# Patient Record
Sex: FEMALE | Race: WHITE | NOT HISPANIC OR LATINO | Employment: OTHER | URBAN - METROPOLITAN AREA
[De-identification: names, ages, dates, MRNs, and addresses within clinical notes are randomized per-mention and may not be internally consistent; named-entity substitution may affect disease eponyms.]

---

## 2017-01-09 ENCOUNTER — ALLSCRIPTS OFFICE VISIT (OUTPATIENT)
Dept: OTHER | Facility: OTHER | Age: 62
End: 2017-01-09

## 2017-03-17 ENCOUNTER — APPOINTMENT (OUTPATIENT)
Dept: LAB | Facility: HOSPITAL | Age: 62
End: 2017-03-17
Payer: COMMERCIAL

## 2017-03-17 ENCOUNTER — ALLSCRIPTS OFFICE VISIT (OUTPATIENT)
Dept: OTHER | Facility: OTHER | Age: 62
End: 2017-03-17

## 2017-03-17 DIAGNOSIS — N39.3 STRESS INCONTINENCE: ICD-10-CM

## 2017-03-17 DIAGNOSIS — N39.0 URINARY TRACT INFECTION: ICD-10-CM

## 2017-03-17 LAB
BILIRUB UR QL STRIP: NORMAL
CLARITY UR: NORMAL
COLOR UR: YELLOW
GLUCOSE (HISTORICAL): NORMAL
HGB UR QL STRIP.AUTO: NORMAL
KETONES UR STRIP-MCNC: NORMAL MG/DL
LEUKOCYTE ESTERASE UR QL STRIP: NORMAL
NITRITE UR QL STRIP: NORMAL
PH UR STRIP.AUTO: 7 [PH]
PROT UR STRIP-MCNC: NORMAL MG/DL
SP GR UR STRIP.AUTO: 1.01
UROBILINOGEN UR QL STRIP.AUTO: 0.2

## 2017-03-17 PROCEDURE — 87186 SC STD MICRODIL/AGAR DIL: CPT

## 2017-03-17 PROCEDURE — 87086 URINE CULTURE/COLONY COUNT: CPT

## 2017-03-17 PROCEDURE — 87077 CULTURE AEROBIC IDENTIFY: CPT

## 2017-03-19 LAB — BACTERIA UR CULT: NORMAL

## 2017-03-20 ENCOUNTER — GENERIC CONVERSION - ENCOUNTER (OUTPATIENT)
Dept: OTHER | Facility: OTHER | Age: 62
End: 2017-03-20

## 2017-05-22 ENCOUNTER — GENERIC CONVERSION - ENCOUNTER (OUTPATIENT)
Dept: OTHER | Facility: OTHER | Age: 62
End: 2017-05-22

## 2017-05-22 LAB
25(OH)D3 SERPL-MCNC: 26.7 NG/ML (ref 30–100)
A/G RATIO (HISTORICAL): 1.8 (ref 1.2–2.2)
ALBUMIN SERPL BCP-MCNC: 4.4 G/DL (ref 3.6–4.8)
ALP SERPL-CCNC: 73 IU/L (ref 39–117)
ALT SERPL W P-5'-P-CCNC: 15 IU/L (ref 0–32)
AMBIG ABBREV CMP14 DEFAULT (HISTORICAL): NORMAL
AST SERPL W P-5'-P-CCNC: 15 IU/L (ref 0–40)
BILIRUB SERPL-MCNC: 0.3 MG/DL (ref 0–1.2)
BUN SERPL-MCNC: 18 MG/DL (ref 8–27)
BUN/CREA RATIO (HISTORICAL): 17 (ref 12–28)
CALCIUM SERPL-MCNC: 9.6 MG/DL (ref 8.7–10.3)
CHLORIDE SERPL-SCNC: 107 MMOL/L (ref 96–106)
CHOLEST SERPL-MCNC: 199 MG/DL (ref 100–199)
CO2 SERPL-SCNC: 23 MMOL/L (ref 18–29)
CREAT SERPL-MCNC: 1.05 MG/DL (ref 0.57–1)
EGFR AFRICAN AMERICAN (HISTORICAL): 66 ML/MIN/1.73
EGFR-AMERICAN CALC (HISTORICAL): 57 ML/MIN/1.73
GLUCOSE SERPL-MCNC: 111 MG/DL (ref 65–99)
HDLC SERPL-MCNC: 56 MG/DL
LDLC SERPL CALC-MCNC: 111 MG/DL (ref 0–99)
POTASSIUM SERPL-SCNC: 4.6 MMOL/L (ref 3.5–5.2)
SODIUM SERPL-SCNC: 147 MMOL/L (ref 134–144)
TOT. GLOBULIN, SERUM (HISTORICAL): 2.5 G/DL (ref 1.5–4.5)
TOTAL PROTEIN (HISTORICAL): 6.9 G/DL (ref 6–8.5)
TRIGL SERPL-MCNC: 160 MG/DL (ref 0–149)

## 2017-05-23 ENCOUNTER — GENERIC CONVERSION - ENCOUNTER (OUTPATIENT)
Dept: OTHER | Facility: OTHER | Age: 62
End: 2017-05-23

## 2017-05-23 LAB — TSH SERPL DL<=0.05 MIU/L-ACNC: 2.4 UIU/ML (ref 0.45–4.5)

## 2017-06-05 ENCOUNTER — ALLSCRIPTS OFFICE VISIT (OUTPATIENT)
Dept: OTHER | Facility: OTHER | Age: 62
End: 2017-06-05

## 2017-06-05 DIAGNOSIS — F31.63 BIPOLAR DISORDER, CURRENT EPISODE MIXED, SEVERE, WITHOUT PSYCHOTIC FEATURES (HCC): ICD-10-CM

## 2017-06-21 ENCOUNTER — GENERIC CONVERSION - ENCOUNTER (OUTPATIENT)
Dept: OTHER | Facility: OTHER | Age: 62
End: 2017-06-21

## 2017-09-12 ENCOUNTER — GENERIC CONVERSION - ENCOUNTER (OUTPATIENT)
Dept: OTHER | Facility: OTHER | Age: 62
End: 2017-09-12

## 2017-09-13 ENCOUNTER — GENERIC CONVERSION - ENCOUNTER (OUTPATIENT)
Dept: OTHER | Facility: OTHER | Age: 62
End: 2017-09-13

## 2017-09-13 LAB — LITHIUM LEVEL (HISTORICAL): 1 MMOL/L (ref 0.6–1.2)

## 2017-09-25 ENCOUNTER — ALLSCRIPTS OFFICE VISIT (OUTPATIENT)
Dept: OTHER | Facility: OTHER | Age: 62
End: 2017-09-25

## 2017-11-16 ENCOUNTER — ALLSCRIPTS OFFICE VISIT (OUTPATIENT)
Dept: OTHER | Facility: OTHER | Age: 62
End: 2017-11-16

## 2017-11-17 LAB
A/G RATIO (HISTORICAL): 1.8 (ref 1.2–2.2)
ALBUMIN SERPL BCP-MCNC: 4.5 G/DL (ref 3.6–4.8)
ALP SERPL-CCNC: 78 IU/L (ref 39–117)
ALT SERPL W P-5'-P-CCNC: 17 IU/L (ref 0–32)
AST SERPL W P-5'-P-CCNC: 18 IU/L (ref 0–40)
BILIRUB SERPL-MCNC: 0.4 MG/DL (ref 0–1.2)
BUN SERPL-MCNC: 16 MG/DL (ref 8–27)
BUN/CREA RATIO (HISTORICAL): 13 (ref 12–28)
CALCIUM SERPL-MCNC: 9.5 MG/DL (ref 8.7–10.3)
CHLORIDE SERPL-SCNC: 108 MMOL/L (ref 96–106)
CHOLEST SERPL-MCNC: 208 MG/DL (ref 100–199)
CREAT SERPL-MCNC: 1.24 MG/DL (ref 0.57–1)
EGFR AFRICAN AMERICAN (HISTORICAL): 53 ML/MIN/1.73
EGFR-AMERICAN CALC (HISTORICAL): 46 ML/MIN/1.73
GLUCOSE SERPL-MCNC: 124 MG/DL (ref 65–99)
HDLC SERPL-MCNC: 56 MG/DL
LDL/HDL RATIO (HISTORICAL): 2.2 RATIO UNITS (ref 0–3.2)
LDLC SERPL CALC-MCNC: 123 MG/DL (ref 0–99)
POTASSIUM SERPL-SCNC: 4.4 MMOL/L (ref 3.5–5.2)
SODIUM SERPL-SCNC: 147 MMOL/L (ref 134–144)
TOT. GLOBULIN, SERUM (HISTORICAL): 2.5 G/DL (ref 1.5–4.5)
TOTAL PROTEIN (HISTORICAL): 7 G/DL (ref 6–8.5)
TRIGL SERPL-MCNC: 146 MG/DL (ref 0–149)
VLDLC SERPL CALC-MCNC: 29 MG/DL (ref 5–40)

## 2017-11-18 LAB
T4 FREE SERPL-MCNC: 1.28 NG/DL (ref 0.82–1.77)
TSH SERPL DL<=0.05 MIU/L-ACNC: 3.68 UIU/ML (ref 0.45–4.5)

## 2017-11-20 ENCOUNTER — GENERIC CONVERSION - ENCOUNTER (OUTPATIENT)
Dept: OTHER | Facility: OTHER | Age: 62
End: 2017-11-20

## 2017-11-20 LAB — 25(OH)D3 SERPL-MCNC: 28 NG/ML

## 2017-11-21 ENCOUNTER — GENERIC CONVERSION - ENCOUNTER (OUTPATIENT)
Dept: OTHER | Facility: OTHER | Age: 62
End: 2017-11-21

## 2017-11-21 LAB
EST. AVERAGE GLUCOSE BLD GHB EST-MCNC: 108 MG/DL
HBA1C MFR BLD HPLC: 5.4 %HB

## 2017-12-05 ENCOUNTER — ALLSCRIPTS OFFICE VISIT (OUTPATIENT)
Dept: OTHER | Facility: OTHER | Age: 62
End: 2017-12-05

## 2017-12-06 NOTE — PROGRESS NOTES
Assessment    1  GERD without esophagitis (530 81) (K21 9)   2  Hypothyroidism (244 9) (E03 9)   3  Impaired fasting glucose (790 21) (R73 01)   4  Bipolar 1 disorder, mixed, severe (296 63) (F31 63)   5  Vitamin D deficiency (268 9) (E55 9)    Plan  Hypothyroidism    · From  Levothyroxine Sodium 75 MCG Oral Tablet Take 1 tablet daily ToLevothyroxine Sodium 88 MCG Oral Tablet TAKE 1 TABLET DAILY   · (LC) TSH+Free T4; Status:Active; Requested BXW:53MQU8481;    · Follow-up visit in 6 months Evaluation and Treatment  Follow-up  Status: Hold For -Scheduling  Requested for: 02GDM8665   · Begin a limited exercise program ; Status:Complete;   Done: 53NZR2035 10:31AM   · You may slowly resume your normal level of activity once you feel better  ;Status:Complete;   Done: 72XWP1192 10:31AM  Hypothyroidism, Impaired fasting glucose, Vitamin D deficiency    · (1) COMPREHENSIVE METABOLIC PANEL; Status:Active; Requested XXC:67WYJ0746;    · (1) HEMOGLOBIN A1C; Status:Active; Requested DAVE:06LZV3919;    · (1) LIPID PANEL FASTING W DIRECT LDL REFLEX; Status:Active; Requestedfor:37Ivx9281;    · (1) TSH WITH FT4 REFLEX; Status:Active; Requested PCL:41VAE7120; Discussion/Summary    Has appt in March with psych with Dr Sanaz Gallegos  Chief Complaint  Patient here for 6 month follow up on Gastroesophageal reflux, Hypothyroidism      History of Present Illness  here for follow up   The patient is being seen for follow-up of gastroesophageal reflux disease  The patient reports doing well  There are no comorbid illnesses  She has had no significant interval events  The patient is currently asymptomatic  Medications:  the patient is adherent to her medication regimen  The patient is being seen for follow-up of hypothyroidism of undetermined etiology  The patient reports doing well  She has had no significant interval events  The patient is currently asymptomatic  Medications:  the patient is adherent to her medication regimen     The patient is being seen for a routine clinic follow-up of pre-diabetes  Recent measurements: hemoglobin A1c 5 3 %  The patient is currently asymptomatic  The patient is being seen for follow-up of vitamin D deficiency  Recent laboratory results: 25-hydroxyvitamin D 28 ng/mL  Current treatment includes dietary vitamin D  The patient is currently asymptomatic  Review of Systems   Constitutional: No fever, no chills, feels well, no tiredness, no recent weight gain or weight loss  Eyes: No complaints of eye pain, no red eyes, no eyesight problems, no discharge, no dry eyes, no itching of eyes  ENT: no complaints of earache, no loss of hearing, no nose bleeds, no nasal discharge, no sore throat, no hoarseness  Cardiovascular: No complaints of slow heart rate, no fast heart rate, no chest pain, no palpitations, no leg claudication, no lower extremity edema  Respiratory: No complaints of shortness of breath, no wheezing, no cough, no SOB on exertion, no orthopnea, no PND  Gastrointestinal: constipation, but-- as noted in HPI  Genitourinary: No complaints of dysuria, no incontinence, no pelvic pain, no dysmenorrhea, no vaginal discharge or bleeding  Musculoskeletal: No complaints of arthralgias, no myalgias, no joint swelling or stiffness, no limb pain or swelling  Integumentary: No complaints of skin rash or lesions, no itching, no skin wounds, no breast pain or lump  Neurological: No complaints of headache, no confusion, no convulsions, no numbness, no dizziness or fainting, no tingling, no limb weakness, no difficulty walking  Psychiatric: Not suicidal, no sleep disturbance, no anxiety or depression, no change in personality, no emotional problems  Endocrine: No complaints of proptosis, no hot flashes, no muscle weakness, no deepening of the voice, no feelings of weakness  Hematologic/Lymphatic: No complaints of swollen glands, no swollen glands in the neck, does not bleed easily, does not bruise easily  Active Problems  1  Anxiety (300 00) (F41 9)   2  Bipolar 1 disorder, mixed, severe (296 63) (F31 63)   3  Complaints of memory disturbance (780 93) (R41 3)   4  Depression with anxiety (300 4) (F41 8)   5  GERD without esophagitis (530 81) (K21 9)   6  Hypothyroidism (244 9) (E03 9)   7  Impaired fasting glucose (790 21) (R73 01)   8  Insomnia (780 52) (G47 00)   9  Need for prophylactic vaccination and inoculation against influenza (V04 81) (Z23)   10  Obstructive sleep apnea (327 23) (G47 33)   11  Onychomycosis of toenail (110 1) (B35 1)   12  Patellofemoral arthritis of right knee (716 96) (M17 11)   13  Patellofemoral dysfunction (719 86) (M25 869)   14  Right knee pain (719 46) (M25 561)   15  Screening for colon cancer (V76 51) (Z12 11)   16  Screening for lipoid disorders (V77 91) (Z13 220)   17  Stress incontinence of urine (N39 3)   18  Vitamin D deficiency (268 9) (E55 9)   19  Well adult on routine health check (V70 0) (Z00 00)    Past Medical History  1  History of Arthritis (V13 4)   2  History of Dysuria (788 1) (R30 0)   3  History of Foot Pain (Soft Tissue) (729 5)   4  History of Hand pain, unspecified laterality   5  History of dermatitis (V13 3) (Z87 2)   6  History of diarrhea (V12 79) (Z87 898)   7  History of fatigue (V13 89) (Z87 898)   8  History of gastroenteritis (V12 79) (Z87 19)   9  History of Well adult on routine health check (V70 0) (Z00 00)    The active problems and past medical history were reviewed and updated today  Surgical History  1  Denied: History Of Prior Surgery    The surgical history was reviewed and updated today  Family History  Mother    1  Family history of Diabetes Mellitus (V18 0)   2  Family history of Heart Valve Replacement   3  Family history of Mother  At Age 78  Father    3  Family history of Arthritis (V17 7)  Maternal Grandmother    5  Family history of Maternal Grandmother Is   Paternal Grandmother    10   Family history of Paternal Grandmother Is   Maternal Grandfather    7  Family history of Maternal Grandfather Is   Paternal Grandfather    6  Family history of Paternal Grandfather Is     The family history was reviewed and updated today  Social History     · Alcohol Use (History)   · Daily Coffee Consumption (3  Cups/Day)   · Denied: History of Drug Use   · Former smoker (V15 82) (Q69 691)   · Marital History - Currently   The social history was reviewed and updated today  Current Meds   1  Cranberry CAPS; Therapy: (HGNIPYUS:86GJM8343) to Recorded   2  Folic Acid 351 MCG Oral Tablet; TAKE 1 TABLET DAILY AS DIRECTED; Therapy: (UXMPOSSM:68WKF0705) to Recorded   3  Glucosamine Complex Oral Tablet; Therapy: (RBLEMYFM:35PKD3922) to Recorded   4  Hair/Skin/Nails/Biotin Oral Tablet; TAKE 1 TABLET DAILY; Therapy: 44DLH3341 to Recorded   5  LamoTRIgine 200 MG Oral Tablet; TAKE 1 TABLET AT BEDTIME; Therapy: 17DFM3777 to (Last JQ:51KON5123)  Requested for: 94KBH1124 Ordered   6  Levothyroxine Sodium 75 MCG Oral Tablet; Take 1 tablet daily; Therapy: 71Dhj9618 to (Last Rx:96Vgn2416)  Requested for: 67SFG7561 Ordered   7  Lithium Carbonate  MG Oral Tablet Extended Release; take 1 tablet twice a day; Therapy: 66CVL3385 to (Last 781 1704)  Requested for: [phone] Ordered   8  Melatonin 10 MG Oral Capsule; TAKE 1 CAPSULE AT BEDTIME; Therapy: 34PAY4688 to Recorded   9  Meloxicam 15 MG Oral Tablet; TAKE 1 TABLET DAILY AS NEEDED; Therapy: 2016 to (Evaluate:29Kkt7627)  Requested for: 72GQY6086; Last Rx:2016 Ordered   10  Multi-Vitamin TABS; Therapy: (BSGHZVXE:37WAT2757) to Recorded   11  Nystatin 179767 UNIT/GM External Ointment; APPLY 2-3 TIMES DAILY TO AFFECTED  AREA(S); Therapy: 79LKQ1465 to (LIIQEDRQ:25JEL7161)  Requested for: 12WVC2904; Last  Rx:95Pot8954 Ordered   12  Nystatin 237355 UNIT/GM External Powder; APPLY 2-3 TIMES DAILY TO AFFECTED  AREA(S);   Therapy: 43ZHG1108 to (Last Rx: 00UPM9244)  Requested for: 49WRH8993 Ordered   13  Omeprazole 40 MG Oral Capsule Delayed Release; take 1 capsule daily; Therapy: 94AXQ7804 to (Last Rx:06Apr2017)  Requested for: 06Apr2017 Ordered   14  Tolterodine Tartrate ER 2 MG Oral Capsule Extended Release 24 Hour; take 1 capsule  daily; Therapy: 38OUI9609 to (Evaluate:99Nqr4728)  Requested for: 51XFT2470; Last  Rx:64Gyh9439 Ordered   15  Triamcinolone Acetonide 0 1 % External Cream; APPLY SPARINGLY AND RUB IN WELL  TO AFFECTED AREA(S) TWICE DAILY  NOT FOR FACE, BREAST OR GROIN;  Therapy: 44MNG6013 to (Last Rx:64Uxj8589)  Requested for: 90Tcz6009 Ordered   16  Vitamin B12 TABS; Therapy: (UEPFWXVH:22UHH0721) to Recorded   17  Vitamin D 2000 UNIT Oral Tablet; TAKE 1 CAPSULE BY MOUTH ONCE DAILY - VIT D  DEFICIENCY; Therapy: (DEONLROR:72SRM3793) to Recorded    The medication list was reviewed and updated today  Allergies  1  Cherry Flavor LIQD   2  Peach Flavor LIQD  3  Apples   4  Other    Vitals  Vital Signs    Recorded: 30HKA6660 10:16AM   Heart Rate 80    Respiration 18    Systolic 338    Diastolic 70    Patient Refused Height Yes Yes   Patient Refused Weight Yes Yes       Physical Exam   Constitutional  General appearance: No acute distress, well appearing and well nourished  Pulmonary  Respiratory effort: No increased work of breathing or signs of respiratory distress  Auscultation of lungs: Clear to auscultation  Cardiovascular  Auscultation of heart: Normal rate and rhythm, normal S1 and S2, without murmurs  Examination of extremities for edema and/or varicosities: Normal    Abdomen  Abdomen: Non-tender, no masses  Liver and spleen: No hepatomegaly or splenomegaly  Lymphatic  Palpation of lymph nodes in neck: No lymphadenopathy  Musculoskeletal  Gait and station: Normal    Skin  Skin and subcutaneous tissue: Normal without rashes or lesions           Future Appointments    Date/Time Provider Specialty Site   12/05/2017 10:30 AM Idalia Kiran DO Family Medicine [facility]       Signatures   Electronically signed by : Kimberley Velazquez DO; Dec  5 2017 10:32AM EST                       (Author)

## 2018-01-10 NOTE — PROGRESS NOTES
Chief Complaint  Patient here for weight check      Active Problems    1  Anxiety (300 00) (F41 9)   2  Bipolar 1 disorder, mixed, severe (296 63) (F31 63)   3  Complaints of memory disturbance (780 93) (R41 3)   4  Depression with anxiety (300 4) (F41 8)   5  Flu vaccine need (V04 81) (Z23)   6  GERD without esophagitis (530 81) (K21 9)   7  Hypothyroidism (244 9) (E03 9)   8  Impaired fasting glucose (790 21) (R73 01)   9  Insomnia (780 52) (G47 00)   10  Need for chickenpox vaccination (V05 4) (Z23)   11  Need for DTaP vaccination (V06 1) (Z23)   12  Obstructive sleep apnea (327 23) (G47 33)   13  Patellofemoral arthritis of right knee (716 96) (M19 90)   14  Patellofemoral dysfunction (719 86) (M25 869)   15  Screening for lipoid disorders (V77 91) (Z13 220)   16  Stress incontinence (N39 3)   17  Well adult on routine health check (V70 0) (Z00 00)    Current Meds   1  Cranberry CAPS; Therapy: (NPFJGU83ZII9835) to Recorded   2  Folic Acid 851 MCG Oral Tablet; TAKE 1 TABLET DAILY AS DIRECTED; Therapy: (WCWTPJVV:10NBL5668) to Recorded   3  Glucosamine Complex Oral Tablet; Therapy: (QOPEXDFX:28BMX0014) to Recorded   4  Hair/Skin/Nails/Biotin Oral Tablet; TAKE 1 TABLET DAILY; Therapy: 53WXS6486 to Recorded   5  LamoTRIgine 200 MG Oral Tablet; TAKE 1 TABLET AT BEDTIME; Therapy: 79NFO6378 to (Evaluate:75Llg7229)  Requested for: 49Sbe1567; Last   Rx:55Bfm7235 Ordered   6  Levothyroxine Sodium 50 MCG Oral Tablet; take 1 tablet every day; Therapy: 59Xoe1275 to (Evaluate:82Iux7248)  Requested for: 85VUK4780; Last   Rx:2015 Ordered   7  Lithium Carbonate  MG Oral Tablet Extended Release; take 1 tablet twice a day; Therapy: 67HON6928 to (Last Rx:96Ovm8300)  Requested for: 60Wqx8300 Ordered   8  Melatonin 10 MG Oral Capsule; TAKE 1 CAPSULE AT BEDTIME; Therapy: 44VPI0897 to Recorded   9  Meloxicam 15 MG Oral Tablet; TAKE 1 TABLET DAILY AS NEEDED;    Therapy: 2016 to (Evaluate:10Bxq3968) Requested for: 21Jan2016; Last   DP:98XKC5230 Ordered   10  Multi-Vitamin TABS; Therapy: (AEFQDYBN:09EPQ0615) to Recorded   11  Omeprazole 40 MG Oral Capsule Delayed Release; take 1 capsule daily; Therapy: 44RSG9944 to (Last Rx:13Jan2016)  Requested for: 56UDD1602 Ordered   12  Triamcinolone Acetonide 0 1 % External Cream; APPLY SPARINGLY AND RUB IN    WELL TO AFFECTED AREA(S) TWICE DAILY  NOT FOR FACE, BREAST OR GROIN;    Therapy: 73ORY5123 to (Last Rx:20Ztl4214)  Requested for: 69Zbl5535 Ordered   13  Vitamin B12 TABS; Therapy: (HDMUHVQJ:39WWN3636) to Recorded   14  Vitamin D 2000 UNIT Oral Tablet; TAKE 1 CAPSULE BY MOUTH ONCE DAILY - VIT D    DEFICIENCY; Therapy: (ZKWMNRVX:10HUZ5633) to Recorded   15  Zoster (Zostavax); INJECT 0 65  ML Subcutaneous; Therapy: 20TQR6111 to (Last Rx:16Apr2014) Ordered    Allergies    1  Cherry Flavor LIQD   2  Peach Flavor LIQD    3  Apples   4  Other    Vitals  Signs [Data Includes: Current Encounter]    Height: 5 ft 1 69 in  Weight: 235 lb   BMI Calculated: 43 42  BSA Calculated: 2 04    Plan   WEIGHT DECREASED     Future Appointments    Date/Time Provider Specialty Site   03/09/2016 09:00 AM CARMEN Gomes   [facility]iatry [facility]   02/03/2016 10:40 AM Humble Rizo DO Orthopedic Surgery [address]   06/14/2016 09:15 AM Teto Alves DO Family Medicine [address] [place] [address]     Signatures   Electronically signed by : Una Baldwin DO; Feb 1 2016  9:12AM EST                       (Author)

## 2018-01-10 NOTE — RESULT NOTES
Verified Results  (1) COMPREHENSIVE METABOLIC PANEL 38YGR9233 78:44TX [facility]     Test Name Result Flag Reference   Glucose, Serum 114 mg/dL H 65-99   BUN 27 mg/dL  8-27   Creatinine, Serum 1 09 mg/dL H 0 57-1 00   eGFR If NonAfricn Am 55 mL/min/1 73 L >59   eGFR If Africn Am 63 mL/min/1 73  >59   BUN/Creatinine Ratio 25  11-26   Sodium, Serum 144 mmol/L  134-144   Potassium, Serum 4 6 mmol/L  3 5-5 2   Chloride, Serum 105 mmol/L     Carbon Dioxide, Total 24 mmol/L  18-29   Calcium, Serum 9 5 mg/dL  8 7-10 3   Protein, Total, Serum 6 9 g/dL  6 0-8 5   Albumin, Serum 4 4 g/dL  3 6-4 8   Globulin, Total 2 5 g/dL  1 5-4 5   A/G Ratio 1 8  1 1-2 5   Bilirubin, Total 0 3 mg/dL  0 0-1 2   Alkaline Phosphatase, S 80 IU/L     AST (SGOT) 16 IU/L  0-40   ALT (SGPT) 18 IU/L  0-32     (1) LIPID PANEL FASTING W DIRECT LDL REFLEX 51Nep6102 08:27AM [facility]     Test Name Result Flag Reference   Cholesterol, Total 210 mg/dL H 100-199   Triglycerides 117 mg/dL  0-149   HDL Cholesterol 58 mg/dL  >39   According to ATP-III Guidelines, HDL-C >59 mg/dL is considered a  negative risk factor for CHD  LDL Cholesterol Calc 129 mg/dL H 0-99     (1) HEMOGLOBIN A1C 72Ues7105 08:27AM [facility]     Test Name Result Flag Reference   Hemoglobin A1c 5 5 %  4 8-5 6   Pre-diabetes: 5 7 - 6 4           Diabetes: >6 4           Glycemic control for adults with diabetes: <7 0     (1) VITAMIN D 25-HYDROXY 63Zcw0541 08:27AM [facility]     Test Name Result Flag Reference   Vitamin D, 25-Hydroxy 22 1 ng/mL L 30 0-100 0   Vitamin D deficiency has been defined by the [address] practice guideline as a  level of serum 25-OH vitamin D less than 20 ng/mL (1,2)  The Endocrine Society went on to further define vitamin D  insufficiency as a level between 21 and 29 ng/mL (2)  1  IOM ([place] [employer])  2010  Dietary reference     intakes for calcium and D  [facility]:  [email or web address]  2  Dori MF, Kaycee COURTNEY, Tuan SCHMID, et al      Evaluation, treatment, and prevention of vitamin D     deficiency: an Endocrine Society clinical practice     guideline  JCEM  2011 Jul; 96(7):1911-30  (LC) TSH Rfx on Abnormal to Free T4 31Cph5043 08:27AM [employer]     Test Name Result Flag Reference   TSH 1 850 uIU/mL  0 450-4 500     [facility]) [facility] Default 52XIA4277 08:27AM [employer]     Test Name Result Flag Reference   [place] [address] Default Comment     A hand-written panel/profile was received from your office  In  accordance with the [facility] Ambiguous Test Code Policy dated July 4568, we have completed your order by using the closest currently  or formerly recognized AMA panel  We have assigned Comprehensive  Metabolic Panel (14), Test Code #278440 to this request   If this  is not the testing you wished to receive on this specimen, please  contact the [address] [place] [address] Client Inquiry/Technical Services Department  to clarify the test order  We appreciate your business  Discussion/Summary   OVERALL OK  WILL DISCUSS AT NEXT VISIT     Marjorie Ventura

## 2018-01-11 NOTE — RESULT NOTES
Discussion/Summary   NORMAL LITHIUM LEVEL      [facility]     Verified Results  (1) LITHIUM 42Qgg8023 11:02AM [facility] [place]     Test Name Result Flag Reference   Lithium (Eskalith(R)), Serum 1 0 mmol/L  0 6-1 2   Detection Limit = 0 1                                           <0 1 indicates None Detected

## 2018-01-12 VITALS
SYSTOLIC BLOOD PRESSURE: 148 MMHG | HEART RATE: 80 BPM | TEMPERATURE: 99.1 F | DIASTOLIC BLOOD PRESSURE: 86 MMHG | RESPIRATION RATE: 20 BRPM

## 2018-01-12 NOTE — RESULT NOTES
Verified Results  * XR KNEE 4+ VIEW RIGHT 25Jan2016 09:31AM [facility] Order Number: [id number]     Test Name Result Flag Reference   XR KNEE 4+ VW RIGHT (Report)     RIGHT KNEE     INDICATION:  right knee pain, swelling     COMPARISON: 6/19/2014     VIEWS: 4; 4 images     FINDINGS:     There is no acute fracture or dislocation  There is no joint effusion  There is worsening tricompartmental osteoarthritis of the right knee  There is now moderate to moderately severe medial compartment narrowing  Marginal hypertrophic spurring is present, including the patellofemoral joint     No lytic or blastic lesions are seen  Soft tissues are unremarkable  IMPRESSION:     Worsening tricompartmental osteoarthritis of the right knee       Signed by:   Bethel Franks MD   1/25/16       Discussion/Summary   worsening arthritis of right knee   f/u with orthopedic as ordered     - Dr Alston    Electronically signed by : Vonnie Ch MD; Jan 25 2016 11:45AM EST                       (Author)

## 2018-01-12 NOTE — RESULT NOTES
Discussion/Summary   VERY GOOD      [facility]     Verified Results  ([facility] [facility]) Hemoglobin A1c 44BBT1976 07:45AM [facility] Saavedra     Test Name Result Flag Reference   Hemoglobin A1c 5 4 %Hb     Reference Range:  American Diabetes Association (ADA) Guidelines:  <5 7: Decreased risk for diabetes  5 7 - 6 4: Increased risk for diabetes  >6 4: Ongoing Hyperglycemia of any cause  <7 0: Glycemic control for adults with diabetes   Estimated Average Glucose 108 mg/dL

## 2018-01-13 VITALS
SYSTOLIC BLOOD PRESSURE: 124 MMHG | RESPIRATION RATE: 18 BRPM | DIASTOLIC BLOOD PRESSURE: 76 MMHG | WEIGHT: 232.38 LBS | BODY MASS INDEX: 43.88 KG/M2 | HEIGHT: 61 IN | HEART RATE: 80 BPM

## 2018-01-13 VITALS
HEIGHT: 61 IN | WEIGHT: 233.13 LBS | SYSTOLIC BLOOD PRESSURE: 128 MMHG | BODY MASS INDEX: 44.02 KG/M2 | RESPIRATION RATE: 22 BRPM | DIASTOLIC BLOOD PRESSURE: 70 MMHG | HEART RATE: 80 BPM

## 2018-01-13 VITALS
HEIGHT: 61 IN | BODY MASS INDEX: 44.75 KG/M2 | WEIGHT: 237 LBS | RESPIRATION RATE: 20 BRPM | DIASTOLIC BLOOD PRESSURE: 72 MMHG | SYSTOLIC BLOOD PRESSURE: 124 MMHG | HEART RATE: 78 BPM

## 2018-01-13 NOTE — RESULT NOTES
Verified Results  (1) URINALYSIS w URINE C/S REFLEX (will reflex a microscopy if leukocytes, occult blood, or nitrites are not within normal limits) 30UKV4412 06:13PM [facility]     Test Name Result Flag Reference   CLINICAL REPORT (Report)     Test:        Urine culture  Specimen Type:   Urine  Specimen Date:   3/4/2016 6:13 PM  Result Date:    3/6/2016 7:28 AM  Result Status:   Final result  Resulting Lab:   [place] [address]            Tel: [phone]                 CULTURE                                       ------------------                                   30,000-39,000 cfu/ml Mixed Contaminants X3

## 2018-01-13 NOTE — PROGRESS NOTES
Assessment    1  Patellofemoral arthritis of right knee (427 91) (M19 90)    Plan  Patellofemoral arthritis of right knee    · Follow-up PRN Evaluation and Treatment  Follow-up  Status: Complete  Done:  [id number]    Discussion/Summary    Patient seen and examined by Dr Elzbieta Randolph and myself  Right knee tricompartmental osteoarthritis  1  Right knee cortisone injection given today  2  Continue Meloxicam as prescribed by PCP  3  Medial  brace fitted today  4  Follow up as needed  Discussed with patient option of ordering hyaluronic acid injections  She will call if she wishes to pursue this  Discussed future TKA  She was informed she would need to loos weight and get down to a BMI close to 35  Chief Complaint    1  Knee Pain    History of Present Illness  HPI: Patient presents with a chronic history of right knee osteoarthritis  The patient says her pain varies  Occasionally her pain is a 10/10 but other times it is dull at about a 4/10  She complains that this affects her ADLs as she has trouble walking her dog  She notices swelling  Her pain is mostly behind the knee and on the medial side  Here PCP gave her Mobic which helps a bit  She did have a course of PT about a year ago without relief  Complains of some stiffness as well  Review of Systems    Constitutional: No fever, no chills, feels well, no tiredness, no recent weight gain or loss  Eyes: No complaints of eyesight problems, no red eyes  ENT: no loss of hearing, no nosebleeds, no sore throat  Cardiovascular: No complaints of chest pain, no palpitations, no leg claudication or lower extremity edema  Respiratory: no compliants of shortness of breath, no wheezing, no cough  Gastrointestinal: no complaints of abdominal pain, no constipation, no nausea or diarrhea, no vomiting, no bloody stools  Genitourinary: no complaints of dysuria, no incontinence  Musculoskeletal: as noted in HPI     Integumentary: no complaints of skin rash or lesion, no itching or dry skin, no skin wounds  Neurological: no complaints of headache, no confusion, no numbness or tingling, no dizziness  Endocrine: No complaints of muscle weakness, no feelings of weakness, no frequent urination, no excessive thirst    Psychiatric: No suicidal thoughts, no anxiety, no feelings of depression  Active Problems    1  Anxiety (300 00) (F41 9)   2  Bipolar 1 disorder, mixed, severe (296 63) (F31 63)   3  Complaints of memory disturbance (780 93) (R41 3)   4  Depression with anxiety (300 4) (F41 8)   5  Flu vaccine need (V04 81) (Z23)   6  GERD without esophagitis (530 81) (K21 9)   7  Hypothyroidism (244 9) (E03 9)   8  Impaired fasting glucose (790 21) (R73 01)   9  Insomnia (780 52) (G47 00)   10  Need for chickenpox vaccination (V05 4) (Z23)   11  Need for DTaP vaccination (V06 1) (Z23)   12  Obstructive sleep apnea (327 23) (G47 33)   13  Patellofemoral arthritis of right knee (716 96) (M19 90)   14  Patellofemoral dysfunction (719 86) (M25 869)   15  Screening for lipoid disorders (V77 91) (Z13 220)   16  Stress incontinence (N39 3)   17  Well adult on routine health check (V70 0) (Z00 00)    Past Medical History    · History of Arthritis (V13 4)   · History of Foot Pain (Soft Tissue) (729 5)   · History of Hand pain, unspecified laterality   · History of dermatitis (V13 3) (Z87 2)   · History of fatigue (V13 89) (C24 739)    The active problems and past medical history were reviewed and updated today  Surgical History    · Denied: History Of Prior Surgery    The surgical history was reviewed and updated today         Family History    · Family history of Diabetes Mellitus (V18 0)   · Family history of Heart Valve Replacement   · Family history of Mother  At Age 78    · Family history of Arthritis (V17 7)    · Family history of Maternal Grandmother Is     · Family history of Paternal Grandmother Is     · Family history of Maternal Grandfather Is     · Family history of Paternal Grandfather Is     The family history was reviewed and updated today  Social History    · Alcohol Use (History)   · Daily Coffee Consumption (3  Cups/Day)   · Denied: History of Drug Use   · Former smoker (V15 82) (G00 382)   · Marital History - Currently   The social history was reviewed and updated today  The social history was reviewed and is unchanged  Current Meds   1  Cranberry CAPS; Therapy: (OVQQOYZN:96ZIP3425) to Recorded   2  Folic Acid 027 MCG Oral Tablet; TAKE 1 TABLET DAILY AS DIRECTED; Therapy: (TOERPAXJ:35VUN7790) to Recorded   3  Glucosamine Complex Oral Tablet; Therapy: (OBXYELAP:27HAJ9529) to Recorded   4  Hair/Skin/Nails/Biotin Oral Tablet; TAKE 1 TABLET DAILY; Therapy: 64QHS9786 to Recorded   5  LamoTRIgine 200 MG Oral Tablet; TAKE 1 TABLET AT BEDTIME; Therapy: 99MBN3302 to (Evaluate:81Zrr2238)  Requested for: 50Wyr7671; Last   Rx:96Oue4219 Ordered   6  Levothyroxine Sodium 50 MCG Oral Tablet; take 1 tablet every day; Therapy: 53Oln2226 to (Evaluate:2016)  Requested for: 56TXE7063; Last   Rx:2015 Ordered   7  Lithium Carbonate  MG Oral Tablet Extended Release; take 1 tablet twice a day; Therapy: 93OJA8855 to (Last Rx:12Jjw2694)  Requested for: 38Fhw3485 Ordered   8  Melatonin 10 MG Oral Capsule; TAKE 1 CAPSULE AT BEDTIME; Therapy: 94QJZ0054 to Recorded   9  Meloxicam 15 MG Oral Tablet; TAKE 1 TABLET DAILY AS NEEDED; Therapy: 2016 to (Evaluate:50Xob9654)  Requested for: 2016; Last   Rx:2016 Ordered   10  Multi-Vitamin TABS; Therapy: (WEHMNFOF:33QUX7036) to Recorded   11  Omeprazole 40 MG Oral Capsule Delayed Release; take 1 capsule daily; Therapy: 69AZO9430 to (Last Rx:2016)  Requested for: 44RWF9882 Ordered   12  Triamcinolone Acetonide 0 1 % External Cream; APPLY SPARINGLY AND RUB IN    WELL TO AFFECTED AREA(S) TWICE DAILY   NOT FOR FACE, BREAST OR GROIN; Therapy: 64GJL1283 to (Last Rx:21Sos9333)  Requested for: 18Zsq7915 Ordered   13  Vitamin B12 TABS; Therapy: (LHTIWQYK:78GST7174) to Recorded   14  Vitamin D 2000 UNIT Oral Tablet; TAKE 1 CAPSULE BY MOUTH ONCE DAILY - VIT D    DEFICIENCY; Therapy: (ETNSEQZB:71CQH8914) to Recorded   15  Zoster (Zostavax); INJECT 0 65  ML Subcutaneous; Therapy: 58XRS2755 to (Last Rx:78Qam0828) Ordered    The medication list was reviewed and updated today  Allergies    1  Cherry Flavor LIQD   2  Peach Flavor LIQD    3  Apples   4  Other    Vitals  Signs [Data Includes: Current Encounter]    Heart Rate: 76  Systolic: 543  Diastolic: 81  Height: 5 ft 1 7 in  Weight: 235 lb   BMI Calculated: 43 4  BSA Calculated: 2 05    Physical Exam    Morbidly obese  Right knee: Inspection reveals minor swelling, but no erythema  No warmth  Popliteal cyst is palpated  Pain with palpation at posterior knee by cyst and medial joint line  Crepitus is noted with passive ROM  5/5 strength with flexion and extension  Only able to flex knee to about 100 degrees secondary to obesity and cyst  Flexion to about 10 degrees  Constitutional - General appearance: Normal    Musculoskeletal - Gait and station: Normal  Digits and nails: Normal  Muscle strength/tone: Normal    Cardiovascular - Pulses: Normal  Examination of extremities for edema and/or varicosities: Normal    Skin - Skin and subcutaneous tissue: Normal    Neurologic - Sensation: Normal    Psychiatric - Orientation to person, place, and time: Normal  Mood and affect: Normal    Eyes   Conjunctiva and lids: Normal     Pupils and irises: Normal        Procedure    Procedure: Injection of the right knee joint  Indication:  Osteoarthritis  Potential complications include bleeding  Risk and benefits were discussed with the patient  Verbal consent was obtained prior to the procedure  Alcohol and Betadine was used to prep the area   Using sterile technique, the aspiration/injection needle was then directed from a Anterolateral aspect  Was used to inject mL of 1% Lidocaine, mL of 0 25% Bupivacaine and mL of 3mg/mL betamethasone  A bandage was applied  the patient tolerated the procedure well  Complications: none  Attending Note  Collaborating Physician Note: Collaborating Note: I interviewed and examined the patient and I agree with the Advanced Practitioner note  I discussed the case with the Advanced Practitioner and reviewed the AP note      Future Appointments    Date/Time Provider Specialty Site   03/09/2016 09:00 AM CARMEN Gomes   Psychiatry [facility]   06/14/2016 09:15 AM Teto Alves DO Family Medicine [facility]     Signatures   Electronically signed by : Rosalva Swenson, Fabiana Gonzalez; Feb  3 2016  1:12PM EST                       (Author)    Electronically signed by : John Bailey DO; Feb  3 2016  1:15PM EST                       (Author)

## 2018-01-14 NOTE — RESULT NOTES
Verified Results  (1) COMPREHENSIVE METABOLIC PANEL 01WGU7123 21:79UA Rossykari, [facility] recommendations are as follows:  GFR calculation is accurate only with a steady state creatinine  Chronic Kidney disease less than 60 ml/min/1 73 sq  meters  Kidney failure less than 15 ml/min/1 73 sq  meters  Test Name Result Flag Reference   GLUCOSE,RANDM 117 mg/dL     If the patient is fasting, the ADA then defines impaired fasting glucose as > 100 mg/dL and diabetes as > or equal to 123 mg/dL  SODIUM 145 mmol/L  136-145   POTASSIUM 4 1 mmol/L  3 5-5 3   CHLORIDE 108 mmol/L  100-108   CARBON DIOXIDE 28 mmol/L  21-32   ANION GAP (CALC) 9 mmol/L  4-13   BLOOD UREA NITROGEN 17 mg/dL  5-25   CREATININE 1 05 mg/dL  0 60-1 30   Standardized to IDMS reference method   CALCIUM 9 0 mg/dL  8 3-10 1   BILI, TOTAL 0 40 mg/dL  0 20-1 00   ALK PHOSPHATAS 73 U/L     ALT (SGPT) 26 U/L  12-78   AST(SGOT) 16 U/L  5-45   ALBUMIN 3 7 g/dL  3 5-5 0   TOTAL PROTEIN 7 0 g/dL  6 4-8 2   eGFR Non-African American 53 3 ml/min/1 73sq m       (1) LIPID PANEL FASTING W DIRECT LDL REFLEX 48NCO1204 08:35AM Jacquelyn Ramirez   Triglyceride:         Normal              <150 mg/dl       Borderline High    150-199 mg/dl       High               200-499 mg/dl       Very High          >499 mg/dl  Cholesterol:         Desirable        <200 mg/dl      Borderline High  200-239 mg/dl      High             >239 mg/dl  HDL Cholesterol:        High    >59 mg/dL      Low     <41 mg/dL  LDL Cholesterol:        Optimal          <100 mg/dl         Near Optimal     100-129 mg/dl        Above Optimal          Borderline High   130-159 mg/dl          High              160-189 mg/dl          Very High        >189 mg/dl  LDL CALCULATED:    This screening LDL is a calculated result  It does not have the accuracy of the Direct Measured LDL in the monitoring of patients with hyperlipidemia and/or statin therapy     Direct Measure LDL (VDB888) must be ordered separately in these patients  Test Name Result Flag Reference   CHOLESTEROL 194 mg/dL     LDL CHOLESTEROL CALCULATED 114 mg/dL H 0-100   TRIGLYCERIDES 121 mg/dL  <=150   HDL,DIRECT 56 mg/dL  40-60     (1) TSH WITH FT4 REFLEX 70GXF6729 08:35AM Gretchen Mayorga   Patients undergoing fluorescein dye angiography may retain small amounts of fluorescein in the body for 48-72 hours post procedure  Samples containing fluorescein can produce falsely depressed TSH values  If the patient had this procedure,a specimen should be resubmitted post fluorescein clearance  The recommended reference ranges for TSH during pregnancy are as follows:  First trimester 0 1 to 2 5 uIU/mL  Second trimester  0 2 to 3 0 uIU/mL  Third trimester 0 3 to 3 0 uIU/m     Test Name Result Flag Reference   TSH 3 277 uIU/mL  0 358-3 740     (1) HEMOGLOBIN A1C [id number] 08:35AM Cristine Foreman   5 7-6 4% impaired fasting glucose  >=6 5% diagnosis of diabetes    Falsely low levels are seen in conditions linked to short RBC life span-  hemolytic anemia, and splenomegaly  Falsely elevated levels are seen in situations where there is an increased production of RBC- receipt of erythropoietin or blood transfusions  Adopted from ADA-Clinical Practice Recommendations     Test Name Result Flag Reference   HEMOGLOBIN A1C 5 4 %  4 0-5 6   EST  AVG  GLUCOSE 108 mg/dl         Discussion/Summary   OVERALL LV Riojas

## 2018-01-15 NOTE — PSYCH
Psych Med Mgmt    Appearance: was calm and cooperative, adequate hygiene and grooming and good eye contact  Observed mood: mood appropriate  Observed mood: affect appropriate  Speech: a normal rate and fluent  Thought processes: coherent/organized  Hallucinations: no hallucinations present  Thought Content: no delusions  Abnormal Thoughts: The patient has no suicidal thoughts and no homicidal thoughts  Orientation: The patient is oriented to person, place and time, oriented to person, oriented to place and oriented to time  Recent and Remote Memory: short term memory intact and long term memory intact  Attention Span And Concentration: concentration impaired  Insight: Limited insight  Judgment: Her judgment was limited  Muscle Strength And Tone  Muscle strength and tone were normal         Goals addressed in session: Medication Management       Treatment Recommendations: Continue current medications  Risks, Benefits And Possible Side Effects Of Medications: Risks, benefits, and possible side effects of medications explained to patient and patient verbalizes understanding  She reports normal appetite, normal energy level, no weight change and normal number of sleep hours  Since last visit stated that this year did start too well because she lost 3 dear friends in the past 3 months  One  in a MVA and she ended up adopting her dog  She has a 31 yo daughter with mood disorder  She worries that her daughter is not medicated and is very unstable  Her son has an alcohol problem and recently had a fall and lacerated his face  She worries about her grandchildren  She has difficulties losing weight and she has arthritis on her knees and needs to lose weight  Vitals  Signs [Data Includes: Current Encounter]   Recorded: [id number] 09:17AM   Height: 5 ft 1 in  Weight: 234 lb   BMI Calculated: 44 21  BSA Calculated: 2 02    Assessment    1  Anxiety (300 00) (F41 9)   2   Bipolar 1 disorder, mixed, severe (296 63) (F31 63)   3  Insomnia (780 52) (G47 00)    Plan    1  LamoTRIgine 200 MG Oral Tablet; TAKE 1 TABLET AT BEDTIME   2  Lithium Carbonate  MG Oral Tablet Extended Release; take 1 tablet twice   a day    Review of Systems    Constitutional: No fever, no chills, feels well, no tiredness, no recent weight gain or loss and as noted in HPI  Cardiovascular: no complaints of slow or fast heart rate, no chest pain, no palpitations  Respiratory: no complaints of shortness of breath, no wheezing, no dyspnea on exertion  Gastrointestinal: no complaints of abdominal pain, no constipation, no nausea, no diarrhea, no vomiting  Genitourinary: no complaints of dysuria, no incontinence, no pelvic pain, no urinary frequency  Musculoskeletal: no complaints of arthralgia, no myalgias, no limb pain, no joint stiffness  Integumentary: no complaints of skin rash, no itching, no dry skin  Neurological: no complaints of headache, no confusion, no numbness, no dizziness  Substance Abuse Hx    Substance Abuse History: Denies  Active Problems    1  Anxiety (300 00) (F41 9)   2  Bipolar 1 disorder, mixed, severe (296 63) (F31 63)   3  Complaints of memory disturbance (780 93) (R41 3)   4  Depression with anxiety (300 4) (F41 8)   5  Dysuria (788 1) (R30 0)   6  Flu vaccine need (V04 81) (Z23)   7  GERD without esophagitis (530 81) (K21 9)   8  Hypothyroidism (244 9) (E03 9)   9  Impaired fasting glucose (790 21) (R73 01)   10  Insomnia (780 52) (G47 00)   11  Need for chickenpox vaccination (V05 4) (Z23)   12  Need for DTaP vaccination (V06 1) (Z23)   13  Obstructive sleep apnea (327 23) (G47 33)   14  Patellofemoral arthritis of right knee (716 96) (M19 90)   15  Patellofemoral dysfunction (719 86) (M25 869)   16  Screening for lipoid disorders (V77 91) (Z13 220)   17  Stress incontinence (N39 3)   18   Well adult on routine health check (V70 0) (Z00 00)    Past Medical History 1  History of Arthritis (V13 4)   2  History of Foot Pain (Soft Tissue) (729 5)   3  History of Hand pain, unspecified laterality   4  History of dermatitis (V13 3) (Z87 2)   5  History of fatigue (V13 89) (H92 448)    The active problems and past medical history were reviewed and updated today  Allergies    1  Cherry Flavor LIQD   2  Peach Flavor LIQD    3  Apples   4  Other    Current Meds   1  Cranberry CAPS; Therapy: (CERSKZPT:54GRW1305) to Recorded   2  Folic Acid 139 MCG Oral Tablet; TAKE 1 TABLET DAILY AS DIRECTED; Therapy: (SIAMQQYD:49GST4840) to Recorded   3  Glucosamine Complex Oral Tablet; Therapy: (UULKVRXW:05GYH0988) to Recorded   4  Hair/Skin/Nails/Biotin Oral Tablet; TAKE 1 TABLET DAILY; Therapy: 63WMO8858 to Recorded   5  LamoTRIgine 200 MG Oral Tablet; TAKE 1 TABLET AT BEDTIME; Therapy: 18JXT8697 to (Last Rx:29Sjl7859)  Requested for: 07JKN5104 Ordered   6  Levothyroxine Sodium 75 MCG Oral Tablet; TAKE 1 TABLET DAILY; Therapy: 02Vhu0287 to (534 8151)  Requested for: 55BZU0839; Last   Rx:04Mar2016 Ordered   7  Lithium Carbonate  MG Oral Tablet Extended Release; take 1 tablet twice a day; Therapy: 89LOY1937 to (Last Rx:35Ztd8485)  Requested for: 98Apn9475 Ordered   8  Melatonin 10 MG Oral Capsule; TAKE 1 CAPSULE AT BEDTIME; Therapy: 43KSG1457 to Recorded   9  Meloxicam 15 MG Oral Tablet; TAKE 1 TABLET DAILY AS NEEDED; Therapy: 21Jan2016 to (Evaluate:34Ajy2981)  Requested for: 89MOK4470; Last   Rx:21Jan2016 Ordered   10  Multi-Vitamin TABS; Therapy: (RXHUNFOI:36QJQ6267) to Recorded   11  Omeprazole 40 MG Oral Capsule Delayed Release; take 1 capsule daily; Therapy: 12ULM5844 to (Last Rx:13Jan2016)  Requested for: 48UTO8550 Ordered   12  Triamcinolone Acetonide 0 1 % External Cream; APPLY SPARINGLY AND RUB IN WELL    TO AFFECTED AREA(S) TWICE DAILY   NOT FOR FACE, BREAST OR GROIN;    Therapy: 91Kdd3676 to (Last Rx:50Bdx5651)  Requested for: 21Jul2015 Ordered 13  VESIcare 5 MG Oral Tablet; TAKE 1 TABLET DAILY; Therapy: 94REW4238 to (Last Rx:2016)  Requested for: 27OBP4972 Ordered   14  Vitamin B12 TABS; Therapy: (DU:37HZH9588) to Recorded   15  Vitamin D 2000 UNIT Oral Tablet; TAKE 1 CAPSULE BY MOUTH ONCE DAILY - VIT D    DEFICIENCY; Therapy: (CORAZONLDVGWEN:54GZY6615) to Recorded   16  Zoster (Zostavax); INJECT 0 65  ML Subcutaneous; Therapy: 80FUI3730 to (Last Rx:2014) Ordered    The medication list was reviewed and updated today  Family Psych History    1  Family history of Diabetes Mellitus (V18 0)   2  Family history of Heart Valve Replacement   3  Family history of Mother  At Age 79    3  Family history of Arthritis (V17 7)    5  Family history of Maternal Grandmother Is     6  Family history of Paternal Grandmother Is     9  Family history of Maternal Grandfather Is     6  Family history of Paternal Grandfather Is     The family history was reviewed and updated today  Social History    · Alcohol Use (History)   · Daily Coffee Consumption (3  Cups/Day)   · Denied: History of Drug Use   · Former smoker (V15 82) (T21 016)   · Marital History - Currently   The social history was reviewed and updated today  The social history was reviewed and is unchanged  End of Encounter Meds    1  LamoTRIgine 200 MG Oral Tablet; TAKE 1 TABLET AT BEDTIME; Therapy: 88Fnh3084 to (Evaluate:2016)  Requested for: 12PYR0667; Last   Rx:2016 Ordered   2  Lithium Carbonate  MG Oral Tablet Extended Release; take 1 tablet twice a day; Therapy: 35MTH0500 to (Evaluate:2016)  Requested for: 41YSK3612; Last   Rx:2016 Ordered    3  Omeprazole 40 MG Oral Capsule Delayed Release; take 1 capsule daily; Therapy: 26WVL0006 to (Last Rx:2016)  Requested for: 23CLD1449 Ordered    4  Cranberry CAPS; Therapy: (AVXWMDGX:03OKO8851) to Recorded   5   Folic Acid 363 MCG Oral Tablet; TAKE 1 TABLET DAILY AS DIRECTED; Therapy: (VGMVUOZR:98FRP6349) to Recorded   6  Hair/Skin/Nails/Biotin Oral Tablet; TAKE 1 TABLET DAILY; Therapy: 26RSO1286 to Recorded   7  Multi-Vitamin TABS; Therapy: (ZSRUMBLE:62GQM0874) to Recorded   8  Vitamin B12 TABS; Therapy: (NHESEYVV:01STD8552) to Recorded   9  Vitamin D 2000 UNIT Oral Tablet; TAKE 1 CAPSULE BY MOUTH ONCE DAILY - VIT D   DEFICIENCY; Therapy: (EVVPIWYL:33WFR6790) to Recorded   10  Zoster (Zostavax) (Zoster (Zostavax)); INJECT 0 65  ML Subcutaneous; Therapy: 08FUN9750 to (Last Rx:16Apr2014) Ordered    11  Glucosamine Complex Oral Tablet; Therapy: (YJPUPZOD:52MGT3664) to Recorded   12  Levothyroxine Sodium 75 MCG Oral Tablet; TAKE 1 TABLET DAILY; Therapy: 24Apr2015 to (0474 77 19 07)  Requested for: 21XUH6931; Last    Rx:04Mar2016 Ordered    13  Melatonin 10 MG Oral Capsule; TAKE 1 CAPSULE AT BEDTIME; Therapy: 14LHU3237 to Recorded    14  Meloxicam 15 MG Oral Tablet; TAKE 1 TABLET DAILY AS NEEDED; Therapy: 21Jan2016 to (Evaluate:28Hwk1597)  Requested for: 64DIM1575; Last    Rx:21Jan2016 Ordered    15  Triamcinolone Acetonide 0 1 % External Cream; APPLY SPARINGLY AND RUB IN WELL    TO AFFECTED AREA(S) TWICE DAILY  NOT FOR FACE, BREAST OR GROIN;    Therapy: 01DMH5704 to (Last Rx:23Vtk5052)  Requested for: 64Pgt7854 Ordered    16  VESIcare 5 MG Oral Tablet; TAKE 1 TABLET DAILY;     Therapy: 22Pua2819 to (Last Rx:04Mar2016)  Requested for: 46GPX3128 Ordered    Future Appointments    Date/Time Provider Specialty Site   09/23/2016 08:00 AM Darci Crowe DO Family Medicine [address] [facility]     Signatures   Electronically signed by : CARMEN Vargas ; Mar  9 2016  9:37AM EST                       (Author)

## 2018-01-16 ENCOUNTER — GENERIC CONVERSION - ENCOUNTER (OUTPATIENT)
Dept: OTHER | Facility: OTHER | Age: 63
End: 2018-01-16

## 2018-01-16 LAB
T4 FREE SERPL-MCNC: 1.62 NG/DL (ref 0.82–1.77)
TSH SERPL DL<=0.05 MIU/L-ACNC: 1.51 UIU/ML (ref 0.45–4.5)

## 2018-01-16 NOTE — RESULT NOTES
Discussion/Summary   KIDNEY FUNCTION DECREASED SLIGHTLY  TRIGLYCERIDES IMPROVED  WILL DISCUSS AT NEXT VISIT     [facility]     Verified Results  ([facility] [facility]) CMP 12+1AC 54QKS7250 07:45AM Jaclyn[place]     Test Name Result Flag Reference   Glucose, Serum 124 mg/dL H 65-99   BUN 16 mg/dL  8-27   Creatinine, Serum 1 24 mg/dL H 0 57-1 00   eGFR If NonAfricn Am 46 mL/min/1 73 L >59   eGFR If Africn Am 53 mL/min/1 73 L >59   BUN/Creatinine Ratio 13  12-28   Sodium, Serum 147 mmol/L H 134-144   Potassium, Serum 4 4 mmol/L  3 5-5 2   Chloride, Serum 108 mmol/L H    Calcium, Serum 9 5 mg/dL  8 7-10 3   Protein, Total, Serum 7 0 g/dL  6 0-8 5   Albumin, Serum 4 5 g/dL  3 6-4 8   Globulin, Total 2 5 g/dL  1 5-4 5   A/G Ratio 1 8  1 2-2 2   Bilirubin, Total 0 4 mg/dL  0 0-1 2   Alkaline Phosphatase, S 78 IU/L     AST (SGOT) 18 IU/L  0-40   ALT (SGPT) 17 IU/L  0-32     (LC) Lipid Panel With LDL/HDL Ratio 64SLE1203 07:45AM [place]     Test Name Result Flag Reference   Cholesterol, Total 208 mg/dL H 100-199   Triglycerides 146 mg/dL  0-149   HDL Cholesterol 56 mg/dL  >39   VLDL Cholesterol Blue 29 mg/dL  5-40   LDL Cholesterol Calc 123 mg/dL H 0-99   LDL/HDL Ratio 2 2 ratio units  0 0-3 2   LDL/HDL Ratio                                                             Men  Women                                               1/2 Avg  Risk  1 0    1 5                                                   Avg Risk  3 6    3 2                                                2X Avg  Risk  6 2    5 0                                                3X Avg  Risk  8 0    6 1     (LC) TSH+Free T4 29HHE2843 07:45AM [place]     Test Name Result Flag Reference   TSH 3 680 uIU/mL  0 450-4 500   T4,Free(Direct) 1 28 ng/dL  0 82-1 77

## 2018-01-16 NOTE — RESULT NOTES
Discussion/Summary   OVERALL OK   WILL DISCUSS AT NEXT VISIT  TIRGLYCERIDES ARE UP     DR Remington Bocanegra     Verified Results  (1) COMPREHENSIVE METABOLIC PANEL [id number] 77:34OF Iver Closs     Test Name Result Flag Reference   Glucose, Serum 111 mg/dL H 65-99   BUN 18 mg/dL  8-27   Creatinine, Serum 1 05 mg/dL H 0 57-1 00   BUN/Creatinine Ratio 17  12-28   Sodium, Serum 147 mmol/L H 134-144   Potassium, Serum 4 6 mmol/L  3 5-5 2   Chloride, Serum 107 mmol/L H    Carbon Dioxide, Total 23 mmol/L  18-29   Calcium, Serum 9 6 mg/dL  8 7-10 3   Protein, Total, Serum 6 9 g/dL  6 0-8 5   Albumin, Serum 4 4 g/dL  3 6-4 8   Globulin, Total 2 5 g/dL  1 5-4 5   A/G Ratio 1 8  1 2-2 2   Bilirubin, Total 0 3 mg/dL  0 0-1 2   Alkaline Phosphatase, S 73 IU/L     AST (SGOT) 15 IU/L  0-40   ALT (SGPT) 15 IU/L  0-32   eGFR If NonAfricn Am 57 mL/min/1 73 L >59   eGFR If Africn Am 66 mL/min/1 73  >59     (1) LIPID PANEL FASTING W DIRECT LDL REFLEX [id number] 07:22AM Rama Closs     Test Name Result Flag Reference   Cholesterol, Total 199 mg/dL  100-199   Triglycerides 160 mg/dL H 0-149   HDL Cholesterol 56 mg/dL  >39   LDL Cholesterol Calc 111 mg/dL H 0-99     (1) VITAMIN D 25-HYDROXY [id number] 07:22AM Perfect Earthprakash Closs     Test Name Result Flag Reference   Vitamin D, 25-Hydroxy 26 7 ng/mL L 30 0-100 0   Vitamin D deficiency has been defined by the [place] of  Medicine and an Endocrine Society practice guideline as a  level of serum 25-OH vitamin D less than 20 ng/mL (1,2)  The Endocrine Society went on to further define vitamin D  insufficiency as a level between 21 and 29 ng/mL (2)  1  IOM ([place] of Medicine)  2010  Dietary reference     intakes for calcium and D  [facility]: The     [facility] [employer]  2  Dori MF, Kacyee NC, Tuan SCHMID, et al      Evaluation, treatment, and prevention of vitamin D     deficiency: an Endocrine Society clinical practice     guideline  JCEM   2011 Jul; 96(7):1911-30  (LC) TSH Rfx on Abnormal to Free T4 [id number] 07:22AM Peymane Mac     Test Name Result Flag Reference   TSH 2 400 uIU/mL  0 450-4 500     [facility]) Lolly Billings CMP14 Default [id number] 07:22AM Peymane Mac     Test Name Result Flag Reference   Lolly Billings CMP14 Default Comment     A hand-written panel/profile was received from your office  In  accordance with the [facility] Ambiguous Test Code Policy dated July 8886, we have completed your order by using the closest currently  or formerly recognized AMA panel  We have assigned Comprehensive  Metabolic Panel (14), Test Code #447164 to this request   If this  is not the testing you wished to receive on this specimen, please  contact the [facility] Client Inquiry/Technical Services Department  to clarify the test order  We appreciate your business

## 2018-01-16 NOTE — RESULT NOTES
Discussion/Summary   VITAMIN D LEVEL IS LOW  YOU NEED TO INCREASE YOUR INTAKE     [facility]     Verified Results  ([facility]) Vitamin D, 25-Hydroxy, Total [id number] 07:45AM [facility]     Test Name Result Flag Reference   Vitamin D, 25-Hydroxy, Serum 28 ng/mL L    Reference Range:   All Ages: Target levels 30 - 100

## 2018-01-17 NOTE — PROGRESS NOTES
Assessment    1  Well adult on routine health check (V70 0) (Z00 00)    Plan  Bipolar 1 disorder, mixed, severe    · (1) LITHIUM; Status:Active; Requested [id number];   Hypothyroidism, Impaired fasting glucose, Vitamin D deficiency    · (LC) CMP 12+1AC; Status:Active; Requested for:01Nov2017;    · (LC) Hemoglobin A1c; Status:Active; Requested for:01Nov2017;    · (LC) Lipid Panel With LDL/HDL Ratio; Status:Active; Requested for:01Nov2017;    · (LC) TSH+Free T4; Status:Active; Requested for:01Nov2017;    · (LC) Vitamin D, 25-Hydroxy, Total; Status:Active; Requested for:01Nov2017;   Patellofemoral arthritis of right knee    · Meloxicam 15 MG Oral Tablet; TAKE 1 TABLET DAILY AS NEEDED    Discussion/Summary  health maintenance visit Currently, she eats a healthy diet  cervical cancer screening is current Breast cancer screening: mammogram is current  Colorectal cancer screening: colorectal cancer screening is current  The immunizations are up to date  She was advised to be evaluated by an ophthalmologist  Advice and education were given regarding aerobic exercise and weight loss  Patient discussion: discussed with the patient  Chief Complaint  Patient here for annual wellness exam      History of Present Illness  HM, Adult Female: The patient is being seen for a health maintenance evaluation  General Health: The patient's health since the last visit is described as good  She has regular dental visits  She denies vision problems  She denies hearing loss  Immunizations status: up to date  Lifestyle:  She consumes a diverse and healthy diet  She has weight concerns  She exercises regularly  She does not use tobacco  She denies alcohol use  She denies drug use  Screening: cancer screening reviewed and updated  Cervical cancer screening includes a pap smear performed last year  Breast cancer screening includes a mammogram performed last year   Colorectal cancer screening includes a colonoscopy performed within the past ten years  metabolic screening reviewed and updated  Metabolic screening includes lipid profile performed within the past five years, glucose screening performed last year and thyroid function test performed last year  HPI: here for well visit      Review of Systems    Constitutional: feeling tired, but as noted in HPI  Eyes: No complaints of eye pain, no red eyes, no eyesight problems, no discharge, no dry eyes, no itching of eyes  ENT: no complaints of earache, no loss of hearing, no nose bleeds, no nasal discharge, no sore throat, no hoarseness  Cardiovascular: No complaints of slow heart rate, no fast heart rate, no chest pain, no palpitations, no leg claudication, no lower extremity edema  Respiratory: No complaints of shortness of breath, no wheezing, no cough, no SOB on exertion, no orthopnea, no PND  Gastrointestinal: No complaints of abdominal pain, no constipation, no nausea or vomiting, no diarrhea, no bloody stools  Genitourinary: No complaints of dysuria, no incontinence, no pelvic pain, no dysmenorrhea, no vaginal discharge or bleeding  Musculoskeletal: No complaints of arthralgias, no myalgias, no joint swelling or stiffness, no limb pain or swelling  Integumentary: No complaints of skin rash or lesions, no itching, no skin wounds, no breast pain or lump  Neurological: tremor, but as noted in HPI  Psychiatric: Not suicidal, no sleep disturbance, no anxiety or depression, no change in personality, no emotional problems  Endocrine: No complaints of proptosis, no hot flashes, no muscle weakness, no deepening of the voice, no feelings of weakness  Hematologic/Lymphatic: No complaints of swollen glands, no swollen glands in the neck, does not bleed easily, does not bruise easily  Active Problems    1  Anxiety (300 00) (F41 9)   2  Bipolar 1 disorder, mixed, severe (296 63) (F31 63)   3  Complaints of memory disturbance (780 93) (R41 3)   4   Depression with anxiety (300 4) (F41 8)   5  Flu vaccine need (V04 81) (Z23)   6  GERD without esophagitis (530 81) (K21 9)   7  Hypothyroidism (244 9) (E03 9)   8  Impaired fasting glucose (790 21) (R73 01)   9  Insomnia (780 52) (G47 00)   10  Need for chickenpox vaccination (V05 4) (Z23)   11  Need for DTaP vaccination (V06 1) (Z23)   12  Obstructive sleep apnea (327 23) (G47 33)   13  Onychomycosis of toenail (110 1) (B35 1)   14  Patellofemoral arthritis of right knee (716 96) (M17 11)   15  Patellofemoral dysfunction (719 86) (M25 869)   16  Right knee pain (719 46) (M25 561)   17  Screening for colon cancer (V76 51) (Z12 11)   18  Screening for lipoid disorders (V77 91) (Z13 220)   19  Stress incontinence of urine (N39 3)   20   Vitamin D deficiency (268 9) (E55 9)    Past Medical History    · History of Arthritis (V13 4)   · History of Dysuria (788 1) (R30 0)   · History of Foot Pain (Soft Tissue) (729 5)   · History of Hand pain, unspecified laterality   · History of dermatitis (V13 3) (Z87 2)   · History of diarrhea (V12 79) (C61 660)   · History of fatigue (V13 89) (Z87 898)   · History of gastroenteritis (V12 79) (Z87 19)   · History of Well adult on routine health check (V70 0) (Z00 00)    Surgical History    · Denied: History Of Prior Surgery    Family History  Mother    · Family history of Diabetes Mellitus (V18 0)   · Family history of Heart Valve Replacement   · Family history of Mother  At Age 78  Father    · Family history of Arthritis (V17 7)  Maternal Grandmother    · Family history of Maternal Grandmother Is   Paternal Grandmother    · Family history of Paternal Grandmother Is   Maternal Grandfather    · Family history of Maternal Grandfather Is   Paternal Grandfather    · Family history of Paternal Grandfather Is     Social History    · Alcohol Use (History)   · Daily Coffee Consumption (3  Cups/Day)   · Denied: History of Drug Use   · Former smoker (V15 82) (R81 898)   · Marital History - Currently     Current Meds   1  Cranberry CAPS; Therapy: (QMWYGHLY:92ZAC8389) to Recorded   2  Folic Acid 442 MCG Oral Tablet; TAKE 1 TABLET DAILY AS DIRECTED; Therapy: (JAOHQFJD:17CZQ4403) to Recorded   3  Glucosamine Complex Oral Tablet; Therapy: (WNSZGQAE:68IOO6108) to Recorded   4  Hair/Skin/Nails/Biotin Oral Tablet; TAKE 1 TABLET DAILY; Therapy: 83KEZ4619 to Recorded   5  LamoTRIgine 200 MG Oral Tablet; TAKE 1 TABLET AT BEDTIME; Therapy: 38WYQ6622 to (Last MR:00VBS7523)  Requested for: 93WZQ9985 Ordered   6  Levothyroxine Sodium 75 MCG Oral Tablet; Take 1 tablet daily; Therapy: 47Ukg2527 to (Last Rx:09Mar2017)  Requested for: 02OQW5834 Ordered   7  Lithium Carbonate  MG Oral Tablet Extended Release; take 1 tablet twice a day; Therapy: 52KLD3352 to (Last YP:39XTT4319)  Requested for: 61XVI5579 Ordered   8  Melatonin 10 MG Oral Capsule; TAKE 1 CAPSULE AT BEDTIME; Therapy: 67KFR2680 to Recorded   9  Meloxicam 15 MG Oral Tablet; TAKE 1 TABLET DAILY AS NEEDED; Therapy: 21Jan2016 to (Evaluate:49Hha7732)  Requested for: 71GEN5313; Last   Rx:21Jan2016 Ordered   10  Multi-Vitamin TABS; Therapy: (GOZDHNJI:70JWT6880) to Recorded   11  Omeprazole 40 MG Oral Capsule Delayed Release; take 1 capsule daily; Therapy: 88WCI7548 to (Last Rx:06Apr2017)  Requested for: 06Apr2017 Ordered   12  Tolterodine Tartrate ER 2 MG Oral Capsule Extended Release 24 Hour; take 1 capsule    daily; Therapy: 61MML0104 to (Evaluate:71Tgf1695)  Requested for: 43CAW9568; Last    Rx:17Mar2017 Ordered   13  Triamcinolone Acetonide 0 1 % External Cream; APPLY SPARINGLY AND RUB IN    WELL TO AFFECTED AREA(S) TWICE DAILY  NOT FOR FACE, BREAST OR GROIN;    Therapy: 95INZ4641 to (Last Rx:36Rjr6796)  Requested for: 42Tnb5831 Ordered   14  Vitamin B12 TABS; Therapy: (HESHWIJD:71ZPK6605) to Recorded   15   Vitamin D 2000 UNIT Oral Tablet; TAKE 1 CAPSULE BY MOUTH ONCE DAILY - VIT D    DEFICIENCY; Therapy: (EXHDVZUL:21GYM4148) to Recorded    Allergies    1  Cherry Flavor LIQD   2  Peach Flavor LIQD    3  Apples   4  Other    Vitals   Recorded: [id number] 10:50AM   Heart Rate 80   Respiration 22   Systolic 591   Diastolic 70   Height 5 ft 1 in   Weight 233 lb 2 oz   BMI Calculated 44 05   BSA Calculated 2 02     Physical Exam    Constitutional   General appearance: No acute distress, well appearing and well nourished  Head and Face   Head and face: Normal     Eyes   Conjunctiva and lids: No swelling, erythema or discharge  Pupils and irises: Equal, round, reactive to light  Ears, Nose, Mouth, and Throat   External inspection of ears and nose: Normal     Otoscopic examination: Tympanic membranes translucent with normal light reflex  Canals patent without erythema  Hearing: Normal     Nasal mucosa, septum, and turbinates: Normal without edema or erythema  Lips, teeth, and gums: Normal, good dentition  Oropharynx: Normal with no erythema, edema, exudate or lesions  Neck   Neck: Supple, symmetric, trachea midline, no masses  Thyroid: Normal, no thyromegaly  Pulmonary   Respiratory effort: No increased work of breathing or signs of respiratory distress  Auscultation of lungs: Clear to auscultation  Cardiovascular   Auscultation of heart: Normal rate and rhythm, normal S1 and S2, no murmurs  Examination of extremities for edema and/or varicosities: Normal     Abdomen   Abdomen: Non-tender, no masses  Liver and spleen: No hepatomegaly or splenomegaly  Lymphatic   Palpation of lymph nodes in neck: No lymphadenopathy  Palpation of lymph nodes in axillae: No lymphadenopathy  Musculoskeletal   Gait and station: Normal     Digits and nails: Normal without clubbing or cyanosis  Joints, bones, and muscles: Normal     Range of motion: Normal     Stability: Normal     Muscle strength/tone: Normal     Skin   Skin and subcutaneous tissue: Normal without rashes or lesions  Palpation of skin and subcutaneous tissue: Normal turgor  Neurologic   Cranial nerves: Cranial nerves II-XII intact  Cortical function: Normal mental status  Reflexes: 2+ and symmetric  Sensation: No sensory loss  Coordination: Normal finger to nose and heel to shin  Psychiatric   Judgment and insight: Normal     Orientation to person, place, and time: Normal     Recent and remote memory: Intact  Mood and affect: Normal        Results/Data  (1) COMPREHENSIVE METABOLIC PANEL [id number] 70:24XW Alexander Tavera     Test Name Result Flag Reference   Glucose, Serum 111 mg/dL H 65-99   BUN 18 mg/dL  8-27   Creatinine, Serum 1 05 mg/dL H 0 57-1 00   BUN/Creatinine Ratio 17  12-28   Sodium, Serum 147 mmol/L H 134-144   Potassium, Serum 4 6 mmol/L  3 5-5 2   Chloride, Serum 107 mmol/L H    Carbon Dioxide, Total 23 mmol/L  18-29   Calcium, Serum 9 6 mg/dL  8 7-10 3   Protein, Total, Serum 6 9 g/dL  6 0-8 5   Albumin, Serum 4 4 g/dL  3 6-4 8   Globulin, Total 2 5 g/dL  1 5-4 5   A/G Ratio 1 8  1 2-2 2   Bilirubin, Total 0 3 mg/dL  0 0-1 2   Alkaline Phosphatase, S 73 IU/L     AST (SGOT) 15 IU/L  0-40   ALT (SGPT) 15 IU/L  0-32   eGFR If NonAfricn Am 57 mL/min/1 73 L >59   eGFR If Africn Am 66 mL/min/1 73  >59     (1) LIPID PANEL FASTING W DIRECT LDL REFLEX [id number] 07:22AM Alexander [facility]     Test Name Result Flag Reference   Cholesterol, Total 199 mg/dL  100-199   Triglycerides 160 mg/dL H 0-149   HDL Cholesterol 56 mg/dL  >39   LDL Cholesterol Calc 111 mg/dL H 0-99     (1) VITAMIN D 25-HYDROXY [id number] 07:22AM Alexander [employer] [facility]     Test Name Result Flag Reference   Vitamin D, 25-Hydroxy 26 7 ng/mL L 30 0-100 0   Vitamin D deficiency has been defined by the [place] of  Medicine and an Endocrine Society practice guideline as a  level of serum 25-OH vitamin D less than 20 ng/mL (1,2)    The Endocrine Society went on to further define vitamin D  insufficiency as a level between 21 and 29 ng/mL (2)   1  IOM ([place] of Medicine)  2010  Dietary reference     intakes for calcium and D  [facility]: The     [place]  2  Dori MF, Kaycee COURTNEY, Tuan SCHMID, et al      Evaluation, treatment, and prevention of vitamin D     deficiency: an Endocrine Society clinical practice     guideline  JCEM  2011 Jul; 96(7):1911-30       (LC) TSH Rfx on Abnormal to Free T4 [id number] 07:22AM Alexander Toscano     Test Name Result Flag Reference   TSH 2 400 uIU/mL  0 450-4 500       Signatures   Electronically signed by : Lissett Ontiveros DO; Jun 5 2017 11:08AM EST                       (Author)

## 2018-01-23 VITALS — RESPIRATION RATE: 18 BRPM | DIASTOLIC BLOOD PRESSURE: 70 MMHG | HEART RATE: 80 BPM | SYSTOLIC BLOOD PRESSURE: 120 MMHG

## 2018-01-23 NOTE — RESULT NOTES
Discussion/Summary   THYROID LEVEL IS BETTER   DR Julieta Scott     Verified Results  ([address] [facility]) TSH+Free T4 [id number] 01:02PM Rosita Roth     Test Name Result Flag Reference   TSH 1 510 uIU/mL  0 450-4 500   T4,Free(Direct) 1 62 ng/dL  0 82-1 77

## 2018-02-09 DIAGNOSIS — F31.9 BIPOLAR 1 DISORDER (HCC): Primary | ICD-10-CM

## 2018-02-09 RX ORDER — LITHIUM CARBONATE 450 MG
450 TABLET, EXTENDED RELEASE ORAL 2 TIMES DAILY
Qty: 180 TABLET | Refills: 0 | Status: SHIPPED | OUTPATIENT
Start: 2018-02-09 | End: 2018-08-17 | Stop reason: SDUPTHER

## 2018-02-09 RX ORDER — LITHIUM CARBONATE 450 MG
1 TABLET, EXTENDED RELEASE ORAL 2 TIMES DAILY
COMMUNITY
Start: 2015-12-24 | End: 2018-02-09 | Stop reason: SDUPTHER

## 2018-02-19 ENCOUNTER — OFFICE VISIT (OUTPATIENT)
Dept: FAMILY MEDICINE CLINIC | Facility: CLINIC | Age: 63
End: 2018-02-19
Payer: COMMERCIAL

## 2018-02-19 VITALS
BODY MASS INDEX: 45.42 KG/M2 | RESPIRATION RATE: 16 BRPM | SYSTOLIC BLOOD PRESSURE: 104 MMHG | HEART RATE: 68 BPM | TEMPERATURE: 98.6 F | DIASTOLIC BLOOD PRESSURE: 78 MMHG | WEIGHT: 240.4 LBS

## 2018-02-19 DIAGNOSIS — J01.10 ACUTE NON-RECURRENT FRONTAL SINUSITIS: Primary | ICD-10-CM

## 2018-02-19 PROCEDURE — 99213 OFFICE O/P EST LOW 20 MIN: CPT | Performed by: FAMILY MEDICINE

## 2018-02-19 RX ORDER — MULTIVITAMIN
TABLET ORAL
COMMUNITY

## 2018-02-19 RX ORDER — LANOLIN ALCOHOL/MO/W.PET/CERES
1 CREAM (GRAM) TOPICAL DAILY
COMMUNITY
End: 2018-05-21 | Stop reason: ALTCHOICE

## 2018-02-19 RX ORDER — UBIDECARENONE 75 MG
CAPSULE ORAL
COMMUNITY
End: 2018-05-21 | Stop reason: ALTCHOICE

## 2018-02-19 RX ORDER — NYSTATIN 100000 [USP'U]/G
POWDER TOPICAL AS NEEDED
COMMUNITY
Start: 2017-09-25 | End: 2020-06-19 | Stop reason: SDUPTHER

## 2018-02-19 RX ORDER — MULTIVIT-MIN/IRON/FOLIC ACID/K 18-600-40
1 CAPSULE ORAL DAILY
COMMUNITY

## 2018-02-19 RX ORDER — TOLTERODINE 2 MG/1
1 CAPSULE, EXTENDED RELEASE ORAL DAILY
COMMUNITY
Start: 2017-03-17 | End: 2018-03-23 | Stop reason: SDUPTHER

## 2018-02-19 RX ORDER — TRIAMCINOLONE ACETONIDE 1 MG/G
CREAM TOPICAL 2 TIMES DAILY
COMMUNITY
Start: 2015-07-21 | End: 2018-05-21 | Stop reason: ALTCHOICE

## 2018-02-19 RX ORDER — MELATONIN 10 MG
1 CAPSULE ORAL
COMMUNITY
Start: 2015-07-21 | End: 2018-05-21 | Stop reason: ALTCHOICE

## 2018-02-19 RX ORDER — AMOXICILLIN AND CLAVULANATE POTASSIUM 875; 125 MG/1; MG/1
1 TABLET, FILM COATED ORAL 2 TIMES DAILY WITH MEALS
Qty: 20 TABLET | Refills: 0 | Status: SHIPPED | OUTPATIENT
Start: 2018-02-19 | End: 2018-03-01

## 2018-02-19 RX ORDER — LAMOTRIGINE 200 MG/1
1 TABLET ORAL
COMMUNITY
Start: 2015-07-20 | End: 2018-04-02 | Stop reason: SDUPTHER

## 2018-02-19 RX ORDER — OMEPRAZOLE 40 MG/1
1 CAPSULE, DELAYED RELEASE ORAL DAILY
COMMUNITY
Start: 2016-01-13 | End: 2018-05-25 | Stop reason: SDUPTHER

## 2018-02-19 RX ORDER — LEVOTHYROXINE SODIUM 88 UG/1
1 TABLET ORAL DAILY
COMMUNITY
Start: 2015-04-24 | End: 2018-03-20 | Stop reason: SDUPTHER

## 2018-02-19 NOTE — PROGRESS NOTES
Assessment/Plan:    Acute non-recurrent frontal sinusitis  Cherelle is stable on exam   She is to f/u PRN  Will treat at this time with Augmentin x 10 days, OTC Cough and Cold Preps PRN (coricidin HBP, etc), rest, and good PO hydration  Diagnoses and all orders for this visit:    Acute non-recurrent frontal sinusitis  -     amoxicillin-clavulanate (AUGMENTIN) 875-125 mg per tablet; Take 1 tablet by mouth 2 (two) times a day with meals for 10 days    Other orders  -     Cranberry 1000 MG CAPS; Take by mouth  -     folic acid (FOLVITE) 667 mcg tablet; Take 1 tablet by mouth daily  -     Glucosamine-Chondroit-Vit C-Mn (GLUCOSAMINE 1500 COMPLEX PO); Take by mouth  -     Multiple Vitamins-Minerals (HAIR/SKIN/NAILS/BIOTIN PO); Take 1 tablet by mouth daily  -     lamoTRIgine (LaMICtal) 200 MG tablet; Take 1 tablet by mouth  -     levothyroxine 88 mcg tablet; Take 1 tablet by mouth daily  -     Melatonin 10 MG CAPS; Take 1 capsule by mouth  -     Multiple Vitamin (MULTI-VITAMIN DAILY) TABS; Take by mouth  -     nystatin (MYCOSTATIN) powder; Apply topically 3 (three) times a day  -     omeprazole (PriLOSEC) 40 MG capsule; Take 1 capsule by mouth daily  -     tolterodine (DETROL LA) 2 mg 24 hr capsule; Take 1 capsule by mouth daily  -     triamcinolone (KENALOG) 0 1 % cream; Apply topically 2 (two) times a day  -     cyanocobalamin (VITAMIN B-12) 100 mcg tablet; Take by mouth  -     Cholecalciferol (VITAMIN D) 2000 units CAPS; Take 1 capsule by mouth daily          Subjective:      Patient ID: Chris Hernández is a 60 y o  female  Sick now x 1 week with sinus pressure  The following portions of the patient's history were reviewed and updated as appropriate: allergies, current medications, past family history, past social history, past surgical history and problem list     No past medical history on file        Current Outpatient Prescriptions:     lamoTRIgine (LaMICtal) 200 MG tablet, Take 1 tablet by mouth, Disp: , Rfl:     levothyroxine 88 mcg tablet, Take 1 tablet by mouth daily, Disp: , Rfl:     Melatonin 10 MG CAPS, Take 1 capsule by mouth, Disp: , Rfl:     Multiple Vitamins-Minerals (HAIR/SKIN/NAILS/BIOTIN PO), Take 1 tablet by mouth daily, Disp: , Rfl:     nystatin (MYCOSTATIN) powder, Apply topically 3 (three) times a day, Disp: , Rfl:     omeprazole (PriLOSEC) 40 MG capsule, Take 1 capsule by mouth daily, Disp: , Rfl:     tolterodine (DETROL LA) 2 mg 24 hr capsule, Take 1 capsule by mouth daily, Disp: , Rfl:     triamcinolone (KENALOG) 0 1 % cream, Apply topically 2 (two) times a day, Disp: , Rfl:     amoxicillin-clavulanate (AUGMENTIN) 875-125 mg per tablet, Take 1 tablet by mouth 2 (two) times a day with meals for 10 days, Disp: 20 tablet, Rfl: 0    Cholecalciferol (VITAMIN D) 2000 units CAPS, Take 1 capsule by mouth daily, Disp: , Rfl:     Cranberry 1000 MG CAPS, Take by mouth, Disp: , Rfl:     cyanocobalamin (VITAMIN B-12) 100 mcg tablet, Take by mouth, Disp: , Rfl:     folic acid (FOLVITE) 691 mcg tablet, Take 1 tablet by mouth daily, Disp: , Rfl:     Glucosamine-Chondroit-Vit C-Mn (GLUCOSAMINE 1500 COMPLEX PO), Take by mouth, Disp: , Rfl:     lithium carbonate (LITHOBID) 450 mg CR tablet, Take 1 tablet (450 mg total) by mouth 2 (two) times a day, Disp: 180 tablet, Rfl: 0    Multiple Vitamin (MULTI-VITAMIN DAILY) TABS, Take by mouth, Disp: , Rfl:     Allergies   Allergen Reactions    Other      Other reaction(s): Anaphylaxis  Annotation - [id number]: plums peaches         Review of Systems   Constitutional: Negative for fever  HENT: Positive for congestion, postnasal drip, rhinorrhea, sinus pain and sore throat  Respiratory: Positive for cough            Objective:    /78 (BP Location: Left arm, Patient Position: Sitting, Cuff Size: Large)   Pulse 68   Temp 98 6 °F (37 °C) (Tympanic)   Resp 16   Wt 109 kg (240 lb 6 4 oz)   BMI 45 42 kg/m²      Physical Exam   Constitutional: She is oriented to person, place, and time  She appears well-developed and well-nourished  No distress  HENT:   Head: Normocephalic and atraumatic  Right Ear: Hearing, tympanic membrane, external ear and ear canal normal    Left Ear: Hearing, tympanic membrane, external ear and ear canal normal    Nose: Mucosal edema present  Right sinus exhibits frontal sinus tenderness  Left sinus exhibits frontal sinus tenderness  Mouth/Throat: Oropharynx is clear and moist  No oropharyngeal exudate  Eyes: Conjunctivae are normal    Neck: Normal range of motion  Neck supple  No thyromegaly present  Cardiovascular: Normal rate, regular rhythm and normal heart sounds  Exam reveals no gallop and no friction rub  No murmur heard  Pulmonary/Chest: Effort normal and breath sounds normal  No stridor  No respiratory distress  She has no wheezes  She has no rales  Lymphadenopathy:     She has no cervical adenopathy  Neurological: She is alert and oriented to person, place, and time  Skin: She is not diaphoretic  Psychiatric: She has a normal mood and affect  Her behavior is normal  Judgment and thought content normal    Nursing note and vitals reviewed

## 2018-02-20 NOTE — ASSESSMENT & PLAN NOTE
Makenzie Vasquez is stable on exam   She is to f/u PRN  Will treat at this time with Augmentin x 10 days, OTC Cough and Cold Preps PRN (coricidin HBP, etc), rest, and good PO hydration

## 2018-03-20 DIAGNOSIS — E03.9 HYPOTHYROIDISM, UNSPECIFIED TYPE: Primary | ICD-10-CM

## 2018-03-20 RX ORDER — LEVOTHYROXINE SODIUM 88 UG/1
88 TABLET ORAL DAILY
Qty: 90 TABLET | Refills: 5 | Status: SHIPPED | OUTPATIENT
Start: 2018-03-20 | End: 2018-03-21 | Stop reason: SDUPTHER

## 2018-03-21 DIAGNOSIS — E03.9 HYPOTHYROIDISM, UNSPECIFIED TYPE: ICD-10-CM

## 2018-03-21 RX ORDER — LEVOTHYROXINE SODIUM 88 UG/1
88 TABLET ORAL DAILY
Qty: 90 TABLET | Refills: 3 | Status: SHIPPED | OUTPATIENT
Start: 2018-03-21 | End: 2018-03-23 | Stop reason: SDUPTHER

## 2018-03-23 ENCOUNTER — TELEPHONE (OUTPATIENT)
Dept: FAMILY MEDICINE CLINIC | Facility: CLINIC | Age: 63
End: 2018-03-23

## 2018-03-23 DIAGNOSIS — E03.9 HYPOTHYROIDISM, UNSPECIFIED TYPE: ICD-10-CM

## 2018-03-23 DIAGNOSIS — E03.8 HYPOTHYROIDISM DUE TO HASHIMOTO'S THYROIDITIS: ICD-10-CM

## 2018-03-23 DIAGNOSIS — N32.81 OVERACTIVE BLADDER: Primary | ICD-10-CM

## 2018-03-23 DIAGNOSIS — E06.3 HYPOTHYROIDISM DUE TO HASHIMOTO'S THYROIDITIS: ICD-10-CM

## 2018-03-23 RX ORDER — LEVOTHYROXINE SODIUM 88 UG/1
88 TABLET ORAL DAILY
Qty: 90 TABLET | Refills: 3 | Status: SHIPPED | OUTPATIENT
Start: 2018-03-23 | End: 2019-02-19 | Stop reason: SDUPTHER

## 2018-03-23 RX ORDER — TOLTERODINE 2 MG/1
2 CAPSULE, EXTENDED RELEASE ORAL DAILY
Qty: 90 CAPSULE | Refills: 3 | Status: SHIPPED | OUTPATIENT
Start: 2018-03-23 | End: 2018-04-18 | Stop reason: SDUPTHER

## 2018-04-02 ENCOUNTER — TELEPHONE (OUTPATIENT)
Dept: FAMILY MEDICINE CLINIC | Facility: CLINIC | Age: 63
End: 2018-04-02

## 2018-04-02 DIAGNOSIS — F31.81 BIPOLAR 2 DISORDER (HCC): Primary | ICD-10-CM

## 2018-04-02 RX ORDER — LAMOTRIGINE 200 MG/1
200 TABLET ORAL DAILY
Qty: 90 TABLET | Refills: 1 | Status: SHIPPED | OUTPATIENT
Start: 2018-04-02 | End: 2018-08-28 | Stop reason: SDUPTHER

## 2018-04-18 DIAGNOSIS — N32.81 OVERACTIVE BLADDER: ICD-10-CM

## 2018-04-18 RX ORDER — TOLTERODINE 2 MG/1
CAPSULE, EXTENDED RELEASE ORAL
Qty: 30 CAPSULE | Refills: 5 | Status: SHIPPED | OUTPATIENT
Start: 2018-04-18 | End: 2020-01-06

## 2018-05-21 ENCOUNTER — OFFICE VISIT (OUTPATIENT)
Dept: OBGYN CLINIC | Facility: CLINIC | Age: 63
End: 2018-05-21
Payer: COMMERCIAL

## 2018-05-21 VITALS
SYSTOLIC BLOOD PRESSURE: 126 MMHG | HEIGHT: 62 IN | DIASTOLIC BLOOD PRESSURE: 84 MMHG | WEIGHT: 239 LBS | BODY MASS INDEX: 43.98 KG/M2

## 2018-05-21 DIAGNOSIS — Z01.419 WELL FEMALE EXAM WITH ROUTINE GYNECOLOGICAL EXAM: Primary | ICD-10-CM

## 2018-05-21 DIAGNOSIS — Z12.31 ENCOUNTER FOR SCREENING MAMMOGRAM FOR MALIGNANT NEOPLASM OF BREAST: ICD-10-CM

## 2018-05-21 DIAGNOSIS — L90.0 LICHEN SCLEROSUS: ICD-10-CM

## 2018-05-21 DIAGNOSIS — Z12.4 ENCOUNTER FOR SCREENING FOR MALIGNANT NEOPLASM OF CERVIX: ICD-10-CM

## 2018-05-21 PROBLEM — B35.1 ONYCHOMYCOSIS OF TOENAIL: Status: ACTIVE | Noted: 2017-01-09

## 2018-05-21 PROCEDURE — S0610 ANNUAL GYNECOLOGICAL EXAMINA: HCPCS | Performed by: OBSTETRICS & GYNECOLOGY

## 2018-05-21 RX ORDER — CLOBETASOL PROPIONATE 0.5 MG/G
OINTMENT TOPICAL 3 TIMES WEEKLY
Qty: 30 G | Refills: 0 | Status: SHIPPED | OUTPATIENT
Start: 2018-05-21 | End: 2020-07-31

## 2018-05-21 NOTE — PROGRESS NOTES
ASSESSMENT & PLAN: Milady Bernard is a 60 y o  [id number] with normal gynecologic exam     1   Routine well woman exam done today  2    Pap and HPV:Pap with HPV was done today  Current ASCCP Guidelines reviewed  If this Pap is negative no further Paps will be necessary  3  Mammogram ordered  Recommend yearly mammography  4   Colonoscopy up-to-date  5  The patient is sexually active  6   Lichen sclerosis -currently asymptomatic, Rx for clobetasol sent to pharmacy  If worsening symptoms patient to call for re-evaluation  7  The following were reviewed in today's visit: breast self exam, mammography screening ordered, menopause, adequate intake of calcium and vitamin D, exercise and healthy diet  8  Patient to return to office in 12 months for annual      All questions have been answered to her satisfaction  CC:  Annual Gynecologic Examination    HPI: Milady Bernard is a 60 y o  [id number] who presents for annual gynecologic examination  She has the following concerns:  None    Health Maintenance:    She exercises 1 days per week  She wears her seatbelt routinely  She does not perform regular monthly self breast exams  She feels safe at home  Patients does not follow a healthy diet  Last mammogram:   Last colonoscopy: Within the last 5 years    Past Medical History:   Diagnosis Date    Bipolar 1 disorder ([facility] [address] )     Disease of thyroid gland        Past Surgical History:   Procedure Laterality Date    NO PAST SURGERIES         Past OB/Gyn History:  Period Cycle (Days):  (n/a post menopausal )No LMP recorded (approximate)  Patient is postmenopausal   Menstrual History:  OB History      Para Term  AB Living    2 2 2     2    SAB TAB Ectopic Multiple Live Births            2         Menstrual history: Patient is post menopausal    History of sexually transmitted infection No  Patient is currently sexually active    heterosexual Birth control: postmenopausal      Family History   Problem Relation Age of Onset    Heart disease Mother     Heart Valve Disease Mother     Diabetes Mother        Social History:  Social History     Social History    Marital status: /Civil Union     Spouse name: N/A    Number of children: N/A    Years of education: N/A     Occupational History    Not on file  Social History Main Topics    Smoking status: Former Smoker     Types: Cigarettes    Smokeless tobacco: Never Used    Alcohol use Yes      Comment: socially    Drug use: No    Sexual activity: Not Currently     Partners: Male     Birth control/ protection: Post-menopausal     Other Topics Concern    Not on file     Social History Narrative    No narrative on file       Allergies   Allergen Reactions    Other      Other reaction(s):  Anaphylaxis  [facility] - [id number]: pljarrett peaches       Current Outpatient Prescriptions:     Ascorbic Acid (RONAL-C PO), Take by mouth, Disp: , Rfl:     b complex vitamins tablet, Take 1 tablet by mouth daily, Disp: , Rfl:     Cholecalciferol (VITAMIN D) 2000 units CAPS, Take 1 capsule by mouth daily, Disp: , Rfl:     Cranberry 1000 MG CAPS, Take by mouth, Disp: , Rfl:     Glucosamine-Chondroit-Vit C-Mn (GLUCOSAMINE 1500 COMPLEX PO), Take by mouth, Disp: , Rfl:     lamoTRIgine (LaMICtal) 200 MG tablet, Take 1 tablet (200 mg total) by mouth daily, Disp: 90 tablet, Rfl: 1    levothyroxine 88 mcg tablet, Take 1 tablet (88 mcg total) by mouth daily for 90 days, Disp: 90 tablet, Rfl: 3    lithium carbonate (LITHOBID) 450 mg CR tablet, Take 1 tablet (450 mg total) by mouth 2 (two) times a day, Disp: 180 tablet, Rfl: 0    Multiple Vitamin (MULTI-VITAMIN DAILY) TABS, Take by mouth, Disp: , Rfl:     Multiple Vitamins-Minerals (HAIR/SKIN/NAILS/BIOTIN PO), Take 1 tablet by mouth daily, Disp: , Rfl:     nystatin (MYCOSTATIN) powder, Apply topically 3 (three) times a day, Disp: , Rfl:     omeprazole (PriLOSEC) 40 MG capsule, Take 1 capsule by mouth daily, Disp: , Rfl:     tolterodine (DETROL LA) 2 mg 24 hr capsule, TAKE 1 CAPSULE DAILY, Disp: 30 capsule, Rfl: 5    clobetasol (TEMOVATE) 0 05 % ointment, Apply topically 3 (three) times a week, Disp: 30 g, Rfl: 0    Review of Systems:  A complete review of systems was performed and was negative, except as listed  Denies weight changes, excessive fatigue, urinary incontinence, urinary frequency, constipation or bowel changes, blood in stool, severe headaches, vaginal bleeding, vaginal discharge, vaginal dryness  Recently treated for yeast with nystatin triamcinolone -itching has resolved    Physical Exam:  /84   Ht 5' 1 5" (1 562 m)   Wt 108 kg (239 lb)   LMP  (Approximate) Comment: 2008  Breastfeeding? No   BMI 44 43 kg/m²    GEN: The patient was alert and oriented x3, pleasant well-appearing female in no acute distress  HEENT:  Unremarkable  CV:  RRR, no murmurs  RESP:  Clear to auscultation bilaterally  BREAST:  Symmetric breasts with no palpable breast masses or obvious breast lesions  She has no retractions or nipple discharge  She has no axillary abnormalities or palpable masses  Self breast exam is taught  ABD:  Soft, nontender, non-distended  EXT: nontender, no edema  PELVIC:  Pattern of lichen sclerosis on external female genitalia, normal vaginal epithelium, No discharge  Cervix present   Bimanual: absent CMT,  normal uterus, non-tender  No palpable adnexal masses  Pap was collected

## 2018-05-25 DIAGNOSIS — K21.9 GERD WITHOUT ESOPHAGITIS: Primary | ICD-10-CM

## 2018-05-25 LAB
ALBUMIN SERPL-MCNC: 4.4 G/DL (ref 3.6–4.8)
ALBUMIN/GLOB SERPL: 1.8 {RATIO} (ref 1.2–2.2)
ALP SERPL-CCNC: 75 IU/L (ref 39–117)
ALT SERPL-CCNC: 20 IU/L (ref 0–32)
AST SERPL-CCNC: 20 IU/L (ref 0–40)
BILIRUB SERPL-MCNC: 0.3 MG/DL (ref 0–1.2)
BUN SERPL-MCNC: 23 MG/DL (ref 8–27)
BUN/CREAT SERPL: 19 (ref 12–28)
CALCIUM SERPL-MCNC: 9.4 MG/DL (ref 8.7–10.3)
CHLORIDE SERPL-SCNC: 107 MMOL/L (ref 96–106)
CHOLEST SERPL-MCNC: 211 MG/DL (ref 100–199)
CO2 SERPL-SCNC: 24 MMOL/L (ref 18–29)
CREAT SERPL-MCNC: 1.21 MG/DL (ref 0.57–1)
CYTOLOGIST CVX/VAG CYTO: NORMAL
DX ICD CODE: NORMAL
GLOBULIN SER-MCNC: 2.5 G/DL (ref 1.5–4.5)
GLUCOSE SERPL-MCNC: 118 MG/DL (ref 65–99)
HBA1C MFR BLD: 5.2 % (ref 4.8–5.6)
HDLC SERPL-MCNC: 50 MG/DL
HPV I/H RISK 1 DNA CVX QL PROBE+SIG AMP: NEGATIVE
LDLC SERPL CALC-MCNC: 126 MG/DL (ref 0–99)
OTHER STN SPEC: NORMAL
PATH REPORT.FINAL DX SPEC: NORMAL
POTASSIUM SERPL-SCNC: 4.7 MMOL/L (ref 3.5–5.2)
PROT SERPL-MCNC: 6.9 G/DL (ref 6–8.5)
SL AMB EGFR AFRICAN AMERICAN: 55 ML/MIN/1.73
SL AMB EGFR NON AFRICAN AMERICAN: 48 ML/MIN/1.73
SL AMB NOTE:: NORMAL
SL AMB SPECIMEN ADEQUACY: NORMAL
SL AMB TEST METHODOLOGY: NORMAL
SODIUM SERPL-SCNC: 143 MMOL/L (ref 134–144)
TRIGL SERPL-MCNC: 173 MG/DL (ref 0–149)
TSH SERPL DL<=0.005 MIU/L-ACNC: 2.4 UIU/ML (ref 0.45–4.5)

## 2018-05-25 RX ORDER — OMEPRAZOLE 40 MG/1
CAPSULE, DELAYED RELEASE ORAL
Qty: 90 CAPSULE | Refills: 3 | Status: SHIPPED | OUTPATIENT
Start: 2018-05-25 | End: 2019-05-15 | Stop reason: SDUPTHER

## 2018-06-07 ENCOUNTER — OFFICE VISIT (OUTPATIENT)
Dept: FAMILY MEDICINE CLINIC | Facility: CLINIC | Age: 63
End: 2018-06-07
Payer: COMMERCIAL

## 2018-06-07 VITALS
DIASTOLIC BLOOD PRESSURE: 80 MMHG | HEART RATE: 72 BPM | SYSTOLIC BLOOD PRESSURE: 140 MMHG | HEIGHT: 62 IN | WEIGHT: 243 LBS | RESPIRATION RATE: 20 BRPM | BODY MASS INDEX: 44.72 KG/M2

## 2018-06-07 DIAGNOSIS — Z00.00 WELL ADULT EXAM: Primary | ICD-10-CM

## 2018-06-07 DIAGNOSIS — R73.01 IMPAIRED FASTING GLUCOSE: ICD-10-CM

## 2018-06-07 DIAGNOSIS — E78.1 HIGH TRIGLYCERIDES: ICD-10-CM

## 2018-06-07 DIAGNOSIS — E03.9 HYPOTHYROIDISM, UNSPECIFIED TYPE: ICD-10-CM

## 2018-06-07 PROBLEM — J01.10 ACUTE NON-RECURRENT FRONTAL SINUSITIS: Status: RESOLVED | Noted: 2018-02-19 | Resolved: 2018-06-07

## 2018-06-07 PROCEDURE — 99396 PREV VISIT EST AGE 40-64: CPT | Performed by: FAMILY MEDICINE

## 2018-06-07 RX ORDER — IVERMECTIN 10 MG/G
CREAM TOPICAL
COMMUNITY
Start: 2018-03-28

## 2018-06-07 NOTE — PROGRESS NOTES
FAMILY PRACTICE HEALTH MAINTENANCE OFFICE VISIT  [facility] - [facility]    NAME: Pamella Rebolledo  AGE: 60 y o  SEX: female  : 1955     DATE: 2018    Assessment and Plan     Problem List Items Addressed This Visit     Hypothyroidism    Relevant Orders    Comprehensive metabolic panel    CBC    TSH, 3rd generation with T4 reflex    Impaired fasting glucose    Relevant Orders    Hemoglobin A1C    Well adult exam - Primary    High triglycerides     Recheck in 4 months after changing diet and increase walking         Relevant Orders    Lipid Panel with Direct LDL reflex            · Patient Counseling:   · Nutrition: Stressed importance of a well balanced diet, moderation of sodium/saturated fat, caloric balance and sufficient intake of fiber  · Exercise: Stressed the importance of regular exercise with a goal of 150 minutes per week  · Dental Health: Discussed daily flossing and brushing and regular dental visits   · Alcohol Use:  Recommended moderation of alcohol intake  · Injury Prevention: Discussed Safety Belts, Safety Helmets, and Smoke Detectors    · Immunizations reviewed  Up to date- shingrix  · Discussed benefits of screening screening discussed  · Discussed the patient's BMI with her  The BMI had been working on diet, vacation ruined it, now heaviest she has been     Return in 1 year (on 2019)          Chief Complaint     Chief Complaint   Patient presents with    Physical Exam     Patient here for annual wellness exam        History of Present Illness     Here for wellness        Well Adult Physical   Patient here for a comprehensive physical exam       Diet and Physical Activity  Diet: well balanced diet  Weight concerns: Patient has class 3 obesity (BMI >40)  Exercise: rarely      Depression Screen  PHQ-9 Depression Screening    PHQ-9:    Frequency of the following problems over the past two weeks:       Little interest or pleasure in doing things:  0 - not at all  PHQ-2 Score:  0          General Health  Hearing: Normal:  bilateral  Vision: goes for regular eye exams and most recent eye exam <1 year  Dental: regular dental visits and brushes teeth twice daily    Reproductive Health  Mammogram- 2017  Pap- 2018      The following portions of the patient's history were reviewed and updated as appropriate: allergies, current medications, past family history, past medical history, past social history, past surgical history and problem list     Review of Systems     Review of Systems   Constitutional: Negative  HENT: Negative  Eyes: Negative  Respiratory: Negative  Cardiovascular: Negative  Gastrointestinal: Negative  Endocrine: Negative  Genitourinary: Negative  Musculoskeletal: Negative  Skin: Negative  Allergic/Immunologic: Negative  Neurological: Negative  Hematological: Negative  Psychiatric/Behavioral: Negative          Past Medical History     Past Medical History:   Diagnosis Date    Arthritis     Bipolar 1 disorder ([facility] [address] )     Disease of thyroid gland        Past Surgical History     Past Surgical History:   Procedure Laterality Date    NO PAST SURGERIES         Social History     Social History     Social History    Marital status: /Civil Union     Spouse name: N/A    Number of children: N/A    Years of education: N/A     Social History Main Topics    Smoking status: Former Smoker     Types: Cigarettes    Smokeless tobacco: Never Used    Alcohol use Yes      Comment: socially    Drug use: No    Sexual activity: Not Currently     Partners: Male     Birth control/ protection: Post-menopausal     Other Topics Concern    None     Social History Narrative    Daily coffee consumption: 3 cups/day       Family History     Family History   Problem Relation Age of Onset    Heart disease Mother     Heart Valve Disease Mother      Replacement    Diabetes Mother     Arthritis Father        Current Medications Current Outpatient Prescriptions:     Ascorbic Acid (RONAL-C PO), Take by mouth, Disp: , Rfl:     b complex vitamins tablet, Take 1 tablet by mouth daily, Disp: , Rfl:     Cholecalciferol (VITAMIN D) 2000 units CAPS, Take 1 capsule by mouth daily, Disp: , Rfl:     clobetasol (TEMOVATE) 0 05 % ointment, Apply topically 3 (three) times a week, Disp: 30 g, Rfl: 0    Cranberry 1000 MG CAPS, Take by mouth, Disp: , Rfl:     Glucosamine-Chondroit-Vit C-Mn (GLUCOSAMINE 1500 COMPLEX PO), Take by mouth, Disp: , Rfl:     lamoTRIgine (LaMICtal) 200 MG tablet, Take 1 tablet (200 mg total) by mouth daily, Disp: 90 tablet, Rfl: 1    levothyroxine 88 mcg tablet, Take 1 tablet (88 mcg total) by mouth daily for 90 days, Disp: 90 tablet, Rfl: 3    lithium carbonate (LITHOBID) 450 mg CR tablet, Take 1 tablet (450 mg total) by mouth 2 (two) times a day, Disp: 180 tablet, Rfl: 0    Multiple Vitamins-Minerals (HAIR/SKIN/NAILS/BIOTIN PO), Take 1 tablet by mouth daily, Disp: , Rfl:     nystatin (MYCOSTATIN) powder, Apply topically 3 (three) times a day, Disp: , Rfl:     omeprazole (PriLOSEC) 40 MG capsule, TAKE 1 CAPSULE DAILY, Disp: 90 capsule, Rfl: 3    tolterodine (DETROL LA) 2 mg 24 hr capsule, TAKE 1 CAPSULE DAILY, Disp: 30 capsule, Rfl: 5    Multiple Vitamin (MULTI-VITAMIN DAILY) TABS, Take by mouth, Disp: , Rfl:     SOOLANTRA 1 % CREA, , Disp: , Rfl:      Allergies     Allergies   Allergen Reactions    Other      Other reaction(s): Anaphylaxis  Annotation - [id number]: plums peaches       Objective     /80   Pulse 72   Resp 20   Ht 5' 1 5" (1 562 m)   Wt 110 kg (243 lb)   BMI 45 17 kg/m²      Physical Exam   Constitutional: She is oriented to person, place, and time  Vital signs are normal  She appears well-developed and well-nourished  HENT:   Head: Normocephalic and atraumatic     Nose: Nose normal    Mouth/Throat: Oropharynx is clear and moist    Eyes: Conjunctivae, EOM and lids are normal  Pupils are equal, round, and reactive to light  Neck: Normal range of motion  Neck supple  Cardiovascular: Normal rate, regular rhythm, S1 normal, S2 normal, normal heart sounds and intact distal pulses  Pulmonary/Chest: Effort normal and breath sounds normal    Abdominal: Soft  Bowel sounds are normal    Musculoskeletal: Normal range of motion  Neurological: She is alert and oriented to person, place, and time  She has normal strength and normal reflexes  Skin: Skin is warm, dry and intact  Psychiatric: She has a normal mood and affect  Her speech is normal and behavior is normal  Judgment and thought content normal  Cognition and memory are normal    Nursing note and vitals reviewed          No exam data present    Health Maintenance     Health Maintenance   Topic Date Due    HIV SCREENING  1955    Hepatitis C Screening  1955    INFLUENZA VACCINE  09/01/2018    Depression Screening PHQ-9  06/07/2019    MAMMOGRAM  06/20/2019    PAP SMEAR  05/21/2021    CRC Screening: Colonoscopy  11/15/2022    DTaP,Tdap,and Td Vaccines (2 - Td) 01/04/2026     Immunization History   Administered Date(s) Administered    Influenza 09/20/2011, 11/08/2012, 09/28/2015, 10/11/2016, 11/16/2017    Influenza Quadrivalent Preservative Free 3 years and older IM 10/06/2014, 09/28/2015, 10/11/2016, 11/16/2017    Influenza TIV (IM) 09/27/2013    Td (adult), Unspecified 11/09/2009    Tdap 01/04/2016    Zoster 10/06/2014       DO Neeraj Galiciaer [facility]

## 2018-08-17 ENCOUNTER — OFFICE VISIT (OUTPATIENT)
Dept: PSYCHIATRY | Facility: CLINIC | Age: 63
End: 2018-08-17
Payer: COMMERCIAL

## 2018-08-17 DIAGNOSIS — F51.04 PSYCHOPHYSIOLOGICAL INSOMNIA: ICD-10-CM

## 2018-08-17 DIAGNOSIS — F31.63 BIPOLAR 1 DISORDER, MIXED, SEVERE (HCC): Primary | ICD-10-CM

## 2018-08-17 DIAGNOSIS — F31.9 BIPOLAR 1 DISORDER (HCC): ICD-10-CM

## 2018-08-17 PROCEDURE — 90792 PSYCH DIAG EVAL W/MED SRVCS: CPT | Performed by: PSYCHIATRY & NEUROLOGY

## 2018-08-17 RX ORDER — LITHIUM CARBONATE 450 MG
450 TABLET, EXTENDED RELEASE ORAL 2 TIMES DAILY
Qty: 180 TABLET | Refills: 0 | Status: SHIPPED | OUTPATIENT
Start: 2018-08-17 | End: 2018-11-13 | Stop reason: SDUPTHER

## 2018-08-17 NOTE — PSYCH
Reason for visit: No chief complaint on file  hospitals     [facility] is a 58 y o  female with a history of Depression who presents for psychiatric evaluation due to ongoing depression   Primary complaints include: anxiety and depression worse  Onset of symptoms was gradual starting several years ago with gradually worsening course since that time  Psychosocial Stressors: family and health  Patient stated since last seen she continued to see her PCP to get her medications Rx  Her last lithium level was about 1 years ago and it was 1 0  She stated that during summer she experiences hand tremors  We discussed the importance of staying hydrated  She also continues to take Lamotrigine 200 mg  She stated her life continues to be very stressful because her daughter had DUI and license suspension  Also her son lost his mother in law and that has been difficult for her  She continues to struggle         Review Of Systems:     Mood Anxiety, Depression and Emotional Lability   Behavior Normal    Thought Content Disturbing Thoughts, Feelings and Unreasonalbe or Irrational Fears   General Emotional Problems and Decreased Functioning   Personality Normal   Other Psych Symptoms Normal   Constitutional Negative   ENT Negative   Cardiovascular Negative   Respiratory Negative   Gastrointestinal Negative   Genitourinary Negative   Musculoskeletal Negative   Integumentary Negative   Neurological Negative   Endocrine Normal    Other Symptoms Normal        Past Psychiatric History:      Past Inpatient Psychiatric Treatment:   Therapy, Out Patient with PCP    Past Outpatient Psychiatric Treatment:    individual therapy [facility]   Past Suicide Attempts:    no  Past Violent Behavior:    no  Past Psychiatric Medication Trials:    Lamictal and Lithium    Family Psychiatric History:   Family History   Problem Relation Age of Onset    Heart disease Mother     Heart Valve Disease Mother         Replacement    Diabetes Mother     Arthritis Father        Social History:    Education: high school diploma/GED  Learning Disabilities: denies  Marital history:   Living arrangement, social support: The patient lives in home with   Occupational History: retired  Functioning Relationships: good support system  Other Pertinent History: None     History   Drug Use No       Traumatic History:       Abuse: denies  Other Traumatic Events: n/a    The following portions of the patient's history were reviewed and updated as appropriate: allergies, current medications, past family history, past medical history, past social history, past surgical history and problem list      Social History     Social History    Marital status: /Civil Union     Spouse name: N/A    Number of children: N/A    Years of education: N/A     Occupational History    Not on file       Social History Main Topics    Smoking status: Former Smoker     Types: Cigarettes    Smokeless tobacco: Never Used    Alcohol use Yes      Comment: socially    Drug use: No    Sexual activity: Not Currently     Partners: Male     Birth control/ protection: Post-menopausal     Other Topics Concern    Not on file     Social History Narrative    Daily coffee consumption: 3 cups/day     Social History     Social History Narrative    Daily coffee consumption: 3 cups/day       Mental status:  Appearance calm and cooperative , adequate hygiene and grooming and good eye contact    Mood depressed   Affect affect was constricted   Speech a normal rate   Thought Processes coherent/organized and normal thought processes   Hallucinations no hallucinations present    Thought Content no delusions   Abnormal Thoughts no suicidal thoughts  and no homicidal thoughts    Orientation  oriented to person and place and time   Remote Memory short term memory intact and long term memory intact   Attention Span concentration impaired   Intellect Not Formally Assessed   Insight Limited insight   Judgement judgment was limited   Muscle Strength Muscle strength and tone were normal and Normal gait    Language no difficulty naming common objects, no difficulty repeating a phrase  and no difficulty writing a sentence    Fund of Knowledge displays adequate knowledge of current events, adequate fund of knowledge regarding past history and adequate fund of knowledge regarding vocabulary    Pain none   Pain Scale 0         Laboratory Results: No results found for this or any previous visit  Assessment/Plan:      Diagnoses and all orders for this visit:    Bipolar 1 disorder, mixed, severe (HCC)    Psychophysiological insomnia    Bipolar 1 disorder (HCC)  -     lithium carbonate (LITHOBID) 450 mg CR tablet;  Take 1 tablet (450 mg total) by mouth 2 (two) times a day          Treatment Recommendations- Risks Benefits         Immediate Medical/Psychiatric/Psychotherapy Treatments and Any Precautions: continue     Risks, Benefits And Possible Side Effects Of Medications:      Controlled Medication Discussion:

## 2018-08-22 ENCOUNTER — TELEPHONE (OUTPATIENT)
Dept: FAMILY MEDICINE CLINIC | Facility: CLINIC | Age: 63
End: 2018-08-22

## 2018-08-28 DIAGNOSIS — F31.81 BIPOLAR 2 DISORDER (HCC): ICD-10-CM

## 2018-08-29 RX ORDER — LAMOTRIGINE 200 MG/1
TABLET ORAL
Qty: 90 TABLET | Refills: 3 | Status: SHIPPED | OUTPATIENT
Start: 2018-08-29 | End: 2018-11-13 | Stop reason: SDUPTHER

## 2018-10-24 LAB
ALBUMIN SERPL-MCNC: 4.4 G/DL (ref 3.6–4.8)
ALBUMIN/GLOB SERPL: 1.8 {RATIO} (ref 1.2–2.2)
ALP SERPL-CCNC: 76 IU/L (ref 39–117)
ALT SERPL-CCNC: 16 IU/L (ref 0–32)
AST SERPL-CCNC: 15 IU/L (ref 0–40)
BILIRUB SERPL-MCNC: 0.3 MG/DL (ref 0–1.2)
BUN SERPL-MCNC: 23 MG/DL (ref 8–27)
BUN/CREAT SERPL: 18 (ref 12–28)
CALCIUM SERPL-MCNC: 9.6 MG/DL (ref 8.7–10.3)
CHLORIDE SERPL-SCNC: 108 MMOL/L (ref 96–106)
CHOLEST SERPL-MCNC: 190 MG/DL (ref 100–199)
CO2 SERPL-SCNC: 24 MMOL/L (ref 20–29)
CREAT SERPL-MCNC: 1.27 MG/DL (ref 0.57–1)
ERYTHROCYTE [DISTWIDTH] IN BLOOD BY AUTOMATED COUNT: 13.4 % (ref 12.3–15.4)
EST. AVERAGE GLUCOSE BLD GHB EST-MCNC: 111 MG/DL
GLOBULIN SER-MCNC: 2.4 G/DL (ref 1.5–4.5)
GLUCOSE SERPL-MCNC: 125 MG/DL (ref 65–99)
HBA1C MFR BLD: 5.5 % (ref 4.8–5.6)
HCT VFR BLD AUTO: 40.7 % (ref 34–46.6)
HDLC SERPL-MCNC: 49 MG/DL
HGB BLD-MCNC: 13.8 G/DL (ref 11.1–15.9)
LDLC SERPL CALC-MCNC: 103 MG/DL (ref 0–99)
LDLC/HDLC SERPL: 2.1 RATIO (ref 0–3.2)
MCH RBC QN AUTO: 31.2 PG (ref 26.6–33)
MCHC RBC AUTO-ENTMCNC: 33.9 G/DL (ref 31.5–35.7)
MCV RBC AUTO: 92 FL (ref 79–97)
PLATELET # BLD AUTO: 152 X10E3/UL (ref 150–379)
POTASSIUM SERPL-SCNC: 4.4 MMOL/L (ref 3.5–5.2)
PROT SERPL-MCNC: 6.8 G/DL (ref 6–8.5)
RBC # BLD AUTO: 4.43 X10E6/UL (ref 3.77–5.28)
SL AMB EGFR AFRICAN AMERICAN: 52 ML/MIN/1.73
SL AMB EGFR NON AFRICAN AMERICAN: 45 ML/MIN/1.73
SL AMB VLDL CHOLESTEROL CALC: 38 MG/DL (ref 5–40)
SODIUM SERPL-SCNC: 146 MMOL/L (ref 134–144)
TRIGL SERPL-MCNC: 191 MG/DL (ref 0–149)
TSH SERPL DL<=0.005 MIU/L-ACNC: 2.98 UIU/ML (ref 0.45–4.5)
WBC # BLD AUTO: 5.2 X10E3/UL (ref 3.4–10.8)

## 2018-11-02 ENCOUNTER — IMMUNIZATION (OUTPATIENT)
Dept: FAMILY MEDICINE CLINIC | Facility: CLINIC | Age: 63
End: 2018-11-02
Payer: COMMERCIAL

## 2018-11-02 DIAGNOSIS — Z23 ENCOUNTER FOR IMMUNIZATION: ICD-10-CM

## 2018-11-02 PROCEDURE — 90686 IIV4 VACC NO PRSV 0.5 ML IM: CPT

## 2018-11-02 PROCEDURE — 90471 IMMUNIZATION ADMIN: CPT

## 2018-11-13 ENCOUNTER — OFFICE VISIT (OUTPATIENT)
Dept: PSYCHIATRY | Facility: CLINIC | Age: 63
End: 2018-11-13
Payer: COMMERCIAL

## 2018-11-13 DIAGNOSIS — F31.9 BIPOLAR 1 DISORDER (HCC): ICD-10-CM

## 2018-11-13 DIAGNOSIS — F31.81 BIPOLAR 2 DISORDER (HCC): ICD-10-CM

## 2018-11-13 PROCEDURE — 99213 OFFICE O/P EST LOW 20 MIN: CPT | Performed by: PSYCHIATRY & NEUROLOGY

## 2018-11-13 RX ORDER — LAMOTRIGINE 200 MG/1
200 TABLET ORAL DAILY
Qty: 90 TABLET | Refills: 0 | Status: SHIPPED | OUTPATIENT
Start: 2018-11-13 | End: 2019-01-28 | Stop reason: SDUPTHER

## 2018-11-13 RX ORDER — LITHIUM CARBONATE 450 MG
450 TABLET, EXTENDED RELEASE ORAL 2 TIMES DAILY
Qty: 180 TABLET | Refills: 0 | Status: SHIPPED | OUTPATIENT
Start: 2018-11-13 | End: 2019-02-19 | Stop reason: SDUPTHER

## 2018-11-13 NOTE — PSYCH
Subjective:Medication Management      Patient ID: Taina Ndiaye is a 58 y o  female  HPI ROS Appetite Changes and Sleep: normal appetite, normal energy level, no weight change and normal number of sleep hours   Patient stated her mood has been stable  No recent health changes or new medications  She did reported having balance problems lately, no tremors  Agrees to have Lithium level drawn  Review Of Systems:     Mood Anxiety, Depression and Emotional Lability   Behavior Normal    Thought Content Disturbing Thoughts, Feelings and Unreasonalbe or Irrational Fears   General Emotional Problems, Sleep Disturbances and Decreased Functioning   Personality Normal   Other Psych Symptoms Normal   Constitutional Negative   ENT Negative   Cardiovascular Negative   Respiratory Negative   Gastrointestinal Negative   Genitourinary Negative   Musculoskeletal Negative   Integumentary Negative   Neurological Negative   Endocrine Normal    Other Symptoms Normal              Laboratory Results: No results found for this or any previous visit  Substance Abuse History:  History   Drug Use No       Family Psychiatric History:   Family History   Problem Relation Age of Onset    Heart disease Mother     Heart Valve Disease Mother         Replacement    Diabetes Mother     Arthritis Father        The following portions of the patient's history were reviewed and updated as appropriate: allergies, current medications, past family history, past medical history, past social history, past surgical history and problem list     Social History     Social History    Marital status: /Civil Union     Spouse name: N/A    Number of children: N/A    Years of education: N/A     Occupational History    Not on file       Social History Main Topics    Smoking status: Former Smoker     Types: Cigarettes    Smokeless tobacco: Never Used    Alcohol use Yes      Comment: socially    Drug use: No    Sexual activity: Not Currently Partners: Male     Birth control/ protection: Post-menopausal     Other Topics Concern    Not on file     Social History Narrative    Daily coffee consumption: 3 cups/day     Social History     Social History Narrative    Daily coffee consumption: 3 cups/day       Objective:       Mental status:  Appearance calm and cooperative , adequate hygiene and grooming and good eye contact    Mood depressed and anxious   Affect affect was constricted   Speech a normal rate and fluent   Thought Processes coherent/organized and normal thought processes   Hallucinations no hallucinations present    Thought Content no delusions   Abnormal Thoughts no suicidal thoughts  and no homicidal thoughts    Orientation  oriented to person and place and time   Remote Memory short term memory intact and long term memory intact   Attention Span concentration impaired   Intellect Appears to be of Average Intelligence   Insight Limited insight   Judgement judgment was limited   Muscle Strength Muscle strength and tone were normal and Normal gait    Language no difficulty naming common objects, no difficulty repeating a phrase  and no difficulty writing a sentence    Fund of Knowledge displays adequate knowledge of current events, adequate fund of knowledge regarding past history and adequate fund of knowledge regarding vocabulary    Pain none   Pain Scale 0       Assessment/Plan:       Diagnoses and all orders for this visit:    [facility]  -     lamoTRIgine (LaMICtal) 200 MG tablet; Take 1 tablet (200 mg total) by mouth daily    Bipolar 1 disorder ([facility])  -     Lithium level; Future  -     lithium carbonate (LITHOBID) 450 mg CR tablet;  Take 1 tablet (450 mg total) by mouth 2 (two) times a day  -     Lithium level            Treatment Recommendations- Risks Benefits      Immediate Medical/Psychiatric/Psychotherapy Treatments and Any Precautions: continue current medications, obtain lithium level    Risks, Benefits And Possible Side Effects Of Medications:  {PSYCH RISK, BENEFITS AND POSSIBLE SIDE EFFECTS (Optional):48322

## 2018-12-20 ENCOUNTER — OFFICE VISIT (OUTPATIENT)
Dept: FAMILY MEDICINE CLINIC | Facility: CLINIC | Age: 63
End: 2018-12-20
Payer: COMMERCIAL

## 2018-12-20 VITALS
HEIGHT: 61 IN | SYSTOLIC BLOOD PRESSURE: 128 MMHG | DIASTOLIC BLOOD PRESSURE: 80 MMHG | WEIGHT: 246.4 LBS | RESPIRATION RATE: 12 BRPM | HEART RATE: 74 BPM | TEMPERATURE: 98.1 F | BODY MASS INDEX: 46.52 KG/M2 | OXYGEN SATURATION: 96 %

## 2018-12-20 DIAGNOSIS — G47.33 OBSTRUCTIVE SLEEP APNEA: Primary | ICD-10-CM

## 2018-12-20 DIAGNOSIS — E03.9 ACQUIRED HYPOTHYROIDISM: ICD-10-CM

## 2018-12-20 DIAGNOSIS — F31.63 BIPOLAR 1 DISORDER, MIXED, SEVERE (HCC): ICD-10-CM

## 2018-12-20 DIAGNOSIS — E78.1 HIGH TRIGLYCERIDES: ICD-10-CM

## 2018-12-20 DIAGNOSIS — R73.01 IMPAIRED FASTING GLUCOSE: ICD-10-CM

## 2018-12-20 PROCEDURE — 99214 OFFICE O/P EST MOD 30 MIN: CPT | Performed by: FAMILY MEDICINE

## 2018-12-20 PROCEDURE — 1036F TOBACCO NON-USER: CPT | Performed by: FAMILY MEDICINE

## 2018-12-20 PROCEDURE — 3008F BODY MASS INDEX DOCD: CPT | Performed by: FAMILY MEDICINE

## 2018-12-20 RX ORDER — LEVOTHYROXINE SODIUM 88 UG/1
TABLET ORAL
COMMUNITY
Start: 2018-12-06 | End: 2019-02-19 | Stop reason: SDUPTHER

## 2018-12-20 NOTE — PROGRESS NOTES
Assessment/Plan:    Problem List Items Addressed This Visit     Bipolar 1 disorder, mixed, severe ([address] )     Seeing Dr Yessi Garcia         Hypothyroidism     Stable on current dose of levothyroxine         Relevant Medications    levothyroxine 88 mcg tablet    Impaired fasting glucose     Stable and doing well         Relevant Orders    Hemoglobin A1C    Obstructive sleep apnea - Primary    Relevant Orders    Ambulatory referral to Pulmonology    High triglycerides     Watch diet         Relevant Orders    CBC    Comprehensive metabolic panel    Lipid Panel with Direct LDL reflex    TSH, 3rd generation with Free T4 reflex          There are no Patient Instructions on file for this visit  No Follow-up on file  Subjective:      Patient ID: Matthew Parker is a 58 y o  female  Chief Complaint   Patient presents with    Follow-up     pt here for follow up        Here for follow up  Overall doing well  Hasn't been watching weight  Has spent time visiting friend with cancer- driving down to [place] every month  Has a sick dog who is on lasix      Thyroid Problem   Presents for follow-up visit  The symptoms have been stable  Her past medical history is significant for hyperlipidemia  Hyperlipidemia   This is a chronic problem  The current episode started more than 1 year ago  The problem is controlled  Exacerbating diseases include hypothyroidism  She is currently on no antihyperlipidemic treatment  The following portions of the patient's history were reviewed and updated as appropriate: allergies, current medications, past family history, past medical history, past social history, past surgical history and problem list     Review of Systems   Constitutional: Negative  HENT: Negative  Eyes: Negative  Cardiovascular: Negative  Gastrointestinal: Negative  Endocrine: Negative  Genitourinary: Negative  Musculoskeletal: Negative  Skin: Negative  Allergic/Immunologic: Negative  Hematological: Negative  Psychiatric/Behavioral: Negative  Current Outpatient Prescriptions   Medication Sig Dispense Refill    Ascorbic Acid (RONAL-C PO) Take by mouth      b complex vitamins tablet Take 1 tablet by mouth daily      Cholecalciferol (VITAMIN D) 2000 units CAPS Take 1 capsule by mouth daily      clobetasol (TEMOVATE) 0 05 % ointment Apply topically 3 (three) times a week 30 g 0    Cranberry 1000 MG CAPS Take by mouth      Glucosamine-Chondroit-Vit C-Mn (GLUCOSAMINE 1500 COMPLEX PO) Take by mouth      lamoTRIgine (LaMICtal) 200 MG tablet Take 1 tablet (200 mg total) by mouth daily 90 tablet 0    levothyroxine 88 mcg tablet Take 1 tablet (88 mcg total) by mouth daily for 90 days 90 tablet 3    levothyroxine 88 mcg tablet       lithium carbonate (LITHOBID) 450 mg CR tablet Take 1 tablet (450 mg total) by mouth 2 (two) times a day 180 tablet 0    Multiple Vitamin (MULTI-VITAMIN DAILY) TABS Take by mouth      Multiple Vitamins-Minerals (HAIR/SKIN/NAILS/BIOTIN PO) Take 1 tablet by mouth daily      nystatin (MYCOSTATIN) powder Apply topically 3 (three) times a day      omeprazole (PriLOSEC) 40 MG capsule TAKE 1 CAPSULE DAILY 90 capsule 3    SOOLANTRA 1 % CREA       tolterodine (DETROL LA) 2 mg 24 hr capsule TAKE 1 CAPSULE DAILY 30 capsule 5     No current facility-administered medications for this visit  Objective:    /80 (BP Location: Left arm, Patient Position: Sitting, Cuff Size: Standard)   Pulse 74   Temp 98 1 °F (36 7 °C)   Resp 12   Ht 5' 1" (1 549 m)   Wt 112 kg (246 lb 6 4 oz)   SpO2 96%   BMI 46 56 kg/m²        Physical Exam   Constitutional: She appears well-developed and well-nourished  HENT:   Head: Normocephalic and atraumatic  Eyes: Pupils are equal, round, and reactive to light  EOM are normal    Neck: Normal range of motion  Neck supple  Cardiovascular: Normal rate, regular rhythm, normal heart sounds and intact distal pulses  Pulmonary/Chest: Effort normal and breath sounds normal    Abdominal: Soft  Bowel sounds are normal    Musculoskeletal: Normal range of motion  Neurological: She is alert  Skin: Skin is warm and dry  Psychiatric: She has a normal mood and affect  Her behavior is normal  Judgment and thought content normal    Nursing note and vitals reviewed               Minna Blood, DO

## 2019-01-25 LAB — LITHIUM SERPL-SCNC: 1 MMOL/L (ref 0.6–1.2)

## 2019-01-28 DIAGNOSIS — F31.81 BIPOLAR 2 DISORDER (HCC): ICD-10-CM

## 2019-01-29 RX ORDER — LAMOTRIGINE 200 MG/1
TABLET ORAL
Qty: 90 TABLET | Refills: 0 | Status: SHIPPED | OUTPATIENT
Start: 2019-01-29 | End: 2019-04-17 | Stop reason: SDUPTHER

## 2019-02-17 DIAGNOSIS — N32.81 OVERACTIVE BLADDER: ICD-10-CM

## 2019-02-17 DIAGNOSIS — E03.9 HYPOTHYROIDISM, UNSPECIFIED TYPE: ICD-10-CM

## 2019-02-18 RX ORDER — TOLTERODINE 2 MG/1
CAPSULE, EXTENDED RELEASE ORAL
Qty: 90 CAPSULE | Refills: 3 | Status: SHIPPED | OUTPATIENT
Start: 2019-02-18 | End: 2019-02-19 | Stop reason: SDUPTHER

## 2019-02-18 RX ORDER — LEVOTHYROXINE SODIUM 88 UG/1
TABLET ORAL
Qty: 90 TABLET | Refills: 3 | Status: SHIPPED | OUTPATIENT
Start: 2019-02-18 | End: 2019-04-22 | Stop reason: SDUPTHER

## 2019-02-19 ENCOUNTER — OFFICE VISIT (OUTPATIENT)
Dept: PSYCHIATRY | Facility: CLINIC | Age: 64
End: 2019-02-19
Payer: COMMERCIAL

## 2019-02-19 DIAGNOSIS — F31.9 BIPOLAR 1 DISORDER (HCC): ICD-10-CM

## 2019-02-19 DIAGNOSIS — F31.63 BIPOLAR 1 DISORDER, MIXED, SEVERE (HCC): Primary | ICD-10-CM

## 2019-02-19 PROCEDURE — 99213 OFFICE O/P EST LOW 20 MIN: CPT | Performed by: PSYCHIATRY & NEUROLOGY

## 2019-02-19 RX ORDER — LITHIUM CARBONATE 450 MG
450 TABLET, EXTENDED RELEASE ORAL 2 TIMES DAILY
Qty: 180 TABLET | Refills: 0 | Status: SHIPPED | OUTPATIENT
Start: 2019-02-19 | End: 2019-05-20 | Stop reason: SDUPTHER

## 2019-02-19 NOTE — PSYCH
Subjective: Medication Management      Patient ID: Taina Ndiaye is a 58 y o  female  HPI ROS Appetite Changes and Sleep: normal appetite, normal energy level, no weight change and normal number of sleep hours   Mood has been stable and denies medication side effects  No recent health changes or new medications  She stated her balance problems have improved and may be related to new progressive lenses  Her last Lithium level is 1 0  She stated she worries sometimes about her memory and sometimes has difficulties finding words  Review Of Systems:     Mood Emotional Lability   Behavior Normal    Thought Content Normal   General Emotional Problems and Decreased Functioning   Personality Normal   Other Psych Symptoms Normal   Constitutional Negative   ENT Negative   Cardiovascular Negative   Respiratory Negative   Gastrointestinal Negative   Genitourinary Negative   Musculoskeletal Negative   Integumentary Negative   Neurological Negative   Endocrine Normal    Other Symptoms Normal              Laboratory Results: No results found for this or any previous visit      Substance Abuse History:  Social History     Substance and Sexual Activity   Drug Use No       Family Psychiatric History:   Family History   Problem Relation Age of Onset    Heart disease Mother     Heart Valve Disease Mother         Replacement    Diabetes Mother     Arthritis Father        The following portions of the patient's history were reviewed and updated as appropriate: allergies, current medications, past family history, past medical history, past social history, past surgical history and problem list     Social History     Socioeconomic History    Marital status: /Civil Union     Spouse name: Not on file    Number of children: Not on file    Years of education: Not on file    Highest education level: Not on file   Occupational History    Not on file   Social Needs    Financial resource strain: Not on file   [place] insecurity:     Worry: Not on file     Inability: Not on file    Transportation needs:     Medical: Not on file     Non-medical: Not on file   Tobacco Use    Smoking status: Former Smoker     Types: Cigarettes    Smokeless tobacco: Never Used   Substance and Sexual Activity    Alcohol use: Yes     Comment: socially    Drug use: No    Sexual activity: Not Currently     Partners: Male     Birth control/protection: Post-menopausal   Lifestyle    Physical activity:     Days per week: Not on file     Minutes per session: Not on file    Stress: Not on file   Relationships    Social connections:     Talks on phone: Not on file     Gets together: Not on file     Attends [employer] service: Not on file     Active member of club or organization: Not on file     Attends meetings of clubs or organizations: Not on file     Relationship status: Not on file    Intimate partner violence:     Fear of current or ex partner: Not on file     Emotionally abused: Not on file     Physically abused: Not on file     Forced sexual activity: Not on file   Other Topics Concern    Not on file   Social History Narrative    Daily coffee consumption: 3 cups/day     Social History     Social History Narrative    Daily coffee consumption: 3 cups/day       Objective:       Mental status:  Appearance calm and cooperative , adequate hygiene and grooming and good eye contact    Mood depressed and anxious   Affect affect was constricted   Speech a normal rate and fluent   Thought Processes coherent/organized and normal thought processes   Hallucinations no hallucinations present    Thought Content no delusions   Abnormal Thoughts no suicidal thoughts  and no homicidal thoughts    Orientation  oriented to person and place and time   Remote Memory short term memory intact and long term memory intact   Attention Span concentration intact   Intellect Appears to be of Average Intelligence   Insight Limited insight   Judgement judgment was limited Muscle Strength Muscle strength and tone were normal and Normal gait    Language no difficulty naming common objects, no difficulty repeating a phrase  and no difficulty writing a sentence    Fund of Knowledge displays adequate knowledge of current events, adequate fund of knowledge regarding past history and adequate fund of knowledge regarding vocabulary    Pain none   Pain Scale 0       Assessment/Plan:       Diagnoses and all orders for this visit:    Bipolar 1 disorder ([facility] )  -     lithium carbonate (LITHOBID) 450 mg CR tablet;  Take 1 tablet (450 mg total) by mouth 2 (two) times a day            Treatment Recommendations- Risks Benefits      Immediate Medical/Psychiatric/Psychotherapy Treatments and Any Precautions: continue current medications    Risks, Benefits And Possible Side Effects Of Medications:  {PSYCH RISK, BENEFITS AND POSSIBLE SIDE EFFECTS (Optional):76891

## 2019-04-12 DIAGNOSIS — F31.81 BIPOLAR 2 DISORDER (HCC): ICD-10-CM

## 2019-04-17 DIAGNOSIS — F31.81 BIPOLAR 2 DISORDER (HCC): ICD-10-CM

## 2019-04-17 LAB
ALBUMIN SERPL-MCNC: 4.5 G/DL (ref 3.6–4.8)
ALBUMIN/GLOB SERPL: 2 {RATIO} (ref 1.2–2.2)
ALP SERPL-CCNC: 82 IU/L (ref 39–117)
ALT SERPL-CCNC: 14 IU/L (ref 0–32)
AST SERPL-CCNC: 13 IU/L (ref 0–40)
BILIRUB SERPL-MCNC: 0.3 MG/DL (ref 0–1.2)
BUN SERPL-MCNC: 21 MG/DL (ref 8–27)
BUN/CREAT SERPL: 15 (ref 12–28)
CALCIUM SERPL-MCNC: 9.6 MG/DL (ref 8.7–10.3)
CHLORIDE SERPL-SCNC: 108 MMOL/L (ref 96–106)
CHOLEST SERPL-MCNC: 215 MG/DL (ref 100–199)
CO2 SERPL-SCNC: 23 MMOL/L (ref 20–29)
CREAT SERPL-MCNC: 1.36 MG/DL (ref 0.57–1)
ERYTHROCYTE [DISTWIDTH] IN BLOOD BY AUTOMATED COUNT: 13.6 % (ref 12.3–15.4)
EST. AVERAGE GLUCOSE BLD GHB EST-MCNC: 114 MG/DL
GLOBULIN SER-MCNC: 2.3 G/DL (ref 1.5–4.5)
GLUCOSE SERPL-MCNC: 118 MG/DL (ref 65–99)
HBA1C MFR BLD: 5.6 % (ref 4.8–5.6)
HCT VFR BLD AUTO: 42.9 % (ref 34–46.6)
HDLC SERPL-MCNC: 51 MG/DL
HGB BLD-MCNC: 13.5 G/DL (ref 11.1–15.9)
LDLC SERPL CALC-MCNC: 128 MG/DL (ref 0–99)
LDLC/HDLC SERPL: 2.5 RATIO (ref 0–3.2)
MCH RBC QN AUTO: 30.5 PG (ref 26.6–33)
MCHC RBC AUTO-ENTMCNC: 31.5 G/DL (ref 31.5–35.7)
MCV RBC AUTO: 97 FL (ref 79–97)
PLATELET # BLD AUTO: 168 X10E3/UL (ref 150–379)
POTASSIUM SERPL-SCNC: 4.7 MMOL/L (ref 3.5–5.2)
PROT SERPL-MCNC: 6.8 G/DL (ref 6–8.5)
RBC # BLD AUTO: 4.42 X10E6/UL (ref 3.77–5.28)
SL AMB EGFR AFRICAN AMERICAN: 47 ML/MIN/1.73
SL AMB EGFR NON AFRICAN AMERICAN: 41 ML/MIN/1.73
SL AMB VLDL CHOLESTEROL CALC: 36 MG/DL (ref 5–40)
SODIUM SERPL-SCNC: 146 MMOL/L (ref 134–144)
TRIGL SERPL-MCNC: 180 MG/DL (ref 0–149)
TSH SERPL DL<=0.005 MIU/L-ACNC: 3.02 UIU/ML (ref 0.45–4.5)
WBC # BLD AUTO: 5.1 X10E3/UL (ref 3.4–10.8)

## 2019-04-17 RX ORDER — LAMOTRIGINE 200 MG/1
200 TABLET ORAL DAILY
Qty: 90 TABLET | Refills: 0 | Status: SHIPPED | OUTPATIENT
Start: 2019-04-17 | End: 2019-05-20 | Stop reason: SDUPTHER

## 2019-04-22 ENCOUNTER — TELEPHONE (OUTPATIENT)
Dept: FAMILY MEDICINE CLINIC | Facility: CLINIC | Age: 64
End: 2019-04-22

## 2019-04-22 ENCOUNTER — OFFICE VISIT (OUTPATIENT)
Dept: FAMILY MEDICINE CLINIC | Facility: CLINIC | Age: 64
End: 2019-04-22
Payer: COMMERCIAL

## 2019-04-22 VITALS
SYSTOLIC BLOOD PRESSURE: 130 MMHG | WEIGHT: 244.4 LBS | BODY MASS INDEX: 46.14 KG/M2 | OXYGEN SATURATION: 98 % | RESPIRATION RATE: 16 BRPM | HEART RATE: 79 BPM | DIASTOLIC BLOOD PRESSURE: 84 MMHG | HEIGHT: 61 IN | TEMPERATURE: 99.1 F

## 2019-04-22 DIAGNOSIS — E78.1 HIGH TRIGLYCERIDES: Primary | ICD-10-CM

## 2019-04-22 DIAGNOSIS — G47.33 OBSTRUCTIVE SLEEP APNEA: ICD-10-CM

## 2019-04-22 DIAGNOSIS — R73.01 IMPAIRED FASTING GLUCOSE: ICD-10-CM

## 2019-04-22 DIAGNOSIS — Z11.59 ENCOUNTER FOR HEPATITIS C SCREENING TEST FOR LOW RISK PATIENT: ICD-10-CM

## 2019-04-22 DIAGNOSIS — E03.9 HYPOTHYROIDISM, UNSPECIFIED TYPE: ICD-10-CM

## 2019-04-22 DIAGNOSIS — F31.63 BIPOLAR 1 DISORDER, MIXED, SEVERE (HCC): ICD-10-CM

## 2019-04-22 DIAGNOSIS — K21.9 GERD WITHOUT ESOPHAGITIS: ICD-10-CM

## 2019-04-22 DIAGNOSIS — R25.1 TREMOR OF LEFT HAND: ICD-10-CM

## 2019-04-22 PROCEDURE — 99214 OFFICE O/P EST MOD 30 MIN: CPT | Performed by: FAMILY MEDICINE

## 2019-04-22 RX ORDER — LEVOTHYROXINE SODIUM 0.1 MG/1
100 TABLET ORAL DAILY
Qty: 90 TABLET | Refills: 3 | Status: SHIPPED | OUTPATIENT
Start: 2019-04-22 | End: 2020-03-16

## 2019-04-23 RX ORDER — LAMOTRIGINE 200 MG/1
TABLET ORAL
Qty: 90 TABLET | Refills: 0 | Status: SHIPPED | OUTPATIENT
Start: 2019-04-23 | End: 2019-06-10 | Stop reason: ALTCHOICE

## 2019-05-15 DIAGNOSIS — K21.9 GERD WITHOUT ESOPHAGITIS: ICD-10-CM

## 2019-05-16 RX ORDER — OMEPRAZOLE 40 MG/1
CAPSULE, DELAYED RELEASE ORAL
Qty: 90 CAPSULE | Refills: 3 | Status: SHIPPED | OUTPATIENT
Start: 2019-05-16 | End: 2020-03-23

## 2019-05-20 ENCOUNTER — OFFICE VISIT (OUTPATIENT)
Dept: PSYCHIATRY | Facility: CLINIC | Age: 64
End: 2019-05-20
Payer: COMMERCIAL

## 2019-05-20 DIAGNOSIS — F31.81 BIPOLAR 2 DISORDER (HCC): ICD-10-CM

## 2019-05-20 DIAGNOSIS — F31.9 BIPOLAR 1 DISORDER (HCC): ICD-10-CM

## 2019-05-20 PROCEDURE — 99213 OFFICE O/P EST LOW 20 MIN: CPT | Performed by: PSYCHIATRY & NEUROLOGY

## 2019-05-20 RX ORDER — LITHIUM CARBONATE 450 MG
450 TABLET, EXTENDED RELEASE ORAL 2 TIMES DAILY
Qty: 180 TABLET | Refills: 0 | Status: SHIPPED | OUTPATIENT
Start: 2019-05-20 | End: 2019-08-29 | Stop reason: SDUPTHER

## 2019-05-20 RX ORDER — LEVOTHYROXINE SODIUM 88 UG/1
TABLET ORAL
COMMUNITY
Start: 2019-05-14 | End: 2019-06-10 | Stop reason: DRUGHIGH

## 2019-06-10 ENCOUNTER — OFFICE VISIT (OUTPATIENT)
Dept: PULMONOLOGY | Facility: CLINIC | Age: 64
End: 2019-06-10
Payer: COMMERCIAL

## 2019-06-10 VITALS
OXYGEN SATURATION: 95 % | BODY MASS INDEX: 44.87 KG/M2 | HEART RATE: 75 BPM | HEIGHT: 62 IN | SYSTOLIC BLOOD PRESSURE: 142 MMHG | TEMPERATURE: 98.1 F | DIASTOLIC BLOOD PRESSURE: 94 MMHG | WEIGHT: 243.8 LBS

## 2019-06-10 DIAGNOSIS — K21.9 GERD WITHOUT ESOPHAGITIS: ICD-10-CM

## 2019-06-10 DIAGNOSIS — G47.33 OBSTRUCTIVE SLEEP APNEA: Primary | ICD-10-CM

## 2019-06-10 DIAGNOSIS — R03.0 ELEVATED BLOOD PRESSURE READING: ICD-10-CM

## 2019-06-10 PROCEDURE — 99203 OFFICE O/P NEW LOW 30 MIN: CPT | Performed by: INTERNAL MEDICINE

## 2019-06-27 ENCOUNTER — OFFICE VISIT (OUTPATIENT)
Dept: PSYCHIATRY | Facility: CLINIC | Age: 64
End: 2019-06-27
Payer: COMMERCIAL

## 2019-06-27 DIAGNOSIS — F31.9 BIPOLAR 1 DISORDER (HCC): ICD-10-CM

## 2019-06-27 PROCEDURE — 99213 OFFICE O/P EST LOW 20 MIN: CPT | Performed by: PSYCHIATRY & NEUROLOGY

## 2019-08-01 ENCOUNTER — CONSULT (OUTPATIENT)
Dept: NEUROLOGY | Facility: CLINIC | Age: 64
End: 2019-08-01
Payer: COMMERCIAL

## 2019-08-01 VITALS
SYSTOLIC BLOOD PRESSURE: 130 MMHG | HEART RATE: 75 BPM | BODY MASS INDEX: 45.08 KG/M2 | WEIGHT: 245 LBS | DIASTOLIC BLOOD PRESSURE: 84 MMHG | HEIGHT: 62 IN

## 2019-08-01 DIAGNOSIS — R25.1 TREMOR OF LEFT HAND: ICD-10-CM

## 2019-08-01 DIAGNOSIS — R41.3 MEMORY LOSS: Primary | ICD-10-CM

## 2019-08-01 PROCEDURE — 99204 OFFICE O/P NEW MOD 45 MIN: CPT | Performed by: PSYCHIATRY & NEUROLOGY

## 2019-08-01 NOTE — PROGRESS NOTES
[facility] Claudio Verduzco    NAME: Raghu Andres  AGE: 71 y o  SEX: female  : 1955     DATE: 2019     Assessment and Plan:       Her B/L tremors in the hand which improved with adjustment in her mood stabilizers suggest this could be a medication induced tremor  However, she has a recent increase in her stress levels, causing anxiety and depression, which makes the possibility of advanced physiological tremor as a likely cause too  However, because of the sudden onset of the tremors, along with her gait imbalance, history of obesity and obstructive sleep apnea, and hypothyroidism, further testing with blood work and brain imaging maybe warranted  Plan   1  Lithium levels  2  TSH, vit  B12 levels, CMP  3  MRI brain       Chief Complaint:     Tremor in B/L upper limbs     History of Present Illness:     Mr David Regan presents to our clinic today due to Tremor of her upper limbs  She has a history of hypothyroidism, bipolar 1 disorder, Obesity with obstructive sleep apnea and arthritis  This tremor started about 6 months ago, and was sudden onset  It is localized to her B/L upper limbs with no involvement in her lower limbs or lips  Since onset she has not noticed any worsening of the symptoms  However, these tremors are not persistent throughout the day, and appear to be worse with work like holding a cup  She does not have a resting tremor  Her fine motor skills like writing and buttoning are not affected  Currently this has not affected her daily activities  Along with this, she also complains of an imbalance in her gait, progressively worsening over the past 6 months  She can still walk on her own, without falling  Infact, she states that there are days where her walking is normal  No history of falls  She denies stiffness or drooling  She has also noticed increased forgetfulness, which seem to be progressively worsening over the past few years   She has trouble remembering names, and tasks for the days  No auditory,or visual hallucinations  She has been experiencing a recent increase in her stress level in the past few months with ongoing personal issues with her children  Because of this, she feels anxious, with on and off palpitations, and also feels depressed  No trouble falling asleep  However, she wakes up in the middle of the night to use the restroom and sometimes has trouble going back to sleep  No headaches, limb weakness or numbness  No visual changes  No history of unintentional weigh loss, excessive sweating, heat intolerance, or diarrhea  Her TSH levels have been in the normal range in the past   She rarely consumes alcohol (one glass of wine a month)  She has no/a family history of dementia, tremors, movement disorders  She was on  lurasidone (LATUDA), lithium carbonate (LITHOBID), and levothyroxine , however there had not been any change in the medications right before the onset of her symptoms  However, a recent change in her medications to Lamictal and Latuda in May has helped her with her tremors  Review of Systems:     Review of Systems   Constitutional: Positive for fatigue  Negative for activity change, appetite change and unexpected weight change  HENT: Negative  Negative for drooling, ear discharge, ear pain, hearing loss and trouble swallowing  Eyes: Negative  Respiratory: Negative  Cardiovascular: Positive for palpitations  Negative for chest pain and leg swelling  Gastrointestinal: Negative  Negative for diarrhea, nausea and vomiting  Endocrine: Negative for cold intolerance and heat intolerance  Genitourinary: Negative  Musculoskeletal: Positive for arthralgias and gait problem  Negative for neck stiffness  Allergic/Immunologic: Negative  Hematological: Negative  Psychiatric/Behavioral: Negative for confusion, hallucinations, self-injury and suicidal ideas  The patient is nervous/anxious   The patient is not hyperactive  All other systems reviewed and are negative         Past Medical History:     Past Medical History:   Diagnosis Date    Arthritis     Bipolar 1 disorder ([facility] [address] )     Disease of thyroid gland         Past Surgical History:     Past Surgical History:   Procedure Laterality Date    NO PAST SURGERIES          Social History:     Social History     Socioeconomic History    Marital status: /Civil Union     Spouse name: Not on file    Number of children: Not on file    Years of education: Not on file    Highest education level: Not on file   Occupational History    Not on file   Social Needs    Financial resource strain: Not on file    Food insecurity:     Worry: Not on file     Inability: Not on file    Transportation needs:     Medical: Not on file     Non-medical: Not on file   Tobacco Use    Smoking status: Former Smoker     Packs/day: 0 25     Years: 30 00     Pack years: 7 50     Types: Cigarettes     Last attempt to quit:      Years since quittin 5    Smokeless tobacco: Never Used   Substance and Sexual Activity    Alcohol use: Yes     Frequency: Monthly or less     Comment: socially    Drug use: No    Sexual activity: Not Currently     Partners: Male     Birth control/protection: Post-menopausal   Lifestyle    Physical activity:     Days per week: Not on file     Minutes per session: Not on file    Stress: Not on file   Relationships    Social connections:     Talks on phone: Not on file     Gets together: Not on file     Attends Catholic service: Not on file     Active member of club or organization: Not on file     Attends meetings of clubs or organizations: Not on file     Relationship status: Not on file    Intimate partner violence:     Fear of current or ex partner: Not on file     Emotionally abused: Not on file     Physically abused: Not on file     Forced sexual activity: Not on file   Other Topics Concern    Not on file   Social History Narrative    Daily coffee consumption: 3 cups/day        Family History:     Family History   Problem Relation Age of Onset    Heart disease Mother     Heart Valve Disease Mother         Replacement    Diabetes Mother     Arthritis Father         Current Medications:     Outpatient Medications Prior to Visit   Medication Sig Dispense Refill    Ascorbic Acid (RONAL-C PO) Take by mouth      b complex vitamins tablet Take 1 tablet by mouth daily      Cholecalciferol (VITAMIN D) 2000 units CAPS Take 1 capsule by mouth daily      clobetasol (TEMOVATE) 0 05 % ointment Apply topically 3 (three) times a week 30 g 0    Cranberry 1000 MG CAPS Take by mouth      Glucosamine-Chondroit-Vit C-Mn (GLUCOSAMINE 1500 COMPLEX PO) Take by mouth      levothyroxine 100 mcg tablet Take 1 tablet (100 mcg total) by mouth daily 90 tablet 3    lithium carbonate (LITHOBID) 450 mg CR tablet Take 1 tablet (450 mg total) by mouth 2 (two) times a day 180 tablet 0    lurasidone (LATUDA) 20 mg tablet Take 1 tablet (20 mg total) by mouth daily with breakfast 30 tablet 2    Multiple Vitamin (MULTI-VITAMIN DAILY) TABS Take by mouth      Multiple Vitamins-Minerals (HAIR/SKIN/NAILS/BIOTIN PO) Take 1 tablet by mouth daily      nystatin (MYCOSTATIN) powder Apply topically 3 (three) times a day      omeprazole (PriLOSEC) 40 MG capsule TAKE 1 CAPSULE DAILY 90 capsule 3    SOOLANTRA 1 % CREA       tolterodine (DETROL LA) 2 mg 24 hr capsule TAKE 1 CAPSULE DAILY 30 capsule 5     No facility-administered medications prior to visit  Allergies: Allergies   Allergen Reactions    Other      Other reaction(s): Anaphylaxis  Annotation - [id number]: plums peaches        Objective:     Physical Exam:  There were no vitals taken for this visit      General Appearance:    Alert, cooperative, no distress, appears stated age   Head:    Normocephalic, without obvious abnormality, atraumatic   Eyes:    PERRL, conjunctiva/corneas clear, EOM's intact, fundi     benign, both eyes   Throat:   Lips, mucosa, and tongue normal; teeth and gums normal   Neck:   Supple, symmetrical, trachea midline, no adenopathy;     thyroid:  no enlargement/tenderness/nodules; no carotid    bruit or JVD   Lungs:     Clear to auscultation bilaterally, respirations unlabored   Chest Wall:    No tenderness or deformity    Heart:    Regular rate and rhythm, S1 and S2 normal, no murmur, rub   or gallop   Abdomen:     Soft, non-tender, bowel sounds active all four quadrants,     no masses, no organomegaly   Neurological:  Neurologic Exam     Mental Status   Oriented to person, place, and time  Attention: normal    Speech: speech is normal   Level of consciousness: alert    Motor Exam   Muscle bulk: normal  Overall muscle tone: normal    Strength   Strength 5/5 throughout  Sensory Exam   Light touch normal    Right arm proprioception: normal  Left arm proprioception: normal    Gait, Coordination, and Reflexes     Gait  Gait: (trendelenburg gait with right hip drop  decreased arm swing on the right side  slight imbalance when turning )    Coordination   Romberg: negative  Finger to nose coordination: normal    Tremor   Resting tremor: absent  Intention tremor: absent  Action tremor: mild tremor on the left arm  Reflexes   Right brachioradialis: 2+  Left brachioradialis: 2+  Right biceps: 2+  Left biceps: 2+  Right triceps: 2+  Left triceps: 2+  Right patellar: 2+  Left patellar: 2+  Right achilles: 2+  Left achilles: 2+  Right : 2+  Left : 2+         UPDRS                             Time since last dose:      Speech  0: Normal: No speech problems  Facial Expression  0: Normal: Normal facial expression  Rigidity - Neck  0: Normal: No rigidity  Rigidity - Upper Extremity (Right)  0: Normal: No rigidity  Rigidity - Upper Extremity (Left)   0: Normal: No rigidity  Rigidity - Lower Extremity (Right)  0: Normal: No rigidity      Rigidity - Lower Extremity (Left)   0: Normal: No rigidity  Finger Taps (Right)   0: Normal: No problems  Finger Taps (Left)   0: Normal: No problems  Hand Movement (Right)  0: Normal: No problems  Hand Movement (Left)   0: Normal: No problems  Pronation/Supination (Right)  1: Slight: Any of the following: a) the regular rhythm is broken with one or two interruptions or hesitations of the tapping movement; b) slight slowing; c) the amplitude decrements near the end of the 10 taps  Pronation/Supination (Left)   0: Normal: No problems  Toe Tapping (Right) 0: Normal: No problems  Toe Tapping (Left) 0: Normal: No problems  Leg Agility (Right)  0: Normal: No problems  Leg Agility (Left)   0: Normal: No problems  Arising from Chair   0: Normal: No problems  Able to arise quickly without hesitation  Gait   1: Slight: Independent walking with minor gait impairment  Freezing of Gait 0: Normal: No freezing  Postural Stability   0: Normal: No problems: Recovers with one or two steps  Posture 2: Mild: Definite flexion, scoliosis or leaning to one side, but patient can correct posture to normal posture when asked to do so  Global spontaneity of movement 0: Normal: No problems  Postural Tremor (Right) 0: Normal: No tremor  Postural Tremor (Left) 0: Normal: No tremor  Kinetic Tremor (Right)  0: Normal: No tremor  Kinetic Tremor (Left)  0: Normal: No tremor  Rest tremor amplitude RUE 0: Normal: No tremor  Rest tremor amplitude LUE 0: Normal: No tremor  Rest tremor amplitude RLE 0: Normal: No tremor  Reset tremor amplitude LLE 0: Normal: No tremor  Lip/Jaw Tremor  0: Normal: No tremor  Consistency of tremor 0: Normal: No tremor  Motor Exam Total:  4      Data:    Laboratory Results: I have personally reviewed the pertinent laboratory results/reports   Radiology/Other Diagnostic Testing Results: I have personally reviewed pertinent reports          Zachary Ahn MD  [facility] [place] [facility] [place]

## 2019-08-01 NOTE — PROGRESS NOTES
Review of Systems   Constitutional: Negative  Negative for appetite change and fever  HENT: Positive for trouble swallowing  Negative for hearing loss, tinnitus and voice change  Eyes: Negative  Negative for photophobia and pain  Respiratory: Negative  Negative for shortness of breath  Cardiovascular: Positive for palpitations  Gastrointestinal: Negative  Negative for nausea and vomiting  Endocrine: Negative  Negative for cold intolerance and heat intolerance  Genitourinary: Positive for frequency and urgency  Negative for dysuria  Musculoskeletal: Positive for gait problem (balance problems and difficulty walking)  Negative for myalgias and neck pain  Skin: Negative  Negative for rash  Neurological: Positive for tremors (bilateral hands) and speech difficulty (slurred)  Negative for dizziness, seizures, syncope, facial asymmetry, weakness, light-headedness, numbness and headaches  Clumsiness     Hematological: Negative  Does not bruise/bleed easily  Psychiatric/Behavioral: Positive for sleep disturbance (waking up at night and snoring)  Negative for confusion and hallucinations  Depression     The above ROS was reviewed and updated           Current Outpatient Medications on File Prior to Visit   Medication Sig Dispense Refill    Ascorbic Acid (RONAL-C PO) Take by mouth      b complex vitamins tablet Take 1 tablet by mouth daily      Cholecalciferol (VITAMIN D) 2000 units CAPS Take 1 capsule by mouth daily      Cranberry 1000 MG CAPS Take by mouth      Glucosamine-Chondroit-Vit C-Mn (GLUCOSAMINE 1500 COMPLEX PO) Take by mouth      levothyroxine 100 mcg tablet Take 1 tablet (100 mcg total) by mouth daily 90 tablet 3    lithium carbonate (LITHOBID) 450 mg CR tablet Take 1 tablet (450 mg total) by mouth 2 (two) times a day 180 tablet 0    lurasidone (LATUDA) 20 mg tablet Take 1 tablet (20 mg total) by mouth daily with breakfast 30 tablet 2    Multiple Vitamin (MULTI-VITAMIN DAILY) TABS Take by mouth      Multiple Vitamins-Minerals (HAIR/SKIN/NAILS/BIOTIN PO) Take 1 tablet by mouth daily      nystatin (MYCOSTATIN) powder Apply topically 3 (three) times a day      omeprazole (PriLOSEC) 40 MG capsule TAKE 1 CAPSULE DAILY 90 capsule 3    SOOLANTRA 1 % CREA       tolterodine (DETROL LA) 2 mg 24 hr capsule TAKE 1 CAPSULE DAILY 30 capsule 5    clobetasol (TEMOVATE) 0 05 % ointment Apply topically 3 (three) times a week (Patient not taking: Reported on 8/1/2019) 30 g 0     No current facility-administered medications on file prior to visit

## 2019-08-19 ENCOUNTER — HOSPITAL ENCOUNTER (OUTPATIENT)
Dept: MRI IMAGING | Facility: HOSPITAL | Age: 64
Discharge: HOME | End: 2019-08-19
Attending: PSYCHIATRY & NEUROLOGY
Payer: COMMERCIAL

## 2019-08-19 DIAGNOSIS — R25.1 TREMOR OF LEFT HAND: ICD-10-CM

## 2019-08-19 DIAGNOSIS — F31.9 BIPOLAR 1 DISORDER (HCC): ICD-10-CM

## 2019-08-19 PROCEDURE — 70551 MRI BRAIN STEM W/O DYE: CPT

## 2019-08-29 ENCOUNTER — OFFICE VISIT (OUTPATIENT)
Dept: PSYCHIATRY | Facility: CLINIC | Age: 64
End: 2019-08-29
Payer: COMMERCIAL

## 2019-08-29 DIAGNOSIS — F31.9 BIPOLAR 1 DISORDER (HCC): ICD-10-CM

## 2019-08-29 PROCEDURE — 99213 OFFICE O/P EST LOW 20 MIN: CPT | Performed by: PSYCHIATRY & NEUROLOGY

## 2019-08-29 RX ORDER — LITHIUM CARBONATE 450 MG
450 TABLET, EXTENDED RELEASE ORAL 2 TIMES DAILY
Qty: 180 TABLET | Refills: 0 | Status: SHIPPED | OUTPATIENT
Start: 2019-08-29 | End: 2019-12-05 | Stop reason: SDUPTHER

## 2019-08-29 NOTE — PSYCH
Subjective: Medication Management      Patient ID: Kayode Valdivia is a 71 y o  female  HPI ROS Appetite Changes and Sleep: normal appetite, normal energy level, no weight change and normal number of sleep hours   Recent brain MRI revealed mild to moderate microvascular ischemia     Review Of Systems:     Mood Anxiety, Depression and Emotional Lability   Behavior Compulsive Behavior   Thought Content Disturbing Thoughts, Feelings and Unreasonalbe or Irrational Fears   General Emotional Problems and Decreased Functioning   Personality Normal   Other Psych Symptoms Normal   Constitutional Negative   ENT Negative   Cardiovascular Negative   Respiratory Negative   Gastrointestinal Negative   Genitourinary Negative   Musculoskeletal Negative   Integumentary Negative   Neurological Negative   Endocrine Normal    Other Symptoms Normal              Laboratory Results: No results found for this or any previous visit      Substance Abuse History:  Social History     Substance and Sexual Activity   Drug Use No       Family Psychiatric History:   Family History   Problem Relation Age of Onset    Heart disease Mother     Heart Valve Disease Mother         Replacement    Diabetes Mother     Arthritis Father        The following portions of the patient's history were reviewed and updated as appropriate: allergies, current medications, past family history, past medical history, past social history, past surgical history and problem list     Social History     Socioeconomic History    Marital status: /Civil Union     Spouse name: Not on file    Number of children: Not on file    Years of education: Not on file    Highest education level: Not on file   Occupational History    Not on file   Social Needs    Financial resource strain: Not on file    Food insecurity:     Worry: Not on file     Inability: Not on file    Transportation needs:     Medical: Not on file     Non-medical: Not on file   Tobacco Use    Smoking status: Former Smoker     Packs/day: 0 25     Years: 30 00     Pack years: 7 50     Types: Cigarettes     Last attempt to quit:      Years since quittin 6    Smokeless tobacco: Never Used   Substance and Sexual Activity    Alcohol use: Yes     Frequency: Monthly or less     Comment: socially    Drug use: No    Sexual activity: Not Currently     Partners: Male     Birth control/protection: Post-menopausal   Lifestyle    Physical activity:     Days per week: Not on file     Minutes per session: Not on file    Stress: Not on file   Relationships    Social connections:     Talks on phone: Not on file     Gets together: Not on file     Attends Religion service: Not on file     Active member of club or organization: Not on file     Attends meetings of clubs or organizations: Not on file     Relationship status: Not on file    Intimate partner violence:     Fear of current or ex partner: Not on file     Emotionally abused: Not on file     Physically abused: Not on file     Forced sexual activity: Not on file   Other Topics Concern    Not on file   Social History Narrative    Daily coffee consumption: 3 cups/day     Social History     Social History Narrative    Daily coffee consumption: 3 cups/day       Objective:       Mental status:  Appearance calm and cooperative , adequate hygiene and grooming and good eye contact    Mood depressed and anxious   Affect affect was constricted   Speech a normal rate and fluent   Thought Processes coherent/organized and normal thought processes   Hallucinations no hallucinations present    Thought Content no delusions   Abnormal Thoughts no suicidal thoughts  and no homicidal thoughts    Orientation  oriented to person and place and time   Remote Memory short term memory intact and long term memory intact   Attention Span concentration intact   Intellect Appears to be of Average Intelligence   Insight Limited insight   Judgement judgment was limited   Muscle Strength Muscle strength and tone were normal and Normal gait    Language no difficulty naming common objects, no difficulty repeating a phrase  and no difficulty writing a sentence    Fund of Knowledge displays adequate knowledge of current events, adequate fund of knowledge regarding past history and adequate fund of knowledge regarding vocabulary    Pain none   Pain Scale 0       Assessment/Plan:       There are no diagnoses linked to this encounter          Treatment Recommendations- Risks Benefits      Immediate Medical/Psychiatric/Psychotherapy Treatments and Any Precautions: continue current medications, increase Latuda to 40 mg daily     Risks, Benefits And Possible Side Effects Of Medications:  {PSYCH RISK, BENEFITS AND POSSIBLE SIDE EFFECTS (Optional):03601    Controlled Medication Discussion:

## 2019-08-29 NOTE — BH TREATMENT PLAN
TREATMENT PLAN (Medication Management Only)        [facility]    Name and Date of Birth:  Orion Pratt 71 y o  1955  Date of Treatment Plan: August 29, 2019  Diagnosis/Diagnoses:    1  Bipolar 1 disorder [facility])      Strengths/Personal Resources for Self-Care: supportive family, taking medications as prescribed, ability to communicate needs, motivation for treatment  Area/Areas of need (in own words): anxiety, anxiety symptoms, depression, depressive symptoms, mood instability  1  Long Term Goal: continue improvement in mood stability  Target Date: 2 months - 10/29/2019  Person/Persons responsible for completion of goal: Cherelle  2  Short Term Objective (s) - How will we reach this goal?:   A  Provider new recommended medication/dosage changes and/or continue medication(s): continue current medications as prescribed  Marlinda Marrow weight using food journal and doing exercise   C  Cleaning and organizing stuff at home and give things away  Target Date: 3 months - 11/29/2019  Person/Persons Responsible for Completion of Goal: Cherelle  Progress Towards Goals: continuing treatment  Treatment Modality: medication management every 3 months  Review due 90 to 120 days from date of this plan: 3 months - 11/29/2019  Expected length of service: maintenance  My Physician/PA/NP and I have developed this plan together and I agree to work on the goals and objectives  I understand the treatment goals that were developed for my treatment

## 2019-09-04 ENCOUNTER — OFFICE VISIT (OUTPATIENT)
Dept: PODIATRY | Facility: CLINIC | Age: 64
End: 2019-09-04
Payer: COMMERCIAL

## 2019-09-04 ENCOUNTER — TELEPHONE (OUTPATIENT)
Dept: PSYCHIATRY | Facility: CLINIC | Age: 64
End: 2019-09-04

## 2019-09-04 VITALS
HEART RATE: 81 BPM | SYSTOLIC BLOOD PRESSURE: 152 MMHG | BODY MASS INDEX: 44.75 KG/M2 | HEIGHT: 62 IN | WEIGHT: 243.2 LBS | DIASTOLIC BLOOD PRESSURE: 93 MMHG

## 2019-09-04 DIAGNOSIS — M20.42 HAMMER TOE OF LEFT FOOT: Primary | ICD-10-CM

## 2019-09-04 DIAGNOSIS — L60.3 DYSTROPHIA UNGUIUM: ICD-10-CM

## 2019-09-04 PROCEDURE — 99213 OFFICE O/P EST LOW 20 MIN: CPT | Performed by: PODIATRIST

## 2019-09-04 NOTE — TELEPHONE ENCOUNTER
Pharmacy faxed clarification request for pt's RX for Irene Jose  Pharmacy is stating pt is currently prescribed Latuda 20mg and also 40mg  Pharmacy is asking if pt should be taking both prescriptions

## 2019-09-04 NOTE — PROGRESS NOTES
Assessment/Plan:      Diagnoses and all orders for this visit:    Hammer toe of left foot    Dystrophia unguium        Patient has some pincer nail deformity which is difficult for her to trim  I did offer phenol matrixectomy to the corners of the nail bed but she would like to defer for now  This can just be simply monitor  There is no for other weight change the way her toenails grow other than permanent removal       Her left 4th hammertoe deformity is minor  She is not having any pain or handicapped from this issue  At the moment is more cosmetic in appearance that is the issue  I did discuss toe spacers and provided them for her  She should begin have daily pain from the hammertoe a can be surgically straightened  We did discuss this procedure in detail as well as the postop recovery course  She would like to monitor for now  She may call as needed  Subjective:     Patient ID: Belia Tapia is a 71 y o  female  Patient is concerned because her left 4th toe looks a little cooking to her  She is unsure how long it has been corrected but seems to have gotten worse over the last 6 months  She denies any trauma or previous fracture  It is not causing any pain or handicapped while walking  She is also worried because her toenails are starting to grow little corrected and she would like to know if she can do something to straighten her toenails  Review of Systems   Constitutional: Negative  Respiratory: Negative for cough and shortness of breath  Gastrointestinal: Negative for diarrhea and nausea  Musculoskeletal: Positive for arthralgias ( crooked toe)  Skin: Positive for color change (  Crooked toenails)  Neurological: Negative for numbness  Objective:     Physical Exam    Vitals reviewed    Constitutional: Patient is not distressed  Patient is well developed    Vascular: Dorsalis pedis and posterior tibial pulses 2/4   Capillary refill time within normal limits to all digits  No erythema  No edema  Dermatology: No rash, no open lesions  Present pedal hair  Mild pincer nail deformity of the great toenails  Musculoskeletal: Normal range of motion to ankle, subtalar joint, and midtarsal joint  Normal range of motion first MTPJ  Manual muscle testing 5 out of 5 for inversion/eversion/dorsiflexion/plantarflexion  Left 4th distal interphalangeal joint varus deformity with medial angular deviation of the distal toe  No joint range of motion or pain issues  Neurological exam: Monofilament sensation intact  Vibratory sensation intact   Achilles reflex is normal

## 2019-09-13 ENCOUNTER — DOCUMENTATION (OUTPATIENT)
Dept: PULMONOLOGY | Facility: CLINIC | Age: 64
End: 2019-09-13

## 2019-09-13 ENCOUNTER — OFFICE VISIT (OUTPATIENT)
Dept: PULMONOLOGY | Facility: CLINIC | Age: 64
End: 2019-09-13
Payer: COMMERCIAL

## 2019-09-13 VITALS
HEART RATE: 85 BPM | HEIGHT: 62 IN | BODY MASS INDEX: 44.61 KG/M2 | OXYGEN SATURATION: 96 % | DIASTOLIC BLOOD PRESSURE: 84 MMHG | WEIGHT: 242.4 LBS | TEMPERATURE: 98.6 F | SYSTOLIC BLOOD PRESSURE: 130 MMHG

## 2019-09-13 DIAGNOSIS — R03.0 ELEVATED BLOOD PRESSURE READING: ICD-10-CM

## 2019-09-13 DIAGNOSIS — G47.33 OBSTRUCTIVE SLEEP APNEA: Primary | ICD-10-CM

## 2019-09-13 PROCEDURE — 99213 OFFICE O/P EST LOW 20 MIN: CPT | Performed by: INTERNAL MEDICINE

## 2019-09-13 NOTE — PROGRESS NOTES
Pulmonary Follow Up Note   Martínez Phan 71 y o  female MRN: [id number]  9/13/2019      Assessment:  1  Obstructive Sleep Apnea  · Compliance report not available today  · ESS today 12/24  · Will order new CPAP machine today   · Discussed goals of at least 4 hours use per night  · We reviewed adverse effects of untreated JULIO including HTN, which she demonstrated today, increased risk of CVA, MI, obesity, etc  · Follow up CPAP compliance in 3 months and will review compliance report   · Consider repeat PSG based upon results given weight gain     2  Obesity - encouraged diet and exercise     3  Poor Sleep Hygiene - reviewed optimal sleep hygiene methods/goals     4  Elevated Blood pressure - improved    Plan:    Diagnoses and all orders for this visit:    Obstructive sleep apnea  -     Cpap New DME    Elevated blood pressure reading        Return in about 3 months (around 12/13/2019) for Recheck  History of Present Illness   HPI:  Martínez Phan is a 71 y o  female who has history of Bipolar disorder, GERD, hypothyroidism, and JULIO  She was diagnosed with JULIO in 2007 at [facility]  She reports being on CPAP since that time with nasal interface  She was initially seen in June 2019 and plans were for improved CPAP Compliance  She reports overall feeling well  She reported fatigue and having difficulty with CPAP  She notes no further ramp on CPAP and feels pressure is varying during night  She reports not using or with limited use 3+ nights a week  CPAP is 15years old  She denied HA, no sore throat, no dyspnea, no cough or sputum production  Review of Systems   Constitutional: Positive for fatigue  Negative for activity change, appetite change and fever  HENT: Negative for ear pain, postnasal drip, rhinorrhea, sneezing, sore throat and trouble swallowing  Respiratory: Negative for shortness of breath and wheezing  Cardiovascular: Negative for chest pain     Gastrointestinal: Negative for abdominal pain, nausea and vomiting  Musculoskeletal: Negative for arthralgias and myalgias  Neurological: Negative for light-headedness and headaches  Hematological: Negative for adenopathy  Psychiatric/Behavioral: Positive for decreased concentration and sleep disturbance  The patient is not nervous/anxious          Historical Information   Past Medical History:   Diagnosis Date    Arthritis     Bipolar 1 disorder ([address] )     Disease of thyroid gland      Past Surgical History:   Procedure Laterality Date    NO PAST SURGERIES       Family History   Problem Relation Age of Onset    Heart disease Mother     Heart Valve Disease Mother         Replacement    Diabetes Mother     Arthritis Father          Meds/Allergies     Current Outpatient Medications:     Ascorbic Acid (RONAL-C PO), Take by mouth, Disp: , Rfl:     b complex vitamins tablet, Take 1 tablet by mouth daily, Disp: , Rfl:     Cholecalciferol (VITAMIN D) 2000 units CAPS, Take 1 capsule by mouth daily, Disp: , Rfl:     clobetasol (TEMOVATE) 0 05 % ointment, Apply topically 3 (three) times a week, Disp: 30 g, Rfl: 0    Cranberry 1000 MG CAPS, Take by mouth, Disp: , Rfl:     Glucosamine-Chondroit-Vit C-Mn (GLUCOSAMINE 1500 COMPLEX PO), Take by mouth, Disp: , Rfl:     levothyroxine 100 mcg tablet, Take 1 tablet (100 mcg total) by mouth daily, Disp: 90 tablet, Rfl: 3    lithium carbonate (LITHOBID) 450 mg CR tablet, Take 1 tablet (450 mg total) by mouth 2 (two) times a day, Disp: 180 tablet, Rfl: 0    lurasidone (LATUDA) 40 mg tablet, Take 1 tablet (40 mg total) by mouth daily with breakfast, Disp: 90 tablet, Rfl: 0    Multiple Vitamin (MULTI-VITAMIN DAILY) TABS, Take by mouth, Disp: , Rfl:     Multiple Vitamins-Minerals (HAIR/SKIN/NAILS/BIOTIN PO), Take 1 tablet by mouth daily, Disp: , Rfl:     nystatin (MYCOSTATIN) powder, Apply topically 3 (three) times a day, Disp: , Rfl:     omeprazole (PriLOSEC) 40 MG capsule, TAKE 1 CAPSULE DAILY, Disp: 90 capsule, Rfl: 3    SOOLANTRA 1 % CREA, , Disp: , Rfl:     tolterodine (DETROL LA) 2 mg 24 hr capsule, TAKE 1 CAPSULE DAILY, Disp: 30 capsule, Rfl: 5  Allergies   Allergen Reactions    Other      Other reaction(s): Anaphylaxis  [facility] - [id number]: plums peaches       Vitals: Blood pressure 130/84, pulse 85, temperature 98 6 °F (37 °C), temperature source Tympanic, height 5' 2" (1 575 m), weight 110 kg (242 lb 6 4 oz), SpO2 96 %, not currently breastfeeding  Body mass index is 44 34 kg/m²  Oxygen Therapy  SpO2: 96 %  Oxygen Therapy: None (Room air)      Physical Exam  Physical Exam   Constitutional: She is oriented to person, place, and time  She appears well-developed and well-nourished  No distress  Obese female no distress   HENT:   Head: Normocephalic and atraumatic  Right Ear: External ear normal    Left Ear: External ear normal    Nose: Nose normal    Mouth/Throat: No oropharyngeal exudate  Eyes: Pupils are equal, round, and reactive to light  Conjunctivae are normal  Right eye exhibits no discharge  Left eye exhibits no discharge  No scleral icterus  Neck: No JVD present  No tracheal deviation present  Cardiovascular: Normal rate, regular rhythm and normal heart sounds  No murmur heard  Pulmonary/Chest: Effort normal and breath sounds normal  No stridor  No respiratory distress  She has no wheezes  She has no rales  Abdominal: Soft  Bowel sounds are normal  She exhibits no distension  There is no tenderness  Musculoskeletal: She exhibits no edema or deformity  Lymphadenopathy:     She has no cervical adenopathy  Neurological: She is alert and oriented to person, place, and time  Skin: Skin is warm and dry  No rash noted  No erythema  Psychiatric: She has a normal mood and affect  Her behavior is normal    Vitals reviewed  Labs: I have personally reviewed pertinent lab results    Lab Results   Component Value Date    WBC 5 1 04/16/2019    HGB 13 5 04/16/2019 HCT 42 9 04/16/2019    MCV 97 04/16/2019     04/16/2019     Lab Results   Component Value Date    GLUCOSE 124 (H) 11/17/2017    CALCIUM 9 5 11/17/2017     (H) 11/17/2017    K 4 7 04/16/2019    CO2 23 04/16/2019     (H) 04/16/2019    BUN 21 04/16/2019    CREATININE 1 36 (H) 04/16/2019     No results found for: IGE  Lab Results   Component Value Date    ALT 14 04/16/2019    AST 13 04/16/2019    ALKPHOS 78 11/17/2017    BILITOT 0 4 11/17/2017       Asif Harrell DO, [facility]

## 2019-09-13 NOTE — PATIENT INSTRUCTIONS
CPAP   WHAT YOU NEED TO KNOW:   What is CPAP? Continuous positive airway pressure (CPAP) is a treatment that uses air pressure to keep your airways open while you sleep  People who have breathing problems, such as sleep apnea, can benefit from CPAP treatment  How is CPAP treatment given? CPAP treatment is given through a machine that has a mask, tube, and motor  The motor sends air through the tube and into your mask  This mild air pressure prevents your airway from collapsing or becoming blocked  Use your CPAP machine whenever you sleep, even when you nap  You may need to use a CPAP machine for the rest of your life  What are the benefits of CPAP? · Improves quality of sleep     · Relieves daytime sleepiness     · Reduces snoring     · Reduces the risk of health problems caused by sleep apnea  What problems may occur with CPAP? It takes time to adjust to CPAP treatment  You may not be able to wear the mask all night right away  At first, try to use your CPAP machine for a few hours every night  Then slowly increase the length of time you use your machine  If problems continue, contact your healthcare provider  Below are some solutions to common problems:  · Your CPAP mask feels uncomfortable or irritates your skin  You may need a mask that is a different size, shape, or material  A mask with fewer straps may irritate your skin less  You may also need to use a special moisturizer made for CPAP machine users  · You have dry mouth or nasal irritation  These problems can be caused by the CPAP machine, a leaking mask, or breathing through your mouth  Some machines come with a heated humidifier to help relieve these symptoms  A chin strap to help keep your mouth closed or a different type of mask can help dry mouth  Use a saline nasal spray at bedtime to help relieve nasal irritation  · You have difficulty adjusting to the air pressure  You may feel like it is hard to exhale or like you are choking   You may burp or have stomach bloating because the air pressure is causing you to swallow air  You may need to adjust the air pressure on your machine or choose a different type of CPAP machine  When should I contact my healthcare provider? · You gain or lose weight  · You continue to feel very sleepy during the day, even after you wear your CPAP device as directed  · Your CPAP is causing problems that do not improve  · You have questions or concerns about your condition, care, or equipment  CARE AGREEMENT:   You have the right to help plan your care  Learn about your health condition and how it may be treated  Discuss treatment options with your caregivers to decide what care you want to receive  You always have the right to refuse treatment  The above information is an  only  It is not intended as medical advice for individual conditions or treatments  Talk to your doctor, nurse or pharmacist before following any medical regimen to see if it is safe and effective for you  © 2017 [address] Information is for End User's use only and may not be sold, redistributed or otherwise used for commercial purposes  All illustrations and images included in CareNotes® are the copyrighted property of [employer]  or [employer]  Sleep Apnea Syndrome, Ambulatory Care   GENERAL INFORMATION:   Sleep apnea syndrome  is also called obstructive sleep apnea syndrome (OSAS), or sleep apnea  It is a condition where you stop breathing for 10 seconds or more while you are sleeping  During sleep with OSAS, the muscles and tissues around your throat relax and block air from passing through  OSAS may happen many times while you are asleep    Common symptoms include the following:   · A hard time thinking, remembering things, or focusing on your tasks the following day    · Headache or nausea    · Bedwetting or waking up often during the night to pass urine    · Feeling sleepy, slow, and tired during the day    · No signs of breathing for 10 seconds or more while you sleep    · Snoring loudly, snorting, gasping or choking while you sleep, and waking up suddenly because of these  Seek immediate care for the following symptoms:   · Chest pain    · Trouble breathing  Treatment for sleep apnea syndrome  includes using a continuous positive airway pressure (CPAP) machine to keep your airway open during sleep  A mask is placed over your nose and mouth, or just your nose  The mask is hooked to the CPAP machine, which blows a gentle stream of air into the mask when you breathe  This helps keep your airway open so you can breathe more regularly  Extra oxygen may be given to you through the machine  You may be given a mouth device  It looks like a mouth guard or dental retainer and stops your tongue and mouth tissues from blocking your throat while you sleep  Surgery may be needed to remove extra tissues that block your mouth, throat, or nose  Manage sleep apnea syndrome:   · Avoid alcohol or sedative medicine before you go to sleep  Alcohol and sedatives can relax the muscles and tissues around your throat, which can block the airflow to your lungs  · Maintain a healthy weight  Excess tissue around your throat may restrict your breathing  Ask your healthcare provider for information if you need to lose weight  · Sleep on your side or use pillows designed to prevent OSAS  This prevents your tongue or other tissues from blocking your throat  You can also raise the head of your bed  Follow up with your healthcare provider as directed:  Write down your questions so you remember to ask them during your visits  CARE AGREEMENT:   You have the right to help plan your care  Learn about your health condition and how it may be treated  Discuss treatment options with your caregivers to decide what care you want to receive  You always have the right to refuse treatment   The above information is an  only  It is not intended as medical advice for individual conditions or treatments  Talk to your doctor, nurse or pharmacist before following any medical regimen to see if it is safe and effective for you  © 2014 [address] is for End User's use only and may not be sold, redistributed or otherwise used for commercial purposes  All illustrations and images included in CareNotes® are the copyrighted property of A D A M , Inc  or [employer]

## 2019-09-19 NOTE — TELEPHONE ENCOUNTER
Patient left message  You increased her dosage  States she's having issues with Latuda  Wanted to inform you she in lowering the dosage on her own   Would like a call back

## 2019-09-23 DIAGNOSIS — F31.9 BIPOLAR 1 DISORDER (HCC): ICD-10-CM

## 2019-09-23 NOTE — TELEPHONE ENCOUNTER
I spoke with Cassie Bronson  She said since the increase of Latuda to 40 mg, she "can't stay awake in the afternoon " She's exhausted and has to go to bed  At the end of last week, she went back to taking 20 mg   She would like to have 20 mg renewed - please send 30 day supply to retail, [facility]

## 2019-09-24 LAB
HBA1C MFR BLD HPLC: 5.4 %
HCV AB SER-ACNC: NEGATIVE

## 2019-10-02 ENCOUNTER — TELEPHONE (OUTPATIENT)
Dept: PULMONOLOGY | Facility: CLINIC | Age: 64
End: 2019-10-02

## 2019-10-18 PROBLEM — E66.01 MORBID OBESITY WITH BMI OF 40.0-44.9, ADULT (HCC): Status: ACTIVE | Noted: 2018-12-20

## 2019-10-22 ENCOUNTER — OFFICE VISIT (OUTPATIENT)
Dept: FAMILY MEDICINE CLINIC | Facility: CLINIC | Age: 64
End: 2019-10-22
Payer: COMMERCIAL

## 2019-10-22 VITALS
OXYGEN SATURATION: 95 % | BODY MASS INDEX: 43.61 KG/M2 | HEART RATE: 85 BPM | SYSTOLIC BLOOD PRESSURE: 130 MMHG | WEIGHT: 237 LBS | HEIGHT: 62 IN | RESPIRATION RATE: 18 BRPM | TEMPERATURE: 98.6 F | DIASTOLIC BLOOD PRESSURE: 80 MMHG

## 2019-10-22 DIAGNOSIS — E78.1 HIGH TRIGLYCERIDES: ICD-10-CM

## 2019-10-22 DIAGNOSIS — N39.3 STRESS INCONTINENCE OF URINE: ICD-10-CM

## 2019-10-22 DIAGNOSIS — E03.9 ACQUIRED HYPOTHYROIDISM: Primary | ICD-10-CM

## 2019-10-22 DIAGNOSIS — R73.01 IMPAIRED FASTING GLUCOSE: ICD-10-CM

## 2019-10-22 DIAGNOSIS — Z23 NEED FOR VACCINATION: ICD-10-CM

## 2019-10-22 DIAGNOSIS — F31.63 BIPOLAR 1 DISORDER, MIXED, SEVERE (HCC): ICD-10-CM

## 2019-10-22 PROBLEM — R03.0 ELEVATED BLOOD PRESSURE READING: Status: RESOLVED | Noted: 2019-06-10 | Resolved: 2019-10-22

## 2019-10-22 PROCEDURE — 90662 IIV NO PRSV INCREASED AG IM: CPT

## 2019-10-22 PROCEDURE — 1101F PT FALLS ASSESS-DOCD LE1/YR: CPT | Performed by: FAMILY MEDICINE

## 2019-10-22 PROCEDURE — 99214 OFFICE O/P EST MOD 30 MIN: CPT | Performed by: FAMILY MEDICINE

## 2019-10-22 PROCEDURE — 90471 IMMUNIZATION ADMIN: CPT

## 2019-10-22 NOTE — PROGRESS NOTES
Assessment/Plan:    1  Acquired hypothyroidism  Assessment & Plan:  Stable on levothyroxine    Orders:  -     TSH, 3rd generation with Free T4 reflex; Future; Expected date: 02/17/2020  -     TSH, 3rd generation with Free T4 reflex    2  Bipolar 1 disorder, mixed, severe ([address] )  Assessment & Plan:  Stable  Seeing psych      3  High triglycerides  Assessment & Plan:  Increased but working on diet    Orders:  -     Comprehensive metabolic panel; Future; Expected date: 02/17/2020  -     Hemoglobin A1C; Future; Expected date: 02/17/2020  -     Lipid Panel with Direct LDL reflex; Future; Expected date: 02/17/2020  -     Comprehensive metabolic panel  -     Hemoglobin A1C  -     Lipid Panel with Direct LDL reflex    4  Stress incontinence of urine  Assessment & Plan:  Would like to see urology  Has been on 2 meds without success  Getting up several times at night    Orders:  -     Ambulatory referral to Urology; Future    5  Need for vaccination  -     influenza vaccine, [id number], high-dose, PF 0 5 mL (FLUZONE HIGH-DOSE)    6  BMI 40 0-44 9, adult [facility])  Assessment & Plan:  Improved with weight loss      7  Impaired fasting glucose  -     Hemoglobin A1C; Future; Expected date: 02/17/2020  -     Hemoglobin A1C        BMI Counseling: Body mass index is 43 35 kg/m²  Discussed the patient's BMI with her  The BMI is above normal  Nutrition recommendations include reducing portion sizes and 3-5 servings of fruits/vegetables daily  Exercise recommendations include exercising 3-5 times per week  There are no Patient Instructions on file for this visit  Return in about 4 months (around 2/22/2020)  Subjective:      Patient ID: Sara Weiner is a 71 y o  female      Chief Complaint   Patient presents with    Follow-up     Patient here for 6 month follow up on Hypothyroidism, Depression and anxiety        Here for follow up  New to medicare  C/o shortness of breath  Losing weight slowly  Shortness of breath with walking  No chest pain    Shortness of Breath   This is a new problem  The current episode started in the past 7 days  The problem occurs intermittently  The problem has been unchanged  The average episode lasts 10 minutes  The symptoms are aggravated by exercise  The patient has no known risk factors for DVT/PE  She has tried nothing for the symptoms  The treatment provided no relief  Thyroid Problem   Presents for follow-up visit  Symptoms include weight loss  The following portions of the patient's history were reviewed and updated as appropriate: allergies, current medications, past family history, past medical history, past social history, past surgical history and problem list     Review of Systems   Constitutional: Positive for weight loss  Respiratory: Positive for shortness of breath            Current Outpatient Medications   Medication Sig Dispense Refill    Ascorbic Acid (RONAL-C PO) Take by mouth      Cholecalciferol (VITAMIN D) 2000 units CAPS Take 1 capsule by mouth daily      clobetasol (TEMOVATE) 0 05 % ointment Apply topically 3 (three) times a week 30 g 0    Cranberry 1000 MG CAPS Take by mouth      Glucosamine-Chondroit-Vit C-Mn (GLUCOSAMINE 1500 COMPLEX PO) Take by mouth      levothyroxine 100 mcg tablet Take 1 tablet (100 mcg total) by mouth daily 90 tablet 3    lithium carbonate (LITHOBID) 450 mg CR tablet Take 1 tablet (450 mg total) by mouth 2 (two) times a day 180 tablet 0    lurasidone (LATUDA) 20 mg tablet Take 2 tablets (40 mg total) by mouth daily with breakfast 30 tablet 2    Multiple Vitamin (MULTI-VITAMIN DAILY) TABS Take by mouth      nystatin (MYCOSTATIN) powder Apply topically as needed       tolterodine (DETROL LA) 2 mg 24 hr capsule TAKE 1 CAPSULE DAILY 30 capsule 5    b complex vitamins tablet Take 1 tablet by mouth daily      Multiple Vitamins-Minerals (HAIR/SKIN/NAILS/BIOTIN PO) Take 1 tablet by mouth daily      omeprazole (PriLOSEC) 40 MG capsule TAKE 1 CAPSULE DAILY 90 capsule 3    SOOLANTRA 1 % CREA        No current facility-administered medications for this visit  Objective:    /80   Pulse 85   Temp 98 6 °F (37 °C)   Resp 18   Ht 5' 2" (1 575 m)   Wt 108 kg (237 lb)   SpO2 95%   BMI 43 35 kg/m²        Physical Exam   Constitutional: She appears well-developed and well-nourished  HENT:   Head: Normocephalic and atraumatic  Eyes: Pupils are equal, round, and reactive to light  EOM are normal    Neck: Normal range of motion  Neck supple  Cardiovascular: Normal rate, regular rhythm, normal heart sounds and intact distal pulses  Pulmonary/Chest: Effort normal and breath sounds normal    Abdominal: Soft  Bowel sounds are normal    Musculoskeletal: Normal range of motion  Neurological: She is alert  Skin: Skin is warm  Capillary refill takes less than 2 seconds  Psychiatric: She has a normal mood and affect  Her behavior is normal  Judgment and thought content normal    Nursing note and vitals reviewed               Cade Taveras DO

## 2019-11-08 ENCOUNTER — TELEPHONE (OUTPATIENT)
Dept: NEUROLOGY | Facility: CLINIC | Age: 64
End: 2019-11-08

## 2019-12-05 ENCOUNTER — TELEPHONE (OUTPATIENT)
Dept: FAMILY MEDICINE CLINIC | Facility: CLINIC | Age: 64
End: 2019-12-05

## 2019-12-05 ENCOUNTER — OFFICE VISIT (OUTPATIENT)
Dept: PSYCHIATRY | Facility: CLINIC | Age: 64
End: 2019-12-05
Payer: COMMERCIAL

## 2019-12-05 DIAGNOSIS — F31.9 BIPOLAR 1 DISORDER (HCC): ICD-10-CM

## 2019-12-05 PROCEDURE — 99213 OFFICE O/P EST LOW 20 MIN: CPT | Performed by: PSYCHIATRY & NEUROLOGY

## 2019-12-05 RX ORDER — LITHIUM CARBONATE 450 MG
450 TABLET, EXTENDED RELEASE ORAL 2 TIMES DAILY
Qty: 180 TABLET | Refills: 0 | Status: SHIPPED | OUTPATIENT
Start: 2019-12-05 | End: 2020-02-25

## 2019-12-05 RX ORDER — LITHIUM CARBONATE 450 MG
450 TABLET, EXTENDED RELEASE ORAL 2 TIMES DAILY
Qty: 180 TABLET | Refills: 0 | Status: SHIPPED | OUTPATIENT
Start: 2019-12-05 | End: 2019-12-05 | Stop reason: SDUPTHER

## 2019-12-05 NOTE — PSYCH
Subjective: Medication Management      Patient ID: Yehuda Baxter is a 71 y o  female  HPI ROS Appetite Changes and Sleep: normal appetite, normal energy level, no weight change and normal number of sleep hours     Patient stated that her mood been depressed and this worse in the evening  She stated when she tried increasing the Latuda to 40 mg she felt too tired in the evening and decided to go back down to 20 mg  She is wondering if she could try 30 mg instead  Review Of Systems:     Mood Anxiety and Depression   Behavior Normal    Thought Content Disturbing Thoughts, Feelings and Unreasonalbe or Irrational Fears   General Emotional Problems and Decreased Functioning   Personality Normal   Other Psych Symptoms Normal   Constitutional Negative   ENT Negative   Cardiovascular Negative   Respiratory Negative   Gastrointestinal Negative   Genitourinary Negative   Musculoskeletal Negative   Integumentary Negative   Neurological Negative   Endocrine Normal    Other Symptoms Normal              Laboratory Results: No results found for this or any previous visit      Substance Abuse History:  Social History     Substance and Sexual Activity   Drug Use No       Family Psychiatric History:   Family History   Problem Relation Age of Onset    Heart disease Mother     Heart Valve Disease Mother         Replacement    Diabetes Mother     Arthritis Father        The following portions of the patient's history were reviewed and updated as appropriate: allergies, current medications, past family history, past medical history, past social history, past surgical history and problem list     Social History     Socioeconomic History    Marital status: /Civil Union     Spouse name: Not on file    Number of children: Not on file    Years of education: Not on file    Highest education level: Not on file   Occupational History    Not on file   Social Needs    Financial resource strain: Not on file   [place] insecurity:     Worry: Not on file     Inability: Not on file    Transportation needs:     Medical: Not on file     Non-medical: Not on file   Tobacco Use    Smoking status: Former Smoker     Packs/day: 0 25     Years: 30 00     Pack years: 7 50     Types: Cigarettes     Last attempt to quit:      Years since quittin 9    Smokeless tobacco: Never Used   Substance and Sexual Activity    Alcohol use: Yes     Frequency: Monthly or less     Comment: socially    Drug use: No    Sexual activity: Not Currently     Partners: Male     Birth control/protection: Post-menopausal   Lifestyle    Physical activity:     Days per week: Not on file     Minutes per session: Not on file    Stress: Not on file   Relationships    Social connections:     Talks on phone: Not on file     Gets together: Not on file     Attends [employer] service: Not on file     Active member of club or organization: Not on file     Attends meetings of clubs or organizations: Not on file     Relationship status: Not on file    Intimate partner violence:     Fear of current or ex partner: Not on file     Emotionally abused: Not on file     Physically abused: Not on file     Forced sexual activity: Not on file   Other Topics Concern    Not on file   Social History Narrative    Daily coffee consumption: 3 cups/day     Social History     Social History Narrative    Daily coffee consumption: 3 cups/day       Objective:       Mental status:  Appearance calm and cooperative , adequate hygiene and grooming and good eye contact    Mood depressed   Affect affect was constricted   Speech a normal rate and fluent   Thought Processes coherent/organized and normal thought processes   Hallucinations no hallucinations present    Thought Content no delusions   Abnormal Thoughts no suicidal thoughts  and no homicidal thoughts    Orientation  oriented to person and place and time   Remote Memory short term memory intact and long term memory intact   Attention Span concentration intact   Intellect Appears to be of Average Intelligence   Insight Limited insight   Judgement judgment was limited   Muscle Strength Muscle strength and tone were normal   Language no difficulty naming common objects, no difficulty repeating a phrase  and no difficulty writing a sentence    Fund of Knowledge displays adequate knowledge of current events, adequate fund of knowledge regarding past history and adequate fund of knowledge regarding vocabulary    Pain none   Pain Scale 0       Assessment/Plan:       Diagnoses and all orders for this visit:    Bipolar 1 disorder (HCC)  -     lithium carbonate (LITHOBID) 450 mg CR tablet; Take 1 tablet (450 mg total) by mouth 2 (two) times a day  -     lurasidone (LATUDA) 20 mg tablet;  Take 1 5 tablets (30 mg total) by mouth daily with breakfast            Treatment Recommendations- Risks Benefits      Immediate Medical/Psychiatric/Psychotherapy Treatments and Any Precautions: continue current treatment     Risks, Benefits And Possible Side Effects Of Medications:  {PSYCH RISK, BENEFITS AND POSSIBLE SIDE EFFECTS (Optional):[id number]

## 2019-12-06 ENCOUNTER — APPOINTMENT (OUTPATIENT)
Dept: LAB | Facility: CLINIC | Age: 64
End: 2019-12-06
Payer: COMMERCIAL

## 2019-12-06 DIAGNOSIS — Z11.59 SCREENING EXAMINATION FOR POLIOMYELITIS: ICD-10-CM

## 2019-12-06 DIAGNOSIS — R73.01 IMPAIRED FASTING GLUCOSE: Primary | ICD-10-CM

## 2019-12-06 DIAGNOSIS — I51.9 MYXEDEMA HEART DISEASE: ICD-10-CM

## 2019-12-06 DIAGNOSIS — F31.31 BIPOLAR AFFECTIVE DISORDER, CURRENTLY DEPRESSED, MILD (HCC): ICD-10-CM

## 2019-12-06 DIAGNOSIS — E03.9 MYXEDEMA HEART DISEASE: ICD-10-CM

## 2019-12-06 LAB
ALBUMIN SERPL BCP-MCNC: 3.9 G/DL (ref 3.5–5)
ALP SERPL-CCNC: 87 U/L (ref 46–116)
ALT SERPL W P-5'-P-CCNC: 27 U/L (ref 12–78)
ANION GAP SERPL CALCULATED.3IONS-SCNC: 9 MMOL/L (ref 4–13)
AST SERPL W P-5'-P-CCNC: 14 U/L (ref 5–45)
BASOPHILS # BLD AUTO: 0.03 THOUSANDS/ΜL (ref 0–0.1)
BASOPHILS NFR BLD AUTO: 1 % (ref 0–1)
BILIRUB SERPL-MCNC: 0.44 MG/DL (ref 0.2–1)
BUN SERPL-MCNC: 22 MG/DL (ref 5–25)
CALCIUM SERPL-MCNC: 9.3 MG/DL (ref 8.3–10.1)
CHLORIDE SERPL-SCNC: 110 MMOL/L (ref 100–108)
CHOLEST SERPL-MCNC: 221 MG/DL (ref 50–200)
CO2 SERPL-SCNC: 26 MMOL/L (ref 21–32)
CREAT SERPL-MCNC: 1.3 MG/DL (ref 0.6–1.3)
EOSINOPHIL # BLD AUTO: 0.15 THOUSAND/ΜL (ref 0–0.61)
EOSINOPHIL NFR BLD AUTO: 3 % (ref 0–6)
ERYTHROCYTE [DISTWIDTH] IN BLOOD BY AUTOMATED COUNT: 14.1 % (ref 11.6–15.1)
EST. AVERAGE GLUCOSE BLD GHB EST-MCNC: 100 MG/DL
GFR SERPL CREATININE-BSD FRML MDRD: 43 ML/MIN/1.73SQ M
GLUCOSE P FAST SERPL-MCNC: 124 MG/DL (ref 65–99)
HBA1C MFR BLD: 5.1 % (ref 4.2–6.3)
HCT VFR BLD AUTO: 44.6 % (ref 34.8–46.1)
HCV AB SER QL: NORMAL
HDLC SERPL-MCNC: 48 MG/DL
HGB BLD-MCNC: 14 G/DL (ref 11.5–15.4)
IMM GRANULOCYTES # BLD AUTO: 0.01 THOUSAND/UL (ref 0–0.2)
IMM GRANULOCYTES NFR BLD AUTO: 0 % (ref 0–2)
LDLC SERPL CALC-MCNC: 145 MG/DL (ref 0–100)
LITHIUM SERPL-SCNC: 1 MMOL/L (ref 0.5–1)
LYMPHOCYTES # BLD AUTO: 1.03 THOUSANDS/ΜL (ref 0.6–4.47)
LYMPHOCYTES NFR BLD AUTO: 21 % (ref 14–44)
MCH RBC QN AUTO: 30.6 PG (ref 26.8–34.3)
MCHC RBC AUTO-ENTMCNC: 31.4 G/DL (ref 31.4–37.4)
MCV RBC AUTO: 98 FL (ref 82–98)
MONOCYTES # BLD AUTO: 0.32 THOUSAND/ΜL (ref 0.17–1.22)
MONOCYTES NFR BLD AUTO: 7 % (ref 4–12)
NEUTROPHILS # BLD AUTO: 3.33 THOUSANDS/ΜL (ref 1.85–7.62)
NEUTS SEG NFR BLD AUTO: 68 % (ref 43–75)
NONHDLC SERPL-MCNC: 173 MG/DL
NRBC BLD AUTO-RTO: 0 /100 WBCS
PLATELET # BLD AUTO: 162 THOUSANDS/UL (ref 149–390)
PMV BLD AUTO: 12.5 FL (ref 8.9–12.7)
POTASSIUM SERPL-SCNC: 4.4 MMOL/L (ref 3.5–5.3)
PROT SERPL-MCNC: 7.6 G/DL (ref 6.4–8.2)
RBC # BLD AUTO: 4.57 MILLION/UL (ref 3.81–5.12)
SODIUM SERPL-SCNC: 145 MMOL/L (ref 136–145)
TRIGL SERPL-MCNC: 142 MG/DL
TSH SERPL DL<=0.05 MIU/L-ACNC: 0.69 UIU/ML (ref 0.36–3.74)
WBC # BLD AUTO: 4.87 THOUSAND/UL (ref 4.31–10.16)

## 2019-12-06 PROCEDURE — 80061 LIPID PANEL: CPT

## 2019-12-06 PROCEDURE — 84443 ASSAY THYROID STIM HORMONE: CPT

## 2019-12-06 PROCEDURE — 36415 COLL VENOUS BLD VENIPUNCTURE: CPT

## 2019-12-06 PROCEDURE — 80178 ASSAY OF LITHIUM: CPT

## 2019-12-06 PROCEDURE — 80053 COMPREHEN METABOLIC PANEL: CPT

## 2019-12-06 PROCEDURE — 86803 HEPATITIS C AB TEST: CPT

## 2019-12-06 PROCEDURE — 83036 HEMOGLOBIN GLYCOSYLATED A1C: CPT

## 2019-12-06 PROCEDURE — 85025 COMPLETE CBC W/AUTO DIFF WBC: CPT

## 2019-12-10 ENCOUNTER — OFFICE VISIT (OUTPATIENT)
Dept: FAMILY MEDICINE CLINIC | Facility: CLINIC | Age: 64
End: 2019-12-10
Payer: COMMERCIAL

## 2019-12-10 VITALS
WEIGHT: 237.2 LBS | BODY MASS INDEX: 44.78 KG/M2 | RESPIRATION RATE: 18 BRPM | HEIGHT: 61 IN | DIASTOLIC BLOOD PRESSURE: 70 MMHG | SYSTOLIC BLOOD PRESSURE: 110 MMHG | HEART RATE: 76 BPM | TEMPERATURE: 97.9 F | OXYGEN SATURATION: 94 %

## 2019-12-10 DIAGNOSIS — Z00.00 WELCOME TO MEDICARE PREVENTIVE VISIT: Primary | ICD-10-CM

## 2019-12-10 DIAGNOSIS — E66.01 MORBID OBESITY WITH BMI OF 40.0-44.9, ADULT (HCC): ICD-10-CM

## 2019-12-10 DIAGNOSIS — Z23 ENCOUNTER FOR IMMUNIZATION: ICD-10-CM

## 2019-12-10 DIAGNOSIS — F31.9 BIPOLAR 1 DISORDER (HCC): ICD-10-CM

## 2019-12-10 DIAGNOSIS — R94.31 ABNORMAL EKG: ICD-10-CM

## 2019-12-10 PROCEDURE — 90732 PPSV23 VACC 2 YRS+ SUBQ/IM: CPT | Performed by: FAMILY MEDICINE

## 2019-12-10 PROCEDURE — 3008F BODY MASS INDEX DOCD: CPT | Performed by: FAMILY MEDICINE

## 2019-12-10 PROCEDURE — G0009 ADMIN PNEUMOCOCCAL VACCINE: HCPCS | Performed by: FAMILY MEDICINE

## 2019-12-10 PROCEDURE — G0402 INITIAL PREVENTIVE EXAM: HCPCS | Performed by: FAMILY MEDICINE

## 2019-12-10 PROCEDURE — G0403 EKG FOR INITIAL PREVENT EXAM: HCPCS | Performed by: FAMILY MEDICINE

## 2019-12-10 NOTE — PROGRESS NOTES
Assessment and Plan:     Problem List Items Addressed This Visit        Other    Morbid obesity with BMI of 40 0-44 9, adult ([facility] [address] )     Diet discussed  Limited exercise         Abnormal EKG     Refer to cardiology          Relevant Orders    Ambulatory referral to Cardiology    Welcome to Medicare preventive visit - Primary    Relevant Orders    POCT ECG (Completed)    Encounter for immunization    Relevant Orders    PNEUMOCOCCAL POLYSACCHARIDE VACCINE 23-VALENT =>1YO SQ IM (Pneumovax) (Completed)           Preventive health issues were discussed with patient, and age appropriate screening tests were ordered as noted in patient's After Visit Summary  Personalized health advice and appropriate referrals for health education or preventive services given if needed, as noted in patient's After Visit Summary  History of Present Illness:     Patient presents for Welcome to Medicare visit  Patient Care Team:  Padmaja Singh DO as PCP - General  DO Marino Key DO     Review of Systems:     Review of Systems   Constitutional: Negative  HENT: Negative  Eyes: Negative  Respiratory: Negative  Cardiovascular: Negative  Gastrointestinal: Negative  Endocrine: Negative  Genitourinary: Negative  Musculoskeletal: Positive for arthralgias  Skin: Negative  Allergic/Immunologic: Negative  Neurological: Negative  Hematological: Negative  Psychiatric/Behavioral: Negative         Problem List:     Patient Active Problem List   Diagnosis    Bipolar 1 disorder, mixed, severe ([facility] [address] )    Depression with anxiety    GERD without esophagitis    Hypothyroidism    Impaired fasting glucose    Insomnia    Obstructive sleep apnea    Onychomycosis of toenail    Patellofemoral arthritis of right knee    Stress incontinence of urine    Well adult exam    High triglycerides    Morbid obesity with BMI of 40 0-44 9, adult ([facility])    Tremor of left hand    Abnormal EKG    Welcome to Medicare preventive visit    Encounter for immunization      Past Medical and Surgical History:     Past Medical History:   Diagnosis Date    Arthritis     Bipolar 1 disorder ([address] )     Disease of thyroid gland     Elevated blood pressure reading 6/10/2019     Past Surgical History:   Procedure Laterality Date    NO PAST SURGERIES        Family History:     Family History   Problem Relation Age of Onset    Heart disease Mother     Heart Valve Disease Mother         Replacement    Diabetes Mother     Arthritis Father       Social History:     Social History     Socioeconomic History    Marital status: /Civil Union     Spouse name: Not on file    Number of children: Not on file    Years of education: Not on file    Highest education level: Not on file   Occupational History    Not on file   Social Needs    Financial resource strain: Not on file    Food insecurity:     Worry: Not on file     Inability: Not on file    Transportation needs:     Medical: Not on file     Non-medical: Not on file   Tobacco Use    Smoking status: Former Smoker     Packs/day: 0 25     Years: 30 00     Pack years: 7 50     Types: Cigarettes     Last attempt to quit:      Years since quittin 9    Smokeless tobacco: Never Used   Substance and Sexual Activity    Alcohol use: Yes     Frequency: Monthly or less     Comment: socially    Drug use: No    Sexual activity: Not Currently     Partners: Male     Birth control/protection: Post-menopausal   Lifestyle    Physical activity:     Days per week: Not on file     Minutes per session: Not on file    Stress: Not on file   Relationships    Social connections:     Talks on phone: Not on file     Gets together: Not on file     Attends Orthodoxy service: Not on file     Active member of club or organization: Not on file     Attends meetings of clubs or organizations: Not on file     Relationship status: Not on file    Intimate partner violence:     Fear of current or ex partner: Not on file     Emotionally abused: Not on file     Physically abused: Not on file     Forced sexual activity: Not on file   Other Topics Concern    Not on file   Social History Narrative    Daily coffee consumption: 3 cups/day      Medications and Allergies:     Current Outpatient Medications   Medication Sig Dispense Refill    Ascorbic Acid (RONAL-C PO) Take by mouth      b complex vitamins tablet Take 1 tablet by mouth daily      Cholecalciferol (VITAMIN D) 2000 units CAPS Take 1 capsule by mouth daily      clobetasol (TEMOVATE) 0 05 % ointment Apply topically 3 (three) times a week 30 g 0    Cranberry 1000 MG CAPS Take by mouth      Glucosamine-Chondroit-Vit C-Mn (GLUCOSAMINE 1500 COMPLEX PO) Take by mouth      levothyroxine 100 mcg tablet Take 1 tablet (100 mcg total) by mouth daily 90 tablet 3    lithium carbonate (LITHOBID) 450 mg CR tablet Take 1 tablet (450 mg total) by mouth 2 (two) times a day 180 tablet 0    lurasidone (LATUDA) 20 mg tablet Take 1 5 tablets (30 mg total) by mouth daily with breakfast 135 tablet 0    Multiple Vitamin (MULTI-VITAMIN DAILY) TABS Take by mouth      Multiple Vitamins-Minerals (HAIR/SKIN/NAILS/BIOTIN PO) Take 1 tablet by mouth daily      nystatin (MYCOSTATIN) powder Apply topically as needed       omeprazole (PriLOSEC) 40 MG capsule TAKE 1 CAPSULE DAILY 90 capsule 3    SOOLANTRA 1 % CREA       tolterodine (DETROL LA) 2 mg 24 hr capsule TAKE 1 CAPSULE DAILY 30 capsule 5     No current facility-administered medications for this visit  Allergies   Allergen Reactions    Other      Other reaction(s):  Anaphylaxis  Annotation - [id number]: plums peaches      Immunizations:     Immunization History   Administered Date(s) Administered    INFLUENZA 09/20/2011, 11/08/2012, 09/28/2015, 10/11/2016, 11/16/2017, 11/02/2018    Influenza Quadrivalent Preservative Free 3 years and older IM 10/06/2014, 09/28/2015, 10/11/2016, 11/16/2017    Influenza TIV (IM) 09/27/2013    Influenza, high dose seasonal 0 5 mL 10/22/2019    Influenza, injectable, quadrivalent, preservative free 0 5 mL 11/02/2018    Pneumococcal Polysaccharide PPV23 12/10/2019    Td (adult), Unspecified 11/09/2009    Tdap 01/04/2016    Zoster 10/06/2014    Zoster Vaccine Recombinant 07/18/2019, 07/25/2019, 09/30/2019, 09/30/2019      Health Maintenance:         Topic Date Due    Cervical Cancer Screening  05/21/2021    CRC Screening: Colonoscopy  11/15/2022    Hepatitis C Screening  Completed         Topic Date Due    Pneumococcal Vaccine: 65+ Years (1 of 2 - PCV13) 06/12/2019      Medicare Screening Tests and Risk Assessments:     Jimbo Chandan is here for her Welcome to Medicare visit  Health Risk Assessment:   Patient rates overall health as very good  Patient feels that their physical health rating is same  Eyesight was rated as slightly worse  Hearing was rated as slightly worse  Patient feels that their emotional and mental health rating is same  Pain experienced in the last 7 days has been none  Patient states that she has experienced no weight loss or gain in last 6 months  Fall Risk Screening: In the past year, patient has experienced: no history of falling in past year      Urinary Incontinence Screening:   Patient has leaked urine accidently in the last six months  Urge when waking up    Home Safety:  Patient has trouble with stairs inside or outside of their home  Patient has working smoke alarms and has working carbon monoxide detector  Home safety hazards include: none  Nutrition:   Current diet is Regular  Medications:   Patient is currently taking over-the-counter supplements  OTC medications include: see medication list  Patient is able to manage medications       Activities of Daily Living (ADLs)/Instrumental Activities of Daily Living (IADLs):   Walk and transfer into and out of bed and chair?: Yes  Dress and groom yourself?: Yes    Bathe or shower yourself?: Yes Feed yourself? Yes  Do your laundry/housekeeping?: Yes  Manage your money, pay your bills and track your expenses?: Yes  Make your own meals?: Yes    Do your own shopping?: Yes    Previous Hospitalizations:   Any hospitalizations or ED visits within the last 12 months?: No      Advance Care Planning:   Living will: Yes    Durable POA for healthcare: Yes    Advanced directive: Yes      Cognitive Screening:   Provider or family/friend/caregiver concerned regarding cognition?: No    PREVENTIVE SCREENINGS      Cardiovascular Screening:    General: Screening Not Indicated and History Lipid Disorder      Diabetes Screening:     General: Screening Current      Colorectal Cancer Screening:     General: Screening Current      Breast Cancer Screening:     General: Screening Current      Cervical Cancer Screening:    General: Screening Not Indicated      Osteoporosis Screening:    General: Risks and Benefits Discussed      Abdominal Aortic Aneurysm (AAA) Screening:        General: Screening Not Indicated      Hepatitis C Screening:    General: Screening Current    Other Counseling Topics:   Regular weightbearing exercise  Visual Acuity Screening    Right eye Left eye Both eyes   Without correction:      With correction: 20/25 20/25 20/25        Physical Exam:     /70   Pulse 76   Temp 97 9 °F (36 6 °C)   Resp 18   Ht 5' 1 34" (1 558 m)   Wt 108 kg (237 lb 3 2 oz)   SpO2 94%   BMI 44 32 kg/m²     Physical Exam   Constitutional: She is oriented to person, place, and time  Vital signs are normal  She appears well-developed and well-nourished  HENT:   Head: Normocephalic and atraumatic  Nose: Nose normal    Mouth/Throat: Oropharynx is clear and moist    Eyes: Pupils are equal, round, and reactive to light  Conjunctivae, EOM and lids are normal    Neck: Normal range of motion  Neck supple  Cardiovascular: Normal rate, regular rhythm, S1 normal, S2 normal, normal heart sounds and intact distal pulses  Pulmonary/Chest: Effort normal and breath sounds normal    Abdominal: Soft  Bowel sounds are normal    Musculoskeletal: Normal range of motion  Neurological: She is alert and oriented to person, place, and time  She has normal strength and normal reflexes  Skin: Skin is warm, dry and intact  Psychiatric: She has a normal mood and affect  Her speech is normal and behavior is normal  Judgment and thought content normal  Cognition and memory are normal    Nursing note and vitals reviewed        Maria M Theodore DO

## 2019-12-10 NOTE — PATIENT INSTRUCTIONS
Medicare Preventive Visit Patient Instructions  Thank you for completing your Welcome to Medicare Visit or Medicare Annual Wellness Visit today  Your next wellness visit will be due in one year (12/10/2020)  The screening/preventive services that you may require over the next 5-10 years are detailed below  Some tests may not apply to you based off risk factors and/or age  Screening tests ordered at today's visit but not completed yet may show as past due  Also, please note that scanned in results may not display below  Preventive Screenings:  Service Recommendations Previous Testing/Comments   Colorectal Cancer Screening  * Colonoscopy    * Fecal Occult Blood Test (FOBT)/Fecal Immunochemical Test (FIT)  * Fecal DNA/Cologuard Test  * Flexible Sigmoidoscopy Age: 54-65 years old   Colonoscopy: every 10 years (may be performed more frequently if at higher risk)  OR  FOBT/FIT: every 1 year  OR  Cologuard: every 3 years  OR  Sigmoidoscopy: every 5 years  Screening may be recommended earlier than age 48 if at higher risk for colorectal cancer  Also, an individualized decision between you and your healthcare provider will decide whether screening between the ages of 74-80 would be appropriate  Colonoscopy: 11/15/2012  FOBT/FIT: Not on file  Cologuard: Not on file  Sigmoidoscopy: Not on file         Breast Cancer Screening Age: 36 years old  Frequency: every 1-2 years  Not required if history of left and right mastectomy Mammogram: 07/02/2019       Cervical Cancer Screening Between the ages of 21-29, pap smear recommended once every 3 years  Between the ages of 33-67, can perform pap smear with HPV co-testing every 5 years     Recommendations may differ for women with a history of total hysterectomy, cervical cancer, or abnormal pap smears in past  Pap Smear: 05/21/2018       Hepatitis C Screening Once for adults born between 1945 and 1965  More frequently in patients at high risk for Hepatitis C Hep C Antibody: 12/06/2019       Diabetes Screening 1-2 times per year if you're at risk for diabetes or have pre-diabetes Fasting glucose: 124 mg/dL   A1C: 5 1 %       Cholesterol Screening Once every 5 years if you don't have a lipid disorder  May order more often based on risk factors  Lipid panel: 12/06/2019         Other Preventive Screenings Covered by Medicare:  1  Abdominal Aortic Aneurysm (AAA) Screening: covered once if your at risk  You're considered to be at risk if you have a family history of AAA  2  Lung Cancer Screening: covers low dose CT scan once per year if you meet all of the following conditions: (1) Age 50-69; (2) No signs or symptoms of lung cancer; (3) Current smoker or have quit smoking within the last 15 years; (4) You have a tobacco smoking history of at least 30 pack years (packs per day multiplied by number of years you smoked); (5) You get a written order from a healthcare provider  3  Glaucoma Screening: covered annually if you're considered high risk: (1) You have diabetes OR (2) Family history of glaucoma OR (3)  aged 48 and older OR (3)  American aged 72 and older  3  Osteoporosis Screening: covered every 2 years if you meet one of the following conditions: (1) You're estrogen deficient and at risk for osteoporosis based off medical history and other findings; (2) Have a vertebral abnormality; (3) On glucocorticoid therapy for more than 3 months; (4) Have primary hyperparathyroidism; (5) On osteoporosis medications and need to assess response to drug therapy  · Last bone density test (DXA Scan): Not on file  5  HIV Screening: covered annually if you're between the age of 12-76  Also covered annually if you are younger than 13 and older than 72 with risk factors for HIV infection  For pregnant patients, it is covered up to 3 times per pregnancy      Immunizations:  Immunization Recommendations   Influenza Vaccine Annual influenza vaccination during flu season is recommended for all persons aged >= 6 months who do not have contraindications   Pneumococcal Vaccine (Prevnar and Pneumovax)  * Prevnar = PCV13  * Pneumovax = PPSV23   Adults 25-60 years old: 1-3 doses may be recommended based on certain risk factors  Adults 72 years old: Prevnar (PCV13) vaccine recommended followed by Pneumovax (PPSV23) vaccine  If already received PPSV23 since turning 65, then PCV13 recommended at least one year after PPSV23 dose  Hepatitis B Vaccine 3 dose series if at intermediate or high risk (ex: diabetes, end stage renal disease, liver disease)   Tetanus (Td) Vaccine - COST NOT COVERED BY MEDICARE PART B Following completion of primary series, a booster dose should be given every 10 years to maintain immunity against tetanus  Td may also be given as tetanus wound prophylaxis  Tdap Vaccine - COST NOT COVERED BY MEDICARE PART B Recommended at least once for all adults  For pregnant patients, recommended with each pregnancy  Shingles Vaccine (Shingrix) - COST NOT COVERED BY MEDICARE PART B  2 shot series recommended in those aged 48 and above     Health Maintenance Due:      Topic Date Due    Cervical Cancer Screening  05/21/2021    CRC Screening: Colonoscopy  11/15/2022    Hepatitis C Screening  Completed     Immunizations Due:      Topic Date Due    Pneumococcal Vaccine: 65+ Years (1 of 2 - PCV13) 06/12/2019     Advance Directives   What are advance directives? Advance directives are legal documents that state your wishes and plans for medical care  These plans are made ahead of time in case you lose your ability to make decisions for yourself  Advance directives can apply to any medical decision, such as the treatments you want, and if you want to donate organs  What are the types of advance directives? There are many types of advance directives, and each state has rules about how to use them  You may choose a combination of any of the following:  · Living will:   This is a written record of the treatment you want  You can also choose which treatments you do not want, which to limit, and which to stop at a certain time  This includes surgery, medicine, IV fluid, and tube feedings  · Durable power of  for healthcare [facility]): This is a written record that states who you want to make healthcare choices for you when you are unable to make them for yourself  This person, called a proxy, is usually a family member or a friend  You may choose more than 1 proxy  · Do not resuscitate (DNR) order:  A DNR order is used in case your heart stops beating or you stop breathing  It is a request not to have certain forms of treatment, such as CPR  A DNR order may be included in other types of advance directives  · Medical directive: This covers the care that you want if you are in a coma, near death, or unable to make decisions for yourself  You can list the treatments you want for each condition  Treatment may include pain medicine, surgery, blood transfusions, dialysis, IV or tube feedings, and a ventilator (breathing machine)  · Values history: This document has questions about your views, beliefs, and how you feel and think about life  This information can help others choose the care that you would choose  Why are advance directives important? An advance directive helps you control your care  Although spoken wishes may be used, it is better to have your wishes written down  Spoken wishes can be misunderstood, or not followed  Treatments may be given even if you do not want them  An advance directive may make it easier for your family to make difficult choices about your care  Weight Management   Why it is important to manage your weight:  Being overweight increases your risk of health conditions such as heart disease, high blood pressure, type 2 diabetes, and certain types of cancer  It can also increase your risk for osteoarthritis, sleep apnea, and other respiratory problems  Aim for a slow, steady weight loss  Even a small amount of weight loss can lower your risk of health problems  How to lose weight safely:  A safe and healthy way to lose weight is to eat fewer calories and get regular exercise  You can lose up about 1 pound a week by decreasing the number of calories you eat by 500 calories each day  Healthy meal plan for weight management:  A healthy meal plan includes a variety of foods, contains fewer calories, and helps you stay healthy  A healthy meal plan includes the following:  · Eat whole-grain foods more often  A healthy meal plan should contain fiber  Fiber is the part of grains, fruits, and vegetables that is not broken down by your body  Whole-grain foods are healthy and provide extra fiber in your diet  Some examples of whole-grain foods are whole-wheat breads and pastas, oatmeal, brown rice, and bulgur  · Eat a variety of vegetables every day  Include dark, leafy greens such as spinach, kale, juan ramon greens, and mustard greens  Eat yellow and orange vegetables such as carrots, sweet potatoes, and winter squash  · Eat a variety of fruits every day  Choose fresh or canned fruit (canned in its own juice or light syrup) instead of juice  Fruit juice has very little or no fiber  · Eat low-fat dairy foods  Drink fat-free (skim) milk or 1% milk  Eat fat-free yogurt and low-fat cottage cheese  Try low-fat cheeses such as mozzarella and other reduced-fat cheeses  · Choose meat and other protein foods that are low in fat  Choose beans or other legumes such as split peas or lentils  Choose fish, skinless poultry (chicken or turkey), or lean cuts of red meat (beef or pork)  Before you cook meat or poultry, cut off any visible fat  · Use less fat and oil  Try baking foods instead of frying them  Add less fat, such as margarine, sour cream, regular salad dressing and mayonnaise to foods  Eat fewer high-fat foods   Some examples of high-fat foods include french fries, doughnuts, ice cream, and cakes  · Eat fewer sweets  Limit foods and drinks that are high in sugar  This includes candy, cookies, regular soda, and sweetened drinks  Exercise:  Exercise at least 30 minutes per day on most days of the week  Some examples of exercise include walking, biking, dancing, and swimming  You can also fit in more physical activity by taking the stairs instead of the elevator or parking farther away from stores  Ask your healthcare provider about the best exercise plan for you  © Copyright [employer] 2018 Information is for End User's use only and may not be sold, redistributed or otherwise used for commercial purposes   All illustrations and images included in CareNotes® are the copyrighted property of [employer]  or [address] [place], [place] [address]

## 2019-12-12 ENCOUNTER — TELEPHONE (OUTPATIENT)
Dept: PSYCHIATRY | Facility: CLINIC | Age: 64
End: 2019-12-12

## 2019-12-12 NOTE — TELEPHONE ENCOUNTER
Fax received from [employer] approving Latuda 20 mg tabs for new reduced dose of 1 5 tabs daily through 12/31/2020 under Medicare Part D benefit       Domenico Mortensen and her pharmacy notified of prior auth approval

## 2019-12-12 NOTE — TELEPHONE ENCOUNTER
Nursing completed a prior authorization for Latuda to [facility] on navinet  This is a dosage decrease for the patient and is requiring 1 5 tablets po daily  Will await outcome of prior authorization

## 2019-12-16 ENCOUNTER — OFFICE VISIT (OUTPATIENT)
Dept: PULMONOLOGY | Facility: CLINIC | Age: 64
End: 2019-12-16
Payer: COMMERCIAL

## 2019-12-16 VITALS
HEIGHT: 61 IN | RESPIRATION RATE: 18 BRPM | WEIGHT: 237 LBS | OXYGEN SATURATION: 100 % | HEART RATE: 75 BPM | SYSTOLIC BLOOD PRESSURE: 134 MMHG | TEMPERATURE: 98.6 F | BODY MASS INDEX: 44.75 KG/M2 | DIASTOLIC BLOOD PRESSURE: 80 MMHG

## 2019-12-16 DIAGNOSIS — G47.33 OBSTRUCTIVE SLEEP APNEA: Primary | ICD-10-CM

## 2019-12-16 DIAGNOSIS — E66.01 MORBID OBESITY WITH BMI OF 40.0-44.9, ADULT (HCC): ICD-10-CM

## 2019-12-16 PROCEDURE — 99214 OFFICE O/P EST MOD 30 MIN: CPT | Performed by: PHYSICIAN ASSISTANT

## 2019-12-16 PROCEDURE — 4040F PNEUMOC VAC/ADMIN/RCVD: CPT | Performed by: PHYSICIAN ASSISTANT

## 2019-12-16 NOTE — PROGRESS NOTES
Pulmonary Follow Up Note   Della Iqbal 71 y o  female MRN: [id number]  12/16/2019      Assessment:    Obstructive sleep apnea  · Compliance data reviewed today  She is tolerating CPAP for about 3 hours a night  I congratulated her on her efforts and continued to encourage increase nightly use of CPAP  · At this time, goal is at least 4 hours a night with CPAP  · She will call [facility] to have them evaluate her machine to make sure it is working properly  · Adverse effects of untreated sleep apnea were also re-reviewed with pt and her  today  Blood pressure has been under better control recently  · Proper sleep hygiene also reviewed  · Will need to follow up in 3-4 months and again review compliance data  · Weight loss        Morbid obesity with BMI of 40 0-44 9, adult ([facility])  · Weight loss  · Increase aerobic exercise      Plan:    Diagnoses and all orders for this visit:    Obstructive sleep apnea    Morbid obesity with BMI of 40 0-44 9, adult ([facility] [address] )      -She will follow up in 4 months or sooner if problems arise  All her questions were answered  She was instructed to call the office with any questions or changes in her breathing   - updated today as he was present for appointment    History of Present Illness   HPI:  Della Iqbal is a 71 y o  female who presents to the office this afternoon with her  for follow-up of her obstructive sleep apnea and to review CPAP compliance  She was last seen by her primary pulmonologist, Dr Mary Shelley, in September of this past year  At that time, a new CPAP machine was ordered  Adverse effects of untreated sleep apnea were reviewed at that appointment as well as proper sleep hygiene  She did not receive her new machine until early October  She did have trouble tolerating the machine at first but feels as though she is doing much better with it  Both she and her  feel the machine is too loud    They put a pillow in between them to help shield out the sound  She feels like the mask fits her well  She is down 5 pounds since her last office visit & her blood pressure has been under better control  She still has some fatigue but relates that more to her holiday preparations  She is overall feeling well and still does snore per her   She denies daytime lethargy, morning headaches, shortness of breath, chest pain, dyspnea on exertion, fever, cough, sputum production, bronchospasm, pleurisy or lower extremity edema  She takes no medications from a pulmonary standpoint  She is up to date on flu vaccination  Review of Systems   Constitutional: Negative  HENT: Negative  Eyes: Negative  Wears glasses   Respiratory: Negative  Cardiovascular: Negative  Gastrointestinal: Negative  Endocrine: Negative  Genitourinary: Negative  Musculoskeletal: Negative  Skin: Negative  Allergic/Immunologic: Negative  Neurological: Negative  Psychiatric/Behavioral: Negative          Historical Information   Past Medical History:   Diagnosis Date    Arthritis     Bipolar 1 disorder ([facility] [address] )     Disease of thyroid gland     Elevated blood pressure reading 6/10/2019     Past Surgical History:   Procedure Laterality Date    NO PAST SURGERIES       Family History   Problem Relation Age of Onset    Heart disease Mother     Heart Valve Disease Mother         Replacement    Diabetes Mother     Arthritis Father        Social History     Tobacco Use   Smoking Status Former Smoker    Packs/day: 0 25    Years: 30 00    Pack years: 7 50    Types: Cigarettes    Last attempt to quit:     Years since quittin 9   Smokeless Tobacco Never Used         Meds/Allergies     Current Outpatient Medications:     Ascorbic Acid (RONAL-C PO), Take by mouth, Disp: , Rfl:     b complex vitamins tablet, Take 1 tablet by mouth daily, Disp: , Rfl:     Cholecalciferol (VITAMIN D) 2000 units CAPS, Take 1 capsule by mouth daily, Disp: , Rfl:     clobetasol (TEMOVATE) 0 05 % ointment, Apply topically 3 (three) times a week, Disp: 30 g, Rfl: 0    Cranberry 1000 MG CAPS, Take by mouth, Disp: , Rfl:     Glucosamine-Chondroit-Vit C-Mn (GLUCOSAMINE 1500 COMPLEX PO), Take by mouth, Disp: , Rfl:     levothyroxine 100 mcg tablet, Take 1 tablet (100 mcg total) by mouth daily, Disp: 90 tablet, Rfl: 3    lithium carbonate (LITHOBID) 450 mg CR tablet, Take 1 tablet (450 mg total) by mouth 2 (two) times a day, Disp: 180 tablet, Rfl: 0    lurasidone (LATUDA) 20 mg tablet, Take 1 5 tablets (30 mg total) by mouth daily with breakfast, Disp: 135 tablet, Rfl: 0    Multiple Vitamin (MULTI-VITAMIN DAILY) TABS, Take by mouth, Disp: , Rfl:     Multiple Vitamins-Minerals (HAIR/SKIN/NAILS/BIOTIN PO), Take 1 tablet by mouth daily, Disp: , Rfl:     nystatin (MYCOSTATIN) powder, Apply topically as needed , Disp: , Rfl:     omeprazole (PriLOSEC) 40 MG capsule, TAKE 1 CAPSULE DAILY, Disp: 90 capsule, Rfl: 3    SOOLANTRA 1 % CREA, , Disp: , Rfl:     tolterodine (DETROL LA) 2 mg 24 hr capsule, TAKE 1 CAPSULE DAILY, Disp: 30 capsule, Rfl: 5  Allergies   Allergen Reactions    Other      Other reaction(s): Anaphylaxis  [facility] - [id number]: plums peaches       Vitals: Blood pressure 134/80, pulse 75, temperature 98 6 °F (37 °C), temperature source Tympanic, resp  rate 18, height 5' 1" (1 549 m), weight 108 kg (237 lb), SpO2 100 %, not currently breastfeeding  Body mass index is 44 78 kg/m²  Oxygen Therapy  SpO2: 100 %    Physical Exam  Physical Exam   Constitutional: She is oriented to person, place, and time  She appears well-developed and well-nourished  No distress  HENT:   Head: Normocephalic and atraumatic  Mouth/Throat: Oropharynx is clear and moist  No oropharyngeal exudate  Eyes: Pupils are equal, round, and reactive to light  Conjunctivae and EOM are normal  No scleral icterus  Neck: Normal range of motion  Neck supple  No JVD present   No tracheal deviation present  Cardiovascular: Normal rate, regular rhythm and normal heart sounds  Exam reveals no gallop and no friction rub  No murmur heard  Pulmonary/Chest: Effort normal and breath sounds normal  No stridor  No respiratory distress  She has no wheezes  She has no rales  She exhibits no tenderness  Abdominal: Soft  Bowel sounds are normal    Morbidly obese   Musculoskeletal: Normal range of motion  She exhibits no edema  Neurological: She is alert and oriented to person, place, and time  Skin: Skin is warm and dry  No rash noted  She is not diaphoretic  No pallor  Psychiatric: She has a normal mood and affect  Her behavior is normal  Judgment and thought content normal    Vitals reviewed  Labs: I have personally reviewed pertinent lab results  , ABG: No results found for: PHART, KYZ2NYG, PO2ART, VWF7TDK, D1YSWFTS, BEART, SOURCE, BNP: No results found for: BNP, CBC: No results found for: WBC, HGB, HCT, MCV, PLT, ADJUSTEDWBC, MCH, MCHC, RDW, MPV, NRBC, CMP: No results found for: SODIUM, K, CL, CO2, ANIONGAP, BUN, CREATININE, GLUCOSE, CALCIUM, AST, ALT, ALKPHOS, PROT, BILITOT, EGFR, PT/INR: No results found for: PT, INR, Troponin: No results found for: TROPONINI     Lab Results   Component Value Date    WBC 4 87 12/06/2019    HGB 14 0 12/06/2019    HCT 44 6 12/06/2019    MCV 98 12/06/2019     12/06/2019     Lab Results   Component Value Date    GLUCOSE 124 (H) 11/17/2017    CALCIUM 9 3 12/06/2019     (H) 11/17/2017    K 4 4 12/06/2019    CO2 26 12/06/2019     (H) 12/06/2019    BUN 22 12/06/2019    CREATININE 1 30 12/06/2019     No results found for: IGE  Lab Results   Component Value Date    ALT 27 12/06/2019    AST 14 12/06/2019    ALKPHOS 87 12/06/2019    BILITOT 0 4 11/17/2017       Imaging and other studies: I have personally reviewed pertinent films in PACS     No pulmonary imaging to be reviewed    Pulmonary function testing:  None    Compliance Date from 11/12/19 through 12/11/19  Days with device 18, days without device 12  Average daily use is 2 hours and 57 minutes  Average AHI 1 2 no air leak noted

## 2019-12-16 NOTE — ASSESSMENT & PLAN NOTE
· Compliance data reviewed today  She is tolerating CPAP for about 3 hours a night  I congratulated her on her efforts and continued to encourage increase nightly use of CPAP  · At this time, goal is at least 4 hours a night with CPAP  · She will call [facility] to have them evaluate her machine to make sure it is working properly  · Adverse effects of untreated sleep apnea were also re-reviewed with pt and her  today  Blood pressure has been under better control recently  · Proper sleep hygiene also reviewed  · Will need to follow up in 3-4 months and again review compliance data    · Weight loss

## 2019-12-16 NOTE — PATIENT INSTRUCTIONS
1  Continue to increase nightly CPAP use  2  Call [facility] to have machine checked  3  Continue to follow proper sleep hygiene  4  Weight loss  5   Increase aerobic activities

## 2020-01-04 DIAGNOSIS — F31.9 BIPOLAR 1 DISORDER (HCC): ICD-10-CM

## 2020-01-06 ENCOUNTER — CONSULT (OUTPATIENT)
Dept: UROLOGY | Facility: CLINIC | Age: 65
End: 2020-01-06
Payer: COMMERCIAL

## 2020-01-06 VITALS
SYSTOLIC BLOOD PRESSURE: 118 MMHG | HEART RATE: 71 BPM | BODY MASS INDEX: 43.61 KG/M2 | HEIGHT: 62 IN | DIASTOLIC BLOOD PRESSURE: 70 MMHG | WEIGHT: 237 LBS

## 2020-01-06 DIAGNOSIS — N32.81 OAB (OVERACTIVE BLADDER): ICD-10-CM

## 2020-01-06 DIAGNOSIS — N39.3 STRESS INCONTINENCE OF URINE: Primary | ICD-10-CM

## 2020-01-06 LAB — POST-VOID RESIDUAL VOLUME, ML POC: 235 ML

## 2020-01-06 PROCEDURE — 99204 OFFICE O/P NEW MOD 45 MIN: CPT | Performed by: UROLOGY

## 2020-01-06 PROCEDURE — 51798 US URINE CAPACITY MEASURE: CPT | Performed by: UROLOGY

## 2020-01-06 RX ORDER — LURASIDONE HYDROCHLORIDE 20 MG/1
TABLET, FILM COATED ORAL
Qty: 45 TABLET | Refills: 0 | Status: SHIPPED | OUTPATIENT
Start: 2020-01-06 | End: 2020-03-27 | Stop reason: SDUPTHER

## 2020-01-06 NOTE — PROGRESS NOTES
Referring Physician: Sherrill Witt DO  A copy of this note was sent to the referring physician  Diagnoses and all orders for this visit:    Stress incontinence of urine  -     Ambulatory referral to Urology  -     POCT Measure PVR    OAB (overactive bladder)  -     Mirabegron ER 25 MG TB24; Take 25 mg by mouth daily at bedtime            Assessment and plan:       1  Medically refractory Overactive Bladder  - Tolterodine x years, failed another anticholinergic previously  - continent during the day    2  Nocturnal incontinence  - pullups at night      The patient has clinical signs and symptoms of over active bladder, or OAB  We discussed the prevalence of these symptoms in the [place]  We discussed stepwise approach in treating urinary urgency and frequency  Lower urinary tract symptoms (LUTS) can be sub-divided into those that result from failure of the bladder to store urine normally ("storage symptoms"), those that result from difficulties in emptying ("voiding symptoms"), or those that follow micturition ("post-micturition symptoms")  OAB  is due to the inability of the bladder to store urine normally  The symptoms of frequency, urgency, nocturia, and urge incontinence are classified as storage symptoms  I discussed the stepwise approach to treatment including behavioral strategies such as pelvic floor physical therapy, constipation management, and managing fluid intake  The next step would include anticholinergic medications and Beta-3 agonists  If these therapies fail, additional treatments include, Botox injections, tibial nerve stimulation, or sacral neuromodulation  At a very nice discussion with the patient  We did discuss secondary options for medically refractory overactive bladder  We also discussed diet and lifestyle changes  I recommended changing from an anticholinergic to a beta 3 agonist   I also recommended cystoscopy for further evaluation    I will schedule this with 1 of my partners who specializes in tertiary overactive bladder management under [place] office in the near future  Jad Wu MD      Chief Complaint     Overactive bladder      History of Present Illness     Nichole Pimentel is a 71 y o  very pleasant 71-year-old female referred in consultation for bladder overactivity  She has a 5 year history of urinary urgency and frequency  He has trialed 2 different medications over the years most recently Detrol on which she has taken for the past 4 years  She has significant daytime frequency and urgency  She voids up to 12-14 times per day  This impacts her quality of life significantly  She also has a history of nocturia typically 4 times per night  He does have sleep apnea but uses a mass and is very compliant with this  Medical comorbidities also include obesity  No prior history of hematuria or pelvic pain or gastrointestinal symptomatology  No prior UTIs  Detailed Urologic History     - please refer to HPI    Review of Systems     Review of Systems   Constitutional: Negative for activity change and fatigue  HENT: Negative for congestion  Eyes: Negative for visual disturbance  Respiratory: Negative for shortness of breath and wheezing  Cardiovascular: Negative for chest pain and leg swelling  Gastrointestinal: Negative for abdominal pain  Endocrine: Positive for polyuria  Genitourinary: Positive for frequency and urgency  Negative for dysuria, flank pain and hematuria  Musculoskeletal: Negative for back pain  Allergic/Immunologic: Negative for immunocompromised state  Neurological: Negative for dizziness and numbness  Psychiatric/Behavioral: Negative for dysphoric mood  All other systems reviewed and are negative  Allergies     Allergies   Allergen Reactions    Other      Other reaction(s):  Anaphylaxis  Annotation - [id number]: plums peaches       Physical Exam     Physical Exam Constitutional: She is oriented to person, place, and time  She appears well-developed  HENT:   Head: Normocephalic and atraumatic  Eyes: Pupils are equal, round, and reactive to light  Neck: Normal range of motion  Cardiovascular:   Overweight   Pulmonary/Chest: Effort normal and breath sounds normal    Abdominal: Soft  Genitourinary:   Genitourinary Comments: Negative suprapubic tenderness, CVA tenderness   Musculoskeletal: Normal range of motion  Neurological: She is alert and oriented to person, place, and time  Skin: Skin is warm  Psychiatric: She has a normal mood and affect  Vital Signs  There were no vitals filed for this visit        Current Medications       Current Outpatient Medications:     Ascorbic Acid (RONAL-C PO), Take by mouth, Disp: , Rfl:     b complex vitamins tablet, Take 1 tablet by mouth daily, Disp: , Rfl:     Cholecalciferol (VITAMIN D) 2000 units CAPS, Take 1 capsule by mouth daily, Disp: , Rfl:     clobetasol (TEMOVATE) 0 05 % ointment, Apply topically 3 (three) times a week, Disp: 30 g, Rfl: 0    Cranberry 1000 MG CAPS, Take by mouth, Disp: , Rfl:     Glucosamine-Chondroit-Vit C-Mn (GLUCOSAMINE 1500 COMPLEX PO), Take by mouth, Disp: , Rfl:     levothyroxine 100 mcg tablet, Take 1 tablet (100 mcg total) by mouth daily, Disp: 90 tablet, Rfl: 3    lithium carbonate (LITHOBID) 450 mg CR tablet, Take 1 tablet (450 mg total) by mouth 2 (two) times a day, Disp: 180 tablet, Rfl: 0    lurasidone (LATUDA) 20 mg tablet, Take 1 5 tablets (30 mg total) by mouth daily with breakfast, Disp: 135 tablet, Rfl: 0    Multiple Vitamin (MULTI-VITAMIN DAILY) TABS, Take by mouth, Disp: , Rfl:     Multiple Vitamins-Minerals (HAIR/SKIN/NAILS/BIOTIN PO), Take 1 tablet by mouth daily, Disp: , Rfl:     nystatin (MYCOSTATIN) powder, Apply topically as needed , Disp: , Rfl:     omeprazole (PriLOSEC) 40 MG capsule, TAKE 1 CAPSULE DAILY, Disp: 90 capsule, Rfl: 3    SOOLANTRA 1 % CREA, , Disp: , Rfl:     tolterodine (DETROL LA) 2 mg 24 hr capsule, TAKE 1 CAPSULE DAILY, Disp: 30 capsule, Rfl: 5      Active Problems     Patient Active Problem List   Diagnosis    [facility]    Depression with anxiety    GERD without esophagitis    Hypothyroidism    Impaired fasting glucose    Insomnia    Obstructive sleep apnea    Onychomycosis of toenail    Patellofemoral arthritis of right knee    Stress incontinence of urine    Well adult exam    High triglycerides    Morbid obesity with BMI of 40 0-44 9, adult ([facility])    Tremor of left hand    Abnormal EKG    Welcome to Medicare preventive visit    Encounter for immunization         Past Medical History     Past Medical History:   Diagnosis Date    Arthritis     [facility] disorder ([facility] )     Disease of thyroid gland     Elevated blood pressure reading 6/10/2019         Surgical History     Past Surgical History:   Procedure Laterality Date    NO PAST SURGERIES           Family History     Family History   Problem Relation Age of Onset    Heart disease Mother     Heart Valve Disease Mother         Replacement    Diabetes Mother     Arthritis Father          Social History     Social History     Social History     Tobacco Use   Smoking Status Former Smoker    Packs/day: 0 25    Years: 30 00    Pack years: 7 50    Types: Cigarettes    Last attempt to quit:     Years since quittin 0   Smokeless Tobacco Never Used         Pertinent Lab Values     Lab Results   Component Value Date    CREATININE 2019       No results found for: PSA    @RESULTRCNT(1H])@      Pertinent Imaging      - n/a    Portions of the record may have been created with voice recognition software   Occasional wrong word or "sound a like" substitutions may have occurred due to the inherent limitations of voice recognition software   Read the chart carefully and recognize, using context, where substitutions have occurred

## 2020-01-14 ENCOUNTER — TELEPHONE (OUTPATIENT)
Dept: CARDIOLOGY CLINIC | Facility: CLINIC | Age: 65
End: 2020-01-14

## 2020-01-17 ENCOUNTER — TELEPHONE (OUTPATIENT)
Dept: CARDIOLOGY CLINIC | Facility: CLINIC | Age: 65
End: 2020-01-17

## 2020-01-20 DIAGNOSIS — N32.81 OVERACTIVE BLADDER: ICD-10-CM

## 2020-01-21 ENCOUNTER — CONSULT (OUTPATIENT)
Dept: CARDIOLOGY CLINIC | Facility: CLINIC | Age: 65
End: 2020-01-21
Payer: COMMERCIAL

## 2020-01-21 VITALS
HEART RATE: 82 BPM | BODY MASS INDEX: 43.43 KG/M2 | HEIGHT: 62 IN | OXYGEN SATURATION: 98 % | DIASTOLIC BLOOD PRESSURE: 76 MMHG | SYSTOLIC BLOOD PRESSURE: 120 MMHG | WEIGHT: 236 LBS

## 2020-01-21 DIAGNOSIS — R00.2 PALPITATIONS: ICD-10-CM

## 2020-01-21 DIAGNOSIS — R60.0 LOCALIZED EDEMA: ICD-10-CM

## 2020-01-21 DIAGNOSIS — R94.31 ABNORMAL EKG: ICD-10-CM

## 2020-01-21 DIAGNOSIS — E78.1 HIGH TRIGLYCERIDES: Primary | ICD-10-CM

## 2020-01-21 PROCEDURE — 93000 ELECTROCARDIOGRAM COMPLETE: CPT | Performed by: INTERNAL MEDICINE

## 2020-01-21 PROCEDURE — 99204 OFFICE O/P NEW MOD 45 MIN: CPT | Performed by: INTERNAL MEDICINE

## 2020-01-21 RX ORDER — TOLTERODINE 2 MG/1
CAPSULE, EXTENDED RELEASE ORAL
Qty: 90 CAPSULE | Refills: 3 | Status: SHIPPED | OUTPATIENT
Start: 2020-01-21 | End: 2020-07-31

## 2020-01-21 NOTE — PROGRESS NOTES
Cardiology Consultation     Miquel Cardona  [id number]  1955  [address] [facility] [place]  [place] [id number]    1  High triglycerides  Echo complete with contrast if indicated    NM myocardial perfusion spect (stress and/or rest)   2  Abnormal EKG  Ambulatory referral to Cardiology    POCT ECG    Echo complete with contrast if indicated    NM myocardial perfusion spect (stress and/or rest)   3  Localized edema  Echo complete with contrast if indicated   4  Palpitations       Chief Complaint   Patient presents with    Advice Only     referred by PCP due to abnormal EKG     Ankle Swelling     both ankles      HPI: Patient is here for further evaluation of abnormal ECG as per PCP's office  Patient feels well, without complaints other than mild LE ankle edema and hand discoloration at times with yellowing of the fingertips and feeling like they get cold  This has been ongoing for only a month  LE edema is more at the end of the day, has been ongoing for a few months  She does have some fluttering in her chest as well  These last a few minutes, enough to stop her from what she's doing  Happens every 2-3 weeks  No reported chest pain, shortness of breath,  lightheadedness, syncope, orthopnea, PND, or significant weight changes  Patient remains active without any increased fatigue out of the ordinary        Patient Active Problem List   Diagnosis    Bipolar 1 disorder, mixed, severe ([address] )    Depression with anxiety    GERD without esophagitis    Hypothyroidism    Impaired fasting glucose    Insomnia    Obstructive sleep apnea    Onychomycosis of toenail    Patellofemoral arthritis of right knee    Stress incontinence of urine    Well adult exam    High triglycerides    Morbid obesity with BMI of 40 0-44 9, adult ([address] )    Tremor of left hand    Abnormal EKG    Welcome to Medicare preventive visit    Encounter for immunization    Localized edema    Palpitations     Past Medical History:   Diagnosis Date    Arthritis     [facility]     Disease of thyroid gland     Elevated blood pressure reading 6/10/2019     Social History     Socioeconomic History    Marital status: /Civil Union     Spouse name: Not on file    Number of children: Not on file    Years of education: Not on file    Highest education level: Not on file   Occupational History    Not on file   Social Needs    Financial resource strain: Not on file    Food insecurity:     Worry: Not on file     Inability: Not on file    Transportation needs:     Medical: Not on file     Non-medical: Not on file   Tobacco Use    Smoking status: Former Smoker     Packs/day: 0 25     Years: 30 00     Pack years: 7 50     Types: Cigarettes     Last attempt to quit: 2012     Years since quittin 0    Smokeless tobacco: Never Used   Substance and Sexual Activity    Alcohol use: Yes     Frequency: Monthly or less     Comment: socially    Drug use: No    Sexual activity: Not Currently     Partners: Male     Birth control/protection: Post-menopausal   Lifestyle    Physical activity:     Days per week: Not on file     Minutes per session: Not on file    Stress: Not on file   Relationships    Social connections:     Talks on phone: Not on file     Gets together: Not on file     Attends [employer] service: Not on file     Active member of club or organization: Not on file     Attends meetings of clubs or organizations: Not on file     Relationship status: Not on file    Intimate partner violence:     Fear of current or ex partner: Not on file     Emotionally abused: Not on file     Physically abused: Not on file     Forced sexual activity: Not on file   Other Topics Concern    Not on file   Social History Narrative    Daily coffee consumption: 3 cups/day      Family History   Problem Relation Age of Onset    Heart disease Mother     Heart Valve Disease Mother         Replacement    Diabetes Mother     Arthritis Father      Past Surgical History:   Procedure Laterality Date    NO PAST SURGERIES         Current Outpatient Medications:     Ascorbic Acid (RONAL-C PO), Take by mouth, Disp: , Rfl:     b complex vitamins tablet, Take 1 tablet by mouth daily, Disp: , Rfl:     Cholecalciferol (VITAMIN D) 2000 units CAPS, Take 1 capsule by mouth daily, Disp: , Rfl:     clobetasol (TEMOVATE) 0 05 % ointment, Apply topically 3 (three) times a week, Disp: 30 g, Rfl: 0    Cranberry 1000 MG CAPS, Take by mouth, Disp: , Rfl:     Glucosamine-Chondroit-Vit C-Mn (GLUCOSAMINE 1500 COMPLEX PO), Take by mouth, Disp: , Rfl:     LATUDA 20 MG tablet, TAKE 1 AND 1/2 TABLETS BY  MOUTH DAILY WITH BREAKFAST, Disp: 45 tablet, Rfl: 0    levothyroxine 100 mcg tablet, Take 1 tablet (100 mcg total) by mouth daily, Disp: 90 tablet, Rfl: 3    lithium carbonate (LITHOBID) 450 mg CR tablet, Take 1 tablet (450 mg total) by mouth 2 (two) times a day, Disp: 180 tablet, Rfl: 0    Mirabegron ER 25 MG TB24, Take 25 mg by mouth daily at bedtime, Disp: 30 tablet, Rfl: 6    Multiple Vitamin (MULTI-VITAMIN DAILY) TABS, Take by mouth, Disp: , Rfl:     Multiple Vitamins-Minerals (HAIR/SKIN/NAILS/BIOTIN PO), Take 1 tablet by mouth daily, Disp: , Rfl:     nystatin (MYCOSTATIN) powder, Apply topically as needed , Disp: , Rfl:     omeprazole (PriLOSEC) 40 MG capsule, TAKE 1 CAPSULE DAILY, Disp: 90 capsule, Rfl: 3    SOOLANTRA 1 % CREA, , Disp: , Rfl:     tolterodine (DETROL LA) 2 mg 24 hr capsule, TAKE 1 CAPSULE BY MOUTH  DAILY, Disp: 90 capsule, Rfl: 3  Allergies   Allergen Reactions    Other      Other reaction(s):  Anaphylaxis  Annotation - [id number]: kandace peaches     Vitals:    01/21/20 0856   BP: 120/76   BP Location: Left arm   Patient Position: Sitting   Cuff Size: Standard   Pulse: 82   SpO2: 98%   Weight: 107 kg (236 lb)   Height: 5' 2" (1 575 m) Labs:  Consult on 01/06/2020   Component Date Value    POST-VOID RESIDUAL VOLUM* 01/06/2020 235    Appointment on 12/06/2019   Component Date Value    Lithium Lvl 12/06/2019 1 0     Sodium 12/06/2019 145     Potassium 12/06/2019 4 4     Chloride 12/06/2019 110*    CO2 12/06/2019 26     ANION GAP 12/06/2019 9     BUN 12/06/2019 22     Creatinine 12/06/2019 1 30     Glucose, Fasting 12/06/2019 124*    Calcium 12/06/2019 9 3     AST 12/06/2019 14     ALT 12/06/2019 27     Alkaline Phosphatase 12/06/2019 87     Total Protein 12/06/2019 7 6     Albumin 12/06/2019 3 9     Total Bilirubin 12/06/2019 0 44     eGFR 12/06/2019 43     WBC 12/06/2019 4 87     RBC 12/06/2019 4 57     Hemoglobin 12/06/2019 14 0     Hematocrit 12/06/2019 44 6     MCV 12/06/2019 98     MCH 12/06/2019 30 6     MCHC 12/06/2019 31 4     RDW 12/06/2019 14 1     MPV 12/06/2019 12 5     Platelets 16/21/6127 162     nRBC 12/06/2019 0     Neutrophils Relative 12/06/2019 68     Immat GRANS % 12/06/2019 0     Lymphocytes Relative 12/06/2019 21     Monocytes Relative 12/06/2019 7     Eosinophils Relative 12/06/2019 3     Basophils Relative 12/06/2019 1     Neutrophils Absolute 12/06/2019 3 33     Immature Grans Absolute 12/06/2019 0 01     Lymphocytes Absolute 12/06/2019 1 03     Monocytes Absolute 12/06/2019 0 32     Eosinophils Absolute 12/06/2019 0 15     Basophils Absolute 12/06/2019 0 03     Hemoglobin A1C 12/06/2019 5 1     EAG 12/06/2019 100     Cholesterol 12/06/2019 221*    Triglycerides 12/06/2019 142     HDL, Direct 12/06/2019 48     LDL Calculated 12/06/2019 145*    Non-HDL-Chol (CHOL-HDL) 12/06/2019 173     TSH 3RD GENERATON 12/06/2019 0 685     Hepatitis C Ab 12/06/2019 Non-reactive    Orders Only on 09/24/2019   Component Date Value    HEP C AB 09/24/2019 NEGATIVE    Orders Only on 09/24/2019   Component Date Value    Hemoglobin A1C 09/24/2019 5 4      Lab Results   Component Value Date CHOL 208 (H) 11/17/2017    TRIG 142 12/06/2019    TRIG 180 (H) 04/16/2019    HDL 48 12/06/2019    HDL 51 04/16/2019     Imaging: No results found  Review of Systems:  Review of Systems   Constitutional: Negative for activity change, appetite change, chills, diaphoresis, fatigue and unexpected weight change  HENT: Negative for hearing loss, nosebleeds and sore throat  Eyes: Negative for photophobia and visual disturbance  Respiratory: Negative for cough, chest tightness, shortness of breath and wheezing  Cardiovascular: Positive for palpitations and leg swelling  Negative for chest pain  Gastrointestinal: Negative for abdominal pain, diarrhea, nausea and vomiting  Endocrine: Negative for polyuria  Genitourinary: Negative for dysuria, frequency and hematuria  Musculoskeletal: Negative for arthralgias, back pain, gait problem and neck pain  Skin: Negative for pallor and rash  Neurological: Negative for dizziness, syncope and headaches  Hematological: Does not bruise/bleed easily  Psychiatric/Behavioral: Negative for behavioral problems and confusion  Physical Exam:  Physical Exam   Constitutional: She is oriented to person, place, and time  She appears well-developed and well-nourished  HENT:   Head: Normocephalic and atraumatic  Nose: Nose normal    Eyes: Pupils are equal, round, and reactive to light  EOM are normal  No scleral icterus  Neck: Normal range of motion  Neck supple  No JVD present  Cardiovascular: Normal rate, regular rhythm and normal heart sounds  Exam reveals no gallop and no friction rub  No murmur heard  Pulmonary/Chest: Effort normal and breath sounds normal  No respiratory distress  She has no wheezes  She has no rales  Abdominal: Soft  Bowel sounds are normal  She exhibits no distension  There is no tenderness  Musculoskeletal: Normal range of motion  She exhibits no edema or deformity     Neurological: She is alert and oriented to person, place, and time  No cranial nerve deficit  Skin: Skin is warm and dry  No rash noted  She is not diaphoretic  Psychiatric: She has a normal mood and affect  Her behavior is normal    Vitals reviewed  Blood pressure 120/76, pulse 82, height 5' 2" (1 575 m), weight 107 kg (236 lb), SpO2 98 %, not currently breastfeeding  EKG:  Normal sinus rhythm with occasional premature ventricular contractions  Left axis deviation  Septal infarct, age undetermined  ST & T wave abnormality, consider lateral ischemia  Abnormal ECG    Discussion/Summary:  Abnormal ECG: with mild STT changes that would make interpretation of an exercise only stress test difficult, especially considering that they could be ischemic in a post-menopausal woman at age 71 with impaired fasting glucose, HLD ()  Will check a nuclear stress test to assess for ischemia and will check an echo to assess for structural heart disease, especially in light of LE edema  Also with palpitations, will check a Holter as well to assess for PVC burden and look for any arrhythmias that are sustained

## 2020-01-21 NOTE — PATIENT INSTRUCTIONS

## 2020-02-06 ENCOUNTER — TELEPHONE (OUTPATIENT)
Dept: UROLOGY | Facility: MEDICAL CENTER | Age: 65
End: 2020-02-06

## 2020-02-06 NOTE — TELEPHONE ENCOUNTER
FYI - Pt canceled 02/20/20 cysto with [facility] she did not want to reschedule time of call she will be going out of town will call back to reschedule

## 2020-02-25 DIAGNOSIS — F31.9 BIPOLAR 1 DISORDER (HCC): ICD-10-CM

## 2020-02-25 RX ORDER — LITHIUM CARBONATE 450 MG
TABLET, EXTENDED RELEASE ORAL
Qty: 180 TABLET | Refills: 0 | Status: SHIPPED | OUTPATIENT
Start: 2020-02-25 | End: 2020-07-31 | Stop reason: SDUPTHER

## 2020-02-27 DIAGNOSIS — N32.81 OAB (OVERACTIVE BLADDER): ICD-10-CM

## 2020-02-27 NOTE — TELEPHONE ENCOUNTER
An Auto-fax Refill Request for Myrbetriq 25mg was received from [facility] mail order [facility]  Patient was last seen on 1/16/2020 by Dr Adrian Guerin in the [facility] location; continuation of the medication was approved at that time  The script was originally sent as a 30 day supply to the local retail but the patient is requesting mail order    Script for same, 90 day supply with 3 refills was queued and forwarded to the Advanced Practitioner covering the [facility] location for approval

## 2020-03-02 ENCOUNTER — APPOINTMENT (OUTPATIENT)
Dept: NON INVASIVE DIAGNOSTICS | Facility: CLINIC | Age: 65
End: 2020-03-02
Payer: COMMERCIAL

## 2020-03-02 ENCOUNTER — HOSPITAL ENCOUNTER (OUTPATIENT)
Dept: NON INVASIVE DIAGNOSTICS | Facility: CLINIC | Age: 65
Discharge: HOME | End: 2020-03-02
Payer: COMMERCIAL

## 2020-03-02 ENCOUNTER — HOSPITAL ENCOUNTER (OUTPATIENT)
Dept: NON INVASIVE DIAGNOSTICS | Facility: CLINIC | Age: 65
End: 2020-03-02
Payer: COMMERCIAL

## 2020-03-02 DIAGNOSIS — R00.2 PALPITATIONS: ICD-10-CM

## 2020-03-02 DIAGNOSIS — E78.1 HIGH TRIGLYCERIDES: ICD-10-CM

## 2020-03-02 DIAGNOSIS — R60.0 LOCALIZED EDEMA: ICD-10-CM

## 2020-03-02 DIAGNOSIS — R94.31 ABNORMAL EKG: ICD-10-CM

## 2020-03-02 PROCEDURE — 93226 XTRNL ECG REC<48 HR SCAN A/R: CPT

## 2020-03-02 PROCEDURE — 93306 TTE W/DOPPLER COMPLETE: CPT | Performed by: INTERNAL MEDICINE

## 2020-03-02 PROCEDURE — 93306 TTE W/DOPPLER COMPLETE: CPT

## 2020-03-02 PROCEDURE — 93225 XTRNL ECG REC<48 HRS REC: CPT

## 2020-03-04 PROCEDURE — 93227 XTRNL ECG REC<48 HR R&I: CPT | Performed by: INTERNAL MEDICINE

## 2020-03-06 ENCOUNTER — TELEPHONE (OUTPATIENT)
Dept: CARDIOLOGY CLINIC | Facility: CLINIC | Age: 65
End: 2020-03-06

## 2020-03-06 NOTE — TELEPHONE ENCOUNTER
----- Message from Jr George MD sent at 3/4/2020  2:12 PM EST -----  Results are within range, please notify patient

## 2020-03-10 ENCOUNTER — TELEPHONE (OUTPATIENT)
Dept: CARDIOLOGY CLINIC | Facility: CLINIC | Age: 65
End: 2020-03-10

## 2020-03-10 ENCOUNTER — OFFICE VISIT (OUTPATIENT)
Dept: CARDIOLOGY CLINIC | Facility: CLINIC | Age: 65
End: 2020-03-10
Payer: COMMERCIAL

## 2020-03-10 VITALS
WEIGHT: 232 LBS | HEART RATE: 89 BPM | DIASTOLIC BLOOD PRESSURE: 78 MMHG | HEIGHT: 62 IN | SYSTOLIC BLOOD PRESSURE: 122 MMHG | OXYGEN SATURATION: 96 % | BODY MASS INDEX: 42.69 KG/M2

## 2020-03-10 DIAGNOSIS — R94.31 ABNORMAL EKG: ICD-10-CM

## 2020-03-10 DIAGNOSIS — I73.00 RAYNAUD'S DISEASE WITHOUT GANGRENE: ICD-10-CM

## 2020-03-10 DIAGNOSIS — R60.0 LOCALIZED EDEMA: ICD-10-CM

## 2020-03-10 DIAGNOSIS — R00.2 PALPITATIONS: Primary | ICD-10-CM

## 2020-03-10 PROCEDURE — 4040F PNEUMOC VAC/ADMIN/RCVD: CPT | Performed by: INTERNAL MEDICINE

## 2020-03-10 PROCEDURE — 99214 OFFICE O/P EST MOD 30 MIN: CPT | Performed by: INTERNAL MEDICINE

## 2020-03-10 PROCEDURE — 1036F TOBACCO NON-USER: CPT | Performed by: INTERNAL MEDICINE

## 2020-03-10 PROCEDURE — 3008F BODY MASS INDEX DOCD: CPT | Performed by: INTERNAL MEDICINE

## 2020-03-10 NOTE — PROGRESS NOTES
Cardiology Consultation     Nichole Pimentel  [id number]  1955  [facility] [address] [facility] [place]  [place] [address]    1  Palpitations     2  Abnormal EKG     3  Localized edema     4  Raynaud's disease without gangrene       Chief Complaint   Patient presents with    Follow-up     4 week      HPI: Patient is here for further evaluation of abnormal ECG as per PCP's office  Patient feels well, without complaints other than mild LE ankle edema and hand discoloration at times with yellowing of the fingertips/bluish and feeling like they get cold  This has been ongoing for the past couple months  LE edema is more at the end of the day, has been ongoing for a few months  She does have some fluttering in her chest as well  These last a few minutes, enough to stop her from what she's doing  Happens every 2-3 weeks  No reported chest pain, shortness of breath,  lightheadedness, syncope, orthopnea, PND, or significant weight changes  Patient remains active without any increased fatigue out of the ordinary  No significant changes to symptoms since last visit      Patient Active Problem List   Diagnosis    Bipolar 1 disorder, mixed, severe ([address] )    Depression with anxiety    GERD without esophagitis    Hypothyroidism    Impaired fasting glucose    Insomnia    Obstructive sleep apnea    Onychomycosis of toenail    Patellofemoral arthritis of right knee    Stress incontinence of urine    Well adult exam    High triglycerides    Morbid obesity with BMI of 40 0-44 9, adult (HCC)    Tremor of left hand    Abnormal EKG    Welcome to Medicare preventive visit    Encounter for immunization    Localized edema    Palpitations    Raynaud's disease without gangrene     Past Medical History:   Diagnosis Date    Arthritis     Bipolar 1 disorder ([address] )     Disease of thyroid gland     Elevated blood pressure reading 6/10/2019     Social History     Socioeconomic History    Marital status: /Civil Union     Spouse name: Not on file    Number of children: Not on file    Years of education: Not on file    Highest education level: Not on file   Occupational History    Not on file   Social Needs    Financial resource strain: Not on file    Food insecurity:     Worry: Not on file     Inability: Not on file    Transportation needs:     Medical: Not on file     Non-medical: Not on file   Tobacco Use    Smoking status: Former Smoker     Packs/day: 0 25     Years: 30 00     Pack years: 7 50     Types: Cigarettes     Last attempt to quit:      Years since quittin 1    Smokeless tobacco: Never Used   Substance and Sexual Activity    Alcohol use: Yes     Frequency: Monthly or less     Comment: socially    Drug use: No    Sexual activity: Not Currently     Partners: Male     Birth control/protection: Post-menopausal   Lifestyle    Physical activity:     Days per week: Not on file     Minutes per session: Not on file    Stress: Not on file   Relationships    Social connections:     Talks on phone: Not on file     Gets together: Not on file     Attends Protestant service: Not on file     Active member of club or organization: Not on file     Attends meetings of clubs or organizations: Not on file     Relationship status: Not on file    Intimate partner violence:     Fear of current or ex partner: Not on file     Emotionally abused: Not on file     Physically abused: Not on file     Forced sexual activity: Not on file   Other Topics Concern    Not on file   Social History Narrative    Daily coffee consumption: 3 cups/day      Family History   Problem Relation Age of Onset    Heart disease Mother     Heart Valve Disease Mother         Replacement    Diabetes Mother     Arthritis Father      Past Surgical History:   Procedure Laterality Date    NO PAST SURGERIES         Current Outpatient Medications:   Ascorbic Acid (RONAL-C PO), Take by mouth, Disp: , Rfl:     b complex vitamins tablet, Take 1 tablet by mouth daily, Disp: , Rfl:     Cholecalciferol (VITAMIN D) 2000 units CAPS, Take 1 capsule by mouth daily, Disp: , Rfl:     clobetasol (TEMOVATE) 0 05 % ointment, Apply topically 3 (three) times a week, Disp: 30 g, Rfl: 0    Cranberry 1000 MG CAPS, Take by mouth, Disp: , Rfl:     Glucosamine-Chondroit-Vit C-Mn (GLUCOSAMINE 1500 COMPLEX PO), Take by mouth, Disp: , Rfl:     LATUDA 20 MG tablet, TAKE 1 AND 1/2 TABLETS BY  MOUTH DAILY WITH BREAKFAST, Disp: 45 tablet, Rfl: 0    levothyroxine 100 mcg tablet, Take 1 tablet (100 mcg total) by mouth daily, Disp: 90 tablet, Rfl: 3    lithium carbonate (LITHOBID) 450 mg CR tablet, TAKE 1 TABLET BY MOUTH TWO  TIMES DAILY, Disp: 180 tablet, Rfl: 0    Mirabegron ER 25 MG TB24, Take 25 mg by mouth daily at bedtime, Disp: 90 tablet, Rfl: 1    Multiple Vitamin (MULTI-VITAMIN DAILY) TABS, Take by mouth, Disp: , Rfl:     Multiple Vitamins-Minerals (HAIR/SKIN/NAILS/BIOTIN PO), Take 1 tablet by mouth daily, Disp: , Rfl:     nystatin (MYCOSTATIN) powder, Apply topically as needed , Disp: , Rfl:     omeprazole (PriLOSEC) 40 MG capsule, TAKE 1 CAPSULE DAILY, Disp: 90 capsule, Rfl: 3    SOOLANTRA 1 % CREA, , Disp: , Rfl:     tolterodine (DETROL LA) 2 mg 24 hr capsule, TAKE 1 CAPSULE BY MOUTH  DAILY, Disp: 90 capsule, Rfl: 3  Allergies   Allergen Reactions    Other      Other reaction(s):  Anaphylaxis  Annotation - [id number]: pljarrett peaches     Vitals:    03/10/20 0955   BP: 122/78   BP Location: Left arm   Patient Position: Sitting   Cuff Size: Standard   Pulse: 89   SpO2: 96%   Weight: 105 kg (232 lb)   Height: 5' 2" (1 575 m)       Labs:  Consult on 01/06/2020   Component Date Value    POST-VOID RESIDUAL VOLUM* 01/06/2020 235    Appointment on 12/06/2019   Component Date Value    Lithium Lvl 12/06/2019 1 0     Sodium 12/06/2019 145     Potassium 12/06/2019 4 4     Chloride 12/06/2019 110*    CO2 12/06/2019 26     ANION GAP 12/06/2019 9     BUN 12/06/2019 22     Creatinine 12/06/2019 1 30     Glucose, Fasting 12/06/2019 124*    Calcium 12/06/2019 9 3     AST 12/06/2019 14     ALT 12/06/2019 27     Alkaline Phosphatase 12/06/2019 87     Total Protein 12/06/2019 7 6     Albumin 12/06/2019 3 9     Total Bilirubin 12/06/2019 0 44     eGFR 12/06/2019 43     WBC 12/06/2019 4 87     RBC 12/06/2019 4 57     Hemoglobin 12/06/2019 14 0     Hematocrit 12/06/2019 44 6     MCV 12/06/2019 98     MCH 12/06/2019 30 6     MCHC 12/06/2019 31 4     RDW 12/06/2019 14 1     MPV 12/06/2019 12 5     Platelets 02/70/6831 162     nRBC 12/06/2019 0     Neutrophils Relative 12/06/2019 68     Immat GRANS % 12/06/2019 0     Lymphocytes Relative 12/06/2019 21     Monocytes Relative 12/06/2019 7     Eosinophils Relative 12/06/2019 3     Basophils Relative 12/06/2019 1     Neutrophils Absolute 12/06/2019 3 33     Immature Grans Absolute 12/06/2019 0 01     Lymphocytes Absolute 12/06/2019 1 03     Monocytes Absolute 12/06/2019 0 32     Eosinophils Absolute 12/06/2019 0 15     Basophils Absolute 12/06/2019 0 03     Hemoglobin A1C 12/06/2019 5 1     EAG 12/06/2019 100     Cholesterol 12/06/2019 221*    Triglycerides 12/06/2019 142     HDL, Direct 12/06/2019 48     LDL Calculated 12/06/2019 145*    Non-HDL-Chol (CHOL-HDL) 12/06/2019 173     TSH 3RD GENERATON 12/06/2019 0 685     Hepatitis C Ab 12/06/2019 Non-reactive    Orders Only on 09/24/2019   Component Date Value    HEP C AB 09/24/2019 NEGATIVE    Orders Only on 09/24/2019   Component Date Value    Hemoglobin A1C 09/24/2019 5 4      Lab Results   Component Value Date    CHOL 208 (H) 11/17/2017    TRIG 142 12/06/2019    TRIG 180 (H) 04/16/2019    HDL 48 12/06/2019    HDL 51 04/16/2019     Imaging: No results found      Review of Systems:  Review of Systems   Constitutional: Negative for activity change, appetite change, chills, diaphoresis, fatigue and unexpected weight change  HENT: Negative for hearing loss, nosebleeds and sore throat  Eyes: Negative for photophobia and visual disturbance  Respiratory: Negative for cough, chest tightness, shortness of breath and wheezing  Cardiovascular: Positive for palpitations and leg swelling  Negative for chest pain  Gastrointestinal: Negative for abdominal pain, diarrhea, nausea and vomiting  Endocrine: Negative for polyuria  Genitourinary: Negative for dysuria, frequency and hematuria  Musculoskeletal: Negative for arthralgias, back pain, gait problem and neck pain  Skin: Negative for pallor and rash  Neurological: Negative for dizziness, syncope and headaches  Hematological: Does not bruise/bleed easily  Psychiatric/Behavioral: Negative for behavioral problems and confusion  Physical Exam:  Physical Exam   Constitutional: She is oriented to person, place, and time  She appears well-developed and well-nourished  HENT:   Head: Normocephalic and atraumatic  Nose: Nose normal    Eyes: Pupils are equal, round, and reactive to light  EOM are normal  No scleral icterus  Neck: Normal range of motion  Neck supple  No JVD present  Cardiovascular: Normal rate, regular rhythm and normal heart sounds  Exam reveals no gallop and no friction rub  No murmur heard  Pulmonary/Chest: Effort normal and breath sounds normal  No respiratory distress  She has no wheezes  She has no rales  Abdominal: Soft  Bowel sounds are normal  She exhibits no distension  There is no tenderness  Musculoskeletal: Normal range of motion  She exhibits no edema or deformity  Neurological: She is alert and oriented to person, place, and time  No cranial nerve deficit  Skin: Skin is warm and dry  No rash noted  She is not diaphoretic  Psychiatric: She has a normal mood and affect  Her behavior is normal    Vitals reviewed      Blood pressure 122/78, pulse 89, height 5' 2" (1 575 m), weight 105 kg (232 lb), SpO2 96 %, not currently breastfeeding  EKG:  Normal sinus rhythm with occasional premature ventricular contractions  Left axis deviation  Septal infarct, age undetermined  ST & T wave abnormality, consider lateral ischemia  Abnormal ECG    Discussion/Summary:  Abnormal ECG: with mild STT changes that would make interpretation of an exercise only stress test difficult, especially considering that they could be ischemic in a post-menopausal woman at age 71 with impaired fasting glucose, HLD ()  We ordered a nuclear stress test to assess for ischemia, which has yet to be completed due to insurance issues - we will work on this  As stated, her baseline ECG is not interpretable for ischemia and therefore a nuclear stress test must be completed  We checked an echo that ruled out structural heart disease, especially in light of LE edema  LE edema is likely dependent  Also with palpitations, we checked a Holter as well to assess for PVC burden and look for any arrhythmias that are sustained  This revealed a normal burden of ectopy and no significant arrhythmias with symptoms  Raynaud's phenomenon: clinically sounds like this  Will need to see rheumatologist to further assess for any other associated conditions

## 2020-03-10 NOTE — PATIENT INSTRUCTIONS
Edema   WHAT YOU NEED TO KNOW:   What is edema? Edema is swelling throughout your body  Edema is usually a sign that you are retaining fluid  The swelling may be caused by heart failure or kidney, thyroid, or liver disease  It may also be caused by medicines such as antidepressants, blood pressure medicines, or hormones  Sudden swelling around the lips or face may be a sign of a severe allergic reaction  Swelling of an arm or leg may be caused by blockage of your veins  What other signs and symptoms may occur with edema? · Discomfort or tenderness in the swollen areas    · Tight and shiny skin over the swollen areas    · Weight gain  How is edema diagnosed? Your healthcare provider will ask about your symptoms and any other symptoms you have  He may also ask about any medical conditions you have  Your healthcare provider will examine your skin over the swollen areas  He may gently push on the swollen area for a short time to see if this leaves a dimple  He may also order tests to find the cause of your edema  How is edema treated and managed? Treatment for edema depends on the cause  Depending on your medical condition, you may be given medicine to help get rid of extra body fluid  Your healthcare provider may suggest that you do any of the following to help manage edema:  · Elevate  your arms or legs as directed  Raise them above the level of your heart as often as you can  This will help decrease swelling and pain  Prop them on pillows or blankets to keep them elevated comfortably  · Wear pressure stockings as directed  The stockings are tight and put pressure on your legs  This helps to keep fluid from collecting in your legs or ankles  · Limit your salt intake  Salt causes your body to hold water  Ask about any other changes to your diet  · Stay active  Do not stand or sit for long periods of time  Ask your healthcare provider about the best exercise plan for you      · Keep your skin moist using lotion, cream, or ointment  Ask your healthcare provider what to use and how often to use it  When should I contact my healthcare provider? · The swollen area feels cold and is pale or blue in color  · The swollen area feels warm, painful, and is red in color  · You have increased swelling or swelling in other parts of your body  · You have questions or concerns about your condition or care  When should I seek immediate care? · You have shortness of breath at rest, especially when you lie down  · You cough up pink, foamy sputum  · You have chest pain  · Your heartbeat is fast or uneven  CARE AGREEMENT:   You have the right to help plan your care  Learn about your health condition and how it may be treated  Discuss treatment options with your caregivers to decide what care you want to receive  You always have the right to refuse treatment  The above information is an  only  It is not intended as medical advice for individual conditions or treatments  Talk to your doctor, nurse or pharmacist before following any medical regimen to see if it is safe and effective for you  © 2017 [employer]  Information is for End User's use only and may not be sold, redistributed or otherwise used for commercial purposes  All illustrations and images included in CareNotes® are the copyrighted property of [employer]  or [employer]

## 2020-03-10 NOTE — TELEPHONE ENCOUNTER
Called [employer] for prior authorization for  NM myocardial perfusion spect (stress and/or rest) CPT 76101 which was denied  Sania DOTY from Dept of Physician Support @ [facility] - [phone] who states a "Letter of Necessity" along with doctor's notes needs to be faxed to expedited appeals Fax # [phone] with the Case # [id number] or call #[phone]

## 2020-03-12 NOTE — TELEPHONE ENCOUNTER
I have written the letter and it is in her chart  Please forward with my office note to the insurance company  Thanks

## 2020-03-14 DIAGNOSIS — E03.9 HYPOTHYROIDISM, UNSPECIFIED TYPE: ICD-10-CM

## 2020-03-16 RX ORDER — LEVOTHYROXINE SODIUM 0.1 MG/1
TABLET ORAL
Qty: 90 TABLET | Refills: 3 | Status: SHIPPED | OUTPATIENT
Start: 2020-03-16 | End: 2020-11-02 | Stop reason: SDUPTHER

## 2020-03-22 DIAGNOSIS — K21.9 GERD WITHOUT ESOPHAGITIS: ICD-10-CM

## 2020-03-23 RX ORDER — OMEPRAZOLE 40 MG/1
CAPSULE, DELAYED RELEASE ORAL
Qty: 90 CAPSULE | Refills: 3 | Status: SHIPPED | OUTPATIENT
Start: 2020-03-23 | End: 2020-10-02 | Stop reason: ALTCHOICE

## 2020-03-27 DIAGNOSIS — F31.9 BIPOLAR 1 DISORDER (HCC): ICD-10-CM

## 2020-06-10 ENCOUNTER — OFFICE VISIT (OUTPATIENT)
Dept: FAMILY MEDICINE CLINIC | Facility: CLINIC | Age: 65
End: 2020-06-10
Payer: COMMERCIAL

## 2020-06-10 VITALS
BODY MASS INDEX: 41.92 KG/M2 | DIASTOLIC BLOOD PRESSURE: 80 MMHG | TEMPERATURE: 98.5 F | HEART RATE: 82 BPM | SYSTOLIC BLOOD PRESSURE: 144 MMHG | HEIGHT: 62 IN | OXYGEN SATURATION: 97 % | RESPIRATION RATE: 18 BRPM | WEIGHT: 227.8 LBS

## 2020-06-10 DIAGNOSIS — E66.01 MORBID OBESITY WITH BMI OF 40.0-44.9, ADULT (HCC): Primary | ICD-10-CM

## 2020-06-10 DIAGNOSIS — E03.9 ACQUIRED HYPOTHYROIDISM: ICD-10-CM

## 2020-06-10 DIAGNOSIS — R73.01 IMPAIRED FASTING GLUCOSE: ICD-10-CM

## 2020-06-10 DIAGNOSIS — B37.9 CANDIDIASIS: ICD-10-CM

## 2020-06-10 DIAGNOSIS — F31.63 BIPOLAR 1 DISORDER, MIXED, SEVERE (HCC): ICD-10-CM

## 2020-06-10 DIAGNOSIS — E78.1 HIGH TRIGLYCERIDES: ICD-10-CM

## 2020-06-10 PROCEDURE — 4040F PNEUMOC VAC/ADMIN/RCVD: CPT | Performed by: FAMILY MEDICINE

## 2020-06-10 PROCEDURE — 1036F TOBACCO NON-USER: CPT | Performed by: FAMILY MEDICINE

## 2020-06-10 PROCEDURE — 1160F RVW MEDS BY RX/DR IN RCRD: CPT | Performed by: FAMILY MEDICINE

## 2020-06-10 PROCEDURE — 3008F BODY MASS INDEX DOCD: CPT | Performed by: FAMILY MEDICINE

## 2020-06-10 PROCEDURE — 99214 OFFICE O/P EST MOD 30 MIN: CPT | Performed by: FAMILY MEDICINE

## 2020-06-10 RX ORDER — NYSTATIN 100000 U/G
OINTMENT TOPICAL 2 TIMES DAILY
Qty: 30 G | Refills: 5 | Status: SHIPPED | OUTPATIENT
Start: 2020-06-10 | End: 2022-05-02

## 2020-06-19 DIAGNOSIS — B37.9 CANDIDIASIS: Primary | ICD-10-CM

## 2020-06-19 RX ORDER — NYSTATIN 100000 [USP'U]/G
POWDER TOPICAL 2 TIMES DAILY
Qty: 15 G | Refills: 3 | Status: SHIPPED | OUTPATIENT
Start: 2020-06-19 | End: 2022-05-02

## 2020-06-26 LAB
ALBUMIN SERPL-MCNC: 4.6 G/DL (ref 3.8–4.8)
ALBUMIN/GLOB SERPL: 1.9 {RATIO} (ref 1.2–2.2)
ALP SERPL-CCNC: 79 IU/L (ref 39–117)
ALT SERPL-CCNC: 18 IU/L (ref 0–32)
AST SERPL-CCNC: 19 IU/L (ref 0–40)
BILIRUB SERPL-MCNC: 0.3 MG/DL (ref 0–1.2)
BUN SERPL-MCNC: 25 MG/DL (ref 8–27)
BUN/CREAT SERPL: 16 (ref 12–28)
CALCIUM SERPL-MCNC: 9.6 MG/DL (ref 8.7–10.3)
CHLORIDE SERPL-SCNC: 108 MMOL/L (ref 96–106)
CHOLEST SERPL-MCNC: 198 MG/DL (ref 100–199)
CO2 SERPL-SCNC: 21 MMOL/L (ref 20–29)
CREAT SERPL-MCNC: 1.56 MG/DL (ref 0.57–1)
ERYTHROCYTE [DISTWIDTH] IN BLOOD BY AUTOMATED COUNT: 12.3 % (ref 11.7–15.4)
EST. AVERAGE GLUCOSE BLD GHB EST-MCNC: 103 MG/DL
GLOBULIN SER-MCNC: 2.4 G/DL (ref 1.5–4.5)
GLUCOSE SERPL-MCNC: 113 MG/DL (ref 65–99)
HBA1C MFR BLD: 5.2 % (ref 4.8–5.6)
HCT VFR BLD AUTO: 41.3 % (ref 34–46.6)
HDLC SERPL-MCNC: 57 MG/DL
HGB BLD-MCNC: 13.7 G/DL (ref 11.1–15.9)
LDLC SERPL CALC-MCNC: 118 MG/DL (ref 0–99)
LDLC/HDLC SERPL: 2.1 RATIO (ref 0–3.2)
MCH RBC QN AUTO: 30.9 PG (ref 26.6–33)
MCHC RBC AUTO-ENTMCNC: 33.2 G/DL (ref 31.5–35.7)
MCV RBC AUTO: 93 FL (ref 79–97)
PLATELET # BLD AUTO: 134 X10E3/UL (ref 150–450)
POTASSIUM SERPL-SCNC: 4.7 MMOL/L (ref 3.5–5.2)
PROT SERPL-MCNC: 7 G/DL (ref 6–8.5)
RBC # BLD AUTO: 4.43 X10E6/UL (ref 3.77–5.28)
SL AMB EGFR AFRICAN AMERICAN: 40 ML/MIN/1.73
SL AMB EGFR NON AFRICAN AMERICAN: 34 ML/MIN/1.73
SL AMB VLDL CHOLESTEROL CALC: 23 MG/DL (ref 5–40)
SODIUM SERPL-SCNC: 144 MMOL/L (ref 134–144)
TRIGL SERPL-MCNC: 116 MG/DL (ref 0–149)
TSH SERPL DL<=0.005 MIU/L-ACNC: 2.24 UIU/ML (ref 0.45–4.5)
WBC # BLD AUTO: 4.7 X10E3/UL (ref 3.4–10.8)

## 2020-06-29 DIAGNOSIS — N32.81 OAB (OVERACTIVE BLADDER): ICD-10-CM

## 2020-06-30 RX ORDER — MIRABEGRON 25 MG/1
TABLET, FILM COATED, EXTENDED RELEASE ORAL
Qty: 90 TABLET | Refills: 1 | Status: SHIPPED | OUTPATIENT
Start: 2020-06-30 | End: 2020-12-14

## 2020-07-27 DIAGNOSIS — F31.9 BIPOLAR 1 DISORDER (HCC): ICD-10-CM

## 2020-07-27 RX ORDER — LURASIDONE HYDROCHLORIDE 20 MG/1
TABLET, FILM COATED ORAL
Qty: 30 TABLET | Refills: 2 | OUTPATIENT
Start: 2020-07-27

## 2020-07-31 ENCOUNTER — OFFICE VISIT (OUTPATIENT)
Dept: PSYCHIATRY | Facility: CLINIC | Age: 65
End: 2020-07-31
Payer: COMMERCIAL

## 2020-07-31 DIAGNOSIS — F31.9 BIPOLAR 1 DISORDER (HCC): ICD-10-CM

## 2020-07-31 PROCEDURE — 1160F RVW MEDS BY RX/DR IN RCRD: CPT | Performed by: PSYCHIATRY & NEUROLOGY

## 2020-07-31 PROCEDURE — 99213 OFFICE O/P EST LOW 20 MIN: CPT | Performed by: PSYCHIATRY & NEUROLOGY

## 2020-07-31 PROCEDURE — 4040F PNEUMOC VAC/ADMIN/RCVD: CPT | Performed by: PSYCHIATRY & NEUROLOGY

## 2020-07-31 PROCEDURE — 1036F TOBACCO NON-USER: CPT | Performed by: PSYCHIATRY & NEUROLOGY

## 2020-07-31 PROCEDURE — 90833 PSYTX W PT W E/M 30 MIN: CPT | Performed by: PSYCHIATRY & NEUROLOGY

## 2020-07-31 RX ORDER — LITHIUM CARBONATE 450 MG
450 TABLET, EXTENDED RELEASE ORAL 2 TIMES DAILY
Qty: 10 TABLET | Refills: 0 | Status: SHIPPED | OUTPATIENT
Start: 2020-07-31 | End: 2020-10-01

## 2020-07-31 RX ORDER — LITHIUM CARBONATE 450 MG
450 TABLET, EXTENDED RELEASE ORAL 2 TIMES DAILY
Qty: 180 TABLET | Refills: 0 | Status: SHIPPED | OUTPATIENT
Start: 2020-07-31 | End: 2020-07-31 | Stop reason: SDUPTHER

## 2020-07-31 NOTE — BH TREATMENT PLAN
TREATMENT PLAN (Medication Management Only)        [facility]    Name and Date of Birth:  Josiah Blancas 76 y o  1955  Date of Treatment Plan: July 31, 2020  Diagnosis/Diagnoses:    1  Bipolar 1 disorder [facility])      Strengths/Personal Resources for Self-Care: supportive family, taking medications as prescribed, ability to communicate needs  Area/Areas of need (in own words): mood instability  1  Long Term Goal: continue improvement in mood stability  Target Date: 2 months - 9/30/2020  Person/Persons responsible for completion of goal: Cherelle  2  Short Term Objective (s) - How will we reach this goal?:   A  Provider new recommended medication/dosage changes and/or continue medication(s): continue current medications as prescribed  B  N/A   C  N/A  Target Date: 3 months - 10/31/2020  Person/Persons Responsible for Completion of Goal: Cherelle  Progress Towards Goals: continuing treatment  Treatment Modality: medication management every 3 months  Review due 90 to 120 days from date of this plan: 6 months  Expected length of service: maintenance  My Physician/PA/NP and I have developed this plan together and I agree to work on the goals and objectives  I understand the treatment goals that were developed for my treatment

## 2020-07-31 NOTE — PSYCH
Subjective: Medication Management      Patient ID: Dwayne Lara is a 76 y o  female  HPI ROS Appetite Changes and Sleep: normal appetite, normal energy level, no weight change and normal number of sleep hours     Patient stated that she has been experiencing involuntary hand movements and difficulties with balance and she has been worried about this symptoms being a side effect of her medications  Her last lithium level was 7 months ago  Will repeat lithium level  She also stated she managed to stay on Latuda 20 mg daily and felt stable so she did not do dose increase to 30 mg  She remains compliant with medications  No recent health changes or new medications  She wishes to continue current treatment  Will renew prescriptions and update treatment plan  Review Of Systems:     Mood Anxiety, Depression and Emotional Lability   Behavior Normal    Thought Content Disturbing Thoughts, Feelings   General Normal    Personality Normal   Other Psych Symptoms Normal   Constitutional Negative   ENT Negative   Cardiovascular Negative   Respiratory Negative   Gastrointestinal Negative   Genitourinary Negative   Musculoskeletal Negative   Integumentary Negative   Neurological Negative   Endocrine Normal    Other Symptoms Normal              Laboratory Results: No results found for this or any previous visit      Substance Abuse History:  Social History     Substance and Sexual Activity   Drug Use No       Family Psychiatric History:   Family History   Problem Relation Age of Onset    Heart disease Mother     Heart Valve Disease Mother         Replacement    Diabetes Mother     Arthritis Father        The following portions of the patient's history were reviewed and updated as appropriate: allergies, current medications, past family history, past medical history, past social history, past surgical history and problem list     Social History     Socioeconomic History    Marital status: /Civil Union     Spouse name: Not on file    Number of children: Not on file    Years of education: Not on file    Highest education level: Not on file   Occupational History    Not on file   Social Needs    Financial resource strain: Not on file    Food insecurity:     Worry: Not on file     Inability: Not on file    Transportation needs:     Medical: Not on file     Non-medical: Not on file   Tobacco Use    Smoking status: Former Smoker     Packs/day: 0 25     Years: 30 00     Pack years: 7 50     Types: Cigarettes     Last attempt to quit:      Years since quittin 5    Smokeless tobacco: Never Used   Substance and Sexual Activity    Alcohol use: Yes     Frequency: Monthly or less     Comment: socially    Drug use: No    Sexual activity: Not Currently     Partners: Male     Birth control/protection: Post-menopausal   Lifestyle    Physical activity:     Days per week: Not on file     Minutes per session: Not on file    Stress: Not on file   Relationships    Social connections:     Talks on phone: Not on file     Gets together: Not on file     Attends Rastafarian service: Not on file     Active member of club or organization: Not on file     Attends meetings of clubs or organizations: Not on file     Relationship status: Not on file    Intimate partner violence:     Fear of current or ex partner: Not on file     Emotionally abused: Not on file     Physically abused: Not on file     Forced sexual activity: Not on file   Other Topics Concern    Not on file   Social History Narrative    Daily coffee consumption: 3 cups/day     Social History     Social History Narrative    Daily coffee consumption: 3 cups/day       Objective:       Mental status:  Appearance calm and cooperative , adequate hygiene and grooming and good eye contact    Mood dysphoric   Affect affect was constricted   Speech a normal rate and fluent   Thought Processes coherent/organized and normal thought processes   Hallucinations no hallucinations present    Thought Content no delusions   Abnormal Thoughts no suicidal thoughts  and no homicidal thoughts    Orientation  oriented to person and place and time   Remote Memory short term memory intact and long term memory intact   Attention Span concentration intact   Intellect Appears to be of Average Intelligence   Insight Limited insight   Judgement judgment was limited   Muscle Strength Muscle strength and tone were normal and Normal gait    Language no difficulty naming common objects, no difficulty repeating a phrase  and no difficulty writing a sentence    Fund of Knowledge displays adequate knowledge of current events, adequate fund of knowledge regarding past history and adequate fund of knowledge regarding vocabulary    Pain none   Pain Scale 0       Assessment/Plan:       Diagnoses and all orders for this visit:    [facility]  -     lithium carbonate (LITHOBID) 450 mg CR tablet; Take 1 tablet (450 mg total) by mouth 2 (two) times a day  -     lurasidone (Latuda) 20 mg tablet; Take 1 tablet (20 mg total) by mouth daily with breakfast  -     Lithium level; Future  -     Lithium level            Treatment Recommendations- Risks Benefits      Immediate Medical/Psychiatric/Psychotherapy Treatments and Any Precautions: continue current medications and recheck lithium level    Risks, Benefits And Possible Side Effects Of Medications:  {PSYCH RISK, BENEFITS AND POSSIBLE SIDE EFFECTS (Optional):64942      Psychotherapy Provided:     Individual psychotherapy provided: Yes  Counseling was provided during the session today for 16 minutes  Medications, treatment progress and treatment plan reviewed with Chadwick Kearns  Medication education provided to Chadwick Kearns  Coping strategies including compliance with medications, exercising, maintain healthy diet, maintain heathy sleeping hygiene and maintain positive attitude reviewed with Cherelle     Educated on importance of medication and treatment compliance  Importance of follow up with family physician for medical issues reviewed with Philly Garrett  Supportive therapy provided

## 2020-07-31 NOTE — TELEPHONE ENCOUNTER
Patient states he will run out of pills until [facility] sends her medication    She is requesting 10 pills be sent to [facility] [address]

## 2020-08-14 NOTE — BH TREATMENT PLAN
Treatment Plan not completed within required time limits due to:  Medication Concerns that took most of the office visit and will do at next OV 12/5/2019

## 2020-08-18 ENCOUNTER — TELEPHONE (OUTPATIENT)
Dept: PSYCHIATRY | Facility: CLINIC | Age: 65
End: 2020-08-18

## 2020-08-18 ENCOUNTER — TELEPHONE (OUTPATIENT)
Dept: FAMILY MEDICINE CLINIC | Facility: CLINIC | Age: 65
End: 2020-08-18

## 2020-08-18 NOTE — TELEPHONE ENCOUNTER
[employer] asked to  letter when dropping off KHALIDA  Will call when letter is completed      Phone [phone]

## 2020-08-18 NOTE — TELEPHONE ENCOUNTER
PT CALLED AND SHE GOT CALLED FOR JURY DUTY ON 10/05/20 AND PT STATES SHE IS BIPOLAR/HAS ANXIETY AND ASKED FOR AN EXCUSE FOR JURY DUTY   FAX # [phone] PLS ADVISE IF OK AND I WILL DO LETTER

## 2020-08-25 ENCOUNTER — OFFICE VISIT (OUTPATIENT)
Dept: FAMILY MEDICINE CLINIC | Facility: CLINIC | Age: 65
End: 2020-08-25
Payer: COMMERCIAL

## 2020-08-25 VITALS
TEMPERATURE: 98.9 F | SYSTOLIC BLOOD PRESSURE: 134 MMHG | WEIGHT: 227.6 LBS | RESPIRATION RATE: 16 BRPM | HEIGHT: 62 IN | BODY MASS INDEX: 41.88 KG/M2 | HEART RATE: 75 BPM | OXYGEN SATURATION: 97 % | DIASTOLIC BLOOD PRESSURE: 80 MMHG

## 2020-08-25 DIAGNOSIS — R25.1 TREMOR OF LEFT HAND: ICD-10-CM

## 2020-08-25 DIAGNOSIS — N18.30 STAGE 3 CHRONIC KIDNEY DISEASE (HCC): ICD-10-CM

## 2020-08-25 DIAGNOSIS — E03.9 ACQUIRED HYPOTHYROIDISM: Primary | ICD-10-CM

## 2020-08-25 DIAGNOSIS — K59.00 CONSTIPATION, UNSPECIFIED CONSTIPATION TYPE: ICD-10-CM

## 2020-08-25 DIAGNOSIS — R73.01 IMPAIRED FASTING GLUCOSE: ICD-10-CM

## 2020-08-25 DIAGNOSIS — F31.63 BIPOLAR 1 DISORDER, MIXED, SEVERE (HCC): ICD-10-CM

## 2020-08-25 LAB — LITHIUM SERPL-SCNC: 1.1 MMOL/L (ref 0.6–1.2)

## 2020-08-25 PROCEDURE — 1036F TOBACCO NON-USER: CPT | Performed by: FAMILY MEDICINE

## 2020-08-25 PROCEDURE — 3725F SCREEN DEPRESSION PERFORMED: CPT | Performed by: FAMILY MEDICINE

## 2020-08-25 PROCEDURE — 99214 OFFICE O/P EST MOD 30 MIN: CPT | Performed by: FAMILY MEDICINE

## 2020-08-25 PROCEDURE — 1160F RVW MEDS BY RX/DR IN RCRD: CPT | Performed by: FAMILY MEDICINE

## 2020-08-25 PROCEDURE — 3288F FALL RISK ASSESSMENT DOCD: CPT | Performed by: FAMILY MEDICINE

## 2020-08-25 PROCEDURE — 1101F PT FALLS ASSESS-DOCD LE1/YR: CPT | Performed by: FAMILY MEDICINE

## 2020-08-25 PROCEDURE — 4040F PNEUMOC VAC/ADMIN/RCVD: CPT | Performed by: FAMILY MEDICINE

## 2020-08-25 PROCEDURE — 3008F BODY MASS INDEX DOCD: CPT | Performed by: FAMILY MEDICINE

## 2020-08-25 NOTE — PROGRESS NOTES
Assessment/Plan:    1  Acquired hypothyroidism  Assessment & Plan:  Stable on levothyroxine      2  Impaired fasting glucose  Assessment & Plan:  a1c 5 2      3  Stage 3 chronic kidney disease ([facility] [address] )  Assessment & Plan:  suspect lithium may have part in this  Will send to nephrology to determine secondary causes     Orders:  -     Ambulatory referral to Nephrology; Future    4  Bipolar 1 disorder, mixed, severe ([facility] [address] )  Assessment & Plan:  Seeing psych      5  Tremor of left hand  Assessment & Plan:  Worsening-would like to return to Dr Luzma Salazar      6  Constipation, unspecified constipation type  Assessment & Plan: Will try MOM            There are no Patient Instructions on file for this visit  No follow-ups on file  Subjective:      Patient ID: Carmela Phipps is a 76 y o  female  Chief Complaint   Patient presents with    Follow-up     Patient here to discuss lab results        Here for follow up  Struggling with constipation- no BM for 3 day  Increased water intake  Lithium makes thirsty      The following portions of the patient's history were reviewed and updated as appropriate: allergies, current medications, past family history, past medical history, past social history, past surgical history and problem list     Review of Systems   Constitutional: Negative  HENT: Negative  Eyes: Negative  Respiratory: Negative  Cardiovascular: Negative  Gastrointestinal: Positive for constipation  Endocrine: Negative  Genitourinary: Negative  Musculoskeletal: Negative  Skin: Negative  Allergic/Immunologic: Negative  Neurological: Positive for tremors  Hematological: Negative  Psychiatric/Behavioral: Negative            Current Outpatient Medications   Medication Sig Dispense Refill    Ascorbic Acid (RONAL-C PO) Take by mouth      b complex vitamins tablet Take 1 tablet by mouth daily      Cholecalciferol (VITAMIN D) 2000 units CAPS Take 1 capsule by mouth daily      Cranberry 1000 MG CAPS Take by mouth      Glucosamine-Chondroit-Vit C-Mn (GLUCOSAMINE 1500 COMPLEX PO) Take by mouth      levothyroxine 100 mcg tablet TAKE 1 TABLET BY MOUTH EVERY DAY 90 tablet 3    lithium carbonate (LITHOBID) 450 mg CR tablet Take 1 tablet (450 mg total) by mouth 2 (two) times a day 10 tablet 0    lurasidone (Latuda) 20 mg tablet Take 1 tablet (20 mg total) by mouth daily with breakfast 90 tablet 0    Multiple Vitamin (MULTI-VITAMIN DAILY) TABS Take by mouth      MYRBETRIQ 25 MG TB24 TAKE 1 TABLET BY MOUTH  DAILY AT BEDTIME 90 tablet 1    nystatin (MYCOSTATIN) ointment Apply topically 2 (two) times a day 30 g 5    nystatin (MYCOSTATIN) powder Apply topically 2 (two) times a day 15 g 3    omeprazole (PriLOSEC) 40 MG capsule TAKE 1 CAPSULE BY MOUTH  DAILY 90 capsule 3    SOOLANTRA 1 % CREA        No current facility-administered medications for this visit  Objective:    /80   Pulse 75   Temp 98 9 °F (37 2 °C)   Resp 16   Ht 5' 2" (1 575 m)   Wt 103 kg (227 lb 9 6 oz)   SpO2 97%   BMI 41 63 kg/m²        Physical Exam  Vitals signs and nursing note reviewed  Constitutional:       Appearance: Normal appearance  HENT:      Head: Normocephalic and atraumatic  Eyes:      Extraocular Movements: Extraocular movements intact  Pupils: Pupils are equal, round, and reactive to light  Neck:      Musculoskeletal: Normal range of motion and neck supple  Cardiovascular:      Rate and Rhythm: Normal rate and regular rhythm  Pulses: Normal pulses  Heart sounds: Normal heart sounds  Pulmonary:      Effort: Pulmonary effort is normal       Breath sounds: Normal breath sounds  Abdominal:      General: Abdomen is flat  Musculoskeletal: Normal range of motion  Skin:     General: Skin is warm  Capillary Refill: Capillary refill takes less than 2 seconds  Neurological:      General: No focal deficit present        Mental Status: She is alert and oriented to person, place, and time  Psychiatric:         Mood and Affect: Mood normal          Behavior: Behavior normal          Thought Content:  Thought content normal          Judgment: Judgment normal                 Vanesa Templeton DO

## 2020-08-27 DIAGNOSIS — F31.9 BIPOLAR 1 DISORDER (HCC): ICD-10-CM

## 2020-08-27 RX ORDER — LURASIDONE HYDROCHLORIDE 20 MG/1
TABLET, FILM COATED ORAL
Qty: 30 TABLET | Refills: 11 | Status: SHIPPED | OUTPATIENT
Start: 2020-08-27 | End: 2020-10-02

## 2020-08-28 ENCOUNTER — TELEPHONE (OUTPATIENT)
Dept: FAMILY MEDICINE CLINIC | Facility: CLINIC | Age: 65
End: 2020-08-28

## 2020-08-28 NOTE — TELEPHONE ENCOUNTER
Discussed with Venus Guerra and she would like pt to try OTC Miralax or Colace or Sennakot  Pt will try Miralax and let us know Monday is this does not work

## 2020-08-28 NOTE — TELEPHONE ENCOUNTER
Patient called and stated she was seen on Tuesday by Dr Twin Crowe and she was told to try Milk of magnesia to help with her constipation but she said she is not having much releif with it and she wanted to know what else she can do or if there was something that could be sent to the pharmacy  Patient is aware Dr Twin Crowe is out of the office, but she wanted to know If one of the covering providers can let her know

## 2020-09-02 ENCOUNTER — TELEPHONE (OUTPATIENT)
Dept: FAMILY MEDICINE CLINIC | Facility: CLINIC | Age: 65
End: 2020-09-02

## 2020-09-02 NOTE — TELEPHONE ENCOUNTER
Gary Frederick had seen Dr Chitra Thompson on 08/25/2020 for issues with constipation  She tried MOM and senokot S  Nothing is giving her relief  Is there something else she can try? Uses [facility] on [address]   Please advise

## 2020-09-04 ENCOUNTER — TELEPHONE (OUTPATIENT)
Dept: FAMILY MEDICINE CLINIC | Facility: CLINIC | Age: 65
End: 2020-09-04

## 2020-09-04 NOTE — TELEPHONE ENCOUNTER
Nay Gonzalez is still not able to have a bowl movement  She has tried MOM, miralax, and senokot  Nothing is giving her relief  Is there something else she can try?  Please advise

## 2020-09-08 ENCOUNTER — TELEMEDICINE (OUTPATIENT)
Dept: FAMILY MEDICINE CLINIC | Facility: CLINIC | Age: 65
End: 2020-09-08
Payer: COMMERCIAL

## 2020-09-08 ENCOUNTER — TELEPHONE (OUTPATIENT)
Dept: FAMILY MEDICINE CLINIC | Facility: CLINIC | Age: 65
End: 2020-09-08

## 2020-09-08 VITALS — WEIGHT: 217 LBS | BODY MASS INDEX: 39.69 KG/M2

## 2020-09-08 DIAGNOSIS — K59.00 CONSTIPATION, UNSPECIFIED CONSTIPATION TYPE: Primary | ICD-10-CM

## 2020-09-08 PROCEDURE — 99213 OFFICE O/P EST LOW 20 MIN: CPT | Performed by: FAMILY MEDICINE

## 2020-09-08 NOTE — TELEPHONE ENCOUNTER
Jamal Ordoñez just wanted Dr Shreyas Randall to know that the suppositories worked! Dr Shreyas Randall asked her to call back with an update  Thank you!

## 2020-09-08 NOTE — PROGRESS NOTES
Virtual Regular Visit      Assessment/Plan:    Problem List Items Addressed This Visit        Other    Constipation - Primary     Will try fleets enema  Increase water  Hold MOM  Will call back with update                    Reason for visit is   Chief Complaint   Patient presents with    Virtual Regular Visit        Encounter provider April Espinoza DO    Provider located at [address] [id number]      Recent Visits  Date Type Provider Dept   09/04/20 Telephone April Espinoza, [address]   09/02/20 Telephone April Espinoza DO [facility]   Showing recent visits within past 7 days and meeting all other requirements     Today's Visits  Date Type Provider Dept   09/08/20 Telemedicine April Espinoza DO [facility]   Showing today's visits and meeting all other requirements     Future Appointments  No visits were found meeting these conditions  Showing future appointments within next 150 days and meeting all other requirements        The patient was identified by name and date of birth  Glennette Felty was informed that this is a telemedicine visit and that the visit is being conducted through [facility] and patient was informed that this is a secure, HIPAA-compliant platform  She agrees to proceed     My office door was closed  No one else was in the room  She acknowledged consent and understanding of privacy and security of the video platform  The patient has agreed to participate and understands they can discontinue the visit at any time  Patient is aware this is a billable service  Subjective  Glennette Felty is a 76 y o  female constipation for 3 weeks   No BM in 3 weeks,  getting some small spurts with MOM  miralax  Exlax, senna   Some relief with prune juice  Eating prunes and lettuce  Has lost weight      Constipation   This is a new problem  The current episode started 1 to 4 weeks ago  The problem is unchanged  The stool is described as watery  The patient is on a high fiber diet  She does not exercise regularly  There has been adequate water intake  Associated symptoms include bloating  Pertinent negatives include no abdominal pain or diarrhea  She has tried fiber, laxatives and stool softeners for the symptoms  Past Medical History:   Diagnosis Date    Arthritis     Bipolar 1 disorder ([address] )     Disease of thyroid gland     Elevated blood pressure reading 6/10/2019       Past Surgical History:   Procedure Laterality Date    NO PAST SURGERIES         Current Outpatient Medications   Medication Sig Dispense Refill    Ascorbic Acid (RONAL-C PO) Take by mouth      b complex vitamins tablet Take 1 tablet by mouth daily      Cholecalciferol (VITAMIN D) 2000 units CAPS Take 1 capsule by mouth daily      Cranberry 1000 MG CAPS Take by mouth      Glucosamine-Chondroit-Vit C-Mn (GLUCOSAMINE 1500 COMPLEX PO) Take by mouth      Latuda 20 MG tablet TAKE 1 TABLET BY MOUTH  DAILY WITH BREAKFAST 30 tablet 11    levothyroxine 100 mcg tablet TAKE 1 TABLET BY MOUTH EVERY DAY 90 tablet 3    lithium carbonate (LITHOBID) 450 mg CR tablet Take 1 tablet (450 mg total) by mouth 2 (two) times a day 10 tablet 0    Multiple Vitamin (MULTI-VITAMIN DAILY) TABS Take by mouth      MYRBETRIQ 25 MG TB24 TAKE 1 TABLET BY MOUTH  DAILY AT BEDTIME 90 tablet 1    nystatin (MYCOSTATIN) ointment Apply topically 2 (two) times a day 30 g 5    nystatin (MYCOSTATIN) powder Apply topically 2 (two) times a day 15 g 3    omeprazole (PriLOSEC) 40 MG capsule TAKE 1 CAPSULE BY MOUTH  DAILY 90 capsule 3    SOOLANTRA 1 % CREA        No current facility-administered medications for this visit  Allergies   Allergen Reactions    Other      Other reaction(s): Anaphylaxis  Annotation - [id number]: plums peaches       Review of Systems   Constitutional: Negative  HENT: Negative  Eyes: Negative  Respiratory: Negative      Cardiovascular: Negative  Gastrointestinal: Positive for bloating and constipation  Negative for abdominal pain and diarrhea  Endocrine: Negative  Genitourinary: Negative  Musculoskeletal: Negative  Skin: Negative  Allergic/Immunologic: Negative  Neurological: Negative  Hematological: Negative  Psychiatric/Behavioral: Negative  Video Exam    Vitals:    09/08/20 1004   Weight: 98 4 kg (217 lb)       Physical Exam  Vitals signs reviewed  Constitutional:       Appearance: Normal appearance  HENT:      Head: Normocephalic  Eyes:      Extraocular Movements: Extraocular movements intact  Pupils: Pupils are equal, round, and reactive to light  Pulmonary:      Effort: Pulmonary effort is normal  No respiratory distress  Breath sounds: No wheezing  Abdominal:      Tenderness: There is no abdominal tenderness (none per pt)  Musculoskeletal: Normal range of motion  Neurological:      Mental Status: She is alert  Psychiatric:         Mood and Affect: Mood normal          Behavior: Behavior normal          Thought Content: Thought content normal          Judgment: Judgment normal           I spent 10 minutes directly with the patient during this visit      [address] [place] acknowledges that she has consented to an online visit or consultation  She understands that the online visit is based solely on information provided by her, and that, in the absence of a face-to-face physical evaluation by the physician, the diagnosis she receives is both limited and provisional in terms of accuracy and completeness  This is not intended to replace a full medical face-to-face evaluation by the physician  Angel Alicia understands and accepts these terms

## 2020-09-09 ENCOUNTER — TELEPHONE (OUTPATIENT)
Dept: FAMILY MEDICINE CLINIC | Facility: CLINIC | Age: 65
End: 2020-09-09

## 2020-09-10 ENCOUNTER — APPOINTMENT (EMERGENCY)
Dept: CT IMAGING | Facility: HOSPITAL | Age: 65
End: 2020-09-10
Payer: COMMERCIAL

## 2020-09-10 ENCOUNTER — HOSPITAL ENCOUNTER (EMERGENCY)
Facility: HOSPITAL | Age: 65
Discharge: HOME | End: 2020-09-10
Attending: EMERGENCY MEDICINE | Admitting: EMERGENCY MEDICINE
Payer: COMMERCIAL

## 2020-09-10 ENCOUNTER — TELEPHONE (OUTPATIENT)
Dept: FAMILY MEDICINE CLINIC | Facility: CLINIC | Age: 65
End: 2020-09-10

## 2020-09-10 ENCOUNTER — APPOINTMENT (EMERGENCY)
Dept: RADIOLOGY | Facility: HOSPITAL | Age: 65
End: 2020-09-10
Payer: COMMERCIAL

## 2020-09-10 VITALS
OXYGEN SATURATION: 97 % | DIASTOLIC BLOOD PRESSURE: 94 MMHG | HEART RATE: 86 BPM | SYSTOLIC BLOOD PRESSURE: 185 MMHG | RESPIRATION RATE: 18 BRPM

## 2020-09-10 DIAGNOSIS — N17.9 ACUTE KIDNEY INJURY (HCC): ICD-10-CM

## 2020-09-10 DIAGNOSIS — R10.9 ABDOMINAL BLOATING WITH CRAMPS: Primary | ICD-10-CM

## 2020-09-10 DIAGNOSIS — R14.0 ABDOMINAL BLOATING WITH CRAMPS: Primary | ICD-10-CM

## 2020-09-10 DIAGNOSIS — R91.1 PULMONARY NODULE, LEFT: ICD-10-CM

## 2020-09-10 LAB
ANION GAP SERPL CALCULATED.3IONS-SCNC: 9 MMOL/L (ref 4–13)
BASOPHILS # BLD AUTO: 0.04 THOUSANDS/ΜL (ref 0–0.1)
BASOPHILS NFR BLD AUTO: 1 % (ref 0–1)
BUN SERPL-MCNC: 18 MG/DL (ref 5–25)
CALCIUM SERPL-MCNC: 9.9 MG/DL (ref 8.3–10.1)
CHLORIDE SERPL-SCNC: 104 MMOL/L (ref 100–108)
CO2 SERPL-SCNC: 25 MMOL/L (ref 21–32)
CREAT SERPL-MCNC: 1.89 MG/DL (ref 0.6–1.3)
EOSINOPHIL # BLD AUTO: 0.14 THOUSAND/ΜL (ref 0–0.61)
EOSINOPHIL NFR BLD AUTO: 3 % (ref 0–6)
ERYTHROCYTE [DISTWIDTH] IN BLOOD BY AUTOMATED COUNT: 13 % (ref 11.6–15.1)
GFR SERPL CREATININE-BSD FRML MDRD: 27 ML/MIN/1.73SQ M
GLUCOSE SERPL-MCNC: 113 MG/DL (ref 65–140)
HCT VFR BLD AUTO: 46.1 % (ref 34.8–46.1)
HGB BLD-MCNC: 14.6 G/DL (ref 11.5–15.4)
IMM GRANULOCYTES # BLD AUTO: 0.02 THOUSAND/UL (ref 0–0.2)
IMM GRANULOCYTES NFR BLD AUTO: 0 % (ref 0–2)
LYMPHOCYTES # BLD AUTO: 0.85 THOUSANDS/ΜL (ref 0.6–4.47)
LYMPHOCYTES NFR BLD AUTO: 16 % (ref 14–44)
MCH RBC QN AUTO: 31 PG (ref 26.8–34.3)
MCHC RBC AUTO-ENTMCNC: 31.7 G/DL (ref 31.4–37.4)
MCV RBC AUTO: 98 FL (ref 82–98)
MONOCYTES # BLD AUTO: 0.38 THOUSAND/ΜL (ref 0.17–1.22)
MONOCYTES NFR BLD AUTO: 7 % (ref 4–12)
NEUTROPHILS # BLD AUTO: 4.02 THOUSANDS/ΜL (ref 1.85–7.62)
NEUTS SEG NFR BLD AUTO: 73 % (ref 43–75)
NRBC BLD AUTO-RTO: 0 /100 WBCS
PLATELET # BLD AUTO: 161 THOUSANDS/UL (ref 149–390)
PMV BLD AUTO: 12.8 FL (ref 8.9–12.7)
POTASSIUM SERPL-SCNC: 4.3 MMOL/L (ref 3.5–5.3)
RBC # BLD AUTO: 4.71 MILLION/UL (ref 3.81–5.12)
SODIUM SERPL-SCNC: 138 MMOL/L (ref 136–145)
WBC # BLD AUTO: 5.45 THOUSAND/UL (ref 4.31–10.16)

## 2020-09-10 PROCEDURE — 74022 RADEX COMPL AQT ABD SERIES: CPT

## 2020-09-10 PROCEDURE — 96360 HYDRATION IV INFUSION INIT: CPT

## 2020-09-10 PROCEDURE — 74176 CT ABD & PELVIS W/O CONTRAST: CPT

## 2020-09-10 PROCEDURE — 99284 EMERGENCY DEPT VISIT MOD MDM: CPT | Performed by: EMERGENCY MEDICINE

## 2020-09-10 PROCEDURE — 85025 COMPLETE CBC W/AUTO DIFF WBC: CPT | Performed by: EMERGENCY MEDICINE

## 2020-09-10 PROCEDURE — 80048 BASIC METABOLIC PNL TOTAL CA: CPT | Performed by: EMERGENCY MEDICINE

## 2020-09-10 PROCEDURE — G1004 CDSM NDSC: HCPCS

## 2020-09-10 PROCEDURE — 36415 COLL VENOUS BLD VENIPUNCTURE: CPT | Performed by: EMERGENCY MEDICINE

## 2020-09-10 PROCEDURE — 99284 EMERGENCY DEPT VISIT MOD MDM: CPT

## 2020-09-10 RX ADMIN — SODIUM CHLORIDE 1000 ML: 0.9 INJECTION, SOLUTION INTRAVENOUS at 10:01

## 2020-09-10 NOTE — ED PROVIDER NOTES
History  Chief Complaint   Patient presents with    Constipation     pt reports being constipated for several weeks, cant remember last bowel movement  tried different medications without relief  History provided by:  Parent  Constipation   Severity:  Moderate  Time since last bowel movement:  4 weeks  Timing:  Intermittent  Progression:  Waxing and waning  Chronicity:  New  Context: dietary changes    Context: not dehydration    Stool description: Patient states every other day she produces a soft and sometimes watery stool but this is only after she takes a moderate amount of laxatives  Unusual stool frequency:  Prior to the past 4 weeks was going every day  Relieved by:  Laxatives and enemas  Worsened by:  Nothing  Associated symptoms: no abdominal pain, no diarrhea, no dysuria, no fever, no nausea and no vomiting    Associated symptoms comment:  Patient abdominal pain and bloating, she feels that she has been constipated so she has been taking laxatives then will not go to the bathroom for 2 days take more laxatives, this is been a on off cycle over the past 4 weeks      Prior to Admission Medications   Prescriptions Last Dose Informant Patient Reported? Taking?    Ascorbic Acid (RONAL-C PO)  Self Yes No   Sig: Take by mouth   Cholecalciferol (VITAMIN D) 2000 units CAPS  Self Yes No   Sig: Take 1 capsule by mouth daily   Cranberry 1000 MG CAPS  Self Yes No   Sig: Take by mouth   Glucosamine-Chondroit-Vit C-Mn (GLUCOSAMINE 1500 COMPLEX PO)  Self Yes No   Sig: Take by mouth   Latuda 20 MG tablet   No No   Sig: TAKE 1 TABLET BY MOUTH  DAILY WITH BREAKFAST   MYRBETRIQ 25 MG TB24   No No   Sig: TAKE 1 TABLET BY MOUTH  DAILY AT BEDTIME   Multiple Vitamin (MULTI-VITAMIN DAILY) TABS  Self Yes No   Sig: Take by mouth   SOOLANTRA 1 % CREA  Self Yes No   b complex vitamins tablet  Self Yes No   Sig: Take 1 tablet by mouth daily   levothyroxine 100 mcg tablet   No No   Sig: TAKE 1 TABLET BY MOUTH EVERY DAY   lithium carbonate (LITHOBID) 450 mg CR tablet   No No   Sig: Take 1 tablet (450 mg total) by mouth 2 (two) times a day   nystatin (MYCOSTATIN) ointment   No No   Sig: Apply topically 2 (two) times a day   nystatin (MYCOSTATIN) powder   No No   Sig: Apply topically 2 (two) times a day   omeprazole (PriLOSEC) 40 MG capsule   No No   Sig: TAKE 1 CAPSULE BY MOUTH  DAILY      Facility-Administered Medications: None       Past Medical History:   Diagnosis Date    Arthritis     Bipolar 1 disorder ([facility] [address] )     Disease of thyroid gland     Elevated blood pressure reading 6/10/2019       Past Surgical History:   Procedure Laterality Date    NO PAST SURGERIES         Family History   Problem Relation Age of Onset    Heart disease Mother     Heart Valve Disease Mother         Replacement    Diabetes Mother     Arthritis Father      I have reviewed and agree with the history as documented  E-Cigarette/Vaping     E-Cigarette/Vaping Substances     Social History     Tobacco Use    Smoking status: Former Smoker     Packs/day: 0 25     Years: 30 00     Pack years: 7 50     Types: Cigarettes     Last attempt to quit:      Years since quittin 6    Smokeless tobacco: Never Used   Substance Use Topics    Alcohol use: Yes     Frequency: Monthly or less     Comment: socially    Drug use: No       Review of Systems   Constitutional: Negative for activity change, chills, diaphoresis and fever  HENT: Negative for congestion, sinus pressure and sore throat  Eyes: Negative for pain and visual disturbance  Respiratory: Negative for cough, chest tightness, shortness of breath, wheezing and stridor  Cardiovascular: Negative for chest pain and palpitations  Gastrointestinal: Positive for constipation  Negative for abdominal distention, abdominal pain, diarrhea, nausea and vomiting  Genitourinary: Negative for dysuria and frequency  Musculoskeletal: Negative for neck pain and neck stiffness  Skin: Negative for rash  Neurological: Negative for dizziness, speech difficulty, light-headedness, numbness and headaches  Physical Exam  Physical Exam  Vitals signs reviewed  Constitutional:       General: She is not in acute distress  Appearance: She is well-developed  She is not diaphoretic  HENT:      Head: Normocephalic and atraumatic  Right Ear: External ear normal       Left Ear: External ear normal       Nose: Nose normal    Eyes:      General:         Right eye: No discharge  Left eye: No discharge  Pupils: Pupils are equal, round, and reactive to light  Neck:      Musculoskeletal: Normal range of motion and neck supple  Trachea: No tracheal deviation  Cardiovascular:      Rate and Rhythm: Normal rate and regular rhythm  Heart sounds: Normal heart sounds  No murmur  Pulmonary:      Effort: Pulmonary effort is normal  No respiratory distress  Breath sounds: Normal breath sounds  No stridor  Abdominal:      General: There is distension  Palpations: Abdomen is soft  Tenderness: There is abdominal tenderness  There is no guarding or rebound  Musculoskeletal: Normal range of motion  Skin:     General: Skin is warm and dry  Coloration: Skin is not pale  Findings: No erythema  Neurological:      General: No focal deficit present  Mental Status: She is alert and oriented to person, place, and time           Vital Signs  ED Triage Vitals [09/10/20 0853]   Temp Pulse Respirations Blood Pressure SpO2   -- 86 18 (!) 185/94 97 %      Temp src Heart Rate Source Patient Position - Orthostatic VS BP Location FiO2 (%)   -- Monitor Sitting Right arm --      Pain Score       --           Vitals:    09/10/20 0853   BP: (!) 185/94   Pulse: 86   Patient Position - Orthostatic VS: Sitting         Visual Acuity      ED Medications  Medications   sodium chloride 0 9 % bolus 1,000 mL (0 mL Intravenous Stopped 9/10/20 1048)       Diagnostic Studies  Results Reviewed Procedure Component Value Units Date/Time    Basic metabolic panel [[id number]]  (Abnormal) Collected:  09/10/20 0908    Lab Status:  Final result Specimen:  Blood from Arm, Right Updated:  09/10/20 0934     Sodium 138 mmol/L      Potassium 4 3 mmol/L      Chloride 104 mmol/L      CO2 25 mmol/L      ANION GAP 9 mmol/L      BUN 18 mg/dL      Creatinine 1 89 mg/dL      Glucose 113 mg/dL      Calcium 9 9 mg/dL      eGFR 27 ml/min/1 73sq m     Narrative:       National Kidney Disease Foundation guidelines for Chronic Kidney Disease (CKD):     Stage 1 with normal or high GFR (GFR > 90 mL/min/1 73 square meters)    Stage 2 Mild CKD (GFR = 60-89 mL/min/1 73 square meters)    Stage 3A Moderate CKD (GFR = 45-59 mL/min/1 73 square meters)    Stage 3B Moderate CKD (GFR = 30-44 mL/min/1 73 square meters)    Stage 4 Severe CKD (GFR = 15-29 mL/min/1 73 square meters)    Stage 5 End Stage CKD (GFR <15 mL/min/1 73 square meters)  Note: GFR calculation is accurate only with a steady state creatinine    CBC and differential [[id number]]  (Abnormal) Collected:  09/10/20 0908    Lab Status:  Final result Specimen:  Blood from Arm, Right Updated:  09/10/20 0914     WBC 5 45 Thousand/uL      RBC 4 71 Million/uL      Hemoglobin 14 6 g/dL      Hematocrit 46 1 %      MCV 98 fL      MCH 31 0 pg      MCHC 31 7 g/dL      RDW 13 0 %      MPV 12 8 fL      Platelets 763 Thousands/uL      nRBC 0 /100 WBCs      Neutrophils Relative 73 %      Immat GRANS % 0 %      Lymphocytes Relative 16 %      Monocytes Relative 7 %      Eosinophils Relative 3 %      Basophils Relative 1 %      Neutrophils Absolute 4 02 Thousands/µL      Immature Grans Absolute 0 02 Thousand/uL      Lymphocytes Absolute 0 85 Thousands/µL      Monocytes Absolute 0 38 Thousand/µL      Eosinophils Absolute 0 14 Thousand/µL      Basophils Absolute 0 04 Thousands/µL                  CT abdomen pelvis wo contrast   Final Result by Gumaro Dolan DO (09/10 1035)   Limited evaluation of GI tract without oral contrast    Limited evaluation of solid organs and vasculature without intravenous contrast       Presumed atelectatic changes and scarring left lung base  10 mm groundglass opacity left lower lobe medially  Based on current Fleischner Society 2017 Guidelines on incidental pulmonary nodule, followup noncontrast CT is recommended at 6-12 months from    the initial examination to confirm persistence; if stable at that time, additional followup CT is recommended for every 2 years until 5 years of stability is demonstrated  Mild fatty changes in the liver with some sparing adjacent to gallbladder fossa  Unremarkable gallbladder and biliary ducts  Small cystic structure left kidney  No evidence of bowel obstruction or perforation  Small amounts of retained stool the colon  Mild Colonic diverticulosis without evidence of diverticulitis  Unremarkable uterus  Probable residual follicle in the left ovary  There is a right adnexal cystic structure measuring 4 1 cm containing low attenuation constituents which may represent intralesional fat = Possible cystic dermoid  Recommend follow-up    nonemergent outpatient pelvic ultrasound for further evaluation  The study was marked in [facility] for immediate notification  Workstation performed: [id number]         XR abdomen obstruction series   Final Result by Ra Maradiaga DO (09/10 1014)   Nonobstructive bowel gas pattern  No evidence of constipation           Workstation performed: [id number]                    Procedures  Procedures         ED Course                                       MDM  Number of Diagnoses or Management Options  Abdominal bloating with cramps: new and requires workup  Acute kidney injury [facility]): new and requires workup  Pulmonary nodule, left: new and requires workup  Diagnosis management comments:       Initial ED assessment:  Patient reports that she is constipated but has been taking laxatives, I do question if she is even constipated to begin with, she may have started constipated but now since she is passing small and soft stool was I think is less likely, she does have some diffuse mild tenderness and sensation of bloating, will perform imaging to evaluate for obstruction although I think this is less likely          Final ED summary/disposition:   After evaluation and workup in the emergency department, ultimately found not to be constipated told her to DC laxatives does have some acute kidney injury for but she believes this is chronic also has a pulmonary nodule for which she will follow up PCP all of this was discussed with her and her  at length       Amount and/or Complexity of Data Reviewed  Clinical lab tests: ordered and reviewed  Tests in the radiology section of CPT®: ordered and reviewed  Decide to obtain previous medical records or to obtain history from someone other than the patient: yes  Obtain history from someone other than the patient: yes  Review and summarize past medical records: yes  Independent visualization of images, tracings, or specimens: yes        Disposition  Final diagnoses:   Abdominal bloating with cramps   Pulmonary nodule, left   Acute kidney injury ([facility] [address] )     Time reflects when diagnosis was documented in both MDM as applicable and the Disposition within this note     Time User Action Codes Description Comment    9/10/2020 10:37 AM Haris Jones [R14 0,  R10 9] Abdominal bloating with cramps     9/10/2020 10:37 AM Bhavna GALLEGOS Add [R91 1] Pulmonary nodule, left     9/10/2020 10:37 AM Haris Priest Add [N17 9] Acute kidney injury [facility])       ED Disposition     ED Disposition Condition Date/Time Comment    Discharge Stable Thu Sep 10, 2020 10:37 AM Bill Doan discharge to home/self care              Follow-up Information     Follow up With Specialties Details Why Contact Rohini Ignacio DO Family Medicine  For repeat imaging of nodule found on CAT scan     As well as to have your kidney levels recheck [address]  [address] [place] [address]  [phone]            Discharge Medication List as of 9/10/2020 10:37 AM      CONTINUE these medications which have NOT CHANGED    Details   Ascorbic Acid (RONAL-C PO) Take by mouth, Historical Med      b complex vitamins tablet Take 1 tablet by mouth daily, Historical Med      Cholecalciferol (VITAMIN D) 2000 units CAPS Take 1 capsule by mouth daily, Historical Med      Cranberry 1000 MG CAPS Take by mouth, Historical Med      Glucosamine-Chondroit-Vit C-Mn (GLUCOSAMINE 1500 COMPLEX PO) Take by mouth, Historical Med      Latuda 20 MG tablet TAKE 1 TABLET BY MOUTH  DAILY WITH BREAKFAST, Normal      levothyroxine 100 mcg tablet TAKE 1 TABLET BY MOUTH EVERY DAY, Normal      lithium carbonate (LITHOBID) 450 mg CR tablet Take 1 tablet (450 mg total) by mouth 2 (two) times a day, Starting Fri 7/31/2020, Normal      Multiple Vitamin (MULTI-VITAMIN DAILY) TABS Take by mouth, Historical Med      MYRBETRIQ 25 MG TB24 TAKE 1 TABLET BY MOUTH  DAILY AT BEDTIME, Normal      nystatin (MYCOSTATIN) ointment Apply topically 2 (two) times a day, Starting Wed 6/10/2020, Normal      nystatin (MYCOSTATIN) powder Apply topically 2 (two) times a day, Starting Fri 6/19/2020, Normal      omeprazole (PriLOSEC) 40 MG capsule TAKE 1 CAPSULE BY MOUTH  DAILY, Normal      SOOLANTRA 1 % CREA Starting Wed 3/28/2018, Historical Med           No discharge procedures on file      PDMP Review     None          ED Provider  Electronically Signed by           Tiesha Lane DO  09/10/20 2440

## 2020-09-21 ENCOUNTER — IMMUNIZATIONS (OUTPATIENT)
Dept: FAMILY MEDICINE CLINIC | Facility: CLINIC | Age: 65
End: 2020-09-21
Payer: COMMERCIAL

## 2020-09-21 ENCOUNTER — TELEPHONE (OUTPATIENT)
Dept: NEUROLOGY | Facility: CLINIC | Age: 65
End: 2020-09-21

## 2020-09-21 DIAGNOSIS — Z23 ENCOUNTER FOR IMMUNIZATION: ICD-10-CM

## 2020-09-21 PROCEDURE — G0008 ADMIN INFLUENZA VIRUS VAC: HCPCS

## 2020-09-21 PROCEDURE — 90662 IIV NO PRSV INCREASED AG IM: CPT

## 2020-09-21 NOTE — TELEPHONE ENCOUNTER
I see is back on the Tevin Holter, this was thought to be the most likely cause for the tremor at her last visit  We can add her to my wait list to see if we can get her in a little sooner for evaluation

## 2020-09-21 NOTE — TELEPHONE ENCOUNTER
Patient calling in  She states she is experiencing "shaking"  She notes that the shaking is in both hands  She states her symptoms have been worsening over the past several months  She is scheduled for a visit for 2/1/20  Patient states that she can hardly write anymore and her hands are shaking even while on the phone  She states it is pretty constant throughout the day   Please advise      [phone]   Ok to leave a detailed message

## 2020-09-30 ENCOUNTER — CONSULT (OUTPATIENT)
Dept: NEPHROLOGY | Facility: CLINIC | Age: 65
End: 2020-09-30
Payer: COMMERCIAL

## 2020-09-30 VITALS
HEART RATE: 88 BPM | HEIGHT: 62 IN | DIASTOLIC BLOOD PRESSURE: 80 MMHG | WEIGHT: 223 LBS | BODY MASS INDEX: 41.04 KG/M2 | RESPIRATION RATE: 16 BRPM | SYSTOLIC BLOOD PRESSURE: 136 MMHG | TEMPERATURE: 98.6 F

## 2020-09-30 DIAGNOSIS — N17.9 ACUTE KIDNEY INJURY (HCC): Primary | ICD-10-CM

## 2020-09-30 DIAGNOSIS — E66.01 MORBID OBESITY WITH BMI OF 40.0-44.9, ADULT (HCC): ICD-10-CM

## 2020-09-30 DIAGNOSIS — N18.30 STAGE 3 CHRONIC KIDNEY DISEASE (HCC): ICD-10-CM

## 2020-09-30 DIAGNOSIS — K21.9 GASTROESOPHAGEAL REFLUX DISEASE WITHOUT ESOPHAGITIS: ICD-10-CM

## 2020-09-30 DIAGNOSIS — F41.8 DEPRESSION WITH ANXIETY: ICD-10-CM

## 2020-09-30 PROCEDURE — 1160F RVW MEDS BY RX/DR IN RCRD: CPT | Performed by: INTERNAL MEDICINE

## 2020-09-30 PROCEDURE — 99204 OFFICE O/P NEW MOD 45 MIN: CPT | Performed by: INTERNAL MEDICINE

## 2020-09-30 PROCEDURE — 1036F TOBACCO NON-USER: CPT | Performed by: INTERNAL MEDICINE

## 2020-09-30 NOTE — PATIENT INSTRUCTIONS
- get blood work this week  - change prilosec to pepcid or zantac if tolerable    - Please call me in 10 days after having your blood work done to review the results if you do not hear back from me or my office, as I may have not received the results  - please remember to perform blood work prior to the next visit  - Please call if the blood pressure top number is greater than 150 or less than 110 consistently  - Please call if you are gaining more than 2lbs in 2 days for adjustment of water pills   ~ Please AVOID the following pain medications  LIST OF NSAIDS (NONSTEROIDAL ANTI-INFLAMMATORY DRUGS) AND MOORE-2 INHIBITORS    DIFLUNISAL (DOLOBID)  IBUPROFEN (MOTRIN, ADVIL)  FLURBIPROFEN (ANSAID)  KETOPROFEN (ORUDIS, ORUVAIL)  FENOPROFEN (NALFON)  NABUMETONE (RELAFEN)  PIROXICAM (FELDENE)  NAPROXEN (ALEVE, NAPROSYN, NAPRELAN, ANAPROX)  DICLOFENAC (VOLTAREN, CATAFLAM)  INDOMETHACIN (INDOCIN)  SULINDAC (CLINORIL)  TOLMETIN (TOLETIN)  ETODOLAC (LODINE)  MELOXICAM (MOBIC)  KETOROLAC (TORADOL)  OXAPROZIN (DAYPRO)  CELECOXIB (CELEBREX)    Phosphorus diet  Follow a low phosphorus diet      Avoid these higher phosphorus foods: Choose these lower phosphorus foods:   Milk, pudding or yogurt (from animals and from many soy varieties) Rice milk (unfortified), nondairy creamer (if it doesn't have terms in the ingredients list that contain the letters "phos")   Hard cheeses, ricotta or cottage cheese, fat-free cream cheese Regular and low-fat cream cheese   Ice cream or frozen yogurt Sherbet or frozen fruit pops   Soups made with higher phosphorus ingredients (milk, dried peas, beans, lentils) Soups made with lower phosphorus ingredients (broth- or water-based with other lower phosphorus ingredients)   Whole grains, including whole-grain breads, crackers, cereal, rice and pasta Refined grains, including white bread, crackers, cereals, rice and pasta   Quick breads, biscuits, cornbread, muffins, pancakes or waffles Homemade refined (white) dinner rolls, bagels or English muffins   Dried peas (split, black-eyed), beans (black, garbanzo, lima, kidney, navy, dacosta) or lentils Green peas (canned, frozen), green beans or wax beans   Organ meats, walleye, pollock or sardines Lean beef, pork, lamb, poultry or other fish   Nuts and seeds Popcorn   Peanut butter and other nut butters Jam, jelly or honey   Chocolate, including chocolate drinks Carob (chocolate-flavored) candy, hard candy or gumdrops   Madeline and pepper-type sodas, flavored orozco, bottled teas (if a term in the ingredients list contains the letters "phos") Lemon-lime soda, ginger ale or root beer, plain water     Follow a moderate potassium diet  Exercise to Lose Weight     Exercise and a healthy diet may help you lose weight  Your doctor may suggest specific exercises  EXERCISE IDEAS AND TIPS      Choose low-cost things you enjoy doing, such as walking, bicycling, or exercising to workout videos   Take stairs instead of the elevator   Walk during your lunch break   Park your car further away from work or school   Go to a gym or an exercise class   Start with 5 to 10 minutes of exercise each day  Build up to 30 minutes of exercise 4 to 6 days a week   Wear shoes with good support and comfortable clothes   Stretch before and after working out   Work out until you breathe harder and your heart beats faster   Drink extra water when you exercise   Do not do so much that you hurt yourself, feel dizzy, or get very short of breath  Exercises that burn about 150 calories:    Running 1 ½ miles in 15 minutes   Playing volleyball for 45 to 60 minutes   Washing and waxing a car for 45 to 60 minutes   Playing touch football for 45 minutes   Walking 1 ¾ miles in 35 minutes   Pushing a stroller 1 ½ miles in 30 minutes   Playing basketball for 30 minutes   Raking leaves for 30 minutes   Bicycling 5 miles in 30 minutes      Walking 2 miles in 30 minutes   Dancing for 30 minutes   Shoveling snow for 15 minutes   Swimming laps for 20 minutes   Walking up stairs for 15 minutes   Bicycling 4 miles in 15 minutes   Gardening for 30 to 45 minutes   Jumping rope for 15 minutes  Washing windows or floors for 45 to 60 minutes

## 2020-09-30 NOTE — PROGRESS NOTES
Nephrology Initial Consultation  Tay Mota 76 y o  female MRN: [id number]            REASON FOR CONSULTATION:  Tay Mota is a 76 y  o female who was referred by Brittaney Rivera DO for evaluation of Follow-up and Chronic Kidney Disease    ASSESSMENT / PLAN:   76 y o   female with pmh of multiple co-morbidities including  JULIO on bipap, CKD stage 3, GERD, bipolar, depression, hypothyroidism, obesity, vitamin-D deficiency, arthritis presents to the office for evaluation and management of abnormal renal parameters  Acute kidney injury on CKD stage III versus CKD stage III progression:  - Patient has a baseline creatinine of 1 2-1 5 mg/dL from 7942-0345 June  Most recent labs show a Creatinine of 1 89 mg/dL on 09/10/2020  Renal function elevated from prior unclear whether this is true CKD progression versus acute kidney injury likely secondary to prerenal azotemia when she presented to the ED    - review chart shows that on September 10th patient's labs were drawn while she was in the ED at that time she had presented for being with complaints of constipation for several weeks and had taken multiple medications to relieve that  - risk for acute kidney injury as well as CKD progression due to usage of lithium as well as Latuda  - advised patient to talk to psychiatrist if possible to switch over from Lithium due to risk of long-term use associated with nephrotoxicity  - CT abdomen from September 2020 showing 1 8 cm Lower interpolar cyst on the left kidney no hydronephrosis  - Acid base and lytes stable  - Clinically the patient appears to be euvolemic to hypovolemic  Advised patient to increase fluid intake  - Recommend to avoid use of NSAIDs, nephrotoxins  Caution advised with regards to exposure to IV contrast dye    - Discussed with the patient in depth her renal status, including the possible etiologies for CKD     - Advised the patient that when her GFR is close to 20mL/min then will start discussing about RRT(renal replacement therapy) options such as renal transplant, peritoneal dialysis and hemodialysis  - Informed the patient about the various options for Renal Replacement therapy  - Discussed with the patient how we need to work together to delay the progression of CKD with optimal BP control based on their age and co-morbidities and trying to reduce proteinuria by the use of anti-proteinuric agents  Blood pressure:  - Patient is on no meds  - Goal BP of <  140/90 based on age and comorbidities  - Instructed to follow low sodium (2gm)diet  - advised patient of dietary habits and weight loss if blood pressure continues to be elevated and depending on level of proteinuria may consider starting ACE-inhibitor  This was discussed with the patient  Hemoglobin:  - Goal Hb of 10-12 g/dL  - Most recent labs suggestive of 14 6 grams/deciliter   - no role for IV iron at this time    CKD-MBD(Mineral Bone Disease): - Based on patients CKD stage following is the goal of therapy  - Maintain calcium phosphorus product of < 55   - Stage 3 CKD - Goal Ca 8 5-10 mg/dL , goal Phos 2 7-4 6 mg/dL  , goal iPTH 30-70 pg/mL  - Patient is currently not at goal   - Continue patient on vitamin-D 2000 units p o  Q day  - Patients' most recent vitamin D level is 28 in 2017  - check intact PTH and vitamin-D levels    Proteinuria:    - proteinuria most likely secondary to  - most recent UA from 2017 with 30+ protein  No quantification  - check protein creatinine ratio  - currently on therapy for proteinuria with vitamin-D    Lipids:  - goal LDL less than 70  - Management as per PCP    GERD:  - management as per Primary team  - on Prilosec  - advised patient if possible to switch over to Pepcid or Zantac due to association of long-term use of PPI with CKD in recent studies      Bipolar/depression:  - Management as per primary team  - follow up with psych  - on lithium and Latuda both of which are associated with nephrotoxicity  Advised patient to talk to psychiatry possible to switch over from Lithium due to long-term use associated with nephrotoxicity  At current dosage latuda is okay to use  Nutrition / obesity:  - Encouraged patient to follow a renal diet comprising of moderate potassium, low phosphorus and protein restriction to 0 8gm/kg  Advise of dietary habits and weight loss  - Will check serum albumin with next blood work  Followup:  - Patient is to follow-up in 4 months, with lab work to be performed after the visit and then again in a few days prior to the next visit  Advised patient to call me in 10 days to review the results if they do not hear back from me, as I may have not received the results  Aida Mcmanus MD, [facility], 9/30/2020, 1:57 PM             HISTORY OF PRESENT ILLNESS: 76 y o  female with multiple comorbidities including JULIO on bipap, CKD stage 3, GERD, bipolar, depression, hypothyroidism, obesity, vitamin-D deficiency, arthritis presents to the office for evaluation and management of abnormal renal parameters  Review of records shows patient had a baseline creatinine of 0 7-1 1 mg/dL up until 0716-1876 and then new baseline creatinine around 1 2-1 5 in 2017 to june 2020 however more recently creatinine in september up to 1 89 mg/dL  Presents with spouse  Is being seen by cardiology also  Been on latuda for about a year now  Been on lithium for a long time  On myerbetric for urinary frequency  On PPI for about 5 years  Takes advil once every 3 weeks  Home sbp is in the 120's  Presented to the visit with her spouse  Happy and thankful for all the care information that she has gotten today  No issues with edema was in the ED on September 10th with constipation  Has an upcoming appointment in a few weeks with a psychiatrist   Reports she is having issues with the Tiffani Potter and does not wish to continue that  Drinks a lot of water  Never seen a nephrologist previously    No history of Refugio I needing dialysis or nephrolithiasis  Review of Systems   Constitutional: Negative for appetite change, chills, fatigue and fever  HENT: Negative for congestion, postnasal drip, rhinorrhea and sore throat  Respiratory: Negative for cough, shortness of breath and wheezing  Cardiovascular: Negative for leg swelling  Gastrointestinal: Negative for abdominal pain, constipation, diarrhea, nausea and vomiting  Genitourinary: Negative for difficulty urinating, dysuria and hematuria  Musculoskeletal: Negative for back pain  Skin: Negative for rash and wound  Neurological: Negative for dizziness, light-headedness and headaches  Psychiatric/Behavioral: Negative for agitation and confusion  All other systems reviewed and are negative  PAST MEDICAL HISTORY:  Past Medical History:   Diagnosis Date    Arthritis     [facility]     Disease of thyroid gland     Elevated blood pressure reading 6/10/2019       PROBLEM LIST    Patient Active Problem List   Diagnosis    Bipolar 1 disorder, mixed, severe ([facility]    Depression with anxiety    Gastroesophageal reflux disease without esophagitis    Hypothyroidism    Impaired fasting glucose    Insomnia    Obstructive sleep apnea    Onychomycosis of toenail    Patellofemoral arthritis of right knee    Stress incontinence of urine    Well adult exam    High triglycerides    Morbid obesity with BMI of 40 0-44 9, adult (HCC)    Tremor of left hand    Abnormal EKG    Welcome to Medicare preventive visit    Encounter for immunization    Localized edema    Palpitations    Raynaud's disease without gangrene    Stage 3 chronic kidney disease ([facility]    Constipation    Acute kidney injury ([facility] )       PAST SURGICAL HISTORY:  Past Surgical History:   Procedure Laterality Date    NO PAST SURGERIES         SOCIAL HISTORY :   reports that she quit smoking about 8 years ago  Her smoking use included cigarettes   She has a 7 50 pack-year smoking history  She has never used smokeless tobacco  She reports current alcohol use  She reports that she does not use drugs  FAMILY HISTORY:  Family History   Problem Relation Age of Onset    Heart disease Mother     Heart Valve Disease Mother         Replacement    Diabetes Mother     Arthritis Father        ALLERGIES:  Allergies   Allergen Reactions    Other      Other reaction(s): Anaphylaxis  Annotation - [id number]: plums peaches           PHYSICAL EXAM:  Vitals:    09/30/20 1313   BP: 136/80   BP Location: Left arm   Patient Position: Sitting   Cuff Size: Large   Pulse: 88   Resp: 16   Temp: 98 6 °F (37 °C)   TempSrc: Temporal   Weight: 101 kg (223 lb)   Height: 5' 2" (1 575 m)     Body mass index is 40 79 kg/m²  Physical Exam  Vitals signs reviewed  Constitutional:       General: She is not in acute distress  Appearance: She is obese  She is not ill-appearing, toxic-appearing or diaphoretic  HENT:      Head: Normocephalic and atraumatic  Mouth/Throat:      Mouth: Mucous membranes are moist       Pharynx: Oropharynx is clear  No oropharyngeal exudate  Eyes:      General: No scleral icterus  Conjunctiva/sclera: Conjunctivae normal    Neck:      Musculoskeletal: Normal range of motion and neck supple  Cardiovascular:      Rate and Rhythm: Normal rate  Heart sounds: Normal heart sounds  No friction rub  Pulmonary:      Effort: Pulmonary effort is normal  No respiratory distress  Breath sounds: Normal breath sounds  Abdominal:      General: Bowel sounds are normal  There is no distension  Palpations: There is no mass  Musculoskeletal:         General: No swelling  Skin:     General: Skin is warm  Coloration: Skin is not jaundiced  Neurological:      General: No focal deficit present  Mental Status: She is alert and oriented to person, place, and time     Psychiatric:         Mood and Affect: Mood normal          Behavior: Behavior normal  LABORATORY DATA:     Results from last 6 Months   Lab Units 09/10/20  0908 06/25/20  0739   WBC Thousand/uL 5 45  --    WHITE BLOOD CELL COUNT  x10E3/uL  --  4 7   HEMOGLOBIN g/dL 14 6  --    HEMOGLOBIN  g/dL  --  13 7   HEMATOCRIT % 46 1  --    HEMATOCRIT  %  --  41 3   PLATELETS Thousands/uL 161  --    PLATELETS  V04H3/FS  --  134*   POTASSIUM mmol/L 4 3 4 7   CHLORIDE mmol/L 104 108*   CO2 mmol/L 25 21   BUN mg/dL 18 25   CREATININE mg/dL 1 89* 1 56*   CALCIUM mg/dL 9 9  --         rest all reviewed    RADIOLOGY:  No orders to display     Rest all reviewed        MEDICATIONS:    Current Outpatient Medications:     Ascorbic Acid (RONAL-C PO), Take by mouth, Disp: , Rfl:     b complex vitamins tablet, Take 1 tablet by mouth daily, Disp: , Rfl:     Cholecalciferol (VITAMIN D) 2000 units CAPS, Take 1 capsule by mouth daily, Disp: , Rfl:     Cranberry 1000 MG CAPS, Take by mouth, Disp: , Rfl:     Glucosamine-Chondroit-Vit C-Mn (GLUCOSAMINE 1500 COMPLEX PO), Take by mouth, Disp: , Rfl:     Latuda 20 MG tablet, TAKE 1 TABLET BY MOUTH  DAILY WITH BREAKFAST, Disp: 30 tablet, Rfl: 11    levothyroxine 100 mcg tablet, TAKE 1 TABLET BY MOUTH EVERY DAY, Disp: 90 tablet, Rfl: 3    lithium carbonate (LITHOBID) 450 mg CR tablet, Take 1 tablet (450 mg total) by mouth 2 (two) times a day, Disp: 10 tablet, Rfl: 0    Multiple Vitamin (MULTI-VITAMIN DAILY) TABS, Take by mouth, Disp: , Rfl:     MYRBETRIQ 25 MG TB24, TAKE 1 TABLET BY MOUTH  DAILY AT BEDTIME, Disp: 90 tablet, Rfl: 1    nystatin (MYCOSTATIN) ointment, Apply topically 2 (two) times a day, Disp: 30 g, Rfl: 5    nystatin (MYCOSTATIN) powder, Apply topically 2 (two) times a day, Disp: 15 g, Rfl: 3    omeprazole (PriLOSEC) 40 MG capsule, TAKE 1 CAPSULE BY MOUTH  DAILY, Disp: 90 capsule, Rfl: 3    SOOLANTRA 1 % CREA, , Disp: , Rfl:         Portions of the record may have been created with voice recognition software   Occasional wrong word or "sound a like" substitutions may have occurred due to the inherent limitations of voice recognition software  Read the chart carefully and recognize, using context, where substitutions have occurred  If you have any questions, please contact the dictating provider

## 2020-10-01 ENCOUNTER — TELEPHONE (OUTPATIENT)
Dept: PSYCHIATRY | Facility: CLINIC | Age: 65
End: 2020-10-01

## 2020-10-01 DIAGNOSIS — F31.9 BIPOLAR 1 DISORDER (HCC): ICD-10-CM

## 2020-10-01 RX ORDER — LITHIUM CARBONATE 300 MG/1
300 TABLET, FILM COATED, EXTENDED RELEASE ORAL 2 TIMES DAILY
Qty: 14 TABLET | Refills: 0 | Status: SHIPPED | OUTPATIENT
Start: 2020-10-01 | End: 2020-11-02

## 2020-10-01 RX ORDER — DIVALPROEX SODIUM 250 MG/1
250 TABLET, DELAYED RELEASE ORAL EVERY 12 HOURS SCHEDULED
Qty: 60 TABLET | Refills: 0 | Status: SHIPPED | OUTPATIENT
Start: 2020-10-01 | End: 2020-11-02

## 2020-10-02 ENCOUNTER — TELEPHONE (OUTPATIENT)
Dept: FAMILY MEDICINE CLINIC | Facility: CLINIC | Age: 65
End: 2020-10-02

## 2020-10-02 DIAGNOSIS — E03.9 ACQUIRED HYPOTHYROIDISM: ICD-10-CM

## 2020-10-02 DIAGNOSIS — F31.9 BIPOLAR 1 DISORDER (HCC): ICD-10-CM

## 2020-10-02 DIAGNOSIS — K21.9 GASTROESOPHAGEAL REFLUX DISEASE WITHOUT ESOPHAGITIS: Primary | ICD-10-CM

## 2020-10-02 RX ORDER — LURASIDONE HYDROCHLORIDE 20 MG/1
TABLET, FILM COATED ORAL
Qty: 45 TABLET | Refills: 2 | Status: SHIPPED | OUTPATIENT
Start: 2020-10-02 | End: 2020-11-02 | Stop reason: SDUPTHER

## 2020-10-02 RX ORDER — OMEPRAZOLE 20 MG/1
20 CAPSULE, DELAYED RELEASE ORAL
Qty: 15 CAPSULE | Refills: 0 | Status: SHIPPED | OUTPATIENT
Start: 2020-10-02 | End: 2020-11-02

## 2020-10-05 LAB
25(OH)D3+25(OH)D2 SERPL-MCNC: 22.2 NG/ML (ref 30–100)
ALBUMIN SERPL ELPH-MCNC: 4.1 G/DL (ref 2.9–4.4)
ALBUMIN SERPL-MCNC: 4.2 G/DL (ref 3.8–4.8)
ALBUMIN/GLOB SERPL: 1.4 {RATIO} (ref 0.7–1.7)
ALPHA1 GLOB SERPL ELPH-MCNC: 0.2 G/DL (ref 0–0.4)
ALPHA2 GLOB SERPL ELPH-MCNC: 0.9 G/DL (ref 0.4–1)
APPEARANCE UR: CLEAR
B-GLOBULIN SERPL ELPH-MCNC: 0.9 G/DL (ref 0.7–1.3)
BACTERIA URNS QL MICRO: ABNORMAL
BILIRUB UR QL STRIP: NEGATIVE
BUN SERPL-MCNC: 17 MG/DL (ref 8–27)
BUN/CREAT SERPL: 11 (ref 12–28)
CALCIUM SERPL-MCNC: 9.4 MG/DL (ref 8.7–10.3)
CHLORIDE SERPL-SCNC: 106 MMOL/L (ref 96–106)
CO2 SERPL-SCNC: 22 MMOL/L (ref 20–29)
COLOR UR: YELLOW
CREAT SERPL-MCNC: 1.57 MG/DL (ref 0.57–1)
CREAT UR-MCNC: 16.4 MG/DL
EPI CELLS #/AREA URNS HPF: ABNORMAL /HPF (ref 0–10)
GAMMA GLOB SERPL ELPH-MCNC: 1 G/DL (ref 0.4–1.8)
GLOBULIN SER CALC-MCNC: 2.9 G/DL (ref 2.2–3.9)
GLUCOSE SERPL-MCNC: 113 MG/DL (ref 65–99)
GLUCOSE UR QL: NEGATIVE
HGB UR QL STRIP: NEGATIVE
KAPPA LC FREE SER-MCNC: 39.7 MG/L (ref 3.3–19.4)
KAPPA LC FREE/LAMBDA FREE SER: 1.69 {RATIO} (ref 0.26–1.65)
KETONES UR QL STRIP: NEGATIVE
LABORATORY COMMENT REPORT: NORMAL
LAMBDA LC FREE SERPL-MCNC: 23.5 MG/L (ref 5.7–26.3)
LEUKOCYTE ESTERASE UR QL STRIP: ABNORMAL
M PROTEIN SERPL ELPH-MCNC: NORMAL G/DL
MAGNESIUM SERPL-MCNC: 2.4 MG/DL (ref 1.6–2.3)
MICRO URNS: ABNORMAL
MUCOUS THREADS URNS QL MICRO: PRESENT
NITRITE UR QL STRIP: NEGATIVE
PH UR STRIP: 7.5 [PH] (ref 5–7.5)
PHOSPHATE SERPL-MCNC: 3.1 MG/DL (ref 3–4.3)
POTASSIUM SERPL-SCNC: 4.7 MMOL/L (ref 3.5–5.2)
PROT SERPL-MCNC: 7 G/DL (ref 6–8.5)
PROT UR QL STRIP: NEGATIVE
PROT UR-MCNC: 12.5 MG/DL
PROT/CREAT UR: 762 MG/G CREAT (ref 0–200)
PTH-INTACT SERPL-MCNC: 79 PG/ML (ref 15–65)
RBC #/AREA URNS HPF: ABNORMAL /HPF (ref 0–2)
SL AMB EGFR AFRICAN AMERICAN: 39 ML/MIN/1.73
SL AMB EGFR NON AFRICAN AMERICAN: 34 ML/MIN/1.73
SODIUM SERPL-SCNC: 141 MMOL/L (ref 134–144)
SP GR UR: <=1.005 (ref 1–1.03)
UROBILINOGEN UR STRIP-ACNC: 0.2 MG/DL (ref 0.2–1)
WBC #/AREA URNS HPF: >30 /HPF (ref 0–5)

## 2020-10-06 ENCOUNTER — TELEPHONE (OUTPATIENT)
Dept: OTHER | Facility: HOSPITAL | Age: 65
End: 2020-10-06

## 2020-10-06 DIAGNOSIS — E55.9 VITAMIN D DEFICIENCY: Primary | ICD-10-CM

## 2020-10-06 RX ORDER — ERGOCALCIFEROL (VITAMIN D2) 1250 MCG
50000 CAPSULE ORAL WEEKLY
Qty: 12 CAPSULE | Refills: 0 | Status: SHIPPED | OUTPATIENT
Start: 2020-10-06 | End: 2020-12-21 | Stop reason: ALTCHOICE

## 2020-10-08 ENCOUNTER — TELEPHONE (OUTPATIENT)
Dept: FAMILY MEDICINE CLINIC | Facility: CLINIC | Age: 65
End: 2020-10-08

## 2020-10-10 LAB — TSH SERPL DL<=0.005 MIU/L-ACNC: 0.66 UIU/ML (ref 0.45–4.5)

## 2020-10-13 DIAGNOSIS — N18.31 STAGE 3A CHRONIC KIDNEY DISEASE (HCC): Primary | ICD-10-CM

## 2020-10-22 ENCOUNTER — CLINICAL SUPPORT (OUTPATIENT)
Dept: NEPHROLOGY | Facility: CLINIC | Age: 65
End: 2020-10-22
Payer: COMMERCIAL

## 2020-10-22 DIAGNOSIS — N18.31 STAGE 3A CHRONIC KIDNEY DISEASE (HCC): ICD-10-CM

## 2020-10-22 PROCEDURE — 97802 MEDICAL NUTRITION INDIV IN: CPT | Performed by: DIETITIAN, REGISTERED

## 2020-10-26 ENCOUNTER — TELEPHONE (OUTPATIENT)
Dept: NEUROLOGY | Facility: CLINIC | Age: 65
End: 2020-10-26

## 2020-11-02 ENCOUNTER — OFFICE VISIT (OUTPATIENT)
Dept: PSYCHIATRY | Facility: CLINIC | Age: 65
End: 2020-11-02
Payer: COMMERCIAL

## 2020-11-02 DIAGNOSIS — E03.2 HYPOTHYROIDISM DUE TO MEDICATION: Primary | ICD-10-CM

## 2020-11-02 DIAGNOSIS — E03.9 HYPOTHYROIDISM, UNSPECIFIED TYPE: ICD-10-CM

## 2020-11-02 DIAGNOSIS — F31.9 BIPOLAR 1 DISORDER (HCC): ICD-10-CM

## 2020-11-02 DIAGNOSIS — Z79.899 LONG TERM CURRENT USE OF ANTIPSYCHOTIC MEDICATION: ICD-10-CM

## 2020-11-02 PROCEDURE — 99213 OFFICE O/P EST LOW 20 MIN: CPT | Performed by: PSYCHIATRY & NEUROLOGY

## 2020-11-02 PROCEDURE — 90833 PSYTX W PT W E/M 30 MIN: CPT | Performed by: PSYCHIATRY & NEUROLOGY

## 2020-11-02 RX ORDER — LEVOTHYROXINE SODIUM 0.1 MG/1
100 TABLET ORAL DAILY
Qty: 90 TABLET | Refills: 3 | Status: SHIPPED | OUTPATIENT
Start: 2020-11-02 | End: 2020-11-11 | Stop reason: SDUPTHER

## 2020-11-05 ENCOUNTER — OFFICE VISIT (OUTPATIENT)
Dept: FAMILY MEDICINE CLINIC | Facility: CLINIC | Age: 65
End: 2020-11-05
Payer: COMMERCIAL

## 2020-11-05 VITALS
TEMPERATURE: 97.5 F | OXYGEN SATURATION: 94 % | HEART RATE: 76 BPM | BODY MASS INDEX: 40.37 KG/M2 | RESPIRATION RATE: 16 BRPM | HEIGHT: 62 IN | WEIGHT: 219.4 LBS | DIASTOLIC BLOOD PRESSURE: 70 MMHG | SYSTOLIC BLOOD PRESSURE: 128 MMHG

## 2020-11-05 DIAGNOSIS — R30.0 DYSURIA: Primary | ICD-10-CM

## 2020-11-05 LAB
SL AMB  POCT GLUCOSE, UA: NEGATIVE
SL AMB LEUKOCYTE ESTERASE,UA: ABNORMAL
SL AMB POCT BILIRUBIN,UA: NEGATIVE
SL AMB POCT BLOOD,UA: NEGATIVE
SL AMB POCT CLARITY,UA: CLEAR
SL AMB POCT COLOR,UA: YELLOW
SL AMB POCT KETONES,UA: NEGATIVE
SL AMB POCT NITRITE,UA: NEGATIVE
SL AMB POCT PH,UA: 6
SL AMB POCT SPECIFIC GRAVITY,UA: 1.01
SL AMB POCT URINE PROTEIN: NEGATIVE
SL AMB POCT UROBILINOGEN: NEGATIVE

## 2020-11-05 PROCEDURE — 99213 OFFICE O/P EST LOW 20 MIN: CPT | Performed by: FAMILY MEDICINE

## 2020-11-05 PROCEDURE — 3008F BODY MASS INDEX DOCD: CPT | Performed by: FAMILY MEDICINE

## 2020-11-05 PROCEDURE — 1160F RVW MEDS BY RX/DR IN RCRD: CPT | Performed by: FAMILY MEDICINE

## 2020-11-05 PROCEDURE — 81002 URINALYSIS NONAUTO W/O SCOPE: CPT | Performed by: FAMILY MEDICINE

## 2020-11-05 PROCEDURE — 1036F TOBACCO NON-USER: CPT | Performed by: FAMILY MEDICINE

## 2020-11-05 RX ORDER — CIPROFLOXACIN 250 MG/1
250 TABLET, FILM COATED ORAL EVERY 12 HOURS SCHEDULED
Qty: 14 TABLET | Refills: 0 | Status: SHIPPED | OUTPATIENT
Start: 2020-11-05 | End: 2020-11-12

## 2020-11-07 LAB
BACTERIA UR CULT: NORMAL
Lab: NORMAL

## 2020-11-09 DIAGNOSIS — F31.9 BIPOLAR 1 DISORDER (HCC): ICD-10-CM

## 2020-11-09 DIAGNOSIS — F31.63 BIPOLAR 1 DISORDER, MIXED, SEVERE (HCC): Primary | ICD-10-CM

## 2020-11-09 RX ORDER — DIVALPROEX SODIUM 250 MG/1
TABLET, DELAYED RELEASE ORAL
Qty: 60 TABLET | Refills: 0 | OUTPATIENT
Start: 2020-11-09

## 2020-11-09 RX ORDER — DIVALPROEX SODIUM 250 MG/1
250 TABLET, DELAYED RELEASE ORAL
Qty: 14 TABLET | Refills: 0 | Status: SHIPPED | OUTPATIENT
Start: 2020-11-09 | End: 2020-11-23 | Stop reason: SDUPTHER

## 2020-11-11 ENCOUNTER — TELEPHONE (OUTPATIENT)
Dept: NEPHROLOGY | Facility: CLINIC | Age: 65
End: 2020-11-11

## 2020-11-11 ENCOUNTER — TELEPHONE (OUTPATIENT)
Dept: PSYCHIATRY | Facility: CLINIC | Age: 65
End: 2020-11-11

## 2020-11-11 DIAGNOSIS — N18.31 STAGE 3A CHRONIC KIDNEY DISEASE (HCC): Primary | ICD-10-CM

## 2020-11-11 DIAGNOSIS — E03.9 HYPOTHYROIDISM, UNSPECIFIED TYPE: ICD-10-CM

## 2020-11-11 DIAGNOSIS — N17.9 ACUTE KIDNEY INJURY (HCC): ICD-10-CM

## 2020-11-11 DIAGNOSIS — F31.9 BIPOLAR 1 DISORDER (HCC): ICD-10-CM

## 2020-11-11 RX ORDER — LEVOTHYROXINE SODIUM 0.1 MG/1
100 TABLET ORAL DAILY
Qty: 30 TABLET | Refills: 0 | Status: SHIPPED | OUTPATIENT
Start: 2020-11-11 | End: 2021-01-28 | Stop reason: SDUPTHER

## 2020-11-11 RX ORDER — LURASIDONE HYDROCHLORIDE 20 MG/1
TABLET, FILM COATED ORAL
Qty: 30 TABLET | Refills: 11 | Status: SHIPPED | OUTPATIENT
Start: 2020-11-11 | End: 2021-08-19 | Stop reason: SDUPTHER

## 2020-11-23 DIAGNOSIS — F31.63 BIPOLAR 1 DISORDER, MIXED, SEVERE (HCC): ICD-10-CM

## 2020-11-23 RX ORDER — DIVALPROEX SODIUM 250 MG/1
250 TABLET, DELAYED RELEASE ORAL
Qty: 14 TABLET | Refills: 0 | Status: SHIPPED | OUTPATIENT
Start: 2020-11-23 | End: 2020-11-24 | Stop reason: SDUPTHER

## 2020-11-24 DIAGNOSIS — F31.63 BIPOLAR 1 DISORDER, MIXED, SEVERE (HCC): ICD-10-CM

## 2020-11-24 RX ORDER — DIVALPROEX SODIUM 250 MG/1
250 TABLET, DELAYED RELEASE ORAL
Qty: 30 TABLET | Refills: 0 | Status: SHIPPED | OUTPATIENT
Start: 2020-11-24 | End: 2021-01-04 | Stop reason: SDUPTHER

## 2020-12-07 ENCOUNTER — CLINICAL SUPPORT (OUTPATIENT)
Dept: NEPHROLOGY | Facility: CLINIC | Age: 65
End: 2020-12-07
Payer: COMMERCIAL

## 2020-12-07 DIAGNOSIS — N18.30 STAGE 3 CHRONIC KIDNEY DISEASE, UNSPECIFIED WHETHER STAGE 3A OR 3B CKD (HCC): ICD-10-CM

## 2020-12-07 PROCEDURE — 97803 MED NUTRITION INDIV SUBSEQ: CPT | Performed by: DIETITIAN, REGISTERED

## 2020-12-10 LAB
25(OH)D3+25(OH)D2 SERPL-MCNC: 35.7 NG/ML (ref 30–100)
ALBUMIN SERPL-MCNC: 4.5 G/DL (ref 3.8–4.8)
BUN SERPL-MCNC: 23 MG/DL (ref 8–27)
BUN/CREAT SERPL: 14 (ref 12–28)
CALCIUM SERPL-MCNC: 9.5 MG/DL (ref 8.7–10.3)
CHLORIDE SERPL-SCNC: 106 MMOL/L (ref 96–106)
CO2 SERPL-SCNC: 23 MMOL/L (ref 20–29)
CREAT SERPL-MCNC: 1.68 MG/DL (ref 0.57–1)
CREAT UR-MCNC: 29.7 MG/DL
GLUCOSE SERPL-MCNC: 106 MG/DL (ref 65–99)
PHOSPHATE SERPL-MCNC: 4.1 MG/DL (ref 3–4.3)
POTASSIUM SERPL-SCNC: 4.7 MMOL/L (ref 3.5–5.2)
PROT UR-MCNC: 4 MG/DL
PROT/CREAT UR: 135 MG/G CREAT (ref 0–200)
PTH-INTACT SERPL-MCNC: 50 PG/ML (ref 15–65)
SL AMB EGFR AFRICAN AMERICAN: 36 ML/MIN/1.73
SL AMB EGFR NON AFRICAN AMERICAN: 31 ML/MIN/1.73
SODIUM SERPL-SCNC: 142 MMOL/L (ref 134–144)

## 2020-12-13 DIAGNOSIS — N32.81 OAB (OVERACTIVE BLADDER): ICD-10-CM

## 2020-12-14 RX ORDER — MIRABEGRON 25 MG/1
TABLET, FILM COATED, EXTENDED RELEASE ORAL
Qty: 90 TABLET | Refills: 3 | Status: SHIPPED | OUTPATIENT
Start: 2020-12-14 | End: 2022-03-01 | Stop reason: SDUPTHER

## 2020-12-15 ENCOUNTER — OFFICE VISIT (OUTPATIENT)
Dept: FAMILY MEDICINE CLINIC | Facility: CLINIC | Age: 65
End: 2020-12-15
Payer: COMMERCIAL

## 2020-12-15 VITALS
BODY MASS INDEX: 39.27 KG/M2 | RESPIRATION RATE: 18 BRPM | DIASTOLIC BLOOD PRESSURE: 70 MMHG | WEIGHT: 213.4 LBS | SYSTOLIC BLOOD PRESSURE: 130 MMHG | HEART RATE: 90 BPM | HEIGHT: 62 IN | OXYGEN SATURATION: 99 % | TEMPERATURE: 98.5 F

## 2020-12-15 DIAGNOSIS — Z00.00 MEDICARE ANNUAL WELLNESS VISIT, SUBSEQUENT: Primary | ICD-10-CM

## 2020-12-15 DIAGNOSIS — K21.9 GASTROESOPHAGEAL REFLUX DISEASE WITHOUT ESOPHAGITIS: ICD-10-CM

## 2020-12-15 PROBLEM — N17.9 ACUTE KIDNEY INJURY (HCC): Status: RESOLVED | Noted: 2020-09-30 | Resolved: 2020-12-15

## 2020-12-15 PROBLEM — R25.1 TREMOR OF LEFT HAND: Status: RESOLVED | Noted: 2019-04-22 | Resolved: 2020-12-15

## 2020-12-15 PROBLEM — R30.0 DYSURIA: Status: RESOLVED | Noted: 2020-11-05 | Resolved: 2020-12-15

## 2020-12-15 PROCEDURE — 1170F FXNL STATUS ASSESSED: CPT | Performed by: FAMILY MEDICINE

## 2020-12-15 PROCEDURE — 3008F BODY MASS INDEX DOCD: CPT | Performed by: FAMILY MEDICINE

## 2020-12-15 PROCEDURE — 1125F AMNT PAIN NOTED PAIN PRSNT: CPT | Performed by: FAMILY MEDICINE

## 2020-12-15 PROCEDURE — 1101F PT FALLS ASSESS-DOCD LE1/YR: CPT | Performed by: FAMILY MEDICINE

## 2020-12-15 PROCEDURE — G0439 PPPS, SUBSEQ VISIT: HCPCS | Performed by: FAMILY MEDICINE

## 2020-12-15 PROCEDURE — 3725F SCREEN DEPRESSION PERFORMED: CPT | Performed by: FAMILY MEDICINE

## 2020-12-15 PROCEDURE — 1160F RVW MEDS BY RX/DR IN RCRD: CPT | Performed by: FAMILY MEDICINE

## 2020-12-15 PROCEDURE — 3288F FALL RISK ASSESSMENT DOCD: CPT | Performed by: FAMILY MEDICINE

## 2020-12-15 PROCEDURE — 1036F TOBACCO NON-USER: CPT | Performed by: FAMILY MEDICINE

## 2020-12-15 RX ORDER — FAMOTIDINE 20 MG/1
20 TABLET, FILM COATED ORAL DAILY
Qty: 90 TABLET | Refills: 3 | Status: SHIPPED | OUTPATIENT
Start: 2020-12-15 | End: 2021-12-21 | Stop reason: SDUPTHER

## 2020-12-15 RX ORDER — FAMOTIDINE 20 MG/1
TABLET, FILM COATED ORAL
COMMUNITY
Start: 2020-10-01 | End: 2020-12-15 | Stop reason: ALTCHOICE

## 2020-12-21 ENCOUNTER — OFFICE VISIT (OUTPATIENT)
Dept: NEPHROLOGY | Facility: CLINIC | Age: 65
End: 2020-12-21
Payer: COMMERCIAL

## 2020-12-21 VITALS
BODY MASS INDEX: 38.83 KG/M2 | HEART RATE: 61 BPM | DIASTOLIC BLOOD PRESSURE: 80 MMHG | SYSTOLIC BLOOD PRESSURE: 128 MMHG | HEIGHT: 62 IN | RESPIRATION RATE: 16 BRPM | TEMPERATURE: 97.1 F | WEIGHT: 211 LBS

## 2020-12-21 DIAGNOSIS — E55.9 VITAMIN D DEFICIENCY: ICD-10-CM

## 2020-12-21 DIAGNOSIS — N18.32 STAGE 3B CHRONIC KIDNEY DISEASE (HCC): Primary | ICD-10-CM

## 2020-12-21 DIAGNOSIS — F41.8 DEPRESSION WITH ANXIETY: ICD-10-CM

## 2020-12-21 DIAGNOSIS — E78.1 HIGH TRIGLYCERIDES: ICD-10-CM

## 2020-12-21 PROCEDURE — 99214 OFFICE O/P EST MOD 30 MIN: CPT | Performed by: INTERNAL MEDICINE

## 2020-12-22 ENCOUNTER — OFFICE VISIT (OUTPATIENT)
Dept: PULMONOLOGY | Facility: CLINIC | Age: 65
End: 2020-12-22
Payer: COMMERCIAL

## 2020-12-22 VITALS
RESPIRATION RATE: 18 BRPM | OXYGEN SATURATION: 99 % | HEART RATE: 76 BPM | WEIGHT: 212 LBS | HEIGHT: 62 IN | SYSTOLIC BLOOD PRESSURE: 132 MMHG | BODY MASS INDEX: 39.01 KG/M2 | DIASTOLIC BLOOD PRESSURE: 80 MMHG | TEMPERATURE: 97.3 F

## 2020-12-22 DIAGNOSIS — G47.33 OBSTRUCTIVE SLEEP APNEA: Primary | ICD-10-CM

## 2020-12-22 PROCEDURE — 1036F TOBACCO NON-USER: CPT | Performed by: INTERNAL MEDICINE

## 2020-12-22 PROCEDURE — 1160F RVW MEDS BY RX/DR IN RCRD: CPT | Performed by: INTERNAL MEDICINE

## 2020-12-22 PROCEDURE — 99213 OFFICE O/P EST LOW 20 MIN: CPT | Performed by: INTERNAL MEDICINE

## 2020-12-22 PROCEDURE — 3008F BODY MASS INDEX DOCD: CPT | Performed by: INTERNAL MEDICINE

## 2021-01-04 DIAGNOSIS — F31.63 BIPOLAR 1 DISORDER, MIXED, SEVERE (HCC): ICD-10-CM

## 2021-01-04 RX ORDER — DIVALPROEX SODIUM 250 MG/1
250 TABLET, DELAYED RELEASE ORAL
Qty: 30 TABLET | Refills: 2 | Status: SHIPPED | OUTPATIENT
Start: 2021-01-04 | End: 2021-04-08

## 2021-01-08 ENCOUNTER — TELEPHONE (OUTPATIENT)
Dept: FAMILY MEDICINE CLINIC | Facility: CLINIC | Age: 66
End: 2021-01-08

## 2021-01-08 DIAGNOSIS — M17.0 OSTEOARTHRITIS OF BOTH KNEES, UNSPECIFIED OSTEOARTHRITIS TYPE: Primary | ICD-10-CM

## 2021-01-08 NOTE — TELEPHONE ENCOUNTER
Pt called and is looking for a referral and who she can go to for both her knees  pls advise pt stated you christina had talked at her last appt about seeing ortho   Thank you

## 2021-01-21 ENCOUNTER — TELEPHONE (OUTPATIENT)
Dept: PSYCHIATRY | Facility: CLINIC | Age: 66
End: 2021-01-21

## 2021-01-21 DIAGNOSIS — Z79.899 LONG TERM CURRENT USE OF ANTIPSYCHOTIC MEDICATION: Primary | ICD-10-CM

## 2021-01-21 NOTE — TELEPHONE ENCOUNTER
Hernandez Payton has an appointment with you on 1/28  She is wondering if you want her to get any labs done prior to her visit    Please advise

## 2021-01-22 ENCOUNTER — LAB (OUTPATIENT)
Dept: LAB | Facility: AMBULARY SURGERY CENTER | Age: 66
End: 2021-01-22
Payer: COMMERCIAL

## 2021-01-22 DIAGNOSIS — F31.31 MILD DEPRESSED BIPOLAR I DISORDER (HCC): Primary | ICD-10-CM

## 2021-01-22 LAB
ALBUMIN SERPL BCP-MCNC: 3.9 G/DL (ref 3.5–5)
ALP SERPL-CCNC: 75 U/L (ref 46–116)
ALT SERPL W P-5'-P-CCNC: 18 U/L (ref 12–78)
ANION GAP SERPL CALCULATED.3IONS-SCNC: 5 MMOL/L (ref 4–13)
AST SERPL W P-5'-P-CCNC: 9 U/L (ref 5–45)
BILIRUB SERPL-MCNC: 0.35 MG/DL (ref 0.2–1)
BUN SERPL-MCNC: 28 MG/DL (ref 5–25)
CALCIUM SERPL-MCNC: 9.3 MG/DL (ref 8.3–10.1)
CHLORIDE SERPL-SCNC: 114 MMOL/L (ref 100–108)
CO2 SERPL-SCNC: 27 MMOL/L (ref 21–32)
CREAT SERPL-MCNC: 1.41 MG/DL (ref 0.6–1.3)
GFR SERPL CREATININE-BSD FRML MDRD: 39 ML/MIN/1.73SQ M
GLUCOSE P FAST SERPL-MCNC: 86 MG/DL (ref 65–99)
POTASSIUM SERPL-SCNC: 4 MMOL/L (ref 3.5–5.3)
PROT SERPL-MCNC: 7.2 G/DL (ref 6.4–8.2)
SODIUM SERPL-SCNC: 146 MMOL/L (ref 136–145)
TSH SERPL DL<=0.05 MIU/L-ACNC: 0.51 UIU/ML (ref 0.36–3.74)

## 2021-01-22 PROCEDURE — 80053 COMPREHEN METABOLIC PANEL: CPT

## 2021-01-22 PROCEDURE — 84443 ASSAY THYROID STIM HORMONE: CPT

## 2021-01-22 PROCEDURE — 36415 COLL VENOUS BLD VENIPUNCTURE: CPT

## 2021-01-28 ENCOUNTER — TELEMEDICINE (OUTPATIENT)
Dept: PSYCHIATRY | Facility: CLINIC | Age: 66
End: 2021-01-28
Payer: COMMERCIAL

## 2021-01-28 DIAGNOSIS — F31.63 BIPOLAR 1 DISORDER, MIXED, SEVERE (HCC): Primary | ICD-10-CM

## 2021-01-28 DIAGNOSIS — E03.9 HYPOTHYROIDISM, UNSPECIFIED TYPE: ICD-10-CM

## 2021-01-28 PROCEDURE — 99214 OFFICE O/P EST MOD 30 MIN: CPT | Performed by: PSYCHIATRY & NEUROLOGY

## 2021-01-28 PROCEDURE — 90833 PSYTX W PT W E/M 30 MIN: CPT | Performed by: PSYCHIATRY & NEUROLOGY

## 2021-01-28 RX ORDER — LEVOTHYROXINE SODIUM 88 UG/1
88 TABLET ORAL DAILY
Qty: 30 TABLET | Refills: 2 | Status: SHIPPED | OUTPATIENT
Start: 2021-01-28 | End: 2021-04-26 | Stop reason: SDUPTHER

## 2021-01-28 NOTE — BH TREATMENT PLAN
TREATMENT PLAN (Medication Management Only)        [facility]    Name and Date of Birth:  Esthela Jones y o  1955  Date of Treatment Plan: January 28, 2021  Diagnosis/Diagnoses:    1  Bipolar 1 disorder, mixed, severe ([facility] )      Strengths/Personal Resources for Self-Care: taking medications as prescribed, ability to communicate needs  Area/Areas of need (in own words): mood instability  1  Long Term Goal: continue improvement in mood stability  Target Date:3 months - 4/28/2021  Person/Persons responsible for completion of goal: Cherelle  2  Short Term Objective (s) - How will we reach this goal?:   A  Provider new recommended medication/dosage changes and/or continue medication(s): continue current medications as prescribed  B  N/A   C  N/A  Target Date:3 months - 4/28/2021  Person/Persons Responsible for Completion of Goal: Cherelle  Progress Towards Goals: continuing treatment  Treatment Modality: medication management every 3 months, medication management with psychotherapy every 3 months  Review due 180 days from date of this plan: 6 months - 7/28/2021  Expected length of service: ongoing treatment  My Physician/PA/NP and I have developed this plan together and I agree to work on the goals and objectives  I understand the treatment goals that were developed for my treatment

## 2021-01-29 NOTE — PROGRESS NOTES
Virtual Regular Visit      Assessment/Plan:    Problem List Items Addressed This Visit        Other    RESOLVED: Tremor of left hand       58-year-old woman presents in follow-up for tremors which have now resolved since discontinuing her lithium  The patient is continuing to reduce her medications with Psychiatry  She reports her mood has remained very stable with these changes  She denies emergence of any new neurologic symptoms  All of her questions were answered  Patient will follow-up in the Neurology Clinic as needed  Other Visit Diagnoses     Tremor    -  Primary               Reason for visit is   Chief Complaint   Patient presents with    Virtual Regular Visit        Encounter provider Nishi Correa MD    Provider located at [address] [place] [id number]      Recent Visits  No visits were found meeting these conditions  Showing recent visits within past 7 days and meeting all other requirements     Today's Visits  Date Type Provider Dept   02/01/21 Telemedicine Nishi Correa MD  [facility]   Showing today's visits and meeting all other requirements     Future Appointments  No visits were found meeting these conditions  Showing future appointments within next 150 days and meeting all other requirements        The patient was identified by name and date of birth  Quique Neri was informed that this is a telemedicine visit and that the visit is being conducted through telephone  My office door was closed  No one else was in the room  She acknowledged consent and understanding of privacy and security of the video platform  The patient has agreed to participate and understands they can discontinue the visit at any time  Patient is aware this is a billable service       Subjective  HPI   I had the pleasure of seeing your patient, Quique Neri in the Movement Disorders Clinic at the [facility]  [facility]  Susanna Hernandez is a 76year-old   Woman with bipolar on chronic neuroleptics presents in follow-up for tremor which began acutely in early 2019  The patient was evaluated in August of 2019 and her tremors are thought most likely be related to medication side effect and possible toxic metabolic derangement  Interval history:  Stopped the [address] and her tremor stopped completely  Sometimes at night she had a slight tremor in the right foot  Plan to come off of the depakote as well  Mood is holding strong  Overall feeling very well  Working with a nephrologist due to kidney injury related to [address]         Past Medical History:   Diagnosis Date    Arthritis     Bipolar [facility] disorder ([facility]     Disease of thyroid gland     Elevated blood pressure reading 6/10/2019       Past Surgical History:   Procedure Laterality Date    COLONOSCOPY  2011    NO PAST SURGERIES         Current Outpatient Medications   Medication Sig Dispense Refill    b complex vitamins tablet Take 1 tablet by mouth daily      Cholecalciferol (VITAMIN D) 2000 units CAPS Take 1 capsule by mouth daily      divalproex sodium (DEPAKOTE) 250 mg EC tablet Take 1 tablet (250 mg total) by mouth daily at bedtime 30 tablet 2    famotidine (PEPCID) 20 mg tablet Take 1 tablet (20 mg total) by mouth daily 90 tablet 3    Glucosamine-Chondroit-Vit C-Mn (GLUCOSAMINE 1500 COMPLEX PO) Take by mouth      Latuda 20 MG tablet TAKE 1 TABLET BY MOUTH  DAILY WITH BREAKFAST 30 tablet 11    levothyroxine 88 mcg tablet Take 1 tablet (88 mcg total) by mouth daily 30 tablet 2    Multiple Vitamin (MULTI-VITAMIN DAILY) TABS Take by mouth      Myrbetriq 25 MG TB24 TAKE 1 TABLET BY MOUTH  DAILY AT BEDTIME 90 tablet 3    nystatin (MYCOSTATIN) ointment Apply topically 2 (two) times a day 30 g 5    nystatin (MYCOSTATIN) powder Apply topically 2 (two) times a day 15 g 3    SOOLANTRA 1 % CREA        No current facility-administered medications for this visit  Allergies   Allergen Reactions    Other      Other reaction(s): Anaphylaxis  Annotation - [id number]: plums peaches       Review of Systems    Video Exam    There were no vitals filed for this visit  Physical Exam     I spent 20 minutes directly with the patient during this visit      [address] acknowledges that she has consented to an online visit or consultation  She understands that the online visit is based solely on information provided by her, and that, in the absence of a face-to-face physical evaluation by the physician, the diagnosis she receives is both limited and provisional in terms of accuracy and completeness  This is not intended to replace a full medical face-to-face evaluation by the physician  Micheal Thomas understands and accepts these terms

## 2021-02-01 ENCOUNTER — TELEMEDICINE (OUTPATIENT)
Dept: NEUROLOGY | Facility: CLINIC | Age: 66
End: 2021-02-01
Payer: COMMERCIAL

## 2021-02-01 DIAGNOSIS — R25.1 TREMOR OF LEFT HAND: ICD-10-CM

## 2021-02-01 DIAGNOSIS — R25.1 TREMOR: Primary | ICD-10-CM

## 2021-02-01 PROCEDURE — 99442 PR PHYS/QHP TELEPHONE EVALUATION 11-20 MIN: CPT | Performed by: PSYCHIATRY & NEUROLOGY

## 2021-02-01 NOTE — ASSESSMENT & PLAN NOTE
53-year-old woman presents in follow-up for tremors which have now resolved since discontinuing her lithium  The patient is continuing to reduce her medications with Psychiatry  She reports her mood has remained very stable with these changes  She denies emergence of any new neurologic symptoms  All of her questions were answered  Patient will follow-up in the Neurology Clinic as needed

## 2021-02-13 DIAGNOSIS — Z23 ENCOUNTER FOR IMMUNIZATION: ICD-10-CM

## 2021-02-17 DIAGNOSIS — M25.562 PAIN IN BOTH KNEES, UNSPECIFIED CHRONICITY: Primary | ICD-10-CM

## 2021-02-17 DIAGNOSIS — M25.561 PAIN IN BOTH KNEES, UNSPECIFIED CHRONICITY: Primary | ICD-10-CM

## 2021-02-23 ENCOUNTER — CONSULT (OUTPATIENT)
Dept: OBGYN CLINIC | Facility: HOSPITAL | Age: 66
End: 2021-02-23
Payer: COMMERCIAL

## 2021-02-23 ENCOUNTER — HOSPITAL ENCOUNTER (OUTPATIENT)
Dept: RADIOLOGY | Facility: HOSPITAL | Age: 66
Discharge: HOME | End: 2021-02-23
Attending: ORTHOPAEDIC SURGERY
Payer: COMMERCIAL

## 2021-02-23 VITALS
SYSTOLIC BLOOD PRESSURE: 124 MMHG | HEIGHT: 62 IN | DIASTOLIC BLOOD PRESSURE: 79 MMHG | HEART RATE: 78 BPM | BODY MASS INDEX: 38.78 KG/M2

## 2021-02-23 DIAGNOSIS — M25.561 PAIN IN BOTH KNEES, UNSPECIFIED CHRONICITY: ICD-10-CM

## 2021-02-23 DIAGNOSIS — M25.562 PAIN IN BOTH KNEES, UNSPECIFIED CHRONICITY: ICD-10-CM

## 2021-02-23 DIAGNOSIS — M17.12 PRIMARY OSTEOARTHRITIS OF LEFT KNEE: ICD-10-CM

## 2021-02-23 DIAGNOSIS — M17.11 PRIMARY OSTEOARTHRITIS OF RIGHT KNEE: Primary | ICD-10-CM

## 2021-02-23 PROCEDURE — 20610 DRAIN/INJ JOINT/BURSA W/O US: CPT | Performed by: ORTHOPAEDIC SURGERY

## 2021-02-23 PROCEDURE — 1036F TOBACCO NON-USER: CPT | Performed by: ORTHOPAEDIC SURGERY

## 2021-02-23 PROCEDURE — 1160F RVW MEDS BY RX/DR IN RCRD: CPT | Performed by: ORTHOPAEDIC SURGERY

## 2021-02-23 PROCEDURE — 99203 OFFICE O/P NEW LOW 30 MIN: CPT | Performed by: ORTHOPAEDIC SURGERY

## 2021-02-23 PROCEDURE — 73562 X-RAY EXAM OF KNEE 3: CPT

## 2021-02-23 RX ORDER — BUPIVACAINE HYDROCHLORIDE 2.5 MG/ML
2 INJECTION, SOLUTION INFILTRATION; PERINEURAL
Status: COMPLETED | OUTPATIENT
Start: 2021-02-23 | End: 2021-02-23

## 2021-02-23 RX ORDER — LIDOCAINE HYDROCHLORIDE 10 MG/ML
2 INJECTION, SOLUTION INFILTRATION; PERINEURAL
Status: COMPLETED | OUTPATIENT
Start: 2021-02-23 | End: 2021-02-23

## 2021-02-23 RX ORDER — BETAMETHASONE SODIUM PHOSPHATE AND BETAMETHASONE ACETATE 3; 3 MG/ML; MG/ML
6 INJECTION, SUSPENSION INTRA-ARTICULAR; INTRALESIONAL; INTRAMUSCULAR; SOFT TISSUE
Status: COMPLETED | OUTPATIENT
Start: 2021-02-23 | End: 2021-02-23

## 2021-02-23 RX ADMIN — BETAMETHASONE SODIUM PHOSPHATE AND BETAMETHASONE ACETATE 6 MG: 3; 3 INJECTION, SUSPENSION INTRA-ARTICULAR; INTRALESIONAL; INTRAMUSCULAR; SOFT TISSUE at 08:41

## 2021-02-23 RX ADMIN — BUPIVACAINE HYDROCHLORIDE 2 ML: 2.5 INJECTION, SOLUTION INFILTRATION; PERINEURAL at 08:41

## 2021-02-23 RX ADMIN — LIDOCAINE HYDROCHLORIDE 2 ML: 10 INJECTION, SOLUTION INFILTRATION; PERINEURAL at 08:41

## 2021-02-23 NOTE — PROGRESS NOTES
Assessment  Diagnoses and all orders for this visit:    Primary osteoarthritis of right knee    Primary osteoarthritis of left knee      Discussion and Plan:    WBAT BL LE  Steroid injection provided to both knees today  Weight loss/activity modification recommended  Referral for PT provided for quad and hamstring muscle strengthening  OTC meds as needed  Follow up in 3 months for reevaluation    Subjective:   Patient ID: Micheal Thomas is a 76 y o  female      75 yo female presents for evaluation as a consult from her PCP Dr Hollingsworth Setting for evaluation of bilateral knee pain  Knee pain is worse on the right knee than the left  Pain is worse with ambulating long distances, ascending and descending stairs, and standing from a sitting position  She denies recent trauma or falls  Pain is mostly localized to the medial aspect of the knee and behind her kneecap  She does states that at the end of the day the pain is worse and sometimes her knees might get swollen  She has not tried oral medications yet  She has had no prior injections or surgeries to her knees  She is retired  Does not use any assistive devices to ambulate  She states that she has lost successfully 30 lb in the last couple months  The following portions of the patient's history were reviewed and updated as appropriate: allergies, current medications, past family history, past medical history, past social history, past surgical history and problem list     Review of Systems   Constitutional: Negative for appetite change and fever  HENT: Negative for sore throat  Eyes: Negative for visual disturbance  Respiratory: Negative for shortness of breath  Cardiovascular: Negative for chest pain  Gastrointestinal: Negative for nausea and vomiting  Musculoskeletal: Positive for arthralgias and myalgias  Skin: Negative for rash and wound         Objective:  /79   Pulse 78   Ht 5' 2" (1 575 m)   BMI 38 78 kg/m²       Right Knee Exam     Muscle Strength   The patient has normal right knee strength  Tenderness   The patient is experiencing tenderness in the medial joint line  Range of Motion   Extension: 0   Flexion: 100     Tests   Varus: negative Valgus: negative    Other   Sensation: normal  Pulse: present    Comments:  No palpable warmth  Palpable bony enlargement  +Crepitance with ROM  Varus alignment      Left Knee Exam     Muscle Strength   The patient has normal left knee strength  Tenderness   The patient is experiencing tenderness in the medial joint line  Range of Motion   Extension: 0   Flexion: 100     Tests   Varus: negative Valgus: negative    Other   Sensation: normal  Pulse: present    Comments:  No palpable warmth  Palpable bony enlargement  +Crepitance with ROM  Varus alignment                Physical Exam  Vitals signs and nursing note reviewed  Constitutional:       Appearance: Normal appearance  HENT:      Head: Normocephalic and atraumatic  Nose: Nose normal       Mouth/Throat:      Mouth: Mucous membranes are moist    Eyes:      Extraocular Movements: Extraocular movements intact  Conjunctiva/sclera: Conjunctivae normal    Neck:      Musculoskeletal: Normal range of motion  Cardiovascular:      Rate and Rhythm: Normal rate  Pulses: Normal pulses  Pulmonary:      Effort: Pulmonary effort is normal       Breath sounds: Normal breath sounds  Abdominal:      Palpations: Abdomen is soft  Musculoskeletal:      Comments: See Ortho Exam   Skin:     General: Skin is warm  Capillary Refill: Capillary refill takes less than 2 seconds  Neurological:      General: No focal deficit present  Mental Status: She is alert  Mental status is at baseline     Psychiatric:         Mood and Affect: Mood normal          Behavior: Behavior normal        Large joint arthrocentesis: L knee  Universal Protocol:  Consent given by: patient  Time out: Immediately prior to procedure a "time out" was called to verify the correct patient, procedure, equipment, support staff and site/side marked as required  Timeout called at: 2/23/2021 8:27 AM   Patient understanding: patient states understanding of the procedure being performed  Patient identity confirmed: verbally with patient    Supporting Documentation  Indications: pain   Procedure Details  Location: knee - L knee  Needle size: 22 G  Ultrasound guidance: no  Medications administered: 6 mg betamethasone acetate-betamethasone sodium phosphate 6 (3-3) mg/mL; 2 mL bupivacaine 0 25 %; 2 mL lidocaine 1 %    Patient tolerance: patient tolerated the procedure well with no immediate complications  Dressing:  Sterile dressing applied    Large joint arthrocentesis: R knee  Universal Protocol:  Consent given by: patient  Time out: Immediately prior to procedure a "time out" was called to verify the correct patient, procedure, equipment, support staff and site/side marked as required  Timeout called at: 2/23/2021 8:28 AM   Patient understanding: patient states understanding of the procedure being performed  Patient identity confirmed: verbally with patient    Supporting Documentation  Indications: pain   Procedure Details  Location: knee - R knee  Preparation: Patient was prepped and draped in the usual sterile fashion  Needle size: 22 G  Ultrasound guidance: no  Medications administered: 6 mg betamethasone acetate-betamethasone sodium phosphate 6 (3-3) mg/mL; 2 mL bupivacaine 0 25 %; 2 mL lidocaine 1 %    Patient tolerance: patient tolerated the procedure well with no immediate complications  Dressing:  Sterile dressing applied        I have personally reviewed pertinent films in PACS and my interpretation is as follows      XraysWB right knee show bone on bone apposition in the medial and patellofemoral compartments, advanced arthritic changes    Xrays WB Left knee show advanced degenerative changes in the medial and patellofemoral compartments as evidenced by joint space narrowuing, subchondral sclerosis and osteophyte formation

## 2021-02-25 ENCOUNTER — IMMUNIZATIONS (OUTPATIENT)
Dept: FAMILY MEDICINE CLINIC | Facility: HOSPITAL | Age: 66
End: 2021-02-25

## 2021-02-25 DIAGNOSIS — Z23 ENCOUNTER FOR IMMUNIZATION: Primary | ICD-10-CM

## 2021-02-25 PROCEDURE — 91300 SARS-COV-2 / COVID-19 MRNA VACCINE (PFIZER-BIONTECH) 30 MCG: CPT

## 2021-02-25 PROCEDURE — 0001A SARS-COV-2 / COVID-19 MRNA VACCINE (PFIZER-BIONTECH) 30 MCG: CPT

## 2021-03-10 ENCOUNTER — APPOINTMENT (OUTPATIENT)
Dept: PHYSICAL THERAPY | Facility: CLINIC | Age: 66
End: 2021-03-10
Payer: COMMERCIAL

## 2021-03-17 ENCOUNTER — EVALUATION (OUTPATIENT)
Dept: PHYSICAL THERAPY | Facility: CLINIC | Age: 66
End: 2021-03-17
Payer: COMMERCIAL

## 2021-03-17 DIAGNOSIS — M25.561 CHRONIC PAIN OF RIGHT KNEE: ICD-10-CM

## 2021-03-17 DIAGNOSIS — M25.562 CHRONIC PAIN OF LEFT KNEE: Primary | ICD-10-CM

## 2021-03-17 DIAGNOSIS — G89.29 CHRONIC PAIN OF LEFT KNEE: Primary | ICD-10-CM

## 2021-03-17 DIAGNOSIS — G89.29 CHRONIC PAIN OF RIGHT KNEE: ICD-10-CM

## 2021-03-17 PROCEDURE — 97162 PT EVAL MOD COMPLEX 30 MIN: CPT | Performed by: PHYSICAL THERAPIST

## 2021-03-17 PROCEDURE — 97110 THERAPEUTIC EXERCISES: CPT | Performed by: PHYSICAL THERAPIST

## 2021-03-17 PROCEDURE — 97112 NEUROMUSCULAR REEDUCATION: CPT | Performed by: PHYSICAL THERAPIST

## 2021-03-17 NOTE — PROGRESS NOTES
PT Evaluation     Today's date: 3/17/2021  Patient name: Kyala Schwab  : 1955  MRN: [id number]  Referring provider: Malka Herndon MD  Dx:   Encounter Diagnosis     ICD-10-CM    1  Chronic pain of left knee  M25 562     G89 29    2  Chronic pain of right knee  M25 561     G89 29                   Assessment  Assessment details: Pt presents with signs and symptoms synonymous of admitting diagnosis as well as is a mild fall risk per subjective indication and SLS assessment  Pt presents with pain, decreased strength, decreased range, flexibility, as well as tolerance to activity and gait dysfunction  Pt would benefit skilled PT intervention in order to address these impairments in order to be able to perform all desired activities with minimal to nil symptom exacerbation with end goal of progressive exercise program education for ability to perform independently  Thank you very much for this referral      Impairments: abnormal gait, abnormal or restricted ROM, activity intolerance, impaired balance, impaired physical strength, lacks appropriate home exercise program, pain with function and poor posture   Understanding of Dx/Px/POC: good   Prognosis: good    Goals  STG 4 Weeks:  Decrease pain at worst to 2/10  Improve range to R knee to 5-115  Improve strength to Hip to 4-  Independent with HEP  LTG 8 Weeks:  Decrease pain at worst to 1/10  Improve range R knee to 0-125  Improve strength to hip to 4/5 or better     Able to perform all desired activities with minimal to nil symptom exacerbation      Plan  Patient would benefit from: skilled physical therapy  Planned modality interventions: cryotherapy, TENS and thermotherapy: hydrocollator packs  Planned therapy interventions: joint mobilization, manual therapy, abdominal trunk stabilization, neuromuscular re-education, patient education, strengthening, stretching, therapeutic activities, therapeutic exercise, transfer training, home exercise program, graded motor, graded exercise, graded activity, gait training, flexibility, functional ROM exercises and balance  Frequency: 2x week  Duration in weeks: 10  Treatment plan discussed with: patient        Subjective Evaluation    History of Present Illness  Date of onset: 3/17/2021  Mechanism of injury: Pt is a 76 yofemale who presents today and is familiar to this facility presents today with B/L knee pain for at least 5 years which she did seek treatment at this facility  [place] so there are not many of stairs, but she noticed this is a great challenge as well as increased walking mobility  Pt reports meeting with Dr Jose Rodriguez, and she referred her to Dr Linda Jang, on 2021 and he she received injection in both knees which has improved mobility, and was referred to PT and thus presents today  Pt reports pain is low, 2-3/10, and she indicates there are periods of time with out pain  The L knee is typically worse than the R, and in the back, but both have been treating her well especially since injection  Pt reports that her largest goals is that she has recently over the last 1 5 years lost 35#s  Pt reports that she would like to perform structured exercise activity in particular swimming, improvement with stairs, schedule walking regiments as well as improved ease of the family vehicle which is a high  truck that she uses a step stool for entrance  Pt denies change in bowel or bladder, no numbness/tingling, denies buckling and locking in the knees, but the R hip a little while ago did have a locking like presentation within the last month that did stop her from walking  This has not happened recently  Pt reports that she follows up with him in 3 months       Quality of life: good    Pain  Current pain ratin  At best pain ratin  At worst pain rating: 3  Quality: dull ache  Relieving factors: rest  Aggravating factors: standing, walking and stair climbing  Progression: improved      Diagnostic Tests  X-ray: normal  Treatments  Previous treatment: injection treatment and physical therapy  Patient Goals  Patient goals for therapy: decreased edema, decreased pain, improved balance, increased motion, increased strength and return to sport/leisure activities          Objective     Active Range of Motion   Left Knee   Flexion: 125 degrees   Extension: 0 degrees     Right Knee   Flexion: 110 (PROM 115) degrees   Extension: -12 (-9) degrees     Additional Active Range of Motion Details  Sensation intact to light touch L3,4,5,S1,S2  Genu Valgum L > R, mild antalgia with poor IC and push off of L LE  SLS L 3" R 4"  SLS HR L R (NT)  Hip Strength  L Flex 4- Ext 5 Abd 3+ Add 4  R Flex 4- Ext 5 Abd 3+ Add 4-  Knee Strength  L ext/ flex 5/5  R ext / flex 5/5  LE Screen: B/L Strong and painless  Palpation: medial joint line B/L  Joint Mobility:  L AP PA ML LM G2 patellar gross 4  R AP PA ML LM G2 patellar gross 4  STs:   L + Hemanth/Scour  (-) Valgus/Varus//Ant/Post Lachman/ Drawer  R + Hemanth/Scour  (-) Valgus/Varus//Ant/Post Lachman/ Drawer  Poor Trunk control with bridge  TUG: 10 65" no AD  Precautions: Fear of falling, Hypothyroidism, HTN hx, 3 pillows supine         Manuals 3/17            AP PA Tib on Femur R G2-4             PROM with Soft Tissue mobilization R knee                                       Neuro Re-Ed             Bridge 4 x 5            SLS UE Support B 10" x 4            Hip Abd + Ext             Pball Press down              NBOS EC             NBOS Foam                          Ther Ex             Bike             HR/TR 2 x 10            Gastroc ST with Wedge B             QS for lubrication review            SLR Flex             Side Steps    RTB                      LP + HR   65#          Ther Activity             Sit to stand             Mini Squats             Gait Training             Step up + Down  4"                        Modalities CP to B knee prn education performed for independent use

## 2021-03-18 ENCOUNTER — IMMUNIZATIONS (OUTPATIENT)
Dept: FAMILY MEDICINE CLINIC | Facility: HOSPITAL | Age: 66
End: 2021-03-18

## 2021-03-18 DIAGNOSIS — Z23 ENCOUNTER FOR IMMUNIZATION: Primary | ICD-10-CM

## 2021-03-18 PROCEDURE — 91300 SARS-COV-2 / COVID-19 MRNA VACCINE (PFIZER-BIONTECH) 30 MCG: CPT

## 2021-03-18 PROCEDURE — 0002A SARS-COV-2 / COVID-19 MRNA VACCINE (PFIZER-BIONTECH) 30 MCG: CPT

## 2021-03-22 ENCOUNTER — OFFICE VISIT (OUTPATIENT)
Dept: PHYSICAL THERAPY | Facility: CLINIC | Age: 66
End: 2021-03-22
Payer: COMMERCIAL

## 2021-03-22 DIAGNOSIS — M25.561 CHRONIC PAIN OF RIGHT KNEE: ICD-10-CM

## 2021-03-22 DIAGNOSIS — G89.29 CHRONIC PAIN OF LEFT KNEE: Primary | ICD-10-CM

## 2021-03-22 DIAGNOSIS — G89.29 CHRONIC PAIN OF RIGHT KNEE: ICD-10-CM

## 2021-03-22 DIAGNOSIS — M25.562 CHRONIC PAIN OF LEFT KNEE: Primary | ICD-10-CM

## 2021-03-22 PROCEDURE — 97140 MANUAL THERAPY 1/> REGIONS: CPT

## 2021-03-22 PROCEDURE — 97112 NEUROMUSCULAR REEDUCATION: CPT

## 2021-03-22 PROCEDURE — 97110 THERAPEUTIC EXERCISES: CPT

## 2021-03-22 NOTE — PROGRESS NOTES
Daily Note     Today's date: 3/22/2021  Patient name: Della Iqbal  : 1955  MRN: [id number]  Referring provider: Maddi Navarro MD  Dx:   Encounter Diagnosis     ICD-10-CM    1  Chronic pain of left knee  M25 562     G89 29    2  Chronic pain of right knee  M25 561     G89 29                   Subjective: Reviewed HEP with patient   No complaints of pain with outlined program        Objective: See treatment diary below      Assessment: Tolerated treatment well  Patient exhibited good technique with therapeutic exercises      Plan: Continue per plan of care  Precautions: Fear of falling, Hypothyroidism, HTN hx, 3 pillows supine         Manuals 3/17 3/22           AP PA Tib on Femur R G2-4  5- TS           PROM with Soft Tissue mobilization R knee  5                                      Neuro Re-Ed             Bridge 4 x 5 review           SLS UE Support B 10" x 4 10" x 4            Hip Abd + Ext  1 x 10            Pball Press down              NBOS EC             NBOS Foam                          Ther Ex             Bike  6 min            HR/TR 2 x 10 2 x 10            Gastroc ST with Wedge B  :15 x 4 B           QS for lubrication review :3 x 10            SLR Flex  1 x 10            Side Steps    RTB         LAQ  1 x 10            LP + HR   65#          Ther Activity             Sit to stand             Mini Squats             Gait Training             Step up + Down  4" x 10 B                        Modalities             CP to B knee prn education performed for independent use

## 2021-03-24 ENCOUNTER — OFFICE VISIT (OUTPATIENT)
Dept: PHYSICAL THERAPY | Facility: CLINIC | Age: 66
End: 2021-03-24
Payer: COMMERCIAL

## 2021-03-24 DIAGNOSIS — G89.29 CHRONIC PAIN OF LEFT KNEE: Primary | ICD-10-CM

## 2021-03-24 DIAGNOSIS — M25.561 CHRONIC PAIN OF RIGHT KNEE: ICD-10-CM

## 2021-03-24 DIAGNOSIS — G89.29 CHRONIC PAIN OF RIGHT KNEE: ICD-10-CM

## 2021-03-24 DIAGNOSIS — M25.562 CHRONIC PAIN OF LEFT KNEE: Primary | ICD-10-CM

## 2021-03-24 PROCEDURE — 97112 NEUROMUSCULAR REEDUCATION: CPT

## 2021-03-24 PROCEDURE — 97110 THERAPEUTIC EXERCISES: CPT

## 2021-03-24 NOTE — PROGRESS NOTES
Daily Note     Today's date: 3/24/2021  Patient name: Karla Lockwood  : 1955  MRN: [id number]  Referring provider: Van Schaefer MD  Dx:   Encounter Diagnosis     ICD-10-CM    1  Chronic pain of left knee  M25 562     G89 29    2  Chronic pain of right knee  M25 561     G89 29                   Subjective: Some soreness in B knees following HEP  No complaints to start treatment today  Objective: See treatment diary below      Assessment: Tolerated treatment fair  Patient exhibited good technique with therapeutic exercises      Plan: Continue per plan of care  Precautions: Fear of falling, Hypothyroidism, HTN hx, 3 pillows supine         Manuals 3/17 3/22 3/24          AP PA Tib on Femur R G2-4  5- TS           PROM with Soft Tissue mobilization R knee  5  5                                    Neuro Re-Ed             Bridge 4 x 5 review           SLS UE Support B 10" x 4 10" x 4  10" x 4          Hip Abd + Ext  1 x 10  1 x 10          Pball Press down              NBOS EC   1 min           NBOS Foam                          Ther Ex             Bike  6 min  6 min           HR/TR 2 x 10 2 x 10  2 x 10           Gastroc ST with Wedge B  :15 x 4 B :20 x 4 B          QS for lubrication review :3 x 10            SLR Flex  1 x 10  1 x 10           Side Steps    RTB         LAQ  1 x 10  1# 2 x 10           LP + HR   65# x 20           Ther Activity             Sit to stand             Mini Squats             Gait Training             Step up + Down  4" x 10 B 6" x 10 B                       Modalities             CP to B knee prn education performed for independent use

## 2021-03-25 ENCOUNTER — TELEPHONE (OUTPATIENT)
Dept: NEPHROLOGY | Facility: CLINIC | Age: 66
End: 2021-03-25

## 2021-03-25 NOTE — TELEPHONE ENCOUNTER
Left patient a message reminding her of her lab work before her follow up appointment on 4/2/21  Flaco Cooper

## 2021-03-26 ENCOUNTER — APPOINTMENT (OUTPATIENT)
Dept: LAB | Facility: AMBULARY SURGERY CENTER | Age: 66
End: 2021-03-26
Payer: COMMERCIAL

## 2021-03-26 ENCOUNTER — TRANSCRIBE ORDERS (OUTPATIENT)
Dept: LAB | Facility: AMBULARY SURGERY CENTER | Age: 66
End: 2021-03-26

## 2021-03-26 DIAGNOSIS — N18.32 CHRONIC KIDNEY DISEASE (CKD) STAGE G3B/A1, MODERATELY DECREASED GLOMERULAR FILTRATION RATE (GFR) BETWEEN 30-44 ML/MIN/1.73 SQUARE METER AND ALBUMINURIA CREATININE RATIO LESS THAN 30 MG/G (HCC): ICD-10-CM

## 2021-03-26 DIAGNOSIS — F41.8 DEPRESSION WITH ANXIETY: ICD-10-CM

## 2021-03-26 DIAGNOSIS — N18.32 CHRONIC KIDNEY DISEASE (CKD) STAGE G3B/A1, MODERATELY DECREASED GLOMERULAR FILTRATION RATE (GFR) BETWEEN 30-44 ML/MIN/1.73 SQUARE METER AND ALBUMINURIA CREATININE RATIO LESS THAN 30 MG/G (HCC): Primary | ICD-10-CM

## 2021-03-26 DIAGNOSIS — E55.9 AVITAMINOSIS D: ICD-10-CM

## 2021-03-26 LAB
25(OH)D3 SERPL-MCNC: 39.9 NG/ML (ref 30–100)
ALBUMIN SERPL BCP-MCNC: 3.6 G/DL (ref 3.5–5)
ANION GAP SERPL CALCULATED.3IONS-SCNC: 4 MMOL/L (ref 4–13)
BUN SERPL-MCNC: 23 MG/DL (ref 5–25)
CALCIUM SERPL-MCNC: 8.9 MG/DL (ref 8.3–10.1)
CHLORIDE SERPL-SCNC: 116 MMOL/L (ref 100–108)
CO2 SERPL-SCNC: 24 MMOL/L (ref 21–32)
CREAT SERPL-MCNC: 1.52 MG/DL (ref 0.6–1.3)
CREAT UR-MCNC: 41.6 MG/DL
GFR SERPL CREATININE-BSD FRML MDRD: 35 ML/MIN/1.73SQ M
GLUCOSE P FAST SERPL-MCNC: 99 MG/DL (ref 65–99)
PHOSPHATE SERPL-MCNC: 3.3 MG/DL (ref 2.3–4.1)
PHOSPHATE SERPL-MCNC: 3.4 MG/DL (ref 2.3–4.1)
POTASSIUM SERPL-SCNC: 3.9 MMOL/L (ref 3.5–5.3)
PROT UR-MCNC: 13 MG/DL
PROT/CREAT UR: 0.31 MG/G{CREAT} (ref 0–0.1)
PTH-INTACT SERPL-MCNC: 88.7 PG/ML (ref 18.4–80.1)
SODIUM SERPL-SCNC: 144 MMOL/L (ref 136–145)

## 2021-03-26 PROCEDURE — 80069 RENAL FUNCTION PANEL: CPT

## 2021-03-26 PROCEDURE — 82306 VITAMIN D 25 HYDROXY: CPT

## 2021-03-26 PROCEDURE — 84100 ASSAY OF PHOSPHORUS: CPT

## 2021-03-26 PROCEDURE — 82570 ASSAY OF URINE CREATININE: CPT

## 2021-03-26 PROCEDURE — 36415 COLL VENOUS BLD VENIPUNCTURE: CPT

## 2021-03-26 PROCEDURE — 84156 ASSAY OF PROTEIN URINE: CPT

## 2021-03-26 PROCEDURE — 83970 ASSAY OF PARATHORMONE: CPT

## 2021-03-30 ENCOUNTER — APPOINTMENT (OUTPATIENT)
Dept: PHYSICAL THERAPY | Facility: CLINIC | Age: 66
End: 2021-03-30
Payer: COMMERCIAL

## 2021-04-01 ENCOUNTER — OFFICE VISIT (OUTPATIENT)
Dept: PHYSICAL THERAPY | Facility: CLINIC | Age: 66
End: 2021-04-01
Payer: COMMERCIAL

## 2021-04-01 DIAGNOSIS — M25.561 CHRONIC PAIN OF RIGHT KNEE: ICD-10-CM

## 2021-04-01 DIAGNOSIS — G89.29 CHRONIC PAIN OF LEFT KNEE: Primary | ICD-10-CM

## 2021-04-01 DIAGNOSIS — G89.29 CHRONIC PAIN OF RIGHT KNEE: ICD-10-CM

## 2021-04-01 DIAGNOSIS — M25.562 CHRONIC PAIN OF LEFT KNEE: Primary | ICD-10-CM

## 2021-04-01 PROCEDURE — 97112 NEUROMUSCULAR REEDUCATION: CPT | Performed by: PHYSICAL THERAPIST

## 2021-04-01 PROCEDURE — 97110 THERAPEUTIC EXERCISES: CPT | Performed by: PHYSICAL THERAPIST

## 2021-04-01 PROCEDURE — 97140 MANUAL THERAPY 1/> REGIONS: CPT | Performed by: PHYSICAL THERAPIST

## 2021-04-01 NOTE — PROGRESS NOTES
Daily Note     Today's date: 2021  Patient name: Virgen Fleming  : 1955  MRN: [id number]  Referring provider: Damion Hernandes MD  Dx:   Encounter Diagnosis     ICD-10-CM    1  Chronic pain of left knee  M25 562     G89 29    2  Chronic pain of right knee  M25 561     G89 29                   Subjective:  Pt presents today stating that she is feeling fairly well  Was active out in the yard a few days ago and having mild soreness due to the weather today  Pt's largest concern is balance right now  Objective: See treatment diary below      Assessment:  Proceeded with Reps today without pain, mild fatigue  Balance is a challenge for pt requiring UE support during the session  Update HEP  Plan: Continue per plan of care  Precautions: Fear of falling, Hypothyroidism, HTN hx, 3 pillows supine         Manuals 3/17 3/22 3/24 4/1         AP PA Tib on Femur R G2-4  5- TS  5         PROM with Soft Tissue mobilization R knee  5  5 5                                   Neuro Re-Ed             Bridge 4 x 5 review           SLS UE Support B 10" x 4 10" x 4  10" x 4 10" x 4         Hip Abd + Ext  1 x 10  1 x 10 2 x 10         Pball Press down              NBOS EC   1 min  1 min         NBOS Foam    1 min                      Ther Ex             Bike  6 min  6 min  6 min         HR/TR 2 x 10 2 x 10  2 x 10  2 x 10         Gastroc ST with Wedge B  :15 x 4 B :20 x 4 B 20" x 4         QS for lubrication review :3 x 10            SLR Flex  1 x 10  1 x 10  2 x10         Side Steps    RTB         LAQ  1 x 10  1# 2 x 10  2# 2 x 10         LP + HR   65# x 20  65# 20x         Ther Activity             Sit to stand             Mini Squats    nv         Gait Training             Step up + Down  4" x 10 B 6" x 10 B 6" x 15                      Modalities             CP to B knee prn education performed for independent use

## 2021-04-02 ENCOUNTER — OFFICE VISIT (OUTPATIENT)
Dept: NEPHROLOGY | Facility: CLINIC | Age: 66
End: 2021-04-02
Payer: COMMERCIAL

## 2021-04-02 VITALS
HEIGHT: 62 IN | SYSTOLIC BLOOD PRESSURE: 118 MMHG | DIASTOLIC BLOOD PRESSURE: 80 MMHG | RESPIRATION RATE: 18 BRPM | HEART RATE: 72 BPM | BODY MASS INDEX: 39.01 KG/M2 | WEIGHT: 212 LBS

## 2021-04-02 DIAGNOSIS — N25.81 SECONDARY HYPERPARATHYROIDISM OF RENAL ORIGIN (HCC): ICD-10-CM

## 2021-04-02 DIAGNOSIS — E55.9 VITAMIN D DEFICIENCY: ICD-10-CM

## 2021-04-02 DIAGNOSIS — K21.9 GASTROESOPHAGEAL REFLUX DISEASE WITHOUT ESOPHAGITIS: ICD-10-CM

## 2021-04-02 DIAGNOSIS — R80.1 PERSISTENT PROTEINURIA: ICD-10-CM

## 2021-04-02 DIAGNOSIS — N18.32 STAGE 3B CHRONIC KIDNEY DISEASE (HCC): Primary | ICD-10-CM

## 2021-04-02 PROCEDURE — 99214 OFFICE O/P EST MOD 30 MIN: CPT | Performed by: INTERNAL MEDICINE

## 2021-04-02 RX ORDER — CALCITRIOL 0.25 UG/1
0.25 CAPSULE, LIQUID FILLED ORAL 3 TIMES WEEKLY
Qty: 36 CAPSULE | Refills: 4 | Status: SHIPPED | OUTPATIENT
Start: 2021-04-02 | End: 2021-10-29

## 2021-04-02 NOTE — PATIENT INSTRUCTIONS
- start calcitriol 0 25mcg three times a week    - Please call me in 10 days after having your blood work done to review the results if you do not hear back from me or my office, as I may have not received the results  - please remember to perform blood work prior to the next visit  - Please call if the blood pressure top number is greater than 150 or less than 110 consistently  - Please call if you are gaining more than 2lbs in 2 days for adjustment of water pills  - Please call me in 10 days after having your blood work done to review the results if you do not hear back from me or my office, as I may have not received the results  - please remember to perform blood work prior to the next visit  - Please call if the blood pressure top number is greater than 150 or less than 110 consistently  - Please call if you are gaining more than 2lbs in 2 days for adjustment of water pills   ~ Please AVOID the following pain medications  LIST OF NSAIDS (NONSTEROIDAL ANTI-INFLAMMATORY DRUGS) AND MOORE-2 INHIBITORS    DIFLUNISAL (DOLOBID)  IBUPROFEN (MOTRIN, ADVIL)  FLURBIPROFEN (ANSAID)  KETOPROFEN (ORUDIS, ORUVAIL)  FENOPROFEN (NALFON)  NABUMETONE (RELAFEN)  PIROXICAM (FELDENE)  NAPROXEN (ALEVE, NAPROSYN, NAPRELAN, ANAPROX)  DICLOFENAC (VOLTAREN, CATAFLAM)  INDOMETHACIN (INDOCIN)  SULINDAC (CLINORIL)  TOLMETIN (TOLETIN)  ETODOLAC (LODINE)  MELOXICAM (MOBIC)  KETOROLAC (TORADOL)  OXAPROZIN (DAYPRO)  CELECOXIB (CELEBREX)    Phosphorus diet  Follow a low phosphorus diet      Avoid these higher phosphorus foods: Choose these lower phosphorus foods:   Milk, pudding or yogurt (from animals and from many soy varieties) Rice milk (unfortified), nondairy creamer (if it doesn't have terms in the ingredients list that contain the letters "phos")   Hard cheeses, ricotta or cottage cheese, fat-free cream cheese Regular and low-fat cream cheese   Ice cream or frozen yogurt Sherbet or frozen fruit pops   Soups made with higher phosphorus ingredients (milk, dried peas, beans, lentils) Soups made with lower phosphorus ingredients (broth- or water-based with other lower phosphorus ingredients)   Whole grains, including whole-grain breads, crackers, cereal, rice and pasta Refined grains, including white bread, crackers, cereals, rice and pasta   Quick breads, biscuits, cornbread, muffins, pancakes or waffles Homemade refined (white) dinner rolls, bagels or English muffins   Dried peas (split, black-eyed), beans (black, garbanzo, lima, kidney, navy, dacosta) or lentils Green peas (canned, frozen), green beans or wax beans   Organ meats, walleye, pollock or sardines Lean beef, pork, lamb, poultry or other fish   Nuts and seeds Popcorn   Peanut butter and other nut butters Jam, jelly or honey   Chocolate, including chocolate drinks Carob (chocolate-flavored) candy, hard candy or gumdrops   Madeline and pepper-type sodas, flavored orozco, bottled teas (if a term in the ingredients list contains the letters "phos") Lemon-lime soda, ginger ale or root beer, plain water     Follow a moderate potassium diet  Exercise to Lose Weight     Exercise and a healthy diet may help you lose weight  Your doctor may suggest specific exercises  EXERCISE IDEAS AND TIPS      Choose low-cost things you enjoy doing, such as walking, bicycling, or exercising to workout videos   Take stairs instead of the elevator   Walk during your lunch break   Park your car further away from work or school   Go to a gym or an exercise class   Start with 5 to 10 minutes of exercise each day  Build up to 30 minutes of exercise 4 to 6 days a week   Wear shoes with good support and comfortable clothes   Stretch before and after working out   Work out until you breathe harder and your heart beats faster   Drink extra water when you exercise   Do not do so much that you hurt yourself, feel dizzy, or get very short of breath       Exercises that burn about 150 calories:    Running 1 ½ miles in 15 minutes   Playing volleyball for 45 to 60 minutes   Washing and waxing a car for 45 to 60 minutes   Playing touch football for 45 minutes   Walking 1 ¾ miles in 35 minutes   Pushing a stroller 1 ½ miles in 30 minutes   Playing basketball for 30 minutes   Raking leaves for 30 minutes   Bicycling 5 miles in 30 minutes   Walking 2 miles in 30 minutes   Dancing for 30 minutes   Shoveling snow for 15 minutes   Swimming laps for 20 minutes   Walking up stairs for 15 minutes   Bicycling 4 miles in 15 minutes   Gardening for 30 to 45 minutes   Jumping rope for 15 minutes  Washing windows or floors for 45 to 60 minutes

## 2021-04-02 NOTE — PROGRESS NOTES
Nephrology Follow up Consultation  Anat Liu 76 y o  female MRN: [id number]            BACKGROUND:  Anat Liu is a 76 y  o female who was referred by Eric Still DO for evaluation of Follow-up and Chronic Kidney Disease    ASSESSMENT / PLAN:   76 y o   feamle with pmh of multiple co-morbidities including JULIO on bipap, CKD stage 3, GERD, bipolar, depression, hypothyroidism, obesity, vitamin-D deficiency, arthritis presents to the office for routine follow-up  CKD stage IIIB:  - Patient has a baseline creatinine of 1 2-1 8 mg/dL  After episode of acute kidney injury in 2020  Most recent labs show a Creatinine of 1 52 mg/dL on 3/26/21  Renal function stable   -likely has underlying CKD secondary to age-related nephron loss plus prior episode of acute kidney injury non dialysis requiring plus chronic lithium use and prior NSAID use plus obesity related hyper filtration  - CT abdomen from September 2020 showing 1 8 cm Lower interpolar cyst on the left kidney no hydronephrosis  - Acid base and lytes stable  - Clinically the patient appears to be euvolemic  - Recommend to avoid use of NSAIDs, nephrotoxins  Caution advised with regards to exposure to IV contrast dye    - Discussed with the patient in depth her renal status, including the possible etiologies for CKD  - Advised the patient that when her GFR is close to 20mL/min then will start discussing about RRT(renal replacement therapy) options such as renal transplant, peritoneal dialysis and hemodialysis  - Informed the patient about the various options for Renal Replacement therapy  - Discussed with the patient how we need to work together to delay the progression of CKD with optimal BP control based on their age and co-morbidities and trying to reduce proteinuria by the use of anti-proteinuric agents  - referral to CKD education/kidney smart placed on 12/21/2020, completed 01/18/2021    Blood pressure:  - Patient is on no meds     - Goal BP of <  140/90 based on age and comorbidities  - Instructed to follow low sodium (2gm)diet  Hemoglobin:  - Goal Hb of 10-12 g/dL  - Most recent labs suggestive of 14 6 grams/deciliter   - no role for IV iron at this time    CKD-MBD(Mineral Bone Disease)/vitamin-D deficiency / secondary hyperparathyroidism of renal origin:  - Based on patients CKD stage following is the goal of therapy  - Maintain calcium phosphorus product of < 55   - Stage 3 CKD - Goal Ca 8 5-10 mg/dL , goal Phos 2 7-4 6 mg/dL  , goal iPTH 30-70 pg/mL  - Patient is currently not at goal   - Continue patient on vitamin-D 2000 units p o  Q day, start calcitriol 0 25 mcg 3 times a week  - Patients' most recent vitamin D level is 39 9 and intact PTH of 88 as of  March 2021  - check intact PTH and vitamin-D levels    Proteinuria:    - proteinuria most likely secondary to obesity related hyper filtration  - most recent UA from 2017 with 30+ protein  - most recent protein creatinine ratio of 310 mg as of March 2021  - check protein creatinine ratio  - currently on therapy for proteinuria with vitamin-D    Lipids:  - goal LDL less than 70  - Management as per PCP    GERD:  - management as per Primary team  - no longer on Prilosec  - currently on Pepcid  Bipolar/depression:  - Management as per primary team  - follow up with psych  -no longer on lithium only on Latuda at renal dosage  Nutrition / obesity:  - Encouraged patient to follow a renal diet comprising of moderate potassium, low phosphorus and protein restriction to 0 8gm/kg  Advise of dietary habits and weight loss  - Will check serum albumin with next blood work  Followup:  - Patient is to follow-up in 6 months, with lab work to be performed in a few days prior to the next visit  Advised patient to call me in 10 days to review the results if they do not hear back from me, as I may have not received the results        [facility]els[facility] Correa [facility], 4/2/2021, 10:43 AM SUBJECTIVE: 76 y o  female presents to the office for routine follow-up  Overall feels well has no complaints no recent hospitalization no NSAID use no issues with edema not checking blood pressures at home working with physical therapy  Is having some frequent urination however urine tested been negative for UTIs  Will follow up with Urology  Happy to hear renal parameters are stable      Review of Systems   Constitutional: Negative for appetite change, chills, fatigue and fever  HENT: Negative for congestion, postnasal drip, rhinorrhea and sore throat  Respiratory: Negative for cough, shortness of breath and wheezing  Cardiovascular: Negative for leg swelling  Gastrointestinal: Negative for abdominal pain, constipation, diarrhea, nausea and vomiting  Endocrine: Positive for polyuria  Genitourinary: Negative for difficulty urinating, dysuria and hematuria  Musculoskeletal: Negative for back pain  Skin: Negative for rash and wound  Neurological: Negative for dizziness and headaches  Psychiatric/Behavioral: Negative for agitation and confusion  All other systems reviewed and are negative        PAST MEDICAL HISTORY:  Past Medical History:   Diagnosis Date    Arthritis     [facility]     Disease of thyroid gland     Elevated blood pressure reading 6/10/2019       PROBLEM LIST    Patient Active Problem List   Diagnosis    Bipolar 1 disorder, mixed, severe ([address] )    Depression with anxiety    Gastroesophageal reflux disease without esophagitis    Hypothyroidism    Impaired fasting glucose    Insomnia    Obstructive sleep apnea    Onychomycosis of toenail    Patellofemoral arthritis of right knee    Stress incontinence of urine    Well adult exam    High triglycerides    BMI 39 0-39 9,adult    Abnormal EKG    Encounter for immunization    Localized edema    Palpitations    Raynaud's disease without gangrene    Stage 3 chronic kidney disease    Constipation    Medicare annual wellness visit, subsequent    Vitamin D deficiency    Primary osteoarthritis of left knee    Primary osteoarthritis of right knee    Secondary hyperparathyroidism of renal origin ([address] )    Persistent proteinuria       PAST SURGICAL HISTORY:  Past Surgical History:   Procedure Laterality Date    COLONOSCOPY  2011    NO PAST SURGERIES         SOCIAL HISTORY :   reports that she quit smoking about 14 years ago  Her smoking use included cigarettes  She has a 7 50 pack-year smoking history  She has never used smokeless tobacco  She reports current alcohol use  She reports that she does not use drugs  FAMILY HISTORY:  Family History   Problem Relation Age of Onset    Heart disease Mother     Heart Valve Disease Mother         Replacement    Diabetes Mother     Arthritis Father        ALLERGIES:  Allergies   Allergen Reactions    Other      Other reaction(s): Anaphylaxis  [facility] - [id number]: pljarrett peaches           PHYSICAL EXAM:  Vitals:    04/02/21 1024   BP: 118/80   BP Location: Left arm   Patient Position: Sitting   Cuff Size: Large   Pulse: 72   Resp: 18   Weight: 96 2 kg (212 lb)   Height: 5' 2" (1 575 m)     Body mass index is 38 78 kg/m²  Physical Exam  Vitals signs reviewed  Constitutional:       General: She is not in acute distress  Appearance: Normal appearance  She is obese  She is not ill-appearing, toxic-appearing or diaphoretic  HENT:      Head: Normocephalic and atraumatic  Mouth/Throat:      Mouth: Mucous membranes are moist       Pharynx: Oropharynx is clear  No oropharyngeal exudate  Eyes:      General: No scleral icterus  Conjunctiva/sclera: Conjunctivae normal    Neck:      Musculoskeletal: Normal range of motion and neck supple  Cardiovascular:      Rate and Rhythm: Normal rate  Heart sounds: Normal heart sounds  No friction rub  Pulmonary:      Effort: Pulmonary effort is normal  No respiratory distress        Breath sounds: No wheezing  Abdominal:      General: There is no distension  Palpations: Abdomen is soft  There is no mass  Tenderness: There is no abdominal tenderness  There is no right CVA tenderness or left CVA tenderness  Musculoskeletal:         General: No swelling  Skin:     General: Skin is warm  Coloration: Skin is not jaundiced  Neurological:      General: No focal deficit present  Mental Status: She is alert and oriented to person, place, and time     Psychiatric:         Mood and Affect: Mood normal          Behavior: Behavior normal          LABORATORY DATA:     Results from last 6 Months   Lab Units 03/26/21  0717 03/26/21  0711 01/22/21  0612 12/09/20  0736   POTASSIUM mmol/L  --  3 9 4 0 4 7   CHLORIDE mmol/L  --  116* 114* 106   CO2 mmol/L  --  24 27 23   BUN mg/dL  --  23 28* 23   CREATININE mg/dL  --  1 52* 1 41* 1 68*   CALCIUM mg/dL  --  8 9 9 3  --    PHOSPHORUS mg/dL 3 4 3 3  --   --         rest all reviewed    RADIOLOGY:  No orders to display     Rest all reviewed        MEDICATIONS:    Current Outpatient Medications:     b complex vitamins tablet, Take 1 tablet by mouth daily, Disp: , Rfl:     Cholecalciferol (VITAMIN D) 2000 units CAPS, Take 1 capsule by mouth daily, Disp: , Rfl:     famotidine (PEPCID) 20 mg tablet, Take 1 tablet (20 mg total) by mouth daily, Disp: 90 tablet, Rfl: 3    Glucosamine-Chondroit-Vit C-Mn (GLUCOSAMINE 1500 COMPLEX PO), Take by mouth, Disp: , Rfl:     Latuda 20 MG tablet, TAKE 1 TABLET BY MOUTH  DAILY WITH BREAKFAST, Disp: 30 tablet, Rfl: 11    levothyroxine 88 mcg tablet, Take 1 tablet (88 mcg total) by mouth daily, Disp: 30 tablet, Rfl: 2    Multiple Vitamin (MULTI-VITAMIN DAILY) TABS, Take by mouth, Disp: , Rfl:     Myrbetriq 25 MG TB24, TAKE 1 TABLET BY MOUTH  DAILY AT BEDTIME, Disp: 90 tablet, Rfl: 3    nystatin (MYCOSTATIN) ointment, Apply topically 2 (two) times a day, Disp: 30 g, Rfl: 5    nystatin (MYCOSTATIN) powder, Apply topically 2 (two) times a day, Disp: 15 g, Rfl: 3    SOOLANTRA 1 % CREA, , Disp: , Rfl:     calcitriol (ROCALTROL) 0 25 mcg capsule, Take 1 capsule (0 25 mcg total) by mouth 3 (three) times a week, Disp: 36 capsule, Rfl: 4    divalproex sodium (DEPAKOTE) 250 mg EC tablet, Take 1 tablet (250 mg total) by mouth daily at bedtime, Disp: 30 tablet, Rfl: 2          Portions of the record may have been created with voice recognition software  Occasional wrong word or "sound a like" substitutions may have occurred due to the inherent limitations of voice recognition software  Read the chart carefully and recognize, using context, where substitutions have occurred  If you have any questions, please contact the dictating provider

## 2021-04-05 ENCOUNTER — OFFICE VISIT (OUTPATIENT)
Dept: PHYSICAL THERAPY | Facility: CLINIC | Age: 66
End: 2021-04-05
Payer: COMMERCIAL

## 2021-04-05 DIAGNOSIS — G89.29 CHRONIC PAIN OF RIGHT KNEE: ICD-10-CM

## 2021-04-05 DIAGNOSIS — M25.561 CHRONIC PAIN OF RIGHT KNEE: ICD-10-CM

## 2021-04-05 DIAGNOSIS — M25.562 CHRONIC PAIN OF LEFT KNEE: Primary | ICD-10-CM

## 2021-04-05 DIAGNOSIS — G89.29 CHRONIC PAIN OF LEFT KNEE: Primary | ICD-10-CM

## 2021-04-05 PROCEDURE — 97112 NEUROMUSCULAR REEDUCATION: CPT

## 2021-04-05 PROCEDURE — 97110 THERAPEUTIC EXERCISES: CPT

## 2021-04-05 NOTE — PROGRESS NOTES
Daily Note     Today's date: 2021  Patient name: Virgen Fleming  : 1955  MRN: [id number]  Referring provider: Damion Hernandes MD  Dx:   Encounter Diagnosis     ICD-10-CM    1  Chronic pain of left knee  M25 562     G89 29    2  Chronic pain of right knee  M25 561     G89 29                   Subjective: Patient states that she was sore following last treatment session  Patient is hesitant to begin walking outside due to a fear of falling   Objective: See treatment diary below      Assessment: Tolerated treatment well  Patient exhibited good technique with therapeutic exercises      Plan: Continue per plan of care  Precautions: Fear of falling, Hypothyroidism, HTN hx, 3 pillows supine         Manuals 3/17 3/22 3/24 4/1 4/5        AP PA Tib on Femur R G2-4  5- TS  5 5- TS        PROM with Soft Tissue mobilization R knee  5  5 5 5                                  Neuro Re-Ed             Bridge 4 x 5 review           SLS UE Support B 10" x 4 10" x 4  10" x 4 10" x 4 20"  4         Hip Abd + Ext  1 x 10  1 x 10 2 x 10 2 x 10        Pball Press down              NBOS EC   1 min  1 min         NBOS Foam    1 min 1 min         Side stepping on foam     4 laps         Ther Ex             Bike  6 min  6 min  6 min 6 min         HR/TR 2 x 10 2 x 10  2 x 10  2 x 10 2 x 10        Gastroc ST with Wedge B  :15 x 4 B :20 x 4 B 20" x 4 20" x 4        QS for lubrication review :3 x 10            SLR Flex  1 x 10  1 x 10  2 x10 2 x 10         Side Steps    RTB         LAQ  1 x 10  1# 2 x 10  2# 2 x 10 2# 2 x 10         LP + HR   65# x 20  65# 20x 65# 2 x 10         Ther Activity             Sit to stand     X 10         Mini Squats    nv         Gait Training             Step up + Down  4" x 10 B 6" x 10 B 6" x 15 6" x 15                      Modalities             CP to B knee prn education performed for independent use

## 2021-04-07 ENCOUNTER — APPOINTMENT (OUTPATIENT)
Dept: PHYSICAL THERAPY | Facility: CLINIC | Age: 66
End: 2021-04-07
Payer: COMMERCIAL

## 2021-04-08 ENCOUNTER — OFFICE VISIT (OUTPATIENT)
Dept: PHYSICAL THERAPY | Facility: CLINIC | Age: 66
End: 2021-04-08
Payer: COMMERCIAL

## 2021-04-08 ENCOUNTER — OFFICE VISIT (OUTPATIENT)
Dept: FAMILY MEDICINE CLINIC | Facility: CLINIC | Age: 66
End: 2021-04-08
Payer: COMMERCIAL

## 2021-04-08 VITALS
BODY MASS INDEX: 39.08 KG/M2 | WEIGHT: 212.4 LBS | OXYGEN SATURATION: 100 % | HEIGHT: 62 IN | SYSTOLIC BLOOD PRESSURE: 128 MMHG | RESPIRATION RATE: 16 BRPM | HEART RATE: 74 BPM | TEMPERATURE: 98.4 F | DIASTOLIC BLOOD PRESSURE: 78 MMHG

## 2021-04-08 DIAGNOSIS — G89.29 CHRONIC PAIN OF LEFT KNEE: Primary | ICD-10-CM

## 2021-04-08 DIAGNOSIS — M25.562 CHRONIC PAIN OF LEFT KNEE: Primary | ICD-10-CM

## 2021-04-08 DIAGNOSIS — F31.9 BIPOLAR 1 DISORDER (HCC): ICD-10-CM

## 2021-04-08 DIAGNOSIS — N18.32 STAGE 3B CHRONIC KIDNEY DISEASE (HCC): ICD-10-CM

## 2021-04-08 DIAGNOSIS — M25.561 CHRONIC PAIN OF RIGHT KNEE: ICD-10-CM

## 2021-04-08 DIAGNOSIS — R35.0 URINARY FREQUENCY: Primary | ICD-10-CM

## 2021-04-08 DIAGNOSIS — G89.29 CHRONIC PAIN OF RIGHT KNEE: ICD-10-CM

## 2021-04-08 DIAGNOSIS — F31.63 BIPOLAR 1 DISORDER, MIXED, SEVERE (HCC): ICD-10-CM

## 2021-04-08 LAB
SL AMB  POCT GLUCOSE, UA: ABNORMAL
SL AMB LEUKOCYTE ESTERASE,UA: ABNORMAL
SL AMB POCT BILIRUBIN,UA: ABNORMAL
SL AMB POCT BLOOD,UA: ABNORMAL
SL AMB POCT CLARITY,UA: CLEAR
SL AMB POCT COLOR,UA: YELLOW
SL AMB POCT KETONES,UA: ABNORMAL
SL AMB POCT NITRITE,UA: ABNORMAL
SL AMB POCT PH,UA: 6
SL AMB POCT SPECIFIC GRAVITY,UA: 1.01
SL AMB POCT URINE PROTEIN: ABNORMAL
SL AMB POCT UROBILINOGEN: 0.2

## 2021-04-08 PROCEDURE — 3008F BODY MASS INDEX DOCD: CPT | Performed by: PSYCHIATRY & NEUROLOGY

## 2021-04-08 PROCEDURE — 81003 URINALYSIS AUTO W/O SCOPE: CPT | Performed by: FAMILY MEDICINE

## 2021-04-08 PROCEDURE — 97140 MANUAL THERAPY 1/> REGIONS: CPT | Performed by: PHYSICAL THERAPIST

## 2021-04-08 PROCEDURE — 87086 URINE CULTURE/COLONY COUNT: CPT | Performed by: FAMILY MEDICINE

## 2021-04-08 PROCEDURE — 87077 CULTURE AEROBIC IDENTIFY: CPT | Performed by: FAMILY MEDICINE

## 2021-04-08 PROCEDURE — 87186 SC STD MICRODIL/AGAR DIL: CPT | Performed by: FAMILY MEDICINE

## 2021-04-08 PROCEDURE — 99214 OFFICE O/P EST MOD 30 MIN: CPT | Performed by: FAMILY MEDICINE

## 2021-04-08 PROCEDURE — 97110 THERAPEUTIC EXERCISES: CPT | Performed by: PHYSICAL THERAPIST

## 2021-04-08 RX ORDER — PHENAZOPYRIDINE HYDROCHLORIDE 200 MG/1
200 TABLET, FILM COATED ORAL
Qty: 6 TABLET | Refills: 0 | Status: SHIPPED | OUTPATIENT
Start: 2021-04-08 | End: 2021-04-10

## 2021-04-08 NOTE — ASSESSMENT & PLAN NOTE
Lab Results   Component Value Date    EGFR 35 03/26/2021    EGFR 39 01/22/2021    EGFR 27 09/10/2020    CREATININE 1 52 (H) 03/26/2021    CREATININE 1 41 (H) 01/22/2021    CREATININE 1 68 (H) 12/09/2020   seeing nephrology-stable

## 2021-04-08 NOTE — PROGRESS NOTES
Daily Note     Today's date: 2021  Patient name: Angel Alicia  : 1955  MRN: [id number]  Referring provider: Floyd Nichols MD  Dx:   Encounter Diagnosis     ICD-10-CM    1  Chronic pain of left knee  M25 562     G89 29    2  Chronic pain of right knee  M25 561     G89 29                   Subjective: Pt presents today stating she is feeling well  Mild soreness in B/L Legs but pain levels are down  Objective: See treatment diary below      Assessment: Enhanced reps, resistance without pain, mild fatigue  Pt continuing to demonstrate strong HEP awareness  Plan: Continue per plan of care  Precautions: Fear of falling, Hypothyroidism, HTN hx, 3 pillows supine         Manuals 3/17 3/22 3/24 4/1 4/5 4/8       AP PA Tib on Femur R G2-4  5- TS  5 5- TS 5       PROM with Soft Tissue mobilization R knee  5  5 5 5 5                                 Neuro Re-Ed             Bridge 4 x 5 review           SLS UE Support B 10" x 4 10" x 4  10" x 4 10" x 4 20"  4  20" x 4       Hip Abd + Ext  1 x 10  1 x 10 2 x 10 2 x 10 2 x 10       Pball Press down              NBOS EC   1 min  1 min         NBOS Foam    1 min 1 min  1 min       Side stepping on foam     4 laps  5 laps       Ther Ex             Bike  6 min  6 min  6 min 6 min  6 min       HR/TR 2 x 10 2 x 10  2 x 10  2 x 10 2 x 10 3 x 10        Gastroc ST with Wedge B  :15 x 4 B :20 x 4 B 20" x 4 20" x 4 20" x 4       QS for lubrication review :3 x 10            SLR Flex  1 x 10  1 x 10  2 x10 2 x 10  2 x 10       Side Steps    RTB         LAQ  1 x 10  1# 2 x 10  2# 2 x 10 2# 2 x 10  3# 2 x 10       LP + HR   65# x 20  65# 20x 65# 2 x 10  65# 2 x 10       Ther Activity             Sit to stand     X 10  3 x 5       Mini Squats    nv  3 x 5       Gait Training             Step up + Down  4" x 10 B 6" x 10 B 6" x 15 6" x 15  6"x  15                    Modalities             CP to B knee prn education performed for independent use

## 2021-04-08 NOTE — PROGRESS NOTES
Assessment/Plan:    1  Urinary frequency  Assessment & Plan: Will sent culture  Cont with fluids    Orders:  -     POCT urine dip auto non-scope  -     Urine culture  -     phenazopyridine (PYRIDIUM) 200 mg tablet; Take 1 tablet (200 mg total) by mouth 3 (three) times a day with meals for 2 days    2  Bipolar 1 disorder ([facility] )    3  Body mass index (BMI)40 0-44 9, adult (HCC)    4  Stage 3b chronic kidney disease  Assessment & Plan:  Lab Results   Component Value Date    EGFR 35 03/26/2021    EGFR 39 01/22/2021    EGFR 27 09/10/2020    CREATININE 1 52 (H) 03/26/2021    CREATININE 1 41 (H) 01/22/2021    CREATININE 1 68 (H) 12/09/2020   seeing nephrology-stable      5  Bipolar 1 disorder, mixed, severe ([facility]  Assessment & Plan:  Stable-seeing psych      BMI Counseling: Body mass index is 38 85 kg/m²  The BMI is above normal  Nutrition recommendations include encouraging healthy choices of fruits and vegetables  Exercise recommendations include exercising 3-5 times per week  Recent Results (from the past 24 hour(s))   POCT urine dip auto non-scope    Collection Time: 04/08/21  8:33 AM   Result Value Ref Range     COLOR,UA yellow     CLARITY,UA clear     SPECIFIC GRAVITY,UA 1 015      PH,UA 6 0     LEUKOCYTE ESTERASE,UA 3+     NITRITE,UA neg     GLUCOSE, UA neg     KETONES,UA neg     BILIRUBIN,UA neg     BLOOD,UA neg     POCT URINE PROTEIN neg     SL AMB POCT UROBILINOGEN 0 2        There are no Patient Instructions on file for this visit  No follow-ups on file  Subjective:      Patient ID: Melanie Alcantara is a 76 y o  female  Chief Complaint   Patient presents with    Urinary Frequency     PT is being seen today due to having urinary frequency       3 weeks of urinary frequency  Uncomfortable  No abd pain  No hematuria  Drinks water all day  Gets up twice a night    Urinary Frequency   This is a new problem  The current episode started 1 to 4 weeks ago  The problem occurs every urination   The problem has been unchanged  The quality of the pain is described as burning  The patient is experiencing no pain  There has been no fever  The fever has been present for less than 1 day  She is not sexually active  There is no history of pyelonephritis  Associated symptoms include frequency and urgency  Pertinent negatives include no hematuria or hesitancy  She has tried increased fluids for the symptoms  The treatment provided mild relief  The following portions of the patient's history were reviewed and updated as appropriate:  past social history    Review of Systems   Constitutional: Negative  HENT: Negative  Eyes: Negative  Respiratory: Negative  Cardiovascular: Negative  Gastrointestinal: Negative  Endocrine: Negative  Genitourinary: Positive for frequency and urgency  Negative for hematuria and hesitancy  Musculoskeletal: Negative  Allergic/Immunologic: Negative  Neurological: Negative  Hematological: Negative  Psychiatric/Behavioral: Negative            Current Outpatient Medications   Medication Sig Dispense Refill    b complex vitamins tablet Take 1 tablet by mouth daily      calcitriol (ROCALTROL) 0 25 mcg capsule Take 1 capsule (0 25 mcg total) by mouth 3 (three) times a week 36 capsule 4    Cholecalciferol (VITAMIN D) 2000 units CAPS Take 1 capsule by mouth daily      famotidine (PEPCID) 20 mg tablet Take 1 tablet (20 mg total) by mouth daily 90 tablet 3    Glucosamine-Chondroit-Vit C-Mn (GLUCOSAMINE 1500 COMPLEX PO) Take by mouth      Latuda 20 MG tablet TAKE 1 TABLET BY MOUTH  DAILY WITH BREAKFAST 30 tablet 11    levothyroxine 88 mcg tablet Take 1 tablet (88 mcg total) by mouth daily 30 tablet 2    Multiple Vitamin (MULTI-VITAMIN DAILY) TABS Take by mouth      Myrbetriq 25 MG TB24 TAKE 1 TABLET BY MOUTH  DAILY AT BEDTIME 90 tablet 3    nystatin (MYCOSTATIN) ointment Apply topically 2 (two) times a day 30 g 5    nystatin (MYCOSTATIN) powder Apply topically 2 (two) times a day 15 g 3    phenazopyridine (PYRIDIUM) 200 mg tablet Take 1 tablet (200 mg total) by mouth 3 (three) times a day with meals for 2 days 6 tablet 0    SOOLANTRA 1 % CREA        No current facility-administered medications for this visit  Objective:    /78   Pulse 74   Temp 98 4 °F (36 9 °C) (Tympanic)   Resp 16   Ht 5' 2" (1 575 m)   Wt 96 3 kg (212 lb 6 4 oz)   SpO2 100%   BMI 38 85 kg/m²      Physical Exam  Vitals signs and nursing note reviewed  Constitutional:       Appearance: Normal appearance  HENT:      Head: Normocephalic and atraumatic  Eyes:      Extraocular Movements: Extraocular movements intact  Pupils: Pupils are equal, round, and reactive to light  Neck:      Musculoskeletal: Normal range of motion  Cardiovascular:      Rate and Rhythm: Normal rate and regular rhythm  Pulses: Normal pulses  Heart sounds: Normal heart sounds  Pulmonary:      Effort: Pulmonary effort is normal    Abdominal:      General: Abdomen is flat  Palpations: Abdomen is soft  Musculoskeletal: Normal range of motion  Skin:     General: Skin is warm  Capillary Refill: Capillary refill takes less than 2 seconds  Neurological:      General: No focal deficit present  Mental Status: She is alert and oriented to person, place, and time  Psychiatric:         Mood and Affect: Mood normal          Behavior: Behavior normal          Thought Content:  Thought content normal          Judgment: Judgment normal              Ruth Raygoza DO

## 2021-04-10 LAB — BACTERIA UR CULT: ABNORMAL

## 2021-04-11 DIAGNOSIS — N30.00 ACUTE CYSTITIS WITHOUT HEMATURIA: Primary | ICD-10-CM

## 2021-04-11 RX ORDER — SULFAMETHOXAZOLE AND TRIMETHOPRIM 800; 160 MG/1; MG/1
1 TABLET ORAL EVERY 12 HOURS SCHEDULED
Qty: 14 TABLET | Refills: 0 | Status: SHIPPED | OUTPATIENT
Start: 2021-04-11 | End: 2021-04-18

## 2021-04-12 ENCOUNTER — APPOINTMENT (OUTPATIENT)
Dept: PHYSICAL THERAPY | Facility: CLINIC | Age: 66
End: 2021-04-12
Payer: COMMERCIAL

## 2021-04-14 ENCOUNTER — APPOINTMENT (OUTPATIENT)
Dept: PHYSICAL THERAPY | Facility: CLINIC | Age: 66
End: 2021-04-14
Payer: COMMERCIAL

## 2021-04-15 ENCOUNTER — EVALUATION (OUTPATIENT)
Dept: PHYSICAL THERAPY | Facility: CLINIC | Age: 66
End: 2021-04-15
Payer: COMMERCIAL

## 2021-04-15 DIAGNOSIS — M25.561 CHRONIC PAIN OF RIGHT KNEE: ICD-10-CM

## 2021-04-15 DIAGNOSIS — G89.29 CHRONIC PAIN OF RIGHT KNEE: ICD-10-CM

## 2021-04-15 DIAGNOSIS — G89.29 CHRONIC PAIN OF LEFT KNEE: Primary | ICD-10-CM

## 2021-04-15 DIAGNOSIS — M25.562 CHRONIC PAIN OF LEFT KNEE: Primary | ICD-10-CM

## 2021-04-15 PROCEDURE — 97110 THERAPEUTIC EXERCISES: CPT | Performed by: PHYSICAL THERAPIST

## 2021-04-15 PROCEDURE — 97140 MANUAL THERAPY 1/> REGIONS: CPT | Performed by: PHYSICAL THERAPIST

## 2021-04-15 NOTE — PROGRESS NOTES
PT Evaluation     Today's date: 4/15/2021  Patient name: Evette Barlow  : 1955  MRN: [id number]  Referring provider: Daquan Flores MD  Dx:   Encounter Diagnosis     ICD-10-CM    1  Chronic pain of left knee  M25 562     G89 29    2  Chronic pain of right knee  M25 561     G89 29                   Assessment  Assessment details: Pt at this time demonstrates excellent progression of range, strength, flexibility, tolerance to activity and is still a low to nil risk for falls given subjective and objective data  Given pt's family based requirements she would like to make today her last scheduled visit and if she is to have a decline or if her home requirements lessen, she would be happy to follow up to continue  She had met all goals sought out for her in regards of STGs however some strength based intervention would be beneficial and pt was in accord of this but declined to continue intervention at this time  Thank you very much for this kind, familiar and motivated referral, she is likely safe to DC to [facility] and welcome to return when ready        Impairments: abnormal gait, abnormal or restricted ROM, activity intolerance, impaired balance, impaired physical strength, lacks appropriate home exercise program, pain with function and poor posture   Understanding of Dx/Px/POC: good   Prognosis: good    Goals  STG 4 Weeks:  Decrease pain at worst to 2/10 -met  Improve range to R knee to 5-115 -met  Improve strength to Hip to 4- -met  Independent with HEP -met   LTG 8 Weeks:  Decrease pain at worst to 1/10 -met  Improve range R knee to 0-125 -partial  Improve strength to hip to 4/5 or better  -partial   Able to perform all desired activities with minimal to nil symptom exacerbation -met      Plan  Planned modality interventions: cryotherapy, TENS and thermotherapy: hydrocollator packs  Planned therapy interventions: joint mobilization, manual therapy, abdominal trunk stabilization, neuromuscular re-education, patient education, strengthening, stretching, therapeutic activities, therapeutic exercise, transfer training, home exercise program, graded motor, graded exercise, graded activity, gait training, flexibility, functional ROM exercises and balance  Duration in weeks: 6  Treatment plan discussed with: patient        Subjective Evaluation    History of Present Illness  Date of onset: 3/17/2021  Mechanism of injury: Pt presents today stating that she has been doing fairly well considering there is quite a bit in her family life  But her knees have been holding quite well considering the abruptness of needing to move a lot of things from one area to another  Pt reports pain at worst 1/10 annoyance if she even thinks of them  Pt indicates that she has been feeling well with her endurance, but still feels a little nervous with balance  She has not had any falls within the last period of treatment, feels as though injections a few months ago also helped, and she is to have a follow up with Dr Avelino Jones within the next few months     Quality of life: good    Pain  Current pain ratin  At best pain ratin  At worst pain ratin  Quality: dull ache  Relieving factors: rest  Aggravating factors: standing, walking and stair climbing  Progression: improved      Diagnostic Tests  X-ray: normal  Treatments  Previous treatment: injection treatment and physical therapy  Patient Goals  Patient goals for therapy: decreased edema, decreased pain, improved balance, increased motion, increased strength and return to sport/leisure activities          Objective     Active Range of Motion   Left Knee   Flexion: 130 degrees   Extension: 0 degrees     Right Knee   Flexion: 115 (PROM 120) degrees   Extension: 0 degrees     Additional Active Range of Motion Details  Sensation intact to light touch L3,4,5,S1,S2  Genu Valgum L > R, mild antalgia with poor IC and push off of L LE  SLS L 7" R 6"  SLS HR L R (NT)  Hip Strength  L Flex 4 Ext 5 Abd 4- Add 4  R Flex 4 Ext 5 Abd 4- Add 4  Knee Strength  L ext/ flex 5/5  R ext / flex 5/5  LE Screen: B/L Strong and painless  Palpation: medial joint line B/L  Joint Mobility:  L AP PA ML LM G2 patellar gross 4  R AP PA ML LM G2 patellar gross 4  STs:   L (-) Hemanth/Scour  (-) Valgus/Varus//Ant/Post Lachman/ Drawer  R (-) Hemanth/Scour  (-) Valgus/Varus//Ant/Post Lachman/ Drawer  Poor Trunk control with bridge  TUG: 10 65" no AD   // 8 8"               Precautions: Fear of falling, Hypothyroidism, HTN hx, 3 pillows supine         Manuals 3/17 3/22 3/24 4/1 4/5 4/8 4/15      AP PA Tib on Femur R G2-4  5- TS  5 5- TS 5       PROM with Soft Tissue mobilization R knee  5  5 5 5 5              15 min assesssment                    Neuro Re-Ed             Bridge 4 x 5 review           SLS UE Support B 10" x 4 10" x 4  10" x 4 10" x 4 20"  4  20" x 4 20"  x4      Hip Abd + Ext  1 x 10  1 x 10 2 x 10 2 x 10 2 x 10 2 x 10      Pball Press down              NBOS EC   1 min  1 min         NBOS Foam    1 min 1 min  1 min       Side stepping on foam     4 laps  5 laps       Ther Ex             Bike  6 min  6 min  6 min 6 min  6 min 6min      HR/TR 2 x 10 2 x 10  2 x 10  2 x 10 2 x 10 3 x 10  3  x10      Gastroc ST with Wedge B  :15 x 4 B :20 x 4 B 20" x 4 20" x 4 20" x 4 20" x4      QS for lubrication review :3 x 10            SLR Flex  1 x 10  1 x 10  2 x10 2 x 10  2 x 10 2 x 10      Side Steps    RTB         LAQ  1 x 10  1# 2 x 10  2# 2 x 10 2# 2 x 10  3# 2 x 10       LP + HR   65# x 20  65# 20x 65# 2 x 10  65# 2 x 10       Ther Activity             Sit to stand     X 10  3 x 5       Mini Squats    nv  3 x 5       Gait Training             Step up + Down  4" x 10 B 6" x 10 B 6" x 15 6" x 15  6"x  15                    Modalities             CP to B knee prn education performed for independent use

## 2021-04-19 ENCOUNTER — APPOINTMENT (OUTPATIENT)
Dept: PHYSICAL THERAPY | Facility: CLINIC | Age: 66
End: 2021-04-19
Payer: COMMERCIAL

## 2021-04-21 ENCOUNTER — APPOINTMENT (OUTPATIENT)
Dept: PHYSICAL THERAPY | Facility: CLINIC | Age: 66
End: 2021-04-21
Payer: COMMERCIAL

## 2021-04-22 ENCOUNTER — APPOINTMENT (OUTPATIENT)
Dept: PHYSICAL THERAPY | Facility: CLINIC | Age: 66
End: 2021-04-22
Payer: COMMERCIAL

## 2021-04-22 ENCOUNTER — APPOINTMENT (OUTPATIENT)
Dept: LAB | Facility: AMBULARY SURGERY CENTER | Age: 66
End: 2021-04-22
Payer: COMMERCIAL

## 2021-04-22 ENCOUNTER — TRANSCRIBE ORDERS (OUTPATIENT)
Dept: LAB | Facility: AMBULARY SURGERY CENTER | Age: 66
End: 2021-04-22

## 2021-04-22 DIAGNOSIS — I51.9 MYXEDEMA HEART DISEASE: Primary | ICD-10-CM

## 2021-04-22 DIAGNOSIS — Z79.899 ENCOUNTER FOR LONG-TERM (CURRENT) USE OF OTHER MEDICATIONS: ICD-10-CM

## 2021-04-22 DIAGNOSIS — E03.9 MYXEDEMA HEART DISEASE: Primary | ICD-10-CM

## 2021-04-22 LAB
CHOLEST SERPL-MCNC: 185 MG/DL (ref 50–200)
HDLC SERPL-MCNC: 51 MG/DL
LDLC SERPL CALC-MCNC: 106 MG/DL (ref 0–100)
TRIGL SERPL-MCNC: 138 MG/DL
TSH SERPL DL<=0.05 MIU/L-ACNC: 0.38 UIU/ML (ref 0.36–3.74)

## 2021-04-22 PROCEDURE — 84443 ASSAY THYROID STIM HORMONE: CPT

## 2021-04-22 PROCEDURE — 80061 LIPID PANEL: CPT

## 2021-04-22 PROCEDURE — 36415 COLL VENOUS BLD VENIPUNCTURE: CPT

## 2021-04-23 DIAGNOSIS — E03.9 HYPOTHYROIDISM, UNSPECIFIED TYPE: ICD-10-CM

## 2021-04-23 RX ORDER — LEVOTHYROXINE SODIUM 88 UG/1
88 TABLET ORAL DAILY
Qty: 30 TABLET | Refills: 2 | OUTPATIENT
Start: 2021-04-23

## 2021-04-26 ENCOUNTER — TELEPHONE (OUTPATIENT)
Dept: FAMILY MEDICINE CLINIC | Facility: CLINIC | Age: 66
End: 2021-04-26

## 2021-04-26 DIAGNOSIS — E03.9 HYPOTHYROIDISM, UNSPECIFIED TYPE: ICD-10-CM

## 2021-04-26 RX ORDER — LEVOTHYROXINE SODIUM 88 UG/1
88 TABLET ORAL DAILY
Qty: 30 TABLET | Refills: 2 | Status: SHIPPED | OUTPATIENT
Start: 2021-04-26 | End: 2021-04-28

## 2021-04-26 NOTE — TELEPHONE ENCOUNTER
I can't really give direction about a rash without looking at it  Generally anything she puts on it topically should be ok

## 2021-04-26 NOTE — TELEPHONE ENCOUNTER
Patient called and stated that she has a rash on her chest and she wanted to know what she can take to help, she said she has kidney diease and wanted to know what she is able to take she isn't sure where she got it from   Please advise

## 2021-04-28 ENCOUNTER — TELEMEDICINE (OUTPATIENT)
Dept: PSYCHIATRY | Facility: CLINIC | Age: 66
End: 2021-04-28
Payer: COMMERCIAL

## 2021-04-28 DIAGNOSIS — F31.63 BIPOLAR 1 DISORDER, MIXED, SEVERE (HCC): Primary | ICD-10-CM

## 2021-04-28 PROCEDURE — 1036F TOBACCO NON-USER: CPT | Performed by: PSYCHIATRY & NEUROLOGY

## 2021-04-28 PROCEDURE — 99214 OFFICE O/P EST MOD 30 MIN: CPT | Performed by: PSYCHIATRY & NEUROLOGY

## 2021-04-28 PROCEDURE — 1160F RVW MEDS BY RX/DR IN RCRD: CPT | Performed by: PSYCHIATRY & NEUROLOGY

## 2021-04-28 PROCEDURE — 90833 PSYTX W PT W E/M 30 MIN: CPT | Performed by: PSYCHIATRY & NEUROLOGY

## 2021-04-28 NOTE — PSYCH
Virtual Brief Visit    Assessment/Plan:    Problem List Items Addressed This Visit        Other    Bipolar 1 disorder, mixed, severe ([facility] [address] ) - Primary                Reason for visit is   Chief Complaint   Patient presents with    Virtual Brief Visit        Encounter provider Shine Juarez MD    Provider located at [address] [place] [id number] [phone]    Recent Visits  No visits were found meeting these conditions  Showing recent visits within past 7 days and meeting all other requirements     Today's Visits  Date Type Provider Dept   04/28/21 Telemedicine Shine Juarez MD [facility] today's visits and meeting all other requirements     Future Appointments  No visits were found meeting these conditions  Showing future appointments within next 150 days and meeting all other requirements        After connecting through telephone, the patient was identified by name and date of birth  Darcie Marcano was informed that this is a telemedicine visit and that the visit is being conducted through telephone  My office door was closed  No one else was in the room  She acknowledged consent and understanding of privacy and security of the platform  The patient has agreed to participate and understands she can discontinue the visit at any time  Patient is aware this is a billable service  Subjective    Darcie Marcano is a 76 y o  female with Bipolar disorder  Normavince Baum has been compliant with Latuda 20 mg daily and denies medication side effects  She denies recent health changes or new medications  Patient stated that her mood has stable on Latuda  I reviewed her most recent blood work and is within normal limits with TSH trending low  Will reduce her Synthroid dose to 44 mcg and repeat TSH in 3 months  She agrees to continue other medications as prescribed   She stated stress now is related to her son and his poor choices in relationships which led him to relapse in alcohol use  They are supporting him to stay sober and is likely to move back with them temporarily  Rubi Dwyer stated she will like to start meeting with an individual counselor  Agrees to schedule follow up in 3 months or sooner if needed  HPI     Past Medical History:   Diagnosis Date    Arthritis     Bipolar 1 disorder ([facility] [address] )     Disease of thyroid gland     Elevated blood pressure reading 6/10/2019       Past Surgical History:   Procedure Laterality Date    COLONOSCOPY  2011    NO PAST SURGERIES         Current Outpatient Medications   Medication Sig Dispense Refill    b complex vitamins tablet Take 1 tablet by mouth daily      calcitriol (ROCALTROL) 0 25 mcg capsule Take 1 capsule (0 25 mcg total) by mouth 3 (three) times a week 36 capsule 4    Cholecalciferol (VITAMIN D) 2000 units CAPS Take 1 capsule by mouth daily      famotidine (PEPCID) 20 mg tablet Take 1 tablet (20 mg total) by mouth daily 90 tablet 3    Glucosamine-Chondroit-Vit C-Mn (GLUCOSAMINE 1500 COMPLEX PO) Take by mouth      Latuda 20 MG tablet TAKE 1 TABLET BY MOUTH  DAILY WITH BREAKFAST 30 tablet 11    levothyroxine 88 mcg tablet Take 1 tablet (88 mcg total) by mouth daily 30 tablet 2    Multiple Vitamin (MULTI-VITAMIN DAILY) TABS Take by mouth      Myrbetriq 25 MG TB24 TAKE 1 TABLET BY MOUTH  DAILY AT BEDTIME 90 tablet 3    nystatin (MYCOSTATIN) ointment Apply topically 2 (two) times a day 30 g 5    nystatin (MYCOSTATIN) powder Apply topically 2 (two) times a day 15 g 3    SOOLANTRA 1 % CREA        No current facility-administered medications for this visit  Allergies   Allergen Reactions    Other      Other reaction(s):  Anaphylaxis  Annotation - [id number]: plums peaches       Review of Systems     Mood Anxiety, Depression and Emotional Lability   Behavior Impulsive Behavior   Thought Content Disturbing Thoughts, Feelings   General Emotional Problems and Decreased Functioning   Personality Normal   Other Psych Symptoms Normal   Constitutional Negative   ENT Negative   Cardiovascular Negative   Respiratory Negative   Gastrointestinal Negative   Genitourinary Negative   Musculoskeletal Negative   Integumentary Negative   Neurological Negative   Endocrine Normal    Other Symptoms Normal              Laboratory Results: No results found for this or any previous visit      Substance Abuse History:  Social History     Substance and Sexual Activity   Drug Use No       Family Psychiatric History:   Family History   Problem Relation Age of Onset    Heart disease Mother     Heart Valve Disease Mother         Replacement    Diabetes Mother     Arthritis Father        The following portions of the patient's history were reviewed and updated as appropriate: allergies, current medications, past family history, past medical history, past social history, past surgical history and problem list     Social History     Socioeconomic History    Marital status: /Civil Union     Spouse name: Not on file    Number of children: Not on file    Years of education: Not on file    Highest education level: Not on file   Occupational History    Not on file   Social Needs    Financial resource strain: Not on file    Food insecurity     Worry: Not on file     Inability: Not on file   [employer] needs     Medical: Not on file     Non-medical: Not on file   Tobacco Use    Smoking status: Former Smoker     Packs/day: 0 25     Years: 30 00     Pack years: 7 50     Types: Cigarettes     Quit date:      Years since quittin 3    Smokeless tobacco: Never Used   Substance and Sexual Activity    Alcohol use: Yes     Frequency: Monthly or less     Comment: socially    Drug use: No    Sexual activity: Not Currently     Partners: Male     Birth control/protection: Post-menopausal   Lifestyle    Physical activity     Days per week: Not on file Minutes per session: Not on file    Stress: Not on file   Relationships    Social connections     Talks on phone: Not on file     Gets together: Not on file     Attends [employer] service: Not on file     Active member of club or organization: Not on file     Attends meetings of clubs or organizations: Not on file     Relationship status: Not on file    Intimate partner violence     Fear of current or ex partner: Not on file     Emotionally abused: Not on file     Physically abused: Not on file     Forced sexual activity: Not on file   Other Topics Concern    Not on file   Social History Narrative    Daily coffee consumption: 3 cups/day     Social History     Social History Narrative    Daily coffee consumption: 3 cups/day       Objective:       Mental status:  Appearance calm and cooperative    Mood dysphoric   Affect affect was constricted   Speech a normal rate and fluent   Thought Processes coherent/organized and normal thought processes   Hallucinations no hallucinations present    Thought Content no delusions   Abnormal Thoughts no suicidal thoughts  and no homicidal thoughts    Orientation  oriented to person and place and time   Remote Memory short term memory intact and long term memory intact   Attention Span concentration intact   Intellect Appears to be of Average Intelligence   Insight Limited insight   Judgement judgment was limited   Muscle Strength n/a   Language no difficulty naming common objects and no difficulty repeating a phrase    Fund of Knowledge displays adequate knowledge of current events               Assessment/Plan:       Diagnoses and all orders for this visit:    Bipolar 1 disorder, mixed, severe ([facility] [address] )            Treatment Recommendations- Risks Benefits      Immediate Medical/Psychiatric/Psychotherapy Treatments and Any Precautions: continue current treatment     Risks, Benefits And Possible Side Effects Of Medications:  {PSYCH RISK, BENEFITS AND POSSIBLE SIDE EFFECTS (Optional):48750    Controlled Medication Discussion: Discussed with patient Black Box warning on concurrent use of benzodiazepines and opioid medications including sedation, respiratory depression, coma and death  Patient understands the risk of treatment with benzodiazepines in addition to opioids and wants to continue taking those medications  , Discussed with patient the risks of sedation, respiratory depression, impairment of ability to drive and potential for abuse and addiction related to treatment with benzodiazepine medications  The patient understands risk of treatment with benzodiazepine medications, agrees to not drive if feels impaired and agrees to take medications as prescribed  and The patient has been filling controlled prescriptions on time as prescribed to [facility]  program       Psychotherapy Provided:     Individual psychotherapy provided: Yes  Counseling was provided during the session today for 16 minutes  Medications, treatment progress and treatment plan reviewed with Preeti Anderson  Medication education provided to Preeti Anderson  Goals discussed during in session: continue improvement in mood stability  Recent stressor including COVID-19 issues, health issues, medical problems, recent medication change, everyday stressors, occasional anxiety and chronic mental illness discussed with Cherelle  Coping strategies including compliance with medications, deep/slow breathing, eliminating avoidance, engaging in previously avoided activities, exercising, getting into a good routine, increasing interest in usual activities, increasing motivation, keeping busy at home, maintain healthy diet, maintain heathy sleeping hygiene and maintain positive attitude reviewed with Preeti Anderson  Importance of medication and treatment compliance reviewed with Cherelle  Educated on importance of medication and treatment compliance    Importance of follow up with family physician for medical issues reviewed with Cherelle  Discussed with Cherelle acceptance of mental illness diagnosis and need for ongoing psychiatric treatment  Supportive therapy provided  I spent 30 minutes directly with the patient during this visit    [address] [place] acknowledges that she has consented to an online visit or consultation  She understands that the online visit is based solely on information provided by her, and that, in the absence of a face-to-face physical evaluation by the physician, the diagnosis she receives is both limited and provisional in terms of accuracy and completeness  This is not intended to replace a full medical face-to-face evaluation by the physician  Bill Doan understands and accepts these terms

## 2021-04-29 ENCOUNTER — TELEPHONE (OUTPATIENT)
Dept: PSYCHIATRY | Facility: CLINIC | Age: 66
End: 2021-04-29

## 2021-04-29 NOTE — TELEPHONE ENCOUNTER
LVM for patient to call me back to make a 3 month f/u with Dr Clementina Parkinson   Patient will be scheduled in August

## 2021-04-29 NOTE — TELEPHONE ENCOUNTER
----- Message from Rc Santiago sent at 4/29/2021 10:31 AM EDT -----  Regarding: please add to therapy waitlist  Dr Garrett Dsouza patient

## 2021-05-04 NOTE — PROGRESS NOTES
PT Discharge    Today's date: 2021  Patient name: Marisa Rothman  : 1955  MRN: [id number]  Referring provider: Enrique Gross MD  Dx:   Encounter Diagnosis     ICD-10-CM    1  Chronic pain of left knee  M25 562 PT plan of care cert/re-cert    B08 29    2  Chronic pain of right knee  M25 561 PT plan of care cert/re-cert    V02 90        Start Time: 9413  Stop Time: 0915  Total time in clinic (min): 25 minutes    Assessment/Plan  Pt has not been present since 4/15/2021  Pt's chart will be DC in compliance of facility policy as all Charts are DC within 30 days of last scheduled visit          Subjective    Objective

## 2021-05-05 ENCOUNTER — APPOINTMENT (OUTPATIENT)
Dept: LAB | Facility: AMBULARY SURGERY CENTER | Age: 66
End: 2021-05-05
Payer: COMMERCIAL

## 2021-05-05 ENCOUNTER — OFFICE VISIT (OUTPATIENT)
Dept: FAMILY MEDICINE CLINIC | Facility: CLINIC | Age: 66
End: 2021-05-05
Payer: COMMERCIAL

## 2021-05-05 VITALS
BODY MASS INDEX: 39.58 KG/M2 | OXYGEN SATURATION: 92 % | TEMPERATURE: 97.6 F | HEART RATE: 92 BPM | WEIGHT: 216.4 LBS | SYSTOLIC BLOOD PRESSURE: 124 MMHG | DIASTOLIC BLOOD PRESSURE: 72 MMHG

## 2021-05-05 DIAGNOSIS — R30.0 DYSURIA: ICD-10-CM

## 2021-05-05 DIAGNOSIS — R30.0 DYSURIA: Primary | ICD-10-CM

## 2021-05-05 LAB
SL AMB  POCT GLUCOSE, UA: NORMAL
SL AMB LEUKOCYTE ESTERASE,UA: NORMAL
SL AMB POCT BILIRUBIN,UA: NORMAL
SL AMB POCT BLOOD,UA: NORMAL
SL AMB POCT CLARITY,UA: CLEAR
SL AMB POCT COLOR,UA: YELLOW
SL AMB POCT KETONES,UA: NORMAL
SL AMB POCT NITRITE,UA: NORMAL
SL AMB POCT PH,UA: 6
SL AMB POCT SPECIFIC GRAVITY,UA: 1.01
SL AMB POCT URINE PROTEIN: NORMAL
SL AMB POCT UROBILINOGEN: NORMAL

## 2021-05-05 PROCEDURE — 1160F RVW MEDS BY RX/DR IN RCRD: CPT | Performed by: FAMILY MEDICINE

## 2021-05-05 PROCEDURE — 87186 SC STD MICRODIL/AGAR DIL: CPT

## 2021-05-05 PROCEDURE — 99213 OFFICE O/P EST LOW 20 MIN: CPT | Performed by: FAMILY MEDICINE

## 2021-05-05 PROCEDURE — 87086 URINE CULTURE/COLONY COUNT: CPT

## 2021-05-05 PROCEDURE — 87077 CULTURE AEROBIC IDENTIFY: CPT

## 2021-05-05 PROCEDURE — 1036F TOBACCO NON-USER: CPT | Performed by: FAMILY MEDICINE

## 2021-05-05 PROCEDURE — 81003 URINALYSIS AUTO W/O SCOPE: CPT | Performed by: FAMILY MEDICINE

## 2021-05-05 NOTE — PROGRESS NOTES
Assessment/Plan:    1  Dysuria  Assessment & Plan: Will check urine culture and treat acoordingly  Increase water intake  Avoid constipation    Orders:  -     Urine culture; Future  -     POCT urine dip auto non-scope       Recent Results (from the past 24 hour(s))   POCT urine dip auto non-scope    Collection Time: 05/05/21  9:09 AM   Result Value Ref Range     COLOR,UA yellow     CLARITY,UA clear     SPECIFIC GRAVITY,UA 1 015      PH,UA 6 0     LEUKOCYTE ESTERASE,UA -     NITRITE,UA -     GLUCOSE, UA -     KETONES,UA -     BILIRUBIN,UA -     BLOOD,UA -     POCT URINE PROTEIN -     SL AMB POCT UROBILINOGEN -      There are no Patient Instructions on file for this visit  No follow-ups on file  Subjective:      Patient ID: Lorenzo Lerma is a 76 y o  female  Chief Complaint   Patient presents with    Follow-up     Patient is here for a rash and uti       Sunday did have burning  Took urocalm over the counter  Has gone to the bathroom 4 times  Feeling better today  Having issues with constipation  Did see dermatologist for rash- gave her steroid cream  Possible from antibiotic? Nerves are shot-son was put in [facility]  She has a call out to Dr Ayla Fox      Difficulty Urinating   This is a new problem  The current episode started in the past 7 days  The quality of the pain is described as burning  The pain is severe  There has been no fever  Associated symptoms include frequency and urgency  Pertinent negatives include no hematuria  Treatments tried: urocalm  The treatment provided mild relief  The following portions of the patient's history were reviewed and updated as appropriate:  past social history    Review of Systems   Constitutional: Negative  HENT: Negative  Eyes: Negative  Respiratory: Negative  Cardiovascular: Negative  Gastrointestinal: Positive for constipation  Genitourinary: Positive for dysuria, frequency and urgency  Negative for hematuria     Skin: Positive for rash (chest)  Allergic/Immunologic: Negative  Neurological: Negative  Hematological: Negative  Psychiatric/Behavioral: The patient is nervous/anxious  Current Outpatient Medications   Medication Sig Dispense Refill    b complex vitamins tablet Take 1 tablet by mouth daily      calcitriol (ROCALTROL) 0 25 mcg capsule Take 1 capsule (0 25 mcg total) by mouth 3 (three) times a week 36 capsule 4    Cholecalciferol (VITAMIN D) 2000 units CAPS Take 1 capsule by mouth daily      famotidine (PEPCID) 20 mg tablet Take 1 tablet (20 mg total) by mouth daily 90 tablet 3    fluocinonide (LIDEX) 0 05 % cream       Glucosamine-Chondroit-Vit C-Mn (GLUCOSAMINE 1500 COMPLEX PO) Take by mouth      Latuda 20 MG tablet TAKE 1 TABLET BY MOUTH  DAILY WITH BREAKFAST 30 tablet 11    Multiple Vitamin (MULTI-VITAMIN DAILY) TABS Take by mouth      Myrbetriq 25 MG TB24 TAKE 1 TABLET BY MOUTH  DAILY AT BEDTIME 90 tablet 3    nystatin (MYCOSTATIN) ointment Apply topically 2 (two) times a day 30 g 5    nystatin (MYCOSTATIN) powder Apply topically 2 (two) times a day 15 g 3    SOOLANTRA 1 % CREA        No current facility-administered medications for this visit  Objective:    /72 (BP Location: Left arm)   Pulse 92   Temp 97 6 °F (36 4 °C)   Wt 98 2 kg (216 lb 6 4 oz)   SpO2 92%   BMI 39 58 kg/m²      Physical Exam  Vitals signs and nursing note reviewed  Constitutional:       Appearance: Normal appearance  Eyes:      Extraocular Movements: Extraocular movements intact  Pupils: Pupils are equal, round, and reactive to light  Neck:      Musculoskeletal: Normal range of motion and neck supple  Cardiovascular:      Rate and Rhythm: Normal rate and regular rhythm  Pulses: Normal pulses  Heart sounds: Normal heart sounds  Pulmonary:      Effort: Pulmonary effort is normal       Breath sounds: Normal breath sounds  Neurological:      Mental Status: She is alert               Vickey Clark Kenn Hamilton, DO

## 2021-05-05 NOTE — ASSESSMENT & PLAN NOTE
Lab Results   Component Value Date    EGFR 35 03/26/2021    EGFR 39 01/22/2021    EGFR 27 09/10/2020    CREATININE 1 52 (H) 03/26/2021    CREATININE 1 41 (H) 01/22/2021    CREATININE 1 68 (H) 12/09/2020   seeing nephrology

## 2021-05-07 DIAGNOSIS — N30.00 ACUTE CYSTITIS WITHOUT HEMATURIA: Primary | ICD-10-CM

## 2021-05-07 LAB — BACTERIA UR CULT: ABNORMAL

## 2021-05-07 RX ORDER — CEPHALEXIN 500 MG/1
500 CAPSULE ORAL EVERY 12 HOURS SCHEDULED
Qty: 14 CAPSULE | Refills: 0 | Status: SHIPPED | OUTPATIENT
Start: 2021-05-07 | End: 2021-05-14

## 2021-06-10 ENCOUNTER — ANNUAL EXAM (OUTPATIENT)
Dept: OBGYN CLINIC | Facility: CLINIC | Age: 66
End: 2021-06-10
Payer: COMMERCIAL

## 2021-06-10 VITALS
WEIGHT: 217 LBS | BODY MASS INDEX: 39.93 KG/M2 | HEIGHT: 62 IN | SYSTOLIC BLOOD PRESSURE: 128 MMHG | DIASTOLIC BLOOD PRESSURE: 76 MMHG

## 2021-06-10 DIAGNOSIS — L90.0 LICHEN SCLEROSUS: ICD-10-CM

## 2021-06-10 DIAGNOSIS — Z01.419 WOMEN'S ANNUAL ROUTINE GYNECOLOGICAL EXAMINATION: Primary | ICD-10-CM

## 2021-06-10 PROBLEM — Z00.00 WELL ADULT EXAM: Status: RESOLVED | Noted: 2018-06-07 | Resolved: 2021-06-10

## 2021-06-10 PROBLEM — K59.00 CONSTIPATION: Status: RESOLVED | Noted: 2020-08-25 | Resolved: 2021-06-10

## 2021-06-10 PROBLEM — Z23 ENCOUNTER FOR IMMUNIZATION: Status: RESOLVED | Noted: 2019-12-10 | Resolved: 2021-06-10

## 2021-06-10 PROBLEM — R30.0 DYSURIA: Status: RESOLVED | Noted: 2021-05-05 | Resolved: 2021-06-10

## 2021-06-10 PROBLEM — Z00.00 MEDICARE ANNUAL WELLNESS VISIT, SUBSEQUENT: Status: RESOLVED | Noted: 2020-12-15 | Resolved: 2021-06-10

## 2021-06-10 PROCEDURE — 3008F BODY MASS INDEX DOCD: CPT | Performed by: FAMILY MEDICINE

## 2021-06-10 PROCEDURE — G0101 CA SCREEN;PELVIC/BREAST EXAM: HCPCS | Performed by: OBSTETRICS & GYNECOLOGY

## 2021-06-10 RX ORDER — LEVOTHYROXINE SODIUM 0.05 MG/1
50 TABLET ORAL DAILY
COMMUNITY
End: 2021-11-22

## 2021-06-10 RX ORDER — CLOBETASOL PROPIONATE 0.5 MG/G
OINTMENT TOPICAL 2 TIMES WEEKLY
Qty: 30 G | Refills: 0 | Status: SHIPPED | OUTPATIENT
Start: 2021-06-10

## 2021-06-10 NOTE — PROGRESS NOTES
ASSESSMENT & PLAN: Taina Ndiaye is a 76 y o  [id number] with normal gynecologic exam     1   Routine well woman exam done today  2  Pap and HPV: Current ASCCP Guidelines reviewed - Pap not indicated  3   Mammogram ordered, yearly mammography recommended  4   Colonoscopy recommended per guidelines  5   Lichen Sclerosis - restart clobetasol 2x/week  6  The following were reviewed in today's visit: breast self exam, mammography screening ordered, menopause, osteoporosis, adequate intake of calcium and vitamin D, exercise and healthy diet  7  Patient to return to office in 12 months for annual    CC: Annual Gynecologic Examination    HPI: Taina Ndiaye is a 76 y o  [id number] who presents for annual gynecologic examination  She has the following concerns:  none    Health Maintenance:    She exercises 1 days per week  She wears her seatbelt routinely  She does perform irregular monthly self breast exams  She feels safe at home  Patients does follow a reg diet  Past Medical History:   Diagnosis Date    Arthritis     Bipolar 1 disorder ([facility] [address] )     Chronic kidney disease     stage 3    Disease of thyroid gland     Elevated blood pressure reading 6/10/2019       Past Surgical History:   Procedure Laterality Date    COLONOSCOPY      NO PAST SURGERIES         Past OB/Gyn History:  OB History        2    Para   2    Term   2            AB        Living   2       SAB        TAB        Ectopic        Multiple        Live Births   2               Menstrual history: Patient is post menopausal    History of abnormal pap smears  No     Patient is not currently sexually active    heterosexual       Family History   Problem Relation Age of Onset    Heart disease Mother     Heart Valve Disease Mother         Replacement    Diabetes Mother     Arthritis Father        Social History:  Social History     Socioeconomic History    Marital status: /Civil Union     Spouse name: Not on file    Number of children: Not on file    Years of education: Not on file    Highest education level: Not on file   Occupational History    Not on file   Social Needs    Financial resource strain: Not on file    Food insecurity     Worry: Not on file     Inability: Not on file    Transportation needs     Medical: Not on file     Non-medical: Not on file   Tobacco Use    Smoking status: Former Smoker     Packs/day: 0 25     Years: 30 00     Pack years: 7 50     Types: Cigarettes     Quit date:      Years since quittin 4    Smokeless tobacco: Never Used   Substance and Sexual Activity    Alcohol use: Not Currently    Drug use: No    Sexual activity: Not Currently     Partners: Male     Birth control/protection: Post-menopausal   Lifestyle    Physical activity     Days per week: Not on file     Minutes per session: Not on file    Stress: Not on file   Relationships    Social connections     Talks on phone: Not on file     Gets together: Not on file     Attends Yazidi service: Not on file     Active member of club or organization: Not on file     Attends meetings of clubs or organizations: Not on file     Relationship status: Not on file    Intimate partner violence     Fear of current or ex partner: Not on file     Emotionally abused: Not on file     Physically abused: Not on file     Forced sexual activity: Not on file   Other Topics Concern    Not on file   Social History Narrative    Daily coffee consumption: 3 cups/day     Presently lives with her   Patient is   Allergies   Allergen Reactions    Other      Other reaction(s):  Anaphylaxis  Annotation - [id number]: plums peaches         Current Outpatient Medications:     b complex vitamins tablet, Take 1 tablet by mouth daily, Disp: , Rfl:     calcitriol (ROCALTROL) 0 25 mcg capsule, Take 1 capsule (0 25 mcg total) by mouth 3 (three) times a week, Disp: 36 capsule, Rfl: 4    Cholecalciferol (VITAMIN D) 2000 units CAPS, Take 1 capsule by mouth daily, Disp: , Rfl:     famotidine (PEPCID) 20 mg tablet, Take 1 tablet (20 mg total) by mouth daily, Disp: 90 tablet, Rfl: 3    fluocinonide (LIDEX) 0 05 % cream, , Disp: , Rfl:     Glucosamine-Chondroit-Vit C-Mn (GLUCOSAMINE 1500 COMPLEX PO), Take by mouth, Disp: , Rfl:     Latuda 20 MG tablet, TAKE 1 TABLET BY MOUTH  DAILY WITH BREAKFAST, Disp: 30 tablet, Rfl: 11    levothyroxine 50 mcg tablet, Take 50 mcg by mouth daily, Disp: , Rfl:     Multiple Vitamin (MULTI-VITAMIN DAILY) TABS, Take by mouth, Disp: , Rfl:     Myrbetriq 25 MG TB24, TAKE 1 TABLET BY MOUTH  DAILY AT BEDTIME, Disp: 90 tablet, Rfl: 3    nystatin (MYCOSTATIN) ointment, Apply topically 2 (two) times a day, Disp: 30 g, Rfl: 5    nystatin (MYCOSTATIN) powder, Apply topically 2 (two) times a day, Disp: 15 g, Rfl: 3    SOOLANTRA 1 % CREA, , Disp: , Rfl:     clobetasol (TEMOVATE) 0 05 % ointment, Apply topically 2 (two) times a week, Disp: 30 g, Rfl: 0    Review of Systems:  Review of Systems   Constitutional: Negative  HENT: Negative  Respiratory: Negative  Cardiovascular: Negative  Gastrointestinal: Negative  Genitourinary: Positive for genital sores and urgency  Negative for menstrual problem, pelvic pain, vaginal discharge and vaginal pain  Neurological: Negative  Psychiatric/Behavioral: Negative  Physical Exam:  /76 (BP Location: Right arm, Patient Position: Sitting, Cuff Size: Extra-Large)   Ht 5' 2" (1 575 m)   Wt 98 4 kg (217 lb)   BMI 39 69 kg/m²    Physical Exam  Constitutional:       Appearance: Normal appearance  She is obese  Genitourinary:      Urethra, bladder, vagina, cervix, uterus, right adnexa and left adnexa normal       Vulval rash (lichen sclerosis) present  Vaginal rugosity present  No vaginal discharge, tenderness, bleeding or atrophy  Cervix is parous  Cervical pinkness present  No cervical polyp  Uterus is mobile  Uterus is not enlarged or tender  HENT:      Head: Normocephalic and atraumatic  Eyes:      Extraocular Movements: Extraocular movements intact  Conjunctiva/sclera: Conjunctivae normal       Pupils: Pupils are equal, round, and reactive to light  Cardiovascular:      Rate and Rhythm: Normal rate and regular rhythm  Heart sounds: Normal heart sounds  No murmur  Pulmonary:      Effort: Pulmonary effort is normal  No respiratory distress  Breath sounds: Normal breath sounds  No wheezing or rales  Chest:      Breasts:         Right: Normal  No swelling, bleeding, inverted nipple, mass, nipple discharge, skin change or tenderness  Left: Normal  No swelling, bleeding, inverted nipple, mass, nipple discharge, skin change or tenderness  Abdominal:      General: There is no distension  Palpations: Abdomen is soft  Tenderness: There is no abdominal tenderness  There is no guarding  Neurological:      General: No focal deficit present  Mental Status: She is alert and oriented to person, place, and time  Skin:     General: Skin is warm and dry  Coloration: Skin is not jaundiced or pale  Vitals signs and nursing note reviewed

## 2021-06-16 ENCOUNTER — RA CDI HCC (OUTPATIENT)
Dept: OTHER | Facility: HOSPITAL | Age: 66
End: 2021-06-16

## 2021-06-16 NOTE — PROGRESS NOTES
[place] [id number]  coding opportunities             Chart reviewed, (number of) suggestions sent to provider: 1     Problem listed updated  Provider Accepted, (number of) suggestions accepted: 1            Number of suggestions NOT actually used: 1     Patients insurance company: [address]  (Medicare Advantage and [employer])     Visit status: Patient arrived for their scheduled appointment        [place] [id number]  coding opportunities             Chart reviewed, (number of) suggestions sent to provider: 1     Problem listed updated   Provider Accepted, (number of) suggestions accepted: 1               Patients insurance company: [address]  (Medicare [employer]vantage and [employer])           [place] [id number]  coding opportunities        DX: [id number] [id number] Morbid Obesity--BMI 39     Chart reviewed, (number of) suggestions sent to provider: 1                  Patients insurance company: [address]  (Medicare Advantage and [employer])

## 2021-06-17 PROBLEM — E66.01 MORBID OBESITY WITH BMI OF 40.0-44.9, ADULT (HCC): Status: ACTIVE | Noted: 2021-04-08

## 2021-06-30 ENCOUNTER — HOSPITAL ENCOUNTER (OUTPATIENT)
Dept: RADIOLOGY | Facility: HOSPITAL | Age: 66
Discharge: HOME | End: 2021-06-30
Payer: COMMERCIAL

## 2021-06-30 ENCOUNTER — OFFICE VISIT (OUTPATIENT)
Dept: FAMILY MEDICINE CLINIC | Facility: CLINIC | Age: 66
End: 2021-06-30
Payer: COMMERCIAL

## 2021-06-30 VITALS
WEIGHT: 217.8 LBS | HEART RATE: 75 BPM | RESPIRATION RATE: 20 BRPM | OXYGEN SATURATION: 98 % | DIASTOLIC BLOOD PRESSURE: 82 MMHG | SYSTOLIC BLOOD PRESSURE: 118 MMHG | TEMPERATURE: 98.3 F | HEIGHT: 62 IN | BODY MASS INDEX: 40.08 KG/M2

## 2021-06-30 DIAGNOSIS — N18.32 STAGE 3B CHRONIC KIDNEY DISEASE (HCC): ICD-10-CM

## 2021-06-30 DIAGNOSIS — K21.9 GASTROESOPHAGEAL REFLUX DISEASE WITHOUT ESOPHAGITIS: ICD-10-CM

## 2021-06-30 DIAGNOSIS — R22.31 MASS OF RIGHT HAND: ICD-10-CM

## 2021-06-30 DIAGNOSIS — E78.1 HIGH TRIGLYCERIDES: ICD-10-CM

## 2021-06-30 DIAGNOSIS — R22.31 MASS OF RIGHT HAND: Primary | ICD-10-CM

## 2021-06-30 PROCEDURE — 73130 X-RAY EXAM OF HAND: CPT

## 2021-06-30 PROCEDURE — 3008F BODY MASS INDEX DOCD: CPT | Performed by: FAMILY MEDICINE

## 2021-06-30 PROCEDURE — 99214 OFFICE O/P EST MOD 30 MIN: CPT | Performed by: FAMILY MEDICINE

## 2021-06-30 PROCEDURE — 1160F RVW MEDS BY RX/DR IN RCRD: CPT | Performed by: FAMILY MEDICINE

## 2021-06-30 NOTE — PROGRESS NOTES
Assessment/Plan:    1  Mass of right hand  -     XR hand 3+ vw right; Future; Expected date: 06/30/2021    2  High triglycerides  Assessment & Plan:  Check lipids    Orders:  -     Lipid Panel with Direct LDL reflex; Future    3  Stage 3b chronic kidney disease [facility])  Assessment & Plan:  Lab Results   Component Value Date    EGFR 35 03/26/2021    EGFR 39 01/22/2021    EGFR 27 09/10/2020    CREATININE 1 52 (H) 03/26/2021    CREATININE 1 41 (H) 01/22/2021    CREATININE 1 68 (H) 12/09/2020   seeing nephrology      4  Gastroesophageal reflux disease without esophagitis  Assessment & Plan: On pepcid            Patient Instructions   AWV due in Dec  /PT had GYN appt  In middle of June no Pap was done      No follow-ups on file  Subjective:      Patient ID: Rafa Montez is a 78 y o  female  Chief Complaint   Patient presents with    Follow-up     6 month f/u       Here for follow up  Seeing nephrology  Overall feeling better from a urinary stand point    Hyperlipidemia  This is a chronic problem  The current episode started more than 1 year ago  The problem is controlled  Recent lipid tests were reviewed and are variable  Exacerbating diseases include chronic renal disease and hypothyroidism  Current antihyperlipidemic treatment includes diet change and exercise  There are no compliance problems  The following portions of the patient's history were reviewed and updated as appropriate: allergies, current medications, past family history, past medical history, past social history, past surgical history and problem list     Review of Systems   Constitutional: Negative  HENT: Negative  Eyes: Negative  Respiratory: Negative  Cardiovascular: Negative  Gastrointestinal: Negative  Endocrine: Negative  Genitourinary: Negative  Musculoskeletal: Positive for arthralgias (knee pain)  Mass on right hand   Allergic/Immunologic: Negative  Neurological: Negative      Hematological: Negative  Psychiatric/Behavioral: Negative  Current Outpatient Medications   Medication Sig Dispense Refill    b complex vitamins tablet Take 1 tablet by mouth daily      calcitriol (ROCALTROL) 0 25 mcg capsule Take 1 capsule (0 25 mcg total) by mouth 3 (three) times a week 36 capsule 4    Cholecalciferol (VITAMIN D) 2000 units CAPS Take 1 capsule by mouth daily      clobetasol (TEMOVATE) 0 05 % ointment Apply topically 2 (two) times a week 30 g 0    famotidine (PEPCID) 20 mg tablet Take 1 tablet (20 mg total) by mouth daily 90 tablet 3    fluocinonide (LIDEX) 0 05 % cream       Glucosamine-Chondroit-Vit C-Mn (GLUCOSAMINE 1500 COMPLEX PO) Take by mouth      Latuda 20 MG tablet TAKE 1 TABLET BY MOUTH  DAILY WITH BREAKFAST 30 tablet 11    levothyroxine 50 mcg tablet Take 50 mcg by mouth daily      Multiple Vitamin (MULTI-VITAMIN DAILY) TABS Take by mouth      Myrbetriq 25 MG TB24 TAKE 1 TABLET BY MOUTH  DAILY AT BEDTIME 90 tablet 3    nystatin (MYCOSTATIN) ointment Apply topically 2 (two) times a day 30 g 5    nystatin (MYCOSTATIN) powder Apply topically 2 (two) times a day 15 g 3    SOOLANTRA 1 % CREA        No current facility-administered medications for this visit  Objective:    /82   Pulse 75   Temp 98 3 °F (36 8 °C) (Tympanic)   Resp 20   Ht 5' 2" (1 575 m)   Wt 98 8 kg (217 lb 12 8 oz)   SpO2 98%   BMI 39 84 kg/m²        Physical Exam  Vitals and nursing note reviewed  Constitutional:       Appearance: Normal appearance  HENT:      Head: Normocephalic and atraumatic  Eyes:      Extraocular Movements: Extraocular movements intact  Pupils: Pupils are equal, round, and reactive to light  Cardiovascular:      Rate and Rhythm: Normal rate and regular rhythm  Pulses: Normal pulses  Heart sounds: Normal heart sounds  Pulmonary:      Effort: Pulmonary effort is normal       Breath sounds: Normal breath sounds     Abdominal:      General: Abdomen is flat       Palpations: Abdomen is soft  Musculoskeletal:         General: Deformity (right hand mass firm) present  Cervical back: Normal range of motion and neck supple  Skin:     General: Skin is warm  Capillary Refill: Capillary refill takes less than 2 seconds  Neurological:      General: No focal deficit present  Mental Status: She is alert and oriented to person, place, and time  Psychiatric:         Mood and Affect: Mood normal          Behavior: Behavior normal          Thought Content:  Thought content normal          Judgment: Judgment normal                 Kelsey Lockleola, DO

## 2021-08-13 ENCOUNTER — APPOINTMENT (OUTPATIENT)
Dept: LAB | Facility: AMBULARY SURGERY CENTER | Age: 66
End: 2021-08-13
Payer: COMMERCIAL

## 2021-08-13 DIAGNOSIS — E78.1 HIGH TRIGLYCERIDES: ICD-10-CM

## 2021-08-13 DIAGNOSIS — E03.9 MYXEDEMA HEART DISEASE: ICD-10-CM

## 2021-08-13 DIAGNOSIS — I51.9 MYXEDEMA HEART DISEASE: ICD-10-CM

## 2021-08-13 LAB
CHOLEST SERPL-MCNC: 197 MG/DL (ref 50–200)
HDLC SERPL-MCNC: 55 MG/DL
LDLC SERPL CALC-MCNC: 122 MG/DL (ref 0–100)
TRIGL SERPL-MCNC: 102 MG/DL
TSH SERPL DL<=0.05 MIU/L-ACNC: 1.01 UIU/ML (ref 0.36–3.74)

## 2021-08-13 PROCEDURE — 80061 LIPID PANEL: CPT

## 2021-08-13 PROCEDURE — 84443 ASSAY THYROID STIM HORMONE: CPT

## 2021-08-13 PROCEDURE — 36415 COLL VENOUS BLD VENIPUNCTURE: CPT

## 2021-08-19 ENCOUNTER — TELEMEDICINE (OUTPATIENT)
Dept: PSYCHIATRY | Facility: CLINIC | Age: 66
End: 2021-08-19
Payer: COMMERCIAL

## 2021-08-19 DIAGNOSIS — F51.04 PSYCHOPHYSIOLOGICAL INSOMNIA: ICD-10-CM

## 2021-08-19 DIAGNOSIS — F31.9 BIPOLAR 1 DISORDER (HCC): ICD-10-CM

## 2021-08-19 DIAGNOSIS — F31.63 BIPOLAR 1 DISORDER, MIXED, SEVERE (HCC): Primary | ICD-10-CM

## 2021-08-19 PROCEDURE — 99443 PR PHYS/QHP TELEPHONE EVALUATION 21-30 MIN: CPT | Performed by: PSYCHIATRY & NEUROLOGY

## 2021-08-19 NOTE — BH TREATMENT PLAN
TREATMENT PLAN (Medication Management Only)        [facility]    Name and Date of Birth:  Radha Higginbotham 78 y o  1955  Date of Treatment Plan: August 19, 2021  Diagnosis/Diagnoses:    1  Bipolar 1 disorder, mixed, severe ([address] )    2  Psychophysiological insomnia      Strengths/Personal Resources for Self-Care: taking medications as prescribed, ability to communicate needs  Area/Areas of need (in own words): mood instability  1  Long Term Goal: continue improvement in mood stability  Target Date:3 months - 11/19/2021  Person/Persons responsible for completion of goal: Cherelle  2  Short Term Objective (s) - How will we reach this goal?:   A  Provider new recommended medication/dosage changes and/or continue medication(s): continue current medications as prescribed  B  N/A   C  N/A  Target Date:3 months - 11/19/2021  Person/Persons Responsible for Completion of Goal: Cherelle  Progress Towards Goals: continuing treatment  Treatment Modality: medication management every 3 months  Review due 180 days from date of this plan: 6 months - 2/19/2022  Expected length of service: ongoing treatment  My Physician/PA/NP and I have developed this plan together and I agree to work on the goals and objectives  I understand the treatment goals that were developed for my treatment

## 2021-08-19 NOTE — PSYCH
Virtual Brief Visit    Verification of patient location:    Patient is located in the following state in which I hold an active license [place]      Assessment/Plan:    Problem List Items Addressed This Visit        Other    Bipolar 1 disorder, mixed, severe ([facility] [address] ) - Primary    Relevant Medications    lurasidone (Latuda) 20 mg tablet    Insomnia      Other Visit Diagnoses     Bipolar 1 disorder ([facility] [address] )        Relevant Medications    lurasidone (Latuda) 20 mg tablet                Reason for visit is   Chief Complaint   Patient presents with    Virtual Brief Visit        Encounter provider Esthela Somers MD    Provider located at [address] [place] [id number] [phone]    Recent Visits  No visits were found meeting these conditions  Showing recent visits within past 7 days and meeting all other requirements  Today's Visits  Date Type Provider Dept   08/19/21 Telemedicine Esthela Somers MD [facility] 18 today's visits and meeting all other requirements  Future Appointments  No visits were found meeting these conditions  Showing future appointments within next 150 days and meeting all other requirements       After connecting through telephone, the patient was identified by name and date of birth  Radha Higginbotham was informed that this is a telemedicine visit and that the visit is being conducted through telephone  My office door was closed  No one else was in the room  She acknowledged consent and understanding of privacy and security of the platform  The patient has agreed to participate and understands she can discontinue the visit at any time  Patient is aware this is a billable service  Subjective    Radha Higginbotham is a 78 y o  female with Bipolar disorder   [place] Maria Teresaoda has been compliant with Latuda 20 mg daily and denies medication side effects   She denies recent health changes or new medications  Patient stated that her mood has stable on Latuda  She agrees to continue other medications as prescribed  Her most recent  TSH level is WNL  She agrees to continue current treatment as prescribed  She will continue to follow up with nephrologist as well for CKD  Will schedule appointment in 3 months or sooner if needed  HPI     Past Medical History:   Diagnosis Date    Arthritis     Bipolar 1 disorder ([facility] [address] )     Chronic kidney disease     stage 3    Disease of thyroid gland     Elevated blood pressure reading 6/10/2019       Past Surgical History:   Procedure Laterality Date    COLONOSCOPY  2011    NO PAST SURGERIES         Current Outpatient Medications   Medication Sig Dispense Refill    b complex vitamins tablet Take 1 tablet by mouth daily      calcitriol (ROCALTROL) 0 25 mcg capsule Take 1 capsule (0 25 mcg total) by mouth 3 (three) times a week 36 capsule 4    Cholecalciferol (VITAMIN D) 2000 units CAPS Take 1 capsule by mouth daily      clobetasol (TEMOVATE) 0 05 % ointment Apply topically 2 (two) times a week 30 g 0    famotidine (PEPCID) 20 mg tablet Take 1 tablet (20 mg total) by mouth daily 90 tablet 3    fluocinonide (LIDEX) 0 05 % cream       Glucosamine-Chondroit-Vit C-Mn (GLUCOSAMINE 1500 COMPLEX PO) Take by mouth      levothyroxine 50 mcg tablet Take 50 mcg by mouth daily      lurasidone (Latuda) 20 mg tablet Take 1 tablet (20 mg total) by mouth daily with breakfast 30 tablet 11    Multiple Vitamin (MULTI-VITAMIN DAILY) TABS Take by mouth      Myrbetriq 25 MG TB24 TAKE 1 TABLET BY MOUTH  DAILY AT BEDTIME 90 tablet 3    nystatin (MYCOSTATIN) ointment Apply topically 2 (two) times a day 30 g 5    nystatin (MYCOSTATIN) powder Apply topically 2 (two) times a day 15 g 3    SOOLANTRA 1 % CREA        No current facility-administered medications for this visit  Allergies   Allergen Reactions    Other      Other reaction(s):  Anaphylaxis  Annotation - [id number]: kandace peaches       Review of Systems          Mood Anxiety, Depression and Emotional Lability   Behavior Impulsive Behavior   Thought Content Disturbing Thoughts, Feelings   General Emotional Problems, Sleep Disturbances and Decreased Functioning   Personality Change in Personality   Other Psych Symptoms Normal   Constitutional Negative   ENT Negative   Cardiovascular Negative   Respiratory Negative   Gastrointestinal Negative   Genitourinary Negative   Musculoskeletal Negative   Integumentary Negative   Neurological Negative   Endocrine Normal    Other Symptoms Normal              Laboratory Results: No results found for this or any previous visit      Substance Abuse History:  Social History     Substance and Sexual Activity   Drug Use No       Family Psychiatric History:   Family History   Problem Relation Age of Onset    Heart disease Mother     Heart Valve Disease Mother         Replacement    Diabetes Mother     Arthritis Father        The following portions of the patient's history were reviewed and updated as appropriate: allergies, current medications, past family history, past medical history, past social history, past surgical history and problem list     Social History     Socioeconomic History    Marital status: /Civil Union     Spouse name: Not on file    Number of children: Not on file    Years of education: Not on file    Highest education level: Not on file   Occupational History    Not on file   Tobacco Use    Smoking status: Former Smoker     Packs/day: 0 25     Years: 30 00     Pack years: 7 50     Types: Cigarettes     Quit date:      Years since quittin 6    Smokeless tobacco: Never Used   Vaping Use    Vaping Use: Never used   Substance and Sexual Activity    Alcohol use: Not Currently    Drug use: No    Sexual activity: Not Currently     Partners: Male     Birth control/protection: Post-menopausal   Other Topics Concern    Not on file   Social History Narrative Daily coffee consumption: 3 cups/day     Social Determinants of Health     Financial Resource Strain:     Difficulty of Paying Living Expenses:    Food Insecurity:     Worried About Running Out of Food in the Last Year:     [address] in the Last Year:    Transportation Needs:     Lack of Transportation (Medical):      Lack of Transportation (Non-Medical):    Physical Activity:     Days of Exercise per Week:     Minutes of Exercise per Session:    Stress:     Feeling of Stress :    Social Connections:     Frequency of Communication with Friends and Family:     Frequency of Social Gatherings with Friends and Family:     Attends Mandaen Services:     Active Member of Clubs or Organizations:     Attends Club or Organization Meetings:     Marital Status:    Intimate Partner Violence:     Fear of Current or Ex-Partner:     Emotionally Abused:     Physically Abused:     Sexually Abused:      Social History     Social History Narrative    Daily coffee consumption: 3 cups/day       Objective:       Mental status:  Appearance calm and cooperative , adequate hygiene and grooming and good eye contact    Mood dysphoric   Affect affect was constricted   Speech a normal rate and fluent   Thought Processes coherent/organized and normal thought processes   Hallucinations no hallucinations present    Thought Content no delusions   Abnormal Thoughts no suicidal thoughts  and no homicidal thoughts    Orientation  oriented to person and place and time   Remote Memory short term memory intact and long term memory intact   Attention Span concentration intact   Intellect Appears to be of Average Intelligence   Insight Limited insight   Judgement judgment was limited   Muscle Strength n/a   Language no difficulty naming common objects and no difficulty repeating a phrase    Fund of Knowledge displays adequate knowledge of current events               Assessment/Plan:       Diagnoses and all orders for this visit:    Bipolar 1 disorder, mixed, severe (HCC)    Psychophysiological insomnia    Bipolar 1 disorder (HCC)  -     lurasidone (Latuda) 20 mg tablet; Take 1 tablet (20 mg total) by mouth daily with breakfast            Treatment Recommendations- Risks Benefits      Immediate Medical/Psychiatric/Psychotherapy Treatments and Any Precautions: continue current treatment     Risks, Benefits And Possible Side Effects Of Medications:  {PSYCH RISK, BENEFITS AND POSSIBLE SIDE EFFECTS (Optional):76283    Controlled Medication Discussion: Discussed with patient Black Box warning on concurrent use of benzodiazepines and opioid medications including sedation, respiratory depression, coma and death  Patient understands the risk of treatment with benzodiazepines in addition to opioids and wants to continue taking those medications  , Discussed with patient the risks of sedation, respiratory depression, impairment of ability to drive and potential for abuse and addiction related to treatment with benzodiazepine medications  The patient understands risk of treatment with benzodiazepine medications, agrees to not drive if feels impaired and agrees to take medications as prescribed  and The patient has been filling controlled prescriptions on time as prescribed to [facility]  program       Psychotherapy Provided:     Individual psychotherapy provided: Yes  Counseling was provided during the session today for 16 minutes  Medications, treatment progress and treatment plan reviewed with Shara Horne  Medication education provided to Shara Horne  Goals discussed during in session: continue improvement in mood stability  Recent stressor including COVID-19 issues, family issues, health issues, limited support, social difficulties, everyday stressors and ongoing anxiety discussed with Cherelle     Coping strategies including compliance with medications, contacting a therapist, deep/slow breathing, eliminating avoidance, engaging in previously avoided activities, exercising, getting into a good routine, increasing interest in usual activities, increasing motivation, increasing social interaction, keeping busy at home, maintain healthy diet, maintain heathy sleeping hygiene and maintain positive attitude reviewed with Errol Hightower  Importance of medication and treatment compliance reviewed with Cherelle  Educated on importance of medication and treatment compliance  Importance of follow up with family physician for medical issues reviewed with Errol Hightower  Supportive therapy provided  I spent 30 minutes directly with the patient during this visit    VIRTUAL VISIT Hamilton Domonique verbally agrees to participate in [employer]  Pt is aware that [employer] could be limited without vital signs or the ability to perform a full hands-on physical  Jumper understands she or the provider may request at any time to terminate the video visit and request the patient to seek care or treatment in person

## 2021-09-20 ENCOUNTER — IMMUNIZATIONS (OUTPATIENT)
Dept: FAMILY MEDICINE CLINIC | Facility: CLINIC | Age: 66
End: 2021-09-20
Payer: COMMERCIAL

## 2021-09-20 DIAGNOSIS — Z23 ENCOUNTER FOR IMMUNIZATION: Primary | ICD-10-CM

## 2021-09-20 PROCEDURE — G0008 ADMIN INFLUENZA VIRUS VAC: HCPCS

## 2021-09-20 PROCEDURE — 90662 IIV NO PRSV INCREASED AG IM: CPT

## 2021-10-08 ENCOUNTER — APPOINTMENT (OUTPATIENT)
Dept: LAB | Facility: AMBULARY SURGERY CENTER | Age: 66
End: 2021-10-08
Payer: COMMERCIAL

## 2021-10-08 DIAGNOSIS — E55.9 AVITAMINOSIS D: ICD-10-CM

## 2021-10-08 DIAGNOSIS — N18.32 CHRONIC KIDNEY DISEASE (CKD) STAGE G3B/A1, MODERATELY DECREASED GLOMERULAR FILTRATION RATE (GFR) BETWEEN 30-44 ML/MIN/1.73 SQUARE METER AND ALBUMINURIA CREATININE RATIO LESS THAN 30 MG/G (HCC): Primary | ICD-10-CM

## 2021-10-08 DIAGNOSIS — N25.81 SECONDARY HYPERPARATHYROIDISM OF RENAL ORIGIN (HCC): ICD-10-CM

## 2021-10-08 DIAGNOSIS — R80.1 PERSISTENT PROTEINURIA, UNSPECIFIED: ICD-10-CM

## 2021-10-08 LAB
25(OH)D3 SERPL-MCNC: 31.6 NG/ML (ref 30–100)
ALBUMIN SERPL BCP-MCNC: 3.4 G/DL (ref 3.5–5)
ANION GAP SERPL CALCULATED.3IONS-SCNC: 5 MMOL/L (ref 4–13)
BUN SERPL-MCNC: 27 MG/DL (ref 5–25)
CALCIUM ALBUM COR SERPL-MCNC: 9.8 MG/DL (ref 8.3–10.1)
CALCIUM SERPL-MCNC: 9.3 MG/DL (ref 8.3–10.1)
CHLORIDE SERPL-SCNC: 111 MMOL/L (ref 100–108)
CO2 SERPL-SCNC: 25 MMOL/L (ref 21–32)
CREAT SERPL-MCNC: 1.71 MG/DL (ref 0.6–1.3)
GFR SERPL CREATININE-BSD FRML MDRD: 31 ML/MIN/1.73SQ M
GLUCOSE P FAST SERPL-MCNC: 118 MG/DL (ref 65–99)
MAGNESIUM SERPL-MCNC: 2.3 MG/DL (ref 1.6–2.6)
PHOSPHATE SERPL-MCNC: 3.3 MG/DL (ref 2.3–4.1)
POTASSIUM SERPL-SCNC: 3.7 MMOL/L (ref 3.5–5.3)
PTH-INTACT SERPL-MCNC: 121.3 PG/ML (ref 18.4–80.1)
SODIUM SERPL-SCNC: 141 MMOL/L (ref 136–145)

## 2021-10-08 PROCEDURE — 83970 ASSAY OF PARATHORMONE: CPT

## 2021-10-08 PROCEDURE — 83735 ASSAY OF MAGNESIUM: CPT

## 2021-10-08 PROCEDURE — 36415 COLL VENOUS BLD VENIPUNCTURE: CPT

## 2021-10-08 PROCEDURE — 82306 VITAMIN D 25 HYDROXY: CPT

## 2021-10-08 PROCEDURE — 80069 RENAL FUNCTION PANEL: CPT

## 2021-10-29 ENCOUNTER — OFFICE VISIT (OUTPATIENT)
Dept: NEPHROLOGY | Facility: CLINIC | Age: 66
End: 2021-10-29
Payer: COMMERCIAL

## 2021-10-29 VITALS
HEIGHT: 62 IN | RESPIRATION RATE: 16 BRPM | WEIGHT: 226 LBS | SYSTOLIC BLOOD PRESSURE: 120 MMHG | HEART RATE: 76 BPM | DIASTOLIC BLOOD PRESSURE: 82 MMHG | BODY MASS INDEX: 41.59 KG/M2

## 2021-10-29 DIAGNOSIS — F31.63 BIPOLAR 1 DISORDER, MIXED, SEVERE (HCC): ICD-10-CM

## 2021-10-29 DIAGNOSIS — E55.9 VITAMIN D DEFICIENCY: ICD-10-CM

## 2021-10-29 DIAGNOSIS — R80.1 PERSISTENT PROTEINURIA: ICD-10-CM

## 2021-10-29 DIAGNOSIS — N18.32 STAGE 3B CHRONIC KIDNEY DISEASE (HCC): Primary | ICD-10-CM

## 2021-10-29 DIAGNOSIS — N25.81 SECONDARY HYPERPARATHYROIDISM OF RENAL ORIGIN (HCC): ICD-10-CM

## 2021-10-29 PROCEDURE — 99214 OFFICE O/P EST MOD 30 MIN: CPT | Performed by: INTERNAL MEDICINE

## 2021-10-29 RX ORDER — CALCITRIOL 0.25 UG/1
0.25 CAPSULE, LIQUID FILLED ORAL DAILY
Qty: 90 CAPSULE | Refills: 4 | Status: SHIPPED | OUTPATIENT
Start: 2021-10-29

## 2021-11-05 ENCOUNTER — TELEPHONE (OUTPATIENT)
Dept: FAMILY MEDICINE CLINIC | Facility: CLINIC | Age: 66
End: 2021-11-05

## 2021-11-08 DIAGNOSIS — E03.9 HYPOTHYROIDISM, UNSPECIFIED TYPE: ICD-10-CM

## 2021-11-08 RX ORDER — LEVOTHYROXINE SODIUM 88 UG/1
TABLET ORAL
Qty: 30 TABLET | Refills: 2 | Status: SHIPPED | OUTPATIENT
Start: 2021-11-08 | End: 2022-05-02 | Stop reason: SDUPTHER

## 2021-11-16 ENCOUNTER — OFFICE VISIT (OUTPATIENT)
Dept: PULMONOLOGY | Facility: CLINIC | Age: 66
End: 2021-11-16
Payer: COMMERCIAL

## 2021-11-16 VITALS
WEIGHT: 227.4 LBS | TEMPERATURE: 97.6 F | OXYGEN SATURATION: 97 % | DIASTOLIC BLOOD PRESSURE: 76 MMHG | BODY MASS INDEX: 41.85 KG/M2 | SYSTOLIC BLOOD PRESSURE: 126 MMHG | HEIGHT: 62 IN | HEART RATE: 88 BPM

## 2021-11-16 DIAGNOSIS — G47.33 OBSTRUCTIVE SLEEP APNEA: Primary | ICD-10-CM

## 2021-11-16 DIAGNOSIS — E66.01 MORBID OBESITY WITH BMI OF 40.0-44.9, ADULT (HCC): ICD-10-CM

## 2021-11-16 PROCEDURE — 99214 OFFICE O/P EST MOD 30 MIN: CPT | Performed by: INTERNAL MEDICINE

## 2021-11-16 PROCEDURE — 3008F BODY MASS INDEX DOCD: CPT | Performed by: PSYCHIATRY & NEUROLOGY

## 2021-11-18 ENCOUNTER — TELEPHONE (OUTPATIENT)
Dept: PSYCHIATRY | Facility: CLINIC | Age: 66
End: 2021-11-18

## 2021-11-20 DIAGNOSIS — F31.9 BIPOLAR 1 DISORDER (HCC): ICD-10-CM

## 2021-11-22 ENCOUNTER — TELEPHONE (OUTPATIENT)
Dept: PSYCHIATRY | Facility: CLINIC | Age: 66
End: 2021-11-22

## 2021-11-22 ENCOUNTER — OFFICE VISIT (OUTPATIENT)
Dept: PSYCHIATRY | Facility: CLINIC | Age: 66
End: 2021-11-22
Payer: COMMERCIAL

## 2021-11-22 DIAGNOSIS — F31.63 BIPOLAR 1 DISORDER, MIXED, SEVERE (HCC): Primary | ICD-10-CM

## 2021-11-22 PROCEDURE — 1160F RVW MEDS BY RX/DR IN RCRD: CPT | Performed by: PSYCHIATRY & NEUROLOGY

## 2021-11-22 PROCEDURE — 90833 PSYTX W PT W E/M 30 MIN: CPT | Performed by: PSYCHIATRY & NEUROLOGY

## 2021-11-22 PROCEDURE — 99213 OFFICE O/P EST LOW 20 MIN: CPT | Performed by: PSYCHIATRY & NEUROLOGY

## 2021-11-22 PROCEDURE — 1036F TOBACCO NON-USER: CPT | Performed by: PSYCHIATRY & NEUROLOGY

## 2021-11-22 RX ORDER — LURASIDONE HYDROCHLORIDE 20 MG/1
TABLET, FILM COATED ORAL
Qty: 30 TABLET | Refills: 11 | Status: SHIPPED | OUTPATIENT
Start: 2021-11-22

## 2021-11-22 NOTE — TELEPHONE ENCOUNTER
----- Message from Bang Cleveland MD sent at 11/22/2021  9:42 AM EST -----  Regarding: Counseling Intake  Renay: Corina Femi is still interested in counseling and stated her schedule is not so busy now, she will like to try again and schedule a counseling appointment

## 2021-12-03 ENCOUNTER — HOSPITAL ENCOUNTER (OUTPATIENT)
Dept: ULTRASOUND IMAGING | Facility: HOSPITAL | Age: 66
Discharge: HOME | End: 2021-12-03
Payer: COMMERCIAL

## 2021-12-03 DIAGNOSIS — N83.201 RIGHT OVARIAN CYST: ICD-10-CM

## 2021-12-03 PROCEDURE — 76830 TRANSVAGINAL US NON-OB: CPT

## 2021-12-03 PROCEDURE — 76856 US EXAM PELVIC COMPLETE: CPT

## 2021-12-06 ENCOUNTER — TELEPHONE (OUTPATIENT)
Dept: OBGYN CLINIC | Facility: CLINIC | Age: 66
End: 2021-12-06

## 2021-12-21 ENCOUNTER — OFFICE VISIT (OUTPATIENT)
Dept: FAMILY MEDICINE CLINIC | Facility: CLINIC | Age: 66
End: 2021-12-21
Payer: COMMERCIAL

## 2021-12-21 VITALS
TEMPERATURE: 98.6 F | SYSTOLIC BLOOD PRESSURE: 130 MMHG | BODY MASS INDEX: 41.88 KG/M2 | HEIGHT: 62 IN | OXYGEN SATURATION: 100 % | DIASTOLIC BLOOD PRESSURE: 82 MMHG | HEART RATE: 48 BPM | WEIGHT: 227.6 LBS | RESPIRATION RATE: 18 BRPM

## 2021-12-21 DIAGNOSIS — K21.9 GASTROESOPHAGEAL REFLUX DISEASE WITHOUT ESOPHAGITIS: ICD-10-CM

## 2021-12-21 DIAGNOSIS — Z00.00 MEDICARE ANNUAL WELLNESS VISIT, SUBSEQUENT: Primary | ICD-10-CM

## 2021-12-21 DIAGNOSIS — G47.33 OBSTRUCTIVE SLEEP APNEA: ICD-10-CM

## 2021-12-21 DIAGNOSIS — Z78.0 ENCOUNTER FOR OSTEOPOROSIS SCREENING IN ASYMPTOMATIC POSTMENOPAUSAL PATIENT: ICD-10-CM

## 2021-12-21 DIAGNOSIS — Z13.820 ENCOUNTER FOR OSTEOPOROSIS SCREENING IN ASYMPTOMATIC POSTMENOPAUSAL PATIENT: ICD-10-CM

## 2021-12-21 PROCEDURE — 1036F TOBACCO NON-USER: CPT | Performed by: FAMILY MEDICINE

## 2021-12-21 PROCEDURE — G0439 PPPS, SUBSEQ VISIT: HCPCS | Performed by: FAMILY MEDICINE

## 2021-12-21 PROCEDURE — 1170F FXNL STATUS ASSESSED: CPT | Performed by: FAMILY MEDICINE

## 2021-12-21 PROCEDURE — 3008F BODY MASS INDEX DOCD: CPT | Performed by: FAMILY MEDICINE

## 2021-12-21 PROCEDURE — 1160F RVW MEDS BY RX/DR IN RCRD: CPT | Performed by: FAMILY MEDICINE

## 2021-12-21 PROCEDURE — 1101F PT FALLS ASSESS-DOCD LE1/YR: CPT | Performed by: FAMILY MEDICINE

## 2021-12-21 PROCEDURE — 3725F SCREEN DEPRESSION PERFORMED: CPT | Performed by: FAMILY MEDICINE

## 2021-12-21 PROCEDURE — 3288F FALL RISK ASSESSMENT DOCD: CPT | Performed by: FAMILY MEDICINE

## 2021-12-21 PROCEDURE — 1125F AMNT PAIN NOTED PAIN PRSNT: CPT | Performed by: FAMILY MEDICINE

## 2021-12-21 RX ORDER — FAMOTIDINE 20 MG/1
20 TABLET, FILM COATED ORAL DAILY
Qty: 90 TABLET | Refills: 3 | Status: SHIPPED | OUTPATIENT
Start: 2021-12-21

## 2021-12-23 ENCOUNTER — TELEPHONE (OUTPATIENT)
Dept: FAMILY MEDICINE CLINIC | Facility: CLINIC | Age: 66
End: 2021-12-23

## 2022-02-01 DIAGNOSIS — N32.81 OAB (OVERACTIVE BLADDER): ICD-10-CM

## 2022-02-01 RX ORDER — MIRABEGRON 25 MG/1
25 TABLET, FILM COATED, EXTENDED RELEASE ORAL
Qty: 90 TABLET | Refills: 0 | Status: CANCELLED | OUTPATIENT
Start: 2022-02-01

## 2022-02-01 NOTE — TELEPHONE ENCOUNTER
Contacted patient, patient scheduled with [facility] on 3/1/22  Patient requested medication to be sent to:   [facility]   Phone:  [phone]

## 2022-02-01 NOTE — TELEPHONE ENCOUNTER
Pt would like a refill on her Rx  It does look like pt has not been seen in the office  Please advise pt if she will need to schedule a visit and if she can have a refill until her apt

## 2022-03-01 ENCOUNTER — OFFICE VISIT (OUTPATIENT)
Dept: UROLOGY | Facility: CLINIC | Age: 67
End: 2022-03-01
Payer: COMMERCIAL

## 2022-03-01 VITALS
WEIGHT: 229.6 LBS | BODY MASS INDEX: 42.25 KG/M2 | SYSTOLIC BLOOD PRESSURE: 128 MMHG | DIASTOLIC BLOOD PRESSURE: 78 MMHG | HEART RATE: 100 BPM | RESPIRATION RATE: 18 BRPM | OXYGEN SATURATION: 94 % | HEIGHT: 62 IN

## 2022-03-01 DIAGNOSIS — N32.81 OAB (OVERACTIVE BLADDER): Primary | ICD-10-CM

## 2022-03-01 LAB
POST-VOID RESIDUAL VOLUME, ML POC: 125 ML
SL AMB  POCT GLUCOSE, UA: NORMAL
SL AMB LEUKOCYTE ESTERASE,UA: NORMAL
SL AMB POCT BILIRUBIN,UA: NORMAL
SL AMB POCT BLOOD,UA: NORMAL
SL AMB POCT CLARITY,UA: CLEAR
SL AMB POCT COLOR,UA: NORMAL
SL AMB POCT KETONES,UA: NORMAL
SL AMB POCT NITRITE,UA: NORMAL
SL AMB POCT PH,UA: 5
SL AMB POCT SPECIFIC GRAVITY,UA: 1.01
SL AMB POCT URINE PROTEIN: NORMAL
SL AMB POCT UROBILINOGEN: 0.2

## 2022-03-01 PROCEDURE — 99213 OFFICE O/P EST LOW 20 MIN: CPT | Performed by: PHYSICIAN ASSISTANT

## 2022-03-01 PROCEDURE — 51798 US URINE CAPACITY MEASURE: CPT | Performed by: PHYSICIAN ASSISTANT

## 2022-03-01 PROCEDURE — 1160F RVW MEDS BY RX/DR IN RCRD: CPT | Performed by: PHYSICIAN ASSISTANT

## 2022-03-01 PROCEDURE — 81002 URINALYSIS NONAUTO W/O SCOPE: CPT | Performed by: PHYSICIAN ASSISTANT

## 2022-03-01 PROCEDURE — 3008F BODY MASS INDEX DOCD: CPT | Performed by: PHYSICIAN ASSISTANT

## 2022-03-01 PROCEDURE — 1036F TOBACCO NON-USER: CPT | Performed by: PHYSICIAN ASSISTANT

## 2022-03-01 RX ORDER — MIRABEGRON 25 MG/1
25 TABLET, FILM COATED, EXTENDED RELEASE ORAL
Qty: 90 TABLET | Refills: 3 | Status: SHIPPED | OUTPATIENT
Start: 2022-03-01

## 2022-03-01 NOTE — PROGRESS NOTES
1  OAB (overactive bladder)  POCT urine dip    POCT Measure PVR    Mirabegron ER (Myrbetriq) 25 MG TB24         Assessment and plan: 1  Overactive Bladder  - continue myrbetriq 25mg   - f/u 1 year symptom reassessment         Raymundo Villanueva PA-C      Chief Complaint     Overactive bladder      History of Present Illness     Evette Barlow is a 78 y o  very female presenting for follow up of OAB  She has a 5 year history of urinary urgency and frequency  He has trialed 2 different medications over the years most recently Detrol on which she has taken for the past 4 years  Approximately 2 years ago patient was prescribed Myrbetriq 25 mg and continues on this medication  She does feel that is decreasing her frequency and urgency  She is currently not wearing any sedentary products  She does have nocturia 2 times a night  No sleep apnea  Has not used her CPAP in the past few months due to a current recall on her machine  Does not notice any changes in her nocturia however  Denies any dysuria, gross hematuria, nausea, vomiting, fevers, or chills  PVR 125ml    Review of Systems     Review of Systems   Constitutional: Negative for activity change and fatigue  HENT: Negative for congestion  Eyes: Negative for visual disturbance  Respiratory: Negative for shortness of breath and wheezing  Cardiovascular: Negative for chest pain and leg swelling  Gastrointestinal: Negative for abdominal pain  Endocrine: Positive for polyuria  Genitourinary: Positive for frequency and urgency  Negative for dysuria, flank pain and hematuria  Musculoskeletal: Negative for back pain  Allergic/Immunologic: Negative for immunocompromised state  Neurological: Negative for dizziness and numbness  Psychiatric/Behavioral: Negative for dysphoric mood  All other systems reviewed and are negative              Allergies     Allergies   Allergen Reactions    Other      Other reaction(s): Anaphylaxis  Annotation - [id number]: plums peaches       Physical Exam     Physical Exam  Constitutional:       Appearance: She is well-developed  HENT:      Head: Normocephalic and atraumatic  Eyes:      Pupils: Pupils are equal, round, and reactive to light  Cardiovascular:      Comments: Overweight  Pulmonary:      Effort: Pulmonary effort is normal       Breath sounds: Normal breath sounds  Abdominal:      Palpations: Abdomen is soft  Genitourinary:     Comments: Negative suprapubic tenderness, CVA tenderness  Musculoskeletal:         General: Normal range of motion  Cervical back: Normal range of motion  Skin:     General: Skin is warm  Neurological:      Mental Status: She is alert and oriented to person, place, and time  Vital Signs  There were no vitals filed for this visit        Current Medications       Current Outpatient Medications:     b complex vitamins tablet, Take 1 tablet by mouth daily, Disp: , Rfl:     calcitriol (ROCALTROL) 0 25 mcg capsule, Take 1 capsule (0 25 mcg total) by mouth daily, Disp: 90 capsule, Rfl: 4    Cholecalciferol (VITAMIN D) 2000 units CAPS, Take 1 capsule by mouth daily, Disp: , Rfl:     clobetasol (TEMOVATE) 0 05 % ointment, Apply topically 2 (two) times a week, Disp: 30 g, Rfl: 0    famotidine (PEPCID) 20 mg tablet, Take 1 tablet (20 mg total) by mouth daily, Disp: 90 tablet, Rfl: 3    fluocinonide (LIDEX) 0 05 % cream, , Disp: , Rfl:     Glucosamine-Chondroit-Vit C-Mn (GLUCOSAMINE 1500 COMPLEX PO), Take by mouth, Disp: , Rfl:     Latuda 20 MG tablet, TAKE 1 TABLET BY MOUTH  DAILY WITH BREAKFAST, Disp: 30 tablet, Rfl: 11    levothyroxine 88 mcg tablet, TAKE ONE TABLET BY MOUTH EVERY DAY, Disp: 30 tablet, Rfl: 2    Multiple Vitamin (MULTI-VITAMIN DAILY) TABS, Take by mouth, Disp: , Rfl:     Myrbetriq 25 MG TB24, TAKE 1 TABLET BY MOUTH  DAILY AT BEDTIME, Disp: 90 tablet, Rfl: 3    nystatin (MYCOSTATIN) ointment, Apply topically 2 (two) times a day, Disp: 30 g, Rfl: 5    nystatin (MYCOSTATIN) powder, Apply topically 2 (two) times a day, Disp: 15 g, Rfl: 3    SOOLANTRA 1 % CREA, , Disp: , Rfl:       Active Problems     Patient Active Problem List   Diagnosis    [facility], [facility]    Depression with anxiety    Gastroesophageal reflux disease without esophagitis    Hypothyroidism    Impaired fasting glucose    Insomnia    Obstructive sleep apnea    Onychomycosis of toenail    Patellofemoral arthritis of right knee    Stress incontinence of urine    High triglycerides    BMI 39 0-39 9,adult    Abnormal EKG    Localized edema    Palpitations    Raynaud's disease without gangrene    Stage 3 chronic kidney disease ([facility])    Vitamin D deficiency    Primary osteoarthritis of left knee    Primary osteoarthritis of right knee    Secondary hyperparathyroidism of renal origin ([facility] )    Persistent proteinuria    Urinary frequency    Morbid obesity with BMI of 40 0-44 9, adult (HCC)    Lichen sclerosus    Mass of right hand    Medicare annual wellness visit, subsequent         Past Medical History     Past Medical History:   Diagnosis Date    Arthritis     Bipolar 1 disorder ([facility])     Chronic kidney disease     stage 3    Disease of thyroid gland     Elevated blood pressure reading 6/10/2019         Surgical History     Past Surgical History:   Procedure Laterality Date    COLONOSCOPY  2011    NO PAST SURGERIES           Family History     Family History   Problem Relation Age of Onset    Heart disease Mother     Heart Valve Disease Mother         Replacement    Diabetes Mother     Arthritis Father          Social History     Social History     Social History     Tobacco Use   Smoking Status Former Smoker    Packs/day: 0 25    Years: 30 00    Pack years: 7 50    Types: Cigarettes    Quit date: 2007    Years since quitting: 15 1   Smokeless Tobacco Never Used         Pertinent Lab Values Lab Results   Component Value Date    CREATININE 1 71 (H) 10/08/2021       No results found for: PSA    @RESULTRCNT(1H])@      Pertinent Imaging      - n/a    Portions of the record may have been created with voice recognition software   Occasional wrong word or "sound a like" substitutions may have occurred due to the inherent limitations of voice recognition software   Read the chart carefully and recognize, using context, where substitutions have occurred

## 2022-04-01 ENCOUNTER — TELEPHONE (OUTPATIENT)
Dept: NEPHROLOGY | Facility: CLINIC | Age: 67
End: 2022-04-01

## 2022-04-01 ENCOUNTER — LAB (OUTPATIENT)
Dept: LAB | Facility: AMBULARY SURGERY CENTER | Age: 67
End: 2022-04-01
Payer: COMMERCIAL

## 2022-04-01 DIAGNOSIS — F31.63 SEVERE MIXED BIPOLAR DISORDER (HCC): ICD-10-CM

## 2022-04-01 DIAGNOSIS — N18.32 STAGE 3B CHRONIC KIDNEY DISEASE (HCC): ICD-10-CM

## 2022-04-01 LAB
ANION GAP SERPL CALCULATED.3IONS-SCNC: 5 MMOL/L (ref 4–13)
BUN SERPL-MCNC: 29 MG/DL (ref 5–25)
CALCIUM SERPL-MCNC: 9.5 MG/DL (ref 8.3–10.1)
CHLORIDE SERPL-SCNC: 111 MMOL/L (ref 100–108)
CO2 SERPL-SCNC: 29 MMOL/L (ref 21–32)
CREAT SERPL-MCNC: 1.56 MG/DL (ref 0.6–1.3)
GFR SERPL CREATININE-BSD FRML MDRD: 34 ML/MIN/1.73SQ M
GLUCOSE P FAST SERPL-MCNC: 111 MG/DL (ref 65–99)
POTASSIUM SERPL-SCNC: 4.4 MMOL/L (ref 3.5–5.3)
SODIUM SERPL-SCNC: 145 MMOL/L (ref 136–145)
TSH SERPL DL<=0.05 MIU/L-ACNC: 1.7 UIU/ML (ref 0.36–3.74)

## 2022-04-01 PROCEDURE — 36415 COLL VENOUS BLD VENIPUNCTURE: CPT

## 2022-04-01 PROCEDURE — 80048 BASIC METABOLIC PNL TOTAL CA: CPT

## 2022-04-01 PROCEDURE — 84443 ASSAY THYROID STIM HORMONE: CPT

## 2022-04-01 NOTE — TELEPHONE ENCOUNTER
----- Message from Anjana Giraldo MD sent at 4/1/2022 12:40 PM EDT -----  Please let the patient know that most recent lab work in terms of renal parameters are stable  Will discuss further at the upcoming visit, let me know if they have any questions or concerns      Thanks

## 2022-04-01 NOTE — RESULT ENCOUNTER NOTE
Please let the patient know that most recent lab work in terms of renal parameters are stable  Will discuss further at the upcoming visit, let me know if they have any questions or concerns      Thanks

## 2022-05-02 ENCOUNTER — OFFICE VISIT (OUTPATIENT)
Dept: PSYCHIATRY | Facility: CLINIC | Age: 67
End: 2022-05-02
Payer: COMMERCIAL

## 2022-05-02 DIAGNOSIS — E03.9 HYPOTHYROIDISM, UNSPECIFIED TYPE: ICD-10-CM

## 2022-05-02 DIAGNOSIS — F31.63 BIPOLAR 1 DISORDER, MIXED, SEVERE (HCC): ICD-10-CM

## 2022-05-02 DIAGNOSIS — F51.04 PSYCHOPHYSIOLOGICAL INSOMNIA: Primary | ICD-10-CM

## 2022-05-02 PROCEDURE — 1160F RVW MEDS BY RX/DR IN RCRD: CPT | Performed by: PSYCHIATRY & NEUROLOGY

## 2022-05-02 PROCEDURE — 99213 OFFICE O/P EST LOW 20 MIN: CPT | Performed by: PSYCHIATRY & NEUROLOGY

## 2022-05-02 PROCEDURE — 90833 PSYTX W PT W E/M 30 MIN: CPT | Performed by: PSYCHIATRY & NEUROLOGY

## 2022-05-02 PROCEDURE — 1036F TOBACCO NON-USER: CPT | Performed by: PSYCHIATRY & NEUROLOGY

## 2022-05-02 RX ORDER — LEVOTHYROXINE SODIUM 88 UG/1
88 TABLET ORAL DAILY
Qty: 30 TABLET | Refills: 2 | Status: SHIPPED | OUTPATIENT
Start: 2022-05-02

## 2022-05-02 NOTE — PSYCH
Subjective: Medication Management      Patient ID: Davida Boast is a 78 y o  female with Bipolar Disorder     HPI ROS Appetite Changes and Sleep: normal appetite, normal energy level, no weight change and normal number of sleep hours   Kanika Bath has been compliant with Latuda 20 mg daily and denies medication side effects  She denies recent health changes or new medications  Patient stated that her mood has stable on Latuda  She agrees to continue other medications as prescribed  Her most recent  TSH level is WNL  Will repeat TSH level before next visit  Patient stated since last seen she was able to move and she is settle in her new place and things are going well for her  She stated last Feb her son broke up with his GF  He moved out and is now living with a roommate that is also an [place] [id number] sponsor  She believes her son is currently sober  She tries to keep a good relationship with her son, daughter  and her 2 grandchildren  Currently her son and daughter are not speaking to each other  She feels that her family stressors have triggered stress eating and weight gain  She agrees to continue current treatment and will schedule follow up in 6 months  Review Of Systems:     Mood Anxiety, Depression and Emotional Lability   Behavior Impulsive Behavior   Thought Content Disturbing Thoughts, Feelings   General Emotional Problems and Decreased Functioning   Personality Normal   Other Psych Symptoms Normal   Constitutional Negative   ENT Negative   Cardiovascular Negative   Respiratory Negative   Gastrointestinal Negative   Genitourinary Negative   Musculoskeletal Negative   Integumentary Negative   Neurological Negative   Endocrine Normal    Other Symptoms Normal              Laboratory Results: No results found for this or any previous visit      Substance Abuse History:  Social History     Substance and Sexual Activity   Drug Use No       Family Psychiatric History:   Family History   Problem Relation Age of Onset    Heart disease Mother     Heart Valve Disease Mother         Replacement    Diabetes Mother     Arthritis Father        The following portions of the patient's history were reviewed and updated as appropriate: allergies, current medications, past family history, past medical history, past social history, past surgical history and problem list     Social History     Socioeconomic History    Marital status: /Civil Union     Spouse name: Not on file    Number of children: Not on file    Years of education: Not on file    Highest education level: Not on file   Occupational History    Not on file   Tobacco Use    Smoking status: Former Smoker     Packs/day: 0 25     Years: 30 00     Pack years: 7 50     Types: Cigarettes     Quit date: 2007     Years since quitting: 15 3    Smokeless tobacco: Never Used   Vaping Use    Vaping Use: Never used   Substance and Sexual Activity    Alcohol use: Not Currently    Drug use: No    Sexual activity: Not Currently     Partners: Male     Birth control/protection: Post-menopausal   Other Topics Concern    Not on file   Social History Narrative    Daily coffee consumption: 3 cups/day     Social Determinants of Health     Financial Resource Strain: Not on file   Food Insecurity: Not on file   Transportation Needs: Not on file   Physical Activity: Not on file   Stress: Not on file   Social Connections: Not on file   Intimate Partner Violence: Not on file   Housing Stability: Not on file     Social History     Social History Narrative    Daily coffee consumption: 3 cups/day       Objective:       Mental status:  Appearance calm and cooperative , adequate hygiene and grooming and good eye contact    Mood dysphoric   Affect affect was constricted   Speech a normal rate and fluent   Thought Processes coherent/organized and normal thought processes   Hallucinations no hallucinations present    Thought Content no delusions   Abnormal Thoughts no suicidal thoughts  and no homicidal thoughts    Orientation  oriented to person and place and time   Remote Memory short term memory intact and long term memory intact   Attention Span concentration intact   Intellect Appears to be of Average Intelligence   Insight Limited insight   Judgement judgment was limited   Muscle Strength Muscle strength and tone were normal and Normal gait    Language no difficulty naming common objects and no difficulty repeating a phrase    Fund of Knowledge displays adequate knowledge of current events, adequate fund of knowledge regarding past history and adequate fund of knowledge regarding vocabulary                Assessment/Plan:       There are no diagnoses linked to this encounter  Treatment Recommendations- Risks Benefits      Immediate Medical/Psychiatric/Psychotherapy Treatments and Any Precautions: continue current medications     Risks, Benefits And Possible Side Effects Of Medications:  {PSYCH RISK, BENEFITS AND POSSIBLE SIDE EFFECTS (Optional):34600    Controlled Medication Discussion: Discussed with patient Black Box warning on concurrent use of benzodiazepines and opioid medications including sedation, respiratory depression, coma and death  Patient understands the risk of treatment with benzodiazepines in addition to opioids and wants to continue taking those medications  , Discussed with patient the risks of sedation, respiratory depression, impairment of ability to drive and potential for abuse and addiction related to treatment with benzodiazepine medications  The patient understands risk of treatment with benzodiazepine medications, agrees to not drive if feels impaired and agrees to take medications as prescribed  and The patient has been filling controlled prescriptions on time as prescribed to [facility]       Psychotherapy Provided: Individual psychotherapy provided       Individual psychotherapy provided: Yes  Counseling was provided during the session today for 16 minutes  Medications, treatment progress and treatment plan reviewed with Bonita López  Medication education provided to Bonita López  Goals discussed during in session: continue improvement in mood stability  Recent stressor including COVID-19 issues, family problems, family conflict, family issues, health issues, medical problems, limited support, social difficulties, everyday stressors and ongoing anxiety discussed with Cherelle  Coping strategies including compliance with medications, contacting a therapist, eliminating avoidance, engaging in previously avoided activities, exercising, increasing energy, increasing interest in usual activities, increasing motivation, increasing social interaction, keeping busy at home, maintain healthy diet, maintain heathy sleeping hygiene and maintain positive attitude reviewed with Bonita López  Importance of medication and treatment compliance reviewed with Cherelle  Educated on importance of medication and treatment compliance  Importance of follow up with family physician for medical issues reviewed with Bonita López  Supportive therapy provided

## 2022-05-02 NOTE — BH TREATMENT PLAN
TREATMENT PLAN (Medication Management Only)        [facility]    Name and Date of Birth:  Marissa Butler 78 y o  1955  Date of Treatment Plan: May 2, 2022  Diagnosis/Diagnoses:    1  Bipolar 1 disorder, mixed, severe ([facility] [address] )      Strengths/Personal Resources for Self-Care: taking medications as prescribed, ability to communicate needs  Area/Areas of need (in own words): mood instability  1  Long Term Goal: improve control of mood stability  Target Date:6 months - 11/2/2022  Person/Persons responsible for completion of goal: Cherelle  2  Short Term Objective (s) - How will we reach this goal?:   A  Provider new recommended medication/dosage changes and/or continue medication(s): continue current medications as prescribed  B  N/A   C  N/A  Target Date:6 months - 11/2/2022  Person/Persons Responsible for Completion of Goal: Cherelle  Progress Towards Goals: continuing treatment  Treatment Modality: medication management every 6 months  Review due 180 days from date of this plan: 6 months - 11/2/2022  Expected length of service: ongoing treatment  My Physician/PA/NP and I have developed this plan together and I agree to work on the goals and objectives  I understand the treatment goals that were developed for my treatment

## 2022-05-25 ENCOUNTER — HOSPITAL ENCOUNTER (OUTPATIENT)
Dept: RADIOLOGY | Age: 67
Discharge: HOME | End: 2022-05-25
Payer: COMMERCIAL

## 2022-05-25 DIAGNOSIS — Z78.0 ENCOUNTER FOR OSTEOPOROSIS SCREENING IN ASYMPTOMATIC POSTMENOPAUSAL PATIENT: ICD-10-CM

## 2022-05-25 DIAGNOSIS — Z13.820 ENCOUNTER FOR OSTEOPOROSIS SCREENING IN ASYMPTOMATIC POSTMENOPAUSAL PATIENT: ICD-10-CM

## 2022-05-25 PROCEDURE — 77080 DXA BONE DENSITY AXIAL: CPT

## 2022-05-26 ENCOUNTER — VBI (OUTPATIENT)
Dept: ADMINISTRATIVE | Facility: OTHER | Age: 67
End: 2022-05-26

## 2022-06-14 ENCOUNTER — OFFICE VISIT (OUTPATIENT)
Dept: OBGYN CLINIC | Facility: CLINIC | Age: 67
End: 2022-06-14
Payer: COMMERCIAL

## 2022-06-14 VITALS — DIASTOLIC BLOOD PRESSURE: 82 MMHG | WEIGHT: 230 LBS | SYSTOLIC BLOOD PRESSURE: 128 MMHG | BODY MASS INDEX: 42.07 KG/M2

## 2022-06-14 DIAGNOSIS — N84.1 ENDOCERVICAL POLYP: ICD-10-CM

## 2022-06-14 DIAGNOSIS — L90.0 LICHEN SCLEROSUS: Primary | ICD-10-CM

## 2022-06-14 PROCEDURE — 99213 OFFICE O/P EST LOW 20 MIN: CPT | Performed by: OBSTETRICS & GYNECOLOGY

## 2022-06-15 NOTE — PROGRESS NOTES
Assessment/Plan:    Lichen sclerosus  - continue to use clobetasol 2-3 times per week  Endocervical polyp    - will monitor, if ever has bleeding, would recommend D+C hysteroscopy    Subjective:      Patient ID: Sara Weiner is a 75 y o  female  HPI  69y P2 presents to f/u after starting clobetasol for lichen sclerosis  She notes that her symptoms are improved  She has occ itching  She has only been using the clobetsol intermittently  She has was noted to have a small endocervical polyp on US, with a 2mm endometrial stripe  She denies vaginal bleeding    The following portions of the patient's history were reviewed and updated as appropriate: allergies, current medications, past family history, past medical history, past social history, past surgical history and problem list     Review of Systems   Constitutional: Negative  HENT: Negative  Respiratory: Negative  Cardiovascular: Negative  Gastrointestinal: Negative  Genitourinary: Positive for genital sores  Neurological: Negative  Psychiatric/Behavioral: Negative  Objective:      /82   Wt 104 kg (230 lb)   Breastfeeding No   BMI 42 07 kg/m²          Physical Exam  Vitals and nursing note reviewed  Constitutional:       Appearance: Normal appearance  HENT:      Head: Normocephalic and atraumatic  Eyes:      Extraocular Movements: Extraocular movements intact  Conjunctiva/sclera: Conjunctivae normal       Pupils: Pupils are equal, round, and reactive to light  Pulmonary:      Effort: Pulmonary effort is normal    Genitourinary:     Labia:         Right: Rash present  Left: Rash present  Comments: Improved lichen, but still present  Skin:     General: Skin is warm and dry  Neurological:      General: No focal deficit present  Mental Status: She is alert and oriented to person, place, and time

## 2022-06-16 ENCOUNTER — OFFICE VISIT (OUTPATIENT)
Dept: FAMILY MEDICINE CLINIC | Facility: CLINIC | Age: 67
End: 2022-06-16
Payer: COMMERCIAL

## 2022-06-16 VITALS
WEIGHT: 228.4 LBS | TEMPERATURE: 98.4 F | DIASTOLIC BLOOD PRESSURE: 80 MMHG | BODY MASS INDEX: 42.03 KG/M2 | SYSTOLIC BLOOD PRESSURE: 110 MMHG | HEART RATE: 98 BPM | RESPIRATION RATE: 16 BRPM | OXYGEN SATURATION: 98 % | HEIGHT: 62 IN

## 2022-06-16 DIAGNOSIS — N25.81 SECONDARY HYPERPARATHYROIDISM OF RENAL ORIGIN (HCC): ICD-10-CM

## 2022-06-16 DIAGNOSIS — R73.01 IMPAIRED FASTING GLUCOSE: ICD-10-CM

## 2022-06-16 DIAGNOSIS — E03.2 HYPOTHYROIDISM DUE TO MEDICATION: Primary | ICD-10-CM

## 2022-06-16 DIAGNOSIS — F31.63 BIPOLAR 1 DISORDER, MIXED, SEVERE (HCC): ICD-10-CM

## 2022-06-16 DIAGNOSIS — N18.32 STAGE 3B CHRONIC KIDNEY DISEASE (HCC): ICD-10-CM

## 2022-06-16 DIAGNOSIS — E78.1 HIGH TRIGLYCERIDES: ICD-10-CM

## 2022-06-16 PROCEDURE — 3288F FALL RISK ASSESSMENT DOCD: CPT | Performed by: FAMILY MEDICINE

## 2022-06-16 PROCEDURE — 1160F RVW MEDS BY RX/DR IN RCRD: CPT | Performed by: FAMILY MEDICINE

## 2022-06-16 PROCEDURE — 1003F LEVEL OF ACTIVITY ASSESS: CPT | Performed by: FAMILY MEDICINE

## 2022-06-16 PROCEDURE — 99214 OFFICE O/P EST MOD 30 MIN: CPT | Performed by: FAMILY MEDICINE

## 2022-06-16 PROCEDURE — 1036F TOBACCO NON-USER: CPT | Performed by: FAMILY MEDICINE

## 2022-06-16 PROCEDURE — 1101F PT FALLS ASSESS-DOCD LE1/YR: CPT | Performed by: FAMILY MEDICINE

## 2022-06-16 PROCEDURE — 3725F SCREEN DEPRESSION PERFORMED: CPT | Performed by: FAMILY MEDICINE

## 2022-06-16 PROCEDURE — 3008F BODY MASS INDEX DOCD: CPT | Performed by: FAMILY MEDICINE

## 2022-06-16 NOTE — ASSESSMENT & PLAN NOTE
Lab Results   Component Value Date    EGFR 34 04/01/2022    EGFR 31 10/08/2021    EGFR 35 03/26/2021    CREATININE 1 56 (H) 04/01/2022    CREATININE 1 71 (H) 10/08/2021    CREATININE 1 52 (H) 03/26/2021   stable  Seeing nephrology

## 2022-06-16 NOTE — PROGRESS NOTES
Assessment/Plan:    1  Hypothyroidism due to medication  Assessment & Plan:  Stable on levo    Orders:  -     Lipid Panel with Direct LDL reflex; Future    2  Impaired fasting glucose  Assessment & Plan:  Check a1c    Orders:  -     Ambulatory referral to clinical pharmacy; Future  -     Hemoglobin A1C; Future    3  Stage 3b chronic kidney disease [facility])  Assessment & Plan:  Lab Results   Component Value Date    EGFR 34 04/01/2022    EGFR 31 10/08/2021    EGFR 35 03/26/2021    CREATININE 1 56 (H) 04/01/2022    CREATININE 1 71 (H) 10/08/2021    CREATININE 1 52 (H) 03/26/2021   stable  Seeing nephrology      4  BMI 40 0-44 9, adult [facility])  Assessment & Plan:  Discussed medical management  Refer to pharmacist for possible discussion    Orders:  -     Ambulatory referral to clinical pharmacy; Future  -     Lipid Panel with Direct LDL reflex; Future    5  Secondary hyperparathyroidism of renal origin [facility])  Assessment & Plan:  Seeing nephrology      6  High triglycerides  Assessment & Plan:  Check lipids      7  Bipolar 1 disorder, mixed, severe ([address] )  Assessment & Plan:  Seeing psych        BMI Counseling: Body mass index is 41 77 kg/m²  The BMI is above normal  Nutrition recommendations include encouraging healthy choices of fruits and vegetables  Exercise recommendations include exercising 3-5 times per week  No pharmacotherapy was ordered  Rationale for BMI follow-up plan is due to patient being overweight or obese  There are no Patient Instructions on file for this visit  Return in about 6 months (around 12/16/2022)  Subjective:      Patient ID: Romero Tristan is a 75 y o  female      Chief Complaint   Patient presents with    Follow-up     Patient here for follow up        Here for follow up  Daughter diagnosed with covid Monday  Has lost a few pounds but frustrated with weight loss   Knee pain- has appt with [facility]      The following portions of the patient's history were reviewed and updated as appropriate: allergies, current medications, past family history, past medical history, past social history, past surgical history and problem list     Review of Systems   Constitutional: Negative  HENT: Negative  Eyes: Negative  Respiratory: Negative  Cardiovascular: Negative  Gastrointestinal: Negative  Endocrine: Negative  Genitourinary: Negative  Musculoskeletal: Positive for arthralgias (knee pain)  Allergic/Immunologic: Negative  Neurological: Negative  Hematological: Negative  Psychiatric/Behavioral: Negative  Current Outpatient Medications   Medication Sig Dispense Refill    b complex vitamins tablet Take 1 tablet by mouth daily      calcitriol (ROCALTROL) 0 25 mcg capsule Take 1 capsule (0 25 mcg total) by mouth daily 90 capsule 4    Cholecalciferol (VITAMIN D) 2000 units CAPS Take 1 capsule by mouth daily      clobetasol (TEMOVATE) 0 05 % ointment Apply topically 2 (two) times a week 30 g 0    famotidine (PEPCID) 20 mg tablet Take 1 tablet (20 mg total) by mouth daily 90 tablet 3    fluocinonide (LIDEX) 0 05 % cream       Glucosamine-Chondroit-Vit C-Mn (GLUCOSAMINE 1500 COMPLEX PO) Take by mouth      Latuda 20 MG tablet TAKE 1 TABLET BY MOUTH  DAILY WITH BREAKFAST 30 tablet 11    levothyroxine 88 mcg tablet Take 1 tablet (88 mcg total) by mouth daily 30 tablet 2    Mirabegron ER (Myrbetriq) 25 MG TB24 Take 25 mg by mouth daily at bedtime 90 tablet 3    Multiple Vitamin (MULTI-VITAMIN DAILY) TABS Take by mouth      SOOLANTRA 1 % CREA        No current facility-administered medications for this visit  Objective:    /80 (BP Location: Left arm, Patient Position: Sitting, Cuff Size: Large)   Pulse 98   Temp 98 4 °F (36 9 °C) (Tympanic)   Resp 16   Ht 5' 2" (1 575 m)   Wt 104 kg (228 lb 6 4 oz)   SpO2 98%   BMI 41 77 kg/m²        Physical Exam  Vitals and nursing note reviewed  Constitutional:       Appearance: Normal appearance  HENT:      Head: Normocephalic and atraumatic  Eyes:      Extraocular Movements: Extraocular movements intact  Pupils: Pupils are equal, round, and reactive to light  Cardiovascular:      Rate and Rhythm: Normal rate and regular rhythm  Pulses: Normal pulses  Heart sounds: Normal heart sounds  Pulmonary:      Effort: Pulmonary effort is normal       Breath sounds: Normal breath sounds  Abdominal:      General: Abdomen is flat  Palpations: Abdomen is soft  Musculoskeletal:         General: Normal range of motion  Cervical back: Normal range of motion and neck supple  Skin:     General: Skin is warm  Capillary Refill: Capillary refill takes less than 2 seconds  Neurological:      General: No focal deficit present  Mental Status: She is alert and oriented to person, place, and time  Psychiatric:         Mood and Affect: Mood normal          Behavior: Behavior normal          Thought Content:  Thought content normal          Judgment: Judgment normal                 Teresa Doshi DO

## 2022-06-20 ENCOUNTER — TELEPHONE (OUTPATIENT)
Dept: FAMILY MEDICINE CLINIC | Facility: CLINIC | Age: 67
End: 2022-06-20

## 2022-06-20 NOTE — PROGRESS NOTES
Reviewed patient chart and LM regarding referral form Dr Alli Kahn  Patient interested in weight loss  However, Wegov[facility] on backorder until at least end of summer  Can follow up then       Pharmacist Tracking Tool    Reason For Outreach  Embedded Pharmacist    Demographics  Interaction Method: Virtual  Type of Intervention: New    Topic(s) Addressed  Obesity    Intervention(s) Made      Non-Pharmacologic:    -- Care coordination  -- Cost    Tool(s) Used  Not Applicable    Time Spent:   Time Spent in Direct Patient Care: 5 minutes  Time Spent in Care Coordination: 20 minutes    Recommendations  Recipient: Patient/caregiver  Outcome: Pending/Follow-up Required

## 2022-06-30 ENCOUNTER — APPOINTMENT (OUTPATIENT)
Dept: LAB | Facility: AMBULARY SURGERY CENTER | Age: 67
End: 2022-06-30
Payer: COMMERCIAL

## 2022-06-30 DIAGNOSIS — E03.2 HYPOTHYROIDISM DUE TO MEDICATION: ICD-10-CM

## 2022-06-30 DIAGNOSIS — R73.01 IMPAIRED FASTING GLUCOSE: ICD-10-CM

## 2022-06-30 LAB
CHOLEST SERPL-MCNC: 193 MG/DL
EST. AVERAGE GLUCOSE BLD GHB EST-MCNC: 108 MG/DL
HBA1C MFR BLD: 5.4 %
HDLC SERPL-MCNC: 54 MG/DL
LDLC SERPL CALC-MCNC: 110 MG/DL (ref 0–100)
TRIGL SERPL-MCNC: 145 MG/DL
TSH SERPL DL<=0.05 MIU/L-ACNC: 2 UIU/ML (ref 0.45–4.5)

## 2022-06-30 PROCEDURE — 80061 LIPID PANEL: CPT

## 2022-06-30 PROCEDURE — 83036 HEMOGLOBIN GLYCOSYLATED A1C: CPT

## 2022-06-30 PROCEDURE — 36415 COLL VENOUS BLD VENIPUNCTURE: CPT

## 2022-06-30 PROCEDURE — 84443 ASSAY THYROID STIM HORMONE: CPT

## 2022-07-26 ENCOUNTER — OFFICE VISIT (OUTPATIENT)
Dept: OBGYN CLINIC | Facility: HOSPITAL | Age: 67
End: 2022-07-26
Payer: COMMERCIAL

## 2022-07-26 VITALS
HEIGHT: 62 IN | WEIGHT: 230.6 LBS | BODY MASS INDEX: 42.44 KG/M2 | SYSTOLIC BLOOD PRESSURE: 134 MMHG | DIASTOLIC BLOOD PRESSURE: 71 MMHG | HEART RATE: 93 BPM

## 2022-07-26 DIAGNOSIS — M17.11 PRIMARY OSTEOARTHRITIS OF RIGHT KNEE: ICD-10-CM

## 2022-07-26 DIAGNOSIS — M17.12 PRIMARY OSTEOARTHRITIS OF LEFT KNEE: Primary | ICD-10-CM

## 2022-07-26 PROCEDURE — 20610 DRAIN/INJ JOINT/BURSA W/O US: CPT | Performed by: ORTHOPAEDIC SURGERY

## 2022-07-26 PROCEDURE — 1160F RVW MEDS BY RX/DR IN RCRD: CPT | Performed by: ORTHOPAEDIC SURGERY

## 2022-07-26 PROCEDURE — 99213 OFFICE O/P EST LOW 20 MIN: CPT | Performed by: ORTHOPAEDIC SURGERY

## 2022-07-26 RX ORDER — BETAMETHASONE SODIUM PHOSPHATE AND BETAMETHASONE ACETATE 3; 3 MG/ML; MG/ML
12 INJECTION, SUSPENSION INTRA-ARTICULAR; INTRALESIONAL; INTRAMUSCULAR; SOFT TISSUE
Status: COMPLETED | OUTPATIENT
Start: 2022-07-26 | End: 2022-07-26

## 2022-07-26 RX ORDER — BUPIVACAINE HYDROCHLORIDE 2.5 MG/ML
2 INJECTION, SOLUTION INFILTRATION; PERINEURAL
Status: COMPLETED | OUTPATIENT
Start: 2022-07-26 | End: 2022-07-26

## 2022-07-26 RX ORDER — LIDOCAINE HYDROCHLORIDE 10 MG/ML
3 INJECTION, SOLUTION INFILTRATION; PERINEURAL
Status: COMPLETED | OUTPATIENT
Start: 2022-07-26 | End: 2022-07-26

## 2022-07-26 RX ADMIN — BUPIVACAINE HYDROCHLORIDE 2 ML: 2.5 INJECTION, SOLUTION INFILTRATION; PERINEURAL at 13:33

## 2022-07-26 RX ADMIN — LIDOCAINE HYDROCHLORIDE 3 ML: 10 INJECTION, SOLUTION INFILTRATION; PERINEURAL at 13:33

## 2022-07-26 RX ADMIN — BETAMETHASONE SODIUM PHOSPHATE AND BETAMETHASONE ACETATE 12 MG: 3; 3 INJECTION, SUSPENSION INTRA-ARTICULAR; INTRALESIONAL; INTRAMUSCULAR; SOFT TISSUE at 13:33

## 2022-07-26 NOTE — PROGRESS NOTES
Assessment:   Diagnosis ICD-10-CM Associated Orders   1  Primary osteoarthritis of left knee  M17 12 Large joint arthrocentesis: bilateral knee     lidocaine (XYLOCAINE) 1 % injection 3 mL     bupivacaine (MARCAINE) 0 25 % injection 2 mL     betamethasone acetate-betamethasone sodium phosphate (CELESTONE) injection 12 mg     lidocaine (XYLOCAINE) 1 % injection 3 mL     bupivacaine (MARCAINE) 0 25 % injection 2 mL     betamethasone acetate-betamethasone sodium phosphate (CELESTONE) injection 12 mg   2  Primary osteoarthritis of right knee  M17 11 Large joint arthrocentesis: bilateral knee     lidocaine (XYLOCAINE) 1 % injection 3 mL     bupivacaine (MARCAINE) 0 25 % injection 2 mL     betamethasone acetate-betamethasone sodium phosphate (CELESTONE) injection 12 mg     lidocaine (XYLOCAINE) 1 % injection 3 mL     bupivacaine (MARCAINE) 0 25 % injection 2 mL     betamethasone acetate-betamethasone sodium phosphate (CELESTONE) injection 12 mg       Plan:  B/l knee OA, received b/l knee CSI today  F/u 3 months  We discussed weight loss for future TKA  Continue HEP and analgesics  Consider visco injections next visit  To do next visit:  No follow-ups on file  The above stated was discussed in layman's terms and the patient expressed understanding  All questions were answered to the patient's satisfaction  Scribe Attestation    I,:   am acting as a scribe while in the presence of the attending physician :       I,:   personally performed the services described in this documentation    as scribed in my presence :             Subjective:   Griselda Setter is a 75 y o  female who presents for f/u of b/l knee OA, R>L  She was here 15 months ago  She received b/l knee csi which gave her good relief for a few months  She is here today with worsening pain and is requesting injections   Pain is dull in character, Located medially, insidious in onset, intermittent in duration, moderate in intensity, Exacerbating factors stairs and kneeling, Remitting factors rest, nonradiating, no numbness or tingling, no open wounds noted  She is considering TKA but her BMI is over 40          Review of systems negative unless otherwise specified in HPI  Review of Systems    Past Medical History:   Diagnosis Date    Abnormal ECG     Arthritis     Bipolar 1 disorder (HCC)     Chronic kidney disease     stage 3    Depression     Disease of thyroid gland     Elevated blood pressure reading 06/10/2019       Past Surgical History:   Procedure Laterality Date    COLONOSCOPY  2011       Family History   Problem Relation Age of Onset    Heart disease Mother     Heart Valve Disease Mother         Replacement    Diabetes Mother         2002    Heart failure Mother         Heart Valve replacement    Arthritis Father        Social History     Occupational History    Not on file   Tobacco Use    Smoking status: Former Smoker     Packs/day: 0 25     Years: 30 00     Pack years: 7 50     Types: Cigarettes, Cigarettes     Quit date: 2008     Years since quittin 5    Smokeless tobacco: Never Used   Vaping Use    Vaping Use: Never used   Substance and Sexual Activity    Alcohol use: Not Currently    Drug use: Never    Sexual activity: Not Currently     Partners: Male     Birth control/protection: Post-menopausal         Current Outpatient Medications:     b complex vitamins tablet, Take 1 tablet by mouth daily, Disp: , Rfl:     calcitriol (ROCALTROL) 0 25 mcg capsule, Take 1 capsule (0 25 mcg total) by mouth daily, Disp: 90 capsule, Rfl: 4    Cholecalciferol (VITAMIN D) 2000 units CAPS, Take 1 capsule by mouth daily, Disp: , Rfl:     clobetasol (TEMOVATE) 0 05 % ointment, Apply topically 2 (two) times a week, Disp: 30 g, Rfl: 0    famotidine (PEPCID) 20 mg tablet, Take 1 tablet (20 mg total) by mouth daily, Disp: 90 tablet, Rfl: 3    fluocinonide (LIDEX) 0 05 % cream, , Disp: , Rfl:     Glucosamine-Chondroit-Vit C-Mn (GLUCOSAMINE 1500 COMPLEX PO), Take by mouth, Disp: , Rfl:     Latuda 20 MG tablet, TAKE 1 TABLET BY MOUTH  DAILY WITH BREAKFAST, Disp: 30 tablet, Rfl: 11    levothyroxine 88 mcg tablet, Take 1 tablet (88 mcg total) by mouth daily, Disp: 30 tablet, Rfl: 2    Mirabegron ER (Myrbetriq) 25 MG TB24, Take 25 mg by mouth daily at bedtime, Disp: 90 tablet, Rfl: 3    Multiple Vitamin (MULTI-VITAMIN DAILY) TABS, Take by mouth, Disp: , Rfl:     SOOLANTRA 1 % CREA, , Disp: , Rfl:   No current facility-administered medications for this visit  Allergies   Allergen Reactions    Other Other (See Comments)     Other reaction(s): Anaphylaxis  Annotation - [id number]: plums peaches, fruits with skin             Vitals:    07/26/22 1306   BP: 134/71   Pulse: 93       Objective:                    Right Knee Exam     Muscle Strength   The patient has normal right knee strength  Tenderness   The patient is experiencing tenderness in the medial joint line  Range of Motion   Extension: normal   Flexion: normal     Tests   Varus: negative Valgus: negative  Lachman:  Anterior - negative      Patellar apprehension: negative    Other   Erythema: absent  Sensation: normal  Pulse: present  Swelling: mild  Effusion: no effusion present      Left Knee Exam     Muscle Strength   The patient has normal left knee strength  Tenderness   The patient is experiencing tenderness in the medial joint line      Range of Motion   Extension: normal   Flexion: normal     Tests   Varus: negative Valgus: negative  Lachman:  Anterior - negative      Patellar apprehension: negative    Other   Erythema: absent  Sensation: normal  Pulse: present  Swelling: mild  Effusion: no effusion present            Diagnostics, reviewed and taken today if performed as documented:    None performed      The attending physician has personally reviewed the pertinent films in PACS and interpretation is as follows:      Procedures, if performed today:    Large joint arthrocentesis: bilateral knee  Universal Protocol:  Consent: Verbal consent obtained  Risks and benefits: risks, benefits and alternatives were discussed  Consent given by: patient  Timeout called at: 7/26/2022 1:30 PM   Site marked: the operative site was marked  Patient identity confirmed: verbally with patient    Supporting Documentation  Indications: pain and joint swelling   Procedure Details  Location: knee - bilateral knee  Needle size: 22 G  Ultrasound guidance: no  Approach: anterolateral    Medications (Right): 3 mL lidocaine 1 %; 2 mL bupivacaine 0 25 %; 12 mg betamethasone acetate-betamethasone sodium phosphate 6 (3-3) mg/mLMedications (Left): 3 mL lidocaine 1 %; 2 mL bupivacaine 0 25 %; 12 mg betamethasone acetate-betamethasone sodium phosphate 6 (3-3) mg/mL   Patient tolerance: patient tolerated the procedure well with no immediate complications  Dressing:  Sterile dressing applied          None performed      Portions of the record may have been created with voice recognition software  Occasional wrong word or "sound a like" substitutions may have occurred due to the inherent limitations of voice recognition software  Read the chart carefully and recognize, using context, where substitutions have occurred

## 2022-09-28 ENCOUNTER — IMMUNIZATIONS (OUTPATIENT)
Dept: FAMILY MEDICINE CLINIC | Facility: CLINIC | Age: 67
End: 2022-09-28
Payer: COMMERCIAL

## 2022-09-28 DIAGNOSIS — Z23 ENCOUNTER FOR IMMUNIZATION: Primary | ICD-10-CM

## 2022-09-28 PROCEDURE — G0008 ADMIN INFLUENZA VIRUS VAC: HCPCS

## 2022-09-28 PROCEDURE — 90662 IIV NO PRSV INCREASED AG IM: CPT

## 2022-10-12 PROBLEM — Z00.00 MEDICARE ANNUAL WELLNESS VISIT, SUBSEQUENT: Status: RESOLVED | Noted: 2021-12-21 | Resolved: 2022-10-12

## 2022-10-20 ENCOUNTER — TELEPHONE (OUTPATIENT)
Dept: OTHER | Facility: OTHER | Age: 67
End: 2022-10-20

## 2022-10-20 NOTE — TELEPHONE ENCOUNTER
Call from patients , Di Guerra, requesting the office mail him proof the patient had her flu vaccine as the facility they live in is requesting it

## 2022-10-25 DIAGNOSIS — F31.9 BIPOLAR 1 DISORDER (HCC): ICD-10-CM

## 2022-10-27 RX ORDER — LURASIDONE HYDROCHLORIDE 20 MG/1
TABLET, FILM COATED ORAL
Qty: 30 TABLET | Refills: 11 | Status: SHIPPED | OUTPATIENT
Start: 2022-10-27

## 2022-11-04 ENCOUNTER — TELEPHONE (OUTPATIENT)
Dept: PSYCHIATRY | Facility: CLINIC | Age: 67
End: 2022-11-04

## 2022-11-04 NOTE — TELEPHONE ENCOUNTER
Evette Barlow and/or Patient requested a call back to discuss blood work  Pt needs to know if is necessary another blood work for the next appt       They can be reached at P#[phone]   Thank you

## 2022-11-09 ENCOUNTER — OFFICE VISIT (OUTPATIENT)
Dept: PSYCHIATRY | Facility: CLINIC | Age: 67
End: 2022-11-09

## 2022-11-09 DIAGNOSIS — Z79.899 LONG TERM CURRENT USE OF ANTIPSYCHOTIC MEDICATION: ICD-10-CM

## 2022-11-09 DIAGNOSIS — E03.9 HYPOTHYROIDISM, UNSPECIFIED TYPE: ICD-10-CM

## 2022-11-09 DIAGNOSIS — F31.63 BIPOLAR 1 DISORDER, MIXED, SEVERE (HCC): Primary | ICD-10-CM

## 2022-11-09 RX ORDER — LEVOTHYROXINE SODIUM 88 UG/1
44 TABLET ORAL DAILY
Qty: 15 TABLET | Refills: 2 | Status: SHIPPED | OUTPATIENT
Start: 2022-11-09

## 2022-11-09 NOTE — PSYCH
Visit Time    Visit Start Time: 9:30  Visit Stop Time: 10:00  Total Visit Duration: 30 minutes    Subjective:Medication Management      Patient ID: Tessie Cruz is a 75 y o  female with Bipolar disorder  HPI ROS Appetite Changes and Sleep: normal appetite, normal energy level, no weight change and normal number of sleep hours   Cherelle has been compliant with Latuda 20 mg daily and denies medication side effects  She denies recent health changes or new medications  Patient stated that her mood has stable  She agrees to continue current  medications as prescribed  Since last seen she stated her son started dating again and his current GF is a good influence on him  He remains sober  She tries to keep a good relationship with her son, daughter  and her 2 grandchildren  Currently her son and daughter are not speaking to each other  She feels that her family stressors have triggered stress eating and weight gain  She agrees to continue current treatment and will schedule follow up in 6 months  Will repeat fasting labs in December      Review Of Systems:     Mood Anxiety, Depression and Emotional Lability   Behavior Impulsive Behavior   Thought Content Disturbing Thoughts, Feelings   General Emotional Problems and Decreased Functioning   Personality Normal   Other Psych Symptoms Normal   Constitutional Negative   ENT Negative   Cardiovascular Negative   Respiratory Negative   Gastrointestinal Negative   Genitourinary Negative   Musculoskeletal Negative   Integumentary Negative   Neurological Negative   Endocrine Normal    Other Symptoms Normal        Laboratory Results:   Most Recent Labs:   Lab Results   Component Value Date    WBC 5 45 09/10/2020    RBC 4 71 09/10/2020    HGB 14 6 09/10/2020    HCT 46 1 09/10/2020     09/10/2020    RDW 13 0 09/10/2020    NEUTROABS 4 02 09/10/2020     (H) 11/17/2017    K 4 4 04/01/2022     (H) 04/01/2022    CO2 29 04/01/2022    BUN 29 (H) 04/01/2022 CREATININE 1 56 (H) 2022    GLUCOSE 124 (H) 2017    CALCIUM 9 5 2022    AST 9 2021    ALT 18 2021    ALKPHOS 75 2021    PROT 7 0 2017    BILITOT 0 4 2017    CHOL 208 (H) 2017    CHOLESTEROL 193 2022    TRIG 145 2022    HDL 54 2022    LDLCALC 110 (H) 2022    [place] 173 2019    LITHIUM 1 1 2020    [id number] 2 000 2022    FREET4 1 62 01/15/2018       Substance Abuse History:  Social History     Substance and Sexual Activity   Drug Use Never       Family Psychiatric History:   Family History   Problem Relation Age of Onset   • Heart disease Mother    • Heart Valve Disease Mother         Replacement   • Diabetes Mother            • Heart failure Mother         Heart Valve replacement   • Arthritis Father        The following portions of the patient's history were reviewed and updated as appropriate: allergies, current medications, past family history, past medical history, past social history, past surgical history and problem list     Social History     Socioeconomic History   • Marital status: /Civil Union     Spouse name: Not on file   • Number of children: Not on file   • Years of education: Not on file   • Highest education level: Not on file   Occupational History   • Not on file   Tobacco Use   • Smoking status: Former Smoker     Packs/day: 0 25     Years: 30 00     Pack years: 7 50     Types: Cigarettes, Cigarettes     Quit date: 2008     Years since quittin 8   • Smokeless tobacco: Never Used   Vaping Use   • Vaping Use: Never used   Substance and Sexual Activity   • Alcohol use: Not Currently   • Drug use: Never   • Sexual activity: Not Currently     Partners: Male     Birth control/protection: Post-menopausal   Other Topics Concern   • Not on file   Social History Narrative    Daily coffee consumption: 3 cups/day     Social Determinants of Health     Financial Resource Strain: Not on file Food Insecurity: Not on file   Transportation Needs: Not on file   Physical Activity: Not on file   Stress: Not on file   Social Connections: Not on file   Intimate Partner Violence: Not on file   Housing Stability: Not on file     Social History     Social History Narrative    Daily coffee consumption: 3 cups/day       Objective:       Mental status:  Appearance calm and cooperative , adequate hygiene and grooming and good eye contact    Mood dysphoric   Affect affect was constricted   Speech a normal rate and fluent   Thought Processes coherent/organized and normal thought processes   Hallucinations no hallucinations present    Thought Content no delusions   Abnormal Thoughts no suicidal thoughts  and no homicidal thoughts    Orientation  oriented to person and place and time   Remote Memory short term memory intact and long term memory intact   Attention Span concentration intact   Intellect Appears to be of Average Intelligence   Insight Limited insight   Judgement judgment was limited   Muscle Strength Muscle strength and tone were normal and Normal gait    Language no difficulty naming common objects and no difficulty repeating a phrase    Fund of Knowledge displays adequate knowledge of current events, adequate fund of knowledge regarding past history and adequate fund of knowledge regarding vocabulary    Pain none   Pain Scale 0       Assessment/Plan:       Diagnoses and all orders for this visit:    Bipolar 1 disorder, mixed, severe ([address] )    Hypothyroidism, unspecified type  -     levothyroxine 88 mcg tablet; Take 0 5 tablets (44 mcg total) by mouth daily    Long term current use of antipsychotic medication  -     Comprehensive metabolic panel; Future  -     Lipid Panel with Direct LDL reflex; Future  -     TSH, 3rd generation with Free T4 reflex;  Future  -     Comprehensive metabolic panel  -     Lipid Panel with Direct LDL reflex  -     TSH, 3rd generation with Free T4 reflex            Treatment Recommendations- Risks Benefits      Immediate Medical/Psychiatric/Psychotherapy Treatments and Any Precautions: continue current treatment     Risks, Benefits And Possible Side Effects Of Medications:  {PSYCH RISK, BENEFITS AND POSSIBLE SIDE EFFECTS (Optional):49187    Controlled Medication Discussion: Discussed with patient Black Box warning on concurrent use of benzodiazepines and opioid medications including sedation, respiratory depression, coma and death  Patient understands the risk of treatment with benzodiazepines in addition to opioids and wants to continue taking those medications  , Discussed with patient the risks of sedation, respiratory depression, impairment of ability to drive and potential for abuse and addiction related to treatment with benzodiazepine medications  The patient understands risk of treatment with benzodiazepine medications, agrees to not drive if feels impaired and agrees to take medications as prescribed  and The patient has been filling controlled prescriptions on time as prescribed to [facility]       Psychotherapy Provided: Individual psychotherapy provided  Individual psychotherapy provided: Yes  Counseling was provided during the session today for 16 minutes  Medications, treatment progress and treatment plan reviewed with Cherelle  Medication education provided to Cherelle  Goals discussed during in session: continue improvement in mood stability  Recent stressor including COVID-19 issues, family problems, family conflict, family issues, health issues, medical problems, limited support, social difficulties, everyday stressors and ongoing anxiety discussed with Cherelle     Coping strategies including compliance with medications, contacting a therapist, eliminating avoidance, engaging in previously avoided activities, exercising, increasing energy, increasing interest in usual activities, increasing motivation, increasing social interaction, keeping busy at home, maintain healthy diet, maintain heathy sleeping hygiene and maintain positive attitude reviewed with Morelia Mosqueda  Importance of medication and treatment compliance reviewed with Cherelle  Educated on importance of medication and treatment compliance  Importance of follow up with family physician for medical issues reviewed with Cherelle    Supportive therapy provided

## 2022-11-09 NOTE — BH TREATMENT PLAN
TREATMENT PLAN (Medication Management Only)        [facility]    Name and Date of Birth:  Miquel Cardona 75 y o  1955  Date of Treatment Plan: November 9, 2022  Diagnosis/Diagnoses:    1  Bipolar 1 disorder, mixed, severe ([address] )    2  Hypothyroidism, unspecified type    3  Long term current use of antipsychotic medication      Strengths/Personal Resources for Self-Care: taking medications as prescribed, ability to communicate needs  Area/Areas of need (in own words): anxiety, anxiety symptoms, depression, depressive symptoms, mood instability  1  Long Term Goal: continue improvement in mood stability  Target Date:6 months - 5/9/2023  Person/Persons responsible for completion of goal: Cherelle  2  Short Term Objective (s) - How will we reach this goal?:   A  Provider new recommended medication/dosage changes and/or continue medication(s): continue current medications as prescribed  B  N/A   C  N/A  Target Date:6 months - 5/9/2023  Person/Persons Responsible for Completion of Goal: Cherelle  Progress Towards Goals: continuing treatment  Treatment Modality: medication management every 6 months  Review due 180 days from date of this plan: 6 months - 5/9/2023  Expected length of service: ongoing treatment  My Physician/PA/NP and I have developed this plan together and I agree to work on the goals and objectives  I understand the treatment goals that were developed for my treatment

## 2022-11-21 ENCOUNTER — APPOINTMENT (OUTPATIENT)
Dept: LAB | Facility: AMBULARY SURGERY CENTER | Age: 67
End: 2022-11-21

## 2022-11-21 DIAGNOSIS — R80.1 PERSISTENT PROTEINURIA: ICD-10-CM

## 2022-11-21 DIAGNOSIS — N18.32 CHRONIC KIDNEY DISEASE (CKD) STAGE G3B/A1, MODERATELY DECREASED GLOMERULAR FILTRATION RATE (GFR) BETWEEN 30-44 ML/MIN/1.73 SQUARE METER AND ALBUMINURIA CREATININE RATIO LESS THAN 30 MG/G (HCC): ICD-10-CM

## 2022-11-21 DIAGNOSIS — E55.9 AVITAMINOSIS D: ICD-10-CM

## 2022-11-21 DIAGNOSIS — N25.81 SECONDARY HYPERPARATHYROIDISM OF RENAL ORIGIN (HCC): ICD-10-CM

## 2022-11-21 LAB
25(OH)D3 SERPL-MCNC: 41.8 NG/ML (ref 30–100)
ALBUMIN SERPL BCP-MCNC: 3.3 G/DL (ref 3.5–5)
ANION GAP SERPL CALCULATED.3IONS-SCNC: 6 MMOL/L (ref 4–13)
BUN SERPL-MCNC: 29 MG/DL (ref 5–25)
CALCIUM ALBUM COR SERPL-MCNC: 10.1 MG/DL (ref 8.3–10.1)
CALCIUM SERPL-MCNC: 9.5 MG/DL (ref 8.3–10.1)
CHLORIDE SERPL-SCNC: 111 MMOL/L (ref 96–108)
CO2 SERPL-SCNC: 23 MMOL/L (ref 21–32)
CREAT SERPL-MCNC: 1.58 MG/DL (ref 0.6–1.3)
GFR SERPL CREATININE-BSD FRML MDRD: 33 ML/MIN/1.73SQ M
GLUCOSE P FAST SERPL-MCNC: 108 MG/DL (ref 65–99)
MAGNESIUM SERPL-MCNC: 2.1 MG/DL (ref 1.6–2.6)
PHOSPHATE SERPL-MCNC: 3.9 MG/DL (ref 2.3–4.1)
POTASSIUM SERPL-SCNC: 3.8 MMOL/L (ref 3.5–5.3)
PTH-INTACT SERPL-MCNC: 49.2 PG/ML (ref 18.4–80.1)
SODIUM SERPL-SCNC: 140 MMOL/L (ref 135–147)

## 2022-11-25 ENCOUNTER — OFFICE VISIT (OUTPATIENT)
Dept: NEPHROLOGY | Facility: CLINIC | Age: 67
End: 2022-11-25

## 2022-11-25 VITALS
BODY MASS INDEX: 42.73 KG/M2 | OXYGEN SATURATION: 96 % | HEIGHT: 62 IN | SYSTOLIC BLOOD PRESSURE: 128 MMHG | WEIGHT: 232.2 LBS | HEART RATE: 81 BPM | DIASTOLIC BLOOD PRESSURE: 80 MMHG

## 2022-11-25 DIAGNOSIS — N18.32 STAGE 3B CHRONIC KIDNEY DISEASE (HCC): Primary | ICD-10-CM

## 2022-11-25 DIAGNOSIS — E55.9 VITAMIN D DEFICIENCY: ICD-10-CM

## 2022-11-25 DIAGNOSIS — R80.1 PERSISTENT PROTEINURIA: ICD-10-CM

## 2022-11-25 DIAGNOSIS — N25.81 SECONDARY HYPERPARATHYROIDISM OF RENAL ORIGIN (HCC): ICD-10-CM

## 2022-11-25 DIAGNOSIS — E66.01 MORBID OBESITY WITH BMI OF 40.0-44.9, ADULT (HCC): ICD-10-CM

## 2022-11-25 NOTE — PATIENT INSTRUCTIONS
- Please call me in 10 days after having your blood work done to review the results if you do not hear back from me or my office, as I may have not received the results  - please remember to perform blood work prior to the next visit  - Please call if the blood pressure top number is greater than 150 or less than 110 consistently  - Please call if you are gaining more than 2lbs in 2 days for adjustment of water pills   ~ Please AVOID the following pain medications  LIST OF NSAIDS (NONSTEROIDAL ANTI-INFLAMMATORY DRUGS) AND MOORE-2 INHIBITORS    DIFLUNISAL (DOLOBID)  IBUPROFEN (MOTRIN, ADVIL)  FLURBIPROFEN (ANSAID)  KETOPROFEN (ORUDIS, ORUVAIL)  FENOPROFEN (NALFON)  NABUMETONE (RELAFEN)  PIROXICAM (FELDENE)  NAPROXEN (ALEVE, NAPROSYN, NAPRELAN, ANAPROX)  DICLOFENAC (VOLTAREN, CATAFLAM)  INDOMETHACIN (INDOCIN)  SULINDAC (CLINORIL)  TOLMETIN (TOLETIN)  ETODOLAC (LODINE)  MELOXICAM (MOBIC)  KETOROLAC (TORADOL)  OXAPROZIN (DAYPRO)  CELECOXIB (CELEBREX)    Phosphorus diet  Follow a low phosphorus diet      Avoid these higher phosphorus foods: Choose these lower phosphorus foods:   Milk, pudding or yogurt (from animals and from many soy varieties) Rice milk (unfortified), nondairy creamer (if it doesn't have terms in the ingredients list that contain the letters "phos")   Hard cheeses, ricotta or cottage cheese, fat-free cream cheese Regular and low-fat cream cheese   Ice cream or frozen yogurt Sherbet or frozen fruit pops   Soups made with higher phosphorus ingredients (milk, dried peas, beans, lentils) Soups made with lower phosphorus ingredients (broth- or water-based with other lower phosphorus ingredients)   Whole grains, including whole-grain breads, crackers, cereal, rice and pasta Refined grains, including white bread, crackers, cereals, rice and pasta   Quick breads, biscuits, cornbread, muffins, pancakes or waffles Homemade refined (white) dinner rolls, bagels or English muffins   Dried peas (split, black-eyed), beans (black, garbanzo, lima, kidney, navy, dacosta) or lentils Green peas (canned, frozen), green beans or wax beans   Organ meats, walleye, pollock or sardines Lean beef, pork, lamb, poultry or other fish   Nuts and seeds Popcorn   Peanut butter and other nut butters Jam, jelly or honey   Chocolate, including chocolate drinks Carob (chocolate-flavored) candy, hard candy or gumdrops   Madeline and pepper-type sodas, flavored orozco, bottled teas (if a term in the ingredients list contains the letters "phos") Lemon-lime soda, ginger ale or root beer, plain water   Follow a moderate potassium diet  Things to do to reduce your blood pressure include working with all your physician to do the following:  ~ stop smoking if you smoke  ~ increase cardiovascular exercise like walking and swimming    ~ modify your diet to decrease fat and salt intake  ~ reduce your weight if you are overweight or obese   ~ increase the consumption of fruits, vegetables and whole grains  ~ decrease alcohol consumption if you consume alcohol    ~ try to minimize stress in your life with lifestyle modifications  ~ be compliant with your anti-hypertensive medications  ~ adjust your medications to help improve your vascular stiffness and decrease risks for heart attacks and strokes  Exercise to Lose Weight     Exercise and a healthy diet may help you lose weight  Your doctor may suggest specific exercises  EXERCISE IDEAS AND TIPS     Choose low-cost things you enjoy doing, such as walking, bicycling, or exercising to workout videos  Take stairs instead of the elevator  Walk during your lunch break  Park your car further away from work or school  Go to a gym or an exercise class  Start with 5 to 10 minutes of exercise each day  Build up to 30 minutes of exercise 4 to 6 days a week  Wear shoes with good support and comfortable clothes  Stretch before and after working out     Work out until you breathe harder and your heart beats faster  Drink extra water when you exercise  Do not do so much that you hurt yourself, feel dizzy, or get very short of breath  Exercises that burn about 150 calories:   Running 1 ½ miles in 15 minutes  Playing volleyball for 45 to 60 minutes  Washing and waxing a car for 45 to 60 minutes  Playing touch football for 45 minutes  Walking 1 ¾ miles in 35 minutes  Pushing a stroller 1 ½ miles in 30 minutes  Playing basketball for 30 minutes  Raking leaves for 30 minutes  Bicycling 5 miles in 30 minutes  Walking 2 miles in 30 minutes  Dancing for 30 minutes  Shoveling snow for 15 minutes  Swimming laps for 20 minutes  Walking up stairs for 15 minutes  Bicycling 4 miles in 15 minutes  Gardening for 30 to 45 minutes  Jumping rope for 15 minutes  Washing windows or floors for 45 to 60 minutes

## 2022-11-25 NOTE — PROGRESS NOTES
Nephrology Follow up Consultation  Della Iqbal 75 y o  female MRN: [id number]            BACKGROUND:  Della Iqbal is a 75 y  o female who was referred by Cortney Hendricks DO for evaluation of Follow-up and Chronic Kidney Disease    ASSESSMENT / PLAN:   75 y o   feamjessica with pmh of multiple co-morbidities including JULIO on bipap, CKD stage 3, GERD, bipolar, depression, hypothyroidism, obesity, vitamin-D deficiency, arthritis presents to the office for routine follow-up  CKD stage IIIB:  - Patient has a baseline creatinine of 1 2-1 8 mg/dL  After episode of acute kidney injury in 2020  Most recent labs show a Creatinine of 1 58 mg/dL on 11/21/2022  Renal function stable at baseline    -likely has underlying CKD secondary to age-related nephron loss plus prior episode of acute kidney injury non dialysis requiring plus chronic lithium use and prior NSAID use plus obesity related hyper filtration  - CT abdomen from September 2020 showing 1 8 cm Lower interpolar cyst on the left kidney no hydronephrosis  - Acid base and lytes stable  - Clinically the patient appears to be euvolemic  - Recommend to avoid use of NSAIDs, nephrotoxins  Caution advised with regards to exposure to IV contrast dye    - Discussed with the patient in depth her renal status, including the possible etiologies for CKD  - Advised the patient that when her GFR is close to 20mL/min then will start discussing about RRT(renal replacement therapy) options such as renal transplant, peritoneal dialysis and hemodialysis  - Informed the patient about the various options for Renal Replacement therapy  - Discussed with the patient how we need to work together to delay the progression of CKD with optimal BP control based on their age and co-morbidities and trying to reduce proteinuria by the use of anti-proteinuric agents     - referral to CKD education/kidney smart placed on 12/21/2020, completed 01/18/2021    Blood pressure:  - Patient is on no meds    - advised patient appropriate techniques of checking blood pressures and to call with blood pressure updates if any issues InCase he may have borderline elevated blood pressures and may benefit from low-dose ACE-inhibitor/ARB therapy to help delay CKD progression also  Patient reports she has a wrist cuff and we will be going to her PCPs office in December advised her to correlated at the visit with the PCP   - Goal BP of <  140/90 based on age and comorbidities  - Instructed to follow low sodium (2gm)diet  Hemoglobin:  - Goal Hb of 10-12 g/dL  - Most recent labs suggestive of 14 6 grams/deciliter   - no role for IV iron at this time    CKD-MBD(Mineral Bone Disease)/vitamin-D deficiency / secondary hyperparathyroidism of renal origin:  - Based on patients CKD stage following is the goal of therapy  - Maintain calcium phosphorus product of < 55   - Stage 3 CKD - Goal Ca 8 5-10 mg/dL , goal Phos 2 7-4 6 mg/dL  , goal iPTH 30-70 pg/mL  - Patient is currently at goal   - Continue patient on vitamin-D 2000 units p o  Q day, calcitriol 0 25 mcg po q24  - Patients' most recent vitamin D level is 41 8 and intact PTH of 49 2 as of  11/21/2022  - check intact PTH and vitamin-D levels    Proteinuria:    - proteinuria most likely secondary to obesity related hyper filtration  - most recent UA from 2017 with 30+ protein  - most recent protein creatinine ratio of 310 mg as of March 2021  - check protein creatinine ratio reordered  - currently on therapy for proteinuria with vitamin-D    Lipids:  - goal LDL less than 70  - Management as per PCP    GERD:  - management as per Primary team  - no longer on Prilosec  - currently on Pepcid  Bipolar/depression:  - Management as per primary team  - follow up with psych  -no longer on lithium only on Latuda at renal dosage       Nutrition / obesity:  - Encouraged patient to follow a renal diet comprising of moderate potassium, low phosphorus and protein restriction to 0 8gm/kg  Advise of dietary habits and weight loss  - Will check serum albumin with next blood work  Followup:  - Patient is to follow-up in 6 months, with lab work to be performed in a few days prior to the next visit  Advised patient to call me in 10 days to review the results if they do not hear back from me, as I may have not received the results  Jessie Roberson, 11/25/2022, 9:14 AM             SUBJECTIVE: 75 y o  female presents to the office for routine follow-up  Feels well has no complaints no recent hospitalization no issues with edema not really checking blood pressures at home happy to hear renal parameters are stable thankful for all the care information that she is gotten today  Agreeable to checking blood pressures at home and call with updates if any issues   Review of Systems   Constitutional: Negative for chills and fever  HENT: Negative for rhinorrhea and sore throat  Respiratory: Negative for cough  Cardiovascular: Negative for leg swelling  Gastrointestinal: Negative for diarrhea and vomiting  Genitourinary: Negative for difficulty urinating, dysuria and hematuria  Musculoskeletal: Negative for back pain  Neurological: Negative for dizziness and headaches  Psychiatric/Behavioral: Negative for agitation and confusion  All other systems reviewed and are negative        PAST MEDICAL HISTORY:  Past Medical History:   Diagnosis Date   • Abnormal ECG    • Arthritis    • Bipolar 1 disorder (HCC)    • Chronic kidney disease     stage 3   • Depression 2001   • Disease of thyroid gland    • Elevated blood pressure reading 06/10/2019       PROBLEM LIST    Patient Active Problem List   Diagnosis   • Bipolar 1 disorder, mixed, severe ([facility] [address] )   • Depression with anxiety   • Gastroesophageal reflux disease without esophagitis   • Hypothyroidism   • Impaired fasting glucose   • Insomnia   • Obstructive sleep apnea   • Onychomycosis of toenail   • Patellofemoral arthritis of right knee   • Stress incontinence of urine   • High triglycerides   • BMI 40 0-44 9, adult ([facility])   • Abnormal EKG   • Localized edema   • Palpitations   • Raynaud's disease without gangrene   • Stage 3 chronic kidney disease ([facility])   • Vitamin D deficiency   • Primary osteoarthritis of left knee   • Primary osteoarthritis of right knee   • Secondary hyperparathyroidism of renal origin ([facility] [address] )   • Persistent proteinuria   • Urinary frequency   • Morbid obesity with BMI of 40 0-44 9, adult ([facility] [address] )   • Mass of right hand   • Lichen sclerosus       PAST SURGICAL HISTORY:  Past Surgical History:   Procedure Laterality Date   • COLONOSCOPY  2011       SOCIAL HISTORY :   reports that she quit smoking about 14 years ago  Her smoking use included cigarettes  She has a 7 50 pack-year smoking history  She has never used smokeless tobacco  She reports that she does not currently use alcohol  She reports that she does not use drugs  FAMILY HISTORY:  Family History   Problem Relation Age of Onset   • Heart disease Mother    • Heart Valve Disease Mother         Replacement   • Diabetes Mother         2002   • Heart failure Mother         Heart Valve replacement   • Arthritis Father        ALLERGIES:  Allergies   Allergen Reactions   • Other Other (See Comments)     Other reaction(s): Anaphylaxis  Annotation - [id number]: plums peaches, fruits with skin            PHYSICAL EXAM:  Vitals:    11/25/22 0854 11/25/22 0907   BP: 132/80 128/80   BP Location: Left arm    Patient Position: Sitting    Cuff Size: Large    Pulse: 81    SpO2: 96%    Weight: 105 kg (232 lb 3 2 oz)    Height: 5' 2" (1 575 m)      Body mass index is 42 47 kg/m²  Physical Exam  Vitals reviewed  Constitutional:       General: She is not in acute distress  Appearance: Normal appearance  She is obese  She is not ill-appearing, toxic-appearing or diaphoretic  HENT:      Head: Normocephalic and atraumatic        Mouth/Throat:      Pharynx: Oropharynx is clear  No oropharyngeal exudate  Eyes:      General: No scleral icterus  Conjunctiva/sclera: Conjunctivae normal    Cardiovascular:      Rate and Rhythm: Normal rate  Heart sounds: Normal heart sounds  No friction rub  Pulmonary:      Effort: Pulmonary effort is normal  No respiratory distress  Breath sounds: Normal breath sounds  No stridor  Abdominal:      General: There is no distension  Palpations: Abdomen is soft  There is no mass  Tenderness: There is no abdominal tenderness  There is no right CVA tenderness or left CVA tenderness  Musculoskeletal:         General: No swelling  Cervical back: Normal range of motion and neck supple  No rigidity  Skin:     General: Skin is warm  Coloration: Skin is not jaundiced  Neurological:      General: No focal deficit present  Mental Status: She is alert and oriented to person, place, and time  Mental status is at baseline     Psychiatric:         Mood and Affect: Mood normal          Behavior: Behavior normal          LABORATORY DATA:     Results from last 6 Months   Lab Units 11/21/22  1310   POTASSIUM mmol/L 3 8   CHLORIDE mmol/L 111*   CO2 mmol/L 23   BUN mg/dL 29*   CREATININE mg/dL 1 58*   CALCIUM mg/dL 9 5   MAGNESIUM mg/dL 2 1   PHOSPHORUS mg/dL 3 9        rest all reviewed    RADIOLOGY:  No orders to display     Rest all reviewed        MEDICATIONS:    Current Outpatient Medications:   •  b complex vitamins tablet, Take 1 tablet by mouth daily, Disp: , Rfl:   •  calcitriol (ROCALTROL) 0 25 mcg capsule, Take 1 capsule (0 25 mcg total) by mouth daily, Disp: 90 capsule, Rfl: 4  •  Cholecalciferol (VITAMIN D) 2000 units CAPS, Take 1 capsule by mouth daily, Disp: , Rfl:   •  clobetasol (TEMOVATE) 0 05 % ointment, Apply topically 2 (two) times a week, Disp: 30 g, Rfl: 0  •  famotidine (PEPCID) 20 mg tablet, Take 1 tablet (20 mg total) by mouth daily, Disp: 90 tablet, Rfl: 3  •  fluocinonide (LIDEX) 0 05 % cream, , Disp: , Rfl:   •  Glucosamine-Chondroit-Vit C-Mn (GLUCOSAMINE 1500 COMPLEX PO), Take by mouth in the morning, Disp: , Rfl:   •  Latuda 20 MG tablet, TAKE 1 TABLET BY MOUTH  DAILY WITH BREAKFAST, Disp: 30 tablet, Rfl: 11  •  levothyroxine 88 mcg tablet, Take 0 5 tablets (44 mcg total) by mouth daily, Disp: 15 tablet, Rfl: 2  •  Multiple Vitamin (MULTI-VITAMIN DAILY) TABS, Take by mouth, Disp: , Rfl:   •  SOOLANTRA 1 % CREA, , Disp: , Rfl:           Portions of the record may have been created with voice recognition software  Occasional wrong word or "sound a like" substitutions may have occurred due to the inherent limitations of voice recognition software  Read the chart carefully and recognize, using context, where substitutions have occurred  If you have any questions, please contact the dictating provider

## 2022-12-21 ENCOUNTER — RA CDI HCC (OUTPATIENT)
Dept: OTHER | Facility: HOSPITAL | Age: 67
End: 2022-12-21

## 2022-12-21 NOTE — PROGRESS NOTES
[facility]  coding opportunities       Chart reviewed, no opportunity found:   [address]        Patients Insurance     Medicare Insurance: [employer] Advantage

## 2022-12-22 ENCOUNTER — OFFICE VISIT (OUTPATIENT)
Dept: FAMILY MEDICINE CLINIC | Facility: CLINIC | Age: 67
End: 2022-12-22

## 2022-12-22 VITALS
SYSTOLIC BLOOD PRESSURE: 116 MMHG | WEIGHT: 231 LBS | OXYGEN SATURATION: 99 % | DIASTOLIC BLOOD PRESSURE: 82 MMHG | BODY MASS INDEX: 43.61 KG/M2 | HEIGHT: 61 IN | HEART RATE: 64 BPM | TEMPERATURE: 98.4 F | RESPIRATION RATE: 16 BRPM

## 2022-12-22 DIAGNOSIS — Z12.11 SCREENING FOR COLON CANCER: ICD-10-CM

## 2022-12-22 DIAGNOSIS — Z00.00 MEDICARE ANNUAL WELLNESS VISIT, SUBSEQUENT: Primary | ICD-10-CM

## 2022-12-22 DIAGNOSIS — K21.9 GASTROESOPHAGEAL REFLUX DISEASE WITHOUT ESOPHAGITIS: ICD-10-CM

## 2022-12-22 DIAGNOSIS — Z23 ENCOUNTER FOR IMMUNIZATION: ICD-10-CM

## 2022-12-22 NOTE — PATIENT INSTRUCTIONS
Medicare Preventive Visit Patient Instructions  Thank you for completing your Welcome to Medicare Visit or Medicare Annual Wellness Visit today  Your next wellness visit will be due in one year (12/23/2023)  The screening/preventive services that you may require over the next 5-10 years are detailed below  Some tests may not apply to you based off risk factors and/or age  Screening tests ordered at today's visit but not completed yet may show as past due  Also, please note that scanned in results may not display below  Preventive Screenings:  Service Recommendations Previous Testing/Comments   Colorectal Cancer Screening  * Colonoscopy    * Fecal Occult Blood Test (FOBT)/Fecal Immunochemical Test (FIT)  * Fecal DNA/Cologuard Test  * Flexible Sigmoidoscopy Age: 39-70 years old   Colonoscopy: every 10 years (may be performed more frequently if at higher risk)  OR  FOBT/FIT: every 1 year  OR  Cologuard: every 3 years  OR  Sigmoidoscopy: every 5 years  Screening may be recommended earlier than age 39 if at higher risk for colorectal cancer  Also, an individualized decision between you and your healthcare provider will decide whether screening between the ages of 74-80 would be appropriate  Colonoscopy: 11/15/2012  FOBT/FIT: Not on file  Cologuard: Not on file  Sigmoidoscopy: Not on file          Breast Cancer Screening Age: 36 years old  Frequency: every 1-2 years  Not required if history of left and right mastectomy Mammogram: 07/20/2022    Screening Current   Cervical Cancer Screening Between the ages of 21-29, pap smear recommended once every 3 years  Between the ages of 33-67, can perform pap smear with HPV co-testing every 5 years     Recommendations may differ for women with a history of total hysterectomy, cervical cancer, or abnormal pap smears in past  Pap Smear: 06/10/2021    Screening Not Indicated   Hepatitis C Screening Once for adults born between 1945 and 1965  More frequently in patients at high risk for Hepatitis C Hep C Antibody: 12/06/2019    Screening Current   Diabetes Screening 1-2 times per year if you're at risk for diabetes or have pre-diabetes Fasting glucose: 108 mg/dL (11/21/2022)  A1C: 5 4 % (6/30/2022)  Screening Current   Cholesterol Screening Once every 5 years if you don't have a lipid disorder  May order more often based on risk factors  Lipid panel: 06/30/2022    Screening Not Indicated  History Lipid Disorder     Other Preventive Screenings Covered by Medicare:  1  Abdominal Aortic Aneurysm (AAA) Screening: covered once if your at risk  You're considered to be at risk if you have a family history of AAA  2  Lung Cancer Screening: covers low dose CT scan once per year if you meet all of the following conditions: (1) Age 50-69; (2) No signs or symptoms of lung cancer; (3) Current smoker or have quit smoking within the last 15 years; (4) You have a tobacco smoking history of at least 20 pack years (packs per day multiplied by number of years you smoked); (5) You get a written order from a healthcare provider  3  Glaucoma Screening: covered annually if you're considered high risk: (1) You have diabetes OR (2) Family history of glaucoma OR (3)  aged 48 and older OR (3)  American aged 72 and older  3  Osteoporosis Screening: covered every 2 years if you meet one of the following conditions: (1) You're estrogen deficient and at risk for osteoporosis based off medical history and other findings; (2) Have a vertebral abnormality; (3) On glucocorticoid therapy for more than 3 months; (4) Have primary hyperparathyroidism; (5) On osteoporosis medications and need to assess response to drug therapy  · Last bone density test (DXA Scan): 05/25/2022   5  HIV Screening: covered annually if you're between the age of 15-65  Also covered annually if you are younger than 13 and older than 72 with risk factors for HIV infection   For pregnant patients, it is covered up to 3 times per pregnancy  Immunizations:  Immunization Recommendations   Influenza Vaccine Annual influenza vaccination during flu season is recommended for all persons aged >= 6 months who do not have contraindications   Pneumococcal Vaccine   * Pneumococcal conjugate vaccine = PCV13 (Prevnar 13), PCV15 (Vaxneuvance), PCV20 (Prevnar 20)  * Pneumococcal polysaccharide vaccine = PPSV23 (Pneumovax) Adults 25-60 years old: 1-3 doses may be recommended based on certain risk factors  Adults 72 years old: 1-2 doses may be recommended based off what pneumonia vaccine you previously received   Hepatitis B Vaccine 3 dose series if at intermediate or high risk (ex: diabetes, end stage renal disease, liver disease)   Tetanus (Td) Vaccine - COST NOT COVERED BY MEDICARE PART B Following completion of primary series, a booster dose should be given every 10 years to maintain immunity against tetanus  Td may also be given as tetanus wound prophylaxis  Tdap Vaccine - COST NOT COVERED BY MEDICARE PART B Recommended at least once for all adults  For pregnant patients, recommended with each pregnancy  Shingles Vaccine (Shingrix) - COST NOT COVERED BY MEDICARE PART B  2 shot series recommended in those aged 48 and above     Health Maintenance Due:      Topic Date Due   • Colorectal Cancer Screening  11/15/2022   • Hepatitis C Screening  Completed     Immunizations Due:      Topic Date Due   • Hepatitis B Vaccine (1 of 3 - 3-dose series) Never done   • Pneumococcal Vaccine: 65+ Years (2 - PCV) 12/10/2020   • COVID-19 Vaccine (5 - Booster for Hoang Peter series) 06/03/2022     Advance Directives   What are advance directives? Advance directives are legal documents that state your wishes and plans for medical care  These plans are made ahead of time in case you lose your ability to make decisions for yourself  Advance directives can apply to any medical decision, such as the treatments you want, and if you want to donate organs     What are the types of advance directives? There are many types of advance directives, and each state has rules about how to use them  You may choose a combination of any of the following:  · Living will: This is a written record of the treatment you want  You can also choose which treatments you do not want, which to limit, and which to stop at a certain time  This includes surgery, medicine, IV fluid, and tube feedings  · Durable power of  for healthcare [facility] [place]): This is a written record that states who you want to make healthcare choices for you when you are unable to make them for yourself  This person, called a proxy, is usually a family member or a friend  You may choose more than 1 proxy  · Do not resuscitate (DNR) order:  A DNR order is used in case your heart stops beating or you stop breathing  It is a request not to have certain forms of treatment, such as CPR  A DNR order may be included in other types of advance directives  · Medical directive: This covers the care that you want if you are in a coma, near death, or unable to make decisions for yourself  You can list the treatments you want for each condition  Treatment may include pain medicine, surgery, blood transfusions, dialysis, IV or tube feedings, and a ventilator (breathing machine)  · Values history: This document has questions about your views, beliefs, and how you feel and think about life  This information can help others choose the care that you would choose  Why are advance directives important? An advance directive helps you control your care  Although spoken wishes may be used, it is better to have your wishes written down  Spoken wishes can be misunderstood, or not followed  Treatments may be given even if you do not want them  An advance directive may make it easier for your family to make difficult choices about your care  Urinary Incontinence   Urinary incontinence (UI)  is when you lose control of your bladder   UI develops because your bladder cannot store or empty urine properly  The 3 most common types of UI are stress incontinence, urge incontinence, or both  Medicines:   · May be given to help strengthen your bladder control  Report any side effects of medication to your healthcare provider  Do pelvic muscle exercises often:  Your pelvic muscles help you stop urinating  Squeeze these muscles tight for 5 seconds, then relax for 5 seconds  Gradually work up to squeezing for 10 seconds  Do 3 sets of 15 repetitions a day, or as directed  This will help strengthen your pelvic muscles and improve bladder control  Train your bladder:  Go to the bathroom at set times, such as every 2 hours, even if you do not feel the urge to go  You can also try to hold your urine when you feel the urge to go  For example, hold your urine for 5 minutes when you feel the urge to go  As that becomes easier, hold your urine for 10 minutes  Self-care:   · Keep a UI record  Write down how often you leak urine and how much you leak  Make a note of what you were doing when you leaked urine  · Drink liquids as directed  You may need to limit the amount of liquid you drink to help control your urine leakage  Do not drink any liquid right before you go to bed  Limit or do not have drinks that contain caffeine or alcohol  · Prevent constipation  Eat a variety of high-fiber foods  Good examples are high-fiber cereals, beans, vegetables, and whole-grain breads  Walking is the best way to trigger your intestines to have a bowel movement  · Exercise regularly and maintain a healthy weight  Weight loss and exercise will decrease pressure on your bladder and help you control your leakage  · Use a catheter as directed  to help empty your bladder  A catheter is a tiny, plastic tube that is put into your bladder to drain your urine  · Go to behavior therapy as directed    Behavior therapy may be used to help you learn to control your urge to urinate  Weight Management   Why it is important to manage your weight:  Being overweight increases your risk of health conditions such as heart disease, high blood pressure, type 2 diabetes, and certain types of cancer  It can also increase your risk for osteoarthritis, sleep apnea, and other respiratory problems  Aim for a slow, steady weight loss  Even a small amount of weight loss can lower your risk of health problems  How to lose weight safely:  A safe and healthy way to lose weight is to eat fewer calories and get regular exercise  You can lose up about 1 pound a week by decreasing the number of calories you eat by 500 calories each day  Healthy meal plan for weight management:  A healthy meal plan includes a variety of foods, contains fewer calories, and helps you stay healthy  A healthy meal plan includes the following:  · Eat whole-grain foods more often  A healthy meal plan should contain fiber  Fiber is the part of grains, fruits, and vegetables that is not broken down by your body  Whole-grain foods are healthy and provide extra fiber in your diet  Some examples of whole-grain foods are whole-wheat breads and pastas, oatmeal, brown rice, and bulgur  · Eat a variety of vegetables every day  Include dark, leafy greens such as spinach, kale, juan ramon greens, and mustard greens  Eat yellow and orange vegetables such as carrots, sweet potatoes, and winter squash  · Eat a variety of fruits every day  Choose fresh or canned fruit (canned in its own juice or light syrup) instead of juice  Fruit juice has very little or no fiber  · Eat low-fat dairy foods  Drink fat-free (skim) milk or 1% milk  Eat fat-free yogurt and low-fat cottage cheese  Try low-fat cheeses such as mozzarella and other reduced-fat cheeses  · Choose meat and other protein foods that are low in fat  Choose beans or other legumes such as split peas or lentils   Choose fish, skinless poultry (chicken or turkey), or lean cuts of red meat (beef or pork)  Before you cook meat or poultry, cut off any visible fat  · Use less fat and oil  Try baking foods instead of frying them  Add less fat, such as margarine, sour cream, regular salad dressing and mayonnaise to foods  Eat fewer high-fat foods  Some examples of high-fat foods include french fries, doughnuts, ice cream, and cakes  · Eat fewer sweets  Limit foods and drinks that are high in sugar  This includes candy, cookies, regular soda, and sweetened drinks  Exercise:  Exercise at least 30 minutes per day on most days of the week  Some examples of exercise include walking, biking, dancing, and swimming  You can also fit in more physical activity by taking the stairs instead of the elevator or parking farther away from stores  Ask your healthcare provider about the best exercise plan for you  © Copyright [employer] 2018 Information is for End User's use only and may not be sold, redistributed or otherwise used for commercial purposes   All illustrations and images included in CareNotes® are the copyrighted property of [employer]  or [address] [place] [address]

## 2022-12-22 NOTE — PROGRESS NOTES
Assessment and Plan:     Problem List Items Addressed This Visit        Digestive    Gastroesophageal reflux disease without esophagitis     Increased cough and reflux  Increase pepcid to 40mg            Other    Medicare annual wellness visit, subsequent - Primary     prevnar 20 today  covid booster completed  cologuard ordered         Encounter for immunization    Relevant Orders    Pneumococcal Conjugate Vaccine 20-valent (PCV20) (Completed)    Screening for colon cancer    Relevant Orders    Cologuard       Urinary Incontinence Plan of Care: counseling topics discussed: limiting fluid intake 3-4 hours before bed  Referral was placed for Urology  Preventive health issues were discussed with patient, and age appropriate screening tests were ordered as noted in patient's After Visit Summary  Personalized health advice and appropriate referrals for health education or preventive services given if needed, as noted in patient's After Visit Summary  History of Present Illness:     Patient presents for a Medicare Wellness Visit    Here for awv  Stopped myrbetric  Trouble in the morning  prevnar 20 today     Patient Care Team:  Naveen Edwards DO as PCP - General  DO Светлана Corona Gene Lame, MD (Nephrology)     Review of Systems:     Review of Systems   Constitutional: Negative  HENT: Negative  Eyes: Negative  Respiratory: Positive for cough  Cardiovascular: Negative  Gastrointestinal: Negative      Genitourinary:        Incontinece        Problem List:     Patient Active Problem List   Diagnosis   • Bipolar 1 disorder, mixed, severe ([facility] )   • Depression with anxiety   • Gastroesophageal reflux disease without esophagitis   • Hypothyroidism   • Impaired fasting glucose   • Insomnia   • Obstructive sleep apnea   • Onychomycosis of toenail   • Patellofemoral arthritis of right knee   • Stress incontinence of urine   • High triglycerides   • BMI 40 0-44 9, adult ([facility] )   • Abnormal EKG   • Localized edema   • Palpitations   • Raynaud's disease without gangrene   • Stage 3 chronic kidney disease ([facility])   • Vitamin D deficiency   • Primary osteoarthritis of left knee   • Primary osteoarthritis of right knee   • Secondary hyperparathyroidism of renal origin ([facility] )   • Persistent proteinuria   • Urinary frequency   • Morbid obesity with BMI of 40 0-44 9, adult ([facility])   • Mass of right hand   • Lichen sclerosus   • Medicare annual wellness visit, subsequent   • Encounter for immunization   • Screening for colon cancer      Past Medical and Surgical History:     Past Medical History:   Diagnosis Date   • Abnormal ECG    • Arthritis    • Bipolar 1 disorder ([facility] )    • Chronic kidney disease     stage 3   • Depression    • Disease of thyroid gland    • Elevated blood pressure reading 06/10/2019     Past Surgical History:   Procedure Laterality Date   • COLONOSCOPY        Family History:     Family History   Problem Relation Age of Onset   • Heart disease Mother    • Heart Valve Disease Mother         Replacement   • Diabetes Mother            • Heart failure Mother         Heart Valve replacement   • Arthritis Father       Social History:     Social History     Socioeconomic History   • Marital status: /Civil Union     Spouse name: None   • Number of children: None   • Years of education: None   • Highest education level: None   Occupational History   • None   Tobacco Use   • Smoking status: Former     Packs/day: 0 25     Years: 30 00     Pack years: 7 50     Types: Cigarettes     Quit date: 2008     Years since quittin 9   • Smokeless tobacco: Never   Vaping Use   • Vaping Use: Never used   Substance and Sexual Activity   • Alcohol use: Not Currently   • Drug use: Never   • Sexual activity: Not Currently     Partners: Male     Birth control/protection: Post-menopausal   Other Topics Concern   • None   Social History Narrative    Daily coffee consumption: 3 cups/day Social Determinants of Health     Financial Resource Strain: Low Risk    • Difficulty of Paying Living Expenses: Not hard at all   Food Insecurity: Not on file   Transportation Needs: No Transportation Needs   • Lack of Transportation (Medical): No   • Lack of Transportation (Non-Medical): No   Physical Activity: Not on file   Stress: Not on file   Social Connections: Not on file   Intimate Partner Violence: Not on file   Housing Stability: Not on file      Medications and Allergies:     Current Outpatient Medications   Medication Sig Dispense Refill   • b complex vitamins tablet Take 1 tablet by mouth daily     • calcitriol (ROCALTROL) 0 25 mcg capsule Take 1 capsule (0 25 mcg total) by mouth daily 90 capsule 4   • Cholecalciferol (VITAMIN D) 2000 units CAPS Take 1 capsule by mouth daily     • CRANBERRY PO Take by mouth     • famotidine (PEPCID) 20 mg tablet Take 1 tablet (20 mg total) by mouth daily 90 tablet 3   • Glucosamine-Chondroit-Vit C-Mn (GLUCOSAMINE 1500 COMPLEX PO) Take by mouth in the morning     • Latuda 20 MG tablet TAKE 1 TABLET BY MOUTH  DAILY WITH BREAKFAST 30 tablet 11   • levothyroxine 88 mcg tablet Take 0 5 tablets (44 mcg total) by mouth daily 15 tablet 2   • Multiple Vitamin (MULTI-VITAMIN DAILY) TABS Take by mouth     • clobetasol (TEMOVATE) 0 05 % ointment Apply topically 2 (two) times a week (Patient not taking: Reported on 12/22/2022) 30 g 0   • fluocinonide (LIDEX) 0 05 % cream  (Patient not taking: Reported on 12/22/2022)     • SOOLANTRA 1 % CREA  (Patient not taking: Reported on 12/22/2022)       No current facility-administered medications for this visit  Allergies   Allergen Reactions   • Other Other (See Comments)     Other reaction(s):  Anaphylaxis  Annotation - [id number]: plums peaches, fruits with skin       Immunizations:     Immunization History   Administered Date(s) Administered   • COVID-19 PFIZER VACCINE 0 3 ML IM 02/25/2021, 03/18/2021, 10/05/2021, 04/08/2022   • COVID-19 Pfizer Vac BIVALENT Silas-sucrose 12 Yr+ IM (BOOSTER ONLY) 12/02/2022   • INFLUENZA 09/20/2011, 11/08/2012, 09/28/2015, 10/11/2016, 11/16/2017, 11/02/2018, 09/28/2022   • Influenza Quadrivalent Preservative Free 3 years and older IM 10/06/2014, 09/28/2015, 10/11/2016, 11/16/2017   • Influenza, high dose seasonal 0 7 mL 10/22/2019, 09/21/2020, 09/20/2021, 09/28/2022   • Influenza, injectable, quadrivalent, preservative free 0 5 mL 11/02/2018   • Influenza, seasonal, injectable 09/27/2013   • Pneumococcal Conjugate Vaccine 20-valent (Pcv20), Polysace 12/22/2022   • Pneumococcal Polysaccharide PPV23 12/10/2019   • Td (adult), Unspecified 11/09/2009   • Tdap 01/04/2016   • Zoster 10/06/2014   • Zoster Vaccine Recombinant 07/18/2019, 07/25/2019, 09/30/2019, 09/30/2019      Health Maintenance:         Topic Date Due   • Colorectal Cancer Screening  11/15/2022   • Hepatitis C Screening  Completed         Topic Date Due   • Hepatitis B Vaccine (1 of 3 - 3-dose series) Never done   • COVID-19 Vaccine (5 - Booster for Pfizer series) 06/03/2022      Medicare Screening Tests and Risk Assessments:     Annalise Honeycutt is here for her Subsequent Wellness visit  Health Risk Assessment:   Patient rates overall health as very good  Patient feels that their physical health rating is same  Patient is very satisfied with their life  Eyesight was rated as same  Hearing was rated as same  Patient feels that their emotional and mental health rating is same  Patients states they are never, rarely angry  Patient states they are sometimes unusually tired/fatigued  Pain experienced in the last 7 days has been none  Patient states that she has experienced no weight loss or gain in last 6 months  Depression Screening:   PHQ-9 Score: 0      Fall Risk Screening: In the past year, patient has experienced: no history of falling in past year      Urinary Incontinence Screening:   Patient has leaked urine accidently in the last six months  Home Safety:  Patient does not have trouble with stairs inside or outside of their home  Patient has working smoke alarms and has no working carbon monoxide detector  Home safety hazards include: none  Nutrition:   Current diet is Regular  Food prepared by [employer]  Medications:   Patient is currently taking over-the-counter supplements  OTC medications include: Vitamins  Patient is able to manage medications  Activities of Daily Living (ADLs)/Instrumental Activities of Daily Living (IADLs):   Walk and transfer into and out of bed and chair?: Yes  Dress and groom yourself?: Yes    Bathe or shower yourself?: Yes    Feed yourself? Yes  Do your laundry/housekeeping?: Yes  Manage your money, pay your bills and track your expenses?: Yes  Make your own meals?: Yes    Do your own shopping?: Yes    Previous Hospitalizations:   Any hospitalizations or ED visits within the last 12 months?: No      Advance Care Planning:   Living will: Yes    Durable POA for healthcare: Yes    Advanced directive: Yes      Cognitive Screening:   Provider or family/friend/caregiver concerned regarding cognition?: No    PREVENTIVE SCREENINGS      Cardiovascular Screening:    General: Screening Not Indicated and History Lipid Disorder      Diabetes Screening:     General: Screening Current      Colorectal Cancer Screening:     General: Risks and Benefits Discussed    Due for: Cologuard      Breast Cancer Screening:     General: Screening Current      Cervical Cancer Screening:    General: Screening Not Indicated      Lung Cancer Screening:     General: Screening Not Indicated      Hepatitis C Screening:    General: Screening Current    Screening, Brief Intervention, and Referral to Treatment (SBIRT)    Screening  Typical number of drinks in a day: 0  Typical number of drinks in a week: 1  Interpretation: Low risk drinking behavior  AUDIT-C Screenin) How often did you have a drink containing alcohol in the past year? 2 to 4 times a month  2) How many drinks did you have on a typical day when you were drinking in the past year? 0  3) How often did you have 6 or more drinks on one occasion in the past year? never    AUDIT-C Score: 2  Interpretation: Score 0-2 (female): Negative screen for alcohol misuse    Single Item Drug Screening:  How often have you used an illegal drug (including marijuana) or a prescription medication for non-medical reasons in the past year? never    Single Item Drug Screen Score: 0  Interpretation: Negative screen for possible drug use disorder    No results found  Physical Exam:     /82 (BP Location: Left arm, Patient Position: Sitting, Cuff Size: Large)   Pulse 64   Temp 98 4 °F (36 9 °C) (Tympanic)   Resp 16   Ht 5' 0 98" (1 549 m)   Wt 105 kg (231 lb)   SpO2 99%   BMI 43 67 kg/m²     Physical Exam  Vitals and nursing note reviewed  Constitutional:       Appearance: Normal appearance  She is well-developed  HENT:      Head: Normocephalic and atraumatic  Right Ear: External ear normal       Left Ear: External ear normal       Nose: Nose normal    Eyes:      Extraocular Movements: Extraocular movements intact  Conjunctiva/sclera: Conjunctivae normal       Pupils: Pupils are equal, round, and reactive to light  Cardiovascular:      Rate and Rhythm: Normal rate and regular rhythm  Pulses: Normal pulses  Heart sounds: Normal heart sounds  Pulmonary:      Effort: Pulmonary effort is normal       Breath sounds: Normal breath sounds  Abdominal:      General: Bowel sounds are normal       Palpations: Abdomen is soft  Musculoskeletal:         General: Normal range of motion  Cervical back: Normal range of motion and neck supple  Skin:     General: Skin is warm and dry  Capillary Refill: Capillary refill takes less than 2 seconds  Neurological:      General: No focal deficit present        Mental Status: She is alert and oriented to person, place, and time    Psychiatric:         Mood and Affect: Mood normal          Behavior: Behavior normal          Thought Content:  Thought content normal          Judgment: Judgment normal           Bri Major DO

## 2023-01-04 DIAGNOSIS — E03.9 HYPOTHYROIDISM, UNSPECIFIED TYPE: ICD-10-CM

## 2023-01-04 RX ORDER — LEVOTHYROXINE SODIUM 88 UG/1
TABLET ORAL
Qty: 45 TABLET | Refills: 3 | Status: SHIPPED | OUTPATIENT
Start: 2023-01-04

## 2023-01-05 DIAGNOSIS — N25.81 SECONDARY HYPERPARATHYROIDISM OF RENAL ORIGIN (HCC): ICD-10-CM

## 2023-01-05 DIAGNOSIS — R80.1 PERSISTENT PROTEINURIA: ICD-10-CM

## 2023-01-05 DIAGNOSIS — E55.9 VITAMIN D DEFICIENCY: ICD-10-CM

## 2023-01-05 DIAGNOSIS — N18.32 STAGE 3B CHRONIC KIDNEY DISEASE (HCC): ICD-10-CM

## 2023-01-06 RX ORDER — CALCITRIOL 0.25 UG/1
CAPSULE, LIQUID FILLED ORAL
Qty: 90 CAPSULE | Refills: 4 | Status: SHIPPED | OUTPATIENT
Start: 2023-01-06

## 2023-01-12 ENCOUNTER — APPOINTMENT (OUTPATIENT)
Dept: LAB | Facility: AMBULARY SURGERY CENTER | Age: 68
End: 2023-01-12

## 2023-01-12 DIAGNOSIS — Z79.899 ENCOUNTER FOR LONG-TERM (CURRENT) USE OF OTHER MEDICATIONS: ICD-10-CM

## 2023-01-12 LAB
ALBUMIN SERPL BCP-MCNC: 3.7 G/DL (ref 3.5–5)
ALP SERPL-CCNC: 68 U/L (ref 46–116)
ALT SERPL W P-5'-P-CCNC: 22 U/L (ref 12–78)
ANION GAP SERPL CALCULATED.3IONS-SCNC: 7 MMOL/L (ref 4–13)
AST SERPL W P-5'-P-CCNC: 13 U/L (ref 5–45)
BILIRUB SERPL-MCNC: 0.5 MG/DL (ref 0.2–1)
BUN SERPL-MCNC: 38 MG/DL (ref 5–25)
CALCIUM SERPL-MCNC: 9.5 MG/DL (ref 8.3–10.1)
CHLORIDE SERPL-SCNC: 111 MMOL/L (ref 96–108)
CHOLEST SERPL-MCNC: 201 MG/DL
CO2 SERPL-SCNC: 22 MMOL/L (ref 21–32)
CREAT SERPL-MCNC: 1.62 MG/DL (ref 0.6–1.3)
GFR SERPL CREATININE-BSD FRML MDRD: 32 ML/MIN/1.73SQ M
GLUCOSE P FAST SERPL-MCNC: 115 MG/DL (ref 65–99)
HDLC SERPL-MCNC: 51 MG/DL
LDLC SERPL CALC-MCNC: 127 MG/DL (ref 0–100)
POTASSIUM SERPL-SCNC: 4.1 MMOL/L (ref 3.5–5.3)
PROT SERPL-MCNC: 7.2 G/DL (ref 6.4–8.4)
SODIUM SERPL-SCNC: 140 MMOL/L (ref 135–147)
TRIGL SERPL-MCNC: 116 MG/DL
TSH SERPL DL<=0.05 MIU/L-ACNC: 2.03 UIU/ML (ref 0.45–4.5)

## 2023-01-13 ENCOUNTER — TELEPHONE (OUTPATIENT)
Dept: FAMILY MEDICINE CLINIC | Facility: CLINIC | Age: 68
End: 2023-01-13

## 2023-01-13 DIAGNOSIS — K21.9 GASTROESOPHAGEAL REFLUX DISEASE WITHOUT ESOPHAGITIS: ICD-10-CM

## 2023-01-13 RX ORDER — FAMOTIDINE 20 MG/1
20 TABLET, FILM COATED ORAL 2 TIMES DAILY
Qty: 180 TABLET | Refills: 3 | Status: SHIPPED | OUTPATIENT
Start: 2023-01-13 | End: 2023-04-27

## 2023-01-13 NOTE — TELEPHONE ENCOUNTER
Patients is inquiring about her famotidine  She says you told her the last time she was here for an OV you told her she could increase to 2 tabs instead of 1??    If you are still agreeable to this, she would like you to put an order into her pharmacy for this please which is [facility] mail [facility]      Thank You

## 2023-01-18 ENCOUNTER — OFFICE VISIT (OUTPATIENT)
Dept: PODIATRY | Facility: CLINIC | Age: 68
End: 2023-01-18

## 2023-01-18 VITALS
HEIGHT: 60 IN | WEIGHT: 231 LBS | DIASTOLIC BLOOD PRESSURE: 81 MMHG | HEART RATE: 97 BPM | SYSTOLIC BLOOD PRESSURE: 156 MMHG | BODY MASS INDEX: 45.35 KG/M2

## 2023-01-18 DIAGNOSIS — L84 CALLUS OF FOOT: ICD-10-CM

## 2023-01-18 DIAGNOSIS — M21.621 BUNIONETTE OF RIGHT FOOT: Primary | ICD-10-CM

## 2023-01-18 NOTE — PROGRESS NOTES
Assessment/Plan:         Diagnoses and all orders for this visit:    Bunionette of right foot    Callus of foot      Diagnosis and options discussed with patient  Patient agreeable to the plan as stated below    Offloading pads provided to reduce pressure to 5th MTPJ  Moisturize twice per day    Subjective:      Patient ID: Alexei Penaloza is a 75 y o  female  PAtient has a painful skin lesion under her right 5th toe  IT has been there for about 2 weeks  IT feels like a rock stuck to the bottom of her foot  The skin is hard  She denies any puncture or trauma  There is no drainage  The following portions of the patient's history were reviewed and updated as appropriate: allergies, current medications, past family history, past medical history, past social history, past surgical history and problem list     Review of Systems    Constitutional: Negative  HENT: Negative for sinus pressure and sinus pain  Respiratory: Negative for cough and shortness of breath  Cardiovascular: Negative for chest pain and leg swelling  Gastrointestinal: Negative for diarrhea, nausea and vomiting  Musculoskeletal: Negative for arthralgias, gait problem, joint swelling and myalgias  Skin: callus  Neurological: Negative for weakness, numbness and headaches  Psychiatric/Behavioral: The patient is not nervous/anxious  Objective:      /81   Pulse 97   Ht 5' (1 524 m)   Wt 105 kg (231 lb)   BMI 45 11 kg/m²          Physical Exam  Vitals reviewed  Constitutional:       Appearance: She is obese  Cardiovascular:      Rate and Rhythm: Normal rate  Pulses: Normal pulses  Musculoskeletal:         General: Deformity (right bunionette with fat pad atrophy) present  Skin:     Capillary Refill: Capillary refill takes less than 2 seconds  Findings: Lesion (hyperkeratosis right sub met 5) present  No erythema     Neurological:      Mental Status: She is alert and oriented to person, place, and time

## 2023-01-20 DIAGNOSIS — R19.5 POSITIVE COLORECTAL CANCER SCREENING USING COLOGUARD TEST: Primary | ICD-10-CM

## 2023-01-20 LAB — COLOGUARD RESULT REPORTABLE: POSITIVE

## 2023-01-23 ENCOUNTER — TELEPHONE (OUTPATIENT)
Dept: FAMILY MEDICINE CLINIC | Facility: CLINIC | Age: 68
End: 2023-01-23

## 2023-01-23 DIAGNOSIS — K21.9 GASTROESOPHAGEAL REFLUX DISEASE WITHOUT ESOPHAGITIS: Primary | ICD-10-CM

## 2023-01-23 RX ORDER — FAMOTIDINE 20 MG/1
20 TABLET, FILM COATED ORAL 2 TIMES DAILY
Qty: 60 TABLET | Refills: 0 | Status: SHIPPED | OUTPATIENT
Start: 2023-01-23

## 2023-01-23 NOTE — TELEPHONE ENCOUNTER
Patient called to say that her famotidine 20mg tablet was lost in transit from [facility]  She is requesting if possible to please send an order to [address]? ?    Please advise

## 2023-01-25 ENCOUNTER — TELEPHONE (OUTPATIENT)
Age: 68
End: 2023-01-25

## 2023-02-20 PROBLEM — Z12.11 SCREENING FOR COLON CANCER: Status: RESOLVED | Noted: 2022-12-22 | Resolved: 2023-02-20

## 2023-02-20 PROBLEM — Z00.00 MEDICARE ANNUAL WELLNESS VISIT, SUBSEQUENT: Status: RESOLVED | Noted: 2022-12-22 | Resolved: 2023-02-20

## 2023-03-03 ENCOUNTER — PREP FOR PROCEDURE (OUTPATIENT)
Age: 68
End: 2023-03-03

## 2023-03-03 DIAGNOSIS — R19.5 POSITIVE COLORECTAL CANCER SCREENING USING COLOGUARD TEST: Primary | ICD-10-CM

## 2023-03-08 ENCOUNTER — ANESTHESIA (OUTPATIENT)
Dept: GASTROENTEROLOGY | Facility: AMBULARY SURGERY CENTER | Age: 68
End: 2023-03-08

## 2023-03-08 ENCOUNTER — ANESTHESIA EVENT (OUTPATIENT)
Dept: GASTROENTEROLOGY | Facility: AMBULARY SURGERY CENTER | Age: 68
End: 2023-03-08

## 2023-03-08 ENCOUNTER — HOSPITAL ENCOUNTER (OUTPATIENT)
Dept: GASTROENTEROLOGY | Facility: AMBULARY SURGERY CENTER | Age: 68
Setting detail: OUTPATIENT SURGERY
Discharge: HOME | End: 2023-03-08
Attending: COLON & RECTAL SURGERY

## 2023-03-08 VITALS
TEMPERATURE: 97.3 F | RESPIRATION RATE: 18 BRPM | SYSTOLIC BLOOD PRESSURE: 135 MMHG | DIASTOLIC BLOOD PRESSURE: 82 MMHG | BODY MASS INDEX: 42.69 KG/M2 | WEIGHT: 232 LBS | OXYGEN SATURATION: 98 % | HEIGHT: 62 IN | HEART RATE: 79 BPM

## 2023-03-08 DIAGNOSIS — R19.5 POSITIVE COLORECTAL CANCER SCREENING USING COLOGUARD TEST: ICD-10-CM

## 2023-03-08 RX ORDER — SODIUM CHLORIDE, SODIUM LACTATE, POTASSIUM CHLORIDE, CALCIUM CHLORIDE 600; 310; 30; 20 MG/100ML; MG/100ML; MG/100ML; MG/100ML
INJECTION, SOLUTION INTRAVENOUS CONTINUOUS PRN
Status: DISCONTINUED | OUTPATIENT
Start: 2023-03-08 | End: 2023-03-08

## 2023-03-08 RX ORDER — LIDOCAINE HYDROCHLORIDE 10 MG/ML
INJECTION, SOLUTION EPIDURAL; INFILTRATION; INTRACAUDAL; PERINEURAL AS NEEDED
Status: DISCONTINUED | OUTPATIENT
Start: 2023-03-08 | End: 2023-03-08

## 2023-03-08 RX ORDER — PROPOFOL 10 MG/ML
INJECTION, EMULSION INTRAVENOUS CONTINUOUS PRN
Status: DISCONTINUED | OUTPATIENT
Start: 2023-03-08 | End: 2023-03-08

## 2023-03-08 RX ORDER — PROPOFOL 10 MG/ML
INJECTION, EMULSION INTRAVENOUS AS NEEDED
Status: DISCONTINUED | OUTPATIENT
Start: 2023-03-08 | End: 2023-03-08

## 2023-03-08 RX ADMIN — PROPOFOL 120 MCG/KG/MIN: 10 INJECTION, EMULSION INTRAVENOUS at 11:45

## 2023-03-08 RX ADMIN — PROPOFOL 100 MG: 10 INJECTION, EMULSION INTRAVENOUS at 11:45

## 2023-03-08 RX ADMIN — SODIUM CHLORIDE, SODIUM LACTATE, POTASSIUM CHLORIDE, AND CALCIUM CHLORIDE: .6; .31; .03; .02 INJECTION, SOLUTION INTRAVENOUS at 11:06

## 2023-03-08 RX ADMIN — LIDOCAINE HYDROCHLORIDE 50 MG: 10 INJECTION, SOLUTION EPIDURAL; INFILTRATION; INTRACAUDAL at 11:45

## 2023-03-08 NOTE — H&P
History and Physical   Colon and Rectal Surgery   Malik Polanco 75 y o  female MRN: [id number]  Unit/Bed#:  Encounter: [id number]  03/08/23   11:38 AM      No chief complaint on file  ASSESSMENT:  Malik Polanco is a 75 y o  female who presents for screening colonoscopy, last over 10 years, she has had positive Cologuard test in the interval   Screening colonoscopy,discussed in a face-to-face, personal, informed consent process, the benefits, alternatives, risks including not limited to bleeding, missed lesion, perforation requiring emergent surgery discussed/understood  PLAN:  Colonoscopy    History of Present Illness   HPI:  Malik Polanco is a 75 y o  female who presents for colonoscopy        Historical Information   Past Medical History:   Diagnosis Date   • Abnormal ECG    • Arthritis    • Bipolar 1 disorder (HCC)    • Chronic kidney disease     stage 3   • CPAP (continuous positive airway pressure) dependence    • Depression 2001   • Disease of thyroid gland    • Elevated blood pressure reading 06/10/2019   • GERD (gastroesophageal reflux disease)    • Sleep apnea      Past Surgical History:   Procedure Laterality Date   • COLONOSCOPY  2011       Meds/Allergies     (Not in a hospital admission)        Current Outpatient Medications:   •  b complex vitamins tablet, Take 1 tablet by mouth daily, Disp: , Rfl:   •  calcitriol (ROCALTROL) 0 25 mcg capsule, TAKE ONE CAPSULE BY MOUTH EVERY DAY, Disp: 90 capsule, Rfl: 4  •  Cholecalciferol (VITAMIN D) 2000 units CAPS, Take 1 capsule by mouth daily, Disp: , Rfl:   •  CRANBERRY PO, Take by mouth, Disp: , Rfl:   •  famotidine (PEPCID) 20 mg tablet, Take 1 tablet (20 mg total) by mouth 2 (two) times a day, Disp: 180 tablet, Rfl: 3  •  Glucosamine-Chondroit-Vit C-Mn (GLUCOSAMINE 1500 COMPLEX PO), Take by mouth in the morning, Disp: , Rfl:   •  Latuda 20 MG tablet, TAKE 1 TABLET BY MOUTH  DAILY WITH BREAKFAST, Disp: 30 tablet, Rfl: 11  •  levothyroxine 88 mcg tablet, TAKE ONE-HALF TABLET BY MOUTH  DAILY, Disp: 45 tablet, Rfl: 3  •  Multiple Vitamin (MULTI-VITAMIN DAILY) TABS, Take by mouth, Disp: , Rfl:   •  famotidine (PEPCID) 20 mg tablet, Take 1 tablet (20 mg total) by mouth 2 (two) times a day, Disp: 60 tablet, Rfl: 0  •  fluocinonide (LIDEX) 0 05 % cream, , Disp: , Rfl:     Allergies   Allergen Reactions   • Other Other (See Comments)     Other reaction(s):  Anaphylaxis  [facility] [id number]: plums peaches, fruits with skin          Social History   Social History     Substance and Sexual Activity   Alcohol Use Not Currently     Social History     Substance and Sexual Activity   Drug Use Never     Social History     Tobacco Use   Smoking Status Former   • Packs/day: 0 25   • Years: 30 00   • Pack years: 7 50   • Types: Cigarettes   • Quit date: 1/1/2008   • Years since quitting: 15 1   Smokeless Tobacco Never         Family History:   Family History   Problem Relation Age of Onset   • Heart disease Mother    • Heart Valve Disease Mother         Replacement   • Diabetes Mother         2002   • Heart failure Mother         Heart Valve replacement   • Arthritis Father          Objective     Current Vitals:   Blood Pressure: 141/81 (03/08/23 1059)  Pulse: 100 (03/08/23 1059)  Temperature: (!) 97 1 °F (36 2 °C) (03/08/23 1059)  Temp Source: Temporal (03/08/23 1059)  Respirations: 18 (03/08/23 1059)  Height: 5' 2" (157 5 cm) (03/08/23 1059)  Weight - Scale: 105 kg (232 lb) (03/08/23 1059)  SpO2: 98 % (03/08/23 1059)  No intake or output data in the 24 hours ending 03/08/23 1138    Physical Exam:  General:no distress  Eyes:perrla/eomi  ENT:moist mucus membranes  Neck:supple  Pulm:no increased work of breathing  CV:sinus  Abdomen:soft,nontender  Extremities:no edema

## 2023-03-08 NOTE — ANESTHESIA POSTPROCEDURE EVALUATION
Post-Op Assessment Note    CV Status:  Stable  Pain Score: 0    Pain management: adequate     Mental Status:  Sleepy   Hydration Status:  Euvolemic   PONV Controlled:  Controlled   Airway Patency:  Patent      Post Op Vitals Reviewed: Yes      Staff: CRNA   Comments: vss sv nonobstructed uneventful        No notable events documented      /69 (03/08/23 1215)    Temp (!) 97 3 °F (36 3 °C) (03/08/23 1215)    Pulse 94 (03/08/23 1215)   Resp 18 (03/08/23 1215)    SpO2 96 % (03/08/23 1215)

## 2023-03-08 NOTE — ANESTHESIA PREPROCEDURE EVALUATION
Procedure:  COLONOSCOPY    Relevant Problems   CARDIO   (+) High triglycerides      ENDO   (+) Hypothyroidism   (+) Secondary hyperparathyroidism of renal origin (HCC)      GI/HEPATIC   (+) Gastroesophageal reflux disease without esophagitis      /RENAL   (+) Stage 3 chronic kidney disease (HCC)      MUSCULOSKELETAL   (+) Patellofemoral arthritis of right knee   (+) Primary osteoarthritis of left knee   (+) Primary osteoarthritis of right knee      NEURO/PSYCH   (+) Depression with anxiety      PULMONARY   (+) Obstructive sleep apnea      Other   (+) BMI 40 0-44 9, adult ([facility])        Physical Exam    Airway    Mallampati score: II  TM Distance: >3 FB  Neck ROM: full     Dental   upper dentures,     Cardiovascular      Pulmonary      Other Findings        Anesthesia Plan  ASA Score- 3     Anesthesia Type- IV sedation with anesthesia with ASA Monitors  Additional Monitors:   Airway Plan:           Plan Factors-Exercise tolerance (METS): >4 METS  Chart reviewed  Patient summary reviewed  Patient is not a current smoker  Induction- intravenous  Postoperative Plan-     Informed Consent- Anesthetic plan and risks discussed with patient  I personally reviewed this patient with the CRNA  Discussed and agreed on the Anesthesia Plan with the CRNA  Roanna Schlatter

## 2023-03-21 ENCOUNTER — TELEPHONE (OUTPATIENT)
Age: 68
End: 2023-03-21

## 2023-04-20 ENCOUNTER — APPOINTMENT (OUTPATIENT)
Dept: LAB | Facility: AMBULARY SURGERY CENTER | Age: 68
End: 2023-04-20

## 2023-04-20 DIAGNOSIS — E66.01 MORBID OBESITY (HCC): ICD-10-CM

## 2023-04-20 DIAGNOSIS — R80.1 PERSISTENT PROTEINURIA: ICD-10-CM

## 2023-04-20 DIAGNOSIS — N25.81 SECONDARY HYPERPARATHYROIDISM OF RENAL ORIGIN (HCC): Primary | ICD-10-CM

## 2023-04-20 DIAGNOSIS — N18.32 CHRONIC KIDNEY DISEASE (CKD) STAGE G3B/A1, MODERATELY DECREASED GLOMERULAR FILTRATION RATE (GFR) BETWEEN 30-44 ML/MIN/1.73 SQUARE METER AND ALBUMINURIA CREATININE RATIO LESS THAN 30 MG/G (HCC): ICD-10-CM

## 2023-04-20 DIAGNOSIS — E55.9 VITAMIN D DEFICIENCY DISEASE: ICD-10-CM

## 2023-04-20 LAB
25(OH)D3 SERPL-MCNC: 33.7 NG/ML (ref 30–100)
ALBUMIN SERPL BCP-MCNC: 3.5 G/DL (ref 3.5–5)
ANION GAP SERPL CALCULATED.3IONS-SCNC: 5 MMOL/L (ref 4–13)
BUN SERPL-MCNC: 27 MG/DL (ref 5–25)
CALCIUM SERPL-MCNC: 9.1 MG/DL (ref 8.3–10.1)
CHLORIDE SERPL-SCNC: 111 MMOL/L (ref 96–108)
CO2 SERPL-SCNC: 23 MMOL/L (ref 21–32)
CREAT SERPL-MCNC: 1.48 MG/DL (ref 0.6–1.3)
CREAT UR-MCNC: 43.5 MG/DL
GFR SERPL CREATININE-BSD FRML MDRD: 36 ML/MIN/1.73SQ M
GLUCOSE SERPL-MCNC: 109 MG/DL (ref 65–140)
PHOSPHATE SERPL-MCNC: 3.5 MG/DL (ref 2.3–4.1)
POTASSIUM SERPL-SCNC: 4.1 MMOL/L (ref 3.5–5.3)
PROT UR-MCNC: 13 MG/DL
PROT/CREAT UR: 0.3 MG/G{CREAT} (ref 0–0.1)
PTH-INTACT SERPL-MCNC: 43.7 PG/ML (ref 18.4–80.1)
SODIUM SERPL-SCNC: 139 MMOL/L (ref 135–147)

## 2023-04-27 ENCOUNTER — OFFICE VISIT (OUTPATIENT)
Dept: NEPHROLOGY | Facility: CLINIC | Age: 68
End: 2023-04-27

## 2023-04-27 VITALS
HEIGHT: 62 IN | BODY MASS INDEX: 43.61 KG/M2 | HEART RATE: 88 BPM | WEIGHT: 237 LBS | DIASTOLIC BLOOD PRESSURE: 76 MMHG | SYSTOLIC BLOOD PRESSURE: 132 MMHG

## 2023-04-27 DIAGNOSIS — R80.1 PERSISTENT PROTEINURIA: ICD-10-CM

## 2023-04-27 DIAGNOSIS — R03.0 ELEVATED BLOOD PRESSURE READING WITHOUT DIAGNOSIS OF HYPERTENSION: ICD-10-CM

## 2023-04-27 DIAGNOSIS — N25.81 SECONDARY HYPERPARATHYROIDISM OF RENAL ORIGIN (HCC): ICD-10-CM

## 2023-04-27 DIAGNOSIS — E55.9 VITAMIN D DEFICIENCY: ICD-10-CM

## 2023-04-27 DIAGNOSIS — N18.32 STAGE 3B CHRONIC KIDNEY DISEASE (HCC): Primary | ICD-10-CM

## 2023-04-27 DIAGNOSIS — E66.01 MORBID OBESITY WITH BMI OF 40.0-44.9, ADULT (HCC): ICD-10-CM

## 2023-04-27 RX ORDER — MIRABEGRON 25 MG/1
TABLET, FILM COATED, EXTENDED RELEASE ORAL
COMMUNITY

## 2023-04-27 NOTE — PATIENT INSTRUCTIONS
"  - Please call me in 10 days after having your blood work done to review the results if you do not hear back from me or my office, as I may have not received the results  - please remember to perform blood work prior to the next visit  - Please call if the blood pressure top number is greater than 140 or less than 110 consistently  - Please call if you are gaining more than 2lbs in 2 days for adjustment of water pills   ~ Please AVOID the following pain medications  LIST OF NSAIDS (NONSTEROIDAL ANTI-INFLAMMATORY DRUGS) AND MOORE-2 INHIBITORS    DIFLUNISAL (DOLOBID)  IBUPROFEN (MOTRIN, ADVIL)  FLURBIPROFEN (ANSAID)  KETOPROFEN (ORUDIS, ORUVAIL)  FENOPROFEN (NALFON)  NABUMETONE (RELAFEN)  PIROXICAM (FELDENE)  NAPROXEN (ALEVE, NAPROSYN, NAPRELAN, ANAPROX)  DICLOFENAC (VOLTAREN, CATAFLAM)  INDOMETHACIN (INDOCIN)  SULINDAC (CLINORIL)  TOLMETIN (TOLETIN)  ETODOLAC (LODINE)  MELOXICAM (MOBIC)  KETOROLAC (TORADOL)  OXAPROZIN (DAYPRO)  CELECOXIB (CELEBREX)    Phosphorus diet  Follow a low phosphorus diet      Avoid these higher phosphorus foods: Choose these lower phosphorus foods:   Milk, pudding or yogurt (from animals and from many soy varieties) Rice milk (unfortified), nondairy creamer (if it doesn't have terms in the ingredients list that contain the letters \"phos\")   Hard cheeses, ricotta or cottage cheese, fat-free cream cheese Regular and low-fat cream cheese   Ice cream or frozen yogurt Sherbet or frozen fruit pops   Soups made with higher phosphorus ingredients (milk, dried peas, beans, lentils) Soups made with lower phosphorus ingredients (broth- or water-based with other lower phosphorus ingredients)   Whole grains, including whole-grain breads, crackers, cereal, rice and pasta Refined grains, including white bread, crackers, cereals, rice and pasta   Quick breads, biscuits, cornbread, muffins, pancakes or waffles Homemade refined (white) dinner rolls, bagels or English muffins   Dried peas (split, " "black-eyed), beans (black, garbanzo, lima, kidney, navy, dacosta) or lentils Green peas (canned, frozen), green beans or wax beans   Organ meats, walleye, pollock or sardines Lean beef, pork, lamb, poultry or other fish   Nuts and seeds Popcorn   Peanut butter and other nut butters Jam, jelly or honey   Chocolate, including chocolate drinks Carob (chocolate-flavored) candy, hard candy or gumdrops   Madeline and pepper-type sodas, flavored orozco, bottled teas (if a term in the ingredients list contains the letters \"phos\") Lemon-lime soda, ginger ale or root beer, plain water   Follow a moderate potassium diet  Things to do to reduce your blood pressure include working with all your physician to do the following:  ~ stop smoking if you smoke  ~ increase cardiovascular exercise like walking and swimming    ~ modify your diet to decrease fat and salt intake  ~ reduce your weight if you are overweight or obese   ~ increase the consumption of fruits, vegetables and whole grains  ~ decrease alcohol consumption if you consume alcohol    ~ try to minimize stress in your life with lifestyle modifications  ~ be compliant with your anti-hypertensive medications  ~ adjust your medications to help improve your vascular stiffness and decrease risks for heart attacks and strokes  Exercise to Lose Weight     Exercise and a healthy diet may help you lose weight  Your doctor may suggest specific exercises  EXERCISE IDEAS AND TIPS     Choose low-cost things you enjoy doing, such as walking, bicycling, or exercising to workout videos  Take stairs instead of the elevator  Walk during your lunch break  Park your car further away from work or school  Go to a gym or an exercise class  Start with 5 to 10 minutes of exercise each day  Build up to 30 minutes of exercise 4 to 6 days a week  Wear shoes with good support and comfortable clothes  Stretch before and after working out     Work out until you breathe " harder and your heart beats faster  Drink extra water when you exercise  Do not do so much that you hurt yourself, feel dizzy, or get very short of breath  Exercises that burn about 150 calories:   Running 1 ½ miles in 15 minutes  Playing volleyball for 45 to 60 minutes  Washing and waxing a car for 45 to 60 minutes  Playing touch football for 45 minutes  Walking 1 ¾ miles in 35 minutes  Pushing a stroller 1 ½ miles in 30 minutes  Playing basketball for 30 minutes  Raking leaves for 30 minutes  Bicycling 5 miles in 30 minutes  Walking 2 miles in 30 minutes  Dancing for 30 minutes  Shoveling snow for 15 minutes  Swimming laps for 20 minutes  Walking up stairs for 15 minutes  Bicycling 4 miles in 15 minutes  Gardening for 30 to 45 minutes  Jumping rope for 15 minutes  Washing windows or floors for 45 to 60 minutes

## 2023-04-27 NOTE — PROGRESS NOTES
Nephrology Follow up Consultation  Johnny Matthews 75 y o  female MRN: [id number]            BACKGROUND:  Johnny Matthews is a 75 y  o female who was referred by Maryjo Douglas DO for evaluation of Chronic Kidney Disease and Follow-up    ASSESSMENT / PLAN:   75 y o   feamle with pmh of multiple co-morbidities including JULIO on bipap, CKD stage 3, GERD, bipolar, depression, hypothyroidism, obesity, vitamin-D deficiency, arthritis presents to the office for routine follow-up  CKD stage IIIB:  - Patient has a baseline creatinine of 1 2-1 8 mg/dL  After episode of acute kidney injury in 2020  Most recent labs show a Creatinine of 1 48 mg/dL on 4/20/23  Renal function stable at baseline    -likely has underlying CKD secondary to age-related nephron loss plus prior episode of acute kidney injury non dialysis requiring plus chronic lithium use and prior NSAID use plus obesity related hyper filtration  - CT abdomen from September 2020 showing 1 8 cm Lower interpolar cyst on the left kidney no hydronephrosis  - Acid base and lytes stable  - Clinically the patient appears to be euvolemic  - Recommend to avoid use of NSAIDs, nephrotoxins  Caution advised with regards to exposure to IV contrast dye    - Discussed with the patient in depth her renal status, including the possible etiologies for CKD  - Advised the patient that when her GFR is close to 20mL/min then will start discussing about RRT(renal replacement therapy) options such as renal transplant, peritoneal dialysis and hemodialysis  - Informed the patient about the various options for Renal Replacement therapy  - Discussed with the patient how we need to work together to delay the progression of CKD with optimal BP control based on their age and co-morbidities and trying to reduce proteinuria by the use of anti-proteinuric agents     - referral to CKD education/kidney smart placed on 12/21/2020, completed 01/18/2021    Blood pressure:  - Patient is on no meds    - advised patient appropriate techniques of checking blood pressures and to call with blood pressure updates if any issues   -Blood pressures are borderline did offer starting low-dose lisinopril however she wishes to wait off on that risk and benefits from renal standpoint and assistance in terms of CKD progression was also discussed she wishes to hold off and will follow-up with her PCP with her blood pressure at her upcoming appointment  Advised patient to bring her blood pressure apparatus with her at her next visit  - Goal BP of <  130/80 based on age and comorbidities  - Instructed to follow low sodium (2gm)diet  Hemoglobin:  - Goal Hb of 10-12 g/dL  - Most recent labs suggestive of 14 6 grams/deciliter   - no role for IV iron at this time    CKD-MBD(Mineral Bone Disease)/vitamin-D deficiency / secondary hyperparathyroidism of renal origin:  - Based on patients CKD stage following is the goal of therapy  - Maintain calcium phosphorus product of < 55   - Stage 3 CKD - Goal Ca 8 5-10 mg/dL , goal Phos 2 7-4 6 mg/dL  , goal iPTH 30-70 pg/mL  - Patient is currently at goal   - Continue patient on vitamin-D 2000 units p o  Q day, calcitriol 0 25 mcg po q24  - Patients' most recent vitamin D level is 33 7 and intact PTH of 43 7 as of 4/27/23  - check intact PTH and vitamin-D levels    Proteinuria:    - proteinuria most likely secondary to obesity related hyper filtration   - most recent protein creatinine ratio of 300 mg as of 4/20/23  - check protein creatinine ratio reordered  - currently on therapy for proteinuria with vitamin-D    Lipids:  - goal LDL less than 70  - Management as per PCP    GERD:  - management as per Primary team  - no longer on Prilosec  - currently on Pepcid  Bipolar/depression:  - Management as per primary team  - follow up with psych  -no longer on lithium only on Latuda at renal dosage       Nutrition / obesity:  - Encouraged patient to follow a renal diet comprising of moderate potassium, low phosphorus and protein restriction to 0 8gm/kg  Advise of dietary habits and weight loss  - Will check serum albumin with next blood work  Followup:  - Patient is to follow-up in 6 months, with lab work to be performed in a few days prior to the next visit  Advised patient to call me in 10 days to review the results if they do not hear back from me, as I may have not received the results  Jessie Bender Mary Ronny, 4/27/2023, 9:12 AM             SUBJECTIVE: 75 y o  female presents to the office for routine follow-up  Feels well has no complaints no recent hospitalization no issues with edema reports home blood pressure systolic in the 171T wishes to hold off on initiation of lisinopril  Reports she will follow-up with her PCP and will bring her blood pressure apparatus to the next visit no NSAID use  Thankful for the care information that she is gone today   Review of Systems   Constitutional: Negative for chills and fever  HENT: Negative for sore throat  Respiratory: Negative for cough, shortness of breath and wheezing  Cardiovascular: Negative for leg swelling  Gastrointestinal: Negative for diarrhea and vomiting  Genitourinary: Negative for difficulty urinating, dysuria and hematuria  Musculoskeletal: Negative for back pain  Skin: Negative for wound  Neurological: Negative for dizziness, light-headedness and headaches  Psychiatric/Behavioral: Negative for agitation and confusion  All other systems reviewed and are negative        PAST MEDICAL HISTORY:  Past Medical History:   Diagnosis Date   • Abnormal ECG    • Arthritis    • Bipolar 1 disorder (HCC)    • Chronic kidney disease     stage 3   • CPAP (continuous positive airway pressure) dependence    • Depression 2001   • Disease of thyroid gland    • Elevated blood pressure reading 06/10/2019   • GERD (gastroesophageal reflux disease)    • Sleep apnea        PROBLEM LIST    Patient Active Problem List Diagnosis   • Bipolar 1 disorder, mixed, severe ([facility])   • Depression with anxiety   • Gastroesophageal reflux disease without esophagitis   • Hypothyroidism   • Impaired fasting glucose   • Insomnia   • Obstructive sleep apnea   • Onychomycosis of toenail   • Patellofemoral arthritis of right knee   • Stress incontinence of urine   • High triglycerides   • BMI 40 0-44 9, adult ([facility])   • Elevated blood pressure reading without diagnosis of hypertension   • Abnormal EKG   • Localized edema   • Palpitations   • Raynaud's disease without gangrene   • Stage 3 chronic kidney disease ([facility])   • Vitamin D deficiency   • Primary osteoarthritis of left knee   • Primary osteoarthritis of right knee   • Secondary hyperparathyroidism of renal origin ([facility] [address] )   • Persistent proteinuria   • Urinary frequency   • Morbid obesity with BMI of 40 0-44 9, adult ([facility] [address] )   • Mass of right hand   • Lichen sclerosus   • Encounter for immunization   • CPAP (continuous positive airway pressure) dependence       PAST SURGICAL HISTORY:  Past Surgical History:   Procedure Laterality Date   • COLONOSCOPY  2011       SOCIAL HISTORY :   reports that she quit smoking about 15 years ago  Her smoking use included cigarettes  She has a 7 50 pack-year smoking history  She has never used smokeless tobacco  She reports that she does not currently use alcohol  She reports that she does not use drugs  FAMILY HISTORY:  Family History   Problem Relation Age of Onset   • Heart disease Mother    • Heart Valve Disease Mother         Replacement   • Diabetes Mother         2002   • Heart failure Mother         Heart Valve replacement   • Arthritis Father        ALLERGIES:  Allergies   Allergen Reactions   • Other Other (See Comments)     Other reaction(s):  Anaphylaxis  Annotation - [id number]: plums peaches, fruits with skin            PHYSICAL EXAM:  Vitals:    04/27/23 0846   BP: 132/76   BP Location: Left arm   Patient Position: Sitting   Cuff Size: Large "  Pulse: 88   Weight: 108 kg (237 lb)   Height: 5' 2\" (1 575 m)     Body mass index is 43 35 kg/m²  Physical Exam  Vitals reviewed  Constitutional:       General: She is not in acute distress  Appearance: Normal appearance  She is obese  She is not ill-appearing, toxic-appearing or diaphoretic  HENT:      Head: Normocephalic and atraumatic  Mouth/Throat:      Mouth: Mucous membranes are moist       Pharynx: Oropharynx is clear  No oropharyngeal exudate  Eyes:      General: No scleral icterus  Conjunctiva/sclera: Conjunctivae normal    Cardiovascular:      Rate and Rhythm: Normal rate  Heart sounds: Normal heart sounds  No friction rub  Pulmonary:      Effort: No respiratory distress  Breath sounds: Normal breath sounds  No stridor  Abdominal:      General: There is no distension  Palpations: Abdomen is soft  There is no mass  Tenderness: There is no abdominal tenderness  There is no right CVA tenderness or left CVA tenderness  Musculoskeletal:         General: No swelling  Cervical back: Normal range of motion  No rigidity  Skin:     General: Skin is warm  Coloration: Skin is not jaundiced  Neurological:      General: No focal deficit present  Mental Status: She is alert and oriented to person, place, and time     Psychiatric:         Mood and Affect: Mood normal          Behavior: Behavior normal          LABORATORY DATA:     Results from last 6 Months   Lab Units 04/20/23  0813 01/12/23  0728 11/21/22  1310   POTASSIUM mmol/L 4 1 4 1 3 8   CHLORIDE mmol/L 111* 111* 111*   CO2 mmol/L 23 22 23   BUN mg/dL 27* 38* 29*   CREATININE mg/dL 1 48* 1 62* 1 58*   CALCIUM mg/dL 9 1 9 5 9 5   MAGNESIUM mg/dL  --   --  2 1   PHOSPHORUS mg/dL 3 5  --  3 9        rest all reviewed    RADIOLOGY:  No orders to display     Rest all reviewed        MEDICATIONS:    Current Outpatient Medications:   •  b complex vitamins tablet, Take 1 tablet by mouth daily, Disp: , " "Rfl:   •  calcitriol (ROCALTROL) 0 25 mcg capsule, TAKE ONE CAPSULE BY MOUTH EVERY DAY, Disp: 90 capsule, Rfl: 4  •  Cholecalciferol (VITAMIN D) 2000 units CAPS, Take 1 capsule by mouth daily, Disp: , Rfl:   •  CRANBERRY PO, Take by mouth, Disp: , Rfl:   •  famotidine (PEPCID) 20 mg tablet, Take 1 tablet (20 mg total) by mouth 2 (two) times a day, Disp: 60 tablet, Rfl: 0  •  Glucosamine-Chondroit-Vit C-Mn (GLUCOSAMINE 1500 COMPLEX PO), Take by mouth in the morning, Disp: , Rfl:   •  Latuda 20 MG tablet, TAKE 1 TABLET BY MOUTH  DAILY WITH BREAKFAST, Disp: 30 tablet, Rfl: 11  •  levothyroxine 88 mcg tablet, TAKE ONE-HALF TABLET BY MOUTH  DAILY, Disp: 45 tablet, Rfl: 3  •  Mirabegron ER (Myrbetriq) 25 MG TB24, Take by mouth, Disp: , Rfl:   •  Multiple Vitamin (MULTI-VITAMIN DAILY) TABS, Take by mouth, Disp: , Rfl:   •  fluocinonide (LIDEX) 0 05 % cream, , Disp: , Rfl:           Portions of the record may have been created with voice recognition software  Occasional wrong word or \"sound a like\" substitutions may have occurred due to the inherent limitations of voice recognition software  Read the chart carefully and recognize, using context, where substitutions have occurred  If you have any questions, please contact the dictating provider        "

## 2023-05-01 ENCOUNTER — TELEPHONE (OUTPATIENT)
Dept: PSYCHIATRY | Facility: CLINIC | Age: 68
End: 2023-05-01

## 2023-05-01 NOTE — TELEPHONE ENCOUNTER
Virgilio Garcia and/or Patient requested a call back to discuss if patient needs blood work before next appointment with provider  They can be reached at P# [phone]       Thank you

## 2023-05-08 DIAGNOSIS — Z12.31 ENCOUNTER FOR SCREENING MAMMOGRAM FOR MALIGNANT NEOPLASM OF BREAST: Primary | ICD-10-CM

## 2023-06-08 ENCOUNTER — OFFICE VISIT (OUTPATIENT)
Dept: PSYCHIATRY | Facility: CLINIC | Age: 68
End: 2023-06-08

## 2023-06-08 DIAGNOSIS — F31.63 BIPOLAR 1 DISORDER, MIXED, SEVERE (HCC): ICD-10-CM

## 2023-06-08 DIAGNOSIS — Z79.899 LONG TERM CURRENT USE OF ANTIPSYCHOTIC MEDICATION: Primary | ICD-10-CM

## 2023-06-08 NOTE — PSYCH
Visit Time    Visit Start Time: 9:30  Visit Stop Time: 10:00  Total Visit Duration: 30 minutes    Subjective:Medication Management    Patient ID: Radha Mariano is a 75 y o  female with Bipolar do     HPI ROS Appetite Changes and Sleep: normal appetite, normal energy level, no weight change and normal number of sleep hours     Cherelle has been compliant with Latuda 20 mg daily and denies medication side effects  She denies recent health changes or new medications  Patient stated that her mood has stable  Since last seen her  had 2 surgeries  both vascular surgeries  One was carotid artery and the other wasAAA surgery  He has recovered well from the procedures  ,  She stated she will like to try off Synthroid because she is no longer taking lithium  Will d/c Synthroid 44 mcg daily and repeat TSH and T4 in 3 months  She stated her son continues to do well  And remains sober, has a good job and continues to date [employer]  Daughter is 41 y/o and single, she worries her daughter is running out of time to start a family, but otherwise she is doing well  She will like to consider weekly semaglutide injections for weight management but will have to be out of pocket cost since insurance will not cover it  Continue Latuda and schedule follow up in 3-6 months      Review Of Systems:     Mood Emotional Lability   Behavior Impulsive Behavior   Thought Content Disturbing Thoughts, Feelings   General Emotional Problems and Decreased Functioning   Personality Normal   Other Psych Symptoms Normal   Constitutional Negative   ENT Negative   Cardiovascular Negative   Respiratory Negative   Gastrointestinal Negative   Genitourinary Negative   Musculoskeletal Negative   Integumentary Negative   Neurological Negative   Endocrine Normal    Other Symptoms Normal        Laboratory Results:   Recent Labs (last 12 months):   Appointment on 04/20/2023   Component Date Value   • Creatinine, Ur 04/20/2023 43 5    • Protein Urine Random 04/20/2023 13    • Prot/Creat Ratio, Ur 04/20/2023 0 30 (H)    • PTH 04/20/2023 43 7    • Albumin 04/20/2023 3 5    • Calcium 04/20/2023 9 1    • Phosphorus 04/20/2023 3 5    • Glucose 04/20/2023 109    • BUN 04/20/2023 27 (H)    • Creatinine 04/20/2023 1 48 (H)    • Sodium 04/20/2023 139    • Potassium 04/20/2023 4 1    • Chloride 04/20/2023 111 (H)    • CO2 04/20/2023 23    • ANION GAP 04/20/2023 5    • eGFR 04/20/2023 36    • Vit D, 25-Hydroxy 04/20/2023 33 7    Hospital Outpatient Visit on 03/08/2023   Component Date Value   • Case Report 03/08/2023                      Value:Surgical Pathology Report                         Case: [id number]                                   Authorizing Provider:  Carlyle Castro MD      Collected:           03/08/2023 1156              Ordering Location:     [address] [place] [address]        Received:            03/08/2023 [address]                                                    Pathologist:           Pamela Obrien MD                                                          Specimens:   A) - Polyp, Colorectal, ascending colon, cold snare                                                 B) - Polyp, Colorectal, rectum, hot snare                                                 • Final Diagnosis 03/08/2023                      Value: This result contains rich text formatting which cannot be displayed here  • Additional Information 03/08/2023                      Value: This result contains rich text formatting which cannot be displayed here  • Synoptic Checklist 03/08/2023                      Value:                            COLON/RECTUM POLYP FORM - GI - All Specimens                                                                                     :    Adenoma(s)     • Gross Description 03/08/2023                      Value: This result contains rich text formatting which cannot be displayed here     Appointment on 01/12/2023   Component Date Value   • Sodium 01/12/2023 140    • Potassium 01/12/2023 4 1    • Chloride 01/12/2023 111 (H)    • CO2 01/12/2023 22    • ANION GAP 01/12/2023 7    • BUN 01/12/2023 38 (H)    • Creatinine 01/12/2023 1 62 (H)    • Glucose, Fasting 01/12/2023 115 (H)    • Calcium 01/12/2023 9 5    • AST 01/12/2023 13    • ALT 01/12/2023 22    • Alkaline Phosphatase 01/12/2023 68    • Total Protein 01/12/2023 7 2    • Albumin 01/12/2023 3 7    • Total Bilirubin 01/12/2023 0 50    • eGFR 01/12/2023 32    • Cholesterol 01/12/2023 201 (H)    • Triglycerides 01/12/2023 116    • HDL, Direct 01/12/2023 51    • LDL Calculated 01/12/2023 127 (H)    • TSH 3RD GENERATON 01/12/2023 2 030    Office Visit on 12/22/2022   Component Date Value   • Cologuard Result 01/13/2023 Positive (A)    Appointment on 11/21/2022   Component Date Value   • Albumin 11/21/2022 3 3 (L)    • Calcium 11/21/2022 9 5    • Corrected Calcium 11/21/2022 10 1    • Phosphorus 11/21/2022 3 9    • BUN 11/21/2022 29 (H)    • Creatinine 11/21/2022 1 58 (H)    • Sodium 11/21/2022 140    • Potassium 11/21/2022 3 8    • Chloride 11/21/2022 111 (H)    • CO2 11/21/2022 23    • ANION GAP 11/21/2022 6    • eGFR 11/21/2022 33    • Glucose, Fasting 11/21/2022 108 (H)    • Vit D, 25-Hydroxy 11/21/2022 41 8    • Magnesium 11/21/2022 2 1    • PTH 11/21/2022 49 2    Appointment on 06/30/2022   Component Date Value   • Cholesterol 06/30/2022 193    • Triglycerides 06/30/2022 145    • HDL, Direct 06/30/2022 54    • LDL Calculated 06/30/2022 110 (H)    • Hemoglobin A1C 06/30/2022 5 4    • EAG 06/30/2022 108    • TSH [address] [place] [address] 06/30/2022 2 000        Substance Abuse History:  Social History     Substance and Sexual Activity   Drug Use Never       Family Psychiatric History:   Family History   Problem Relation Age of Onset   • Heart disease Mother    • Heart Valve Disease Mother         Replacement   • Diabetes Mother         2002   • Heart failure Mother Heart Valve replacement   • Arthritis Father        The following portions of the patient's history were reviewed and updated as appropriate: allergies, current medications, past family history, past medical history, past social history, past surgical history and problem list     Social History     Socioeconomic History   • Marital status: /Civil Union     Spouse name: Not on file   • Number of children: Not on file   • Years of education: Not on file   • Highest education level: Not on file   Occupational History   • Not on file   Tobacco Use   • Smoking status: Former     Packs/day: 0 25     Years: 30 00     Total pack years: 7 50     Types: Cigarettes     Quit date: 1/1/2008     Years since quitting: 15 4   • Smokeless tobacco: Never   Vaping Use   • Vaping Use: Never used   Substance and Sexual Activity   • Alcohol use: Not Currently   • Drug use: Never   • Sexual activity: Not Currently     Partners: Male     Birth control/protection: Post-menopausal   Other Topics Concern   • Not on file   Social History Narrative    Daily coffee consumption: 3 cups/day     Social Determinants of Health     Financial Resource Strain: Low Risk  (12/18/2022)    Overall Financial Resource Strain (CARDIA)    • Difficulty of Paying Living Expenses: Not hard at all   Food Insecurity: Not on file   Transportation Needs: No Transportation Needs (12/18/2022)    PRAPARE - Transportation    • Lack of Transportation (Medical): No    • Lack of Transportation (Non-Medical):  No   Physical Activity: Not on file   Stress: Not on file   Social Connections: Not on file   Intimate Partner Violence: Not on file   Housing Stability: Not on file     Social History     Social History Narrative    Daily coffee consumption: 3 cups/day       Objective:       Mental status:  Appearance calm and cooperative , adequate hygiene and grooming and good eye contact    Mood euthymic   Affect affect was constricted   Speech a normal rate and fluent Thought Processes coherent/organized and normal thought processes   Hallucinations no hallucinations present    Thought Content no delusions   Abnormal Thoughts no suicidal thoughts  and no homicidal thoughts    Orientation  oriented to person and place and time   Remote Memory short term memory intact and long term memory intact   Attention Span concentration intact   Intellect Appears to be of Average Intelligence   Insight Limited insight   Judgement judgment was limited   Muscle Strength Muscle strength and tone were normal and Normal gait    Language no difficulty naming common objects and no difficulty repeating a phrase    Fund of Knowledge displays adequate knowledge of current events, adequate fund of knowledge regarding past history and adequate fund of knowledge regarding vocabulary                Assessment/Plan:       Diagnoses and all orders for this visit:    Long term current use of antipsychotic medication  -     TSH, 3rd generation with Free T4 reflex; Future  -     Lipid Panel with Direct LDL reflex; Future  -     CBC and differential; Future  -     Comprehensive metabolic panel; Future  -     TSH, 3rd generation with Free T4 reflex  -     Lipid Panel with Direct LDL reflex  -     CBC and differential  -     Comprehensive metabolic panel    Bipolar 1 disorder, mixed, severe (HCC)            Treatment Recommendations- Risks Benefits      Immediate Medical/Psychiatric/Psychotherapy Treatments and Any Precautions: continue current treatment , d/c Synthroid    Risks, Benefits And Possible Side Effects Of Medications:  {PSYCH RISK, BENEFITS AND POSSIBLE SIDE EFFECTS (Optional):65857    Controlled Medication Discussion: Discussed with patient Black Box warning on concurrent use of benzodiazepines and opioid medications including sedation, respiratory depression, coma and death   Patient understands the risk of treatment with benzodiazepines in addition to opioids and wants to continue taking those medications  , Discussed with patient the risks of sedation, respiratory depression, impairment of ability to drive and potential for abuse and addiction related to treatment with benzodiazepine medications  The patient understands risk of treatment with benzodiazepine medications, agrees to not drive if feels impaired and agrees to take medications as prescribed  and The patient has been filling controlled prescriptions on time as prescribed to [facility]       Psychotherapy Provided: Individual psychotherapy provided  Individual psychotherapy provided: Yes  Counseling was provided during the session today for 16 minutes  Medications, treatment progress and treatment plan reviewed with Cherelle  Medication education provided to Cherelle  Goals discussed during in session: continue improvement in mood stability  Recent stressor including  family problems, family conflict, family issues, health issues, medical problems, limited support, social difficulties, everyday stressors and ongoing anxiety discussed with Cherelle  Coping strategies including compliance with medications, contacting a therapist, eliminating avoidance, engaging in previously avoided activities, exercising, increasing energy, increasing interest in usual activities, increasing motivation, increasing social interaction, keeping busy at home, maintain healthy diet, maintain heathy sleeping hygiene and maintain positive attitude reviewed with Kaye Shaver  Importance of medication and treatment compliance reviewed with Cherelle  Educated on importance of medication and treatment compliance  Importance of follow up with family physician for medical issues reviewed with Cherelle    Supportive therapy provided

## 2023-06-08 NOTE — BH TREATMENT PLAN
TREATMENT PLAN (Medication Management Only)        [facility]    Name and Date of Birth:  Jose Maier 75 y o  1955  Date of Treatment Plan: June 9, 2023  Diagnosis/Diagnoses:    1  Long term current use of antipsychotic medication    2  Bipolar 1 disorder, mixed, severe ([address] )      Strengths/Personal Resources for Self-Care: supportive family, taking medications as prescribed, ability to communicate needs  Area/Areas of need (in own words): mood instability  1  Long Term Goal: continue improvement in mood stability  Target Date:6 months - 12/9/2023  Person/Persons responsible for completion of goal: Cherelle  2  Short Term Objective (s) - How will we reach this goal?:   A  Provider new recommended medication/dosage changes and/or continue medication(s): continue current medications as prescribed  B  N/A   C  N/A  Target Date:6 months - 12/9/2023  Person/Persons Responsible for Completion of Goal: Cherelle  Progress Towards Goals: continuing treatment  Treatment Modality: medication management every 6 months  Review due 180 days from date of this plan: 6 months - 12/9/2023  Expected length of service: ongoing treatment  My Physician/PA/NP and I have developed this plan together and I agree to work on the goals and objectives  I understand the treatment goals that were developed for my treatment

## 2023-06-26 ENCOUNTER — OFFICE VISIT (OUTPATIENT)
Dept: UROLOGY | Facility: CLINIC | Age: 68
End: 2023-06-26
Payer: COMMERCIAL

## 2023-06-26 VITALS
DIASTOLIC BLOOD PRESSURE: 86 MMHG | HEART RATE: 106 BPM | SYSTOLIC BLOOD PRESSURE: 132 MMHG | BODY MASS INDEX: 43.24 KG/M2 | WEIGHT: 235 LBS | OXYGEN SATURATION: 96 % | HEIGHT: 62 IN | RESPIRATION RATE: 18 BRPM

## 2023-06-26 DIAGNOSIS — N32.81 OAB (OVERACTIVE BLADDER): Primary | ICD-10-CM

## 2023-06-26 LAB — POST-VOID RESIDUAL VOLUME, ML POC: 56 ML

## 2023-06-26 PROCEDURE — 99213 OFFICE O/P EST LOW 20 MIN: CPT | Performed by: PHYSICIAN ASSISTANT

## 2023-06-26 PROCEDURE — 51798 US URINE CAPACITY MEASURE: CPT | Performed by: PHYSICIAN ASSISTANT

## 2023-06-26 RX ORDER — MIRABEGRON 25 MG/1
25 TABLET, FILM COATED, EXTENDED RELEASE ORAL DAILY
Qty: 90 TABLET | Refills: 3 | Status: SHIPPED | OUTPATIENT
Start: 2023-06-26

## 2023-06-26 NOTE — PROGRESS NOTES
1  OAB (overactive bladder)  POCT Measure PVR    Mirabegron ER (Myrbetriq) 25 MG TB24        Assessment and plan: 1  Overactive Bladder  - continue myrbetriq 25mg   - f/u 1 year symptom reassessment        Shyann Foss      Chief Complaint     Overactive bladder      History of Present Illness     Veena Grullon is a 70 y o  very female presenting for follow up of OAB  She has a 5 year history of urinary urgency and frequency  He has trialed 2 different medications over the years most recently Detrol on which she has taken for the past 4 years  Approximately 3 years ago patient was prescribed Myrbetriq 25 mg and continues on this medication  She does feel that is decreasing her frequency and urgency  She is currently not wearing any sedentary products  Patient briefly self discontinued taking the myrbetriq and noticed worsening symptoms  She has since restarted with positive improvement  She does have nocturia 2 times a night  No sleep apnea  Has not used her CPAP in the past few months due to a current recall on her machine  Does not notice any changes in her nocturia however  Denies any dysuria, gross hematuria, nausea, vomiting, fevers, or chills  PVR 56mL      Review of Systems     Review of Systems   Constitutional: Negative for activity change and fatigue  HENT: Negative for congestion  Eyes: Negative for visual disturbance  Respiratory: Negative for shortness of breath and wheezing  Cardiovascular: Negative for chest pain and leg swelling  Gastrointestinal: Negative for abdominal pain  Endocrine: Negative for polyuria  Genitourinary: Negative for dysuria, flank pain, frequency, hematuria and urgency  Musculoskeletal: Negative for back pain  Allergic/Immunologic: Negative for immunocompromised state  Neurological: Negative for dizziness and numbness  Psychiatric/Behavioral: Negative for dysphoric mood     All other systems reviewed and are "negative  Allergies     Allergies   Allergen Reactions   • Other Other (See Comments)     Other reaction(s): Anaphylaxis  [facility] - [id number]: plums peaches, fruits with skin        Physical Exam     Physical Exam  Constitutional:       General: She is not in acute distress  Appearance: She is well-developed  She is obese  She is not ill-appearing, toxic-appearing or diaphoretic  HENT:      Head: Normocephalic and atraumatic  Eyes:      General:         Right eye: No discharge  Left eye: No discharge  Cardiovascular:      Comments: Overweight  Pulmonary:      Effort: Pulmonary effort is normal  No respiratory distress  Musculoskeletal:         General: No swelling or tenderness  Normal range of motion  Cervical back: Normal range of motion  Skin:     General: Skin is warm  Neurological:      General: No focal deficit present  Mental Status: She is alert and oriented to person, place, and time     Psychiatric:         Mood and Affect: Mood normal          Behavior: Behavior normal              Vital Signs  Vitals:    06/26/23 0741   BP: 132/86   BP Location: Left arm   Patient Position: Sitting   Cuff Size: Large   Pulse: (!) 106   Resp: 18   SpO2: 96%   Weight: 107 kg (235 lb)   Height: 5' 2\" (1 575 m)         Current Medications       Current Outpatient Medications:   •  b complex vitamins tablet, Take 1 tablet by mouth daily, Disp: , Rfl:   •  calcitriol (ROCALTROL) 0 25 mcg capsule, TAKE ONE CAPSULE BY MOUTH EVERY DAY, Disp: 90 capsule, Rfl: 4  •  Cholecalciferol (VITAMIN D) 2000 units CAPS, Take 1 capsule by mouth daily, Disp: , Rfl:   •  CRANBERRY PO, Take by mouth, Disp: , Rfl:   •  famotidine (PEPCID) 20 mg tablet, Take 1 tablet (20 mg total) by mouth 2 (two) times a day, Disp: 60 tablet, Rfl: 0  •  Glucosamine-Chondroit-Vit C-Mn (GLUCOSAMINE 1500 COMPLEX PO), Take by mouth in the morning, Disp: , Rfl:   •  Latuda 20 MG tablet, TAKE 1 TABLET BY MOUTH  DAILY WITH " BREAKFAST, Disp: 30 tablet, Rfl: 11  •  Mirabegron ER (Myrbetriq) 25 MG TB24, Take 25 mg by mouth in the morning, Disp: 90 tablet, Rfl: 3  •  Multiple Vitamin (MULTI-VITAMIN DAILY) TABS, Take by mouth, Disp: , Rfl:   •  levothyroxine 88 mcg tablet, TAKE ONE-HALF TABLET BY MOUTH  DAILY, Disp: 45 tablet, Rfl: 3      Active Problems     Patient Active Problem List   Diagnosis   • Bipolar 1 disorder, mixed, severe ([facility])   • Depression with anxiety   • Gastroesophageal reflux disease without esophagitis   • Hypothyroidism   • Impaired fasting glucose   • Insomnia   • Obstructive sleep apnea   • Onychomycosis of toenail   • Patellofemoral arthritis of right knee   • Stress incontinence of urine   • High triglycerides   • BMI 40 0-44 9, adult ([facility])   • Elevated blood pressure reading without diagnosis of hypertension   • Abnormal EKG   • Localized edema   • Palpitations   • Raynaud's disease without gangrene   • Stage 3 chronic kidney disease ([facility])   • Vitamin D deficiency   • Primary osteoarthritis of left knee   • Primary osteoarthritis of right knee   • Secondary hyperparathyroidism of renal origin ([facility] [address] )   • Persistent proteinuria   • Urinary frequency   • Morbid obesity with BMI of 40 0-44 9, adult ([facility])   • Mass of right hand   • Lichen sclerosus   • Encounter for immunization   • CPAP (continuous positive airway pressure) dependence         Past Medical History     Past Medical History:   Diagnosis Date   • Abnormal ECG    • Arthritis    • Bipolar 1 disorder ([facility])    • Chronic kidney disease     stage 3   • CPAP (continuous positive airway pressure) dependence    • Depression 2001   • Disease of thyroid gland    • Elevated blood pressure reading 06/10/2019   • GERD (gastroesophageal reflux disease)    • Sleep apnea          Surgical History     Past Surgical History:   Procedure Laterality Date   • COLONOSCOPY  2011         Family History     Family History   Problem Relation Age of Onset   • Heart disease Mother "  • Heart Valve Disease Mother         Replacement   • Diabetes Mother         2002   • Heart failure Mother         Heart Valve replacement   • Arthritis Father          Social History     Social History     Social History     Tobacco Use   Smoking Status Former   • Packs/day: 0 25   • Years: 30 00   • Total pack years: 7 50   • Types: Cigarettes   • Quit date: 1/1/2008   • Years since quitting: 15 4   Smokeless Tobacco Never         Pertinent Lab Values     Lab Results   Component Value Date    CREATININE 1 48 (H) 04/20/2023       No results found for: \"PSA\"    @RESULTRCNT(1H])@      Pertinent Imaging      - n/a    Portions of the record may have been created with voice recognition software   Occasional wrong word or \"sound a like\" substitutions may have occurred due to the inherent limitations of voice recognition software   Read the chart carefully and recognize, using context, where substitutions have occurred    "

## 2023-06-28 ENCOUNTER — OFFICE VISIT (OUTPATIENT)
Dept: FAMILY MEDICINE CLINIC | Facility: CLINIC | Age: 68
End: 2023-06-28
Payer: COMMERCIAL

## 2023-06-28 VITALS
RESPIRATION RATE: 16 BRPM | HEART RATE: 83 BPM | OXYGEN SATURATION: 97 % | TEMPERATURE: 98.6 F | BODY MASS INDEX: 44.29 KG/M2 | WEIGHT: 234.6 LBS | HEIGHT: 61 IN | SYSTOLIC BLOOD PRESSURE: 124 MMHG | DIASTOLIC BLOOD PRESSURE: 70 MMHG

## 2023-06-28 DIAGNOSIS — E03.2 HYPOTHYROIDISM DUE TO MEDICATION: ICD-10-CM

## 2023-06-28 DIAGNOSIS — E78.1 HIGH TRIGLYCERIDES: ICD-10-CM

## 2023-06-28 DIAGNOSIS — N18.32 STAGE 3B CHRONIC KIDNEY DISEASE (HCC): ICD-10-CM

## 2023-06-28 DIAGNOSIS — G47.33 OBSTRUCTIVE SLEEP APNEA: Primary | ICD-10-CM

## 2023-06-28 PROCEDURE — 99214 OFFICE O/P EST MOD 30 MIN: CPT | Performed by: FAMILY MEDICINE

## 2023-06-28 NOTE — ASSESSMENT & PLAN NOTE
Lab Results   Component Value Date    EGFR 36 04/20/2023    EGFR 32 01/12/2023    EGFR 33 11/21/2022    CREATININE 1 48 (H) 04/20/2023    CREATININE 1 62 (H) 01/12/2023    CREATININE 1 58 (H) 11/21/2022   seeing nephrology

## 2023-06-28 NOTE — PROGRESS NOTES
Assessment/Plan:    1  Obstructive sleep apnea  Assessment & Plan:  Referred to sleep center    Orders:  -     Ambulatory Referral to Sleep Medicine; Future    2  Stage 3b chronic kidney disease [facility])  Assessment & Plan:  Lab Results   Component Value Date    EGFR 36 04/20/2023    EGFR 32 01/12/2023    EGFR 33 11/21/2022    CREATININE 1 48 (H) 04/20/2023    CREATININE 1 62 (H) 01/12/2023    CREATININE 1 58 (H) 11/21/2022   seeing nephrology      3  Hypothyroidism due to medication  Assessment & Plan:  Recheck tsh      4  High triglycerides  Assessment & Plan:  Check lipids      5  BMI 40 0-44 9, adult [facility])  Assessment & Plan:  Refer to weight loss center    Orders:  -     Ambulatory Referral to Weight Management; Future      BMI Counseling: Body mass index is 43 95 kg/m²  The BMI is above normal  Nutrition recommendations include encouraging healthy choices of fruits and vegetables  Rationale for BMI follow-up plan is due to patient being overweight or obese  There are no Patient Instructions on file for this visit  Return in about 6 months (around 12/28/2023) for Annual physical     Subjective:      Patient ID: Cedrick Peralta is a 70 y o  female      Chief Complaint   Patient presents with   • Follow-up     Patient here for 6 month follow up on GERD       Here for follow up  Seen by urology- on myrbetriq  Concerned about losing weight  Living in a place that she doesnt make her own meals  Starting to swim again  Diagnosed with sleep apnea years ago0 machine recalled      The following portions of the patient's history were reviewed and updated as appropriate: allergies, current medications, past family history, past medical history, past social history, past surgical history and problem list     Review of Systems      Current Outpatient Medications   Medication Sig Dispense Refill   • b complex vitamins tablet Take 1 tablet by mouth daily     • calcitriol (ROCALTROL) 0 25 mcg capsule TAKE ONE CAPSULE BY "MOUTH EVERY DAY 90 capsule 4   • Cholecalciferol (VITAMIN D) 2000 units CAPS Take 1 capsule by mouth daily     • CRANBERRY PO Take by mouth     • famotidine (PEPCID) 20 mg tablet Take 1 tablet (20 mg total) by mouth 2 (two) times a day 60 tablet 0   • Glucosamine-Chondroit-Vit C-Mn (GLUCOSAMINE 1500 COMPLEX PO) Take by mouth in the morning     • Latuda 20 MG tablet TAKE 1 TABLET BY MOUTH  DAILY WITH BREAKFAST 30 tablet 11   • Mirabegron ER (Myrbetriq) 25 MG TB24 Take 25 mg by mouth in the morning 90 tablet 3   • Multiple Vitamin (MULTI-VITAMIN DAILY) TABS Take by mouth       No current facility-administered medications for this visit  Objective:    /70 (BP Location: Left arm, Patient Position: Sitting, Cuff Size: Large)   Pulse 83   Temp 98 6 °F (37 °C) (Tympanic)   Resp 16   Ht 5' 1 26\" (1 556 m)   Wt 106 kg (234 lb 9 6 oz)   SpO2 97%   BMI 43 95 kg/m²        Physical Exam  Vitals and nursing note reviewed  Constitutional:       Appearance: She is well-developed  HENT:      Head: Normocephalic and atraumatic  Nose: Nose normal    Eyes:      General: Lids are normal       Conjunctiva/sclera: Conjunctivae normal       Pupils: Pupils are equal, round, and reactive to light  Cardiovascular:      Rate and Rhythm: Normal rate and regular rhythm  Heart sounds: Normal heart sounds, S1 normal and S2 normal    Pulmonary:      Effort: Pulmonary effort is normal       Breath sounds: Normal breath sounds  Abdominal:      General: Bowel sounds are normal       Palpations: Abdomen is soft  Musculoskeletal:         General: Normal range of motion  Cervical back: Normal range of motion and neck supple  Skin:     General: Skin is warm and dry  Neurological:      Mental Status: She is alert and oriented to person, place, and time  Deep Tendon Reflexes: Reflexes are normal and symmetric     Psychiatric:         Speech: Speech normal          Behavior: Behavior normal          Thought " Content:  Thought content normal          Judgment: Judgment normal                 Starr Card, DO

## 2023-07-06 DIAGNOSIS — K21.9 GASTROESOPHAGEAL REFLUX DISEASE WITHOUT ESOPHAGITIS: ICD-10-CM

## 2023-07-06 RX ORDER — FAMOTIDINE 20 MG/1
TABLET, FILM COATED ORAL
Qty: 180 TABLET | Refills: 1 | Status: SHIPPED | OUTPATIENT
Start: 2023-07-06

## 2023-07-24 DIAGNOSIS — K21.9 GASTROESOPHAGEAL REFLUX DISEASE WITHOUT ESOPHAGITIS: ICD-10-CM

## 2023-07-24 RX ORDER — FAMOTIDINE 20 MG/1
20 TABLET, FILM COATED ORAL 2 TIMES DAILY
Qty: 180 TABLET | Refills: 3 | Status: SHIPPED | OUTPATIENT
Start: 2023-07-24

## 2023-08-01 ENCOUNTER — HOSPITAL ENCOUNTER (OUTPATIENT)
Dept: RADIOLOGY | Age: 68
Discharge: HOME | End: 2023-08-01
Payer: COMMERCIAL

## 2023-08-01 VITALS — BODY MASS INDEX: 44.18 KG/M2 | WEIGHT: 234 LBS | HEIGHT: 61 IN

## 2023-08-01 DIAGNOSIS — Z12.31 ENCOUNTER FOR SCREENING MAMMOGRAM FOR MALIGNANT NEOPLASM OF BREAST: ICD-10-CM

## 2023-08-01 PROCEDURE — 77063 BREAST TOMOSYNTHESIS BI: CPT

## 2023-08-01 PROCEDURE — 77067 SCR MAMMO BI INCL CAD: CPT

## 2023-08-11 NOTE — TELEPHONE ENCOUNTER
Patient calling saying [facility] cannot fill order for cpap unless they have results for a sleep study or a diagnostic sleep study   Please advise
Spoke with rep at [address] [place] [address], she stated they needed recent office notes and copy of her last sleep study  Faxed over office notes and pt's sleep study from 2009  Made the rep aware of the date of the sleepy study, she stated it was ok for them to use 
Illogical

## 2023-09-08 ENCOUNTER — APPOINTMENT (OUTPATIENT)
Dept: LAB | Facility: AMBULARY SURGERY CENTER | Age: 68
End: 2023-09-08
Payer: COMMERCIAL

## 2023-09-08 DIAGNOSIS — Z79.899 ENCOUNTER FOR LONG-TERM (CURRENT) USE OF OTHER MEDICATIONS: Primary | ICD-10-CM

## 2023-09-08 LAB
ALBUMIN SERPL BCP-MCNC: 3.8 G/DL (ref 3.5–5)
ALP SERPL-CCNC: 72 U/L (ref 34–104)
ALT SERPL W P-5'-P-CCNC: 21 U/L (ref 7–52)
ANION GAP SERPL CALCULATED.3IONS-SCNC: 10 MMOL/L
AST SERPL W P-5'-P-CCNC: 19 U/L (ref 13–39)
BASOPHILS # BLD AUTO: 0.05 THOUSANDS/ÂΜL (ref 0–0.1)
BASOPHILS NFR BLD AUTO: 1 % (ref 0–1)
BILIRUB SERPL-MCNC: 0.52 MG/DL (ref 0.2–1)
BUN SERPL-MCNC: 28 MG/DL (ref 5–25)
CALCIUM SERPL-MCNC: 9.7 MG/DL (ref 8.4–10.2)
CHLORIDE SERPL-SCNC: 108 MMOL/L (ref 96–108)
CHOLEST SERPL-MCNC: 190 MG/DL
CO2 SERPL-SCNC: 25 MMOL/L (ref 21–32)
CREAT SERPL-MCNC: 1.56 MG/DL (ref 0.6–1.3)
EOSINOPHIL # BLD AUTO: 0.18 THOUSAND/ÂΜL (ref 0–0.61)
EOSINOPHIL NFR BLD AUTO: 3 % (ref 0–6)
ERYTHROCYTE [DISTWIDTH] IN BLOOD BY AUTOMATED COUNT: 14.1 % (ref 11.6–15.1)
GFR SERPL CREATININE-BSD FRML MDRD: 33 ML/MIN/1.73SQ M
GLUCOSE P FAST SERPL-MCNC: 117 MG/DL (ref 65–99)
HCT VFR BLD AUTO: 44.1 % (ref 34.8–46.1)
HDLC SERPL-MCNC: 50 MG/DL
HGB BLD-MCNC: 14.7 G/DL (ref 11.5–15.4)
IMM GRANULOCYTES # BLD AUTO: 0.02 THOUSAND/UL (ref 0–0.2)
IMM GRANULOCYTES NFR BLD AUTO: 0 % (ref 0–2)
LDLC SERPL CALC-MCNC: 111 MG/DL (ref 0–100)
LYMPHOCYTES # BLD AUTO: 1.55 THOUSANDS/ÂΜL (ref 0.6–4.47)
LYMPHOCYTES NFR BLD AUTO: 26 % (ref 14–44)
MCH RBC QN AUTO: 32.1 PG (ref 26.8–34.3)
MCHC RBC AUTO-ENTMCNC: 33.3 G/DL (ref 31.4–37.4)
MCV RBC AUTO: 96 FL (ref 82–98)
MONOCYTES # BLD AUTO: 0.49 THOUSAND/ÂΜL (ref 0.17–1.22)
MONOCYTES NFR BLD AUTO: 8 % (ref 4–12)
NEUTROPHILS # BLD AUTO: 3.73 THOUSANDS/ÂΜL (ref 1.85–7.62)
NEUTS SEG NFR BLD AUTO: 62 % (ref 43–75)
NRBC BLD AUTO-RTO: 0 /100 WBCS
PLATELET # BLD AUTO: 142 THOUSANDS/UL (ref 149–390)
PMV BLD AUTO: 12.8 FL (ref 8.9–12.7)
POTASSIUM SERPL-SCNC: 4.2 MMOL/L (ref 3.5–5.3)
PROT SERPL-MCNC: 6.7 G/DL (ref 6.4–8.4)
RBC # BLD AUTO: 4.58 MILLION/UL (ref 3.81–5.12)
SODIUM SERPL-SCNC: 143 MMOL/L (ref 135–147)
TRIGL SERPL-MCNC: 144 MG/DL
TSH SERPL DL<=0.05 MIU/L-ACNC: 2.43 UIU/ML (ref 0.45–4.5)
WBC # BLD AUTO: 6.02 THOUSAND/UL (ref 4.31–10.16)

## 2023-09-08 PROCEDURE — 80061 LIPID PANEL: CPT

## 2023-09-08 PROCEDURE — 84443 ASSAY THYROID STIM HORMONE: CPT

## 2023-09-08 PROCEDURE — 36415 COLL VENOUS BLD VENIPUNCTURE: CPT

## 2023-09-08 PROCEDURE — 80053 COMPREHEN METABOLIC PANEL: CPT

## 2023-09-08 PROCEDURE — 85025 COMPLETE CBC W/AUTO DIFF WBC: CPT

## 2023-09-21 ENCOUNTER — HOSPITAL ENCOUNTER (OUTPATIENT)
Dept: ULTRASOUND IMAGING | Facility: CLINIC | Age: 68
Discharge: HOME | End: 2023-09-21
Payer: COMMERCIAL

## 2023-09-21 ENCOUNTER — HOSPITAL ENCOUNTER (OUTPATIENT)
Dept: MAMMOGRAPHY | Facility: CLINIC | Age: 68
Discharge: HOME | End: 2023-09-21
Payer: COMMERCIAL

## 2023-09-21 VITALS — WEIGHT: 234 LBS | HEIGHT: 61 IN | BODY MASS INDEX: 44.18 KG/M2

## 2023-09-21 DIAGNOSIS — R92.8 ABNORMAL SCREENING MAMMOGRAM: ICD-10-CM

## 2023-09-21 PROCEDURE — G0279 TOMOSYNTHESIS, MAMMO: HCPCS

## 2023-09-21 PROCEDURE — 76642 ULTRASOUND BREAST LIMITED: CPT

## 2023-09-21 PROCEDURE — 77065 DX MAMMO INCL CAD UNI: CPT

## 2023-09-28 DIAGNOSIS — F31.9 BIPOLAR 1 DISORDER (HCC): ICD-10-CM

## 2023-10-01 RX ORDER — LURASIDONE HYDROCHLORIDE 20 MG/1
20 TABLET, FILM COATED ORAL
Qty: 30 TABLET | Refills: 11 | Status: SHIPPED | OUTPATIENT
Start: 2023-10-01

## 2023-10-03 ENCOUNTER — OFFICE VISIT (OUTPATIENT)
Dept: FAMILY MEDICINE CLINIC | Facility: CLINIC | Age: 68
End: 2023-10-03
Payer: COMMERCIAL

## 2023-10-03 VITALS
RESPIRATION RATE: 18 BRPM | SYSTOLIC BLOOD PRESSURE: 110 MMHG | TEMPERATURE: 97.4 F | HEART RATE: 87 BPM | DIASTOLIC BLOOD PRESSURE: 90 MMHG | WEIGHT: 233.6 LBS | OXYGEN SATURATION: 95 % | BODY MASS INDEX: 44.1 KG/M2 | HEIGHT: 61 IN

## 2023-10-03 DIAGNOSIS — J98.8 VIRAL RESPIRATORY ILLNESS: Primary | ICD-10-CM

## 2023-10-03 DIAGNOSIS — B97.89 VIRAL RESPIRATORY ILLNESS: Primary | ICD-10-CM

## 2023-10-03 PROCEDURE — 99213 OFFICE O/P EST LOW 20 MIN: CPT | Performed by: NURSE PRACTITIONER

## 2023-10-03 PROCEDURE — 87635 SARS-COV-2 COVID-19 AMP PRB: CPT | Performed by: NURSE PRACTITIONER

## 2023-10-03 RX ORDER — BENZONATATE 100 MG/1
100 CAPSULE ORAL 3 TIMES DAILY PRN
Qty: 20 CAPSULE | Refills: 0 | Status: SHIPPED | OUTPATIENT
Start: 2023-10-03

## 2023-10-03 NOTE — PROGRESS NOTES
Name: Ricarda Faustin      : 1955      MRN: [id number]  Encounter Provider: ZURI Granger  Encounter Date: 10/3/2023   Encounter department: [place] [address]. Viral respiratory illness  Assessment & Plan: Onset  with dry cough, fatigue, facial pressure. No fever, chills. Known covid at their residence. Rapid test done at [facility] is negative, will send out pcr. Prescription sent for benzonatate, recommend increasing fluids. Will f/u pending results. If she tests positive she is eligible for antiviral.      Orders:  -     benzonatate (TESSALON PERLES) 100 mg capsule; Take 1 capsule (100 mg total) by mouth 3 (three) times a day as needed for cough  -     COVID Only- Office Collect         Subjective      Pt is a 70 y.o. y/o female who is seen today for evaluation of cough. Past medical history of GERD, hypothyroidism, impaired fasting glucose, secondary hyperparathyroidism of renal origin, mingo, raynaud's, ckd, bipolar 1 disorder, obesity, insomnia, lichen sclerosus, proteinuria, stress incontinence. She started with a dry cough on , she says her cough has been making it difficult for her to sleep. She has a headache and fatigue. She says she feels like her face is swollen, no congestion or sore throat. No fever, chills, sob. Appetite is normal, denies n/v/d. She has been using lozenges. She tested negative for covid at home 10/2. She lives in [facility] and there are other people in the residence with similar symptoms and there is somebody there they know of who has covid. Review of Systems   Constitutional: Positive for fatigue. Negative for appetite change, chills and fever. HENT: Positive for sinus pressure. Negative for congestion, ear pain and postnasal drip. Respiratory: Positive for cough. Negative for chest tightness, shortness of breath and wheezing. Cardiovascular: Negative for chest pain. Gastrointestinal: Negative for diarrhea, nausea and vomiting. Musculoskeletal: Negative for myalgias. Neurological: Positive for headaches. Negative for dizziness. Hematological: Negative for adenopathy. Psychiatric/Behavioral: Positive for sleep disturbance. Current Outpatient Medications on File Prior to Visit   Medication Sig   • b complex vitamins tablet Take 1 tablet by mouth daily   • calcitriol (ROCALTROL) 0.25 mcg capsule TAKE ONE CAPSULE BY MOUTH EVERY DAY   • Cholecalciferol (VITAMIN D) 2000 units CAPS Take 1 capsule by mouth daily   • CRANBERRY PO Take by mouth   • famotidine (PEPCID) 20 mg tablet Take 1 tablet (20 mg total) by mouth 2 (two) times a day   • Glucosamine-Chondroit-Vit C-Mn (GLUCOSAMINE 1500 COMPLEX PO) Take by mouth in the morning   • lurasidone (LATUDA) 20 mg tablet TAKE 1 TABLET BY MOUTH DAILY  WITH BREAKFAST   • Mirabegron ER (Myrbetriq) 25 MG TB24 Take 25 mg by mouth in the morning   • Multiple Vitamin (MULTI-VITAMIN DAILY) TABS Take by mouth       Objective     /90 (BP Location: Left arm, Patient Position: Sitting, Cuff Size: Large)   Pulse 87   Temp (!) 97.4 °F (36.3 °C) (Tympanic)   Resp 18   Ht 5' 1.26" (1.556 m)   Wt 106 kg (233 lb 9.6 oz)   SpO2 95%   BMI 43.76 kg/m²     Physical Exam  Vitals reviewed. Constitutional:       General: She is awake. She is not in acute distress. Appearance: Normal appearance. She is well-developed and well-groomed. She is not ill-appearing. HENT:      Head: Normocephalic. Right Ear: Hearing, tympanic membrane, ear canal and external ear normal. No middle ear effusion. Tympanic membrane is not bulging. Left Ear: Hearing, tympanic membrane, ear canal and external ear normal.  No middle ear effusion. Tympanic membrane is not bulging. Nose: No mucosal edema or rhinorrhea. Right Turbinates: Swollen. Left Turbinates: Swollen. Mouth/Throat:      Lips: Pink.       Mouth: Mucous membranes are moist. Mucous membranes are not dry. Pharynx: No oropharyngeal exudate or posterior oropharyngeal erythema. Tonsils: No tonsillar abscesses. Eyes:      Conjunctiva/sclera: Conjunctivae normal.   Cardiovascular:      Rate and Rhythm: Normal rate and regular rhythm. Heart sounds: Normal heart sounds. No murmur heard. Pulmonary:      Effort: Pulmonary effort is normal. No accessory muscle usage or respiratory distress. Breath sounds: Normal breath sounds. No decreased breath sounds, wheezing, rhonchi or rales. Lymphadenopathy:      Head:      Right side of head: No submental, submandibular, tonsillar, preauricular, posterior auricular or occipital adenopathy. Left side of head: No submental, submandibular, tonsillar, preauricular, posterior auricular or occipital adenopathy. Cervical: No cervical adenopathy. Skin:     General: Skin is warm and dry. Neurological:      Mental Status: She is alert and oriented to person, place, and time. Psychiatric:         Attention and Perception: Attention normal.         Mood and Affect: Mood normal.         Speech: Speech normal.         Behavior: Behavior normal. Behavior is cooperative. Thought Content:  Thought content normal.         Cognition and Memory: Cognition normal.         Judgment: Judgment normal.       ZURI Guillen

## 2023-10-03 NOTE — ASSESSMENT & PLAN NOTE
Onset 9/30 with dry cough, fatigue, facial pressure. No fever, chills. Known covid at their residence. Rapid test done at [facility] is negative, will send out pcr. Prescription sent for benzonatate, recommend increasing fluids. Will f/u pending results.   If she tests positive she is eligible for antiviral.

## 2023-10-04 LAB — SARS-COV-2 RNA RESP QL NAA+PROBE: NEGATIVE

## 2023-10-09 ENCOUNTER — APPOINTMENT (OUTPATIENT)
Dept: LAB | Facility: AMBULARY SURGERY CENTER | Age: 68
End: 2023-10-09
Payer: COMMERCIAL

## 2023-10-09 DIAGNOSIS — E66.01 MORBID OBESITY (HCC): ICD-10-CM

## 2023-10-09 DIAGNOSIS — N18.32 CHRONIC KIDNEY DISEASE (CKD) STAGE G3B/A1, MODERATELY DECREASED GLOMERULAR FILTRATION RATE (GFR) BETWEEN 30-44 ML/MIN/1.73 SQUARE METER AND ALBUMINURIA CREATININE RATIO LESS THAN 30 MG/G (HCC): ICD-10-CM

## 2023-10-09 DIAGNOSIS — E55.9 AVITAMINOSIS D: ICD-10-CM

## 2023-10-09 DIAGNOSIS — R03.0 ELEVATED BLOOD PRESSURE READING WITHOUT DIAGNOSIS OF HYPERTENSION: ICD-10-CM

## 2023-10-09 DIAGNOSIS — N25.81 SECONDARY HYPERPARATHYROIDISM OF RENAL ORIGIN (HCC): ICD-10-CM

## 2023-10-09 LAB
25(OH)D3 SERPL-MCNC: 47.7 NG/ML (ref 30–100)
ALBUMIN SERPL BCP-MCNC: 4.2 G/DL (ref 3.5–5)
ANION GAP SERPL CALCULATED.3IONS-SCNC: 8 MMOL/L
BUN SERPL-MCNC: 27 MG/DL (ref 5–25)
CALCIUM SERPL-MCNC: 9.6 MG/DL (ref 8.4–10.2)
CHLORIDE SERPL-SCNC: 107 MMOL/L (ref 96–108)
CO2 SERPL-SCNC: 28 MMOL/L (ref 21–32)
CREAT SERPL-MCNC: 1.54 MG/DL (ref 0.6–1.3)
CREAT UR-MCNC: 58.3 MG/DL
GFR SERPL CREATININE-BSD FRML MDRD: 34 ML/MIN/1.73SQ M
GLUCOSE P FAST SERPL-MCNC: 112 MG/DL (ref 65–99)
PHOSPHATE SERPL-MCNC: 3.5 MG/DL (ref 2.3–4.1)
POTASSIUM SERPL-SCNC: 4.1 MMOL/L (ref 3.5–5.3)
PROT UR-MCNC: 10 MG/DL
PROT/CREAT UR: 0.17 MG/G{CREAT} (ref 0–0.1)
PTH-INTACT SERPL-MCNC: 45.3 PG/ML (ref 12–88)
SODIUM SERPL-SCNC: 143 MMOL/L (ref 135–147)

## 2023-10-09 PROCEDURE — 36415 COLL VENOUS BLD VENIPUNCTURE: CPT

## 2023-10-09 PROCEDURE — 82306 VITAMIN D 25 HYDROXY: CPT

## 2023-10-09 PROCEDURE — 80069 RENAL FUNCTION PANEL: CPT

## 2023-10-09 PROCEDURE — 83970 ASSAY OF PARATHORMONE: CPT

## 2023-10-12 ENCOUNTER — OFFICE VISIT (OUTPATIENT)
Dept: NEPHROLOGY | Facility: CLINIC | Age: 68
End: 2023-10-12

## 2023-10-12 VITALS
WEIGHT: 223 LBS | HEIGHT: 60 IN | SYSTOLIC BLOOD PRESSURE: 126 MMHG | BODY MASS INDEX: 43.78 KG/M2 | DIASTOLIC BLOOD PRESSURE: 82 MMHG

## 2023-10-12 DIAGNOSIS — E55.9 VITAMIN D DEFICIENCY: ICD-10-CM

## 2023-10-12 DIAGNOSIS — N25.81 SECONDARY HYPERPARATHYROIDISM OF RENAL ORIGIN (HCC): ICD-10-CM

## 2023-10-12 DIAGNOSIS — R80.1 PERSISTENT PROTEINURIA: ICD-10-CM

## 2023-10-12 DIAGNOSIS — F31.63 BIPOLAR 1 DISORDER, MIXED, SEVERE (HCC): ICD-10-CM

## 2023-10-12 DIAGNOSIS — N18.32 STAGE 3B CHRONIC KIDNEY DISEASE (HCC): Primary | ICD-10-CM

## 2023-10-12 NOTE — PROGRESS NOTES
Nephrology Follow up Consultation  Nazario Haque 70 y.o. female MRN: [id number]            BACKGROUND:  Nazario Haque is a 70 y. o.female who was referred by Venkatesh De Anda DO for evaluation of Chronic Kidney Disease and Follow-up  . ASSESSMENT / PLAN:   70 y.o.  feamle with pmh of multiple co-morbidities including JULIO on bipap, CKD stage 3, GERD, bipolar, depression, hypothyroidism, obesity, vitamin-D deficiency, arthritis presents to the office for routine follow-up. CKD stage IIIB:  - Patient has a baseline creatinine of 1.2-1.8 mg/dL  After episode of acute kidney injury in 2020. Most recent labs show a Creatinine of 1.54 mg/dL on 10/9/23. Renal function stable at baseline.   -likely has underlying CKD secondary to age-related nephron loss plus prior episode of acute kidney injury non dialysis requiring plus chronic lithium use and prior NSAID use plus obesity related hyper filtration. - CT abdomen from September 2020 showing 1.8 cm Lower interpolar cyst on the left kidney no hydronephrosis. - Acid base and lytes stable. - Clinically the patient appears to be euvolemic  - Recommend to avoid use of NSAIDs, nephrotoxins. Caution advised with regards to exposure to IV contrast dye.   - Discussed with the patient in depth her renal status, including the possible etiologies for CKD. - Advised the patient that when her GFR is close to 20mL/min then will start discussing about RRT(renal replacement therapy) options such as renal transplant, peritoneal dialysis and hemodialysis. - Informed the patient about the various options for Renal Replacement therapy. - Discussed with the patient how we need to work together to delay the progression of CKD with optimal BP control based on their age and co-morbidities and trying to reduce proteinuria by the use of anti-proteinuric agents.    - referral to CKD education/kidney smart placed on 12/21/2020, completed 01/18/2021    Blood pressure:  - Patient is on no meds.   - advised patient appropriate techniques of checking blood pressures and to call with blood pressure updates if any issues   - Goal BP of <  130/80 based on age and comorbidities  - Instructed to follow low sodium (2gm)diet. Hemoglobin:  - Goal Hb of 10-12 g/dL  - Most recent labs suggestive of 14.7 grams/deciliter   - no role for IV iron at this time    CKD-MBD(Mineral Bone Disease)/vitamin-D deficiency / secondary hyperparathyroidism of renal origin:  - Based on patients CKD stage following is the goal of therapy. - Maintain calcium phosphorus product of < 55.  - Stage 3 CKD - Goal Ca 8.5-10 mg/dL , goal Phos 2.7-4.6 mg/dL  , goal iPTH 30-70 pg/mL  - Patient is currently at goal.  - Continue patient on vitamin-D 2000 units p.o. Q.day, calcitriol 0.25 mcg po q24  - Patients' most recent vitamin D level is 47.7 and intact PTH of 45.3 as of 10/9/23  - check intact PTH and vitamin-D levels    Proteinuria:    - proteinuria most likely secondary to obesity related hyper filtration   - most recent protein creatinine ratio of 170 mg as of 10/9/23, improved from prior  - check microalbumin to creatinine ratio  - currently on therapy for proteinuria with vitamin-D    Lipids:  - goal LDL less than 70  - Management as per PCP    GERD:  - management as per Primary team  - no longer on Prilosec  - currently on Pepcid. Bipolar/depression:  - Management as per primary team  - follow up with psych  -no longer on lithium only on Latuda at renal dosage. Nutrition / obesity:  - Encouraged patient to follow a renal diet comprising of moderate potassium, low phosphorus and protein restriction to 0.8gm/kg. Advise of dietary habits and weight loss. - Will check serum albumin with next blood work. Followup:  - Patient is to follow-up in 6 months, with lab work to be performed in a few days prior to the next visit.   Advised patient to call me in 10 days to review the results if they do not hear back from me, as I may have not received the results. Jessie Barney, 10/12/2023, 9:22 AM             SUBJECTIVE: 70 y.o. female presents to the office for routine follow-up. Feels well other than mild persistent cough for a few weeks. Discussed with patient with regards to using over-the-counter Claritin and Flonase to help with postnasal drip/likely underlying allergy concerns. No recent hospitalization no NSAID use no issues with edema thankful for the care information that she is gone today happy to hear renal parameters are stable. Agreeable with plan discussed at the visit. .Review of Systems   Constitutional:  Negative for chills and fever. HENT:  Negative for sore throat. Respiratory:  Positive for cough. Negative for shortness of breath. Cardiovascular:  Negative for leg swelling. Gastrointestinal:  Negative for constipation and diarrhea. Genitourinary:  Negative for difficulty urinating and dysuria. Musculoskeletal:  Negative for back pain. Neurological:  Negative for dizziness and headaches. Psychiatric/Behavioral:  Negative for agitation and confusion. All other systems reviewed and are negative.       PAST MEDICAL HISTORY:  Past Medical History:   Diagnosis Date    Abnormal ECG     Arthritis     Bipolar 1 disorder (HCC)     Chronic kidney disease     stage 3    CPAP (continuous positive airway pressure) dependence     Depression 2001    Disease of thyroid gland     Elevated blood pressure reading 06/10/2019    GERD (gastroesophageal reflux disease)     Sleep apnea        PROBLEM LIST    Patient Active Problem List   Diagnosis    Bipolar 1 disorder, mixed, severe ([address])    Depression with anxiety    Gastroesophageal reflux disease without esophagitis    Hypothyroidism    Impaired fasting glucose    Insomnia    Obstructive sleep apnea    Onychomycosis of toenail    Patellofemoral arthritis of right knee    Stress incontinence of urine    High triglycerides    BMI 40.0-44.9, adult ([address]) Elevated blood pressure reading without diagnosis of hypertension    Abnormal EKG    Localized edema    Palpitations    Raynaud's disease without gangrene    Stage 3 chronic kidney disease (HCC)    Vitamin D deficiency    Primary osteoarthritis of left knee    Primary osteoarthritis of right knee    Secondary hyperparathyroidism of renal origin ([address])    Persistent proteinuria    Urinary frequency    Morbid obesity with BMI of 40.0-44.9, adult (HCC)    Mass of right hand    Lichen sclerosus    Encounter for immunization    CPAP (continuous positive airway pressure) dependence    Viral respiratory illness       PAST SURGICAL HISTORY:  Past Surgical History:   Procedure Laterality Date    COLONOSCOPY  2011       SOCIAL HISTORY :   reports that she quit smoking about 15 years ago. Her smoking use included cigarettes. She has a 7.50 pack-year smoking history. She has never used smokeless tobacco. She reports that she does not currently use alcohol. She reports that she does not use drugs. FAMILY HISTORY:  Family History   Problem Relation Age of Onset    Heart disease Mother     Heart Valve Disease Mother         Replacement    Diabetes Mother         2002    Heart failure Mother         Heart Valve replacement    Arthritis Father     No Known Problems Sister     No Known Problems Daughter     No Known Problems Maternal Grandmother     No Known Problems Maternal Grandfather     No Known Problems Paternal Grandmother     No Known Problems Paternal Grandfather     No Known Problems Son     No Known Problems Maternal Aunt     No Known Problems Maternal Aunt     No Known Problems Maternal Aunt     No Known Problems Maternal Aunt     No Known Problems Maternal Aunt     No Known Problems Paternal Aunt        ALLERGIES:  Allergies   Allergen Reactions    Other Other (See Comments)     Other reaction(s):  Anaphylaxis  Annotation - [id number]: plums peaches, fruits with skin            PHYSICAL EXAM:  Vitals:    10/12/23 0831 BP: 126/82   BP Location: Left arm   Patient Position: Sitting   Cuff Size: Large   Weight: 101 kg (223 lb)   Height: 5' (1.524 m)     Body mass index is 43.55 kg/m². Physical Exam  Vitals reviewed. Constitutional:       General: She is not in acute distress. Appearance: Normal appearance. She is obese. She is not ill-appearing, toxic-appearing or diaphoretic. HENT:      Head: Normocephalic and atraumatic. Mouth/Throat:      Pharynx: No oropharyngeal exudate. Eyes:      General: No scleral icterus. Cardiovascular:      Rate and Rhythm: Normal rate. Pulmonary:      Effort: No respiratory distress. Breath sounds: Normal breath sounds. No stridor. Abdominal:      General: There is no distension. Palpations: Abdomen is soft. There is no mass. Tenderness: There is no right CVA tenderness or left CVA tenderness. Musculoskeletal:         General: No swelling. Cervical back: Normal range of motion. No rigidity. Skin:     Coloration: Skin is not jaundiced. Neurological:      General: No focal deficit present. Mental Status: She is alert and oriented to person, place, and time.    Psychiatric:         Mood and Affect: Mood normal.         Behavior: Behavior normal.         LABORATORY DATA:     Results from last 6 Months   Lab Units 10/09/23  1136 09/08/23  0634 04/20/23  0813   WBC Thousand/uL  --  6.02  --    HEMOGLOBIN g/dL  --  14.7  --    HEMATOCRIT %  --  44.1  --    PLATELETS Thousands/uL  --  142*  --    POTASSIUM mmol/L 4.1 4.2 4.1   CHLORIDE mmol/L 107 108 111*   CO2 mmol/L 28 25 23   BUN mg/dL 27* 28* 27*   CREATININE mg/dL 1.54* 1.56* 1.48*   CALCIUM mg/dL 9.6 9.7 9.1   PHOSPHORUS mg/dL 3.5  --  3.5        rest all reviewed    RADIOLOGY:  No orders to display     Rest all reviewed        MEDICATIONS:    Current Outpatient Medications:     benzonatate (TESSALON PERLES) 100 mg capsule, Take 1 capsule (100 mg total) by mouth 3 (three) times a day as needed for cough, Disp: 20 capsule, Rfl: 0    calcitriol (ROCALTROL) 0.25 mcg capsule, TAKE ONE CAPSULE BY MOUTH EVERY DAY, Disp: 90 capsule, Rfl: 4    Cholecalciferol (VITAMIN D) 2000 units CAPS, Take 1 capsule by mouth daily, Disp: , Rfl:     CRANBERRY PO, Take by mouth, Disp: , Rfl:     famotidine (PEPCID) 20 mg tablet, Take 1 tablet (20 mg total) by mouth 2 (two) times a day, Disp: 180 tablet, Rfl: 3    Glucosamine-Chondroit-Vit C-Mn (GLUCOSAMINE 1500 COMPLEX PO), Take by mouth in the morning, Disp: , Rfl:     lurasidone (LATUDA) 20 mg tablet, TAKE 1 TABLET BY MOUTH DAILY  WITH BREAKFAST, Disp: 30 tablet, Rfl: 11    Mirabegron ER (Myrbetriq) 25 MG TB24, Take 25 mg by mouth in the morning, Disp: 90 tablet, Rfl: 3    Multiple Vitamin (MULTI-VITAMIN DAILY) TABS, Take by mouth, Disp: , Rfl:           Portions of the record may have been created with voice recognition software. Occasional wrong word or "sound a like" substitutions may have occurred due to the inherent limitations of voice recognition software. Read the chart carefully and recognize, using context, where substitutions have occurred. If you have any questions, please contact the dictating provider.

## 2023-10-12 NOTE — PATIENT INSTRUCTIONS
- Please call me in 10 days after having your blood work done to review the results if you do not hear back from me or my office, as I may have not received the results. - please remember to perform blood work prior to the next visit. - Please call if the blood pressure top number is greater than 140 or less than 110 consistently. - Please call if you are gaining more than 2lbs in 2 days for adjustment of water pills.  ~ Please AVOID the following pain medications. LIST OF NSAIDS (NONSTEROIDAL ANTI-INFLAMMATORY DRUGS) AND MOORE-2 INHIBITORS    DIFLUNISAL (DOLOBID)  IBUPROFEN (MOTRIN, ADVIL)  FLURBIPROFEN (ANSAID)  KETOPROFEN (ORUDIS, ORUVAIL)  FENOPROFEN (NALFON)  NABUMETONE (RELAFEN)  PIROXICAM (FELDENE)  NAPROXEN (ALEVE, NAPROSYN, NAPRELAN, ANAPROX)  DICLOFENAC (VOLTAREN, CATAFLAM)  INDOMETHACIN (INDOCIN)  SULINDAC (CLINORIL)  TOLMETIN (TOLETIN)  ETODOLAC (LODINE)  MELOXICAM (MOBIC)  KETOROLAC (TORADOL)  OXAPROZIN (DAYPRO)  CELECOXIB (CELEBREX)    Phosphorus diet  Follow a low phosphorus diet.     Avoid these higher phosphorus foods: Choose these lower phosphorus foods:   Milk, pudding or yogurt (from animals and from many soy varieties) Rice milk (unfortified), nondairy creamer (if it doesn't have terms in the ingredients list that contain the letters "phos")   Hard cheeses, ricotta or cottage cheese, fat-free cream cheese Regular and low-fat cream cheese   Ice cream or frozen yogurt Sherbet or frozen fruit pops   Soups made with higher phosphorus ingredients (milk, dried peas, beans, lentils) Soups made with lower phosphorus ingredients (broth- or water-based with other lower phosphorus ingredients)   Whole grains, including whole-grain breads, crackers, cereal, rice and pasta Refined grains, including white bread, crackers, cereals, rice and pasta   Quick breads, biscuits, cornbread, muffins, pancakes or waffles Homemade refined (white) dinner rolls, bagels or English muffins   Dried peas (split, black-eyed), beans (black, garbanzo, lima, kidney, navy, dacosta) or lentils Green peas (canned, frozen), green beans or wax beans   Organ meats, walleye, pollock or sardines Lean beef, pork, lamb, poultry or other fish   Nuts and seeds Popcorn   Peanut butter and other nut butters Jam, jelly or honey   Chocolate, including chocolate drinks Carob (chocolate-flavored) candy, hard candy or gumdrops   Madeline and pepper-type sodas, flavored orozco, bottled teas (if a term in the ingredients list contains the letters "phos") Lemon-lime soda, ginger ale or root beer, plain water   Follow a moderate potassium diet. 2 Gram Low Sodium Diet     A 2 gram sodium diet restricts the amount of sodium in the diet to no more than 2 g or 2000 mg daily. Limiting the amount of sodium is often used to help lower blood pressure. It is important if you have heart, liver, or kidney problems. Many foods contain sodium for flavor and sometimes as a preservative. When the amount of sodium in a diet needs to be low, it is important to know what to look for when choosing foods and drinks. The following includes some information and guidelines to help make it easier for you to adapt to a low sodium diet. QUICK TIPS     Do not add salt to food. Avoid convenience items and fast food. Choose unsalted snack foods. Buy lower sodium products, often labeled as "lower sodium" or "no salt added."   Check food labels to learn how much sodium is in 1 serving. When eating at a restaurant, ask that your food be prepared with less salt or none, if possible. READING FOOD LABELS FOR SODIUM INFORMATION     The nutrition facts label is a good place to find how much sodium is in foods. Look for products with no more than 500 to 600 mg of sodium per meal and no more than 150 mg per serving. Remember that 2 g = 2000 mg. The food label may also list foods as:   Sodium-free: Less than 5 mg in a serving.    Very low sodium: 35 mg or less in a serving. Low-sodium: 140 mg or less in a serving. Light in sodium: 50% less sodium in a serving. For example, if a food that usually has 300 mg of sodium is changed to become light in sodium, it will have 150 mg of sodium. Reduced sodium: 25% less sodium in a serving. For example, if a food that usually has 400 mg of sodium is changed to reduced sodium, it will have 300 mg of sodium. CHOOSING FOODS     Grains     Avoid: Salted crackers and snack items. Some cereals, including instant hot cereals. Bread stuffing and biscuit mixes. Seasoned rice or pasta mixes. Choose: Unsalted snack items. Low-sodium cereals, oats, puffed wheat and rice, shredded wheat. English muffins and bread. Pasta. Meats     Avoid: Salted, canned, smoked, spiced, pickled meats, including fish and poultry. Cintron, ham, sausage, cold cuts, hot dogs, anchovies. Choose: Low-sodium canned tuna and salmon. Fresh or frozen meat, poultry, and fish. Dairy   Avoid: Processed cheese and spreads. Cottage cheese. Buttermilk and condensed milk. Regular cheese. Choose: Milk. Low-sodium cottage cheese. Yogurt. Sour cream. Low-sodium cheese. Fruits and Vegetables     Avoid: Regular canned vegetables. Regular canned tomato sauce and paste. Frozen vegetables in sauces. Paticia Click. Haven Surinder. Relishes. Sauerkraut. Choose: Low-sodium canned vegetables. Low-sodium tomato sauce and paste. Frozen or fresh vegetables. Fresh and frozen fruit. Condiments     Avoid: Canned and packaged gravies. Worcestershire sauce. Tartar sauce. Barbecue sauce. Soy sauce. Steak sauce. Ketchup. Onion, garlic, and table salt. Meat flavorings and tenderizers. Choose: Fresh and dried herbs and spices. Low-sodium varieties of mustard and ketchup. Lemon juice. Tabasco sauce. Horseradish.     SAMPLE: 2 GRAM SODIUM MEAL PLAN     Breakfast / Sodium (mg)   1 cup low-fat milk / 795 mg   2 slices whole-wheat toast / 270 mg   1 tbs heart-healthy margarine / 153 mg   1 hard-boiled egg / 139 mg   1 small orange / 0 mg    Lunch / Sodium (mg)   1 cup raw carrots / 76 mg   ½ cup hummus / 298 mg   1 cup low-fat milk / 143 mg   ½ cup red grapes / 2 mg   1 whole-wheat lita bread / 356 mg    Dinner / Sodium (mg)   1 cup whole-wheat pasta / 2 mg   1 cup low-sodium tomato sauce / 73 mg   3 oz lean ground beef / 57 mg   1 small side salad (1 cup raw spinach leaves, ½ cup cucumber, ¼ cup yellow bell pepper) with 1 tsp olive oil and 1 tsp red wine vinegar / 25 mg    Snack / Sodium (mg)   1 container low-fat vanilla yogurt / 107 mg   3 sylvie cracker squares / 127 mg    Nutrient Analysis   Calories: 2033   Protein: 77 g   Carbohydrate: 282 g   Fat: 72 g   Sodium: 1971 mg      Exercise to Lose Weight     Exercise and a healthy diet may help you lose weight. Your doctor may suggest specific exercises. EXERCISE IDEAS AND TIPS     Choose low-cost things you enjoy doing, such as walking, bicycling, or exercising to workout videos. Take stairs instead of the elevator. Walk during your lunch break. Park your car further away from work or school. Go to a gym or an exercise class. Start with 5 to 10 minutes of exercise each day. Build up to 30 minutes of exercise 4 to 6 days a week. Wear shoes with good support and comfortable clothes. Stretch before and after working out. Work out until you breathe harder and your heart beats faster. Drink extra water when you exercise. Do not do so much that you hurt yourself, feel dizzy, or get very short of breath. Exercises that burn about 150 calories:   Running 1 ½ miles in 15 minutes. Playing volleyball for 45 to 60 minutes. Washing and waxing a car for 45 to 60 minutes. Playing touch football for 45 minutes. Walking 1 ¾ miles in 35 minutes. Pushing a stroller 1 ½ miles in 30 minutes. Playing basketball for 30 minutes. Raking leaves for 30 minutes. Bicycling 5 miles in 30 minutes. Walking 2 miles in 30 minutes.    Dancing for 30 minutes. Shoveling snow for 15 minutes. Swimming laps for 20 minutes. Walking up stairs for 15 minutes. Bicycling 4 miles in 15 minutes. Gardening for 30 to 45 minutes. Jumping rope for 15 minutes. Washing windows or floors for 45 to 60 minutes.

## 2023-10-14 ENCOUNTER — IMMUNIZATIONS (OUTPATIENT)
Dept: FAMILY MEDICINE CLINIC | Facility: CLINIC | Age: 68
End: 2023-10-14
Payer: COMMERCIAL

## 2023-10-14 DIAGNOSIS — Z23 ENCOUNTER FOR IMMUNIZATION: Primary | ICD-10-CM

## 2023-10-14 PROCEDURE — G0008 ADMIN INFLUENZA VIRUS VAC: HCPCS

## 2023-10-14 PROCEDURE — 90662 IIV NO PRSV INCREASED AG IM: CPT

## 2023-10-31 ENCOUNTER — OFFICE VISIT (OUTPATIENT)
Dept: SLEEP CENTER | Facility: CLINIC | Age: 68
End: 2023-10-31
Payer: COMMERCIAL

## 2023-10-31 VITALS
HEIGHT: 61 IN | SYSTOLIC BLOOD PRESSURE: 129 MMHG | DIASTOLIC BLOOD PRESSURE: 71 MMHG | HEART RATE: 80 BPM | BODY MASS INDEX: 43.8 KG/M2 | WEIGHT: 232 LBS

## 2023-10-31 DIAGNOSIS — G47.33 OBSTRUCTIVE SLEEP APNEA: Primary | ICD-10-CM

## 2023-10-31 PROCEDURE — 1160F RVW MEDS BY RX/DR IN RCRD: CPT | Performed by: PSYCHIATRY & NEUROLOGY

## 2023-10-31 PROCEDURE — 99204 OFFICE O/P NEW MOD 45 MIN: CPT | Performed by: PSYCHIATRY & NEUROLOGY

## 2023-10-31 PROCEDURE — 1159F MED LIST DOCD IN RCRD: CPT | Performed by: PSYCHIATRY & NEUROLOGY

## 2023-10-31 NOTE — PROGRESS NOTES
Assessment/Plan:    1. Obstructive sleep apnea  -     Ambulatory Referral to Sleep Medicine  -     Home Study; Future    Ms. Davina Orr has a history of moderate obstructive sleep apnea, of note that prior sleep test was not scored per Medicare criteria. Therefore due to this and the fact that she notes improvement of symptoms, a repeat test is needed. She prefers to pursue a home sleep test that this was ordered today. She sleeps upright, I encouraged her to complete the test and her typical sleep position. I suspect she likely still has obstructive sleep apnea and if so, would recommend continued use of CPAP. She is open to this. She no longer has a CPAP machine so I will need to order one for her after the test.  She asked about the inspire device, we discussed that would not be an option for her at present due to her elevated BMI. I will follow-up with her after her sleep study, she mentioned she may prefer to have further follow-up closer to her home in [facility]. We also discussed that she has significant gastroesophageal reflux disease, given that she has breakthrough symptoms despite use of famotidine, she should follow-up with her primary care physician and or see GI. Certainly she should also follow lifestyle modifications which is challenging for her as she often eats late. Subjective:      Patient ID: Teresa Thibodeaux is a 70 y.o. female. HPI  Historian-the patient and her  Wilder Sim  This is a 58-year-old female who has a history of obstructive sleep apnea, I do not have extensive prior chart notes available, it seems she was last seen in 2016 by Dr. Trista Vargas. She had a prior sleep study in May 2009 at [facility]. Per their report, she had an AHI of 23 events per hour and a respiratory disturbance index of 34. She subsequently had a CPAP titration in setting of 11 cm H2O was found to be effective.   It seems that this report was scored using a 3% hypopnea rule, her weight is not documented on the test.    She describes she was assessed in 2009 as she was falling asleep in meetings. Was prescribed CPAP which helped. Was using a So Clean machine with her Dreamstation which was under recall. Stopped CPAP due to the recall. Never received a machine through the recall. Does not have the old machine anymore     Cc- recommended that she has the same issues or JULIO now. Referral now due to PCP's concern    She has GERD at night, if eating late- finds it hard to lie down at 9 pm so sits in the recliner. May fall asleep in a recliner with the TV on, around 10 pm. Wakes 2-3 times to use the bathroom. .  She is up around 6 to 7 AM, awakening on her own. She sleeps about 7 hours a night. She is usually refreshed upon awakening, sometimes feels sleepy during the day. She takes naps 2 days out of the week. She denies drowsy driving, [place] Sleepiness Scale score is normal at 2. May doze watching TV in the day    She denies witnessed apnea, snoring, or gasping in sleep. Snoring is better than it had been. No AM headaches. No dry mouth     She denies restless legs, sleepwalking, or dream enactment    Caffeine- 2 cups of coffee a day  No alcohol       She has not used her CPAP machine since 2022. The last compliance report I have through February 2022 showed an AHI of 0.9 with use of her DreamStation machine, machine was set at 11 cm H2O, she had used the machine at 15 out of 30 nights, average session 5 hours 12 minutes    Forsyth Sleepiness Scale  Sitting and reading: Would never doze  Watching TV: Would never doze  Sitting, inactive in a public place (e.g. a theatre or a meeting):  Would never doze  As a passenger in a car for an hour without a break: Would never doze  Lying down to rest in the afternoon when circumstances permit: Moderate chance of dozing  Sitting and talking to someone: Would never doze  Sitting quietly after a lunch without alcohol: Would never doze  In a car, while stopped for a few minutes in traffic: Would never doze  Total score: 2      Review of Systems   HENT:  Negative for congestion, nosebleeds and postnasal drip. Respiratory:  Positive for cough. Negative for shortness of breath and wheezing. Cardiovascular:  Negative for palpitations and leg swelling. Allergic/Immunologic: Positive for environmental allergies. Psychiatric/Behavioral:  Negative for dysphoric mood.           Objective:      Visit Vitals  /71 (BP Location: Left arm, Patient Position: Sitting, Cuff Size: Large)   Pulse 80   Ht 5' 1" (1.549 m)   Wt 105 kg (232 lb)   BMI 43.84 kg/m²   OB Status Postmenopausal   Smoking Status Former   BSA 2.01 m²             Physical Exam    Mallampati 4  Tonsil size : not enlarged  Scalopping of tongue noted    Neck Circumference 15"

## 2023-10-31 NOTE — PATIENT INSTRUCTIONS
1.  Please schedule the sleep study as we discussed  2. After the test is read (typically within 1 week), I will order a PAP machine and/or supplies if indicated by the results. If the test is inconclusive, my office will be in touch to determine the next step (sooner follow-up, further testing etc)  3. I would then want to see you for a followup visit about 5-6 weeks after you receive your machine at home. Please bring your machine to the first visit  4. If you have any difficulties using the machine, please contact the medical supply for machine related issues (mask fit, leakage, question about settings) or contact me if you have side effects, concern about the air pressure, or symptoms of concern. It is very important to avoid driving while drowsy, this can be very dangerous or even cause serious injury or death. If sleepy, it is not safe to get behind the wheel. If you are driving and feels sleepy, it is very important to pull over right away. Even losing control of the car for a split second can be deadly. If you feel you cannot control when sleepiness occurs and cannot prevented, it is important to not drive at all until this improves. Please let me know if you experience this as it is very important. Nursing Support:  When: Monday through Friday 7A-5PM except holidays  Where: Our direct line is [phone]. If you are having a true emergency please call 911. In the event that the line is busy or it is after hours please leave a voice message and we will return your call. Please speak clearly, leaving your full name, birth date, best number to reach you and the reason for your call. Medication refills: We will need the name of the medication, the dosage, the ordering provider, whether you get a 30 or 90 day refill, and the pharmacy name and address. Medications will be ordered by the provider only. Nurses cannot call in prescriptions.   Please allow 7 days for medication refills. Physician requested updates: If your provider requested that you call with an update after starting medication, please be ready to provide us the medication and dosage, what time you take your medication, the time you attempt to fall asleep, time you fall asleep, when you wake up, and what time you get out of bed. Sleep Study Results: We will contact you with sleep study results and/or next steps after the physician has reviewed your testing.

## 2023-11-15 ENCOUNTER — OFFICE VISIT (OUTPATIENT)
Dept: FAMILY MEDICINE CLINIC | Facility: CLINIC | Age: 68
End: 2023-11-15
Payer: COMMERCIAL

## 2023-11-15 VITALS
HEIGHT: 61 IN | DIASTOLIC BLOOD PRESSURE: 90 MMHG | TEMPERATURE: 99.8 F | BODY MASS INDEX: 43.23 KG/M2 | OXYGEN SATURATION: 95 % | WEIGHT: 229 LBS | SYSTOLIC BLOOD PRESSURE: 114 MMHG | HEART RATE: 98 BPM | RESPIRATION RATE: 16 BRPM

## 2023-11-15 DIAGNOSIS — J40 BRONCHITIS: Primary | ICD-10-CM

## 2023-11-15 DIAGNOSIS — J02.9 SORE THROAT: ICD-10-CM

## 2023-11-15 DIAGNOSIS — K21.9 GASTROESOPHAGEAL REFLUX DISEASE WITHOUT ESOPHAGITIS: ICD-10-CM

## 2023-11-15 PROBLEM — R05.9 COUGH: Status: ACTIVE | Noted: 2023-11-15

## 2023-11-15 LAB — S PYO AG THROAT QL: NEGATIVE

## 2023-11-15 PROCEDURE — 99213 OFFICE O/P EST LOW 20 MIN: CPT | Performed by: FAMILY MEDICINE

## 2023-11-15 PROCEDURE — 87070 CULTURE OTHR SPECIMN AEROBIC: CPT | Performed by: FAMILY MEDICINE

## 2023-11-15 PROCEDURE — 87880 STREP A ASSAY W/OPTIC: CPT | Performed by: FAMILY MEDICINE

## 2023-11-15 RX ORDER — SUCRALFATE ORAL 1 G/10ML
1 SUSPENSION ORAL 2 TIMES DAILY
Qty: 414 ML | Refills: 0 | Status: SHIPPED | OUTPATIENT
Start: 2023-11-15 | End: 2023-11-15 | Stop reason: SDUPTHER

## 2023-11-15 RX ORDER — PANTOPRAZOLE SODIUM 40 MG/1
40 TABLET, DELAYED RELEASE ORAL DAILY
Qty: 90 TABLET | Refills: 0 | Status: SHIPPED | OUTPATIENT
Start: 2023-11-15 | End: 2023-11-22

## 2023-11-15 RX ORDER — AZITHROMYCIN 250 MG/1
TABLET, FILM COATED ORAL
Qty: 6 TABLET | Refills: 0 | Status: SHIPPED | OUTPATIENT
Start: 2023-11-15 | End: 2023-11-22

## 2023-11-15 RX ORDER — PANTOPRAZOLE SODIUM 40 MG/1
40 TABLET, DELAYED RELEASE ORAL DAILY
Qty: 90 TABLET | Refills: 0 | Status: SHIPPED | OUTPATIENT
Start: 2023-11-15 | End: 2023-11-15 | Stop reason: SDUPTHER

## 2023-11-15 RX ORDER — SUCRALFATE ORAL 1 G/10ML
1 SUSPENSION ORAL 2 TIMES DAILY
Qty: 414 ML | Refills: 0 | Status: SHIPPED | OUTPATIENT
Start: 2023-11-15 | End: 2023-12-08

## 2023-11-15 RX ORDER — FLUTICASONE PROPIONATE 50 MCG
1 SPRAY, SUSPENSION (ML) NASAL DAILY
Qty: 15.8 ML | Refills: 0 | Status: SHIPPED | OUTPATIENT
Start: 2023-11-15

## 2023-11-15 NOTE — PROGRESS NOTES
Name: Rosalino Nance      : 1955      MRN: [id number]  Encounter Provider: Krishna Alcala DO  Encounter Date: 11/15/2023   Encounter department: [facility]     1. Bronchitis  Comments:  Symptomatic treatment discussed. Will provide azithromycin for 5 days. Would recommend probiotic while using antibiotic. Orders:  -     fluticasone (FLONASE) 50 mcg/act nasal spray; 1 spray into each nostril daily    2. Gastroesophageal reflux disease without esophagitis  Assessment & Plan:  - Worsened cough which she reports has been ongoing for 6 to 7 weeks. - Start Protonix 40 mg p.o. daily  - Carafate 1 g twice daily as needed to help soothe the stomach lining for 10 days.  -Antireflux measures discussed with possible discussion to follow-up with GI.  -Follow-up as needed. Orders:  -     sucralfate (CARAFATE) 1 g/10 mL suspension; Take 10 mL (1 g total) by mouth 2 (two) times a day    3. Sore throat  -     POCT rapid strepA  -     Throat culture           Subjective     And a 62-year-old female with past medical history of anxiety, bipolar 1, CKD 3, JULIO, GERD who presents today for evaluation of ongoing cough for the past 6 to 7 weeks. She denies any noted fevers, chills at this time. Has taken at home COVID testing has been negative over the course. She does believe she has been bringing up mucus with her cough that recently changed colors. She does admit she has been taking famotidine for reflux and does not think is helping much anymore. Is using her CPAP machine as discussed. She denies any wheezing. Denies any urinary symptoms, no rashes, no recent travel or known sick contacts. Review of Systems   Constitutional:  Negative for chills and fever. HENT:  Positive for congestion and postnasal drip. Negative for ear pain, sore throat and trouble swallowing. Eyes:  Negative for pain and visual disturbance. Respiratory:  Positive for cough.  Negative for shortness of breath and wheezing. Cardiovascular:  Negative for chest pain and palpitations. Gastrointestinal:  Negative for abdominal pain, constipation, diarrhea and vomiting. Endocrine: Negative for polydipsia and polyuria. Genitourinary:  Negative for dysuria and hematuria. Musculoskeletal:  Negative for arthralgias and back pain. Skin:  Negative for color change and rash. Neurological:  Negative for dizziness, seizures, syncope and weakness. Psychiatric/Behavioral:  Negative for confusion. The patient is not nervous/anxious. All other systems reviewed and are negative.       Past Medical History:   Diagnosis Date    Abnormal ECG     Anxiety 2002    Arthritis     Bipolar 1 disorder (HCC)     Chronic kidney disease     stage 3    CPAP (continuous positive airway pressure) dependence     Depression 2001    Disease of thyroid gland     Elevated blood pressure reading 06/10/2019    GERD (gastroesophageal reflux disease)     Obesity     Sleep apnea      Past Surgical History:   Procedure Laterality Date    COLONOSCOPY  2011     Family History   Problem Relation Age of Onset    Heart disease Mother     Heart Valve Disease Mother         Replacement    Diabetes Mother         2002    Heart failure Mother         Heart Valve replacement    Arthritis Father     No Known Problems Sister     No Known Problems Daughter     No Known Problems Maternal Grandmother     No Known Problems Maternal Grandfather     No Known Problems Paternal Grandmother     No Known Problems Paternal Grandfather     No Known Problems Son     No Known Problems Maternal Aunt     No Known Problems Maternal Aunt     No Known Problems Maternal Aunt     No Known Problems Maternal Aunt     No Known Problems Maternal Aunt     No Known Problems Paternal Aunt     Alcohol abuse Son         Dior Pérez, 36year old son, has issues with alcohol and alcohol abuse     Social History     Socioeconomic History    Marital status: /Civil Union Spouse name: None    Number of children: None    Years of education: None    Highest education level: None   Occupational History    None   Tobacco Use    Smoking status: Former     Packs/day: 0.25     Years: 30.00     Total pack years: 7.50     Types: Cigarettes     Quit date: 1/1/2008     Years since quitting: 15.9    Smokeless tobacco: Never   Vaping Use    Vaping Use: Never used   Substance and Sexual Activity    Alcohol use: Yes     Alcohol/week: 1.0 standard drink of alcohol     Types: 1 Glasses of wine per week    Drug use: Never    Sexual activity: Not Currently     Partners: Male     Birth control/protection: Post-menopausal   Other Topics Concern    None   Social History Narrative    Daily coffee consumption: 3 cups/day     Social Determinants of Health     Financial Resource Strain: Low Risk  (12/18/2022)    Overall Financial Resource Strain (CARDIA)     Difficulty of Paying Living Expenses: Not hard at all   Food Insecurity: No Food Insecurity (11/21/2023)    Hunger Vital Sign     Worried About Running Out of Food in the Last Year: Never true     Ran Out of Food in the Last Year: Never true   Transportation Needs: No Transportation Needs (11/21/2023)    PRAPARE - Transportation     Lack of Transportation (Medical): No     Lack of Transportation (Non-Medical):  No   Physical Activity: Not on file   Stress: Not on file   Social Connections: Not on file   Intimate Partner Violence: Not on file   Housing Stability: Low Risk  (11/21/2023)    Housing Stability Vital Sign     Unable to Pay for Housing in the Last Year: No     Number of Places Lived in the Last Year: 1     Unstable Housing in the Last Year: No     Current Outpatient Medications on File Prior to Visit   Medication Sig    calcitriol (ROCALTROL) 0.25 mcg capsule TAKE ONE CAPSULE BY MOUTH EVERY DAY    Cholecalciferol (VITAMIN D) 2000 units CAPS Take 1 capsule by mouth daily    CRANBERRY PO Take by mouth    famotidine (PEPCID) 20 mg tablet Take 1 tablet (20 mg total) by mouth 2 (two) times a day    Glucosamine-Chondroit-Vit C-Mn (GLUCOSAMINE 1500 COMPLEX PO) Take by mouth in the morning    lurasidone (LATUDA) 20 mg tablet TAKE 1 TABLET BY MOUTH DAILY  WITH BREAKFAST    Mirabegron ER (Myrbetriq) 25 MG TB24 Take 25 mg by mouth in the morning    Multiple Vitamin (MULTI-VITAMIN DAILY) TABS Take by mouth    benzonatate (TESSALON PERLES) 100 mg capsule Take 1 capsule (100 mg total) by mouth 3 (three) times a day as needed for cough     Allergies   Allergen Reactions    Other Other (See Comments)     Other reaction(s): Anaphylaxis  Annotation - [id number]: plums peaches, fruits with skin      Immunization History   Administered Date(s) Administered    COVID-19 PFIZER VACCINE 0.3 ML IM 02/25/2021, 03/18/2021, 10/05/2021, 04/08/2022    COVID-19 Pfizer Vac BIVALENT Silas-sucrose 12 Yr+ IM 12/02/2022    INFLUENZA 09/20/2011, 11/08/2012, 09/28/2015, 10/11/2016, 11/16/2017, 11/02/2018, 09/28/2022    Influenza Quadrivalent Preservative Free 3 years and older IM 10/06/2014, 09/28/2015, 10/11/2016, 11/16/2017    Influenza, high dose seasonal 0.7 mL 10/22/2019, 09/21/2020, 09/20/2021, 09/28/2022, 10/14/2023    Influenza, injectable, quadrivalent, preservative free 0.5 mL 11/02/2018    Influenza, seasonal, injectable 09/27/2013    Pneumococcal Conjugate Vaccine 20-valent (Pcv20), Polysace 12/22/2022, 12/22/2022    Pneumococcal Polysaccharide PPV23 12/10/2019    Td (adult), Unspecified 11/09/2009    Tdap 01/04/2016    Zoster 10/06/2014    Zoster Vaccine Recombinant 07/18/2019, 07/25/2019, 09/30/2019, 09/30/2019       Objective     /90 (BP Location: Left arm, Patient Position: Sitting, Cuff Size: Standard)   Pulse 98   Temp 99.8 °F (37.7 °C) (Tympanic)   Resp 16   Ht 5' 1" (1.549 m)   Wt 104 kg (229 lb)   SpO2 95%   BMI 43.27 kg/m²     Physical Exam  Vitals and nursing note reviewed. Constitutional:       Appearance: Normal appearance.    HENT:      Head: Normocephalic and atraumatic. Right Ear: Tympanic membrane and external ear normal.      Left Ear: Tympanic membrane and external ear normal.      Nose: Congestion present. Mouth/Throat:      Mouth: Mucous membranes are moist.      Pharynx: No posterior oropharyngeal erythema. Eyes:      Extraocular Movements: Extraocular movements intact. Conjunctiva/sclera: Conjunctivae normal.      Pupils: Pupils are equal, round, and reactive to light. Cardiovascular:      Rate and Rhythm: Normal rate and regular rhythm. Pulses: Normal pulses. Heart sounds: Normal heart sounds. Pulmonary:      Effort: Pulmonary effort is normal.      Breath sounds: Rhonchi present. Abdominal:      General: Bowel sounds are normal.      Palpations: Abdomen is soft. Musculoskeletal:         General: Normal range of motion. Cervical back: Normal range of motion. Lymphadenopathy:      Cervical: No cervical adenopathy. Skin:     General: Skin is warm. Capillary Refill: Capillary refill takes less than 2 seconds. Neurological:      General: No focal deficit present. Mental Status: She is alert and oriented to person, place, and time.    Psychiatric:         Mood and Affect: Mood normal.         Behavior: Behavior normal.       Ananda Yun DO

## 2023-11-15 NOTE — PATIENT INSTRUCTIONS
Take Carafate twice daily for stomach lining  Take Azithromycin as prescribed  Take Flonase 1 spray each nostril twice daily  Take Claritin once daily  Take Protonix for Reflux symptoms once daily. Diet for Stomach Ulcers and Gastritis   WHAT YOU NEED TO KNOW:   A diet for stomach ulcers and gastritis is a meal plan that limits foods that irritate your stomach. Certain foods may worsen symptoms such as stomach pain, bloating, heartburn, or indigestion. DISCHARGE INSTRUCTIONS:   Foods to limit or avoid:  You may need to avoid acidic, spicy, or high-fat foods. Not all foods affect everyone the same way. You will need to learn which foods worsen your symptoms and limit those foods. The following are some foods that may worsen ulcer or gastritis symptoms:  Beverages:      Whole milk and chocolate milk    Hot cocoa and cola    Any beverage with caffeine    Regular and decaffeinated coffee    Peppermint and spearmint tea    Green and black tea, with or without caffeine    Orange and grapefruit juices    Drinks that contain alcohol    Spices and seasonings:      Black and red pepper    Chili powder    Mustard seed and nutmeg    Other foods:      Dairy foods made from whole milk or cream    Chocolate    Spicy or strongly flavored cheeses, such as jalapeno or black pepper    Highly seasoned, high-fat meats, such as sausage, salami, wade, ham, and cold cuts    Hot chiles and peppers    Tomato products, such as tomato paste, tomato sauce, or tomato juice    Foods to include:  Eat a variety of healthy foods from all the food groups. Eat fruits, vegetables, whole grains, and fat-free or low-fat dairy foods. Whole grains include whole-wheat breads, cereals, pasta, and brown rice. Choose lean meats, poultry (chicken and turkey), fish, beans, eggs, and nuts. A healthy meal plan is low in unhealthy fats, salt, and added sugar. Healthy fats include olive oil and canola oil.  Ask your dietitian for more information about a healthy diet. Other helpful guidelines:   Do not eat right before bedtime. Stop eating at least 2 hours before bedtime. Eat small, frequent meals. Your stomach may tolerate small, frequent meals better than large meals. © Copyright Juan Andino 2023 Information is for End User's use only and may not be sold, redistributed or otherwise used for commercial purposes. The above information is an  only. It is not intended as medical advice for individual conditions or treatments. Talk to your doctor, nurse or pharmacist before following any medical regimen to see if it is safe and effective for you.

## 2023-11-15 NOTE — ASSESSMENT & PLAN NOTE
- Worsened cough which she reports has been ongoing for 6 to 7 weeks. - Start Protonix 40 mg p.o. daily  - Carafate 1 g twice daily as needed to help soothe the stomach lining for 10 days.  -Antireflux measures discussed with possible discussion to follow-up with GI.  -Follow-up as needed.

## 2023-11-16 NOTE — RESULT ENCOUNTER NOTE
Sharad Villarreal,    Your throat culture returned from the throat swab and is also negative for bacterial infection. Please continue with treatments that we discussed. Please follow-up if no improvement or worsening signs and symptoms.   Thank you    -Dr. Israel York

## 2023-11-17 LAB — BACTERIA THROAT CULT: NORMAL

## 2023-11-19 ENCOUNTER — HOSPITAL ENCOUNTER (INPATIENT)
Facility: HOSPITAL | Age: 68
LOS: 2 days | Discharge: HOME | DRG: 202 | End: 2023-11-22
Attending: EMERGENCY MEDICINE | Admitting: GENERAL PRACTICE
Payer: COMMERCIAL

## 2023-11-19 DIAGNOSIS — K21.9 GASTROESOPHAGEAL REFLUX DISEASE WITHOUT ESOPHAGITIS: ICD-10-CM

## 2023-11-19 DIAGNOSIS — J40 BRONCHITIS: Primary | ICD-10-CM

## 2023-11-19 DIAGNOSIS — R05.2 SUBACUTE COUGH: ICD-10-CM

## 2023-11-19 DIAGNOSIS — R79.89 ELEVATED TROPONIN: ICD-10-CM

## 2023-11-19 DIAGNOSIS — R00.0 TACHYCARDIA: ICD-10-CM

## 2023-11-19 DIAGNOSIS — N18.9 CKD (CHRONIC KIDNEY DISEASE): ICD-10-CM

## 2023-11-19 DIAGNOSIS — I44.7 LBBB (LEFT BUNDLE BRANCH BLOCK): ICD-10-CM

## 2023-11-19 DIAGNOSIS — J45.909 UNCOMPLICATED ASTHMA, UNSPECIFIED ASTHMA SEVERITY, UNSPECIFIED WHETHER PERSISTENT: ICD-10-CM

## 2023-11-19 PROCEDURE — 84484 ASSAY OF TROPONIN QUANT: CPT | Performed by: PHYSICIAN ASSISTANT

## 2023-11-19 PROCEDURE — 99285 EMERGENCY DEPT VISIT HI MDM: CPT | Performed by: PHYSICIAN ASSISTANT

## 2023-11-19 PROCEDURE — 80048 BASIC METABOLIC PNL TOTAL CA: CPT | Performed by: PHYSICIAN ASSISTANT

## 2023-11-19 PROCEDURE — 99285 EMERGENCY DEPT VISIT HI MDM: CPT

## 2023-11-19 PROCEDURE — 85379 FIBRIN DEGRADATION QUANT: CPT | Performed by: PHYSICIAN ASSISTANT

## 2023-11-19 PROCEDURE — 94640 AIRWAY INHALATION TREATMENT: CPT

## 2023-11-19 PROCEDURE — 36415 COLL VENOUS BLD VENIPUNCTURE: CPT | Performed by: PHYSICIAN ASSISTANT

## 2023-11-19 PROCEDURE — 93005 ELECTROCARDIOGRAM TRACING: CPT

## 2023-11-19 PROCEDURE — 85027 COMPLETE CBC AUTOMATED: CPT | Performed by: PHYSICIAN ASSISTANT

## 2023-11-19 PROCEDURE — 85007 BL SMEAR W/DIFF WBC COUNT: CPT | Performed by: PHYSICIAN ASSISTANT

## 2023-11-19 RX ORDER — ALBUTEROL SULFATE 2.5 MG/3ML
5 SOLUTION RESPIRATORY (INHALATION) ONCE
Status: COMPLETED | OUTPATIENT
Start: 2023-11-19 | End: 2023-11-19

## 2023-11-19 RX ADMIN — IPRATROPIUM BROMIDE 0.5 MG: 0.5 SOLUTION RESPIRATORY (INHALATION) at 23:59

## 2023-11-19 RX ADMIN — ALBUTEROL SULFATE 5 MG: 2.5 SOLUTION RESPIRATORY (INHALATION) at 23:59

## 2023-11-19 NOTE — Clinical Note
Case was discussed with Kimmy ROGERS and the patient's admission status was agreed to be Admission Status: observation status to the service of Dr. Ramirez

## 2023-11-19 NOTE — LETTER
Dr. Rose Berger,         Thank you for allowing us to participate in the care of your patient, Martin Munoz, who was hospitalized from 11/20/2023 through 11/22/2023 with the admitting diagnosis of chronic cough, tachycardia, NSVT. She is being discharged to home today in good condition and with instructions to follow up outpatient with you, Pulmonology, and Cardiology. If you have any additional questions or would like to discuss further, please feel free to contact me.         Ladon Epley, MD  [facility] Internal Medicine, Hospitalist  [phone]'

## 2023-11-20 ENCOUNTER — APPOINTMENT (EMERGENCY)
Dept: CT IMAGING | Facility: HOSPITAL | Age: 68
DRG: 202 | End: 2023-11-20
Payer: COMMERCIAL

## 2023-11-20 ENCOUNTER — APPOINTMENT (OUTPATIENT)
Dept: NON INVASIVE DIAGNOSTICS | Facility: HOSPITAL | Age: 68
DRG: 202 | End: 2023-11-20
Payer: COMMERCIAL

## 2023-11-20 PROBLEM — R00.0 TACHYCARDIA: Status: ACTIVE | Noted: 2023-11-20

## 2023-11-20 LAB
2HR DELTA HS TROPONIN: -5 NG/L
4HR DELTA HS TROPONIN: -9 NG/L
ANION GAP SERPL CALCULATED.3IONS-SCNC: 10 MMOL/L
ANION GAP SERPL CALCULATED.3IONS-SCNC: 11 MMOL/L
ATRIAL RATE: 105 BPM
ATRIAL RATE: 116 BPM
BASOPHILS # BLD AUTO: 0.02 THOUSANDS/ÂΜL (ref 0–0.1)
BASOPHILS # BLD MANUAL: 0 THOUSAND/UL (ref 0–0.1)
BASOPHILS NFR BLD AUTO: 0 % (ref 0–1)
BASOPHILS NFR MAR MANUAL: 0 % (ref 0–1)
BUN SERPL-MCNC: 29 MG/DL (ref 5–25)
BUN SERPL-MCNC: 29 MG/DL (ref 5–25)
CALCIUM SERPL-MCNC: 8.7 MG/DL (ref 8.4–10.2)
CALCIUM SERPL-MCNC: 9.5 MG/DL (ref 8.4–10.2)
CARDIAC TROPONIN I PNL SERPL HS: 20 NG/L
CARDIAC TROPONIN I PNL SERPL HS: 24 NG/L
CARDIAC TROPONIN I PNL SERPL HS: 29 NG/L
CHLORIDE SERPL-SCNC: 105 MMOL/L (ref 96–108)
CHLORIDE SERPL-SCNC: 108 MMOL/L (ref 96–108)
CO2 SERPL-SCNC: 20 MMOL/L (ref 21–32)
CO2 SERPL-SCNC: 20 MMOL/L (ref 21–32)
CREAT SERPL-MCNC: 1.43 MG/DL (ref 0.6–1.3)
CREAT SERPL-MCNC: 1.5 MG/DL (ref 0.6–1.3)
D DIMER PPP FEU-MCNC: 1.48 UG/ML FEU
EOSINOPHIL # BLD AUTO: 0 THOUSAND/ÂΜL (ref 0–0.61)
EOSINOPHIL # BLD MANUAL: 0.11 THOUSAND/UL (ref 0–0.4)
EOSINOPHIL NFR BLD AUTO: 0 % (ref 0–6)
EOSINOPHIL NFR BLD MANUAL: 1 % (ref 0–6)
ERYTHROCYTE [DISTWIDTH] IN BLOOD BY AUTOMATED COUNT: 13.4 % (ref 11.6–15.1)
ERYTHROCYTE [DISTWIDTH] IN BLOOD BY AUTOMATED COUNT: 13.4 % (ref 11.6–15.1)
GFR SERPL CREATININE-BSD FRML MDRD: 35 ML/MIN/1.73SQ M
GFR SERPL CREATININE-BSD FRML MDRD: 37 ML/MIN/1.73SQ M
GLUCOSE SERPL-MCNC: 248 MG/DL (ref 65–140)
GLUCOSE SERPL-MCNC: 298 MG/DL (ref 65–140)
HCT VFR BLD AUTO: 40.2 % (ref 34.8–46.1)
HCT VFR BLD AUTO: 43.6 % (ref 34.8–46.1)
HGB BLD-MCNC: 13.4 G/DL (ref 11.5–15.4)
HGB BLD-MCNC: 14.4 G/DL (ref 11.5–15.4)
IMM GRANULOCYTES # BLD AUTO: 0.04 THOUSAND/UL (ref 0–0.2)
IMM GRANULOCYTES NFR BLD AUTO: 1 % (ref 0–2)
LG PLATELETS BLD QL SMEAR: PRESENT
LYMPHOCYTES # BLD AUTO: 0.67 THOUSANDS/ÂΜL (ref 0.6–4.47)
LYMPHOCYTES # BLD AUTO: 0.99 THOUSAND/UL (ref 0.6–4.47)
LYMPHOCYTES # BLD AUTO: 7 % (ref 14–44)
LYMPHOCYTES NFR BLD AUTO: 9 % (ref 14–44)
MCH RBC QN AUTO: 30.9 PG (ref 26.8–34.3)
MCH RBC QN AUTO: 32 PG (ref 26.8–34.3)
MCHC RBC AUTO-ENTMCNC: 33 G/DL (ref 31.4–37.4)
MCHC RBC AUTO-ENTMCNC: 33.3 G/DL (ref 31.4–37.4)
MCV RBC AUTO: 94 FL (ref 82–98)
MCV RBC AUTO: 96 FL (ref 82–98)
MONOCYTES # BLD AUTO: 0.11 THOUSAND/UL (ref 0–1.22)
MONOCYTES # BLD AUTO: 0.18 THOUSAND/ÂΜL (ref 0.17–1.22)
MONOCYTES NFR BLD AUTO: 2 % (ref 4–12)
MONOCYTES NFR BLD: 1 % (ref 4–12)
NEUTROPHILS # BLD AUTO: 6.52 THOUSANDS/ÂΜL (ref 1.85–7.62)
NEUTROPHILS # BLD MANUAL: 9.75 THOUSAND/UL (ref 1.85–7.62)
NEUTS BAND NFR BLD MANUAL: 3 % (ref 0–8)
NEUTS SEG NFR BLD AUTO: 86 % (ref 43–75)
NEUTS SEG NFR BLD AUTO: 88 % (ref 43–75)
NRBC BLD AUTO-RTO: 0 /100 WBCS
P AXIS: 48 DEGREES
P AXIS: 78 DEGREES
PLATELET # BLD AUTO: 128 THOUSANDS/UL (ref 149–390)
PLATELET # BLD AUTO: 154 THOUSANDS/UL (ref 149–390)
PLATELET BLD QL SMEAR: ADEQUATE
PMV BLD AUTO: 12.1 FL (ref 8.9–12.7)
PMV BLD AUTO: 12.6 FL (ref 8.9–12.7)
POTASSIUM SERPL-SCNC: 3.4 MMOL/L (ref 3.5–5.3)
POTASSIUM SERPL-SCNC: 3.9 MMOL/L (ref 3.5–5.3)
PR INTERVAL: 140 MS
PR INTERVAL: 158 MS
PROCALCITONIN SERPL-MCNC: 0.19 NG/ML
QRS AXIS: -74 DEGREES
QRS AXIS: -77 DEGREES
QRSD INTERVAL: 130 MS
QRSD INTERVAL: 132 MS
QT INTERVAL: 382 MS
QT INTERVAL: 388 MS
QTC INTERVAL: 504 MS
QTC INTERVAL: 539 MS
RBC # BLD AUTO: 4.19 MILLION/UL (ref 3.81–5.12)
RBC # BLD AUTO: 4.66 MILLION/UL (ref 3.81–5.12)
RBC MORPH BLD: NORMAL
SODIUM SERPL-SCNC: 136 MMOL/L (ref 135–147)
SODIUM SERPL-SCNC: 138 MMOL/L (ref 135–147)
T WAVE AXIS: 82 DEGREES
T WAVE AXIS: 88 DEGREES
TSH SERPL DL<=0.05 MIU/L-ACNC: 1 UIU/ML (ref 0.45–4.5)
VARIANT LYMPHS # BLD AUTO: 2 %
VENTRICULAR RATE: 105 BPM
VENTRICULAR RATE: 116 BPM
WBC # BLD AUTO: 10.96 THOUSAND/UL (ref 4.31–10.16)
WBC # BLD AUTO: 7.43 THOUSAND/UL (ref 4.31–10.16)

## 2023-11-20 PROCEDURE — 94640 AIRWAY INHALATION TREATMENT: CPT

## 2023-11-20 PROCEDURE — 94664 DEMO&/EVAL PT USE INHALER: CPT

## 2023-11-20 PROCEDURE — 93010 ELECTROCARDIOGRAM REPORT: CPT | Performed by: INTERNAL MEDICINE

## 2023-11-20 PROCEDURE — 80048 BASIC METABOLIC PNL TOTAL CA: CPT

## 2023-11-20 PROCEDURE — 84484 ASSAY OF TROPONIN QUANT: CPT | Performed by: PHYSICIAN ASSISTANT

## 2023-11-20 PROCEDURE — 96360 HYDRATION IV INFUSION INIT: CPT

## 2023-11-20 PROCEDURE — 84443 ASSAY THYROID STIM HORMONE: CPT

## 2023-11-20 PROCEDURE — 71275 CT ANGIOGRAPHY CHEST: CPT

## 2023-11-20 PROCEDURE — 96361 HYDRATE IV INFUSION ADD-ON: CPT

## 2023-11-20 PROCEDURE — 0202U NFCT DS 22 TRGT SARS-COV-2: CPT | Performed by: NURSE PRACTITIONER

## 2023-11-20 PROCEDURE — 84145 PROCALCITONIN (PCT): CPT | Performed by: GENERAL PRACTICE

## 2023-11-20 PROCEDURE — 99222 1ST HOSP IP/OBS MODERATE 55: CPT | Performed by: GENERAL PRACTICE

## 2023-11-20 PROCEDURE — 36415 COLL VENOUS BLD VENIPUNCTURE: CPT | Performed by: PHYSICIAN ASSISTANT

## 2023-11-20 PROCEDURE — 85025 COMPLETE CBC W/AUTO DIFF WBC: CPT

## 2023-11-20 PROCEDURE — 94760 N-INVAS EAR/PLS OXIMETRY 1: CPT

## 2023-11-20 PROCEDURE — 93005 ELECTROCARDIOGRAM TRACING: CPT

## 2023-11-20 PROCEDURE — 99223 1ST HOSP IP/OBS HIGH 75: CPT | Performed by: STUDENT IN AN ORGANIZED HEALTH CARE EDUCATION/TRAINING PROGRAM

## 2023-11-20 PROCEDURE — G1004 CDSM NDSC: HCPCS

## 2023-11-20 RX ORDER — LEVALBUTEROL 1.25 MG/.5ML
1.25 SOLUTION, CONCENTRATE RESPIRATORY (INHALATION)
Status: DISCONTINUED | OUTPATIENT
Start: 2023-11-20 | End: 2023-11-20

## 2023-11-20 RX ORDER — MELATONIN
2000 DAILY
Status: DISCONTINUED | OUTPATIENT
Start: 2023-11-20 | End: 2023-11-22 | Stop reason: HOSPADM

## 2023-11-20 RX ORDER — FAMOTIDINE 20 MG/1
20 TABLET, FILM COATED ORAL DAILY
Status: DISCONTINUED | OUTPATIENT
Start: 2023-11-20 | End: 2023-11-22 | Stop reason: HOSPADM

## 2023-11-20 RX ORDER — METHYLPREDNISOLONE SODIUM SUCCINATE 40 MG/ML
40 INJECTION, POWDER, LYOPHILIZED, FOR SOLUTION INTRAMUSCULAR; INTRAVENOUS DAILY
Status: DISCONTINUED | OUTPATIENT
Start: 2023-11-20 | End: 2023-11-21

## 2023-11-20 RX ORDER — HYDROCORTISONE ACETATE 0.5 %
CREAM (GRAM) TOPICAL DAILY
Status: DISCONTINUED | OUTPATIENT
Start: 2023-11-20 | End: 2023-11-20 | Stop reason: RX

## 2023-11-20 RX ORDER — CALCIUM CARBONATE 500 MG/1
500 TABLET, CHEWABLE ORAL 3 TIMES DAILY PRN
Status: DISCONTINUED | OUTPATIENT
Start: 2023-11-20 | End: 2023-11-22 | Stop reason: HOSPADM

## 2023-11-20 RX ORDER — LEVALBUTEROL 1.25 MG/.5ML
1.25 SOLUTION, CONCENTRATE RESPIRATORY (INHALATION)
Status: DISCONTINUED | OUTPATIENT
Start: 2023-11-20 | End: 2023-11-21

## 2023-11-20 RX ORDER — HEPARIN SODIUM 5000 [USP'U]/ML
5000 INJECTION, SOLUTION INTRAVENOUS; SUBCUTANEOUS EVERY 8 HOURS SCHEDULED
Status: DISCONTINUED | OUTPATIENT
Start: 2023-11-20 | End: 2023-11-22 | Stop reason: HOSPADM

## 2023-11-20 RX ORDER — BUDESONIDE 0.5 MG/2ML
0.5 INHALANT ORAL
Status: DISCONTINUED | OUTPATIENT
Start: 2023-11-20 | End: 2023-11-21

## 2023-11-20 RX ORDER — SUCRALFATE 1 G/1
1 TABLET ORAL 2 TIMES DAILY
Status: DISCONTINUED | OUTPATIENT
Start: 2023-11-20 | End: 2023-11-22 | Stop reason: HOSPADM

## 2023-11-20 RX ORDER — LURASIDONE HYDROCHLORIDE 20 MG/1
20 TABLET, FILM COATED ORAL
Status: DISCONTINUED | OUTPATIENT
Start: 2023-11-20 | End: 2023-11-22 | Stop reason: HOSPADM

## 2023-11-20 RX ORDER — ONDANSETRON 2 MG/ML
4 INJECTION INTRAMUSCULAR; INTRAVENOUS EVERY 6 HOURS PRN
Status: DISCONTINUED | OUTPATIENT
Start: 2023-11-20 | End: 2023-11-20

## 2023-11-20 RX ORDER — ENOXAPARIN SODIUM 100 MG/ML
40 INJECTION SUBCUTANEOUS DAILY
Status: DISCONTINUED | OUTPATIENT
Start: 2023-11-20 | End: 2023-11-20

## 2023-11-20 RX ORDER — OXYBUTYNIN CHLORIDE 5 MG/1
5 TABLET, EXTENDED RELEASE ORAL DAILY
Status: DISCONTINUED | OUTPATIENT
Start: 2023-11-20 | End: 2023-11-22 | Stop reason: HOSPADM

## 2023-11-20 RX ORDER — FAMOTIDINE 20 MG/1
20 TABLET, FILM COATED ORAL 2 TIMES DAILY
Status: DISCONTINUED | OUTPATIENT
Start: 2023-11-20 | End: 2023-11-20

## 2023-11-20 RX ORDER — FLUTICASONE PROPIONATE 50 MCG
1 SPRAY, SUSPENSION (ML) NASAL DAILY
Status: DISCONTINUED | OUTPATIENT
Start: 2023-11-20 | End: 2023-11-22 | Stop reason: HOSPADM

## 2023-11-20 RX ORDER — LEVALBUTEROL INHALATION SOLUTION 1.25 MG/3ML
1.25 SOLUTION RESPIRATORY (INHALATION) ONCE
Status: COMPLETED | OUTPATIENT
Start: 2023-11-20 | End: 2023-11-20

## 2023-11-20 RX ORDER — POTASSIUM CHLORIDE 20 MEQ/1
20 TABLET, EXTENDED RELEASE ORAL ONCE
Status: COMPLETED | OUTPATIENT
Start: 2023-11-20 | End: 2023-11-20

## 2023-11-20 RX ORDER — GUAIFENESIN 600 MG/1
600 TABLET, EXTENDED RELEASE ORAL EVERY 12 HOURS SCHEDULED
Status: DISCONTINUED | OUTPATIENT
Start: 2023-11-20 | End: 2023-11-22 | Stop reason: HOSPADM

## 2023-11-20 RX ORDER — PANTOPRAZOLE SODIUM 40 MG/1
40 TABLET, DELAYED RELEASE ORAL 2 TIMES DAILY
Status: DISCONTINUED | OUTPATIENT
Start: 2023-11-20 | End: 2023-11-22 | Stop reason: HOSPADM

## 2023-11-20 RX ORDER — LEVALBUTEROL INHALATION SOLUTION 1.25 MG/3ML
SOLUTION RESPIRATORY (INHALATION)
Status: DISPENSED
Start: 2023-11-20 | End: 2023-11-21

## 2023-11-20 RX ORDER — CALCITRIOL 0.25 UG/1
0.25 CAPSULE, LIQUID FILLED ORAL DAILY
Status: DISCONTINUED | OUTPATIENT
Start: 2023-11-20 | End: 2023-11-22 | Stop reason: HOSPADM

## 2023-11-20 RX ORDER — PANTOPRAZOLE SODIUM 40 MG/1
40 TABLET, DELAYED RELEASE ORAL
Status: DISCONTINUED | OUTPATIENT
Start: 2023-11-20 | End: 2023-11-20

## 2023-11-20 RX ADMIN — SUCRALFATE 1 G: 1 TABLET ORAL at 17:48

## 2023-11-20 RX ADMIN — FLUTICASONE PROPIONATE 1 SPRAY: 50 SPRAY, METERED NASAL at 08:37

## 2023-11-20 RX ADMIN — IPRATROPIUM BROMIDE 0.5 MG: 0.5 SOLUTION RESPIRATORY (INHALATION) at 21:41

## 2023-11-20 RX ADMIN — HEPARIN SODIUM 5000 UNITS: 5000 INJECTION INTRAVENOUS; SUBCUTANEOUS at 06:12

## 2023-11-20 RX ADMIN — POTASSIUM CHLORIDE 20 MEQ: 1500 TABLET, EXTENDED RELEASE ORAL at 08:36

## 2023-11-20 RX ADMIN — SODIUM CHLORIDE 1000 ML: 0.9 INJECTION, SOLUTION INTRAVENOUS at 01:26

## 2023-11-20 RX ADMIN — OXYBUTYNIN CHLORIDE 5 MG: 5 TABLET, EXTENDED RELEASE ORAL at 08:36

## 2023-11-20 RX ADMIN — IPRATROPIUM BROMIDE 0.5 MG: 0.5 SOLUTION RESPIRATORY (INHALATION) at 14:46

## 2023-11-20 RX ADMIN — FAMOTIDINE 20 MG: 20 TABLET, FILM COATED ORAL at 11:15

## 2023-11-20 RX ADMIN — GUAIFENESIN 600 MG: 600 TABLET ORAL at 21:36

## 2023-11-20 RX ADMIN — LURASIDONE HYDROCHLORIDE 20 MG: 20 TABLET, COATED ORAL at 08:36

## 2023-11-20 RX ADMIN — LEVALBUTEROL HYDROCHLORIDE 1.25 MG: 1.25 SOLUTION RESPIRATORY (INHALATION) at 01:48

## 2023-11-20 RX ADMIN — Medication 2000 UNITS: at 08:36

## 2023-11-20 RX ADMIN — CALCITRIOL 0.25 MCG: 0.25 CAPSULE, LIQUID FILLED ORAL at 08:36

## 2023-11-20 RX ADMIN — SUCRALFATE 1 G: 1 TABLET ORAL at 08:36

## 2023-11-20 RX ADMIN — IPRATROPIUM BROMIDE 0.5 MG: 0.5 SOLUTION RESPIRATORY (INHALATION) at 01:48

## 2023-11-20 RX ADMIN — BUDESONIDE 0.5 MG: 0.5 INHALANT ORAL at 21:41

## 2023-11-20 RX ADMIN — IOHEXOL 80 ML: 350 INJECTION, SOLUTION INTRAVENOUS at 01:16

## 2023-11-20 RX ADMIN — METHYLPREDNISOLONE SODIUM SUCCINATE 40 MG: 40 INJECTION, POWDER, FOR SOLUTION INTRAMUSCULAR; INTRAVENOUS at 08:36

## 2023-11-20 RX ADMIN — LEVALBUTEROL 1.25 MG: 1.25 SOLUTION, CONCENTRATE RESPIRATORY (INHALATION) at 21:42

## 2023-11-20 RX ADMIN — PANTOPRAZOLE SODIUM 40 MG: 40 TABLET, DELAYED RELEASE ORAL at 21:36

## 2023-11-20 RX ADMIN — LEVALBUTEROL 1.25 MG: 1.25 SOLUTION, CONCENTRATE RESPIRATORY (INHALATION) at 14:51

## 2023-11-20 RX ADMIN — PANTOPRAZOLE SODIUM 40 MG: 40 TABLET, DELAYED RELEASE ORAL at 06:12

## 2023-11-20 RX ADMIN — HEPARIN SODIUM 5000 UNITS: 5000 INJECTION INTRAVENOUS; SUBCUTANEOUS at 21:36

## 2023-11-20 RX ADMIN — LEVALBUTEROL 1.25 MG: 1.25 SOLUTION, CONCENTRATE RESPIRATORY (INHALATION) at 08:36

## 2023-11-20 RX ADMIN — GUAIFENESIN 600 MG: 600 TABLET ORAL at 08:36

## 2023-11-20 RX ADMIN — BUDESONIDE 0.5 MG: 0.5 INHALANT ORAL at 14:46

## 2023-11-20 RX ADMIN — HEPARIN SODIUM 5000 UNITS: 5000 INJECTION INTRAVENOUS; SUBCUTANEOUS at 13:32

## 2023-11-20 NOTE — ED NOTES
Patient resting with no signs or symptoms of distress. Denies complaints at this time. Comfort and safety measures provided. Will monitor.       An Camacho RN  11/20/23 2317

## 2023-11-20 NOTE — H&P
[address] [facility]  H&P  Name: Nazario Haque 70 y.o. female I MRN: [id number]  Unit/Bed#: ED-31 I Date of Admission: 11/19/2023   Date of Service: 11/20/2023 I Hospital Day: 0      Assessment/Plan   * Cough  Assessment & Plan  Nonproductive cough ongoing for 6 weeks, associated with nasal congestion and some rhinorrhea  Previously on azithromycin and Ceftin, did not improve her symptoms  Prior smoking history, half a pack a day for over 30 years. Quit at 47years old. Never had PFTs done or seen pulmonology  Mild bilateral wheezing, improved with nebulizer therapy  Chest x-ray reviewed personally and looks like some diaphragm flattening bilaterally but no consolidation  CTA PE study negative for PE  Patient states she was previously tested for COVID and strep which were both negative  WBC 10.9, tachycardia meeting SIRS criteria, unlikely to be infectious etiology. No fevers or chills. No sputum. Imaging clear. Does not appear to be in COPD exacerbation at this time, but could be an underlying COPD    Plan:  Monitor off antibiotics at this time  Continue nebulizers  Mucinex  Respiratory protocol  Airway clearance  Consult pulmonology to establish care, PFTs outpatient    Tachycardia  Assessment & Plan  Sinus tachycardia  Concurrent cough, suspecting underlying COPD but no exacerbation at this time  Previously in 2020 had a Holter monitor that showed PVCs. At that time patient was supposed to get a stress test but she never got that.   Echo in 2020 showed normal EF 60%, no WMA or other abnormalities    Plan:  Repeat echo  Telemetry  Refer to cardiology upon discharge    Stage 3 chronic kidney disease [facility])  Assessment & Plan  Lab Results   Component Value Date    EGFR 35 11/19/2023    EGFR 34 10/09/2023    EGFR 33 09/08/2023    CREATININE 1.50 (H) 11/19/2023    CREATININE 1.54 (H) 10/09/2023    CREATININE 1.56 (H) 09/08/2023   Creatinine around baseline  Patient euvolemic  Avoid nephrotoxins  Monitor BMPs           VTE Pharmacologic Prophylaxis: VTE Score: 6 High Risk (Score >/= 5) - Pharmacological DVT Prophylaxis Ordered: heparin. Sequential Compression Devices Ordered. Code Status: Level 1 - Full Code confirmed with the patient  Discussion with family: Patient declined call to . Anticipated Length of Stay: Patient will be admitted on an observation basis with an anticipated length of stay of less than 2 midnights secondary to cough. Chief Complaint: Cough    History of Present Illness:  Martin Munoz is a 70 y.o. female with a PMH of bipolar 1 disorder, depression, hypothyroidism, GERD, Raynaud's disease, CKD stage III, JULIO who presents with a dry cough that has been ongoing for the past 6 weeks. Patient states that she has been to several providers for this cough and has tried multiple treatments including multiple rounds of antibiotics like azithromycin and Ceftin, prednisone, inhalers and nothing has been helping. She states that her cough has not improved at all. The cough is dry and she does not produce any sputum. She also endorses having nasal stuffiness with some rhinorrhea. She uses fluticasone at home. She denies any shortness of breath, chest pain, pressure, or discomfort, back pain, abdominal pain, nausea or vomiting, GI or  symptoms. No lightheadedness or dizziness. Patient states that she smoked for over 30 years half a pack per day, quit when she was 47years old. She has never seen a pulmonologist or had any PFTs done. She states that she feels like she was wheezing earlier today but that this improved after she had nebulizer treatment in the ED. Patient denies any environmental exposure or occupational exposure history. She got a cat this weekend, otherwise no pet exposures. No allergy history. In the ED, patient is found to be tachycardic and mild hypoxia requiring 2 L nasal cannula.   Per chart review, patient had a previous Holter monitor in 2020 that showed sinus rhythm with rare ventricular and supraventricular ectopic activity consisting of PVCs and some bradycardia. Review of Systems:  Review of Systems   Constitutional:  Negative for chills, diaphoresis, fatigue and fever. HENT:  Positive for rhinorrhea. Negative for ear pain, hearing loss, postnasal drip, sinus pressure, sinus pain, sneezing and sore throat. Eyes:  Negative for pain and visual disturbance. Respiratory:  Positive for cough and wheezing. Negative for choking, chest tightness, shortness of breath and stridor. Cardiovascular:  Negative for chest pain, palpitations and leg swelling. Gastrointestinal:  Negative for abdominal pain, nausea and vomiting. Genitourinary:  Negative for dysuria and hematuria. Musculoskeletal:  Negative for arthralgias, back pain and myalgias. Skin:  Negative for color change and rash. Neurological:  Negative for seizures, syncope, light-headedness and headaches. Psychiatric/Behavioral:  Negative for confusion. All other systems reviewed and are negative. Past Medical and Surgical History:   Past Medical History:   Diagnosis Date    Abnormal ECG     Anxiety 2002    Arthritis     Bipolar 1 disorder ([address])     Chronic kidney disease     stage 3    CPAP (continuous positive airway pressure) dependence     Depression 2001    Disease of thyroid gland     Elevated blood pressure reading 06/10/2019    GERD (gastroesophageal reflux disease)     Obesity     Sleep apnea        Past Surgical History:   Procedure Laterality Date    COLONOSCOPY  2011       Meds/Allergies:  Prior to Admission medications    Medication Sig Start Date End Date Taking?  Authorizing Provider   calcitriol (ROCALTROL) 0.25 mcg capsule TAKE ONE CAPSULE BY MOUTH EVERY DAY 1/6/23  Yes Kirsten Mccormick MD   Cholecalciferol (VITAMIN D) 2000 units CAPS Take 1 capsule by mouth daily   Yes Historical Provider, MD   CRANBERRY PO Take by mouth   Yes Historical Provider, MD   fluticasone (FLONASE) 50 mcg/act nasal spray 1 spray into each nostril daily 11/15/23  Yes Kevin Matthews, DO   Glucosamine-Chondroit-Vit C-Mn (GLUCOSAMINE 1500 COMPLEX PO) Take by mouth in the morning   Yes Historical Provider, MD   lurasidone (LATUDA) 20 mg tablet TAKE 1 TABLET BY MOUTH DAILY  WITH BREAKFAST 10/1/23  Yes Ines Gould MD   Mirabegron ER (Myrbetriq) 25 MG TB24 Take 25 mg by mouth in the morning 6/26/23  Yes Ovi Roberts PA-C   Multiple Vitamin (MULTI-VITAMIN DAILY) TABS Take by mouth   Yes Historical Provider, MD   pantoprazole (PROTONIX) 40 mg tablet Take 1 tablet (40 mg total) by mouth daily 11/15/23  Yes Kevin Matthews DO   sucralfate (CARAFATE) 1 g/10 mL suspension Take 10 mL (1 g total) by mouth 2 (two) times a day 11/15/23  Yes Jamarcus Villavicencio DO   azithromycin (ZITHROMAX) 250 mg tablet Take 2 tablets today then 1 tablet daily x 4 days 11/15/23 11/19/23  Jamarcus Villavicencio DO   benzonatate (TESSALON PERLES) 100 mg capsule Take 1 capsule (100 mg total) by mouth 3 (three) times a day as needed for cough  Patient not taking: Reported on 10/31/2023 10/3/23   ZURI Flores   famotidine (PEPCID) 20 mg tablet Take 1 tablet (20 mg total) by mouth 2 (two) times a day  Patient not taking: Reported on 11/20/2023 7/24/23   Jose Russo DO     I have reviewed home medications with patient personally. Allergies: Allergies   Allergen Reactions    Other Other (See Comments)     Other reaction(s):  Anaphylaxis  Annotation - [id number]: plums peaches, fruits with skin        Social History:  Marital Status: /Civil Union   Occupation: Retired   Patient Pre-hospital Living Situation: Home  Patient Pre-hospital Level of Mobility: walks  Patient Pre-hospital Diet Restrictions: Unknown  Substance Use History:   Social History     Substance and Sexual Activity   Alcohol Use Yes    Alcohol/week: 1.0 standard drink of alcohol    Types: 1 Glasses of wine per week     Social History     Tobacco Use   Smoking Status Former    Packs/day: 0.25    Years: 30.00    Total pack years: 7.50    Types: Cigarettes    Quit date: 1/1/2008    Years since quitting: 15.8   Smokeless Tobacco Never     Social History     Substance and Sexual Activity   Drug Use Never       Family History:  Family History   Problem Relation Age of Onset    Heart disease Mother     Heart Valve Disease Mother         Replacement    Diabetes Mother         2002    Heart failure Mother         Heart Valve replacement    Arthritis Father     No Known Problems Sister     No Known Problems Daughter     No Known Problems Maternal Grandmother     No Known Problems Maternal Grandfather     No Known Problems Paternal Grandmother     No Known Problems Paternal Grandfather     No Known Problems Son     No Known Problems Maternal Aunt     No Known Problems Maternal Aunt     No Known Problems Maternal Aunt     No Known Problems Maternal Aunt     No Known Problems Maternal Aunt     No Known Problems Paternal Aunt     Alcohol abuse Son         Kolton Gomes, 36year old son, has issues with alcohol and alcohol abuse       Physical Exam:     Vitals:   Blood Pressure: 118/61 (11/20/23 0515)  Pulse: (!) 110 (11/20/23 0515)  Temperature: 98.7 °F (37.1 °C) (11/19/23 2023)  Temp Source: Oral (11/19/23 2023)  Respirations: 20 (11/20/23 0515)  Height: 5' 1" (154.9 cm) (11/20/23 0000)  Weight - Scale: 104 kg (228 lb 13.4 oz) (11/19/23 2023)  SpO2: 95 % (11/20/23 0515)    Physical Exam  Vitals and nursing note reviewed. Constitutional:       General: She is not in acute distress. Appearance: Normal appearance. She is well-developed. She is obese. She is not ill-appearing. HENT:      Head: Normocephalic and atraumatic. Mouth/Throat:      Mouth: Mucous membranes are moist.   Eyes:      General: No scleral icterus. Extraocular Movements: Extraocular movements intact.       Conjunctiva/sclera: Conjunctivae normal. Pupils: Pupils are equal, round, and reactive to light. Cardiovascular:      Rate and Rhythm: Regular rhythm. Tachycardia present. Pulses: Normal pulses. Heart sounds: Normal heart sounds. No murmur heard. No friction rub. No gallop. Pulmonary:      Effort: Pulmonary effort is normal. No respiratory distress. Breath sounds: Wheezing present. No rales. Abdominal:      General: There is no distension. Palpations: Abdomen is soft. Tenderness: There is no abdominal tenderness. There is no guarding. Musculoskeletal:         General: No swelling. Cervical back: Neck supple. Right lower leg: No edema. Left lower leg: No edema. Skin:     General: Skin is warm and dry. Capillary Refill: Capillary refill takes less than 2 seconds. Neurological:      Mental Status: She is alert and oriented to person, place, and time. Mental status is at baseline.    Psychiatric:         Mood and Affect: Mood normal.         Behavior: Behavior normal.          Additional Data:     Lab Results:  Results from last 7 days   Lab Units 11/20/23  0523 11/19/23  2351   WBC Thousand/uL 7.43 10.96*   HEMOGLOBIN g/dL 13.4 14.4   HEMATOCRIT % 40.2 43.6   PLATELETS Thousands/uL 128* 154   BANDS PCT %  --  3   NEUTROS PCT % 88*  --    LYMPHS PCT % 9*  --    LYMPHO PCT %  --  7*   MONOS PCT % 2*  --    MONO PCT %  --  1*   EOS PCT % 0 1     Results from last 7 days   Lab Units 11/19/23  2351   SODIUM mmol/L 136   POTASSIUM mmol/L 3.9   CHLORIDE mmol/L 105   CO2 mmol/L 20*   BUN mg/dL 29*   CREATININE mg/dL 1.50*   ANION GAP mmol/L 11   CALCIUM mg/dL 9.5   GLUCOSE RANDOM mg/dL 248*                       Lines/Drains:  Invasive Devices       Peripheral Intravenous Line  Duration             Peripheral IV 11/19/23 Right;Ventral (anterior) Forearm <1 day                        Imaging: Reviewed radiology reports from this admission including: chest CT scan  CTA ED chest PE study   Final Result by Luli Magallon DO Reji (11/20 0132)      Degraded evaluation of the pulmonary arteries due to respiratory motion. No pulmonary embolus identified within the limitations of the examination. No other acute abnormality identified. Moderate hiatal hernia            Workstation performed: [id number]             EKG and Other Studies Reviewed on Admission:   EKG: Sinus Tachycardia. . ** Please Note: This note has been constructed using a voice recognition system.  **

## 2023-11-20 NOTE — ED PROVIDER NOTES
History  Chief Complaint   Patient presents with    Cough     Patient has been coughing for 6 weeks. Has been seen multiple times. 70yo female who presents to ER for evaluation of cough. Ongoing for the past 6 weeks. States earlier this week she was started on zithromax, however sx's did not improve. [facility] they swtiched her to ceftin however did not start that yet because she was sent here for additional evaluation due to abnormal lung sounds and EKG. States she was told the CXR there was normal. She had a neb at their clinic today which didn't seem to help much either. Patient states her chest sounds congested and she feels wheezy, however nothing comes up. Denies fever or other URI sx's. States she was also tested for covid and strep which were both negative. Denies h/o smoking or copd/asthma. Denies sick contacts.  states he is well. Prior to Admission Medications   Prescriptions Last Dose Informant Patient Reported? Taking?    CRANBERRY PO  Self Yes No   Sig: Take by mouth   Cholecalciferol (VITAMIN D) 2000 units CAPS  Self Yes No   Sig: Take 1 capsule by mouth daily   Glucosamine-Chondroit-Vit C-Mn (GLUCOSAMINE 1500 COMPLEX PO)  Self Yes No   Sig: Take by mouth in the morning   Mirabegron ER (Myrbetriq) 25 MG TB24   No No   Sig: Take 25 mg by mouth in the morning   Multiple Vitamin (MULTI-VITAMIN DAILY) TABS  Self Yes No   Sig: Take by mouth   azithromycin (ZITHROMAX) 250 mg tablet   No No   Sig: Take 2 tablets today then 1 tablet daily x 4 days   benzonatate (TESSALON PERLES) 100 mg capsule   No No   Sig: Take 1 capsule (100 mg total) by mouth 3 (three) times a day as needed for cough   Patient not taking: Reported on 10/31/2023   calcitriol (ROCALTROL) 0.25 mcg capsule  Self No No   Sig: TAKE ONE CAPSULE BY MOUTH EVERY DAY   famotidine (PEPCID) 20 mg tablet   No No   Sig: Take 1 tablet (20 mg total) by mouth 2 (two) times a day fluticasone (FLONASE) 50 mcg/act nasal spray   No No   Si spray into each nostril daily   lurasidone (LATUDA) 20 mg tablet   No No   Sig: TAKE 1 TABLET BY MOUTH DAILY  WITH BREAKFAST   pantoprazole (PROTONIX) 40 mg tablet   No No   Sig: Take 1 tablet (40 mg total) by mouth daily   sucralfate (CARAFATE) 1 g/10 mL suspension   No No   Sig: Take 10 mL (1 g total) by mouth 2 (two) times a day      Facility-Administered Medications: None       Past Medical History:   Diagnosis Date    Abnormal ECG     Anxiety     Arthritis     Bipolar 1 disorder (HCC)     Chronic kidney disease     stage 3    CPAP (continuous positive airway pressure) dependence     Depression     Disease of thyroid gland     Elevated blood pressure reading 06/10/2019    GERD (gastroesophageal reflux disease)     Obesity     Sleep apnea        Past Surgical History:   Procedure Laterality Date    COLONOSCOPY         Family History   Problem Relation Age of Onset    Heart disease Mother     Heart Valve Disease Mother         Replacement    Diabetes Mother         2002    Heart failure Mother         Heart Valve replacement    Arthritis Father     No Known Problems Sister     No Known Problems Daughter     No Known Problems Maternal Grandmother     No Known Problems Maternal Grandfather     No Known Problems Paternal Grandmother     No Known Problems Paternal Grandfather     No Known Problems Son     No Known Problems Maternal Aunt     No Known Problems Maternal Aunt     No Known Problems Maternal Aunt     No Known Problems Maternal Aunt     No Known Problems Maternal Aunt     No Known Problems Paternal Aunt     Alcohol abuse Son         Dashawn West, 36year old son, has issues with alcohol and alcohol abuse     I have reviewed and agree with the history as documented.     E-Cigarette/Vaping    E-Cigarette Use Never User      E-Cigarette/Vaping Substances    Nicotine No     THC No     CBD No     Flavoring No     Other No     Unknown No Social History     Tobacco Use    Smoking status: Former     Packs/day: 0.25     Years: 30.00     Total pack years: 7.50     Types: Cigarettes     Quit date: 1/1/2008     Years since quitting: 15.8    Smokeless tobacco: Never   Vaping Use    Vaping Use: Never used   Substance Use Topics    Alcohol use: Yes     Alcohol/week: 1.0 standard drink of alcohol     Types: 1 Glasses of wine per week    Drug use: Never       Review of Systems   Constitutional:  Negative for chills and fever. HENT:  Negative for ear pain and sore throat. Respiratory:  Positive for cough, chest tightness, shortness of breath and wheezing. Cardiovascular:  Negative for chest pain. Gastrointestinal:  Negative for abdominal pain, nausea and vomiting. Musculoskeletal:  Negative for myalgias. Skin:  Negative for rash. Neurological:  Negative for weakness and numbness. All other systems reviewed and are negative. Physical Exam  Physical Exam  Vitals and nursing note reviewed. Constitutional:       Appearance: Normal appearance. She is well-developed. HENT:      Head: Atraumatic. Right Ear: External ear normal.      Left Ear: External ear normal.   Eyes:      General: No scleral icterus. Conjunctiva/sclera: Conjunctivae normal.   Cardiovascular:      Rate and Rhythm: Normal rate and regular rhythm. Heart sounds: Normal heart sounds. Pulmonary:      Effort: Pulmonary effort is normal.      Breath sounds: Wheezing and rhonchi present. Musculoskeletal:         General: No swelling or tenderness. Normal range of motion. Cervical back: Neck supple. Skin:     General: Skin is warm and dry. Findings: No rash. Neurological:      Mental Status: She is alert and oriented to person, place, and time.    Psychiatric:         Mood and Affect: Mood normal.         Vital Signs  ED Triage Vitals   Temperature Pulse Respirations Blood Pressure SpO2   11/19/23 2023 11/19/23 2023 11/19/23 2023 11/19/23 2023 11/19/23 2023   98.7 °F (37.1 °C) (!) 118 18 149/82 95 %      Temp Source Heart Rate Source Patient Position - Orthostatic VS BP Location FiO2 (%)   11/19/23 2023 11/19/23 2023 11/19/23 2023 11/19/23 2023 --   Oral Monitor Sitting Right arm       Pain Score       11/20/23 0000       No Pain           Vitals:    11/20/23 0130 11/20/23 0200 11/20/23 0230 11/20/23 0300   BP: 132/66 143/67 138/65 141/65   Pulse: (!) 114 (!) 114 (!) 118 (!) 120   Patient Position - Orthostatic VS: Sitting Sitting Sitting Sitting         Visual Acuity      ED Medications  Medications   albuterol inhalation solution 5 mg (5 mg Nebulization Given 11/19/23 2359)   ipratropium (ATROVENT) 0.02 % inhalation solution 0.5 mg (0.5 mg Nebulization Given 11/19/23 2359)   sodium chloride 0.9 % bolus 1,000 mL (1,000 mL Intravenous New Bag 11/20/23 0126)   iohexol (OMNIPAQUE) 350 MG/ML injection (MULTI-DOSE) 80 mL (80 mL Intravenous Given 11/20/23 0116)   ipratropium (ATROVENT) 0.02 % inhalation solution 0.5 mg (0.5 mg Nebulization Given 11/20/23 0148)   levalbuterol (XOPENEX) inhalation solution 1.25 mg (1.25 mg Nebulization Given 11/20/23 0148)       Diagnostic Studies  Results Reviewed       Procedure Component Value Units Date/Time    HS Troponin I 2hr [[id number]]  (Normal) Collected: 11/20/23 0152    Lab Status: Final result Specimen: Blood from Arm, Right Updated: 11/20/23 0241     hs TnI 2hr 24 ng/L      Delta 2hr hsTnI -5 ng/L     RBC Morphology Reflex Test [[id number]] Collected: 11/19/23 2351    Lab Status: Final result Specimen: Blood from Arm, Right Updated: 11/20/23 0201    CBC and differential [[id number]]  (Abnormal) Collected: 11/19/23 2351    Lab Status: Final result Specimen: Blood from Arm, Right Updated: 11/20/23 0113     WBC 10.96 Thousand/uL      RBC 4.66 Million/uL      Hemoglobin 14.4 g/dL      Hematocrit 43.6 %      MCV 94 fL      MCH 30.9 pg      MCHC 33.0 g/dL      RDW 13.4 %      MPV 12.1 fL      Platelets 377 Thousands/uL Narrative: This is an appended report. These results have been appended to a previously verified report. Manual Differential(PHLEBS Do Not Order) [[id number]]  (Abnormal) Collected: 11/19/23 2351    Lab Status: Final result Specimen: Blood from Arm, Right Updated: 11/20/23 0113     Segmented % 86 %      Bands % 3 %      Lymphocytes % 7 %      Monocytes % 1 %      Eosinophils, % 1 %      Basophils % 0 %      Atypical Lymphocytes % 2 %      Absolute Neutrophils 9.75 Thousand/uL      Lymphocytes Absolute 0.99 Thousand/uL      Monocytes Absolute 0.11 Thousand/uL      Eosinophils Absolute 0.11 Thousand/uL      Basophils Absolute 0.00 Thousand/uL      Total Counted --     RBC Morphology Normal     Platelet Estimate Adequate     Large Platelet Present    D-Dimer [[id number]]  (Abnormal) Collected: 11/19/23 2351    Lab Status: Final result Specimen: Blood from Arm, Right Updated: 11/20/23 0048     D-Dimer, Quant 1.48 ug/ml FEU     Narrative: In the evaluation for possible pulmonary embolism, in the appropriate (Well's Score of 4 or less) patient, the age adjusted d-dimer cutoff for this patient can be calculated as:    Age x 0.01 (in ug/mL) for Age-adjusted D-dimer exclusion threshold for a patient over 50 years.     HS Troponin 0hr (reflex protocol) [[id number]]  (Normal) Collected: 11/19/23 2351    Lab Status: Final result Specimen: Blood from Arm, Right Updated: 11/20/23 0047     hs TnI 0hr 29 ng/L     HS Troponin I 4hr [[id number]]     Lab Status: No result Specimen: Blood     Basic metabolic panel [[id number]]  (Abnormal) Collected: 11/19/23 2351    Lab Status: Final result Specimen: Blood from Arm, Right Updated: 11/20/23 0038     Sodium 136 mmol/L      Potassium 3.9 mmol/L      Chloride 105 mmol/L      CO2 20 mmol/L      ANION GAP 11 mmol/L      BUN 29 mg/dL      Creatinine 1.50 mg/dL      Glucose 248 mg/dL      Calcium 9.5 mg/dL      eGFR 35 ml/min/1.73sq m     Narrative:      National Kid[employer] Disease Foundation guidelines for Chronic Kidney Disease (CKD):     Stage 1 with normal or high GFR (GFR > 90 mL/min/1.73 square meters)    Stage 2 Mild CKD (GFR = 60-89 mL/min/1.73 square meters)    Stage 3A Moderate CKD (GFR = 45-59 mL/min/1.73 square meters)    Stage 3B Moderate CKD (GFR = 30-44 mL/min/1.73 square meters)    Stage 4 Severe CKD (GFR = 15-29 mL/min/1.73 square meters)    Stage 5 End Stage CKD (GFR <15 mL/min/1.73 square meters)  Note: GFR calculation is accurate only with a steady state creatinine                   CTA ED chest PE study   Final Result by Harriet Soriano DO (11/20 0132)      Degraded evaluation of the pulmonary arteries due to respiratory motion. No pulmonary embolus identified within the limitations of the examination. No other acute abnormality identified. Moderate hiatal hernia            Workstation performed: [id number]                    Procedures  ECG 12 Lead Documentation Only    Date/Time: 11/20/2023 12:37 AM    Performed by: Kathy De Santiago PA-C  Authorized by: Kathy De Santiago PA-C    Indications / Diagnosis:  Sob  ECG reviewed by me, the ED Provider: yes    Patient location:  ED  Previous ECG:     Previous ECG:  Compared to current    Comparison ECG info:  Earlier today, no other priors to review    Similarity:  No change  Interpretation:     Interpretation: abnormal    Rate:     ECG rate:  105    ECG rate assessment: normal    Rhythm:     Rhythm: sinus tachycardia    Ectopy:     Ectopy: none    QRS:     QRS axis:  Normal    QRS intervals:   Wide  Conduction:     Conduction: abnormal      Abnormal conduction: complete LBBB    ST segments:     ST segments:  Normal  T waves:     T waves: normal    ECG 12 Lead Documentation Only    Date/Time: 11/20/2023 1:59 AM    Performed by: Kathy De Santiago PA-C  Authorized by: Kathy De Santiago PA-C    Indications / Diagnosis:  Sob  ECG reviewed by me, the ED Provider: yes    Patient location:  ED  Previous ECG:     Previous ECG:  Compared to current    Similarity:  No change  Interpretation:     Interpretation: abnormal    Rate:     ECG rate:  116    ECG rate assessment: normal    Rhythm:     Rhythm: sinus tachycardia    Ectopy:     Ectopy: none    QRS:     QRS axis:  Normal    QRS intervals:   Wide  Conduction:     Conduction: abnormal      Abnormal conduction: complete LBBB    ST segments:     ST segments:  Normal  T waves:     T waves: normal             ED Course  ED Course as of 11/20/23 0355   Mon Nov 20, 2023   0047 Completed neb, patient states she still feels some wheezing or congestion in her lungs, on auscultation crackles and rhonchi heard    0141 Reviewed results with patient, discussed admission, she is acceptable, she will also like to try another neb.   0152 SLIM paged for admission   0244 Patient sob after walking, heart rate went up to 140, O2 sat 94%, lungs sound have increased rhonchi wheeze -  SLIM updated   0330 SLIM AP accepted admission to resident service   1539 Discussed case with IM resident, patient will be admitted             HEART Risk Score      Flowsheet Row Most Recent Value   Heart Score Risk Calculator    History 1 Filed at: 11/20/2023 0329   ECG 0 Filed at: 11/20/2023 0329   Age 2 Filed at: 11/20/2023 0329   Risk Factors 2 Filed at: 11/20/2023 0329   Troponin 1 Filed at: 11/20/2023 0329   HEART Score 6 Filed at: 11/20/2023 0329                  PERC Rule for PE      Flowsheet Row Most Recent Value   PERC Rule for PE    Age >=50 1 Filed at: 11/19/2023 2328   HR >=100 1 Filed at: 11/19/2023 2328   O2 Sat on room air < 95% 0 Filed at: 11/19/2023 2328   History of PE or DVT 0 Filed at: 11/19/2023 2328   Recent trauma or surgery 0 Filed at: 11/19/2023 2328   Hemoptysis 0 Filed at: 11/19/2023 2328   Exogenous estrogen 0 Filed at: 11/19/2023 2328   Unilateral leg swelling 0 Filed at: 11/19/2023 2328   PERC Rule for PE Results 2 Filed at: 11/19/2023 2328                SBIRT 20yo+      Flowsheet Row Most Recent Value Initial Alcohol Screen: US AUDIT-C     1. How often do you have a drink containing alcohol? 0 Filed at: 11/19/2023 2240   2. How many drinks containing alcohol do you have on a typical day you are drinking? 0 Filed at: 11/19/2023 2240   3b. FEMALE Any Age, or MALE 65+: How often do you have 4 or more drinks on one occassion? 0 Filed at: 11/19/2023 2240   Audit-C Score 0 Filed at: 11/19/2023 2240   CHERIE: How many times in the past year have you. .. Used an illegal drug or used a prescription medication for non-medical reasons? Never Filed at: 11/19/2023 2240            Wells' Criteria for PE      Flowsheet Row Most Recent Value   Wells' Criteria for PE    Clinical signs and symptoms of DVT 0 Filed at: 11/19/2023 2328   PE is primary diagnosis or equally likely 0 Filed at: 11/19/2023 2328   HR >100 1.5 Filed at: 11/19/2023 2328   Immobilization at least 3 days or Surgery in the previous 4 weeks 0 Filed at: 11/19/2023 2328   Previous, objectively diagnosed PE or DVT 0 Filed at: 11/19/2023 2328   Hemoptysis --   Malignancy with treatment within 6 months or palliative 0 Filed at: 11/19/2023 2328   Wells' Criteria Total 1.5 Filed at: 11/19/2023 2328                  Medical Decision Making  Differential diagnosis includes but is not limited to: pna, bronchitis, acs, pe, maryjane, arhythmia, dehydration    Amount and/or Complexity of Data Reviewed  External Data Reviewed: notes. Details: [facility] notes  Labs: ordered. Details: reviewed  Radiology: ordered. Details: result reviewed  ECG/medicine tests: ordered and independent interpretation performed. Risk  Prescription drug management. Decision regarding hospitalization.              Disposition  Final diagnoses:   Bronchitis   Tachycardia   LBBB (left bundle branch block)   Elevated troponin   CKD (chronic kidney disease)     Time reflects when diagnosis was documented in both MDM as applicable and the Disposition within this note       Time User Action Codes Description Comment    11/20/2023  3:28 AM Zeferino Bye L Add [J40] Bronchitis     11/20/2023  3:28 AM Zeferino Bye L Add [R00.0] Tachycardia     11/20/2023  3:28 AM Zeferino Bye L Add [I44.7] LBBB (left bundle branch block)     11/20/2023  3:28 AM Henny Sammyadeloupe Add [R79.89] Elevated troponin     11/20/2023  3:28 AM Zeferino Bye L Add [N18.9] CKD (chronic kidney disease)           ED Disposition       ED Disposition   Admit    Condition   Stable    Date/Time   Mon Nov 20, 2023 0351    Comment   Case was discussed with Carlota ROGERS and the patient's admission status was agreed to be Admission Status: observation status to the service of Dr. Gilbert Pritchett    None         Patient's Medications   Discharge Prescriptions    No medications on file       No discharge procedures on file.     PDMP Review       None            ED Provider  Electronically Signed by             Mark Rucker PA-C  11/20/23 8859

## 2023-11-20 NOTE — ED NOTES
Pre breathing treatment peak flow 200 and post breathing treatment peak flow 230. Provider made aware.      Brittaney Davenport RN  11/20/23 0791

## 2023-11-20 NOTE — ED NOTES
Ambulated independently under supervision. SPO2 on room air ranged between 94%-95%. Patient heart rate became 138-140. Patient stated to positive shortness of breath. Provider made aware.      Andrews Araiza RN  11/20/23 0518

## 2023-11-20 NOTE — UTILIZATION REVIEW
OBSERVATION 11/20/12 @ 0253 CONVERTED TO INPATIENT 11/20/23 @ 1548 DUE TO COUGH, REQUIRING CONTINUED IV STEROIDS. nitial Clinical Review    Admission: Date/Time/Statement:   Admission Orders (From admission, onward)       Ordered        11/20/23 1548  Inpatient Admission  Once            11/20/23 0352  Place in Observation  Once                          Orders Placed This Encounter   Procedures    Place in Observation     Standing Status:   Standing     Number of Occurrences:   1     Order Specific Question:   Level of Care     Answer:   Med Surg [16]    Inpatient Admission     Standing Status:   Standing     Number of Occurrences:   1     Order Specific Question:   Level of Care     Answer:   Med Surg [16]     Order Specific Question:   Estimated length of stay     Answer:   More than 2 Midnights     Order Specific Question:   Certification     Answer:   I certify that inpatient services are medically necessary for this patient for a duration of greater than two midnights. See H&P and MD Progress Notes for additional information about the patient's course of treatment. ED Arrival Information       Expected   -    Arrival   11/19/2023 20:09    Acuity   Urgent              Means of arrival   Walk-In    Escorted by   Spouse    Service   Hospitalist    Admission type   Emergency              Arrival complaint   cough             Chief Complaint   Patient presents with    Cough     Patient has been coughing for 6 weeks. Has been seen multiple times. Initial Presentation: 70 y.o. female to the ED from home with complaints of  cough for 6 weeks prior to arrival. Has been seen for the same multiple times. Arrives tachycardic with wheezing, rhonchi heard in lungs. Admitted under observation  then converted to inpatient  for cough, tachycardia. H/O bipolar 1 disorder, depression, hypothyroidism, GERD, Raynaud's disease, CKD stage III, JULIO .  H/O smoking quit 19 years prior, has not ever seen pulmonary doc. Mildly hypoxic. Placed on 2 LNC. Check ECHo, monitor tele. Started on IV steroids, nebulizer in the ED. Previously on azithromycin, ceftin. Date:  11/20  Continue with IV steroids. Check ECHo. Wheezing, tachycardia continue. Resume GERD medications. PUlmonary consult: GCs 15, subacute cough. Start protonix. Continue iv steroids. Decreased breath sounds, wheezing, ronchi heard in lungs. Date: 11/21    Day 3: Has surpassed a 2nd midnight with active treatments and services, which include continued iv steroids, nebulizers.  .      ED Triage Vitals   Temperature Pulse Respirations Blood Pressure SpO2   11/19/23 2023 11/19/23 2023 11/19/23 2023 11/19/23 2023 11/19/23 2023   98.7 °F (37.1 °C) (!) 118 18 149/82 95 %      Temp Source Heart Rate Source Patient Position - Orthostatic VS BP Location FiO2 (%)   11/19/23 2023 11/19/23 2023 11/19/23 2023 11/19/23 2023 --   Oral Monitor Sitting Right arm       Pain Score       11/20/23 0000       No Pain          Wt Readings from Last 1 Encounters:   11/21/23 103 kg (228 lb)     Additional Vital Signs:   ate/Time Temp Pulse Resp BP MAP (mmHg) SpO2 Calculated FIO2 (%) - Nasal Cannula Nasal Cannula O2 Flow Rate (L/min) O2 Device Patient Position - Orthostatic VS   11/20/23 0833 -- 89 20 119/67 87 95 % -- -- None (Room air) Sitting   11/20/23 0515 -- 110 Abnormal  20 118/61 84 95 % -- -- None (Room air) --   11/20/23 0300 -- 120 Abnormal  20 141/65 94 92 % 28 2 L/min Nasal cannula Sitting   11/20/23 0230 -- 118 Abnormal  20 138/65 93 93 % 28 2 L/min Nasal cannula Sitting   11/20/23 0200 -- 114 Abnormal  20 143/67 97 96 % 28 2 L/min Nasal cannula Sitting   11/20/23 0130 -- 114 Abnormal  20 132/66 92 93 % 28 2 L/min Nasal cannula Sitting   11/20/23 0100 -- 118 Abnormal  20 120/56 80 92 % 28 2 L/min Nasal cannula Sitting   11/20/23 0030 -- 116 Abnormal  20 130/67 92 95 % -- -- -- --   11/20/23 0015 -- 109 Abnormal  20 127/71 93 95 % 28 2 L/min Nasal cannula Sitting 11/20/23 0000 -- 104 18 -- -- 92 % -- -- None (Room air) --   11/19/23 2023 98.7 °F (37.1 °C) 118 Abnormal  18 149/82 110 95 % -- -- None (Room air) Sitting     Pertinent Labs/Diagnostic Test Results:   11/19 EKG: Sinus tachycardia  Left axis deviation  Left bundle branch block  Abnormal ECG  When compared with ECG of 19-NOV-2023 23:59, (unconfirmed)  No significant change was found  CTA ED chest PE study   Final Result by Harriet Soraino DO (11/20 0132)      Degraded evaluation of the pulmonary arteries due to respiratory motion. No pulmonary embolus identified within the limitations of the examination. No other acute abnormality identified.       Moderate hiatal hernia            Workstation performed: [id number]           Results from last 7 days   Lab Units 11/20/23  1300   SARS-COV-2  Not Detected     Results from last 7 days   Lab Units 11/21/23 0427 11/20/23 0523 11/19/23 2351   WBC Thousand/uL 13.08* 7.43 10.96*   HEMOGLOBIN g/dL 12.4 13.4 14.4   HEMATOCRIT % 38.3 40.2 43.6   PLATELETS Thousands/uL 161 128* 154   NEUTROS ABS Thousands/µL 9.86* 6.52  --    BANDS PCT %  --   --  3         Results from last 7 days   Lab Units 11/21/23 0427 11/20/23 0523 11/19/23 2351   SODIUM mmol/L 140 138 136   POTASSIUM mmol/L 4.0 3.4* 3.9   CHLORIDE mmol/L 109* 108 105   CO2 mmol/L 23 20* 20*   ANION GAP mmol/L 8 10 11   BUN mg/dL 29* 29* 29*   CREATININE mg/dL 1.48* 1.43* 1.50*   EGFR ml/min/1.73sq m 35 37 35   CALCIUM mg/dL 9.1 8.7 9.5   MAGNESIUM mg/dL 2.4  --   --              Results from last 7 days   Lab Units 11/21/23 0427 11/20/23 0523 11/19/23 2351   GLUCOSE RANDOM mg/dL 124 298* 248*         Results from last 7 days   Lab Units 11/20/23  0400 11/20/23  0152 11/19/23 2351   HS TNI 0HR ng/L  --   --  29   HS TNI 2HR ng/L  --  24  --    HSTNI D2 ng/L  --  -5  --    HS TNI 4HR ng/L 20  --   --    HSTNI D4 ng/L -9  --   --      Results from last 7 days   Lab Units 11/19/23  5812   D-DIMER QUANTITATIVE ug/ml FEU 1.48*       ED Treatment:   Medication Administration from 11/19/2023 1950 to 11/20/2023 9599         Date/Time Order Dose Route Action Comments     11/19/2023 2359 EST albuterol inhalation solution 5 mg 5 mg Nebulization Given --     11/19/2023 2359 EST ipratropium (ATROVENT) 0.02 % inhalation solution 0.5 mg 0.5 mg Nebulization Given --     11/20/2023 0126 EST sodium chloride 0.9 % bolus 1,000 mL 1,000 mL Intravenous New Bag --     11/20/2023 0148 EST ipratropium (ATROVENT) 0.02 % inhalation solution 0.5 mg 0.5 mg Nebulization Given --     11/20/2023 0148 EST levalbuterol (Dee [place]) inhalation solution 1.25 mg 1.25 mg Nebulization Given --     11/20/2023 0836 EST calcitriol (ROCALTROL) capsule 0.25 mcg 0.25 mcg Oral Given --     11/20/2023 0836 EST cholecalciferol (VITAMIN D3) tablet 2,000 Units 2,000 Units Oral Given --     11/20/2023 0837 EST fluticasone (FLONASE) 50 mcg/act nasal spray 1 spray 1 spray Nasal Given --     11/20/2023 0836 EST lurasidone (LATUDA) tablet 20 mg 20 mg Oral Given --     11/20/2023 0836 EST oxybutynin (DITROPAN-XL) 24 hr tablet 5 mg 5 mg Oral Given --     11/20/2023 0612 EST pantoprazole (PROTONIX) EC tablet 40 mg 40 mg Oral Given --     11/20/2023 0836 EST sucralfate (CARAFATE) tablet 1 g 1 g Oral Given --     11/20/2023 0836 EST guaiFENesin (MUCINEX) 12 hr tablet 600 mg 600 mg Oral Given --     11/20/2023 0612 EST heparin (porcine) subcutaneous injection 5,000 Units 5,000 Units Subcutaneous Given --     11/20/2023 0836 EST levalbuterol (XOPENEX) inhalation solution 1.25 mg 1.25 mg Nebulization Given --     11/20/2023 0836 EST potassium chloride (K-DUR,KLOR-CON) CR tablet 20 mEq 20 mEq Oral Given --     11/20/2023 0836 EST methylPREDNISolone sodium succinate (Solu-MEDROL) injection 40 mg 40 mg Intravenous Given --          Past Medical History:   Diagnosis Date    Abnormal ECG     Anxiety 2002    Arthritis     Bipolar 1 disorder (HCC)     Chronic kidney disease     stage 3    CPAP (continuous positive airway pressure) dependence     Depression 2001    Disease of thyroid gland     Elevated blood pressure reading 06/10/2019    GERD (gastroesophageal reflux disease)     Obesity     Sleep apnea      Present on Admission:   Cough   Stage 3 chronic kidney disease (HCC)   Tachycardia      Admitting Diagnosis: Cough [R05.9]  LBBB (left bundle branch block) [I44.7]  Tachycardia [R00.0]  Bronchitis [J40]  CKD (chronic kidney disease) [N18.9]  Elevated troponin [R79.89]  Subacute cough [R05.2]  Age/Sex: 70 y.o. female  Admission Orders:  Up and OOB  SCDs    Scheduled Medications:  calcitriol, 0.25 mcg, Oral, Daily  cholecalciferol, 2,000 Units, Oral, Daily  famotidine, 20 mg, Oral, BID  fluticasone, 1 spray, Nasal, Daily  guaiFENesin, 600 mg, Oral, Q12H RUBINA  heparin (porcine), 5,000 Units, Subcutaneous, Q8H RUBINA  levalbuterol, 1.25 mg, Nebulization, TID  lurasidone, 20 mg, Oral, Daily With Breakfast  methylPREDNISolone sodium succinate, 40 mg, Intravenous, Daily  oxybutynin, 5 mg, Oral, Daily  pantoprazole, 40 mg, Oral, BID  sucralfate, 1 g, Oral, BID      Continuous IV Infusions:     PRN Meds:  calcium carbonate, 500 mg, Oral, TID PRN  trimethobenzamide, 200 mg, Intramuscular, Q6H PRN        IP CONSULT TO PULMONOLOGY  IP CONSULT TO CASE MANAGEMENT    Network Utilization Review Department  ATTENTION: Please call with any questions or concerns to [phone] and carefully listen to the prompts so that you are directed to the right person. All voicemails are confidential.   For Discharge needs, contact Care Management DC Support Team at [phone] opt. [phone]  Send all requests for admission clinical reviews, approved or denied determinations and any other requests to dedicated fax number below belonging to the campus where the patient is receiving treatment.  List of dedicated fax numbers for the Facilities:  FACILITY NAME [address] DENIALS (Administrative/Medical Necessity) [phone] DISCHARGE SUPPORT TEAM (NETWORK) [id number] [address] [facility] (Maternity/NICU/Pediatrics) [address] [facility] [phone] 1505 [facility] [address] [address] [facility] [address] [address] [facility] [phone]

## 2023-11-20 NOTE — CONSULTS
Consultation - Pulmonary Medicine   Linnea Mckinney 70 y.o. female MRN: [id number]  Unit/Bed#: RYAN Encounter: [id number]      Assessment/Plan:    1. Acute pulmonary insufficiency likely multifaceted as listed below        -Currently on room air-94%, does not wear home O2        -Continue saturations greater than 88%        -Pulmonary toileting: Deep breathing cough, OOB as tolerated, IS Q 1 hr        2. Subacute on chronic cough        -Etiology somewhat unclear        -H/O of uncontrolled GERD        -May consider EGD        -Recommend trial of gabapentin    3. Possible tracheobronchitis        -Completed course of azithromycin and Ceftin        -Absolute neutrophils elevated        -RP 2 sputum ordered, procalcitonin negative        -If coughing continues to persist and microbiology is negative may need to consider bronchoscopy    4. 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 Moderate JULIO        -2009-completed sleep study recommended CPAP 11 cm of H2O        -Stopped CPAP during recall        -Currently following with Dr. Joni Steward        -Scheduled for home sleep study            History of Present Illness   Physician Requesting Consult: Lisette Kearns DO  Reason for Consult / Principal Problem: Cough  Hx and PE limited by: Nothing  Chief Complaint: " I cannot stop coughing  HPI: Linnea Mckinney is a 70 y.o. [place] female who presented to [facility] with complaints of shortness of breath and cough. Patient has past medical history of anxiety, bipolar, GERD, and elevated blood pressure.   Patient reports that she began developing a cough approximately 6 weeks ago. Patient has been evaluated by multiple providers which have provided azithromycin, Ceftin, prednisone, inhalers all of which nothing seems to relieve her symptoms. On ED admission patient was administered 40 mg IV Solu-Medrol, and nebulizer. Pulmonary was consulted for acute cough. Today upon examination patient reports that she is feeling short of breath. Upon deep inhalation patient had very forceful cough causing her shortness of breath. Patient did have some scattered wheezing and rhonchi throughout lung fields. Currently denying any sweats, pleuritic chest pain, or palpations. From pulmonary standpoint, patient follows with Dr. Natacha Jones for her JULIO. Patient reported approximate 1 pack/day 30-year smoking history however quit in 2008. Patient has never been formally diagnosed with COPD or asthma but is never had any formal PFT testing. Patient is currently not maintained on any inhaled or oxygen therapies. 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 Patient does report having 1. Patient reports history of GERD in which she is maintained on Pepcid/tonics. Patient does report history of postnasal drip in which she is maintained on Flonase. Denies any symptoms of seasonal allergies or dysphagia. Patient does report history of JULIO. Currently denies any recent exposures to any asbestos, silica, or dust.    Inpatient consult to Pulmonology  Consult performed by: ZURI Francisco  Consult ordered by: Romero Mata MD          Review of Systems   Constitutional:  Negative for chills and fever. HENT:  Negative for ear pain and sore throat. Eyes:  Negative for pain and visual disturbance. Respiratory:  Positive for cough and shortness of breath. Negative for apnea, choking, chest tightness, wheezing and stridor. Cardiovascular:  Negative for chest pain and palpitations.    Gastrointestinal: Negative for abdominal pain and vomiting. Genitourinary:  Negative for dysuria and hematuria. Musculoskeletal:  Negative for arthralgias and back pain. Skin:  Negative for color change and rash. Neurological:  Negative for seizures and syncope. Psychiatric/Behavioral:  Negative for agitation and behavioral problems. All other systems reviewed and are negative.       Historical Information   Past Medical History:   Diagnosis Date    Abnormal ECG     Anxiety 2002    Arthritis     Bipolar 1 disorder (HCC)     Chronic kidney disease     stage 3    CPAP (continuous positive airway pressure) dependence     Depression 2001    Disease of thyroid gland     Elevated blood pressure reading 06/10/2019    GERD (gastroesophageal reflux disease)     Obesity     Sleep apnea      Past Surgical History:   Procedure Laterality Date    COLONOSCOPY  2011     Social History   Social History     Substance and Sexual Activity   Alcohol Use Yes    Alcohol/week: 1.0 standard drink of alcohol    Types: 1 Glasses of wine per week     Social History     Substance and Sexual Activity   Drug Use Never     Social History     Tobacco Use   Smoking Status Former    Packs/day: 0.25    Years: 30.00    Total pack years: 7.50    Types: Cigarettes    Quit date: 1/1/2008    Years since quitting: 15.8   Smokeless Tobacco Never     E-Cigarette/Vaping    E-Cigarette Use Never User      E-Cigarette/Vaping Substances    Nicotine No     THC No     CBD No     Flavoring No     Other No     Unknown No      Occupational History: Noncontributory    Family History:   Family History   Problem Relation Age of Onset    Heart disease Mother     Heart Valve Disease Mother         Replacement    Diabetes Mother         2002    Heart failure Mother         Heart Valve replacement    Arthritis Father     No Known Problems Sister     No Known Problems Daughter     No Known Problems Maternal Grandmother     No Known Problems Maternal Grandfather     No Known Problems Paternal Grandmother     No Known Problems Paternal Grandfather     No Known Problems Son     No Known Problems Maternal Aunt     No Known Problems Maternal Aunt     No Known Problems Maternal Aunt     No Known Problems Maternal Aunt     No Known Problems Maternal Aunt     No Known Problems Paternal Aunt     Alcohol abuse Son         Hamilton Alonso, 36year old son, has issues with alcohol and alcohol abuse       Meds/Allergies   pertinent pulmonary meds have been reviewed    Allergies   Allergen Reactions    Other Other (See Comments)     Other reaction(s): Anaphylaxis  [facility] - [id number]: plums peaches, fruits with skin        Objective   Vitals: Blood pressure 134/63, pulse 89, temperature 98.7 °F (37.1 °C), temperature source Oral, resp. rate 18, height 5' 1" (1.549 m), weight 104 kg (228 lb 13.4 oz), SpO2 96 %, not currently breastfeeding. ,Body mass index is 43.24 kg/m². Intake/Output Summary (Last 24 hours) at 11/20/2023 1313  Last data filed at 11/20/2023 9239  Gross per 24 hour   Intake 1000 ml   Output --   Net 1000 ml     Invasive Devices       Peripheral Intravenous Line  Duration             Peripheral IV 11/19/23 Right;Ventral (anterior) Forearm <1 day                    Physical Exam  Constitutional:       General: She is not in acute distress. Appearance: Normal appearance. She is normal weight. She is not ill-appearing. HENT:      Head: Normocephalic and atraumatic. Nose: Nose normal. No congestion or rhinorrhea. Mouth/Throat:      Mouth: Mucous membranes are dry. Pharynx: Oropharynx is clear. No oropharyngeal exudate or posterior oropharyngeal erythema. Cardiovascular:      Rate and Rhythm: Normal rate and regular rhythm. Pulses: Normal pulses. Heart sounds: No murmur heard. No friction rub. No gallop. Pulmonary:      Effort: Pulmonary effort is normal. No tachypnea, bradypnea, accessory muscle usage or respiratory distress.       Breath sounds: Decreased air movement present. No stridor. Decreased breath sounds and wheezing present. No rhonchi or rales. Comments: Some expiratory wheezing and rhonchi  Chest:      Chest wall: No tenderness. Abdominal:      General: Abdomen is flat. Bowel sounds are normal. There is no distension. Palpations: Abdomen is soft. There is no mass. Hernia: No hernia is present. Musculoskeletal:         General: No swelling or tenderness. Normal range of motion. Cervical back: Normal range of motion. No rigidity or tenderness. Skin:     General: Skin is warm and dry. Coloration: Skin is not jaundiced or pale. Neurological:      General: No focal deficit present. Mental Status: She is alert and oriented to person, place, and time. Mental status is at baseline. Psychiatric:         Mood and Affect: Mood normal.         Behavior: Behavior normal.         Lab Results: I have personally reviewed pertinent lab results. , ABG: No results found for: "PHART", "IZW2CZK", "PO2ART", "ESO1AYO", "A2LVWDBW", "BEART", "SOURCE", BNP: No results found for: "BNP", CBC:   Lab Results   Component Value Date    WBC 7.43 11/20/2023    HGB 13.4 11/20/2023    HCT 40.2 11/20/2023    MCV 96 11/20/2023     (L) 11/20/2023    RBC 4.19 11/20/2023    MCH 32.0 11/20/2023    MCHC 33.3 11/20/2023    RDW 13.4 11/20/2023    MPV 12.6 11/20/2023    NRBC 0 11/20/2023   , CMP:   Lab Results   Component Value Date    SODIUM 138 11/20/2023    K 3.4 (L) 11/20/2023     11/20/2023    CO2 20 (L) 11/20/2023    BUN 29 (H) 11/20/2023    CREATININE 1.43 (H) 11/20/2023    CALCIUM 8.7 11/20/2023    EGFR 37 11/20/2023   , PT/INR: No results found for: "PT", "INR"        Imaging Studies: I have personally reviewed pertinent films in PACS     CTA chest no acute abnormalities    EKG, Pathology, and Other Studies: I have personally reviewed pertinent films in PACS     Echo- 3/2020-EF 60%    Pulmonary Results (PFTs, PSG): I have personally reviewed pertinent films in PACS     No PFTs recorded    Code Status: Level 1 - Full Code    Portions of the record may have been created with voice recognition software. Occasional wrong word or "sound a like" substitutions may have occurred due to the inherent limitations of voice recognition software. Read the chart carefully and recognize, using context, where substitutions have occurred.

## 2023-11-20 NOTE — QUICK NOTE
S: Patient seen and examined at bedside. She was sitting up on the edge of the bed in mild discomfort. Patient reported epigastric pain that she feels is related to her acid reflux. She endorsed persistent wheezing and intermittent SOB. She also endorsed a racing heartbeat after nebulizer treatments. O:  Vitals:    11/20/23 0515   BP: 118/61   Pulse: (!) 110   Resp: 20   Temp:    SpO2: 95%       Physical Exam  Vitals and nursing note reviewed. Constitutional:       General: She is not in acute distress. Appearance: She is not toxic-appearing or diaphoretic. HENT:      Head: Normocephalic and atraumatic. Right Ear: External ear normal.      Left Ear: External ear normal.      Nose: Nose normal. No congestion or rhinorrhea. Mouth/Throat:      Mouth: Mucous membranes are moist.   Eyes:      General: No scleral icterus. Right eye: No discharge. Left eye: No discharge. Conjunctiva/sclera: Conjunctivae normal.   Cardiovascular:      Rate and Rhythm: Regular rhythm. Tachycardia present. Heart sounds: Normal heart sounds. No murmur heard. No friction rub. No gallop. Pulmonary:      Breath sounds: Wheezing present. Abdominal:      General: Bowel sounds are normal. There is no distension. Palpations: Abdomen is soft. Tenderness: There is no abdominal tenderness. There is no guarding. Musculoskeletal:         General: No swelling or tenderness. Skin:     General: Skin is warm and dry. Capillary Refill: Capillary refill takes less than 2 seconds. Coloration: Skin is not jaundiced or pale. Findings: No bruising, erythema, lesion or rash. Neurological:      Mental Status: She is alert. Mental status is at baseline. Psychiatric:         Mood and Affect: Mood normal.         Behavior: Behavior normal.         Thought Content:  Thought content normal.         Judgment: Judgment normal.         A: Ms. Wenceslao Raygoza is a 71 yo female with a PMH of bipolar 1 disorder, ddepression, hypothyroidism, GERD, Raynauds, Stage 3 CKD, and JULIO presenting with 6 weeks of chronic dry cough, despite treatment with abx, steroids, and inhaler. P: No change to current plan. - Pulmonology consulted. Recommendations appreciated. - Procal negative. No indication for abx at this time. - Echocardiogram pending.   - Resumed home GERD medications.        Maxine Shaver MD   PGY-2

## 2023-11-20 NOTE — ASSESSMENT & PLAN NOTE
Nonproductive cough ongoing for 6 weeks, associated with nasal congestion and some rhinorrhea  Previously on azithromycin and Ceftin, did not improve her symptoms  Prior smoking history, half a pack a day for over 30 years. Quit at 47years old. Never had PFTs done or seen pulmonology  Mild bilateral wheezing, improved with nebulizer therapy  Chest x-ray reviewed personally and looks like some diaphragm flattening bilaterally but no consolidation  CTA PE study negative for PE  Patient states she was previously tested for COVID and strep which were both negative  WBC 10.9, tachycardia meeting SIRS criteria, unlikely to be infectious etiology. No fevers or chills. No sputum. Imaging clear.   Does not appear to be in COPD exacerbation at this time, but could be an underlying COPD    Plan:  Monitor off antibiotics at this time  Continue nebulizers  Mucinex  Respiratory protocol  Airway clearance  Consult pulmonology to establish care, PFTs outpatient

## 2023-11-20 NOTE — ED NOTES
Spoke with Charge RN on [facility] - to wait for ED Charge to return due to miscommunication about bed. Will monitor.       Beulah Taveras RN  11/20/23 7459

## 2023-11-20 NOTE — ED NOTES
Waiting for confirmation that bed is physically in room before transporting to the floor.      Cathy Snow RN  11/20/23 2075

## 2023-11-20 NOTE — ASSESSMENT & PLAN NOTE
Sinus tachycardia  Concurrent cough, suspecting underlying COPD but no exacerbation at this time  Previously in 2020 had a Holter monitor that showed PVCs. At that time patient was supposed to get a stress test but she never got that.   Echo in 2020 showed normal EF 60%, no WMA or other abnormalities    Plan:  Repeat echo  Telemetry  Refer to cardiology upon discharge

## 2023-11-20 NOTE — ASSESSMENT & PLAN NOTE
Lab Results   Component Value Date    EGFR 35 11/19/2023    EGFR 34 10/09/2023    EGFR 33 09/08/2023    CREATININE 1.50 (H) 11/19/2023    CREATININE 1.54 (H) 10/09/2023    CREATININE 1.56 (H) 09/08/2023   Creatinine around baseline  Patient euvolemic  Avoid nephrotoxins  Monitor BMPs

## 2023-11-21 ENCOUNTER — APPOINTMENT (INPATIENT)
Dept: NON INVASIVE DIAGNOSTICS | Facility: HOSPITAL | Age: 68
DRG: 202 | End: 2023-11-21
Payer: COMMERCIAL

## 2023-11-21 LAB
ANION GAP SERPL CALCULATED.3IONS-SCNC: 8 MMOL/L
AORTIC ROOT: 3 CM
APICAL FOUR CHAMBER EJECTION FRACTION: 63 %
ASCENDING AORTA: 3.1 CM
ATRIAL RATE: 85 BPM
B PARAP IS1001 DNA NPH QL NAA+NON-PROBE: NOT DETECTED
B PERT.PT PRMT NPH QL NAA+NON-PROBE: NOT DETECTED
BASOPHILS # BLD AUTO: 0.03 THOUSANDS/ÂΜL (ref 0–0.1)
BASOPHILS NFR BLD AUTO: 0 % (ref 0–1)
BUN SERPL-MCNC: 29 MG/DL (ref 5–25)
C PNEUM DNA NPH QL NAA+NON-PROBE: NOT DETECTED
CALCIUM SERPL-MCNC: 9.1 MG/DL (ref 8.4–10.2)
CHLORIDE SERPL-SCNC: 109 MMOL/L (ref 96–108)
CO2 SERPL-SCNC: 23 MMOL/L (ref 21–32)
CREAT SERPL-MCNC: 1.48 MG/DL (ref 0.6–1.3)
E WAVE DECELERATION TIME: 184 MS
E/A RATIO: 0.72
EOSINOPHIL # BLD AUTO: 0.01 THOUSAND/ÂΜL (ref 0–0.61)
EOSINOPHIL NFR BLD AUTO: 0 % (ref 0–6)
ERYTHROCYTE [DISTWIDTH] IN BLOOD BY AUTOMATED COUNT: 13.5 % (ref 11.6–15.1)
FLUAV RNA NPH QL NAA+NON-PROBE: NOT DETECTED
FLUBV RNA NPH QL NAA+NON-PROBE: NOT DETECTED
FRACTIONAL SHORTENING: 25 (ref 28–44)
GFR SERPL CREATININE-BSD FRML MDRD: 35 ML/MIN/1.73SQ M
GLUCOSE SERPL-MCNC: 124 MG/DL (ref 65–140)
HADV DNA NPH QL NAA+NON-PROBE: NOT DETECTED
HCOV 229E RNA NPH QL NAA+NON-PROBE: NOT DETECTED
HCOV HKU1 RNA NPH QL NAA+NON-PROBE: NOT DETECTED
HCOV NL63 RNA NPH QL NAA+NON-PROBE: NOT DETECTED
HCOV OC43 RNA NPH QL NAA+NON-PROBE: NOT DETECTED
HCT VFR BLD AUTO: 38.3 % (ref 34.8–46.1)
HGB BLD-MCNC: 12.4 G/DL (ref 11.5–15.4)
HMPV RNA NPH QL NAA+NON-PROBE: NOT DETECTED
HPIV1 RNA NPH QL NAA+NON-PROBE: NOT DETECTED
HPIV2 RNA NPH QL NAA+NON-PROBE: NOT DETECTED
HPIV3 RNA NPH QL NAA+NON-PROBE: NOT DETECTED
HPIV4 RNA NPH QL NAA+NON-PROBE: NOT DETECTED
IMM GRANULOCYTES # BLD AUTO: 0.07 THOUSAND/UL (ref 0–0.2)
IMM GRANULOCYTES NFR BLD AUTO: 1 % (ref 0–2)
INTERVENTRICULAR SEPTUM IN DIASTOLE (PARASTERNAL SHORT AXIS VIEW): 0.9 CM
INTERVENTRICULAR SEPTUM: 0.9 CM (ref 0.6–1.1)
LAAS-AP2: 20.2 CM2
LAAS-AP4: 20.5 CM2
LEFT ATRIUM SIZE: 4.7 CM
LEFT ATRIUM VOLUME (MOD BIPLANE): 65 ML
LEFT ATRIUM VOLUME INDEX (MOD BIPLANE): 32.5 ML/M2
LEFT INTERNAL DIMENSION IN SYSTOLE: 4 CM (ref 2.1–4)
LEFT VENTRICULAR INTERNAL DIMENSION IN DIASTOLE: 5.3 CM (ref 3.5–6)
LEFT VENTRICULAR POSTERIOR WALL IN END DIASTOLE: 0.9 CM
LEFT VENTRICULAR STROKE VOLUME: 66 ML
LVSV (TEICH): 66 ML
LYMPHOCYTES # BLD AUTO: 2.43 THOUSANDS/ÂΜL (ref 0.6–4.47)
LYMPHOCYTES NFR BLD AUTO: 19 % (ref 14–44)
M PNEUMO DNA NPH QL NAA+NON-PROBE: NOT DETECTED
MAGNESIUM SERPL-MCNC: 2.4 MG/DL (ref 1.9–2.7)
MCH RBC QN AUTO: 30.9 PG (ref 26.8–34.3)
MCHC RBC AUTO-ENTMCNC: 32.4 G/DL (ref 31.4–37.4)
MCV RBC AUTO: 96 FL (ref 82–98)
MONOCYTES # BLD AUTO: 0.68 THOUSAND/ÂΜL (ref 0.17–1.22)
MONOCYTES NFR BLD AUTO: 5 % (ref 4–12)
MV E'TISSUE VEL-SEP: 7 CM/S
MV PEAK A VEL: 1.21 M/S
MV PEAK E VEL: 87 CM/S
MV STENOSIS PRESSURE HALF TIME: 53 MS
MV VALVE AREA P 1/2 METHOD: 4.15
NEUTROPHILS # BLD AUTO: 9.86 THOUSANDS/ÂΜL (ref 1.85–7.62)
NEUTS SEG NFR BLD AUTO: 75 % (ref 43–75)
NRBC BLD AUTO-RTO: 0 /100 WBCS
P AXIS: 64 DEGREES
PLATELET # BLD AUTO: 161 THOUSANDS/UL (ref 149–390)
PMV BLD AUTO: 12.5 FL (ref 8.9–12.7)
POTASSIUM SERPL-SCNC: 4 MMOL/L (ref 3.5–5.3)
PR INTERVAL: 162 MS
PROCALCITONIN SERPL-MCNC: 0.2 NG/ML
QRS AXIS: -57 DEGREES
QRSD INTERVAL: 128 MS
QT INTERVAL: 422 MS
QTC INTERVAL: 502 MS
RBC # BLD AUTO: 4.01 MILLION/UL (ref 3.81–5.12)
RIGHT ATRIAL 2D VOLUME: 35 ML
RIGHT ATRIUM AREA SYSTOLE A4C: 14.1 CM2
RIGHT VENTRICLE ID DIMENSION: 3.5 CM
RSV RNA NPH QL NAA+NON-PROBE: DETECTED
RV+EV RNA NPH QL NAA+NON-PROBE: NOT DETECTED
SARS-COV-2 RNA NPH QL NAA+NON-PROBE: NOT DETECTED
SL CV LEFT ATRIUM LENGTH A2C: 5.2 CM
SL CV LV EF: 55
SL CV PED ECHO LEFT VENTRICLE DIASTOLIC VOLUME (MOD BIPLANE) 2D: 135 ML
SL CV PED ECHO LEFT VENTRICLE SYSTOLIC VOLUME (MOD BIPLANE) 2D: 70 ML
SODIUM SERPL-SCNC: 140 MMOL/L (ref 135–147)
T WAVE AXIS: 99 DEGREES
TR MAX PG: 21 MMHG
TR PEAK VELOCITY: 2.3 M/S
TRICUSPID ANNULAR PLANE SYSTOLIC EXCURSION: 2.1 CM
TRICUSPID VALVE PEAK REGURGITATION VELOCITY: 2.27 M/S
VENTRICULAR RATE: 85 BPM
WBC # BLD AUTO: 13.08 THOUSAND/UL (ref 4.31–10.16)

## 2023-11-21 PROCEDURE — 85025 COMPLETE CBC W/AUTO DIFF WBC: CPT

## 2023-11-21 PROCEDURE — 83735 ASSAY OF MAGNESIUM: CPT

## 2023-11-21 PROCEDURE — 93010 ELECTROCARDIOGRAM REPORT: CPT | Performed by: INTERNAL MEDICINE

## 2023-11-21 PROCEDURE — 87070 CULTURE OTHR SPECIMN AEROBIC: CPT | Performed by: NURSE PRACTITIONER

## 2023-11-21 PROCEDURE — 99232 SBSQ HOSP IP/OBS MODERATE 35: CPT | Performed by: STUDENT IN AN ORGANIZED HEALTH CARE EDUCATION/TRAINING PROGRAM

## 2023-11-21 PROCEDURE — 87205 SMEAR GRAM STAIN: CPT | Performed by: NURSE PRACTITIONER

## 2023-11-21 PROCEDURE — 99232 SBSQ HOSP IP/OBS MODERATE 35: CPT | Performed by: INTERNAL MEDICINE

## 2023-11-21 PROCEDURE — 93306 TTE W/DOPPLER COMPLETE: CPT | Performed by: INTERNAL MEDICINE

## 2023-11-21 PROCEDURE — 84145 PROCALCITONIN (PCT): CPT

## 2023-11-21 PROCEDURE — 93306 TTE W/DOPPLER COMPLETE: CPT

## 2023-11-21 PROCEDURE — 82785 ASSAY OF IGE: CPT | Performed by: GENERAL PRACTICE

## 2023-11-21 PROCEDURE — 86003 ALLG SPEC IGE CRUDE XTRC EA: CPT | Performed by: GENERAL PRACTICE

## 2023-11-21 PROCEDURE — 80048 BASIC METABOLIC PNL TOTAL CA: CPT

## 2023-11-21 PROCEDURE — 93005 ELECTROCARDIOGRAM TRACING: CPT

## 2023-11-21 RX ORDER — ALBUTEROL SULFATE 2.5 MG/3ML
2.5 SOLUTION RESPIRATORY (INHALATION) EVERY 6 HOURS PRN
Refills: 0 | Status: CANCELLED | OUTPATIENT
Start: 2023-11-21

## 2023-11-21 RX ORDER — FLUTICASONE FUROATE AND VILANTEROL 100; 25 UG/1; UG/1
1 POWDER RESPIRATORY (INHALATION) DAILY
Status: DISCONTINUED | OUTPATIENT
Start: 2023-11-21 | End: 2023-11-22 | Stop reason: HOSPADM

## 2023-11-21 RX ORDER — PREDNISONE 20 MG/1
40 TABLET ORAL DAILY
Status: DISCONTINUED | OUTPATIENT
Start: 2023-11-22 | End: 2023-11-22 | Stop reason: HOSPADM

## 2023-11-21 RX ADMIN — HEPARIN SODIUM 5000 UNITS: 5000 INJECTION INTRAVENOUS; SUBCUTANEOUS at 21:12

## 2023-11-21 RX ADMIN — GUAIFENESIN 600 MG: 600 TABLET ORAL at 08:16

## 2023-11-21 RX ADMIN — Medication 12.5 MG: at 11:13

## 2023-11-21 RX ADMIN — SUCRALFATE 1 G: 1 TABLET ORAL at 08:18

## 2023-11-21 RX ADMIN — Medication 2000 UNITS: at 08:18

## 2023-11-21 RX ADMIN — METHYLPREDNISOLONE SODIUM SUCCINATE 40 MG: 40 INJECTION, POWDER, FOR SOLUTION INTRAMUSCULAR; INTRAVENOUS at 08:16

## 2023-11-21 RX ADMIN — SUCRALFATE 1 G: 1 TABLET ORAL at 17:23

## 2023-11-21 RX ADMIN — PANTOPRAZOLE SODIUM 40 MG: 40 TABLET, DELAYED RELEASE ORAL at 08:18

## 2023-11-21 RX ADMIN — Medication 12.5 MG: at 21:12

## 2023-11-21 RX ADMIN — GUAIFENESIN 600 MG: 600 TABLET ORAL at 21:12

## 2023-11-21 RX ADMIN — LURASIDONE HYDROCHLORIDE 20 MG: 20 TABLET, COATED ORAL at 08:19

## 2023-11-21 RX ADMIN — PANTOPRAZOLE SODIUM 40 MG: 40 TABLET, DELAYED RELEASE ORAL at 21:12

## 2023-11-21 RX ADMIN — CALCITRIOL 0.25 MCG: 0.25 CAPSULE, LIQUID FILLED ORAL at 08:19

## 2023-11-21 RX ADMIN — OXYBUTYNIN CHLORIDE 5 MG: 5 TABLET, EXTENDED RELEASE ORAL at 08:17

## 2023-11-21 RX ADMIN — FLUTICASONE FUROATE AND VILANTEROL TRIFENATATE 1 PUFF: 100; 25 POWDER RESPIRATORY (INHALATION) at 09:47

## 2023-11-21 RX ADMIN — FAMOTIDINE 20 MG: 20 TABLET, FILM COATED ORAL at 08:17

## 2023-11-21 NOTE — ASSESSMENT & PLAN NOTE
Patient was tachycardic since admission, even after discontinuing albuterol treatments  Previously in 2020 had a Holter monitor that showed PVCs. At that time patient was supposed to get a stress test but was not completed 2/2 COVID. Echo in 2020 showed normal EF 60%, no WMA or other abnormalities. Patient was monitored on telemetry during admission, and was found to have one episode of 5 beats of NSVT on 11/21/23 at 0830. Patient was asymptomatic. Echocardiogram was repeated during admission, and patient was started on metoprolol.      Plan:  Discharge on toprol-xl 25mg daily   Stress test outpatient   Cardiology follow up outpatient

## 2023-11-21 NOTE — ASSESSMENT & PLAN NOTE
Patient has been tachycardic since admission, even after discontinuing albuterol treatments   Previously in 2020 had a Holter monitor that showed PVCs. At that time patient was supposed to get a stress test but was not completed 2/2 COVID. Echo in 2020 showed normal EF 60%, no WMA or other abnormalities  One episode of 5 beats of NSVT on 11/21/23 at 0830. Patient asymptomatic.      Plan:  Repeat echo  Telemetry  Refer to cardiology upon discharge

## 2023-11-21 NOTE — CASE MANAGEMENT
Case Management Assessment & Discharge Planning Note    Patient name Jose Jenkins  Location S /S -39 MRN [id number]  : 1955 Date 2023       Current Admission Date: 2023  Current Admission Diagnosis:Cough   Patient Active Problem List    Diagnosis Date Noted    Tachycardia 2023    Cough 11/15/2023    Viral respiratory illness 10/03/2023    CPAP (continuous positive airway pressure) dependence     Encounter for immunization     Lichen sclerosus 4529    Mass of right hand 2021    Urinary frequency 2021    Morbid obesity with BMI of 40.0-44.9, adult ([address]) 2021    Secondary hyperparathyroidism of renal origin ([address]) 2021    Persistent proteinuria 2021    Primary osteoarthritis of left knee 2021    Primary osteoarthritis of right knee 2021    Vitamin D deficiency 2020    Stage 3 chronic kidney disease ([address]) 2020    Raynaud's disease without gangrene 03/10/2020    Localized edema 2020    Palpitations 2020    Abnormal EKG 12/10/2019    Elevated blood pressure reading without diagnosis of hypertension 06/10/2019    BMI 40.0-44.9, adult ([address]) 2018    High triglycerides 2018    Onychomycosis of toenail 2017    Insomnia 2015    Stress incontinence of urine 2015    Patellofemoral arthritis of right knee 2014    Depression with anxiety 2014    Hypothyroidism 2013    Impaired fasting glucose 2013    Bipolar 1 disorder, mixed, severe ([address]) 2013    Gastroesophageal reflux disease without esophagitis 2013    Obstructive sleep apnea 2013      LOS (days): 1  Geometric Mean LOS (GMLOS) (days):   Days to GMLOS:     OBJECTIVE:    Risk of Unplanned Readmission Score: 14.81         Current admission status: Inpatient  Referral Reason: DME    Preferred Pharmacy:   [facility] [place] [address] [address] [facility]  [address]  [address] [facility] [address]  Phone: [phone] [address]ax: [address]  [facility] [address]  Phone: [phone] Fax: [phone], [address]  [address] [facility] [place]  Phone: [phone] Fax: [address] [place], [place] - [address]  [address]  [place] [address]  Phone: [phone] Fax: [phone]    Primary Care Provider: Pam Patel DO    Primary Insurance: Karen Núñez [facility]  Secondary Insurance:     ASSESSMENT:  [place] Proxies    There are no active Health Care Proxies on file. Patient Information  Admitted from[de-identified] Home [facility] - independent living apartments)  Mental Status: Alert  During Assessment patient was accompanied by: Not accompanied during assessment  Assessment information provided by[de-identified] Patient (via room phone due to RSV isolation)  Primary Caregiver: Self  Support Systems: Family members, Spouse/significant other, [address] of Residence: [facility] [address] [facility] do you live in?: [address] [facility] entry access options.  Select all that apply.: No steps to enter home  Type of Current Residence: Other (Comment) [facility] - independent living apartments)  Living Arrangements: Lives w/ Spouse/significant other    Activities of Daily Living Prior to Admission  Functional Status: Independent  Completes ADLs independently?: Yes  Ambulates independently?: Yes  Does patient use assisted devices?: No  Does patient currently own DME?: No  Does the patient have a history of Short-Term Rehab?: No  Does patient have a history of HHC?: No  Does patient currently have [address]?: No         Patient Information Continued  Does patient have prescription coverage?: Yes  Does patient receive dialysis treatments?: No  Does patient have a history of substance abuse?: No         Means of Transportation  Means of Transport to Appts[de-identified] Drives Self      Housing Stability: Low Risk  (11/21/2023)    Housing Stability Vital Sign     Unable to Pay for Housing in the Last Year: No     Number of Places Lived in the Last Year: 1     Unstable Housing in the Last Year: No   Food Insecurity: No Food Insecurity (11/21/2023)    Hunger Vital Sign     Worried About Running Out of Food in the Last Year: Never true     Ran Out of Food in the Last Year: Never true   Transportation Needs: No Transportation Needs (11/21/2023)    PRAPARE - Transportation     Lack of Transportation (Medical): No     Lack of Transportation (Non-Medical): No   Utilities: Not At Risk (11/21/2023)    [facility] Utilities     Threatened with loss of utilities: No       DISCHARGE DETAILS:    Discharge planning discussed with[de-identified] patient via room phone  Freedom of Choice: Yes (re: DME)  Comments - Freedom of Choice: requesting nebulizer be ordered through [employer]     Were Treatment Team discharge recommendations reviewed with patient/caregiver?: Yes  Did patient/caregiver verbalize understanding of patient care needs?: Yes  Were patient/caregiver advised of the risks associated with not following Treatment Team discharge recommendations?: Yes    Contacts  Patient Contacts: spouse, Shari Mtz  Relationship to Patient[de-identified] [address]         Is the patient interested in [facility] at discharge?: No    DME Referral Provided  Referral made for DME?: Yes  DME referral completed for the following items[de-identified] Nebulizer  DME Supplier Name[de-identified] [id number]    Other Referral/Resources/Interventions Provided:  Interventions: DME  Referral Comments: Patient admitted due to RSV, tachycardia. CM consult recieved for nebulizer. Call made to patient's room to complete assessment and discuss d/c plan. Patient reports that she lives with her spouse in independent living apartments at [facility]. Reports that she ambulates without a device. Denies any history of home care services or rehab, and does not feel as though anything is needed at this time. Patient aware of consult for nebulizer, and confirmed agreement with same being ordered. Requesting to use [employer] so it can be delivered to room once approved from insurance. Patient states that her spouse will be picking her up once ready for d/c. Order placed for nebulizer in [place]; will deliver to bedside once approved. No other d/c needs identified at this time.          Treatment Team Recommendation: Home  Discharge Destination Plan[de-identified] Home  Transport at Discharge : Family

## 2023-11-21 NOTE — ASSESSMENT & PLAN NOTE
Nonproductive cough ongoing for 6 weeks, associated with nasal congestion and some rhinorrhea  Previously on azithromycin and Ceftin, did not improve her symptoms  Prior smoking history, half a pack a day for over 30 years. Quit at 47years old. Never had PFTs done or seen pulmonology  Mild bilateral wheezing, improved with nebulizer therapy  Chest x-ray reviewed personally and looks like some diaphragm flattening bilaterally but no consolidation  CTA PE study negative for PE  Patient states she was previously tested for COVID and strep which were both negative  WBC 10.9, tachycardia meeting SIRS criteria, unlikely to be infectious etiology. No fevers or chills. No sputum. Imaging clear. Patient positive for RSV on RP2 panel 11/20/23. Pulmonology suspects persistent eosinophilic asthma in conjunction with RSV infection     Plan:  Pulmonology consulted. Recommendations appreciated. Prednisone 40 mg daily. Taper by 10 mg q3days.    Symbicort 160-4.5 mcg 2 puffs BID  ProAir 2 puffs q6h PRN   Albuterol nebulizer q6h PRN   Follow up with Pulmonology outpatient

## 2023-11-21 NOTE — PROGRESS NOTES
[address] [facility]  Progress Note  Name: Maeve Pearson  MRN: [id number]  Unit/Bed#: S -01 I Date of Admission: 11/19/2023   Date of Service: 11/21/2023 I Hospital Day: 1    Assessment/Plan   Tachycardia  Assessment & Plan  Patient has been tachycardic since admission, even after discontinuing albuterol treatments   Previously in 2020 had a Holter monitor that showed PVCs. At that time patient was supposed to get a stress test but was not completed 2/2 COVID. Echo in 2020 showed normal EF 60%, no WMA or other abnormalities  One episode of 5 beats of NSVT on 11/21/23 at 0830. Patient asymptomatic. Plan:  Repeat echo pending read   Telemetry  Lopressor 12.5mg BID started 11/21/23  Stress test and cardiology follow up after discharge    Stage 3 chronic kidney disease [facility])  Assessment & Plan  Lab Results   Component Value Date    EGFR 35 11/21/2023    EGFR 37 11/20/2023    EGFR 35 11/19/2023    CREATININE 1.48 (H) 11/21/2023    CREATININE 1.43 (H) 11/20/2023    CREATININE 1.50 (H) 11/19/2023     Creatinine currently near baseline  Patient appears euvolemic    Plan:   - Avoid nephrotoxins  - Monitor BMPs    Bipolar 1 disorder, mixed, severe (HCC)  Assessment & Plan  Continue PTA lurasidone (latuda) 20 mg daily            VTE Pharmacologic Prophylaxis: VTE Score: 6 High Risk (Score >/= 5) - Pharmacological DVT Prophylaxis Ordered: heparin. Sequential Compression Devices Ordered. Mobility:   Basic Mobility Inpatient Raw Score: 24  JH-HLM Goal: 8: Walk 250 feet or more  JH-HLM Achieved: 7: Walk 25 feet or more  HLM Goal achieved. Continue to encourage appropriate mobility. Patient Centered Rounds: I performed bedside rounds with nursing staff today. Discussions with Specialists or Other Care Team Provider: Attending Physician, Case Management, Pulmonology     Education and Discussions with Family / Patient: Will call and update family later today.      Total Time Spent on Date of Encounter in care of patient: 45 mins. This time was spent on one or more of the following: performing physical exam; counseling and coordination of care; obtaining or reviewing history; documenting in the medical record; reviewing/ordering tests, medications or procedures; communicating with other healthcare professionals and discussing with patient's family/caregivers. Current Length of Stay: 1 day(s)  Current Patient Status: Inpatient   Certification Statement: The patient will continue to require additional inpatient hospital stay due to intermittent nonsustained ventricular tachycardia needing further workup immediately   Discharge Plan: Anticipate discharge tomorrow to home. Code Status: Level 1 - Full Code    Subjective:   Patient seen and examined at bedside this morning. Patient reports her breathing is significantly improved. She no longer notices herself wheezing. Her cough has improved but is still present. She has noticed her heart rate has been high on the monitor, but denies any chest pain, SOB, palpitations, or discomfort. Overnight, patient remained on room air. Nursing reports no concerns. Objective:     Vitals:   Temp (24hrs), Av.9 °F (36.6 °C), Min:97.5 °F (36.4 °C), Max:98.2 °F (36.8 °C)    Temp:  [97.5 °F (36.4 °C)-98.2 °F (36.8 °C)] 98.2 °F (36.8 °C)  HR:  [89-95] 89  BP: (130-135)/(69-77) 130/69  SpO2:  [92 %-96 %] 96 %  Body mass index is 43.08 kg/m². Input and Output Summary (last 24 hours): Intake/Output Summary (Last 24 hours) at 2023 1426  Last data filed at 2023 0900  Gross per 24 hour   Intake 480 ml   Output --   Net 480 ml       Physical Exam:   Physical Exam  Vitals and nursing note reviewed. Constitutional:       General: She is not in acute distress. Appearance: Normal appearance. She is well-developed. She is obese. She is not ill-appearing, toxic-appearing or diaphoretic. HENT:      Head: Normocephalic and atraumatic.       Right Ear: External ear normal.      Left Ear: External ear normal.      Nose: Nose normal. No congestion or rhinorrhea. Mouth/Throat:      Mouth: Mucous membranes are moist.   Eyes:      General: No scleral icterus. Extraocular Movements: Extraocular movements intact. Conjunctiva/sclera: Conjunctivae normal.      Pupils: Pupils are equal, round, and reactive to light. Cardiovascular:      Rate and Rhythm: Regular rhythm. Tachycardia present. Pulses: Normal pulses. Heart sounds: Normal heart sounds. No murmur heard. No friction rub. No gallop. Pulmonary:      Effort: Pulmonary effort is normal. No respiratory distress. Breath sounds: No stridor. No wheezing, rhonchi or rales. Chest:      Chest wall: No tenderness. Abdominal:      General: Bowel sounds are normal. There is no distension. Palpations: Abdomen is soft. Tenderness: There is no abdominal tenderness. There is no guarding. Musculoskeletal:         General: No swelling, tenderness, deformity or signs of injury. Cervical back: Neck supple. Right lower leg: No edema. Left lower leg: No edema. Skin:     General: Skin is warm and dry. Capillary Refill: Capillary refill takes less than 2 seconds. Coloration: Skin is not jaundiced or pale. Findings: No bruising, erythema, lesion or rash. Neurological:      Mental Status: She is alert and oriented to person, place, and time. Mental status is at baseline. Psychiatric:         Mood and Affect: Mood normal.         Behavior: Behavior normal.         Thought Content:  Thought content normal.         Judgment: Judgment normal.          Additional Data:     Labs:  Results from last 7 days   Lab Units 11/21/23  0427 11/20/23  0523 11/19/23  2351   WBC Thousand/uL 13.08*   < > 10.96*   HEMOGLOBIN g/dL 12.4   < > 14.4   HEMATOCRIT % 38.3   < > 43.6   PLATELETS Thousands/uL 161   < > 154   BANDS PCT %  --   --  3   NEUTROS PCT % 75   < >  -- LYMPHS PCT % 19   < >  --    LYMPHO PCT %  --   --  7*   MONOS PCT % 5   < >  --    MONO PCT %  --   --  1*   EOS PCT % 0   < > 1    < > = values in this interval not displayed. Results from last 7 days   Lab Units 23  0427   SODIUM mmol/L 140   POTASSIUM mmol/L 4.0   CHLORIDE mmol/L 109*   CO2 mmol/L 23   BUN mg/dL 29*   CREATININE mg/dL 1.48*   ANION GAP mmol/L 8   CALCIUM mg/dL 9.1   GLUCOSE RANDOM mg/dL 124                 Results from last 7 days   Lab Units 23  0427 23  0523   PROCALCITONIN ng/ml 0.20 0.19       Lines/Drains:  Invasive Devices       Peripheral Intravenous Line  Duration             Peripheral IV 23 Right;Ventral (anterior) Forearm 1 day                      Telemetry:  Telemetry Orders (From admission, onward)               24 Hour Telemetry Monitoring  Continuous x 24 Hours (Telem)           Question:  Reason for 24 Hour Telemetry  Answer:  Arrhythmias requiring acute medical intervention / PPM or ICD malfunction                     Telemetry Reviewed: NSVT  Indication for Continued Telemetry Use: Arrthymias requiring medical therapy             Imaging: Reviewed radiology reports from this admission including: chest CT scan  CTA ED chest PE study   Final Result by Xavi Chambers DO (132)      Degraded evaluation of the pulmonary arteries due to respiratory motion. No pulmonary embolus identified within the limitations of the examination. No other acute abnormality identified.       Moderate hiatal hernia            Workstation performed: [id number]               Recent Cultures (last 7 days):         Last 24 Hours Medication List:   Current Facility-Administered Medications   Medication Dose Route Frequency Provider Last Rate    calcitriol  0.25 mcg Oral Daily Alicia Clemens MD      calcium carbonate  500 mg Oral TID PRN John Luis MD      cholecalciferol  2,000 Units Oral Daily Alicia Clemens MD      famotidine  20 mg Oral Daily Sharon Canseco MD      fluticasone  1 spray Nasal Daily Gloria Andres MD      Fluticasone Furoate-Vilanterol  1 puff Inhalation Daily Johnny Otoole MD      guaiFENesin  600 mg Oral Q12H MD Sugar      heparin (porcine)  5,000 Units Subcutaneous [facility] Gloria Andres MD      lurasidone  20 mg Oral Daily With [address] MD Karan      methylPREDNISolone sodium succinate  40 mg Intravenous Daily Sharon Canseco MD      metoprolol tartrate  12.5 mg Oral Q12H [address] Sharon Canseco MD      oxybutynin  5 mg Oral Daily Gloria Andres MD      pantoprazole  40 mg Oral BID Sharon Canseco MD      sucralfate  1 g Oral BID Gloria Andres MD      trimethobenzamide  200 mg Intramuscular Q6H PRN Sharon Canseco MD          Today, Patient Was Seen By: Sharon Canseco MD    **Please Note: This note may have been constructed using a voice recognition system. **

## 2023-11-21 NOTE — ASSESSMENT & PLAN NOTE
Lab Results   Component Value Date    EGFR 35 11/21/2023    EGFR 37 11/20/2023    EGFR 35 11/19/2023    CREATININE 1.48 (H) 11/21/2023    CREATININE 1.43 (H) 11/20/2023    CREATININE 1.50 (H) 11/19/2023     Creatinine currently near baseline  Patient appears euvolemic    Plan:   - Avoid nephrotoxins  - Monitor BMPs

## 2023-11-21 NOTE — CASE MANAGEMENT
Case Management Progress Note    Patient name Garrett Victoria  Location S /S -63 MRN [id number]  : 1955 Date 2023       LOS (days): 1  Geometric Mean LOS (GMLOS) (days): 2.80  Days to GMLOS:1.8        OBJECTIVE:        Current admission status: Inpatient  Preferred Pharmacy:   [facility] ([address] [place] [address]  [address] [facility] [address]  Phone: [phone] Fax:  [facility] [address]  Phone: [phone] Fax: [address], [address] [facility]  [address] [place] [address]  Phone: [phone] Fax: [address] [facility] [address] [place] [address]  Phone: [phone] Fax: [phone]    Primary Care Provider: Jeovany Carrasquillo DO    [facility] Insurance[facility]  Secondary Insurance:     PROGRESS NOTE:    Nebulizer delivered to bedside by Timo Fowler.

## 2023-11-21 NOTE — PROGRESS NOTES
Progress Note - Pulmonary   Devere Hams 70 y.o. female MRN: [id number]  Unit/Bed#: S -01 Encounter: [id number]    Assessment/Plan:    1. Acute pulmonary insufficiency likely multifaceted as listed below        -Patient remains on room air-4%, does not wear home O2       -Continue saturations greater than 88%       -Pulmonary toileting: Incentive spirometry out of bed as tolerated    2. RSV        -Likely cause of patient's symptoms        -Can transition to prednisone will need longer steroid taper        -No indication for antibiotics or superimposed bacterial infection    3. Subacute cough         -Likely secondary to RSV        -We will continue symptom management        -We will need to manage GERD        -May consider gabapentin    4. Suspected moderate persistent eosinophilic asthma        -Eos- 150-- 140-- 180--110        -We will transition to prednisone 40 mg decrease by 10 mg every 3 days        -Please discharge patient on this home regimen: Symbicort 160-4.5 mcg 2 puffs twice daily, ProAir 2 puffs every 6 as needed, albuterol nebulizer every 6 as needed         -[facility] allergy panel pending        -Be a candidate for Biologics        - consulted to provide nebulizer    5. Moderate JULIO        -2009-completed sleep study CPAP 15 cm of H2O        -Stop CPAP during recall        -Follow with Dr. Sveta Doan        -Scheduled for home sleep study      -Outpatient follow-up or discharge instructions  -Pulmonary will sign off    Chief Complaint:    "I still am coughing"    Subjective:    Edilma Velazquez was comfortably sitting in her bed. She reports that she is still having episodes of coughing. No significant overnight events reported. Currently denying any fevers, chills, abscess, headaches and night sweats, pleuritic chest pain, or palpations. Objective:    Vitals: Blood pressure 130/69, pulse 89, temperature 98.2 °F (36.8 °C), resp.  rate 18, height 5' 1" (1.549 m), weight 104 kg (228 lb 13.4 oz), SpO2 96 %, not currently breastfeeding. ,Body mass index is 43.24 kg/m². Intake/Output Summary (Last 24 hours) at 11/21/2023 0935  Last data filed at 11/20/2023 1700  Gross per 24 hour   Intake 240 ml   Output --   Net 240 ml       Invasive Devices       Peripheral Intravenous Line  Duration             Peripheral IV 11/19/23 Right;Ventral (anterior) Forearm 1 day                    Physical Exam:     Physical Exam  Constitutional:       General: She is not in acute distress. Appearance: Normal appearance. She is normal weight. She is not ill-appearing. HENT:      Head: Normocephalic and atraumatic. Nose: Nose normal. No congestion or rhinorrhea. Mouth/Throat:      Mouth: Mucous membranes are dry. Pharynx: Oropharynx is clear. No oropharyngeal exudate or posterior oropharyngeal erythema. Cardiovascular:      Rate and Rhythm: Normal rate and regular rhythm. Pulses: Normal pulses. Heart sounds: Normal heart sounds. No murmur heard. No friction rub. No gallop. Pulmonary:      Effort: Pulmonary effort is normal. No tachypnea, bradypnea, accessory muscle usage or respiratory distress. Breath sounds: Decreased air movement present. No stridor. Decreased breath sounds and wheezing present. No rhonchi or rales. Comments: Some expiratory wheezing  Chest:      Chest wall: No tenderness. Abdominal:      General: Abdomen is flat. Bowel sounds are normal. There is no distension. Palpations: Abdomen is soft. There is no mass. Musculoskeletal:         General: No swelling or tenderness. Normal range of motion. Cervical back: Normal range of motion. No rigidity or tenderness. Skin:     General: Skin is warm and dry. Coloration: Skin is not jaundiced or pale. Neurological:      General: No focal deficit present. Mental Status: She is alert and oriented to person, place, and time. Mental status is at baseline.    Psychiatric:         Mood and Affect: Mood normal.         Behavior: Behavior normal.         Labs: I have personally reviewed pertinent lab results. , ABG: No results found for: "PHART", "YBF2RPA", "PO2ART", "RUG5WYQ", "C2CHDJOU", "BEART", "SOURCE", BNP: No results found for: "BNP", CBC:   Lab Results   Component Value Date    WBC 13.08 (H) 11/21/2023    HGB 12.4 11/21/2023    HCT 38.3 11/21/2023    MCV 96 11/21/2023     11/21/2023    RBC 4.01 11/21/2023    MCH 30.9 11/21/2023    MCHC 32.4 11/21/2023    RDW 13.5 11/21/2023    MPV 12.5 11/21/2023    NRBC 0 11/21/2023   , CMP:   Lab Results   Component Value Date    SODIUM 140 11/21/2023    K 4.0 11/21/2023     (H) 11/21/2023    CO2 23 11/21/2023    BUN 29 (H) 11/21/2023    CREATININE 1.48 (H) 11/21/2023    CALCIUM 9.1 11/21/2023    EGFR 35 11/21/2023   , PT/INR: No results found for: "PT", "INR"      Imaging and other studies: I have personally reviewed pertinent films in PACS    CTA chest no acute abnormalities

## 2023-11-21 NOTE — ASSESSMENT & PLAN NOTE
Lab Results   Component Value Date    EGFR 32 11/22/2023    EGFR 35 11/21/2023    EGFR 37 11/20/2023    CREATININE 1.60 (H) 11/22/2023    CREATININE 1.48 (H) 11/21/2023    CREATININE 1.43 (H) 11/20/2023     Patient's Creatinine was monitored throughout admission, and she was persistently near her baseline without evidence of OWEN or alteration of volume statis.      Plan:   - Follow up with PCP outpatient

## 2023-11-22 ENCOUNTER — TRANSITIONAL CARE MANAGEMENT (OUTPATIENT)
Dept: FAMILY MEDICINE CLINIC | Facility: CLINIC | Age: 68
End: 2023-11-22

## 2023-11-22 VITALS
DIASTOLIC BLOOD PRESSURE: 79 MMHG | TEMPERATURE: 97.3 F | WEIGHT: 228 LBS | RESPIRATION RATE: 18 BRPM | HEART RATE: 92 BPM | HEIGHT: 61 IN | BODY MASS INDEX: 43.05 KG/M2 | OXYGEN SATURATION: 93 % | SYSTOLIC BLOOD PRESSURE: 148 MMHG

## 2023-11-22 PROBLEM — R05.2 SUBACUTE COUGH: Status: ACTIVE | Noted: 2023-11-22

## 2023-11-22 PROBLEM — J45.909 UNCOMPLICATED ASTHMA, UNSPECIFIED ASTHMA SEVERITY, UNSPECIFIED WHETHER PERSISTENT: Status: ACTIVE | Noted: 2023-11-22

## 2023-11-22 LAB
A ALTERNATA IGE QN: <0.1 KUA/I
A FUMIGATUS IGE QN: <0.1 KUA/I
ANION GAP SERPL CALCULATED.3IONS-SCNC: 8 MMOL/L
BERMUDA GRASS IGE QN: <0.1 KUA/I
BOXELDER IGE QN: <0.1 KUA/I
BUN SERPL-MCNC: 38 MG/DL (ref 5–25)
C HERBARUM IGE QN: <0.1 KUA/I
CALCIUM SERPL-MCNC: 9 MG/DL (ref 8.4–10.2)
CAT DANDER IGE QN: 0.51 KUA/I
CHLORIDE SERPL-SCNC: 112 MMOL/L (ref 96–108)
CMN PIGWEED IGE QN: <0.1 KUA/I
CO2 SERPL-SCNC: 21 MMOL/L (ref 21–32)
COMMON RAGWEED IGE QN: <0.1 KUA/I
COTTONWOOD IGE QN: <0.1 KUA/I
CREAT SERPL-MCNC: 1.6 MG/DL (ref 0.6–1.3)
D FARINAE IGE QN: 0.39 KUA/I
D PTERONYSS IGE QN: 0.31 KUA/I
DME PARACHUTE DELIVERY DATE ACTUAL: NORMAL
DME PARACHUTE DELIVERY DATE REQUESTED: NORMAL
DME PARACHUTE ITEM DESCRIPTION: NORMAL
DME PARACHUTE ORDER STATUS: NORMAL
DME PARACHUTE SUPPLIER NAME: NORMAL
DME PARACHUTE SUPPLIER PHONE: NORMAL
DOG DANDER IGE QN: 0.35 KUA/I
GFR SERPL CREATININE-BSD FRML MDRD: 32 ML/MIN/1.73SQ M
GLUCOSE SERPL-MCNC: 89 MG/DL (ref 65–140)
LONDON PLANE IGE QN: <0.1 KUA/I
MOUSE URINE PROT IGE QN: <0.1 KUA/I
MT JUNIPER IGE QN: <0.1 KUA/I
MUGWORT IGE QN: <0.1 KUA/I
P NOTATUM IGE QN: <0.1 KUA/I
POTASSIUM SERPL-SCNC: 3.8 MMOL/L (ref 3.5–5.3)
ROACH IGE QN: <0.1 KUA/I
SHEEP SORREL IGE QN: <0.1 KUA/I
SILVER BIRCH IGE QN: 2.35 KUA/I
SODIUM SERPL-SCNC: 141 MMOL/L (ref 135–147)
TIMOTHY IGE QN: <0.1 KUA/I
TOTAL IGE SMQN RAST: 37.2 KU/L (ref 0–113)
WALNUT IGE QN: <0.1 KUA/I
WHITE ASH IGE QN: <0.1 KUA/I
WHITE ELM IGE QN: <0.1 KUA/I
WHITE MULBERRY IGE QN: <0.1 KUA/I
WHITE OAK IGE QN: 0.57 KUA/I

## 2023-11-22 PROCEDURE — 99239 HOSP IP/OBS DSCHRG MGMT >30: CPT | Performed by: INTERNAL MEDICINE

## 2023-11-22 PROCEDURE — 80048 BASIC METABOLIC PNL TOTAL CA: CPT

## 2023-11-22 RX ORDER — PREDNISONE 20 MG/1
40 TABLET ORAL DAILY
Qty: 8 TABLET | Refills: 0 | Status: SHIPPED | OUTPATIENT
Start: 2023-11-23 | End: 2023-11-27

## 2023-11-22 RX ORDER — METOPROLOL SUCCINATE 25 MG/1
25 TABLET, EXTENDED RELEASE ORAL DAILY
Qty: 30 TABLET | Refills: 0 | Status: SHIPPED | OUTPATIENT
Start: 2023-11-22 | End: 2023-12-05 | Stop reason: SDUPTHER

## 2023-11-22 RX ORDER — PANTOPRAZOLE SODIUM 40 MG/1
40 TABLET, DELAYED RELEASE ORAL 2 TIMES DAILY
Qty: 60 TABLET | Refills: 0 | Status: SHIPPED | OUTPATIENT
Start: 2023-11-22 | End: 2023-12-05 | Stop reason: SDUPTHER

## 2023-11-22 RX ORDER — FLUTICASONE FUROATE AND VILANTEROL 100; 25 UG/1; UG/1
1 POWDER RESPIRATORY (INHALATION) DAILY
Qty: 60 BLISTER | Refills: 0 | Status: SHIPPED | OUTPATIENT
Start: 2023-11-22 | End: 2023-12-05

## 2023-11-22 RX ORDER — GUAIFENESIN 600 MG/1
600 TABLET, EXTENDED RELEASE ORAL EVERY 12 HOURS SCHEDULED
Qty: 14 TABLET | Refills: 0 | Status: SHIPPED | OUTPATIENT
Start: 2023-11-22 | End: 2023-12-05

## 2023-11-22 RX ORDER — ALBUTEROL SULFATE 2.5 MG/3ML
2.5 SOLUTION RESPIRATORY (INHALATION) EVERY 6 HOURS PRN
Qty: 90 ML | Refills: 0 | Status: SHIPPED | OUTPATIENT
Start: 2023-11-22

## 2023-11-22 RX ADMIN — PANTOPRAZOLE SODIUM 40 MG: 40 TABLET, DELAYED RELEASE ORAL at 08:28

## 2023-11-22 RX ADMIN — SUCRALFATE 1 G: 1 TABLET ORAL at 08:28

## 2023-11-22 RX ADMIN — GUAIFENESIN 600 MG: 600 TABLET ORAL at 08:28

## 2023-11-22 RX ADMIN — Medication 12.5 MG: at 08:28

## 2023-11-22 RX ADMIN — FAMOTIDINE 20 MG: 20 TABLET, FILM COATED ORAL at 08:28

## 2023-11-22 RX ADMIN — OXYBUTYNIN CHLORIDE 5 MG: 5 TABLET, EXTENDED RELEASE ORAL at 08:28

## 2023-11-22 RX ADMIN — HEPARIN SODIUM 5000 UNITS: 5000 INJECTION INTRAVENOUS; SUBCUTANEOUS at 04:58

## 2023-11-22 RX ADMIN — CALCITRIOL 0.25 MCG: 0.25 CAPSULE, LIQUID FILLED ORAL at 08:29

## 2023-11-22 RX ADMIN — Medication 2000 UNITS: at 08:28

## 2023-11-22 RX ADMIN — LURASIDONE HYDROCHLORIDE 20 MG: 20 TABLET, COATED ORAL at 08:29

## 2023-11-22 RX ADMIN — PREDNISONE 40 MG: 20 TABLET ORAL at 08:28

## 2023-11-22 RX ADMIN — FLUTICASONE PROPIONATE 1 SPRAY: 50 SPRAY, METERED NASAL at 08:29

## 2023-11-22 RX ADMIN — FLUTICASONE FUROATE AND VILANTEROL TRIFENATATE 1 PUFF: 100; 25 POWDER RESPIRATORY (INHALATION) at 08:29

## 2023-11-22 NOTE — UTILIZATION REVIEW
NOTIFICATION OF ADMISSION DISCHARGE   This is a Notification of Discharge from [address] [facility]. Please be advised that this patient has been discharge from our facility. Below you will find the admission and discharge date and time including the patient’s disposition. UTILIZATION REVIEW CONTACT:  Nanda Kelly  Utilization   Network Utilization Review Department  Phone: [phone] x carefully listen to the prompts. All voicemails are confidential.  Email: [email or web address]     ADMISSION INFORMATION  PRESENTATION DATE: 11/19/2023 10:50 PM  OBERVATION ADMISSION DATE:   INPATIENT ADMISSION DATE: 11/20/23  3:48 PM   DISCHARGE DATE: 11/22/2023  2:36 PM   DISPOSITION:Home/Self Care    Network Utilization Review Department  ATTENTION: Please call with any questions or concerns to [phone] and carefully listen to the prompts so that you are directed to the right person. All voicemails are confidential.   For Discharge needs, contact Care Management DC Support Team at [phone] opt. 2  Send all requests for admission clinical reviews, approved or denied determinations and any other requests to dedicated fax number below belonging to the campus where the patient is receiving treatment.  List of dedicated fax numbers for the Facilities:  [place] DENIALS (Administrative/Medical Necessity) [phone]   DISCHARGE SUPPORT TEAM (Network) [phone] [address] [facility] (Maternity/NICU/Pediatrics) [phone]   [address] [facility] [address] [phone] [address] [facility] [phone]   [address] [place] [address]  [address]  [phone]

## 2023-11-22 NOTE — PLAN OF CARE
Problem: PAIN - ADULT  Goal: Verbalizes/displays adequate comfort level or baseline comfort level  Description: Interventions:  - Encourage patient to monitor pain and request assistance  - Assess pain using appropriate pain scale  - Administer analgesics based on type and severity of pain and evaluate response  - Implement non-pharmacological measures as appropriate and evaluate response  - Consider cultural and social influences on pain and pain management  - Notify physician/advanced practitioner if interventions unsuccessful or patient reports new pain  Outcome: Progressing     Problem: INFECTION - ADULT  Goal: Absence or prevention of progression during hospitalization  Description: INTERVENTIONS:  - Assess and monitor for signs and symptoms of infection  - Monitor lab/diagnostic results  - Monitor all insertion sites, i.e. indwelling lines, tubes, and drains  - Monitor endotracheal if appropriate and nasal secretions for changes in amount and color  - Bandy appropriate cooling/warming therapies per order  - Administer medications as ordered  - Instruct and encourage patient and family to use good hand hygiene technique  - Identify and instruct in appropriate isolation precautions for identified infection/condition  Outcome: Progressing  Goal: Absence of fever/infection during neutropenic period  Description: INTERVENTIONS:  - Monitor WBC    Outcome: Progressing     Problem: SAFETY ADULT  Goal: Patient will remain free of falls  Description: INTERVENTIONS:  - Educate patient/family on patient safety including physical limitations  - Instruct patient to call for assistance with activity   - Consult OT/PT to assist with strengthening/mobility   - Keep Call bell within reach  - Keep bed low and locked with side rails adjusted as appropriate  - Keep care items and personal belongings within reach  - Initiate and maintain comfort rounds  - Make Fall Risk Sign visible to staff  - Offer Toileting every 2 Hours, in advance of need  - Initiate/Maintain bed/chair alarm  - Obtain necessary fall risk management equipment  - Apply yellow socks and bracelet for high fall risk patients  - Consider moving patient to room near nurses station  Outcome: Progressing  Goal: Maintain or return to baseline ADL function  Description: INTERVENTIONS:  -  Assess patient's ability to carry out ADLs; assess patient's baseline for ADL function and identify physical deficits which impact ability to perform ADLs (bathing, care of mouth/teeth, toileting, grooming, dressing, etc.)  - Assess/evaluate cause of self-care deficits   - Assess range of motion  - Assess patient's mobility; develop plan if impaired  - Assess patient's need for assistive devices and provide as appropriate  - Encourage maximum independence but intervene and supervise when necessary  - Involve family in performance of ADLs  - Assess for home care needs following discharge   - Consider OT consult to assist with ADL evaluation and planning for discharge  - Provide patient education as appropriate  Outcome: Progressing  Goal: Maintains/Returns to pre admission functional level  Description: INTERVENTIONS:  - Perform AM-PAC 6 Click Basic Mobility/ Daily Activity assessment daily.  - Set and communicate daily mobility goal to care team and patient/family/caregiver.    - Collaborate with rehabilitation services on mobility goals if consulted  - Perform Range of Motion   - Reposition patient  - Dangle patient   - Stand patient   - Ambulate patient   - Out of bed to chair    - Out of bed for meals   - Out of bed for toileting  - Record patient progress and toleration of activity level   Outcome: Progressing     Problem: DISCHARGE PLANNING  Goal: Discharge to home or other facility with appropriate resources  Description: INTERVENTIONS:  - Identify barriers to discharge w/patient and caregiver  - Arrange for needed discharge resources and transportation as appropriate  - Identify discharge learning needs (meds, wound care, etc.)  - Arrange for interpretive services to assist at discharge as needed  - Refer to Case Management Department for coordinating discharge planning if the patient needs post-hospital services based on physician/advanced practitioner order or complex needs related to functional status, cognitive ability, or social support system  Outcome: Progressing     Problem: Knowledge Deficit  Goal: Patient/family/caregiver demonstrates understanding of disease process, treatment plan, medications, and discharge instructions  Description: Complete learning assessment and assess knowledge base.   Interventions:  - Provide teaching at level of understanding  - Provide teaching via preferred learning methods  Outcome: Progressing     Problem: RESPIRATORY - ADULT  Goal: Achieves optimal ventilation and oxygenation  Description: INTERVENTIONS:  - Assess for changes in respiratory status  - Assess for changes in mentation and behavior  - Position to facilitate oxygenation and minimize respiratory effort  - Oxygen administered by appropriate delivery if ordered  - Initiate smoking cessation education as indicated  - Encourage broncho-pulmonary hygiene including cough, deep breathe, Incentive Spirometry  - Assess the need for suctioning and aspirate as needed  - Assess and instruct to report SOB or any respiratory difficulty  - Respiratory Therapy support as indicated  Outcome: Progressing

## 2023-11-22 NOTE — DISCHARGE INSTR - AVS FIRST PAGE
Dear Coretta Sandhu,     It was our pleasure to care for you here at [facility] [employer]. It is our hope that we were always able to exceed the expected standards for your care during your stay. You were hospitalized due to chronic cough. You were cared for on the third floor by Keith Yañez MD under the service of Martell Juares MD with the [facility] who covers for your primary care physician (PCP), Chikis Easley DO, while you were hospitalized. If you have any questions or concerns related to this hospitalization, you may contact us at [phone]. For follow up as well as any medication refills, we recommend that you follow up with your primary care physician. A registered nurse will reach out to you by phone within a few days after your discharge to answer any additional questions that you may have after going home. However, at this time we provide for you here, the most important instructions / recommendations at discharge:       Notable Medication Adjustments -   Please take Prednisone 40 mg daily for four more days, starting tomorrow (Thursday)   Please use Breo inhaler 1 puff daily starting tomorrow (Thursday)   Please use Albuterol nebulizer every 6 hours as needed for wheezing and shortness of breath   Please take Metoprolol (Toprol-XL) 25 mg once daily   Please take Pantoprazole (Protonix) 40 mg twice daily   Please take Famotidine (Pepcid) 20 mg once daily   Plaese take Guaifenesin (Mucinex) 600 mg every 12 hours for seven days, starting tomorrow (Thursday). Please CONTINUE taking these prior medications: Calcitriol, Flonase, Latuda (Lurasidone), Mirabegron (Myrbetriq), Sucralfate (Carafate), and your vitamins and supplements.      Testing Required after Discharge -   Sleep Study  Cardiac Stress Test     Important follow up information -   Please follow up with your PCP within 1 week of discharge   Please follow up with Cardiology outpatient   Please follow up with Pulmonology outpatient     Other Instructions -   Have a wonderful Thanksgiving! Please review this entire after visit summary as additional general instructions including medication list, appointments, activity, diet, any pertinent wound care, and other additional recommendations from your care team that may be provided for you.       Sincerely,     Linda Herman MD

## 2023-11-22 NOTE — ASSESSMENT & PLAN NOTE
Lab Results   Component Value Date    EGFR 32 11/22/2023    EGFR 35 11/21/2023    EGFR 37 11/20/2023    CREATININE 1.60 (H) 11/22/2023    CREATININE 1.48 (H) 11/21/2023    CREATININE 1.43 (H) 11/20/2023     Creatinine currently near baseline  Patient appears euvolemic    Plan:   - Avoid nephrotoxins  - Monitor BMPs

## 2023-11-22 NOTE — ASSESSMENT & PLAN NOTE
Patient has been tachycardic since admission, even after discontinuing albuterol treatments   Previously in 2020 had a Holter monitor that showed PVCs. At that time patient was supposed to get a stress test but was not completed 2/2 COVID. Echo in 2020 showed normal EF 60%, no WMA or other abnormalities  One episode of 5 beats of NSVT on 11/21/23 at 0830. Patient asymptomatic.      Plan:  Repeat echo pending read   Telemetry  Lopressor 12.5mg BID started 11/21/23  Stress test and cardiology follow up after discharge

## 2023-11-23 LAB
BACTERIA SPT RESP CULT: ABNORMAL
GRAM STN SPEC: ABNORMAL

## 2023-11-23 NOTE — ASSESSMENT & PLAN NOTE
Patient presented with nonproductive cough ongoing for 6 weeks, associated with nasal congestion and some rhinorrhea. S/p outpatient treatment with azithromycin and Ceftin, which did not improve her symptoms. She has a prior smoking history, half a pack a day for over 30 years. Quit at 47years old. On exam, she had mild bilateral wheezing that improved with albuterol neb. On arrival she was tachy with elevated WBC, and tested positive for RSV on RP2 panel. Pulmonology was consulted, and suspects persistent eosinophilic asthma in conjunction with RSV infection.      Plan:  Complete 5 day course of PO prednisone 40 mg   Breo 100 1 puff daily   Albuterol nebulizer q6h PRN   PPI BID  Follow up with Pulmonology outpatient

## 2023-11-23 NOTE — DISCHARGE SUMMARY
[facility]  Discharge- [address], 70 y.o. female MRN: [id number]  Unit/Bed#: S -Diego Encounter: [id number]  Primary Care Provider: Hannah Letters, DO   Date and time admitted to hospital: 11/19/2023 10:50 PM    * Cough  Assessment & Plan  Patient presented with nonproductive cough ongoing for 6 weeks, associated with nasal congestion and some rhinorrhea. S/p outpatient treatment with azithromycin and Ceftin, which did not improve her symptoms. She has a prior smoking history, half a pack a day for over 30 years. Quit at 47years old. On exam, she had mild bilateral wheezing that improved with albuterol neb. On arrival she was tachy with elevated WBC, and tested positive for RSV on RP2 panel. Pulmonology was consulted, and suspects persistent eosinophilic asthma in conjunction with RSV infection. Plan:  Complete 5 day course of PO prednisone 40 mg   Breo 100 1 puff daily   Albuterol nebulizer q6h PRN   PPI BID  Follow up with Pulmonology outpatient     Tachycardia  Assessment & Plan  Patient was tachycardic since admission, even after discontinuing albuterol treatments  Previously in 2020 had a Holter monitor that showed PVCs. At that time patient was supposed to get a stress test but was not completed 2/2 COVID. Echo in 2020 showed normal EF 60%, no WMA or other abnormalities. Patient was monitored on telemetry during admission, and was found to have one episode of 5 beats of NSVT on 11/21/23 at 0830. Patient was asymptomatic. Echocardiogram was repeated during admission, and patient was started on metoprolol.      Plan:  Discharge on toprol-xl 25mg daily   Stress test outpatient   Cardiology follow up outpatient     Stage 3 chronic kidney disease [facility])  Assessment & Plan  Lab Results   Component Value Date    EGFR 32 11/22/2023    EGFR 35 11/21/2023    EGFR 37 11/20/2023    CREATININE 1.60 (H) 11/22/2023    CREATININE 1.48 (H) 11/21/2023    CREATININE 1.43 (H) 11/20/2023     Patient's Creatinine was monitored throughout admission, and she was persistently near her baseline without evidence of OWEN or alteration of volume statis. Plan:   - Follow up with PCP outpatient     Bipolar 1 disorder, mixed, severe ([address])  Assessment & Plan  Patient's PTA lurasidone (latuda) 20 mg daily was continued throughout admission and at discharge. Medical Problems       Resolved Problems  Date Reviewed: 11/22/2023   None       Discharging Physician / Practitioner: Moni Young MD  PCP: Gurmeet Heaton DO  Admission Date:   Admission Orders (From admission, onward)       Ordered        11/20/23 1548  Inpatient Admission  Once            11/20/23 0352  Place in Observation  Once                          Discharge Date: 11/22/23    Consultations During Hospital Stay:  Pulmonology    Procedures Performed:   None    Significant Findings / Test Results:   RSV +   EKG with tachycardia and NSVT, LBBB   Echocardiogram with LVEF 58%, grade 1 diastolic dysfunction, abnormal septal motion consisted with LBBB, mildly dilated L atrium, mild to moderate mitral regurgitation, mild tricuspid regurgitation    Incidental Findings:   None    Test Results Pending at Discharge (will require follow up): [facility] Allergy Panel  Sputum Culture     Outpatient Tests Requested:  Sleep Study  Cardiac Stress Test    Complications:  None    Reason for Admission: chronic cough with persistent tachycardia, NSVT     HPI: "Laine Paul is a 70 y.o. female with a PMH of bipolar 1 disorder, depression, hypothyroidism, GERD, Raynaud's disease, CKD stage III, JULIO who presents with a dry cough that has been ongoing for the past 6 weeks. Patient states that she has been to several providers for this cough and has tried multiple treatments including multiple rounds of antibiotics like azithromycin and Ceftin, prednisone, inhalers and nothing has been helping.   She states that her cough has not improved at all.  The cough is dry and she does not produce any sputum. She also endorses having nasal stuffiness with some rhinorrhea. She uses fluticasone at home. She denies any shortness of breath, chest pain, pressure, or discomfort, back pain, abdominal pain, nausea or vomiting, GI or  symptoms. No lightheadedness or dizziness. Patient states that she smoked for over 30 years half a pack per day, quit when she was 47years old. She has never seen a pulmonologist or had any PFTs done. She states that she feels like she was wheezing earlier today but that this improved after she had nebulizer treatment in the ED. Patient denies any environmental exposure or occupational exposure history. She got a cat this weekend, otherwise no pet exposures. No allergy history. In the ED, patient is found to be tachycardic and mild hypoxia requiring 2 L nasal cannula. Per chart review, patient had a previous Holter monitor in 2020 that showed sinus rhythm with rare ventricular and supraventricular ectopic activity consisting of PVCs and some bradycardia."      Hospital Course:   Johnathon Canas is a 70 y.o. female patient who originally presented to the hospital on 11/19/2023 due to chronic cough for several weeks, despite outpatient treatment with multiple rounds of antibiotics, steroids, and inhalers. In ED, she was found to by tachycardic and slightly hypoxic, and was started on supplemental O2. She was also wheezing significantly, and started on albuterol neb treatments. During the course of her admission, the patient was treated with IV steroids, neb treatments, PPI, and H2 blockers. Pulmonology was consulted, and ordered respiratory panel that revealed patient was positive for RSV. Echocardiogram was performed during admission as patient was persistently tachycardic, even after her breathing and cough improved. She was also found to have an episode of five beats of NSVT.  For this and the tachycardia, the patient was started on metoprolol, which she responded very well to. Orthostatic vital signs were taken to ensure tolerance to the medication, which were negative for orthostatic hypotension. The patient was discharged to home in good condition with instructions to follow up with Cardiology and Pulmonology outpatient. Please see above list of diagnoses and related plan for additional information. Condition at Discharge: good    Discharge Day Visit / Exam:   Subjective:    Patient seen and examined at bedside this morning. Patient reports she feels much better. Her breathing has improved, she no longer feels irving she is wheezing or out of breath, and her coughing frequency has improved. She also endorses feeling less fatigued. She denies any chest pain, SOB, abdominal pain, N/V/D/C. Her last BM was yesterday morning, and it was normal.      Overnight, no acute events reported by night team. No further episodes of tachycardia or NSVT recorded by telemetry. Nursing reports no concerns. Yesterday, patient underwent echocardiogram.    Vitals: Blood Pressure: 148/79 (11/22/23 1115)  Pulse: 92 (11/22/23 1115)  Temperature: (!) 97.3 °F (36.3 °C) (11/22/23 0723)  Temp Source: Oral (11/19/23 2023)  Respirations: 18 (11/22/23 1115)  Height: 5' 1" (154.9 cm) (11/21/23 1330)  Weight - Scale: 103 kg (228 lb) (11/21/23 1330)  SpO2: 93 % (11/22/23 0723)    Exam:   Physical Exam  Vitals and nursing note reviewed. Constitutional:       General: She is not in acute distress. Appearance: Normal appearance. She is well-developed. She is not ill-appearing, toxic-appearing or diaphoretic. HENT:      Head: Normocephalic and atraumatic. Right Ear: External ear normal.      Left Ear: External ear normal.      Nose: Nose normal. No congestion or rhinorrhea. Mouth/Throat:      Mouth: Mucous membranes are moist.   Eyes:      General: No scleral icterus. Extraocular Movements: Extraocular movements intact. Conjunctiva/sclera: Conjunctivae normal.      Pupils: Pupils are equal, round, and reactive to light. Cardiovascular:      Rate and Rhythm: Normal rate and regular rhythm. Pulses: Normal pulses. Heart sounds: Normal heart sounds. No murmur heard. No friction rub. No gallop. Pulmonary:      Effort: Pulmonary effort is normal. No respiratory distress. Breath sounds: No stridor. No wheezing, rhonchi or rales. Chest:      Chest wall: No tenderness. Abdominal:      General: Bowel sounds are normal. There is no distension. Palpations: Abdomen is soft. Tenderness: There is no abdominal tenderness. There is no guarding. Musculoskeletal:         General: No swelling, tenderness, deformity or signs of injury. Cervical back: Neck supple. Right lower leg: No edema. Left lower leg: No edema. Skin:     General: Skin is warm and dry. Capillary Refill: Capillary refill takes less than 2 seconds. Coloration: Skin is not jaundiced or pale. Findings: No bruising, erythema, lesion or rash. Neurological:      Mental Status: She is alert and oriented to person, place, and time. Mental status is at baseline. Psychiatric:         Mood and Affect: Mood normal.         Behavior: Behavior normal.         Thought Content: Thought content normal.         Judgment: Judgment normal.         Discussion with Family:  Will call and update family via phone. Discharge instructions/Information to patient and family:   See after visit summary for information provided to patient and family. Provisions for Follow-Up Care:  See after visit summary for information related to follow-up care and any pertinent home health orders. Mobility at time of Discharge:   Basic Mobility Inpatient Raw Score: 24  JH-HLM Goal: 8: Walk 250 feet or more  JH-HLM Achieved: 8: Walk 250 feet ot more  HLM Goal achieved. Continue to encourage appropriate mobility.      Disposition: Home    Planned Readmission: None     Discharge Statement:  I spent 45 minutes discharging the patient. This time was spent on the day of discharge. I had direct contact with the patient on the day of discharge. Greater than 50% of the total time was spent examining patient, answering all patient questions, arranging and discussing plan of care with patient as well as directly providing post-discharge instructions. Additional time then spent on discharge activities. Discharge Medications:  See after visit summary for reconciled discharge medications provided to patient and/or family.       **Please Note: This note may have been constructed using a voice recognition system**

## 2023-11-27 ENCOUNTER — RA CDI HCC (OUTPATIENT)
Dept: OTHER | Facility: HOSPITAL | Age: 68
End: 2023-11-27

## 2023-11-27 NOTE — PROGRESS NOTES
E66.01  [facility] coding opportunities          Chart Reviewed number of suggestions sent to Provider: 1     Patients Insurance     Medicare Insurance: [id number]

## 2023-12-02 PROBLEM — J98.8 VIRAL RESPIRATORY ILLNESS: Status: RESOLVED | Noted: 2023-10-03 | Resolved: 2023-12-02

## 2023-12-02 PROBLEM — B97.89 VIRAL RESPIRATORY ILLNESS: Status: RESOLVED | Noted: 2023-10-03 | Resolved: 2023-12-02

## 2023-12-05 ENCOUNTER — OFFICE VISIT (OUTPATIENT)
Dept: PULMONOLOGY | Facility: CLINIC | Age: 68
End: 2023-12-05
Payer: COMMERCIAL

## 2023-12-05 ENCOUNTER — HOSPITAL ENCOUNTER (OUTPATIENT)
Dept: SLEEP CENTER | Facility: CLINIC | Age: 68
Discharge: HOME | End: 2023-12-05
Payer: COMMERCIAL

## 2023-12-05 ENCOUNTER — OFFICE VISIT (OUTPATIENT)
Dept: FAMILY MEDICINE CLINIC | Facility: CLINIC | Age: 68
End: 2023-12-05
Payer: COMMERCIAL

## 2023-12-05 VITALS
SYSTOLIC BLOOD PRESSURE: 120 MMHG | DIASTOLIC BLOOD PRESSURE: 72 MMHG | HEIGHT: 61 IN | WEIGHT: 228 LBS | TEMPERATURE: 98.8 F | BODY MASS INDEX: 43.05 KG/M2 | HEART RATE: 89 BPM | OXYGEN SATURATION: 96 %

## 2023-12-05 VITALS
WEIGHT: 227 LBS | BODY MASS INDEX: 42.86 KG/M2 | SYSTOLIC BLOOD PRESSURE: 116 MMHG | TEMPERATURE: 98.4 F | OXYGEN SATURATION: 97 % | HEIGHT: 61 IN | DIASTOLIC BLOOD PRESSURE: 74 MMHG | HEART RATE: 86 BPM | RESPIRATION RATE: 18 BRPM

## 2023-12-05 DIAGNOSIS — R00.0 TACHYCARDIA: ICD-10-CM

## 2023-12-05 DIAGNOSIS — G47.33 OBSTRUCTIVE SLEEP APNEA: ICD-10-CM

## 2023-12-05 DIAGNOSIS — J45.909 UNCOMPLICATED ASTHMA, UNSPECIFIED ASTHMA SEVERITY, UNSPECIFIED WHETHER PERSISTENT: ICD-10-CM

## 2023-12-05 DIAGNOSIS — R05.2 SUBACUTE COUGH: ICD-10-CM

## 2023-12-05 DIAGNOSIS — J21.0 RSV (ACUTE BRONCHIOLITIS DUE TO RESPIRATORY SYNCYTIAL VIRUS): Primary | ICD-10-CM

## 2023-12-05 DIAGNOSIS — I44.7 LBBB (LEFT BUNDLE BRANCH BLOCK): ICD-10-CM

## 2023-12-05 DIAGNOSIS — K21.9 GASTROESOPHAGEAL REFLUX DISEASE WITHOUT ESOPHAGITIS: ICD-10-CM

## 2023-12-05 DIAGNOSIS — Z09 HOSPITAL DISCHARGE FOLLOW-UP: Primary | ICD-10-CM

## 2023-12-05 PROCEDURE — 99214 OFFICE O/P EST MOD 30 MIN: CPT | Performed by: INTERNAL MEDICINE

## 2023-12-05 PROCEDURE — G0399 HOME SLEEP TEST/TYPE 3 PORTA: HCPCS

## 2023-12-05 PROCEDURE — 99495 TRANSJ CARE MGMT MOD F2F 14D: CPT | Performed by: FAMILY MEDICINE

## 2023-12-05 RX ORDER — PANTOPRAZOLE SODIUM 40 MG/1
40 TABLET, DELAYED RELEASE ORAL DAILY
Qty: 90 TABLET | Refills: 1
Start: 2023-12-05 | End: 2023-12-05

## 2023-12-05 RX ORDER — METOPROLOL SUCCINATE 25 MG/1
25 TABLET, EXTENDED RELEASE ORAL DAILY
Qty: 90 TABLET | Refills: 3 | Status: SHIPPED | OUTPATIENT
Start: 2023-12-05

## 2023-12-05 NOTE — ASSESSMENT & PLAN NOTE
She would like to follow-up with Dr. Nidia Matias with regards to this. She picked up an at-home sleep study today.

## 2023-12-05 NOTE — ASSESSMENT & PLAN NOTE
The patient has completely recovered from this diagnosis. She has no persistent subacute cough. No further treatment indicated.     Advised patient to stop Breo, and resume if symptoms recur  Advised patient to discontinue Protonix separately,  from stopping Breo by around a week or so, and resume if symptomatic and we will send in the 20 mg dose and attempt to wean off of that

## 2023-12-05 NOTE — PROGRESS NOTES
Pulmonary Follow Up Note   Linnea Mckinney 70 y.o. female MRN: [id number]  12/5/2023    Assessment:    RSV (acute bronchiolitis due to respiratory syncytial virus)  The patient has completely recovered from this diagnosis. She has no persistent subacute cough. No further treatment indicated. Advised patient to stop Breo, and resume if symptoms recur  Advised patient to discontinue Protonix separately,  from stopping Breo by around a week or so, and resume if symptomatic and we will send in the 20 mg dose and attempt to wean off of that    Obstructive sleep apnea  She would like to follow-up with Dr. Alyx Miller with regards to this. She picked up an at-home sleep study today. Plan:    Diagnoses and all orders for this visit:    RSV (acute bronchiolitis due to respiratory syncytial virus)        -    Wean off Breo    Subacute cough  -    Resolved    History of Present Illness   HPI:  Linnea Mckinney is a 70 y.o. female who presents for posthospital follow-up. She reports that her energy level is not quite back to normal, but she is otherwise fine. She is sleeping well, and her cough is completely resolved. She was discharged on Breo which she remains on, as well as Protonix, which she would like to come off. No further shortness of breath or wheezing. Review of Systems   Respiratory:  Negative for cough, shortness of breath and wheezing.       Historical Information   Past Medical History:   Diagnosis Date   • Abnormal ECG    • Anxiety 2002   • Arthritis    • Bipolar 1 disorder (HCC)    • Chronic kidney disease     stage 3   • CPAP (continuous positive airway pressure) dependence    • Depression 2001   • Disease of thyroid gland    • Elevated blood pressure reading 06/10/2019   • GERD (gastroesophageal reflux disease)    • Obesity    • Sleep apnea    • Sleep apnea, obstructive 2007     Past Surgical History:   Procedure Laterality Date   • COLONOSCOPY  2011     Family History   Problem Relation Age of Onset   • Heart disease Mother    • Heart Valve Disease Mother         Replacement   • Diabetes Mother         2002   • Heart failure Mother         Heart Valve replacement   • Arthritis Father    • No Known Problems Sister    • No Known Problems Daughter    • No Known Problems Maternal Grandmother    • No Known Problems Maternal Grandfather    • No Known Problems Paternal Grandmother    • No Known Problems Paternal Grandfather    • No Known Problems Son    • No Known Problems Maternal Aunt    • No Known Problems Maternal Aunt    • No Known Problems Maternal Aunt    • No Known Problems Maternal Aunt    • No Known Problems Maternal Aunt    • No Known Problems Paternal Aunt    • Alcohol abuse Son         Jasson Kelley, 36year old son, has issues with alcohol and alcohol abuse       Meds/Allergies     Current Outpatient Medications:   •  albuterol (2.5 mg/3 mL) 0.083 % nebulizer solution, Take 3 mL (2.5 mg total) by nebulization every 6 (six) hours as needed for wheezing or shortness of breath, Disp: 90 mL, Rfl: 0  •  calcitriol (ROCALTROL) 0.25 mcg capsule, TAKE ONE CAPSULE BY MOUTH EVERY DAY, Disp: 90 capsule, Rfl: 4  •  Cholecalciferol (VITAMIN D) 2000 units CAPS, Take 1 capsule by mouth daily, Disp: , Rfl:   •  CRANBERRY PO, Take by mouth, Disp: , Rfl:   •  famotidine (PEPCID) 20 mg tablet, Take 1 tablet (20 mg total) by mouth 2 (two) times a day, Disp: 180 tablet, Rfl: 3  •  fluticasone (FLONASE) 50 mcg/act nasal spray, 1 spray into each nostril daily, Disp: 15.8 mL, Rfl: 0  •  Glucosamine-Chondroit-Vit C-Mn (GLUCOSAMINE 1500 COMPLEX PO), Take by mouth in the morning, Disp: , Rfl:   •  lurasidone (LATUDA) 20 mg tablet, TAKE 1 TABLET BY MOUTH DAILY  WITH BREAKFAST, Disp: 30 tablet, Rfl: 11  •  metoprolol succinate (TOPROL-XL) 25 mg 24 hr tablet, Take 1 tablet (25 mg total) by mouth daily, Disp: 90 tablet, Rfl: 3  •  Mirabegron ER (Myrbetriq) 25 MG TB24, Take 25 mg by mouth in the morning, Disp: 90 tablet, Rfl: 3  • Multiple Vitamin (MULTI-VITAMIN DAILY) TABS, Take by mouth, Disp: , Rfl:   •  sucralfate (CARAFATE) 1 g/10 mL suspension, Take 10 mL (1 g total) by mouth 2 (two) times a day, Disp: 414 mL, Rfl: 0  Allergies   Allergen Reactions   • Other Other (See Comments)     Other reaction(s): Anaphylaxis  [facility] - [id number]: plums peaches, fruits with skin        Vitals: Blood pressure 120/72, pulse 89, temperature 98.8 °F (37.1 °C), temperature source Tympanic, height 5' 1" (1.549 m), weight 103 kg (228 lb), SpO2 96 %, not currently breastfeeding. Body mass index is 43.08 kg/m². Oxygen Therapy  SpO2: 96 %  Oxygen Therapy: None (Room air)    Physical Exam  Physical Exam  Vitals reviewed. Constitutional:       General: She is not in acute distress. Appearance: Normal appearance. She is well-developed. She is not ill-appearing. HENT:      Head: Normocephalic and atraumatic. Eyes:      General: No scleral icterus. Conjunctiva/sclera: Conjunctivae normal.   Neck:      Vascular: No JVD. Cardiovascular:      Rate and Rhythm: Normal rate and regular rhythm. Heart sounds: Normal heart sounds. No murmur heard. No friction rub. No gallop. Pulmonary:      Effort: Pulmonary effort is normal. No respiratory distress. Breath sounds: Normal breath sounds. No wheezing or rales. Musculoskeletal:      Cervical back: Neck supple. Right lower leg: No edema. Left lower leg: No edema. Skin:     General: Skin is warm and dry. Findings: No rash. Neurological:      General: No focal deficit present. Mental Status: She is alert and oriented to person, place, and time. Mental status is at baseline. Psychiatric:         Mood and Affect: Mood normal.         Behavior: Behavior normal.     Labs: I have personally reviewed pertinent lab results.   Lab Results   Component Value Date    WBC 13.08 (H) 11/21/2023    HGB 12.4 11/21/2023    HCT 38.3 11/21/2023    MCV 96 11/21/2023     11/21/2023 Lab Results   Component Value Date    GLUCOSE 124 (H) 11/17/2017    CALCIUM 9.0 11/22/2023     (H) 11/17/2017    K 3.8 11/22/2023    CO2 21 11/22/2023     (H) 11/22/2023    BUN 38 (H) 11/22/2023    CREATININE 1.60 (H) 11/22/2023     Lab Results   Component Value Date    IGE 37.2 11/21/2023     Lab Results   Component Value Date    ALT 21 09/08/2023    AST 19 09/08/2023    ALKPHOS 72 09/08/2023    BILITOT 0.4 11/17/2017       Imaging and other studies: I have personally reviewed relevant films in PACS. EKG, Pathology, and Other Studies: I have personally reviewed relevant reports in Epic. Thomas Reece M.D.   [facility]

## 2023-12-05 NOTE — PROGRESS NOTES
Assessment/Plan:    1. Hospital discharge follow-up    2. LBBB (left bundle branch block)  -     metoprolol succinate (TOPROL-XL) 25 mg 24 hr tablet; Take 1 tablet (25 mg total) by mouth daily    3. Tachycardia  Assessment & Plan: On metroprolol    Orders:  -     metoprolol succinate (TOPROL-XL) 25 mg 24 hr tablet; Take 1 tablet (25 mg total) by mouth daily    4. Gastroesophageal reflux disease without esophagitis  -     pantoprazole (PROTONIX) 40 mg tablet; Take 1 tablet (40 mg total) by mouth daily    5. Subacute cough  Assessment & Plan:  Possible reflux contributed  Back on protonix  Will discuss with nephrology    Orders:  -     pantoprazole (PROTONIX) 40 mg tablet; Take 1 tablet (40 mg total) by mouth daily    6. Uncomplicated asthma, unspecified asthma severity, unspecified whether persistent  -     pantoprazole (PROTONIX) 40 mg tablet; Take 1 tablet (40 mg total) by mouth daily            There are no Patient Instructions on file for this visit. No follow-ups on file. Subjective:      Patient ID: Judith Trejo is a 70 y.o. female. Chief Complaint   Patient presents with   • Transition of Care Management     Patient being seen for TCM       Here for hospital follow up  Had rsv  Was coughing for a long time  Low enengy  Started metoprolol for tachycardia  Seeing pulmonolgy today and cardiology in [place]        The following portions of the patient's history were reviewed and updated as appropriate: allergies, current medications, past family history, past medical history, past social history, past surgical history and problem list.    Review of Systems   Constitutional:  Positive for fatigue. HENT: Negative. Eyes: Negative. Respiratory: Negative. Cardiovascular: Negative. Gastrointestinal: Negative. Endocrine: Negative. Genitourinary: Negative. Musculoskeletal: Negative. Allergic/Immunologic: Negative. Neurological: Negative. Hematological: Negative. Psychiatric/Behavioral: Negative. TCM Call     Date and time call was made  11/22/2023  2:46 PM    Patient was hospitialized at  [address] [place] [address]    Date of Admission  11/19/23    Date of discharge  11/22/23    Diagnosis  Cough    Disposition  Home    Were the patients medications reviewed and updated  No      TCM Call     Should patient be enrolled in anticoag monitoring? No    Scheduled for follow up? Yes    Did you obtain your prescribed medications  Yes    Do you need help managing your prescriptions or medications  No    Is transportation to your appointment needed  No    I have advised the patient to call PCP with any new or worsening symptoms  Aung Anderson,     Living Arrangements  Spouse or Significiant other    Are you recieving any outpatient services  No    Are you recieving home care services  No    Are you using any community resources  No    Current waiver services  No    Have you fallen in the last 12 months  No    Interperter language line needed  No         Current Outpatient Medications   Medication Sig Dispense Refill   • albuterol (2.5 mg/3 mL) 0.083 % nebulizer solution Take 3 mL (2.5 mg total) by nebulization every 6 (six) hours as needed for wheezing or shortness of breath 90 mL 0   • calcitriol (ROCALTROL) 0.25 mcg capsule TAKE ONE CAPSULE BY MOUTH EVERY DAY 90 capsule 4   • Cholecalciferol (VITAMIN D) 2000 units CAPS Take 1 capsule by mouth daily     • CRANBERRY PO Take by mouth     • famotidine (PEPCID) 20 mg tablet Take 1 tablet (20 mg total) by mouth 2 (two) times a day 180 tablet 3   • fluticasone (FLONASE) 50 mcg/act nasal spray 1 spray into each nostril daily 15.8 mL 0   • Fluticasone Furoate-Vilanterol (Breo Ellipta) 100-25 mcg/actuation inhaler Inhale 1 puff daily Rinse mouth after use.  60 blister 0   • Glucosamine-Chondroit-Vit C-Mn (GLUCOSAMINE 1500 COMPLEX PO) Take by mouth in the morning     • guaiFENesin (MUCINEX) 600 mg 12 hr tablet Take 1 tablet (600 mg total) by mouth every 12 (twelve) hours 14 tablet 0   • lurasidone (LATUDA) 20 mg tablet TAKE 1 TABLET BY MOUTH DAILY  WITH BREAKFAST 30 tablet 11   • metoprolol succinate (TOPROL-XL) 25 mg 24 hr tablet Take 1 tablet (25 mg total) by mouth daily 90 tablet 3   • Mirabegron ER (Myrbetriq) 25 MG TB24 Take 25 mg by mouth in the morning 90 tablet 3   • Multiple Vitamin (MULTI-VITAMIN DAILY) TABS Take by mouth     • pantoprazole (PROTONIX) 40 mg tablet Take 1 tablet (40 mg total) by mouth daily 90 tablet 1   • sucralfate (CARAFATE) 1 g/10 mL suspension Take 10 mL (1 g total) by mouth 2 (two) times a day 414 mL 0     No current facility-administered medications for this visit. Objective:    /74 (BP Location: Left arm, Patient Position: Sitting, Cuff Size: Large)   Pulse 86   Temp 98.4 °F (36.9 °C) (Tympanic)   Resp 18   Ht 5' 1" (1.549 m)   Wt 103 kg (227 lb)   SpO2 97%   BMI 42.89 kg/m²        Physical Exam  Vitals and nursing note reviewed. Constitutional:       Appearance: She is well-developed. HENT:      Head: Normocephalic and atraumatic. Nose: Nose normal.   Eyes:      General: Lids are normal.      Conjunctiva/sclera: Conjunctivae normal.      Pupils: Pupils are equal, round, and reactive to light. Cardiovascular:      Rate and Rhythm: Normal rate and regular rhythm. Heart sounds: Normal heart sounds, S1 normal and S2 normal.   Pulmonary:      Effort: Pulmonary effort is normal.      Breath sounds: Normal breath sounds. Abdominal:      General: Bowel sounds are normal.      Palpations: Abdomen is soft. Musculoskeletal:         General: Normal range of motion. Cervical back: Normal range of motion and neck supple. Skin:     General: Skin is warm and dry. Neurological:      Mental Status: She is alert and oriented to person, place, and time. Deep Tendon Reflexes: Reflexes are normal and symmetric.    Psychiatric:         Speech: Speech normal.         Behavior: Behavior normal.         Thought Content:  Thought content normal.         Judgment: Judgment normal.                Leticia Fruits, DO

## 2023-12-05 NOTE — PROGRESS NOTES
Home Sleep Study Documentation    HOME STUDY DEVICE: Noxturnal no                                           Anais G3 yes      Pre-Sleep Home Study:    Set-up and instructions performed by: Alonso Sky performed demonstration for Patient: yes    Return demonstration performed by Patient: yes    Written instructions provided to Patient: yes    Patient signed consent form: yes        Post-Sleep Home Study:    Additional comments by Patient:     Home Sleep Study Failed:no:    Failure reason: N/A    Reported or Detected: N/A    Scored by: Hayley Villa

## 2023-12-08 ENCOUNTER — OFFICE VISIT (OUTPATIENT)
Dept: PSYCHIATRY | Facility: CLINIC | Age: 68
End: 2023-12-08
Payer: COMMERCIAL

## 2023-12-08 DIAGNOSIS — Z79.899 LONG TERM CURRENT USE OF ANTIPSYCHOTIC MEDICATION: ICD-10-CM

## 2023-12-08 DIAGNOSIS — F31.63 BIPOLAR 1 DISORDER, MIXED, SEVERE (HCC): Primary | ICD-10-CM

## 2023-12-08 PROCEDURE — 99213 OFFICE O/P EST LOW 20 MIN: CPT | Performed by: PSYCHIATRY & NEUROLOGY

## 2023-12-08 PROCEDURE — 90833 PSYTX W PT W E/M 30 MIN: CPT | Performed by: PSYCHIATRY & NEUROLOGY

## 2023-12-08 NOTE — BH TREATMENT PLAN
TREATMENT PLAN (Medication Management Only)        [address] [facility]    Name and Date of Birth:  Lois Lee 70 y.o. 1955  Date of Treatment Plan: December 8, 2023  Diagnosis/Diagnoses:    1. Bipolar 1 disorder, mixed, severe ([address])      Strengths/Personal Resources for Self-Care: supportive family, taking medications as prescribed, ability to communicate needs. Area/Areas of need (in own words): depression, depressive symptoms, mood instability  1. Long Term Goal: continue improvement in depression. Target Date:6 months - 6/8/2024  Person/Persons responsible for completion of goal: Cherelle  2. Short Term Objective (s) - How will we reach this goal?:   A. Provider new recommended medication/dosage changes and/or continue medication(s): continue current medications as prescribed. B. N/A.  C. N/A. Target Date:6 months - 6/8/2024  Person/Persons Responsible for Completion of Goal: Cherelle  Progress Towards Goals: continuing treatment  Treatment Modality: medication management every 6 months  Review due 180 days from date of this plan: 6 months - 6/8/2024  Expected length of service: ongoing treatment  My Physician/PA/NP and I have developed this plan together and I agree to work on the goals and objectives. I understand the treatment goals that were developed for my treatment.

## 2023-12-08 NOTE — PSYCH
Visit Time    Visit Start Time: 9:30  Visit Stop Time: 10:00  Total Visit Duration:  30 minutes    Subjective:Medication Management    Patient ID: Rani Ames is a 70 y.o. female with Bipolar do     HPI ROS Appetite Changes and Sleep: normal appetite, normal energy level, no weight change and normal number of sleep hours     Cherelle has been compliant with Latuda 20 mg daily and denies medication side effects. She was recently admitted to the hospital with RSV infections. She stated it took her several weeks to feel better after being in the hospital . She stated her  was admitted earlier with same respiratory infection. She stated that her adult children are doing well, her daughter has a long term BF that is older than her and was recently dx with testicular CA. She recently told her that she is fine about not having children since her partner is not able to have children. She has 2 grand kids from her son. Her son remains sober and is doing well. Continue Latuda and schedule follow up in 6 months. Review Of Systems:     Mood Emotional Lability   Behavior Impulsive Behavior   Thought Content Disturbing Thoughts, Feelings   General Emotional Problems and Decreased Functioning   Personality Normal   Other Psych Symptoms Normal   Constitutional Negative   ENT Negative   Cardiovascular Negative   Respiratory Negative   Gastrointestinal Negative   Genitourinary Negative   Musculoskeletal Negative   Integumentary Negative   Neurological Negative   Endocrine Normal    Other Symptoms Normal        Laboratory Results:   Recent Labs (last 12 months):   No results displayed because visit has over 200 results.       Office Visit on 11/15/2023   Component Date Value     RAPID STREP A 11/15/2023 Negative     Throat Culture 11/15/2023 Negative for beta-hemolytic Streptococcus    Appointment on 10/09/2023   Component Date Value    Vit D, 25-Hydroxy 10/09/2023 47.7     Albumin 10/09/2023 4.2     Calcium 10/09/2023 9.6     Phosphorus 10/09/2023 3.5     BUN 10/09/2023 27 (H)     Creatinine 10/09/2023 1.54 (H)     Sodium 10/09/2023 143     Potassium 10/09/2023 4.1     Chloride 10/09/2023 107     CO2 10/09/2023 28     ANION GAP 10/09/2023 8     eGFR 10/09/2023 34     Glucose, Fasting 10/09/2023 112 (H)     PTH 10/09/2023 45.3    Office Visit on 10/03/2023   Component Date Value    SARS-CoV-2 10/03/2023 Negative    Appointment on 09/08/2023   Component Date Value    TSH 3RD GENERATON 09/08/2023 2.426     Cholesterol 09/08/2023 190     Triglycerides 09/08/2023 144     HDL, Direct 09/08/2023 50     LDL Calculated 09/08/2023 111 (H)     WBC 09/08/2023 6.02     RBC 09/08/2023 4.58     Hemoglobin 09/08/2023 14.7     Hematocrit 09/08/2023 44.1     MCV 09/08/2023 96     MCH 09/08/2023 32.1     MCHC 09/08/2023 33.3     RDW 09/08/2023 14.1     MPV 09/08/2023 12.8 (H)     Platelets 10/77/0890 142 (L)     nRBC 09/08/2023 0     Neutrophils Relative 09/08/2023 62     Immat GRANS % 09/08/2023 0     Lymphocytes Relative 09/08/2023 26     Monocytes Relative 09/08/2023 8     Eosinophils Relative 09/08/2023 3     Basophils Relative 09/08/2023 1     Neutrophils Absolute 09/08/2023 3.73     Immature Grans Absolute 09/08/2023 0.02     Lymphocytes Absolute 09/08/2023 1.55     Monocytes Absolute 09/08/2023 0.49     Eosinophils Absolute 09/08/2023 0.18     Basophils Absolute 09/08/2023 0.05     Sodium 09/08/2023 143     Potassium 09/08/2023 4.2     Chloride 09/08/2023 108     CO2 09/08/2023 25     ANION GAP 09/08/2023 10     BUN 09/08/2023 28 (H)     Creatinine 09/08/2023 1.56 (H)     Glucose, Fasting 09/08/2023 117 (H)     Calcium 09/08/2023 9.7     AST 09/08/2023 19     ALT 09/08/2023 21     Alkaline Phosphatase 09/08/2023 72     Total Protein 09/08/2023 6.7     Albumin 09/08/2023 3.8     Total Bilirubin 09/08/2023 0.52     eGFR 09/08/2023 33    Office Visit on 06/26/2023   Component Date Value    POST-VOID RESIDUAL VOLUM* 06/26/2023 56 Appointment on 04/20/2023   Component Date Value    Creatinine, Ur 04/20/2023 43.5     Protein Urine Random 04/20/2023 13     Prot/Creat Ratio, Ur 04/20/2023 0.30 (H)     PTH 04/20/2023 43.7     Albumin 04/20/2023 3.5     Calcium 04/20/2023 9.1     Phosphorus 04/20/2023 3.5     Glucose 04/20/2023 109     BUN 04/20/2023 27 (H)     Creatinine 04/20/2023 1.48 (H)     Sodium 04/20/2023 139     Potassium 04/20/2023 4.1     Chloride 04/20/2023 111 (H)     CO2 04/20/2023 23     ANION GAP 04/20/2023 5     eGFR 04/20/2023 36     Vit D, 25-Hydroxy 04/20/2023 33.7    Hospital Outpatient Visit on 03/08/2023   Component Date Value    Case Report 03/08/2023                      Value:Surgical Pathology Report                         Case: [id number]                                   Authorizing Provider:  Sakshi Blue MD      Collected:           03/08/2023 1156              [facility]ing Location:     [facility]        Received:            03/08/2023 [address] [place] [address]                                                    Pathologist:           Mitali Bailey MD                                                          Specimens:   A) - Polyp, Colorectal, ascending colon, cold snare                                                 B) - Polyp, Colorectal, rectum, hot snare                                                  Final Diagnosis 03/08/2023                      Value: This result contains rich text formatting which cannot be displayed here. Additional Information 03/08/2023                      Value: This result contains rich text formatting which cannot be displayed here. Synoptic Checklist 03/08/2023                      Value:                            COLON/RECTUM POLYP FORM - GI - All Specimens                                                                                     :    Adenoma(s)      Gross Description 03/08/2023                      Value: This result contains rich text formatting which cannot be displayed here. Appointment on 01/12/2023   Component Date Value    Sodium 01/12/2023 140     Potassium 01/12/2023 4.1     Chloride 01/12/2023 111 (H)     CO2 01/12/2023 22     ANION GAP 01/12/2023 7     BUN 01/12/2023 38 (H)     Creatinine 01/12/2023 1.62 (H)     Glucose, Fasting 01/12/2023 115 (H)     Calcium 01/12/2023 9.5     AST 01/12/2023 13     ALT 01/12/2023 22     Alkaline Phosphatase 01/12/2023 68     Total Protein 01/12/2023 7.2     Albumin 01/12/2023 3.7     Total Bilirubin 01/12/2023 0.50     eGFR 01/12/2023 32     Cholesterol 01/12/2023 201 (H)     Triglycerides 01/12/2023 116     HDL, Direct 01/12/2023 51     LDL Calculated 01/12/2023 127 (H)     TSH 3RD GENERATON 01/12/2023 2.030    Office Visit on 12/22/2022   Component Date Value    Cologuard Result 01/13/2023 Positive (A)    There may be more visits with results that are not included.        Substance Abuse History:  Social History     Substance and Sexual Activity   Drug Use Never       Family Psychiatric History:   Family History   Problem Relation Age of Onset    Heart disease Mother     Heart Valve Disease Mother         Replacement    Diabetes Mother         2002    Heart failure Mother         Heart Valve replacement    Arthritis Father     No Known Problems Sister     No Known Problems Daughter     No Known Problems Maternal Grandmother     No Known Problems Maternal Grandfather     No Known Problems Paternal Grandmother     No Known Problems Paternal Grandfather     No Known Problems Son     No Known Problems Maternal Aunt     No Known Problems Maternal Aunt     No Known Problems Maternal Aunt     No Known Problems Maternal Aunt     No Known Problems Maternal Aunt     No Known Problems Paternal Aunt     Alcohol abuse Son         Hamilton Alonso, 36year old son, has issues with alcohol and alcohol abuse       The following portions of the patient's history were reviewed and updated as appropriate: allergies, current medications, past family history, past medical history, past social history, past surgical history and problem list.    Social History     Socioeconomic History    Marital status: /Civil Union     Spouse name: Not on file    Number of children: Not on file    Years of education: Not on file    Highest education level: Not on file   Occupational History    Not on file   Tobacco Use    Smoking status: Former     Packs/day: 0.25     Years: 30.00     Total pack years: 7.50     Types: Cigarettes     Quit date: 2009     Years since quittin.9    Smokeless tobacco: Never   Vaping Use    Vaping Use: Never used   Substance and Sexual Activity    Alcohol use: Yes     Alcohol/week: 1.0 standard drink of alcohol     Types: 1 Glasses of wine per week    Drug use: Never    Sexual activity: Not Currently     Partners: Male     Birth control/protection: Post-menopausal   Other Topics Concern    Not on file   Social History Narrative    Daily coffee consumption: 3 cups/day     Social Determinants of Health     Financial Resource Strain: Low Risk  (2022)    Overall Financial Resource Strain (CARDIA)     Difficulty of Paying Living Expenses: Not hard at all   Food Insecurity: No Food Insecurity (2023)    Hunger Vital Sign     Worried About Running Out of Food in the Last Year: Never true     Ran Out of Food in the Last Year: Never true   Transportation Needs: No Transportation Needs (2023)    PRAPARE - Transportation     Lack of Transportation (Medical): No     Lack of Transportation (Non-Medical):  No   Physical Activity: Not on file   Stress: Not on file   Social Connections: Not on file   Intimate Partner Violence: Not on file   Housing Stability: Low Risk  (2023)    Housing Stability Vital Sign     Unable to Pay for Housing in the Last Year: No     Number of Places Lived in the Last Year: 1     Unstable Housing in the Last Year: No     Social History     Social History Narrative    Daily coffee consumption: 3 cups/day       Objective:       Mental status:  Appearance calm and cooperative , adequate hygiene and grooming and good eye contact    Mood euthymic   Affect affect was constricted   Speech a normal rate and fluent   Thought Processes coherent/organized and normal thought processes   Hallucinations no hallucinations present    Thought Content no delusions   Abnormal Thoughts no suicidal thoughts  and no homicidal thoughts    Orientation  oriented to person and place and time   Remote Memory short term memory intact and long term memory intact   Attention Span concentration intact   Intellect Appears to be of Average Intelligence   Insight Limited insight   Judgement judgment was limited   Muscle Strength Muscle strength and tone were normal and Normal gait    Language no difficulty naming common objects and no difficulty repeating a phrase    Fund of Knowledge displays adequate knowledge of current events, adequate fund of knowledge regarding past history and adequate fund of knowledge regarding vocabulary                Assessment/Plan:       Diagnoses and all orders for this visit:    Bipolar 1 disorder, mixed, severe ([address])              Treatment Recommendations- Risks Benefits      Immediate Medical/Psychiatric/Psychotherapy Treatments and Any Precautions: continue current treatment     Risks, Benefits And Possible Side Effects Of Medications:  {PSYCH RISK, BENEFITS AND POSSIBLE SIDE EFFECTS (Optional):99165    Controlled Medication Discussion: Discussed with patient Black Box warning on concurrent use of benzodiazepines and opioid medications including sedation, respiratory depression, coma and death. Patient understands the risk of treatment with benzodiazepines in addition to opioids and wants to continue taking those medications.  , Discussed with patient the risks of sedation, respiratory depression, impairment of ability to drive and potential for abuse and addiction related to treatment with benzodiazepine medications. The patient understands risk of treatment with benzodiazepine medications, agrees to not drive if feels impaired and agrees to take medications as prescribed. and The patient has been filling controlled prescriptions on time as prescribed to [address] [facility] [address] program.      Psychotherapy Provided: Individual psychotherapy provided. Individual psychotherapy provided: Yes  Counseling was provided during the session today for 16 minutes. Medications, treatment progress and treatment plan reviewed with Georgia Salas. Medication education provided to Georgia Salas. Goals discussed during in session: continue improvement in mood stability. Recent stressor including  family problems, family conflict, family issues, health issues, medical problems, limited support, social difficulties, everyday stressors and ongoing anxiety discussed with Cherelle. Coping strategies including compliance with medications, contacting a therapist, eliminating avoidance, engaging in previously avoided activities, exercising, increasing energy, increasing interest in usual activities, increasing motivation, increasing social interaction, keeping busy at home, maintain healthy diet, maintain heathy sleeping hygiene and maintain positive attitude reviewed with Georgia Salas. Importance of medication and treatment compliance reviewed with Cherelle. Educated on importance of medication and treatment compliance. Importance of follow up with family physician for medical issues reviewed with Georgia Salas. Supportive therapy provided.

## 2023-12-12 ENCOUNTER — TELEPHONE (OUTPATIENT)
Dept: SLEEP CENTER | Facility: CLINIC | Age: 68
End: 2023-12-12

## 2023-12-12 DIAGNOSIS — G47.33 OBSTRUCTIVE SLEEP APNEA: Primary | ICD-10-CM

## 2023-12-12 PROCEDURE — 95806 SLEEP STUDY UNATT&RESP EFFT: CPT | Performed by: PSYCHIATRY & NEUROLOGY

## 2023-12-14 ENCOUNTER — OFFICE VISIT (OUTPATIENT)
Dept: CARDIOLOGY CLINIC | Facility: CLINIC | Age: 68
End: 2023-12-14
Payer: COMMERCIAL

## 2023-12-14 VITALS
DIASTOLIC BLOOD PRESSURE: 80 MMHG | WEIGHT: 228.6 LBS | OXYGEN SATURATION: 95 % | BODY MASS INDEX: 43.16 KG/M2 | HEART RATE: 91 BPM | HEIGHT: 61 IN | SYSTOLIC BLOOD PRESSURE: 126 MMHG

## 2023-12-14 DIAGNOSIS — I49.3 PREMATURE VENTRICULAR CONTRACTIONS: Primary | ICD-10-CM

## 2023-12-14 DIAGNOSIS — R94.31 ABNORMAL ELECTROCARDIOGRAM (ECG) (EKG): ICD-10-CM

## 2023-12-14 DIAGNOSIS — I44.7 LBBB (LEFT BUNDLE BRANCH BLOCK): ICD-10-CM

## 2023-12-14 DIAGNOSIS — R00.0 TACHYCARDIA: ICD-10-CM

## 2023-12-14 PROBLEM — G47.8 OTHER SLEEP DISORDERS: Status: ACTIVE | Noted: 2023-12-14

## 2023-12-14 PROCEDURE — 99204 OFFICE O/P NEW MOD 45 MIN: CPT | Performed by: INTERNAL MEDICINE

## 2023-12-14 NOTE — LETTER
December 14, 2023     Kaleb Ivory, [facility]  [address] [place] [id number]    Patient: Hardik Lilly   YOB: 1955   Date of Visit: 12/14/2023       Dear Dr. Johs Le:    Thank you for referring Micaela Graham to me for evaluation. Below are my notes for this consultation. If you have questions, please do not hesitate to call me. I look forward to following your patient along with you. Sincerely,        Tyrese Kyle MD        CC: MD Tyrese Persaud MD  12/14/2023  1:04 PM  Incomplete                                             Cardiology Consultation     Hardik Lilly  [id number]  1955  [facility] [address] [address]    1. Tachycardia  Ambulatory referral to Cardiology      2. LBBB (left bundle branch block)  Ambulatory referral to Cardiology        Chief Complaint   Patient presents with   • New Patient Visit     Hospital follow-up for tachycardia noted during 11/19-11/22/23 admission with echo - stress test recommended in outpatient    • Rapid Heart Rate     Patient has been noticing higher pulses on her readings, in the 90s at rest     HPI: Patient is here for cardiac evaluation after hospitalization where she was noted to be tachycardic. She was started on Toprol XL and asked to follow up. She was also noted to have some PVCs and NSVT. No reported chest pain, shortness of breath, palpitations, lightheadedness, syncope, LE edema, orthopnea, PND, or significant weight changes. Patient remains active without any increased fatigue out of the ordinary.       Patient Active Problem List   Diagnosis   • Bipolar 1 disorder, mixed, severe ([address])   • Depression with anxiety   • Gastroesophageal reflux disease without esophagitis   • Hypothyroidism   • Impaired fasting glucose   • Insomnia   • Obstructive sleep apnea   • Onychomycosis of toenail   • Patellofemoral arthritis of right knee   • Stress incontinence of urine   • High triglycerides   • BMI 40.0-44.9, adult ([facility])   • Elevated blood pressure reading without diagnosis of hypertension   • Abnormal EKG   • Localized edema   • Palpitations   • Raynaud's disease without gangrene   • Stage 3 chronic kidney disease ([facility])   • Vitamin D deficiency   • Primary osteoarthritis of left knee   • Primary osteoarthritis of right knee   • Secondary hyperparathyroidism of renal origin ([address])   • Persistent proteinuria   • Urinary frequency   • Morbid obesity with BMI of 40.0-44.9, adult ([facility])   • Mass of right hand   • Lichen sclerosus   • Encounter for immunization   • CPAP (continuous positive airway pressure) dependence   • Cough   • Tachycardia   • Uncomplicated asthma, unspecified asthma severity, unspecified whether persistent   • Subacute cough   • RSV (acute bronchiolitis due to respiratory syncytial virus)     Past Medical History:   Diagnosis Date   • Abnormal ECG    • Anxiety    • Arthritis    • Bipolar 1 disorder ([facility])    • Chronic kidney disease     stage 3   • CPAP (continuous positive airway pressure) dependence    • Depression    • Disease of thyroid gland    • Elevated blood pressure reading 06/10/2019   • GERD (gastroesophageal reflux disease)    • Obesity    • Sleep apnea    • Sleep apnea, obstructive      Social History     Socioeconomic History   • Marital status: /Civil Union     Spouse name: Not on file   • Number of children: Not on file   • Years of education: Not on file   • Highest education level: Not on file   Occupational History   • Not on file   Tobacco Use   • Smoking status: Former     Current packs/day: 0.00     Average packs/day: 0.3 packs/day for 30.0 years (7.5 ttl pk-yrs)     Types: Cigarettes     Start date: 1979     Quit date: 2009     Years since quittin.9   • Smokeless tobacco: Never   Vaping Use   • Vaping status: Never Used   Substance and Sexual Activity   • Alcohol use: Yes     Alcohol/week: 1.0 standard drink of alcohol     Types: 1 Glasses of wine per week   • Drug use: No   • Sexual activity: Not Currently     Partners: Male     Birth control/protection: Post-menopausal   Other Topics Concern   • Not on file   Social History Narrative    Daily coffee consumption: 3 cups/day     Social Determinants of Health     Financial Resource Strain: Low Risk  (12/18/2022)    Overall Financial Resource Strain (CARDIA)    • Difficulty of Paying Living Expenses: Not hard at all   Food Insecurity: No Food Insecurity (11/21/2023)    Hunger Vital Sign    • Worried About Running Out of Food in the Last Year: Never true    • Ran Out of Food in the Last Year: Never true   Transportation Needs: No Transportation Needs (11/21/2023)    PRAPARE - Transportation    • Lack of Transportation (Medical): No    • Lack of Transportation (Non-Medical):  No   Physical Activity: Not on file   Stress: Not on file   Social Connections: Not on file   Intimate Partner Violence: Not on file   Housing Stability: Low Risk  (11/21/2023)    Housing Stability Vital Sign    • Unable to Pay for Housing in the Last Year: No    • Number of Places Lived in the Last Year: 1    • Unstable Housing in the Last Year: No      Family History   Problem Relation Age of Onset   • Heart disease Mother    • Heart Valve Disease Mother         Replacement   • Diabetes Mother         2002   • Heart failure Mother         Heart Valve replacement   • Arthritis Father    • No Known Problems Sister    • No Known Problems Daughter    • No Known Problems Maternal Grandmother    • No Known Problems Maternal Grandfather    • No Known Problems Paternal Grandmother    • No Known Problems Paternal Grandfather    • No Known Problems Son    • No Known Problems Maternal Aunt    • No Known Problems Maternal Aunt    • No Known Problems Maternal Aunt    • No Known Problems Maternal Aunt    • No Known Problems Maternal Aunt    • No Known Problems Paternal Aunt    • Alcohol abuse Son         Hamilton Alonso, 36year old son, has issues with alcohol and alcohol abuse     Past Surgical History:   Procedure Laterality Date   • COLONOSCOPY  2011       Current Outpatient Medications:   •  albuterol (2.5 mg/3 mL) 0.083 % nebulizer solution, Take 3 mL (2.5 mg total) by nebulization every 6 (six) hours as needed for wheezing or shortness of breath, Disp: 90 mL, Rfl: 0  •  calcitriol (ROCALTROL) 0.25 mcg capsule, TAKE ONE CAPSULE BY MOUTH EVERY DAY, Disp: 90 capsule, Rfl: 4  •  Cholecalciferol (VITAMIN D) 2000 units CAPS, Take 1 capsule by mouth daily, Disp: , Rfl:   •  CRANBERRY PO, Take by mouth, Disp: , Rfl:   •  famotidine (PEPCID) 20 mg tablet, Take 1 tablet (20 mg total) by mouth 2 (two) times a day, Disp: 180 tablet, Rfl: 3  •  fluticasone (FLONASE) 50 mcg/act nasal spray, 1 spray into each nostril daily, Disp: 15.8 mL, Rfl: 0  •  Glucosamine-Chondroit-Vit C-Mn (GLUCOSAMINE 1500 COMPLEX PO), Take by mouth in the morning, Disp: , Rfl:   •  lurasidone (LATUDA) 20 mg tablet, TAKE 1 TABLET BY MOUTH DAILY  WITH BREAKFAST, Disp: 30 tablet, Rfl: 11  •  metoprolol succinate (TOPROL-XL) 25 mg 24 hr tablet, Take 1 tablet (25 mg total) by mouth daily, Disp: 90 tablet, Rfl: 3  •  Mirabegron ER (Myrbetriq) 25 MG TB24, Take 25 mg by mouth in the morning, Disp: 90 tablet, Rfl: 3  •  Multiple Vitamin (MULTI-VITAMIN DAILY) TABS, Take by mouth daily, Disp: , Rfl:   Allergies   Allergen Reactions   • Other Other (See Comments)     Other reaction(s): Anaphylaxis  Annotation - [id number]: plums peaches, fruits with skin      Vitals:    12/14/23 1251   BP: 126/80   BP Location: Right arm   Patient Position: Sitting   Cuff Size: Large   Pulse: 91   SpO2: 95%   Weight: 104 kg (228 lb 9.6 oz)   Height: 5' 1" (1.549 m)       Labs:  No results displayed because visit has over 200 results.       Office Visit on 11/15/2023   Component Date Value   •  RAPID STREP A 11/15/2023 Negative    • Throat Culture 11/15/2023 Negative for beta-hemolytic Streptococcus    Appointment on 10/09/2023   Component Date Value   • Vit D, 25-Hydroxy 10/09/2023 47.7    • Albumin 10/09/2023 4.2    • Calcium 10/09/2023 9.6    • Phosphorus 10/09/2023 3.5    • BUN 10/09/2023 27 (H)    • Creatinine 10/09/2023 1.54 (H)    • Sodium 10/09/2023 143    • Potassium 10/09/2023 4.1    • Chloride 10/09/2023 107    • CO2 10/09/2023 28    • ANION GAP 10/09/2023 8    • eGFR 10/09/2023 34    • Glucose, Fasting 10/09/2023 112 (H)    • PTH 10/09/2023 45.3    Office Visit on 10/03/2023   Component Date Value   • SARS-CoV-2 10/03/2023 Negative    Appointment on 09/08/2023   Component Date Value   • TSH 3RD GENERATON 09/08/2023 2.426    • Cholesterol 09/08/2023 190    • Triglycerides 09/08/2023 144    • HDL, Direct 09/08/2023 50    • LDL Calculated 09/08/2023 111 (H)    • WBC 09/08/2023 6.02    • RBC 09/08/2023 4.58    • Hemoglobin 09/08/2023 14.7    • Hematocrit 09/08/2023 44.1    • MCV 09/08/2023 96    • MCH 09/08/2023 32.1    • MCHC 09/08/2023 33.3    • RDW 09/08/2023 14.1    • MPV 09/08/2023 12.8 (H)    • Platelets 38/55/7596 142 (L)    • nRBC 09/08/2023 0    • Neutrophils Relative 09/08/2023 62    • Immat GRANS % 09/08/2023 0    • Lymphocytes Relative 09/08/2023 26    • Monocytes Relative 09/08/2023 8    • Eosinophils Relative 09/08/2023 3    • Basophils Relative 09/08/2023 1    • Neutrophils Absolute 09/08/2023 3.73    • Immature Grans Absolute 09/08/2023 0.02    • Lymphocytes Absolute 09/08/2023 1.55    • Monocytes Absolute 09/08/2023 0.49    • Eosinophils Absolute 09/08/2023 0.18    • Basophils Absolute 09/08/2023 0.05    • Sodium 09/08/2023 143    • Potassium 09/08/2023 4.2    • Chloride 09/08/2023 108    • CO2 09/08/2023 25    • ANION GAP 09/08/2023 10    • BUN 09/08/2023 28 (H)    • Creatinine 09/08/2023 1.56 (H)    • Glucose, Fasting 09/08/2023 117 (H)    • Calcium 09/08/2023 9.7    • AST 09/08/2023 19    • ALT 09/08/2023 21    • Alkaline Phosphatase 2023 72    • Total Protein 2023 6.7    • Albumin 2023 3.8    • Total Bilirubin 2023 0.52    • eGFR 2023 33    Office Visit on 2023   Component Date Value   • POST-VOID RESIDUAL VOLUM* 2023 56      Lab Results   Component Value Date    CHOL 208 (H) 2017    TRIG 144 2023    TRIG 116 2020    HDL 50 2023    HDL 57 2020     Imaging: Home Study    Result Date: 2023  Narrative: Table formatting from the original result was not included. Images from the original result were not included. Home Study Report Patient Name: Reed Garrett Patient Number: [id number] Date of Home Study: 2023 Referring Physician: Phil Witt Interpreting Physician: Phil Witt YOB: 1955 STUDY FORMAT: The patient was admitted to the sleep laboratory at the [address] [place] [address] and oriented to the home sleep study testing procedure and equipment. The home sleep testing study was performed using the [employer] device. A return demonstration by the patient helped to assure correct usage. The following parameters were monitored: p-flow via nasal cannula, body position, oximetry, pulse rate and respiratory effort via thoracic belt. The sleep study was scored following the rules established by the American Academy of Sleep Medicine (AASM). Hypopneas are defined as a =30% drop in flow for =10 seconds that are associated with =4% desaturations. SAMM is the Respiratory Event Index (number of respiratory events per hour) and is a surrogate for the apnea/hypopnea index (AHI). PATIENT HISTORY: This is a 59-year-old female with a history of obstructive sleep apnea referred for a home sleep test to re-confirm the diagnosis as her previous test was not scored per Medicare criteria. TESTING RESULTS: The test results are from [facility]. The total time in bed (analysis time) was 485.9 minutes.   The patient had a total of 17 respiratory events made up of 1 obstructive apneas, 0 central apneas, 0 mixed apneas and 16 hypopneas resulting in a respiratory event index (SAMM) of 2.2. The lowest SpO2 recorded is 86%. Impression: 1. This test does not demonstrate obstructive sleep apnea as the respiratory event index was normal. 2.Baseline oxygen saturation was normal. 3.Average heart rate was normal RECOMMENDATION: A diagnostic polysomnogram is recommended to rule out a false negative result, as a home sleep test may underestimate disease severity. Study Date: 2023 Patient Name: Kaitlyn Ferro Recording Device: Ervin Ashley Sex: F Height: 61.0 in. : 1955 Weight: 232.0 lbs. Age: 70 years B. M.I: 43.8 lb/in2 Times and Durations Lights off clock time:  9:17:52 PM Total Recording Time (TRT): 485.9 minutes Lights on clock time: 5:23:46 AM Time In Bed (TIB): 485.9 minutes   Monitoring Time (MT): 469.0 minutes Device and Sensor Details The study was recorded on a [employer] device using 1 RIP effort belt and a pressure based flow sensor. The heart rate is derived from the oximeter sensor and the snore signal is derived from the pressure sensor. The device also records body position and uses it to determine the monitoring time (sleep/wake periods). Summary SAMM 2.2 OAI 0.1 BONY 0.0 Lowest Desat 86 SAMM is the number of respiratory events per hour. OAI is the number of obstructive apneas per hour. BONY is the number of central apneas per hour. Lowest Desat is the lowest blood oxygen level that lasted at least 2 seconds.  RESPIRATORY EVENTS  Index (#/hour) Total # of Events Mean duration  (sec) Max duration  (sec) # of Events by Position      Supine Prone Left Right Up Central Apneas 0.0 0 0.0 0.0 0    0    0 Obstructive Apneas 0.1 1 14.5 14.5 0    1    0 Mixed Apneas 0.0 0 0.0 0.0 0    0    0 Hypopneas 2.0 16 27.4 36.5 6    10    0 Apneas + Hypopneas 2.2 17 26.7 36.5 6    11    0 RERAs 0.0 0 0.0 0.0 0    0    0 Total 2.2 17 26.7 36.5 6    11    0 Time in Position 41.0    427.7    8.8 SAMM in Position 8.8    1.5    0.0 Positional Summary  Supine Non-Supine Total # of Events 6 11.00 Total Duration (minutes) 41.0 436.50 Sleep Duration (minutes) 41.0 428.00 SAMM in Position 8.8 1.54 REISupine / REINon-Supine Ratio 5.71  Positional Sleep Apnea is indicated if SAMM (supine) >= 2 x SAMM (non-supine) per [facility]. 1984;7(2). Oximetry Summary  Dur. (min) % TIB <90 % 2.7 0.6 <85 % 0.1 0.0 <80 % 0.0 0.0 <70 % 0.0 0.0 Total Dur (min) < 89 0.4 min Average (%) 92 Total # of Desats 16 Desat Index (#/hour) 2.1 Desat Max (%) 6 Desat Max dur (sec) 53.0 Lowest SpO2 % during sleep 82 Duration of Min SpO2 (sec) 1 Highest SpO2 % during sleep 98 Duration of Max SpO2(sec) 22  Heart Rate Stats Mean HR during sleep 73.7 (BPM) Highest HR during sleep 100  (BPM) Highest HR during TIB  103 (BPM) Lowest HR during sleep 65  (BPM) Lowest HR during TIB 57 (BPM) Snoring Summary Total Snoring Episodes 178 Total Duration with Snoring 26.4 minutes Mean Duration of Snoring 8.9 seconds Percentage of Snoring 5.6 %      Echo complete w/ contrast if indicated    Result Date: 11/21/2023  Narrative: •  Left Ventricle: Left ventricular cavity size is normal. Wall thickness is at the upper limits of normal. The left ventricular ejection fraction is 55%. Systolic function is normal. Wall motion is normal. Diastolic function is mildly abnormal, consistent with grade I (abnormal) relaxation. •  IVS: There is abnormal septal motion consistent with left bundle branch block. •  Right Ventricle: Right ventricular cavity size is normal. Systolic function is normal. •  Left Atrium: The atrium is mildly dilated (35-41 mL/m2). •  Mitral Valve: There is mild to moderate regurgitation with a centrally directed jet. •  Tricuspid Valve: There is mild regurgitation.      CTA ED chest PE study    Result Date: 11/20/2023  Narrative: CTA - CHEST WITH IV CONTRAST - PULMONARY ANGIOGRAM INDICATION:   sob, hypoxia, tachycardia. COMPARISON: 11/18/2023 TECHNIQUE: CTA examination of the chest was performed using angiographic technique according to a protocol specifically tailored to evaluate for pulmonary embolism. Multiplanar 2D reformatted images were created from the source data. In addition, coronal 3D MIP postprocessing was performed on the acquisition scanner. Radiation dose length product (DLP) for this visit:  324 mGy-cm . This examination, like all CT scans performed in the [facility], was performed utilizing techniques to minimize radiation dose exposure, including the use of iterative reconstruction and automated exposure control. IV Contrast:  80 mL of iohexol (OMNIPAQUE) FINDINGS: PULMONARY ARTERIAL TREE: Suboptimal evaluation due to respiratory motion. No pulmonary embolus identified within the limitations of the examination. LUNGS:  Lungs are clear. There is no tracheal or endobronchial lesion. PLEURA:  Unremarkable. HEART/GREAT VESSELS:  Unremarkable for patient's age. No thoracic aortic aneurysm. MEDIASTINUM AND RC: Moderate hiatal hernia CHEST WALL AND LOWER NECK:   Unremarkable. VISUALIZED STRUCTURES IN THE UPPER ABDOMEN:  Unremarkable. OSSEOUS STRUCTURES:  No acute fracture or destructive osseous lesion. Impression: Degraded evaluation of the pulmonary arteries due to respiratory motion. No pulmonary embolus identified within the limitations of the examination. No other acute abnormality identified. Moderate hiatal hernia Workstation performed: [id number]     XR chest pa & lateral    Result Date: 11/19/2023  Narrative: [id number]      Review of Systems:  Review of Systems   Constitutional:  Negative for activity change, appetite change, chills, diaphoresis, fatigue and unexpected weight change. HENT:  Negative for hearing loss, nosebleeds and sore throat.     Eyes:  Negative for photophobia and visual disturbance. Respiratory:  Negative for cough, chest tightness, shortness of breath and wheezing. Cardiovascular:  Negative for chest pain, palpitations and leg swelling. Gastrointestinal:  Negative for abdominal pain, diarrhea, nausea and vomiting. Endocrine: Negative for polyuria. Genitourinary:  Negative for dysuria, frequency and hematuria. Musculoskeletal:  Negative for arthralgias, back pain, gait problem and neck pain. Skin:  Negative for pallor and rash. Neurological:  Negative for dizziness, syncope and headaches. Hematological:  Does not bruise/bleed easily. Psychiatric/Behavioral:  Negative for behavioral problems and confusion. Physical Exam:  Physical Exam  Vitals reviewed. Constitutional:       Appearance: Normal appearance. She is well-developed. She is not ill-appearing or diaphoretic. HENT:      Head: Normocephalic and atraumatic. Nose: Nose normal.   Eyes:      General: No scleral icterus. Extraocular Movements: Extraocular movements intact. Pupils: Pupils are equal, round, and reactive to light. Neck:      Vascular: No JVD. Cardiovascular:      Rate and Rhythm: Normal rate and regular rhythm. Heart sounds: Murmur heard. Systolic murmur is present with a grade of 2/6. No friction rub. No gallop. Pulmonary:      Effort: Pulmonary effort is normal. No respiratory distress. Breath sounds: Normal breath sounds. No wheezing or rales. Abdominal:      General: Bowel sounds are normal. There is no distension. Palpations: Abdomen is soft. Tenderness: There is no abdominal tenderness. Musculoskeletal:         General: No deformity. Normal range of motion. Cervical back: Normal range of motion and neck supple. Right lower leg: No edema. Left lower leg: No edema. Skin:     General: Skin is warm and dry. Findings: No rash.    Neurological:      Mental Status: She is alert and oriented to person, place, and time.      Cranial Nerves: No cranial nerve deficit. Psychiatric:         Mood and Affect: Mood normal.         Behavior: Behavior normal.       Blood pressure 126/80, pulse 91, height 5' 1" (1.549 m), weight 104 kg (228 lb 9.6 oz), SpO2 95%, not currently breastfeeding. Discussion/Summary:  Tachycardia: noted to have a few PVCs and brief NSVT during hospitalization for RSV. Echocardiogram revealed normal LV size and function with G1DD, abnormal septal motion due to LBBB, LAE, mild to mod MR. She was started on a B-blocker and her heart rates are improved.      LBBB: new diagnosis, will screen for ischemic disease with a stress test.

## 2023-12-14 NOTE — PROGRESS NOTES
Cardiology Consultation     Nazario Haque  [id number]  1955  [facility] [place] [address] [address]  [place] [id number] [place] [id number]    1. Premature ventricular contractions        2. Tachycardia  Ambulatory referral to Cardiology      3. LBBB (left bundle branch block)  Ambulatory referral to Cardiology        Chief Complaint   Patient presents with    New Patient Visit     Hospital follow-up for tachycardia noted during 11/19-11/22/23 admission with echo - stress test recommended in outpatient     Rapid Heart Rate     Patient has been noticing higher pulses on her readings, in the 90s at rest     HPI: Patient is here for cardiac evaluation after hospitalization where she was noted to be tachycardic. She was started on Toprol XL and asked to follow up. She was also noted to have some PVCs and NSVT. No reported chest pain, shortness of breath, palpitations, lightheadedness, syncope, LE edema, orthopnea, PND, or significant weight changes. Patient remains active without any increased fatigue out of the ordinary.       Patient Active Problem List   Diagnosis    Bipolar 1 disorder, mixed, severe ([address])    Depression with anxiety    Gastroesophageal reflux disease without esophagitis    Hypothyroidism    Impaired fasting glucose    Insomnia    Obstructive sleep apnea    Onychomycosis of toenail    Patellofemoral arthritis of right knee    Stress incontinence of urine    High triglycerides    BMI 40.0-44.9, adult (HCC)    Elevated blood pressure reading without diagnosis of hypertension    Abnormal EKG    Localized edema    Palpitations    Raynaud's disease without gangrene    Stage 3 chronic kidney disease (HCC)    Vitamin D deficiency    Primary osteoarthritis of left knee    Primary osteoarthritis of right knee    Secondary hyperparathyroidism of renal origin ([address])    Persistent proteinuria    Urinary frequency    Morbid obesity with BMI of 40.0-44.9, adult ([facility])    Mass of right hand    Lichen sclerosus    Encounter for immunization    CPAP (continuous positive airway pressure) dependence    Cough    Tachycardia    Uncomplicated asthma, unspecified asthma severity, unspecified whether persistent    Subacute cough    RSV (acute bronchiolitis due to respiratory syncytial virus)    Premature ventricular contractions    LBBB (left bundle branch block)     Past Medical History:   Diagnosis Date    Abnormal ECG     Anxiety     Arthritis     Bipolar 1 disorder ([facility])     Chronic kidney disease     stage 3    CPAP (continuous positive airway pressure) dependence     Depression     Disease of thyroid gland     Elevated blood pressure reading 06/10/2019    GERD (gastroesophageal reflux disease)     Obesity     Sleep apnea     Sleep apnea, obstructive      Social History     Socioeconomic History    Marital status: /Civil Union     Spouse name: Not on file    Number of children: Not on file    Years of education: Not on file    Highest education level: Not on file   Occupational History    Not on file   Tobacco Use    Smoking status: Former     Current packs/day: 0.00     Average packs/day: 0.3 packs/day for 30.0 years (7.5 ttl pk-yrs)     Types: Cigarettes     Start date: 1979     Quit date: 2009     Years since quittin.9    Smokeless tobacco: Never   Vaping Use    Vaping status: Never Used   Substance and Sexual Activity    Alcohol use:  Yes     Alcohol/week: 1.0 standard drink of alcohol     Types: 1 Glasses of wine per week    Drug use: No    Sexual activity: Not Currently     Partners: Male     Birth control/protection: Post-menopausal   Other Topics Concern    Not on file   Social History Narrative    Daily coffee consumption: 3 cups/day     Social Determinants of Health     Financial Resource Strain: Low Risk  (2022)    Overall Financial Resource Strain (CARDIA)     Difficulty of Paying Living Expenses: Not hard at all   Food Insecurity: No Food Insecurity (11/21/2023)    Hunger Vital Sign     Worried About Running Out of Food in the Last Year: Never true     Ran Out of Food in the Last Year: Never true   Transportation Needs: No Transportation Needs (11/21/2023)    PRAPARE - Transportation     Lack of Transportation (Medical): No     Lack of Transportation (Non-Medical):  No   Physical Activity: Not on file   Stress: Not on file   Social Connections: Not on file   Intimate Partner Violence: Not on file   Housing Stability: Low Risk  (11/21/2023)    Housing Stability Vital Sign     Unable to Pay for Housing in the Last Year: No     Number of Places Lived in the Last Year: 1     Unstable Housing in the Last Year: No      Family History   Problem Relation Age of Onset    Heart disease Mother     Heart Valve Disease Mother         Replacement    Diabetes Mother         2002    Heart failure Mother         Heart Valve replacement    Arthritis Father     No Known Problems Sister     No Known Problems Daughter     No Known Problems Maternal Grandmother     No Known Problems Maternal Grandfather     No Known Problems Paternal Grandmother     No Known Problems Paternal Grandfather     No Known Problems Son     No Known Problems Maternal Aunt     No Known Problems Maternal Aunt     No Known Problems Maternal Aunt     No Known Problems Maternal Aunt     No Known Problems Maternal Aunt     No Known Problems Paternal Aunt     Alcohol abuse Son         Randall Mayes, 36year old son, has issues with alcohol and alcohol abuse     Past Surgical History:   Procedure Laterality Date    COLONOSCOPY 2011       Current Outpatient Medications:     albuterol (2.5 mg/3 mL) 0.083 % nebulizer solution, Take 3 mL (2.5 mg total) by nebulization every 6 (six) hours as needed for wheezing or shortness of breath, Disp: 90 mL, Rfl: 0    calcitriol (ROCALTROL) 0.25 mcg capsule, TAKE ONE CAPSULE BY MOUTH EVERY DAY, Disp: 90 capsule, Rfl: 4 Cholecalciferol (VITAMIN D) 2000 units CAPS, Take 1 capsule by mouth daily, Disp: , Rfl:     CRANBERRY PO, Take by mouth, Disp: , Rfl:     famotidine (PEPCID) 20 mg tablet, Take 1 tablet (20 mg total) by mouth 2 (two) times a day, Disp: 180 tablet, Rfl: 3    fluticasone (FLONASE) 50 mcg/act nasal spray, 1 spray into each nostril daily, Disp: 15.8 mL, Rfl: 0    Glucosamine-Chondroit-Vit C-Mn (GLUCOSAMINE 1500 COMPLEX PO), Take by mouth in the morning, Disp: , Rfl:     lurasidone (LATUDA) 20 mg tablet, TAKE 1 TABLET BY MOUTH DAILY  WITH BREAKFAST, Disp: 30 tablet, Rfl: 11    metoprolol succinate (TOPROL-XL) 25 mg 24 hr tablet, Take 1 tablet (25 mg total) by mouth daily, Disp: 90 tablet, Rfl: 3    Mirabegron ER (Myrbetriq) 25 MG TB24, Take 25 mg by mouth in the morning, Disp: 90 tablet, Rfl: 3    Multiple Vitamin (MULTI-VITAMIN DAILY) TABS, Take by mouth daily, Disp: , Rfl:   Allergies   Allergen Reactions    Other Other (See Comments)     Other reaction(s): Anaphylaxis  Annotation - [id number]: plums peaches, fruits with skin      Vitals:    12/14/23 1251   BP: 126/80   BP Location: Right arm   Patient Position: Sitting   Cuff Size: Large   Pulse: 91   SpO2: 95%   Weight: 104 kg (228 lb 9.6 oz)   Height: 5' 1" (1.549 m)       Labs:  No results displayed because visit has over 200 results.       Office Visit on 11/15/2023   Component Date Value     RAPID STREP A 11/15/2023 Negative     Throat Culture 11/15/2023 Negative for beta-hemolytic Streptococcus    Appointment on 10/09/2023   Component Date Value    Vit D, 25-Hydroxy 10/09/2023 47.7     Albumin 10/09/2023 4.2     Calcium 10/09/2023 9.6     Phosphorus 10/09/2023 3.5     BUN 10/09/2023 27 (H)     Creatinine 10/09/2023 1.54 (H)     Sodium 10/09/2023 143     Potassium 10/09/2023 4.1     Chloride 10/09/2023 107     CO2 10/09/2023 28     ANION GAP 10/09/2023 8     eGFR 10/09/2023 34     Glucose, Fasting 10/09/2023 112 (H)     PTH 10/09/2023 45.3    Office Visit on 10/03/2023   Component Date Value    SARS-CoV-2 10/03/2023 Negative    Appointment on 09/08/2023   Component Date Value    TSH 3RD GENERATON 09/08/2023 2.426     Cholesterol 09/08/2023 190     Triglycerides 09/08/2023 144     HDL, Direct 09/08/2023 50     LDL Calculated 09/08/2023 111 (H)     WBC 09/08/2023 6.02     RBC 09/08/2023 4.58     Hemoglobin 09/08/2023 14.7     Hematocrit 09/08/2023 44.1     MCV 09/08/2023 96     MCH 09/08/2023 32.1     MCHC 09/08/2023 33.3     RDW 09/08/2023 14.1     MPV 09/08/2023 12.8 (H)     Platelets 52/44/5968 142 (L)     nRBC 09/08/2023 0     Neutrophils Relative 09/08/2023 62     Immat GRANS % 09/08/2023 0     Lymphocytes Relative 09/08/2023 26     Monocytes Relative 09/08/2023 8     Eosinophils Relative 09/08/2023 3     Basophils Relative 09/08/2023 1     Neutrophils Absolute 09/08/2023 3.73     Immature Grans Absolute 09/08/2023 0.02     Lymphocytes Absolute 09/08/2023 1.55     Monocytes Absolute 09/08/2023 0.49     Eosinophils Absolute 09/08/2023 0.18     Basophils Absolute 09/08/2023 0.05     Sodium 09/08/2023 143     Potassium 09/08/2023 4.2     Chloride 09/08/2023 108     CO2 09/08/2023 25     ANION GAP 09/08/2023 10     BUN 09/08/2023 28 (H)     Creatinine 09/08/2023 1.56 (H)     Glucose, Fasting 09/08/2023 117 (H)     Calcium 09/08/2023 9.7     AST 09/08/2023 19     ALT 09/08/2023 21     Alkaline Phosphatase 09/08/2023 72     Total Protein 09/08/2023 6.7     Albumin 09/08/2023 3.8     Total Bilirubin 09/08/2023 0.52     eGFR 09/08/2023 33    Office Visit on 06/26/2023   Component Date Value    POST-VOID RESIDUAL VOLUM* 06/26/2023 56      Lab Results   Component Value Date    CHOL 208 (H) 11/17/2017    TRIG 144 09/08/2023    TRIG 116 06/25/2020    HDL 50 09/08/2023    HDL 57 06/25/2020     Imaging: Home Study    Result Date: 12/12/2023  Narrative: Table formatting from the original result was not included. Images from the original result were not included.  Home Study Report Patient Name: Sebastian Fonseca Patient Number: [id number] Date of Home Study: 2023 Referring Physician: Ester Rodríguez Interpreting Physician: Ester Rodríguez : 1955 STUDY FORMAT: The patient was admitted to the sleep laboratory at the [address] [place] [address] and oriented to the home sleep study testing procedure and equipment. The home sleep testing study was performed using the [employer] device. A return demonstration by the patient helped to assure correct usage. The following parameters were monitored: p-flow via nasal cannula, body position, oximetry, pulse rate and respiratory effort via thoracic belt. The sleep study was scored following the rules established by the American Academy of Sleep Medicine (AASM). Hypopneas are defined as a =30% drop in flow for =10 seconds that are associated with =4% desaturations. SAMM is the Respiratory Event Index (number of respiratory events per hour) and is a surrogate for the apnea/hypopnea index (AHI). PATIENT HISTORY: This is a 69-year-old female with a history of obstructive sleep apnea referred for a home sleep test to re-confirm the diagnosis as her previous test was not scored per Medicare criteria. TESTING RESULTS: The test results are from [facility]. The total time in bed (analysis time) was 485.9 minutes. The patient had a total of 17 respiratory events made up of 1 obstructive apneas, 0 central apneas, 0 mixed apneas and 16 hypopneas resulting in a respiratory event index (SAMM) of 2.2. The lowest SpO2 recorded is 86%. Impression: 1. This test does not demonstrate obstructive sleep apnea as the respiratory event index was normal. 2.Baseline oxygen saturation was normal. 3.Average heart rate was normal RECOMMENDATION: A diagnostic polysomnogram is recommended to rule out a false negative result, as a home sleep test may underestimate disease severity.    Study Date: 2023 Patient Name: Sebastian Fonesca Recording Device: Mindy Woods Sex: F Height: 61.0 in. : 1955 Weight: 232.0 lbs. Age: 70 years B. M.I: 43.8 lb/in2 Times and Durations Lights off clock time:  9:17:52 PM Total Recording Time (TRT): 485.9 minutes Lights on clock time: 5:23:46 AM Time In Bed (TIB): 485.9 minutes   Monitoring Time (MT): 469.0 minutes Device and Sensor Details The study was recorded on a [employer] device using 1 RIP effort belt and a pressure based flow sensor. The heart rate is derived from the oximeter sensor and the snore signal is derived from the pressure sensor. The device also records body position and uses it to determine the monitoring time (sleep/wake periods). Summary SAMM 2.2 OAI 0.1 BONY 0.0 Lowest Desat 86 SAMM is the number of respiratory events per hour. OAI is the number of obstructive apneas per hour. BONY is the number of central apneas per hour. Lowest Desat is the lowest blood oxygen level that lasted at least 2 seconds. RESPIRATORY EVENTS  Index (#/hour) Total # of Events Mean duration  (sec) Max duration  (sec) # of Events by Position      Supine Prone Left Right Up Central Apneas 0.0 0 0.0 0.0 0    0    0 Obstructive Apneas 0.1 1 14.5 14.5 0    1    0 Mixed Apneas 0.0 0 0.0 0.0 0    0    0 Hypopneas 2.0 16 27.4 36.5 6    10    0 Apneas + Hypopneas 2.2 17 26.7 36.5 6    11    0 RERAs 0.0 0 0.0 0.0 0    0    0 Total 2.2 17 26.7 36.5 6    11    0 Time in Position 41.0    427.7    8.8 SAMM in Position 8.8    1.5    0.0 Positional Summary  Supine Non-Supine Total # of Events 6 11.00 Total Duration (minutes) 41.0 436.50 Sleep Duration (minutes) 41.0 428.00 SAMM in Position 8.8 1.54 REISupine / REINon-Supine Ratio 5.71  Positional Sleep Apnea is indicated if SMAM (supine) >= 2 x SAMM (non-supine) per Birgit Repine, Sleep. 1984;7(2).   Oximetry Summary  Dur. (min) % TIB <90 % 2.7 0.6 <85 % 0.1 0.0 <80 % 0.0 0.0 <70 % 0.0 0.0 Total Dur (min) < 89 0.4 min Average (%) 92 Total # of Desats 16 Desat Index (#/hour) 2.1 Desat Max (%) 6 Desat Max dur (sec) 53.0 Lowest SpO2 % during sleep 82 Duration of Min SpO2 (sec) 1 Highest SpO2 % during sleep 98 Duration of Max SpO2(sec) 22  Heart Rate Stats Mean HR during sleep 73.7 (BPM) Highest HR during sleep 100  (BPM) Highest HR during TIB  103 (BPM) Lowest HR during sleep 65  (BPM) Lowest HR during TIB 57 (BPM) Snoring Summary Total Snoring Episodes 178 Total Duration with Snoring 26.4 minutes Mean Duration of Snoring 8.9 seconds Percentage of Snoring 5.6 %      Echo complete w/ contrast if indicated    Result Date: 11/21/2023  Narrative:   Left Ventricle: Left ventricular cavity size is normal. Wall thickness is at the upper limits of normal. The left ventricular ejection fraction is 55%. Systolic function is normal. Wall motion is normal. Diastolic function is mildly abnormal, consistent with grade I (abnormal) relaxation. IVS: There is abnormal septal motion consistent with left bundle branch block. Right Ventricle: Right ventricular cavity size is normal. Systolic function is normal.   Left Atrium: The atrium is mildly dilated (35-41 mL/m2). Mitral Valve: There is mild to moderate regurgitation with a centrally directed jet. Tricuspid Valve: There is mild regurgitation. CTA ED chest PE study    Result Date: 11/20/2023  Narrative: CTA - CHEST WITH IV CONTRAST - PULMONARY ANGIOGRAM INDICATION:   sob, hypoxia, tachycardia. COMPARISON: 11/18/2023 TECHNIQUE: CTA examination of the chest was performed using angiographic technique according to a protocol specifically tailored to evaluate for pulmonary embolism. Multiplanar 2D reformatted images were created from the source data. In addition, coronal 3D MIP postprocessing was performed on the acquisition scanner. Radiation dose length product (DLP) for this visit:  324 mGy-cm .   This examination, like all CT scans performed in the [facility], was performed utilizing techniques to minimize radiation dose exposure, including the use of iterative reconstruction and automated exposure control. IV Contrast:  80 mL of iohexol (OMNIPAQUE) FINDINGS: PULMONARY ARTERIAL TREE: Suboptimal evaluation due to respiratory motion. No pulmonary embolus identified within the limitations of the examination. LUNGS:  Lungs are clear. There is no tracheal or endobronchial lesion. PLEURA:  Unremarkable. HEART/GREAT VESSELS:  Unremarkable for patient's age. No thoracic aortic aneurysm. MEDIASTINUM AND RC: Moderate hiatal hernia CHEST WALL AND LOWER NECK:   Unremarkable. VISUALIZED STRUCTURES IN THE UPPER ABDOMEN:  Unremarkable. OSSEOUS STRUCTURES:  No acute fracture or destructive osseous lesion. Impression: Degraded evaluation of the pulmonary arteries due to respiratory motion. No pulmonary embolus identified within the limitations of the examination. No other acute abnormality identified. Moderate hiatal hernia Workstation performed: [id number]     XR chest pa & lateral    Result Date: 11/19/2023  Narrative: [id number]      Review of Systems:  Review of Systems   Constitutional:  Negative for activity change, appetite change, chills, diaphoresis, fatigue and unexpected weight change. HENT:  Negative for hearing loss, nosebleeds and sore throat. Eyes:  Negative for photophobia and visual disturbance. Respiratory:  Negative for cough, chest tightness, shortness of breath and wheezing. Cardiovascular:  Negative for chest pain, palpitations and leg swelling. Gastrointestinal:  Negative for abdominal pain, diarrhea, nausea and vomiting. Endocrine: Negative for polyuria. Genitourinary:  Negative for dysuria, frequency and hematuria. Musculoskeletal:  Negative for arthralgias, back pain, gait problem and neck pain. Skin:  Negative for pallor and rash. Neurological:  Negative for dizziness, syncope and headaches. Hematological:  Does not bruise/bleed easily.    Psychiatric/Behavioral: Negative for behavioral problems and confusion. Physical Exam:  Physical Exam  Vitals reviewed. Constitutional:       Appearance: Normal appearance. She is well-developed. She is not ill-appearing or diaphoretic. HENT:      Head: Normocephalic and atraumatic. Nose: Nose normal.   Eyes:      General: No scleral icterus. Extraocular Movements: Extraocular movements intact. Pupils: Pupils are equal, round, and reactive to light. Neck:      Vascular: No JVD. Cardiovascular:      Rate and Rhythm: Normal rate and regular rhythm. Heart sounds: Murmur heard. Systolic murmur is present with a grade of 2/6. No friction rub. No gallop. Pulmonary:      Effort: Pulmonary effort is normal. No respiratory distress. Breath sounds: Normal breath sounds. No wheezing or rales. Abdominal:      General: Bowel sounds are normal. There is no distension. Palpations: Abdomen is soft. Tenderness: There is no abdominal tenderness. Musculoskeletal:         General: No deformity. Normal range of motion. Cervical back: Normal range of motion and neck supple. Right lower leg: No edema. Left lower leg: No edema. Skin:     General: Skin is warm and dry. Findings: No rash. Neurological:      Mental Status: She is alert and oriented to person, place, and time. Cranial Nerves: No cranial nerve deficit. Psychiatric:         Mood and Affect: Mood normal.         Behavior: Behavior normal.       Blood pressure 126/80, pulse 91, height 5' 1" (1.549 m), weight 104 kg (228 lb 9.6 oz), SpO2 95%, not currently breastfeeding. Discussion/Summary:  Tachycardia: noted to have a few PVCs and brief NSVT during hospitalization for RSV. Echocardiogram revealed normal LV size and function with G1DD, abnormal septal motion due to LBBB, LAE, mild to mod MR. She was started on a B-blocker and her heart rates are improved.      LBBB: new diagnosis, will screen for ischemic disease with a stress test.

## 2023-12-18 ENCOUNTER — TELEPHONE (OUTPATIENT)
Dept: CARDIOLOGY CLINIC | Facility: CLINIC | Age: 68
End: 2023-12-18

## 2023-12-18 NOTE — TELEPHONE ENCOUNTER
P/C about testing and medication, called pt back and went over medicatin information, pt verbally understood

## 2023-12-19 ENCOUNTER — TELEPHONE (OUTPATIENT)
Dept: SLEEP CENTER | Facility: CLINIC | Age: 68
End: 2023-12-19

## 2023-12-19 NOTE — TELEPHONE ENCOUNTER
Called patient and advised of sleep study results.      Offered to schedule in lab study but patient declined at this time.  States she has an appointment on 1/3/23 with Dr. Harrell, pulmonology, and wants to first discuss with him.  Offered to schedule follow up with Dr. Richard but patient also declined. Provided office number to call to schedule in-lab study if she wishes.

## 2023-12-19 NOTE — TELEPHONE ENCOUNTER
----- Message from Vinod Richard MD sent at 12/12/2023  9:28 AM EST -----  Home sleep test negative, she needs an in lab study.

## 2023-12-21 ENCOUNTER — HOSPITAL ENCOUNTER (OUTPATIENT)
Dept: NON INVASIVE DIAGNOSTICS | Facility: CLINIC | Age: 68
Discharge: HOME | End: 2023-12-21
Payer: COMMERCIAL

## 2023-12-21 VITALS
HEIGHT: 61 IN | DIASTOLIC BLOOD PRESSURE: 100 MMHG | OXYGEN SATURATION: 98 % | WEIGHT: 228 LBS | HEART RATE: 96 BPM | BODY MASS INDEX: 43.05 KG/M2 | SYSTOLIC BLOOD PRESSURE: 156 MMHG

## 2023-12-21 DIAGNOSIS — R94.31 ABNORMAL ELECTROCARDIOGRAM (ECG) (EKG): ICD-10-CM

## 2023-12-21 DIAGNOSIS — I49.3 PREMATURE VENTRICULAR CONTRACTIONS: ICD-10-CM

## 2023-12-21 DIAGNOSIS — I44.7 LBBB (LEFT BUNDLE BRANCH BLOCK): ICD-10-CM

## 2023-12-21 DIAGNOSIS — R00.0 TACHYCARDIA: ICD-10-CM

## 2023-12-21 LAB
NUC STRESS EJECTION FRACTION: 48 %
RATE PRESSURE PRODUCT: NORMAL
SL CV REST NUCLEAR ISOTOPE DOSE: 10.51 MCI
SL CV STRESS NUCLEAR ISOTOPE DOSE: 32.6 MCI
SL CV STRESS RECOVERY BP: NORMAL MMHG
SL CV STRESS RECOVERY HR: 107 BPM
SL CV STRESS RECOVERY O2 SAT: 98 %
STRESS ANGINA INDEX: 0
STRESS BASELINE BP: NORMAL MMHG
STRESS BASELINE HR: 96 BPM
STRESS O2 SAT REST: 98 %
STRESS PEAK HR: 120 BPM
STRESS POST O2 SAT PEAK: 96 %
STRESS POST PEAK BP: 172 MMHG
STRESS/REST PERFUSION RATIO: 1.17

## 2023-12-21 PROCEDURE — 93017 CV STRESS TEST TRACING ONLY: CPT

## 2023-12-21 PROCEDURE — 93018 CV STRESS TEST I&R ONLY: CPT | Performed by: INTERNAL MEDICINE

## 2023-12-21 PROCEDURE — G1004 CDSM NDSC: HCPCS

## 2023-12-21 PROCEDURE — A9502 TC99M TETROFOSMIN: HCPCS

## 2023-12-21 PROCEDURE — 78452 HT MUSCLE IMAGE SPECT MULT: CPT | Performed by: INTERNAL MEDICINE

## 2023-12-21 PROCEDURE — 93016 CV STRESS TEST SUPVJ ONLY: CPT | Performed by: INTERNAL MEDICINE

## 2023-12-21 PROCEDURE — 78452 HT MUSCLE IMAGE SPECT MULT: CPT

## 2023-12-21 RX ORDER — REGADENOSON 0.08 MG/ML
0.4 INJECTION, SOLUTION INTRAVENOUS ONCE
Status: COMPLETED | OUTPATIENT
Start: 2023-12-21 | End: 2023-12-21

## 2023-12-21 RX ADMIN — REGADENOSON 0.4 MG: 0.08 INJECTION, SOLUTION INTRAVENOUS at 09:17

## 2023-12-22 LAB
ARRHY DURING EX: NORMAL
CHEST PAIN STATEMENT: NORMAL
MAX DIASTOLIC BP: 104 MMHG
MAX PREDICTED HEART RATE: 151 BPM
PROTOCOL NAME: NORMAL
REASON FOR TERMINATION: NORMAL
STRESS POST EXERCISE DUR MIN: 3 MIN
STRESS POST EXERCISE DUR SEC: 0 SEC
STRESS POST PEAK HR: 121 BPM
STRESS POST PEAK SYSTOLIC BP: 172 MMHG
TARGET HR FORMULA: NORMAL
TEST INDICATION: NORMAL

## 2024-01-03 ENCOUNTER — OFFICE VISIT (OUTPATIENT)
Dept: PULMONOLOGY | Facility: CLINIC | Age: 69
End: 2024-01-03
Payer: COMMERCIAL

## 2024-01-03 VITALS
TEMPERATURE: 97.8 F | DIASTOLIC BLOOD PRESSURE: 74 MMHG | BODY MASS INDEX: 43.08 KG/M2 | WEIGHT: 228 LBS | HEART RATE: 101 BPM | OXYGEN SATURATION: 97 % | SYSTOLIC BLOOD PRESSURE: 132 MMHG

## 2024-01-03 DIAGNOSIS — J21.0 RSV (ACUTE BRONCHIOLITIS DUE TO RESPIRATORY SYNCYTIAL VIRUS): ICD-10-CM

## 2024-01-03 DIAGNOSIS — G47.33 OBSTRUCTIVE SLEEP APNEA: Primary | ICD-10-CM

## 2024-01-03 DIAGNOSIS — K21.9 GASTROESOPHAGEAL REFLUX DISEASE WITHOUT ESOPHAGITIS: ICD-10-CM

## 2024-01-03 PROCEDURE — 99214 OFFICE O/P EST MOD 30 MIN: CPT | Performed by: INTERNAL MEDICINE

## 2024-01-03 NOTE — PATIENT INSTRUCTIONS
"Schedule sleep study  Resume regular exercise with swimming  Raise head of bed 2\"  Check with kidney doctor resuming protonix - consider gastroenterology evaluation  "

## 2024-01-03 NOTE — PROGRESS NOTES
"Pulmonary Follow Up Note   Cherelle Dash 68 y.o. female MRN: [id number]  1/3/2024      Assessment:  Obstructive Sleep Apnea  Off CPAP x 2 years  Home PSG not consistent with JULIO  ESS today 10/24  Given body habitus, continued fatigue, HTN history - plan repeat formal PSG  Agree with checking TFTs per PCP  Flu vaccine 2022, COVID-19 x 5, Prevnar 20 in 2022, RSV vaccine next season  Follow up in 6 months or sooner as needed     Obesity - encouraged diet and exercise as able     3. GERD - continue H2 blockade, raise HOB 2\", further discussions with PCP and nephrology for PPI, consider GI evaluation    4. Recent RSV infection with bronchospasm  Resolved bronchospasm, no further symptoms, no history of asthma  Consider spirometry should symptoms return  PRN STEPHENIE for now      Plan:    Diagnoses and all orders for this visit:    Obstructive sleep apnea    RSV (acute bronchiolitis due to respiratory syncytial virus)    Gastroesophageal reflux disease without esophagitis        Return in about 6 months (around 7/3/2024).    History of Present Illness   HPI:  Cherelle Dash is a 68 y.o. female who has Bipolar disorder, GERD, hypothyroidism, CKD, and JULIO. She was diagnosed with JULIO in 2007 at [facility]. She reports being on CPAP since that time with nasal interface. She was initially seen in June 2019 and plans were for improved CPAP Compliance.  She was last seen by me in 2021.  She had recent admission in Nov 2023 for RSV infection with resultant bronchospasm.  Admission record was reviewed.      She reports feeling improved. She denied any wheeze, cough or sputum production. She is not using STEPHENIE inhaler. She reports continued fatigue. She denied changes after stopping CPAP use nearly 2 years ago.  She denied AM headaches. She has GERD symptoms multiple times a week and was improved on PPI. She reports off PPI at direction of her PCP. She denied dysphagia or odynophagia. She is primarily sedentary but has plans " to resume swimming.     Review of Systems   Constitutional:  Positive for fatigue. Negative for activity change, appetite change and fever.   HENT:  Negative for ear pain, mouth sores, postnasal drip, rhinorrhea, sneezing, sore throat and trouble swallowing.    Respiratory:  Negative for cough, chest tightness, shortness of breath and wheezing.    Cardiovascular:  Negative for chest pain and leg swelling.   Gastrointestinal:  Negative for abdominal pain, nausea and vomiting.   Musculoskeletal:  Negative for myalgias.   Allergic/Immunologic: Negative for immunocompromised state.   Neurological:  Negative for headaches.   Hematological:  Negative for adenopathy.   Psychiatric/Behavioral:  Negative for sleep disturbance. The patient is not nervous/anxious.        Historical Information   Past Medical History:   Diagnosis Date    Abnormal ECG     Anxiety 2002    Arthritis     Bipolar 1 disorder (HCC)     Chronic kidney disease     stage 3    CPAP (continuous positive airway pressure) dependence     Depression 2001    Disease of thyroid gland     Elevated blood pressure reading 06/10/2019    GERD (gastroesophageal reflux disease)     Obesity     Sleep apnea     Sleep apnea, obstructive 2007     Past Surgical History:   Procedure Laterality Date    COLONOSCOPY  2011     Family History   Problem Relation Age of Onset    Heart disease Mother     Heart Valve Disease Mother         Replacement    Diabetes Mother         2002    Heart failure Mother         Heart Valve replacement    Arthritis Father     No Known Problems Sister     No Known Problems Daughter     No Known Problems Maternal Grandmother     No Known Problems Maternal Grandfather     No Known Problems Paternal Grandmother     No Known Problems Paternal Grandfather     No Known Problems Son     No Known Problems Maternal Aunt     No Known Problems Maternal Aunt     No Known Problems Maternal Aunt     No Known Problems Maternal Aunt     No Known Problems Maternal  Aunt     No Known Problems Paternal Aunt     Alcohol abuse Son         Kolton, 40 year old son, has issues with alcohol and alcohol abuse         Meds/Allergies     Current Outpatient Medications:     Cholecalciferol (VITAMIN D) 2000 units CAPS, Take 1 capsule by mouth daily, Disp: , Rfl:     CRANBERRY PO, Take by mouth, Disp: , Rfl:     famotidine (PEPCID) 20 mg tablet, Take 1 tablet (20 mg total) by mouth 2 (two) times a day, Disp: 180 tablet, Rfl: 3    Glucosamine-Chondroit-Vit C-Mn (GLUCOSAMINE 1500 COMPLEX PO), Take by mouth in the morning, Disp: , Rfl:     lurasidone (LATUDA) 20 mg tablet, TAKE 1 TABLET BY MOUTH DAILY  WITH BREAKFAST, Disp: 30 tablet, Rfl: 11    metoprolol succinate (TOPROL-XL) 25 mg 24 hr tablet, Take 1 tablet (25 mg total) by mouth daily, Disp: 90 tablet, Rfl: 3    Mirabegron ER (Myrbetriq) 25 MG TB24, Take 25 mg by mouth in the morning, Disp: 90 tablet, Rfl: 3    Multiple Vitamin (MULTI-VITAMIN DAILY) TABS, Take by mouth daily, Disp: , Rfl:     albuterol (2.5 mg/3 mL) 0.083 % nebulizer solution, Take 3 mL (2.5 mg total) by nebulization every 6 (six) hours as needed for wheezing or shortness of breath, Disp: 90 mL, Rfl: 0    calcitriol (ROCALTROL) 0.25 mcg capsule, TAKE ONE CAPSULE BY MOUTH EVERY DAY, Disp: 90 capsule, Rfl: 4    fluticasone (FLONASE) 50 mcg/act nasal spray, 1 spray into each nostril daily, Disp: 15.8 mL, Rfl: 0  Allergies   Allergen Reactions    Other Other (See Comments)     Other reaction(s): Anaphylaxis  Annotation - 31Mar2014: plums peaches, fruits with skin        Vitals: Blood pressure 132/74, pulse 101, temperature 97.8 °F (36.6 °C), weight 103 kg (228 lb), SpO2 97%, not currently breastfeeding. Body mass index is 43.08 kg/m². Oxygen Therapy  SpO2: 97 %      Physical Exam  Physical Exam  Vitals reviewed.   Constitutional:       General: She is not in acute distress.     Appearance: Normal appearance. She is well-developed. She is obese. She is not ill-appearing,  toxic-appearing or diaphoretic.   HENT:      Head: Normocephalic and atraumatic.      Right Ear: External ear normal.      Left Ear: External ear normal.      Nose: Nose normal.      Mouth/Throat:      Mouth: Mucous membranes are moist.      Pharynx: Oropharynx is clear. No oropharyngeal exudate.   Eyes:      General: No scleral icterus.        Right eye: No discharge.         Left eye: No discharge.      Conjunctiva/sclera: Conjunctivae normal.      Pupils: Pupils are equal, round, and reactive to light.   Neck:      Vascular: No JVD.      Trachea: No tracheal deviation.   Cardiovascular:      Rate and Rhythm: Normal rate and regular rhythm.      Heart sounds: Normal heart sounds. No murmur heard.     No gallop.   Pulmonary:      Effort: Pulmonary effort is normal. No respiratory distress.      Breath sounds: Normal breath sounds. No stridor. No wheezing, rhonchi or rales.   Abdominal:      General: Bowel sounds are normal. There is no distension.      Palpations: Abdomen is soft.      Tenderness: There is no abdominal tenderness. There is no guarding or rebound.   Musculoskeletal:         General: No deformity.      Right lower leg: No edema.      Left lower leg: No edema.   Lymphadenopathy:      Cervical: No cervical adenopathy.   Skin:     General: Skin is warm and dry.      Coloration: Skin is not jaundiced.      Findings: No erythema or rash.   Neurological:      General: No focal deficit present.      Mental Status: She is alert and oriented to person, place, and time. Mental status is at baseline.   Psychiatric:         Mood and Affect: Mood normal.         Behavior: Behavior normal.         Thought Content: Thought content normal.         Labs: I have personally reviewed pertinent lab results.  Lab Results   Component Value Date    WBC 13.08 (H) 11/21/2023    HGB 12.4 11/21/2023    HCT 38.3 11/21/2023    MCV 96 11/21/2023     11/21/2023     Lab Results   Component Value Date    GLUCOSE 124 (H)  "11/17/2017    CALCIUM 9.0 11/22/2023     (H) 11/17/2017    K 3.8 11/22/2023    CO2 21 11/22/2023     (H) 11/22/2023    BUN 38 (H) 11/22/2023    CREATININE 1.60 (H) 11/22/2023     Lab Results   Component Value Date    IGE 37.2 11/21/2023     Lab Results   Component Value Date    ALT 21 09/08/2023    AST 19 09/08/2023    ALKPHOS 72 09/08/2023    BILITOT 0.4 11/17/2017       NE RAST 11/2023 - (+) Cat, birch, Dust mite, dog dander, oak, Total IgE 37    Imaging and other studies: I have personally reviewed pertinent reports.   and I have personally reviewed pertinent films in PACS  CT angio 11/20/2023 - no PE, no focal infiltrates, no parenchymal lesions, no PTX, no effusions, mod hiatal hernia     Pulmonary function testing: none currently    Sleep Testing  Home PSG 12/7/2023 - SAMM 2.2, laura SpO2 86%    EKG, Pathology, and Other Studies: I have personally reviewed pertinent reports.    Myocardial perfusion 12/2023 - EF 48%, \"without diagnostic evidence for perfusion abnormality\"    TTE 11/2021 - EF 55%, grade I diastolic dysfunction, normal RV size/function, mild-mod MR    TE 3/2020 EF 60%, normal RV size/function    Asif Harrell, DO, FACP  [facility]    Answers submitted by the patient for this visit:  Pulmonology Questionnaire (Submitted on 1/2/2024)  Chief Complaint: Primary symptoms  Do you have shortness of breath that occurs with effort or exertion?: Yes  Do you have ear congestion?: No  Do you have heartburn?: Yes  Do you have fatigue?: Yes  Do you have nasal congestion?: No  Do you have shortness of breath when lying flat?: No  Do you have shortness of breath when you wake up?: No  Do you have sweats?: No  Have you experienced weight loss?: No  Risk factors for lung disease: animal exposure    "

## 2024-01-06 DIAGNOSIS — J40 BRONCHITIS: ICD-10-CM

## 2024-01-08 RX ORDER — FLUTICASONE PROPIONATE 50 MCG
SPRAY, SUSPENSION (ML) NASAL
Qty: 24 ML | Refills: 1 | Status: SHIPPED | OUTPATIENT
Start: 2024-01-08

## 2024-01-08 NOTE — TELEPHONE ENCOUNTER
Requested medication(s) are due for refill today: Yes  Patient has already received a courtesy refill: No  Other reason request has been forwarded to provider: per protocol. Please refuse if not appropriate

## 2024-01-15 ENCOUNTER — ANNUAL EXAM (OUTPATIENT)
Dept: OBGYN CLINIC | Facility: CLINIC | Age: 69
End: 2024-01-15
Payer: COMMERCIAL

## 2024-01-15 VITALS
HEIGHT: 61 IN | SYSTOLIC BLOOD PRESSURE: 132 MMHG | BODY MASS INDEX: 43.35 KG/M2 | DIASTOLIC BLOOD PRESSURE: 80 MMHG | WEIGHT: 229.6 LBS

## 2024-01-15 DIAGNOSIS — Z01.419 WELL WOMAN EXAM WITH ROUTINE GYNECOLOGICAL EXAM: Primary | ICD-10-CM

## 2024-01-15 DIAGNOSIS — B37.2 CUTANEOUS CANDIDIASIS: ICD-10-CM

## 2024-01-15 DIAGNOSIS — Z12.31 ENCOUNTER FOR SCREENING MAMMOGRAM FOR MALIGNANT NEOPLASM OF BREAST: ICD-10-CM

## 2024-01-15 DIAGNOSIS — L90.0 LICHEN SCLEROSUS: ICD-10-CM

## 2024-01-15 PROBLEM — R05.9 COUGH: Status: RESOLVED | Noted: 2023-11-15 | Resolved: 2024-01-15

## 2024-01-15 PROBLEM — Z23 ENCOUNTER FOR IMMUNIZATION: Status: RESOLVED | Noted: 2022-12-22 | Resolved: 2024-01-15

## 2024-01-15 PROBLEM — R05.2 SUBACUTE COUGH: Status: RESOLVED | Noted: 2023-11-22 | Resolved: 2024-01-15

## 2024-01-15 PROBLEM — R00.2 PALPITATIONS: Status: RESOLVED | Noted: 2020-01-21 | Resolved: 2024-01-15

## 2024-01-15 PROBLEM — R00.0 TACHYCARDIA: Status: RESOLVED | Noted: 2023-11-20 | Resolved: 2024-01-15

## 2024-01-15 PROBLEM — R60.0 LOCALIZED EDEMA: Status: RESOLVED | Noted: 2020-01-21 | Resolved: 2024-01-15

## 2024-01-15 PROCEDURE — G0101 CA SCREEN;PELVIC/BREAST EXAM: HCPCS | Performed by: OBSTETRICS & GYNECOLOGY

## 2024-01-15 RX ORDER — NYSTATIN 10B UNIT
POWDER (EA) MISCELLANEOUS 2 TIMES DAILY
Qty: 1 EACH | Refills: 3 | Status: SHIPPED | OUTPATIENT
Start: 2024-01-15

## 2024-01-15 RX ORDER — CLOBETASOL PROPIONATE 0.5 MG/G
OINTMENT TOPICAL 2 TIMES WEEKLY
Qty: 60 G | Refills: 3 | Status: SHIPPED | OUTPATIENT
Start: 2024-01-15

## 2024-01-15 NOTE — PROGRESS NOTES
"ASSESSMENT & PLAN:   Diagnoses and all orders for this visit:    Well woman exam with routine gynecological exam  -     Mammo screening bilateral w 3d & cad; Future    Encounter for screening mammogram for malignant neoplasm of breast  -     Mammo screening bilateral w 3d & cad; Future    Lichen sclerosus  -     clobetasol (TEMOVATE) 0.05 % ointment; Apply topically 2 (two) times a week    Cutaneous candidiasis  -     Nystatin POWD; Use 2 (two) times a day          The following were reviewed in today's visit: ASCCP guidelines (Pap screen not indicated after age 65), STD testing breast self exam, mammography screening ordered, menopause, exercise, and healthy diet.    Patient to return to office in yearly for annual exam.     All questions have been answered to her satisfaction.        CC:  Annual Gynecologic Examination  Chief Complaint   Patient presents with    Gynecologic Exam     Cherelle Dash is herre for her annual exam; pt is complaining of \"mucous in her urine.\"(Urine sample brought in) no burning or increased frequency, although pt does already go to the bathroom frequently.  Mammo ordered, pap not indicated.       HPI: Cherelle Dash is a 68 y.o.  who presents for annual gynecologic examination.  She has the following concerns:  perineal itching, groin red, mucus in urine      Health Maintenance:    Exercise: intermittently  Breast exams/breast awareness: yes  Last mammogram:   Colorectal cancer screenin  DEXA:     Past Medical History:   Diagnosis Date    Abnormal ECG     Anxiety     Arthritis     Bipolar 1 disorder (HCC)     Chronic kidney disease     stage 3    CPAP (continuous positive airway pressure) dependence     Depression     Disease of thyroid gland     Elevated blood pressure reading 06/10/2019    GERD (gastroesophageal reflux disease)     Obesity     Sleep apnea     Sleep apnea, obstructive        Past Surgical History:   Procedure Laterality Date    " COLONOSCOPY  2011       Past OB/Gyn History:   No LMP recorded. Patient is postmenopausal.    Patient is menopausal.   Menopausal symptoms: dryness  Last Pap: 2018 : no abnormalities; further pap screening not indicated  History of abnormal Pap smear: no    Patient is not currently sexually active.     Family History  Family History   Problem Relation Age of Onset    Heart disease Mother     Heart Valve Disease Mother         Replacement    Diabetes Mother         2002    Heart failure Mother         Heart Valve replacement    Arthritis Father     No Known Problems Sister     No Known Problems Daughter     No Known Problems Maternal Grandmother     No Known Problems Maternal Grandfather     No Known Problems Paternal Grandmother     No Known Problems Paternal Grandfather     No Known Problems Son     No Known Problems Maternal Aunt     No Known Problems Maternal Aunt     No Known Problems Maternal Aunt     No Known Problems Maternal Aunt     No Known Problems Maternal Aunt     No Known Problems Paternal Aunt     Alcohol abuse Son         Kolton, 40 year old son, has issues with alcohol and alcohol abuse       Family history of uterine or ovarian cancer: no  Family history of breast cancer: no  Family history of colon cancer: no    Social History:  Social History     Socioeconomic History    Marital status: /Civil Union     Spouse name: Not on file    Number of children: Not on file    Years of education: Not on file    Highest education level: Not on file   Occupational History    Not on file   Tobacco Use    Smoking status: Former     Current packs/day: 0.00     Average packs/day: 0.3 packs/day for 30.0 years (7.5 ttl pk-yrs)     Types: Cigarettes     Start date: 1/1/1979     Quit date: 1/1/2009     Years since quitting: 15.0    Smokeless tobacco: Never   Vaping Use    Vaping status: Never Used   Substance and Sexual Activity    Alcohol use: Not Currently     Alcohol/week: 1.0 standard drink of alcohol      Types: 1 Glasses of wine per week    Drug use: No    Sexual activity: Not Currently     Partners: Male     Birth control/protection: Post-menopausal   Other Topics Concern    Not on file   Social History Narrative    Daily coffee consumption: 3 cups/day     Social Determinants of Health     Financial Resource Strain: Low Risk  (12/18/2022)    Overall Financial Resource Strain (CARDIA)     Difficulty of Paying Living Expenses: Not hard at all   Food Insecurity: No Food Insecurity (11/21/2023)    Hunger Vital Sign     Worried About Running Out of Food in the Last Year: Never true     Ran Out of Food in the Last Year: Never true   Transportation Needs: No Transportation Needs (11/21/2023)    PRAPARE - Transportation     Lack of Transportation (Medical): No     Lack of Transportation (Non-Medical): No   Physical Activity: Not on file   Stress: Not on file   Social Connections: Not on file   Intimate Partner Violence: Not on file   Housing Stability: Low Risk  (11/21/2023)    Housing Stability Vital Sign     Unable to Pay for Housing in the Last Year: No     Number of Places Lived in the Last Year: 1     Unstable Housing in the Last Year: No     Domestic violence screen: negative    Allergies:  Allergies   Allergen Reactions    Other Other (See Comments)     Other reaction(s): Anaphylaxis  Annotation - 31Mar2014: plums peaches, fruits with skin        Medications:    Current Outpatient Medications:     albuterol (2.5 mg/3 mL) 0.083 % nebulizer solution, Take 3 mL (2.5 mg total) by nebulization every 6 (six) hours as needed for wheezing or shortness of breath, Disp: 90 mL, Rfl: 0    calcitriol (ROCALTROL) 0.25 mcg capsule, TAKE ONE CAPSULE BY MOUTH EVERY DAY, Disp: 90 capsule, Rfl: 4    Cholecalciferol (VITAMIN D) 2000 units CAPS, Take 1 capsule by mouth daily, Disp: , Rfl:     clobetasol (TEMOVATE) 0.05 % ointment, Apply topically 2 (two) times a week, Disp: 60 g, Rfl: 3    CRANBERRY PO, Take by mouth, Disp: , Rfl:      "famotidine (PEPCID) 20 mg tablet, Take 1 tablet (20 mg total) by mouth 2 (two) times a day, Disp: 180 tablet, Rfl: 3    fluticasone (FLONASE) 50 mcg/act nasal spray, SPRAY 1 SPRAY INTO EACH NOSTRIL EVERY DAY, Disp: 24 mL, Rfl: 1    Glucosamine-Chondroit-Vit C-Mn (GLUCOSAMINE 1500 COMPLEX PO), Take by mouth in the morning, Disp: , Rfl:     lurasidone (LATUDA) 20 mg tablet, TAKE 1 TABLET BY MOUTH DAILY  WITH BREAKFAST, Disp: 30 tablet, Rfl: 11    metoprolol succinate (TOPROL-XL) 25 mg 24 hr tablet, Take 1 tablet (25 mg total) by mouth daily, Disp: 90 tablet, Rfl: 3    Mirabegron ER (Myrbetriq) 25 MG TB24, Take 25 mg by mouth in the morning, Disp: 90 tablet, Rfl: 3    Multiple Vitamin (MULTI-VITAMIN DAILY) TABS, Take by mouth daily, Disp: , Rfl:     Nystatin POWD, Use 2 (two) times a day, Disp: 1 each, Rfl: 3    Review of Systems:  Review of Systems   Constitutional: Negative.    HENT: Negative.     Respiratory: Negative.     Cardiovascular: Negative.    Gastrointestinal: Negative.    Genitourinary:  Positive for frequency. Negative for pelvic pain, vaginal bleeding and vaginal discharge.   Neurological: Negative.    Psychiatric/Behavioral: Negative.           Physical Exam:  /80 (BP Location: Right arm, Patient Position: Sitting, Cuff Size: Standard)   Ht 5' 1\" (1.549 m)   Wt 104 kg (229 lb 9.6 oz)   BMI 43.38 kg/m²    Physical Exam  Constitutional:       Appearance: Normal appearance.   Genitourinary:      Bladder and urethral meatus normal.      No lesions in the vagina.      Genitourinary Comments: Cutaneous candidiasis in groin  Lichen at introitus      Right Labia: skin changes.      Right Labia: No rash, tenderness, lesions or Bartholin's cyst.     Left Labia: skin changes.      Left Labia: No tenderness, lesions, Bartholin's cyst or rash.           No vaginal erythema, tenderness or bleeding.      Mild vaginal atrophy present.       Right Adnexa: not tender, not full and no mass present.     Left " Adnexa: not tender, not full and no mass present.     Cervix is parous.      No cervical motion tenderness, discharge, lesion or polyp.      Uterus is not enlarged, fixed or tender.      No uterine mass detected.     Urethral meatus caruncle not present.     No urethral tenderness or mass present.   Breasts:     Right: No swelling, bleeding, inverted nipple, mass, nipple discharge, skin change or tenderness.      Left: No swelling, bleeding, inverted nipple, mass, nipple discharge, skin change or tenderness.   HENT:      Head: Normocephalic and atraumatic.   Eyes:      Extraocular Movements: Extraocular movements intact.      Conjunctiva/sclera: Conjunctivae normal.      Pupils: Pupils are equal, round, and reactive to light.   Cardiovascular:      Rate and Rhythm: Normal rate and regular rhythm.      Heart sounds: Normal heart sounds. No murmur heard.  Pulmonary:      Effort: Pulmonary effort is normal. No respiratory distress.      Breath sounds: Normal breath sounds. No wheezing or rales.   Abdominal:      General: There is no distension.      Palpations: Abdomen is soft.      Tenderness: There is no abdominal tenderness. There is no guarding.   Neurological:      General: No focal deficit present.      Mental Status: She is alert and oriented to person, place, and time.   Skin:     General: Skin is warm and dry.   Psychiatric:         Mood and Affect: Mood normal.         Behavior: Behavior normal.   Vitals and nursing note reviewed.

## 2024-01-19 ENCOUNTER — RA CDI HCC (OUTPATIENT)
Dept: OTHER | Facility: HOSPITAL | Age: 69
End: 2024-01-19

## 2024-01-19 NOTE — PROGRESS NOTES
E66.01  HCC coding opportunities          Chart Reviewed number of suggestions sent to Provider: 1     Patients Insurance     Medicare Insurance: [employer] Advantage

## 2024-01-29 ENCOUNTER — OFFICE VISIT (OUTPATIENT)
Dept: FAMILY MEDICINE CLINIC | Facility: CLINIC | Age: 69
End: 2024-01-29
Payer: COMMERCIAL

## 2024-01-29 VITALS
SYSTOLIC BLOOD PRESSURE: 108 MMHG | WEIGHT: 227.2 LBS | HEART RATE: 72 BPM | TEMPERATURE: 97.6 F | DIASTOLIC BLOOD PRESSURE: 70 MMHG | HEIGHT: 61 IN | BODY MASS INDEX: 42.9 KG/M2 | OXYGEN SATURATION: 98 % | RESPIRATION RATE: 16 BRPM

## 2024-01-29 DIAGNOSIS — E66.01 MORBID OBESITY WITH BMI OF 40.0-44.9, ADULT (HCC): ICD-10-CM

## 2024-01-29 DIAGNOSIS — N18.32 STAGE 3B CHRONIC KIDNEY DISEASE (HCC): ICD-10-CM

## 2024-01-29 DIAGNOSIS — Z00.00 MEDICARE ANNUAL WELLNESS VISIT, SUBSEQUENT: Primary | ICD-10-CM

## 2024-01-29 DIAGNOSIS — N25.81 SECONDARY HYPERPARATHYROIDISM OF RENAL ORIGIN (HCC): ICD-10-CM

## 2024-01-29 DIAGNOSIS — F31.63 BIPOLAR 1 DISORDER, MIXED, SEVERE (HCC): ICD-10-CM

## 2024-01-29 PROBLEM — J21.0 RSV (ACUTE BRONCHIOLITIS DUE TO RESPIRATORY SYNCYTIAL VIRUS): Status: RESOLVED | Noted: 2023-12-05 | Resolved: 2024-01-29

## 2024-01-29 PROCEDURE — G0439 PPPS, SUBSEQ VISIT: HCPCS | Performed by: FAMILY MEDICINE

## 2024-01-29 PROCEDURE — 1125F AMNT PAIN NOTED PAIN PRSNT: CPT | Performed by: FAMILY MEDICINE

## 2024-01-29 PROCEDURE — 1159F MED LIST DOCD IN RCRD: CPT | Performed by: FAMILY MEDICINE

## 2024-01-29 PROCEDURE — 1003F LEVEL OF ACTIVITY ASSESS: CPT | Performed by: FAMILY MEDICINE

## 2024-01-29 PROCEDURE — 1090F PRES/ABSN URINE INCON ASSESS: CPT | Performed by: FAMILY MEDICINE

## 2024-01-29 PROCEDURE — 1160F RVW MEDS BY RX/DR IN RCRD: CPT | Performed by: FAMILY MEDICINE

## 2024-01-29 PROCEDURE — 1170F FXNL STATUS ASSESSED: CPT | Performed by: FAMILY MEDICINE

## 2024-01-29 PROCEDURE — 3288F FALL RISK ASSESSMENT DOCD: CPT | Performed by: FAMILY MEDICINE

## 2024-01-29 NOTE — PROGRESS NOTES
Assessment and Plan:     Problem List Items Addressed This Visit     Bipolar 1 disorder, mixed, severe (HCC)     Stable  Seeing psych         Stage 3 chronic kidney disease (HCC)     Lab Results   Component Value Date    EGFR 32 11/22/2023    EGFR 35 11/21/2023    EGFR 37 11/20/2023    CREATININE 1.60 (H) 11/22/2023    CREATININE 1.48 (H) 11/21/2023    CREATININE 1.43 (H) 11/20/2023   Seeing nephrology         Secondary hyperparathyroidism of renal origin (HCC)     stable         Morbid obesity with BMI of 40.0-44.9, adult (HCC)     Losing weight  Watching diet  swimming         Medicare annual wellness visit, subsequent - Primary     Shingrix vaccine            Urinary Incontinence Plan of Care: counseling topics discussed: limiting fluid intake 3-4 hours before bed.       Preventive health issues were discussed with patient, and age appropriate screening tests were ordered as noted in patient's After Visit Summary.  Personalized health advice and appropriate referrals for health education or preventive services given if needed, as noted in patient's After Visit Summary.     History of Present Illness:     Patient presents for a Medicare Wellness Visit    Here for awv  Needs sleep study study  Will discuss ppi with nephrology       Patient Care Team:  Gretchen Mayorga DO as PCP - General  DO Toni Corona DO Sana Rab Akbar, MD (Nephrology)     Review of Systems:     Review of Systems   Constitutional: Negative.    HENT: Negative.     Eyes: Negative.    Respiratory: Negative.     Cardiovascular: Negative.    Gastrointestinal: Negative.    Endocrine: Negative.    Genitourinary: Negative.    Musculoskeletal: Negative.    Skin: Negative.    Allergic/Immunologic: Negative.    Neurological: Negative.    Hematological: Negative.    Psychiatric/Behavioral: Negative.          Problem List:     Patient Active Problem List   Diagnosis   • Bipolar 1 disorder, mixed, severe (HCC)   • Depression with anxiety   •  Gastroesophageal reflux disease without esophagitis   • Hypothyroidism   • Impaired fasting glucose   • Insomnia   • Obstructive sleep apnea   • Onychomycosis of toenail   • Patellofemoral arthritis of right knee   • Stress incontinence of urine   • High triglycerides   • Elevated blood pressure reading without diagnosis of hypertension   • Abnormal EKG   • Raynaud's disease without gangrene   • Stage 3 chronic kidney disease ([facility])   • Vitamin D deficiency   • Primary osteoarthritis of left knee   • Primary osteoarthritis of right knee   • Secondary hyperparathyroidism of renal origin ([facility])   • Persistent proteinuria   • Urinary frequency   • Morbid obesity with BMI of 40.0-44.9, adult ([facility])   • Mass of right hand   • Lichen sclerosus   • CPAP (continuous positive airway pressure) dependence   • Premature ventricular contractions   • LBBB (left bundle branch block)   • Other sleep disorders   • Medicare annual wellness visit, subsequent      Past Medical and Surgical History:     Past Medical History:   Diagnosis Date   • Abnormal ECG    • Anxiety 2002   • Arthritis    • Bipolar 1 disorder ([facility])    • Chronic kidney disease     stage 3   • CPAP (continuous positive airway pressure) dependence    • Depression 2001   • Disease of thyroid gland    • Elevated blood pressure reading 06/10/2019   • GERD (gastroesophageal reflux disease)    • Obesity    • RSV (acute bronchiolitis due to respiratory syncytial virus) 12/05/2023   • Sleep apnea    • Sleep apnea, obstructive 2007     Past Surgical History:   Procedure Laterality Date   • COLONOSCOPY  2011      Family History:     Family History   Problem Relation Age of Onset   • Heart disease Mother    • Heart Valve Disease Mother         Replacement   • Diabetes Mother         2002   • Heart failure Mother         Heart Valve replacement   • Arthritis Father    • No Known Problems Sister    • No Known Problems Daughter    • No Known Problems Maternal Grandmother    • No Known  Problems Maternal Grandfather    • No Known Problems Paternal Grandmother    • No Known Problems Paternal Grandfather    • No Known Problems Son    • No Known Problems Maternal Aunt    • No Known Problems Maternal Aunt    • No Known Problems Maternal Aunt    • No Known Problems Maternal Aunt    • No Known Problems Maternal Aunt    • No Known Problems Paternal Aunt    • Alcohol abuse Son         Kolton, 40 year old son, has issues with alcohol and alcohol abuse      Social History:     Social History     Socioeconomic History   • Marital status: /Civil Union     Spouse name: None   • Number of children: None   • Years of education: None   • Highest education level: None   Occupational History   • None   Tobacco Use   • Smoking status: Former     Current packs/day: 0.00     Average packs/day: 0.3 packs/day for 30.0 years (7.5 ttl pk-yrs)     Types: Cigarettes     Start date: 1/1/1979     Quit date: 1/1/2009     Years since quitting: 15.0   • Smokeless tobacco: Never   Vaping Use   • Vaping status: Never Used   Substance and Sexual Activity   • Alcohol use: Yes     Alcohol/week: 1.0 standard drink of alcohol     Types: 1 Glasses of wine per week   • Drug use: Never   • Sexual activity: Not Currently     Partners: Male     Birth control/protection: Post-menopausal   Other Topics Concern   • None   Social History Narrative    Daily coffee consumption: 3 cups/day     Social Determinants of Health     Financial Resource Strain: Low Risk  (1/24/2024)    Overall Financial Resource Strain (CARDIA)    • Difficulty of Paying Living Expenses: Not hard at all   Food Insecurity: No Food Insecurity (11/21/2023)    Hunger Vital Sign    • Worried About Running Out of Food in the Last Year: Never true    • Ran Out of Food in the Last Year: Never true   Transportation Needs: No Transportation Needs (1/24/2024)    PRAPARE - Transportation    • Lack of Transportation (Medical): No    • Lack of Transportation (Non-Medical): No    Physical Activity: Not on file   Stress: Not on file   Social Connections: Not on file   Intimate Partner Violence: Not on file   Housing Stability: Low Risk  (11/21/2023)    Housing Stability Vital Sign    • Unable to Pay for Housing in the Last Year: No    • Number of Places Lived in the Last Year: 1    • Unstable Housing in the Last Year: No      Medications and Allergies:     Current Outpatient Medications   Medication Sig Dispense Refill   • albuterol (2.5 mg/3 mL) 0.083 % nebulizer solution Take 3 mL (2.5 mg total) by nebulization every 6 (six) hours as needed for wheezing or shortness of breath 90 mL 0   • calcitriol (ROCALTROL) 0.25 mcg capsule TAKE ONE CAPSULE BY MOUTH EVERY DAY 90 capsule 4   • Cholecalciferol (VITAMIN D) 2000 units CAPS Take 1 capsule by mouth daily     • clobetasol (TEMOVATE) 0.05 % ointment Apply topically 2 (two) times a week 60 g 3   • CRANBERRY PO Take by mouth     • famotidine (PEPCID) 20 mg tablet Take 1 tablet (20 mg total) by mouth 2 (two) times a day 180 tablet 3   • fluticasone (FLONASE) 50 mcg/act nasal spray SPRAY 1 SPRAY INTO EACH NOSTRIL EVERY DAY 24 mL 1   • Glucosamine-Chondroit-Vit C-Mn (GLUCOSAMINE 1500 COMPLEX PO) Take by mouth in the morning     • lurasidone (LATUDA) 20 mg tablet TAKE 1 TABLET BY MOUTH DAILY  WITH BREAKFAST 30 tablet 11   • metoprolol succinate (TOPROL-XL) 25 mg 24 hr tablet Take 1 tablet (25 mg total) by mouth daily 90 tablet 3   • Mirabegron ER (Myrbetriq) 25 MG TB24 Take 25 mg by mouth in the morning 90 tablet 3   • Multiple Vitamin (MULTI-VITAMIN DAILY) TABS Take by mouth daily     • Nystatin POWD Use 2 (two) times a day 1 each 3     No current facility-administered medications for this visit.     Allergies   Allergen Reactions   • Other Other (See Comments)     Other reaction(s): Anaphylaxis  Annotation - 31Mar2014: plums peaches, fruits with skin       Immunizations:     Immunization History   Administered Date(s) Administered   • COVID-19  PFIZER VACCINE 0.3 ML IM 02/25/2021, 03/18/2021, 10/05/2021, 04/08/2022   • COVID-19 Pfizer Vac BIVALENT Silas-sucrose 12 Yr+ IM 12/02/2022   • INFLUENZA 09/20/2011, 11/08/2012, 09/28/2015, 10/11/2016, 11/16/2017, 11/02/2018, 09/28/2022   • Influenza Quadrivalent Preservative Free 3 years and older IM 10/06/2014, 09/28/2015, 10/11/2016, 11/16/2017   • Influenza, high dose seasonal 0.7 mL 10/22/2019, 09/21/2020, 09/20/2021, 09/28/2022, 10/14/2023   • Influenza, injectable, quadrivalent, preservative free 0.5 mL 11/02/2018   • Influenza, seasonal, injectable 09/27/2013   • Pneumococcal Conjugate Vaccine 20-valent (Pcv20), Polysace 12/22/2022, 12/22/2022   • Pneumococcal Polysaccharide PPV23 12/10/2019   • Td (adult), Unspecified 11/09/2009   • Tdap 01/04/2016   • Zoster 10/06/2014   • Zoster Vaccine Recombinant 07/18/2019, 07/25/2019, 09/30/2019, 09/30/2019      Health Maintenance:         Topic Date Due   • Colorectal Cancer Screening  03/07/2024   • Hepatitis C Screening  Completed         Topic Date Due   • COVID-19 Vaccine (6 - 2023-24 season) 09/01/2023      Medicare Screening Tests and Risk Assessments:     Cherelle is here for her Subsequent Wellness visit.     Health Risk Assessment:   Patient rates overall health as very good. Patient feels that their physical health rating is same. Patient is very satisfied with their life. Eyesight was rated as same. Hearing was rated as same. Patient feels that their emotional and mental health rating is slightly better. Patients states they are never, rarely angry. Patient states they are sometimes unusually tired/fatigued. Pain experienced in the last 7 days has been none. Patient states that she has experienced no weight loss or gain in last 6 months.     Depression Screening:   PHQ-9 Score: 0      Fall Risk Screening:   In the past year, patient has experienced: no history of falling in past year      Urinary Incontinence Screening:   Patient has leaked urine accidently  in the last six months.     Home Safety:  Patient does not have trouble with stairs inside or outside of their home. Patient has working smoke alarms and has working carbon monoxide detector. Home safety hazards include: none.     Nutrition:   Current diet is Regular and No Added Salt.     Medications:   Patient is currently taking over-the-counter supplements. OTC medications include: see medication list. Patient is able to manage medications.     Activities of Daily Living (ADLs)/Instrumental Activities of Daily Living (IADLs):   Walk and transfer into and out of bed and chair?: Yes  Dress and groom yourself?: Yes    Bathe or shower yourself?: Yes    Feed yourself? Yes  Do your laundry/housekeeping?: Yes  Manage your money, pay your bills and track your expenses?: Yes  Make your own meals?: Yes    Do your own shopping?: Yes    Previous Hospitalizations:   Any hospitalizations or ED visits within the last 12 months?: Yes    How many hospitalizations have you had in the last year?: 1-2    Hospitalization Comments: RSV in November 2023, 4 days in hospital    Advance Care Planning:   Living will: Yes    Durable POA for healthcare: Yes    Advanced directive: Yes      Cognitive Screening:   Provider or family/friend/caregiver concerned regarding cognition?: No    PREVENTIVE SCREENINGS      Cardiovascular Screening:    General: Screening Not Indicated and History Lipid Disorder      Diabetes Screening:     General: Screening Current      Colorectal Cancer Screening:     General: Screening Current      Breast Cancer Screening:     General: Screening Current      Cervical Cancer Screening:    General: Screening Not Indicated      Lung Cancer Screening:     General: Screening Not Indicated      Hepatitis C Screening:    General: Screening Current    Screening, Brief Intervention, and Referral to Treatment (SBIRT)    Screening  Typical number of drinks in a day: 0  Typical number of drinks in a week: 1  Interpretation: Low  "risk drinking behavior.    AUDIT-C Screenin) How often did you have a drink containing alcohol in the past year? monthly or less  2) How many drinks did you have on a typical day when you were drinking in the past year? 0  3) How often did you have 6 or more drinks on one occasion in the past year? never    AUDIT-C Score: 1  Interpretation: Score 0-2 (female): Negative screen for alcohol misuse    Single Item Drug Screening:  How often have you used an illegal drug (including marijuana) or a prescription medication for non-medical reasons in the past year? never    Single Item Drug Screen Score: 0  Interpretation: Negative screen for possible drug use disorder    Other Counseling Topics:   Regular weightbearing exercise.     No results found.     Physical Exam:     /70 (BP Location: Left arm, Patient Position: Sitting, Cuff Size: Large)   Pulse 72   Temp 97.6 °F (36.4 °C) (Tympanic)   Resp 16   Ht 5' 0.98\" (1.549 m)   Wt 103 kg (227 lb 3.2 oz)   SpO2 98%   BMI 42.95 kg/m²     Physical Exam  Vitals and nursing note reviewed.   Constitutional:       Appearance: Normal appearance. She is well-developed.   HENT:      Head: Normocephalic and atraumatic.      Right Ear: External ear normal.      Left Ear: External ear normal.      Nose: Nose normal.   Eyes:      Extraocular Movements: Extraocular movements intact.      Conjunctiva/sclera: Conjunctivae normal.      Pupils: Pupils are equal, round, and reactive to light.   Cardiovascular:      Rate and Rhythm: Normal rate and regular rhythm.      Heart sounds: Normal heart sounds.   Pulmonary:      Effort: Pulmonary effort is normal.      Breath sounds: Normal breath sounds.   Abdominal:      General: Abdomen is flat. Bowel sounds are normal.      Palpations: Abdomen is soft.   Musculoskeletal:         General: Normal range of motion.      Cervical back: Normal range of motion and neck supple.   Skin:     General: Skin is warm and dry.      Capillary " Refill: Capillary refill takes less than 2 seconds.   Neurological:      General: No focal deficit present.      Mental Status: She is alert and oriented to person, place, and time.   Psychiatric:         Mood and Affect: Mood normal.         Behavior: Behavior normal.         Thought Content: Thought content normal.         Judgment: Judgment normal.          Gretchen Mayorga DO

## 2024-01-29 NOTE — PATIENT INSTRUCTIONS
Medicare Preventive Visit Patient Instructions  Thank you for completing your Welcome to Medicare Visit or Medicare Annual Wellness Visit today. Your next wellness visit will be due in one year (1/29/2025).  The screening/preventive services that you may require over the next 5-10 years are detailed below. Some tests may not apply to you based off risk factors and/or age. Screening tests ordered at today's visit but not completed yet may show as past due. Also, please note that scanned in results may not display below.  Preventive Screenings:  Service Recommendations Previous Testing/Comments   Colorectal Cancer Screening  * Colonoscopy    * Fecal Occult Blood Test (FOBT)/Fecal Immunochemical Test (FIT)  * Fecal DNA/Cologuard Test  * Flexible Sigmoidoscopy Age: 45-75 years old   Colonoscopy: every 10 years (may be performed more frequently if at higher risk)  OR  FOBT/FIT: every 1 year  OR  Cologuard: every 3 years  OR  Sigmoidoscopy: every 5 years  Screening may be recommended earlier than age 45 if at higher risk for colorectal cancer. Also, an individualized decision between you and your healthcare provider will decide whether screening between the ages of 76-85 would be appropriate. Colonoscopy: 03/08/2023  FOBT/FIT: Not on file  Cologuard: 01/13/2023  Sigmoidoscopy: Not on file    Screening Current     Breast Cancer Screening Age: 40+ years old  Frequency: every 1-2 years  Not required if history of left and right mastectomy Mammogram: 08/01/2023    Screening Current   Cervical Cancer Screening Between the ages of 21-29, pap smear recommended once every 3 years.   Between the ages of 30-65, can perform pap smear with HPV co-testing every 5 years.   Recommendations may differ for women with a history of total hysterectomy, cervical cancer, or abnormal pap smears in past. Pap Smear: 01/15/2024    Screening Not Indicated   Hepatitis C Screening Once for adults born between 1945 and 1965  More frequently in  patients at high risk for Hepatitis C Hep C Antibody: 12/06/2019    Screening Current   Diabetes Screening 1-2 times per year if you're at risk for diabetes or have pre-diabetes Fasting glucose: 112 mg/dL (10/9/2023)  A1C: 5.4 % (6/30/2022)  Screening Current   Cholesterol Screening Once every 5 years if you don't have a lipid disorder. May order more often based on risk factors. Lipid panel: 09/08/2023    Screening Not Indicated  History Lipid Disorder     Other Preventive Screenings Covered by Medicare:  Abdominal Aortic Aneurysm (AAA) Screening: covered once if your at risk. You're considered to be at risk if you have a family history of AAA.  Lung Cancer Screening: covers low dose CT scan once per year if you meet all of the following conditions: (1) Age 55-77; (2) No signs or symptoms of lung cancer; (3) Current smoker or have quit smoking within the last 15 years; (4) You have a tobacco smoking history of at least 20 pack years (packs per day multiplied by number of years you smoked); (5) You get a written order from a healthcare provider.  Glaucoma Screening: covered annually if you're considered high risk: (1) You have diabetes OR (2) Family history of glaucoma OR (3)  aged 50 and older OR (4)  American aged 65 and older  Osteoporosis Screening: covered every 2 years if you meet one of the following conditions: (1) You're estrogen deficient and at risk for osteoporosis based off medical history and other findings; (2) Have a vertebral abnormality; (3) On glucocorticoid therapy for more than 3 months; (4) Have primary hyperparathyroidism; (5) On osteoporosis medications and need to assess response to drug therapy.   Last bone density test (DXA Scan): 05/25/2022.  HIV Screening: covered annually if you're between the age of 15-65. Also covered annually if you are younger than 15 and older than 65 with risk factors for HIV infection. For pregnant patients, it is covered up to 3 times per  pregnancy.    Immunizations:  Immunization Recommendations   Influenza Vaccine Annual influenza vaccination during flu season is recommended for all persons aged >= 6 months who do not have contraindications   Pneumococcal Vaccine   * Pneumococcal conjugate vaccine = PCV13 (Prevnar 13), PCV15 (Vaxneuvance), PCV20 (Prevnar 20)  * Pneumococcal polysaccharide vaccine = PPSV23 (Pneumovax) Adults 19-65 yo with certain risk factors or if 65+ yo  If never received any pneumonia vaccine: recommend Prevnar 20 (PCV20)  Give PCV20 if previously received 1 dose of PCV13 or PPSV23   Hepatitis B Vaccine 3 dose series if at intermediate or high risk (ex: diabetes, end stage renal disease, liver disease)   Respiratory syncytial virus (RSV) Vaccine - COVERED BY MEDICARE PART D  * RSVPreF3 (Arexvy) CDC recommends that adults 60 years of age and older may receive a single dose of RSV vaccine using shared clinical decision-making (SCDM)   Tetanus (Td) Vaccine - COST NOT COVERED BY MEDICARE PART B Following completion of primary series, a booster dose should be given every 10 years to maintain immunity against tetanus. Td may also be given as tetanus wound prophylaxis.   Tdap Vaccine - COST NOT COVERED BY MEDICARE PART B Recommended at least once for all adults. For pregnant patients, recommended with each pregnancy.   Shingles Vaccine (Shingrix) - COST NOT COVERED BY MEDICARE PART B  2 shot series recommended in those 19 years and older who have or will have weakened immune systems or those 50 years and older     Health Maintenance Due:      Topic Date Due   • Colorectal Cancer Screening  03/07/2024   • Hepatitis C Screening  Completed     Immunizations Due:      Topic Date Due   • COVID-19 Vaccine (6 - 2023-24 season) 09/01/2023     Advance Directives   What are advance directives?  Advance directives are legal documents that state your wishes and plans for medical care. These plans are made ahead of time in case you lose your ability  to make decisions for yourself. Advance directives can apply to any medical decision, such as the treatments you want, and if you want to donate organs.   What are the types of advance directives?  There are many types of advance directives, and each state has rules about how to use them. You may choose a combination of any of the following:  Living will:  This is a written record of the treatment you want. You can also choose which treatments you do not want, which to limit, and which to stop at a certain time. This includes surgery, medicine, IV fluid, and tube feedings.   Durable power of  for healthcare (DPAHC):  This is a written record that states who you want to make healthcare choices for you when you are unable to make them for yourself. This person, called a proxy, is usually a family member or a friend. You may choose more than 1 proxy.  Do not resuscitate (DNR) order:  A DNR order is used in case your heart stops beating or you stop breathing. It is a request not to have certain forms of treatment, such as CPR. A DNR order may be included in other types of advance directives.  Medical directive:  This covers the care that you want if you are in a coma, near death, or unable to make decisions for yourself. You can list the treatments you want for each condition. Treatment may include pain medicine, surgery, blood transfusions, dialysis, IV or tube feedings, and a ventilator (breathing machine).  Values history:  This document has questions about your views, beliefs, and how you feel and think about life. This information can help others choose the care that you would choose.  Why are advance directives important?  An advance directive helps you control your care. Although spoken wishes may be used, it is better to have your wishes written down. Spoken wishes can be misunderstood, or not followed. Treatments may be given even if you do not want them. An advance directive may make it easier for your  family to make difficult choices about your care.   Urinary Incontinence   Urinary incontinence (UI)  is when you lose control of your bladder. UI develops because your bladder cannot store or empty urine properly. The 3 most common types of UI are stress incontinence, urge incontinence, or both.  Medicines:   May be given to help strengthen your bladder control. Report any side effects of medication to your healthcare provider.  Do pelvic muscle exercises often:  Your pelvic muscles help you stop urinating. Squeeze these muscles tight for 5 seconds, then relax for 5 seconds. Gradually work up to squeezing for 10 seconds. Do 3 sets of 15 repetitions a day, or as directed. This will help strengthen your pelvic muscles and improve bladder control.  Train your bladder:  Go to the bathroom at set times, such as every 2 hours, even if you do not feel the urge to go. You can also try to hold your urine when you feel the urge to go. For example, hold your urine for 5 minutes when you feel the urge to go. As that becomes easier, hold your urine for 10 minutes.   Self-care:   Keep a UI record.  Write down how often you leak urine and how much you leak. Make a note of what you were doing when you leaked urine.  Drink liquids as directed. You may need to limit the amount of liquid you drink to help control your urine leakage. Do not drink any liquid right before you go to bed. Limit or do not have drinks that contain caffeine or alcohol.   Prevent constipation.  Eat a variety of high-fiber foods. Good examples are high-fiber cereals, beans, vegetables, and whole-grain breads. Walking is the best way to trigger your intestines to have a bowel movement.  Exercise regularly and maintain a healthy weight.  Weight loss and exercise will decrease pressure on your bladder and help you control your leakage.   Use a catheter as directed  to help empty your bladder. A catheter is a tiny, plastic tube that is put into your bladder to  drain your urine.   Go to behavior therapy as directed.  Behavior therapy may be used to help you learn to control your urge to urinate.    Weight Management   Why it is important to manage your weight:  Being overweight increases your risk of health conditions such as heart disease, high blood pressure, type 2 diabetes, and certain types of cancer. It can also increase your risk for osteoarthritis, sleep apnea, and other respiratory problems. Aim for a slow, steady weight loss. Even a small amount of weight loss can lower your risk of health problems.  How to lose weight safely:  A safe and healthy way to lose weight is to eat fewer calories and get regular exercise. You can lose up about 1 pound a week by decreasing the number of calories you eat by 500 calories each day.   Healthy meal plan for weight management:  A healthy meal plan includes a variety of foods, contains fewer calories, and helps you stay healthy. A healthy meal plan includes the following:  Eat whole-grain foods more often.  A healthy meal plan should contain fiber. Fiber is the part of grains, fruits, and vegetables that is not broken down by your body. Whole-grain foods are healthy and provide extra fiber in your diet. Some examples of whole-grain foods are whole-wheat breads and pastas, oatmeal, brown rice, and bulgur.  Eat a variety of vegetables every day.  Include dark, leafy greens such as spinach, kale, juan ramon greens, and mustard greens. Eat yellow and orange vegetables such as carrots, sweet potatoes, and winter squash.   Eat a variety of fruits every day.  Choose fresh or canned fruit (canned in its own juice or light syrup) instead of juice. Fruit juice has very little or no fiber.  Eat low-fat dairy foods.  Drink fat-free (skim) milk or 1% milk. Eat fat-free yogurt and low-fat cottage cheese. Try low-fat cheeses such as mozzarella and other reduced-fat cheeses.  Choose meat and other protein foods that are low in fat.  Choose beans  or other legumes such as split peas or lentils. Choose fish, skinless poultry (chicken or turkey), or lean cuts of red meat (beef or pork). Before you cook meat or poultry, cut off any visible fat.   Use less fat and oil.  Try baking foods instead of frying them. Add less fat, such as margarine, sour cream, regular salad dressing and mayonnaise to foods. Eat fewer high-fat foods. Some examples of high-fat foods include french fries, doughnuts, ice cream, and cakes.  Eat fewer sweets.  Limit foods and drinks that are high in sugar. This includes candy, cookies, regular soda, and sweetened drinks.  Exercise:  Exercise at least 30 minutes per day on most days of the week. Some examples of exercise include walking, biking, dancing, and swimming. You can also fit in more physical activity by taking the stairs instead of the elevator or parking farther away from stores. Ask your healthcare provider about the best exercise plan for you.      © Copyright [employer] 2018 Information is for End User's use only and may not be sold, redistributed or otherwise used for commercial purposes. All illustrations and images included in CareNotes® are the copyrighted property of [employer] or [employer]

## 2024-01-29 NOTE — ASSESSMENT & PLAN NOTE
Lab Results   Component Value Date    EGFR 32 11/22/2023    EGFR 35 11/21/2023    EGFR 37 11/20/2023    CREATININE 1.60 (H) 11/22/2023    CREATININE 1.48 (H) 11/21/2023    CREATININE 1.43 (H) 11/20/2023   Seeing nephrology

## 2024-02-07 ENCOUNTER — OFFICE VISIT (OUTPATIENT)
Dept: CARDIOLOGY CLINIC | Facility: CLINIC | Age: 69
End: 2024-02-07
Payer: COMMERCIAL

## 2024-02-07 VITALS
DIASTOLIC BLOOD PRESSURE: 70 MMHG | WEIGHT: 230 LBS | HEIGHT: 61 IN | SYSTOLIC BLOOD PRESSURE: 110 MMHG | HEART RATE: 90 BPM | BODY MASS INDEX: 43.43 KG/M2

## 2024-02-07 DIAGNOSIS — R94.31 ABNORMAL EKG: ICD-10-CM

## 2024-02-07 DIAGNOSIS — I44.7 LBBB (LEFT BUNDLE BRANCH BLOCK): Primary | ICD-10-CM

## 2024-02-07 DIAGNOSIS — I49.3 PREMATURE VENTRICULAR CONTRACTIONS: ICD-10-CM

## 2024-02-07 PROCEDURE — 99214 OFFICE O/P EST MOD 30 MIN: CPT | Performed by: INTERNAL MEDICINE

## 2024-02-07 NOTE — PROGRESS NOTES
Cardiology Consultation     Cherelle Dash  [id number]  1955  [facility]  [address]  [place] [address]    1. LBBB (left bundle branch block)        2. Premature ventricular contractions        3. Abnormal EKG            Chief Complaint   Patient presents with    Follow-up     No cardiac complaints.     HPI: Patient feels well, without complaints.  No reported chest pain, shortness of breath, palpitations, lightheadedness, syncope, LE edema, orthopnea, PND, or significant weight changes.  Patient remains active without any increased fatigue out of the ordinary.      Patient Active Problem List   Diagnosis    Bipolar 1 disorder, mixed, severe ([facility])    Depression with anxiety    Gastroesophageal reflux disease without esophagitis    Hypothyroidism    Impaired fasting glucose    Insomnia    Obstructive sleep apnea    Onychomycosis of toenail    Patellofemoral arthritis of right knee    Stress incontinence of urine    High triglycerides    Elevated blood pressure reading without diagnosis of hypertension    Abnormal EKG    Raynaud's disease without gangrene    Stage 3 chronic kidney disease ([facility])    Vitamin D deficiency    Primary osteoarthritis of left knee    Primary osteoarthritis of right knee    Secondary hyperparathyroidism of renal origin ([facility])    Persistent proteinuria    Urinary frequency    Morbid obesity with BMI of 40.0-44.9, adult ([facility])    Mass of right hand    Lichen sclerosus    CPAP (continuous positive airway pressure) dependence    Premature ventricular contractions    LBBB (left bundle branch block)    Other sleep disorders    Medicare annual wellness visit, subsequent     Past Medical History:   Diagnosis Date    Abnormal ECG     Anxiety 2002    Arthritis     Bipolar 1 disorder (HCC)     Chronic kidney disease     stage 3    CPAP (continuous positive airway pressure) dependence      Depression 2001    Disease of thyroid gland     Elevated blood pressure reading 06/10/2019    GERD (gastroesophageal reflux disease)     Obesity     RSV (acute bronchiolitis due to respiratory syncytial virus) 12/05/2023    Sleep apnea     Sleep apnea, obstructive 2007     Social History     Socioeconomic History    Marital status: /Civil Union     Spouse name: Not on file    Number of children: Not on file    Years of education: Not on file    Highest education level: Not on file   Occupational History    Not on file   Tobacco Use    Smoking status: Former     Current packs/day: 0.00     Average packs/day: 0.3 packs/day for 30.0 years (7.5 ttl pk-yrs)     Types: Cigarettes     Start date: 1/1/1979     Quit date: 1/1/2009     Years since quitting: 15.1    Smokeless tobacco: Never   Vaping Use    Vaping status: Never Used   Substance and Sexual Activity    Alcohol use: Yes     Alcohol/week: 1.0 standard drink of alcohol     Types: 1 Glasses of wine per week    Drug use: Never    Sexual activity: Not Currently     Partners: Male     Birth control/protection: Post-menopausal   Other Topics Concern    Not on file   Social History Narrative    Daily coffee consumption: 3 cups/day     Social Determinants of Health     Financial Resource Strain: Low Risk  (1/24/2024)    Overall Financial Resource Strain (CARDIA)     Difficulty of Paying Living Expenses: Not hard at all   Food Insecurity: No Food Insecurity (11/21/2023)    Hunger Vital Sign     Worried About Running Out of Food in the Last Year: Never true     Ran Out of Food in the Last Year: Never true   Transportation Needs: No Transportation Needs (1/24/2024)    PRAPARE - Transportation     Lack of Transportation (Medical): No     Lack of Transportation (Non-Medical): No   Physical Activity: Not on file   Stress: Not on file   Social Connections: Not on file   Intimate Partner Violence: Not on file   Housing Stability: Low Risk  (11/21/2023)    Housing  Stability Vital Sign     Unable to Pay for Housing in the Last Year: No     Number of Places Lived in the Last Year: 1     Unstable Housing in the Last Year: No      Family History   Problem Relation Age of Onset    Heart disease Mother     Heart Valve Disease Mother         Replacement    Diabetes Mother         2002    Heart failure Mother         Heart Valve replacement    Arthritis Father     No Known Problems Sister     No Known Problems Daughter     No Known Problems Maternal Grandmother     No Known Problems Maternal Grandfather     No Known Problems Paternal Grandmother     No Known Problems Paternal Grandfather     No Known Problems Son     No Known Problems Maternal Aunt     No Known Problems Maternal Aunt     No Known Problems Maternal Aunt     No Known Problems Maternal Aunt     No Known Problems Maternal Aunt     No Known Problems Paternal Aunt     Alcohol abuse Son         Kolton, 40 year old son, has issues with alcohol and alcohol abuse     Past Surgical History:   Procedure Laterality Date    COLONOSCOPY  2011       Current Outpatient Medications:     calcitriol (ROCALTROL) 0.25 mcg capsule, TAKE ONE CAPSULE BY MOUTH EVERY DAY, Disp: 90 capsule, Rfl: 4    Cholecalciferol (VITAMIN D) 2000 units CAPS, Take 1 capsule by mouth daily, Disp: , Rfl:     clobetasol (TEMOVATE) 0.05 % ointment, Apply topically 2 (two) times a week, Disp: 60 g, Rfl: 3    CRANBERRY PO, Take by mouth, Disp: , Rfl:     famotidine (PEPCID) 20 mg tablet, Take 1 tablet (20 mg total) by mouth 2 (two) times a day, Disp: 180 tablet, Rfl: 3    Glucosamine-Chondroit-Vit C-Mn (GLUCOSAMINE 1500 COMPLEX PO), Take by mouth in the morning, Disp: , Rfl:     lurasidone (LATUDA) 20 mg tablet, TAKE 1 TABLET BY MOUTH DAILY  WITH BREAKFAST, Disp: 30 tablet, Rfl: 11    metoprolol succinate (TOPROL-XL) 25 mg 24 hr tablet, Take 1 tablet (25 mg total) by mouth daily, Disp: 90 tablet, Rfl: 3    Mirabegron ER (Myrbetriq) 25 MG TB24, Take 25 mg by mouth in  "the morning, Disp: 90 tablet, Rfl: 3    Multiple Vitamin (MULTI-VITAMIN DAILY) TABS, Take by mouth daily, Disp: , Rfl:     Nystatin POWD, Use 2 (two) times a day, Disp: 1 each, Rfl: 3    albuterol (2.5 mg/3 mL) 0.083 % nebulizer solution, Take 3 mL (2.5 mg total) by nebulization every 6 (six) hours as needed for wheezing or shortness of breath (Patient not taking: Reported on 2/7/2024), Disp: 90 mL, Rfl: 0    fluticasone (FLONASE) 50 mcg/act nasal spray, SPRAY 1 SPRAY INTO EACH NOSTRIL EVERY DAY (Patient not taking: Reported on 2/7/2024), Disp: 24 mL, Rfl: 1  Allergies   Allergen Reactions    Other Other (See Comments)     Other reaction(s): Anaphylaxis  Annotation - 31Mar2014: plums peaches, fruits with skin      Vitals:    02/07/24 1408   BP: 110/70   BP Location: Right arm   Patient Position: Sitting   Cuff Size: Large   Pulse: 90   Weight: 104 kg (230 lb)   Height: 5' 1\" (1.549 m)       Labs:  Hospital Outpatient Visit on 12/21/2023   Component Date Value    Protocol Name 12/21/2023 MONICA SIT     Exercise duration (min) 12/21/2023 3     Exercise duration (sec) 12/21/2023 0     Post Peak Systolic BP 12/21/2023 172     Max Diastolic Bp 12/21/2023 104     Peak HR 12/21/2023 121     Max Predicted Heart Rate 12/21/2023 151     Reason for Termination 12/21/2023 TEST COMPLETE...     Test Indication 12/21/2023 Screening for CAD     Target Hr Formular 12/21/2023 (220 - Age)*100%     Arrhy During Ex 12/21/2023 none     Chest Pain Statement 12/21/2023 none    Hospital Outpatient Visit on 12/21/2023   Component Date Value    Baseline HR 12/21/2023 96     Baseline BP 12/21/2023 156/100     O2 sat rest 12/21/2023 98     Stress peak HR 12/21/2023 120     Post peak BP 12/21/2023 172     O2 sat peak 12/21/2023 96     Recovery HR 12/21/2023 107     Recovery BP 12/21/2023 152/98     O2 sat recovery 12/21/2023 98     Rate Pressure Product 12/21/2023 20,640.0     Angina Index 12/21/2023 0     Rest Nuclear Isotope Dose 12/21/2023 " 10.51     Stress Nuclear Isotope D* 12/21/2023 32.60     Stress/rest perfusion ra* 12/21/2023 1.17     EF (%) 12/21/2023 48    No results displayed because visit has over 200 results.      Office Visit on 11/15/2023   Component Date Value     RAPID STREP A 11/15/2023 Negative     Throat Culture 11/15/2023 Negative for beta-hemolytic Streptococcus    Appointment on 10/09/2023   Component Date Value    Vit D, 25-Hydroxy 10/09/2023 47.7     Albumin 10/09/2023 4.2     Calcium 10/09/2023 9.6     Phosphorus 10/09/2023 3.5     BUN 10/09/2023 27 (H)     Creatinine 10/09/2023 1.54 (H)     Sodium 10/09/2023 143     Potassium 10/09/2023 4.1     Chloride 10/09/2023 107     CO2 10/09/2023 28     ANION GAP 10/09/2023 8     eGFR 10/09/2023 34     Glucose, Fasting 10/09/2023 112 (H)     PTH 10/09/2023 45.3    Office Visit on 10/03/2023   Component Date Value    SARS-CoV-2 10/03/2023 Negative    Appointment on 09/08/2023   Component Date Value    TSH 3RD GENERATON 09/08/2023 2.426     Cholesterol 09/08/2023 190     Triglycerides 09/08/2023 144     HDL, Direct 09/08/2023 50     LDL Calculated 09/08/2023 111 (H)     WBC 09/08/2023 6.02     RBC 09/08/2023 4.58     Hemoglobin 09/08/2023 14.7     Hematocrit 09/08/2023 44.1     MCV 09/08/2023 96     MCH 09/08/2023 32.1     MCHC 09/08/2023 33.3     RDW 09/08/2023 14.1     MPV 09/08/2023 12.8 (H)     Platelets 09/08/2023 142 (L)     nRBC 09/08/2023 0     Neutrophils Relative 09/08/2023 62     Immat GRANS % 09/08/2023 0     Lymphocytes Relative 09/08/2023 26     Monocytes Relative 09/08/2023 8     Eosinophils Relative 09/08/2023 3     Basophils Relative 09/08/2023 1     Neutrophils Absolute 09/08/2023 3.73     Immature Grans Absolute 09/08/2023 0.02     Lymphocytes Absolute 09/08/2023 1.55     Monocytes Absolute 09/08/2023 0.49     Eosinophils Absolute 09/08/2023 0.18     Basophils Absolute 09/08/2023 0.05     Sodium 09/08/2023 143     Potassium 09/08/2023 4.2     Chloride 09/08/2023 108      CO2 2023 25     ANION GAP 2023 10     BUN 2023 28 (H)     Creatinine 2023 1.56 (H)     Glucose, Fasting 2023 117 (H)     Calcium 2023 9.7     AST 2023 19     ALT 2023 21     Alkaline Phosphatase 2023 72     Total Protein 2023 6.7     Albumin 2023 3.8     Total Bilirubin 2023 0.52     eGFR 2023 33      Lab Results   Component Value Date    CHOL 208 (H) 2017    TRIG 144 2023    TRIG 116 2020    HDL 50 2023    HDL 57 2020     Imaging: Home Study    Result Date: 2023  Narrative: Table formatting from the original result was not included. Images from the original result were not included. Home Study Report Patient Name: Cherelle Dash Patient Number: [id number] Date of Home Study: 2023 Referring Physician: Vinod Richard Interpreting Physician: Vinod BETHEA.: 1955 STUDY FORMAT: The patient was admitted to the sleep laboratory at the [facility] and oriented to the home sleep study testing procedure and equipment.  The home sleep testing study was performed using the [employer] Night One device.  A return demonstration by the patient helped to assure correct usage.  The following parameters were monitored: p-flow via nasal cannula, body position, oximetry, pulse rate and respiratory effort via thoracic belt. The sleep study was scored following the rules established by the American Academy of Sleep Medicine (AASM).  Hypopneas are defined as a =30% drop in flow for =10 seconds that are associated with =4% desaturations.  SAMM is the Respiratory Event Index (number of respiratory events per hour) and is a surrogate for the apnea/hypopnea index (AHI). PATIENT HISTORY: This is a 68-year-old female with a history of obstructive sleep apnea referred for a home sleep test to re-confirm the diagnosis as her previous test was not scored per Medicare criteria. TESTING RESULTS:  The test results are from [facility].  The total time in bed (analysis time) was 485.9 minutes.  The patient had a total of 17 respiratory events made up of 1 obstructive apneas, 0 central apneas, 0 mixed apneas and 16 hypopneas resulting in a respiratory event index (SAMM) of 2.2.  The lowest SpO2 recorded is 86%.     Impression: 1.This test does not demonstrate obstructive sleep apnea as the respiratory event index was normal. 2.Baseline oxygen saturation was normal. 3.Average heart rate was normal RECOMMENDATION: A diagnostic polysomnogram is recommended to rule out a false negative result, as a home sleep test may underestimate disease severity.   Study Date: 2023 Patient Name: Cherelle Dash Recording Device: Poxel Sex: F Height: 61.0 in. : 1955 Weight: 232.0 lbs. Age: 68 years B.M.I: 43.8 lb/in2 Times and Durations Lights off clock time:  9:17:52 PM Total Recording Time (TRT): 485.9 minutes Lights on clock time: 5:23:46 AM Time In Bed (TIB): 485.9 minutes   Monitoring Time (MT): 469.0 minutes Device and Sensor Details The study was recorded on a [employer] Anais NightOne device using 1 RIP effort belt and a pressure based flow sensor. The heart rate is derived from the oximeter sensor and the snore signal is derived from the pressure sensor. The device also records body position and uses it to determine the monitoring time (sleep/wake periods). Summary SAMM 2.2 OAI 0.1 BONY 0.0 Lowest Desat 86 SAMM is the number of respiratory events per hour. OAI is the number of obstructive apneas per hour. BONY is the number of central apneas per hour. Lowest Desat is the lowest blood oxygen level that lasted at least 2 seconds. RESPIRATORY EVENTS  Index (#/hour) Total # of Events Mean duration  (sec) Max duration  (sec) # of Events by Position      Supine Prone Left Right Up Central Apneas 0.0 0 0.0 0.0 0    0    0 Obstructive Apneas 0.1 1 14.5 14.5 0    1    0 Mixed Apneas 0.0 0 0.0 0.0 0    0    0  Hypopneas 2.0 16 27.4 36.5 6    10    0 Apneas + Hypopneas 2.2 17 26.7 36.5 6    11    0 RERAs 0.0 0 0.0 0.0 0    0    0 Total 2.2 17 26.7 36.5 6    11    0 Time in Position 41.0    427.7    8.8 SAMM in Position 8.8    1.5    0.0 Positional Summary  Supine Non-Supine Total # of Events 6 11.00 Total Duration (minutes) 41.0 436.50 Sleep Duration (minutes) 41.0 428.00 SAMM in Position 8.8 1.54 REISupine / REINon-Supine Ratio 5.71  Positional Sleep Apnea is indicated if SAMM (supine) >= 2 x SAMM (non-supine) per Carina, Sleep. 1984;7(2).  Oximetry Summary  Dur. (min) % TIB <90 % 2.7 0.6 <85 % 0.1 0.0 <80 % 0.0 0.0 <70 % 0.0 0.0 Total Dur (min) < 89 0.4 min Average (%) 92 Total # of Desats 16 Desat Index (#/hour) 2.1 Desat Max (%) 6 Desat Max dur (sec) 53.0 Lowest SpO2 % during sleep 82 Duration of Min SpO2 (sec) 1 Highest SpO2 % during sleep 98 Duration of Max SpO2(sec) 22  Heart Rate Stats Mean HR during sleep 73.7 (BPM) Highest HR during sleep 100  (BPM) Highest HR during TIB  103 (BPM) Lowest HR during sleep 65  (BPM) Lowest HR during TIB 57 (BPM) Snoring Summary Total Snoring Episodes 178 Total Duration with Snoring 26.4 minutes Mean Duration of Snoring 8.9 seconds Percentage of Snoring 5.6 %      Echo complete w/ contrast if indicated    Result Date: 11/21/2023  Narrative:   Left Ventricle: Left ventricular cavity size is normal. Wall thickness is at the upper limits of normal. The left ventricular ejection fraction is 55%. Systolic function is normal. Wall motion is normal. Diastolic function is mildly abnormal, consistent with grade I (abnormal) relaxation.   IVS: There is abnormal septal motion consistent with left bundle branch block.   Right Ventricle: Right ventricular cavity size is normal. Systolic function is normal.   Left Atrium: The atrium is mildly dilated (35-41 mL/m2).   Mitral Valve: There is mild to moderate regurgitation with a centrally directed jet.   Tricuspid Valve: There is mild  regurgitation.     CTA ED chest PE study    Result Date: 11/20/2023  Narrative: CTA - CHEST WITH IV CONTRAST - PULMONARY ANGIOGRAM INDICATION:   sob, hypoxia, tachycardia. COMPARISON: 11/18/2023 TECHNIQUE: CTA examination of the chest was performed using angiographic technique according to a protocol specifically tailored to evaluate for pulmonary embolism.  Multiplanar 2D reformatted images were created from the source data. In addition, coronal 3D MIP postprocessing was performed on the acquisition scanner. Radiation dose length product (DLP) for this visit:  324 mGy-cm .  This examination, like all CT scans performed in the [facility], was performed utilizing techniques to minimize radiation dose exposure, including the use of iterative reconstruction and automated exposure control. IV Contrast:  80 mL of iohexol (OMNIPAQUE) FINDINGS: PULMONARY ARTERIAL TREE: Suboptimal evaluation due to respiratory motion. No pulmonary embolus identified within the limitations of the examination. LUNGS:  Lungs are clear.  There is no tracheal or endobronchial lesion. PLEURA:  Unremarkable. HEART/GREAT VESSELS:  Unremarkable for patient's age. No thoracic aortic aneurysm. MEDIASTINUM AND RC: Moderate hiatal hernia CHEST WALL AND LOWER NECK:   Unremarkable. VISUALIZED STRUCTURES IN THE UPPER ABDOMEN:  Unremarkable. OSSEOUS STRUCTURES:  No acute fracture or destructive osseous lesion.     Impression: Degraded evaluation of the pulmonary arteries due to respiratory motion. No pulmonary embolus identified within the limitations of the examination. No other acute abnormality identified. Moderate hiatal hernia Workstation performed: [id number]     XR chest pa & lateral    Result Date: 11/19/2023  Narrative: [id number]      Review of Systems:  Review of Systems   Constitutional:  Negative for activity change, appetite change, chills, diaphoresis, fatigue and unexpected weight  change.   HENT:  Negative for hearing loss, nosebleeds and sore throat.    Eyes:  Negative for photophobia and visual disturbance.   Respiratory:  Negative for cough, chest tightness, shortness of breath and wheezing.    Cardiovascular:  Negative for chest pain, palpitations and leg swelling.   Gastrointestinal:  Negative for abdominal pain, diarrhea, nausea and vomiting.   Endocrine: Negative for polyuria.   Genitourinary:  Negative for dysuria, frequency and hematuria.   Musculoskeletal:  Negative for arthralgias, back pain, gait problem and neck pain.   Skin:  Negative for pallor and rash.   Neurological:  Negative for dizziness, syncope and headaches.   Hematological:  Does not bruise/bleed easily.   Psychiatric/Behavioral:  Negative for behavioral problems and confusion.        Physical Exam:  Physical Exam  Vitals reviewed.   Constitutional:       Appearance: Normal appearance. She is well-developed. She is not ill-appearing or diaphoretic.   HENT:      Head: Normocephalic and atraumatic.      Nose: Nose normal.   Eyes:      General: No scleral icterus.     Extraocular Movements: Extraocular movements intact.      Pupils: Pupils are equal, round, and reactive to light.   Neck:      Vascular: No JVD.   Cardiovascular:      Rate and Rhythm: Normal rate and regular rhythm.      Heart sounds: Murmur heard.      Systolic murmur is present with a grade of 2/6.      No friction rub. No gallop.   Pulmonary:      Effort: Pulmonary effort is normal. No respiratory distress.      Breath sounds: Normal breath sounds. No wheezing or rales.   Abdominal:      General: Bowel sounds are normal. There is no distension.      Palpations: Abdomen is soft.      Tenderness: There is no abdominal tenderness.   Musculoskeletal:         General: No deformity. Normal range of motion.      Cervical back: Normal range of motion and neck supple.      Right lower leg: No edema.      Left lower leg: No edema.   Skin:     General: Skin is  "warm and dry.      Findings: No rash.   Neurological:      Mental Status: She is alert and oriented to person, place, and time.      Cranial Nerves: No cranial nerve deficit.   Psychiatric:         Mood and Affect: Mood normal.         Behavior: Behavior normal.       Blood pressure 110/70, pulse 90, height 5' 1\" (1.549 m), weight 104 kg (230 lb), not currently breastfeeding.    Discussion/Summary:  Tachycardia: noted to have a few PVCs and brief NSVT during hospitalization for RSV.  Echocardiogram revealed normal LV size and function with G1DD, abnormal septal motion due to LBBB, LAE, mild to mod MR.  She was started on a B-blocker and her heart rates are improved. Continues on B-blocker without side effects or new symptoms.    LBBB: new diagnosis, screened for ischemic disease with a stress test that was normal for perfusion.  EF noted to be 48%, likely from conduction system disease with LBBB, no TID noted.      "

## 2024-02-18 DIAGNOSIS — N25.81 SECONDARY HYPERPARATHYROIDISM OF RENAL ORIGIN (HCC): ICD-10-CM

## 2024-02-18 DIAGNOSIS — N18.32 STAGE 3B CHRONIC KIDNEY DISEASE (HCC): ICD-10-CM

## 2024-02-18 DIAGNOSIS — R80.1 PERSISTENT PROTEINURIA: ICD-10-CM

## 2024-02-18 DIAGNOSIS — E55.9 VITAMIN D DEFICIENCY: ICD-10-CM

## 2024-02-19 RX ORDER — CALCITRIOL 0.25 UG/1
0.25 CAPSULE, LIQUID FILLED ORAL DAILY
Qty: 90 CAPSULE | Refills: 3 | Status: SHIPPED | OUTPATIENT
Start: 2024-02-19

## 2024-02-21 PROBLEM — Z00.00 MEDICARE ANNUAL WELLNESS VISIT, SUBSEQUENT: Status: RESOLVED | Noted: 2024-01-29 | Resolved: 2024-02-21

## 2024-03-21 ENCOUNTER — TELEPHONE (OUTPATIENT)
Dept: NEPHROLOGY | Facility: CLINIC | Age: 69
End: 2024-03-21

## 2024-03-21 ENCOUNTER — LAB (OUTPATIENT)
Dept: LAB | Facility: AMBULARY SURGERY CENTER | Age: 69
End: 2024-03-21
Payer: COMMERCIAL

## 2024-03-21 DIAGNOSIS — N25.81 SECONDARY HYPERPARATHYROIDISM OF RENAL ORIGIN (HCC): Primary | ICD-10-CM

## 2024-03-21 DIAGNOSIS — E66.01 MORBID OBESITY (HCC): ICD-10-CM

## 2024-03-21 DIAGNOSIS — N18.32 CHRONIC KIDNEY DISEASE (CKD) STAGE G3B/A1, MODERATELY DECREASED GLOMERULAR FILTRATION RATE (GFR) BETWEEN 30-44 ML/MIN/1.73 SQUARE METER AND ALBUMINURIA CREATININE RATIO LESS THAN 30 MG/G (HCC): ICD-10-CM

## 2024-03-21 LAB
25(OH)D3 SERPL-MCNC: 47.8 NG/ML (ref 30–100)
ALBUMIN SERPL BCP-MCNC: 4.1 G/DL (ref 3.5–5)
ANION GAP SERPL CALCULATED.3IONS-SCNC: 9 MMOL/L (ref 4–13)
BUN SERPL-MCNC: 25 MG/DL (ref 5–25)
CALCIUM SERPL-MCNC: 10 MG/DL (ref 8.4–10.2)
CHLORIDE SERPL-SCNC: 105 MMOL/L (ref 96–108)
CO2 SERPL-SCNC: 25 MMOL/L (ref 21–32)
CREAT SERPL-MCNC: 1.43 MG/DL (ref 0.6–1.3)
CREAT UR-MCNC: 56.9 MG/DL
GFR SERPL CREATININE-BSD FRML MDRD: 37 ML/MIN/1.73SQ M
GLUCOSE P FAST SERPL-MCNC: 115 MG/DL (ref 65–99)
PHOSPHATE SERPL-MCNC: 4.9 MG/DL (ref 2.3–4.1)
POTASSIUM SERPL-SCNC: 3.9 MMOL/L (ref 3.5–5.3)
PROT UR-MCNC: 19 MG/DL
PROT/CREAT UR: 0.33 MG/G{CREAT} (ref 0–0.1)
PTH-INTACT SERPL-MCNC: 17.8 PG/ML (ref 12–88)
SODIUM SERPL-SCNC: 139 MMOL/L (ref 135–147)

## 2024-03-21 PROCEDURE — 84156 ASSAY OF PROTEIN URINE: CPT

## 2024-03-21 PROCEDURE — 82570 ASSAY OF URINE CREATININE: CPT

## 2024-03-21 PROCEDURE — 83970 ASSAY OF PARATHORMONE: CPT

## 2024-03-21 PROCEDURE — 80069 RENAL FUNCTION PANEL: CPT

## 2024-03-21 PROCEDURE — 82306 VITAMIN D 25 HYDROXY: CPT

## 2024-03-21 PROCEDURE — 36415 COLL VENOUS BLD VENIPUNCTURE: CPT

## 2024-03-21 NOTE — TELEPHONE ENCOUNTER
----- Message from Jessie Kulkarni MD sent at 3/21/2024  1:48 PM EDT -----  Please let the patient know that most recent lab work in terms of renal parameters are stable.  Please advise her to follow low phosphorus diet.  I am not sure why this patient of mine is being scheduled with Dr. Macias unless patient requested which is fine with me.  Will discuss further at the upcoming visit with Dr. Macias, let me know if they have any questions or concerns.    Thanks

## 2024-03-21 NOTE — RESULT ENCOUNTER NOTE
Please let the patient know that most recent lab work in terms of renal parameters are stable.  Please advise her to follow low phosphorus diet.  I am not sure why this patient of mine is being scheduled with Dr. Macias unless patient requested which is fine with me.  Will discuss further at the upcoming visit with Dr. Macias, let me know if they have any questions or concerns.    Thanks

## 2024-03-25 ENCOUNTER — HOSPITAL ENCOUNTER (OUTPATIENT)
Dept: ULTRASOUND IMAGING | Facility: CLINIC | Age: 69
Discharge: HOME | End: 2024-03-25
Payer: COMMERCIAL

## 2024-03-25 DIAGNOSIS — Z09 FOLLOW-UP EXAM, 3-6 MONTHS SINCE PREVIOUS EXAM: ICD-10-CM

## 2024-03-25 PROCEDURE — 76642 ULTRASOUND BREAST LIMITED: CPT

## 2024-04-04 ENCOUNTER — TELEPHONE (OUTPATIENT)
Dept: NEPHROLOGY | Facility: CLINIC | Age: 69
End: 2024-04-04

## 2024-04-04 ENCOUNTER — TELEPHONE (OUTPATIENT)
Age: 69
End: 2024-04-04

## 2024-04-04 DIAGNOSIS — R80.1 PERSISTENT PROTEINURIA: ICD-10-CM

## 2024-04-04 DIAGNOSIS — N18.32 STAGE 3B CHRONIC KIDNEY DISEASE (HCC): Primary | ICD-10-CM

## 2024-04-04 NOTE — TELEPHONE ENCOUNTER
The pt asked before we got off the phone - should she have her labs update prior to the June appt now? She went end of March for the appt she had today that needed to be cx. If she does need to redo prior to June appt can you call her and let her know lab slips are in the computer? She would like confirmation either way please. Thanks for your help with that appt!

## 2024-04-04 NOTE — TELEPHONE ENCOUNTER
Left a voicemail to patient with the following information: Cherelle, just to let you know that labs have been ordered for your next appt on 6/17 and mailed to your home address. Advised patient to please call the office to let us know she received the message and if have any other questions or concerns.

## 2024-04-04 NOTE — TELEPHONE ENCOUNTER
Received a call from the [facility] that the pt would like to cancel transfer of care appt today 4/4 with Dr Macias, as she is not feeling well. Appt was rescheduled on 6/17/24 in the AO with Dr Macias.

## 2024-04-29 DIAGNOSIS — N32.81 OAB (OVERACTIVE BLADDER): ICD-10-CM

## 2024-04-30 RX ORDER — MIRABEGRON 25 MG/1
25 TABLET, FILM COATED, EXTENDED RELEASE ORAL EVERY MORNING
Qty: 90 TABLET | Refills: 1 | Status: SHIPPED | OUTPATIENT
Start: 2024-04-30

## 2024-06-06 ENCOUNTER — APPOINTMENT (OUTPATIENT)
Dept: LAB | Facility: AMBULARY SURGERY CENTER | Age: 69
End: 2024-06-06
Payer: COMMERCIAL

## 2024-06-06 DIAGNOSIS — N18.32 CHRONIC KIDNEY DISEASE (CKD) STAGE G3B/A1, MODERATELY DECREASED GLOMERULAR FILTRATION RATE (GFR) BETWEEN 30-44 ML/MIN/1.73 SQUARE METER AND ALBUMINURIA CREATININE RATIO LESS THAN 30 MG/G (HCC): ICD-10-CM

## 2024-06-06 DIAGNOSIS — Z79.899 ENCOUNTER FOR LONG-TERM (CURRENT) USE OF OTHER MEDICATIONS: Primary | ICD-10-CM

## 2024-06-06 DIAGNOSIS — R80.1 PERSISTENT PROTEINURIA: ICD-10-CM

## 2024-06-06 LAB
ALBUMIN SERPL BCP-MCNC: 3.9 G/DL (ref 3.5–5)
ALP SERPL-CCNC: 76 U/L (ref 34–104)
ALT SERPL W P-5'-P-CCNC: 11 U/L (ref 7–52)
ANION GAP SERPL CALCULATED.3IONS-SCNC: 7 MMOL/L (ref 4–13)
AST SERPL W P-5'-P-CCNC: 12 U/L (ref 13–39)
BASOPHILS # BLD AUTO: 0.05 THOUSANDS/ÂΜL (ref 0–0.1)
BASOPHILS NFR BLD AUTO: 1 % (ref 0–1)
BILIRUB SERPL-MCNC: 0.48 MG/DL (ref 0.2–1)
BUN SERPL-MCNC: 27 MG/DL (ref 5–25)
CALCIUM SERPL-MCNC: 9.6 MG/DL (ref 8.4–10.2)
CHLORIDE SERPL-SCNC: 109 MMOL/L (ref 96–108)
CHOLEST SERPL-MCNC: 200 MG/DL
CO2 SERPL-SCNC: 26 MMOL/L (ref 21–32)
CREAT SERPL-MCNC: 1.49 MG/DL (ref 0.6–1.3)
CREAT UR-MCNC: 78.6 MG/DL
EOSINOPHIL # BLD AUTO: 0.19 THOUSAND/ÂΜL (ref 0–0.61)
EOSINOPHIL NFR BLD AUTO: 2 % (ref 0–6)
ERYTHROCYTE [DISTWIDTH] IN BLOOD BY AUTOMATED COUNT: 13.4 % (ref 11.6–15.1)
GFR SERPL CREATININE-BSD FRML MDRD: 35 ML/MIN/1.73SQ M
GLUCOSE P FAST SERPL-MCNC: 116 MG/DL (ref 65–99)
HCT VFR BLD AUTO: 45.9 % (ref 34.8–46.1)
HDLC SERPL-MCNC: 54 MG/DL
HGB BLD-MCNC: 14.7 G/DL (ref 11.5–15.4)
IMM GRANULOCYTES # BLD AUTO: 0.02 THOUSAND/UL (ref 0–0.2)
IMM GRANULOCYTES NFR BLD AUTO: 0 % (ref 0–2)
LDLC SERPL CALC-MCNC: 123 MG/DL (ref 0–100)
LYMPHOCYTES # BLD AUTO: 1.57 THOUSANDS/ÂΜL (ref 0.6–4.47)
LYMPHOCYTES NFR BLD AUTO: 20 % (ref 14–44)
MCH RBC QN AUTO: 30.9 PG (ref 26.8–34.3)
MCHC RBC AUTO-ENTMCNC: 32 G/DL (ref 31.4–37.4)
MCV RBC AUTO: 97 FL (ref 82–98)
MONOCYTES # BLD AUTO: 0.6 THOUSAND/ÂΜL (ref 0.17–1.22)
MONOCYTES NFR BLD AUTO: 8 % (ref 4–12)
NEUTROPHILS # BLD AUTO: 5.55 THOUSANDS/ÂΜL (ref 1.85–7.62)
NEUTS SEG NFR BLD AUTO: 69 % (ref 43–75)
NRBC BLD AUTO-RTO: 0 /100 WBCS
PHOSPHATE SERPL-MCNC: 4.6 MG/DL (ref 2.3–4.1)
PLATELET # BLD AUTO: 148 THOUSANDS/UL (ref 149–390)
PMV BLD AUTO: 12.9 FL (ref 8.9–12.7)
POTASSIUM SERPL-SCNC: 4.3 MMOL/L (ref 3.5–5.3)
PROT SERPL-MCNC: 7 G/DL (ref 6.4–8.4)
PROT UR-MCNC: 19 MG/DL
PROT/CREAT UR: 0.24 MG/G{CREAT} (ref 0–0.1)
PTH-INTACT SERPL-MCNC: 26.9 PG/ML (ref 12–88)
RBC # BLD AUTO: 4.75 MILLION/UL (ref 3.81–5.12)
SODIUM SERPL-SCNC: 142 MMOL/L (ref 135–147)
TRIGL SERPL-MCNC: 115 MG/DL
TSH SERPL DL<=0.05 MIU/L-ACNC: 2.1 UIU/ML (ref 0.45–4.5)
WBC # BLD AUTO: 7.98 THOUSAND/UL (ref 4.31–10.16)

## 2024-06-06 PROCEDURE — 84156 ASSAY OF PROTEIN URINE: CPT

## 2024-06-06 PROCEDURE — 82570 ASSAY OF URINE CREATININE: CPT

## 2024-06-06 PROCEDURE — 84100 ASSAY OF PHOSPHORUS: CPT

## 2024-06-06 PROCEDURE — 36415 COLL VENOUS BLD VENIPUNCTURE: CPT

## 2024-06-06 PROCEDURE — 80061 LIPID PANEL: CPT

## 2024-06-06 PROCEDURE — 85025 COMPLETE CBC W/AUTO DIFF WBC: CPT

## 2024-06-06 PROCEDURE — 80053 COMPREHEN METABOLIC PANEL: CPT

## 2024-06-06 PROCEDURE — 83970 ASSAY OF PARATHORMONE: CPT

## 2024-06-06 PROCEDURE — 84443 ASSAY THYROID STIM HORMONE: CPT

## 2024-06-11 ENCOUNTER — OFFICE VISIT (OUTPATIENT)
Dept: PSYCHIATRY | Facility: CLINIC | Age: 69
End: 2024-06-11
Payer: COMMERCIAL

## 2024-06-11 DIAGNOSIS — F31.63 BIPOLAR 1 DISORDER, MIXED, SEVERE (HCC): Primary | ICD-10-CM

## 2024-06-11 PROCEDURE — 99213 OFFICE O/P EST LOW 20 MIN: CPT | Performed by: PSYCHIATRY & NEUROLOGY

## 2024-06-11 PROCEDURE — 90833 PSYTX W PT W E/M 30 MIN: CPT | Performed by: PSYCHIATRY & NEUROLOGY

## 2024-06-11 RX ORDER — IVERMECTIN 10 MG/G
CREAM TOPICAL
COMMUNITY
Start: 2024-04-17

## 2024-06-11 NOTE — BH TREATMENT PLAN
TREATMENT PLAN (Medication Management Only)        [facility]    Name and Date of Birth:  Cherelle Dash 68 y.o. 1955  Date of Treatment Plan: June 11, 2024  Diagnosis/Diagnoses:    Bipolar Disorder   Strengths/Personal Resources for Self-Care: supportive family, taking medications as prescribed, ability to communicate needs.  Area/Areas of need (in own words): mood instability  1. Long Term Goal: continue improvement in mood stability.  Target Date:6 months - 12/11/2024  Person/Persons responsible for completion of goal: Cherelle  2.  Short Term Objective (s) - How will we reach this goal?:   A. Provider new recommended medication/dosage changes and/or continue medication(s): continue current medications as prescribed.  B. N/A.  C. N/A.  Target Date:6 months - 12/11/2024  Person/Persons Responsible for Completion of Goal: Cherelle  Progress Towards Goals: continuing treatment  Treatment Modality: medication management every 6 months  Review due 180 days from date of this plan: 6 months - 12/11/2024  Expected length of service: ongoing treatment  My Physician/PA/NP and I have developed this plan together and I agree to work on the goals and objectives. I understand the treatment goals that were developed for my treatment.       126

## 2024-06-11 NOTE — PSYCH
Visit Time    Visit Start Time: 9:30  Visit Stop Time: 10:00  Total Visit Duration:  30 minutes    Subjective: Medication management      Patient ID: Cherelle Dash is a 68 y.o. female with Bipolar do     HPI ROS Appetite Changes and Sleep: normal appetite, normal energy level, no weight change, and normal number of sleep hours    Cherelle has been compliant with Latuda 20 mg daily and denies medication side effects. She stated that her adult children are doing well, her daughter has a long term BF that is older than her and was recently dx with testicular CA. She recently told her that she is fine about not having children since her partner is not able to have children.  She has 2 grand kids from her son. Her son remains sober and is doing well.  Enjoys volunteering at different committees at her living  facility.        Continue Latuda and schedule follow up in 6 months.    Review Of Systems:     Mood Emotional Lability   Behavior Impulsive Behavior   Thought Content Disturbing Thoughts, Feelings   General Emotional Problems and Decreased Functioning   Personality Normal   Other Psych Symptoms Normal   Constitutional Negative   ENT Negative   Cardiovascular Negative   Respiratory Negative   Gastrointestinal Negative   Genitourinary Negative   Musculoskeletal Negative   Integumentary Negative   Neurological Negative   Endocrine Normal    Other Symptoms Normal        Laboratory Results: Recent Labs (last 12 months):   Appointment on 06/06/2024   Component Date Value    TSH 3RD GENERATON 06/06/2024 2.095     Cholesterol 06/06/2024 200 (H)     Triglycerides 06/06/2024 115     HDL, Direct 06/06/2024 54     LDL Calculated 06/06/2024 123 (H)     WBC 06/06/2024 7.98     RBC 06/06/2024 4.75     Hemoglobin 06/06/2024 14.7     Hematocrit 06/06/2024 45.9     MCV 06/06/2024 97     MCH 06/06/2024 30.9     MCHC 06/06/2024 32.0     RDW 06/06/2024 13.4     MPV 06/06/2024 12.9 (H)     Platelets 06/06/2024 148 (L)     nRBC 06/06/2024 0      Segmented % 06/06/2024 69     Immature Grans % 06/06/2024 0     Lymphocytes % 06/06/2024 20     Monocytes % 06/06/2024 8     Eosinophils Relative 06/06/2024 2     Basophils Relative 06/06/2024 1     Absolute Neutrophils 06/06/2024 5.55     Absolute Immature Grans 06/06/2024 0.02     Absolute Lymphocytes 06/06/2024 1.57     Absolute Monocytes 06/06/2024 0.60     Eosinophils Absolute 06/06/2024 0.19     Basophils Absolute 06/06/2024 0.05     Sodium 06/06/2024 142     Potassium 06/06/2024 4.3     Chloride 06/06/2024 109 (H)     CO2 06/06/2024 26     ANION GAP 06/06/2024 7     BUN 06/06/2024 27 (H)     Creatinine 06/06/2024 1.49 (H)     Glucose, Fasting 06/06/2024 116 (H)     Calcium 06/06/2024 9.6     AST 06/06/2024 12 (L)     ALT 06/06/2024 11     Alkaline Phosphatase 06/06/2024 76     Total Protein 06/06/2024 7.0     Albumin 06/06/2024 3.9     Total Bilirubin 06/06/2024 0.48     eGFR 06/06/2024 35     PTH 06/06/2024 26.9     Creatinine, Ur 06/06/2024 78.6     Protein Urine Random 06/06/2024 19     Prot/Creat Ratio, Ur 06/06/2024 0.24 (H)     Phosphorus 06/06/2024 4.6 (H)    Lab on 03/21/2024   Component Date Value    Creatinine, Ur 03/21/2024 56.9     Protein Urine Random 03/21/2024 19     Prot/Creat Ratio, Ur 03/21/2024 0.33 (H)     Vit D, 25-Hydroxy 03/21/2024 47.8     Albumin 03/21/2024 4.1     Calcium 03/21/2024 10.0     Phosphorus 03/21/2024 4.9 (H)     BUN 03/21/2024 25     Creatinine 03/21/2024 1.43 (H)     Sodium 03/21/2024 139     Potassium 03/21/2024 3.9     Chloride 03/21/2024 105     CO2 03/21/2024 25     ANION GAP 03/21/2024 9     eGFR 03/21/2024 37     Glucose, Fasting 03/21/2024 115 (H)     PTH 03/21/2024 17.8    Hospital Outpatient Visit on 12/21/2023   Component Date Value    Protocol Name 12/21/2023 MONICA SIT     Exercise duration (min) 12/21/2023 3     Exercise duration (sec) 12/21/2023 0     Post Peak Systolic BP 12/21/2023 172     Max Diastolic Bp 12/21/2023 104     Peak HR 12/21/2023 121      Max Predicted Heart Rate 12/21/2023 151     Reason for Termination 12/21/2023 TEST COMPLETE...     Test Indication 12/21/2023 Screening for CAD     Target Hr Formular 12/21/2023 (220 - Age)*100%     Arrhy During Ex 12/21/2023 none     Chest Pain Statement 12/21/2023 none    Hospital Outpatient Visit on 12/21/2023   Component Date Value    Baseline HR 12/21/2023 96     Baseline BP 12/21/2023 156/100     O2 sat rest 12/21/2023 98     Stress peak HR 12/21/2023 120     Post peak BP 12/21/2023 172     O2 sat peak 12/21/2023 96     Recovery HR 12/21/2023 107     Recovery BP 12/21/2023 152/98     O2 sat recovery 12/21/2023 98     Rate Pressure Product 12/21/2023 20,640.0     Angina Index 12/21/2023 0     Rest Nuclear Isotope Dose 12/21/2023 10.51     Stress Nuclear Isotope D* 12/21/2023 32.60     Stress/rest perfusion ra* 12/21/2023 1.17     EF (%) 12/21/2023 48    No results displayed because visit has over 200 results.      Office Visit on 11/15/2023   Component Date Value     RAPID STREP A 11/15/2023 Negative     Throat Culture 11/15/2023 Negative for beta-hemolytic Streptococcus    Appointment on 10/09/2023   Component Date Value    Vit D, 25-Hydroxy 10/09/2023 47.7     Albumin 10/09/2023 4.2     Calcium 10/09/2023 9.6     Phosphorus 10/09/2023 3.5     BUN 10/09/2023 27 (H)     Creatinine 10/09/2023 1.54 (H)     Sodium 10/09/2023 143     Potassium 10/09/2023 4.1     Chloride 10/09/2023 107     CO2 10/09/2023 28     ANION GAP 10/09/2023 8     eGFR 10/09/2023 34     Glucose, Fasting 10/09/2023 112 (H)     PTH 10/09/2023 45.3    Office Visit on 10/03/2023   Component Date Value    SARS-CoV-2 10/03/2023 Negative    Appointment on 09/08/2023   Component Date Value    TSH 3RD GENERATON 09/08/2023 2.426     Cholesterol 09/08/2023 190     Triglycerides 09/08/2023 144     HDL, Direct 09/08/2023 50     LDL Calculated 09/08/2023 111 (H)     WBC 09/08/2023 6.02     RBC 09/08/2023 4.58     Hemoglobin 09/08/2023 14.7     Hematocrit  09/08/2023 44.1     MCV 09/08/2023 96     MCH 09/08/2023 32.1     MCHC 09/08/2023 33.3     RDW 09/08/2023 14.1     MPV 09/08/2023 12.8 (H)     Platelets 09/08/2023 142 (L)     nRBC 09/08/2023 0     Segmented % 09/08/2023 62     Immature Grans % 09/08/2023 0     Lymphocytes % 09/08/2023 26     Monocytes % 09/08/2023 8     Eosinophils Relative 09/08/2023 3     Basophils Relative 09/08/2023 1     Absolute Neutrophils 09/08/2023 3.73     Absolute Immature Grans 09/08/2023 0.02     Absolute Lymphocytes 09/08/2023 1.55     Absolute Monocytes 09/08/2023 0.49     Eosinophils Absolute 09/08/2023 0.18     Basophils Absolute 09/08/2023 0.05     Sodium 09/08/2023 143     Potassium 09/08/2023 4.2     Chloride 09/08/2023 108     CO2 09/08/2023 25     ANION GAP 09/08/2023 10     BUN 09/08/2023 28 (H)     Creatinine 09/08/2023 1.56 (H)     Glucose, Fasting 09/08/2023 117 (H)     Calcium 09/08/2023 9.7     AST 09/08/2023 19     ALT 09/08/2023 21     Alkaline Phosphatase 09/08/2023 72     Total Protein 09/08/2023 6.7     Albumin 09/08/2023 3.8     Total Bilirubin 09/08/2023 0.52     eGFR 09/08/2023 33    Office Visit on 06/26/2023   Component Date Value    POST-VOID RESIDUAL VOLUM* 06/26/2023 56    There may be more visits with results that are not included.         Substance Abuse History:  Social History     Substance and Sexual Activity   Drug Use Never       Family Psychiatric History:   Family History   Problem Relation Age of Onset    Heart disease Mother     Heart Valve Disease Mother         Replacement    Diabetes Mother         2002    Heart failure Mother         Heart Valve replacement    Arthritis Father     No Known Problems Sister     No Known Problems Daughter     No Known Problems Maternal Grandmother     No Known Problems Maternal Grandfather     No Known Problems Paternal Grandmother     No Known Problems Paternal Grandfather     No Known Problems Son     No Known Problems Maternal Aunt     No Known Problems  Maternal Aunt     No Known Problems Maternal Aunt     No Known Problems Maternal Aunt     No Known Problems Maternal Aunt     No Known Problems Paternal Aunt     Alcohol abuse Son         Kolton, 40 year old son, has issues with alcohol and alcohol abuse       The following portions of the patient's history were reviewed and updated as appropriate: allergies, current medications, past family history, past medical history, past social history, past surgical history, and problem list.    Social History     Socioeconomic History    Marital status: /Civil Union     Spouse name: Not on file    Number of children: Not on file    Years of education: Not on file    Highest education level: Not on file   Occupational History    Not on file   Tobacco Use    Smoking status: Former     Current packs/day: 0.00     Average packs/day: 0.3 packs/day for 30.0 years (7.5 ttl pk-yrs)     Types: Cigarettes     Start date: 1/1/1979     Quit date: 1/1/2009     Years since quitting: 15.4    Smokeless tobacco: Never   Vaping Use    Vaping status: Never Used   Substance and Sexual Activity    Alcohol use: Yes     Alcohol/week: 1.0 standard drink of alcohol     Types: 1 Glasses of wine per week    Drug use: Never    Sexual activity: Not Currently     Partners: Male     Birth control/protection: Post-menopausal   Other Topics Concern    Not on file   Social History Narrative    Daily coffee consumption: 3 cups/day     Social Determinants of Health     Financial Resource Strain: Low Risk  (1/24/2024)    Overall Financial Resource Strain (CARDIA)     Difficulty of Paying Living Expenses: Not hard at all   Food Insecurity: No Food Insecurity (11/21/2023)    Hunger Vital Sign     Worried About Running Out of Food in the Last Year: Never true     Ran Out of Food in the Last Year: Never true   Transportation Needs: No Transportation Needs (1/24/2024)    PRAPARE - Transportation     Lack of Transportation (Medical): No     Lack of  Transportation (Non-Medical): No   Physical Activity: Not on file   Stress: Not on file   Social Connections: Not on file   Intimate Partner Violence: Not on file   Housing Stability: Low Risk  (11/21/2023)    Housing Stability Vital Sign     Unable to Pay for Housing in the Last Year: No     Number of Times Moved in the Last Year: 1     Homeless in the Last Year: No     Social History     Social History Narrative    Daily coffee consumption: 3 cups/day       Objective:       Mental status:  Appearance calm and cooperative , adequate hygiene and grooming, and good eye contact    Mood dysphoric   Affect affect was constricted   Speech a normal rate and fluent   Thought Processes coherent/organized and normal thought processes   Hallucinations no hallucinations present    Thought Content no delusions   Abnormal Thoughts no suicidal thoughts  and no homicidal thoughts    Orientation  oriented to person and place and time   Remote Memory short term memory intact and long term memory intact   Attention Span concentration intact   Intellect Appears to be of Average Intelligence   Insight Limited insight   Judgement judgment was limited   Muscle Strength Muscle strength and tone were normal and Normal gait    Language no difficulty naming common objects and no difficulty repeating a phrase    Fund of Knowledge displays adequate knowledge of current events, adequate fund of knowledge regarding past history, and adequate fund of knowledge regarding vocabulary                Assessment/Plan:       Diagnoses and all orders for this visit:    Bipolar 1 disorder, mixed, severe (HCC)    Other orders  -     ciclopirox (LOPROX) 0.77 % cream  -     Ivermectin 1 % CREA            Treatment Recommendations- Risks Benefits      Immediate Medical/Psychiatric/Psychotherapy Treatments and Any Precautions: continue current treatment     Risks, Benefits And Possible Side Effects Of Medications:  {PSYCH RISK, BENEFITS AND POSSIBLE SIDE  EFFECTS (Optional):74019    Controlled Medication Discussion: Discussed with patient Black Box warning on concurrent use of benzodiazepines and opioid medications including sedation, respiratory depression, coma and death. Patient understands the risk of treatment with benzodiazepines in addition to opioids and wants to continue taking those medications. , Discussed with patient the risks of sedation, respiratory depression, impairment of ability to drive and potential for abuse and addiction related to treatment with benzodiazepine medications. The patient understands risk of treatment with benzodiazepine medications, agrees to not drive if feels impaired and agrees to take medications as prescribed., and The patient has been filling controlled prescriptions on time as prescribed to [place] Prescription Drug Monitoring program.      Psychotherapy Provided: Individual psychotherapy provided.       Individual psychotherapy provided: Yes  Counseling was provided during the session today for 16 minutes.  Medications, treatment progress and treatment plan reviewed with Cherelle.  Medication education provided to Cherelle.  Supportive therapy provided.

## 2024-06-17 ENCOUNTER — OFFICE VISIT (OUTPATIENT)
Dept: NEPHROLOGY | Facility: CLINIC | Age: 69
End: 2024-06-17
Payer: COMMERCIAL

## 2024-06-17 VITALS
DIASTOLIC BLOOD PRESSURE: 70 MMHG | HEIGHT: 61 IN | OXYGEN SATURATION: 96 % | HEART RATE: 74 BPM | WEIGHT: 229 LBS | SYSTOLIC BLOOD PRESSURE: 118 MMHG | BODY MASS INDEX: 43.23 KG/M2

## 2024-06-17 DIAGNOSIS — N18.32 STAGE 3B CHRONIC KIDNEY DISEASE (HCC): Primary | ICD-10-CM

## 2024-06-17 DIAGNOSIS — F31.63 BIPOLAR 1 DISORDER, MIXED, SEVERE (HCC): ICD-10-CM

## 2024-06-17 DIAGNOSIS — N25.81 SECONDARY HYPERPARATHYROIDISM OF RENAL ORIGIN (HCC): ICD-10-CM

## 2024-06-17 DIAGNOSIS — E66.01 MORBID OBESITY WITH BMI OF 40.0-44.9, ADULT (HCC): ICD-10-CM

## 2024-06-17 DIAGNOSIS — E83.39 HYPERPHOSPHATEMIA: ICD-10-CM

## 2024-06-17 PROCEDURE — 99214 OFFICE O/P EST MOD 30 MIN: CPT | Performed by: INTERNAL MEDICINE

## 2024-06-17 PROCEDURE — G2211 COMPLEX E/M VISIT ADD ON: HCPCS | Performed by: INTERNAL MEDICINE

## 2024-06-17 RX ORDER — CALCITRIOL 0.25 UG/1
0.25 CAPSULE, LIQUID FILLED ORAL 3 TIMES WEEKLY
Qty: 36 CAPSULE | Refills: 2 | Status: SHIPPED | OUTPATIENT
Start: 2024-06-17 | End: 2025-03-14

## 2024-06-17 NOTE — PROGRESS NOTES
OFFICE FOLLOW UP - Nephrology   Cherelle Dash 69 y.o. female MRN: [id number]       ASSESSMENT and PLAN:  Cherelle was seen today for follow-up and chronic kidney disease.    Diagnoses and all orders for this visit:    Stage 3b chronic kidney disease (HCC)  -     CBC; Future  -     Renal function panel; Future  -     PTH, intact; Future  -     Protein / creatinine ratio, urine; Future  -     calcitriol (ROCALTROL) 0.25 mcg capsule; Take 1 capsule (0.25 mcg total) by mouth 3 (three) times a week    Secondary hyperparathyroidism of renal origin (HCC)  -     calcitriol (ROCALTROL) 0.25 mcg capsule; Take 1 capsule (0.25 mcg total) by mouth 3 (three) times a week    Morbid obesity with BMI of 40.0-44.9, adult (HCC)    Bipolar 1 disorder, mixed, severe (HCC)    Hyperphosphatemia        This is a 69-year-old lady who presents to the office for CKD follow-up, previously followed with Dr. Kulkarni, patient decided to transfer care with me given that I am taking care of her , noted creatinine lately in the mid ones since 2020 also history of GERD, bipolar disorder previously on lithium that was discontinued 2020 after episode of OWEN obesity, arthritis, vitamin D deficiency.  Last time seen by Dr. Kulkarni was on 10/2023.    1.  Chronic kidney disease stage IIIb with creatinine fluctuating in the mid ones.  Recent blood and urine test were reviewed with patient and with her  blood pressure overall is stable with minimal proteinuria.  Discussed about importance to stay well-hydrated, follow low-salt diet, keep working on losing weight, avoid NSAIDs.  I would like to see her back in the office in 6 months with repeat labs    2 hemodynamics, blood pressure currently well-controlled, she is on any blood pressure medication, follow low-salt diet and advised to lose weight.    3.  Mineral bone disease, history of secondary hyperparathyroidism currently on Calcitrol 1 tablet daily, most recent PTH lower, decrease dose to 3 times  a week and follow labs in 6 months.  Also noted mild hyperphosphatemia, low phosphorus diet provided to patient.    4.  GERD, currently on Pepcid management as per primary care doctor    5 morbid obesity, noted patient gained 6 pounds since last office visit, we discussed how importance of losing weight, recommend to discuss further with primary care doctor    6 hemoglobin, most recent normal, follow labs in 6 months    #7 history of bipolar disorder/depression, previously on lithium but was discontinued in 2020, management per psych    Patient Instructions   As we discussed in the office visit you have some mild chronic kidney disease stage III, based on the most recent blood test your kidney function is stable.  Recommend to decrease Calcitrol to 1 tablet 3 times a week (Mondays, Wednesdays, Fridays).  Your phosphorus level is mildly elevated, recommend to follow a low phosphorus diet.  I would like to see you back in the office in 6 months with repeat labs.  Continue close follow-up with primary care doctor.  Recommend to keep working on losing weight.  Avoid NSAIDs (no ibuprofen, Motrin, Advil, Aleve, naproxen)  Okay to take Tylenol or acetaminophen as needed for pain.      HPI: Cherelle Dash is a 69 y.o. female who is here for Follow-up and Chronic Kidney Disease  .    This is a patient with history of CKD, morbid obesity, GERD, previously followed with Dr. Kulkarni, patient presents to the office today for CKD follow-up and requesting transfer of care given that I am taking care of her     Since last visit, she was hospitalized in November 2023 at [facility] with RSV bronchitis.     Patient currently has no complaints at this time and is feeling well other than GERD issues.   Patient denies any chest pain, shortness of breath and swelling.   Denies any abdominal pain, no nausea, vomiting, no diarrhea or constipation.  Denies any urinary problems, no burning sensation or gross  hematuria.  Denies NSAID use.  Denies tobacco abuse.      The last blood work was done on 6/6/2024, which we have reviewed together.  Creatinine 1.49 with an estimated GFR of 35    ROS:   All the systems were reviewed and were negative except as documented on the HPI.    Allergies: Other    Medications:   Current Outpatient Medications:     calcitriol (ROCALTROL) 0.25 mcg capsule, Take 1 capsule (0.25 mcg total) by mouth 3 (three) times a week, Disp: 36 capsule, Rfl: 2    Cholecalciferol (VITAMIN D) 2000 units CAPS, Take 1 capsule by mouth daily, Disp: , Rfl:     ciclopirox (LOPROX) 0.77 % cream, , Disp: , Rfl:     clobetasol (TEMOVATE) 0.05 % ointment, Apply topically 2 (two) times a week, Disp: 60 g, Rfl: 3    CRANBERRY PO, Take by mouth, Disp: , Rfl:     famotidine (PEPCID) 20 mg tablet, Take 1 tablet (20 mg total) by mouth 2 (two) times a day, Disp: 180 tablet, Rfl: 3    Glucosamine-Chondroit-Vit C-Mn (GLUCOSAMINE 1500 COMPLEX PO), Take by mouth in the morning, Disp: , Rfl:     Ivermectin 1 % CREA, , Disp: , Rfl:     lurasidone (LATUDA) 20 mg tablet, TAKE 1 TABLET BY MOUTH DAILY  WITH BREAKFAST, Disp: 30 tablet, Rfl: 11    metoprolol succinate (TOPROL-XL) 25 mg 24 hr tablet, Take 1 tablet (25 mg total) by mouth daily, Disp: 90 tablet, Rfl: 3    Mirabegron ER (Myrbetriq) 25 MG TB24, TAKE 1 TABLET BY MOUTH IN THE  MORNING, Disp: 90 tablet, Rfl: 1    Multiple Vitamin (MULTI-VITAMIN DAILY) TABS, Take by mouth daily, Disp: , Rfl:     Nystatin POWD, Use 2 (two) times a day, Disp: 1 each, Rfl: 3    Past Medical History:   Diagnosis Date    Abnormal ECG     Anxiety 2002    Arthritis     Bipolar 1 disorder (HCC)     Chronic kidney disease     stage 3    CPAP (continuous positive airway pressure) dependence     Depression 2001    Disease of thyroid gland     Elevated blood pressure reading 06/10/2019    GERD (gastroesophageal reflux disease)     Obesity     RSV (acute bronchiolitis due to respiratory syncytial virus)  "12/05/2023    Sleep apnea     Sleep apnea, obstructive 2007     Past Surgical History:   Procedure Laterality Date    COLONOSCOPY  2011     Family History   Problem Relation Age of Onset    Heart disease Mother     Heart Valve Disease Mother         Replacement    Diabetes Mother         2002    Heart failure Mother         Heart Valve replacement    Arthritis Father     No Known Problems Sister     No Known Problems Daughter     No Known Problems Maternal Grandmother     No Known Problems Maternal Grandfather     No Known Problems Paternal Grandmother     No Known Problems Paternal Grandfather     No Known Problems Son     No Known Problems Maternal Aunt     No Known Problems Maternal Aunt     No Known Problems Maternal Aunt     No Known Problems Maternal Aunt     No Known Problems Maternal Aunt     No Known Problems Paternal Aunt     Alcohol abuse Son         Kolton, 40 year old son, has issues with alcohol and alcohol abuse      reports that she quit smoking about 15 years ago. Her smoking use included cigarettes. She started smoking about 45 years ago. She has a 7.5 pack-year smoking history. She has never used smokeless tobacco. She reports current alcohol use of about 1.0 standard drink of alcohol per week. She reports that she does not use drugs.      Physical Exam:   Vitals:    06/17/24 1243   BP: 118/70   BP Location: Right arm   Patient Position: Sitting   Cuff Size: Adult   Pulse: 74   SpO2: 96%   Weight: 104 kg (229 lb)   Height: 5' 1\" (1.549 m)     Body mass index is 43.27 kg/m².    General: Morbidly obese, cooperative, in not acute distress  Eyes: conjunctivae pink, anicteric sclerae  ENT: lips and mucous membranes moist  Neck: supple, no JVD  Chest: clear breath sounds bilateral, no crackles, ronchus or wheezings  CVS: distinct S1 & S2, normal rate, regular rhythm  Abdomen: soft, non-tender, non-distended, normoactive bowel sounds  Back: no CVA tenderness  Extremities: no edema of both legs  Skin: no " rash  Neuro: awake, alert, oriented      Lab Results:  Results for orders placed or performed in visit on 06/06/24   TSH, 3rd generation with Free T4 reflex   Result Value Ref Range    TSH 3RD GENERATON 2.095 0.450 - 4.500 uIU/mL   Lipid Panel with Direct LDL reflex   Result Value Ref Range    Cholesterol 200 (H) See Comment mg/dL    Triglycerides 115 See Comment mg/dL    HDL, Direct 54 >=50 mg/dL    LDL Calculated 123 (H) 0 - 100 mg/dL   CBC and differential   Result Value Ref Range    WBC 7.98 4.31 - 10.16 Thousand/uL    RBC 4.75 3.81 - 5.12 Million/uL    Hemoglobin 14.7 11.5 - 15.4 g/dL    Hematocrit 45.9 34.8 - 46.1 %    MCV 97 82 - 98 fL    MCH 30.9 26.8 - 34.3 pg    MCHC 32.0 31.4 - 37.4 g/dL    RDW 13.4 11.6 - 15.1 %    MPV 12.9 (H) 8.9 - 12.7 fL    Platelets 148 (L) 149 - 390 Thousands/uL    nRBC 0 /100 WBCs    Segmented % 69 43 - 75 %    Immature Grans % 0 0 - 2 %    Lymphocytes % 20 14 - 44 %    Monocytes % 8 4 - 12 %    Eosinophils Relative 2 0 - 6 %    Basophils Relative 1 0 - 1 %    Absolute Neutrophils 5.55 1.85 - 7.62 Thousands/µL    Absolute Immature Grans 0.02 0.00 - 0.20 Thousand/uL    Absolute Lymphocytes 1.57 0.60 - 4.47 Thousands/µL    Absolute Monocytes 0.60 0.17 - 1.22 Thousand/µL    Eosinophils Absolute 0.19 0.00 - 0.61 Thousand/µL    Basophils Absolute 0.05 0.00 - 0.10 Thousands/µL   Comprehensive metabolic panel   Result Value Ref Range    Sodium 142 135 - 147 mmol/L    Potassium 4.3 3.5 - 5.3 mmol/L    Chloride 109 (H) 96 - 108 mmol/L    CO2 26 21 - 32 mmol/L    ANION GAP 7 4 - 13 mmol/L    BUN 27 (H) 5 - 25 mg/dL    Creatinine 1.49 (H) 0.60 - 1.30 mg/dL    Glucose, Fasting 116 (H) 65 - 99 mg/dL    Calcium 9.6 8.4 - 10.2 mg/dL    AST 12 (L) 13 - 39 U/L    ALT 11 7 - 52 U/L    Alkaline Phosphatase 76 34 - 104 U/L    Total Protein 7.0 6.4 - 8.4 g/dL    Albumin 3.9 3.5 - 5.0 g/dL    Total Bilirubin 0.48 0.20 - 1.00 mg/dL    eGFR 35 ml/min/1.73sq m   PTH, intact   Result Value Ref Range    PTH  "26.9 12.0 - 88.0 pg/mL   Protein / creatinine ratio, urine   Result Value Ref Range    Creatinine, Ur 78.6 Reference range not established. mg/dL    Protein Urine Random 19 Reference range not established. mg/dL    Prot/Creat Ratio, Ur 0.24 (H) 0.00 - 0.10   Phosphorus   Result Value Ref Range    Phosphorus 4.6 (H) 2.3 - 4.1 mg/dL             Portions of the record may have been created with voice recognition software. Occasional wrong word or \"sound a like\" substitutions may have occurred due to the inherent limitations of voice recognition software. Read the chart carefully and recognize, using context, where substitutions have occurred.If you have any questions, please contact the dictating provider.  "

## 2024-06-17 NOTE — PATIENT INSTRUCTIONS
As we discussed in the office visit you have some mild chronic kidney disease stage III, based on the most recent blood test your kidney function is stable.  Recommend to decrease Calcitrol to 1 tablet 3 times a week (Mondays, Wednesdays, Fridays).  Your phosphorus level is mildly elevated, recommend to follow a low phosphorus diet.  I would like to see you back in the office in 6 months with repeat labs.  Continue close follow-up with primary care doctor.  Recommend to keep working on losing weight.  Avoid NSAIDs (no ibuprofen, Motrin, Advil, Aleve, naproxen)  Okay to take Tylenol or acetaminophen as needed for pain.

## 2024-06-24 DIAGNOSIS — K21.9 GASTROESOPHAGEAL REFLUX DISEASE WITHOUT ESOPHAGITIS: ICD-10-CM

## 2024-06-24 RX ORDER — FAMOTIDINE 20 MG/1
20 TABLET, FILM COATED ORAL 2 TIMES DAILY
Qty: 180 TABLET | Refills: 3 | Status: SHIPPED | OUTPATIENT
Start: 2024-06-24

## 2024-06-27 DIAGNOSIS — F31.9 BIPOLAR 1 DISORDER (HCC): ICD-10-CM

## 2024-06-27 RX ORDER — LURASIDONE HYDROCHLORIDE 20 MG/1
20 TABLET, FILM COATED ORAL
Qty: 30 TABLET | Refills: 11 | Status: SHIPPED | OUTPATIENT
Start: 2024-06-27

## 2024-06-27 NOTE — TELEPHONE ENCOUNTER
Patient is calling regarding cancelling an appointment.    Date/Time: 12/12/2024 at 9;30 am    Reason: unable to make it    Patient was rescheduled: YES [x] NO []  If yes, when was Patient reschedule for: 12/17/2024  at 10 am    Patient requesting call back to reschedule: YES [] NO [x]

## 2024-07-02 ENCOUNTER — OFFICE VISIT (OUTPATIENT)
Dept: FAMILY MEDICINE CLINIC | Facility: CLINIC | Age: 69
End: 2024-07-02
Payer: COMMERCIAL

## 2024-07-02 VITALS
HEART RATE: 83 BPM | BODY MASS INDEX: 43.54 KG/M2 | DIASTOLIC BLOOD PRESSURE: 74 MMHG | HEIGHT: 61 IN | RESPIRATION RATE: 16 BRPM | OXYGEN SATURATION: 97 % | WEIGHT: 230.6 LBS | TEMPERATURE: 98.2 F | SYSTOLIC BLOOD PRESSURE: 120 MMHG

## 2024-07-02 DIAGNOSIS — K21.9 GASTROESOPHAGEAL REFLUX DISEASE WITHOUT ESOPHAGITIS: ICD-10-CM

## 2024-07-02 DIAGNOSIS — E66.01 MORBID OBESITY WITH BMI OF 40.0-44.9, ADULT (HCC): ICD-10-CM

## 2024-07-02 DIAGNOSIS — J02.9 SORE THROAT: Primary | ICD-10-CM

## 2024-07-02 LAB — S PYO AG THROAT QL: NEGATIVE

## 2024-07-02 PROCEDURE — 87880 STREP A ASSAY W/OPTIC: CPT | Performed by: FAMILY MEDICINE

## 2024-07-02 PROCEDURE — 99214 OFFICE O/P EST MOD 30 MIN: CPT | Performed by: FAMILY MEDICINE

## 2024-07-02 NOTE — PROGRESS NOTES
"Ambulatory Visit  Name: Cherelle Dash      : 1955      MRN: [id number]  Encounter Provider: Gretchen Mayorga DO  Encounter Date: 2024   Encounter department: [facility]    Assessment & Plan   1. Sore throat  Assessment & Plan:  Likely acid reflux  Orders:  -     POCT rapid ANTIGEN strepA  -     Throat culture  2. Gastroesophageal reflux disease without esophagitis  Assessment & Plan:  Trial gaviscon  3. Morbid obesity with BMI of 40.0-44.9, adult (Formerly [facility])  Assessment & Plan:  Discussed wegovy but not covered by [employer]       History of Present Illness     Here for 1 week of sore throat  On and off  Worse yesterday afternoon    Sore Throat   This is a new problem. The current episode started in the past 7 days. Pertinent negatives include no congestion, coughing, ear pain or trouble swallowing. She has tried nothing for the symptoms.       Review of Systems   Constitutional: Negative.    HENT:  Positive for sore throat. Negative for congestion, ear pain and trouble swallowing.    Eyes: Negative.    Respiratory:  Negative for cough.    Cardiovascular: Negative.    Gastrointestinal: Negative.    Endocrine: Negative.    Genitourinary: Negative.    Musculoskeletal: Negative.    Allergic/Immunologic: Negative.    Neurological: Negative.    Hematological: Negative.    Psychiatric/Behavioral: Negative.         Objective     /74 (BP Location: Left arm, Patient Position: Sitting, Cuff Size: Large)   Pulse 83   Temp 98.2 °F (36.8 °C) (Tympanic)   Resp 16   Ht 5' 1\" (1.549 m)   Wt 105 kg (230 lb 9.6 oz)   SpO2 97%   BMI 43.57 kg/m²     Physical Exam  Vitals and nursing note reviewed.   Constitutional:       Appearance: She is well-developed.   HENT:      Head: Normocephalic and atraumatic.      Right Ear: External ear normal.      Left Ear: External ear normal.      Nose: Nose normal.   Eyes:      Conjunctiva/sclera: Conjunctivae normal.      Pupils: Pupils are equal, round, and " reactive to light.   Cardiovascular:      Rate and Rhythm: Normal rate and regular rhythm.      Heart sounds: Normal heart sounds.   Pulmonary:      Effort: Pulmonary effort is normal.      Breath sounds: Normal breath sounds.   Abdominal:      General: Bowel sounds are normal.      Palpations: Abdomen is soft.   Musculoskeletal:         General: Normal range of motion.      Cervical back: Normal range of motion and neck supple.   Skin:     General: Skin is warm and dry.      Capillary Refill: Capillary refill takes less than 2 seconds.   Neurological:      Mental Status: She is alert and oriented to person, place, and time.   Psychiatric:         Behavior: Behavior normal.         Thought Content: Thought content normal.         Judgment: Judgment normal.       Recent Results (from the past 24 hour(s))   POCT rapid ANTIGEN strepA    Collection Time: 07/02/24  5:00 PM   Result Value Ref Range     RAPID STREP A Negative Negative      Administrative Statements

## 2024-07-06 LAB — B-HEM STREP SPEC QL CULT: NEGATIVE

## 2024-07-13 ENCOUNTER — NURSE TRIAGE (OUTPATIENT)
Dept: OTHER | Facility: OTHER | Age: 69
End: 2024-07-13

## 2024-07-13 NOTE — TELEPHONE ENCOUNTER
"Reason for Disposition  • Postmenopausal vaginal bleeding    Answer Assessment - Initial Assessment Questions  1. AMOUNT: \"Describe the bleeding that you are having.\" \"How much bleeding is there?\"     - SPOTTING: spotting, or pinkish / brownish mucous discharge; does not fill panti-liner or pad     - MILD:  less than 1 pad / hour; less than patient's usual menstrual bleeding    - MODERATE: 1-2 pads / hour; 1 menstrual cup every 6 hours; small-medium blood clots (e.g., pea, grape, small coin)    - SEVERE: soaking 2 or more pads/hour for 2 or more hours; 1 menstrual cup every 2 hours; bleeding not contained by pads or continuous red blood from vagina; large blood clots (e.g., golf ball, large coin)       Spotting to mild    2. ONSET: \"When did the bleeding begin?\" \"Is it continuing now?\"      Within the last 20 minutes    3. MENOPAUSE: \"When was your last menstrual period?\"       Menopause probably almost 20 years ago    4. ABDOMINAL PAIN: \"Do you have any pain?\" \"How bad is the pain?\"  (e.g., Scale 1-10; mild, moderate, or severe)    - MILD (1-3): doesn't interfere with normal activities, abdomen soft and not tender to touch     - MODERATE (4-7): interferes with normal activities or awakens from sleep, tender to touch     - SEVERE (8-10): excruciating pain, doubled over, unable to do any normal activities       no    5. BLOOD THINNERS: \"Do you take any blood thinners?\" (e.g., Coumadin/warfarin, Pradaxa/dabigatran, aspirin)      no    6. HORMONES: \"Are you taking any hormone medications, prescription or OTC?\" (e.g., birth control pills, estrogen)      no    7. CAUSE: \"What do you think is causing the bleeding?\" (e.g., recent gyn surgery, recent gyn procedure; known bleeding disorder, uterine cancer)       Doctor said she had a uterine polyp when she had an ultrasound in 2021 that may, at some point, start bleeding.       8. HEMODYNAMIC STATUS: \"Are you weak or feeling lightheaded?\" If Yes, ask: \"Can you stand and walk " "normally?\"        no    9. OTHER SYMPTOMS: \"What other symptoms are you having with the bleeding?\" (e.g., back pain, burning with urination, fever)      Anxious about the whole thing    Protocols used: Vaginal Bleeding - Postmenopausal-ADULT-AH    "

## 2024-07-13 NOTE — TELEPHONE ENCOUNTER
"Regarding: bleeding  ----- Message from Kavita DOTY sent at 7/13/2024  6:14 PM EDT -----  \"I'm having some bleeding, it soaked into my under wear but now it's a little less heavy\"    "

## 2024-07-18 ENCOUNTER — TELEPHONE (OUTPATIENT)
Dept: OBGYN CLINIC | Facility: CLINIC | Age: 69
End: 2024-07-18

## 2024-07-18 ENCOUNTER — NURSE TRIAGE (OUTPATIENT)
Age: 69
End: 2024-07-18

## 2024-07-18 ENCOUNTER — OFFICE VISIT (OUTPATIENT)
Dept: OBGYN CLINIC | Facility: CLINIC | Age: 69
End: 2024-07-18
Payer: COMMERCIAL

## 2024-07-18 ENCOUNTER — HOSPITAL ENCOUNTER (OUTPATIENT)
Dept: RADIOLOGY | Age: 69
Discharge: HOME | End: 2024-07-18
Payer: COMMERCIAL

## 2024-07-18 VITALS
WEIGHT: 225 LBS | HEIGHT: 61 IN | SYSTOLIC BLOOD PRESSURE: 142 MMHG | DIASTOLIC BLOOD PRESSURE: 84 MMHG | BODY MASS INDEX: 42.48 KG/M2

## 2024-07-18 DIAGNOSIS — N95.0 POST-MENOPAUSAL BLEEDING: Primary | ICD-10-CM

## 2024-07-18 DIAGNOSIS — N95.0 POST-MENOPAUSAL BLEEDING: ICD-10-CM

## 2024-07-18 DIAGNOSIS — D69.6 PLATELETS DECREASED (HCC): ICD-10-CM

## 2024-07-18 PROCEDURE — 58100 BIOPSY OF UTERUS LINING: CPT | Performed by: OBSTETRICS & GYNECOLOGY

## 2024-07-18 PROCEDURE — 76856 US EXAM PELVIC COMPLETE: CPT

## 2024-07-18 PROCEDURE — 99214 OFFICE O/P EST MOD 30 MIN: CPT | Performed by: OBSTETRICS & GYNECOLOGY

## 2024-07-18 PROCEDURE — 76830 TRANSVAGINAL US NON-OB: CPT

## 2024-07-18 RX ORDER — MEGESTROL ACETATE 40 MG/1
40 TABLET ORAL 2 TIMES DAILY
Qty: 60 TABLET | Refills: 0 | Status: SHIPPED | OUTPATIENT
Start: 2024-07-18 | End: 2024-08-17

## 2024-07-18 NOTE — TELEPHONE ENCOUNTER
.[facility] OB GYN Department  Surgery Scheduling Sheet    Patient Name: Cherelle Dash  : 1955    Provider: Dr. Carmen Troncoso     Needed: no; Language: N/A    Procedure: exam under anesthesia, dilation and curettage , hysteroscopy, and removal of pathology    Diagnosis: PMB    Special Needs or Equipment: symphion    Anesthesia: IV sedation with anesthesia    Length of stay: outpatient  Does patient have comorbid conditions that will require close perioperative monitoring prior to safe discharge: no    The patient has comorbid conditions that will require close perioperative monitoring prior to safe discharge, including N/A.   This may require acute care beyond the usual and routine recovery period. As such, inpatient admission post-operatively is expected and appropriate, and anticipated hospital length of stay will be >2 midnights.    Pre-Admission Testing Needed: no   Labs that should be ordered: NA    Order PAT that is recommended in prep for procedure?: Not Indicated    Medical Clearance Needed: no; Provider: N/A    MA Form Signed (tubals/hysterectomy): Not Indicated    Surgical Drink Given: no     How many days out of work: 1 day(s)     How many days no drivin day(s)       Is pre op appt needed?  no  Interval for post op appt: 2 week(s)     For Surgical Scheduler:     Surgery Scheduled On: THURS. 24-MID DAY  [place]: [facility]    Pre-op Appt: COMPLETED 24  Post op Appt: 24  Consult/Medical clearance appt: N/A

## 2024-07-18 NOTE — TELEPHONE ENCOUNTER
"Maggie noted post menopausal bleeding starting last week.  Has been light/intermittent. This morning had episode where bleeding was heavier- mucousy  red then abruptly stopped.  Denies pelvic pain.    Advised to avoid any type of exertional activity this morning until evaluation in office at 11 am.  C/B if develops heavy bleeding, or any additional symptoms.      Patient in agreement.       Reason for Disposition   Patient wants to be seen     Appointment scheduled prior to triage call    Answer Assessment - Initial Assessment Questions  1. AMOUNT: \"Describe the bleeding that you are having.\"     - SPOTTING: spotting, or pinkish / brownish mucous discharge; does not fill panti-liner or pad     - MILD:  less than 1 pad / hour; less than patient's usual menstrual bleeding    - MODERATE: 1-2 pads / hour; 1 menstrual cup every 6 hours; small-medium blood clots (e.g., pea, grape, small coin)    - SEVERE: soaking 2 or more pads/hour for 2 or more hours; 1 menstrual cup every 2 hours; bleeding not contained by pads or continuous red blood from vagina; large blood clots (e.g., golf ball, large coin)       Mild to moderate- has mucousy type bleeding intermittently starting last week.   2. ONSET: \"When did the bleeding begin?\" \"Is it continuing now?\"      Last week  3. MENSTRUAL PERIOD: \"When was the last normal menstrual period?\" \"How is this different than your period?\"      Post menopausal   4. REGULARITY: \"How regular are your periods?\"      N/a  5. ABDOMINAL PAIN: \"Do you have any pain?\" \"How bad is the pain?\"  (e.g., Scale 1-10; mild, moderate, or severe)    - MILD (1-3): doesn't interfere with normal activities, abdomen soft and not tender to touch     - MODERATE (4-7): interferes with normal activities or awakens from sleep, tender to touch     - SEVERE (8-10): excruciating pain, doubled over, unable to do any normal activities       denies  6. PREGNANCY: \"Could you be pregnant?\" \"Are you sexually active?\" \"Did you " "recently give birth?\"      N/a  7. BREASTFEEDING: \"Are you breastfeeding?\"      N/a  8. HORMONES: \"Are you taking any hormone medications, prescription or OTC?\" (e.g., birth control pills, estrogen)      N/a  9. BLOOD THINNERS: \"Do you take any blood thinners?\" (e.g., Coumadin/warfarin, Pradaxa/dabigatran, aspirin)      denies  10. CAUSE: \"What do you think is causing the bleeding?\" (e.g., recent gyn surgery, recent gyn procedure; known bleeding disorder, cervical cancer, polycystic ovarian disease, fibroids)          unsure  11. HEMODYNAMIC STATUS: \"Are you weak or feeling lightheaded?\" If Yes, ask: \"Can you stand and walk normally?\"         denies  12. OTHER SYMPTOMS: \"What other symptoms are you having with the bleeding?\" (e.g., passed tissue, vaginal discharge, fever, menstrual-type cramps)        denies    Protocols used: Vaginal Bleeding - Abnormal-ADULT-[place]    "

## 2024-07-18 NOTE — TELEPHONE ENCOUNTER
----- Message from Antionette NIX sent at 7/18/2024  8:18 AM EDT -----  Pt having  bleeding,but has had drastic increase of bleeding this morning, unable to move from the toilet,

## 2024-07-18 NOTE — PROGRESS NOTES
Assessment/Plan:      Diagnoses and all orders for this visit:    Post-menopausal bleeding  -     megestrol (MEGACE) 40 mg tablet; Take 1 tablet (40 mg total) by mouth 2 (two) times a day  -     US pelvis complete w transvaginal; Future              Subjective:     Patient ID: Cherelle Dash is a 69 y.o. female.    HPI    69y P2 presents with 1 week of PMB.  The bleeding can be spotting to period like.  She denies pain or discomfort.  No dizziness or lightheadedness.  No other complaints.  No inciting events.    Review of Systems   Constitutional: Negative.    HENT: Negative.     Respiratory: Negative.     Cardiovascular: Negative.    Gastrointestinal: Negative.    Genitourinary:  Positive for vaginal bleeding. Negative for pelvic pain, vaginal discharge and vaginal pain.   Neurological: Negative.    Psychiatric/Behavioral: Negative.           Objective:     Physical Exam  Vitals and nursing note reviewed.   Constitutional:       Appearance: Normal appearance. She is obese.   HENT:      Head: Normocephalic and atraumatic.   Eyes:      Extraocular Movements: Extraocular movements intact.      Conjunctiva/sclera: Conjunctivae normal.      Pupils: Pupils are equal, round, and reactive to light.   Pulmonary:      Effort: Pulmonary effort is normal.   Abdominal:      General: There is no distension.      Palpations: Abdomen is soft.      Tenderness: There is no abdominal tenderness.   Genitourinary:     Labia:         Right: Rash (lichen) present. No tenderness, lesion or injury.         Left: Rash (lichen) present. No tenderness, lesion or injury.       Vagina: Bleeding present. No vaginal discharge or tenderness.      Cervix: Normal.   Neurological:      General: No focal deficit present.      Mental Status: She is alert and oriented to person, place, and time.   Psychiatric:         Mood and Affect: Mood normal.         Behavior: Behavior normal.         Endometrial biopsy    Date/Time: 7/18/2024 11:00 AM    Performed  by: Carmen Troncoso MD  Authorized by: Carmen Troncoso MD  Universal Protocol:  Consent: Verbal consent obtained.  Risks and benefits: risks, benefits and alternatives were discussed  Consent given by: patient  Patient understanding: patient states understanding of the procedure being performed  Patient consent: the patient's understanding of the procedure matches consent given  Procedure consent: procedure consent matches procedure scheduled  Required items: required blood products, implants, devices, and special equipment available  Patient identity confirmed: verbally with patient    Indication:     Indications: Post-menopausal bleeding      Progression of post-menopausal bleeding:  Waxing and waning  Procedure:     Procedure: endometrial biopsy with Pipelle      A bivalve speculum was placed in the vagina: yes      Cervix cleaned and prepped: yes      The cervix was dilated: no      Uterus sounded: no      Patient tolerated procedure well with no complications: yes      Unable to perform due to: cervical stenosis    Comments:     Procedure comments:  Unable to enter uterine cavity  Mod bleeding noted during procedure  Improved with pressure

## 2024-07-19 ENCOUNTER — TELEPHONE (OUTPATIENT)
Age: 69
End: 2024-07-19

## 2024-07-19 NOTE — TELEPHONE ENCOUNTER
PA for megace    Submitted via    []CMM-KEY   [x][employer]-Case ID # [id number]   []Faxed to plan   []Other website   []Phone call Case ID #     Office notes sent, clinical questions answered. Awaiting determination    Turnaround time for your insurance to make a decision on your Prior Authorization can take 7-21 business days.

## 2024-07-22 ENCOUNTER — DOCUMENTATION (OUTPATIENT)
Dept: HEMATOLOGY ONCOLOGY | Facility: CLINIC | Age: 69
End: 2024-07-22

## 2024-07-22 DIAGNOSIS — N85.8 UTERINE MASS: ICD-10-CM

## 2024-07-22 DIAGNOSIS — N95.0 POST-MENOPAUSAL BLEEDING: Primary | ICD-10-CM

## 2024-07-22 NOTE — TELEPHONE ENCOUNTER
PA for MEGACE Denied    Reason:(Screenshot if applicable)        Message sent to office clinical pool Yes    Denial letter scanned into Media Yes    Appeal started No ( Provider will need to decide if appeal is warranted and send clinical documentation to Prior Authorization Team for initiation.)    **Please follow up with your patient regarding denial and next steps**

## 2024-07-22 NOTE — PROGRESS NOTES
Chart rvw'd on 2024      Referred by:  Dr. Krupa Troncoso --> Dr. Page    Diagnosis:  PMB  Uterine mass    Imagin2024 US pelvis complete w transvaginal-  A 4.8 cm relatively homogeneous mass lesion in the cervix/lower uterine segment with significant internal vascularity and mass effect on the adjacent endometrial complex. Differential diagnoses include a neoplasm or large cervical polyp. Tissue sampling versus further assessment with MRI pelvis with and without contrast is recommended     Diffuse thickening of the endometrial stripe with apparent 9 mm hyperechoic vascular lesion in the fundal endometrial stripe. Direct visualization and tissue sampling is recommended.     A 3.0 cm right adnexal cyst, and 2.9 cm left adnexal cyst, differential diagnosis include ovarian cysts, hydrosalpinx or paraovarian cysts. Follow-up with pelvic ultrasound in 1 year versus attention on follow-up MRI pelvis is recommended.     A few dependent debris in the partially visualized urinary bladder, nonspecific but can be secondary to acute cystitis. Clinical correlation is recommended.      Pathology:  N/A      Scheduled appointments:  MRI pelvis female wo and w contrast scheduled on 2024    Surgery:  N/A      Post surgical pathology:  N/A      68 yo PM F presenting w PMB X1 week. Endometrial bx attempted but unsuccessful d/t cervical stenosis. Noted 4.8 cm mass in the cervix/lower uterus, thickened endometrium and B/L adnexal cysts on US. GYN ONC referral has been placed for further eval.

## 2024-07-24 ENCOUNTER — TELEPHONE (OUTPATIENT)
Age: 69
End: 2024-07-24

## 2024-07-24 NOTE — TELEPHONE ENCOUNTER
Cherelle called to see if there were any other labs she had to do. She states that she has a throat culture done at the office but she got her negative result the same day.  No additional culture ordered.   NFA.

## 2024-08-05 ENCOUNTER — RA CDI HCC (OUTPATIENT)
Dept: OTHER | Facility: HOSPITAL | Age: 69
End: 2024-08-05

## 2024-08-06 ENCOUNTER — HOSPITAL ENCOUNTER (OUTPATIENT)
Dept: RADIOLOGY | Age: 69
Discharge: HOME | End: 2024-08-06
Payer: COMMERCIAL

## 2024-08-06 DIAGNOSIS — N85.8 UTERINE MASS: ICD-10-CM

## 2024-08-06 DIAGNOSIS — N95.0 POST-MENOPAUSAL BLEEDING: ICD-10-CM

## 2024-08-06 PROCEDURE — A9585 GADOBUTROL INJECTION: HCPCS | Performed by: OBSTETRICS & GYNECOLOGY

## 2024-08-06 PROCEDURE — 72197 MRI PELVIS W/O & W/DYE: CPT

## 2024-08-06 RX ORDER — GADOBUTROL 604.72 MG/ML
10 INJECTION INTRAVENOUS
Status: COMPLETED | OUTPATIENT
Start: 2024-08-06 | End: 2024-08-06

## 2024-08-06 RX ADMIN — GADOBUTROL 10 ML: 604.72 INJECTION INTRAVENOUS at 11:54

## 2024-08-11 PROBLEM — N88.8 CERVICAL MASS: Status: ACTIVE | Noted: 2024-08-11

## 2024-08-11 PROBLEM — N95.0 PMB (POSTMENOPAUSAL BLEEDING): Status: ACTIVE | Noted: 2024-08-11

## 2024-08-12 ENCOUNTER — CONSULT (OUTPATIENT)
Dept: GYNECOLOGIC ONCOLOGY | Facility: CLINIC | Age: 69
End: 2024-08-12
Payer: COMMERCIAL

## 2024-08-12 VITALS
DIASTOLIC BLOOD PRESSURE: 82 MMHG | SYSTOLIC BLOOD PRESSURE: 122 MMHG | TEMPERATURE: 98.3 F | HEART RATE: 97 BPM | BODY MASS INDEX: 42.7 KG/M2 | WEIGHT: 226 LBS | OXYGEN SATURATION: 98 %

## 2024-08-12 DIAGNOSIS — E66.01 MORBID OBESITY WITH BMI OF 40.0-44.9, ADULT (HCC): ICD-10-CM

## 2024-08-12 DIAGNOSIS — N88.8 CERVICAL MASS: Primary | ICD-10-CM

## 2024-08-12 DIAGNOSIS — N95.0 POST-MENOPAUSAL BLEEDING: ICD-10-CM

## 2024-08-12 DIAGNOSIS — N95.0 PMB (POSTMENOPAUSAL BLEEDING): ICD-10-CM

## 2024-08-12 DIAGNOSIS — N85.8 UTERINE MASS: ICD-10-CM

## 2024-08-12 PROCEDURE — 99459 PELVIC EXAMINATION: CPT | Performed by: OBSTETRICS & GYNECOLOGY

## 2024-08-12 PROCEDURE — 99205 OFFICE O/P NEW HI 60 MIN: CPT | Performed by: OBSTETRICS & GYNECOLOGY

## 2024-08-12 RX ORDER — SODIUM CHLORIDE, SODIUM LACTATE, POTASSIUM CHLORIDE, CALCIUM CHLORIDE 600; 310; 30; 20 MG/100ML; MG/100ML; MG/100ML; MG/100ML
125 INJECTION, SOLUTION INTRAVENOUS CONTINUOUS
Status: CANCELLED | OUTPATIENT
Start: 2024-08-12

## 2024-08-12 RX ORDER — ACETAMINOPHEN 325 MG/1
975 TABLET ORAL ONCE
Status: CANCELLED | OUTPATIENT
Start: 2024-08-12 | End: 2024-08-12

## 2024-08-12 RX ORDER — MEGESTROL ACETATE 40 MG/1
40 TABLET ORAL 2 TIMES DAILY
Qty: 60 TABLET | Refills: 3 | Status: SHIPPED | OUTPATIENT
Start: 2024-08-12 | End: 2024-09-11

## 2024-08-12 NOTE — PATIENT INSTRUCTIONS
1. Nothing to eat or drink after midnight prior to the operation.    2. Please avoid ibuprofen, aspirin, fish oil for 7 days prior to surgery  3. Medications to take the morning of surgery with a sip of water: metoprolol, latuda

## 2024-08-12 NOTE — H&P (VIEW-ONLY)
Assessment/Plan:    Problem List Items Addressed This Visit          Obstetrics/Gynecology    PMB (postmenopausal bleeding)     69-year-old with obesity, BMI 42 kg/m², stage III CKD, postmenopausal bleeding with inability to sample in the office, ultrasound and MRI evidence of a 4.1 x 4.3 cm cervical mass, 8 mm endometrium, bilateral simple appearing ovarian cysts.  I reviewed CBC, CMP, MRI and ultrasound images.  I concur with the radiologist's opinion.  There is also a 8 mm left external iliac lymph node by MRI.  Her performance status is 0.  1.  Based on morbid obesity, postmenopausal bleeding, no history of abnormal Pap smears, clinical examination, this is likely an endometrial cancer, however, cervical cancer is also in the differential.  2.  She understands that the treatments for cervical cancer and endometrial cancer differ.  Cervical cancer at this stage will be treated with curative intent chemotherapy and radiation whereas endometrial cancer will be treated with surgical resection followed by adjuvant therapy if needed with radiation plus or minus chemotherapy.  3.  I therefore discussed performing biopsies of the endometrium and cervix to establish pathologic diagnosis prior to offering curative intent therapy.  4.  I discussed the risks of examination under anesthesia, dilation and curettage with hysteroscopy, cervical biopsies, possible LEEP procedure.  She understands the risks of the operation and agrees to proceed as outlined.  HPV will be sent on biopsy specimens.  5.  Plan to follow-up final pathology from cervical biopsy/LEEP/D&C and planned curative intent therapy accordingly.  Thank you for the courtesy of this consultation.  All questions were answered by the end of the visit.         Relevant Orders    Type and screen    Protime-INR    HEMOGLOBIN A1C W/ EAG ESTIMATION    CBC and Platelet    Comprehensive metabolic panel        EKG 12 lead       Other    Morbid obesity with BMI of  40.0-44.9, adult (HCC)     May be contributing to pathology in the endometrium or cervix.  Will await final pathology.         Cervical mass - Primary    Relevant Orders    Case request operating room: DILATATION AND CURETTAGE (D&C) WITH HYSTEROSCOPY, BIOPSY LEEP CERVIX (Completed)    Type and screen    Protime-INR    HEMOGLOBIN A1C W/ EAG ESTIMATION    CBC and Platelet    Comprehensive metabolic panel        EKG 12 lead     Other Visit Diagnoses       Post-menopausal bleeding        Relevant Medications    megestrol (MEGACE) 40 mg tablet    Other Relevant Orders    Case request operating room: DILATATION AND CURETTAGE (D&C) WITH HYSTEROSCOPY, BIOPSY LEEP CERVIX (Completed)    Uterine mass                    CHIEF COMPLAINT: Postmenopausal bleeding, cervical mass        Patient ID: Cherelle Dash is a 69 y.o. female  69-year-old who presents as a consultation from Dr. Troncoso with postmenopausal bleeding.  Endometrial biopsy was attempted in the office 7/18/2024 but could not be performed secondary to cervical stenosis.  She has no history of abnormal Pap smears.  Her last Pap in 2018 was HPV negative and within normal limits.  She had an ultrasound performed on 7/18/2024 which revealed the uterus to measure 10.8 x 4.6 cm with a 4.8 x 4.5 cm cervical mass and bilateral small simple appearing ovarian cysts.  This was followed up with an MRI of the pelvis on 8/6/2024 which revealed a 4.1 x 4.3 cm cervical tumor, 8 mm endometrial thickening, bilateral ovarian cysts measuring approximately 2 cm.  These are simple.  There is also a measurable left external iliac lymph node measuring 8 mm.  She has been placed on palliative Megace therapy at 40 mg twice daily which has been helping her symptoms.  She needs a new prescription.  Her bleeding has stopped after the initial episode.  She is able to perform her actives of daily living without difficulty.  She does not have pelvic pain.        Review of Systems    Constitutional:  Negative for activity change and unexpected weight change.   HENT: Negative.     Eyes: Negative.    Respiratory: Negative.     Cardiovascular: Negative.    Gastrointestinal:  Negative for abdominal distention and abdominal pain.   Endocrine: Negative.    Genitourinary:  Positive for vaginal bleeding. Negative for pelvic pain.   Musculoskeletal: Negative.    Skin: Negative.    Allergic/Immunologic: Negative.    Neurological: Negative.    Hematological: Negative.    Psychiatric/Behavioral: Negative.         Current Outpatient Medications   Medication Sig Dispense Refill    calcitriol (ROCALTROL) 0.25 mcg capsule Take 1 capsule (0.25 mcg total) by mouth 3 (three) times a week 36 capsule 2    Cholecalciferol (VITAMIN D) 2000 units CAPS Take 1 capsule by mouth daily      ciclopirox (LOPROX) 0.77 % cream       clobetasol (TEMOVATE) 0.05 % ointment Apply topically 2 (two) times a week 60 g 3    CRANBERRY PO Take by mouth      famotidine (PEPCID) 20 mg tablet TAKE 1 TABLET BY MOUTH TWICE  DAILY 180 tablet 3    Glucosamine-Chondroit-Vit C-Mn (GLUCOSAMINE 1500 COMPLEX PO) Take by mouth in the morning      Ivermectin 1 % CREA       lurasidone (LATUDA) 20 mg tablet TAKE 1 TABLET BY MOUTH DAILY  WITH BREAKFAST 30 tablet 11    megestrol (MEGACE) 40 mg tablet Take 1 tablet (40 mg total) by mouth 2 (two) times a day 60 tablet 3    metoprolol succinate (TOPROL-XL) 25 mg 24 hr tablet Take 1 tablet (25 mg total) by mouth daily 90 tablet 3    Mirabegron ER (Myrbetriq) 25 MG TB24 TAKE 1 TABLET BY MOUTH IN THE  MORNING 90 tablet 1    Multiple Vitamin (MULTI-VITAMIN DAILY) TABS Take by mouth daily       No current facility-administered medications for this visit.       Allergies   Allergen Reactions    Other Other (See Comments)     Other reaction(s): Anaphylaxis  Annotation - 31Mar2014: plums peaches, fruits with skin        Past Medical History:   Diagnosis Date    Abnormal ECG     Anxiety 2002    Arthritis      Bipolar 1 disorder (HCC)     Chronic kidney disease     stage 3    CPAP (continuous positive airway pressure) dependence     Depression     Disease of thyroid gland     Elevated blood pressure reading 06/10/2019    GERD (gastroesophageal reflux disease)     Obesity     RSV (acute bronchiolitis due to respiratory syncytial virus) 2023    Sleep apnea     Sleep apnea, obstructive        Past Surgical History:   Procedure Laterality Date    COLONOSCOPY         OB History          2    Para   2    Term   2            AB   0    Living   2         SAB        IAB        Ectopic        Multiple        Live Births   2                 Family History   Problem Relation Age of Onset    Heart disease Mother     Heart Valve Disease Mother         Replacement    Diabetes Mother         2002    Heart failure Mother         Heart Valve replacement    Arthritis Father     No Known Problems Sister     No Known Problems Daughter     No Known Problems Maternal Grandmother     No Known Problems Maternal Grandfather     No Known Problems Paternal Grandmother     No Known Problems Paternal Grandfather     No Known Problems Son     No Known Problems Maternal Aunt     No Known Problems Maternal Aunt     No Known Problems Maternal Aunt     No Known Problems Maternal Aunt     No Known Problems Maternal Aunt     No Known Problems Paternal Aunt     Alcohol abuse Son         [place], 40 year old son, has issues with alcohol and alcohol abuse       The following portions of the patient's history were reviewed and updated as appropriate: allergies, current medications, past family history, past medical history, past social history, past surgical history, and problem list.      Objective:    Blood pressure 122/82, pulse 97, temperature 98.3 °F (36.8 °C), temperature source Temporal, weight 103 kg (226 lb), SpO2 98%, not currently breastfeeding.  Body mass index is 42.7 kg/m².    Physical Exam  Vitals reviewed. Exam  conducted with a chaperone present.   Constitutional:       General: She is not in acute distress.     Appearance: Normal appearance. She is well-developed. She is obese. She is not ill-appearing, toxic-appearing or diaphoretic.   HENT:      Head: Normocephalic and atraumatic.   Eyes:      General: No scleral icterus.     Extraocular Movements: Extraocular movements intact.      Conjunctiva/sclera: Conjunctivae normal.   Neck:      Thyroid: No thyromegaly.   Cardiovascular:      Rate and Rhythm: Normal rate and regular rhythm.      Heart sounds: Normal heart sounds.   Pulmonary:      Effort: Pulmonary effort is normal.      Breath sounds: Normal breath sounds.   Abdominal:      General: There is no distension.      Palpations: Abdomen is soft. There is no mass.      Tenderness: There is no abdominal tenderness. There is no guarding or rebound.      Hernia: A hernia is present.      Comments: Umbilical hernia   Genitourinary:     Comments: The external female genitalia is normal. The bartholin's, uretheral and skenes glands are normal. The urethral meatus is normal (midline with no lesions). Anus without fissure or lesion. Speculum exam reveals vagina without lesion or discharge. Cervix is normal appearing without visible lesions. No bleeding.  There is a small cystocele/rectocele. Bimanual exam notes a mobile uterus measuring approximately 12 weeks in size with a distended lower segment.  With No tenderness. Adnexa without masses or tenderness. Bladder is without fullness, mass or tenderness.  No parametrial disease.    Musculoskeletal:         General: No swelling or tenderness.      Cervical back: Normal range of motion and neck supple.      Right lower leg: No edema.      Left lower leg: No edema.   Lymphadenopathy:      Cervical: No cervical adenopathy.   Skin:     General: Skin is warm and dry.      Coloration: Skin is not jaundiced or pale.      Findings: No lesion or rash.   Neurological:      General: No  "focal deficit present.      Mental Status: She is alert and oriented to person, place, and time. Mental status is at baseline.      Cranial Nerves: No cranial nerve deficit.      Motor: No weakness.      Gait: Gait normal.   Psychiatric:         Mood and Affect: Mood normal.         Behavior: Behavior normal.         Thought Content: Thought content normal.         Judgment: Judgment normal.           No results found for: \"\"  Lab Results   Component Value Date    WBC 7.98 06/06/2024    HGB 14.7 06/06/2024    HCT 45.9 06/06/2024    MCV 97 06/06/2024     (L) 06/06/2024     Lab Results   Component Value Date     (H) 11/17/2017    K 4.3 06/06/2024     (H) 06/06/2024    CO2 26 06/06/2024    ANIONGAP 10 08/04/2015    BUN 27 (H) 06/06/2024    CREATININE 1.49 (H) 06/06/2024    GLUCOSE 124 (H) 11/17/2017    GLUF 116 (H) 06/06/2024    CALCIUM 9.6 06/06/2024    CORRECTEDCA 10.1 11/21/2022    AST 12 (L) 06/06/2024    ALT 11 06/06/2024    ALKPHOS 76 06/06/2024    PROT 7.0 11/17/2017    BILITOT 0.4 11/17/2017    EGFR 35 06/06/2024     MRI pelvis female wo and w contrast  Narrative: MRI OF THE PELVIS WITH AND WITHOUT CONTRAST (GYNECOLOGIC)    INDICATION: 69 years / Female. N95.0: Postmenopausal bleeding  N85.8: Other specified noninflammatory disorders of uterus. Cervical mass on recent ultrasound    COMPARISON: Ultrasound, 7/18/2024    TECHNIQUE: Multiplanar/multisequence MRI of the female pelvis was performed before and after administration of contrast.    IV Contrast: 10 mL of Gadobutrol injection (SINGLE-DOSE)    FINDINGS:    UTERUS:  There is a cervical tumor in infiltrative pattern, measuring 2.3 x 4.3 x 4.1 cm. It does not extend into the vagina, rectum, bladder, pelvic sidewall or other pelvic structures. There is no definite spread beyond the serosa, although minimal serosal   indistinctness is demonstrated at 3-4:00 and 9:00 on images 31 and 32 of series 4, and 12:00, better demonstrated on " sagittal image 29 series 3. However, it is not confirmed on other sequences, especially contrast-enhanced. The tumor restricts diffusion.  There is abnormal endometrial thickening up to 8 mm, with heterogeneous enhancement and probable cystic changes. It is unclear whether it is related to cervical tumor, or reflects a primary dysplasia. It does not extend beyond the uterus.    ADNEXA:  Right:  Ovary normal in size and morphology.  No suspicious adnexal lesion. Unilocular cyst, 2.2 x 2.1 cm  No hydrosalpinx.    Left:  Ovary normal in size and morphology.  No suspicious adnexal lesion. Unilocular cyst, 2.1 x 2.6 cm  No hydrosalpinx.    URINARY BLADDER: Normal.    PELVIC CAVITY:  No lymphadenopathy by size criteria. Early enhancing left external iliac lymph node, maximum transverse dimension 8 mm, for example image 239 series 24138. No other suspicious lymph nodes.    BOWEL: No dilated loops of bowel.    VESSELS: No aneurysm.    PELVIC WALL: Intact. No hydronephrosis.    BONES: No suspicious osseous lesion.  Impression: 4.3 cm previously reported cervical tumor, as detailed above, compatible with cervical cancer.  Nonenlarged but early enhancing left external iliac lymph node, possibly metastatic.  Abnormal endometrium that may be secondary to the cervical tumor, or reflect primary dysplasia. No extension beyond the uterus.  Bilateral unilocular simple ovarian cysts.    Workstation performed: [id number]

## 2024-08-12 NOTE — ASSESSMENT & PLAN NOTE
69-year-old with obesity, BMI 42 kg/m², stage III CKD, postmenopausal bleeding with inability to sample in the office, ultrasound and MRI evidence of a 4.1 x 4.3 cm cervical mass, 8 mm endometrium, bilateral simple appearing ovarian cysts.  I reviewed CBC, CMP, MRI and ultrasound images.  I concur with the radiologist's opinion.  There is also a 8 mm left external iliac lymph node by MRI.  Her performance status is 0.  1.  Based on morbid obesity, postmenopausal bleeding, no history of abnormal Pap smears, clinical examination, this is likely an endometrial cancer, however, cervical cancer is also in the differential.  2.  She understands that the treatments for cervical cancer and endometrial cancer differ.  Cervical cancer at this stage will be treated with curative intent chemotherapy and radiation whereas endometrial cancer will be treated with surgical resection followed by adjuvant therapy if needed with radiation plus or minus chemotherapy.  3.  I therefore discussed performing biopsies of the endometrium and cervix to establish pathologic diagnosis prior to offering curative intent therapy.  4.  I discussed the risks of examination under anesthesia, dilation and curettage with hysteroscopy, cervical biopsies, possible LEEP procedure.  She understands the risks of the operation and agrees to proceed as outlined.  HPV will be sent on biopsy specimens.  5.  Plan to follow-up final pathology from cervical biopsy/LEEP/D&C and planned curative intent therapy accordingly.  Thank you for the courtesy of this consultation.  All questions were answered by the end of the visit.

## 2024-08-12 NOTE — PROGRESS NOTES
Assessment/Plan:    Problem List Items Addressed This Visit          Obstetrics/Gynecology    PMB (postmenopausal bleeding)     69-year-old with obesity, BMI 42 kg/m², stage III CKD, postmenopausal bleeding with inability to sample in the office, ultrasound and MRI evidence of a 4.1 x 4.3 cm cervical mass, 8 mm endometrium, bilateral simple appearing ovarian cysts.  I reviewed CBC, CMP, MRI and ultrasound images.  I concur with the radiologist's opinion.  There is also a 8 mm left external iliac lymph node by MRI.  Her performance status is 0.  1.  Based on morbid obesity, postmenopausal bleeding, no history of abnormal Pap smears, clinical examination, this is likely an endometrial cancer, however, cervical cancer is also in the differential.  2.  She understands that the treatments for cervical cancer and endometrial cancer differ.  Cervical cancer at this stage will be treated with curative intent chemotherapy and radiation whereas endometrial cancer will be treated with surgical resection followed by adjuvant therapy if needed with radiation plus or minus chemotherapy.  3.  I therefore discussed performing biopsies of the endometrium and cervix to establish pathologic diagnosis prior to offering curative intent therapy.  4.  I discussed the risks of examination under anesthesia, dilation and curettage with hysteroscopy, cervical biopsies, possible LEEP procedure.  She understands the risks of the operation and agrees to proceed as outlined.  HPV will be sent on biopsy specimens.  5.  Plan to follow-up final pathology from cervical biopsy/LEEP/D&C and planned curative intent therapy accordingly.  Thank you for the courtesy of this consultation.  All questions were answered by the end of the visit.         Relevant Orders    Type and screen    Protime-INR    HEMOGLOBIN A1C W/ EAG ESTIMATION    CBC and Platelet    Comprehensive metabolic panel        EKG 12 lead       Other    Morbid obesity with BMI of  40.0-44.9, adult (HCC)     May be contributing to pathology in the endometrium or cervix.  Will await final pathology.         Cervical mass - Primary    Relevant Orders    Case request operating room: DILATATION AND CURETTAGE (D&C) WITH HYSTEROSCOPY, BIOPSY LEEP CERVIX (Completed)    Type and screen    Protime-INR    HEMOGLOBIN A1C W/ EAG ESTIMATION    CBC and Platelet    Comprehensive metabolic panel        EKG 12 lead     Other Visit Diagnoses       Post-menopausal bleeding        Relevant Medications    megestrol (MEGACE) 40 mg tablet    Other Relevant Orders    Case request operating room: DILATATION AND CURETTAGE (D&C) WITH HYSTEROSCOPY, BIOPSY LEEP CERVIX (Completed)    Uterine mass                    CHIEF COMPLAINT: Postmenopausal bleeding, cervical mass        Patient ID: Cherelle Dash is a 69 y.o. female  69-year-old who presents as a consultation from Dr. Troncoso with postmenopausal bleeding.  Endometrial biopsy was attempted in the office 7/18/2024 but could not be performed secondary to cervical stenosis.  She has no history of abnormal Pap smears.  Her last Pap in 2018 was HPV negative and within normal limits.  She had an ultrasound performed on 7/18/2024 which revealed the uterus to measure 10.8 x 4.6 cm with a 4.8 x 4.5 cm cervical mass and bilateral small simple appearing ovarian cysts.  This was followed up with an MRI of the pelvis on 8/6/2024 which revealed a 4.1 x 4.3 cm cervical tumor, 8 mm endometrial thickening, bilateral ovarian cysts measuring approximately 2 cm.  These are simple.  There is also a measurable left external iliac lymph node measuring 8 mm.  She has been placed on palliative Megace therapy at 40 mg twice daily which has been helping her symptoms.  She needs a new prescription.  Her bleeding has stopped after the initial episode.  She is able to perform her actives of daily living without difficulty.  She does not have pelvic pain.        Review of Systems    Constitutional:  Negative for activity change and unexpected weight change.   HENT: Negative.     Eyes: Negative.    Respiratory: Negative.     Cardiovascular: Negative.    Gastrointestinal:  Negative for abdominal distention and abdominal pain.   Endocrine: Negative.    Genitourinary:  Positive for vaginal bleeding. Negative for pelvic pain.   Musculoskeletal: Negative.    Skin: Negative.    Allergic/Immunologic: Negative.    Neurological: Negative.    Hematological: Negative.    Psychiatric/Behavioral: Negative.         Current Outpatient Medications   Medication Sig Dispense Refill    calcitriol (ROCALTROL) 0.25 mcg capsule Take 1 capsule (0.25 mcg total) by mouth 3 (three) times a week 36 capsule 2    Cholecalciferol (VITAMIN D) 2000 units CAPS Take 1 capsule by mouth daily      ciclopirox (LOPROX) 0.77 % cream       clobetasol (TEMOVATE) 0.05 % ointment Apply topically 2 (two) times a week 60 g 3    CRANBERRY PO Take by mouth      famotidine (PEPCID) 20 mg tablet TAKE 1 TABLET BY MOUTH TWICE  DAILY 180 tablet 3    Glucosamine-Chondroit-Vit C-Mn (GLUCOSAMINE 1500 COMPLEX PO) Take by mouth in the morning      Ivermectin 1 % CREA       lurasidone (LATUDA) 20 mg tablet TAKE 1 TABLET BY MOUTH DAILY  WITH BREAKFAST 30 tablet 11    megestrol (MEGACE) 40 mg tablet Take 1 tablet (40 mg total) by mouth 2 (two) times a day 60 tablet 3    metoprolol succinate (TOPROL-XL) 25 mg 24 hr tablet Take 1 tablet (25 mg total) by mouth daily 90 tablet 3    Mirabegron ER (Myrbetriq) 25 MG TB24 TAKE 1 TABLET BY MOUTH IN THE  MORNING 90 tablet 1    Multiple Vitamin (MULTI-VITAMIN DAILY) TABS Take by mouth daily       No current facility-administered medications for this visit.       Allergies   Allergen Reactions    Other Other (See Comments)     Other reaction(s): Anaphylaxis  Annotation - 31Mar2014: plums peaches, fruits with skin        Past Medical History:   Diagnosis Date    Abnormal ECG     Anxiety 2002    Arthritis      Bipolar 1 disorder (HCC)     Chronic kidney disease     stage 3    CPAP (continuous positive airway pressure) dependence     Depression     Disease of thyroid gland     Elevated blood pressure reading 06/10/2019    GERD (gastroesophageal reflux disease)     Obesity     RSV (acute bronchiolitis due to respiratory syncytial virus) 2023    Sleep apnea     Sleep apnea, obstructive        Past Surgical History:   Procedure Laterality Date    COLONOSCOPY         OB History          2    Para   2    Term   2            AB   0    Living   2         SAB        IAB        Ectopic        Multiple        Live Births   2                 Family History   Problem Relation Age of Onset    Heart disease Mother     Heart Valve Disease Mother         Replacement    Diabetes Mother         2002    Heart failure Mother         Heart Valve replacement    Arthritis Father     No Known Problems Sister     No Known Problems Daughter     No Known Problems Maternal Grandmother     No Known Problems Maternal Grandfather     No Known Problems Paternal Grandmother     No Known Problems Paternal Grandfather     No Known Problems Son     No Known Problems Maternal Aunt     No Known Problems Maternal Aunt     No Known Problems Maternal Aunt     No Known Problems Maternal Aunt     No Known Problems Maternal Aunt     No Known Problems Paternal Aunt     Alcohol abuse Son         [place], 40 year old son, has issues with alcohol and alcohol abuse       The following portions of the patient's history were reviewed and updated as appropriate: allergies, current medications, past family history, past medical history, past social history, past surgical history, and problem list.      Objective:    Blood pressure 122/82, pulse 97, temperature 98.3 °F (36.8 °C), temperature source Temporal, weight 103 kg (226 lb), SpO2 98%, not currently breastfeeding.  Body mass index is 42.7 kg/m².    Physical Exam  Vitals reviewed. Exam  conducted with a chaperone present.   Constitutional:       General: She is not in acute distress.     Appearance: Normal appearance. She is well-developed. She is obese. She is not ill-appearing, toxic-appearing or diaphoretic.   HENT:      Head: Normocephalic and atraumatic.   Eyes:      General: No scleral icterus.     Extraocular Movements: Extraocular movements intact.      Conjunctiva/sclera: Conjunctivae normal.   Neck:      Thyroid: No thyromegaly.   Cardiovascular:      Rate and Rhythm: Normal rate and regular rhythm.      Heart sounds: Normal heart sounds.   Pulmonary:      Effort: Pulmonary effort is normal.      Breath sounds: Normal breath sounds.   Abdominal:      General: There is no distension.      Palpations: Abdomen is soft. There is no mass.      Tenderness: There is no abdominal tenderness. There is no guarding or rebound.      Hernia: A hernia is present.      Comments: Umbilical hernia   Genitourinary:     Comments: The external female genitalia is normal. The bartholin's, uretheral and skenes glands are normal. The urethral meatus is normal (midline with no lesions). Anus without fissure or lesion. Speculum exam reveals vagina without lesion or discharge. Cervix is normal appearing without visible lesions. No bleeding.  There is a small cystocele/rectocele. Bimanual exam notes a mobile uterus measuring approximately 12 weeks in size with a distended lower segment.  With No tenderness. Adnexa without masses or tenderness. Bladder is without fullness, mass or tenderness.  No parametrial disease.    Musculoskeletal:         General: No swelling or tenderness.      Cervical back: Normal range of motion and neck supple.      Right lower leg: No edema.      Left lower leg: No edema.   Lymphadenopathy:      Cervical: No cervical adenopathy.   Skin:     General: Skin is warm and dry.      Coloration: Skin is not jaundiced or pale.      Findings: No lesion or rash.   Neurological:      General: No  "focal deficit present.      Mental Status: She is alert and oriented to person, place, and time. Mental status is at baseline.      Cranial Nerves: No cranial nerve deficit.      Motor: No weakness.      Gait: Gait normal.   Psychiatric:         Mood and Affect: Mood normal.         Behavior: Behavior normal.         Thought Content: Thought content normal.         Judgment: Judgment normal.           No results found for: \"\"  Lab Results   Component Value Date    WBC 7.98 06/06/2024    HGB 14.7 06/06/2024    HCT 45.9 06/06/2024    MCV 97 06/06/2024     (L) 06/06/2024     Lab Results   Component Value Date     (H) 11/17/2017    K 4.3 06/06/2024     (H) 06/06/2024    CO2 26 06/06/2024    ANIONGAP 10 08/04/2015    BUN 27 (H) 06/06/2024    CREATININE 1.49 (H) 06/06/2024    GLUCOSE 124 (H) 11/17/2017    GLUF 116 (H) 06/06/2024    CALCIUM 9.6 06/06/2024    CORRECTEDCA 10.1 11/21/2022    AST 12 (L) 06/06/2024    ALT 11 06/06/2024    ALKPHOS 76 06/06/2024    PROT 7.0 11/17/2017    BILITOT 0.4 11/17/2017    EGFR 35 06/06/2024     MRI pelvis female wo and w contrast  Narrative: MRI OF THE PELVIS WITH AND WITHOUT CONTRAST (GYNECOLOGIC)    INDICATION: 69 years / Female. N95.0: Postmenopausal bleeding  N85.8: Other specified noninflammatory disorders of uterus. Cervical mass on recent ultrasound    COMPARISON: Ultrasound, 7/18/2024    TECHNIQUE: Multiplanar/multisequence MRI of the female pelvis was performed before and after administration of contrast.    IV Contrast: 10 mL of Gadobutrol injection (SINGLE-DOSE)    FINDINGS:    UTERUS:  There is a cervical tumor in infiltrative pattern, measuring 2.3 x 4.3 x 4.1 cm. It does not extend into the vagina, rectum, bladder, pelvic sidewall or other pelvic structures. There is no definite spread beyond the serosa, although minimal serosal   indistinctness is demonstrated at 3-4:00 and 9:00 on images 31 and 32 of series 4, and 12:00, better demonstrated on " sagittal image 29 series 3. However, it is not confirmed on other sequences, especially contrast-enhanced. The tumor restricts diffusion.  There is abnormal endometrial thickening up to 8 mm, with heterogeneous enhancement and probable cystic changes. It is unclear whether it is related to cervical tumor, or reflects a primary dysplasia. It does not extend beyond the uterus.    ADNEXA:  Right:  Ovary normal in size and morphology.  No suspicious adnexal lesion. Unilocular cyst, 2.2 x 2.1 cm  No hydrosalpinx.    Left:  Ovary normal in size and morphology.  No suspicious adnexal lesion. Unilocular cyst, 2.1 x 2.6 cm  No hydrosalpinx.    URINARY BLADDER: Normal.    PELVIC CAVITY:  No lymphadenopathy by size criteria. Early enhancing left external iliac lymph node, maximum transverse dimension 8 mm, for example image 239 series 72500. No other suspicious lymph nodes.    BOWEL: No dilated loops of bowel.    VESSELS: No aneurysm.    PELVIC WALL: Intact. No hydronephrosis.    BONES: No suspicious osseous lesion.  Impression: 4.3 cm previously reported cervical tumor, as detailed above, compatible with cervical cancer.  Nonenlarged but early enhancing left external iliac lymph node, possibly metastatic.  Abnormal endometrium that may be secondary to the cervical tumor, or reflect primary dysplasia. No extension beyond the uterus.  Bilateral unilocular simple ovarian cysts.    Workstation performed: [id number]

## 2024-08-14 ENCOUNTER — OFFICE VISIT (OUTPATIENT)
Dept: FAMILY MEDICINE CLINIC | Facility: CLINIC | Age: 69
End: 2024-08-14
Payer: COMMERCIAL

## 2024-08-14 ENCOUNTER — LAB REQUISITION (OUTPATIENT)
Dept: LAB | Facility: HOSPITAL | Age: 69
End: 2024-08-14
Payer: COMMERCIAL

## 2024-08-14 ENCOUNTER — APPOINTMENT (OUTPATIENT)
Dept: LAB | Facility: AMBULARY SURGERY CENTER | Age: 69
End: 2024-08-14
Payer: COMMERCIAL

## 2024-08-14 VITALS
BODY MASS INDEX: 42.48 KG/M2 | SYSTOLIC BLOOD PRESSURE: 130 MMHG | DIASTOLIC BLOOD PRESSURE: 84 MMHG | OXYGEN SATURATION: 97 % | RESPIRATION RATE: 16 BRPM | WEIGHT: 225 LBS | HEART RATE: 78 BPM | HEIGHT: 61 IN | TEMPERATURE: 98.7 F

## 2024-08-14 DIAGNOSIS — E66.01 MORBID OBESITY WITH BMI OF 40.0-44.9, ADULT (HCC): ICD-10-CM

## 2024-08-14 DIAGNOSIS — Z99.89 CPAP (CONTINUOUS POSITIVE AIRWAY PRESSURE) DEPENDENCE: ICD-10-CM

## 2024-08-14 DIAGNOSIS — N18.32 STAGE 3B CHRONIC KIDNEY DISEASE (HCC): ICD-10-CM

## 2024-08-14 DIAGNOSIS — N88.8 CERVICAL MASS: ICD-10-CM

## 2024-08-14 DIAGNOSIS — N95.0 POSTMENOPAUSAL BLEEDING: ICD-10-CM

## 2024-08-14 DIAGNOSIS — N95.0 PMB (POSTMENOPAUSAL BLEEDING): ICD-10-CM

## 2024-08-14 DIAGNOSIS — N88.8 OTHER SPECIFIED NONINFLAMMATORY DISORDERS OF CERVIX UTERI: ICD-10-CM

## 2024-08-14 DIAGNOSIS — R73.01 IMPAIRED FASTING GLUCOSE: ICD-10-CM

## 2024-08-14 DIAGNOSIS — N95.0 PMB (POSTMENOPAUSAL BLEEDING): Primary | ICD-10-CM

## 2024-08-14 DIAGNOSIS — E78.1 HIGH TRIGLYCERIDES: ICD-10-CM

## 2024-08-14 DIAGNOSIS — E03.2 HYPOTHYROIDISM DUE TO MEDICATION: ICD-10-CM

## 2024-08-14 DIAGNOSIS — N88.8 NABOTHIAN GLAND CYST: Primary | ICD-10-CM

## 2024-08-14 DIAGNOSIS — G47.33 OBSTRUCTIVE SLEEP APNEA: ICD-10-CM

## 2024-08-14 PROBLEM — J02.9 SORE THROAT: Status: RESOLVED | Noted: 2024-07-02 | Resolved: 2024-08-14

## 2024-08-14 PROBLEM — R03.0 ELEVATED BLOOD PRESSURE READING WITHOUT DIAGNOSIS OF HYPERTENSION: Status: RESOLVED | Noted: 2019-06-10 | Resolved: 2024-08-14

## 2024-08-14 LAB
ABO GROUP BLD: NORMAL
ALBUMIN SERPL BCG-MCNC: 3.8 G/DL (ref 3.5–5)
ALP SERPL-CCNC: 66 U/L (ref 34–104)
ALT SERPL W P-5'-P-CCNC: 10 U/L (ref 7–52)
ANION GAP SERPL CALCULATED.3IONS-SCNC: 9 MMOL/L (ref 4–13)
AST SERPL W P-5'-P-CCNC: 10 U/L (ref 13–39)
BILIRUB SERPL-MCNC: 0.34 MG/DL (ref 0.2–1)
BLD GP AB SCN SERPL QL: NEGATIVE
BUN SERPL-MCNC: 24 MG/DL (ref 5–25)
CALCIUM SERPL-MCNC: 9.3 MG/DL (ref 8.4–10.2)
CANCER AG125 SERPL-ACNC: 45.2 U/ML (ref 0–35)
CHLORIDE SERPL-SCNC: 110 MMOL/L (ref 96–108)
CO2 SERPL-SCNC: 24 MMOL/L (ref 21–32)
CREAT SERPL-MCNC: 1.62 MG/DL (ref 0.6–1.3)
ERYTHROCYTE [DISTWIDTH] IN BLOOD BY AUTOMATED COUNT: 13 % (ref 11.6–15.1)
EST. AVERAGE GLUCOSE BLD GHB EST-MCNC: 111 MG/DL
GFR SERPL CREATININE-BSD FRML MDRD: 31 ML/MIN/1.73SQ M
GLUCOSE P FAST SERPL-MCNC: 90 MG/DL (ref 65–99)
HBA1C MFR BLD: 5.5 %
HCT VFR BLD AUTO: 36.9 % (ref 34.8–46.1)
HGB BLD-MCNC: 11.6 G/DL (ref 11.5–15.4)
INR PPP: 0.98 (ref 0.85–1.19)
MCH RBC QN AUTO: 29.6 PG (ref 26.8–34.3)
MCHC RBC AUTO-ENTMCNC: 31.4 G/DL (ref 31.4–37.4)
MCV RBC AUTO: 94 FL (ref 82–98)
PLATELET # BLD AUTO: 190 THOUSANDS/UL (ref 149–390)
PMV BLD AUTO: 12.6 FL (ref 8.9–12.7)
POTASSIUM SERPL-SCNC: 4.4 MMOL/L (ref 3.5–5.3)
PROT SERPL-MCNC: 6.7 G/DL (ref 6.4–8.4)
PROTHROMBIN TIME: 13.3 SECONDS (ref 12.3–15)
RBC # BLD AUTO: 3.92 MILLION/UL (ref 3.81–5.12)
RH BLD: POSITIVE
SODIUM SERPL-SCNC: 143 MMOL/L (ref 135–147)
SPECIMEN EXPIRATION DATE: NORMAL
WBC # BLD AUTO: 6.76 THOUSAND/UL (ref 4.31–10.16)

## 2024-08-14 PROCEDURE — 86900 BLOOD TYPING SEROLOGIC ABO: CPT | Performed by: OBSTETRICS & GYNECOLOGY

## 2024-08-14 PROCEDURE — 80053 COMPREHEN METABOLIC PANEL: CPT

## 2024-08-14 PROCEDURE — 36415 COLL VENOUS BLD VENIPUNCTURE: CPT

## 2024-08-14 PROCEDURE — G2211 COMPLEX E/M VISIT ADD ON: HCPCS | Performed by: FAMILY MEDICINE

## 2024-08-14 PROCEDURE — 99214 OFFICE O/P EST MOD 30 MIN: CPT | Performed by: FAMILY MEDICINE

## 2024-08-14 PROCEDURE — 85027 COMPLETE CBC AUTOMATED: CPT

## 2024-08-14 PROCEDURE — 86901 BLOOD TYPING SEROLOGIC RH(D): CPT | Performed by: OBSTETRICS & GYNECOLOGY

## 2024-08-14 PROCEDURE — 86850 RBC ANTIBODY SCREEN: CPT | Performed by: OBSTETRICS & GYNECOLOGY

## 2024-08-14 PROCEDURE — 85610 PROTHROMBIN TIME: CPT

## 2024-08-14 PROCEDURE — 93005 ELECTROCARDIOGRAM TRACING: CPT

## 2024-08-14 PROCEDURE — 86304 IMMUNOASSAY TUMOR CA 125: CPT

## 2024-08-14 PROCEDURE — 83036 HEMOGLOBIN GLYCOSYLATED A1C: CPT

## 2024-08-14 NOTE — PROGRESS NOTES
Ambulatory Visit  Name: Cherelle Dash      : 1955      MRN: [id number]  Encounter Provider: Gretchen Mayorga DO  Encounter Date: 2024   Encounter department: [facility]    Assessment & Plan   1. PMB (postmenopausal bleeding)  Assessment & Plan:  Cervical mass  Schedule for surgery- Dannie schmid  2. Stage 3b chronic kidney disease (HCC)  Assessment & Plan:  Lab Results   Component Value Date    EGFR 35 2024    EGFR 37 2024    EGFR 32 2023    CREATININE 1.49 (H) 2024    CREATININE 1.43 (H) 2024    CREATININE 1.60 (H) 2023   Stable  Seeing nephrologist  3. Cervical mass  Assessment & Plan:  Scheduled for D & C  4. High triglycerides  Assessment & Plan:  stable  5. Morbid obesity with BMI of 40.0-44.9, adult (HCC)  Assessment & Plan:  Losing weight  6. CPAP (continuous positive airway pressure) dependence  7. Obstructive sleep apnea  Assessment & Plan:  Home sleep study normal  Schedule for in house  Will postpone due to upcoming surgery  8. Impaired fasting glucose  Assessment & Plan:  A1c ordered  9. Hypothyroidism due to medication  Assessment & Plan:  Tsh in range       History of Present Illness     Here for follow up  Started with bleeding   Seen by GYN  Had ultrasound and pelvic MRI  Had cervical mass and now has surgery scheduled  Will have D & C first for pathology          Review of Systems   Constitutional:  Positive for fatigue.   HENT: Negative.     Eyes: Negative.    Respiratory: Negative.     Cardiovascular: Negative.    Gastrointestinal: Negative.    Endocrine: Negative.    Genitourinary:  Positive for frequency and menstrual problem.   Musculoskeletal: Negative.    Allergic/Immunologic: Negative.    Neurological: Negative.    Hematological: Negative.    Psychiatric/Behavioral: Negative.       Medical History Reviewed by provider this encounter:       Objective     /84 (BP Location: Left arm, Patient Position: Sitting, Cuff Size:  "Standard)   Pulse 78   Temp 98.7 °F (37.1 °C) (Tympanic)   Resp 16   Ht 5' 1.26\" (1.556 m)   Wt 102 kg (225 lb)   SpO2 97%   BMI 42.15 kg/m²     Physical Exam  Vitals and nursing note reviewed.   Constitutional:       Appearance: Normal appearance. She is well-developed.   HENT:      Head: Normocephalic and atraumatic.      Right Ear: External ear normal.      Left Ear: External ear normal.      Nose: Nose normal.   Eyes:      Extraocular Movements: Extraocular movements intact.      Conjunctiva/sclera: Conjunctivae normal.      Pupils: Pupils are equal, round, and reactive to light.   Cardiovascular:      Rate and Rhythm: Normal rate and regular rhythm.      Heart sounds: Normal heart sounds.   Pulmonary:      Effort: Pulmonary effort is normal.      Breath sounds: Normal breath sounds.   Abdominal:      General: Abdomen is flat. Bowel sounds are normal.      Palpations: Abdomen is soft.   Musculoskeletal:         General: Normal range of motion.      Cervical back: Normal range of motion and neck supple.   Skin:     General: Skin is warm and dry.      Capillary Refill: Capillary refill takes less than 2 seconds.   Neurological:      Mental Status: She is alert and oriented to person, place, and time.   Psychiatric:         Behavior: Behavior normal.         Thought Content: Thought content normal.         Judgment: Judgment normal.       Administrative Statements   I have spent a total time of 15 minutes in caring for this patient on the day of the visit/encounter including Risks and benefits of tx options.  "

## 2024-08-14 NOTE — ASSESSMENT & PLAN NOTE
Lab Results   Component Value Date    EGFR 35 06/06/2024    EGFR 37 03/21/2024    EGFR 32 11/22/2023    CREATININE 1.49 (H) 06/06/2024    CREATININE 1.43 (H) 03/21/2024    CREATININE 1.60 (H) 11/22/2023   Stable  Seeing nephrologist

## 2024-08-16 ENCOUNTER — ANESTHESIA EVENT (OUTPATIENT)
Dept: PERIOP | Facility: HOSPITAL | Age: 69
End: 2024-08-16
Payer: COMMERCIAL

## 2024-08-19 NOTE — PRE-PROCEDURE INSTRUCTIONS
Pre-Surgery Instructions:   Medication Instructions    calcitriol (ROCALTROL) 0.25 mcg capsule Hold day of surgery.    Cholecalciferol (VITAMIN D) 2000 units CAPS Stop taking 7 days prior to surgery.    CRANBERRY PO Stop taking 7 days prior to surgery.    famotidine (PEPCID) 20 mg tablet Take day of surgery.    Glucosamine-Chondroit-Vit C-Mn (GLUCOSAMINE 1500 COMPLEX PO) Stop taking 7 days prior to surgery.    lurasidone (LATUDA) 20 mg tablet Take day of surgery.    megestrol (MEGACE) 40 mg tablet Hold day of surgery.    metoprolol succinate (TOPROL-XL) 25 mg 24 hr tablet Take day of surgery.    Mirabegron ER (Myrbetriq) 25 MG TB24 Hold day of surgery.    Multiple Vitamin (MULTI-VITAMIN DAILY) TABS Stop taking 7 days prior to surgery.      Medication instructions for day surgery reviewed, spouse present. Please use only a sip of water to take your instructed medications. Avoid all over the counter vitamins, supplements and NSAIDS for one week prior to surgery per anesthesia guidelines. Tylenol is ok to take as needed.     You will receive a call one business day prior to surgery with an arrival time and hospital directions. If your surgery is scheduled on a Monday, the hospital will be calling you on the Friday prior to your surgery. If you have not heard from anyone by 8pm, please call the hospital supervisor through the hospital  at [phone]. ([place] [phone] or [place] [phone]).    Do not eat or drink anything after midnight the night before your surgery, including candy, mints, lifesavers, or chewing gum. Do not drink alcohol 24hrs before your surgery. Try not to smoke at least 24hrs before your surgery.       Follow the pre surgery showering instructions as listed in the “My Surgical Experience Booklet” or otherwise provided by your surgeon's office. Do not use a blade to shave the surgical area 1 week before surgery. It is okay to use a clean electric clippers up to 24 hours before  surgery. Do not apply any lotions, creams, including makeup, cologne, deodorant, or perfumes after showering on the day of your surgery. Do not use dry shampoo, hair spray, hair gel, or any type of hair products.     No contact lenses, eye make-up, or artificial eyelashes. Remove nail polish, including gel polish, and any artificial, gel, or acrylic nails if possible. Remove all jewelry including rings and body piercing jewelry.     Wear causal clothing that is easy to take on and off. Consider your type of surgery.    Keep any valuables, jewelry, piercings at home. Please bring any specially ordered equipment (sling, braces) if indicated.    Arrange for a responsible person to drive you to and from the hospital on the day of your surgery. Please confirm the visitor policy for the day of your procedure when you receive your phone call with an arrival time.     Call the surgeon's office with any new illnesses, exposures, or additional questions prior to surgery.    Please reference your “My Surgical Experience Booklet” for additional information to prepare for your upcoming surgery.

## 2024-08-21 LAB
ATRIAL RATE: 67 BPM
P AXIS: 37 DEGREES
PR INTERVAL: 174 MS
QRS AXIS: -55 DEGREES
QRSD INTERVAL: 126 MS
QT INTERVAL: 442 MS
QTC INTERVAL: 467 MS
T WAVE AXIS: -2 DEGREES
VENTRICULAR RATE: 67 BPM

## 2024-08-21 PROCEDURE — 93010 ELECTROCARDIOGRAM REPORT: CPT | Performed by: INTERNAL MEDICINE

## 2024-08-23 ENCOUNTER — HOSPITAL ENCOUNTER (OUTPATIENT)
Facility: HOSPITAL | Age: 69
Setting detail: OUTPATIENT SURGERY
Discharge: HOME | End: 2024-08-23
Attending: OBSTETRICS & GYNECOLOGY | Admitting: OBSTETRICS & GYNECOLOGY
Payer: COMMERCIAL

## 2024-08-23 ENCOUNTER — ANESTHESIA (OUTPATIENT)
Dept: PERIOP | Facility: HOSPITAL | Age: 69
End: 2024-08-23
Payer: COMMERCIAL

## 2024-08-23 VITALS
HEIGHT: 61 IN | TEMPERATURE: 97.3 F | DIASTOLIC BLOOD PRESSURE: 76 MMHG | OXYGEN SATURATION: 99 % | HEART RATE: 71 BPM | WEIGHT: 225 LBS | BODY MASS INDEX: 42.48 KG/M2 | SYSTOLIC BLOOD PRESSURE: 144 MMHG | RESPIRATION RATE: 18 BRPM

## 2024-08-23 DIAGNOSIS — N88.8 CERVICAL MASS: ICD-10-CM

## 2024-08-23 DIAGNOSIS — N95.0 POST-MENOPAUSAL BLEEDING: ICD-10-CM

## 2024-08-23 DIAGNOSIS — E66.01 MORBID OBESITY WITH BMI OF 40.0-44.9, ADULT (HCC): Primary | ICD-10-CM

## 2024-08-23 LAB
ABO GROUP BLD: NORMAL
ABO GROUP BLD: NORMAL
BLD GP AB SCN SERPL QL: NEGATIVE
RH BLD: POSITIVE
RH BLD: POSITIVE
SPECIMEN EXPIRATION DATE: NORMAL

## 2024-08-23 PROCEDURE — 88305 TISSUE EXAM BY PATHOLOGIST: CPT | Performed by: STUDENT IN AN ORGANIZED HEALTH CARE EDUCATION/TRAINING PROGRAM

## 2024-08-23 PROCEDURE — 86901 BLOOD TYPING SEROLOGIC RH(D): CPT | Performed by: OBSTETRICS & GYNECOLOGY

## 2024-08-23 PROCEDURE — 88342 IMHCHEM/IMCYTCHM 1ST ANTB: CPT | Performed by: STUDENT IN AN ORGANIZED HEALTH CARE EDUCATION/TRAINING PROGRAM

## 2024-08-23 PROCEDURE — 86850 RBC ANTIBODY SCREEN: CPT | Performed by: OBSTETRICS & GYNECOLOGY

## 2024-08-23 PROCEDURE — 88341 IMHCHEM/IMCYTCHM EA ADD ANTB: CPT | Performed by: STUDENT IN AN ORGANIZED HEALTH CARE EDUCATION/TRAINING PROGRAM

## 2024-08-23 PROCEDURE — 86900 BLOOD TYPING SEROLOGIC ABO: CPT | Performed by: OBSTETRICS & GYNECOLOGY

## 2024-08-23 PROCEDURE — 58120 DILATION AND CURETTAGE: CPT | Performed by: OBSTETRICS & GYNECOLOGY

## 2024-08-23 RX ORDER — MEPERIDINE HYDROCHLORIDE 25 MG/ML
12.5 INJECTION INTRAMUSCULAR; INTRAVENOUS; SUBCUTANEOUS
Status: DISCONTINUED | OUTPATIENT
Start: 2024-08-23 | End: 2024-08-23 | Stop reason: HOSPADM

## 2024-08-23 RX ORDER — ONDANSETRON 2 MG/ML
4 INJECTION INTRAMUSCULAR; INTRAVENOUS EVERY 6 HOURS PRN
Status: DISCONTINUED | OUTPATIENT
Start: 2024-08-23 | End: 2024-08-23 | Stop reason: HOSPADM

## 2024-08-23 RX ORDER — ACETAMINOPHEN 325 MG/1
975 TABLET ORAL EVERY 6 HOURS PRN
Status: DISCONTINUED | OUTPATIENT
Start: 2024-08-23 | End: 2024-08-23 | Stop reason: HOSPADM

## 2024-08-23 RX ORDER — ONDANSETRON 2 MG/ML
INJECTION INTRAMUSCULAR; INTRAVENOUS AS NEEDED
Status: DISCONTINUED | OUTPATIENT
Start: 2024-08-23 | End: 2024-08-23

## 2024-08-23 RX ORDER — PROPOFOL 10 MG/ML
INJECTION, EMULSION INTRAVENOUS AS NEEDED
Status: DISCONTINUED | OUTPATIENT
Start: 2024-08-23 | End: 2024-08-23

## 2024-08-23 RX ORDER — SODIUM CHLORIDE, SODIUM LACTATE, POTASSIUM CHLORIDE, CALCIUM CHLORIDE 600; 310; 30; 20 MG/100ML; MG/100ML; MG/100ML; MG/100ML
125 INJECTION, SOLUTION INTRAVENOUS CONTINUOUS
Status: DISCONTINUED | OUTPATIENT
Start: 2024-08-23 | End: 2024-08-23 | Stop reason: HOSPADM

## 2024-08-23 RX ORDER — DEXAMETHASONE SODIUM PHOSPHATE 10 MG/ML
INJECTION, SOLUTION INTRAMUSCULAR; INTRAVENOUS AS NEEDED
Status: DISCONTINUED | OUTPATIENT
Start: 2024-08-23 | End: 2024-08-23

## 2024-08-23 RX ORDER — LIDOCAINE HYDROCHLORIDE 20 MG/ML
INJECTION, SOLUTION EPIDURAL; INFILTRATION; INTRACAUDAL; PERINEURAL AS NEEDED
Status: DISCONTINUED | OUTPATIENT
Start: 2024-08-23 | End: 2024-08-23

## 2024-08-23 RX ORDER — IBUPROFEN 600 MG/1
600 TABLET, FILM COATED ORAL EVERY 6 HOURS PRN
Status: DISCONTINUED | OUTPATIENT
Start: 2024-08-23 | End: 2024-08-23

## 2024-08-23 RX ORDER — FENTANYL CITRATE/PF 50 MCG/ML
25 SYRINGE (ML) INJECTION
Status: DISCONTINUED | OUTPATIENT
Start: 2024-08-23 | End: 2024-08-23 | Stop reason: HOSPADM

## 2024-08-23 RX ORDER — ACETAMINOPHEN 325 MG/1
975 TABLET ORAL ONCE
Status: COMPLETED | OUTPATIENT
Start: 2024-08-23 | End: 2024-08-23

## 2024-08-23 RX ORDER — FENTANYL CITRATE 50 UG/ML
INJECTION, SOLUTION INTRAMUSCULAR; INTRAVENOUS AS NEEDED
Status: DISCONTINUED | OUTPATIENT
Start: 2024-08-23 | End: 2024-08-23

## 2024-08-23 RX ADMIN — FENTANYL CITRATE 25 MCG: 50 INJECTION INTRAMUSCULAR; INTRAVENOUS at 07:39

## 2024-08-23 RX ADMIN — DEXAMETHASONE SODIUM PHOSPHATE 10 MG: 10 INJECTION, SOLUTION INTRAMUSCULAR; INTRAVENOUS at 07:43

## 2024-08-23 RX ADMIN — ONDANSETRON 4 MG: 2 INJECTION INTRAMUSCULAR; INTRAVENOUS at 07:43

## 2024-08-23 RX ADMIN — MEPERIDINE HYDROCHLORIDE 12.5 MG: 25 INJECTION INTRAMUSCULAR; INTRAVENOUS; SUBCUTANEOUS at 08:56

## 2024-08-23 RX ADMIN — SODIUM CHLORIDE, SODIUM LACTATE, POTASSIUM CHLORIDE, AND CALCIUM CHLORIDE: .6; .31; .03; .02 INJECTION, SOLUTION INTRAVENOUS at 07:15

## 2024-08-23 RX ADMIN — FENTANYL CITRATE 25 MCG: 50 INJECTION INTRAMUSCULAR; INTRAVENOUS at 08:06

## 2024-08-23 RX ADMIN — PROPOFOL 150 MG: 10 INJECTION, EMULSION INTRAVENOUS at 07:34

## 2024-08-23 RX ADMIN — FENTANYL CITRATE 25 MCG: 50 INJECTION INTRAMUSCULAR; INTRAVENOUS at 08:16

## 2024-08-23 RX ADMIN — LIDOCAINE HYDROCHLORIDE 100 MG: 20 INJECTION, SOLUTION EPIDURAL; INFILTRATION; INTRACAUDAL at 07:34

## 2024-08-23 RX ADMIN — FENTANYL CITRATE 25 MCG: 50 INJECTION INTRAMUSCULAR; INTRAVENOUS at 07:53

## 2024-08-23 RX ADMIN — ACETAMINOPHEN 975 MG: 325 TABLET ORAL at 05:58

## 2024-08-23 NOTE — ANESTHESIA PREPROCEDURE EVALUATION
Procedure:  DILATATION AND CURETTAGE (D&C) WITH HYSTEROSCOPY, EXAM UNDER ANESTHESIA (Uterus)  CERVICAL BIOPSIES (Cervix)    Relevant Problems   ANESTHESIA (within normal limits)      CARDIO   (+) High triglycerides   (+) LBBB (left bundle branch block)   (+) Premature ventricular contractions   (+) Raynaud's disease without gangrene      ENDO   (+) Hypothyroidism   (+) Secondary hyperparathyroidism of renal origin (HCC)      GI/HEPATIC   (+) Gastroesophageal reflux disease without esophagitis      /RENAL   (+) Stage 3 chronic kidney disease (HCC)      MUSCULOSKELETAL   (+) Patellofemoral arthritis of right knee   (+) Primary osteoarthritis of left knee   (+) Primary osteoarthritis of right knee      NEURO/PSYCH   (+) Depression with anxiety      PULMONARY   (+) Obstructive sleep apnea      Behavioral Health   (+) Bipolar 1 disorder, mixed, severe (HCC)      Other   (+) Cervical mass   (+) Morbid obesity with BMI of 40.0-44.9, adult ([facility])        Physical Exam    Airway    Mallampati score: II  TM Distance: >3 FB  Neck ROM: full     Dental   No notable dental hx     Cardiovascular      Pulmonary      Other Findings  post-pubertal.      Anesthesia Plan  ASA Score- 3     Anesthesia Type- general with ASA Monitors.         Additional Monitors:     Airway Plan: LMA.           Plan Factors-Exercise tolerance (METS): >4 METS.    Chart reviewed. EKG reviewed.  Existing labs reviewed. Patient summary reviewed.    Patient is not a current smoker.              Induction-     Postoperative Plan-         Informed Consent- Anesthetic plan and risks discussed with patient.  I personally reviewed this patient with the CRNA. Discussed and agreed on the Anesthesia Plan with the CRNA..        
No

## 2024-08-23 NOTE — ANESTHESIA POSTPROCEDURE EVALUATION
Post-Op Assessment Note    CV Status:  Stable  Pain Score: 0    Pain management: adequate       Mental Status:  Alert and awake   Hydration Status:  Euvolemic   PONV Controlled:  Controlled   Airway Patency:  Patent     Post Op Vitals Reviewed: Yes    No anethesia notable event occurred.    Staff: CRNA               /69 (08/23/24 0821)    Temp 99 °F (37.2 °C) (08/23/24 0821)    Pulse 78 (08/23/24 0821)   Resp 16 (08/23/24 0821)    SpO2 99 % (08/23/24 0821)

## 2024-08-23 NOTE — INTERVAL H&P NOTE
H&P reviewed. After examining the patient I find no changes in the patients condition since the H&P had been written.    Vitals:    08/23/24 0551   BP: 131/71   Pulse: 98   Resp: 18   Temp: 97.8 °F (36.6 °C)   SpO2: 94%

## 2024-08-23 NOTE — OP NOTE
OPERATIVE REPORT  PATIENT NAME: Cherelle Dash    :  1955  MRN: [id number]  Pt Location: [facility] OR ROOM 06    SURGERY DATE: 2024    Surgeons and Role:     * Evin Page MD - Primary     * Jacky Page MD - Assisting    Preop Diagnosis:  Cervical mass [N88.8]  Post-menopausal bleeding [N95.0]    Post-Op Diagnosis Codes:     * Cervical mass [N88.8]     * Post-menopausal bleeding [N95.0]    Procedure(s):  EXAM UNDER ANESTHESIA. DILATATION AND CURETTAGE. CERVICAL BIOPSIES    Specimen(s):  ID Type Source Tests Collected by Time Destination   1 : [facility] [place] Tissue Endometrium TISSUE EXAM Evin Page MD 2024  7:54 AM    2 : [facility] [place] #2 Tissue Endometrium TISSUE EXAM Evin Page MD 2024  7:59 AM    3 : Cervical biopsies Tissue Cervix TISSUE EXAM Evin Page MD 2024  8:05 AM        Estimated Blood Loss:   Minimal    Drains:  * No LDAs found *    Anesthesia Type:   General/LMA    Operative Indications:  Cervical mass [N88.8]  Post-menopausal bleeding [N95.0]  69-year-old with postmenopausal bleeding, cervical mass presents for exam under anesthesia, D&C, cervical biopsies to establish site of origin    Operative Findings:  1.  Exam under anesthesia revealed lichen sclerosis of the vulva.  Speculum examination revealed a grossly normal vagina and exocervix.  There was a cystocele present.  The uterus was enlarged measuring approximately 13 weeks in size.  There were no palpable adnexal masses.  No parametrial disease.  2.  During cervical dilation, there was noted to be a cervical mass at the endocervix causing stenosis.  3.  Based on exam under anesthesia and curettage findings, suspect endometrial cancer with lower uterine segment/upper endocervical involvement.      Complications:   None    Procedure and Technique:  After informed consent was obtained, the patient was taken the operating room where general anesthesia was administered via LMA.  She was then  prepped and draped in normal sterile fashion in the dorsal lithotomy position.  Examination under anesthesia was then performed with findings noted as above.  A speculum was placed in the vagina.  The anterior lip of the cervix was grasped with single-tooth tenaculum.  The uterus was then sounded to 12 cm.  The cervix was then dilated with Steele dilators.  A sharp curettage was then performed with necrotic appearing tissue evident in the lower portion of the endometrial cavity.  Frozen section was obtained.  Cervical biopsies were then obtained using an 18-gauge core biopsy instrument.  Multiple cores were obtained particularly of the anterior cervix which was noted by MRI to have most infiltration with tumor.  Hemostasis on the cervix was then achieved using cautery.  The patient was then awakened and transferred to the recovery room in stable condition.  All instrument counts correct x 2 for procedure.  No complications.  Estimate blood loss is 70 mL.   I was present for the entire procedure.    Patient Disposition:  PACU         SIGNATURE: Evin Page MD  DATE: August 23, 2024  TIME: 8:09 AM

## 2024-09-05 ENCOUNTER — NURSE TRIAGE (OUTPATIENT)
Age: 69
End: 2024-09-05

## 2024-09-05 ENCOUNTER — APPOINTMENT (EMERGENCY)
Dept: RADIOLOGY | Facility: HOSPITAL | Age: 69
End: 2024-09-05
Payer: COMMERCIAL

## 2024-09-05 ENCOUNTER — HOSPITAL ENCOUNTER (OUTPATIENT)
Dept: VASCULAR ULTRASOUND | Facility: HOSPITAL | Age: 69
Setting detail: OBSERVATION
Discharge: HOME | End: 2024-09-05
Payer: COMMERCIAL

## 2024-09-05 ENCOUNTER — HOSPITAL ENCOUNTER (OUTPATIENT)
Facility: HOSPITAL | Age: 69
Setting detail: OBSERVATION
Discharge: HOME | End: 2024-09-06
Attending: EMERGENCY MEDICINE | Admitting: STUDENT IN AN ORGANIZED HEALTH CARE EDUCATION/TRAINING PROGRAM
Payer: COMMERCIAL

## 2024-09-05 ENCOUNTER — APPOINTMENT (EMERGENCY)
Dept: CT IMAGING | Facility: HOSPITAL | Age: 69
End: 2024-09-05
Payer: COMMERCIAL

## 2024-09-05 DIAGNOSIS — I50.9 HEART FAILURE (HCC): ICD-10-CM

## 2024-09-05 DIAGNOSIS — R91.8 PULMONARY NODULES: ICD-10-CM

## 2024-09-05 DIAGNOSIS — R06.00 DYSPNEA, UNSPECIFIED TYPE: ICD-10-CM

## 2024-09-05 DIAGNOSIS — I26.99 PULMONARY EMBOLI (HCC): Primary | ICD-10-CM

## 2024-09-05 LAB
2HR DELTA HS TROPONIN: 0 NG/L
4HR DELTA HS TROPONIN: 1 NG/L
ALBUMIN SERPL BCG-MCNC: 3.8 G/DL (ref 3.5–5)
ALP SERPL-CCNC: 61 U/L (ref 34–104)
ALT SERPL W P-5'-P-CCNC: 11 U/L (ref 7–52)
ANION GAP SERPL CALCULATED.3IONS-SCNC: 5 MMOL/L (ref 4–13)
APTT PPP: 24 SECONDS (ref 23–34)
APTT PPP: >210 SECONDS (ref 23–34)
AST SERPL W P-5'-P-CCNC: 11 U/L (ref 13–39)
ATRIAL RATE: 81 BPM
BASOPHILS # BLD AUTO: 0.04 THOUSANDS/ÂΜL (ref 0–0.1)
BASOPHILS NFR BLD AUTO: 1 % (ref 0–1)
BILIRUB SERPL-MCNC: 0.39 MG/DL (ref 0.2–1)
BNP SERPL-MCNC: 36 PG/ML (ref 0–100)
BUN SERPL-MCNC: 20 MG/DL (ref 5–25)
CALCIUM SERPL-MCNC: 9.3 MG/DL (ref 8.4–10.2)
CARDIAC TROPONIN I PNL SERPL HS: 21 NG/L
CARDIAC TROPONIN I PNL SERPL HS: 21 NG/L
CARDIAC TROPONIN I PNL SERPL HS: 22 NG/L
CHLORIDE SERPL-SCNC: 112 MMOL/L (ref 96–108)
CO2 SERPL-SCNC: 23 MMOL/L (ref 21–32)
CREAT SERPL-MCNC: 1.63 MG/DL (ref 0.6–1.3)
D DIMER PPP FEU-MCNC: 8.32 UG/ML FEU
DEPRECATED AT III PPP: 113 % OF NORMAL (ref 92–136)
EOSINOPHIL # BLD AUTO: 0.21 THOUSAND/ÂΜL (ref 0–0.61)
EOSINOPHIL NFR BLD AUTO: 3 % (ref 0–6)
ERYTHROCYTE [DISTWIDTH] IN BLOOD BY AUTOMATED COUNT: 13 % (ref 11.6–15.1)
FLUAV RNA RESP QL NAA+PROBE: NEGATIVE
FLUBV RNA RESP QL NAA+PROBE: NEGATIVE
GFR SERPL CREATININE-BSD FRML MDRD: 31 ML/MIN/1.73SQ M
GLUCOSE SERPL-MCNC: 127 MG/DL (ref 65–140)
HCT VFR BLD AUTO: 36.4 % (ref 34.8–46.1)
HGB BLD-MCNC: 11.7 G/DL (ref 11.5–15.4)
IMM GRANULOCYTES # BLD AUTO: 0.03 THOUSAND/UL (ref 0–0.2)
IMM GRANULOCYTES NFR BLD AUTO: 0 % (ref 0–2)
INR PPP: 1.04 (ref 0.85–1.19)
LYMPHOCYTES # BLD AUTO: 1.26 THOUSANDS/ÂΜL (ref 0.6–4.47)
LYMPHOCYTES NFR BLD AUTO: 18 % (ref 14–44)
MCH RBC QN AUTO: 29.4 PG (ref 26.8–34.3)
MCHC RBC AUTO-ENTMCNC: 32.1 G/DL (ref 31.4–37.4)
MCV RBC AUTO: 92 FL (ref 82–98)
MONOCYTES # BLD AUTO: 0.52 THOUSAND/ÂΜL (ref 0.17–1.22)
MONOCYTES NFR BLD AUTO: 7 % (ref 4–12)
NEUTROPHILS # BLD AUTO: 4.94 THOUSANDS/ÂΜL (ref 1.85–7.62)
NEUTS SEG NFR BLD AUTO: 71 % (ref 43–75)
NRBC BLD AUTO-RTO: 0 /100 WBCS
P AXIS: 68 DEGREES
PLATELET # BLD AUTO: 126 THOUSANDS/UL (ref 149–390)
PMV BLD AUTO: 12.5 FL (ref 8.9–12.7)
POTASSIUM SERPL-SCNC: 4.1 MMOL/L (ref 3.5–5.3)
PR INTERVAL: 156 MS
PROT SERPL-MCNC: 7 G/DL (ref 6.4–8.4)
PROTHROMBIN TIME: 14.3 SECONDS (ref 12.3–15)
QRS AXIS: -61 DEGREES
QRSD INTERVAL: 126 MS
QT INTERVAL: 394 MS
QTC INTERVAL: 457 MS
RBC # BLD AUTO: 3.98 MILLION/UL (ref 3.81–5.12)
RSV RNA RESP QL NAA+PROBE: NEGATIVE
SARS-COV-2 RNA RESP QL NAA+PROBE: NEGATIVE
SODIUM SERPL-SCNC: 140 MMOL/L (ref 135–147)
T WAVE AXIS: 73 DEGREES
VENTRICULAR RATE: 81 BPM
WBC # BLD AUTO: 7 THOUSAND/UL (ref 4.31–10.16)

## 2024-09-05 PROCEDURE — 85705 THROMBOPLASTIN INHIBITION: CPT | Performed by: EMERGENCY MEDICINE

## 2024-09-05 PROCEDURE — 85610 PROTHROMBIN TIME: CPT | Performed by: EMERGENCY MEDICINE

## 2024-09-05 PROCEDURE — 93970 EXTREMITY STUDY: CPT

## 2024-09-05 PROCEDURE — 85732 THROMBOPLASTIN TIME PARTIAL: CPT | Performed by: EMERGENCY MEDICINE

## 2024-09-05 PROCEDURE — 71275 CT ANGIOGRAPHY CHEST: CPT

## 2024-09-05 PROCEDURE — 85306 CLOT INHIBIT PROT S FREE: CPT | Performed by: EMERGENCY MEDICINE

## 2024-09-05 PROCEDURE — 85670 THROMBIN TIME PLASMA: CPT | Performed by: EMERGENCY MEDICINE

## 2024-09-05 PROCEDURE — 85613 RUSSELL VIPER VENOM DILUTED: CPT | Performed by: EMERGENCY MEDICINE

## 2024-09-05 PROCEDURE — 93005 ELECTROCARDIOGRAM TRACING: CPT

## 2024-09-05 PROCEDURE — 85305 CLOT INHIBIT PROT S TOTAL: CPT | Performed by: EMERGENCY MEDICINE

## 2024-09-05 PROCEDURE — 0241U HB NFCT DS VIR RESP RNA 4 TRGT: CPT | Performed by: EMERGENCY MEDICINE

## 2024-09-05 PROCEDURE — 85730 THROMBOPLASTIN TIME PARTIAL: CPT | Performed by: STUDENT IN AN ORGANIZED HEALTH CARE EDUCATION/TRAINING PROGRAM

## 2024-09-05 PROCEDURE — 86147 CARDIOLIPIN ANTIBODY EA IG: CPT | Performed by: EMERGENCY MEDICINE

## 2024-09-05 PROCEDURE — 71045 X-RAY EXAM CHEST 1 VIEW: CPT

## 2024-09-05 PROCEDURE — 99285 EMERGENCY DEPT VISIT HI MDM: CPT

## 2024-09-05 PROCEDURE — 85025 COMPLETE CBC W/AUTO DIFF WBC: CPT | Performed by: EMERGENCY MEDICINE

## 2024-09-05 PROCEDURE — 93010 ELECTROCARDIOGRAM REPORT: CPT | Performed by: INTERNAL MEDICINE

## 2024-09-05 PROCEDURE — 99223 1ST HOSP IP/OBS HIGH 75: CPT | Performed by: STUDENT IN AN ORGANIZED HEALTH CARE EDUCATION/TRAINING PROGRAM

## 2024-09-05 PROCEDURE — 99285 EMERGENCY DEPT VISIT HI MDM: CPT | Performed by: EMERGENCY MEDICINE

## 2024-09-05 PROCEDURE — 83880 ASSAY OF NATRIURETIC PEPTIDE: CPT | Performed by: EMERGENCY MEDICINE

## 2024-09-05 PROCEDURE — 85730 THROMBOPLASTIN TIME PARTIAL: CPT | Performed by: EMERGENCY MEDICINE

## 2024-09-05 PROCEDURE — 93970 EXTREMITY STUDY: CPT | Performed by: SURGERY

## 2024-09-05 PROCEDURE — 96360 HYDRATION IV INFUSION INIT: CPT

## 2024-09-05 PROCEDURE — 84484 ASSAY OF TROPONIN QUANT: CPT | Performed by: EMERGENCY MEDICINE

## 2024-09-05 PROCEDURE — 85379 FIBRIN DEGRADATION QUANT: CPT | Performed by: EMERGENCY MEDICINE

## 2024-09-05 PROCEDURE — 96361 HYDRATE IV INFUSION ADD-ON: CPT

## 2024-09-05 PROCEDURE — 36415 COLL VENOUS BLD VENIPUNCTURE: CPT | Performed by: EMERGENCY MEDICINE

## 2024-09-05 PROCEDURE — 80053 COMPREHEN METABOLIC PANEL: CPT | Performed by: EMERGENCY MEDICINE

## 2024-09-05 PROCEDURE — 86146 BETA-2 GLYCOPROTEIN ANTIBODY: CPT | Performed by: EMERGENCY MEDICINE

## 2024-09-05 PROCEDURE — 85303 CLOT INHIBIT PROT C ACTIVITY: CPT | Performed by: EMERGENCY MEDICINE

## 2024-09-05 PROCEDURE — 85300 ANTITHROMBIN III ACTIVITY: CPT | Performed by: EMERGENCY MEDICINE

## 2024-09-05 RX ORDER — HEPARIN SODIUM 1000 [USP'U]/ML
8000 INJECTION, SOLUTION INTRAVENOUS; SUBCUTANEOUS ONCE
Status: COMPLETED | OUTPATIENT
Start: 2024-09-05 | End: 2024-09-05

## 2024-09-05 RX ORDER — FAMOTIDINE 20 MG/1
20 TABLET, FILM COATED ORAL DAILY
Status: DISCONTINUED | OUTPATIENT
Start: 2024-09-05 | End: 2024-09-06 | Stop reason: HOSPADM

## 2024-09-05 RX ORDER — HEPARIN SODIUM 1000 [USP'U]/ML
8000 INJECTION, SOLUTION INTRAVENOUS; SUBCUTANEOUS EVERY 6 HOURS PRN
Status: DISCONTINUED | OUTPATIENT
Start: 2024-09-05 | End: 2024-09-06

## 2024-09-05 RX ORDER — ACETAMINOPHEN 325 MG/1
650 TABLET ORAL EVERY 6 HOURS PRN
Status: DISCONTINUED | OUTPATIENT
Start: 2024-09-05 | End: 2024-09-06 | Stop reason: HOSPADM

## 2024-09-05 RX ORDER — OXYBUTYNIN CHLORIDE 5 MG/1
5 TABLET, EXTENDED RELEASE ORAL DAILY
Status: DISCONTINUED | OUTPATIENT
Start: 2024-09-05 | End: 2024-09-06 | Stop reason: HOSPADM

## 2024-09-05 RX ORDER — LURASIDONE HYDROCHLORIDE 20 MG/1
20 TABLET, FILM COATED ORAL
Status: DISCONTINUED | OUTPATIENT
Start: 2024-09-06 | End: 2024-09-06 | Stop reason: HOSPADM

## 2024-09-05 RX ORDER — CALCITRIOL 0.25 UG/1
0.25 CAPSULE, LIQUID FILLED ORAL 3 TIMES WEEKLY
Status: DISCONTINUED | OUTPATIENT
Start: 2024-09-06 | End: 2024-09-06 | Stop reason: HOSPADM

## 2024-09-05 RX ORDER — METOPROLOL SUCCINATE 25 MG/1
25 TABLET, EXTENDED RELEASE ORAL DAILY
Status: DISCONTINUED | OUTPATIENT
Start: 2024-09-05 | End: 2024-09-06 | Stop reason: HOSPADM

## 2024-09-05 RX ORDER — HEPARIN SODIUM 10000 [USP'U]/100ML
3-30 INJECTION, SOLUTION INTRAVENOUS
Status: DISCONTINUED | OUTPATIENT
Start: 2024-09-05 | End: 2024-09-06

## 2024-09-05 RX ORDER — HEPARIN SODIUM 1000 [USP'U]/ML
4000 INJECTION, SOLUTION INTRAVENOUS; SUBCUTANEOUS EVERY 6 HOURS PRN
Status: DISCONTINUED | OUTPATIENT
Start: 2024-09-05 | End: 2024-09-06

## 2024-09-05 RX ADMIN — FAMOTIDINE 20 MG: 20 TABLET, FILM COATED ORAL at 15:29

## 2024-09-05 RX ADMIN — IOHEXOL 70 ML: 350 INJECTION, SOLUTION INTRAVENOUS at 12:10

## 2024-09-05 RX ADMIN — HEPARIN SODIUM 8000 UNITS: 1000 INJECTION INTRAVENOUS; SUBCUTANEOUS at 15:05

## 2024-09-05 RX ADMIN — HEPARIN SODIUM 18 UNITS/KG/HR: 10000 INJECTION, SOLUTION INTRAVENOUS at 15:07

## 2024-09-05 RX ADMIN — SODIUM CHLORIDE 500 ML: 0.9 INJECTION, SOLUTION INTRAVENOUS at 12:19

## 2024-09-05 NOTE — ASSESSMENT & PLAN NOTE
Status post D&C with OB/GYN on 8/23/2024  Denies any further bleeding episodes since D&C  Will hold Megace in setting of pulmonary embolism

## 2024-09-05 NOTE — TELEPHONE ENCOUNTER
"Pt experiencing moderate SOB with exertion. She recently started a weight loss pill, but stopped taking it three days ago. She received an RSV shot last Thursday and a few days after this  Denies any chest tightness  SpO2 99%, HR 96  Denies any PVC's  /82,   Denies any edema or dizziness  Pt will go to the ED to  be evaluated    Reason for Disposition   MODERATE difficulty breathing (e.g., speaks in phrases, SOB even at rest, pulse 100-120) of new-onset or worse than normal    Answer Assessment - Initial Assessment Questions  1. RESPIRATORY STATUS: \"Describe your breathing?\" (e.g., wheezing, shortness of breath, unable to speak, severe coughing)       SOB  2. ONSET: \"When did this breathing problem begin?\"       3 days ago  3. PATTERN \"Does the difficult breathing come and go, or has it been constant since it started?\"       Ok at rest, SOB with exertion  4. SEVERITY: \"How bad is your breathing?\" (e.g., mild, moderate, severe)     - MILD: No SOB at rest, mild SOB with walking, speaks normally in sentences, can lay down, no retractions, pulse < 100.     - MODERATE: SOB at rest, SOB with minimal exertion and prefers to sit, cannot lie down flat, speaks in phrases, mild retractions, audible wheezing, pulse 100-120.     - SEVERE: Very SOB at rest, speaks in single words, struggling to breathe, sitting hunched forward, retractions, pulse > 120       Mild to moderate  5. RECURRENT SYMPTOM: \"Have you had difficulty breathing before?\" If Yes, ask: \"When was the last time?\" and \"What happened that time?\"       no  6. CARDIAC HISTORY: \"Do you have any history of heart disease?\" (e.g., heart attack, angina, bypass surgery, angioplasty)       unknown  7. LUNG HISTORY: \"Do you have any history of lung disease?\"  (e.g., pulmonary embolus, asthma, emphysema)      *No Answer*  8. CAUSE: \"What do you think is causing the breathing problem?\"       unknown  9. OTHER SYMPTOMS: \"Do you have any other symptoms? (e.g., " "dizziness, runny nose, cough, chest pain, fever)      *No Answer*  10. PREGNANCY: \"Is there any chance you are pregnant?\" \"When was your last menstrual period?\"        *No Answer*  11. TRAVEL: \"Have you traveled out of the country in the last month?\" (e.g., travel history, exposures)        *No Answer*    Protocols used: Breathing Difficulty-ADULT-[place]    "

## 2024-09-05 NOTE — ED PROCEDURE NOTE
Procedure  POC Cardiac US    Date/Time: 9/5/2024 3:53 PM    Performed by: Toni MI DO  Authorized by: Toni MI DO    Patient location:  ED  Other Assisting Provider: Yes (comment) (Dr. Sharyn Archibald,  Resident, Dr. Rogelio Oakley,  Resident, and [place], Medical Student)    Procedure details:     Exam Type:  Educational    Indications: dyspnea      Assessment / Evaluation for: cardiac function, pericardial effusion and right heart strain (suspected pulmonary embolism)      Assessment / Evaluation for comment:  Known pulmonary emboli    Exam Type: initial exam      Image quality: limited diagnostic      Image availability:  Images available in PACS and video obtained  Patient Details:     Cardiac Rhythm:  Regular    Mechanical ventilation: No    Cardiac findings:     Echo technique: limited 2D      Views obtained: parasternal long axis, parasternal short axis, subcostal and apical      Pericardial effusion: absent      Tamponade physiology: absent      Wall motion: normal      LV systolic function: normal      RV dilation: none    Pulmonary findings:     Left Lung Findings: left lung sliding      Right lung findings: right lung sliding      B-lines: no B-lines present                     Toni MI DO  09/05/24 [id number]

## 2024-09-05 NOTE — ASSESSMENT & PLAN NOTE
Lab Results   Component Value Date    EGFR 31 09/05/2024    EGFR 31 08/14/2024    EGFR 35 06/06/2024    CREATININE 1.63 (H) 09/05/2024    CREATININE 1.62 (H) 08/14/2024    CREATININE 1.49 (H) 06/06/2024     Patient follows with nephrology baseline seems to be around 1.4-1.6

## 2024-09-05 NOTE — ED PROVIDER NOTES
History  Chief Complaint   Patient presents with    Shortness of Breath     X couple days        Shortness of Breath  Associated symptoms: no abdominal pain, no chest pain, no cough, no ear pain, no fever, no rash, no sore throat and no vomiting        69yoF, CKD, JULIO, D and C on 8/23 for abnormal uterine bleed, presenting to ER with LOMBARDI over last week. Pt denies CP, BLE edema, orthopnea, calf/popliteal pain, fever, chills, cough, sputum production. Questions sore throat.     Prior to Admission Medications   Prescriptions Last Dose Informant Patient Reported? Taking?   CRANBERRY PO 9/5/2024 Self Yes Yes   Sig: Take by mouth   Cholecalciferol (VITAMIN D) 2000 units CAPS 9/5/2024 Self Yes Yes   Sig: Take 1 capsule by mouth daily   Glucosamine-Chondroit-Vit C-Mn (GLUCOSAMINE 1500 COMPLEX PO) 9/5/2024 Self Yes Yes   Sig: Take by mouth in the morning   Ivermectin 1 % CREA 9/5/2024 Self Yes Yes   Mirabegron ER (Myrbetriq) 25 MG TB24 9/5/2024 Self No Yes   Sig: TAKE 1 TABLET BY MOUTH IN THE  MORNING   Multiple Vitamin (MULTI-VITAMIN DAILY) TABS 9/5/2024 Self Yes Yes   Sig: Take by mouth daily   calcitriol (ROCALTROL) 0.25 mcg capsule 9/5/2024 Self No Yes   Sig: Take 1 capsule (0.25 mcg total) by mouth 3 (three) times a week   ciclopirox (LOPROX) 0.77 % cream 9/5/2024 Self Yes Yes   clobetasol (TEMOVATE) 0.05 % ointment 9/5/2024 Self No Yes   Sig: Apply topically 2 (two) times a week   famotidine (PEPCID) 20 mg tablet 9/5/2024 Self No Yes   Sig: TAKE 1 TABLET BY MOUTH TWICE  DAILY   lurasidone (LATUDA) 20 mg tablet 9/5/2024 Self No Yes   Sig: TAKE 1 TABLET BY MOUTH DAILY  WITH BREAKFAST   megestrol (MEGACE) 40 mg tablet 9/5/2024  No Yes   Sig: Take 1 tablet (40 mg total) by mouth 2 (two) times a day   metoprolol succinate (TOPROL-XL) 25 mg 24 hr tablet 9/5/2024 Self No Yes   Sig: Take 1 tablet (25 mg total) by mouth daily      Facility-Administered Medications: None       Past Medical History:   Diagnosis Date    Abnormal  ECG     Anxiety 2002    Arthritis     Bipolar 1 disorder (HCC)     Chronic kidney disease     stage 3    CPAP (continuous positive airway pressure) dependence     Depression 2001    Disease of thyroid gland     Elevated blood pressure reading 06/10/2019    GERD (gastroesophageal reflux disease)     Obesity     RSV (acute bronchiolitis due to respiratory syncytial virus) 12/05/2023    Sleep apnea     Sleep apnea, obstructive 2007       Past Surgical History:   Procedure Laterality Date    COLONOSCOPY  2011    SD HYSTEROSCOPY BX ENDOMETRIUM&/POLYPC W/WO D&C N/A 8/23/2024    Procedure: EXAM UNDER ANESTHESIA, DILATATION AND CURETTAGE, CERVICAL BIOPSIES;  Surgeon: Evin Page MD;  Location: [facility];  Service: Gynecology Oncology       Family History   Problem Relation Age of Onset    Heart disease Mother     Heart Valve Disease Mother         Replacement    Diabetes Mother         2002    Heart failure Mother         Heart Valve replacement    Arthritis Father     No Known Problems Sister     No Known Problems Daughter     No Known Problems Maternal Grandmother     No Known Problems Maternal Grandfather     No Known Problems Paternal Grandmother     No Known Problems Paternal Grandfather     No Known Problems Son     No Known Problems Maternal Aunt     No Known Problems Maternal Aunt     No Known Problems Maternal Aunt     No Known Problems Maternal Aunt     No Known Problems Maternal Aunt     No Known Problems Paternal Aunt     Alcohol abuse Son         Kolton, 40 year old son, has issues with alcohol and alcohol abuse     I have reviewed and agree with the history as documented.    E-Cigarette/Vaping    E-Cigarette Use Never User      E-Cigarette/Vaping Substances    Nicotine No     THC No     CBD No     Flavoring No     Other No     Unknown No      Social History     Tobacco Use    Smoking status: Former     Current packs/day: 0.00     Average packs/day: 0.3 packs/day for 30.0 years (7.5 ttl pk-yrs)      Types: Cigarettes     Start date: 1/1/1979     Quit date: 1/1/2009     Years since quitting: 15.6     Passive exposure: Never    Smokeless tobacco: Never   Vaping Use    Vaping status: Never Used   Substance Use Topics    Alcohol use: Yes     Alcohol/week: 1.0 standard drink of alcohol     Types: 1 Glasses of wine per week    Drug use: Never       Review of Systems   Constitutional:  Negative for chills and fever.   HENT:  Negative for ear pain and sore throat.    Eyes:  Negative for pain and visual disturbance.   Respiratory:  Positive for shortness of breath. Negative for cough.    Cardiovascular:  Negative for chest pain and palpitations.   Gastrointestinal:  Negative for abdominal pain and vomiting.   Genitourinary:  Negative for dysuria and hematuria.   Musculoskeletal:  Negative for arthralgias and back pain.   Skin:  Negative for color change and rash.   Neurological:  Negative for seizures and syncope.   All other systems reviewed and are negative.      Physical Exam  Physical Exam  Vitals and nursing note reviewed.   Constitutional:       General: She is not in acute distress.     Appearance: She is well-developed.   HENT:      Head: Normocephalic and atraumatic.      Mouth/Throat:      Mouth: Mucous membranes are moist.      Pharynx: Oropharynx is clear. No pharyngeal swelling or oropharyngeal exudate.   Eyes:      Conjunctiva/sclera: Conjunctivae normal.   Cardiovascular:      Rate and Rhythm: Normal rate and regular rhythm.      Heart sounds: No murmur heard.  Pulmonary:      Effort: Pulmonary effort is normal. No respiratory distress.      Breath sounds: Normal breath sounds.   Abdominal:      Palpations: Abdomen is soft.      Tenderness: There is no abdominal tenderness.   Musculoskeletal:         General: No swelling.      Cervical back: Neck supple.      Right lower leg: No tenderness. No edema.      Left lower leg: No tenderness. No edema.   Skin:     General: Skin is warm and dry.      Capillary  Refill: Capillary refill takes less than 2 seconds.   Neurological:      Mental Status: She is alert.   Psychiatric:         Mood and Affect: Mood normal.         Vital Signs  ED Triage Vitals   Temperature Pulse Respirations Blood Pressure SpO2   09/05/24 0830 09/05/24 0829 09/05/24 0829 09/05/24 0829 09/05/24 0829   98 °F (36.7 °C) 95 18 143/88 97 %      Temp src Heart Rate Source Patient Position - Orthostatic VS BP Location FiO2 (%)   -- 09/05/24 1236 09/05/24 0829 09/05/24 0829 --    Monitor Sitting Right arm       Pain Score       --                  Vitals:    09/05/24 0829 09/05/24 1236   BP: 143/88 132/71   Pulse: 95 70   Patient Position - Orthostatic VS: Sitting Sitting         Visual Acuity      ED Medications  Medications   heparin (porcine) injection 8,000 Units (has no administration in time range)   heparin (porcine) 25,000 units in 0.45% NaCl 250 mL infusion (premix) (has no administration in time range)   heparin (porcine) injection 8,000 Units (has no administration in time range)   heparin (porcine) injection 4,000 Units (has no administration in time range)   sodium chloride 0.9 % bolus 500 mL (0 mL Intravenous Stopped 9/5/24 1408)   iohexol (OMNIPAQUE) 350 MG/ML injection (MULTI-DOSE) 70 mL (70 mL Intravenous Given 9/5/24 1210)       Diagnostic Studies  Results Reviewed       Procedure Component Value Units Date/Time    HS Troponin I 4hr [[id number]]  (Normal) Collected: 09/05/24 1416    Lab Status: Final result Specimen: Blood from Arm, Left Updated: 09/05/24 1450     hs TnI 4hr 22 ng/L      Delta 4hr hsTnI 1 ng/L     APTT [[id number]]  (Normal) Collected: 09/05/24 1422    Lab Status: Final result Specimen: Blood from Arm, Right Updated: 09/05/24 1438     PTT 24 seconds     Protime-INR [[id number]]  (Normal) Collected: 09/05/24 1422    Lab Status: Final result Specimen: Blood from Arm, Right Updated: 09/05/24 1438     Protime 14.3 seconds      INR 1.04    Narrative:      INR Therapeutic  Range    Indication                                             INR Range      Atrial Fibrillation                                               2.0-3.0  Hypercoagulable State                                    2.0.2.3  Left Ventricular Asist Device                            2.0-3.0  Mechanical Heart Valve                                  -    Aortic(with afib, MI, embolism, HF, LA enlargement,    and/or coagulopathy)                                     2.0-3.0 (2.5-3.5)     Mitral                                                             2.5-3.5  Prosthetic/Bioprosthetic Heart Valve               2.0-3.0  Venous thromboembolism (VTE: VT, PE        2.0-3.0    HS Troponin I 2hr [[id number]]  (Normal) Collected: 09/05/24 1226    Lab Status: Final result Specimen: Blood from Arm, Left Updated: 09/05/24 1313     hs TnI 2hr 21 ng/L      Delta 2hr hsTnI 0 ng/L     B-Type Natriuretic Peptide(BNP) [[id number]]  (Normal) Collected: 09/05/24 0852    Lab Status: Final result Specimen: Blood from Arm, Left Updated: 09/05/24 1301     BNP 36 pg/mL     Cardiolipin antibody [[id number]] Collected: 09/05/24 1226    Lab Status: In process Specimen: Blood from Arm, Left Updated: 09/05/24 1240    Antithrombin III Activity [[id number]] Collected: 09/05/24 1226    Lab Status: In process Specimen: Blood from Arm, Left Updated: 09/05/24 1240    Beta-2 glycoprotein antibodies [[id number]] Collected: 09/05/24 1226    Lab Status: In process Specimen: Blood from Arm, Left Updated: 09/05/24 1240    Protein C activity [[id number]] Collected: 09/05/24 1226    Lab Status: In process Specimen: Blood from Arm, Left Updated: 09/05/24 1240    Protein S activity [[id number]] Collected: 09/05/24 1226    Lab Status: In process Specimen: Blood from Arm, Left Updated: 09/05/24 1240    Lupus anticoagulant [[id number]] Collected: 09/05/24 1226    Lab Status: In process Specimen: Blood from Arm, Left Updated: 09/05/24 1240    Protein S Antigen, Total & Free  [[id number]] Collected: 09/05/24 1226    Lab Status: In process Specimen: Blood from Arm, Left Updated: 09/05/24 1240    D-dimer, quantitative [[id number]]  (Abnormal) Collected: 09/05/24 1023    Lab Status: Final result Specimen: Blood from Arm, Right Updated: 09/05/24 1100     D-Dimer, Quant 8.32 ug/ml FEU     Narrative:      In the evaluation for possible pulmonary embolism, in the appropriate (Well's Score of 4 or less) patient, the age adjusted d-dimer cutoff for this patient can be calculated as:    Age x 0.01 (in ug/mL) for Age-adjusted D-dimer exclusion threshold for a patient over 50 years.    HS Troponin 0hr (reflex protocol) [[id number]]  (Normal) Collected: 09/05/24 1023    Lab Status: Final result Specimen: Blood from Arm, Right Updated: 09/05/24 1054     hs TnI 0hr 21 ng/L     FLU/RSV/COVID - if FLU/RSV clinically relevant [[id number]]  (Normal) Collected: 09/05/24 0852    Lab Status: Final result Specimen: Nares from Nose Updated: 09/05/24 1002     SARS-CoV-2 Negative     INFLUENZA A PCR Negative     INFLUENZA B PCR Negative     RSV PCR Negative    Narrative:      This test has been performed using the CoV-2/Flu/RSV plus assay on the [employer] GeneXpert platform. This test has been validated by the  and verified by the performing laboratory.     This test is designed to amplify and detect the following: nucleocapsid (N), envelope (E), and RNA-dependent RNA polymerase (RdRP) genes of the SARS-CoV-2 genome; matrix (M), basic polymerase (PB2), and acidic protein (PA) segments of the influenza A genome; matrix (M) and non-structural protein (NS) segments of the influenza B genome, and the nucleocapsid genes of RSV A and RSV B.     Positive results are indicative of the presence of Flu A, Flu B, RSV, and/or SARS-CoV-2 RNA. Positive results for SARS-CoV-2 or suspected novel influenza should be reported to state, local, or federal health departments according to local reporting requirements.      All  results should be assessed in conjunction with clinical presentation and other laboratory markers for clinical management.     FOR PEDIATRIC PATIENTS - copy/paste COVID Guidelines URL to browser: [email or web address]       Comprehensive metabolic panel [[id number]]  (Abnormal) Collected: 09/05/24 0852    Lab Status: Final result Specimen: Blood from Arm, Left Updated: 09/05/24 0915     Sodium 140 mmol/L      Potassium 4.1 mmol/L      Chloride 112 mmol/L      CO2 23 mmol/L      ANION GAP 5 mmol/L      BUN 20 mg/dL      Creatinine 1.63 mg/dL      Glucose 127 mg/dL      Calcium 9.3 mg/dL      AST 11 U/L      ALT 11 U/L      Alkaline Phosphatase 61 U/L      Total Protein 7.0 g/dL      Albumin 3.8 g/dL      Total Bilirubin 0.39 mg/dL      eGFR 31 ml/min/1.73sq m     Narrative:      National Kid[employer] Disease Foundation guidelines for Chronic Kidney Disease (CKD):     Stage 1 with normal or high GFR (GFR > 90 mL/min/1.73 square meters)    Stage 2 Mild CKD (GFR = 60-89 mL/min/1.73 square meters)    Stage 3A Moderate CKD (GFR = 45-59 mL/min/1.73 square meters)    Stage 3B Moderate CKD (GFR = 30-44 mL/min/1.73 square meters)    Stage 4 Severe CKD (GFR = 15-29 mL/min/1.73 square meters)    Stage 5 End Stage CKD (GFR <15 mL/min/1.73 square meters)  Note: GFR calculation is accurate only with a steady state creatinine    CBC and differential [[id number]]  (Abnormal) Collected: 09/05/24 0852    Lab Status: Final result Specimen: Blood from Arm, Left Updated: 09/05/24 0903     WBC 7.00 Thousand/uL      RBC 3.98 Million/uL      Hemoglobin 11.7 g/dL      Hematocrit 36.4 %      MCV 92 fL      MCH 29.4 pg      MCHC 32.1 g/dL      RDW 13.0 %      MPV 12.5 fL      Platelets 126 Thousands/uL      nRBC 0 /100 WBCs      Segmented % 71 %      Immature Grans % 0 %      Lymphocytes % 18 %      Monocytes % 7 %      Eosinophils Relative 3 %      Basophils Relative 1 %      Absolute Neutrophils  4.94 Thousands/µL      Absolute Immature Grans 0.03 Thousand/uL      Absolute Lymphocytes 1.26 Thousands/µL      Absolute Monocytes 0.52 Thousand/µL      Eosinophils Absolute 0.21 Thousand/µL      Basophils Absolute 0.04 Thousands/µL                    CTA ED chest PE Study   Final Result by Toni Gonzales MD (09/05 1347)      Bilateral pulmonary emboli.   On the left, nonocclusive upper and lower lobar branches   On the right, occlusive proximal truncus anterior and partially occlusive interlobular artery.      No CT evidence of right heart strain.      Pulmonary nodules, measuring up to 6 mm in the right lower lobe.   Not well evaluated on comparison November 2023 exam secondary to motion artifact at that time.   Considering smoking history, recommend follow-up chest CT in 12 months.      Moderate hiatal hernia.      Findings were discussed with Dr. Marcum at 1:45 p.m. on 9/5/2024                  Workstation performed: [id number]         XR chest 1 view portable   ED Interpretation by Filiberto Marcum DO (09/05 0837)   NAD      Final Result by Jessenia Hugo MD (09/05 1041)      No acute cardiopulmonary disease.      Resident: Asif Jefferson I, the attending radiologist, have reviewed the images and agree with the final report above.      Workstation performed: [id number]                    Procedures  ECG 12 Lead Documentation Only    Date/Time: 9/5/2024 2:57 PM    Performed by: Filiberto Marcum DO  Authorized by: Filiberto Marcum DO    Indications / Diagnosis:  SOB  ECG reviewed by me, the ED Provider: yes    Patient location:  ED  Previous ECG:     Previous ECG:  Compared to current    Similarity:  Changes noted  Comments:      NSR w/ nonspecific changes           ED Course  ED Course as of 09/05/24 1458   Thu Sep 05, 2024   1347 Per rads, b/l PE with severe clot burden including rt main being nearly occluded. Pulmonary nodules identified w/ f/u in 12 mo. Given WOB with even movement in bed, call  placed to hospital medicine for admission.                                  SBIRT 22yo+      Flowsheet Row Most Recent Value   Initial Alcohol Screen: US AUDIT-C     1. How often do you have a drink containing alcohol? 0 Filed at: 09/05/2024 0858   2. How many drinks containing alcohol do you have on a typical day you are drinking?  0 Filed at: 09/05/2024 0858   3b. FEMALE Any Age, or MALE 65+: How often do you have 4 or more drinks on one occassion? 0 Filed at: 09/05/2024 0858   Audit-C Score 0 Filed at: 09/05/2024 0858   CHERIE: How many times in the past year have you...    Used an illegal drug or used a prescription medication for non-medical reasons? Never Filed at: 09/05/2024 0858                      Medical Decision Making  69yoF, CKD, JULIO, D and C on 8/23 for abnormal uterine bleed, presenting to ER with LOMBARDI over last week - found to have severe clot burden, near full occlusion rt lung and scattered lt lung. Oddly, VSS but with significant dyspnea when speaking or when moving even slightly in bed. Exam otherwise benign including signs/sx DVT. CBC, CMP wnl. EKG unchanged. Thrombosis panel sent, although arguably provoked given recent surgery. PESI reassuring, however amount of burden and worsening acuity in last 72hrs prohibits safe dispo from ER. In discussion with hospital medicine, hep ggt initiated over lovenox. Accepted to their care.     Amount and/or Complexity of Data Reviewed  Independent Historian: spouse  External Data Reviewed: notes.  Labs: ordered.  Radiology: ordered and independent interpretation performed.  ECG/medicine tests: ordered and independent interpretation performed.    Risk  Prescription drug management.  Decision regarding hospitalization.                 Disposition  Final diagnoses:   Pulmonary emboli (HCC)   Pulmonary nodules   Dyspnea, unspecified type     Time reflects when diagnosis was documented in both MDM as applicable and the Disposition within this note       Time User  Action Codes Description Comment    9/5/2024  1:47 PM Filiberto Marcum [I26.99] Pulmonary emboli (HCC)     9/5/2024  1:47 PM Filiberto Marcum [R91.8] Pulmonary nodules     9/5/2024  1:49 PM Filiberto Marcum [R06.00] Dyspnea, unspecified type           ED Disposition       ED Disposition   Admit    Condition   Stable    Date/Time   Thu Sep 5, 2024 1411    Comment   Case was discussed with LEENA and the patient's admission status was agreed to be Admission Status: observation status to the service of Dr. Chauhan .               Follow-up Information    None         Patient's Medications   Discharge Prescriptions    No medications on file       No discharge procedures on file.    PDMP Review       None            ED Provider  Electronically Signed by             Filiberto Marcum DO  09/05/24 5931

## 2024-09-05 NOTE — ASSESSMENT & PLAN NOTE
69-year-old female with past medical history of posterior bleeding status post D&C on 8/23/2024 with OB/GYN presents with worsening shortness of breath with exertion    CT PE:  Bilateral pulmonary emboli.  On the left, nonocclusive upper and lower lobar branches  On the right, occlusive proximal truncus anterior and partially occlusive interlobular artery.     No CT evidence of right heart strain.     Pulmonary nodules, measuring up to 6 mm in the right lower lobe.  Not well evaluated on comparison November 2023 exam secondary to motion artifact at that time.  Considering smoking history, recommend follow-up chest CT in 12 months.      Patient placed on heparin drip  Follow-up echo  Check venous duplex us  Telemetry  Suspect PE in setting of recent surgical intervention along with Megace to help with bleeding which can increase risk of clotting  Hold kristopher

## 2024-09-05 NOTE — H&P
[facility]  H&P  Name: Cherelle Dash 69 y.o. female I MRN: [id number]  Unit/Bed#: ED-43 I Date of Admission: 9/5/2024   Date of Service: 9/5/2024 I Hospital Day: 0      Assessment & Plan   * Other pulmonary embolism without acute cor pulmonale (HCC)  Assessment & Plan  69-year-old female with past medical history of posterior bleeding status post D&C on 8/23/2024 with OB/GYN presents with worsening shortness of breath with exertion    CT PE:  Bilateral pulmonary emboli.  On the left, nonocclusive upper and lower lobar branches  On the right, occlusive proximal truncus anterior and partially occlusive interlobular artery.     No CT evidence of right heart strain.     Pulmonary nodules, measuring up to 6 mm in the right lower lobe.  Not well evaluated on comparison November 2023 exam secondary to motion artifact at that time.  Considering smoking history, recommend follow-up chest CT in 12 months.      Patient placed on heparin drip  Follow-up echo  Telemetry  Suspect PE in setting of recent surgical intervention along with Megace to help with bleeding which can increase risk of clotting  Hold magace        PMB (postmenopausal bleeding)  Assessment & Plan  Status post D&C with OB/GYN on 8/23/2024  Denies any further bleeding episodes since D&C  Will hold Megace in setting of pulmonary embolism    Urinary frequency  Assessment & Plan  Will substitute home meds Myrbetriq with oxybutynin    Secondary hyperparathyroidism of renal origin (HCC)  Assessment & Plan  Continue home calcitriol    Vitamin D deficiency  Assessment & Plan  Continue vitamin D supplementation    Stage 3 chronic kidney disease (HCC)  Assessment & Plan  Lab Results   Component Value Date    EGFR 31 09/05/2024    EGFR 31 08/14/2024    EGFR 35 06/06/2024    CREATININE 1.63 (H) 09/05/2024    CREATININE 1.62 (H) 08/14/2024    CREATININE 1.49 (H) 06/06/2024     Patient follows with nephrology baseline seems to be around  1.4-1.6    Bipolar 1 disorder, mixed, severe (HCC)  Assessment & Plan  Continue home Latuda           VTE Pharmacologic Prophylaxis:   High Risk (Score >/= 5) - Pharmacological DVT Prophylaxis Ordered: heparin drip. Sequential Compression Devices Ordered.  Code Status: Level 1 - Full Code   Discussion with family: Patient declined call to .     Anticipated Length of Stay: Patient will be admitted on an observation basis with an anticipated length of stay of less than 2 midnights secondary to PE.    Total Time Spent on Date of Encounter in care of patient: 55 mins. This time was spent on one or more of the following: performing physical exam; counseling and coordination of care; obtaining or reviewing history; documenting in the medical record; reviewing/ordering tests, medications or procedures; communicating with other healthcare professionals and discussing with patient's family/caregivers.    Chief Complaint: Shortness of breath with exertion    History of Present Illness:  Cherelle Dash is a 69 y.o. female with a PMH of CKD stage III, bipolar, postmenopausal uterine bleeding status post D&C on 8/23/2024 who presents with worsening shortness of breath over the last few days with exertion.  She states that she was getting winded when walking to the car which is abnormal for her.  Progressively worsening symptoms she presented to the ED for evaluation where CT PE was obtained showing bilateral pulmonary emboli.  Patient was started on heparin drip and admitted to medicine for further monitoring.  Patient states that her shortness of breath is improved with rest and worsened with exertion.  Aside from dyspnea exertion she has no other complaints denies any chest pain palpitations abdominal pain fevers or chills.    Review of Systems:  Review of Systems   Constitutional:  Negative for chills and fever.   HENT:  Negative for ear pain and sore throat.    Eyes:  Negative for pain and visual disturbance.    Respiratory:  Positive for shortness of breath. Negative for cough.    Cardiovascular:  Negative for chest pain and palpitations.   Gastrointestinal:  Negative for abdominal pain and vomiting.   Genitourinary:  Negative for dysuria and hematuria.   Musculoskeletal:  Negative for arthralgias and back pain.   Skin:  Negative for color change and rash.   Neurological:  Negative for seizures and syncope.   All other systems reviewed and are negative.      Past Medical and Surgical History:   Past Medical History:   Diagnosis Date    Abnormal ECG     Anxiety 2002    Arthritis     Bipolar 1 disorder (HCC)     Chronic kidney disease     stage 3    CPAP (continuous positive airway pressure) dependence     Depression 2001    Disease of thyroid gland     Elevated blood pressure reading 06/10/2019    GERD (gastroesophageal reflux disease)     Obesity     RSV (acute bronchiolitis due to respiratory syncytial virus) 12/05/2023    Sleep apnea     Sleep apnea, obstructive 2007       Past Surgical History:   Procedure Laterality Date    COLONOSCOPY  2011    NH HYSTEROSCOPY BX ENDOMETRIUM&/POLYPC W/WO D&C N/A 8/23/2024    Procedure: EXAM UNDER ANESTHESIA, DILATATION AND CURETTAGE, CERVICAL BIOPSIES;  Surgeon: Evin Page MD;  Location: [facility];  Service: Gynecology Oncology       Meds/Allergies:  Prior to Admission medications    Medication Sig Start Date End Date Taking? Authorizing Provider   calcitriol (ROCALTROL) 0.25 mcg capsule Take 1 capsule (0.25 mcg total) by mouth 3 (three) times a week 6/17/24 3/14/25 Yes Stew Macias MD   Cholecalciferol (VITAMIN D) 2000 units CAPS Take 1 capsule by mouth daily   Yes Historical Provider, MD   ciclopirox (LOPROX) 0.77 % cream  4/17/24  Yes Historical Provider, MD   clobetasol (TEMOVATE) 0.05 % ointment Apply topically 2 (two) times a week 1/15/24  Yes Carmen Troncoso MD   CRANBERRY PO Take by mouth   Yes Historical Provider, MD   famotidine (PEPCID) 20 mg  tablet TAKE 1 TABLET BY MOUTH TWICE  DAILY 6/24/24  Yes Gretchen Mayorga DO   Glucosamine-Chondroit-Vit C-Mn (GLUCOSAMINE 1500 COMPLEX PO) Take by mouth in the morning   Yes Historical Provider, MD   Ivermectin 1 % CREA  4/17/24  Yes Historical Provider, MD   lurasidone (LATUDA) 20 mg tablet TAKE 1 TABLET BY MOUTH DAILY  WITH BREAKFAST 6/27/24  Yes Jayda Hernandez MD   megestrol (MEGACE) 40 mg tablet Take 1 tablet (40 mg total) by mouth 2 (two) times a day 8/12/24 9/11/24 Yes Evin Page MD   metoprolol succinate (TOPROL-XL) 25 mg 24 hr tablet Take 1 tablet (25 mg total) by mouth daily 12/5/23  Yes Gretchen Mayorga DO   Mirabegron ER (Myrbetriq) 25 MG TB24 TAKE 1 TABLET BY MOUTH IN THE  MORNING 4/30/24  Yes Shyann Foss PA-C   Multiple Vitamin (MULTI-VITAMIN DAILY) TABS Take by mouth daily   Yes Historical Provider, MD     I have reviewed home medications with patient personally.    Allergies:   Allergies   Allergen Reactions    Raw Fruit - Food Allergy Anaphylaxis     PLUMS, PEACHES, FRUIT WITH SKIN       Social History:  Marital Status: /Civil Union     Patient Pre-hospital Living Situation: Home  Patient Pre-hospital Level of Mobility: unable to be assessed at time of evaluation  Patient Pre-hospital Diet Restrictions: none  Substance Use History:   Social History     Substance and Sexual Activity   Alcohol Use Yes    Alcohol/week: 1.0 standard drink of alcohol    Types: 1 Glasses of wine per week     Social History     Tobacco Use   Smoking Status Former    Current packs/day: 0.00    Average packs/day: 0.3 packs/day for 30.0 years (7.5 ttl pk-yrs)    Types: Cigarettes    Start date: 1/1/1979    Quit date: 1/1/2009    Years since quitting: 15.6    Passive exposure: Never   Smokeless Tobacco Never     Social History     Substance and Sexual Activity   Drug Use Never       Family History:  Family History   Problem Relation Age of Onset    Heart disease Mother     Heart Valve  Disease Mother         Replacement    Diabetes Mother         2002    Heart failure Mother         Heart Valve replacement    Arthritis Father     No Known Problems Sister     No Known Problems Daughter     No Known Problems Maternal Grandmother     No Known Problems Maternal Grandfather     No Known Problems Paternal Grandmother     No Known Problems Paternal Grandfather     No Known Problems Son     No Known Problems Maternal Aunt     No Known Problems Maternal Aunt     No Known Problems Maternal Aunt     No Known Problems Maternal Aunt     No Known Problems Maternal Aunt     No Known Problems Paternal Aunt     Alcohol abuse Son         [place], 40 year old son, has issues with alcohol and alcohol abuse       Physical Exam:     Vitals:   Blood Pressure: 132/71 (09/05/24 1236)  Pulse: 70 (09/05/24 1236)  Temperature: 98 °F (36.7 °C) (09/05/24 0830)  Respirations: 18 (09/05/24 1236)  SpO2: 96 % (09/05/24 1236)    Physical Exam  Vitals and nursing note reviewed.   Constitutional:       General: She is not in acute distress.     Appearance: She is well-developed. She is obese.   HENT:      Head: Normocephalic and atraumatic.   Eyes:      Conjunctiva/sclera: Conjunctivae normal.   Cardiovascular:      Rate and Rhythm: Normal rate and regular rhythm.      Heart sounds: No murmur heard.  Pulmonary:      Effort: Pulmonary effort is normal. No respiratory distress.      Breath sounds: Normal breath sounds.   Abdominal:      Palpations: Abdomen is soft.      Tenderness: There is no abdominal tenderness.   Musculoskeletal:         General: No swelling.   Skin:     General: Skin is warm and dry.   Neurological:      Mental Status: She is alert. Mental status is at baseline.   Psychiatric:         Mood and Affect: Mood normal.          Additional Data:     Lab Results:  Results from last 7 days   Lab Units 09/05/24  0852   WBC Thousand/uL 7.00   HEMOGLOBIN g/dL 11.7   HEMATOCRIT % 36.4   PLATELETS Thousands/uL 126*   SEGS PCT %  71   LYMPHO PCT % 18   MONO PCT % 7   EOS PCT % 3     Results from last 7 days   Lab Units 09/05/24  0852   SODIUM mmol/L 140   POTASSIUM mmol/L 4.1   CHLORIDE mmol/L 112*   CO2 mmol/L 23   BUN mg/dL 20   CREATININE mg/dL 1.63*   ANION GAP mmol/L 5   CALCIUM mg/dL 9.3   ALBUMIN g/dL 3.8   TOTAL BILIRUBIN mg/dL 0.39   ALK PHOS U/L 61   ALT U/L 11   AST U/L 11*   GLUCOSE RANDOM mg/dL 127     Results from last 7 days   Lab Units 09/05/24  1422   INR  1.04         Lab Results   Component Value Date    HGBA1C 5.5 08/14/2024    HGBA1C 5.4 06/30/2022    HGBA1C 5.2 06/25/2020           Lines/Drains:  Invasive Devices       Peripheral Intravenous Line  Duration             Peripheral IV 09/05/24 Left;Proximal;Ventral (anterior) Forearm <1 day                        Imaging: Reviewed radiology reports from this admission including: chest CT scan  CTA ED chest PE Study   Final Result by Toni Gonzales MD (09/05 1347)      Bilateral pulmonary emboli.   On the left, nonocclusive upper and lower lobar branches   On the right, occlusive proximal truncus anterior and partially occlusive interlobular artery.      No CT evidence of right heart strain.      Pulmonary nodules, measuring up to 6 mm in the right lower lobe.   Not well evaluated on comparison November 2023 exam secondary to motion artifact at that time.   Considering smoking history, recommend follow-up chest CT in 12 months.      Moderate hiatal hernia.      Findings were discussed with Dr. Marcum at 1:45 p.m. on 9/5/2024                  Workstation performed: [id number]         XR chest 1 view portable   ED Interpretation by Filiberto Marcum DO (09/05 0858)   NAD      Final Result by Jessenia Hugo MD (09/05 0759)      No acute cardiopulmonary disease.      Resident: Asif Jefferson I, the attending radiologist, have reviewed the images and agree with the final report above.      Workstation performed: [id number]          VAS VENOUS DUPLEX - LOWER  LIMB BILATERAL    (Results Pending)       EKG and Other Studies Reviewed on Admission:   EKG:  LBBB sinus rhythm 67 bpm.    ** Please Note: This note has been constructed using a voice recognition system. **

## 2024-09-06 ENCOUNTER — APPOINTMENT (OUTPATIENT)
Dept: NON INVASIVE DIAGNOSTICS | Facility: HOSPITAL | Age: 69
End: 2024-09-06
Payer: COMMERCIAL

## 2024-09-06 VITALS
BODY MASS INDEX: 42.48 KG/M2 | TEMPERATURE: 98.7 F | RESPIRATION RATE: 17 BRPM | OXYGEN SATURATION: 98 % | SYSTOLIC BLOOD PRESSURE: 115 MMHG | HEART RATE: 80 BPM | WEIGHT: 225 LBS | HEIGHT: 61 IN | DIASTOLIC BLOOD PRESSURE: 72 MMHG

## 2024-09-06 PROBLEM — I50.21 ACUTE SYSTOLIC HEART FAILURE (HCC): Status: ACTIVE | Noted: 2024-09-06

## 2024-09-06 LAB
ANION GAP SERPL CALCULATED.3IONS-SCNC: 8 MMOL/L (ref 4–13)
AORTIC ROOT: 3.1 CM
APICAL FOUR CHAMBER EJECTION FRACTION: 40 %
APTT PPP: 149 SECONDS (ref 23–34)
BSA FOR ECHO PROCEDURE: 1.99 M2
BUN SERPL-MCNC: 23 MG/DL (ref 5–25)
CALCIUM SERPL-MCNC: 9.1 MG/DL (ref 8.4–10.2)
CHLORIDE SERPL-SCNC: 115 MMOL/L (ref 96–108)
CO2 SERPL-SCNC: 19 MMOL/L (ref 21–32)
CREAT SERPL-MCNC: 1.76 MG/DL (ref 0.6–1.3)
E WAVE DECELERATION TIME: 167 MS
E/A RATIO: 0.56
ERYTHROCYTE [DISTWIDTH] IN BLOOD BY AUTOMATED COUNT: 13.1 % (ref 11.6–15.1)
FRACTIONAL SHORTENING: 15 (ref 28–44)
GFR SERPL CREATININE-BSD FRML MDRD: 28 ML/MIN/1.73SQ M
GLUCOSE P FAST SERPL-MCNC: 107 MG/DL (ref 65–99)
GLUCOSE SERPL-MCNC: 107 MG/DL (ref 65–140)
HCT VFR BLD AUTO: 35.4 % (ref 34.8–46.1)
HGB BLD-MCNC: 11.4 G/DL (ref 11.5–15.4)
INTERVENTRICULAR SEPTUM IN DIASTOLE (PARASTERNAL SHORT AXIS VIEW): 1.2 CM
INTERVENTRICULAR SEPTUM: 1.2 CM (ref 0.6–1.1)
LAAS-AP2: 17.4 CM2
LAAS-AP4: 8.4 CM2
LEFT ATRIUM SIZE: 4.6 CM
LEFT ATRIUM VOLUME (MOD BIPLANE): 32 ML
LEFT ATRIUM VOLUME INDEX (MOD BIPLANE): 16.1 ML/M2
LEFT INTERNAL DIMENSION IN SYSTOLE: 4 CM (ref 2.1–4)
LEFT VENTRICLE DIASTOLIC VOLUME (MOD BIPLANE): 119 ML
LEFT VENTRICLE DIASTOLIC VOLUME INDEX (MOD BIPLANE): 59.8 ML/M2
LEFT VENTRICLE SYSTOLIC VOLUME (MOD BIPLANE): 75 ML
LEFT VENTRICLE SYSTOLIC VOLUME INDEX (MOD BIPLANE): 37.7 ML/M2
LEFT VENTRICULAR INTERNAL DIMENSION IN DIASTOLE: 4.7 CM (ref 3.5–6)
LEFT VENTRICULAR POSTERIOR WALL IN END DIASTOLE: 1.2 CM
LEFT VENTRICULAR STROKE VOLUME: 32 ML
LV EF: 37 %
LVSV (TEICH): 32 ML
MAGNESIUM SERPL-MCNC: 2 MG/DL (ref 1.9–2.7)
MCH RBC QN AUTO: 29.1 PG (ref 26.8–34.3)
MCHC RBC AUTO-ENTMCNC: 32.2 G/DL (ref 31.4–37.4)
MCV RBC AUTO: 90 FL (ref 82–98)
MITRAL REGURGITATION PEAK VELOCITY: 5.46 M/S
MITRAL VALVE MEAN INFLOW VELOCITY: 4.72 M/S
MITRAL VALVE REGURGITANT PEAK GRADIENT: 119 MMHG
MV E'TISSUE VEL-LAT: 7 CM/S
MV E'TISSUE VEL-SEP: 6 CM/S
MV PEAK A VEL: 0.97 M/S
MV PEAK E VEL: 54 CM/S
MV STENOSIS PRESSURE HALF TIME: 48 MS
MV VALVE AREA P 1/2 METHOD: 4.58
PHOSPHATE SERPL-MCNC: 4.4 MG/DL (ref 2.3–4.1)
PISA MRMAX VEL: 0.42 M/S
PISA RADIUS: 0.4 CM
PLATELET # BLD AUTO: 143 THOUSANDS/UL (ref 149–390)
PMV BLD AUTO: 12.3 FL (ref 8.9–12.7)
POTASSIUM SERPL-SCNC: 4.1 MMOL/L (ref 3.5–5.3)
RBC # BLD AUTO: 3.92 MILLION/UL (ref 3.81–5.12)
RIGHT ATRIUM AREA SYSTOLE A4C: 15.1 CM2
RIGHT VENTRICLE ID DIMENSION: 4.5 CM
SL CV DOP CALC MV VTI RETROGRADE: 199.5 CM
SL CV LEFT ATRIUM LENGTH A2C: 5.1 CM
SL CV LV EF: 40
SL CV MV MEAN GRADIENT RETROGRADE: 95 MMHG
SL CV PED ECHO LEFT VENTRICLE DIASTOLIC VOLUME (MOD BIPLANE) 2D: 101 ML
SL CV PED ECHO LEFT VENTRICLE SYSTOLIC VOLUME (MOD BIPLANE) 2D: 69 ML
SODIUM SERPL-SCNC: 142 MMOL/L (ref 135–147)
TR MAX PG: 31 MMHG
TR PEAK VELOCITY: 2.8 M/S
TRICUSPID ANNULAR PLANE SYSTOLIC EXCURSION: 1.4 CM
TRICUSPID VALVE PEAK REGURGITATION VELOCITY: 2.8 M/S
WBC # BLD AUTO: 6.91 THOUSAND/UL (ref 4.31–10.16)

## 2024-09-06 PROCEDURE — 80048 BASIC METABOLIC PNL TOTAL CA: CPT | Performed by: STUDENT IN AN ORGANIZED HEALTH CARE EDUCATION/TRAINING PROGRAM

## 2024-09-06 PROCEDURE — C8929 TTE W OR WO FOL WCON,DOPPLER: HCPCS

## 2024-09-06 PROCEDURE — 85027 COMPLETE CBC AUTOMATED: CPT | Performed by: STUDENT IN AN ORGANIZED HEALTH CARE EDUCATION/TRAINING PROGRAM

## 2024-09-06 PROCEDURE — 83735 ASSAY OF MAGNESIUM: CPT | Performed by: STUDENT IN AN ORGANIZED HEALTH CARE EDUCATION/TRAINING PROGRAM

## 2024-09-06 PROCEDURE — 93306 TTE W/DOPPLER COMPLETE: CPT | Performed by: INTERNAL MEDICINE

## 2024-09-06 PROCEDURE — 84100 ASSAY OF PHOSPHORUS: CPT | Performed by: STUDENT IN AN ORGANIZED HEALTH CARE EDUCATION/TRAINING PROGRAM

## 2024-09-06 PROCEDURE — 85730 THROMBOPLASTIN TIME PARTIAL: CPT | Performed by: STUDENT IN AN ORGANIZED HEALTH CARE EDUCATION/TRAINING PROGRAM

## 2024-09-06 PROCEDURE — 99239 HOSP IP/OBS DSCHRG MGMT >30: CPT | Performed by: INTERNAL MEDICINE

## 2024-09-06 RX ADMIN — LURASIDONE HYDROCHLORIDE 20 MG: 20 TABLET, COATED ORAL at 09:06

## 2024-09-06 RX ADMIN — APIXABAN 10 MG: 5 TABLET, FILM COATED ORAL at 11:42

## 2024-09-06 RX ADMIN — CALCITRIOL 0.25 MCG: 0.25 CAPSULE, LIQUID FILLED ORAL at 09:05

## 2024-09-06 RX ADMIN — OXYBUTYNIN CHLORIDE 5 MG: 5 TABLET, EXTENDED RELEASE ORAL at 09:06

## 2024-09-06 RX ADMIN — METOPROLOL SUCCINATE 25 MG: 25 TABLET, EXTENDED RELEASE ORAL at 09:06

## 2024-09-06 RX ADMIN — PERFLUTREN 0.4 ML/MIN: 6.52 INJECTION, SUSPENSION INTRAVENOUS at 11:13

## 2024-09-06 RX ADMIN — FAMOTIDINE 20 MG: 20 TABLET, FILM COATED ORAL at 09:06

## 2024-09-06 RX ADMIN — Medication 2000 UNITS: at 09:05

## 2024-09-06 RX ADMIN — HEPARIN SODIUM 15 UNITS/KG/HR: 10000 INJECTION, SOLUTION INTRAVENOUS at 05:20

## 2024-09-06 NOTE — PLAN OF CARE
Problem: PAIN - ADULT  Goal: Verbalizes/displays adequate comfort level or baseline comfort level  Description: Interventions:  - Encourage patient to monitor pain and request assistance  - Assess pain using appropriate pain scale  - Administer analgesics based on type and severity of pain and evaluate response  - Implement non-pharmacological measures as appropriate and evaluate response  - Consider cultural and social influences on pain and pain management  - Notify physician/advanced practitioner if interventions unsuccessful or patient reports new pain  Outcome: Progressing     Problem: INFECTION - ADULT  Goal: Absence or prevention of progression during hospitalization  Description: INTERVENTIONS:  - Assess and monitor for signs and symptoms of infection  - Monitor lab/diagnostic results  - Monitor all insertion sites, i.e. indwelling lines, tubes, and drains  - Monitor endotracheal if appropriate and nasal secretions for changes in amount and color  - [place] appropriate cooling/warming therapies per order  - Administer medications as ordered  - Instruct and encourage patient and family to use good hand hygiene technique  - Identify and instruct in appropriate isolation precautions for identified infection/condition  Outcome: Progressing  Goal: Absence of fever/infection during neutropenic period  Description: INTERVENTIONS:  - Monitor WBC    Outcome: Progressing     Problem: SAFETY ADULT  Goal: Patient will remain free of falls  Description: INTERVENTIONS:  - Educate patient/family on patient safety including physical limitations  - Instruct patient to call for assistance with activity   - Consult OT/PT to assist with strengthening/mobility   - Keep Call bell within reach  - Keep bed low and locked with side rails adjusted as appropriate  - Keep care items and personal belongings within reach  - Initiate and maintain comfort rounds  - Make Fall Risk Sign visible to staff  - Offer Toileting every 2 Hours,  in advance of need  - Initiate/Maintain alarm  - Obtain necessary fall risk management equipment:   - Apply yellow socks and bracelet for high fall risk patients  - Consider moving patient to room near nurses station  Outcome: Progressing  Goal: Maintain or return to baseline ADL function  Description: INTERVENTIONS:  -  Assess patient's ability to carry out ADLs; assess patient's baseline for ADL function and identify physical deficits which impact ability to perform ADLs (bathing, care of mouth/teeth, toileting, grooming, dressing, etc.)  - Assess/evaluate cause of self-care deficits   - Assess range of motion  - Assess patient's mobility; develop plan if impaired  - Assess patient's need for assistive devices and provide as appropriate  - Encourage maximum independence but intervene and supervise when necessary  - Involve family in performance of ADLs  - Assess for home care needs following discharge   - Consider OT consult to assist with ADL evaluation and planning for discharge  - Provide patient education as appropriate  Outcome: Progressing  Goal: Maintains/Returns to pre admission functional level  Description: INTERVENTIONS:  - Perform AM-PAC 6 Click Basic Mobility/ Daily Activity assessment daily.  - Set and communicate daily mobility goal to care team and patient/family/caregiver.   - Collaborate with rehabilitation services on mobility goals if consulted  - Perform Range of Motion 2 times a day.  - Reposition patient every 2 hours.  - Dangle patient 2 times a day  - Stand patient 2 times a day  - Ambulate patient 2 times a day  - Out of bed to chair 2 times a day   - Out of bed for meals 2 times a day  - Out of bed for toileting  - Record patient progress and toleration of activity level   Outcome: Progressing     Problem: DISCHARGE PLANNING  Goal: Discharge to home or other facility with appropriate resources  Description: INTERVENTIONS:  - Identify barriers to discharge w/patient and caregiver  -  Arrange for needed discharge resources and transportation as appropriate  - Identify discharge learning needs (meds, wound care, etc.)  - Arrange for interpretive services to assist at discharge as needed  - Refer to Case Management Department for coordinating discharge planning if the patient needs post-hospital services based on physician/advanced practitioner order or complex needs related to functional status, cognitive ability, or social support system  Outcome: Progressing     Problem: Knowledge Deficit  Goal: Patient/family/caregiver demonstrates understanding of disease process, treatment plan, medications, and discharge instructions  Description: Complete learning assessment and assess knowledge base.  Interventions:  - Provide teaching at level of understanding  - Provide teaching via preferred learning methods  Outcome: Progressing

## 2024-09-06 NOTE — ASSESSMENT & PLAN NOTE
Wt Readings from Last 3 Encounters:   09/06/24 102 kg (225 lb)   08/23/24 102 kg (225 lb)   08/14/24 102 kg (225 lb)     In the setting of bilateral PE  Echo found to have drop in EF from 55 to 40%, systolic function moderately reduced  Patient already on Toprol-XL 25 mg daily  Curbside a cardiology who would not make any changes to her GDMT at this time, suggest follow-up in the office    Plan:  Continue Toprol-XL 25 mg daily  Referral to cardiology, appointment for Monday with Dr. Solares  Treat PE as below

## 2024-09-06 NOTE — PHYSICAL THERAPY NOTE
Physical Therapy Cancellation Note         09/06/24 0829   Note Type   Note type Cancelled Session   Cancel Reasons Medical status   Additional Comments PT orders received, chart reviewed. Pt with findings of acute B/L PEs and BLE DVTs. Heparin drip started 9/5 at 14:15 per EMR. Will cancel and hold until anticoagulated x 24 hours for therapy intervention. Will f/u for therapy eval as appropriate.     SHIRA BarbosaT

## 2024-09-06 NOTE — ASSESSMENT & PLAN NOTE
69-year-old female with past medical history of posterior bleeding status post D&C on 8/23/2024 with OB/GYN presents with worsening shortness of breath with exertion    CT PE:  Bilateral pulmonary emboli.  On the left, nonocclusive upper and lower lobar branches  On the right, occlusive proximal truncus anterior and partially occlusive interlobular artery.     No CT evidence of right heart strain.     Pulmonary nodules, measuring up to 6 mm in the right lower lobe.  Not well evaluated on comparison November 2023 exam secondary to motion artifact at that time.  Considering smoking history, recommend follow-up chest CT in 12 months.      Patient placed on heparin drip  Echo showing systolic heart failure with new drop in EF from 55 to 40%  Curbside cardiology who would not make any changes to her GD MT at this time, continue beta-blocker and follow-up in the office  Suspect PE in setting of recent surgical intervention along with Megace to help with bleeding which can increase risk of clotting    Plan:  Discharged on Eliquis 10 mg twice daily for 7 days, followed by 5 mg twice daily for 6 months as this was an unprovoked PE  Follow-up with cardiology on Monday with Dr. Solares, referral placed  Continue metoprolol  Hold kristopher

## 2024-09-06 NOTE — CASE MANAGEMENT
Case Management Progress Note    Patient name Cherelle Dash  Location S /S -01 MRN [id number]  : 1955 Date 2024       LOS (days): 0  Geometric Mean LOS (GMLOS) (days):   Days to GMLOS:        OBJECTIVE:        Current admission status: Observation  Preferred Pharmacy:   [facility] - [place] [address]  [address]  [address]  [facility] [address]  Phone: [phone] Fax: [phone]    [id number] - [place] [place] - [address]  [address]  [place] [address]  Phone: [phone] Fax: [phone]    [facility] - [place], [place] - [address]  [address]  [facility] [address]  [facility] [address]  Phone: [phone] Fax: [phone]    Primary Care Provider: Gretchen Mayorga DO    Primary Insurance: [employer]  Secondary Insurance:     PROGRESS NOTE:    CM contacted [facility] pharmacy via phone re: Eliquis price check. Per pharmacy Eliquis co-pay cost is $22 per month supply but does not have enough in stock currently to fill script. Pharm[facility]     CM f/u with patient and spouse at bedside re: same. CM also notified pt of $22 co-pay cost for Eliquis - pt and spouse agreeable to cost. Pt relayed preference to send script to [facility] location. CM f/u with [facility] via phone re: same. [facility] location to send Eliquis script to [facility] to be filled today.     CM notified [facility] via secure chat of same.

## 2024-09-06 NOTE — DISCHARGE SUMMARY
[facility]  Discharge- Cherelle Dash 1955, 69 y.o. female MRN: [id number]  Unit/Bed#: S -01 Encounter: [id number]  Primary Care Provider: Gretchen Mayorga DO   Date and time admitted to hospital: 9/5/2024  8:23 AM    * Other pulmonary embolism without acute cor pulmonale (HCC)  Assessment & Plan  69-year-old female with past medical history of posterior bleeding status post D&C on 8/23/2024 with OB/GYN presents with worsening shortness of breath with exertion    CT PE:  Bilateral pulmonary emboli.  On the left, nonocclusive upper and lower lobar branches  On the right, occlusive proximal truncus anterior and partially occlusive interlobular artery.     No CT evidence of right heart strain.     Pulmonary nodules, measuring up to 6 mm in the right lower lobe.  Not well evaluated on comparison November 2023 exam secondary to motion artifact at that time.  Considering smoking history, recommend follow-up chest CT in 12 months.      Patient placed on heparin drip  Echo showing systolic heart failure with new drop in EF from 55 to 40%  Curbside cardiology who would not make any changes to her GD MT at this time, continue beta-blocker and follow-up in the office  Suspect PE in setting of recent surgical intervention along with Megace to help with bleeding which can increase risk of clotting    Plan:  Discharged on Eliquis 10 mg twice daily for 7 days, followed by 5 mg twice daily for 6 months as this was an unprovoked PE  Follow-up with cardiology on Monday with Dr. Solares, referral placed  Continue metoprolol  Hold magace        Acute systolic heart failure (HCC)  Assessment & Plan  Wt Readings from Last 3 Encounters:   09/06/24 102 kg (225 lb)   08/23/24 102 kg (225 lb)   08/14/24 102 kg (225 lb)     In the setting of bilateral PE  Echo found to have drop in EF from 55 to 40%, systolic function moderately reduced  Patient already on Toprol-XL 25 mg daily  Curbside a cardiology who  would not make any changes to her GDMT at this time, suggest follow-up in the office    Plan:  Continue Toprol-XL 25 mg daily  Referral to cardiology, appointment for Monday with Dr. Solares  Treat PE as below          PMB (postmenopausal bleeding)  Assessment & Plan  Status post D&C with OB/GYN on 8/23/2024  Denies any further bleeding episodes since D&C  Will hold Megace in setting of pulmonary embolism    Urinary frequency  Assessment & Plan  Will substitute home meds Myrbetriq with oxybutynin    Secondary hyperparathyroidism of renal origin (HCC)  Assessment & Plan  Continue home calcitriol    Vitamin D deficiency  Assessment & Plan  Continue vitamin D supplementation    Stage 3 chronic kidney disease (HCC)  Assessment & Plan  Lab Results   Component Value Date    EGFR 28 09/06/2024    EGFR 31 09/05/2024    EGFR 31 08/14/2024    CREATININE 1.76 (H) 09/06/2024    CREATININE 1.63 (H) 09/05/2024    CREATININE 1.62 (H) 08/14/2024     Patient follows with nephrology baseline seems to be around 1.4-1.6    Bipolar 1 disorder, mixed, severe (HCC)  Assessment & Plan  Continue home Latuda      Medical Problems       Resolved Problems  Date Reviewed: 9/5/2024   None       Discharging Resident: Scout Dubose MD  Discharging Attending: Max Garcia MD  PCP: Gretchen Mayorga DO  Admission Date:   Admission Orders (From admission, onward)       Ordered        09/05/24 1411  Place in Observation  Once                          Discharge Date: 09/06/24    Consultations During Hospital Stay:  None, curb sided cardiology    Procedures Performed:   None    Significant Findings / Test Results:    VAS VENOUS DUPLEX - LOWER LIMB BILATERAL   Final Result by Mert Reeves MD (09/05 2056)      CTA ED chest PE Study   Final Result by Toni Gonzales MD (09/05 1347)      Bilateral pulmonary emboli.   On the left, nonocclusive upper and lower lobar branches   On the right, occlusive proximal truncus anterior and  partially occlusive interlobular artery.      No CT evidence of right heart strain.      Pulmonary nodules, measuring up to 6 mm in the right lower lobe.   Not well evaluated on comparison November 2023 exam secondary to motion artifact at that time.   Considering smoking history, recommend follow-up chest CT in 12 months.      Moderate hiatal hernia.      Findings were discussed with Dr. Marcum at 1:45 p.m. on 9/5/2024                  Workstation performed: [id number]         XR chest 1 view portable   ED Interpretation by Filiberto Marcum DO (09/05 0858)   NAD      Final Result by Jessenia Hugo MD (09/05 1041)      No acute cardiopulmonary disease.      Resident: Asif Jefferson I, the attending radiologist, have reviewed the images and agree with the final report above.      Workstation performed: [id number]               Incidental Findings:   Pulmonary nodules    Test Results Pending at Discharge (will require follow up):  None     Outpatient Tests Requested:  None    Complications: PE complicated by systolic heart failure    Reason for Admission: Dyspnea on exertion    Hospital Course:   Cherelle Dash is a 69 y.o. female patient with past medical history of stage III CKD, bipolar, GERD, obesity, JULIO, postmenopausal bleeding with D&C done August 23 who originally presented to the hospital on 9/5/2024 due to about 1 week of dyspnea with exertion, patient describing getting winded while walking to the car.  No chest pain or pressure.  In the ED a CT found her to have bilateral PE, no heart strain seen on CT.  Ultrasound of the legs revealed bilateral DVTs as well.  Hemodynamically she was stable.  BNP and troponins were normal.  She was started on a heparin drip and later transitioned to Eliquis after a price check.  Echocardiogram revealed a decrease in her ejection fraction from 55% last year to 40% currently, moderate systolic dysfunction of the left ventricle, right ventricular dilation with  "mildly systolic dysfunctional right ventricle.  We curb sided our cardiologist to see whether the patient needed to be seen inpatient by cardiology for goal-directed medical therapy and since she is already on a beta-blocker, and her pressures are on the lower side of normal, and she is hemodynamically stable and asymptomatic, she can follow-up in the office on Monday with Dr. Jaramillo for further titration of GDMT.  Patient reports feeling better, she is hemodynamically stable for discharge with follow-up to cardiology and PCP.      Please see above list of diagnoses and related plan for additional information.     Condition at Discharge: good    Discharge Day Visit / Exam:   Subjective:  no acute complaints  Vitals: Blood Pressure: 115/72 (09/06/24 1046)  Pulse: 80 (09/06/24 1046)  Temperature: 98.7 °F (37.1 °C) (09/06/24 0710)  Temp Source: Oral (09/06/24 0710)  Respirations: 17 (09/06/24 0710)  Height: 5' 1\" (154.9 cm) (09/06/24 1046)  Weight - Scale: 102 kg (225 lb) (09/06/24 1046)  SpO2: 98 % (09/06/24 1417)  Exam:   Physical Exam  Constitutional:       General: She is not in acute distress.     Appearance: Normal appearance. She is obese. She is not ill-appearing.   HENT:      Mouth/Throat:      Mouth: Mucous membranes are moist.   Eyes:      Extraocular Movements: Extraocular movements intact.      Pupils: Pupils are equal, round, and reactive to light.   Cardiovascular:      Rate and Rhythm: Normal rate and regular rhythm.      Pulses: Normal pulses.      Heart sounds: Normal heart sounds. No murmur heard.     No friction rub. No gallop.   Pulmonary:      Effort: Pulmonary effort is normal. No respiratory distress.      Breath sounds: No wheezing or rales.   Abdominal:      General: There is no distension.      Palpations: Abdomen is soft.      Tenderness: There is no abdominal tenderness. There is no guarding.   Musculoskeletal:      Right lower leg: No edema.      Left lower leg: No edema.   Skin:     " General: Skin is warm.   Neurological:      Mental Status: She is alert and oriented to person, place, and time.   Psychiatric:         Mood and Affect: Mood normal.         Behavior: Behavior normal.          Discussion with Family: Patient declined call to .     Discharge instructions/Information to patient and family:   See after visit summary for information provided to patient and family.      Provisions for Follow-Up Care:  See after visit summary for information related to follow-up care and any pertinent home health orders.      Mobility at time of Discharge:   JH-HLM Achieved: 6: Walk 10 steps or more  HLM Goal achieved. Continue to encourage appropriate mobility.     Disposition:   Home    Planned Readmission: none    Discharge Medications:  See after visit summary for reconciled discharge medications provided to patient and/or family.      **Please Note: This note may have been constructed using a voice recognition system**

## 2024-09-06 NOTE — INCIDENTAL FINDINGS
The following findings require follow up:  Radiographic finding   Finding: Pulmonary nodules, measuring up to 6 mm in the right lower lobe., Not well evaluated on comparison November 2023 exam secondary to motion artifact at that time., Considering smoking history, recommend follow-up chest CT in 12 months., Moderate hiatal hernia.     Follow up required: Follow-up chest CT in 12 months   Follow up should be done within 12 month(s)    Please notify the following clinician to assist with the follow up:   Dr. Mayorga (PCP)    Incidental finding results were discussed with the Patient by Scout Dubose MD on 09/06/24.   They expressed understanding and all questions answered.

## 2024-09-06 NOTE — PLAN OF CARE
Problem: PAIN - ADULT  Goal: Verbalizes/displays adequate comfort level or baseline comfort level  Description: Interventions:  - Encourage patient to monitor pain and request assistance  - Assess pain using appropriate pain scale  - Administer analgesics based on type and severity of pain and evaluate response  - Implement non-pharmacological measures as appropriate and evaluate response  - Consider cultural and social influences on pain and pain management  - Notify physician/advanced practitioner if interventions unsuccessful or patient reports new pain  Outcome: Adequate for Discharge     Problem: INFECTION - ADULT  Goal: Absence or prevention of progression during hospitalization  Description: INTERVENTIONS:  - Assess and monitor for signs and symptoms of infection  - Monitor lab/diagnostic results  - Monitor all insertion sites, i.e. indwelling lines, tubes, and drains  - Monitor endotracheal if appropriate and nasal secretions for changes in amount and color  - [place] appropriate cooling/warming therapies per order  - Administer medications as ordered  - Instruct and encourage patient and family to use good hand hygiene technique  - Identify and instruct in appropriate isolation precautions for identified infection/condition  Outcome: Adequate for Discharge  Goal: Absence of fever/infection during neutropenic period  Description: INTERVENTIONS:  - Monitor WBC    Outcome: Adequate for Discharge     Problem: SAFETY ADULT  Goal: Patient will remain free of falls  Description: INTERVENTIONS:  - Educate patient/family on patient safety including physical limitations  - Instruct patient to call for assistance with activity   - Consult OT/PT to assist with strengthening/mobility   - Keep Call bell within reach  - Keep bed low and locked with side rails adjusted as appropriate  - Keep care items and personal belongings within reach  - Initiate and maintain comfort rounds  - Make Fall Risk Sign visible to  staff  - Offer Toileting every 2 Hours, in advance of need  - Initiate/Maintain bed alarm  - Obtain necessary fall risk management equipment: bed alarm  - Apply yellow socks and bracelet for high fall risk patients  - Consider moving patient to room near nurses station  Outcome: Adequate for Discharge  Goal: Maintain or return to baseline ADL function  Description: INTERVENTIONS:  -  Assess patient's ability to carry out ADLs; assess patient's baseline for ADL function and identify physical deficits which impact ability to perform ADLs (bathing, care of mouth/teeth, toileting, grooming, dressing, etc.)  - Assess/evaluate cause of self-care deficits   - Assess range of motion  - Assess patient's mobility; develop plan if impaired  - Assess patient's need for assistive devices and provide as appropriate  - Encourage maximum independence but intervene and supervise when necessary  - Involve family in performance of ADLs  - Assess for home care needs following discharge   - Consider OT consult to assist with ADL evaluation and planning for discharge  - Provide patient education as appropriate  Outcome: Adequate for Discharge  Goal: Maintains/Returns to pre admission functional level  Description: INTERVENTIONS:  - Perform AM-PAC 6 Click Basic Mobility/ Daily Activity assessment daily.  - Set and communicate daily mobility goal to care team and patient/family/caregiver.   - Collaborate with rehabilitation services on mobility goals if consulted  - Perform Range of Motion 2 times a day.  - Reposition patient every 2 hours.  - Dangle patient 2 times a day  - Stand patient 2 times a day  - Ambulate patient 3 times a day  - Out of bed to chair 3 times a day   - Out of bed for meals 3 times a day  - Out of bed for toileting  - Record patient progress and toleration of activity level   Outcome: Adequate for Discharge     Problem: DISCHARGE PLANNING  Goal: Discharge to home or other facility with appropriate  resources  Description: INTERVENTIONS:  - Identify barriers to discharge w/patient and caregiver  - Arrange for needed discharge resources and transportation as appropriate  - Identify discharge learning needs (meds, wound care, etc.)  - Arrange for interpretive services to assist at discharge as needed  - Refer to Case Management Department for coordinating discharge planning if the patient needs post-hospital services based on physician/advanced practitioner order or complex needs related to functional status, cognitive ability, or social support system  Outcome: Adequate for Discharge     Problem: Knowledge Deficit  Goal: Patient/family/caregiver demonstrates understanding of disease process, treatment plan, medications, and discharge instructions  Description: Complete learning assessment and assess knowledge base.  Interventions:  - Provide teaching at level of understanding  - Provide teaching via preferred learning methods  Outcome: Adequate for Discharge

## 2024-09-06 NOTE — UTILIZATION REVIEW
Initial Clinical Review    Admission: Date/Time/Statement:   Admission Orders (From admission, onward)       Ordered        09/05/24 1411  Place in Observation  Once                          Orders Placed This Encounter   Procedures    Place in Observation     Standing Status:   Standing     Number of Occurrences:   1     Order Specific Question:   Level of Care     Answer:   Med Surg [16]     ED Arrival Information       Expected   9/5/2024 08:20    Arrival   9/5/2024 08:20    Acuity   Urgent              Means of arrival   Walk-In    Escorted by   Spouse    Service   Hospitalist    Admission type   Emergency              Arrival complaint   Shortness of Breath             Chief Complaint   Patient presents with    Shortness of Breath     X couple days        Initial Presentation: 69 y.o. female presented to ED as observation for PE.  PMH of CKD stage III, bipolar, postmenopausal uterine bleeding status post D&C on 8/23/2024 who presents with worsening shortness of breath over the last few days with exertion.  She states that she was getting winded when walking to the car which is abnormal for her.  Progressively worsening symptoms CT PE was obtained showing bilateral pulmonary emboli. Patient was started on heparin drip.On exam obese. Plan IV Heparin gtt, telemetry,ECHO, hold Magace and supportive care      Anticipated Length of Stay/Certification Statement: Patient will be admitted on an observation basis with an anticipated length of stay of less than 2 midnights secondary to PE.     Date: 09-06-24  Day 2:   ECHO     Interpretation Summary         Left Ventricle: Left ventricular cavity size is normal. Wall thickness is mildly increased. There is mild concentric hypertrophy. The left ventricular ejection fraction is 40%. Systolic function is moderately reduced. There is moderate global hypokinesis. Diastolic function is mildly abnormal, consistent with grade I (abnormal) relaxation.    Right Ventricle: Right  ventricular cavity size is dilated. Systolic function is mildly reduced.    Mitral Valve: There is mild regurgitation.  ED Triage Vitals   Temperature Pulse Respirations Blood Pressure SpO2 Pain Score   09/05/24 0830 09/05/24 0829 09/05/24 0829 09/05/24 0829 09/05/24 0829 09/05/24 1530   98 °F (36.7 °C) 95 18 143/88 97 % No Pain     Weight (last 2 days)       Date/Time Weight    09/06/24 1046 102 (225)            Vital Signs (last 3 days)       Date/Time Temp Pulse Resp BP MAP (mmHg) SpO2 O2 Device Patient Position - Orthostatic VS Pain    09/06/24 1046 -- 80 -- 115/72 -- -- -- -- --    09/06/24 07:10:54 98.7 °F (37.1 °C) 80 17 115/72 86 94 % None (Room air) Lying --    09/06/24 07:10:39 98.7 °F (37.1 °C) 80 -- 115/72 86 95 % -- -- --    09/05/24 2100 -- -- -- -- -- -- None (Room air) -- --    09/05/24 2048 98.5 °F (36.9 °C) -- 18 139/80 100 95 % None (Room air) Sitting No Pain    09/05/24 1530 -- 88 18 147/82 -- 96 % None (Room air) Lying No Pain    09/05/24 1236 -- 70 18 132/71 97 96 % None (Room air) Sitting --    09/05/24 0859 -- -- -- -- -- -- None (Room air) -- --    09/05/24 0830 98 °F (36.7 °C) -- -- -- -- -- -- -- --    09/05/24 0829 -- 95 18 143/88 -- 97 % None (Room air) Sitting --              Pertinent Labs/Diagnostic Test Results:   Radiology:   VAS VENOUS DUPLEX - LOWER LIMB BILATERAL   Final Interpretation by Mert Reeves MD (09/05 2056)      CTA ED chest PE Study   Final Interpretation by Toni Gonzales MD (09/05 1347)      Bilateral pulmonary emboli.   On the left, nonocclusive upper and lower lobar branches   On the right, occlusive proximal truncus anterior and partially occlusive interlobular artery.      No CT evidence of right heart strain.      Pulmonary nodules, measuring up to 6 mm in the right lower lobe.   Not well evaluated on comparison November 2023 exam secondary to motion artifact at that time.   Considering smoking history, recommend follow-up chest CT in 12 months.       Moderate hiatal hernia.      Findings were discussed with Dr. Marcum at 1:45 p.m. on 9/5/2024                  Workstation performed: [id number]         XR chest 1 view portable   ED Interpretation by Filiberto Marcum DO (09/05 0858)   NAD      Final Interpretation by Jessenia Hugo MD (09/05 1041)      No acute cardiopulmonary disease.      Resident: Asif Jefferson I, the attending radiologist, have reviewed the images and agree with the final report above.      Workstation performed: [id number]           Cardiology:  Echo complete w/ contrast if indicated   Final Result by Torin Gold MD (09/06 1123)        Left Ventricle: Left ventricular cavity size is normal. Wall thickness    is mildly increased. There is mild concentric hypertrophy. The left    ventricular ejection fraction is 40%. Systolic function is moderately    reduced. There is moderate global hypokinesis. Diastolic function is    mildly abnormal, consistent with grade I (abnormal) relaxation.     Right Ventricle: Right ventricular cavity size is dilated. Systolic    function is mildly reduced.     Mitral Valve: There is mild regurgitation.         ECG 12 lead   Final Result by Mert Solares MD (09/05 1223)   Normal sinus rhythm   Left axis deviation   Left bundle branch block   Abnormal ECG      Confirmed by Mert Solares ([id number]) on 9/5/2024 12:23:46 PM        GI:  No orders to display       Results from last 7 days   Lab Units 09/05/24  0852   SARS-COV-2  Negative     Results from last 7 days   Lab Units 09/06/24  0519 09/05/24  0852   WBC Thousand/uL 6.91 7.00   HEMOGLOBIN g/dL 11.4* 11.7   HEMATOCRIT % 35.4 36.4   PLATELETS Thousands/uL 143* 126*   TOTAL NEUT ABS Thousands/µL  --  4.94         Results from last 7 days   Lab Units 09/06/24  0519 09/05/24  0852   SODIUM mmol/L 142 140   POTASSIUM mmol/L 4.1 4.1   CHLORIDE mmol/L 115* 112*   CO2 mmol/L 19* 23   ANION GAP mmol/L 8 5   BUN mg/dL 23 20   CREATININE mg/dL 1.76*  1.63*   EGFR ml/min/1.73sq m 28 31   CALCIUM mg/dL 9.1 9.3   MAGNESIUM mg/dL 2.0  --    PHOSPHORUS mg/dL 4.4*  --      Results from last 7 days   Lab Units 09/05/24  0852   AST U/L 11*   ALT U/L 11   ALK PHOS U/L 61   TOTAL PROTEIN g/dL 7.0   ALBUMIN g/dL 3.8   TOTAL BILIRUBIN mg/dL 0.39         Results from last 7 days   Lab Units 09/06/24  0519 09/05/24  0852   GLUCOSE RANDOM mg/dL 107 127     Results from last 7 days   Lab Units 09/05/24  1416 09/05/24  1226 09/05/24  1023   HS TNI 0HR ng/L  --   --  21   HS TNI 2HR ng/L  --  21  --    HSTNI D2 ng/L  --  0  --    HS TNI 4HR ng/L 22  --   --    HSTNI D4 ng/L 1  --   --      Results from last 7 days   Lab Units 09/05/24  1023   D-DIMER QUANTITATIVE ug/ml FEU 8.32*     Results from last 7 days   Lab Units 09/06/24  0519 09/05/24  2114 09/05/24  1422   PROTIME seconds  --   --  14.3   INR   --   --  1.04   PTT seconds 149* >210* 24     Results from last 7 days   Lab Units 09/05/24  0852   BNP pg/mL 36     Results from last 7 days   Lab Units 09/05/24  0852   INFLUENZA A PCR  Negative   INFLUENZA B PCR  Negative   RSV PCR  Negative         ED Treatment-Medication Administration from 09/05/2024 0756 to 09/05/2024 2038         Date/Time Order Dose Route Action     09/05/2024 1219 sodium chloride 0.9 % bolus 500 mL 500 mL Intravenous New Bag     09/05/2024 1210 iohexol (OMNIPAQUE) 350 MG/ML injection (MULTI-DOSE) 70 mL 70 mL Intravenous Given     09/05/2024 1505 heparin (porcine) injection 8,000 Units 8,000 Units Intravenous Given     09/05/2024 1507 heparin (porcine) 25,000 units in 0.45% NaCl 250 mL infusion (premix) 18 Units/kg/hr Intravenous New Bag     09/05/2024 1529 famotidine (PEPCID) tablet 20 mg 20 mg Oral Given            Past Medical History:   Diagnosis Date    Abnormal ECG     Anxiety 2002    Arthritis     Bipolar 1 disorder (HCC)     Chronic kidney disease     stage 3    CPAP (continuous positive airway pressure) dependence     Depression 2001    Disease  of thyroid gland     Elevated blood pressure reading 06/10/2019    GERD (gastroesophageal reflux disease)     Obesity     RSV (acute bronchiolitis due to respiratory syncytial virus) 12/05/2023    Sleep apnea     Sleep apnea, obstructive 2007     Present on Admission:   Vitamin D deficiency   Urinary frequency   Stage 3 chronic kidney disease (HCC)   Secondary hyperparathyroidism of renal origin (HCC)   Bipolar 1 disorder, mixed, severe (HCC)   PMB (postmenopausal bleeding)      Admitting Diagnosis: SOB (shortness of breath) [R06.02]  Pulmonary nodules [R91.8]  Pulmonary emboli (HCC) [I26.99]  Dyspnea, unspecified type [R06.00]  Age/Sex: 69 y.o. female  Admission Orders:  Scheduled Medications:  apixaban, 10 mg, Oral, BID   Followed by  [START ON 9/13/2024] apixaban, 5 mg, Oral, BID  calcitriol, 0.25 mcg, Oral, Once per day on Monday Wednesday Friday  Cholecalciferol, 2,000 Units, Oral, Daily  famotidine, 20 mg, Oral, Daily  lurasidone, 20 mg, Oral, Daily With Breakfast  metoprolol succinate, 25 mg, Oral, Daily  oxybutynin, 5 mg, Oral, Daily      Continuous IV Infusions:     PRN Meds:  acetaminophen, 650 mg, Oral, Q6H PRN        None    Network Utilization Review Department  ATTENTION: Please call with any questions or concerns to [phone] and carefully listen to the prompts so that you are directed to the right person. All voicemails are confidential.   For Discharge needs, contact Care Management DC Support Team at [phone] opt. [phone]  Send all requests for admission clinical reviews, approved or denied determinations and any other requests to dedicated fax number below belonging to the campus where the patient is receiving treatment. List of dedicated fax numbers for the Facilities:  FACILITY NAME UR FAX NUMBER   ADMISSION DENIALS (Administrative/Medical Necessity) [phone]   DISCHARGE SUPPORT TEAM (NETWORK) [phone]   PARENT CHILD HEALTH (Maternity/NICU/Pediatrics) [phone]   [facility]  [facility] [phone]   [facility] [phone]   [facility] [phone]   [facility] [phone]   [facility] [phone]   [facility] [phone]   [facility] [phone]   [facility] [phone]   [facility] [phone]   [facility] [phone]   [facility] [phone]   [facility] [phone]

## 2024-09-06 NOTE — ASSESSMENT & PLAN NOTE
Lab Results   Component Value Date    EGFR 28 09/06/2024    EGFR 31 09/05/2024    EGFR 31 08/14/2024    CREATININE 1.76 (H) 09/06/2024    CREATININE 1.63 (H) 09/05/2024    CREATININE 1.62 (H) 08/14/2024     Patient follows with nephrology baseline seems to be around 1.4-1.6

## 2024-09-06 NOTE — DISCHARGE INSTR - AVS FIRST PAGE
Dear Cherelle Dash,     It was our pleasure to care for you here at [facility].  It is our hope that we were always able to exceed the expected standards for your care during your stay.  You were hospitalized due to DVT PE.  You were cared for on the third floor by Scout Dubose MD under the service of Max Garcia MD with the [facility] who covers for your primary care physician (PCP), Gretchen Mayorga DO, while you were hospitalized.  If you have any questions or concerns related to this hospitalization, you may contact us at .  For follow up as well as any medication refills, we recommend that you follow up with your primary care physician.  A registered nurse will reach out to you by phone within a few days after your discharge to answer any additional questions that you may have after going home.  However, at this time we provide for you here, the most important instructions / recommendations at discharge:     Notable Medication Adjustments -   Take Eliquis 10 mg twice daily for 10 days, followed by  Eliquis 5 mg twice daily for 6 months  Testing Required after Discharge -   None  ** Please contact your PCP to request testing orders for any of the testing recommended here **  Important follow up information -   Please follow-up with your PCP within 2 weeks of discharge  Please follow-up with cardiology regarding echocardiogram results, referral was placed with Dr. Solares to be seen on Monday  Other Instructions -   None  Please review this entire after visit summary as additional general instructions including medication list, appointments, activity, diet, any pertinent wound care, and other additional recommendations from your care team that may be provided for you.      Sincerely,     Scout Dubose MD

## 2024-09-07 LAB
APTT SCREEN TO CONFIRM RATIO: 1.13 RATIO (ref 0–1.34)
CONFIRM APTT/NORMAL: 40.4 SEC (ref 0–47.6)
LA PPP-IMP: NORMAL
PROT S ACT/NOR PPP: 107 % (ref 61–136)
PROT S PPP-ACNC: 103 % (ref 60–150)
SCREEN APTT: 33.4 SEC (ref 0–43.5)
SCREEN DRVVT: 44.6 SEC (ref 0–47)
THROMBIN TIME: 16.3 SEC (ref 0–23)

## 2024-09-08 LAB
B2 GLYCOPROT1 IGA SERPL IA-ACNC: 0.7
B2 GLYCOPROT1 IGG SERPL IA-ACNC: <0.8
B2 GLYCOPROT1 IGM SERPL IA-ACNC: <2.4
CARDIOLIPIN IGA SER IA-ACNC: 2.7
CARDIOLIPIN IGG SER IA-ACNC: 2.1
CARDIOLIPIN IGM SER IA-ACNC: 2.2

## 2024-09-09 ENCOUNTER — OFFICE VISIT (OUTPATIENT)
Dept: CARDIOLOGY CLINIC | Facility: CLINIC | Age: 69
End: 2024-09-09
Payer: COMMERCIAL

## 2024-09-09 ENCOUNTER — OFFICE VISIT (OUTPATIENT)
Dept: GYNECOLOGIC ONCOLOGY | Facility: CLINIC | Age: 69
End: 2024-09-09
Payer: COMMERCIAL

## 2024-09-09 ENCOUNTER — TRANSITIONAL CARE MANAGEMENT (OUTPATIENT)
Dept: FAMILY MEDICINE CLINIC | Facility: CLINIC | Age: 69
End: 2024-09-09

## 2024-09-09 VITALS
SYSTOLIC BLOOD PRESSURE: 128 MMHG | HEART RATE: 112 BPM | DIASTOLIC BLOOD PRESSURE: 80 MMHG | OXYGEN SATURATION: 98 % | TEMPERATURE: 98.1 F | WEIGHT: 223 LBS | BODY MASS INDEX: 42.14 KG/M2

## 2024-09-09 VITALS
WEIGHT: 223.2 LBS | HEART RATE: 100 BPM | DIASTOLIC BLOOD PRESSURE: 72 MMHG | BODY MASS INDEX: 42.17 KG/M2 | OXYGEN SATURATION: 98 % | SYSTOLIC BLOOD PRESSURE: 122 MMHG

## 2024-09-09 DIAGNOSIS — I26.99 OTHER ACUTE PULMONARY EMBOLISM WITHOUT ACUTE COR PULMONALE (HCC): ICD-10-CM

## 2024-09-09 DIAGNOSIS — N88.8 CERVICAL MASS: ICD-10-CM

## 2024-09-09 DIAGNOSIS — N95.0 PMB (POSTMENOPAUSAL BLEEDING): Primary | ICD-10-CM

## 2024-09-09 DIAGNOSIS — I49.3 PREMATURE VENTRICULAR CONTRACTIONS: ICD-10-CM

## 2024-09-09 DIAGNOSIS — I42.0 DILATED CARDIOMYOPATHY (HCC): Primary | ICD-10-CM

## 2024-09-09 DIAGNOSIS — I44.7 LBBB (LEFT BUNDLE BRANCH BLOCK): ICD-10-CM

## 2024-09-09 DIAGNOSIS — I50.22 HEART FAILURE WITH MILDLY REDUCED EJECTION FRACTION (HFMREF) (HCC): ICD-10-CM

## 2024-09-09 PROBLEM — I50.21 ACUTE SYSTOLIC HEART FAILURE (HCC): Status: RESOLVED | Noted: 2024-09-06 | Resolved: 2024-09-09

## 2024-09-09 LAB
PROT C AG ACT/NOR PPP IA: 136 % OF NORMAL (ref 60–150)
PROT S ACT/NOR PPP: 98 % (ref 68–108)

## 2024-09-09 PROCEDURE — G2211 COMPLEX E/M VISIT ADD ON: HCPCS | Performed by: OBSTETRICS & GYNECOLOGY

## 2024-09-09 PROCEDURE — 99214 OFFICE O/P EST MOD 30 MIN: CPT | Performed by: INTERNAL MEDICINE

## 2024-09-09 PROCEDURE — 99215 OFFICE O/P EST HI 40 MIN: CPT | Performed by: OBSTETRICS & GYNECOLOGY

## 2024-09-09 RX ORDER — METRONIDAZOLE 500 MG/100ML
500 INJECTION, SOLUTION INTRAVENOUS ONCE
OUTPATIENT
Start: 2024-09-09 | End: 2024-09-09

## 2024-09-09 RX ORDER — CEFAZOLIN SODIUM 2 G/50ML
2000 SOLUTION INTRAVENOUS ONCE
OUTPATIENT
Start: 2024-09-09 | End: 2024-09-09

## 2024-09-09 RX ORDER — HEPARIN SODIUM 5000 [USP'U]/ML
5000 INJECTION, SOLUTION INTRAVENOUS; SUBCUTANEOUS
OUTPATIENT
Start: 2024-09-09 | End: 2024-09-10

## 2024-09-09 RX ORDER — ACETAMINOPHEN 325 MG/1
975 TABLET ORAL ONCE
OUTPATIENT
Start: 2024-09-09 | End: 2024-09-09

## 2024-09-09 RX ORDER — SODIUM CHLORIDE, SODIUM LACTATE, POTASSIUM CHLORIDE, CALCIUM CHLORIDE 600; 310; 30; 20 MG/100ML; MG/100ML; MG/100ML; MG/100ML
125 INJECTION, SOLUTION INTRAVENOUS CONTINUOUS
OUTPATIENT
Start: 2024-09-09

## 2024-09-09 RX ORDER — METOPROLOL SUCCINATE 50 MG/1
50 TABLET, EXTENDED RELEASE ORAL DAILY
Qty: 90 TABLET | Refills: 3 | Status: SHIPPED | OUTPATIENT
Start: 2024-09-09

## 2024-09-09 NOTE — PATIENT INSTRUCTIONS
"Patient Education     Low-sodium diet   The Basics   Written by the doctors and editors at [facility]   What is sodium? -- This is the main ingredient in table salt. It is also found in lots of foods. The body needs a very small amount of sodium to work normally, but most people eat much more sodium than their body needs.  Who should eat less sodium? -- Nearly everyone eats too much sodium. The average American takes in 3400 milligrams of sodium each day. Experts say that most people should have no more than 2300 milligrams a day.  Some people with certain health conditions should follow a low-sodium diet. Ask your doctor how much sodium you should have.  Why should I eat less sodium? -- Reducing the amount of sodium you eat can have lots of health benefits:   It can lower your blood pressure. This means that it can help lower your risk of stroke, heart attack, kidney damage, and lots of other health problems.   It can reduce the amount of fluid in your body, which means that your heart doesn't have to work as hard.   It can keep the kidneys from having to work too hard. This is especially important in people who have kidney disease.   It can reduce swelling in the ankles and belly, which can be uncomfortable and make it hard to move.   It can lower the chances of forming kidney stones.   It can help keep your bones strong.  Which foods have the most sodium? -- Processed foods have the most sodium. These foods usually come in cans, boxes, jars, and bags. They tend to have a lot of sodium even if they don't taste salty. In fact, many sweet foods have a lot of sodium in them. The only way to know for sure how much sodium is in a food is to check the label (figure 1).  Here are some examples of foods that often have too much sodium:   Canned soups   Rice and noodle mixes   Sauces, dressings, and condiments (such as ketchup and mustard)   Pre-made frozen meals (also called \"TV dinners\")   Deli meats, hot dogs, and " "cheeses   Smoked, cured, or pickled foods   Salted snack foods and nuts   Restaurant meals  What should I do to reduce the amount of sodium in my diet? -- Many people think that eating a low-sodium diet just means not adding salt to their food. But this is not true. Not adding salt at the table or when cooking will help a little. But almost all of the sodium you eat is already in the food you buy at the grocery store or at restaurants (figure 2).  Here are some tips to help you eat less sodium:   Avoid processed foods when possible. This is the most important thing you can do to eat less sodium. Processed foods include most foods that are sold in cans, boxes, jars, and bags.   Instead of buying pre-made, processed foods, buy fresh or fresh-frozen fruits and vegetables. (\"Fresh-frozen\" means that the food is frozen without anything added to it.)   Buy meats, fish, chicken, and turkey that are fresh instead of canned or sold at the deli counter. (Meats sold at the deli counter are high in sodium.)   Try to eat at restaurants less often.   When possible, try to make meals from scratch at home using fresh ingredients.   If you do buy canned or packaged foods, choose ones that are labeled \"sodium free\" or \"very low sodium\" (table 1). Or choose foods that have less than 400 milligrams of sodium in each serving. The amount of sodium in each serving appears on the nutrition label (figure 1).  The table has some examples of foods to avoid and foods to choose instead (table 2).  Whatever changes you make, make them slowly. Choose 1 thing to do differently, and do that for a while. If you can keep doing that change easily, add another change. For instance, if you usually eat green beans from a can, try buying fresh or fresh-frozen green beans and cooking them at home without adding salt. If that works for you, keep doing it. Then, choose another thing to change.  If you try making a change and it doesn't work right away, don't " "give up. See if you can reduce sodium in other ways. The important thing is to take small steps and to keep doing the changes that work for you.  Can I still eat out at restaurants sometimes? -- One of best ways to limit your sodium is to only eat out at restaurants every once in a while. When you do eat out, try to choose places that offer healthier choices and fresh ingredients.  No matter where you eat, when choosing your food:   Ask your  if your meal can be made without salt.   Avoid foods that come with sauces or dips.   Choose plain grilled meats or fish and steamed vegetables.   Ask for oil and vinegar for your salad, rather than dressing.   If a meal you really want has more sodium than you should have, consider saving half of it to eat another day.  What if food just does not taste as good without sodium? -- Starting a low-sodium diet can be hard. The good news is that your taste buds can get used to having less sodium. But you have to give them some time to adjust.  It can also help to try other ways to add flavor to your foods. Try things like herbs, spices, lemon juice, and vinegar.  What about salt substitutes? -- Flavoring your food with a salt substitute is a good way to reduce how much salt you eat. But check with your doctor or nurse before trying this. Some salt substitutes can be dangerous if you have certain health problems or take certain medicines.  Do medicines have sodium? -- Yes, some medicines contain sodium. If you are buying medicines you can get without a prescription, look to see how much sodium they have. Avoid products that have \"sodium carbonate\" or \"sodium bicarbonate\" unless your doctor prescribes them. (Sodium bicarbonate is baking soda.)  All topics are updated as new evidence becomes available and our peer review process is complete.  This topic retrieved from [employer] on: Mar 22, 2024.  Topic 61440 Version 14.0  Release: 32.2.4 - C32.80  © 2024 Up[employer]Date, Inc[employer] and/or its " "affiliates. All rights reserved.  figure 1: Food labels can be tricky     To figure out how much sodium you are eating, check the label to find out how much sodium is in 1 serving. If you are having more than 1 serving, multiply that amount by the number of servings you plan to eat. For instance, if you are going to eat this whole can of soup, you should multiply 850 by 2. That means that you will be having 1700 milligrams of sodium. That's more sodium than many people are supposed to have in 1 day.  Graphic 96572 Version 8.0  figure 2: Sources of sodium in your diet     Graphic 49756 Version 2.0  table 1: A guide to common nutrient claims and what they mean  Salt/sodium-free  Less than 5 mg of sodium per serving   Very low sodium  35 mg or less of sodium per serving   Low sodium  140 mg or less of sodium per serving   Reduced sodium  At least 25% less sodium than the regular product   Light or lite in sodium  At least 50% less sodium than the regular product   No salt added or unsalted  No salt is added during processing, but these products may not be salt/sodium-free unless stated   mg: milligram; %: percent.  Graphic 92312 Version 7.0  table 2: Ways to cut down on salt (sodium)  Avoid these foods  Try these foods instead    Cured and smoked foods like wade, sausage, smoked fish and meats, hot dogs, ham, lunch meats, corned beef, and pickles Fresh turkey, chicken, and lean beef   Canned fish (tuna, sardines) Unsalted tuna or sardines   Canned meats Fresh unprocessed meats, vegetable protein, and fish  Frozen and canned meats, vegetable protein, and fish that are labeled \"low-sodium\"   Salted pretzels, crackers, potato chips, tortilla chips, and nuts Low-sodium and unsalted versions of these foods   Most cheeses Low-sodium cheeses (check label for actual sodium content)   Sauces (tomato and cream, etc), tomato juices Low-sodium versions of these foods, such as low-sodium tomato juice   Processed, instant, and " "convenience foods like frozen dinners, packaged meals, canned soups, and boxed pasta blends Cook and freeze your own low-sodium meals, soups, and broths    If you do need to use convenience or processed foods, read the labels. Choose items with 140 to 200 mg of sodium per serving.    For an entire convenience meal (frozen dinner), try to find options with less than 500 to 600 mg of sodium.    If you used canned foods, look for those labeled \"sodium-free.\" Or you can rinse the canned food under water to lower the sodium content.   Fast foods and foods prepared at restaurants (unless without cheese, sauces, or added salt) Fresh foods and foods with sauces on the side  Request that food be prepared without cheese or added salt   Graphic 39429 Version 10.0  Consumer Information Use and Disclaimer   Disclaimer: This generalized information is a limited summary of diagnosis, treatment, and/or medication information. It is not meant to be comprehensive and should be used as a tool to help the user understand and/or assess potential diagnostic and treatment options. It does NOT include all information about conditions, treatments, medications, side effects, or risks that may apply to a specific patient. It is not intended to be medical advice or a substitute for the medical advice, diagnosis, or treatment of a health care provider based on the health care provider's examination and assessment of a patient's specific and unique circumstances. Patients must speak with a health care provider for complete information about their health, medical questions, and treatment options, including any risks or benefits regarding use of medications. This information does not endorse any treatments or medications as safe, effective, or approved for treating a specific patient. Up[employer]Date, Inc. and its affiliates disclaim any warranty or liability relating to this information or the use thereof.The use of this information is governed by the " Terms of Use, available at https://www.[employer]mentsuwer.com/en/know/clinical-effectiveness-terms. 2024© [employer] and its affiliates and/or licensors. All rights reserved.  Copyright   © 2024 [employer] and/or its affiliates. All rights reserved.

## 2024-09-09 NOTE — PATIENT INSTRUCTIONS
1. Nothing to eat or drink after midnight prior to the operation.    2. Please avoid ibuprofen, aspirin, fish oil for 7 days prior to surgery  3. Medications to take the morning of surgery with a sip of water: metoprolol, pepcid  4.  Please stop Eliquis 3 days prior to surgery.  Eliquis can resume the second day after surgery.

## 2024-09-09 NOTE — PROGRESS NOTES
Cardiology Consultation     Cherelle Dash  [id number]  1955  [facility]  [address]  [address]  [place] [address]    1. Dilated cardiomyopathy ([facility])  Ambulatory  Referral to Cardiac Rehabilitation    NM myocardial perfusion spect (rx stress and/or rest)      2. LBBB (left bundle branch block)  metoprolol succinate (TOPROL-XL) 50 mg 24 hr tablet      3. Other acute pulmonary embolism without acute cor pulmonale ([facility])  Ambulatory  Referral to Cardiac Rehabilitation      4. Premature ventricular contractions        5. Heart failure with mildly reduced ejection fraction (HFmrEF) ([facility])  Ambulatory  Referral to Cardiac Rehabilitation    NM myocardial perfusion spect (rx stress and/or rest)          Discussion/Summary:    Dilated cardiomyopathy and HFmrEF - I had a long discussion with Maggie and her  today in the office regarding her changes on the echocardiogram.  I did state that visually her EF looked 45% to me.  Part of this is brought on by the left bundle branch block, but her EF is lower than it had been in the past.  This was likely stress-induced at the time of her acute pulmonary embolism.  She is euvolemic today and has not required any loop diuretic therapy.  We will perform another ischemic evaluation, stress nuclear study in 1 to 2 months once she has recovered.  I am going to increase her Toprol-XL to 50 mg daily.  I will avoid any ACE inhibitors or Entresto given a rise in her creatinine compared to baseline at the time of her hospitalization.  We will have her back after her stress nuclear study and then in about 3 months repeat an echocardiogram.    Chronic left bundle branch block - This has been known which led to her testing last year.  As stated her prior ejection fraction was normal at 55%.    PVCs - Controlled on Toprol-XL.    Pulmonary embolism - She was hospitalized last week for  this occurring after a D&C procedure from last month.  She is now on Eliquis.    HPI:    Mrs. Dash comes in as an acute visit today given her recent hospitalization last week and changes on her echocardiogram.  Agustina went to the ER on 9/5/2024 with shortness of breath over a few days.  Of note she had a D&C for postmenopausal uterine bleeding on 8/23/2024.  CT of the chest showed bilateral pulmonary emboli.  During that hospital admission she had an echocardiogram that showed a drop in her ejection fraction of 40%.  I did review this, and visually to me it appeared mildly reduced at about 45%.  List this was a change from her prior echocardiogram of 55%.  There was no evidence for any significant right heart strain from the PE.  She also has a known history of chronic kidney disease.    Cherelle follows with Dr. Gold in our office given a history of PVCs and a chronic left bundle branch block.  She did have an echocardiogram and stress nuclear study in November 2023.  As stated her EF by echocardiogram was 55% and her stress nuclear study did not have any significant perfusion defects, although there was quite a bit of soft tissue and diaphragmatic attenuation artifact.  EF by stress nuclear study was 48%, and attributed to the left bundle branch block.  She has been on Toprol-XL for her history of PVCs and this dose was not changed during her hospitalization.    Cherelle continues to recover from her hospitalization she was just discharged on Friday.  She does continue to have shortness of breath with exertion and fatigue.  She denies chest pain or any symptoms of angina.  No palpitations, lightheadedness or any syncope.  She is trying to remain active but admits to a rather sedentary lifestyle even before her D&C.  She is euvolemic today and conveys no signs or symptoms of CHF.      Patient Active Problem List   Diagnosis    Bipolar 1 disorder, mixed, severe (HCC)    Depression with anxiety    Gastroesophageal  reflux disease without esophagitis    Hypothyroidism    Impaired fasting glucose    Insomnia    Obstructive sleep apnea    Onychomycosis of toenail    Patellofemoral arthritis of right knee    Stress incontinence of urine    High triglycerides    Abnormal EKG    Raynaud's disease without gangrene    Stage 3 chronic kidney disease ([facility])    Vitamin D deficiency    Primary osteoarthritis of left knee    Primary osteoarthritis of right knee    Secondary hyperparathyroidism of renal origin ([facility])    Persistent proteinuria    Urinary frequency    Morbid obesity with BMI of 40.0-44.9, adult ([facility])    Mass of right hand    Lichen sclerosus    Premature ventricular contractions    LBBB (left bundle branch block)    Other sleep disorders    Hyperphosphatemia    PMB (postmenopausal bleeding)    Cervical mass    Other pulmonary embolism without acute cor pulmonale ([facility])    Dilated cardiomyopathy ([facility])     Past Medical History:   Diagnosis Date    Abnormal ECG     Anxiety 2002    Arthritis     Bipolar 1 disorder ([facility])     Chronic kidney disease     stage 3    CPAP (continuous positive airway pressure) dependence     Depression 2001    Disease of thyroid gland     Elevated blood pressure reading 06/10/2019    GERD (gastroesophageal reflux disease)     Obesity     RSV (acute bronchiolitis due to respiratory syncytial virus) 12/05/2023    Sleep apnea     Sleep apnea, obstructive 2007     Social History     Socioeconomic History    Marital status: /Civil Union     Spouse name: Not on file    Number of children: Not on file    Years of education: Not on file    Highest education level: Not on file   Occupational History    Not on file   Tobacco Use    Smoking status: Former     Current packs/day: 0.00     Average packs/day: 0.3 packs/day for 30.0 years (7.5 ttl pk-yrs)     Types: Cigarettes     Start date: 1/1/1979     Quit date: 1/1/2009     Years since quitting: 15.6     Passive exposure: Never    Smokeless tobacco: Never    Vaping Use    Vaping status: Never Used   Substance and Sexual Activity    Alcohol use: Yes     Alcohol/week: 1.0 standard drink of alcohol     Types: 1 Glasses of wine per week    Drug use: Never    Sexual activity: Not Currently     Partners: Male     Birth control/protection: Post-menopausal   Other Topics Concern    Not on file   Social History Narrative    Daily coffee consumption: 3 cups/day     Social Determinants of Health     Financial Resource Strain: Low Risk  (1/24/2024)    Overall Financial Resource Strain (CARDIA)     Difficulty of Paying Living Expenses: Not hard at all   Food Insecurity: No Food Insecurity (11/21/2023)    Hunger Vital Sign     Worried About Running Out of Food in the Last Year: Never true     Ran Out of Food in the Last Year: Never true   Transportation Needs: No Transportation Needs (1/24/2024)    PRAPARE - Transportation     Lack of Transportation (Medical): No     Lack of Transportation (Non-Medical): No   Physical Activity: Not on file   Stress: Not on file   Social Connections: Not on file   Intimate Partner Violence: Not on file   Housing Stability: Low Risk  (11/21/2023)    Housing Stability Vital Sign     Unable to Pay for Housing in the Last Year: No     Number of Times Moved in the Last Year: 1     Homeless in the Last Year: No      Family History   Problem Relation Age of Onset    Heart disease Mother     Heart Valve Disease Mother         Replacement    Diabetes Mother         2002    Heart failure Mother         Heart Valve replacement    Arthritis Father     No Known Problems Sister     No Known Problems Daughter     No Known Problems Maternal Grandmother     No Known Problems Maternal Grandfather     No Known Problems Paternal Grandmother     No Known Problems Paternal Grandfather     No Known Problems Son     No Known Problems Maternal Aunt     No Known Problems Maternal Aunt     No Known Problems Maternal Aunt     No Known Problems Maternal Aunt     No Known  Problems Maternal Aunt     No Known Problems Paternal Aunt     Alcohol abuse Son         Kolton, 40 year old son, has issues with alcohol and alcohol abuse     Past Surgical History:   Procedure Laterality Date    COLONOSCOPY  2011    [place] HYSTEROSCOPY BX ENDOMETRIUM&/POLYPC W/WO D&C N/A 8/23/2024    Procedure: EXAM UNDER ANESTHESIA, DILATATION AND CURETTAGE, CERVICAL BIOPSIES;  Surgeon: Evin Page MD;  Location: [facility];  Service: Gynecology Oncology       Current Outpatient Medications:     apixaban (Eliquis) 5 mg, Take 2 tablets (10 mg total) by mouth 2 (two) times a day for 7 days, THEN 1 tablet (5 mg total) 2 (two) times a day for 23 days., Disp: 74 tablet, Rfl: 0    calcitriol (ROCALTROL) 0.25 mcg capsule, Take 1 capsule (0.25 mcg total) by mouth 3 (three) times a week, Disp: 36 capsule, Rfl: 2    Cholecalciferol (VITAMIN D) 2000 units CAPS, Take 1 capsule by mouth daily, Disp: , Rfl:     ciclopirox (LOPROX) 0.77 % cream, , Disp: , Rfl:     clobetasol (TEMOVATE) 0.05 % ointment, Apply topically 2 (two) times a week, Disp: 60 g, Rfl: 3    CRANBERRY PO, Take by mouth, Disp: , Rfl:     famotidine (PEPCID) 20 mg tablet, TAKE 1 TABLET BY MOUTH TWICE  DAILY, Disp: 180 tablet, Rfl: 3    Glucosamine-Chondroit-Vit C-Mn (GLUCOSAMINE 1500 COMPLEX PO), Take by mouth in the morning, Disp: , Rfl:     Ivermectin 1 % CREA, , Disp: , Rfl:     lurasidone (LATUDA) 20 mg tablet, TAKE 1 TABLET BY MOUTH DAILY  WITH BREAKFAST, Disp: 30 tablet, Rfl: 11    metoprolol succinate (TOPROL-XL) 50 mg 24 hr tablet, Take 1 tablet (50 mg total) by mouth daily, Disp: 90 tablet, Rfl: 3    Mirabegron ER (Myrbetriq) 25 MG TB24, TAKE 1 TABLET BY MOUTH IN THE  MORNING, Disp: 90 tablet, Rfl: 1    Multiple Vitamin (MULTI-VITAMIN DAILY) TABS, Take by mouth daily, Disp: , Rfl:   Allergies   Allergen Reactions    Raw Fruit - Food Allergy Anaphylaxis     PLUMS, PEACHES, FRUIT WITH SKIN     Vitals:    09/09/24 0945   BP: 122/72   BP Location:  Left arm   Patient Position: Sitting   Cuff Size: Large   Pulse: 100   SpO2: 98%   Weight: 101 kg (223 lb 3.2 oz)       Labs:  Lab Results   Component Value Date     (H) 11/17/2017    K 4.1 09/06/2024    K 4.7 12/09/2020    K 4.4 12/07/2020     (H) 09/06/2024     12/09/2020     (H) 12/07/2020    CO2 19 (L) 09/06/2024    CO2 23 12/09/2020    CO2 23 12/07/2020    BUN 23 09/06/2024    BUN 23 12/09/2020    BUN 20 12/07/2020    CREATININE 1.76 (H) 09/06/2024    CREATININE 1.51 (H) 12/07/2020    GLUCOSE 124 (H) 11/17/2017    CALCIUM 9.1 09/06/2024    CALCIUM 9.4 12/07/2020     Lab Results   Component Value Date    WBC 6.91 09/06/2024    WBC 5.01 03/10/2015    HGB 11.4 (L) 09/06/2024    HGB 14.0 03/10/2015    HCT 35.4 09/06/2024    HCT 44.8 03/10/2015    MCV 90 09/06/2024    MCV 97 03/10/2015     (L) 09/06/2024     03/10/2015     Lab Results   Component Value Date    CHOL 208 (H) 11/17/2017    TRIG 115 06/06/2024    TRIG 116 06/25/2020    HDL 54 06/06/2024    HDL 57 06/25/2020     Imaging:  ECG obtained at the time of her hospitalization showed normal sinus rhythm, left axis deviation and a left bundle branch block.    Echo complete w/ contrast if indicated  Result Date: 9/6/2024  Narrative:   Left Ventricle: Left ventricular cavity size is normal. Wall thickness is mildly increased. There is mild concentric hypertrophy. The left ventricular ejection fraction is 40%. Systolic function is moderately reduced. There is moderate global hypokinesis. Diastolic function is mildly abnormal, consistent with grade I (abnormal) relaxation.   Right Ventricle: Right ventricular cavity size is dilated. Systolic function is mildly reduced.   Mitral Valve: There is mild regurgitation.     VAS VENOUS DUPLEX - LOWER LIMB BILATERAL  Result Date: 9/5/2024  Narrative:  [facility] REPORT CLINICAL: Indications: Patient presents with recent discovery of pulmonary embolism and physician wants to  determine potential source. Risk Factors The patient has history of CKD.  FINDINGS:  Right          Impression              Popliteal      Occlusive Subsegmental  PostTibial     Occlusive Subsegmental  Calf Veins     Occlusive Subsegmental  Gastrocnemius  Occlusive Subsegmental   Left           Impression              Popliteal      Non Occlusive Thrombus  PostTibial     Occlusive Subsegmental     CONCLUSION:  Impression:  RIGHT LOWER LIMB: Evidence of acute deep vein thrombosis was noted in the popliteal, posterior tibial, gastrocnemius and soleal veins. No evidence of superficial thrombophlebitis noted. Doppler evaluation shows a normal response to augmentation maneuvers.. Popliteal, posterior tibial and anterior tibial arterial Doppler waveform's are triphasic.  LEFT LOWER LIMB: Evidence of acute deep vein thrombosis was noted in the popliteal and posterior tibial. No evidence of superficial thrombophlebitis noted. Doppler evaluation shows a normal response to augmentation maneuvers. Popliteal, posterior tibial and anterior tibial arterial Doppler waveform's are triphasic.  Technical findings were given to Nacho Chauhan DO at 5:50 PM.  SIGNATURE: Electronically Signed by: DAVI POLLACK MD on 2024-09-05 08:56:59 PM    CTA ED chest PE Study  Result Date: 9/5/2024  Narrative: CTA - CHEST WITH IV CONTRAST - PULMONARY ANGIOGRAM INDICATION: Dimer 8, SOB/LOMBARDI. COMPARISON: CTA chest, PE study, 11/20/2023 TECHNIQUE: CTA examination of the chest was performed using angiographic technique according to a protocol specifically tailored to evaluate for pulmonary embolism. Multiplanar 2D reformatted images were created from the source data. In addition, coronal  3D MIP postprocessing was performed on the acquisition scanner. Radiation dose length product (DLP) for this visit: 381 mGy-cm . This examination, like all CT scans performed in the [facility], was performed utilizing techniques to minimize radiation  dose exposure, including the use of iterative reconstruction and automated exposure control. IV Contrast: 70 mL of iohexol (OMNIPAQUE) FINDINGS: PULMONARY ARTERIAL TREE: Bilateral pulmonary emboli: Left upper and lower lobar nonocclusive emboli (305:93, 106) On the right nearly occlusive truncus anterior embolus (305:91), as well as partially occlusive in the interlobar artery ((305:105) LUNGS: 3 mm posterior right upper lobe nodule (304:67) 6 mm right lower lobe nodule (304:127) Nodules not well appreciated on the comparison November 2023 exam, particularly in the right lower lobe secondary to motion artifact at that time. There is no tracheal or endobronchial lesion. PLEURA: Unremarkable. HEART/GREAT VESSELS: Coronary calcifications no thoracic aortic aneurysm. MEDIASTINUM AND RC: Unremarkable. CHEST WALL AND LOWER NECK: Relatively symmetric ovoid soft tissue density structures extending posteriorly from the bilateral nipples. Findings stable from November 2023 exam, correlation with mammographic history advised. VISUALIZED STRUCTURES IN THE UPPER ABDOMEN: Moderate hiatal hernia Partially visualized probable small duodenal diverticulum OSSEOUS STRUCTURES: No acute fracture or destructive osseous lesion.     Impression: Bilateral pulmonary emboli. On the left, nonocclusive upper and lower lobar branches On the right, occlusive proximal truncus anterior and partially occlusive interlobular artery. No CT evidence of right heart strain. Pulmonary nodules, measuring up to 6 mm in the right lower lobe. Not well evaluated on comparison November 2023 exam secondary to motion artifact at that time. Considering smoking history, recommend follow-up chest CT in 12 months. Moderate hiatal hernia. Findings were discussed with Dr. Marcum at 1:45 p.m. on 9/5/2024 Workstation performed: [id number]     XR chest 1 view portable  Result Date: 9/5/2024  Narrative: XR CHEST PORTABLE INDICATION: Shortness of breath. COMPARISON: CTA  chest 11/20/2023, chest radiograph 11/18/2023 FINDINGS: Clear lungs. No pneumothorax or pleural effusion. Normal cardiomediastinal silhouette. Bones are unremarkable for age. Normal upper abdomen.     Impression: No acute cardiopulmonary disease. Resident: Asif Jefferson I, the attending radiologist, have reviewed the images and agree with the final report above. Workstation performed: [id number]       Review of Systems:  Review of Systems   Constitutional: Negative.    HENT: Negative.     Eyes: Negative.    Respiratory: Negative.     Cardiovascular: Negative.    Gastrointestinal: Negative.    Musculoskeletal: Negative.    Skin: Negative.    Allergic/Immunologic: Negative.    Neurological: Negative.    Hematological: Negative.    Psychiatric/Behavioral: Negative.     All other systems reviewed and are negative.    Vitals:    09/09/24 0945   BP: 122/72   BP Location: Left arm   Patient Position: Sitting   Cuff Size: Large   Pulse: 100   SpO2: 98%   Weight: 101 kg (223 lb 3.2 oz)     Physical Exam  Vitals and nursing note reviewed.   Constitutional:       Appearance: She is well-developed.   HENT:      Head: Normocephalic and atraumatic.   Eyes:      General: No scleral icterus.        Right eye: No discharge.         Left eye: No discharge.      Extraocular Movements: EOM normal.      Pupils: Pupils are equal, round, and reactive to light.   Neck:      Thyroid: No thyromegaly.      Vascular: No JVD.   Cardiovascular:      Rate and Rhythm: Normal rate and regular rhythm. No extrasystoles are present.     Pulses: Normal pulses. No decreased pulses.      Heart sounds: Normal heart sounds, S1 normal and S2 normal. No murmur heard.     No friction rub. No gallop.   Pulmonary:      Effort: Pulmonary effort is normal. No respiratory distress.      Breath sounds: Normal breath sounds. No wheezing, rhonchi or rales.   Abdominal:      General: Bowel sounds are normal. There is no distension.      Palpations: Abdomen is soft.       Tenderness: There is no abdominal tenderness.   Musculoskeletal:         General: No tenderness, deformity or edema. Normal range of motion.      Cervical back: Normal range of motion and neck supple.   Skin:     General: Skin is warm and dry.      Findings: No rash.   Neurological:      Mental Status: She is alert and oriented to person, place, and time.      Cranial Nerves: No cranial nerve deficit.   Psychiatric:         Mood and Affect: Mood and affect normal.         Thought Content: Thought content normal.         Judgment: Judgment normal.       Counseling / Coordination of Care  Total office time spent today 40 minutes.  Greater than 50% of total time was spent with the patient and / or family counseling and / or coordination of care.

## 2024-09-10 ENCOUNTER — RA CDI HCC (OUTPATIENT)
Dept: OTHER | Facility: HOSPITAL | Age: 69
End: 2024-09-10

## 2024-09-10 NOTE — PROGRESS NOTES
Assessment/Plan:    Problem List Items Addressed This Visit          Cardiovascular and Mediastinum    Other pulmonary embolism without acute cor pulmonale (HCC)     Bilateral pulmonary embolus as well as DVT identified during hospital admission.  She is now on therapeutic anticoagulation with Eliquis.  Echo demonstrated a EF of 40%.  CTA chest 9/5/2024 revealed the pulmonary embolus as well as small pulmonary nodules.  Megace has been held.  1.  We discussed the additional risks of acute pulmonary embolus and concurrent pelvic surgery.  She understands that clot stabilization may take up to 6 weeks and that if surgery is to be performed soon after diagnosis of acute PE, she would require IVC filter placement preoperatively.            Obstetrics/Gynecology    PMB (postmenopausal bleeding) - Primary     69-year-old with obesity, BMI 42 kg/m², stage III CKD, postmenopausal bleeding with inability to sample in the office, ultrasound and MRI evidence of a 4.1 x 4.3 cm cervical mass, 8 mm endometrium, bilateral simple appearing ovarian cysts.  She is now status post D&C, multiple cervical biopsies, EUA on 8/23/2024.  Pathology from that procedure is pending.  I reviewed hospital admission notes, CTA chest images, echo results.  Her performance status is 0.  1.  Although pathology is pending, I discussed that I suspect an endometrial cancer with involvement of the upper endocervix.  2.  I therefore discussed definitive surgical management inclusive of examination under anesthesia, robotic assisted total laparoscopic hysterectomy, bilateral salpingo-oophorectomy, lymph node dissection, possible exploratory laparotomy and all other indicated procedures.  She understands the risks of the operation including the potential additional risk of undertreatment of the event that she has a cervical cancer diagnosis and agrees to proceed as outlined.  Consent for surgery was obtained by me in the office.  3.  In the event that she  has biopsy-proven endometrial malignancy, consideration can be given to neoadjuvant chemotherapy or radiation to allow clot stabilization.  If no diagnosis was rendered by cervical biopsies and D&C, surgery may be appropriate to obtain definitive diagnosis with the risk of undertreatment in the event that she has a cervical cancer.  If the biopsies demonstrate cervical cancer, she understands that the surgery will be canceled and that she will start on curative intent chemotherapy and radiation.  I have spent a total time of 45 minutes in caring for this patient on the day of the visit/encounter including Diagnostic results, Risks and benefits of tx options, Instructions for management, Patient and family education, Importance of tx compliance, Risk factor reductions, Impressions, Counseling / Coordination of care, Documenting in the medical record, Reviewing / ordering tests, medicine, procedures  , Obtaining or reviewing history  , and Communicating with other healthcare professionals only a brief time was spent on the postoperative history and physical examination..           Relevant Orders    Case request operating room: HYSTERECTOMY LAPAROSCOPIC TOTAL (LTH) W/ ROBOTICS (Completed)    CBC and Platelet    Basic metabolic panel    HEMOGLOBIN A1C W/ EAG ESTIMATION    EKG 12 lead       Other    Cervical mass         CHIEF COMPLAINT: Follow-up after D&C, cervical biopsies, treatment discussion.      Problem:  Cancer Staging   No matching staging information was found for the patient.        Previous therapy:  Oncology History    No history exists.         Patient ID: Cherelle Dash is a 69 y.o. female  Who returns for treatment discussion.  She had EUA, D&C, cervical biopsies 8/23/2024.  Intraoperative frozen section was not diagnostic of malignancy.  Final pathology is currently pending.  In the interim, she was admitted on 9/5/2024 with dyspnea and found to have bilateral pulmonary embolus with bilateral DVT.  She  had an echocardiogram which revealed a 40% ejection fraction.  She was seen by cardiology yesterday and has a plan for stress echo to be performed at the end of October.  I reviewed the CT chest images.  I concur with the radiologist opinion.  There were small pulmonary nodules with largest measuring 6 mm in the right lower lobe.  Megace has been held.  Her bleeding did not worsen after stopping the Megace.  He has been tolerating the therapeutic anticoagulation well.        The following portions of the patient's history were reviewed and updated as appropriate: allergies, current medications, past family history, past medical history, past social history, past surgical history, and problem list.    Review of Systems   Constitutional:  Negative for activity change and unexpected weight change.   HENT: Negative.     Eyes: Negative.    Respiratory: Negative.     Cardiovascular: Negative.    Gastrointestinal:  Negative for abdominal distention and abdominal pain.   Endocrine: Negative.    Genitourinary:  Positive for vaginal bleeding. Negative for pelvic pain.   Musculoskeletal: Negative.    Skin: Negative.    Allergic/Immunologic: Negative.    Neurological: Negative.    Hematological: Negative.    Psychiatric/Behavioral:  The patient is nervous/anxious.        Current Outpatient Medications   Medication Sig Dispense Refill    apixaban (Eliquis) 5 mg Take 2 tablets (10 mg total) by mouth 2 (two) times a day for 7 days, THEN 1 tablet (5 mg total) 2 (two) times a day for 23 days. 74 tablet 0    calcitriol (ROCALTROL) 0.25 mcg capsule Take 1 capsule (0.25 mcg total) by mouth 3 (three) times a week 36 capsule 2    Cholecalciferol (VITAMIN D) 2000 units CAPS Take 1 capsule by mouth daily      ciclopirox (LOPROX) 0.77 % cream       clobetasol (TEMOVATE) 0.05 % ointment Apply topically 2 (two) times a week 60 g 3    CRANBERRY PO Take by mouth      famotidine (PEPCID) 20 mg tablet TAKE 1 TABLET BY MOUTH TWICE  DAILY 180  tablet 3    Glucosamine-Chondroit-Vit C-Mn (GLUCOSAMINE 1500 COMPLEX PO) Take by mouth in the morning      Ivermectin 1 % CREA       lurasidone (LATUDA) 20 mg tablet TAKE 1 TABLET BY MOUTH DAILY  WITH BREAKFAST 30 tablet 11    metoprolol succinate (TOPROL-XL) 50 mg 24 hr tablet Take 1 tablet (50 mg total) by mouth daily 90 tablet 3    Mirabegron ER (Myrbetriq) 25 MG TB24 TAKE 1 TABLET BY MOUTH IN THE  MORNING 90 tablet 1    Multiple Vitamin (MULTI-VITAMIN DAILY) TABS Take by mouth daily       No current facility-administered medications for this visit.           Objective:    Blood pressure 128/80, pulse (!) 112, temperature 98.1 °F (36.7 °C), temperature source Temporal, weight 101 kg (223 lb), SpO2 98%, not currently breastfeeding.  Body mass index is 42.14 kg/m².  Body surface area is 1.98 meters squared.    Physical Exam  Vitals reviewed.   Constitutional:       General: She is not in acute distress.     Appearance: Normal appearance. She is not ill-appearing.   HENT:      Head: Normocephalic and atraumatic.      Mouth/Throat:      Mouth: Mucous membranes are moist.   Eyes:      General: No scleral icterus.        Right eye: No discharge.         Left eye: No discharge.      Conjunctiva/sclera: Conjunctivae normal.   Pulmonary:      Effort: Pulmonary effort is normal.   Musculoskeletal:      Right lower leg: Edema present.      Left lower leg: Edema present.   Skin:     General: Skin is warm and dry.      Coloration: Skin is not jaundiced.      Findings: No rash.   Neurological:      General: No focal deficit present.      Mental Status: She is alert and oriented to person, place, and time.      Cranial Nerves: No cranial nerve deficit.      Motor: No weakness.      Gait: Gait normal.   Psychiatric:         Mood and Affect: Mood normal.         Behavior: Behavior normal.         Thought Content: Thought content normal.         Judgment: Judgment normal.         Lab Results   Component Value Date     45.2  (H) 08/14/2024     Lab Results   Component Value Date     (H) 11/17/2017    K 4.1 09/06/2024     (H) 09/06/2024    CO2 19 (L) 09/06/2024    ANIONGAP 10 08/04/2015    BUN 23 09/06/2024    CREATININE 1.76 (H) 09/06/2024    GLUCOSE 124 (H) 11/17/2017    GLUF 107 (H) 09/06/2024    CALCIUM 9.1 09/06/2024    CORRECTEDCA 10.1 11/21/2022    AST 11 (L) 09/05/2024    ALT 11 09/05/2024    ALKPHOS 61 09/05/2024    PROT 7.0 11/17/2017    BILITOT 0.4 11/17/2017    EGFR 28 09/06/2024     Lab Results   Component Value Date    WBC 6.91 09/06/2024    HGB 11.4 (L) 09/06/2024    HCT 35.4 09/06/2024    MCV 90 09/06/2024     (L) 09/06/2024     Lab Results   Component Value Date    NEUTROABS 4.94 09/05/2024        Trend:  Lab Results   Component Value Date     45.2 (H) 08/14/2024       Echo complete w/ contrast if indicated    Left Ventricle: Left ventricular cavity size is normal. Wall thickness   is mildly increased. There is mild concentric hypertrophy. The left   ventricular ejection fraction is 40%. Systolic function is moderately   reduced. There is moderate global hypokinesis. Diastolic function is   mildly abnormal, consistent with grade I (abnormal) relaxation.    Right Ventricle: Right ventricular cavity size is dilated. Systolic   function is mildly reduced.    Mitral Valve: There is mild regurgitation.

## 2024-09-10 NOTE — ASSESSMENT & PLAN NOTE
69-year-old with obesity, BMI 42 kg/m², stage III CKD, postmenopausal bleeding with inability to sample in the office, ultrasound and MRI evidence of a 4.1 x 4.3 cm cervical mass, 8 mm endometrium, bilateral simple appearing ovarian cysts.  She is now status post D&C, multiple cervical biopsies, EUA on 8/23/2024.  Pathology from that procedure is pending.  I reviewed hospital admission notes, CTA chest images, echo results.  Her performance status is 0.  1.  Although pathology is pending, I discussed that I suspect an endometrial cancer with involvement of the upper endocervix.  2.  I therefore discussed definitive surgical management inclusive of examination under anesthesia, robotic assisted total laparoscopic hysterectomy, bilateral salpingo-oophorectomy, lymph node dissection, possible exploratory laparotomy and all other indicated procedures.  She understands the risks of the operation including the potential additional risk of undertreatment of the event that she has a cervical cancer diagnosis and agrees to proceed as outlined.  Consent for surgery was obtained by me in the office.  3.  In the event that she has biopsy-proven endometrial malignancy, consideration can be given to neoadjuvant chemotherapy or radiation to allow clot stabilization.  If no diagnosis was rendered by cervical biopsies and D&C, surgery may be appropriate to obtain definitive diagnosis with the risk of undertreatment in the event that she has a cervical cancer.  If the biopsies demonstrate cervical cancer, she understands that the surgery will be canceled and that she will start on curative intent chemotherapy and radiation.  I have spent a total time of 45 minutes in caring for this patient on the day of the visit/encounter including Diagnostic results, Risks and benefits of tx options, Instructions for management, Patient and family education, Importance of tx compliance, Risk factor reductions, Impressions, Counseling /  Coordination of care, Documenting in the medical record, Reviewing / ordering tests, medicine, procedures  , Obtaining or reviewing history  , and Communicating with other healthcare professionals only a brief time was spent on the postoperative history and physical examination..

## 2024-09-13 ENCOUNTER — TELEPHONE (OUTPATIENT)
Age: 69
End: 2024-09-13

## 2024-09-13 NOTE — TELEPHONE ENCOUNTER
Caller: Patient     Doctor:  Torin Gold MD     Reason for call: Patient called & stated that her stress test was changed to 10/16 & she is scheduled for pre-op appt on 10/3 following surgery on 10/21. Pt would like to know should she move her appt for 10/3? Please Advise     Dr. Gold no pre-op aoot before 10/21..    Call back#: [phone]

## 2024-09-16 ENCOUNTER — TELEPHONE (OUTPATIENT)
Dept: GYNECOLOGIC ONCOLOGY | Facility: CLINIC | Age: 69
End: 2024-09-16

## 2024-09-16 ENCOUNTER — TELEPHONE (OUTPATIENT)
Age: 69
End: 2024-09-16

## 2024-09-16 PROCEDURE — 88305 TISSUE EXAM BY PATHOLOGIST: CPT | Performed by: STUDENT IN AN ORGANIZED HEALTH CARE EDUCATION/TRAINING PROGRAM

## 2024-09-16 PROCEDURE — 88342 IMHCHEM/IMCYTCHM 1ST ANTB: CPT | Performed by: STUDENT IN AN ORGANIZED HEALTH CARE EDUCATION/TRAINING PROGRAM

## 2024-09-16 PROCEDURE — 88341 IMHCHEM/IMCYTCHM EA ADD ANTB: CPT | Performed by: STUDENT IN AN ORGANIZED HEALTH CARE EDUCATION/TRAINING PROGRAM

## 2024-09-16 NOTE — TELEPHONE ENCOUNTER
Received call from patient to discuss pre-op labs. States her procedure is tentatively scheduled for 10/21, and wanted to know when lab work should be completed. Discussed that lab work should be completed at least 60 day prior to surgery. Patient agreeable, will be getting blood work done around first week of October.    Patient also was inquiring about status of biopsy results and if results are finalized.

## 2024-09-16 NOTE — TELEPHONE ENCOUNTER
Called and informed patient that her biopsy results are still pending and when the patient gets them in her mychart is the same time the provider will get the results. Patient stated she understood and will keep looking for them in her mychart.

## 2024-09-17 ENCOUNTER — OFFICE VISIT (OUTPATIENT)
Dept: FAMILY MEDICINE CLINIC | Facility: CLINIC | Age: 69
End: 2024-09-17
Payer: COMMERCIAL

## 2024-09-17 ENCOUNTER — DOCUMENTATION (OUTPATIENT)
Dept: GYNECOLOGIC ONCOLOGY | Facility: CLINIC | Age: 69
End: 2024-09-17

## 2024-09-17 VITALS
WEIGHT: 224.2 LBS | RESPIRATION RATE: 18 BRPM | BODY MASS INDEX: 42.33 KG/M2 | SYSTOLIC BLOOD PRESSURE: 126 MMHG | OXYGEN SATURATION: 98 % | TEMPERATURE: 98.7 F | DIASTOLIC BLOOD PRESSURE: 80 MMHG | HEIGHT: 61 IN | HEART RATE: 71 BPM

## 2024-09-17 DIAGNOSIS — Z09 HOSPITAL DISCHARGE FOLLOW-UP: Primary | ICD-10-CM

## 2024-09-17 DIAGNOSIS — I44.7 LBBB (LEFT BUNDLE BRANCH BLOCK): ICD-10-CM

## 2024-09-17 DIAGNOSIS — N18.32 STAGE 3B CHRONIC KIDNEY DISEASE (HCC): ICD-10-CM

## 2024-09-17 DIAGNOSIS — I26.99 OTHER ACUTE PULMONARY EMBOLISM WITHOUT ACUTE COR PULMONALE (HCC): ICD-10-CM

## 2024-09-17 DIAGNOSIS — I42.0 DILATED CARDIOMYOPATHY (HCC): ICD-10-CM

## 2024-09-17 PROCEDURE — 99495 TRANSJ CARE MGMT MOD F2F 14D: CPT | Performed by: FAMILY MEDICINE

## 2024-09-17 NOTE — PROGRESS NOTES
I discussed the results of her cervical biopsies with her.  Pathology was sent to [facility] for review.  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  The atypical glands are positive for CK7, PAX8, patchy ER and p16 patchy negative for CT.  Rare cells are also positive for CK20, GATA3 and negative for high risk HPV.  P53 is wild-type.  Based on the histology, staining pattern, imaging, there is concern for possible gastric type adenocarcinoma of the cervix.  She understands that this type of cancer can be resistant to typical curative intent treatment for cervical cancer.  Will therefore plan for surgery followed by adjuvant treatment.  Given timing from recent diagnosis of acute pulmonary embolus, we will plan on IVC filter placement preoperatively.

## 2024-09-17 NOTE — ASSESSMENT & PLAN NOTE
Lab Results   Component Value Date    EGFR 28 09/06/2024    EGFR 31 09/05/2024    EGFR 31 08/14/2024    CREATININE 1.76 (H) 09/06/2024    CREATININE 1.63 (H) 09/05/2024    CREATININE 1.62 (H) 08/14/2024   Worsening kidney function  Did have dye for CT  Has repeat in 2 weeks

## 2024-09-17 NOTE — PROGRESS NOTES
Transition of Care Visit  Name: Cherelle Dash      : 1955      MRN: [id number]  Encounter Provider: Gretchen Mayorga DO  Encounter Date: 2024   Encounter department: [facility]    Assessment & Plan  Hospital discharge follow-up         Other acute pulmonary embolism without acute cor pulmonale (HCC)  On metoprolol and eliquis         Dilated cardiomyopathy (HCC)  On echo  Will get stress test done  Possible related to stress from PE         LBBB (left bundle branch block)  See by cardiology         Stage 3b chronic kidney disease (HCC)  Lab Results   Component Value Date    EGFR 28 2024    EGFR 31 2024    EGFR 31 2024    CREATININE 1.76 (H) 2024    CREATININE 1.63 (H) 2024    CREATININE 1.62 (H) 2024   Worsening kidney function  Did have dye for CT  Has repeat in 2 weeks            History of Present Illness     Transitional Care Management Review:   Cherelle Dash is a 69 y.o. female here for TCM follow up.     During the TCM phone call patient stated:  TCM Call       Date and time call was made  2024  8:37 AM    Patient was hospitialized at  [facility]    Date of Admission  24    Date of discharge  24    Diagnosis  Other pulmonary embolism without acute cor pulmonale    Disposition  Home    Were the patients medications reviewed and updated  No          TCM Call       Should patient be enrolled in anticoag monitoring?  Yes    Scheduled for follow up?  Yes    Did you obtain your prescribed medications  Yes    Do you need help managing your prescriptions or medications  No    Is transportation to your appointment needed  No    I have advised the patient to call PCP with any new or worsening symptoms  MR Claudia    Living Arrangements  Spouse or Significiant other    Are you recieving any outpatient services  No    Are you recieving home care services  No    Are you using any community resources  No    Current waiver  "services  No    Have you fallen in the last 12 months  No    Interperter language line needed  No          Hospital follow up  Felt SOB 1.5 weeks after surgery  Actually was sob for 2 days before she went to the ER  Had CT which did show PE  Has DVT in both leg and both lung  Had echo  Does show some stress induced areas on echo        Review of Systems   Constitutional: Negative.    HENT: Negative.     Eyes: Negative.    Respiratory: Negative.     Cardiovascular: Negative.    Gastrointestinal: Negative.    Endocrine: Negative.    Genitourinary: Negative.    Skin:  Positive for rash (elbows).   Allergic/Immunologic: Negative.    Neurological: Negative.    Hematological: Negative.    Psychiatric/Behavioral: Negative.       Objective     /80 (BP Location: Left arm, Patient Position: Sitting, Cuff Size: Standard)   Pulse 71   Temp 98.7 °F (37.1 °C) (Tympanic)   Resp 18   Ht 5' 1\" (1.549 m)   Wt 102 kg (224 lb 3.2 oz)   SpO2 98%   BMI 42.36 kg/m²     Physical Exam  Vitals and nursing note reviewed.   Constitutional:       Appearance: Normal appearance. She is well-developed.   HENT:      Head: Normocephalic and atraumatic.      Right Ear: External ear normal.      Left Ear: External ear normal.      Nose: Nose normal.   Eyes:      Extraocular Movements: Extraocular movements intact.      Conjunctiva/sclera: Conjunctivae normal.      Pupils: Pupils are equal, round, and reactive to light.   Cardiovascular:      Rate and Rhythm: Normal rate and regular rhythm.      Heart sounds: Normal heart sounds.   Pulmonary:      Effort: Pulmonary effort is normal.      Breath sounds: Normal breath sounds.   Abdominal:      General: Abdomen is flat. Bowel sounds are normal.      Palpations: Abdomen is soft.   Musculoskeletal:         General: Normal range of motion.      Cervical back: Normal range of motion and neck supple.   Skin:     General: Skin is warm and dry.      Capillary Refill: Capillary refill takes less than " 2 seconds.   Neurological:      General: No focal deficit present.      Mental Status: She is alert and oriented to person, place, and time.   Psychiatric:         Mood and Affect: Mood normal.         Behavior: Behavior normal.         Thought Content: Thought content normal.         Judgment: Judgment normal.       Medications have been reviewed by provider in current encounter    Administrative Statements

## 2024-09-18 ENCOUNTER — DOCUMENTATION (OUTPATIENT)
Dept: GYNECOLOGIC ONCOLOGY | Facility: CLINIC | Age: 69
End: 2024-09-18

## 2024-09-18 NOTE — PROGRESS NOTES
Gynecologic Oncology Treatment Planning Conference Case Review     Primary Gynecologic Oncologist: Dr. Page    Clinical Summary: Cherelle Dash is a 69 y.o. 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 PMH notable for recent DVT/PE (9/5/2024), HFrEF (EF 40-45%), CKD stage III.  Please see EMR for full history, imaging, and laboratory studies.     Recommendations:   Consider IVC filter with initial surgical management  Consider neoadjuvant radiation or chemotherapy if not appropriate for initial surgical management due to recent PE      DISCLAIMERS:    TO THE TREATING PHYSICIAN:  This conference is a meeting of clinicians from various specialty areas who evaluate and discuss patients for whom a multidisciplinary treatment approach is being considered. Please note that the above opinion was a consensus of the conference attendees and is intended only to assist in quality care of the discussed patient.  The responsibility for follow up on the input given during the conference, along with any final decisions regarding plan of care, is that of the patient and the patient's provider. Accordingly, appointments have only been recommended based on this information and have NOT been scheduled unless otherwise noted.      TO THE PATIENT:  This summary is a brief record of major aspects of your cancer treatment. You may choose to share a copy with any of your doctors or nurses. However, this is not a detailed or comprehensive record of your care.

## 2024-09-19 DIAGNOSIS — I26.99 OTHER ACUTE PULMONARY EMBOLISM WITHOUT ACUTE COR PULMONALE (HCC): Primary | ICD-10-CM

## 2024-09-20 ENCOUNTER — TELEPHONE (OUTPATIENT)
Dept: GYNECOLOGIC ONCOLOGY | Facility: CLINIC | Age: 69
End: 2024-09-20

## 2024-09-20 NOTE — TELEPHONE ENCOUNTER
Phoned patient to discuss surgery scheduling, surgery scheduled for 10/21/2024 at [facility].  Pre Op and Post op instructions reviewed.  Post op appointment scheduled.   All questions/concerns addressed.  Provided patient with call back number for any questions/concerns.

## 2024-09-20 NOTE — TELEPHONE ENCOUNTER
Phoned IR spoke to Becka regarding placement of IVC filter date of surgery.  Per Becka, order received, waiting for IR orders.  When IR orders are received patient will be contacted with information.  Phoned patient and communicated that information to her.

## 2024-09-26 ENCOUNTER — HOSPITAL ENCOUNTER (OUTPATIENT)
Dept: MAMMOGRAPHY | Facility: CLINIC | Age: 69
Discharge: HOME | End: 2024-09-26
Payer: COMMERCIAL

## 2024-09-26 ENCOUNTER — HOSPITAL ENCOUNTER (OUTPATIENT)
Dept: ULTRASOUND IMAGING | Facility: CLINIC | Age: 69
Discharge: HOME | End: 2024-09-26
Payer: COMMERCIAL

## 2024-09-26 VITALS — HEIGHT: 61 IN | BODY MASS INDEX: 42.29 KG/M2 | WEIGHT: 224 LBS

## 2024-09-26 DIAGNOSIS — N25.81 SECONDARY HYPERPARATHYROIDISM OF RENAL ORIGIN (HCC): ICD-10-CM

## 2024-09-26 DIAGNOSIS — E66.01 MORBID OBESITY (HCC): ICD-10-CM

## 2024-09-26 DIAGNOSIS — R92.8 ABNORMAL FINDINGS ON DIAGNOSTIC IMAGING OF BREAST: ICD-10-CM

## 2024-09-26 DIAGNOSIS — N18.32 CHRONIC KIDNEY DISEASE (CKD) STAGE G3B/A1, MODERATELY DECREASED GLOMERULAR FILTRATION RATE (GFR) BETWEEN 30-44 ML/MIN/1.73 SQUARE METER AND ALBUMINURIA CREATININE RATIO LESS THAN 30 MG/G (HCC): ICD-10-CM

## 2024-09-26 PROCEDURE — 77066 DX MAMMO INCL CAD BI: CPT

## 2024-09-26 PROCEDURE — G0279 TOMOSYNTHESIS, MAMMO: HCPCS

## 2024-09-26 PROCEDURE — 76642 ULTRASOUND BREAST LIMITED: CPT

## 2024-09-30 ENCOUNTER — TELEPHONE (OUTPATIENT)
Age: 69
End: 2024-09-30

## 2024-09-30 ENCOUNTER — TELEMEDICINE (OUTPATIENT)
Dept: INTERVENTIONAL RADIOLOGY/VASCULAR | Facility: CLINIC | Age: 69
End: 2024-09-30
Payer: COMMERCIAL

## 2024-09-30 ENCOUNTER — NURSE TRIAGE (OUTPATIENT)
Age: 69
End: 2024-09-30

## 2024-09-30 DIAGNOSIS — I26.99 OTHER ACUTE PULMONARY EMBOLISM WITHOUT ACUTE COR PULMONALE (HCC): Primary | ICD-10-CM

## 2024-09-30 DIAGNOSIS — I26.99 PULMONARY EMBOLI (HCC): ICD-10-CM

## 2024-09-30 DIAGNOSIS — N88.8 CERVICAL MASS: ICD-10-CM

## 2024-09-30 DIAGNOSIS — C53.9 MALIGNANT NEOPLASM OF CERVIX, UNSPECIFIED SITE (HCC): Primary | ICD-10-CM

## 2024-09-30 DIAGNOSIS — I26.99 OTHER ACUTE PULMONARY EMBOLISM WITHOUT ACUTE COR PULMONALE (HCC): ICD-10-CM

## 2024-09-30 PROCEDURE — 99442 PR PHYS/QHP TELEPHONE EVALUATION 11-20 MIN: CPT | Performed by: INTERNAL MEDICINE

## 2024-09-30 NOTE — TELEPHONE ENCOUNTER
Return call placed. Plan for CBC/Diff, hold eliquis x 3 days and re-start at eliquis 2.5 mg BID. To closely monitor bleeding. I will f/u with the patient on Thursday morning.     /(duke Page), she is pending cardiac clearence with stress test/cards f/u (which are not scheduled until 10/16 and 10/18). Can we assist with expediting to potentially be able to get her to the OR on 10/11? Thanks!

## 2024-09-30 NOTE — TELEPHONE ENCOUNTER
"Pt needs refill on her Eliquis 5mg.  New orders will have to be placed.       Answer Assessment - Initial Assessment Questions  1. REASON FOR CALL or QUESTION: \"What is your reason for calling today?\" or \"How can I best help you?\" or \"What question do you have that I can help answer?\"      I need a refill of my Eliquis    Protocols used: Information Only Call - No Triage-ADULT-[place]    "

## 2024-09-30 NOTE — TELEPHONE ENCOUNTER
Caller:  from OB/GYN    Doctor: Dr. Mert Solares    Reason for call: Patient needs cardiac clearance for surgery on 10/11/24.  Patient was recently seen by Dr. Mert Solares on 9/9/2024.  Patient was originally scheduled to have a hysterectomy on 10/21/2024.  However, due to excessive bleeding, this procedure has been moved up to 10/11/2024.  Patient needs to be cleared prior to that date.   is working on getting NM stress test ordered by Dr Solares moved up from 10/16 to a sooner date so that clearance can be achieved.      Call back#: Please call  back with an questions or concerns regarding patient's cardiac clearance @ [phone].

## 2024-09-30 NOTE — TELEPHONE ENCOUNTER
FYI:   Pt called stated that she is having increased PMB,on Eliquis 5mg BID at this time for HX B/L PE and DVT.Megace has been stopped several weeks ago while hospitalized. Pt last seen Dr Page on 9/9.     Pt sched to see Dr Caldera for IVC placement as well as surgery with DR Page 10/21.     Pt is bleeding changing 1 maxi pad EVERY 3 HOURS at this time.Informed pt  that should her bleeding increase to changing 1 maxi pad/hour she needs to go to the ER for further evaluation,pt understands.Pt currently denies any abd pain/cramping,no weakness/dizziness,no SOB.Not having any other issues at this time.  Pls advise

## 2024-09-30 NOTE — PROGRESS NOTES
Virtual Brief Visit  Name: Cherelle Dash      : 1955      MRN: [id number]  Encounter Provider: Brian Cavazos MD  Encounter Date: 2024   Encounter department: [facility] INTERVENTIONAL RADIOLOGY [place]    This Visit is being completed by telephone. The Patient is located at Home and in the following state in which I hold an active license [place]    The patient was identified by name and date of birth. Cherelle Dash was informed that this is a telemedicine visit and that the visit is being conducted through Telephone.  My office door was closed. No one else was in the room.  She acknowledged consent and understanding of privacy and security of the video platform. The patient has agreed to participate and understands they can discontinue the visit at any time.    Patient is aware this is a billable service.     Assessment & Plan  Other acute pulmonary embolism without acute cor pulmonale (HCC)    Orders:    Ambulatory referral to Interventional Radiology    IR IVC filter placement optional/temporary; Future      Cervical mass    Orders:    IR IVC filter placement optional/temporary; Future          History of Present Illness   69 year-old female with history of PE/DVT on Eliquis, history of cervical mass with vaginal bleeding, with request for IVC filter placement prior to laparoscopic hysterectomy and bilateral salpingo-oophorectomy.    Patient states having ongoing vaginal bleeding. Otherwise, she denies any new symptoms at this time.    CT of the abdomen from 9/10/2020 showed no variant anatomy.    The benefits and risks of IVC filter placement was described to the patient, including decreasing risk of life threatening PE while off anti-coagulation. Risks of laurie-procedural risks of bleeding/infection were described. Long term risks of filter migration, caval wall strut penetration, IVC thrombosis with leg swelling, were described. Plan for filter retrieval as soon as she was back on  anti-coagulation was also explained to the patient to avoid long term risks of indwelling IVC filter    Patient agrees for IVC filter placement. This procedure should be scheduled prior to patient's surgery on 10/21, which we will accommodate. Patient has GFR of 28, and CO2 use will be considered. Patient does not need to hold Eliquis for this procedure.    Patient states she will reach out to Dr. Page's office for further recommendations for current vaginal bleeding.    Visit Time  Total Visit Duration: 12 minutes

## 2024-09-30 NOTE — ASSESSMENT & PLAN NOTE
Orders:    Ambulatory referral to Interventional Radiology    IR IVC filter placement optional/temporary; Future

## 2024-09-30 NOTE — H&P (VIEW-ONLY)
Virtual Brief Visit  Name: Cherelle Dash      : 1955      MRN: [id number]  Encounter Provider: Brian Cavazos MD  Encounter Date: 2024   Encounter department: [facility] INTERVENTIONAL RADIOLOGY [place]    This Visit is being completed by telephone. The Patient is located at Home and in the following state in which I hold an active license [place]    The patient was identified by name and date of birth. Cherelle Dash was informed that this is a telemedicine visit and that the visit is being conducted through Telephone.  My office door was closed. No one else was in the room.  She acknowledged consent and understanding of privacy and security of the video platform. The patient has agreed to participate and understands they can discontinue the visit at any time.    Patient is aware this is a billable service.     Assessment & Plan  Other acute pulmonary embolism without acute cor pulmonale (HCC)    Orders:    Ambulatory referral to Interventional Radiology    IR IVC filter placement optional/temporary; Future      Cervical mass    Orders:    IR IVC filter placement optional/temporary; Future          History of Present Illness   69 year-old female with history of PE/DVT on Eliquis, history of cervical mass with vaginal bleeding, with request for IVC filter placement prior to laparoscopic hysterectomy and bilateral salpingo-oophorectomy.    Patient states having ongoing vaginal bleeding. Otherwise, she denies any new symptoms at this time.    CT of the abdomen from 9/10/2020 showed no variant anatomy.    The benefits and risks of IVC filter placement was described to the patient, including decreasing risk of life threatening PE while off anti-coagulation. Risks of laurie-procedural risks of bleeding/infection were described. Long term risks of filter migration, caval wall strut penetration, IVC thrombosis with leg swelling, were described. Plan for filter retrieval as soon as she was back on  anti-coagulation was also explained to the patient to avoid long term risks of indwelling IVC filter    Patient agrees for IVC filter placement. This procedure should be scheduled prior to patient's surgery on 10/21, which we will accommodate. Patient has GFR of 28, and CO2 use will be considered. Patient does not need to hold Eliquis for this procedure.    Patient states she will reach out to Dr. Page's office for further recommendations for current vaginal bleeding.    Visit Time  Total Visit Duration: 12 minutes

## 2024-10-01 ENCOUNTER — HOSPITAL ENCOUNTER (OUTPATIENT)
Dept: RADIOLOGY | Facility: HOSPITAL | Age: 69
Discharge: HOME | End: 2024-10-01
Attending: OBSTETRICS & GYNECOLOGY
Payer: COMMERCIAL

## 2024-10-01 ENCOUNTER — TELEPHONE (OUTPATIENT)
Dept: GYNECOLOGIC ONCOLOGY | Facility: CLINIC | Age: 69
End: 2024-10-01

## 2024-10-01 ENCOUNTER — APPOINTMENT (OUTPATIENT)
Dept: LAB | Facility: AMBULARY SURGERY CENTER | Age: 69
End: 2024-10-01
Attending: OBSTETRICS & GYNECOLOGY
Payer: COMMERCIAL

## 2024-10-01 DIAGNOSIS — C53.0 MALIGNANT NEOPLASM OF ENDOCERVIX (HCC): ICD-10-CM

## 2024-10-01 DIAGNOSIS — N95.0 PMB (POSTMENOPAUSAL BLEEDING): ICD-10-CM

## 2024-10-01 DIAGNOSIS — C53.0 MALIGNANT NEOPLASM OF ENDOCERVIX (HCC): Primary | ICD-10-CM

## 2024-10-01 LAB
ANION GAP SERPL CALCULATED.3IONS-SCNC: 11 MMOL/L (ref 4–13)
BUN SERPL-MCNC: 24 MG/DL (ref 5–25)
CALCIUM SERPL-MCNC: 9.3 MG/DL (ref 8.4–10.2)
CHLORIDE SERPL-SCNC: 111 MMOL/L (ref 96–108)
CO2 SERPL-SCNC: 21 MMOL/L (ref 21–32)
CREAT SERPL-MCNC: 1.68 MG/DL (ref 0.6–1.3)
ERYTHROCYTE [DISTWIDTH] IN BLOOD BY AUTOMATED COUNT: 13.2 % (ref 11.6–15.1)
EST. AVERAGE GLUCOSE BLD GHB EST-MCNC: 126 MG/DL
GFR SERPL CREATININE-BSD FRML MDRD: 30 ML/MIN/1.73SQ M
GLUCOSE P FAST SERPL-MCNC: 131 MG/DL (ref 65–99)
HBA1C MFR BLD: 6 %
HCT VFR BLD AUTO: 39.2 % (ref 34.8–46.1)
HGB BLD-MCNC: 12.4 G/DL (ref 11.5–15.4)
MCH RBC QN AUTO: 28.4 PG (ref 26.8–34.3)
MCHC RBC AUTO-ENTMCNC: 31.6 G/DL (ref 31.4–37.4)
MCV RBC AUTO: 90 FL (ref 82–98)
PLATELET # BLD AUTO: 152 THOUSANDS/UL (ref 149–390)
PMV BLD AUTO: 13.1 FL (ref 8.9–12.7)
POTASSIUM SERPL-SCNC: 3.9 MMOL/L (ref 3.5–5.3)
RBC # BLD AUTO: 4.36 MILLION/UL (ref 3.81–5.12)
SODIUM SERPL-SCNC: 143 MMOL/L (ref 135–147)
WBC # BLD AUTO: 5.07 THOUSAND/UL (ref 4.31–10.16)

## 2024-10-01 PROCEDURE — 83036 HEMOGLOBIN GLYCOSYLATED A1C: CPT

## 2024-10-01 PROCEDURE — 74176 CT ABD & PELVIS W/O CONTRAST: CPT

## 2024-10-01 PROCEDURE — 86901 BLOOD TYPING SEROLOGIC RH(D): CPT | Performed by: OBSTETRICS & GYNECOLOGY

## 2024-10-01 PROCEDURE — 85027 COMPLETE CBC AUTOMATED: CPT

## 2024-10-01 PROCEDURE — 80048 BASIC METABOLIC PNL TOTAL CA: CPT

## 2024-10-01 PROCEDURE — 36415 COLL VENOUS BLD VENIPUNCTURE: CPT

## 2024-10-01 PROCEDURE — 86850 RBC ANTIBODY SCREEN: CPT | Performed by: OBSTETRICS & GYNECOLOGY

## 2024-10-01 PROCEDURE — 71250 CT THORAX DX C-: CPT

## 2024-10-01 PROCEDURE — 86900 BLOOD TYPING SEROLOGIC ABO: CPT | Performed by: OBSTETRICS & GYNECOLOGY

## 2024-10-01 RX ADMIN — IOHEXOL 50 ML: 240 INJECTION, SOLUTION INTRATHECAL; INTRAVASCULAR; INTRAVENOUS; ORAL at 18:20

## 2024-10-01 NOTE — TELEPHONE ENCOUNTER
MARYELLEN pt is scheduled for stress test on 10/4/2024.  CC letter can be generated and routed to you for sign off after stress test is completed

## 2024-10-01 NOTE — TELEPHONE ENCOUNTER
Called and informed patient that she is to arrive at [facility] at 4:30 pm for her CT SCAN. Patient is only allowed clear liquids from this time on. Patient stated she understood.

## 2024-10-01 NOTE — H&P (VIEW-ONLY)
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  She is having worsening postmenopausal bleeding while on therapeutic anticoagulation with Eliquis.  I reviewed her CBC from 10/1/2024.  Hemoglobin is 12 g/dL.  She has a stress test planned this week.  I reviewed her case at the recent gynecologic tumor conference as well as with radiation oncology.  Given uncertain response rates to traditional chemoradiation for cervical cancer, surgery has been recommended with consideration of adjuvant treatment based on final pathology/margin status.  Due to ongoing bleeding, we will plan to move up robotic assisted total laparoscopic hysterectomy and bilateral salpingo-oophorectomy to 10/11/2024.  IVC filter placement has been scheduled for 10 H24.  Will also obtain CT chest abdomen pelvis to evaluate for metastatic disease.  She is in agreement with the plan.

## 2024-10-01 NOTE — PROGRESS NOTES
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  She is having worsening postmenopausal bleeding while on therapeutic anticoagulation with Eliquis.  I reviewed her CBC from 10/1/2024.  Hemoglobin is 12 g/dL.  She has a stress test planned this week.  I reviewed her case at the recent gynecologic tumor conference as well as with radiation oncology.  Given uncertain response rates to traditional chemoradiation for cervical cancer, surgery has been recommended with consideration of adjuvant treatment based on final pathology/margin status.  Due to ongoing bleeding, we will plan to move up robotic assisted total laparoscopic hysterectomy and bilateral salpingo-oophorectomy to 10/11/2024.  IVC filter placement has been scheduled for 10 H24.  Will also obtain CT chest abdomen pelvis to evaluate for metastatic disease.  She is in agreement with the plan.  
Detail Level: Detailed
DEE

## 2024-10-02 ENCOUNTER — TELEPHONE (OUTPATIENT)
Dept: INTERVENTIONAL RADIOLOGY/VASCULAR | Facility: HOSPITAL | Age: 69
End: 2024-10-02

## 2024-10-02 ENCOUNTER — ANESTHESIA EVENT (OUTPATIENT)
Dept: PERIOP | Facility: HOSPITAL | Age: 69
End: 2024-10-02
Payer: COMMERCIAL

## 2024-10-02 ENCOUNTER — LAB REQUISITION (OUTPATIENT)
Dept: LAB | Facility: HOSPITAL | Age: 69
End: 2024-10-02
Payer: COMMERCIAL

## 2024-10-02 DIAGNOSIS — N95.0 POSTMENOPAUSAL BLEEDING: ICD-10-CM

## 2024-10-02 LAB
ABO GROUP BLD: NORMAL
ATRIAL RATE: 69 BPM
BLD GP AB SCN SERPL QL: NEGATIVE
P AXIS: 5 DEGREES
PR INTERVAL: 174 MS
QRS AXIS: -56 DEGREES
QRSD INTERVAL: 130 MS
QT INTERVAL: 450 MS
QTC INTERVAL: 482 MS
RH BLD: POSITIVE
SPECIMEN EXPIRATION DATE: NORMAL
T WAVE AXIS: 84 DEGREES
VENTRICULAR RATE: 69 BPM

## 2024-10-02 PROCEDURE — 93010 ELECTROCARDIOGRAM REPORT: CPT | Performed by: INTERNAL MEDICINE

## 2024-10-02 NOTE — PRE-PROCEDURE INSTRUCTIONS
Pre-Surgery Instructions:   Medication Instructions    apixaban (Eliquis) 5 mg Instructions provided by MD    calcitriol (ROCALTROL) 0.25 mcg capsule Hold day of surgery.    Cholecalciferol (VITAMIN D) 2000 units CAPS Hold day of surgery.    ciclopirox (LOPROX) 0.77 % cream Uses PRN- DO NOT take day of surgery    clobetasol (TEMOVATE) 0.05 % ointment Uses PRN- DO NOT take day of surgery    CRANBERRY PO Stop taking 7 days prior to surgery.    famotidine (PEPCID) 20 mg tablet Take day of surgery.    Glucosamine-Chondroit-Vit C-Mn (GLUCOSAMINE 1500 COMPLEX PO) Stop taking 7 days prior to surgery.    Ivermectin 1 % CREA Uses PRN- DO NOT take day of surgery    lurasidone (LATUDA) 20 mg tablet Take day of surgery.    metoprolol succinate (TOPROL-XL) 50 mg 24 hr tablet Take day of surgery.    Mirabegron ER (Myrbetriq) 25 MG TB24 Hold day of surgery.    Multiple Vitamin (MULTI-VITAMIN DAILY) TABS Stop taking 7 days prior to surgery.   Medication instructions for day surgery reviewed. Please use only a sip of water to take your instructed medications. Avoid all over the counter vitamins, supplements and NSAIDS for one week prior to surgery per anesthesia guidelines. Tylenol is ok to take as needed.     You will receive a call one business day prior to surgery with an arrival time and hospital directions. If your surgery is scheduled on a Monday, the hospital will be calling you on the Friday prior to your surgery. If you have not heard from anyone by 8pm, please call the hospital supervisor through the hospital  at [phone]. ([place] [phone] or [place] [phone]).    Do not eat or drink anything after midnight the night before your surgery, including candy, mints, lifesavers, or chewing gum. Do not drink alcohol 24hrs before your surgery. Try not to smoke at least 24hrs before your surgery.       Follow the pre surgery showering instructions as listed in the “My Surgical Experience Booklet” or otherwise  provided by your surgeon's office. Do not use a blade to shave the surgical area 1 week before surgery. It is okay to use a clean electric clippers up to 24 hours before surgery. Do not apply any lotions, creams, including makeup, cologne, deodorant, or perfumes after showering on the day of your surgery. Do not use dry shampoo, hair spray, hair gel, or any type of hair products.     No contact lenses, eye make-up, or artificial eyelashes. Remove nail polish, including gel polish, and any artificial, gel, or acrylic nails if possible. Remove all jewelry including rings and body piercing jewelry.     Wear causal clothing that is easy to take on and off. Consider your type of surgery.    Keep any valuables, jewelry, piercings at home. Please bring any specially ordered equipment (sling, braces) if indicated.    Arrange for a responsible person to drive you to and from the hospital on the day of your surgery. Please confirm the visitor policy for the day of your procedure when you receive your phone call with an arrival time.     Call the surgeon's office with any new illnesses, exposures, or additional questions prior to surgery.    Please reference your “My Surgical Experience Booklet” for additional information to prepare for your upcoming surgery.

## 2024-10-03 ENCOUNTER — TELEPHONE (OUTPATIENT)
Age: 69
End: 2024-10-03

## 2024-10-03 NOTE — TELEPHONE ENCOUNTER
Patient called in to let Dr. Page know that her bleeding has stopped with the change of her Eliquis.  Patient does not need a call back.    Thank you.

## 2024-10-04 ENCOUNTER — HOSPITAL ENCOUNTER (OUTPATIENT)
Dept: NON INVASIVE DIAGNOSTICS | Facility: CLINIC | Age: 69
Discharge: HOME | End: 2024-10-04
Payer: COMMERCIAL

## 2024-10-04 VITALS
WEIGHT: 224 LBS | HEIGHT: 61 IN | BODY MASS INDEX: 42.29 KG/M2 | DIASTOLIC BLOOD PRESSURE: 84 MMHG | SYSTOLIC BLOOD PRESSURE: 146 MMHG

## 2024-10-04 DIAGNOSIS — I42.0 DILATED CARDIOMYOPATHY (HCC): ICD-10-CM

## 2024-10-04 DIAGNOSIS — I50.22 HEART FAILURE WITH MILDLY REDUCED EJECTION FRACTION (HFMREF) (HCC): ICD-10-CM

## 2024-10-04 LAB
MAX DIASTOLIC BP: 84 MMHG
MAX PREDICTED HEART RATE: 150 BPM
NUC STRESS EJECTION FRACTION: 60 %
PROTOCOL NAME: NORMAL
RATE PRESSURE PRODUCT: NORMAL
REASON FOR TERMINATION: NORMAL
SL CV REST NUCLEAR ISOTOPE DOSE: 10.59 MCI
SL CV STRESS NUCLEAR ISOTOPE DOSE: 31.6 MCI
SL CV STRESS RECOVERY BP: NORMAL MMHG
SL CV STRESS RECOVERY HR: 93 BPM
SL CV STRESS RECOVERY O2 SAT: 98 %
STRESS ANGINA INDEX: 0
STRESS BASELINE BP: NORMAL MMHG
STRESS BASELINE HR: 76 BPM
STRESS O2 SAT REST: 98 %
STRESS PEAK HR: 97 BPM
STRESS POST EXERCISE DUR MIN: 3 MIN
STRESS POST EXERCISE DUR SEC: 0 SEC
STRESS POST O2 SAT PEAK: 98 %
STRESS POST PEAK BP: 126 MMHG
STRESS POST PEAK HR: 100 BPM
STRESS POST PEAK SYSTOLIC BP: 146 MMHG
STRESS/REST PERFUSION RATIO: 1.02
TARGET HR FORMULA: NORMAL
TEST INDICATION: NORMAL

## 2024-10-04 PROCEDURE — 93016 CV STRESS TEST SUPVJ ONLY: CPT | Performed by: INTERNAL MEDICINE

## 2024-10-04 PROCEDURE — 78452 HT MUSCLE IMAGE SPECT MULT: CPT | Performed by: INTERNAL MEDICINE

## 2024-10-04 PROCEDURE — A9502 TC99M TETROFOSMIN: HCPCS

## 2024-10-04 PROCEDURE — 78452 HT MUSCLE IMAGE SPECT MULT: CPT

## 2024-10-04 PROCEDURE — 93018 CV STRESS TEST I&R ONLY: CPT | Performed by: INTERNAL MEDICINE

## 2024-10-04 PROCEDURE — 93017 CV STRESS TEST TRACING ONLY: CPT

## 2024-10-04 RX ORDER — REGADENOSON 0.08 MG/ML
0.4 INJECTION, SOLUTION INTRAVENOUS ONCE
Status: COMPLETED | OUTPATIENT
Start: 2024-10-04 | End: 2024-10-04

## 2024-10-04 RX ADMIN — REGADENOSON 0.4 MG: 0.08 INJECTION, SOLUTION INTRAVENOUS at 09:59

## 2024-10-05 ENCOUNTER — IMMUNIZATIONS (OUTPATIENT)
Dept: FAMILY MEDICINE CLINIC | Facility: CLINIC | Age: 69
End: 2024-10-05
Payer: COMMERCIAL

## 2024-10-05 DIAGNOSIS — Z23 ENCOUNTER FOR IMMUNIZATION: Primary | ICD-10-CM

## 2024-10-05 PROCEDURE — 90662 IIV NO PRSV INCREASED AG IM: CPT

## 2024-10-05 PROCEDURE — G0008 ADMIN INFLUENZA VIRUS VAC: HCPCS

## 2024-10-08 ENCOUNTER — HOSPITAL ENCOUNTER (OUTPATIENT)
Dept: INTERVENTIONAL RADIOLOGY/VASCULAR | Facility: HOSPITAL | Age: 69
Discharge: HOME | End: 2024-10-08
Attending: OBSTETRICS & GYNECOLOGY
Payer: COMMERCIAL

## 2024-10-08 VITALS
OXYGEN SATURATION: 99 % | HEART RATE: 65 BPM | SYSTOLIC BLOOD PRESSURE: 129 MMHG | TEMPERATURE: 98.9 F | RESPIRATION RATE: 12 BRPM | DIASTOLIC BLOOD PRESSURE: 69 MMHG

## 2024-10-08 DIAGNOSIS — I26.99 OTHER ACUTE PULMONARY EMBOLISM WITHOUT ACUTE COR PULMONALE (HCC): ICD-10-CM

## 2024-10-08 DIAGNOSIS — N88.8 CERVICAL MASS: ICD-10-CM

## 2024-10-08 LAB
ANION GAP SERPL CALCULATED.3IONS-SCNC: 9 MMOL/L (ref 4–13)
BUN SERPL-MCNC: 19 MG/DL (ref 5–25)
CALCIUM SERPL-MCNC: 9.3 MG/DL (ref 8.4–10.2)
CHLORIDE SERPL-SCNC: 113 MMOL/L (ref 96–108)
CO2 SERPL-SCNC: 21 MMOL/L (ref 21–32)
CREAT SERPL-MCNC: 1.56 MG/DL (ref 0.6–1.3)
ERYTHROCYTE [DISTWIDTH] IN BLOOD BY AUTOMATED COUNT: 13.2 % (ref 11.6–15.1)
GFR SERPL CREATININE-BSD FRML MDRD: 33 ML/MIN/1.73SQ M
GLUCOSE P FAST SERPL-MCNC: 120 MG/DL (ref 65–99)
GLUCOSE SERPL-MCNC: 120 MG/DL (ref 65–140)
HCT VFR BLD AUTO: 37.7 % (ref 34.8–46.1)
HGB BLD-MCNC: 12.3 G/DL (ref 11.5–15.4)
MCH RBC QN AUTO: 28.3 PG (ref 26.8–34.3)
MCHC RBC AUTO-ENTMCNC: 32.6 G/DL (ref 31.4–37.4)
MCV RBC AUTO: 87 FL (ref 82–98)
PLATELET # BLD AUTO: 196 THOUSANDS/UL (ref 149–390)
PMV BLD AUTO: 12.2 FL (ref 8.9–12.7)
POTASSIUM SERPL-SCNC: 3.9 MMOL/L (ref 3.5–5.3)
RBC # BLD AUTO: 4.35 MILLION/UL (ref 3.81–5.12)
SODIUM SERPL-SCNC: 143 MMOL/L (ref 135–147)
WBC # BLD AUTO: 6.76 THOUSAND/UL (ref 4.31–10.16)

## 2024-10-08 PROCEDURE — 85027 COMPLETE CBC AUTOMATED: CPT | Performed by: INTERNAL MEDICINE

## 2024-10-08 PROCEDURE — 37191 INS ENDOVAS VENA CAVA FILTR: CPT

## 2024-10-08 PROCEDURE — 99152 MOD SED SAME PHYS/QHP 5/>YRS: CPT

## 2024-10-08 PROCEDURE — 37191 INS ENDOVAS VENA CAVA FILTR: CPT | Performed by: INTERNAL MEDICINE

## 2024-10-08 PROCEDURE — 99152 MOD SED SAME PHYS/QHP 5/>YRS: CPT | Performed by: INTERNAL MEDICINE

## 2024-10-08 PROCEDURE — 76937 US GUIDE VASCULAR ACCESS: CPT

## 2024-10-08 PROCEDURE — 80048 BASIC METABOLIC PNL TOTAL CA: CPT | Performed by: INTERNAL MEDICINE

## 2024-10-08 PROCEDURE — C1880 VENA CAVA FILTER: HCPCS

## 2024-10-08 RX ORDER — MIDAZOLAM HYDROCHLORIDE 2 MG/2ML
INJECTION, SOLUTION INTRAMUSCULAR; INTRAVENOUS AS NEEDED
Status: COMPLETED | OUTPATIENT
Start: 2024-10-08 | End: 2024-10-08

## 2024-10-08 RX ORDER — FENTANYL CITRATE 50 UG/ML
INJECTION, SOLUTION INTRAMUSCULAR; INTRAVENOUS AS NEEDED
Status: COMPLETED | OUTPATIENT
Start: 2024-10-08 | End: 2024-10-08

## 2024-10-08 RX ADMIN — MIDAZOLAM 1 MG: 1 INJECTION INTRAMUSCULAR; INTRAVENOUS at 13:20

## 2024-10-08 RX ADMIN — FENTANYL CITRATE 50 MCG: 50 INJECTION, SOLUTION INTRAMUSCULAR; INTRAVENOUS at 13:31

## 2024-10-08 RX ADMIN — FENTANYL CITRATE 50 MCG: 50 INJECTION, SOLUTION INTRAMUSCULAR; INTRAVENOUS at 13:20

## 2024-10-08 RX ADMIN — MIDAZOLAM 1 MG: 1 INJECTION INTRAMUSCULAR; INTRAVENOUS at 13:31

## 2024-10-08 NOTE — BRIEF OP NOTE (RAD/CATH)
INTERVENTIONAL RADIOLOGY PROCEDURE NOTE    Date: 10/8/2024    Procedure:   Procedure Summary       Date: 10/08/24 Room / Location: [facility] Interventional Radiology    Anesthesia Start:  Anesthesia Stop:     Procedure: IR IVC FILTER PLACEMENT OPTIONAL/TEMPORARY Diagnosis:       Other acute pulmonary embolism without acute cor pulmonale (HCC)      Cervical mass      (History of DVT/PE now requiring surgery)    Scheduled Providers:  Responsible Provider:     Anesthesia Type: Not recorded ASA Status: Not recorded            Preoperative diagnosis:   1. Other acute pulmonary embolism without acute cor pulmonale (HCC)    2. Cervical mass         Postoperative diagnosis: Same.    Surgeon: Brian Cavazos MD     Assistant: None. No qualified resident was available.    Blood loss: 5 mL    Specimens: None    Findings: Placement of an Williams infrarenal IVC filter.     I will follow-up with patient in 2 months to discuss IVC filter removal.    Complications: None immediate.    Anesthesia: conscious sedation and local

## 2024-10-08 NOTE — INTERVAL H&P NOTE
Update: (This section must be completed if the H&P was completed greater than 24 hrs to procedure or admission)    H&P reviewed. After examining the patient, I find no changed to the H&P since it had been written.    /65   Pulse 70   Temp 99.1 °F (37.3 °C) (Temporal)   Resp 20   SpO2 95%     Patient re-evaluated. Accept as history and physical.    We will proceed with temporary IVC filter placement today.    Brian Cavazos MD/October 8, 2024/1:04 PM

## 2024-10-08 NOTE — DISCHARGE INSTRUCTIONS
Inferior Vena Cava Filter Placement     WHAT YOU NEED TO KNOW:   Inferior vena cava filter placement is surgery to place a filter into your inferior vena cava (IVC). The IVC is a large blood vessel that brings blood from your lower body back to your heart. The filter is a small mesh strainer made of thin wires. It is placed in the center of the IVC to trap blood clots going to your heart or lungs.    Filter types: Permanent Filters are used for patients who can't have anticoagulation medications. The filters are left in place permanently.  Optional filters: Are filters that can be removed when a patient's risk for clotting is decreased.    DISCHARGE INSTRUCTIONS:     Wound care: Keep your wound clean and dry.Band aid may come off in 24 hours.     Self-care:   Limit activity: Do not lift, pull or push heavy objects for 24 hours. Slowly start to do more each day. Return to your daily activities as directed.   Resume your normal diet. Small sips of flat soda will help with nausea.    Contact Interventional Radiology at [phone]  if:  You have a fever.   You have chills, a cough, or feel weak and achy.  Persistent nausea or vomiting.   Your wound is red, swollen, or draining pus.   You have questions or concerns about your condition.  Optional filters can and should  be removed when you no longer need it.  Please call Interventional Radiology to make your removal appointment.

## 2024-10-09 ENCOUNTER — TELEPHONE (OUTPATIENT)
Dept: GYNECOLOGIC ONCOLOGY | Facility: CLINIC | Age: 69
End: 2024-10-09

## 2024-10-09 NOTE — TELEPHONE ENCOUNTER
Patient calling to inquire about her stress test results. Patient states she is unsure if her pre-surgical testing was ok and she is cleared for surgery in 2 days with Dr. Downing. Patient would like a call back to discuss her results.

## 2024-10-10 NOTE — ASSESSMENT & PLAN NOTE
Name: Megan Davis      Relationship: Self      Best Callback Number: [phone]       HUB PROVIDED THE RELAY MESSAGE FROM THE OFFICE      PATIENT: VOICED UNDERSTANDING AND HAS NO FURTHER QUESTIONS AT THIS TIME    ADDITIONAL INFORMATION:    On echo  Will get stress test done  Possible related to stress from PE

## 2024-10-10 NOTE — TELEPHONE ENCOUNTER
Spoke with Cesia Solares's office - patient is cleared by Cardiology.  Clearance to be signed by Dr. Solares and uploaded into letters in patient's chart - per Cesia.  Phoned patient and notified her that she is cleared by cardiology.

## 2024-10-10 NOTE — TELEPHONE ENCOUNTER
See below. Wasn't she to have cardiac clearance after nuclear stress? What was the plan? Can you please f/u with her? Thanks!

## 2024-10-10 NOTE — TELEPHONE ENCOUNTER
Letter prepped and routed to you for editing/approval.  Per surgeon's office, pt is currently holing Eliquis.  Surgery is tomorrow.  Thank you.

## 2024-10-11 ENCOUNTER — ANESTHESIA (OUTPATIENT)
Dept: PERIOP | Facility: HOSPITAL | Age: 69
End: 2024-10-11
Payer: COMMERCIAL

## 2024-10-11 ENCOUNTER — HOSPITAL ENCOUNTER (OUTPATIENT)
Facility: HOSPITAL | Age: 69
Setting detail: OUTPATIENT SURGERY
LOS: 1 days | Discharge: HOME | End: 2024-10-12
Attending: OBSTETRICS & GYNECOLOGY | Admitting: OBSTETRICS & GYNECOLOGY
Payer: COMMERCIAL

## 2024-10-11 DIAGNOSIS — C53.0 MALIGNANT NEOPLASM OF ENDOCERVIX (HCC): ICD-10-CM

## 2024-10-11 DIAGNOSIS — G89.18 POSTOPERATIVE PAIN: Primary | ICD-10-CM

## 2024-10-11 DIAGNOSIS — N95.0 PMB (POSTMENOPAUSAL BLEEDING): ICD-10-CM

## 2024-10-11 DIAGNOSIS — I27.82 OTHER CHRONIC PULMONARY EMBOLISM WITHOUT ACUTE COR PULMONALE (HCC): ICD-10-CM

## 2024-10-11 LAB — APTT PPP: 24 SECONDS (ref 23–34)

## 2024-10-11 PROCEDURE — 56605 BIOPSY OF VULVA/PERINEUM: CPT | Performed by: OBSTETRICS & GYNECOLOGY

## 2024-10-11 PROCEDURE — 88112 CYTOPATH CELL ENHANCE TECH: CPT | Performed by: PATHOLOGY

## 2024-10-11 PROCEDURE — 88307 TISSUE EXAM BY PATHOLOGIST: CPT | Performed by: PATHOLOGY

## 2024-10-11 PROCEDURE — 88305 TISSUE EXAM BY PATHOLOGIST: CPT | Performed by: PATHOLOGY

## 2024-10-11 PROCEDURE — 38570 LAPAROSCOPY LYMPH NODE BIOP: CPT | Performed by: OBSTETRICS & GYNECOLOGY

## 2024-10-11 PROCEDURE — NC001 PR NO CHARGE: Performed by: PHYSICIAN ASSISTANT

## 2024-10-11 PROCEDURE — 88309 TISSUE EXAM BY PATHOLOGIST: CPT | Performed by: PATHOLOGY

## 2024-10-11 PROCEDURE — S2900 ROBOTIC SURGICAL SYSTEM: HCPCS | Performed by: OBSTETRICS & GYNECOLOGY

## 2024-10-11 PROCEDURE — 88342 IMHCHEM/IMCYTCHM 1ST ANTB: CPT | Performed by: PATHOLOGY

## 2024-10-11 PROCEDURE — 88341 IMHCHEM/IMCYTCHM EA ADD ANTB: CPT | Performed by: PATHOLOGY

## 2024-10-11 PROCEDURE — 58571 TLH W/T/O 250 G OR LESS: CPT | Performed by: OBSTETRICS & GYNECOLOGY

## 2024-10-11 PROCEDURE — 85730 THROMBOPLASTIN TIME PARTIAL: CPT | Performed by: STUDENT IN AN ORGANIZED HEALTH CARE EDUCATION/TRAINING PROGRAM

## 2024-10-11 PROCEDURE — 38900 IO MAP OF SENT LYMPH NODE: CPT | Performed by: OBSTETRICS & GYNECOLOGY

## 2024-10-11 RX ORDER — PROPOFOL 10 MG/ML
INJECTION, EMULSION INTRAVENOUS AS NEEDED
Status: DISCONTINUED | OUTPATIENT
Start: 2024-10-11 | End: 2024-10-11

## 2024-10-11 RX ORDER — OXYCODONE HYDROCHLORIDE 5 MG/1
5 TABLET ORAL EVERY 6 HOURS PRN
Status: DISCONTINUED | OUTPATIENT
Start: 2024-10-11 | End: 2024-10-12 | Stop reason: HOSPADM

## 2024-10-11 RX ORDER — MINERAL OIL AND PETROLATUM 150; 830 MG/G; MG/G
OINTMENT OPHTHALMIC AS NEEDED
Status: DISCONTINUED | OUTPATIENT
Start: 2024-10-11 | End: 2024-10-11

## 2024-10-11 RX ORDER — INDOCYANINE GREEN AND WATER 25 MG
KIT INJECTION AS NEEDED
Status: DISCONTINUED | OUTPATIENT
Start: 2024-10-11 | End: 2024-10-11 | Stop reason: HOSPADM

## 2024-10-11 RX ORDER — BUPIVACAINE HYDROCHLORIDE 5 MG/ML
INJECTION, SOLUTION EPIDURAL; INTRACAUDAL AS NEEDED
Status: DISCONTINUED | OUTPATIENT
Start: 2024-10-11 | End: 2024-10-11 | Stop reason: HOSPADM

## 2024-10-11 RX ORDER — CEFAZOLIN SODIUM 2 G/50ML
2000 SOLUTION INTRAVENOUS ONCE
Status: COMPLETED | OUTPATIENT
Start: 2024-10-11 | End: 2024-10-11

## 2024-10-11 RX ORDER — METRONIDAZOLE 500 MG/100ML
500 INJECTION, SOLUTION INTRAVENOUS ONCE
Status: COMPLETED | OUTPATIENT
Start: 2024-10-11 | End: 2024-10-11

## 2024-10-11 RX ORDER — ACETAMINOPHEN 325 MG/1
975 TABLET ORAL EVERY 6 HOURS PRN
Status: CANCELLED | OUTPATIENT
Start: 2024-10-11

## 2024-10-11 RX ORDER — ONDANSETRON 2 MG/ML
4 INJECTION INTRAMUSCULAR; INTRAVENOUS EVERY 6 HOURS PRN
Status: DISCONTINUED | OUTPATIENT
Start: 2024-10-11 | End: 2024-10-12 | Stop reason: HOSPADM

## 2024-10-11 RX ORDER — FENTANYL CITRATE 50 UG/ML
INJECTION, SOLUTION INTRAMUSCULAR; INTRAVENOUS AS NEEDED
Status: DISCONTINUED | OUTPATIENT
Start: 2024-10-11 | End: 2024-10-11

## 2024-10-11 RX ORDER — HEPARIN SODIUM 5000 [USP'U]/ML
5000 INJECTION, SOLUTION INTRAVENOUS; SUBCUTANEOUS
Status: COMPLETED | OUTPATIENT
Start: 2024-10-11 | End: 2024-10-11

## 2024-10-11 RX ORDER — ROCURONIUM BROMIDE 10 MG/ML
INJECTION, SOLUTION INTRAVENOUS AS NEEDED
Status: DISCONTINUED | OUTPATIENT
Start: 2024-10-11 | End: 2024-10-11

## 2024-10-11 RX ORDER — METOPROLOL SUCCINATE 50 MG/1
50 TABLET, EXTENDED RELEASE ORAL DAILY
Status: DISCONTINUED | OUTPATIENT
Start: 2024-10-12 | End: 2024-10-12 | Stop reason: HOSPADM

## 2024-10-11 RX ORDER — ACETAMINOPHEN 325 MG/1
650 TABLET ORAL EVERY 6 HOURS SCHEDULED
Status: DISCONTINUED | OUTPATIENT
Start: 2024-10-12 | End: 2024-10-12 | Stop reason: HOSPADM

## 2024-10-11 RX ORDER — OXYCODONE HYDROCHLORIDE 5 MG/1
5 TABLET ORAL EVERY 4 HOURS PRN
Qty: 7 TABLET | Refills: 0 | Status: SHIPPED | OUTPATIENT
Start: 2024-10-11

## 2024-10-11 RX ORDER — PHENYLEPHRINE HCL IN 0.9% NACL 1 MG/10 ML
SYRINGE (ML) INTRAVENOUS AS NEEDED
Status: DISCONTINUED | OUTPATIENT
Start: 2024-10-11 | End: 2024-10-11

## 2024-10-11 RX ORDER — MAGNESIUM HYDROXIDE 1200 MG/15ML
LIQUID ORAL AS NEEDED
Status: DISCONTINUED | OUTPATIENT
Start: 2024-10-11 | End: 2024-10-11 | Stop reason: HOSPADM

## 2024-10-11 RX ORDER — ONDANSETRON 2 MG/ML
INJECTION INTRAMUSCULAR; INTRAVENOUS AS NEEDED
Status: DISCONTINUED | OUTPATIENT
Start: 2024-10-11 | End: 2024-10-11

## 2024-10-11 RX ORDER — MIDAZOLAM HYDROCHLORIDE 2 MG/2ML
INJECTION, SOLUTION INTRAMUSCULAR; INTRAVENOUS AS NEEDED
Status: DISCONTINUED | OUTPATIENT
Start: 2024-10-11 | End: 2024-10-11

## 2024-10-11 RX ORDER — ONDANSETRON 2 MG/ML
4 INJECTION INTRAMUSCULAR; INTRAVENOUS ONCE AS NEEDED
Status: DISCONTINUED | OUTPATIENT
Start: 2024-10-11 | End: 2024-10-11 | Stop reason: HOSPADM

## 2024-10-11 RX ORDER — MEPERIDINE HYDROCHLORIDE 25 MG/ML
6.25 INJECTION INTRAMUSCULAR; INTRAVENOUS; SUBCUTANEOUS ONCE
Status: COMPLETED | OUTPATIENT
Start: 2024-10-11 | End: 2024-10-11

## 2024-10-11 RX ORDER — OXYCODONE HYDROCHLORIDE 10 MG/1
10 TABLET ORAL EVERY 6 HOURS PRN
Status: DISCONTINUED | OUTPATIENT
Start: 2024-10-11 | End: 2024-10-12 | Stop reason: HOSPADM

## 2024-10-11 RX ORDER — ACETAMINOPHEN 10 MG/ML
1000 INJECTION, SOLUTION INTRAVENOUS EVERY 8 HOURS
Status: COMPLETED | OUTPATIENT
Start: 2024-10-11 | End: 2024-10-12

## 2024-10-11 RX ORDER — FAMOTIDINE 20 MG/1
20 TABLET, FILM COATED ORAL
Status: DISCONTINUED | OUTPATIENT
Start: 2024-10-11 | End: 2024-10-12 | Stop reason: HOSPADM

## 2024-10-11 RX ORDER — HYDROMORPHONE HCL/PF 1 MG/ML
SYRINGE (ML) INJECTION AS NEEDED
Status: DISCONTINUED | OUTPATIENT
Start: 2024-10-11 | End: 2024-10-11

## 2024-10-11 RX ORDER — DEXAMETHASONE SODIUM PHOSPHATE 10 MG/ML
INJECTION, SOLUTION INTRAMUSCULAR; INTRAVENOUS AS NEEDED
Status: DISCONTINUED | OUTPATIENT
Start: 2024-10-11 | End: 2024-10-11

## 2024-10-11 RX ORDER — OXYCODONE HYDROCHLORIDE 5 MG/1
5 TABLET ORAL EVERY 4 HOURS PRN
Status: DISCONTINUED | OUTPATIENT
Start: 2024-10-11 | End: 2024-10-11 | Stop reason: SDUPTHER

## 2024-10-11 RX ORDER — ACETAMINOPHEN 325 MG/1
975 TABLET ORAL ONCE
Status: COMPLETED | OUTPATIENT
Start: 2024-10-11 | End: 2024-10-11

## 2024-10-11 RX ORDER — LURASIDONE HYDROCHLORIDE 20 MG/1
20 TABLET, FILM COATED ORAL
Status: DISCONTINUED | OUTPATIENT
Start: 2024-10-12 | End: 2024-10-12 | Stop reason: HOSPADM

## 2024-10-11 RX ORDER — SODIUM CHLORIDE 9 MG/ML
125 INJECTION, SOLUTION INTRAVENOUS CONTINUOUS
Status: DISCONTINUED | OUTPATIENT
Start: 2024-10-11 | End: 2024-10-11

## 2024-10-11 RX ORDER — FENTANYL CITRATE/PF 50 MCG/ML
25 SYRINGE (ML) INJECTION
Status: DISCONTINUED | OUTPATIENT
Start: 2024-10-11 | End: 2024-10-11 | Stop reason: HOSPADM

## 2024-10-11 RX ORDER — SODIUM CHLORIDE, SODIUM LACTATE, POTASSIUM CHLORIDE, CALCIUM CHLORIDE 600; 310; 30; 20 MG/100ML; MG/100ML; MG/100ML; MG/100ML
125 INJECTION, SOLUTION INTRAVENOUS CONTINUOUS
Status: DISCONTINUED | OUTPATIENT
Start: 2024-10-11 | End: 2024-10-11

## 2024-10-11 RX ORDER — GLYCOPYRROLATE 0.2 MG/ML
INJECTION INTRAMUSCULAR; INTRAVENOUS AS NEEDED
Status: DISCONTINUED | OUTPATIENT
Start: 2024-10-11 | End: 2024-10-11

## 2024-10-11 RX ORDER — LIDOCAINE HYDROCHLORIDE 20 MG/ML
INJECTION, SOLUTION EPIDURAL; INFILTRATION; INTRACAUDAL; PERINEURAL AS NEEDED
Status: DISCONTINUED | OUTPATIENT
Start: 2024-10-11 | End: 2024-10-11

## 2024-10-11 RX ORDER — PROPOFOL 10 MG/ML
INJECTION, EMULSION INTRAVENOUS CONTINUOUS PRN
Status: DISCONTINUED | OUTPATIENT
Start: 2024-10-11 | End: 2024-10-11

## 2024-10-11 RX ADMIN — MEPERIDINE HYDROCHLORIDE 6.25 MG: 25 INJECTION INTRAMUSCULAR; INTRAVENOUS; SUBCUTANEOUS at 12:22

## 2024-10-11 RX ADMIN — ACETAMINOPHEN 1000 MG: 10 INJECTION INTRAVENOUS at 15:24

## 2024-10-11 RX ADMIN — ROCURONIUM BROMIDE 10 MG: 10 INJECTION, SOLUTION INTRAVENOUS at 09:24

## 2024-10-11 RX ADMIN — PROPOFOL 130 MG: 10 INJECTION, EMULSION INTRAVENOUS at 07:46

## 2024-10-11 RX ADMIN — SODIUM CHLORIDE, SODIUM LACTATE, POTASSIUM CHLORIDE, AND CALCIUM CHLORIDE: .6; .31; .03; .02 INJECTION, SOLUTION INTRAVENOUS at 09:06

## 2024-10-11 RX ADMIN — NOREPINEPHRINE BITARTRATE 1 MCG/MIN: 1 INJECTION INTRAVENOUS at 08:02

## 2024-10-11 RX ADMIN — ROCURONIUM BROMIDE 20 MG: 10 INJECTION, SOLUTION INTRAVENOUS at 08:24

## 2024-10-11 RX ADMIN — FAMOTIDINE 20 MG: 20 TABLET, FILM COATED ORAL at 15:21

## 2024-10-11 RX ADMIN — SUGAMMADEX 200 MG: 100 INJECTION, SOLUTION INTRAVENOUS at 11:00

## 2024-10-11 RX ADMIN — FENTANYL CITRATE 50 MCG: 50 INJECTION INTRAMUSCULAR; INTRAVENOUS at 08:40

## 2024-10-11 RX ADMIN — FENTANYL CITRATE 50 MCG: 50 INJECTION INTRAMUSCULAR; INTRAVENOUS at 07:46

## 2024-10-11 RX ADMIN — LIDOCAINE HYDROCHLORIDE 100 MG: 20 INJECTION, SOLUTION EPIDURAL; INFILTRATION; INTRACAUDAL at 07:46

## 2024-10-11 RX ADMIN — WHITE PETROLATUM 57.7 %-MINERAL OIL 31.9 % EYE OINTMENT 2 APPLICATION: at 07:46

## 2024-10-11 RX ADMIN — SODIUM CHLORIDE 125 ML/HR: 0.9 INJECTION, SOLUTION INTRAVENOUS at 05:52

## 2024-10-11 RX ADMIN — DEXAMETHASONE SODIUM PHOSPHATE 10 MG: 10 INJECTION INTRAMUSCULAR; INTRAVENOUS at 07:47

## 2024-10-11 RX ADMIN — METRONIDAZOLE: 500 INJECTION, SOLUTION INTRAVENOUS at 07:50

## 2024-10-11 RX ADMIN — GLYCOPYRROLATE 0.2 MG: 0.2 INJECTION, SOLUTION INTRAMUSCULAR; INTRAVENOUS at 07:32

## 2024-10-11 RX ADMIN — Medication 50 MCG: at 08:07

## 2024-10-11 RX ADMIN — CEFAZOLIN SODIUM 2000 MG: 2 SOLUTION INTRAVENOUS at 07:40

## 2024-10-11 RX ADMIN — Medication 100 MCG: at 07:47

## 2024-10-11 RX ADMIN — ROCURONIUM BROMIDE 10 MG: 10 INJECTION, SOLUTION INTRAVENOUS at 10:05

## 2024-10-11 RX ADMIN — HEPARIN SODIUM 5000 UNITS: 5000 INJECTION INTRAVENOUS; SUBCUTANEOUS at 05:49

## 2024-10-11 RX ADMIN — SODIUM CHLORIDE, SODIUM LACTATE, POTASSIUM CHLORIDE, AND CALCIUM CHLORIDE: .6; .31; .03; .02 INJECTION, SOLUTION INTRAVENOUS at 10:54

## 2024-10-11 RX ADMIN — MIDAZOLAM 1 MG: 1 INJECTION INTRAMUSCULAR; INTRAVENOUS at 07:32

## 2024-10-11 RX ADMIN — ONDANSETRON 4 MG: 2 INJECTION INTRAMUSCULAR; INTRAVENOUS at 09:55

## 2024-10-11 RX ADMIN — PROPOFOL 100 MCG/KG/MIN: 10 INJECTION, EMULSION INTRAVENOUS at 07:47

## 2024-10-11 RX ADMIN — ACETAMINOPHEN 975 MG: 325 TABLET, FILM COATED ORAL at 05:48

## 2024-10-11 RX ADMIN — HYDROMORPHONE HYDROCHLORIDE 0.5 MG: 1 INJECTION, SOLUTION INTRAMUSCULAR; INTRAVENOUS; SUBCUTANEOUS at 08:34

## 2024-10-11 RX ADMIN — ROCURONIUM BROMIDE 50 MG: 10 INJECTION, SOLUTION INTRAVENOUS at 07:47

## 2024-10-11 NOTE — OP NOTE
OPERATIVE REPORT  PATIENT NAME: Cherelle Dash    :  1955  MRN: [id number]  Pt Location: [facility] OR ROOM 08    SURGERY DATE: 10/11/2024    Surgeons and Role:     * Rodney Downing MD - Primary     * Alley Ortiz PA-C - Assisting     * Jonah Choudhury DO - Assisting     * Robbi Rodríguez MD - Fellow    Preop Diagnosis:  PMB (postmenopausal bleeding) [N95.0]    Post-Op Diagnosis Codes:     * PMB (postmenopausal bleeding) [N95.0]    Procedure(s):  ROBOTIC ASSISTED LTH. BSO.  [place] lymph node mapping and biopsy. EUA  Vulvar biopsy    Specimen(s):  ID Type Source Tests Collected by Time Destination   1 :  Washing Peritoneal Washings NON-GYNECOLOGIC CYTOLOGY Rodney Downing MD 10/11/2024 0850    2 : Left pelvic sentinel lymph node #1 Tissue Pelvic TISSUE EXAM Rodney Downing MD 10/11/2024 0857    3 : Left pelvic sentinel lymph node #2 Tissue Pelvic TISSUE EXAM Rodney Downing MD 10/11/2024 0908    4 : Right pelvic sentinel lymph node #1 Tissue Pelvic TISSUE EXAM Rodney Downing MD 10/11/2024 0912    5 : Right pelvic sentinel lymph node #2 Tissue Pelvic TISSUE EXAM Rodney Downing MD 10/11/2024 0912    6 : Right pelvic sentinel lymph node #3 Tissue Pelvic TISSUE EXAM Rodney Downing MD 10/11/2024 0919    7 : Right pelvic aortic sentinel lymph node #1 Tissue Pelvic TISSUE EXAM Rodney Downing MD 10/11/2024 0928    8 : Right pelvic aortic sentinel lymph node #2 Tissue Pelvic TISSUE EXAM Rodney Downing MD 10/11/2024 0939    9 :  Tissue Uterus w/Bilateral Ovaries and Fallopian Tubes TISSUE EXAM Rodney Downing MD 10/11/2024 1024    10 : Right vulvar biopsy 7 o'clock Tissue Vulva TISSUE EXAM Rodney Downing MD 10/11/2024 1058        Estimated Blood Loss:   50 mL    Drains:  [REMOVED] Urethral Catheter Non-latex;Double-lumen (Removed)   Number of days: 0       Anesthesia Type:   General    Operative Indications:  PMB (postmenopausal bleeding) [N95.0]        Operative Findings:  Exam under  anesthesia reveals a vulva with diffuse parchment changes consistent with lichen sclerosis.  Biopsy was performed.    Bimanual exam reveals slightly enlarged cervix measuring approximately 4 x 4 cm.  No parametrial induration.  Upon entrance to the abdomen no evidence of carcinomatosis was noted.  Uterus cervix tubes and ovaries appeared unremarkable.  Blood supply was increased throughout.  [place] lymph nodes were mapped in the bilateral pelvic area and the right para-aortic area and were removed without difficulty.      Complications:   None    Procedure and Technique:  The patient was identified as herself and and appropriate time-out procedure was performed.    After being placed in dorsal lithotomy position and exam under anesthesia was performed    The patient was placed in dorsal lithotomy position and prepped and draped in the usual sterile fashion including a 3 times vaginal Chlorhexadine prep.  Attention was turned to the vagina where a sterile by valve speculum was placed into the vagina.  The cervix was grasped with a single tooth tenaculum. the cervix at the 3 o'clock and 9 o'clock sites with both the deep and superficial injections of ICG with a spinal needle.  Schwartz catheter was placed without difficulty.  An EEA sizer was then placed into the vagina without difficulty.    Attention was then turned to the abdomen where planned incision sites were marked and infiltrated with 0.5% Marcaine.  The periumbilical incision was created with a knife.  The Veress needle was placed without difficulty.  The abdomen was insufflated with sterile gas.  The remainder of the incisions were created with a knife.  The 8 mm trocar was placed into the laurie umbilical incision and a camera was placed without difficulty.  Under direct visualization the 3 other DaVinci 8 mm ports were placed without difficulty.  A 12 mm assistant port was placed in the right lower quadrant without difficulty.  The patient was placed in  steep Trendelenburg position.  The robot was brought in and side docked without difficulty.  Electrocautery sheers were placed in the number 1 arm, a Vessel Sealer was placed in the 3. Arm, and a Prograsp was placed in the 4. Arm.    I broke scrub an approached the console.  The right round ligament was taken down with the Vessel Sealer.  The right pelvic sidewall was opened with cautery jocelynn.  The right perivesical and pararectal spaces were open.  The ICG was tracked using the Firefly scope.  The phosphorescent lymph vessels were identified and followed.  All identified sentinel nodes by ICG capture were removed and sent as numbered, side specific sentinel lymph nodes.  Additionally the right para-aortic area was noted to have 2 ICG stained lymph nodes which were removed without difficulty.  The infundibulopelvic ligament and ureter on the right were  in this retroperitoneal space.  The path of the ureter was identified.  The infundibulopelvic ligament was then taken down with the Vessel Sealer in multiple bites.  The posterior broad ligament was taken down with electrocautery jocelynn.      The same procedure was performed on the left-hand side and the left round ligament was taken down in the same fashion.  The left paravesicle and perirectal spaces were open, ICG lymph vessels were tracked and the sentinel lymph nodes were removed.    The bladder flap was begun with the electrocautery Jocelynn and taken down to the upper vagina.  The uterine artery pedicle was skeletonized and taken down with the Vessel Sealer.  The cardinal ligament was taken down with sequential bites with the Vessel Sealer.  The utererosacral ligament was taken down with the Vessel Sealer.     The same procedure was then performed on the patient's left-hand side again taking care to identify the ureter prior to taking down the infundibulopelvic ligament.  The vaginal cuff was then taken down with Electrocautery Jocelynn.  The specimen  was retrieved through the vagina with a single tooth tenaculum.  The vaginal cuff was closed with a running suture of 2 0 Stratafix.  A vulvar biopsy was taken from the right labia majora and closed with 3-0 Monocryl.  Oozing was noted which was controlled with silver nitrate.    The abdomen was explored and no further bleeding sites were noted.  The abdomen was irrigated.  Surgiflo was placed in the raw beds.  The pneumoperitoneum was allowed to escape and no bleeding was noted.  All instruments were removed.  The trocars were removed.  The incision sites were closed with 4 0 Monocryl without difficulty.    The patient tolerated procedure without complications.  The sponge and instrument counts were correct prior to closure.  Hemostasis was assured prior to closure.  The patient was brought to the recovery room in stable condition.    Rodney Downing MD  Division of GYN Oncology  [facility].    I was present for the entire procedure.    Patient Disposition:  PACU     This procedure was not performed to treat colon cancer through resection           SIGNATURE: Rodney Downing MD  DATE: October 11, 2024  TIME: 11:02 AM

## 2024-10-11 NOTE — INTERVAL H&P NOTE
H&P reviewed. After examining the patient I find no changes in the patients condition since the H&P had been written.  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  Differential diagnosis includes minimal deviation gastric type cervical carcinoma versus endometrial primary.  CAT scan reveals no evidence of distant disease.  Patient has completed IVC filter for recent pulmonary embolism.  She is off anticoagulation.  Plan robot-assisted total laparoscopic hysterectomy bilateral salpingo-oophorectomy possible lymph node dissection.  Patient aware that she will stay for longer than today for plan to reanticoagulation.    Vitals:    10/11/24 0540   BP: 137/70   Pulse: 68   Resp: 16   Temp: 98.6 °F (37 °C)   SpO2: 97%

## 2024-10-11 NOTE — DISCHARGE SUMMARY
"Discharge Summary - OB/GYN   Name: Cherelle Dash 69 y.o. female I MRN: [id number]  Unit/Bed#: E5 -01 I Date of Admission: 10/11/2024   Date of Service: 10/11/2024 I Hospital Day: 0    +  Admission Date: 10/11/2024   Discharge Date: 10/11/24***    Attending Physician: ***    Consulting Physician(s): ***    Admitting Diagnosis:   PMB (postmenopausal bleeding) [N95.0]    Discharge Diagnosis: ***    Procedures Performed: ***    Hospital Course: The patient presented on day of admission for the above mentioned procedure.  She was admitted for pain control and postoperative management. ***  Her procedure was uncomplicated.  The patient was continued on ***     The patient's hospital course was uncomplicated ***.  On POD 1, the patient was doing well.  Her pain was well controlled.  Her Hb fell from *** preoperatively to *** postoperatively.  Her Schwartz was removed and she was able to void spontaneously.  She was tolerating PO and ***passing flatus.  She was ambulating well.      The patient was discharged home on POD 1 in stable condition.  She was given prescriptions for *** for pain control.  She was asked to follow up with  *** in 2 weeks for a postoperative appointment.     Lab Results:   Lab Results   Component Value Date    WBC 6.76 10/08/2024    HGB 12.3 10/08/2024    HCT 37.7 10/08/2024    MCV 87 10/08/2024     10/08/2024     Lab Results   Component Value Date    GLUCOSE 124 (H) 11/17/2017    CALCIUM 9.3 10/08/2024     (H) 11/17/2017    K 3.9 10/08/2024    CO2 21 10/08/2024     (H) 10/08/2024    BUN 19 10/08/2024    CREATININE 1.56 (H) 10/08/2024     No results found for: \"POCGLU\"  Lab Results   Component Value Date    PTT 24 10/11/2024     Lab Results   Component Value Date    INR 1.04 09/05/2024    INR 0.98 08/14/2024    PROTIME 14.3 09/05/2024    PROTIME 13.3 08/14/2024       Pathology: ***    Imaging: ***      Condition at Discharge: {condition:88655}     Discharge Medications: See " "after visit summary for reconciled discharge medications provided to patient and family.      Discharge instructions/Information to patient and family: See after visit summary for information provided to patient and family.      Provisions for Follow-Up Care: See after visit summary for information related to follow-up care and any pertinent home health orders.      Disposition: {Discharge Disposition:50808}    Planned Readmission: {EXAM; YES/NO:36875::\"No\"}    Code Status: ***    Discharge Statement   I spent *** minutes discharging the patient. This time was spent on the day of discharge. I had direct contact with the patient on the day of discharge. Additional documentation is required if more than 30 minutes were spent on discharge.     ***       "

## 2024-10-11 NOTE — DISCHARGE INSTRUCTIONS
[facility] - Gynecologic Oncology  Nina Connelly Boulay and [place]  [phone]    Hysterectomy Discharge Instructions    WHAT YOU NEED TO KNOW:   A hysterectomy is surgery to remove your uterus. Your ovaries, fallopian tubes, cervix, or part of your vagina may also need to be removed. The organs and tissue that will be removed depends on your medical condition.  After a hysterectomy, you will not be able to become pregnant.  If your ovaries are removed, you will go through menopause if you have not already.    DISCHARGE INSTRUCTIONS:   Contact your doctor at the number above if:   You have a fever over 101o.  You have nausea or are vomiting that does not improve after a light meal.   Your pain is getting worse, even after you take medicine.   You feel pain or burning when you urinate, or you have trouble urinating.   You have pus or a foul-smelling odor coming from your vagina.    Your wound is red, swollen, or draining pus.  You see new or an increased amount of bright red blood coming from your vagina or your incisions.   You have questions or concerns about your condition or care.    Seek care immediately:   Your arm or leg feels warm, tender, and painful. It may look swollen and red.  You have increasing abdominal or pelvic pain.   You have heavy vaginal bleeding that fills 1 or more sanitary pads in 1 hour.    Call 911 for any of the following:   You feel lightheaded, short of breath, and have chest pain.   You cough up blood.    Medicines: You may need any of the following:  Prescription medicine may be given. You may receive a prescription for pain medication or be advised to use over the counter (OTC) pain medication such as acetaminophen (Tylenol) or ibuprofen (Advil, Motrin). Ask your healthcare provider how to take this medicine safely.  NSAIDs , such as ibuprofen, help decrease swelling, pain, and fever. NSAIDs can cause stomach bleeding or kidney problems in certain  people. If you take blood thinner medicine, always ask your healthcare provider if NSAIDs are safe for you. Always read the medicine label and follow directions.   Stool softeners help treat or prevent constipation.    Take your medicine as directed. Contact your healthcare provider if you think your medicine is not helping or if you have side effects. Tell him or her if you are allergic to any medicine. Keep a list of the medicines, vitamins, and herbs you take. Include the amounts, and when and why you take them. Bring the list or the pill bottles to follow-up visits. Carry your medicine list with you in case of an emergency.    Activity:   Rest as needed. Get up and move around as directed to help prevent blood clots. Start with short walks and slowly increase the distance every day. Limit the number of times you climb stairs to 2 times each day for the first week. Plan most of your daily activities on one level of your home.      Do not lift objects heavier than 10 pounds for 6 weeks. Avoid strenuous activity for 2 weeks.      Do not strain during bowel movements. High-fiber foods and extra liquids can help you prevent constipation. Examples of high-fiber foods are fruit and bran. Prune juice and water are good liquids to drink.      Do not have sex, use tampons, or douche for up to 8 weeks.     You may shower as soon as the day after surgery.  Tub baths can be taken starting 2 weeks after surgery.Do not go into pools or hot tubs until cleared by your doctor.      Ask when it is safe for you to drive. It is generally safe to drive after 2 weeks and when no longer taking prescription pain medication.    Ask when you may return to work and to other regular activities.    Wound care: Care for your abdominal incisions as directed. Carefully wash around the wound with soap and water. If you have Hibiclens or medicated soap that you were instructed to use before surgery, you may use that to wash with for up to 2 days  after surgery.  If not, any mild non-scented, non-abrasive soap is safe.  It is okay to let the soap and water run over your incision. Do not scrub your incision. Dry the area and put on new, clean bandages as directed. Change your bandages when they get wet or dirty. If you have strips of medical tape, let them fall off on their own. It may take 7 to 14 days for them to fall off. Check your incision every day for redness, swelling, or pus.   Deep breathing: Take deep breaths and cough 10 times each hour. This will decrease your risk for a lung infection. Take a deep breath and hold it for as long as you can. Let the air out and then cough strongly. Deep breaths help open your airway. You may be given an incentive spirometer to help you take deep breaths. Put the plastic piece in your mouth and take a slow, deep breath, then let the air out and cough. Repeat these steps 10 times every hour.   Get support: This surgery may be life-changing for you and your family. You will no longer be able to get pregnant. Sudden changes in the levels of your hormones may occur and cause mood swings and depression. You may feel angry, sad, or frightened, or cry frequently and unexpectedly. These feelings are normal. Talk to your healthcare provider about where you can get support. You can also ask if hormone replacement medicine is right for you.   Follow up with your healthcare provider or gynecologist as directed: You may need to return to have stitches removed, and for other tests. Write down your questions so you remember to ask them during your visits.      © 2017 [employer] Information is for End User's use only and may not be sold, redistributed or otherwise used for commercial purposes. All illustrations and images included in CareNotes® are the copyrighted property of [employer] Inc[employer] or [employer].  The above information is an  only. It is not intended as medical advice for  individual conditions or treatments. Talk to your doctor, nurse or pharmacist before following any medical regimen to see if it is safe and effective for you.

## 2024-10-11 NOTE — ANESTHESIA POSTPROCEDURE EVALUATION
Post-Op Assessment Note    Last Filed PACU Vitals:  Vitals Value Taken Time   Temp 97.6 °F (36.4 °C) 10/11/24 1312   Pulse 77 10/11/24 1315   /68 10/11/24 1313   Resp 16 10/11/24 1249   SpO2 92 % 10/11/24 1315   Vitals shown include unfiled device data.    Modified Tao:  Activity: 2 (10/11/2024 12:44 PM)  Respiration: 2 (10/11/2024 12:44 PM)  Circulation: 2 (10/11/2024 12:44 PM)  Consciousness: 2 (10/11/2024 12:44 PM)  Oxygen Saturation: 2 (10/11/2024 12:44 PM)  Modified Tao Score: 10 (10/11/2024 12:44 PM)

## 2024-10-11 NOTE — ANESTHESIA PREPROCEDURE EVALUATION
Procedure:  ROBOTIC ASSISTED LTH, BSO, LYMPH NODE DISSECTION, EUA (Abdomen)  EXPLORATORY LAPAROTOMY (Abdomen)    Relevant Problems   ANESTHESIA (within normal limits)      CARDIO   (+) High triglycerides   (+) LBBB (left bundle branch block)   (+) Other pulmonary embolism without acute cor pulmonale (HCC)   (+) Premature ventricular contractions   (+) Raynaud's disease without gangrene      ENDO   (+) Hypothyroidism   (+) Secondary hyperparathyroidism of renal origin (HCC)      GI/HEPATIC   (+) Gastroesophageal reflux disease without esophagitis      /RENAL   (+) Stage 3 chronic kidney disease (HCC)      GYN   (+) Malignant neoplasm of endocervix (HCC)      MUSCULOSKELETAL   (+) Patellofemoral arthritis of right knee   (+) Primary osteoarthritis of left knee   (+) Primary osteoarthritis of right knee      NEURO/PSYCH   (+) Depression with anxiety      PULMONARY   (+) Obstructive sleep apnea      Behavioral Health   (+) Bipolar 1 disorder, mixed, severe (HCC)      Cardiovascular/Peripheral Vascular   (+) Dilated cardiomyopathy (HCC)      Other   (+) Morbid obesity with BMI of 40.0-44.9, adult ([facility])        Physical Exam    Airway    Mallampati score: III  TM Distance: >3 FB  Neck ROM: full     Dental    upper dentures    Cardiovascular  Rhythm: regular, Rate: normal, Cardiovascular exam normal    Pulmonary  Pulmonary exam normal Breath sounds clear to auscultation    Other Findings  Per pt denies anything remaining that is loose or removeablepost-pubertal.      Anesthesia Plan  ASA Score- 3     Anesthesia Type- general with ASA Monitors.         Additional Monitors:     Airway Plan: ETT.    Comment: Pt consented for TAP block.       Plan Factors-Exercise tolerance (METS): >4 METS.    Chart reviewed. EKG reviewed. Imaging results reviewed. Existing labs reviewed. Patient summary reviewed.                  Induction- intravenous.    Postoperative Plan- Plan for postoperative opioid use.         Informed Consent-  Anesthetic plan and risks discussed with patient.  I personally reviewed this patient with the CRNA. Discussed and agreed on the Anesthesia Plan with the CRNA..

## 2024-10-11 NOTE — ANESTHESIA POSTPROCEDURE EVALUATION
Post-Op Assessment Note    CV Status:  Stable  Pain Score: 0    Pain management: adequate       Mental Status:  Sleepy   Hydration Status:  Stable   PONV Controlled:  None   Airway Patency:  Patent  Airway: intubated     Post Op Vitals Reviewed: Yes    No anethesia notable event occurred.    Staff: CRNA           Last Filed PACU Vitals:  Vitals Value Taken Time   Temp     Pulse 72 10/11/24 1123   /64 10/11/24 1121   Resp     SpO2 96 % 10/11/24 1123   Vitals shown include unfiled device data.    Modified Tao:  No data recorded

## 2024-10-12 VITALS
TEMPERATURE: 97.8 F | DIASTOLIC BLOOD PRESSURE: 70 MMHG | HEART RATE: 70 BPM | WEIGHT: 225.09 LBS | BODY MASS INDEX: 42.53 KG/M2 | SYSTOLIC BLOOD PRESSURE: 128 MMHG | OXYGEN SATURATION: 96 % | RESPIRATION RATE: 18 BRPM

## 2024-10-12 LAB
ANION GAP SERPL CALCULATED.3IONS-SCNC: 6 MMOL/L (ref 4–13)
APTT PPP: 23 SECONDS (ref 23–34)
BASOPHILS # BLD AUTO: 0.02 THOUSANDS/ΜL (ref 0–0.1)
BASOPHILS NFR BLD AUTO: 0 % (ref 0–1)
BUN SERPL-MCNC: 23 MG/DL (ref 5–25)
CALCIUM SERPL-MCNC: 8.8 MG/DL (ref 8.4–10.2)
CHLORIDE SERPL-SCNC: 115 MMOL/L (ref 96–108)
CO2 SERPL-SCNC: 23 MMOL/L (ref 21–32)
CREAT SERPL-MCNC: 1.64 MG/DL (ref 0.6–1.3)
EOSINOPHIL # BLD AUTO: 0 THOUSAND/ΜL (ref 0–0.61)
EOSINOPHIL NFR BLD AUTO: 0 % (ref 0–6)
ERYTHROCYTE [DISTWIDTH] IN BLOOD BY AUTOMATED COUNT: 13.3 % (ref 11.6–15.1)
ERYTHROCYTE [DISTWIDTH] IN BLOOD BY AUTOMATED COUNT: 13.5 % (ref 11.6–15.1)
GFR SERPL CREATININE-BSD FRML MDRD: 31 ML/MIN/1.73SQ M
GLUCOSE SERPL-MCNC: 114 MG/DL (ref 65–140)
HCT VFR BLD AUTO: 33.1 % (ref 34.8–46.1)
HCT VFR BLD AUTO: 33.8 % (ref 34.8–46.1)
HGB BLD-MCNC: 10.5 G/DL (ref 11.5–15.4)
HGB BLD-MCNC: 10.5 G/DL (ref 11.5–15.4)
IMM GRANULOCYTES # BLD AUTO: 0.02 THOUSAND/UL (ref 0–0.2)
IMM GRANULOCYTES NFR BLD AUTO: 0 % (ref 0–2)
INR PPP: 1.05 (ref 0.85–1.19)
LYMPHOCYTES # BLD AUTO: 1.37 THOUSANDS/ΜL (ref 0.6–4.47)
LYMPHOCYTES NFR BLD AUTO: 17 % (ref 14–44)
MAGNESIUM SERPL-MCNC: 2 MG/DL (ref 1.9–2.7)
MCH RBC QN AUTO: 27.6 PG (ref 26.8–34.3)
MCH RBC QN AUTO: 28.3 PG (ref 26.8–34.3)
MCHC RBC AUTO-ENTMCNC: 31.1 G/DL (ref 31.4–37.4)
MCHC RBC AUTO-ENTMCNC: 31.7 G/DL (ref 31.4–37.4)
MCV RBC AUTO: 89 FL (ref 82–98)
MCV RBC AUTO: 89 FL (ref 82–98)
MONOCYTES # BLD AUTO: 0.74 THOUSAND/ΜL (ref 0.17–1.22)
MONOCYTES NFR BLD AUTO: 9 % (ref 4–12)
NEUTROPHILS # BLD AUTO: 5.94 THOUSANDS/ΜL (ref 1.85–7.62)
NEUTS SEG NFR BLD AUTO: 74 % (ref 43–75)
NRBC BLD AUTO-RTO: 0 /100 WBCS
PLATELET # BLD AUTO: 151 THOUSANDS/UL (ref 149–390)
PLATELET # BLD AUTO: 155 THOUSANDS/UL (ref 149–390)
PMV BLD AUTO: 12.8 FL (ref 8.9–12.7)
PMV BLD AUTO: 13.1 FL (ref 8.9–12.7)
POTASSIUM SERPL-SCNC: 3.9 MMOL/L (ref 3.5–5.3)
PROTHROMBIN TIME: 13.9 SECONDS (ref 12.3–15)
RBC # BLD AUTO: 3.71 MILLION/UL (ref 3.81–5.12)
RBC # BLD AUTO: 3.8 MILLION/UL (ref 3.81–5.12)
SODIUM SERPL-SCNC: 144 MMOL/L (ref 135–147)
WBC # BLD AUTO: 8.03 THOUSAND/UL (ref 4.31–10.16)
WBC # BLD AUTO: 8.09 THOUSAND/UL (ref 4.31–10.16)

## 2024-10-12 PROCEDURE — 85730 THROMBOPLASTIN TIME PARTIAL: CPT

## 2024-10-12 PROCEDURE — 85610 PROTHROMBIN TIME: CPT

## 2024-10-12 PROCEDURE — 80048 BASIC METABOLIC PNL TOTAL CA: CPT

## 2024-10-12 PROCEDURE — 85027 COMPLETE CBC AUTOMATED: CPT

## 2024-10-12 PROCEDURE — 83735 ASSAY OF MAGNESIUM: CPT

## 2024-10-12 PROCEDURE — 85025 COMPLETE CBC W/AUTO DIFF WBC: CPT

## 2024-10-12 PROCEDURE — 99024 POSTOP FOLLOW-UP VISIT: CPT | Performed by: OBSTETRICS & GYNECOLOGY

## 2024-10-12 RX ADMIN — ACETAMINOPHEN 1000 MG: 10 INJECTION INTRAVENOUS at 00:23

## 2024-10-12 RX ADMIN — ACETAMINOPHEN 650 MG: 325 TABLET, FILM COATED ORAL at 13:16

## 2024-10-12 RX ADMIN — LURASIDONE HYDROCHLORIDE 20 MG: 20 TABLET, COATED ORAL at 07:33

## 2024-10-12 RX ADMIN — APIXABAN 5 MG: 5 TABLET, FILM COATED ORAL at 09:37

## 2024-10-12 RX ADMIN — ACETAMINOPHEN 650 MG: 325 TABLET, FILM COATED ORAL at 07:32

## 2024-10-12 RX ADMIN — FAMOTIDINE 20 MG: 20 TABLET, FILM COATED ORAL at 07:33

## 2024-10-12 RX ADMIN — METOPROLOL SUCCINATE 50 MG: 50 TABLET, EXTENDED RELEASE ORAL at 09:37

## 2024-10-12 NOTE — CASE MANAGEMENT
Case Management Assessment & Discharge Planning Note    Patient name Cherelle Dash  Location [facility] 5 /E5 -* MRN [id number]  : 1955 Date 10/12/2024       Current Admission Date: 10/11/2024  Current Admission Diagnosis:Malignant neoplasm of endocervix (HCC)   Patient Active Problem List    Diagnosis Date Noted Date Diagnosed    Hospital discharge follow-up 2024     Dilated cardiomyopathy (HCC) 2024     Other pulmonary embolism without acute cor pulmonale ([facility]) 2024     PMB (postmenopausal bleeding) 2024     Malignant neoplasm of endocervix (HCC) 2024     Hyperphosphatemia 2024     Premature ventricular contractions 2023     LBBB (left bundle branch block) 2023     Other sleep disorders 2023     Lichen sclerosus 2022     Mass of right hand 2021     Urinary frequency 2021     Secondary hyperparathyroidism of renal origin ([facility]) 2021     Persistent proteinuria 2021     Primary osteoarthritis of left knee 2021     Primary osteoarthritis of right knee 2021     Vitamin D deficiency 2020     Stage 3 chronic kidney disease ([facility]) 2020     Raynaud's disease without gangrene 03/10/2020     Abnormal EKG 12/10/2019     Morbid obesity with BMI of 40.0-44.9, adult ([facility]) 2018     High triglycerides 2018     Onychomycosis of toenail 2017     Insomnia 2015     Stress incontinence of urine 2015     Patellofemoral arthritis of right knee 2014     Depression with anxiety 2014     Hypothyroidism 2013     Impaired fasting glucose 2013     Bipolar 1 disorder, mixed, severe ([facility]) 2013     Gastroesophageal reflux disease without esophagitis 2013     Obstructive sleep apnea 2013       LOS (days): 1  Geometric Mean LOS (GMLOS) (days): 2.7  Days to GMLOS:     OBJECTIVE:    Risk of Unplanned Readmission Score: 16.87         Current admission  status: Outpatient Surgery       Preferred Pharmacy:   [facility] - [place], [place] - [address]  [address] [facility]  [address]  [facility] [address]  Phone: [phone] Fax: [phone]    [facility] - [place], [place] - [address]  [address]  [address]  [facility] [address]  Phone: [phone] Fax: [phone]    [id number] - [place], [place] - [address]  [address]  [place] [address]  Phone: [phone] Fax: [phone]    [facility] - [place] - [address]  [facility],  [address]  [facility] [address] [facility],  [address] [facility] [place] [address]  Phone: [phone] Fax: [phone]    Primary Care Provider: Gretchen Mayorga DO    [facility] Insurance: [facility]  Secondary Insurance:     ASSESSMENT:  Active Health Care Proxies    There are no active Health Care Proxies on file.                      Patient Information  Admitted from:: Home  Mental Status: Alert  During Assessment patient was accompanied by: Spouse  Assessment information provided by:: Patient  Primary Caregiver: Self  Caregiver's Relationship to Patient:: Family Member  Support Systems: Self, Spouse/significant other  County of Residence: [place]  [facility]  Home entry access options. Select all that apply.: Stairs  Number of steps to enter home.: 3  Type of Current Residence: Facility  Upon entering residence, is there a bedroom on the main floor (no further steps)?: Yes  Upon entering residence, is there a bathroom on the main floor (no further steps)?: Yes  Living Arrangements: Lives w/ Spouse/significant other    Activities of Daily Living Prior to Admission  Functional Status: Independent  Completes ADLs independently?: Yes  Ambulates independently?: Yes  Does patient use assisted devices?: No  Does patient currently own DME?: No  Does patient have a history of Outpatient Therapy (PT/OT)?: No  Does the  patient have a history of Short-Term Rehab?: No  Does patient have a history of HHC?: No  Does patient currently have HHC?: No         Patient Information Continued  Income Source: Pension/long term                    DISCHARGE DETAILS:    Discharge planning discussed with:: chart reviewed and rec'd epic SC from medical team. Per medical team, pt is medically cleared for discharged today pending DOAC price check. Eliquis [facility] to [facility] for price check. Per [facility], Eliquis filled on 9/23. Cm TC [facility], confirmed same. per rep, Eliquis 5mg was filled on 9/23 and next refil 10/23. Cm met with pt and spouse at bedside, confirmed discharged and updated on medication. Pt verbalized understanding and spouse to transport upon discharged. No discharged need identified.

## 2024-10-12 NOTE — ASSESSMENT & PLAN NOTE
Lab Results   Component Value Date    EGFR 31 10/12/2024    EGFR 33 10/08/2024    EGFR 30 10/01/2024    CREATININE 1.64 (H) 10/12/2024    CREATININE 1.56 (H) 10/08/2024    CREATININE 1.68 (H) 10/01/2024   History of stage 3 CKD  Cr trend as above, at baseline  Avoid nephrotoxic agents

## 2024-10-12 NOTE — PLAN OF CARE
Problem: Prexisting or High Potential for Compromised Skin Integrity  Goal: Skin integrity is maintained or improved  Description: INTERVENTIONS:  - Identify patients at risk for skin breakdown  - Assess and monitor skin integrity  - Assess and monitor nutrition and hydration status  - Monitor labs   - Assess for incontinence   - Turn and reposition patient  - Assist with mobility/ambulation  - Relieve pressure over bony prominences  - Avoid friction and shearing  - Provide appropriate hygiene as needed including keeping skin clean and dry  - Evaluate need for skin moisturizer/barrier cream  - Collaborate with interdisciplinary team   - Patient/family teaching  - Consider wound care consult   Outcome: Progressing     Problem: PAIN - ADULT  Goal: Verbalizes/displays adequate comfort level or baseline comfort level  Description: Interventions:  - Encourage patient to monitor pain and request assistance  - Assess pain using appropriate pain scale  - Administer analgesics based on type and severity of pain and evaluate response  - Implement non-pharmacological measures as appropriate and evaluate response  - Consider cultural and social influences on pain and pain management  - Notify physician/advanced practitioner if interventions unsuccessful or patient reports new pain  Outcome: Progressing     Problem: SAFETY ADULT  Goal: Patient will remain free of falls  Description: INTERVENTIONS:  - Educate patient/family on patient safety including physical limitations  - Instruct patient to call for assistance with activity   - Consult OT/PT to assist with strengthening/mobility   - Keep Call bell within reach  - Keep bed low and locked with side rails adjusted as appropriate  - Keep care items and personal belongings within reach  - Initiate and maintain comfort rounds  - Make Fall Risk Sign visible to staff  - Apply yellow socks and bracelet for high fall risk patients  - Consider moving patient to room near nurses  station  Outcome: Progressing  Goal: Maintain or return to baseline ADL function  Description: INTERVENTIONS:  -  Assess patient's ability to carry out ADLs; assess patient's baseline for ADL function and identify physical deficits which impact ability to perform ADLs (bathing, care of mouth/teeth, toileting, grooming, dressing, etc.)  - Assess/evaluate cause of self-care deficits   - Assess range of motion  - Assess patient's mobility; develop plan if impaired  - Assess patient's need for assistive devices and provide as appropriate  - Encourage maximum independence but intervene and supervise when necessary  - Involve family in performance of ADLs  - Assess for home care needs following discharge   - Consider OT consult to assist with ADL evaluation and planning for discharge  - Provide patient education as appropriate  Outcome: Progressing  Goal: Maintains/Returns to pre admission functional level  Description: INTERVENTIONS:  - Perform AM-PAC 6 Click Basic Mobility/ Daily Activity assessment daily.  - Set and communicate daily mobility goal to care team and patient/family/caregiver.   - Collaborate with rehabilitation services on mobility goals if consulted  - Out of bed for toileting  - Record patient progress and toleration of activity level   Outcome: Progressing     Problem: DISCHARGE PLANNING  Goal: Discharge to home or other facility with appropriate resources  Description: INTERVENTIONS:  - Identify barriers to discharge w/patient and caregiver  - Arrange for needed discharge resources and transportation as appropriate  - Identify discharge learning needs (meds, wound care, etc.)  - Arrange for interpretive services to assist at discharge as needed  - Refer to Case Management Department for coordinating discharge planning if the patient needs post-hospital services based on physician/advanced practitioner order or complex needs related to functional status, cognitive ability, or social support  system  Outcome: Progressing     Problem: Knowledge Deficit  Goal: Patient/family/caregiver demonstrates understanding of disease process, treatment plan, medications, and discharge instructions  Description: Complete learning assessment and assess knowledge base.  Interventions:  - Provide teaching at level of understanding  - Provide teaching via preferred learning methods  Outcome: Progressing

## 2024-10-12 NOTE — ASSESSMENT & PLAN NOTE
68yo POD#1 s/p RA-TLH, BSO, SLNB, vulvar biopsy for mucinous adenocarcinoma admitted in the setting of chronic PE with plan for re-initiation of anticoagulation therapy.  Pain: PO Tylenol 650mg q6h, Yaneth 5/10mg PRN  - Avoid NSAIDs in setting of CKD  FEN: PO fluids, replace PRN, regular diet  DVT ppx: SCDs, Eliquis 5mg BID start POD#1  Discharge planning pending 1200 CBC

## 2024-10-12 NOTE — PROGRESS NOTES
Progress Note - GYN Oncology   Name: Cherelle Dash 69 y.o. female I MRN: [id number]  Unit/Bed#: E5 -01 I Date of Admission: 10/11/2024   Date of Service: 10/12/2024 I Hospital Day: 1     Assessment & Plan  Malignant neoplasm of endocervix (HCC)  68yo POD#1 s/p RA-TLH, BSO, SLNB, vulvar biopsy for mucinous adenocarcinoma admitted in the setting of chronic PE with plan for re-initiation of anticoagulation therapy.  Pain: PO Tylenol 650mg q6h, Yaneth 5/10mg PRN  - Avoid NSAIDs in setting of CKD  FEN: PO fluids, replace PRN, regular diet  DVT ppx: SCDs, Eliquis 5mg BID start POD#1  Discharge planning pending 1200 CBC  LBBB (left bundle branch block)  No current cardiac symptoms  Home Toprol-XL 50mg qd  Continue to monitor  Other pulmonary embolism without acute cor pulmonale (HCC)  History of DVT/PE with IVC filter in place  Plan to restart Eliquis 5mg BID dosing  Discharge pending hemodynamic stability  Bipolar 1 disorder, mixed, severe (HCC)  No current symptoms  Continue to monitor  Stage 3 chronic kidney disease (HCC)  Lab Results   Component Value Date    EGFR 31 10/12/2024    EGFR 33 10/08/2024    EGFR 30 10/01/2024    CREATININE 1.64 (H) 10/12/2024    CREATININE 1.56 (H) 10/08/2024    CREATININE 1.68 (H) 10/01/2024   History of stage 3 CKD  Cr trend as above, at baseline  Avoid nephrotoxic agents    GYNECOLOGY  Subjective   Cherelle Dash has no current complaints.  Pain is well controlled with oral analgesics.  Overnight events: none. Patient is currently voiding.  She is ambulating.  Patient is currently passing flatus and has had no bowel movement. She is tolerating PO, and denies nausea or vomiting. Patient denies fever, chills, chest pain, shortness of breath, or calf tenderness.       Objective :  Temp:  [97.2 °F (36.2 °C)-99.8 °F (37.7 °C)] 98.2 °F (36.8 °C)  HR:  [70-82] 80  BP: (120-148)/(64-74) 128/73  Resp:  [14-19] 18  SpO2:  [94 %-99 %] 97 %  O2 Device: None (Room air)    Physical Exam  Vitals  and nursing note reviewed.   Constitutional:       General: She is not in acute distress.  HENT:      Head: Normocephalic.      Right Ear: External ear normal.      Left Ear: External ear normal.   Eyes:      General: No scleral icterus.        Right eye: No discharge.         Left eye: No discharge.      Conjunctiva/sclera: Conjunctivae normal.   Cardiovascular:      Rate and Rhythm: Normal rate and regular rhythm.      Pulses: Normal pulses.      Heart sounds: Normal heart sounds.   Pulmonary:      Effort: Pulmonary effort is normal. No respiratory distress.      Breath sounds: Normal breath sounds.   Abdominal:      General: Abdomen is flat. There is no distension.      Palpations: Abdomen is soft.      Tenderness: There is no abdominal tenderness. There is no guarding.      Comments: Well-appearing 5x robotic laparoscopic port site incisions, C/D/I   Musculoskeletal:         General: No swelling or tenderness. Normal range of motion.      Cervical back: Normal range of motion.      Right lower leg: No edema.      Left lower leg: No edema.   Skin:     General: Skin is warm and dry.      Capillary Refill: Capillary refill takes less than 2 seconds.   Neurological:      Mental Status: She is alert and oriented to person, place, and time. Mental status is at baseline.   Psychiatric:         Mood and Affect: Mood normal.         Behavior: Behavior normal.           Lab Results: I have reviewed the following results:CBC/BMP:   .     10/12/24  0535   WBC 8.09   HGB 10.5*   HCT 33.1*      SODIUM 144   K 3.9   *   CO2 23   BUN 23   CREATININE 1.64*   GLUC 114   MG 2.0

## 2024-10-12 NOTE — ASSESSMENT & PLAN NOTE
History of DVT/PE with IVC filter in place  Plan to restart Eliquis 5mg BID dosing  Discharge pending hemodynamic stability

## 2024-10-12 NOTE — PLAN OF CARE
Problem: Prexisting or High Potential for Compromised Skin Integrity  Goal: Skin integrity is maintained or improved  Description: INTERVENTIONS:  - Identify patients at risk for skin breakdown  - Assess and monitor skin integrity  - Assess and monitor nutrition and hydration status  - Monitor labs   - Assess for incontinence   - Turn and reposition patient  - Assist with mobility/ambulation  - Relieve pressure over bony prominences  - Avoid friction and shearing  - Provide appropriate hygiene as needed including keeping skin clean and dry  - Evaluate need for skin moisturizer/barrier cream  - Collaborate with interdisciplinary team   - Patient/family teaching  - Consider wound care consult   Outcome: Progressing     Problem: PAIN - ADULT  Goal: Verbalizes/displays adequate comfort level or baseline comfort level  Description: Interventions:  - Encourage patient to monitor pain and request assistance  - Assess pain using appropriate pain scale  - Administer analgesics based on type and severity of pain and evaluate response  - Implement non-pharmacological measures as appropriate and evaluate response  - Consider cultural and social influences on pain and pain management  - Notify physician/advanced practitioner if interventions unsuccessful or patient reports new pain  Outcome: Progressing     Problem: SAFETY ADULT  Goal: Patient will remain free of falls  Description: INTERVENTIONS:  - Educate patient/family on patient safety including physical limitations  - Instruct patient to call for assistance with activity   - Consult OT/PT to assist with strengthening/mobility   - Keep Call bell within reach  - Keep bed low and locked with side rails adjusted as appropriate  - Keep care items and personal belongings within reach  - Initiate and maintain comfort rounds  - Make Fall Risk Sign visible to staff  - Apply yellow socks and bracelet for high fall risk patients  - Consider moving patient to room near nurses  station  Outcome: Progressing  Goal: Maintain or return to baseline ADL function  Description: INTERVENTIONS:  -  Assess patient's ability to carry out ADLs; assess patient's baseline for ADL function and identify physical deficits which impact ability to perform ADLs (bathing, care of mouth/teeth, toileting, grooming, dressing, etc.)  - Assess/evaluate cause of self-care deficits   - Assess range of motion  - Assess patient's mobility; develop plan if impaired  - Assess patient's need for assistive devices and provide as appropriate  - Encourage maximum independence but intervene and supervise when necessary  - Involve family in performance of ADLs  - Assess for home care needs following discharge   - Consider OT consult to assist with ADL evaluation and planning for discharge  - Provide patient education as appropriate  Outcome: Progressing  Goal: Maintains/Returns to pre admission functional level  Description: INTERVENTIONS:  - Perform AM-PAC 6 Click Basic Mobility/ Daily Activity assessment daily.  - Set and communicate daily mobility goal to care team and patient/family/caregiver.   - Collaborate with rehabilitation services on mobility goals if consulted    - Out of bed for toileting  - Record patient progress and toleration of activity level   Outcome: Progressing

## 2024-10-14 ENCOUNTER — TELEPHONE (OUTPATIENT)
Age: 69
End: 2024-10-14

## 2024-10-14 NOTE — TELEPHONE ENCOUNTER
Pt called in stating that her after summary from her surgery with Dr. Downing stated that she was to make a post op appt with him but currently she has one with Dr. Page on 11/11/24. She wanted to know if this was okay? Pt can be reached at [phone]. Thank you.

## 2024-10-23 PROCEDURE — 88341 IMHCHEM/IMCYTCHM EA ADD ANTB: CPT | Performed by: PATHOLOGY

## 2024-10-23 PROCEDURE — 88307 TISSUE EXAM BY PATHOLOGIST: CPT | Performed by: PATHOLOGY

## 2024-10-23 PROCEDURE — 88305 TISSUE EXAM BY PATHOLOGIST: CPT | Performed by: PATHOLOGY

## 2024-10-23 PROCEDURE — 88342 IMHCHEM/IMCYTCHM 1ST ANTB: CPT | Performed by: PATHOLOGY

## 2024-10-23 PROCEDURE — 88112 CYTOPATH CELL ENHANCE TECH: CPT | Performed by: PATHOLOGY

## 2024-10-23 PROCEDURE — 88309 TISSUE EXAM BY PATHOLOGIST: CPT | Performed by: PATHOLOGY

## 2024-10-25 ENCOUNTER — DOCUMENTATION (OUTPATIENT)
Dept: HEMATOLOGY ONCOLOGY | Facility: CLINIC | Age: 69
End: 2024-10-25

## 2024-10-25 ENCOUNTER — RA CDI HCC (OUTPATIENT)
Dept: OTHER | Facility: HOSPITAL | Age: 69
End: 2024-10-25

## 2024-10-25 NOTE — PROGRESS NOTES
In-basket message received from Dr. Downing to add patient to the gyn [facility] on 11/4/2024. Chart reviewed and prep completed.

## 2024-10-28 ENCOUNTER — TELEPHONE (OUTPATIENT)
Dept: GYNECOLOGIC ONCOLOGY | Facility: CLINIC | Age: 69
End: 2024-10-28

## 2024-10-28 NOTE — TELEPHONE ENCOUNTER
Called pt to change her appointment from 11/11/24 to 11/12/24 @ 10:15 am do to Dr. Page's schedule closing.    Pt confirmed new date, time and location.

## 2024-10-29 ENCOUNTER — TELEPHONE (OUTPATIENT)
Dept: GYNECOLOGIC ONCOLOGY | Facility: CLINIC | Age: 69
End: 2024-10-29

## 2024-10-29 LAB
CARIS GENOMIC LOH - EXOME: NORMAL
CARIS HER2/NEU: NEGATIVE
CARIS HLA-A: NORMAL
CARIS HLA-B: NORMAL
CARIS HLA-C: NORMAL
CARIS MSI - EXOME: NORMAL
CARIS PD-L1 (22C3): POSITIVE
CARIS TMB - EXOME: NORMAL

## 2024-10-29 NOTE — TELEPHONE ENCOUNTER
Called and spoke with patient.  Asked patient if she was willing to come in at 8:45 am.  Patient stated that 8:45 am in [place] with Dr. Page would be better for her.  Patient confirmed.

## 2024-11-01 ENCOUNTER — TELEPHONE (OUTPATIENT)
Age: 69
End: 2024-11-01

## 2024-11-01 NOTE — TELEPHONE ENCOUNTER
Daphney from [facility] called in because the medical director is denying part of the  [employer] genetic testing and they wanted to give Dr Page the option for a peer to peer.  If he wants to get that scheduled please call [phone].      They are approving codes:    55716 analyte Z01V0  74922 analyte MSH2, MSH2, PMS2  33798 analyte MLH1,       Not approving codes:    08882 AR, ER, HER2/BATSHEVA, PD-L1, VA, PTEN      Peer to peer expires at 1pm eastern time on 11/5/24    If peer to peer expires the denial stands    If it expires, there is an expedited appeal option at [phone]     Case # [id number]

## 2024-11-04 ENCOUNTER — OFFICE VISIT (OUTPATIENT)
Dept: FAMILY MEDICINE CLINIC | Facility: CLINIC | Age: 69
End: 2024-11-04
Payer: COMMERCIAL

## 2024-11-04 ENCOUNTER — DOCUMENTATION (OUTPATIENT)
Dept: GYNECOLOGIC ONCOLOGY | Facility: CLINIC | Age: 69
End: 2024-11-04

## 2024-11-04 VITALS
OXYGEN SATURATION: 98 % | TEMPERATURE: 98.2 F | SYSTOLIC BLOOD PRESSURE: 120 MMHG | HEART RATE: 73 BPM | DIASTOLIC BLOOD PRESSURE: 86 MMHG | BODY MASS INDEX: 43.08 KG/M2 | WEIGHT: 228.2 LBS | RESPIRATION RATE: 18 BRPM | HEIGHT: 61 IN

## 2024-11-04 DIAGNOSIS — N18.32 STAGE 3B CHRONIC KIDNEY DISEASE (HCC): ICD-10-CM

## 2024-11-04 DIAGNOSIS — R73.01 IMPAIRED FASTING GLUCOSE: ICD-10-CM

## 2024-11-04 DIAGNOSIS — C53.0 MALIGNANT NEOPLASM OF ENDOCERVIX (HCC): Primary | ICD-10-CM

## 2024-11-04 DIAGNOSIS — E66.01 MORBID OBESITY WITH BMI OF 40.0-44.9, ADULT (HCC): ICD-10-CM

## 2024-11-04 PROCEDURE — 99214 OFFICE O/P EST MOD 30 MIN: CPT | Performed by: FAMILY MEDICINE

## 2024-11-04 PROCEDURE — G2211 COMPLEX E/M VISIT ADD ON: HCPCS | Performed by: FAMILY MEDICINE

## 2024-11-04 NOTE — ASSESSMENT & PLAN NOTE
Lab Results   Component Value Date    EGFR 31 10/12/2024    EGFR 33 10/08/2024    EGFR 30 10/01/2024    CREATININE 1.64 (H) 10/12/2024    CREATININE 1.56 (H) 10/08/2024    CREATININE 1.68 (H) 10/01/2024   Seeing nephrology

## 2024-11-04 NOTE — PROGRESS NOTES
"Ambulatory Visit  Name: Cherelle Dash      : 1955      MRN: [id number]  Encounter Provider: Gretchen Mayorga DO  Encounter Date: 2024   Encounter department: [facility]    Assessment & Plan  Malignant neoplasm of endocervix (HCC)  Had complete hystectomy  Will meet to decide about next steps with Dannie page next week         Morbid obesity with BMI of 40.0-44.9, adult (HCC)    Has weight management appt in March         Stage 3b chronic kidney disease (HCC)  Lab Results   Component Value Date    EGFR 31 10/12/2024    EGFR 33 10/08/2024    EGFR 30 10/01/2024    CREATININE 1.64 (H) 10/12/2024    CREATININE 1.56 (H) 10/08/2024    CREATININE 1.68 (H) 10/01/2024   Seeing nephrology         Impaired fasting glucose  Increase to 6  Will start walking              History of Present Illness     History of Present Illness  Had surgery Oct 11th  Went home next day  Did laparoscopic hysterectomy  Restarted blood thinners next day  Going to see Dr. Page for follow up  Rash in elbow area  Some ankle swelling  Weight management appt is      Review of Systems   Constitutional: Negative.    HENT: Negative.     Eyes: Negative.    Respiratory: Negative.     Cardiovascular:  Positive for leg swelling (ankle).   Skin:  Positive for rash (elbow).   Allergic/Immunologic: Negative.    Neurological: Negative.    Hematological: Negative.    Psychiatric/Behavioral: Negative.       Objective     /86 (BP Location: Left arm, Patient Position: Sitting, Cuff Size: Large)   Pulse 73   Temp 98.2 °F (36.8 °C) (Tympanic)   Resp 18   Ht 5' 1\" (1.549 m)   Wt 104 kg (228 lb 3.2 oz)   SpO2 98%   BMI 43.12 kg/m²     Physical Exam    Physical Exam  Vitals and nursing note reviewed.   Constitutional:       Appearance: Normal appearance. She is well-developed.   HENT:      Head: Normocephalic and atraumatic.      Right Ear: External ear normal.      Left Ear: External ear normal.      Nose: Nose normal. "   Eyes:      Conjunctiva/sclera: Conjunctivae normal.      Pupils: Pupils are equal, round, and reactive to light.   Cardiovascular:      Rate and Rhythm: Normal rate and regular rhythm.      Heart sounds: Normal heart sounds.   Pulmonary:      Effort: Pulmonary effort is normal.      Breath sounds: Normal breath sounds.   Abdominal:      General: Bowel sounds are normal.      Palpations: Abdomen is soft.   Musculoskeletal:         General: Normal range of motion.      Cervical back: Normal range of motion and neck supple.   Skin:     General: Skin is warm and dry.      Capillary Refill: Capillary refill takes less than 2 seconds.   Neurological:      Mental Status: She is alert and oriented to person, place, and time.   Psychiatric:         Behavior: Behavior normal.         Thought Content: Thought content normal.         Judgment: Judgment normal.

## 2024-11-04 NOTE — PROGRESS NOTES
Multidisciplinary Gynecologic Oncology Tumor Case Review       Physician Recommended Plan     Cherelle Dash is a 69 y.o. female     Diagnosis: Low grade Endometrioid carcinoma stage 3C1     Patient was discussed at the Multidisciplinary Gynecologic Oncology Case review on 11/04/2024. The group recommended to consider chemotherapy utilizing taxol and carboplatin, plus or minus  pembrolizumab.     Follow-up appointment with Dr Page on 11/12/2024.     NCCN guidelines were available for review.     The final treatment plan will be left to the discretion of the patient and the treating physician.       DISCLAIMERS:    TO THE TREATING PHYSICIAN:  This conference is a meeting of clinicians from various specialty areas who evaluate and discuss patients for whom a multidisciplinary treatment approach is being considered. Please note that the above opinion was a consensus of the conference attendees and is intended only to assist in quality care of the discussed patient.  The responsibility for follow up on the input given during the conference, along with any final decisions regarding plan of care, is that of the patient and the patient's provider. Accordingly, appointments have only been recommended based on this information and have NOT been scheduled unless otherwise noted.      TO THE PATIENT:  This summary is a brief record of major aspects of your cancer treatment. You may choose to share a copy with any of your doctors or nurses. However, this is not a detailed or comprehensive record of your care.

## 2024-11-05 ENCOUNTER — APPOINTMENT (OUTPATIENT)
Dept: LAB | Facility: AMBULARY SURGERY CENTER | Age: 69
End: 2024-11-05
Payer: COMMERCIAL

## 2024-11-05 ENCOUNTER — TELEPHONE (OUTPATIENT)
Age: 69
End: 2024-11-05

## 2024-11-05 DIAGNOSIS — N95.0 PMB (POSTMENOPAUSAL BLEEDING): ICD-10-CM

## 2024-11-05 LAB
EST. AVERAGE GLUCOSE BLD GHB EST-MCNC: 131 MG/DL
HBA1C MFR BLD: 6.2 %

## 2024-11-05 PROCEDURE — 83036 HEMOGLOBIN GLYCOSYLATED A1C: CPT

## 2024-11-05 PROCEDURE — 36415 COLL VENOUS BLD VENIPUNCTURE: CPT

## 2024-11-05 NOTE — TELEPHONE ENCOUNTER
Phone call from patient to verify if labs were needed prior to appt in November. Informed patient labs were needed, and she may report to any [facility] for labs. Patient agreeable.

## 2024-11-11 DIAGNOSIS — C53.0 MALIGNANT NEOPLASM OF ENDOCERVIX (HCC): ICD-10-CM

## 2024-11-11 DIAGNOSIS — I27.82 OTHER CHRONIC PULMONARY EMBOLISM WITHOUT ACUTE COR PULMONALE (HCC): ICD-10-CM

## 2024-11-11 NOTE — TELEPHONE ENCOUNTER
Requested medication(s) are due for refill today: Yes  Patient has already received a courtesy refill: No  Other reason request has been forwarded to provider: Last filled by Dr. Morgan Porter

## 2024-11-11 NOTE — TELEPHONE ENCOUNTER
Please call patient when prescription is sent as the pharmacy told her it was electronically taking out of system.

## 2024-11-12 ENCOUNTER — OFFICE VISIT (OUTPATIENT)
Dept: GYNECOLOGIC ONCOLOGY | Facility: CLINIC | Age: 69
End: 2024-11-12
Payer: COMMERCIAL

## 2024-11-12 VITALS
WEIGHT: 228 LBS | OXYGEN SATURATION: 98 % | HEART RATE: 92 BPM | TEMPERATURE: 97.9 F | SYSTOLIC BLOOD PRESSURE: 138 MMHG | DIASTOLIC BLOOD PRESSURE: 76 MMHG | BODY MASS INDEX: 43.08 KG/M2

## 2024-11-12 DIAGNOSIS — E03.9 ACQUIRED HYPOTHYROIDISM: ICD-10-CM

## 2024-11-12 DIAGNOSIS — C54.1 ENDOMETRIAL CANCER (HCC): Primary | ICD-10-CM

## 2024-11-12 DIAGNOSIS — I42.0 DILATED CARDIOMYOPATHY (HCC): ICD-10-CM

## 2024-11-12 DIAGNOSIS — I26.99 OTHER ACUTE PULMONARY EMBOLISM WITHOUT ACUTE COR PULMONALE (HCC): ICD-10-CM

## 2024-11-12 PROCEDURE — G2211 COMPLEX E/M VISIT ADD ON: HCPCS | Performed by: OBSTETRICS & GYNECOLOGY

## 2024-11-12 PROCEDURE — 99215 OFFICE O/P EST HI 40 MIN: CPT | Performed by: OBSTETRICS & GYNECOLOGY

## 2024-11-12 NOTE — PROGRESS NOTES
Assessment/Plan:    Problem List Items Addressed This Visit          Cardiovascular and Mediastinum    Other pulmonary embolism without acute cor pulmonale (HCC)     Continue therapeutic Eliquis.  Will plan to have IVC filter removed approximately 2 months after surgery.         Dilated cardiomyopathy (HCC)     Referral to cardio oncology for evaluation while on treatment.         Relevant Orders    Ambulatory Referral to Cardiology       Endocrine    Hypothyroidism    Relevant Orders    TSH, 3rd generation       Genitourinary    Endometrial cancer (HCC) - Primary     69-year-old with stage III C1 grade 1 endometrial cancer with positive ITC and periaortic lymph nodes, extensive LVSI, p53 wild-type, CPS score of 60, history of dilated cardiomyopathy with most recent EF 60% by nuclear medicine stress test, history of pulmonary embolus with IVC filter in situ, stage III chronic kidney disease with a baseline serum creatinine of approximately 1.6 mg/dL.  She is recovering well from robotic assisted total laparoscopic hysterectomy, bilateral salpingo-oophorectomy, sentinel lymph node biopsies.  Her performance status is 0.  1.  I discussed the pathology in detail with the patient and her .  2.  We discussed multiple different treatment options including chemotherapy plus or minus immunotherapy plus or minus radiation therapy, radiation therapy alone, hormone therapy (nonstandard).  She understands that chemotherapy and radiation therapy have similar PFS and OS, however, PFS and OS, however, for lymph node positive endometrial cancer combination treatment may be more effective than either modality alone.  3.  I discussed the risks of starting chemotherapy with carboplatin AUC 5, Taxol at 135 mg/m², pembrolizumab at 200 mg IV every 21 days for 3 cycles followed by repeat CT imaging to assess response followed by 3 additional cycles for a total of 6 cycles of treatment.  I discussed the risks of treatment including  the risks of pancytopenia, alopecia, neuropathy, allergic reaction, fatigue, nausea, vomiting, hepatic and renal damage, heart problems, thyroid problems, pneumonitis, colitis, pancreatitis, arthralgias, skin rashes.  She was also provided with written information regarding potential side effects of the treatment.  She agrees to proceed as outlined.  Consent for treatment was obtained by me in the office.  4.  Plan for pelvic radiation therapy after completion of cytotoxic chemothera this would then be followed by maintenance immunotherapy.  5.  She understands that response to treatment is high and that 5-year overall survival by stage is approximately 75%.  She understands that survival and response rates vary from patient to patient based on patient factors as well as tumor related factors.  6.  We discussed risk reduction options including scalp cooling.  7.  Referral to interventional radiology for port placement  8.We further discussed the use of ctDNA and that it can be measured to assess the absence or presence of molecular residual disease. The sensitivity of this assay in gyn cancers has not been readily established but is utilized in other disease sites to monitor progression and treatment response. As a result, it may or may not give us clinical information that is useful at this time but may provide added insight for the future.  She is interested in MRD testing.    I have spent a total time of 55 minutes in caring for this patient on the day of the visit/encounter including Diagnostic results, Prognosis, Risks and benefits of tx options, Instructions for management, Patient and family education, Importance of tx compliance, Risk factor reductions, Impressions, Counseling / Coordination of care, Documenting in the medical record, Reviewing / ordering tests, medicine, procedures  , Obtaining or reviewing history  , and Communicating with other healthcare professionals .  Only a brief time was spent on  the postoperative history and physical examination           Relevant Orders    CBC and differential    Comprehensive metabolic panel    Magnesium    Ambulatory referral to Interventional Radiology    TSH, 3rd generation    Ambulatory Referral to Cardiology    Ambulatory referral to Palliative Care         CHIEF COMPLAINT: Postoperative evaluation, treatment discussion      Problem:  Cancer Staging   Endometrial cancer (HCC)  Staging form: Corpus Uteri - Carcinoma, AJCC 8th Edition  - Pathologic stage from 10/11/2024: FIGO Stage IIIC1 (pT2, pN1mi(sn), cM0) - Signed by Evin Page MD on 11/12/2024        Previous therapy:  Oncology History   Endometrial cancer (HCC)   8/11/2024 Initial Diagnosis    Endometrial cancer (HCC)     10/11/2024 -  Cancer Staged    Staging form: Corpus Uteri - Carcinoma, AJCC 8th Edition  - Pathologic stage from 10/11/2024: FIGO Stage IIIC1 (pT2, pN1mi(sn), cM0) - Signed by Evin Page MD on 11/12/2024  Method of lymph node assessment: [place] lymph node biopsy  Histologic grade (G): G1  Histologic grading system: 3 grade system  Lymph-vascular invasion (LVI): BOTH lymphatic and small vessel AND venous (large vessel) invasion  Peritoneal cytology results: Negative  Pelvic brandi status: Positive       10/11/2024 Surgery    Robotic assisted total laparoscopic hysterectomy, bilateral salpingo-oophorectomy, pelvic and periaortic sentinel lymph node biopsies, vulvar biopsy  1.  Grade 1, cervical stromal invasion to a depth of 12 out of 15 mm, extensive LVSI, p53 wild-type, pMMR, HER2 1+,  washings negative, 0.7 mm metastatic focus and pelvic lymph node, ITC identified and periaortic lymph nodes.           Patient ID: Cherelle Dash is a 69 y.o. female  Returns after surgery to discuss treatment options.  She is recovering from surgery well.  She is ambulating, voiding, having bowel movements.  She is not having any vaginal bleeding.  She does not require narcotic pain  medication.  She has been taking Eliquis for pulmonary embolus.  Her last CT imaging was prior to surgery on 10/1/2024.  There is no evidence of metastatic disease.  She had a stress test prior to her surgery for history of dilated cardiomyopathy and the EF was noted to be 60%.  She tolerated surgery well.  Genomic testing prior to surgery revealed PD-L1 positivity with a CPS score of 60.  She has no new complaints.  She is nervous about the diagnosis.  No other interval change in medications or medical history since her surgery.        The following portions of the patient's history were reviewed and updated as appropriate: allergies, current medications, past family history, past medical history, past social history, past surgical history, and problem list.    Review of Systems    Current Outpatient Medications   Medication Sig Dispense Refill    apixaban (Eliquis) 5 mg Take 1 tablet (5 mg total) by mouth 2 (two) times a day 180 tablet 0    calcitriol (ROCALTROL) 0.25 mcg capsule Take 1 capsule (0.25 mcg total) by mouth 3 (three) times a week (Patient taking differently: Take 0.25 mcg by mouth 3 (three) times a week M-W-F) 36 capsule 2    Cholecalciferol (VITAMIN D) 2000 units CAPS Take 1 capsule by mouth daily      ciclopirox (LOPROX) 0.77 % cream       clobetasol (TEMOVATE) 0.05 % ointment Apply topically 2 (two) times a week 60 g 3    CRANBERRY PO Take by mouth      famotidine (PEPCID) 20 mg tablet TAKE 1 TABLET BY MOUTH TWICE  DAILY 180 tablet 3    Glucosamine-Chondroit-Vit C-Mn (GLUCOSAMINE 1500 COMPLEX PO) Take by mouth in the morning      Ivermectin 1 % CREA       lurasidone (LATUDA) 20 mg tablet TAKE 1 TABLET BY MOUTH DAILY  WITH BREAKFAST 30 tablet 11    metoprolol succinate (TOPROL-XL) 50 mg 24 hr tablet Take 1 tablet (50 mg total) by mouth daily 90 tablet 3    Mirabegron ER (Myrbetriq) 25 MG TB24 TAKE 1 TABLET BY MOUTH IN THE  MORNING 90 tablet 1    Multiple Vitamin (MULTI-VITAMIN DAILY) TABS Take by  mouth daily      oxyCODONE (ROXICODONE) 5 immediate release tablet Take 1 tablet (5 mg total) by mouth every 4 (four) hours as needed for moderate pain for up to 7 doses Max Daily Amount: 30 mg 7 tablet 0     No current facility-administered medications for this visit.           Objective:    Blood pressure 138/76, pulse 92, temperature 97.9 °F (36.6 °C), temperature source Temporal, weight 103 kg (228 lb), SpO2 98%, not currently breastfeeding.  Body mass index is 43.08 kg/m².  Body surface area is 1.99 meters squared.    Physical Exam  Vitals reviewed.   Constitutional:       General: She is not in acute distress.     Appearance: Normal appearance.   HENT:      Head: Normocephalic and atraumatic.      Mouth/Throat:      Mouth: Mucous membranes are moist.   Pulmonary:      Effort: Pulmonary effort is normal.      Breath sounds: Normal breath sounds.   Abdominal:      Palpations: Abdomen is soft. There is no mass.      Tenderness: There is no abdominal tenderness.   Skin:     General: Skin is warm and dry.      Comments: Surgical trocar sites are intact, clean and dry without induration, erythema or purulent drainage.    Neurological:      Mental Status: She is alert and oriented to person, place, and time.   Psychiatric:         Mood and Affect: Mood normal.         Behavior: Behavior normal.         Thought Content: Thought content normal.         Judgment: Judgment normal.         Lab Results   Component Value Date     45.2 (H) 08/14/2024     Lab Results   Component Value Date     (H) 11/17/2017    K 3.9 10/12/2024     (H) 10/12/2024    CO2 23 10/12/2024    ANIONGAP 10 08/04/2015    BUN 23 10/12/2024    CREATININE 1.64 (H) 10/12/2024    GLUCOSE 124 (H) 11/17/2017    GLUF 120 (H) 10/08/2024    CALCIUM 8.8 10/12/2024    CORRECTEDCA 10.1 11/21/2022    AST 11 (L) 09/05/2024    ALT 11 09/05/2024    ALKPHOS 61 09/05/2024    PROT 7.0 11/17/2017    BILITOT 0.4 11/17/2017    EGFR 31 10/12/2024     Lab  Results   Component Value Date    WBC 8.03 10/12/2024    HGB 10.5 (L) 10/12/2024    HCT 33.8 (L) 10/12/2024    MCV 89 10/12/2024     10/12/2024     Lab Results   Component Value Date    NEUTROABS 5.94 10/12/2024        Trend:  Lab Results   Component Value Date     45.2 (H) 08/14/2024

## 2024-11-12 NOTE — ASSESSMENT & PLAN NOTE
Continue therapeutic Eliquis.  Will plan to have IVC filter removed approximately 2 months after surgery.

## 2024-11-12 NOTE — ASSESSMENT & PLAN NOTE
69-year-old with stage III C1 grade 1 endometrial cancer with positive ITC and periaortic lymph nodes, extensive LVSI, p53 wild-type, CPS score of 60, history of dilated cardiomyopathy with most recent EF 60% by nuclear medicine stress test, history of pulmonary embolus with IVC filter in situ, stage III chronic kidney disease with a baseline serum creatinine of approximately 1.6 mg/dL.  She is recovering well from robotic assisted total laparoscopic hysterectomy, bilateral salpingo-oophorectomy, sentinel lymph node biopsies.  Her performance status is 0.  1.  I discussed the pathology in detail with the patient and her .  2.  We discussed multiple different treatment options including chemotherapy plus or minus immunotherapy plus or minus radiation therapy, radiation therapy alone, hormone therapy (nonstandard).  She understands that chemotherapy and radiation therapy have similar PFS and OS, however, PFS and OS, however, for lymph node positive endometrial cancer combination treatment may be more effective than either modality alone.  3.  I discussed the risks of starting chemotherapy with carboplatin AUC 5, Taxol at 135 mg/m², pembrolizumab at 200 mg IV every 21 days for 3 cycles followed by repeat CT imaging to assess response followed by 3 additional cycles for a total of 6 cycles of treatment.  I discussed the risks of treatment including the risks of pancytopenia, alopecia, neuropathy, allergic reaction, fatigue, nausea, vomiting, hepatic and renal damage, heart problems, thyroid problems, pneumonitis, colitis, pancreatitis, arthralgias, skin rashes.  She was also provided with written information regarding potential side effects of the treatment.  She agrees to proceed as outlined.  Consent for treatment was obtained by me in the office.  4.  Plan for pelvic radiation therapy after completion of cytotoxic chemothera this would then be followed by maintenance immunotherapy.  5.  She understands that  response to treatment is high and that 5-year overall survival by stage is approximately 75%.  She understands that survival and response rates vary from patient to patient based on patient factors as well as tumor related factors.  6.  We discussed risk reduction options including scalp cooling.  7.  Referral to interventional radiology for port placement  8.We further discussed the use of ctDNA and that it can be measured to assess the absence or presence of molecular residual disease. The sensitivity of this assay in gyn cancers has not been readily established but is utilized in other disease sites to monitor progression and treatment response. As a result, it may or may not give us clinical information that is useful at this time but may provide added insight for the future.  She is interested in MRD testing.    I have spent a total time of 55 minutes in caring for this patient on the day of the visit/encounter including Diagnostic results, Prognosis, Risks and benefits of tx options, Instructions for management, Patient and family education, Importance of tx compliance, Risk factor reductions, Impressions, Counseling / Coordination of care, Documenting in the medical record, Reviewing / ordering tests, medicine, procedures  , Obtaining or reviewing history  , and Communicating with other healthcare professionals .  Only a brief time was spent on the postoperative history and physical examination

## 2024-11-13 ENCOUNTER — TELEPHONE (OUTPATIENT)
Dept: NEPHROLOGY | Facility: CLINIC | Age: 69
End: 2024-11-13

## 2024-11-13 DIAGNOSIS — R68.89 OTHER GENERAL SYMPTOMS AND SIGNS: ICD-10-CM

## 2024-11-13 DIAGNOSIS — C54.1 ENDOMETRIAL CANCER (HCC): Primary | ICD-10-CM

## 2024-11-13 PROBLEM — Z45.2 ENCOUNTER FOR CENTRAL LINE CARE: Status: ACTIVE | Noted: 2024-11-13

## 2024-11-13 RX ORDER — ONDANSETRON 8 MG/1
8 TABLET, FILM COATED ORAL EVERY 8 HOURS PRN
Qty: 30 TABLET | Refills: 1 | Status: SHIPPED | OUTPATIENT
Start: 2024-11-13

## 2024-11-13 RX ORDER — LORAZEPAM 1 MG/1
1 TABLET ORAL EVERY 8 HOURS PRN
Qty: 20 TABLET | Refills: 0 | Status: SHIPPED | OUTPATIENT
Start: 2024-11-13

## 2024-11-13 NOTE — TELEPHONE ENCOUNTER
Left voicemail for the patient reminding to please complete labwork prior to 11/22 appointment with Dr. Macias. Advised patient to call back with any questions or  Concerns.

## 2024-11-14 ENCOUNTER — APPOINTMENT (OUTPATIENT)
Dept: LAB | Facility: AMBULARY SURGERY CENTER | Age: 69
End: 2024-11-14
Payer: COMMERCIAL

## 2024-11-14 DIAGNOSIS — C54.1 ENDOMETRIAL CANCER (HCC): ICD-10-CM

## 2024-11-14 DIAGNOSIS — N18.32 STAGE 3B CHRONIC KIDNEY DISEASE (HCC): ICD-10-CM

## 2024-11-14 DIAGNOSIS — R68.89 OTHER GENERAL SYMPTOMS AND SIGNS: ICD-10-CM

## 2024-11-14 LAB
ALBUMIN SERPL BCG-MCNC: 3.7 G/DL (ref 3.5–5)
ALP SERPL-CCNC: 69 U/L (ref 34–104)
ALT SERPL W P-5'-P-CCNC: 13 U/L (ref 7–52)
AMYLASE SERPL-CCNC: 30 IU/L (ref 29–103)
ANION GAP SERPL CALCULATED.3IONS-SCNC: 11 MMOL/L (ref 4–13)
AST SERPL W P-5'-P-CCNC: 15 U/L (ref 13–39)
BASOPHILS # BLD AUTO: 0.03 THOUSANDS/ÂΜL (ref 0–0.1)
BASOPHILS NFR BLD AUTO: 1 % (ref 0–1)
BILIRUB SERPL-MCNC: 0.38 MG/DL (ref 0.2–1)
BUN SERPL-MCNC: 23 MG/DL (ref 5–25)
CALCIUM SERPL-MCNC: 9.1 MG/DL (ref 8.4–10.2)
CANCER AG125 SERPL-ACNC: 54.2 U/ML (ref 0–35)
CHLORIDE SERPL-SCNC: 110 MMOL/L (ref 96–108)
CO2 SERPL-SCNC: 22 MMOL/L (ref 21–32)
CREAT SERPL-MCNC: 1.41 MG/DL (ref 0.6–1.3)
CREAT UR-MCNC: 55.6 MG/DL
EOSINOPHIL # BLD AUTO: 0.12 THOUSAND/ÂΜL (ref 0–0.61)
EOSINOPHIL NFR BLD AUTO: 3 % (ref 0–6)
ERYTHROCYTE [DISTWIDTH] IN BLOOD BY AUTOMATED COUNT: 14.1 % (ref 11.6–15.1)
GFR SERPL CREATININE-BSD FRML MDRD: 37 ML/MIN/1.73SQ M
GLUCOSE P FAST SERPL-MCNC: 122 MG/DL (ref 65–99)
HCT VFR BLD AUTO: 37.1 % (ref 34.8–46.1)
HGB BLD-MCNC: 11.4 G/DL (ref 11.5–15.4)
IMM GRANULOCYTES # BLD AUTO: 0.01 THOUSAND/UL (ref 0–0.2)
IMM GRANULOCYTES NFR BLD AUTO: 0 % (ref 0–2)
LIPASE SERPL-CCNC: 16 U/L (ref 11–82)
LYMPHOCYTES # BLD AUTO: 0.9 THOUSANDS/ÂΜL (ref 0.6–4.47)
LYMPHOCYTES NFR BLD AUTO: 21 % (ref 14–44)
MAGNESIUM SERPL-MCNC: 1.9 MG/DL (ref 1.9–2.7)
MCH RBC QN AUTO: 26.8 PG (ref 26.8–34.3)
MCHC RBC AUTO-ENTMCNC: 30.7 G/DL (ref 31.4–37.4)
MCV RBC AUTO: 87 FL (ref 82–98)
MONOCYTES # BLD AUTO: 0.6 THOUSAND/ÂΜL (ref 0.17–1.22)
MONOCYTES NFR BLD AUTO: 14 % (ref 4–12)
NEUTROPHILS # BLD AUTO: 2.63 THOUSANDS/ÂΜL (ref 1.85–7.62)
NEUTS SEG NFR BLD AUTO: 61 % (ref 43–75)
NRBC BLD AUTO-RTO: 0 /100 WBCS
PHOSPHATE SERPL-MCNC: 3.7 MG/DL (ref 2.3–4.1)
PLATELET # BLD AUTO: 167 THOUSANDS/UL (ref 149–390)
PMV BLD AUTO: 13.6 FL (ref 8.9–12.7)
POTASSIUM SERPL-SCNC: 4 MMOL/L (ref 3.5–5.3)
PROT SERPL-MCNC: 6.9 G/DL (ref 6.4–8.4)
PROT UR-MCNC: 12.6 MG/DL
PROT/CREAT UR: 0.2 MG/G{CREAT} (ref 0–0.1)
PTH-INTACT SERPL-MCNC: 36.2 PG/ML (ref 12–88)
RBC # BLD AUTO: 4.25 MILLION/UL (ref 3.81–5.12)
SODIUM SERPL-SCNC: 143 MMOL/L (ref 135–147)
TSH SERPL DL<=0.05 MIU/L-ACNC: 2.65 UIU/ML (ref 0.45–4.5)
WBC # BLD AUTO: 4.29 THOUSAND/UL (ref 4.31–10.16)

## 2024-11-14 PROCEDURE — 84443 ASSAY THYROID STIM HORMONE: CPT

## 2024-11-14 PROCEDURE — 83970 ASSAY OF PARATHORMONE: CPT

## 2024-11-14 PROCEDURE — 82570 ASSAY OF URINE CREATININE: CPT

## 2024-11-14 PROCEDURE — 84100 ASSAY OF PHOSPHORUS: CPT

## 2024-11-14 PROCEDURE — 82150 ASSAY OF AMYLASE: CPT

## 2024-11-14 PROCEDURE — 84156 ASSAY OF PROTEIN URINE: CPT

## 2024-11-14 PROCEDURE — 80053 COMPREHEN METABOLIC PANEL: CPT

## 2024-11-14 PROCEDURE — 83735 ASSAY OF MAGNESIUM: CPT

## 2024-11-14 PROCEDURE — 36415 COLL VENOUS BLD VENIPUNCTURE: CPT

## 2024-11-14 PROCEDURE — 85025 COMPLETE CBC W/AUTO DIFF WBC: CPT

## 2024-11-14 PROCEDURE — 86304 IMMUNOASSAY TUMOR CA 125: CPT

## 2024-11-14 PROCEDURE — 83690 ASSAY OF LIPASE: CPT

## 2024-11-18 ENCOUNTER — CLINICAL SUPPORT (OUTPATIENT)
Dept: PALLIATIVE MEDICINE | Facility: CLINIC | Age: 69
End: 2024-11-18
Payer: COMMERCIAL

## 2024-11-18 ENCOUNTER — CONSULT (OUTPATIENT)
Dept: PALLIATIVE MEDICINE | Facility: CLINIC | Age: 69
End: 2024-11-18
Payer: COMMERCIAL

## 2024-11-18 VITALS
HEART RATE: 89 BPM | TEMPERATURE: 97.3 F | SYSTOLIC BLOOD PRESSURE: 118 MMHG | WEIGHT: 224 LBS | HEIGHT: 61 IN | BODY MASS INDEX: 42.29 KG/M2 | OXYGEN SATURATION: 96 % | DIASTOLIC BLOOD PRESSURE: 62 MMHG

## 2024-11-18 DIAGNOSIS — Z71.89 GOALS OF CARE, COUNSELING/DISCUSSION: ICD-10-CM

## 2024-11-18 DIAGNOSIS — Z51.5 PALLIATIVE CARE BY SPECIALIST: Primary | ICD-10-CM

## 2024-11-18 DIAGNOSIS — Z71.89 COUNSELING AND COORDINATION OF CARE: Primary | ICD-10-CM

## 2024-11-18 DIAGNOSIS — N18.32 STAGE 3B CHRONIC KIDNEY DISEASE (HCC): ICD-10-CM

## 2024-11-18 DIAGNOSIS — I42.0 DILATED CARDIOMYOPATHY (HCC): ICD-10-CM

## 2024-11-18 DIAGNOSIS — R45.89 ANXIETY ABOUT HEALTH: ICD-10-CM

## 2024-11-18 DIAGNOSIS — C54.1 ENDOMETRIAL CANCER (HCC): ICD-10-CM

## 2024-11-18 PROCEDURE — G2211 COMPLEX E/M VISIT ADD ON: HCPCS | Performed by: STUDENT IN AN ORGANIZED HEALTH CARE EDUCATION/TRAINING PROGRAM

## 2024-11-18 PROCEDURE — NC001 PR NO CHARGE

## 2024-11-18 PROCEDURE — 99204 OFFICE O/P NEW MOD 45 MIN: CPT | Performed by: STUDENT IN AN ORGANIZED HEALTH CARE EDUCATION/TRAINING PROGRAM

## 2024-11-18 RX ORDER — POLYETHYLENE GLYCOL 3350 17 G/17G
17 POWDER, FOR SOLUTION ORAL DAILY
COMMUNITY

## 2024-11-18 NOTE — ASSESSMENT & PLAN NOTE
Psychosocial   Supportive listening provided  Normalized experience of patient/family  Provided anxiety containment     Referrals Placed / Medical Equipment Ordered  -None today  -Will have our Palliative Care SW reach out to patient for additional source of support     Follow-Up Recommendations  -Follow-up with PCP and current medical specialists  -Follow-up with palliative care: 3 weeks (after patient's first systemic treatment to see how she is doing from a symptom standpoint).

## 2024-11-18 NOTE — PROGRESS NOTES
"Palliative Outpatient Assessment of Need    LSW completed an assessment of need which was completed with (patient, family, or both) in the office or via phone/video conference    Relationship status:    Duration of relationship: 44 years   Name of significant other: Jack  Children and Ages: 1 son- Kolton; 1 dtr- Jessica   Pets: 1 cat  Other important family information: Children are local  Living situation (where and whom): Resides with  at [facility]    Patient's primary caregiver: Self   Any limitations of caregiver:  Environmental concerns or barriers:   history: None  Employment history/source of income: Director- [employer]  Disability:    Concerns regarding literacy: None  Spirituality/ Anabaptism: \"I believe\"    Patient's strengths, social supports, and resources: Strong family support  Cultural information:   Mental Health current or previous: Anxiety; Bipolar 1  Substance use or history: Former smoker   Sleep: No concerns  Exercise: Active  Diet/nutrition: No concerns  Durable Medical Equipment needs: None  Transportation:  transports (pt chooses not to drive but is able to if needed)  Financial concerns: None  Advanced Directive: AD on file designating spouse Jack as substitute decision-maker, with children Kolton and Jessica as alternates  Other medical or social work providers involved: GynOnc; Urology; Cardiology; Nephrology; Psychiatry  Patient/caregiver current level of coping: Pt feels she is coping well emotionally at this time. Reviewed role of [facility] SW for on-going support.   Understanding: Pt appears understanding of current medical status  Patient/family concerns and areas of need: Upcoming CA txs; [place] Resource provided per pt's request; [facility] Newsletter reviewed and mailed out to pt.   Patient's interests:     I have spent 25 minutes with Patient  today in which greater than 50% of this time was spent in " counseling/coordination of care.    *All questions may not be answered due to constraints.  Follow-up discussions may need to occur

## 2024-11-18 NOTE — PROGRESS NOTES
Name: Cherelle Dash      : 1955      MRN: [id number]  Encounter Provider: Maynor Yen DO  Encounter Date: 2024   Encounter department: [facility] PALLIATIVE CARE [place]  :  Assessment & Plan  Endometrial cancer (HCC)  Patient following Gyn/Onc with Dr. Page.  -Patient with Stage IIIC diagnosis  -plans for IR port placement on 12/3/2024 with initiation of systemic treatment on 2024 with Taxol, Carboplatin, and Keytruda    No symptoms of concern today. Denies pain, nausea, vomiting, issues with appetite, or bladder concerns.  Patient does have chronic constipation for which she uses Miralax every other day which keeps her regular.  Patient to continue with adequate hydration and nutrition especially once starting treatments.    Patient voices no needs today after introduction of Palliative Care services.    Orders:    Ambulatory referral to Palliative Care    Palliative care by specialist  Psychosocial   Supportive listening provided  Normalized experience of patient/family  Provided anxiety containment     Referrals Placed / Medical Equipment Ordered  -None today  -Will have our Palliative Care SW reach out to patient for additional source of support     Follow-Up Recommendations  -Follow-up with PCP and current medical specialists  -Follow-up with palliative care: 3 weeks (after patient's first systemic treatment to see how she is doing from a symptom standpoint).         Dilated cardiomyopathy (HCC)  Patient has been referred to Cardiac Oncology with upcoming appointment scheduled.           Stage 3b chronic kidney disease (HCC)  Lab Results   Component Value Date    EGFR 37 2024    EGFR 31 10/12/2024    EGFR 33 10/08/2024    CREATININE 1.41 (H) 2024    CREATININE 1.64 (H) 10/12/2024    CREATININE 1.56 (H) 10/08/2024     Patient follows Nephrology with upcoming appointment per patient.  Patient to continue cares with her specialists.         Anxiety about health  Some anxiety  revolved around waiting to start treatment along with the amount of information revolving around her diagnosis.    Patient was prescribed Ativan 1mg q8 hours PRN by her Gyn Onc office.  Continue to follow and provide support.    Will speak with our Palliative Care SW team to reach out to patient to provide support as well.       Goals of care, counseling/discussion  Goal - focused on disease directed cares at this time.             Decisional apparatus: Patient is competent on my exam today. If competence is lost, patient's substitute decision maker would default to spouse - Jack  by [place] Act 169.   Advance Directive / Living Will / POLST: On file               PDMP Review: I have reviewed the patient's controlled substance dispensing history in the Prescription Drug Monitoring Program in compliance with the [facility] regulations before prescribing any controlled substances.    History of Present Illness   Cherelle Dash is a 69 y.o. female who presents for consultation.    Palliative care diagnosis: Endometrial cancer stage IIIc.  Initial diagnosis from August 11, 2024.  Patient is status post robotic assisted total laparoscopic hysterectomy, BSO, pelvic and periaortic sentinel lymph node biopsies along with vulvar biopsy.    Patient is here accompanied by her  Jack.  She is alert, oriented, pleasantly conversant with no acute distress or discomfort.  Vital signs reviewed.  Palliative care services introduced to the patient.  Patient denies any pain, nausea, vomiting, issues with appetite, bladder issues, chest pain, shortness of breath.  Does have chronic constipation for which she takes MiraLAX once every other day.  Voices no new concerns at this time.  She does endorse some anxiety regarding waiting for treatment to start as well as wealth of information since her diagnosis.    She is scheduled for IR port placement on 12/3/2024 along with initiation of systemic cancer treatment with gynecologic oncology  scheduled for 12/6/2024.  We will follow-up with the patient the week after she initiates her treatment to see how she is doing from a symptom standpoint.    She is well supported by her  along with her 2 children.  Her 2 children live locally in [place], [place] (son - Kolton) and [place], [place] (daughter - Jessica).  She also has grandchildren with 1 in college now.    No further questions or concerns today.  All questions answered.  Patient knows to reach out to our office with any questions or concerns.      ---------------------------------------------------------------------------------------  ---------------------------------------------------------------------------------------  10/11/2024 sentinel lymph node biopsy results  A. Left pelvic sentinel lymph node #1, excision:  -One lymph node, negative for carcinoma (0/1), see comment.     Comment: CK AE1/AE3 evaluated on block A1 and is negative, supporting above findings.       B. Left pelvic sentinel lymph node #2, see comment:  -Isolated tumor cells present in one lymph node, see comment.     Comment: Rare positive CK AE1/AE3 cells (B5-7, B5-8), consistent with isolated tumor cells. ER attempted but non-contributory.      C. Right pelvic sentinel lymph node #1, excision:  -Isolated tumor cell present in one lymph node, see comment.     Comment: Rare CK AE1/AE3 positive atypical cell (C1-3, C1-4), consistent with isolated tumor cell present.      D. Right pelvic sentinel lymph node #2, excision:  -One lymph node, negative for carcinoma (0/1), see comment.     Comment: CK AE1/AE3 evaluated on block D1 and is negative, supporting above findings.        E. Right pelvic sentinel lymph node #3, excision:  -Metastatic carcinoma involving 1 of 1 lymph node (1/1), see comment.  -Metastatic focus measures 0.7mm.  -No extranodal extension identified.     Comment: CK AE1/AE3 evaluated on blocks E1-E3 supports above interpretation.       F. Right para-aortic  sentinel lymph node #1, excision:  -Isolated tumor cells present in one lymph node, see comment.     Comment: 0.1mm focus of atypical cells highlighted by CK AE1/AE3 (F3-2), consistent with isolated tumor cells. Deeper levels examined. ER attempted, however atypical focus no long present on deeper levels.      G. Right para-aortic sentinel lymph node #2, excision:  -Isolated tumor cell present in one lymph node, see comment.     Comment: Rare positive CK AE1/AE3 cell (G2-7), consistent with isolated tumor cells.      H. Uterus, cervix, bilateral fallopian tubes and ovaries:  -Adenocarcinoma, favor endometrioid adenocarcinoma, FIGO grade 1, see comment.   -Surgical margins and parametria negative for carcinoma.  -Lymph-vascular invasion identified, extensive.  -Bilateral ovaries with Josh tumor and stromal luteinization, negative for carcinoma, see note.  -Fallopian tubes negative for carcinoma.   -See synoptic report.      Comment: The mass is predominantly located in endocervical canal/lower uterine segment with cervical stromal invasion and also involves endometrial cavity with background atypical endometrial hyperplasia/endometrial intraepithelial neoplasia (AEH/EIN) and superficial myometrial invasion.      Immunostains performed on block H7 shows tumor is positive for ER (patchy), MT (patchy), CEA-mono(patchy), p16 (patchy) p53 wild type, vimentin (patchy), CK7 positive, and CK20 focal positive. PTEN is lost.  HPV testing performed on biopsy material ([id number]) is negative. MMR testing performed at outside institution on biopsy material is reportedly intact. The morphologic and immunohistochemical findings support above interpretation.      I. Vulva, right, 7:00, biopsy:  -Lichen sclerosus.  -Negative for squamous intraepithelial lesion and malignancy.       10/11/2024 cytology results  A.B. Peritoneal Washings,  (ThinPrep and cell block preparations):  Negative for malignancy.  Mesothelial cells,  histiocytes and neutrophils.     Satisfactory for evaluation.    Past Medical History   Past Medical History:   Diagnosis Date    Abnormal ECG     Anxiety 2002    Arthritis     Bipolar 1 disorder (HCC)     Cervical cancer (HCC)     Chronic kidney disease     stage 3    CPAP (continuous positive airway pressure) dependence     Depression 2001    Disease of thyroid gland     DVT (deep venous thrombosis) (HCC)     BLLE    Elevated blood pressure reading 06/10/2019    GERD (gastroesophageal reflux disease)     Obesity     Pulmonary emboli (HCC)     B/L    RSV (acute bronchiolitis due to respiratory syncytial virus) 12/05/2023    Sleep apnea     Sleep apnea, obstructive 2007    Uterine cancer (HCC)      Past Surgical History:   Procedure Laterality Date    COLONOSCOPY  2011    IR IVC FILTER PLACEMENT OPTIONAL/TEMPORARY  10/8/2024    LAPAROTOMY N/A 10/11/2024    Procedure: EXPLORATORY LAPAROTOMY;  Surgeon: Rodney Downing MD;  Location: [facility];  Service: Gynecology Oncology    DC HYSTEROSCOPY BX ENDOMETRIUM&/POLYPC W/WO D&C N/A 8/23/2024    Procedure: EXAM UNDER ANESTHESIA, DILATATION AND CURETTAGE, CERVICAL BIOPSIES;  Surgeon: Evin Page MD;  Location:  MAIN OR;  Service: Gynecology Oncology    DC LAPS TOTAL HYSTERECT 250 GM/< W/RMVL TUBE/OVARY N/A 10/11/2024    Procedure: ROBOTIC ASSISTED LTH, BSO, LYMPH NODE DISSECTION, EUA;  Surgeon: Rodney Downing MD;  Location: [facility];  Service: Gynecology Oncology     Family History   Problem Relation Age of Onset    Heart disease Mother     Heart Valve Disease Mother         Replacement    Diabetes Mother         2002    Heart failure Mother         Heart Valve replacement    Arthritis Father     No Known Problems Sister     No Known Problems Daughter     No Known Problems Maternal Grandmother     No Known Problems Maternal Grandfather     No Known Problems Paternal Grandmother     No Known Problems Paternal Grandfather     No Known Problems Son     No  Known Problems Maternal Aunt     No Known Problems Maternal Aunt     No Known Problems Maternal Aunt     No Known Problems Maternal Aunt     No Known Problems Maternal Aunt     No Known Problems Paternal Aunt     Alcohol abuse Son         Kolton, 40 year old son, has issues with alcohol and alcohol abuse      reports that she quit smoking about 15 years ago. Her smoking use included cigarettes. She started smoking about 45 years ago. She has a 7.5 pack-year smoking history. She has never been exposed to tobacco smoke. She has never used smokeless tobacco. She reports current alcohol use of about 1.0 standard drink of alcohol per week. She reports that she does not use drugs.  Current Outpatient Medications on File Prior to Visit   Medication Sig Dispense Refill    apixaban (Eliquis) 5 mg Take 1 tablet (5 mg total) by mouth 2 (two) times a day 180 tablet 0    calcitriol (ROCALTROL) 0.25 mcg capsule Take 1 capsule (0.25 mcg total) by mouth 3 (three) times a week 36 capsule 2    Cholecalciferol (VITAMIN D) 2000 units CAPS Take 1 capsule by mouth daily      ciclopirox (LOPROX) 0.77 % cream       clobetasol (TEMOVATE) 0.05 % ointment Apply topically 2 (two) times a week 60 g 3    CRANBERRY PO Take by mouth      famotidine (PEPCID) 20 mg tablet TAKE 1 TABLET BY MOUTH TWICE  DAILY 180 tablet 3    Glucosamine-Chondroit-Vit C-Mn (GLUCOSAMINE 1500 COMPLEX PO) Take by mouth in the morning      Ivermectin 1 % CREA       LORazepam (ATIVAN) 1 mg tablet Take 1 tablet (1 mg total) by mouth every 8 (eight) hours as needed (nausea or anxiety) 20 tablet 0    lurasidone (LATUDA) 20 mg tablet TAKE 1 TABLET BY MOUTH DAILY  WITH BREAKFAST 30 tablet 11    metoprolol succinate (TOPROL-XL) 50 mg 24 hr tablet Take 1 tablet (50 mg total) by mouth daily 90 tablet 3    Mirabegron ER (Myrbetriq) 25 MG TB24 TAKE 1 TABLET BY MOUTH IN THE  MORNING 90 tablet 1    Multiple Vitamin (MULTI-VITAMIN DAILY) TABS Take by mouth daily      ondansetron (ZOFRAN)  8 mg tablet Take 1 tablet (8 mg total) by mouth every 8 (eight) hours as needed for nausea or vomiting 30 tablet 1    polyethylene glycol (GLYCOLAX) 17 GM/SCOOP powder Take 17 g by mouth daily       No current facility-administered medications on file prior to visit.     Allergies   Allergen Reactions    Raw Fruit - Food Allergy Anaphylaxis     PLUMS, PEACHES, FRUIT WITH SKIN      Medical History Reviewed by provider this encounter:  Tobacco  Allergies  Meds  Problems  Med Hx  Surg Hx  Fam Hx     .  Past Medical History   Past Medical History:   Diagnosis Date    Abnormal ECG     Anxiety 2002    Arthritis     Bipolar 1 disorder (HCC)     Cervical cancer (HCC)     Chronic kidney disease     stage 3    CPAP (continuous positive airway pressure) dependence     Depression 2001    Disease of thyroid gland     DVT (deep venous thrombosis) (HCC)     BLLE    Elevated blood pressure reading 06/10/2019    GERD (gastroesophageal reflux disease)     Obesity     Pulmonary emboli (HCC)     B/L    RSV (acute bronchiolitis due to respiratory syncytial virus) 12/05/2023    Sleep apnea     Sleep apnea, obstructive 2007    Uterine cancer (HCC)      Past Surgical History:   Procedure Laterality Date    COLONOSCOPY  2011    IR IVC FILTER PLACEMENT OPTIONAL/TEMPORARY  10/8/2024    LAPAROTOMY N/A 10/11/2024    Procedure: EXPLORATORY LAPAROTOMY;  Surgeon: Rodney Downing MD;  [facility];  Service: Gynecology Oncology    AZ HYSTEROSCOPY BX ENDOMETRIUM&/POLYPC W/WO D&C N/A 8/23/2024    Procedure: EXAM UNDER ANESTHESIA, DILATATION AND CURETTAGE, CERVICAL BIOPSIES;  Surgeon: Evin Page MD;  Location:  MAIN OR;  Service: Gynecology Oncology    AZ LAPS TOTAL HYSTERECT 250 GM/< W/RMVL TUBE/OVARY N/A 10/11/2024    Procedure: ROBOTIC ASSISTED LTH, BSO, LYMPH NODE DISSECTION, EUA;  Surgeon: Rodney Downing MD;  Location[facility];  Service: Gynecology Oncology     Family History   Problem Relation Age of Onset     Heart disease Mother     Heart Valve Disease Mother         Replacement    Diabetes Mother         2002    Heart failure Mother         Heart Valve replacement    Arthritis Father     No Known Problems Sister     No Known Problems Daughter     No Known Problems Maternal Grandmother     No Known Problems Maternal Grandfather     No Known Problems Paternal Grandmother     No Known Problems Paternal Grandfather     No Known Problems Son     No Known Problems Maternal Aunt     No Known Problems Maternal Aunt     No Known Problems Maternal Aunt     No Known Problems Maternal Aunt     No Known Problems Maternal Aunt     No Known Problems Paternal Aunt     Alcohol abuse Son         Kolton, 40 year old son, has issues with alcohol and alcohol abuse      reports that she quit smoking about 15 years ago. Her smoking use included cigarettes. She started smoking about 45 years ago. She has a 7.5 pack-year smoking history. She has never been exposed to tobacco smoke. She has never used smokeless tobacco. She reports current alcohol use of about 1.0 standard drink of alcohol per week. She reports that she does not use drugs.  Current Outpatient Medications on File Prior to Visit   Medication Sig Dispense Refill    apixaban (Eliquis) 5 mg Take 1 tablet (5 mg total) by mouth 2 (two) times a day 180 tablet 0    calcitriol (ROCALTROL) 0.25 mcg capsule Take 1 capsule (0.25 mcg total) by mouth 3 (three) times a week 36 capsule 2    Cholecalciferol (VITAMIN D) 2000 units CAPS Take 1 capsule by mouth daily      ciclopirox (LOPROX) 0.77 % cream       clobetasol (TEMOVATE) 0.05 % ointment Apply topically 2 (two) times a week 60 g 3    CRANBERRY PO Take by mouth      famotidine (PEPCID) 20 mg tablet TAKE 1 TABLET BY MOUTH TWICE  DAILY 180 tablet 3    Glucosamine-Chondroit-Vit C-Mn (GLUCOSAMINE 1500 COMPLEX PO) Take by mouth in the morning      Ivermectin 1 % CREA       LORazepam (ATIVAN) 1 mg tablet Take 1 tablet (1 mg total) by mouth  every 8 (eight) hours as needed (nausea or anxiety) 20 tablet 0    lurasidone (LATUDA) 20 mg tablet TAKE 1 TABLET BY MOUTH DAILY  WITH BREAKFAST 30 tablet 11    metoprolol succinate (TOPROL-XL) 50 mg 24 hr tablet Take 1 tablet (50 mg total) by mouth daily 90 tablet 3    Mirabegron ER (Myrbetriq) 25 MG TB24 TAKE 1 TABLET BY MOUTH IN THE  MORNING 90 tablet 1    Multiple Vitamin (MULTI-VITAMIN DAILY) TABS Take by mouth daily      ondansetron (ZOFRAN) 8 mg tablet Take 1 tablet (8 mg total) by mouth every 8 (eight) hours as needed for nausea or vomiting 30 tablet 1    polyethylene glycol (GLYCOLAX) 17 GM/SCOOP powder Take 17 g by mouth daily       No current facility-administered medications on file prior to visit.     Allergies   Allergen Reactions    Raw Fruit - Food Allergy Anaphylaxis     PLUMS, PEACHES, FRUIT WITH SKIN      Current Outpatient Medications on File Prior to Visit   Medication Sig Dispense Refill    apixaban (Eliquis) 5 mg Take 1 tablet (5 mg total) by mouth 2 (two) times a day 180 tablet 0    calcitriol (ROCALTROL) 0.25 mcg capsule Take 1 capsule (0.25 mcg total) by mouth 3 (three) times a week 36 capsule 2    Cholecalciferol (VITAMIN D) 2000 units CAPS Take 1 capsule by mouth daily      ciclopirox (LOPROX) 0.77 % cream       clobetasol (TEMOVATE) 0.05 % ointment Apply topically 2 (two) times a week 60 g 3    CRANBERRY PO Take by mouth      famotidine (PEPCID) 20 mg tablet TAKE 1 TABLET BY MOUTH TWICE  DAILY 180 tablet 3    Glucosamine-Chondroit-Vit C-Mn (GLUCOSAMINE 1500 COMPLEX PO) Take by mouth in the morning      Ivermectin 1 % CREA       LORazepam (ATIVAN) 1 mg tablet Take 1 tablet (1 mg total) by mouth every 8 (eight) hours as needed (nausea or anxiety) 20 tablet 0    lurasidone (LATUDA) 20 mg tablet TAKE 1 TABLET BY MOUTH DAILY  WITH BREAKFAST 30 tablet 11    metoprolol succinate (TOPROL-XL) 50 mg 24 hr tablet Take 1 tablet (50 mg total) by mouth daily 90 tablet 3    Mirabegron ER (Myrbetriq) 25  "MG TB24 TAKE 1 TABLET BY MOUTH IN THE  MORNING 90 tablet 1    Multiple Vitamin (MULTI-VITAMIN DAILY) TABS Take by mouth daily      ondansetron (ZOFRAN) 8 mg tablet Take 1 tablet (8 mg total) by mouth every 8 (eight) hours as needed for nausea or vomiting 30 tablet 1    polyethylene glycol (GLYCOLAX) 17 GM/SCOOP powder Take 17 g by mouth daily       No current facility-administered medications on file prior to visit.      Social History     Tobacco Use    Smoking status: Former     Current packs/day: 0.00     Average packs/day: 0.3 packs/day for 30.0 years (7.5 ttl pk-yrs)     Types: Cigarettes     Start date: 1/1/1979     Quit date: 1/1/2009     Years since quitting: 15.8     Passive exposure: Never    Smokeless tobacco: Never   Vaping Use    Vaping status: Never Used   Substance and Sexual Activity    Alcohol use: Yes     Alcohol/week: 1.0 standard drink of alcohol     Types: 1 Glasses of wine per week    Drug use: Never    Sexual activity: Not Currently     Partners: Male     Birth control/protection: Post-menopausal        Objective   /62 (BP Location: Left arm, Patient Position: Sitting, Cuff Size: Large)   Pulse 89   Temp (!) 97.3 °F (36.3 °C) (Temporal)   Ht 5' 1\" (1.549 m)   Wt 102 kg (224 lb)   SpO2 96%   BMI 42.32 kg/m²     Physical Exam  Vitals reviewed.   Constitutional:       General: She is not in acute distress.     Appearance: She is not ill-appearing, toxic-appearing or diaphoretic.   HENT:      Head: Normocephalic and atraumatic.      Nose: Nose normal.      Mouth/Throat:      Mouth: Mucous membranes are moist.   Eyes:      General:         Right eye: No discharge.         Left eye: No discharge.   Cardiovascular:      Rate and Rhythm: Normal rate.   Pulmonary:      Effort: Pulmonary effort is normal. No respiratory distress.   Abdominal:      General: Abdomen is flat. There is no distension.   Musculoskeletal:         General: No swelling.   Skin:     General: Skin is warm and dry.      " Coloration: Skin is not jaundiced or pale.   Neurological:      General: No focal deficit present.      Mental Status: She is alert. Mental status is at baseline.   Psychiatric:         Mood and Affect: Mood normal.         Behavior: Behavior normal.         Thought Content: Thought content normal.         Judgment: Judgment normal.         Recent labs:  Lab Results   Component Value Date/Time    SODIUM 143 11/14/2024 06:58 AM    SODIUM 142 12/09/2020 07:36 AM    SODIUM 144 12/07/2020 08:34 AM    K 4.0 11/14/2024 06:58 AM    K 4.7 12/09/2020 07:36 AM    K 4.4 12/07/2020 08:34 AM    BUN 23 11/14/2024 06:58 AM    BUN 23 12/09/2020 07:36 AM    BUN 20 12/07/2020 08:34 AM    CREATININE 1.41 (H) 11/14/2024 06:58 AM    CREATININE 1.51 (H) 12/07/2020 08:34 AM    GLUC 114 10/12/2024 05:35 AM    GLUC 106 (H) 12/09/2020 07:36 AM    GLUC 104 (H) 12/07/2020 08:34 AM    CALCIUM 9.1 11/14/2024 06:58 AM    CALCIUM 9.4 12/07/2020 08:34 AM    AST 15 11/14/2024 06:58 AM    AST 14 12/07/2020 08:34 AM    ALT 13 11/14/2024 06:58 AM    ALT 18 12/07/2020 08:34 AM    ALB 3.7 11/14/2024 06:58 AM    ALB 3.8 12/07/2020 08:34 AM    TP 6.9 11/14/2024 06:58 AM    TP 6.7 12/07/2020 08:34 AM    EGFR 37 11/14/2024 06:58 AM    EGFR 36 (L) 12/07/2020 08:34 AM     Lab Results   Component Value Date/Time    HGB 11.4 (L) 11/14/2024 06:58 AM    HGB 14.0 03/10/2015 07:55 AM    WBC 4.29 (L) 11/14/2024 06:58 AM    WBC 5.01 03/10/2015 07:55 AM     11/14/2024 06:58 AM     03/10/2015 07:55 AM    INR 1.05 10/12/2024 05:35 AM    PTT 23 10/12/2024 05:35 AM     Lab Results   Component Value Date/Time    LED3BFEMYMWJ 2.649 11/14/2024 06:58 AM    VLU3XFSTEJME 1.510 01/15/2018 01:02 PM       Recent Imaging:  Procedure: IR IVC filter placement optional/temporary  Result Date: 10/8/2024  Narrative: Inferior venacavogram and inferior venacaval filter placement Clinical History:  69 year-old female with history of PE/DVT on Eliquis, history of cervical mass  with vaginal bleeding, with request for IVC filter placement prior to laparoscopic hysterectomy and bilateral salpingo-oophorectomy. Contrast: CO2 Fluoro time: 2.4 MINUTES Number of Images: Multiple Radiation dose: 220 mGy Conscious sedation time: 20 MINUTES Technique: The patient was brought to the interventional radiology suite and identified verbally and by wrist band. The patient was placed supine on the table. The right internal jugular vein was evaluated as a potential access site with ultrasound. The vessel was found to be patent and compressible. Lidocaine was administered to the skin and a small skin incision was made. Under ultrasound guidance, the right internal jugular vein was accessed using single wall Seldinger technique. Static images of real time needle entry into the vessel were obtained.  A [employer] wire was advanced into the inferior vena cava, over which a Tom Green sheath was inserted. An inferior venacavogram was performed using CO2. Findings: The examination demonstrates that there is no thrombus in the inferior vena cava. The renal veins are positively identified. No caval anomalies are present. Intervention: The sheath was positioned appropriately, and a Abril inferior vena cava filter was deployed below the level of the renal veins.     Impression: Impression: 1. Normal inferior venacavogram. 2. Satisfactory intravascular placement of a Tom Green filter below the level of the renal veins. Workstation performed: [id number]     Procedure: NM myocardial perfusion spect (rx stress and/or rest)  Result Date: 10/4/2024  Narrative:   Stress ECG: The ECG was uninterpretable due to left bundle branch block. after pharmacologic vasodilation, without reproduction of symptoms.   Perfusion: There is a left ventricular perfusion defect that is small in size with mild reduction in uptake present in the mid anterior location(s) that is paradoxical. On reprocessed imaging there is improvement of this defect on  stress imaging. LV wall function also appears to be preserved through out the anterior wall. The defect appears to be an artifact caused by breast attenuation.   Stress Function: Left ventricular function post-stress is normal. Stress ejection fraction is 60%. No evidence of ischemia. Normal left ventricular cavity size, function, and wall thickening. No TID. 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 COMPARISON: 9/5/2024 TECHNIQUE: CT examination of the chest, abdomen and pelvis was performed without intravenous contrast. Multiplanar 2D reformatted images were created from the source data. This examination, like all CT scans performed in the [facility], was performed utilizing techniques to minimize radiation dose exposure, including the use of iterative reconstruction and automated exposure control. Radiation dose length product (DLP) for this visit: 1913.39 mGy-cm Enteric Contrast: Not administered. FINDINGS: CHEST LUNGS: Stable 5 mm right lower lobe pulmonary nodules noted series 302 image 132.. No tracheal or endobronchial lesion. PLEURA: Unremarkable. HEART/GREAT VESSELS: Heart is unremarkable for patient's age. No thoracic aortic aneurysm. MEDIASTINUM AND RC: Moderate hiatal hernia is present. CHEST WALL AND LOWER NECK: Unremarkable. ABDOMEN LIVER/BILIARY TREE: Unremarkable. GALLBLADDER: No calcified gallstones. No pericholecystic inflammatory change. SPLEEN: Unremarkable. PANCREAS: Unremarkable. ADRENAL GLANDS: Unremarkable. KIDNEYS/URETERS: Unremarkable. No hydronephrosis. STOMACH AND BOWEL: Unremarkable. APPENDIX: No findings to  suggest appendicitis. ABDOMINOPELVIC CAVITY: No ascites. No pneumoperitoneum. No lymphadenopathy. VESSELS: Unremarkable for patient's age. PELVIS REPRODUCTIVE ORGANS: The left ovary is prominent. URINARY BLADDER: Unremarkable. ABDOMINAL WALL/INGUINAL REGIONS: Small fat-containing umbilical hernia is present BONES: No acute fracture or suspicious osseous lesion.     Impression: Stable 5 mm right lower lobe pulmonary nodule. No CT evidence of metastatic disease within the abdomen or pelvis. Prominent left ovary, consider further characterization with pelvic ultrasound. Moderate hiatal hernia. Workstation performed: [id number]     Procedure: Mammo diagnostic bilateral w 3d & cad  Result Date: 9/26/2024  Narrative: DIAGNOSIS: Secondary hyperparathyroidism of renal origin (HCC); Chronic kidney disease (CKD) stage G3b/A1, moderately decreased glomerular filtration rate (GFR) between 30-44 mL/min/1.73 square meter and albuminuria creatinine ratio less than 30 mg/g (HCC); Morbid obesity (HCC); Abnormal findings on diagnostic imaging of breast TECHNIQUE: Digital diagnostic mammography was performed. Computer Aided Detection (CAD) analyzed all applicable images. Right breast ultrasound was performed. COMPARISONS: Prior breast imaging dated: 03/25/2024, 09/21/2023, 09/21/2023, 08/01/2023, 07/20/2022, 07/13/2021, 07/07/2020, 07/02/2019, 06/26/2018, 06/20/2017, 06/09/2016, and 05/28/2015 RELEVANT HISTORY: Family Breast Cancer History: No known family history of breast cancer. Family Medical History: No known relevant family medical history. Personal History: Hormone history includes birth control. No known relevant surgical history. No known relevant medical history. RISK ASSESSMENT: 5 Year Tyrer-Cuzick: 1.06% 10 Year Tyrer-Cuzick: 2.26% Lifetime Tyrer-Cuzick: 3.6% TISSUE DENSITY: The breasts are almost entirely fatty. INDICATION: Cherelle Dash is a 69 y.o. female presenting for 6 month follow up. FINDINGS: RIGHT 1) MASS [B]  Mammo diagnostic bilateral w 3d & cad: There is a mass seen in the retroareolar region of the right breast in the middle depth. Compared to the previous study, there are no significant changes. US breast right limited (diagnostic): There is a 5 mm x 2 mm x 6 mm oval, parallel, hypoechoic mass with circumscribed margins with no posterior features seen in the retroareolar region of the right breast at 12 o'clock in the middle depth, 4 cm from the nipple. The mass correlates with the prior mammogram finding and ultrasound finding. Compared to the previous study, there are no significant changes. Left Mammo diagnostic bilateral w 3d & cad There are no suspicious masses, grouped microcalcifications or areas of unexplained architectural distortion. The skin and nipple areolar complex are unremarkable.     Impression: Stable benign morphology mass 12:00 right breast probable complicated cyst.  Surveillance recommended to confirm stability. No evidence for malignancy in either breast. ASSESSMENT/BI-RADS CATEGORY: Left: 1 - Negative Right: 3 - Probably Benign Overall: 3 - Probably Benign RECOMMENDATION:      - Ultrasound in 1 year for the right breast.      - Diagnostic mammogram in 1 year for both breasts. Workstation ID: [id number] Signed by:  Brandon Aguilar MD     Procedure: US breast right limited (diagnostic)  Result Date: 9/26/2024  Narrative: DIAGNOSIS: Secondary hyperparathyroidism of renal origin (HCC); Chronic kidney disease (CKD) stage G3b/A1, moderately decreased glomerular filtration rate (GFR) between 30-44 mL/min/1.73 square meter and albuminuria creatinine ratio less than 30 mg/g (HCC); Morbid obesity (HCC); Abnormal findings on diagnostic imaging of breast TECHNIQUE: Digital diagnostic mammography was performed. Computer Aided Detection (CAD) analyzed all applicable images. Right breast ultrasound was performed. COMPARISONS: Prior breast imaging dated: 03/25/2024, 09/21/2023, 09/21/2023, 08/01/2023,  07/20/2022, 07/13/2021, 07/07/2020, 07/02/2019, 06/26/2018, 06/20/2017, 06/09/2016, and 05/28/2015 RELEVANT HISTORY: Family Breast Cancer History: No known family history of breast cancer. Family Medical History: No known relevant family medical history. Personal History: Hormone history includes birth control. No known relevant surgical history. No known relevant medical history. RISK ASSESSMENT: 5 Year Tyrer-Cuzick: 1.06% 10 Year Tyrer-Cuzick: 2.26% Lifetime Tyrer-Cuzick: 3.6% TISSUE DENSITY: The breasts are almost entirely fatty. INDICATION: Cherelle Dash is a 69 y.o. female presenting for 6 month follow up. FINDINGS: RIGHT 1) MASS [B] Mammo diagnostic bilateral w 3d & cad: There is a mass seen in the retroareolar region of the right breast in the middle depth. Compared to the previous study, there are no significant changes. US breast right limited (diagnostic): There is a 5 mm x 2 mm x 6 mm oval, parallel, hypoechoic mass with circumscribed margins with no posterior features seen in the retroareolar region of the right breast at 12 o'clock in the middle depth, 4 cm from the nipple. The mass correlates with the prior mammogram finding and ultrasound finding. Compared to the previous study, there are no significant changes. Left Mammo diagnostic bilateral w 3d & cad There are no suspicious masses, grouped microcalcifications or areas of unexplained architectural distortion. The skin and nipple areolar complex are unremarkable.     Impression: Stable benign morphology mass 12:00 right breast probable complicated cyst.  Surveillance recommended to confirm stability. No evidence for malignancy in either breast. ASSESSMENT/BI-RADS CATEGORY: Left: 1 - Negative Right: 3 - Probably Benign Overall: 3 - Probably Benign RECOMMENDATION:      - Ultrasound in 1 year for the right breast.      - Diagnostic mammogram in 1 year for both breasts. Workstation ID: [id number] Signed by:  Brandon Aguilar MD     Procedure: Echo  complete w/ contrast if indicated  Result Date: 9/6/2024  Narrative:   Left Ventricle: Left ventricular cavity size is normal. Wall thickness is mildly increased. There is mild concentric hypertrophy. The left ventricular ejection fraction is 40%. Systolic function is moderately reduced. There is moderate global hypokinesis. Diastolic function is mildly abnormal, consistent with grade I (abnormal) relaxation.   Right Ventricle: Right ventricular cavity size is dilated. Systolic function is mildly reduced.   Mitral Valve: There is mild regurgitation.     Procedure:  VAS VENOUS DUPLEX - LOWER LIMB BILATERAL  Result Date: 9/5/2024  Narrative:  [facility] REPORT CLINICAL: Indications: Patient presents with recent discovery of pulmonary embolism and physician wants to determine potential source. Risk Factors The patient has history of CKD.  FINDINGS:  Right          Impression              Popliteal      Occlusive Subsegmental  PostTibial     Occlusive Subsegmental  Calf Veins     Occlusive Subsegmental  Gastrocnemius  Occlusive Subsegmental   Left           Impression              Popliteal      Non Occlusive Thrombus  PostTibial     Occlusive Subsegmental     CONCLUSION:  Impression:  RIGHT LOWER LIMB: Evidence of acute deep vein thrombosis was noted in the popliteal, posterior tibial, gastrocnemius and soleal veins. No evidence of superficial thrombophlebitis noted. Doppler evaluation shows a normal response to augmentation maneuvers.. Popliteal, posterior tibial and anterior tibial arterial Doppler waveform's are triphasic.  LEFT LOWER LIMB: Evidence of acute deep vein thrombosis was noted in the popliteal and posterior tibial. No evidence of superficial thrombophlebitis noted. Doppler evaluation shows a normal response to augmentation maneuvers. Popliteal, posterior tibial and anterior tibial arterial Doppler waveform's are triphasic.  Technical findings were given to Nacho Chauhan DO at 5:50 PM.  SIGNATURE:  Electronically Signed by: DAVI POLLACK MD on 2024-09-05 08:56:59 PM    Procedure: US bedside procedure  Result Date: 9/5/2024  Narrative: [id number]    Procedure: CTA ED chest PE Study  Result Date: 9/5/2024  Narrative: CTA - CHEST WITH IV CONTRAST - PULMONARY ANGIOGRAM INDICATION: Dimer 8, SOB/LOMBARDI. COMPARISON: CTA chest, PE study, 11/20/2023 TECHNIQUE: CTA examination of the chest was performed using angiographic technique according to a protocol specifically tailored to evaluate for pulmonary embolism. Multiplanar 2D reformatted images were created from the source data. In addition, coronal  3D MIP postprocessing was performed on the acquisition scanner. Radiation dose length product (DLP) for this visit: 381 mGy-cm . This examination, like all CT scans performed in the [facility], was performed utilizing techniques to minimize radiation dose exposure, including the use of iterative reconstruction and automated exposure control. IV Contrast: 70 mL of iohexol (OMNIPAQUE) FINDINGS: PULMONARY ARTERIAL TREE: Bilateral pulmonary emboli: Left upper and lower lobar nonocclusive emboli (305:93, 106) On the right nearly occlusive truncus anterior embolus (305:91), as well as partially occlusive in the interlobar artery ((305:105) LUNGS: 3 mm posterior right upper lobe nodule (304:67) 6 mm right lower lobe nodule (304:127) Nodules not well appreciated on the comparison November 2023 exam, particularly in the right lower lobe secondary to motion artifact at that time. There is no tracheal or endobronchial lesion. PLEURA: Unremarkable. HEART/GREAT VESSELS: Coronary calcifications no thoracic aortic aneurysm. MEDIASTINUM AND RC: Unremarkable. CHEST WALL AND LOWER NECK: Relatively symmetric ovoid soft tissue density structures extending posteriorly from the bilateral nipples. Findings stable from November 2023 exam, correlation with mammographic history advised. VISUALIZED  STRUCTURES IN THE UPPER ABDOMEN: Moderate hiatal hernia Partially visualized probable small duodenal diverticulum OSSEOUS STRUCTURES: No acute fracture or destructive osseous lesion.     Impression: Bilateral pulmonary emboli. On the left, nonocclusive upper and lower lobar branches On the right, occlusive proximal truncus anterior and partially occlusive interlobular artery. No CT evidence of right heart strain. Pulmonary nodules, measuring up to 6 mm in the right lower lobe. Not well evaluated on comparison November 2023 exam secondary to motion artifact at that time. Considering smoking history, recommend follow-up chest CT in 12 months. Moderate hiatal hernia. Findings were discussed with Dr. Marcum at 1:45 p.m. on 9/5/2024 Workstation performed: [id number]     Procedure: XR chest 1 view portable  Result Date: 9/5/2024  Narrative: XR CHEST PORTABLE INDICATION: Shortness of breath. COMPARISON: CTA chest 11/20/2023, chest radiograph 11/18/2023 FINDINGS: Clear lungs. No pneumothorax or pleural effusion. Normal cardiomediastinal silhouette. Bones are unremarkable for age. Normal upper abdomen.     Impression: No acute cardiopulmonary disease. Resident: Asif Jefferson I, the attending radiologist, have reviewed the images and agree with the final report above. Workstation performed: [id number]     Procedure: MRI pelvis female wo and w contrast  Result Date: 8/8/2024  Narrative: MRI OF THE PELVIS WITH AND WITHOUT CONTRAST (GYNECOLOGIC) INDICATION: 69 years / Female. N95.0: Postmenopausal bleeding N85.8: Other specified noninflammatory disorders of uterus. Cervical mass on recent ultrasound COMPARISON: Ultrasound, 7/18/2024 TECHNIQUE: Multiplanar/multisequence MRI of the female pelvis was performed before and after administration of contrast. IV Contrast: 10 mL of Gadobutrol injection (SINGLE-DOSE) FINDINGS: UTERUS: There is a cervical tumor in infiltrative pattern, measuring 2.3 x 4.3 x 4.1 cm. It does not extend  into the vagina, rectum, bladder, pelvic sidewall or other pelvic structures. There is no definite spread beyond the serosa, although minimal serosal indistinctness is demonstrated at 3-4:00 and 9:00 on images 31 and 32 of series 4, and 12:00, better demonstrated on sagittal image 29 series 3. However, it is not confirmed on other sequences, especially contrast-enhanced. The tumor restricts diffusion. There is abnormal endometrial thickening up to 8 mm, with heterogeneous enhancement and probable cystic changes. It is unclear whether it is related to cervical tumor, or reflects a primary dysplasia. It does not extend beyond the uterus. ADNEXA: Right: Ovary normal in size and morphology. No suspicious adnexal lesion. Unilocular cyst, 2.2 x 2.1 cm No hydrosalpinx. Left: Ovary normal in size and morphology. No suspicious adnexal lesion. Unilocular cyst, 2.1 x 2.6 cm No hydrosalpinx. URINARY BLADDER: Normal. PELVIC CAVITY: No lymphadenopathy by size criteria. Early enhancing left external iliac lymph node, maximum transverse dimension 8 mm, for example image 239 series 42562. No other suspicious lymph nodes. BOWEL: No dilated loops of bowel. VESSELS: No aneurysm. PELVIC WALL: Intact. No hydronephrosis. BONES: No suspicious osseous lesion.     Impression: 4.3 cm previously reported cervical tumor, as detailed above, compatible with cervical cancer. Nonenlarged but early enhancing left external iliac lymph node, possibly metastatic. Abnormal endometrium that may be secondary to the cervical tumor, or reflect primary dysplasia. No extension beyond the uterus. Bilateral unilocular simple ovarian cysts. Workstation performed: [id number]       Administrative Statements   I have spent a total time of 46 minutes in caring for this patient on the day of the visit/encounter including Risks and benefits of tx options, Instructions for management, Patient and family education, Importance of tx compliance, Risk factor reductions,  Impressions, Counseling / Coordination of care, Documenting in the medical record, Reviewing / ordering tests, medicine, procedures  , and Obtaining or reviewing history  . Topics discussed with the patient / family include symptom assessment and management, medication review, psychosocial support, advanced directives, goals of care, supportive listening, and anticipatory guidance.

## 2024-11-18 NOTE — ASSESSMENT & PLAN NOTE
Patient following Gyn/Onc with Dr. Page.  -Patient with Stage IIIC diagnosis  -plans for IR port placement on 12/3/2024 with initiation of systemic treatment on 12/6/2024 with Taxol, Carboplatin, and Keytruda    No symptoms of concern today. Denies pain, nausea, vomiting, issues with appetite, or bladder concerns.  Patient does have chronic constipation for which she uses Miralax every other day which keeps her regular.  Patient to continue with adequate hydration and nutrition especially once starting treatments.    Patient voices no needs today after introduction of Palliative Care services.    Orders:    Ambulatory referral to Palliative Care

## 2024-11-18 NOTE — ASSESSMENT & PLAN NOTE
Lab Results   Component Value Date    EGFR 37 11/14/2024    EGFR 31 10/12/2024    EGFR 33 10/08/2024    CREATININE 1.41 (H) 11/14/2024    CREATININE 1.64 (H) 10/12/2024    CREATININE 1.56 (H) 10/08/2024     Patient follows Nephrology with upcoming appointment per patient.  Patient to continue cares with her specialists.

## 2024-11-18 NOTE — PATIENT INSTRUCTIONS
It was a pleasure speaking with you today.    As discussed:  -Continue your medications as prescribed.  -Continue with a bowel regimen to avoid constipation.      Follow-up in: 3 weeks or sooner as needed    Please do not hesitate to call me sooner should you have any questions/concerns or needs.    Medication safety issues - Do not drive under the influence of narcotics (including opioids), watch for adverse effects including confusion / altered mental status / respiratory depression (slowed breathing), keep medications stored in a safe/locked environment, do not use alcohol while opioids or other narcotics are in your system. Do not travel with more than the minimum number of tablets or capsules required for the trip.    ER Precautions  Watch for red flag symptoms including, but not limited to fevers, chills, chest pain, shortness of breath, intractable nausea/vomiting/diarrhea, or acute intractable pain (especially if pain is new or has changed).

## 2024-11-18 NOTE — ASSESSMENT & PLAN NOTE
Some anxiety revolved around waiting to start treatment along with the amount of information revolving around her diagnosis.    Patient was prescribed Ativan 1mg q8 hours PRN by her Gyn Onc office.  Continue to follow and provide support.    Will speak with our Palliative Care SW team to reach out to patient to provide support as well.

## 2024-11-22 ENCOUNTER — OFFICE VISIT (OUTPATIENT)
Dept: NEPHROLOGY | Facility: CLINIC | Age: 69
End: 2024-11-22
Payer: COMMERCIAL

## 2024-11-22 ENCOUNTER — TELEPHONE (OUTPATIENT)
Age: 69
End: 2024-11-22

## 2024-11-22 VITALS
DIASTOLIC BLOOD PRESSURE: 64 MMHG | BODY MASS INDEX: 42.21 KG/M2 | SYSTOLIC BLOOD PRESSURE: 122 MMHG | WEIGHT: 223.6 LBS | HEIGHT: 61 IN | HEART RATE: 92 BPM

## 2024-11-22 DIAGNOSIS — N25.81 SECONDARY HYPERPARATHYROIDISM OF RENAL ORIGIN (HCC): ICD-10-CM

## 2024-11-22 DIAGNOSIS — I42.0 DILATED CARDIOMYOPATHY (HCC): ICD-10-CM

## 2024-11-22 DIAGNOSIS — E66.01 MORBID OBESITY WITH BMI OF 40.0-44.9, ADULT (HCC): ICD-10-CM

## 2024-11-22 DIAGNOSIS — C54.1 ENDOMETRIAL CANCER (HCC): ICD-10-CM

## 2024-11-22 DIAGNOSIS — N18.32 STAGE 3B CHRONIC KIDNEY DISEASE (HCC): Primary | ICD-10-CM

## 2024-11-22 PROCEDURE — G2211 COMPLEX E/M VISIT ADD ON: HCPCS | Performed by: INTERNAL MEDICINE

## 2024-11-22 PROCEDURE — 99214 OFFICE O/P EST MOD 30 MIN: CPT | Performed by: INTERNAL MEDICINE

## 2024-11-22 NOTE — PATIENT INSTRUCTIONS
As we discussed in the office visit and after reviewing most recent blood test your kidney function remains stable with a creatinine lately in the mid ones.  Your most recent parathyroid hormone level (PTH) is in the lower side, plan to stop taking Calcitrol for now.  I would like to see you back in the office in 6 months with repeat labs.  Continue close follow-up with primary care doctor, gynecologist oncologist.  Remember to follow low-salt diet.  No other changes in your medication.  Remember to avoid NSAIDs (no ibuprofen, Motrin, Advil, Aleve, naproxen).  Okay to take Tylenol or acetaminophen as needed for pain.

## 2024-11-22 NOTE — PROGRESS NOTES
OFFICE FOLLOW UP - Nephrology   Cherelle Dash 69 y.o. female MRN: [id number]       ASSESSMENT and PLAN:  Cherelle was seen today for chronic kidney disease and follow-up.    Diagnoses and all orders for this visit:    Stage 3b chronic kidney disease (HCC)  -     CBC; Future  -     Renal function panel; Future  -     PTH, intact; Future  -     Protein / creatinine ratio, urine; Future    Secondary hyperparathyroidism of renal origin (HCC)    Endometrial cancer (HCC)    Morbid obesity with BMI of 40.0-44.9, adult (HCC)    Dilated cardiomyopathy (HCC)        This is a 69-year-old lady who presents to the office for CKD follow-up, previously followed with Dr. Kulkarni, patient decided to transfer care with me given that I am taking care of her , noted creatinine lately in the mid ones since 2020 also history of GERD, bipolar disorder previously on lithium that was discontinued 2020 after episode of OWEN obesity, arthritis, vitamin D deficiency.  Last time patient was seen was on 6/2024, since last office visit was found to have endometrial cancer, plan to start chemotherapy early next month.    1.  Chronic kidney disease stage IIIb with creatinine fluctuating in the mid ones.  Most recent blood and urine test were reviewed with patient, creatinine 1.41 with an estimated GFR of 37 and no significant proteinuria with a UPC of 0.2  Discussed about importance to stay well-hydrated, follow low-salt diet, keep working on losing weight, avoid NSAIDs.  I would like to see her back in the office in 6 months with repeat labs    2 hemodynamics, pressure today well-controlled, she is not on any antihypertensive medication, advised to continue following a low-salt diet and keep working on losing weight    3.  Mineral bone disease, history of secondary hyperparathyroidism on Calcitrol dose was decreased to 3 times a week, most recent PTH remains low, plan to stop Calcitrol for now and follow labs in 6 months    4.  GERD, currently  on Pepcid management as per primary care doctor    5 morbid obesity, noted patient lost 6 pounds since last office visit, encouraged to keep losing weight    #6 hemoglobin, anemia, hemoglobin low but improving at 11.4 follow labs in 6 months    #7  Stage III endometrial cancer status post total laparoscopic hysterectomy, bilateral salpingo-oophorectomy, sentinel lymph node biopsy follow gynecology oncology, plan to start chemotherapy early next month.  Follow labs closely as per gynecology oncology    #8 history of bipolar disorder/depression, previously on lithium but was discontinued in 2020, management per psych    Patient Instructions   As we discussed in the office visit and after reviewing most recent blood test your kidney function remains stable with a creatinine lately in the mid ones.  Your most recent parathyroid hormone level (PTH) is in the lower side, plan to stop taking Calcitrol for now.  I would like to see you back in the office in 6 months with repeat labs.  Continue close follow-up with primary care doctor, gynecologist oncologist.  Remember to follow low-salt diet.  No other changes in your medication.  Remember to avoid NSAIDs (no ibuprofen, Motrin, Advil, Aleve, naproxen).  Okay to take Tylenol or acetaminophen as needed for pain.            HPI: Cherelle Dash is a 69 y.o. female who is here for Chronic Kidney Disease and Follow-up  .    This is a patient with history of CKD, morbid obesity, GERD, previously followed with Dr. Kulkarni, patient requested transfer of care given that I am taking care of her     Hospitalized November 2023 at [facility] with RSV bronchitis.     Last time seen 06/2024.    Since last office visit, patient developed postmenopausal bleeding, status post D+C 08/2024, was found to have endometrial cancer.    Hospitalized on 9/2024 due to shortness of breath found to have PE, started on anticoagulation.  Status post robotic assisted LTH, BSO, lymph  node dissection on 10/11/2024 by gynecology oncology    Patient today in general is doing well, recovered well from surgery.  She is scheduled to have a port placement and start chemotherapy early December.    Patient denies any chest pain, shortness of breath and swelling.   Denies any abdominal pain, no nausea, vomiting, no diarrhea or constipation.  Denies any urinary problems, no burning sensation or gross hematuria.  Denies NSAID use.  Denies tobacco abuse.      The last blood work was done on 11/14/2024, which we have reviewed together.  Creatinine 1.41 with an estimated GFR of 37    ROS: All the systems were reviewed and were negative except as documented on the H&P.        Allergies: Raw fruit - food allergy    Medications:   Current Outpatient Medications:     apixaban (Eliquis) 5 mg, Take 1 tablet (5 mg total) by mouth 2 (two) times a day, Disp: 180 tablet, Rfl: 0    Cholecalciferol (VITAMIN D) 2000 units CAPS, Take 1 capsule by mouth daily, Disp: , Rfl:     ciclopirox (LOPROX) 0.77 % cream, , Disp: , Rfl:     clobetasol (TEMOVATE) 0.05 % ointment, Apply topically 2 (two) times a week, Disp: 60 g, Rfl: 3    CRANBERRY PO, Take by mouth, Disp: , Rfl:     famotidine (PEPCID) 20 mg tablet, TAKE 1 TABLET BY MOUTH TWICE  DAILY, Disp: 180 tablet, Rfl: 3    Glucosamine-Chondroit-Vit C-Mn (GLUCOSAMINE 1500 COMPLEX PO), Take by mouth in the morning, Disp: , Rfl:     Ivermectin 1 % CREA, , Disp: , Rfl:     LORazepam (ATIVAN) 1 mg tablet, Take 1 tablet (1 mg total) by mouth every 8 (eight) hours as needed (nausea or anxiety), Disp: 20 tablet, Rfl: 0    lurasidone (LATUDA) 20 mg tablet, TAKE 1 TABLET BY MOUTH DAILY  WITH BREAKFAST, Disp: 30 tablet, Rfl: 11    metoprolol succinate (TOPROL-XL) 50 mg 24 hr tablet, Take 1 tablet (50 mg total) by mouth daily, Disp: 90 tablet, Rfl: 3    Mirabegron ER (Myrbetriq) 25 MG TB24, TAKE 1 TABLET BY MOUTH IN THE  MORNING, Disp: 90 tablet, Rfl: 1    Multiple Vitamin (MULTI-VITAMIN  DAILY) TABS, Take by mouth daily, Disp: , Rfl:     ondansetron (ZOFRAN) 8 mg tablet, Take 1 tablet (8 mg total) by mouth every 8 (eight) hours as needed for nausea or vomiting, Disp: 30 tablet, Rfl: 1    polyethylene glycol (GLYCOLAX) 17 GM/SCOOP powder, Take 17 g by mouth daily, Disp: , Rfl:     Past Medical History:   Diagnosis Date    Abnormal ECG     Anxiety 2002    Arthritis     Bipolar 1 disorder (HCC)     Cervical cancer (HCC)     Chronic kidney disease     stage 3    CPAP (continuous positive airway pressure) dependence     Depression 2001    Disease of thyroid gland     DVT (deep venous thrombosis) (HCC)     BLLE    Elevated blood pressure reading 06/10/2019    GERD (gastroesophageal reflux disease)     Obesity     Pulmonary emboli (HCC)     B/L    RSV (acute bronchiolitis due to respiratory syncytial virus) 12/05/2023    Sleep apnea     Sleep apnea, obstructive 2007    Uterine cancer (HCC)      Past Surgical History:   Procedure Laterality Date    COLONOSCOPY  2011    IR IVC FILTER PLACEMENT OPTIONAL/TEMPORARY  10/8/2024    LAPAROTOMY N/A 10/11/2024    Procedure: EXPLORATORY LAPAROTOMY;  Surgeon: Rodney Downing MD;  [facility];  Service: Gynecology Oncology    OR HYSTEROSCOPY BX ENDOMETRIUM&/POLYPC W/WO D&C N/A 8/23/2024    Procedure: EXAM UNDER ANESTHESIA, DILATATION AND CURETTAGE, CERVICAL BIOPSIES;  Surgeon: Evin Page MD;  [facility];  Service: Gynecology Oncology    OR LAPS TOTAL HYSTERECT 250 GM/< W/RMVL TUBE/OVARY N/A 10/11/2024    Procedure: ROBOTIC ASSISTED LTH, BSO, LYMPH NODE DISSECTION, EUA;  Surgeon: Rodney Downing MD;  [facility];  Service: Gynecology Oncology     Family History   Problem Relation Age of Onset    Heart disease Mother     Heart Valve Disease Mother         Replacement    Diabetes Mother         2002    Heart failure Mother         Heart Valve replacement    Arthritis Father     No Known Problems Sister     No Known Problems  "Daughter     No Known Problems Maternal Grandmother     No Known Problems Maternal Grandfather     No Known Problems Paternal Grandmother     No Known Problems Paternal Grandfather     No Known Problems Son     No Known Problems Maternal Aunt     No Known Problems Maternal Aunt     No Known Problems Maternal Aunt     No Known Problems Maternal Aunt     No Known Problems Maternal Aunt     No Known Problems Paternal Aunt     Alcohol abuse Son         Kolton, 40 year old son, has issues with alcohol and alcohol abuse      reports that she quit smoking about 15 years ago. Her smoking use included cigarettes. She started smoking about 45 years ago. She has a 7.5 pack-year smoking history. She has never been exposed to tobacco smoke. She has never used smokeless tobacco. She reports current alcohol use of about 1.0 standard drink of alcohol per week. She reports that she does not use drugs.      Physical Exam:   Vitals:    11/22/24 1302   BP: 122/64   BP Location: Left arm   Patient Position: Sitting   Cuff Size: Large   Pulse: 92   Weight: 101 kg (223 lb 9.6 oz)   Height: 5' 1\" (1.549 m)     Body mass index is 42.25 kg/m².    General: Morbidly obese, conscious, cooperative, in not acute distress  Eyes: conjunctivae pink, anicteric sclerae  ENT: lips and mucous membranes moist  Neck: supple, no JVD  Chest: clear breath sounds bilateral, no crackles, ronchus or wheezings  CVS: distinct S1 & S2, normal rate, regular rhythm  Abdomen: soft, non-tender, non-distended, normoactive bowel sounds  Back: no CVA tenderness  Extremities: no edema of both legs  Skin: no rash  Neuro: awake, alert, oriented        Lab Results:  Results for orders placed or performed in visit on 11/14/24   PTH, intact    Collection Time: 11/14/24  6:58 AM   Result Value Ref Range    PTH 36.2 12.0 - 88.0 pg/mL   Protein / creatinine ratio, urine    Collection Time: 11/14/24  6:58 AM   Result Value Ref Range    Creatinine, Ur 55.6 Reference range not " established. mg/dL    Protein Urine Random 12.6 Reference range not established. mg/dL    Prot/Creat Ratio, Ur 0.2 (H) 0.0 - 0.1   CBC and differential    Collection Time: 11/14/24  6:58 AM   Result Value Ref Range    WBC 4.29 (L) 4.31 - 10.16 Thousand/uL    RBC 4.25 3.81 - 5.12 Million/uL    Hemoglobin 11.4 (L) 11.5 - 15.4 g/dL    Hematocrit 37.1 34.8 - 46.1 %    MCV 87 82 - 98 fL    MCH 26.8 26.8 - 34.3 pg    MCHC 30.7 (L) 31.4 - 37.4 g/dL    RDW 14.1 11.6 - 15.1 %    MPV 13.6 (H) 8.9 - 12.7 fL    Platelets 167 149 - 390 Thousands/uL    nRBC 0 /100 WBCs    Segmented % 61 43 - 75 %    Immature Grans % 0 0 - 2 %    Lymphocytes % 21 14 - 44 %    Monocytes % 14 (H) 4 - 12 %    Eosinophils Relative 3 0 - 6 %    Basophils Relative 1 0 - 1 %    Absolute Neutrophils 2.63 1.85 - 7.62 Thousands/µL    Absolute Immature Grans 0.01 0.00 - 0.20 Thousand/uL    Absolute Lymphocytes 0.90 0.60 - 4.47 Thousands/µL    Absolute Monocytes 0.60 0.17 - 1.22 Thousand/µL    Eosinophils Absolute 0.12 0.00 - 0.61 Thousand/µL    Basophils Absolute 0.03 0.00 - 0.10 Thousands/µL   Comprehensive metabolic panel    Collection Time: 11/14/24  6:58 AM   Result Value Ref Range    Sodium 143 135 - 147 mmol/L    Potassium 4.0 3.5 - 5.3 mmol/L    Chloride 110 (H) 96 - 108 mmol/L    CO2 22 21 - 32 mmol/L    ANION GAP 11 4 - 13 mmol/L    BUN 23 5 - 25 mg/dL    Creatinine 1.41 (H) 0.60 - 1.30 mg/dL    Glucose, Fasting 122 (H) 65 - 99 mg/dL    Calcium 9.1 8.4 - 10.2 mg/dL    AST 15 13 - 39 U/L    ALT 13 7 - 52 U/L    Alkaline Phosphatase 69 34 - 104 U/L    Total Protein 6.9 6.4 - 8.4 g/dL    Albumin 3.7 3.5 - 5.0 g/dL    Total Bilirubin 0.38 0.20 - 1.00 mg/dL    eGFR 37 ml/min/1.73sq m   Magnesium    Collection Time: 11/14/24  6:58 AM   Result Value Ref Range    Magnesium 1.9 1.9 - 2.7 mg/dL   TSH, 3rd generation with Free T4 reflex    Collection Time: 11/14/24  6:58 AM   Result Value Ref Range    TSH 3RD GENERATON 2.649 0.450 - 4.500 uIU/mL   Amylase     "Collection Time: 11/14/24  6:58 AM   Result Value Ref Range    Amylase 30 29 - 103 IU/L   Lipase    Collection Time: 11/14/24  6:58 AM   Result Value Ref Range    Lipase 16 11 - 82 u/L       Collection Time: 11/14/24  6:58 AM   Result Value Ref Range     54.2 (H) 0.0 - 35.0 U/mL   Phosphorus    Collection Time: 11/14/24  6:58 AM   Result Value Ref Range    Phosphorus 3.7 2.3 - 4.1 mg/dL             Portions of the record may have been created with voice recognition software. Occasional wrong word or \"sound a like\" substitutions may have occurred due to the inherent limitations of voice recognition software. Read the chart carefully and recognize, using context, where substitutions have occurred.If you have any questions, please contact the dictating provider.  "

## 2024-11-22 NOTE — TELEPHONE ENCOUNTER
Received a phone call from patient.  Patient inquiring about getting labs done once port is placed.  Patient is having port placed on 12/3.  First treatment is 12/6.  Informed patient that labs must be obtained at infusion center and need to be scheduled.  Gave patient number to infusion center at [facility] to schedule.      Patient stated that she received a wig from [facility].  Patient is requesting a script for wig to be mailed to home address, in case insurance does not pay without a script.    Patient is requesting AVS from office visit with Dr. Page on 11/12 be mailed to home address.      Included clerical pool in message.

## 2024-11-25 ENCOUNTER — TELEPHONE (OUTPATIENT)
Dept: RADIOLOGY | Facility: HOSPITAL | Age: 69
End: 2024-11-25

## 2024-11-25 DIAGNOSIS — T50.905A DRUG-RELATED ALOPECIA: ICD-10-CM

## 2024-11-25 DIAGNOSIS — L65.8 DRUG-RELATED ALOPECIA: ICD-10-CM

## 2024-11-25 DIAGNOSIS — C54.1 ENDOMETRIAL CANCER (HCC): Primary | ICD-10-CM

## 2024-11-25 NOTE — PROGRESS NOTES
Spoke with pt regarding their procedure on 12/3. Instructions provided on arrival time of 0915 to [facility]; NPO after midnight, AM meds ok with sips of water; need for a  post-procedure. Pt verbalized understanding, questions answered as offered.

## 2024-12-03 ENCOUNTER — TELEPHONE (OUTPATIENT)
Dept: INFUSION CENTER | Facility: CLINIC | Age: 69
End: 2024-12-03

## 2024-12-03 ENCOUNTER — HOSPITAL ENCOUNTER (OUTPATIENT)
Dept: RADIOLOGY | Facility: HOSPITAL | Age: 69
Discharge: HOME | End: 2024-12-03
Attending: OBSTETRICS & GYNECOLOGY | Admitting: STUDENT IN AN ORGANIZED HEALTH CARE EDUCATION/TRAINING PROGRAM
Payer: COMMERCIAL

## 2024-12-03 VITALS
BODY MASS INDEX: 42.1 KG/M2 | SYSTOLIC BLOOD PRESSURE: 132 MMHG | HEIGHT: 61 IN | HEART RATE: 67 BPM | RESPIRATION RATE: 18 BRPM | WEIGHT: 223 LBS | DIASTOLIC BLOOD PRESSURE: 70 MMHG | OXYGEN SATURATION: 98 % | TEMPERATURE: 98 F

## 2024-12-03 DIAGNOSIS — C54.1 ENDOMETRIAL CANCER (HCC): ICD-10-CM

## 2024-12-03 PROCEDURE — C1788 PORT, INDWELLING, IMP: HCPCS

## 2024-12-03 PROCEDURE — 77001 FLUOROGUIDE FOR VEIN DEVICE: CPT | Performed by: STUDENT IN AN ORGANIZED HEALTH CARE EDUCATION/TRAINING PROGRAM

## 2024-12-03 PROCEDURE — 36561 INSERT TUNNELED CV CATH: CPT | Performed by: STUDENT IN AN ORGANIZED HEALTH CARE EDUCATION/TRAINING PROGRAM

## 2024-12-03 PROCEDURE — 99153 MOD SED SAME PHYS/QHP EA: CPT

## 2024-12-03 PROCEDURE — 99152 MOD SED SAME PHYS/QHP 5/>YRS: CPT

## 2024-12-03 PROCEDURE — 76937 US GUIDE VASCULAR ACCESS: CPT | Performed by: STUDENT IN AN ORGANIZED HEALTH CARE EDUCATION/TRAINING PROGRAM

## 2024-12-03 PROCEDURE — C1894 INTRO/SHEATH, NON-LASER: HCPCS

## 2024-12-03 PROCEDURE — 76937 US GUIDE VASCULAR ACCESS: CPT

## 2024-12-03 PROCEDURE — 99152 MOD SED SAME PHYS/QHP 5/>YRS: CPT | Performed by: STUDENT IN AN ORGANIZED HEALTH CARE EDUCATION/TRAINING PROGRAM

## 2024-12-03 PROCEDURE — 36561 INSERT TUNNELED CV CATH: CPT

## 2024-12-03 PROCEDURE — 77001 FLUOROGUIDE FOR VEIN DEVICE: CPT

## 2024-12-03 RX ORDER — CEFAZOLIN SODIUM 2 G/50ML
SOLUTION INTRAVENOUS
Status: COMPLETED | OUTPATIENT
Start: 2024-12-03 | End: 2024-12-03

## 2024-12-03 RX ORDER — MIDAZOLAM HYDROCHLORIDE 2 MG/2ML
INJECTION, SOLUTION INTRAMUSCULAR; INTRAVENOUS AS NEEDED
Status: COMPLETED | OUTPATIENT
Start: 2024-12-03 | End: 2024-12-03

## 2024-12-03 RX ORDER — FENTANYL CITRATE 50 UG/ML
INJECTION, SOLUTION INTRAMUSCULAR; INTRAVENOUS AS NEEDED
Status: COMPLETED | OUTPATIENT
Start: 2024-12-03 | End: 2024-12-03

## 2024-12-03 RX ADMIN — FENTANYL CITRATE 50 MCG: 50 INJECTION INTRAMUSCULAR; INTRAVENOUS at 11:43

## 2024-12-03 RX ADMIN — MIDAZOLAM 1 MG: 1 INJECTION INTRAMUSCULAR; INTRAVENOUS at 12:02

## 2024-12-03 RX ADMIN — MIDAZOLAM 1 MG: 1 INJECTION INTRAMUSCULAR; INTRAVENOUS at 11:43

## 2024-12-03 RX ADMIN — FENTANYL CITRATE 25 MCG: 50 INJECTION INTRAMUSCULAR; INTRAVENOUS at 12:02

## 2024-12-03 RX ADMIN — CEFAZOLIN SODIUM 2000 MG: 2 SOLUTION INTRAVENOUS at 11:40

## 2024-12-03 RX ADMIN — FENTANYL CITRATE 25 MCG: 50 INJECTION INTRAMUSCULAR; INTRAVENOUS at 12:13

## 2024-12-03 NOTE — H&P
Interventional Radiology  History and Physical 12/3/2024     Cherelle Dash   1955   [id number]    Assessment/Plan:  Very pleasant 69-year-old woman with endometrial cancer referred for port placement in anticipation of chemotherapy.     Problem List Items Addressed This Visit       Endometrial cancer (HCC)    Relevant Orders    IR port placement          Subjective:     Patient ID: Cherelle Dash is a 69 y.o. female.    History of Present Illness  Very pleasant 69-year-old woman with endometrial cancer referred for port placement in anticipation of chemotherapy.           Past Medical History:   Diagnosis Date    Abnormal ECG     Anxiety 2002    Arthritis     Bipolar 1 disorder (HCC)     Cervical cancer (HCC)     Chronic kidney disease     stage 3    CPAP (continuous positive airway pressure) dependence     Depression 2001    Disease of thyroid gland     DVT (deep venous thrombosis) (HCC)     BLLE    Elevated blood pressure reading 06/10/2019    GERD (gastroesophageal reflux disease)     Obesity     Pulmonary emboli (HCC)     B/L    RSV (acute bronchiolitis due to respiratory syncytial virus) 12/05/2023    Sleep apnea     Sleep apnea, obstructive 2007    Uterine cancer (HCC)         Past Surgical History:   Procedure Laterality Date    COLONOSCOPY  2011    IR IVC FILTER PLACEMENT OPTIONAL/TEMPORARY  10/8/2024    LAPAROTOMY N/A 10/11/2024    Procedure: EXPLORATORY LAPAROTOMY;  Surgeon: Rodney Downing MD;  [facility];  Service: Gynecology Oncology    CT HYSTEROSCOPY BX ENDOMETRIUM&/POLYPC W/WO D&C N/A 8/23/2024    Procedure: EXAM UNDER ANESTHESIA, DILATATION AND CURETTAGE, CERVICAL BIOPSIES;  Surgeon: Evin Page MD;  [facility];  Service: Gynecology Oncology    CT LAPS TOTAL HYSTERECT 250 GM/< W/RMVL TUBE/OVARY N/A 10/11/2024    Procedure: ROBOTIC ASSISTED LTH, BSO, LYMPH NODE DISSECTION, EUA;  Surgeon: Rodney Downing MD;  [facility];  Service: Gynecology Oncology         Social History     Tobacco Use   Smoking Status Former    Current packs/day: 0.00    Average packs/day: 0.3 packs/day for 30.0 years (7.5 ttl pk-yrs)    Types: Cigarettes    Start date: 1/1/1979    Quit date: 1/1/2009    Years since quitting: 15.9    Passive exposure: Never   Smokeless Tobacco Never        Social History     Substance and Sexual Activity   Alcohol Use Yes    Alcohol/week: 1.0 standard drink of alcohol    Types: 1 Glasses of wine per week        Social History     Substance and Sexual Activity   Drug Use Never        Allergies   Allergen Reactions    Raw Fruit - Food Allergy Anaphylaxis     PLUMS, PEACHES, FRUIT WITH SKIN       Current Outpatient Medications   Medication Sig Dispense Refill    apixaban (Eliquis) 5 mg Take 1 tablet (5 mg total) by mouth 2 (two) times a day 180 tablet 0    Cholecalciferol (VITAMIN D) 2000 units CAPS Take 1 capsule by mouth daily      CRANBERRY PO Take by mouth      famotidine (PEPCID) 20 mg tablet TAKE 1 TABLET BY MOUTH TWICE  DAILY 180 tablet 3    Glucosamine-Chondroit-Vit C-Mn (GLUCOSAMINE 1500 COMPLEX PO) Take by mouth in the morning      metoprolol succinate (TOPROL-XL) 50 mg 24 hr tablet Take 1 tablet (50 mg total) by mouth daily 90 tablet 3    Mirabegron ER (Myrbetriq) 25 MG TB24 TAKE 1 TABLET BY MOUTH IN THE  MORNING 90 tablet 1    Multiple Vitamin (MULTI-VITAMIN DAILY) TABS Take by mouth daily      polyethylene glycol (GLYCOLAX) 17 GM/SCOOP powder Take 17 g by mouth daily      ciclopirox (LOPROX) 0.77 % cream       clobetasol (TEMOVATE) 0.05 % ointment Apply topically 2 (two) times a week 60 g 3    CRANIAL PROSTHESIS, RX, Apply to affected area for chemotherapy-induced alopecia 1 each 0    Ivermectin 1 % CREA       LORazepam (ATIVAN) 1 mg tablet Take 1 tablet (1 mg total) by mouth every 8 (eight) hours as needed (nausea or anxiety) 20 tablet 0    lurasidone (LATUDA) 20 mg tablet TAKE 1 TABLET BY MOUTH DAILY  WITH BREAKFAST 30 tablet 11    ondansetron  "(ZOFRAN) 8 mg tablet Take 1 tablet (8 mg total) by mouth every 8 (eight) hours as needed for nausea or vomiting 30 tablet 1     No current facility-administered medications for this encounter.          Objective:    Vitals:    12/03/24 0959   BP: 117/66   Pulse: 69   Resp: 18   Temp: 97.8 °F (36.6 °C)   TempSrc: Temporal   SpO2: 96%   Weight: 101 kg (223 lb)   Height: 5' 1\" (1.549 m)     Physical Exam:  General: Well appearing, no acute distress.  HEENT: NC/AT.  EOMI.  CV: RRR  Chest: Normal respiratory effort.  Speaking in full sentences.  Abdomen: Soft, ND/NT.  Skin: Warm and dry  Neuro: Alert and oriented x 3.  No focal deficits.           Lab Results   Component Value Date    BNP 36 09/05/2024      Lab Results   Component Value Date    WBC 4.29 (L) 11/14/2024    HGB 11.4 (L) 11/14/2024    HCT 37.1 11/14/2024    MCV 87 11/14/2024     11/14/2024     Lab Results   Component Value Date    INR 1.05 10/12/2024    INR 1.04 09/05/2024    INR 0.98 08/14/2024    PROTIME 13.9 10/12/2024    PROTIME 14.3 09/05/2024    PROTIME 13.3 08/14/2024     Lab Results   Component Value Date    PTT 23 10/12/2024         I have personally reviewed pertinent imaging and laboratory results.     Code Status: Prior  Advance Directive and Living Will: Yes    Power of :    POLST:      This text is generated with voice recognition software. There may be translation, syntax,  or grammatical errors. If you have any questions, please contact the dictating provider.   "

## 2024-12-03 NOTE — TELEPHONE ENCOUNTER
Placed new patient phone call to the patient. Left a detailed voicemail instructions / what to expect. Also, reminded to have labs drawn for treatment.

## 2024-12-03 NOTE — SEDATION DOCUMENTATION
Pt tolerated port placement well.  Site intact, open to air with exofin.  Taken to recovery and report given.

## 2024-12-03 NOTE — DISCHARGE INSTRUCTIONS
Implanted Venous Access Port     WHAT YOU NEED TO KNOW:   An implanted venous access port is a device used to give treatments and take blood. It may also be called a central venous access device (CVAD). The port is a small container that is placed under your skin, usually in your upper chest. The port is attached to a catheter that enters a large vein.   DISCHARGE INSTRUCTIONS:   Resume your normal diet. Small sips of flat soda will help with mild nausea.  Prevent an infection:   Wash your hands often.  Use soap and water. Clean your hands before and after you care for your port. Remind everyone who cares for your port to wash their hands.   Check your skin for infection every day.  Look for redness, swelling, or fluid oozing from the port site.  Care for your port:   1. You may shower beginning 48 hours after procedure.     2.  Leave glue in place.    3. It is normal for some bruising to occur.    4. Use Tylenol for pain.    5. Limit use of arm on the side that your port was placed. Lift nothing heavier than 5 pounds for 1 week, and then gradually increase activity as tolerated.    6. DO NOT apply ointment, lotion or cream to port site until incision is healed. Allow glue to fall off. DO NOT attempt to peel glue from skin even it it begins to flake.     7. After the port incision is healed you may swim, bathe.  Notify the Interventional Radiologist if you have any of the followin. Fever above 101 F    2. Increased redness or swelling after 1st day.     3. Increased pain after 1st day.    4. Any sign of infection (drainage from port site, skin separation, hot to touch).    5. Persistent nausea or vomiting.    Contact Interventional Radiology at [phone] ([facility] PATIENTS: Contact Interventional Radiology at [phone]) ([facility] PATIENTS: Contact Interventional Radiology at [phone]).      Procedural Sedation   WHAT YOU NEED TO KNOW:   Procedural sedation is medicine used during procedures to help you  feel relaxed and calm. You will remember little to none of the procedure. After sedation you may feel tired, weak, or unsteady on your feet. You may also have trouble concentrating or short-term memory loss. These symptoms should go away in 24 hours or less.   DISCHARGE INSTRUCTIONS:   Call 911 or have someone else call for any of the following:   You have sudden trouble breathing.     You cannot be woken.     Contact Interventional Radiology at [phone]   [facility] PATIENTS: Contact Interventional Radiology at [phone] [facility] PATIENTS: Contact Interventional Radiology at [phone]) if any of the following occur:      You have a severe headache or dizziness.     Your heart is beating faster than usual.    You have a fever or chills.     Your skin is itchy, swollen, or you have a rash.     You have nausea or are vomiting for more than 8 hours after the procedure.      You have questions or concerns about your condition or care.  Self-care:   Have someone stay with you for 24 hours. This person can drive you to errands and help you do things around the house. This person can also watch for problems.      Rest and do quiet activities for 24 hours. Do not exercise, ride a bike, or play sports. Stand up slowly to prevent dizziness and falls. Take short walks around the house with another person. Slowly return to your usual activities the next day.      Do not drive or use dangerous machines or tools for 24 hours. You may injure yourself or others. Examples include a lawnmower, saw, or drill. Do not return to work for 24 hours if you use dangerous machines or tools for work.      Do not make important decisions for 24 hours. For example, do not sign important papers or invest money.      Drink liquids as directed. Liquids help flush the sedation medicine out of your body. Ask how much liquid to drink each day and which liquids are best for you.      Eat small, frequent meals to prevent nausea and vomiting. Start  with clear liquids such as juice or broth. If you do not vomit after clear liquids, you can eat your usual foods.      Do not drink alcohol or take medicines that make you drowsy. This includes medicines that help you sleep and anxiety medicines. Ask your healthcare provider if it is safe for you to take pain medicine.  Follow up with your healthcare provider as directed: Write down your questions so you remember to ask them during your visits.

## 2024-12-03 NOTE — BRIEF OP NOTE (RAD/CATH)
INTERVENTIONAL RADIOLOGY PROCEDURE NOTE    Date: 12/3/2024    Procedure:   Procedure Summary       Date: 12/03/24 Room / Location: [facility] Interventional Radiology    Anesthesia Start:  Anesthesia Stop:     Procedure: IR PORT PLACEMENT Diagnosis:       Endometrial cancer (HCC)      (chemo administration)    Scheduled Providers: Rene Crawley MD Responsible Provider:     Anesthesia Type: Not recorded ASA Status: Not recorded            Preoperative diagnosis:   1. Endometrial cancer (HCC)         Postoperative diagnosis: Same.    Surgeon: Jamaal Colindres MD     Assistant: None. No qualified resident was available.    Blood loss: Minimal    Specimens: None     Findings:   Uncomplicated right chest/right internal jugular vein port placement.  Tip terminates at the superior cavoatrial junction.    Port ready for immediate use.    Complications: None immediate.    Anesthesia: conscious sedation

## 2024-12-04 ENCOUNTER — HOSPITAL ENCOUNTER (OUTPATIENT)
Dept: INFUSION CENTER | Facility: CLINIC | Age: 69
Discharge: HOME | End: 2024-12-04
Payer: COMMERCIAL

## 2024-12-04 ENCOUNTER — TELEPHONE (OUTPATIENT)
Age: 69
End: 2024-12-04

## 2024-12-04 VITALS — TEMPERATURE: 98 F

## 2024-12-04 DIAGNOSIS — R68.89 OTHER GENERAL SYMPTOMS AND SIGNS: ICD-10-CM

## 2024-12-04 DIAGNOSIS — C54.1 ENDOMETRIAL CANCER (HCC): ICD-10-CM

## 2024-12-04 DIAGNOSIS — Z45.2 ENCOUNTER FOR CENTRAL LINE CARE: Primary | ICD-10-CM

## 2024-12-04 LAB
ALBUMIN SERPL BCG-MCNC: 3.6 G/DL (ref 3.5–5)
ALP SERPL-CCNC: 74 U/L (ref 34–104)
ALT SERPL W P-5'-P-CCNC: 8 U/L (ref 7–52)
AMYLASE SERPL-CCNC: 38 IU/L (ref 29–103)
ANION GAP SERPL CALCULATED.3IONS-SCNC: 7 MMOL/L (ref 4–13)
AST SERPL W P-5'-P-CCNC: 11 U/L (ref 13–39)
BASOPHILS # BLD AUTO: 0.05 THOUSANDS/ΜL (ref 0–0.1)
BASOPHILS NFR BLD AUTO: 1 % (ref 0–1)
BILIRUB SERPL-MCNC: 0.31 MG/DL (ref 0.2–1)
BUN SERPL-MCNC: 30 MG/DL (ref 5–25)
CALCIUM SERPL-MCNC: 8.8 MG/DL (ref 8.4–10.2)
CANCER AG125 SERPL-ACNC: 8.7 U/ML (ref 0–35)
CHLORIDE SERPL-SCNC: 108 MMOL/L (ref 96–108)
CO2 SERPL-SCNC: 24 MMOL/L (ref 21–32)
CREAT SERPL-MCNC: 1.51 MG/DL (ref 0.6–1.3)
EOSINOPHIL # BLD AUTO: 0.09 THOUSAND/ΜL (ref 0–0.61)
EOSINOPHIL NFR BLD AUTO: 2 % (ref 0–6)
ERYTHROCYTE [DISTWIDTH] IN BLOOD BY AUTOMATED COUNT: 14.5 % (ref 11.6–15.1)
GFR SERPL CREATININE-BSD FRML MDRD: 34 ML/MIN/1.73SQ M
GLUCOSE SERPL-MCNC: 152 MG/DL (ref 65–140)
HCT VFR BLD AUTO: 35.9 % (ref 34.8–46.1)
HGB BLD-MCNC: 11 G/DL (ref 11.5–15.4)
IMM GRANULOCYTES # BLD AUTO: 0.01 THOUSAND/UL (ref 0–0.2)
IMM GRANULOCYTES NFR BLD AUTO: 0 % (ref 0–2)
LIPASE SERPL-CCNC: 23 U/L (ref 11–82)
LYMPHOCYTES # BLD AUTO: 1.11 THOUSANDS/ΜL (ref 0.6–4.47)
LYMPHOCYTES NFR BLD AUTO: 27 % (ref 14–44)
MAGNESIUM SERPL-MCNC: 1.9 MG/DL (ref 1.9–2.7)
MCH RBC QN AUTO: 26.2 PG (ref 26.8–34.3)
MCHC RBC AUTO-ENTMCNC: 30.6 G/DL (ref 31.4–37.4)
MCV RBC AUTO: 86 FL (ref 82–98)
MONOCYTES # BLD AUTO: 0.39 THOUSAND/ΜL (ref 0.17–1.22)
MONOCYTES NFR BLD AUTO: 9 % (ref 4–12)
NEUTROPHILS # BLD AUTO: 2.53 THOUSANDS/ΜL (ref 1.85–7.62)
NEUTS SEG NFR BLD AUTO: 61 % (ref 43–75)
NRBC BLD AUTO-RTO: 0 /100 WBCS
PLATELET # BLD AUTO: 150 THOUSANDS/UL (ref 149–390)
PMV BLD AUTO: 13 FL (ref 8.9–12.7)
POTASSIUM SERPL-SCNC: 4 MMOL/L (ref 3.5–5.3)
PROT SERPL-MCNC: 6.6 G/DL (ref 6.4–8.4)
RBC # BLD AUTO: 4.2 MILLION/UL (ref 3.81–5.12)
SODIUM SERPL-SCNC: 139 MMOL/L (ref 135–147)
TSH SERPL DL<=0.05 MIU/L-ACNC: 2.11 UIU/ML (ref 0.45–4.5)
WBC # BLD AUTO: 4.18 THOUSAND/UL (ref 4.31–10.16)

## 2024-12-04 PROCEDURE — 85025 COMPLETE CBC W/AUTO DIFF WBC: CPT

## 2024-12-04 PROCEDURE — 82150 ASSAY OF AMYLASE: CPT

## 2024-12-04 PROCEDURE — 83735 ASSAY OF MAGNESIUM: CPT

## 2024-12-04 PROCEDURE — 83690 ASSAY OF LIPASE: CPT

## 2024-12-04 PROCEDURE — 80053 COMPREHEN METABOLIC PANEL: CPT

## 2024-12-04 PROCEDURE — 84443 ASSAY THYROID STIM HORMONE: CPT

## 2024-12-04 PROCEDURE — 86304 IMMUNOASSAY TUMOR CA 125: CPT

## 2024-12-04 RX ORDER — SODIUM CHLORIDE 9 MG/ML
20 INJECTION, SOLUTION INTRAVENOUS ONCE
Status: CANCELLED | OUTPATIENT
Start: 2024-12-06

## 2024-12-04 RX ORDER — PALONOSETRON 0.05 MG/ML
0.25 INJECTION, SOLUTION INTRAVENOUS ONCE
Status: CANCELLED | OUTPATIENT
Start: 2024-12-06

## 2024-12-04 NOTE — TELEPHONE ENCOUNTER
Called and spoke with Patient. Told patient that per Sharon, Please have Patient hold off on deep cleans while on treatment. She can proceed with standard cleaning. Patient gave her verbal understanding.

## 2024-12-04 NOTE — TELEPHONE ENCOUNTER
Please have her hold off on deep cleans while on treatment. She can proceed with standard cleaning.   Thanks!

## 2024-12-04 NOTE — PROGRESS NOTES
Pt here for central labs, R chest PAC placed 12/3-patient denies any discomforts/itching at the site, no fevers. Afebrile today. Site appears reddened around the PAC insertion site and below. No temperature difference to touch from non-compromised skin. Above information & picture sent to Sharon WALKER via ESC, okay to proceed with central labs today as ordered. Pt will send mychart picture tomorrow AM to office (patient's  educated on how to do this) & call office with any concerns for s/s of infection/pain. Labs drawn and sent to the lab. Port flushed per protocol. Aware of next appointment on 12/6 @ 8am. AVS declined..

## 2024-12-04 NOTE — TELEPHONE ENCOUNTER
Received a phone call from patient.  Patient inquiring if she can continue receiving deep cleanings from periodontist while receiving treatment.  Patient states that she goes every 3 months for cleaning.  Please advise and call patient with any further recommendations.

## 2024-12-06 ENCOUNTER — HOSPITAL ENCOUNTER (OUTPATIENT)
Dept: INFUSION CENTER | Facility: CLINIC | Age: 69
End: 2024-12-06
Payer: COMMERCIAL

## 2024-12-06 VITALS
DIASTOLIC BLOOD PRESSURE: 75 MMHG | TEMPERATURE: 97.7 F | BODY MASS INDEX: 41.84 KG/M2 | WEIGHT: 227.36 LBS | OXYGEN SATURATION: 95 % | SYSTOLIC BLOOD PRESSURE: 115 MMHG | HEIGHT: 62 IN | HEART RATE: 71 BPM

## 2024-12-06 DIAGNOSIS — C54.1 ENDOMETRIAL CANCER (HCC): Primary | ICD-10-CM

## 2024-12-06 PROCEDURE — 96413 CHEMO IV INFUSION 1 HR: CPT

## 2024-12-06 PROCEDURE — 96415 CHEMO IV INFUSION ADDL HR: CPT

## 2024-12-06 PROCEDURE — 96417 CHEMO IV INFUS EACH ADDL SEQ: CPT

## 2024-12-06 PROCEDURE — 96367 TX/PROPH/DG ADDL SEQ IV INF: CPT

## 2024-12-06 PROCEDURE — 96375 TX/PRO/DX INJ NEW DRUG ADDON: CPT

## 2024-12-06 RX ORDER — PALONOSETRON 0.05 MG/ML
0.25 INJECTION, SOLUTION INTRAVENOUS ONCE
Status: COMPLETED | OUTPATIENT
Start: 2024-12-06 | End: 2024-12-06

## 2024-12-06 RX ORDER — SODIUM CHLORIDE 9 MG/ML
20 INJECTION, SOLUTION INTRAVENOUS ONCE
Status: COMPLETED | OUTPATIENT
Start: 2024-12-06 | End: 2024-12-06

## 2024-12-06 RX ADMIN — CARBOPLATIN 314 MG: 600 INJECTION, SOLUTION INTRAVENOUS at 15:04

## 2024-12-06 RX ADMIN — FOSAPREPITANT 150 MG: 150 INJECTION, POWDER, LYOPHILIZED, FOR SOLUTION INTRAVENOUS at 08:47

## 2024-12-06 RX ADMIN — DEXAMETHASONE SODIUM PHOSPHATE 20 MG: 10 INJECTION, SOLUTION INTRAMUSCULAR; INTRAVENOUS at 09:23

## 2024-12-06 RX ADMIN — PALONOSETRON HYDROCHLORIDE 0.25 MG: 0.25 INJECTION INTRAVENOUS at 10:40

## 2024-12-06 RX ADMIN — SODIUM CHLORIDE 20 ML/HR: 0.9 INJECTION, SOLUTION INTRAVENOUS at 08:44

## 2024-12-06 RX ADMIN — FAMOTIDINE 20 MG: 10 INJECTION INTRAVENOUS at 10:13

## 2024-12-06 RX ADMIN — DIPHENHYDRAMINE HYDROCHLORIDE 25 MG: 50 INJECTION, SOLUTION INTRAMUSCULAR; INTRAVENOUS at 09:52

## 2024-12-06 RX ADMIN — SODIUM CHLORIDE 200 MG: 9 INJECTION, SOLUTION INTRAVENOUS at 10:49

## 2024-12-06 RX ADMIN — PACLITAXEL 267.6 MG: 6 INJECTION, SOLUTION INTRAVENOUS at 11:40

## 2024-12-06 NOTE — PROGRESS NOTES
"Pt notified RN that her shirt felt \"wet\" by her port site while Taxol was infusing. RN found port needle completely out of the pt. Infusion was stopped and chemo spill protocol was put into place. Once clean and shirt changed, RN reaccessed pt's port without issue. Taxol was restarted.  "

## 2024-12-06 NOTE — PROGRESS NOTES
Patient tolerated her treatment without any adverse reactions. Next appointment confirmed 12/11/24 at 0830 at [place]. AVS given

## 2024-12-06 NOTE — PROGRESS NOTES
Patient is here for D1C1 of keytruda, taxol and carboplatin. Labs reviewed and meet parameters. Patient offers no complaints at this time

## 2024-12-11 ENCOUNTER — HOSPITAL ENCOUNTER (OUTPATIENT)
Dept: INFUSION CENTER | Facility: CLINIC | Age: 69
Discharge: HOME | End: 2024-12-11
Payer: COMMERCIAL

## 2024-12-11 DIAGNOSIS — C54.1 ENDOMETRIAL CANCER (HCC): ICD-10-CM

## 2024-12-11 DIAGNOSIS — C54.1 ENDOMETRIAL CANCER (HCC): Primary | ICD-10-CM

## 2024-12-11 DIAGNOSIS — Z45.2 ENCOUNTER FOR CENTRAL LINE CARE: Primary | ICD-10-CM

## 2024-12-11 LAB
ALBUMIN SERPL BCG-MCNC: 3.8 G/DL (ref 3.5–5)
ALP SERPL-CCNC: 67 U/L (ref 34–104)
ALT SERPL W P-5'-P-CCNC: 15 U/L (ref 7–52)
ANION GAP SERPL CALCULATED.3IONS-SCNC: 5 MMOL/L (ref 4–13)
AST SERPL W P-5'-P-CCNC: 19 U/L (ref 13–39)
BASOPHILS # BLD AUTO: 0 THOUSANDS/ÂΜL (ref 0–0.1)
BASOPHILS NFR BLD AUTO: 0 % (ref 0–1)
BILIRUB SERPL-MCNC: 0.39 MG/DL (ref 0.2–1)
BUN SERPL-MCNC: 28 MG/DL (ref 5–25)
CALCIUM SERPL-MCNC: 8.9 MG/DL (ref 8.4–10.2)
CHLORIDE SERPL-SCNC: 110 MMOL/L (ref 96–108)
CO2 SERPL-SCNC: 23 MMOL/L (ref 21–32)
CREAT SERPL-MCNC: 1.27 MG/DL (ref 0.6–1.3)
EOSINOPHIL # BLD AUTO: 0.19 THOUSAND/ÂΜL (ref 0–0.61)
EOSINOPHIL NFR BLD AUTO: 6 % (ref 0–6)
ERYTHROCYTE [DISTWIDTH] IN BLOOD BY AUTOMATED COUNT: 15.2 % (ref 11.6–15.1)
GFR SERPL CREATININE-BSD FRML MDRD: 42 ML/MIN/1.73SQ M
GLUCOSE SERPL-MCNC: 142 MG/DL (ref 65–140)
HCT VFR BLD AUTO: 35.7 % (ref 34.8–46.1)
HGB BLD-MCNC: 11 G/DL (ref 11.5–15.4)
IMM GRANULOCYTES # BLD AUTO: 0.01 THOUSAND/UL (ref 0–0.2)
IMM GRANULOCYTES NFR BLD AUTO: 0 % (ref 0–2)
LYMPHOCYTES # BLD AUTO: 0.61 THOUSANDS/ÂΜL (ref 0.6–4.47)
LYMPHOCYTES NFR BLD AUTO: 20 % (ref 14–44)
MCH RBC QN AUTO: 26.2 PG (ref 26.8–34.3)
MCHC RBC AUTO-ENTMCNC: 30.8 G/DL (ref 31.4–37.4)
MCV RBC AUTO: 85 FL (ref 82–98)
MONOCYTES # BLD AUTO: 0.07 THOUSAND/ÂΜL (ref 0.17–1.22)
MONOCYTES NFR BLD AUTO: 2 % (ref 4–12)
NEUTROPHILS # BLD AUTO: 2.19 THOUSANDS/ÂΜL (ref 1.85–7.62)
NEUTS SEG NFR BLD AUTO: 72 % (ref 43–75)
NRBC BLD AUTO-RTO: 0 /100 WBCS
PLATELET # BLD AUTO: 104 THOUSANDS/UL (ref 149–390)
PLATELET BLD QL SMEAR: ABNORMAL
PMV BLD AUTO: 13.1 FL (ref 8.9–12.7)
POTASSIUM SERPL-SCNC: 4.1 MMOL/L (ref 3.5–5.3)
PROT SERPL-MCNC: 6.6 G/DL (ref 6.4–8.4)
RBC # BLD AUTO: 4.2 MILLION/UL (ref 3.81–5.12)
RBC MORPH BLD: NORMAL
SODIUM SERPL-SCNC: 138 MMOL/L (ref 135–147)
WBC # BLD AUTO: 3.07 THOUSAND/UL (ref 4.31–10.16)

## 2024-12-11 PROCEDURE — 85025 COMPLETE CBC W/AUTO DIFF WBC: CPT

## 2024-12-11 PROCEDURE — 80053 COMPREHEN METABOLIC PANEL: CPT

## 2024-12-11 RX ORDER — CHLORHEXIDINE GLUCONATE ORAL RINSE 1.2 MG/ML
SOLUTION DENTAL
COMMUNITY
Start: 2024-11-22

## 2024-12-11 RX ORDER — SODIUM FLUORIDE 6.1 MG/ML
PASTE, DENTIFRICE DENTAL
COMMUNITY
Start: 2024-11-22

## 2024-12-11 RX ORDER — LIDOCAINE/PRILOCAINE 2.5 %-2.5%
CREAM (GRAM) TOPICAL
Qty: 30 G | Refills: 1 | Status: SHIPPED | OUTPATIENT
Start: 2024-12-11

## 2024-12-11 NOTE — PROGRESS NOTES
Patient is here for central labs. She offers no complaints at this time. Labs drawn per 's orders. Next appointment confirmed 12/18/24 at 1300 at [place]. Patient declined avs

## 2024-12-12 ENCOUNTER — OFFICE VISIT (OUTPATIENT)
Dept: CARDIOLOGY CLINIC | Facility: CLINIC | Age: 69
End: 2024-12-12
Payer: COMMERCIAL

## 2024-12-12 VITALS
BODY MASS INDEX: 42.53 KG/M2 | HEART RATE: 89 BPM | OXYGEN SATURATION: 97 % | SYSTOLIC BLOOD PRESSURE: 118 MMHG | HEIGHT: 61 IN | WEIGHT: 225.3 LBS | DIASTOLIC BLOOD PRESSURE: 72 MMHG

## 2024-12-12 DIAGNOSIS — I26.99 PULMONARY EMBOLI (HCC): ICD-10-CM

## 2024-12-12 DIAGNOSIS — I42.0 DILATED CARDIOMYOPATHY (HCC): ICD-10-CM

## 2024-12-12 DIAGNOSIS — I49.3 PREMATURE VENTRICULAR CONTRACTIONS: Primary | ICD-10-CM

## 2024-12-12 DIAGNOSIS — I44.7 LBBB (LEFT BUNDLE BRANCH BLOCK): ICD-10-CM

## 2024-12-12 PROBLEM — R94.31 ABNORMAL EKG: Status: RESOLVED | Noted: 2019-12-10 | Resolved: 2024-12-12

## 2024-12-12 PROCEDURE — 99214 OFFICE O/P EST MOD 30 MIN: CPT | Performed by: INTERNAL MEDICINE

## 2024-12-12 NOTE — ASSESSMENT & PLAN NOTE
screened for ischemic disease with a stress test that was normal for perfusion.    EF noted to be 48%, likely from conduction system disease with LBBB, no TID noted  Repeat nuclear stress testing completed in October 2024 due to reduced ejection fraction noted in the setting of left bundle branch block, and possibly stress cardiomyopathy in the setting of acute pulmonary embolism.  This revealed no evidence of ischemia with normal LV size and function with an ejection fraction of 60%

## 2024-12-12 NOTE — PROGRESS NOTES
Cardiology Follow Up    Cherelle Dash  1955  [id number]  [facility]AS[facility] Center[facility]APARRITA  [address]  [place] [address] [phone]      Assessment & Plan  Pulmonary emboli (HCC)  Continues on Eliquis  Premature ventricular contractions  noted to have a few PVCs and brief NSVT during hospitalization for RSV.    Echocardiogram revealed normal LV size and function with G1DD, abnormal septal motion due to LBBB, LAE, mild to mod MR.    She was started on a B-blocker and her heart rates are improved.   Continues on B-blocker without side effects or new symptoms.  Dose was recently increased of beta-blocker due to mildly reduced ejection fraction noted on echocardiogram the setting of pulmonary embolism  LBBB (left bundle branch block)  screened for ischemic disease with a stress test that was normal for perfusion.    EF noted to be 48%, likely from conduction system disease with LBBB, no TID noted  Repeat nuclear stress testing completed in October 2024 due to reduced ejection fraction noted in the setting of left bundle branch block, and possibly stress cardiomyopathy in the setting of acute pulmonary embolism.  This revealed no evidence of ischemia with normal LV size and function with an ejection fraction of 60%  Dilated cardiomyopathy (HCC)  Noted on echocardiogram in the setting of acute pulmonary embolism with an ejection fraction of 40% with mild RV dilation and reduced function as well  Now with recovered ejection fraction on nuclear stress testing 1 month later in October 2024  Likely her cardiomyopathy is related to stress cardiomyopathy in the setting of acute PE, and has since recovered, unlikely related to any other etiology like her endometrial cancer  Remains euvolemic and doing well currently    Chief Complaint   Patient presents with    Follow-up     No cardiac complaint.       Interval History: Patient feels well, without complaints.  No reported chest pain,  "shortness of breath, palpitations, lightheadedness, syncope, LE edema, orthopnea, PND, or significant weight changes.  Patient remains active without any increased fatigue out of the ordinary.        Blood pressure 118/72, pulse 89, height 5' 1\" (1.549 m), weight 102 kg (225 lb 4.8 oz), SpO2 97%, not currently breastfeeding.      Review of Systems:  Review of Systems   Constitutional:  Negative for activity change, appetite change, chills, diaphoresis, fatigue and unexpected weight change.   HENT:  Negative for hearing loss, nosebleeds and sore throat.    Eyes:  Negative for photophobia and visual disturbance.   Respiratory:  Negative for cough, chest tightness, shortness of breath and wheezing.    Cardiovascular:  Negative for chest pain, palpitations and leg swelling.   Gastrointestinal:  Negative for abdominal pain, diarrhea, nausea and vomiting.   Endocrine: Negative for polyuria.   Genitourinary:  Negative for dysuria, frequency and hematuria.   Musculoskeletal:  Negative for arthralgias, back pain, gait problem and neck pain.   Skin:  Negative for pallor and rash.   Neurological:  Negative for dizziness, syncope and headaches.   Hematological:  Does not bruise/bleed easily.   Psychiatric/Behavioral:  Negative for behavioral problems and confusion.        Physical Exam:  Physical Exam  Vitals reviewed.   Constitutional:       Appearance: Normal appearance. She is well-developed. She is not ill-appearing or diaphoretic.   HENT:      Head: Normocephalic and atraumatic.      Nose: Nose normal.   Eyes:      General: No scleral icterus.     Extraocular Movements: Extraocular movements intact.      Pupils: Pupils are equal, round, and reactive to light.   Neck:      Vascular: No JVD.   Cardiovascular:      Rate and Rhythm: Normal rate and regular rhythm.      Heart sounds: Normal heart sounds. No murmur heard.     No friction rub. No gallop.   Pulmonary:      Effort: Pulmonary effort is normal. No respiratory " distress.      Breath sounds: Normal breath sounds. No wheezing or rales.   Abdominal:      General: Bowel sounds are normal. There is no distension.      Palpations: Abdomen is soft.      Tenderness: There is no abdominal tenderness.   Musculoskeletal:         General: No deformity. Normal range of motion.      Cervical back: Normal range of motion and neck supple.      Right lower leg: No edema.      Left lower leg: No edema.   Skin:     General: Skin is warm and dry.      Findings: No rash.   Neurological:      Mental Status: She is alert and oriented to person, place, and time.      Cranial Nerves: No cranial nerve deficit.   Psychiatric:         Mood and Affect: Mood normal.         Behavior: Behavior normal.         Patient Active Problem List   Diagnosis    Bipolar 1 disorder, mixed, severe ([facility])    Depression with anxiety    Gastroesophageal reflux disease without esophagitis    Hypothyroidism    Impaired fasting glucose    Insomnia    Obstructive sleep apnea    Onychomycosis of toenail    Patellofemoral arthritis of right knee    Stress incontinence of urine    High triglycerides    Raynaud's disease without gangrene    Stage 3 chronic kidney disease ([facility])    Vitamin D deficiency    Primary osteoarthritis of left knee    Primary osteoarthritis of right knee    Secondary hyperparathyroidism of renal origin ([facility])    Persistent proteinuria    Urinary frequency    Morbid obesity with BMI of 40.0-44.9, adult ([facility])    Mass of right hand    Lichen sclerosus    Premature ventricular contractions    LBBB (left bundle branch block)    Other sleep disorders    Hyperphosphatemia    PMB (postmenopausal bleeding)    Endometrial cancer ([facility])    Other pulmonary embolism without acute cor pulmonale ([facility])    Dilated cardiomyopathy ([facility])    Hospital discharge follow-up    Encounter for central line care    Palliative care by specialist    Anxiety about health    Goals of care, counseling/discussion     Past Medical History:    Diagnosis Date    Abnormal ECG     Anxiety 2002    Arthritis     Bipolar 1 disorder (HCC)     Cervical cancer (HCC)     Chronic kidney disease     stage 3    CPAP (continuous positive airway pressure) dependence     Depression 2001    Disease of thyroid gland     DVT (deep venous thrombosis) (HCC)     BLLE    Elevated blood pressure reading 06/10/2019    GERD (gastroesophageal reflux disease)     Obesity     Pulmonary emboli (HCC)     B/L    RSV (acute bronchiolitis due to respiratory syncytial virus) 12/05/2023    Sleep apnea     Sleep apnea, obstructive 2007    Uterine cancer (HCC)      Social History     Socioeconomic History    Marital status: /Civil Union     Spouse name: Not on file    Number of children: Not on file    Years of education: Not on file    Highest education level: Not on file   Occupational History    Not on file   Tobacco Use    Smoking status: Former     Current packs/day: 0.00     Average packs/day: 0.3 packs/day for 30.0 years (7.5 ttl pk-yrs)     Types: Cigarettes     Start date: 1/1/1979     Quit date: 1/1/2009     Years since quitting: 15.9     Passive exposure: Never    Smokeless tobacco: Never   Vaping Use    Vaping status: Never Used   Substance and Sexual Activity    Alcohol use: Yes     Alcohol/week: 1.0 standard drink of alcohol     Types: 1 Glasses of wine per week    Drug use: Never    Sexual activity: Not Currently     Partners: Male     Birth control/protection: Post-menopausal   Other Topics Concern    Not on file   Social History Narrative    Daily coffee consumption: 3 cups/day     Social Drivers of Health     Financial Resource Strain: Low Risk  (1/24/2024)    Overall Financial Resource Strain (CARDIA)     Difficulty of Paying Living Expenses: Not hard at all   Food Insecurity: No Food Insecurity (11/21/2023)    Nursing - Inadequate Food Risk Classification     Worried About Running Out of Food in the Last Year: Never true     Ran Out of Food in the Last Year: Never  true     Ran Out of Food in the Last Year: Not on file   Transportation Needs: No Transportation Needs (1/24/2024)    PRAPARE - Transportation     Lack of Transportation (Medical): No     Lack of Transportation (Non-Medical): No   Physical Activity: Not on file   Stress: Not on file   Social Connections: Not on file   Intimate Partner Violence: Not on file   Housing Stability: Low Risk  (11/21/2023)    Housing Stability Vital Sign     Unable to Pay for Housing in the Last Year: No     Number of Times Moved in the Last Year: 1     Homeless in the Last Year: No      Family History   Problem Relation Age of Onset    Heart disease Mother     Heart Valve Disease Mother         Replacement    Diabetes Mother         2002    Heart failure Mother         Heart Valve replacement    Arthritis Father     No Known Problems Sister     No Known Problems Daughter     No Known Problems Maternal Grandmother     No Known Problems Maternal Grandfather     No Known Problems Paternal Grandmother     No Known Problems Paternal Grandfather     No Known Problems Son     No Known Problems Maternal Aunt     No Known Problems Maternal Aunt     No Known Problems Maternal Aunt     No Known Problems Maternal Aunt     No Known Problems Maternal Aunt     No Known Problems Paternal Aunt     Alcohol abuse Son         K[place]ton, 40 year old son, has issues with alcohol and alcohol abuse     Past Surgical History:   Procedure Laterality Date    COLONOSCOPY  2011    IR IVC FILTER PLACEMENT OPTIONAL/TEMPORARY  10/8/2024    IR PORT PLACEMENT  12/3/2024    LAPAROTOMY N/A 10/11/2024    Procedure: EXPLORATORY LAPAROTOMY;  Surgeon: Rodney Downing MD;  Location: [facility];  Service: Gynecology Oncology    WY HYSTEROSCOPY BX ENDOMETRIUM&/POLYPC W/WO D&C N/A 8/23/2024    Procedure: EXAM UNDER ANESTHESIA, DILATATION AND CURETTAGE, CERVICAL BIOPSIES;  Surgeon: Evni Page MD;  Location: [facility];  Service: Gynecology Oncology    WY LAPS TOTAL  HYSTERECT 250 GM/< W/RMVL TUBE/OVARY N/A 10/11/2024    Procedure: ROBOTIC ASSISTED LTH, BSO, LYMPH NODE DISSECTION, EUA;  Surgeon: Rodney Downing MD;  Location: [facility] OR;  Service: Gynecology Oncology       Current Outpatient Medications:     apixaban (Eliquis) 5 mg, Take 1 tablet (5 mg total) by mouth 2 (two) times a day, Disp: 180 tablet, Rfl: 0    chlorhexidine (PERIDEX) 0.12 % solution, USE HALF OUNCE TWICE A DAY RINSE FOR 30 SECONDS BY MOUTH THEN EXPECTORATE DO NOT SWALLOW, Disp: , Rfl:     Cholecalciferol (VITAMIN D) 2000 units CAPS, Take 1 capsule by mouth daily, Disp: , Rfl:     ciclopirox (LOPROX) 0.77 % cream, , Disp: , Rfl:     clobetasol (TEMOVATE) 0.05 % ointment, Apply topically 2 (two) times a week, Disp: 60 g, Rfl: 3    CRANBERRY PO, Take by mouth, Disp: , Rfl:     famotidine (PEPCID) 20 mg tablet, TAKE 1 TABLET BY MOUTH TWICE  DAILY, Disp: 180 tablet, Rfl: 3    Glucosamine-Chondroit-Vit C-Mn (GLUCOSAMINE 1500 COMPLEX PO), Take by mouth in the morning, Disp: , Rfl:     Ivermectin 1 % CREA, , Disp: , Rfl:     lidocaine-prilocaine (EMLA) cream, Apply to PORT 30 min prior to labs and chemo, Disp: 30 g, Rfl: 1    LORazepam (ATIVAN) 1 mg tablet, Take 1 tablet (1 mg total) by mouth every 8 (eight) hours as needed (nausea or anxiety), Disp: 20 tablet, Rfl: 0    lurasidone (LATUDA) 20 mg tablet, TAKE 1 TABLET BY MOUTH DAILY  WITH BREAKFAST, Disp: 30 tablet, Rfl: 11    metoprolol succinate (TOPROL-XL) 50 mg 24 hr tablet, Take 1 tablet (50 mg total) by mouth daily, Disp: 90 tablet, Rfl: 3    Mirabegron ER (Myrbetriq) 25 MG TB24, TAKE 1 TABLET BY MOUTH IN THE  MORNING, Disp: 90 tablet, Rfl: 1    Multiple Vitamin (MULTI-VITAMIN DAILY) TABS, Take by mouth daily, Disp: , Rfl:     ondansetron (ZOFRAN) 8 mg tablet, Take 1 tablet (8 mg total) by mouth every 8 (eight) hours as needed for nausea or vomiting, Disp: 30 tablet, Rfl: 1    polyethylene glycol (GLYCOLAX) 17 GM/SCOOP powder, Take 17 g by mouth daily,  Disp: , Rfl:     Sodium Fluoride 5000 PPM 1.1 % GEL, As directed, Disp: , Rfl:     CRANIAL PROSTHESIS, RX,, Apply to affected area for chemotherapy-induced alopecia (Patient not taking: Reported on 12/12/2024), Disp: 1 each, Rfl: 0  No current facility-administered medications for this visit.    Facility-Administered Medications Ordered in Other Visits:     alteplase (CATHFLO) injection 2 mg, 2 mg, Intracatheter, Q1MIN PRN, Evin Page MD  Allergies   Allergen Reactions    Raw Fruit - Food Allergy Anaphylaxis     PLUMS, PEACHES, FRUIT WITH SKIN       Labs:  Hospital Outpatient Visit on 12/11/2024   Component Date Value    WBC 12/11/2024 3.07 (L)     RBC 12/11/2024 4.20     Hemoglobin 12/11/2024 11.0 (L)     Hematocrit 12/11/2024 35.7     MCV 12/11/2024 85     MCH 12/11/2024 26.2 (L)     MCHC 12/11/2024 30.8 (L)     RDW 12/11/2024 15.2 (H)     MPV 12/11/2024 13.1 (H)     Platelets 12/11/2024 104 (L)     nRBC 12/11/2024 0     Segmented % 12/11/2024 72     Immature Grans % 12/11/2024 0     Lymphocytes % 12/11/2024 20     Monocytes % 12/11/2024 2 (L)     Eosinophils Relative 12/11/2024 6     Basophils Relative 12/11/2024 0     Absolute Neutrophils 12/11/2024 2.19     Absolute Immature Grans 12/11/2024 0.01     Absolute Lymphocytes 12/11/2024 0.61     Absolute Monocytes 12/11/2024 0.07 (L)     Eosinophils Absolute 12/11/2024 0.19     Basophils Absolute 12/11/2024 0.00     Sodium 12/11/2024 138     Potassium 12/11/2024 4.1     Chloride 12/11/2024 110 (H)     CO2 12/11/2024 23     ANION GAP 12/11/2024 5     BUN 12/11/2024 28 (H)     Creatinine 12/11/2024 1.27     Glucose 12/11/2024 142 (H)     Calcium 12/11/2024 8.9     AST 12/11/2024 19     ALT 12/11/2024 15     Alkaline Phosphatase 12/11/2024 67     Total Protein 12/11/2024 6.6     Albumin 12/11/2024 3.8     Total Bilirubin 12/11/2024 0.39     eGFR 12/11/2024 42     RBC Morphology 12/11/2024 Normal     Platelet Estimate 12/11/2024 Borderline (A)    Hospital  Outpatient Visit on 12/04/2024   Component Date Value    WBC 12/04/2024 4.18 (L)     RBC 12/04/2024 4.20     Hemoglobin 12/04/2024 11.0 (L)     Hematocrit 12/04/2024 35.9     MCV 12/04/2024 86     MCH 12/04/2024 26.2 (L)     MCHC 12/04/2024 30.6 (L)     RDW 12/04/2024 14.5     MPV 12/04/2024 13.0 (H)     Platelets 12/04/2024 150     nRBC 12/04/2024 0     Segmented % 12/04/2024 61     Immature Grans % 12/04/2024 0     Lymphocytes % 12/04/2024 27     Monocytes % 12/04/2024 9     Eosinophils Relative 12/04/2024 2     Basophils Relative 12/04/2024 1     Absolute Neutrophils 12/04/2024 2.53     Absolute Immature Grans 12/04/2024 0.01     Absolute Lymphocytes 12/04/2024 1.11     Absolute Monocytes 12/04/2024 0.39     Eosinophils Absolute 12/04/2024 0.09     Basophils Absolute 12/04/2024 0.05     Sodium 12/04/2024 139     Potassium 12/04/2024 4.0     Chloride 12/04/2024 108     CO2 12/04/2024 24     ANION GAP 12/04/2024 7     BUN 12/04/2024 30 (H)     Creatinine 12/04/2024 1.51 (H)     Glucose 12/04/2024 152 (H)     Calcium 12/04/2024 8.8     AST 12/04/2024 11 (L)     ALT 12/04/2024 8     Alkaline Phosphatase 12/04/2024 74     Total Protein 12/04/2024 6.6     Albumin 12/04/2024 3.6     Total Bilirubin 12/04/2024 0.31     eGFR 12/04/2024 34     Magnesium 12/04/2024 1.9     TSH 3RD GENERATON 12/04/2024 2.111     Amylase 12/04/2024 38     Lipase 12/04/2024 23      12/04/2024 8.7    Appointment on 11/14/2024   Component Date Value    PTH 11/14/2024 36.2     Creatinine, Ur 11/14/2024 55.6     Protein Urine Random 11/14/2024 12.6     Prot/Creat Ratio, Ur 11/14/2024 0.2 (H)     WBC 11/14/2024 4.29 (L)     RBC 11/14/2024 4.25     Hemoglobin 11/14/2024 11.4 (L)     Hematocrit 11/14/2024 37.1     MCV 11/14/2024 87     MCH 11/14/2024 26.8     MCHC 11/14/2024 30.7 (L)     RDW 11/14/2024 14.1     MPV 11/14/2024 13.6 (H)     Platelets 11/14/2024 167     nRBC 11/14/2024 0     Segmented % 11/14/2024 61     Immature Grans %  11/14/2024 0     Lymphocytes % 11/14/2024 21     Monocytes % 11/14/2024 14 (H)     Eosinophils Relative 11/14/2024 3     Basophils Relative 11/14/2024 1     Absolute Neutrophils 11/14/2024 2.63     Absolute Immature Grans 11/14/2024 0.01     Absolute Lymphocytes 11/14/2024 0.90     Absolute Monocytes 11/14/2024 0.60     Eosinophils Absolute 11/14/2024 0.12     Basophils Absolute 11/14/2024 0.03     Sodium 11/14/2024 143     Potassium 11/14/2024 4.0     Chloride 11/14/2024 110 (H)     CO2 11/14/2024 22     ANION GAP 11/14/2024 11     BUN 11/14/2024 23     Creatinine 11/14/2024 1.41 (H)     Glucose, Fasting 11/14/2024 122 (H)     Calcium 11/14/2024 9.1     AST 11/14/2024 15     ALT 11/14/2024 13     Alkaline Phosphatase 11/14/2024 69     Total Protein 11/14/2024 6.9     Albumin 11/14/2024 3.7     Total Bilirubin 11/14/2024 0.38     eGFR 11/14/2024 37     Magnesium 11/14/2024 1.9     TSH 3RD GENERATON 11/14/2024 2.649     Amylase 11/14/2024 30     Lipase 11/14/2024 16      11/14/2024 54.2 (H)     Phosphorus 11/14/2024 3.7    Appointment on 11/05/2024   Component Date Value    Hemoglobin A1C 11/05/2024 6.2 (H)     EAG 11/05/2024 131    Admission on 10/11/2024, Discharged on 10/12/2024   Component Date Value    PTT 10/11/2024 24     Case Report 10/11/2024                      Value:Non-gynecologic Cytology                          Case: [id number]                                  Authorizing Provider:  Rodney Downing MD       Collected:           10/11/2024 0850              Ordering Location:     [facility]        Received:            10/11/2024 0905                                     [facility]                                                     Pathologist:           Damian Moreno DO                                                            Specimens:   A) - Peritoneal Washings                                                                            B) - Peritoneal Washings, CELL  BLOCK                                                       Final Diagnosis 10/11/2024                      Value:A.B. Peritoneal Washings,  (ThinPrep and cell block preparations):  Negative for malignancy.  Mesothelial cells, histiocytes and neutrophils.    Satisfactory for evaluation.        Note 10/11/2024                      Value:Screening performed at [facility], [address] [facility] [address].        Gross Description 10/11/2024                      Value:A. 80 mL of clear, colorless fluid, received in [facility]to[facility]yt   B. Scant cell button, white    Due to the (size and/or consistency) of the specimen, it is questionable whether cell block will survive histological processing.      Additional Information 10/11/2024                      Value:[employer]'s FDA approved ,  and ThinPrep Imaging Duo System are utilized with strict adherence to the 's instruction manual to prepare gynecologic and non-gynecologic cytology specimens for the production of ThinPrep slides as well as for gynecologic ThinPrep imaging. These processes have been validated by our laboratory and/or by the .    These tests were developed and their performance characteristics determined by [facility] or [facility]. They may not be cleared or approved by the U.S. Food and Drug Administration. The FDA has determined that such clearance or approval is not necessary. These tests are used for clinical purposes. They should not be regarded as investigational or for research. This laboratory has been approved by CLIA 88, designated as a high-complexity laboratory and is qualified to perform these tests.    Interpretation performed at [facility] [address] [place] [id number]        Case Report 10/11/2024                      Value:Surgical Pathology Report                         Case: [id number]                                  Authorizing Provider:  Rodney  CARMEN Downing MD       Collected:           10/11/2024 0857              Ordering Location:     [facility]        Received:            10/11/2024 1135                                     [facility]                                                     Pathologist:           Damian Moreno DO                                                            Specimens:   A) - Lymph Node, Sinclair, Left pelvic sentinel lymph node #1                                       B) - Lymph Node, Sinclair, Left pelvic sentinel lymph node #2                                       C) - Lymph Node, Sinclair, Right pelvic sentinel lymph node #1                                      D) - Lymph Node, Sinclair, Right pelvic sentinel lymph node #2                                      E) - Lymph Node, Sinclair, Right pelvic sentinel lymph node #3                                                                F) - Lymph Node, Sinclair, Right pelvic aortic sentinel lymph node #1                               G) - Lymph Node, Sinclair, Right pelvic aortic sentinel lymph node #2                               H) - Uterus w/Bilateral Ovaries and Fallopian Tubes                                                 I) - Vulva, Right vulvar biopsy 7 o'clock                                                  Final Diagnosis 10/11/2024                      Value:A. Left pelvic sentinel lymph node #1, excision:  -One lymph node, negative for carcinoma (0/1), see comment.    Comment: CK AE1/AE3 evaluated on block A1 and is negative, supporting above findings.      B. Left pelvic sentinel lymph node #2, see comment:  -Isolated tumor cells present in one lymph node, see comment.    Comment: Rare positive CK AE1/AE3 cells (B5-7, B5-8), consistent with isolated tumor cells. ER attempted but non-contributory.     C. Right pelvic sentinel lymph node #1, excision:  -Isolated tumor cell present in one lymph node, see comment.    Comment: Rare CK AE1/AE3  positive atypical cell (C1-3, C1-4), consistent with isolated tumor cell present.     D. Right pelvic sentinel lymph node #2, excision:  -One lymph node, negative for carcinoma (0/1), see comment.    Comment: CK AE1/AE3 evaluated on block D1 and is negative, supporting above findings.       E. Right pelvic sentinel lymph node #3, excision:  -Metastatic carcinoma involving 1 of 1 lymph node (1/1), see                           comment.  -Metastatic focus measures 0.7mm.  -No extranodal extension identified.    Comment: CK AE1/AE3 evaluated on blocks E1-E3 supports above interpretation.      F. Right para-aortic sentinel lymph node #1, excision:  -Isolated tumor cells present in one lymph node, see comment.    Comment: 0.1mm focus of atypical cells highlighted by CK AE1/AE3 (F3-2), consistent with isolated tumor cells. Deeper levels examined. ER attempted, however atypical focus no long present on deeper levels.     G. Right para-aortic sentinel lymph node #2, excision:  -Isolated tumor cell present in one lymph node, see comment.    Comment: Rare positive CK AE1/AE3 cell (G2-7), consistent with isolated tumor cells.     H. Uterus, cervix, bilateral fallopian tubes and ovaries:  -Adenocarcinoma, favor endometrioid adenocarcinoma, FIGO grade 1, see comment.   -Surgical margins and parametria negative for carcinoma.  -Lymph-vascular invasion identified, extensive.  -Bilateral ovaries with Josh tumor and                           stromal luteinization, negative for carcinoma, see note.  -Fallopian tubes negative for carcinoma.   -See synoptic report.     Comment: The mass is predominantly located in endocervical canal/lower uterine segment with cervical stromal invasion and also involves endometrial cavity with background atypical endometrial hyperplasia/endometrial intraepithelial neoplasia (AEH/EIN) and superficial myometrial invasion.     Immunostains performed on block H7 shows tumor is positive for ER (patchy),  KY (patchy), CEA-mono(patchy), p16 (patchy) p53 wild type, vimentin (patchy), CK7 positive, and CK20 focal positive. PTEN is lost.  HPV testing performed on biopsy material ([id number]) is negative. MMR testing performed at outside institution on biopsy material is reportedly intact. The morphologic and immunohistochemical findings support above interpretation.     I. Vulva, right, 7:00, biopsy:  -Lichen sclerosus.  -Negative for squamous intraepithelial lesion and malignancy.         Note 10/11/2024                      Value:BEST BLOCK FOR ANCILLARY STUDIES: H8  Intradepartmental consultation (DD) is in agreement    Note Part H: Immunostains performed on blocks H24 and H27 show Josh tumor is positive for CK AE1/AE3, CK7, GATA3, and negative for ER, PAX8, and CK20. Inhibin highlights luteinized stromal cells. Synaptophysin and chromogranin are negative.         Additional Information 10/11/2024                      Value:All reported additional testing was performed with appropriately reactive controls.  These tests were developed and their performance characteristics determined by [facility] or appropriate performing facility, though some tests may be performed on tissues which have not been validated for performance characteristics (such as staining performed on alcohol exposed cell blocks and decalcified tissues).  Results should be interpreted with caution and in the context of the patients’ clinical condition. These tests may not be cleared or approved by the U.S. Food and Drug Administration, though the FDA has determined that such clearance or approval is not necessary. These tests are used for clinical purposes and they should not be regarded as investigational or for research. This laboratory has been approved by CLIA 88, designated as a high-complexity laboratory and is qualified to perform these tests.    Interpretation performed at [facility] sezmie                            Angel[place] [address] [id number]      Synoptic Checklist 10/11/2024                      Value:ENDOMETRIUM                            ENDOMETRIUM - All Specimens                            8th Edition - Protocol posted: 12/13/2023                                                        SPECIMEN                               Procedure:    Total hysterectomy and bilateral salpingo-oophorectomy                                Hysterectomy Type:    Laparoscopic, robotic-assisted                                Specimen Integrity:    Intact                                                         TUMOR                               Tumor Site:    Endometrium                                Tumor Site:    Lower uterine segment                                Tumor Site:    Cervix                                Tumor Size:    Greatest Dimension (Centimeters): 3.8 cm                               Histologic Type:    Endometrioid carcinoma, NOS                                Histologic Grade:    FIGO grade 1                                Two-Tier Grading System:    Low grade (encompassing FIGO 1 and 2)                                Myometrial Invasion:    Present                                  Depth of Myometrial Invasion:    5 mm                                 Myometrial Thickness:    20 mm                                 Percentage of Myometrial Invasion:    Estimated to be less than 50%                                Adenomyosis:    Present, involved by carcinoma                                Uterine Serosa Involvement:    Not identified                                Lower Uterine Segment Involvement:    Present, myoinvasive                                Cervical Stromal Involvement:    Present                                  Depth of Cervical Stroma Invasion:    12 mm                                 Cervical Stroma Thickness:    15 mm                               Other Tissue / Organ Involvement:    Not applicable                                 Peritoneal / Ascitic Fluid:    Negative for malignant cells                                The International System for Reporting Serous Fluid Cytopathology:    Negative for malignancy (NFM)                                Lymphatic and / or Vascular Invasion:    Present                                  :    Extensive / substantial (greater than or equal to 5 vessel involvement)                                                         MARGINS                               Margin Status:    All margins negative for invasive carcinoma                                                         REGIONAL LYMPH NODES                               Regional Lymph Node Status:                                     :    Tumor present in pelvic lymph node(s)                                    Total Number of Pelvic Nodes with Macrometastasis:    0                                    Total Number of Pelvic Nodes with Micrometastasis:    1                                      Number of Pelvic [place] Nodes with Micrometastasis:    1                                    Laterality of Pelvic Node(s) with Tumor:    Right sentinel                                    Laterality of Pelvic Node(s) with Tumor:    Left sentinel: Isolated tumor cells                                    Size of Largest Pelvic [place] Metastatic Deposit:    0.7 mm                               :    Tumor present in para-aortic lymph node(s)                                    Total Number of Para-aortic Nodes with Macrometastasis:    0                                    Total Number of Para-aortic Nodes with Micrometastasis:    0                                    Laterality of Para-aortic Node(s) with Tumor:    Right sentinel: Isolated tumor cells                                  Lymph Nodes Examined:                                       Total Number of Pelvic Nodes Examined:    5                                    Number of Pelvic  "[place] Nodes Examined:    5                                    Total Number of Para-aortic Nodes Examined:    2                                    Number of Para-aortic [place] Nodes Examined:    2                                                         pTNM CLASSIFICATION (AJCC 8th Edition)                               Reporting of pT, pN, and (when applicable) pM categories is based on information available to the pathologist at the time the report is issued. As per the AJCC (Chapter 1, 8th Ed.) it is the managing physician’s responsibility to establish the final pathologic stage based upon all pertinent information, including but potentially not limited to this pathology report.                             pT Category:    pT2                              pN Category:    pN1mi                              N Suffix:    (sn)                                                         FIGO STAGE                             FIGO Stage:    IIIC1i                                                         ADDITIONAL FINDINGS                             Additional Findings:    Atypical hyperplasia / endometrial intraepithelial neoplasia (EIN)       Gross Description 10/11/2024                      Value:A. The specimen is received in formalin, labeled with the patient's name and hospital number, and is designated \" left pelvic sentinel lymph node #1\".  It consists of a 1.5 x 1.0 x 0.4 cm potential disrupted tan-brown lymph node.  The specimen is bisected and entirely submitted in cassette A1.  B. The specimen is received in formalin, labeled with the patient's name and hospital number, and is designated \" left pelvic sentinel lymph node #2\".  It consists of a 3.8 x 2.3 x 1.7 cm aggregate of soft tissue and potential tan-white disrupted lymph node.  Entirely submitted in cassettes B1-B6  C. The specimen is received in formalin, labeled with the patient's name and hospital number, and is designated \" right pelvic sentinel " "lymph node #1\".  It consists of a 1.5 x 1.0 x 0.5 cm potential tan-brown lymph node.  The specimen is bisected and entirely submitted in cassette C1.  D. The specimen is received in formalin, labeled with the patient's name and hospital number, and is designated \"                           right pelvic sentinel lymph node #2\".  It consists of a 1.7 x 0.7 x 0.4 cm fragment of adipose tissue with an underlying 0.4 x 0.3 x 0.3 cm tan potential lymph node.  The specimen is bisected and entirely submitted in cassette D1.  E. The specimen is received in formalin, labeled with the patient's name and hospital number, and is designated \" right pelvic sentinel lymph node #3\".  It consists of a 2.8 x 2.0 x 1.4 cm disrupted soft tissue fragment with an underlying 1.4 x 0.9 x 0.7 cm tan-brown disrupted potential lymph node.  The specimen is trisected and entirely submitted in cassettes E1-E3.  F. The specimen is received in formalin, labeled with the patient's name and hospital number, and is designated \" right pelvic aortic sentinel lymph node #1\".  It consists of a 1.9 x 1.8 x 0.9 cm soft tissue fragment with an underlying 1.4 x 0.5 x 0.7 cm disrupted potential tan-brown lymph node.  Entirely submitted in cassettes F1-F3.  G. The specimen is received in formalin, labeled with the                           patient's name and hospital number, and is designated \" right pelvic aortic sentinel lymph node #2\".  It consists of a 3.3 x 2.5 x 1.3 cm disrupted portion of soft tissue with an underlying 1.5 x 0.7 x 0.7 cm potential tan-brown disrupted lymph node.  Entirely submitted in cassettes G1-G4.  H. The specimen is received in formalin, labeled with the patient's name and hospital number, and is designated \" uterus with bilateral ovaries and fallopian tubes\".  It consists of a 146.3 g total hysterectomy with attached bilateral fallopian tubes and ovaries.  The uterus measures 10.0 cm from fundus to cervix, 5.5 cm from cornu to " cornu, and 4.5 cm from anterior to posterior.  The serosa is tan-brown, smooth to wrinkled.  The 3.5 x 3.5 cm tan-gray smooth, glistening ectocervix displays a 0.8 x 0.4 cm oval os.  The anterior soft tissue margin is inked blue and the posterior soft tissue margin is inked black.  Bivalving reveals a 3.8 x 3.3 cm tan, pale yellow, focally hemorrhagic mass occupying                           the entire anterior and posterior endocervical canal, extending into the lower uterine segment and measuring 0.4 cm to the cervical cuff margin. Sectioning reveals the mass extends 1.4 cm deep and abuts the anterior soft tissue margin and measures 0.2 cm to the anterior parametrial margin. The 3.5 x 2.7 cm ill-defined endometrial cavity is partially tan-brown and shaggy.  The endometrial lining measures less than 0.1 cm thick.  The tan-pink slightly trabeculated myometrium measures up to 1.8 cm thick with an 0.7 x 0.5 x 0.5 cm cystic area containing brown congealed material at the posterior aspect. The right fimbriated fallopian tube measures 7.0 x 0.7 cm and the left fimbriated fallopian tube measures 5.4 x 0.4 cm.  The right fimbriated end displays a 0.2 x 0.2 x 0.1 cm tan-white nodule adherent at the fimbria. Sectioning reveals tan-pink stellate lumens. The right ovary weighs 11.6 g and measures 3.5 x 2.5 x 2.2 cm and the left ovary weighs 13.6 g and measures 3.4 x 2.0 x 1.7 cm.                            The outer surfaces are tan nodular and inked black.  Sectioning the right ovary reveals a 2.5 x 2.3 x 2.2 cm serous fluid-filled smooth lined cyst with a 1.2 x 0.6 x 0.2 cm tan-white nodular excrescence and an ill-defined 1.6 x 0.7 x 0.7 cm tan-white glistening solid nodule.  Sectioning the left ovary reveals a smooth-lined multiloculated cyst and a 2.3 x 2.2 x 1.5 cm tan-white solid to cystic nodule. Digital images are acquired.  Representative sections are submitted as follows:    H1: Right anterior parametrial margin, en  "face  H2: Left anterior parametrial margin, en face  H3: Right posterior parametrial margin, en face  H4: Left posterior parametrial margin, en face  H5-H6: Anterior cervix to PAUL with mass, bisected (red ink designates upper aspect, not a margin)  H7-H8: Anterior cervix to PAUL with mass, bisected (red ink designates upper aspect, not a margin)  H9: Anterior endocervical mass at deepest extent, perpendicular  H10-H11: Anterior endomyometrium,                           full-thickness  H12-H13: Posterior cervix to PAUL with mass, bisected (red ink designates upper aspect, not a margin)  H14-H15: Posterior cervix to PAUL with mass, bisected (red ink designates upper aspect, not a margin)  H16-H17: Posterior cervix to PAUL with mass, bisected (red ink designates upper aspect, not a margin)  H18-H20: Posterior endomyometrium, full-thickness (H20 cystic area)   H21: Right fimbriated end with nodule  H22: Remainder of right fimbriated end  H23: Right fallopian tube  H24: Right ovary nodule  H25: Right ovary cyst wall excrescence  H26: Left fimbriated end, bisected  H27: Left fallopian tube  H28-H30: Left ovary nodule and cyst wall  H31-H37: Remainder of right ovary  H38-H45: Remainder of left ovary  H46-H49: Anterior endomyometrium junction entirely submitted from PAUL to fundus  H50-H54: Posterior endomyometrium junction entirely submitted from PAUL to fundus  I. The specimen is received in formalin, labeled with the patient's name and hospital number, and is                           designated \" right vulvar biopsy 7:00\".  It consists of a 0.5 x 0.5 x 0.3 cm tan-brown, gray wedge shaped skin excision.  The resection margins are inked green and the specimen is bisected.  Entirely submitted between sponges in cassette I1.    Note: The estimated total formalin fixation time based upon information provided by the submitting clinician and the standard processing schedule is over 72 hours. Aaliyah      WBC 10/12/2024 8.09     " RBC 10/12/2024 3.71 (L)     Hemoglobin 10/12/2024 10.5 (L)     Hematocrit 10/12/2024 33.1 (L)     MCV 10/12/2024 89     MCH 10/12/2024 28.3     MCHC 10/12/2024 31.7     RDW 10/12/2024 13.3     MPV 10/12/2024 13.1 (H)     Platelets 10/12/2024 151     nRBC 10/12/2024 0     Segmented % 10/12/2024 74     Immature Grans % 10/12/2024 0     Lymphocytes % 10/12/2024 17     Monocytes % 10/12/2024 9     Eosinophils Relative 10/12/2024 0     Basophils Relative 10/12/2024 0     Absolute Neutrophils 10/12/2024 5.94     Absolute Immature Grans 10/12/2024 0.02     Absolute Lymphocytes 10/12/2024 1.37     Absolute Monocytes 10/12/2024 0.74     Eosinophils Absolute 10/12/2024 0.00     Basophils Absolute 10/12/2024 0.02     Sodium 10/12/2024 144     Potassium 10/12/2024 3.9     Chloride 10/12/2024 115 (H)     CO2 10/12/2024 23     ANION GAP 10/12/2024 6     BUN 10/12/2024 23     Creatinine 10/12/2024 1.64 (H)     Glucose 10/12/2024 114     Calcium 10/12/2024 8.8     eGFR 10/12/2024 31     Magnesium 10/12/2024 2.0     PTT 10/12/2024 23     Protime 10/12/2024 13.9     INR 10/12/2024 1.05     WBC 10/12/2024 8.03     RBC 10/12/2024 3.80 (L)     Hemoglobin 10/12/2024 10.5 (L)     Hematocrit 10/12/2024 33.8 (L)     MCV 10/12/2024 89     MCH 10/12/2024 27.6     MCHC 10/12/2024 31.1 (L)     RDW 10/12/2024 13.5     Platelets 10/12/2024 155     MPV 10/12/2024 12.8 (H)    Hospital Outpatient Visit on 10/08/2024   Component Date Value    WBC 10/08/2024 6.76     RBC 10/08/2024 4.35     Hemoglobin 10/08/2024 12.3     Hematocrit 10/08/2024 37.7     MCV 10/08/2024 87     MCH 10/08/2024 28.3     MCHC 10/08/2024 32.6     RDW 10/08/2024 13.2     Platelets 10/08/2024 196     MPV 10/08/2024 12.2     Sodium 10/08/2024 143     Potassium 10/08/2024 3.9     Chloride 10/08/2024 113 (H)     CO2 10/08/2024 21     ANION GAP 10/08/2024 9     BUN 10/08/2024 19     Creatinine 10/08/2024 1.56 (H)     Glucose 10/08/2024 120     Glucose, Fasting 10/08/2024 120 (H)      Calcium 10/08/2024 9.3     eGFR 10/08/2024 33    Hospital Outpatient Visit on 10/04/2024   Component Date Value    Baseline HR 10/04/2024 76     Baseline BP 10/04/2024 146/84     O2 sat rest 10/04/2024 98     Stress peak HR 10/04/2024 97     Post peak BP 10/04/2024 126     O2 sat peak 10/04/2024 98     Recovery HR 10/04/2024 93     Recovery BP 10/04/2024 132/82     O2 sat recovery 10/04/2024 98     Rate Pressure Product 10/04/2024 12,222.0     Angina Index 10/04/2024 0     Rest Nuclear Isotope Dose 10/04/2024 10.59     Stress Nuclear Isotope D* 10/04/2024 31.60     Stress/rest perfusion ra* 10/04/2024 1.02     EF (%) 10/04/2024 60     Protocol Name 10/04/2024 MONICA SIT     Exercise duration (min) 10/04/2024 3     Exercise duration (sec) 10/04/2024 0     Post Peak Systolic BP 10/04/2024 146     Max Diastolic Bp 10/04/2024 84     Peak HR 10/04/2024 100     Max Predicted Heart Rate 10/04/2024 150     Reason for Termination 10/04/2024 TEST COMPLETE...     Test Indication 10/04/2024 Screening for CAD     Target Hr Formular 10/04/2024 (220 - Age)*100%    Lab Requisition on 10/01/2024   Component Date Value    ABO Grouping 10/01/2024 O     Rh Factor 10/01/2024 Positive     Antibody Screen 10/01/2024 Negative     Specimen Expiration Date 10/01/2024 [id number]    Appointment on 10/01/2024   Component Date Value    Ventricular Rate 10/01/2024 69     Atrial Rate 10/01/2024 69     ME Interval 10/01/2024 174     QRSD Interval 10/01/2024 130     QT Interval 10/01/2024 450     QTC Interval 10/01/2024 482     P Axis 10/01/2024 5     QRS [place] 10/01/2024 -56     T Wave [place] 10/01/2024 84    Appointment on 10/01/2024   Component Date Value    WBC 10/01/2024 5.07     RBC 10/01/2024 4.36     Hemoglobin 10/01/2024 12.4     Hematocrit 10/01/2024 39.2     MCV 10/01/2024 90     MCH 10/01/2024 28.4     MCHC 10/01/2024 31.6     RDW 10/01/2024 13.2     Platelets 10/01/2024 152     MPV 10/01/2024 13.1 (H)     Sodium 10/01/2024 143     Potassium  10/01/2024 3.9     Chloride 10/01/2024 111 (H)     CO2 10/01/2024 21     ANION GAP 10/01/2024 11     BUN 10/01/2024 24     Creatinine 10/01/2024 1.68 (H)     Glucose, Fasting 10/01/2024 131 (H)     Calcium 10/01/2024 9.3     eGFR 10/01/2024 30     Hemoglobin A1C 10/01/2024 6.0 (H)     EAG 10/01/2024 126    There may be more visits with results that are not included.     Lab Results   Component Value Date    CHOL 208 (H) 11/17/2017    TRIG 115 06/06/2024    TRIG 116 06/25/2020    HDL 54 06/06/2024    HDL 57 06/25/2020     Imaging: IR port placement  Result Date: 12/5/2024  Narrative: PROCEDURE: Port Placement Procedural Personnel Attending physician(s): Jamaal Colindres MD Pre-procedure diagnosis: Endometrial cancer Post-procedure diagnosis: Same Clinical History: Very pleasant 69-year-old woman with endometrial cancer referred for port placement in anticipation of chemotherapy. PROCEDURE SUMMARY: Insertion of tunneled centrally inserted central venous access device, with subcutaneous port PROCEDURE DETAILS: Pre-procedure Consent: Informed consent for the procedure including risks, benefits and alternatives was obtained and time-out was performed prior to the procedure. Preparation: The site was prepared and draped using maximal sterile barrier technique including cutaneous antisepsis. Anesthesia/sedation Level of anesthesia/sedation: Moderate sedation (conscious sedation) Anesthesia/sedation administered by: Independent trained observer under attending supervision with continuous monitoring of the patient's level of consciousness and physiologic status Total intra-service sedation time: 45 MIN Technique and Findings: Immediate pre-procedure ultrasound demonstrated a widely patent right internal jugular vein, which was judged appropriate for access.  Local anesthetic was administered.  A dermatotomy was made.  Under real-time ultrasound guidance, access was obtained using a 21-gauge micropuncture needle.  A  "permanent image was saved.  A microwire was advanced via the micropuncture needle and the needle exchanged for a transitional micropuncture dilator.  The intravascular distance was measured with the microwire, and then both the microwire and inner portion of the transitional dilator were removed.  A 0.035\" guidewire was advanced into the IVC.  The wire and transitional dilator were secured with a flow switch. Attention was then turned towards creation of the port pocket.  An appropriate site on the chest, approximately two finger-breadths below the collarbone, was selected.  Local anesthetic was administered to the planned pocket and along the planned tunnel tract.  An incision was made with a 15-blade scalpel.  The pocket was developed with blunt dissection.  The reservoir was inserted into the pocket and the catheter tunneled to the venous access site.  The catheter was cut to length. Attention was returned to the venous access site.  The flow switch and transitional dilator were removed.  The venotomy was serially dilated and a peel-away sheath was placed.  The port catheter was inserted via the sheath, which was then peeled away.  Fluoroscopy confirmed appropriate position of the catheter tip.  The port was tested; it flushed and aspirated well.  The reservoir was secured in the pocket with a single 3-0 Vicryl suture. The pocket was closed with 3-0 Vicryl deep dermal sutures in simple interrupted fashion, followed by 4-0 Monocryl subcuticular suture in running fashion.  The pocket incision and the neck venotomy sites were covered with Exofin glue. A completion fluoroscopic image demonstrated appropriate position of the port with tip terminating at the right atrium. Contrast Contrast dose: None Radiation Dose Fluoroscopy time: 0.7 MIN FL Reference air kerma: 11 mGy Additional Details Specimens removed: None Estimated blood loss (mL): Less than 10 Complications: No immediate complications.     Impression: " Successful placement of right chest port via the right internal jugular vein.  The tip terminates at the right atrium.  Port is ready for immediate use. Workstation performed: [id number]

## 2024-12-12 NOTE — ASSESSMENT & PLAN NOTE
Noted on echocardiogram in the setting of acute pulmonary embolism with an ejection fraction of 40% with mild RV dilation and reduced function as well  Now with recovered ejection fraction on nuclear stress testing 1 month later in October 2024  Likely her cardiomyopathy is related to stress cardiomyopathy in the setting of acute PE, and has since recovered, unlikely related to any other etiology like her endometrial cancer  Remains euvolemic and doing well currently

## 2024-12-12 NOTE — ASSESSMENT & PLAN NOTE
noted to have a few PVCs and brief NSVT during hospitalization for RSV.    Echocardiogram revealed normal LV size and function with G1DD, abnormal septal motion due to LBBB, LAE, mild to mod MR.    She was started on a B-blocker and her heart rates are improved.   Continues on B-blocker without side effects or new symptoms.  Dose was recently increased of beta-blocker due to mildly reduced ejection fraction noted on echocardiogram the setting of pulmonary embolism

## 2024-12-13 ENCOUNTER — OFFICE VISIT (OUTPATIENT)
Dept: PALLIATIVE MEDICINE | Facility: CLINIC | Age: 69
End: 2024-12-13
Payer: COMMERCIAL

## 2024-12-13 VITALS
HEIGHT: 61 IN | TEMPERATURE: 97.3 F | HEART RATE: 86 BPM | WEIGHT: 224 LBS | OXYGEN SATURATION: 97 % | SYSTOLIC BLOOD PRESSURE: 112 MMHG | DIASTOLIC BLOOD PRESSURE: 80 MMHG | BODY MASS INDEX: 42.29 KG/M2

## 2024-12-13 DIAGNOSIS — Z71.89 GOALS OF CARE, COUNSELING/DISCUSSION: ICD-10-CM

## 2024-12-13 DIAGNOSIS — Z51.5 PALLIATIVE CARE BY SPECIALIST: Primary | ICD-10-CM

## 2024-12-13 DIAGNOSIS — N18.32 STAGE 3B CHRONIC KIDNEY DISEASE (HCC): ICD-10-CM

## 2024-12-13 DIAGNOSIS — I42.0 DILATED CARDIOMYOPATHY (HCC): ICD-10-CM

## 2024-12-13 DIAGNOSIS — F31.63 BIPOLAR 1 DISORDER, MIXED, SEVERE (HCC): ICD-10-CM

## 2024-12-13 DIAGNOSIS — C54.1 ENDOMETRIAL CANCER (HCC): ICD-10-CM

## 2024-12-13 PROCEDURE — G2211 COMPLEX E/M VISIT ADD ON: HCPCS | Performed by: STUDENT IN AN ORGANIZED HEALTH CARE EDUCATION/TRAINING PROGRAM

## 2024-12-13 PROCEDURE — 99213 OFFICE O/P EST LOW 20 MIN: CPT | Performed by: STUDENT IN AN ORGANIZED HEALTH CARE EDUCATION/TRAINING PROGRAM

## 2024-12-13 NOTE — ASSESSMENT & PLAN NOTE
Following Gyn/Oncology (Dr. Page) with ongoing systemic treatments with Keytruda + Paclitaxel + Carboplatin  Next cycle scheduled treatment for 12/27/2024    No pain or issues after her first systemic treatment.  She is doing well without issues from her Port.  She is eating well without change or loss in appetite.  Able to sleep but has her usual nighttime awakenings which is not of concern for patient at this time.  No issues with diarrhea, but has had some constipation for which she uses Miralax as needed and remains effective.  Encouraged adequate hydration.

## 2024-12-13 NOTE — ASSESSMENT & PLAN NOTE
Lab Results   Component Value Date    EGFR 42 12/11/2024    EGFR 34 12/04/2024    EGFR 37 11/14/2024    CREATININE 1.27 12/11/2024    CREATININE 1.51 (H) 12/04/2024    CREATININE 1.41 (H) 11/14/2024     Patient follows Nephrology - Dr. Macias

## 2024-12-13 NOTE — PROGRESS NOTES
Name: Cherelle Dash      : 1955      MRN: [id number]  Encounter Provider: Maynor Yen DO  Encounter Date: 2024   Encounter [facility]  :  Assessment & Plan  Endometrial cancer (HCC)  Following Gyn/Oncology (Dr. Page) with ongoing systemic treatments with Keytruda + Paclitaxel + Carboplatin  Next cycle scheduled treatment for 2024    No pain or issues after her first systemic treatment.  She is doing well without issues from her Port.  She is eating well without change or loss in appetite.  Able to sleep but has her usual nighttime awakenings which is not of concern for patient at this time.  No issues with diarrhea, but has had some constipation for which she uses Miralax as needed and remains effective.  Encouraged adequate hydration.       Palliative care by specialist  Psychosocial   Supportive listening provided  Normalized experience of patient/family  Provided anxiety containment     Referrals Placed / Medical Equipment Ordered  -None today    Follow-Up Recommendations  -Follow-up with PCP and current medical specialists  -Follow-up with palliative care: 3 weeks          Stage 3b chronic kidney disease (HCC)  Lab Results   Component Value Date    EGFR 42 2024    EGFR 34 2024    EGFR 37 2024    CREATININE 1.27 2024    CREATININE 1.51 (H) 2024    CREATININE 1.41 (H) 2024     Patient follows Nephrology - Dr. Macias       Dilated cardiomyopathy (HCC)  Follows Dr. Gold of Cardiology         Goals of care, counseling/discussion  Goals - focused on disease directed cares         Bipolar 1 disorder, mixed, severe (HCC)  Managed by specialists - on Latuda  Stable at this time.           Decisional apparatus: Patient is competent on my exam today. If competence is lost, patient's substitute decision maker would default to spouse by [place] Act 169.   Advance Directive / Living Will / POLST: On file     PDMP Review: I have  reviewed the patient's controlled substance dispensing history in the Prescription Drug Monitoring Program in compliance with the [facility] regulations before prescribing any controlled substances.    History of Present Illness   Cherelle Dash is a 69 y.o. female who presents for follow-up.    Palliative Diagnosis - Endometrial Cancer Stage IIIC    Patient is seen today in office. Alert, oriented, pleasantly conversant.  Patient is seen accompanied by her , Merritt.    Appetite has been good without issues or loss.    Pain is not an issue at this time.    Patient is well supported by her  and family. She has a small family gathering planned for the upcoming holidays.       Medical History Reviewed by provider this encounter:  Tobacco  Allergies  Meds  Problems  Med Hx  Surg Hx  Fam Hx     .  Past Medical History   Past Medical History:   Diagnosis Date    Abnormal ECG     Anxiety 2002    Arthritis     Bipolar 1 disorder (HCC)     Cervical cancer (HCC)     Chronic kidney disease     stage 3    CPAP (continuous positive airway pressure) dependence     Depression 2001    Disease of thyroid gland     DVT (deep venous thrombosis) (HCC)     BLLE    Elevated blood pressure reading 06/10/2019    GERD (gastroesophageal reflux disease)     Obesity     Pulmonary emboli (HCC)     B/L    RSV (acute bronchiolitis due to respiratory syncytial virus) 12/05/2023    Sleep apnea     Sleep apnea, obstructive 2007    Uterine cancer (HCC)      Past Surgical History:   Procedure Laterality Date    COLONOSCOPY  2011    IR IVC FILTER PLACEMENT OPTIONAL/TEMPORARY  10/8/2024    IR PORT PLACEMENT  12/3/2024    LAPAROTOMY N/A 10/11/2024    Procedure: EXPLORATORY LAPAROTOMY;  Surgeon: Rodney Downing MD;  Location: [facility];  Service: Gynecology Oncology    NE HYSTEROSCOPY BX ENDOMETRIUM&/POLYPC W/WO D&C N/A 8/23/2024    Procedure: EXAM UNDER ANESTHESIA, DILATATION AND CURETTAGE, CERVICAL BIOPSIES;  Surgeon: Evin Heard  MD Kaylee;  Location:  MAIN OR;  Service: Gynecology Oncology    NE LAPS TOTAL HYSTERECT 250 GM/< W/RMVL TUBE/OVARY N/A 10/11/2024    Procedure: ROBOTIC ASSISTED LTH, BSO, LYMPH NODE DISSECTION, EUA;  Surgeon: Rodney Downing MD;  Location: [facility];  Service: Gynecology Oncology     Family History   Problem Relation Age of Onset    Heart disease Mother     Heart Valve Disease Mother         Replacement    Diabetes Mother         2002    Heart failure Mother         Heart Valve replacement    Arthritis Father     No Known Problems Sister     No Known Problems Daughter     No Known Problems Maternal Grandmother     No Known Problems Maternal Grandfather     No Known Problems Paternal Grandmother     No Known Problems Paternal Grandfather     No Known Problems Son     No Known Problems Maternal Aunt     No Known Problems Maternal Aunt     No Known Problems Maternal Aunt     No Known Problems Maternal Aunt     No Known Problems Maternal Aunt     No Known Problems Paternal Aunt     Alcohol abuse Son         Kolton, 40 year old son, has issues with alcohol and alcohol abuse      reports that she quit smoking about 15 years ago. Her smoking use included cigarettes. She started smoking about 45 years ago. She has a 7.5 pack-year smoking history. She has never been exposed to tobacco smoke. She has never used smokeless tobacco. She reports current alcohol use of about 1.0 standard drink of alcohol per week. She reports that she does not use drugs.  Current Outpatient Medications on File Prior to Visit   Medication Sig Dispense Refill    apixaban (Eliquis) 5 mg Take 1 tablet (5 mg total) by mouth 2 (two) times a day 180 tablet 0    chlorhexidine (PERIDEX) 0.12 % solution USE HALF OUNCE TWICE A DAY RINSE FOR 30 SECONDS BY MOUTH THEN EXPECTORATE DO NOT SWALLOW      Cholecalciferol (VITAMIN D) 2000 units CAPS Take 1 capsule by mouth daily      ciclopirox (LOPROX) 0.77 % cream       clobetasol (TEMOVATE) 0.05 % ointment  Apply topically 2 (two) times a week 60 g 3    CRANBERRY PO Take by mouth      famotidine (PEPCID) 20 mg tablet TAKE 1 TABLET BY MOUTH TWICE  DAILY 180 tablet 3    Glucosamine-Chondroit-Vit C-Mn (GLUCOSAMINE 1500 COMPLEX PO) Take by mouth in the morning      Ivermectin 1 % CREA       lidocaine-prilocaine (EMLA) cream Apply to PORT 30 min prior to labs and chemo 30 g 1    LORazepam (ATIVAN) 1 mg tablet Take 1 tablet (1 mg total) by mouth every 8 (eight) hours as needed (nausea or anxiety) 20 tablet 0    lurasidone (LATUDA) 20 mg tablet TAKE 1 TABLET BY MOUTH DAILY  WITH BREAKFAST 30 tablet 11    metoprolol succinate (TOPROL-XL) 50 mg 24 hr tablet Take 1 tablet (50 mg total) by mouth daily 90 tablet 3    Mirabegron ER (Myrbetriq) 25 MG TB24 TAKE 1 TABLET BY MOUTH IN THE  MORNING 90 tablet 1    Multiple Vitamin (MULTI-VITAMIN DAILY) TABS Take by mouth daily      ondansetron (ZOFRAN) 8 mg tablet Take 1 tablet (8 mg total) by mouth every 8 (eight) hours as needed for nausea or vomiting 30 tablet 1    polyethylene glycol (GLYCOLAX) 17 GM/SCOOP powder Take 17 g by mouth daily      Sodium Fluoride 5000 PPM 1.1 % GEL As directed      CRANIAL PROSTHESIS, RX, Apply to affected area for chemotherapy-induced alopecia (Patient not taking: Reported on 12/12/2024) 1 each 0     Current Facility-Administered Medications on File Prior to Visit   Medication Dose Route Frequency Provider Last Rate Last Admin    alteplase (CATHFLO) injection 2 mg  2 mg Intracatheter Q1MIN PRN Evin Page MD         Allergies   Allergen Reactions    Raw Fruit - Food Allergy Anaphylaxis     PLUMS, PEACHES, FRUIT WITH SKIN      Current Outpatient Medications on File Prior to Visit   Medication Sig Dispense Refill    apixaban (Eliquis) 5 mg Take 1 tablet (5 mg total) by mouth 2 (two) times a day 180 tablet 0    chlorhexidine (PERIDEX) 0.12 % solution USE HALF OUNCE TWICE A DAY RINSE FOR 30 SECONDS BY MOUTH THEN EXPECTORATE DO NOT SWALLOW       Cholecalciferol (VITAMIN D) 2000 units CAPS Take 1 capsule by mouth daily      ciclopirox (LOPROX) 0.77 % cream       clobetasol (TEMOVATE) 0.05 % ointment Apply topically 2 (two) times a week 60 g 3    CRANBERRY PO Take by mouth      famotidine (PEPCID) 20 mg tablet TAKE 1 TABLET BY MOUTH TWICE  DAILY 180 tablet 3    Glucosamine-Chondroit-Vit C-Mn (GLUCOSAMINE 1500 COMPLEX PO) Take by mouth in the morning      Ivermectin 1 % CREA       lidocaine-prilocaine (EMLA) cream Apply to PORT 30 min prior to labs and chemo 30 g 1    LORazepam (ATIVAN) 1 mg tablet Take 1 tablet (1 mg total) by mouth every 8 (eight) hours as needed (nausea or anxiety) 20 tablet 0    lurasidone (LATUDA) 20 mg tablet TAKE 1 TABLET BY MOUTH DAILY  WITH BREAKFAST 30 tablet 11    metoprolol succinate (TOPROL-XL) 50 mg 24 hr tablet Take 1 tablet (50 mg total) by mouth daily 90 tablet 3    Mirabegron ER (Myrbetriq) 25 MG TB24 TAKE 1 TABLET BY MOUTH IN THE  MORNING 90 tablet 1    Multiple Vitamin (MULTI-VITAMIN DAILY) TABS Take by mouth daily      ondansetron (ZOFRAN) 8 mg tablet Take 1 tablet (8 mg total) by mouth every 8 (eight) hours as needed for nausea or vomiting 30 tablet 1    polyethylene glycol (GLYCOLAX) 17 GM/SCOOP powder Take 17 g by mouth daily      Sodium Fluoride 5000 PPM 1.1 % GEL As directed      CRANIAL PROSTHESIS, RX, Apply to affected area for chemotherapy-induced alopecia (Patient not taking: Reported on 12/12/2024) 1 each 0     Current Facility-Administered Medications on File Prior to Visit   Medication Dose Route Frequency Provider Last Rate Last Admin    alteplase (CATHFLO) injection 2 mg  2 mg Intracatheter Q1MIN PRN Evin Page MD          Social History     Tobacco Use    Smoking status: Former     Current packs/day: 0.00     Average packs/day: 0.3 packs/day for 30.0 years (7.5 ttl pk-yrs)     Types: Cigarettes     Start date: 1/1/1979     Quit date: 1/1/2009     Years since quitting: 15.9     Passive exposure:  Never    Smokeless tobacco: Never   Vaping Use    Vaping status: Never Used   Substance and Sexual Activity    Alcohol use: Yes     Alcohol/week: 1.0 standard drink of alcohol     Types: 1 Glasses of wine per week    Drug use: Never    Sexual activity: Not Currently     Partners: Male     Birth control/protection: Post-menopausal        Objective   There were no vitals taken for this visit.    Physical Exam  Vitals reviewed.   Constitutional:       General: She is not in acute distress.     Appearance: Normal appearance. She is not ill-appearing, toxic-appearing or diaphoretic.   HENT:      Head: Normocephalic and atraumatic.      Nose: Nose normal.      Mouth/Throat:      Mouth: Mucous membranes are moist.   Eyes:      General:         Right eye: No discharge.         Left eye: No discharge.   Cardiovascular:      Rate and Rhythm: Normal rate.   Pulmonary:      Effort: Pulmonary effort is normal. No respiratory distress.   Abdominal:      General: Abdomen is flat. There is no distension.   Musculoskeletal:         General: No swelling.   Skin:     General: Skin is warm and dry.      Coloration: Skin is not jaundiced or pale.   Neurological:      General: No focal deficit present.      Mental Status: She is alert. Mental status is at baseline.   Psychiatric:         Mood and Affect: Mood normal.         Behavior: Behavior normal.         Thought Content: Thought content normal.         Judgment: Judgment normal.         Recent labs:  Lab Results   Component Value Date/Time    SODIUM 138 12/11/2024 08:37 AM    SODIUM 142 12/09/2020 07:36 AM    SODIUM 144 12/07/2020 08:34 AM    K 4.1 12/11/2024 08:37 AM    K 4.7 12/09/2020 07:36 AM    K 4.4 12/07/2020 08:34 AM    BUN 28 (H) 12/11/2024 08:37 AM    BUN 23 12/09/2020 07:36 AM    BUN 20 12/07/2020 08:34 AM    CREATININE 1.27 12/11/2024 08:37 AM    CREATININE 1.51 (H) 12/07/2020 08:34 AM    GLUC 142 (H) 12/11/2024 08:37 AM    GLUC 106 (H) 12/09/2020 07:36 AM    GLUC 104  (H) 12/07/2020 08:34 AM    CALCIUM 8.9 12/11/2024 08:37 AM    CALCIUM 9.4 12/07/2020 08:34 AM    AST 19 12/11/2024 08:37 AM    AST 14 12/07/2020 08:34 AM    ALT 15 12/11/2024 08:37 AM    ALT 18 12/07/2020 08:34 AM    ALB 3.8 12/11/2024 08:37 AM    ALB 3.8 12/07/2020 08:34 AM    TP 6.6 12/11/2024 08:37 AM    TP 6.7 12/07/2020 08:34 AM    EGFR 42 12/11/2024 08:37 AM    EGFR 36 (L) 12/07/2020 08:34 AM     Lab Results   Component Value Date/Time    HGB 11.0 (L) 12/11/2024 08:37 AM    HGB 14.0 03/10/2015 07:55 AM    WBC 3.07 (L) 12/11/2024 08:37 AM    WBC 5.01 03/10/2015 07:55 AM     (L) 12/11/2024 08:37 AM     03/10/2015 07:55 AM    INR 1.05 10/12/2024 05:35 AM    PTT 23 10/12/2024 05:35 AM     Lab Results   Component Value Date/Time    [id number] 2.111 12/04/2024 09:19 AM    [id number] 1.510 01/15/2018 01:02 PM       Recent Imaging:  Procedure: IR port placement  Result Date: 12/5/2024  Narrative: PROCEDURE: Port Placement Procedural Personnel Attending physician(s): Jamaal Colindres MD Pre-procedure diagnosis: Endometrial cancer Post-procedure diagnosis: Same Clinical History: Very pleasant 69-year-old woman with endometrial cancer referred for port placement in anticipation of chemotherapy. PROCEDURE SUMMARY: Insertion of tunneled centrally inserted central venous access device, with subcutaneous port PROCEDURE DETAILS: Pre-procedure Consent: Informed consent for the procedure including risks, benefits and alternatives was obtained and time-out was performed prior to the procedure. Preparation: The site was prepared and draped using maximal sterile barrier technique including cutaneous antisepsis. Anesthesia/sedation Level of anesthesia/sedation: Moderate sedation (conscious sedation) Anesthesia/sedation administered by: Independent trained observer under attending supervision with continuous monitoring of the patient's level of consciousness and physiologic status Total intra-service  "sedation time: 45 MIN Technique and Findings: Immediate pre-procedure ultrasound demonstrated a widely patent right internal jugular vein, which was judged appropriate for access.  Local anesthetic was administered.  A dermatotomy was made.  Under real-time ultrasound guidance, access was obtained using a 21-gauge micropuncture needle.  A permanent image was saved.  A microwire was advanced via the micropuncture needle and the needle exchanged for a transitional micropuncture dilator.  The intravascular distance was measured with the microwire, and then both the microwire and inner portion of the transitional dilator were removed.  A 0.035\" guidewire was advanced into the IVC.  The wire and transitional dilator were secured with a flow switch. Attention was then turned towards creation of the port pocket.  An appropriate site on the chest, approximately two finger-breadths below the collarbone, was selected.  Local anesthetic was administered to the planned pocket and along the planned tunnel tract.  An incision was made with a 15-blade scalpel.  The pocket was developed with blunt dissection.  The reservoir was inserted into the pocket and the catheter tunneled to the venous access site.  The catheter was cut to length. Attention was returned to the venous access site.  The flow switch and transitional dilator were removed.  The venotomy was serially dilated and a peel-away sheath was placed.  The port catheter was inserted via the sheath, which was then peeled away.  Fluoroscopy confirmed appropriate position of the catheter tip.  The port was tested; it flushed and aspirated well.  The reservoir was secured in the pocket with a single 3-0 Vicryl suture. The pocket was closed with 3-0 Vicryl deep dermal sutures in simple interrupted fashion, followed by 4-0 Monocryl subcuticular suture in running fashion.  The pocket incision and the neck venotomy sites were covered with Exofin glue. A completion fluoroscopic " image demonstrated appropriate position of the port with tip terminating at the right atrium. Contrast Contrast dose: None Radiation Dose Fluoroscopy time: 0.7 MIN FL Reference air kerma: 11 mGy Additional Details Specimens removed: None Estimated blood loss (mL): Less than 10 Complications: No immediate complications.     Impression: Successful placement of right chest port via the right internal jugular vein.  The tip terminates at the right atrium.  Port is ready for immediate use. Workstation performed: [id number]     Procedure: IR IVC filter placement optional/temporary  Result Date: 10/8/2024  Narrative: Inferior venacavogram and inferior venacaval filter placement Clinical History:  69 year-old female with history of PE/DVT on Eliquis, history of cervical mass with vaginal bleeding, with request for IVC filter placement prior to laparoscopic hysterectomy and bilateral salpingo-oophorectomy. Contrast: CO2 Fluoro time: 2.4 MINUTES Number of Images: Multiple Radiation dose: 220 mGy Conscious sedation time: 20 MINUTES Technique: The patient was brought to the interventional radiology suite and identified verbally and by wrist band. The patient was placed supine on the table. The right internal jugular vein was evaluated as a potential access site with ultrasound. The vessel was found to be patent and compressible. Lidocaine was administered to the skin and a small skin incision was made. Under ultrasound guidance, the right internal jugular vein was accessed using single wall Seldinger technique. Static images of real time needle entry into the vessel were obtained.  A Bentson wire was advanced into the inferior vena cava, over which a Ransom sheath was inserted. An inferior venacavogram was performed using CO2. Findings: The examination demonstrates that there is no thrombus in the inferior vena cava. The renal veins are positively identified. No caval anomalies are present. Intervention: The sheath was  positioned appropriately, and a [place] inferior vena cava filter was deployed below the level of the renal veins.     Impression: Impression: 1. Normal inferior venacavogram. 2. Satisfactory intravascular placement of a Abril filter below the level of the renal veins. Workstation performed: [id number]     Procedure: NM myocardial perfusion spect (rx stress and/or rest)  Result Date: 10/4/2024  Narrative:   Stress ECG: The ECG was uninterpretable due to left bundle branch block. after pharmacologic vasodilation, without reproduction of symptoms.   Perfusion: There is a left ventricular perfusion defect that is small in size with mild reduction in uptake present in the mid anterior location(s) that is paradoxical. On reprocessed imaging there is improvement of this defect on stress imaging. LV wall function also appears to be preserved through out the anterior wall. The defect appears to be an artifact caused by breast attenuation.   Stress Function: Left ventricular function post-stress is normal. Stress ejection fraction is 60%. No evidence of ischemia. Normal left ventricular cavity size, function, and wall thickening. No TID. Compared to prior study there is an improvement in LV EF to 60%     Procedure: Stress strip  Result Date: 10/4/2024  Narrative: Confirmed by KARLA NEWSOME ([id number]),  Tana Luo ([id number]) on 10/4/2024 11:13:49 AM    Procedure: CT chest abdomen pelvis wo contrast  Result Date: 10/2/2024  Narrative: CT CHEST, ABDOMEN AND PELVIS WITHOUT IV CONTRAST INDICATION: C53.0: Malignant neoplasm of endocervix. COMPARISON: 9/5/2024 TECHNIQUE: CT examination of the chest, abdomen and pelvis was performed without intravenous contrast. Multiplanar 2D reformatted images were created from the source data. This examination, like all CT scans performed in the [facility], was performed utilizing techniques to minimize radiation dose exposure, including the use of iterative reconstruction  and automated exposure control. Radiation dose length product (DLP) for this visit: 1913.39 mGy-cm Enteric Contrast: Not administered. FINDINGS: CHEST LUNGS: Stable 5 mm right lower lobe pulmonary nodules noted series 302 image 132.. No tracheal or endobronchial lesion. PLEURA: Unremarkable. HEART/GREAT VESSELS: Heart is unremarkable for patient's age. No thoracic aortic aneurysm. MEDIASTINUM AND RC: Moderate hiatal hernia is present. CHEST WALL AND LOWER NECK: Unremarkable. ABDOMEN LIVER/BILIARY TREE: Unremarkable. GALLBLADDER: No calcified gallstones. No pericholecystic inflammatory change. SPLEEN: Unremarkable. PANCREAS: Unremarkable. ADRENAL GLANDS: Unremarkable. KIDNEYS/URETERS: Unremarkable. No hydronephrosis. STOMACH AND BOWEL: Unremarkable. APPENDIX: No findings to suggest appendicitis. ABDOMINOPELVIC CAVITY: No ascites. No pneumoperitoneum. No lymphadenopathy. VESSELS: Unremarkable for patient's age. PELVIS REPRODUCTIVE ORGANS: The left ovary is prominent. URINARY BLADDER: Unremarkable. ABDOMINAL WALL/INGUINAL REGIONS: Small fat-containing umbilical hernia is present BONES: No acute fracture or suspicious osseous lesion.     Impression: Stable 5 mm right lower lobe pulmonary nodule. No CT evidence of metastatic disease within the abdomen or pelvis. Prominent left ovary, consider further characterization with pelvic ultrasound. Moderate hiatal hernia. Workstation performed: [id number]     Procedure: Mammo diagnostic bilateral w 3d & cad  Result Date: 9/26/2024  Narrative: DIAGNOSIS: Secondary hyperparathyroidism of renal origin (HCC); Chronic kidney disease (CKD) stage G3b/A1, moderately decreased glomerular filtration rate (GFR) between 30-44 mL/min/1.73 square meter and albuminuria creatinine ratio less than 30 mg/g (HCC); Morbid obesity (HCC); Abnormal findings on diagnostic imaging of breast TECHNIQUE: Digital diagnostic mammography was performed. Computer Aided Detection (CAD) analyzed all applicable  images. Right breast ultrasound was performed. COMPARISONS: Prior breast imaging dated: 03/25/2024, 09/21/2023, 09/21/2023, 08/01/2023, 07/20/2022, 07/13/2021, 07/07/2020, 07/02/2019, 06/26/2018, 06/20/2017, 06/09/2016, and 05/28/2015 RELEVANT HISTORY: Family Breast Cancer History: No known family history of breast cancer. Family Medical History: No known relevant family medical history. Personal History: Hormone history includes birth control. No known relevant surgical history. No known relevant medical history. RISK ASSESSMENT: 5 Year Tyrer-Cuzick: 1.06% 10 Year Tyrer-Cuzick: 2.26% Lifetime Tyrer-Cuzick: 3.6% TISSUE DENSITY: The breasts are almost entirely fatty. INDICATION: Cherelle Dash is a 69 y.o. female presenting for 6 month follow up. FINDINGS: RIGHT 1) MASS [B] Mammo diagnostic bilateral w 3d & cad: There is a mass seen in the retroareolar region of the right breast in the middle depth. Compared to the previous study, there are no significant changes. US breast right limited (diagnostic): There is a 5 mm x 2 mm x 6 mm oval, parallel, hypoechoic mass with circumscribed margins with no posterior features seen in the retroareolar region of the right breast at 12 o'clock in the middle depth, 4 cm from the nipple. The mass correlates with the prior mammogram finding and ultrasound finding. Compared to the previous study, there are no significant changes. Left Mammo diagnostic bilateral w 3d & cad There are no suspicious masses, grouped microcalcifications or areas of unexplained architectural distortion. The skin and nipple areolar complex are unremarkable.     Impression: Stable benign morphology mass 12:00 right breast probable complicated cyst.  Surveillance recommended to confirm stability. No evidence for malignancy in either breast. ASSESSMENT/BI-RADS CATEGORY: Left: 1 - Negative Right: 3 - Probably Benign Overall: 3 - Probably Benign RECOMMENDATION:      - Ultrasound in 1 year for the right breast.       - Diagnostic mammogram in 1 year for both breasts. Workstation ID: [id number] Signed by:  Brandon Aguilar MD     Procedure: US breast right limited (diagnostic)  Result Date: 9/26/2024  Narrative: DIAGNOSIS: Secondary hyperparathyroidism of renal origin (HCC); Chronic kidney disease (CKD) stage G3b/A1, moderately decreased glomerular filtration rate (GFR) between 30-44 mL/min/1.73 square meter and albuminuria creatinine ratio less than 30 mg/g (HCC); Morbid obesity (HCC); Abnormal findings on diagnostic imaging of breast TECHNIQUE: Digital diagnostic mammography was performed. Computer Aided Detection (CAD) analyzed all applicable images. Right breast ultrasound was performed. COMPARISONS: Prior breast imaging dated: 03/25/2024, 09/21/2023, 09/21/2023, 08/01/2023, 07/20/2022, 07/13/2021, 07/07/2020, 07/02/2019, 06/26/2018, 06/20/2017, 06/09/2016, and 05/28/2015 RELEVANT HISTORY: Family Breast Cancer History: No known family history of breast cancer. Family Medical History: No known relevant family medical history. Personal History: Hormone history includes birth control. No known relevant surgical history. No known relevant medical history. RISK ASSESSMENT: 5 Year Tyrer-Cuzick: 1.06% 10 Year Tyrer-Cuzick: 2.26% Lifetime Tyrer-Cuzick: 3.6% TISSUE DENSITY: The breasts are almost entirely fatty. INDICATION: Cherelle Dash is a 69 y.o. female presenting for 6 month follow up. FINDINGS: RIGHT 1) MASS [B] Mammo diagnostic bilateral w 3d & cad: There is a mass seen in the retroareolar region of the right breast in the middle depth. Compared to the previous study, there are no significant changes. US breast right limited (diagnostic): There is a 5 mm x 2 mm x 6 mm oval, parallel, hypoechoic mass with circumscribed margins with no posterior features seen in the retroareolar region of the right breast at 12 o'clock in the middle depth, 4 cm from the nipple. The mass correlates with the prior mammogram finding and  ultrasound finding. Compared to the previous study, there are no significant changes. Left Mammo diagnostic bilateral w 3d & cad There are no suspicious masses, grouped microcalcifications or areas of unexplained architectural distortion. The skin and nipple areolar complex are unremarkable.     Impression: Stable benign morphology mass 12:00 right breast probable complicated cyst.  Surveillance recommended to confirm stability. No evidence for malignancy in either breast. ASSESSMENT/BI-RADS CATEGORY: Left: 1 - Negative Right: 3 - Probably Benign Overall: 3 - Probably Benign RECOMMENDATION:      - Ultrasound in 1 year for the right breast.      - Diagnostic mammogram in 1 year for both breasts. Workstation ID: [id number] Signed by:  Brandon Aguilar MD     Procedure: Echo complete w/ contrast if indicated  Result Date: 9/6/2024  Narrative:   Left Ventricle: Left ventricular cavity size is normal. Wall thickness is mildly increased. There is mild concentric hypertrophy. The left ventricular ejection fraction is 40%. Systolic function is moderately reduced. There is moderate global hypokinesis. Diastolic function is mildly abnormal, consistent with grade I (abnormal) relaxation.   Right Ventricle: Right ventricular cavity size is dilated. Systolic function is mildly reduced.   Mitral Valve: There is mild regurgitation.     Procedure:  VAS VENOUS DUPLEX - LOWER LIMB BILATERAL  Result Date: 9/5/2024  Narrative:  [facility] REPORT CLINICAL: Indications: Patient presents with recent discovery of pulmonary embolism and physician wants to determine potential source. Risk Factors The patient has history of CKD.  FINDINGS:  Right          Impression              Popliteal      Occlusive Subsegmental  PostTibial     Occlusive Subsegmental  Calf Veins     Occlusive Subsegmental  Gastrocnemius  Occlusive Subsegmental   Left           Impression              Popliteal      Non Occlusive Thrombus  PostTibial      Occlusive Subsegmental     CONCLUSION:  Impression:  RIGHT LOWER LIMB: Evidence of acute deep vein thrombosis was noted in the popliteal, posterior tibial, gastrocnemius and soleal veins. No evidence of superficial thrombophlebitis noted. Doppler evaluation shows a normal response to augmentation maneuvers.. Popliteal, posterior tibial and anterior tibial arterial Doppler waveform's are triphasic.  LEFT LOWER LIMB: Evidence of acute deep vein thrombosis was noted in the popliteal and posterior tibial. No evidence of superficial thrombophlebitis noted. Doppler evaluation shows a normal response to augmentation maneuvers. Popliteal, posterior tibial and anterior tibial arterial Doppler waveform's are triphasic.  Technical findings were given to Nacho Chauhan DO at 5:50 PM.  SIGNATURE: Electronically Signed by: DAVI POLLACK MD on 2024-09-05 08:56:59 PM    Procedure: US bedside procedure  Result Date: 9/5/2024  Narrative: [id number]    Procedure: CTA ED chest PE Study  Result Date: 9/5/2024  Narrative: CTA - CHEST WITH IV CONTRAST - PULMONARY ANGIOGRAM INDICATION: Dimer 8, SOB/LOMBARDI. COMPARISON: CTA chest, PE study, 11/20/2023 TECHNIQUE: CTA examination of the chest was performed using angiographic technique according to a protocol specifically tailored to evaluate for pulmonary embolism. Multiplanar 2D reformatted images were created from the source data. In addition, coronal  3D MIP postprocessing was performed on the acquisition scanner. Radiation dose length product (DLP) for this visit: 381 mGy-cm . This examination, like all CT scans performed in the [facility], was performed utilizing techniques to minimize radiation dose exposure, including the use of iterative reconstruction and automated exposure control. IV Contrast: 70 mL of iohexol (OMNIPAQUE) FINDINGS: PULMONARY ARTERIAL TREE: Bilateral pulmonary emboli: Left upper and lower lobar nonocclusive emboli (305:93,  106) On the right nearly occlusive truncus anterior embolus (305:91), as well as partially occlusive in the interlobar artery ((305:105) LUNGS: 3 mm posterior right upper lobe nodule (304:67) 6 mm right lower lobe nodule (304:127) Nodules not well appreciated on the comparison November 2023 exam, particularly in the right lower lobe secondary to motion artifact at that time. There is no tracheal or endobronchial lesion. PLEURA: Unremarkable. HEART/GREAT VESSELS: Coronary calcifications no thoracic aortic aneurysm. MEDIASTINUM AND RC: Unremarkable. CHEST WALL AND LOWER NECK: Relatively symmetric ovoid soft tissue density structures extending posteriorly from the bilateral nipples. Findings stable from November 2023 exam, correlation with mammographic history advised. VISUALIZED STRUCTURES IN THE UPPER ABDOMEN: Moderate hiatal hernia Partially visualized probable small duodenal diverticulum OSSEOUS STRUCTURES: No acute fracture or destructive osseous lesion.     Impression: Bilateral pulmonary emboli. On the left, nonocclusive upper and lower lobar branches On the right, occlusive proximal truncus anterior and partially occlusive interlobular artery. No CT evidence of right heart strain. Pulmonary nodules, measuring up to 6 mm in the right lower lobe. Not well evaluated on comparison November 2023 exam secondary to motion artifact at that time. Considering smoking history, recommend follow-up chest CT in 12 months. Moderate hiatal hernia. Findings were discussed with Dr. Marcum at 1:45 p.m. on 9/5/2024 Workstation performed: [id number]     Procedure: XR chest 1 view portable  Result Date: 9/5/2024  Narrative: XR CHEST PORTABLE INDICATION: Shortness of breath. COMPARISON: CTA chest 11/20/2023, chest radiograph 11/18/2023 FINDINGS: Clear lungs. No pneumothorax or pleural effusion. Normal cardiomediastinal silhouette. Bones are unremarkable for age. Normal upper abdomen.     Impression: No acute cardiopulmonary  disease. Resident: Asif Jefferson I, the attending radiologist, have reviewed the images and agree with the final report above. Workstation performed: [id number]       Administrative Statements   I have spent a total time of 21 minutes in caring for this patient on the day of the visit/encounter including Risks and benefits of tx options, Instructions for management, Patient and family education, Importance of tx compliance, Risk factor reductions, Impressions, Counseling / Coordination of care, Documenting in the medical record, Reviewing / ordering tests, medicine, procedures  , and Obtaining or reviewing history  . Topics discussed with the patient / family include symptom assessment and management, medication review, psychosocial support, supportive listening, and anticipatory guidance.

## 2024-12-13 NOTE — ASSESSMENT & PLAN NOTE
Psychosocial   Supportive listening provided  Normalized experience of patient/family  Provided anxiety containment     Referrals Placed / Medical Equipment Ordered  -None today    Follow-Up Recommendations  -Follow-up with PCP and current medical specialists  -Follow-up with palliative care: 3 weeks

## 2024-12-17 NOTE — PROGRESS NOTES
Cardio-Oncology / Heart Failure Cardiology Clinic Note    Cherelle Dash 69 y.o. female   MRN: [id number]  Encounter: [id number]        Assessment / Plan:    # Cardio-Oncology Pertinent History  Endometrial cancer  Dx 2024  S/p surgery  Dec 2024 started chemotherapy - carboplatin, taxol, pembro  Plan is to get XRT after chemotherapy, then longer term immunotherapy    # Cardiomyopathy  # HF improved EF    ETIOLOGY:  - EF 40% in setting of acute PE (Sept 2024).  BEFORE chemotherapy.  - non-ischemic by stress test.     - Possible stress CMP due to the acute PE.  Also, LBBB may be contributing given dyssynchronous appearance on echo.  - EF improved to 60% by nuclear stress (Oct 2024)  - monitor for recurrence of LV dysfunction in setting of chronic LBBB    VOLUME:  - not requiring diuretic  - exam -  euvolemic    GDMT:  - toprol 50 mg  - would need to be cautious with ACE/ARB/ARNI in setting CKD and active chemo    DEVICE:  - chronic LBBB  - not indicated with improved EF    # PE  Diagnosed September 2024  On Eliquis  Has IVC filter, plan to remove 2 months after surgery    # HLD    Follows with general cardiology    # PVCs  On toprol    # CKD 3  Baseline Cr 1.4 - 1.6    # Obesity  BMI 42  Weight loss recommended    # Bipolar / depression /anxiety  Follows with psychiatry     # High risk medication use  carboplatin, taxol, pembro  Discussed cardiac risks of hypertension, edema, bradycardia arrhythmias, heart block, pericarditis, myocarditis.  Already has baseline troponin  Given hx of LV dysfunction, rec echo in January    # Primary cardiologist  Dr. Gold.      Today's Plan Summary:  See above assessment/plan for full details of today's plan.  Briefly,     Echo in January                 Reason For Visit / Chief Complaint:  Referred by Dr. Page (GYN ONC) for a new patient visit for history of cardiomyopathy and need for potentially heart toxic chemotherapy.    HPI:   Cherelle Dash is a 69 y.o.  female with  history as noted in the problem list and further detailed in the above assessment and plan.    Referred by Dr. Page (GYN ONC) for a new patient visit for history of cardiomyopathy and need for potentially heart toxic chemotherapy.    As above, the patient has a history of endometrial cancer.  She had surgery and has begun chemotherapy with carboplatin, Taxol, Pembro.  Ultimately she will get XRT and then further immunotherapy.  She also has a history of LV dysfunction earlier this year in the setting of an acute PE.  Further assessment of EF one month later (by nuclear stress) demonstrated recovery of LV function.  The patient follows closely with a cardiologist, Dr. Gold.    Today, the patient reports  -  Patient denies chest pain.   No SOB or LOMBARDI.   No orthopnea or PND.   No leg edema.  No palpitations, presyncope, or syncope.       Retired.   Used to work for [employer].    Former smoker. Quit in her 50s.    No ETOH.  No drugs.        Cardiac Imaging personally reviewed:  EKG 10-1-24  Sinus  LBBB       Holter or event monitor    Echo 2020  EF 60%      EF 55%    9-6-24  Mild LVH.  EF 40%.  Mild RV dysfunction  Mild MR         JUANJO    Cardiac MRI    Stress testing Nuclear stress -   No ischemia.  EF 60%       Coronary [facility]A or [facility]    RHC    CPET              Patient Active Problem List    Diagnosis Date Noted   • Palliative care by specialist 11/18/2024   • Anxiety about health 11/18/2024   • Goals of care, counseling/discussion 11/18/2024   • Encounter for central line care 11/13/2024   • Hospital discharge follow-up 09/17/2024   • Dilated cardiomyopathy (HCC) 09/09/2024   • Other pulmonary embolism without acute cor pulmonale (HCC) 09/05/2024   • PMB (postmenopausal bleeding) 08/11/2024   • Endometrial cancer (HCC) 08/11/2024   • Hyperphosphatemia 06/17/2024   • Premature ventricular contractions 12/14/2023   • LBBB (left bundle branch block) 12/14/2023   • Other sleep disorders  12/14/2023   • Lichen sclerosus 06/14/2022   • Mass of right hand 06/30/2021   • Urinary frequency 04/08/2021   • Secondary hyperparathyroidism of renal origin ([facility]) 04/02/2021   • Persistent proteinuria 04/02/2021   • Primary osteoarthritis of left knee 02/23/2021   • Primary osteoarthritis of right knee 02/23/2021   • Vitamin D deficiency 12/21/2020   • Stage 3 chronic kidney disease ([facility]) 08/25/2020   • Raynaud's disease without gangrene 03/10/2020   • Morbid obesity with BMI of 40.0-44.9, adult ([facility]) 12/20/2018   • High triglycerides 06/07/2018   • Onychomycosis of toenail 01/09/2017   • Insomnia 07/21/2015   • Stress incontinence of urine 07/21/2015   • Patellofemoral arthritis of right knee 08/11/2014   • Depression with anxiety 04/30/2014   • Hypothyroidism 06/04/2013   • Impaired fasting glucose 06/04/2013   • Bipolar 1 disorder, mixed, severe ([facility]) 05/01/2013   • Gastroesophageal reflux disease without esophagitis 05/01/2013   • Obstructive sleep apnea 05/01/2013       Past Medical History:   Diagnosis Date   • Abnormal ECG    • Anxiety 2002   • Arthritis    • Bipolar 1 disorder ([facility])    • Cervical cancer ([facility])    • Chronic kidney disease     stage 3   • CPAP (continuous positive airway pressure) dependence    • Depression 2001   • Disease of thyroid gland    • DVT (deep venous thrombosis) ([facility])     BLLE   • Elevated blood pressure reading 06/10/2019   • GERD (gastroesophageal reflux disease)    • Obesity    • Pulmonary emboli ([facility])     B/L   • RSV (acute bronchiolitis due to respiratory syncytial virus) 12/05/2023   • Sleep apnea    • Sleep apnea, obstructive 2007   • Uterine cancer ([facility])        Review of Systems   Constitutional:  Negative for chills and fever.   HENT:  Negative for nosebleeds.    Gastrointestinal:  Negative for abdominal distention and blood in stool.   Neurological:  Negative for dizziness and syncope.   Psychiatric/Behavioral:  Negative for confusion.    14-point ROS completed and  negative except as stated above and/or in the HPI.    Allergies   Allergen Reactions   • Raw Fruit - Food Allergy Anaphylaxis     PLUMS, PEACHES, FRUIT WITH SKIN       Current Outpatient Medications   Medication Instructions   • apixaban (ELIQUIS) 5 mg, Oral, 2 times daily   • chlorhexidine (PERIDEX) 0.12 % solution USE HALF OUNCE TWICE A DAY RINSE FOR 30 SECONDS BY MOUTH THEN EXPECTORATE DO NOT SWALLOW   • Cholecalciferol (VITAMIN D) 2000 units CAPS 1 capsule, Daily   • ciclopirox (LOPROX) 0.77 % cream    • clobetasol (TEMOVATE) 0.05 % ointment Topical, 2 times weekly   • CRANBERRY PO Take by mouth   • CRANIAL PROSTHESIS, RX, Apply to affected area for chemotherapy-induced alopecia   • famotidine (PEPCID) 20 mg, Oral, 2 times daily   • Glucosamine-Chondroit-Vit C-Mn (GLUCOSAMINE 1500 COMPLEX PO) Daily   • Ivermectin 1 % CREA    • lidocaine-prilocaine (EMLA) cream Apply to PORT 30 min prior to labs and chemo   • LORazepam (ATIVAN) 1 mg, Oral, Every 8 hours PRN   • lurasidone (LATUDA) 20 mg, Oral, Daily with breakfast   • metoprolol succinate (TOPROL-XL) 50 mg, Oral, Daily   • Multiple Vitamin (MULTI-VITAMIN DAILY) TABS Daily   • Myrbetriq 25 mg, Oral, Every morning   • ondansetron (ZOFRAN) 8 mg, Oral, Every 8 hours PRN   • polyethylene glycol (GLYCOLAX) 17 g, Daily   • Sodium Fluoride 5000 PPM 1.1 % GEL As directed       Social History     Socioeconomic History   • Marital status: /Civil Union     Spouse name: Not on file   • Number of children: Not on file   • Years of education: Not on file   • Highest education level: Not on file   Occupational History   • Not on file   Tobacco Use   • Smoking status: Former     Current packs/day: 0.00     Average packs/day: 0.3 packs/day for 30.0 years (7.5 ttl pk-yrs)     Types: Cigarettes     Start date: 1/1/1979     Quit date: 1/1/2009     Years since quitting: 15.9     Passive exposure: Never   • Smokeless tobacco: Never   Vaping Use   • Vaping status: Never Used    Substance and Sexual Activity   • Alcohol use: Yes     Alcohol/week: 1.0 standard drink of alcohol     Types: 1 Glasses of wine per week   • Drug use: Never   • Sexual activity: Not Currently     Partners: Male     Birth control/protection: Post-menopausal   Other Topics Concern   • Not on file   Social History Narrative    Daily coffee consumption: 3 cups/day     Social Drivers of Health     Financial Resource Strain: Low Risk  (1/24/2024)    Overall Financial Resource Strain (CARDIA)    • Difficulty of Paying Living Expenses: Not hard at all   Food Insecurity: No Food Insecurity (11/21/2023)    Nursing - Inadequate Food Risk Classification    • Worried About Running Out of Food in the Last Year: Never true    • Ran Out of Food in the Last Year: Never true    • Ran Out of Food in the Last Year: Not on file   Transportation Needs: No Transportation Needs (1/24/2024)    PRAPARE - Transportation    • Lack of Transportation (Medical): No    • Lack of Transportation (Non-Medical): No   Physical Activity: Not on file   Stress: Not on file   Social Connections: Not on file   Intimate Partner Violence: Not on file   Housing Stability: Low Risk  (11/21/2023)    Housing Stability Vital Sign    • Unable to Pay for Housing in the Last Year: No    • Number of Times Moved in the Last Year: 1    • Homeless in the Last Year: No       Family History   Problem Relation Age of Onset   • Heart disease Mother    • Heart Valve Disease Mother         Replacement   • Diabetes Mother         2002   • Heart failure Mother         Heart Valve replacement   • Arthritis Father    • No Known Problems Sister    • No Known Problems Daughter    • No Known Problems Maternal Grandmother    • No Known Problems Maternal Grandfather    • No Known Problems Paternal Grandmother    • No Known Problems Paternal Grandfather    • No Known Problems Son    • No Known Problems Maternal Aunt    • No Known Problems Maternal Aunt    • No Known Problems Maternal  "Aunt    • No Known Problems Maternal Aunt    • No Known Problems Maternal Aunt    • No Known Problems Paternal Aunt    • Alcohol abuse Son         [place], 40 year old son, has issues with alcohol and alcohol abuse       Physical Exam:  Blood pressure 114/62, pulse 80, height 5' 1\" (1.549 m), weight 103 kg (227 lb), SpO2 96%, not currently breastfeeding.  Body mass index is 42.89 kg/m².  Wt Readings from Last 3 Encounters:   12/18/24 103 kg (227 lb)   12/13/24 102 kg (224 lb)   12/12/24 102 kg (225 lb 4.8 oz)     Physical Exam  Vitals reviewed.   Constitutional:       General: She is not in acute distress.     Appearance: She is not toxic-appearing.   HENT:      Head: Normocephalic and atraumatic.   Eyes:      General: No scleral icterus.     Conjunctiva/sclera: Conjunctivae normal.   Neck:      Vascular: No carotid bruit.      Comments: JVP borderline elevated  Cardiovascular:      Rate and Rhythm: Normal rate and regular rhythm.      Heart sounds: No murmur heard.     No friction rub. No gallop.   Pulmonary:      Breath sounds: Normal breath sounds. No wheezing, rhonchi or rales.   Abdominal:      General: There is no distension.      Palpations: Abdomen is soft.      Tenderness: There is no abdominal tenderness. There is no guarding.   Musculoskeletal:      Right lower leg: No edema.      Left lower leg: No edema.   Skin:     Coloration: Skin is not jaundiced or pale.      Findings: No erythema.   Neurological:      Mental Status: She is alert. Mental status is at baseline.   Psychiatric:         Mood and Affect: Mood normal.         Behavior: Behavior normal.         Labs & Results:  Lab Results   Component Value Date    SODIUM 138 12/11/2024    K 4.1 12/11/2024     (H) 12/11/2024    CO2 23 12/11/2024    BUN 28 (H) 12/11/2024    CREATININE 1.27 12/11/2024    GLUC 142 (H) 12/11/2024    CALCIUM 8.9 12/11/2024     No results found for: \"NTBNP\"       Thank you for the opportunity to participate in the care of " this patient.    Mert Olson MD, [facility]  Staff Cardiologist  Director of Cardio-Oncology  [facility]

## 2024-12-18 ENCOUNTER — OFFICE VISIT (OUTPATIENT)
Age: 69
End: 2024-12-18
Payer: COMMERCIAL

## 2024-12-18 ENCOUNTER — HOSPITAL ENCOUNTER (OUTPATIENT)
Dept: INFUSION CENTER | Facility: CLINIC | Age: 69
Discharge: HOME | End: 2024-12-18
Payer: COMMERCIAL

## 2024-12-18 VITALS
HEIGHT: 61 IN | OXYGEN SATURATION: 96 % | BODY MASS INDEX: 42.86 KG/M2 | DIASTOLIC BLOOD PRESSURE: 62 MMHG | SYSTOLIC BLOOD PRESSURE: 114 MMHG | WEIGHT: 227 LBS | HEART RATE: 80 BPM

## 2024-12-18 DIAGNOSIS — I50.32 HEART FAILURE WITH IMPROVED EJECTION FRACTION (HFIMPEF) (HCC): ICD-10-CM

## 2024-12-18 DIAGNOSIS — I49.3 PVC (PREMATURE VENTRICULAR CONTRACTION): ICD-10-CM

## 2024-12-18 DIAGNOSIS — Z79.899 ENCOUNTER FOR MONITORING CARDIOTOXIC DRUG THERAPY: ICD-10-CM

## 2024-12-18 DIAGNOSIS — I42.0 DILATED CARDIOMYOPATHY (HCC): ICD-10-CM

## 2024-12-18 DIAGNOSIS — N18.30 STAGE 3 CHRONIC KIDNEY DISEASE, UNSPECIFIED WHETHER STAGE 3A OR 3B CKD (HCC): ICD-10-CM

## 2024-12-18 DIAGNOSIS — I26.99 OTHER PULMONARY EMBOLISM WITHOUT ACUTE COR PULMONALE, UNSPECIFIED CHRONICITY (HCC): ICD-10-CM

## 2024-12-18 DIAGNOSIS — C54.1 ENDOMETRIAL CANCER (HCC): ICD-10-CM

## 2024-12-18 DIAGNOSIS — Z51.81 ENCOUNTER FOR MONITORING CARDIOTOXIC DRUG THERAPY: ICD-10-CM

## 2024-12-18 DIAGNOSIS — Z45.2 ENCOUNTER FOR CENTRAL LINE CARE: Primary | ICD-10-CM

## 2024-12-18 DIAGNOSIS — I42.9 CARDIOMYOPATHY, UNSPECIFIED TYPE (HCC): Primary | ICD-10-CM

## 2024-12-18 DIAGNOSIS — I44.7 LBBB (LEFT BUNDLE BRANCH BLOCK): ICD-10-CM

## 2024-12-18 LAB
ALBUMIN SERPL BCG-MCNC: 3.7 G/DL (ref 3.5–5)
ALP SERPL-CCNC: 79 U/L (ref 34–104)
ALT SERPL W P-5'-P-CCNC: 12 U/L (ref 7–52)
ANION GAP SERPL CALCULATED.3IONS-SCNC: 5 MMOL/L (ref 4–13)
AST SERPL W P-5'-P-CCNC: 12 U/L (ref 13–39)
BASOPHILS # BLD AUTO: 0.02 THOUSANDS/ÂΜL (ref 0–0.1)
BASOPHILS NFR BLD AUTO: 1 % (ref 0–1)
BILIRUB SERPL-MCNC: 0.25 MG/DL (ref 0.2–1)
BUN SERPL-MCNC: 25 MG/DL (ref 5–25)
CALCIUM SERPL-MCNC: 9 MG/DL (ref 8.4–10.2)
CHLORIDE SERPL-SCNC: 110 MMOL/L (ref 96–108)
CO2 SERPL-SCNC: 26 MMOL/L (ref 21–32)
CREAT SERPL-MCNC: 1.37 MG/DL (ref 0.6–1.3)
EOSINOPHIL # BLD AUTO: 0.33 THOUSAND/ÂΜL (ref 0–0.61)
EOSINOPHIL NFR BLD AUTO: 14 % (ref 0–6)
ERYTHROCYTE [DISTWIDTH] IN BLOOD BY AUTOMATED COUNT: 15.1 % (ref 11.6–15.1)
GFR SERPL CREATININE-BSD FRML MDRD: 39 ML/MIN/1.73SQ M
GLUCOSE SERPL-MCNC: 154 MG/DL (ref 65–140)
HCT VFR BLD AUTO: 34.2 % (ref 34.8–46.1)
HGB BLD-MCNC: 10.4 G/DL (ref 11.5–15.4)
IMM GRANULOCYTES # BLD AUTO: 0 THOUSAND/UL (ref 0–0.2)
IMM GRANULOCYTES NFR BLD AUTO: 0 % (ref 0–2)
LYMPHOCYTES # BLD AUTO: 1.15 THOUSANDS/ÂΜL (ref 0.6–4.47)
LYMPHOCYTES NFR BLD AUTO: 47 % (ref 14–44)
MCH RBC QN AUTO: 26.1 PG (ref 26.8–34.3)
MCHC RBC AUTO-ENTMCNC: 30.4 G/DL (ref 31.4–37.4)
MCV RBC AUTO: 86 FL (ref 82–98)
MONOCYTES # BLD AUTO: 0.42 THOUSAND/ÂΜL (ref 0.17–1.22)
MONOCYTES NFR BLD AUTO: 17 % (ref 4–12)
NEUTROPHILS # BLD AUTO: 0.51 THOUSANDS/ÂΜL (ref 1.85–7.62)
NEUTS SEG NFR BLD AUTO: 21 % (ref 43–75)
NRBC BLD AUTO-RTO: 0 /100 WBCS
PLATELET # BLD AUTO: 129 THOUSANDS/UL (ref 149–390)
PMV BLD AUTO: 12.4 FL (ref 8.9–12.7)
POTASSIUM SERPL-SCNC: 3.8 MMOL/L (ref 3.5–5.3)
PROT SERPL-MCNC: 6.4 G/DL (ref 6.4–8.4)
RBC # BLD AUTO: 3.98 MILLION/UL (ref 3.81–5.12)
SODIUM SERPL-SCNC: 141 MMOL/L (ref 135–147)
WBC # BLD AUTO: 2.43 THOUSAND/UL (ref 4.31–10.16)

## 2024-12-18 PROCEDURE — 80053 COMPREHEN METABOLIC PANEL: CPT

## 2024-12-18 PROCEDURE — 85025 COMPLETE CBC W/AUTO DIFF WBC: CPT

## 2024-12-18 PROCEDURE — 99204 OFFICE O/P NEW MOD 45 MIN: CPT | Performed by: INTERNAL MEDICINE

## 2024-12-18 NOTE — Clinical Note
Dr Page,  I just saw your patient today in cardio-oncology clinic.  She is appearing clinically stable.  I will have her get an echo in late January after her third cycle.  Thanks Mert Olson

## 2024-12-18 NOTE — PROGRESS NOTES
Pt arrives to infusion center for lab draw. Offering no complaints. PAC accessed without issue, brisk blood returned noted, labs obtained, line flushed, and deaccessed. Pt confirmed next appoint on 12/24 @0930 . AVS declined.

## 2024-12-20 ENCOUNTER — OFFICE VISIT (OUTPATIENT)
Dept: GYNECOLOGIC ONCOLOGY | Facility: CLINIC | Age: 69
End: 2024-12-20
Payer: COMMERCIAL

## 2024-12-20 VITALS
OXYGEN SATURATION: 98 % | SYSTOLIC BLOOD PRESSURE: 126 MMHG | HEIGHT: 61 IN | RESPIRATION RATE: 16 BRPM | HEART RATE: 91 BPM | DIASTOLIC BLOOD PRESSURE: 70 MMHG | TEMPERATURE: 97.4 F | WEIGHT: 227 LBS | BODY MASS INDEX: 42.86 KG/M2

## 2024-12-20 DIAGNOSIS — D70.2 DRUG-INDUCED NEUTROPENIA (HCC): ICD-10-CM

## 2024-12-20 DIAGNOSIS — I26.99 OTHER ACUTE PULMONARY EMBOLISM WITHOUT ACUTE COR PULMONALE (HCC): ICD-10-CM

## 2024-12-20 DIAGNOSIS — C54.1 ENDOMETRIAL CANCER (HCC): Primary | ICD-10-CM

## 2024-12-20 DIAGNOSIS — N32.81 OAB (OVERACTIVE BLADDER): ICD-10-CM

## 2024-12-20 PROCEDURE — 99215 OFFICE O/P EST HI 40 MIN: CPT | Performed by: PHYSICIAN ASSISTANT

## 2024-12-20 PROCEDURE — G2211 COMPLEX E/M VISIT ADD ON: HCPCS | Performed by: PHYSICIAN ASSISTANT

## 2024-12-20 RX ORDER — MIRABEGRON 25 MG/1
25 TABLET, FILM COATED, EXTENDED RELEASE ORAL EVERY MORNING
Qty: 90 TABLET | Refills: 1 | Status: SHIPPED | OUTPATIENT
Start: 2024-12-20

## 2024-12-20 NOTE — ASSESSMENT & PLAN NOTE
Continue therapeutic anticoagulation with eliquis.     Referral for IVC filter removal, as patient is 2 months post-op.   Orders:  •  Ambulatory Referral to Interventional Radiology; Future

## 2024-12-20 NOTE — PROGRESS NOTES
Name: Cherelle Dash      : 1955      MRN: [id number]  Encounter Provider: Sharon Banks PA-C  Encounter Date: 2024   Encounter department[facility]  :  Assessment & Plan  Endometrial cancer (HCC)  Stage IIIC1, grade 1 endometrial cancer with ITC within periaortic LN and extensive LVSI who is s/p surgical resection and currently receiving adjuvant chemotherapy with taxol 135 mg/m2, carboplatin AUC 5 and pembrolizumab 200 mg IV every 21 days. Overall, she tolerated her first cycle of treatment with minimal symptoms. She has chemotherapy-induced neutropenia.     Proceed with next planned cycle as long as her metabolic and hematologic parameters are adequate. GSF will be added.     Return to the office as per her chemotherapy calendar. Plan for repeat imaging after completion of cycle 3.   Orders:  •  MISCELLANEOUS LAB TEST; Future  •  Infusion Appointment Request; Future  •  Infusion Appointment Request; Future  •  Infusion Appointment Request; Future  •  Infusion Appointment Request; Future  •  Infusion Appointment Request; Future    Drug-induced neutropenia (HCC)  [facility] with cycle 1 at 500.   Taxol and carboplatin dose-reduced.   Repeat CBC/Diff planned next week.   Addition of day 2 GSF with cycle 2.        Other acute pulmonary embolism without acute cor pulmonale (HCC)  Continue therapeutic anticoagulation with eliquis.     Referral for IVC filter removal, as patient is 2 months post-op.   Orders:  •  Ambulatory Referral to Interventional Radiology; Future            History of Present Illness   Reason for Visit / CC:  Pre-Chemo Visit    Cherelle Dash is a 69 y.o. female   who presents to the office for pre-chemotherapy evaluation. Overall, she tolerated her first cycle of treatment well and had minimal symptoms. She has been afebrile. Denies n/v/abdominal pain. Appetite is appropriate. She notes intermittent constipation which was managed with  miralax. She denies vaginal bleeding or discharge. No skin rash, blood in stool, diarrhea, new cough/SOB. Patient denies chemotherapy induced neuropathy.        Oncology History   Oncology History   Endometrial cancer (HCC)   8/11/2024 Initial Diagnosis    Endometrial cancer (HCC)     10/11/2024 -  Cancer Staged    Staging form: Corpus Uteri - Carcinoma, AJCC 8th Edition  - Pathologic stage from 10/11/2024: FIGO Stage IIIC1 (pT2, pN1mi(sn), cM0) - Signed by Evin Page MD on 11/12/2024  Method of lymph node assessment: [place] lymph node biopsy  Histologic grade (G): G1  Histologic grading system: 3 grade system  Lymph-vascular invasion (LVI): BOTH lymphatic and small vessel AND venous (large vessel) invasion  Peritoneal cytology results: Negative  Pelvic brandi status: Positive       10/11/2024 Surgery    Robotic assisted total laparoscopic hysterectomy, bilateral salpingo-oophorectomy, pelvic and periaortic sentinel lymph node biopsies, vulvar biopsy  1.  Grade 1, cervical stromal invasion to a depth of 12 out of 15 mm, extensive LVSI, p53 wild-type, pMMR, HER2 1+,  washings negative, 0.7 mm metastatic focus and pelvic lymph node, ITC identified and periaortic lymph nodes.     12/6/2024 -  Chemotherapy    Taxol 135 mg/m2, carboplatin AUC 5 and pembrolizumab 200 mg IV every 21 days.          Review of Systems   Constitutional: Negative.    HENT: Negative.     Eyes: Negative.    Respiratory: Negative.     Cardiovascular: Negative.    Gastrointestinal:  Positive for constipation. Negative for abdominal pain, nausea and vomiting.   Genitourinary: Negative.    Musculoskeletal: Negative.    Skin: Negative.    Neurological: Negative.    Psychiatric/Behavioral: Negative.      A complete review of systems is negative other than that noted above in the HPI.  Medical History Reviewed by provider this encounter:  Tobacco  Allergies  Meds  Problems  Med Hx  Surg Hx  Fam Hx     .  Current Outpatient  Medications on File Prior to Visit   Medication Sig Dispense Refill   • apixaban (Eliquis) 5 mg Take 1 tablet (5 mg total) by mouth 2 (two) times a day 180 tablet 0   • chlorhexidine (PERIDEX) 0.12 % solution USE HALF OUNCE TWICE A DAY RINSE FOR 30 SECONDS BY MOUTH THEN EXPECTORATE DO NOT SWALLOW     • Cholecalciferol (VITAMIN D) 2000 units CAPS Take 1 capsule by mouth daily     • ciclopirox (LOPROX) 0.77 % cream      • clobetasol (TEMOVATE) 0.05 % ointment Apply topically 2 (two) times a week 60 g 3   • CRANBERRY PO Take by mouth     • famotidine (PEPCID) 20 mg tablet TAKE 1 TABLET BY MOUTH TWICE  DAILY 180 tablet 3   • Glucosamine-Chondroit-Vit C-Mn (GLUCOSAMINE 1500 COMPLEX PO) Take by mouth in the morning     • Ivermectin 1 % CREA      • lidocaine-prilocaine (EMLA) cream Apply to PORT 30 min prior to labs and chemo 30 g 1   • LORazepam (ATIVAN) 1 mg tablet Take 1 tablet (1 mg total) by mouth every 8 (eight) hours as needed (nausea or anxiety) 20 tablet 0   • lurasidone (LATUDA) 20 mg tablet TAKE 1 TABLET BY MOUTH DAILY  WITH BREAKFAST 30 tablet 11   • metoprolol succinate (TOPROL-XL) 50 mg 24 hr tablet Take 1 tablet (50 mg total) by mouth daily 90 tablet 3   • Multiple Vitamin (MULTI-VITAMIN DAILY) TABS Take by mouth daily     • ondansetron (ZOFRAN) 8 mg tablet Take 1 tablet (8 mg total) by mouth every 8 (eight) hours as needed for nausea or vomiting 30 tablet 1   • polyethylene glycol (GLYCOLAX) 17 GM/SCOOP powder Take 17 g by mouth daily     • Sodium Fluoride 5000 PPM 1.1 % GEL As directed     • [DISCONTINUED] Mirabegron ER (Myrbetriq) 25 MG TB24 TAKE 1 TABLET BY MOUTH IN THE  MORNING 90 tablet 1   • CRANIAL PROSTHESIS, RX, Apply to affected area for chemotherapy-induced alopecia (Patient not taking: Reported on 12/12/2024) 1 each 0     Current Facility-Administered Medications on File Prior to Visit   Medication Dose Route Frequency Provider Last Rate Last Admin   • alteplase (CATHFLO) injection 2 mg  2 mg  "Intracatheter Q1MIN PRN Evin Page MD             Objective   /70 (BP Location: Left arm, Patient Position: Sitting, Cuff Size: Large)   Pulse 91   Temp (!) 97.4 °F (36.3 °C) (Temporal)   Resp 16   Ht 5' 1\" (1.549 m)   Wt 103 kg (227 lb)   SpO2 98%   BMI 42.89 kg/m²     Body mass index is 42.89 kg/m².    Performance status is zero.       Physical Exam  Constitutional:       Appearance: She is well-developed.   Pulmonary:      Effort: Pulmonary effort is normal.   Skin:     General: Skin is warm and dry.      Findings: No rash.   Neurological:      Mental Status: She is alert and oriented to person, place, and time.   Psychiatric:         Behavior: Behavior normal.         Thought Content: Thought content normal.         Judgment: Judgment normal.            Lab Results   Component Value Date/Time    Potassium 3.8 12/18/2024 01:02 PM    Chloride 110 (H) 12/18/2024 01:02 PM    CO2 26 12/18/2024 01:02 PM    BUN 25 12/18/2024 01:02 PM    Creatinine 1.37 (H) 12/18/2024 01:02 PM    Glucose, Fasting 122 (H) 11/14/2024 06:58 AM    Calcium 9.0 12/18/2024 01:02 PM    AST 12 (L) 12/18/2024 01:02 PM    ALT 12 12/18/2024 01:02 PM    Alkaline Phosphatase 79 12/18/2024 01:02 PM    eGFR 39 12/18/2024 01:02 PM     Lab Results   Component Value Date/Time    WBC 2.43 (L) 12/18/2024 01:02 PM    Hemoglobin 10.4 (L) 12/18/2024 01:02 PM    Hematocrit 34.2 (L) 12/18/2024 01:02 PM    MCV 86 12/18/2024 01:02 PM    Platelets 129 (L) 12/18/2024 01:02 PM     Lab Results   Component Value Date/Time    Absolute Neutrophils 0.51 (L) 12/18/2024 01:02 PM        Trend:  Lab Results   Component Value Date     8.7 12/04/2024     54.2 (H) 11/14/2024     45.2 (H) 08/14/2024               "

## 2024-12-20 NOTE — ASSESSMENT & PLAN NOTE
[facility] with cycle 1 at 500.   Taxol and carboplatin dose-reduced.   Repeat CBC/Diff planned next week.   Addition of day 2 GSF with cycle 2.

## 2024-12-20 NOTE — ASSESSMENT & PLAN NOTE
Stage IIIC1, grade 1 endometrial cancer with ITC within periaortic LN and extensive LVSI who is s/p surgical resection and currently receiving adjuvant chemotherapy with taxol 135 mg/m2, carboplatin AUC 5 and pembrolizumab 200 mg IV every 21 days. Overall, she tolerated her first cycle of treatment with minimal symptoms. She has chemotherapy-induced neutropenia.     Proceed with next planned cycle as long as her metabolic and hematologic parameters are adequate. GSF will be added.     Return to the office as per her chemotherapy calendar. Plan for repeat imaging after completion of cycle 3.   Orders:  •  MISCELLANEOUS LAB TEST; Future  •  Infusion Appointment Request; Future  •  Infusion Appointment Request; Future  •  Infusion Appointment Request; Future  •  Infusion Appointment Request; Future  •  Infusion Appointment Request; Future

## 2024-12-20 NOTE — PATIENT INSTRUCTIONS
For constipation related to chemotherapy:  Take colace three times per day and mirlax 1-2 times per day.

## 2024-12-24 ENCOUNTER — HOSPITAL ENCOUNTER (OUTPATIENT)
Dept: INFUSION CENTER | Facility: CLINIC | Age: 69
Discharge: HOME | End: 2024-12-24
Payer: COMMERCIAL

## 2024-12-24 ENCOUNTER — TELEPHONE (OUTPATIENT)
Dept: RADIOLOGY | Facility: HOSPITAL | Age: 69
End: 2024-12-24

## 2024-12-24 DIAGNOSIS — C54.1 ENDOMETRIAL CANCER (HCC): ICD-10-CM

## 2024-12-24 DIAGNOSIS — Z45.2 ENCOUNTER FOR CENTRAL LINE CARE: Primary | ICD-10-CM

## 2024-12-24 DIAGNOSIS — N88.8 CERVICAL MASS: Primary | ICD-10-CM

## 2024-12-24 DIAGNOSIS — R68.89 OTHER GENERAL SYMPTOMS AND SIGNS: ICD-10-CM

## 2024-12-24 LAB
ALBUMIN SERPL BCG-MCNC: 3.9 G/DL (ref 3.5–5)
ALP SERPL-CCNC: 77 U/L (ref 34–104)
ALT SERPL W P-5'-P-CCNC: 14 U/L (ref 7–52)
AMYLASE SERPL-CCNC: 38 IU/L (ref 29–103)
ANION GAP SERPL CALCULATED.3IONS-SCNC: 7 MMOL/L (ref 4–13)
AST SERPL W P-5'-P-CCNC: 16 U/L (ref 13–39)
BASOPHILS # BLD AUTO: 0.03 THOUSANDS/ÂΜL (ref 0–0.1)
BASOPHILS NFR BLD AUTO: 1 % (ref 0–1)
BILIRUB SERPL-MCNC: 0.38 MG/DL (ref 0.2–1)
BUN SERPL-MCNC: 22 MG/DL (ref 5–25)
CALCIUM SERPL-MCNC: 9.3 MG/DL (ref 8.4–10.2)
CANCER AG125 SERPL-ACNC: 8.8 U/ML (ref 0–35)
CHLORIDE SERPL-SCNC: 106 MMOL/L (ref 96–108)
CO2 SERPL-SCNC: 27 MMOL/L (ref 21–32)
CREAT SERPL-MCNC: 1.55 MG/DL (ref 0.6–1.3)
EOSINOPHIL # BLD AUTO: 0.11 THOUSAND/ÂΜL (ref 0–0.61)
EOSINOPHIL NFR BLD AUTO: 3 % (ref 0–6)
ERYTHROCYTE [DISTWIDTH] IN BLOOD BY AUTOMATED COUNT: 16.2 % (ref 11.6–15.1)
GFR SERPL CREATININE-BSD FRML MDRD: 33 ML/MIN/1.73SQ M
GLUCOSE SERPL-MCNC: 126 MG/DL (ref 65–140)
HCT VFR BLD AUTO: 36.5 % (ref 34.8–46.1)
HGB BLD-MCNC: 11.5 G/DL (ref 11.5–15.4)
IMM GRANULOCYTES # BLD AUTO: 0.01 THOUSAND/UL (ref 0–0.2)
IMM GRANULOCYTES NFR BLD AUTO: 0 % (ref 0–2)
LIPASE SERPL-CCNC: 19 U/L (ref 11–82)
LYMPHOCYTES # BLD AUTO: 1.08 THOUSANDS/ÂΜL (ref 0.6–4.47)
LYMPHOCYTES NFR BLD AUTO: 27 % (ref 14–44)
MAGNESIUM SERPL-MCNC: 1.9 MG/DL (ref 1.9–2.7)
MCH RBC QN AUTO: 27.1 PG (ref 26.8–34.3)
MCHC RBC AUTO-ENTMCNC: 31.5 G/DL (ref 31.4–37.4)
MCV RBC AUTO: 86 FL (ref 82–98)
MONOCYTES # BLD AUTO: 0.48 THOUSAND/ÂΜL (ref 0.17–1.22)
MONOCYTES NFR BLD AUTO: 12 % (ref 4–12)
NEUTROPHILS # BLD AUTO: 2.36 THOUSANDS/ÂΜL (ref 1.85–7.62)
NEUTS SEG NFR BLD AUTO: 57 % (ref 43–75)
NRBC BLD AUTO-RTO: 0 /100 WBCS
PLATELET # BLD AUTO: 116 THOUSANDS/UL (ref 149–390)
PMV BLD AUTO: 11.7 FL (ref 8.9–12.7)
POTASSIUM SERPL-SCNC: 4.5 MMOL/L (ref 3.5–5.3)
PROT SERPL-MCNC: 6.9 G/DL (ref 6.4–8.4)
RBC # BLD AUTO: 4.24 MILLION/UL (ref 3.81–5.12)
SODIUM SERPL-SCNC: 140 MMOL/L (ref 135–147)
TSH SERPL DL<=0.05 MIU/L-ACNC: 2.33 UIU/ML (ref 0.45–4.5)
WBC # BLD AUTO: 4.07 THOUSAND/UL (ref 4.31–10.16)

## 2024-12-24 PROCEDURE — 82150 ASSAY OF AMYLASE: CPT

## 2024-12-24 PROCEDURE — 84443 ASSAY THYROID STIM HORMONE: CPT

## 2024-12-24 PROCEDURE — 86304 IMMUNOASSAY TUMOR CA 125: CPT

## 2024-12-24 PROCEDURE — 80053 COMPREHEN METABOLIC PANEL: CPT

## 2024-12-24 PROCEDURE — 83690 ASSAY OF LIPASE: CPT

## 2024-12-24 PROCEDURE — 83735 ASSAY OF MAGNESIUM: CPT

## 2024-12-24 PROCEDURE — 85025 COMPLETE CBC W/AUTO DIFF WBC: CPT

## 2024-12-24 NOTE — TELEPHONE ENCOUNTER
PT returned our call trying to get her in for an urgent port check. She refused to come to [facility] today due to having family and would not like to be scheduled until after Lisa. She even said the week of the 30th would work. Trying to get her in on Thursday.

## 2024-12-24 NOTE — PROGRESS NOTES
Pt here for labs, unable to access port. Attempted access by myself and Maggie RN with no success, unable to advance needle. Labs drawn peripherally for chemo later this week. IR contacted with concern of a potentially flipped port. Pt not willing to get it checked today so IR scheduling will contact her to have port evaluated. Pt next appt 12/27 at 0800 at [facility]. Declined AVS.

## 2024-12-26 ENCOUNTER — HOSPITAL ENCOUNTER (OUTPATIENT)
Dept: RADIOLOGY | Facility: HOSPITAL | Age: 69
Discharge: HOME | End: 2024-12-26
Attending: RADIOLOGY
Payer: COMMERCIAL

## 2024-12-26 VITALS
RESPIRATION RATE: 18 BRPM | DIASTOLIC BLOOD PRESSURE: 65 MMHG | OXYGEN SATURATION: 100 % | HEART RATE: 75 BPM | SYSTOLIC BLOOD PRESSURE: 137 MMHG

## 2024-12-26 DIAGNOSIS — N88.8 CERVICAL MASS: ICD-10-CM

## 2024-12-26 PROCEDURE — 37799 UNLISTED PX VASCULAR SURGERY: CPT | Performed by: RADIOLOGY

## 2024-12-26 PROCEDURE — 36576 REPAIR TUNNELED CV CATH: CPT

## 2024-12-26 RX ORDER — SODIUM CHLORIDE 9 MG/ML
20 INJECTION, SOLUTION INTRAVENOUS ONCE
Status: CANCELLED | OUTPATIENT
Start: 2024-12-27

## 2024-12-26 RX ORDER — FENTANYL CITRATE 50 UG/ML
INJECTION, SOLUTION INTRAMUSCULAR; INTRAVENOUS AS NEEDED
Status: COMPLETED | OUTPATIENT
Start: 2024-12-26 | End: 2024-12-26

## 2024-12-26 RX ORDER — CEFAZOLIN SODIUM 2 G/50ML
SOLUTION INTRAVENOUS
Status: COMPLETED | OUTPATIENT
Start: 2024-12-26 | End: 2024-12-26

## 2024-12-26 RX ORDER — PALONOSETRON 0.05 MG/ML
0.25 INJECTION, SOLUTION INTRAVENOUS ONCE
Status: CANCELLED | OUTPATIENT
Start: 2024-12-27

## 2024-12-26 RX ORDER — LIDOCAINE WITH 8.4% SOD BICARB 0.9%(10ML)
SYRINGE (ML) INJECTION AS NEEDED
Status: COMPLETED | OUTPATIENT
Start: 2024-12-26 | End: 2024-12-26

## 2024-12-26 RX ADMIN — FENTANYL CITRATE 50 MCG: 50 INJECTION INTRAMUSCULAR; INTRAVENOUS at 10:53

## 2024-12-26 RX ADMIN — FENTANYL CITRATE 50 MCG: 50 INJECTION INTRAMUSCULAR; INTRAVENOUS at 10:47

## 2024-12-26 RX ADMIN — Medication 20 ML: at 10:49

## 2024-12-26 RX ADMIN — CEFAZOLIN SODIUM 2000 MG: 2 SOLUTION INTRAVENOUS at 10:53

## 2024-12-26 NOTE — BRIEF OP NOTE (RAD/CATH)
INTERVENTIONAL RADIOLOGY PROCEDURE NOTE    Date: 12/26/2024    Procedure: IR PORT CHECK AND REVISION    Preoperative diagnosis:   1. Cervical mass       Postoperative diagnosis: Same.    Surgeon: Mert Harry MD     Assistant: None. No qualified resident was available.    Blood loss: minimal    Specimens: none    Findings: Port flipped with one loose suture.  Port secured with 2 proline sutures.  It is still mildly difficult to access given the patient's body habitus.  If there is still trouble accessing, it may need to be removed and replaced in a different spot.    Complications: None immediate.    Anesthesia: local and IV Fentanyl

## 2024-12-26 NOTE — SEDATION DOCUMENTATION
IR port revision performed by Dr. Harry. Patient tolerated procedure well. Patient met discharge criteria. Patient discharged home with spouse and was able to ambulate out of department.

## 2024-12-27 ENCOUNTER — HOSPITAL ENCOUNTER (OUTPATIENT)
Dept: INFUSION CENTER | Facility: CLINIC | Age: 69
End: 2024-12-27
Payer: COMMERCIAL

## 2024-12-27 VITALS
TEMPERATURE: 96.8 F | DIASTOLIC BLOOD PRESSURE: 78 MMHG | BODY MASS INDEX: 43.23 KG/M2 | WEIGHT: 229 LBS | SYSTOLIC BLOOD PRESSURE: 124 MMHG | OXYGEN SATURATION: 97 % | HEIGHT: 61 IN | RESPIRATION RATE: 18 BRPM | HEART RATE: 69 BPM

## 2024-12-27 DIAGNOSIS — C54.1 ENDOMETRIAL CANCER (HCC): Primary | ICD-10-CM

## 2024-12-27 PROCEDURE — 96367 TX/PROPH/DG ADDL SEQ IV INF: CPT

## 2024-12-27 PROCEDURE — 96413 CHEMO IV INFUSION 1 HR: CPT

## 2024-12-27 PROCEDURE — 96375 TX/PRO/DX INJ NEW DRUG ADDON: CPT

## 2024-12-27 PROCEDURE — 96417 CHEMO IV INFUS EACH ADDL SEQ: CPT

## 2024-12-27 PROCEDURE — 96415 CHEMO IV INFUSION ADDL HR: CPT

## 2024-12-27 RX ORDER — SODIUM CHLORIDE 9 MG/ML
20 INJECTION, SOLUTION INTRAVENOUS ONCE
Status: COMPLETED | OUTPATIENT
Start: 2024-12-27 | End: 2024-12-27

## 2024-12-27 RX ORDER — PALONOSETRON 0.05 MG/ML
0.25 INJECTION, SOLUTION INTRAVENOUS ONCE
Status: COMPLETED | OUTPATIENT
Start: 2024-12-27 | End: 2024-12-27

## 2024-12-27 RX ADMIN — FOSAPREPITANT 150 MG: 150 INJECTION, POWDER, LYOPHILIZED, FOR SOLUTION INTRAVENOUS at 08:56

## 2024-12-27 RX ADMIN — DEXAMETHASONE SODIUM PHOSPHATE 20 MG: 10 INJECTION, SOLUTION INTRAMUSCULAR; INTRAVENOUS at 10:24

## 2024-12-27 RX ADMIN — DIPHENHYDRAMINE HYDROCHLORIDE 25 MG: 50 INJECTION, SOLUTION INTRAMUSCULAR; INTRAVENOUS at 10:02

## 2024-12-27 RX ADMIN — SODIUM CHLORIDE 200 MG: 9 INJECTION, SOLUTION INTRAVENOUS at 11:04

## 2024-12-27 RX ADMIN — PACLITAXEL 267.6 MG: 6 INJECTION, SOLUTION INTRAVENOUS at 12:00

## 2024-12-27 RX ADMIN — CARBOPLATIN 309 MG: 600 INJECTION, SOLUTION INTRAVENOUS at 15:02

## 2024-12-27 RX ADMIN — SODIUM CHLORIDE 20 ML/HR: 0.9 INJECTION, SOLUTION INTRAVENOUS at 08:47

## 2024-12-27 RX ADMIN — PALONOSETRON HYDROCHLORIDE 0.25 MG: 0.25 INJECTION INTRAVENOUS at 08:51

## 2024-12-27 RX ADMIN — FAMOTIDINE 20 MG: 10 INJECTION INTRAVENOUS at 09:30

## 2024-12-27 NOTE — PROGRESS NOTES
Patient tolerated treatment well without incident. AVS provided. Confirmed to return to the Infusion Center tomorrow at 1500 for a Neulasta injection. Patient ambulatory at discharge.

## 2024-12-27 NOTE — PATIENT INSTRUCTIONS
December 2024 Sunday Monday Tuesday Wednesday Thursday Friday Saturday   1     2     3    IR PORT PLACEMENT SED     (60 min.)   AN IR 1   [facility] Interventional Radiology 4    Catheter Maintenance   9:00 AM   (60 min.)   INF FAST TRACK 2   [facility] 5     6    Oncology Treatment   8:00 AM   (450 min.)   AN INF CHAIR 13   [facility] 7            Cycle 1, Day 1    8     9     10     11    Catheter Maintenance   8:30 AM   (60 min.)   AN INF FAST TRACK 1   [facility] 12    Cardiology Office Visit   8:05 AM   (20 min.)   Torin Gold MD   [facility] [place] 13    OFFICE VISIT   7:45 AM   (30 min.)   Maynor Yen DO   [facility] [place] 14                15     16     17     18    Cardiology Oncology New Patient   8:25 AM   (60 min.)   Mert Olson MD   [facility] [address] [place] [address]    Catheter Maintenance   1:00 PM   (60 min.)   AN INF FAST TRACK 1   [facility] 19     20    Office Visit   8:25 AM   (40 min.)   Sharon Banks PA-C   [facility] [place] 21                22     23     24    Catheter Maintenance   9:30 AM   (60 min.)   INF FAST TRACK 2   [facility] 25     26    IR PORT CHECK     (30 min.)   BE IR 3 (DISCOVERY)   [facility] Interventional Radiology 27    Oncology Treatment   8:00 AM   (450 min.)   AN INF CHAIR 10   [facility] 28    Infusion Therapy Plan   3:30 PM   (30 min.)   AN INF FAST TRACK 1   [facility]        Cycle 2, Day 1 Cycle 2, Day 2   29     30     31                                           Treatment Details         12/6/2024 - Cycle 1, Day 1      Chemotherapy: ONCBCN PROVIDER COMMUNICATION, PEMBROLIZUMAB IVPB,  ONCBCN PROVIDER COMMUNICATION2, PACLITAXEL IVPB, ONCBCN PROVIDER COMMUNICATION2, CARBOPLATIN IVPB (GOG AUC DOSING)    12/27/2024 - Cycle 2, Day 1      Chemotherapy: ONCBCN PROVIDER COMMUNICATION, PEMBROLIZUMAB IVPB, ONCBCN PROVIDER COMMUNICATION2, PACLITAXEL IVPB, ONCBCN PROVIDER COMMUNICATION2, CARBOPLATIN IVPB (GOG AUC DOSING)    12/28/2024 - Cycle 2, Day 2      Supportive Care: pegfilgrastim (NEULASTA), APPT 17 January 2025 Sunday Monday Tuesday Wednesday Thursday Friday Saturday                  1     2     3     4                5     6     7    Catheter Maintenance  11:30 AM   (60 min.)   INF FAST TRACK 2   [facility] 8     9     10    Office Visit   8:05 AM   (40 min.)   Sharon Banks PA-C   [facility] [place] 11                12     13     14     15    Catheter Maintenance   8:30 AM   (60 min.)   AN INF FAST TRACK 1   [facility] 16    FOLLOW UP   8:45 AM   (20 min.)   ZURI Hopkins   [facility] [place] 17    Oncology Treatment   7:30 AM   (455 min.)   [facility] INF CHAIR 15   [facility] 18    Infusion Therapy Plan   3:30 PM   (30 min.)   AN INF FAST TRACK 1   [facility]        Cycle 3, Day 1 Cycle 3, Day 2   19     20    ECHO COMPLETE  10:00 AM   (60 min.)   AN HV ECHO 2   [facility] 21     22    Catheter Maintenance  11:00 AM   (60 min.)   AN INF FAST TRACK 1   [facility] 23     24     25                26     27     28     29    Catheter Maintenance  12:30 PM   (60 min.)   INF FAST TRACK 2   [facility] 30     31                            Treatment Details         1/17/2025 - Cycle 3, Day 1      Chemotherapy: ONCBCN PROVIDER COMMUNICATION, PEMBROLIZUMAB IVPB, ONCBCN PROVIDER COMMUNICATION2, PACLITAXEL IVPB,  ONCBCN PROVIDER COMMUNICATION2, CARBOPLATIN IVPB (GOG AUC DOSING)    1/18/2025 - Cycle 3, Day 2      Supportive Care: pegfilgrastim (NEULASTA), APPT 17

## 2024-12-27 NOTE — PROGRESS NOTES
Patient presents tot  Infusion center for the treatment of Keytruda, Taxol, and Carboplatin. Labs reviewed from 12/24/2024 and within parameters for treatment. She offers no concerns at this time. Port accessed with good blood return. Patient is resting comfortably in the chair, call bell within reach.

## 2024-12-28 ENCOUNTER — HOSPITAL ENCOUNTER (OUTPATIENT)
Dept: INFUSION CENTER | Facility: CLINIC | Age: 69
Discharge: HOME | End: 2024-12-28
Payer: COMMERCIAL

## 2024-12-28 DIAGNOSIS — C54.1 ENDOMETRIAL CANCER (HCC): Primary | ICD-10-CM

## 2024-12-28 PROCEDURE — 96372 THER/PROPH/DIAG INJ SC/IM: CPT

## 2024-12-28 RX ADMIN — PEGFILGRASTIM 6 MG: 6 INJECTION SUBCUTANEOUS at 14:51

## 2025-01-02 ENCOUNTER — OFFICE VISIT (OUTPATIENT)
Dept: PALLIATIVE MEDICINE | Facility: CLINIC | Age: 70
End: 2025-01-02

## 2025-01-02 VITALS
WEIGHT: 225 LBS | OXYGEN SATURATION: 97 % | BODY MASS INDEX: 42.48 KG/M2 | DIASTOLIC BLOOD PRESSURE: 78 MMHG | TEMPERATURE: 97.7 F | HEIGHT: 61 IN | SYSTOLIC BLOOD PRESSURE: 118 MMHG | HEART RATE: 108 BPM

## 2025-01-02 DIAGNOSIS — F41.8 DEPRESSION WITH ANXIETY: ICD-10-CM

## 2025-01-02 DIAGNOSIS — I42.0 DILATED CARDIOMYOPATHY (HCC): ICD-10-CM

## 2025-01-02 DIAGNOSIS — C54.1 ENDOMETRIAL CANCER (HCC): Primary | ICD-10-CM

## 2025-01-02 DIAGNOSIS — Z51.5 PALLIATIVE CARE BY SPECIALIST: ICD-10-CM

## 2025-01-02 DIAGNOSIS — N18.32 STAGE 3B CHRONIC KIDNEY DISEASE (HCC): ICD-10-CM

## 2025-01-02 DIAGNOSIS — M25.50 ARTHRALGIA: ICD-10-CM

## 2025-01-02 NOTE — PROGRESS NOTES
Name: Cherelle Dash      : 1955      MRN: [id number]  Encounter Provider: Maynor Yen DO  Encounter Date: 2025   Encounter department: [facility]  :  Assessment & Plan  Endometrial cancer (HCC)  Following Dr. Page of Gynecologic Oncology  Care team includes  Dr. Olson of Cardiac Oncology  Potential plans for IVC filter removal soon.    Patient is on Keytruda, Paclitaxel and Carboplatin and tolerating treatment this time. Next cycle on 2025.  Tolerating PO intake without issue.    No new intolerable pain other than joint discomfort post-treatments.with Neulasta.  Discussed trial of Loratidine 10mg daily as needed to see if this can help her with this bone discomfort.  CrCl of 54 mL/min (37 mL/min adjusted for weight)       Dilated cardiomyopathy (HCC)  Following her Cardiology specialists for ongoing monitoring.       Palliative care by specialist  Psychosocial   Supportive listening provided  Normalized experience of patient/family  Provided anxiety containment     Referrals Placed / Medical Equipment Ordered  -None    Follow-Up Recommendations  -Follow-up with PCP and current medical specialists  -Follow-up with palliative care: 4 weeks         Stage 3b chronic kidney disease (HCC)  Lab Results   Component Value Date    EGFR 33 2024    EGFR 39 2024    EGFR 42 2024    CREATININE 1.55 (H) 2024    CREATININE 1.37 (H) 2024    CREATININE 1.27 2024          Depression with anxiety  Follows  for medication management.       Arthralgia  Bilateral elbows, arms and shoulders after Neulasta injection            PDMP Review: I have reviewed the patient's controlled substance dispensing history in the Prescription Drug Monitoring Program in compliance with the [facility] regulations before prescribing any controlled substances.    History of Present Illness   Cherelle Dash is a 69 y.o. female who presents for follow-up.    Palliative Diagnosis -  Endometrial cancer    Patient is seen today in office. Alert, oriented, pleasantly conversant.  Patient is seen with her , NAD.    Appetite has been stable and without issues with PO intake.    Pain to the joints/ arthralgias after her Neulasta injection. States she was recommended to try Claritin/loratidine to see if this helps in which I supported this plan given its anti-histamine properties which can help with this specific type of discomfort from Neulasta.    Patient has noticed a resting tremor in the past weeks.  Not causing discomfort but noticeable. She remains on her medications without missing doses. Tremor is not evident with movement. Discussed this could be an essential tremor and if it becomes more pronounced, can consider work-up.    Patient is well supported by her family.      Medical History Reviewed by provider this encounter:  Tobacco  Allergies  Meds  Problems  Med Hx  Surg Hx  Fam Hx     .  Past Medical History   Past Medical History:   Diagnosis Date    Abnormal ECG     Anxiety 2002    Arthritis     Bipolar 1 disorder (HCC)     Cervical cancer (HCC)     Chronic kidney disease     stage 3    CPAP (continuous positive airway pressure) dependence     Depression 2001    Disease of thyroid gland     DVT (deep venous thrombosis) (HCC)     BLLE    Elevated blood pressure reading 06/10/2019    GERD (gastroesophageal reflux disease)     Obesity     Pulmonary emboli (HCC)     B/L    RSV (acute bronchiolitis due to respiratory syncytial virus) 12/05/2023    Sleep apnea     Sleep apnea, obstructive 2007    Uterine cancer (HCC)      Past Surgical History:   Procedure Laterality Date    COLONOSCOPY  2011    IR IVC FILTER PLACEMENT OPTIONAL/TEMPORARY  10/8/2024    IR PORT CHECK  12/26/2024    IR PORT PLACEMENT  12/3/2024    LAPAROTOMY N/A 10/11/2024    Procedure: EXPLORATORY LAPAROTOMY;  Surgeon: Rodney Downing MD;  Location: [facility];  Service: Gynecology Oncology    GA HYSTEROSCOPY BX  ENDOMETRIUM&/POLYPC W/WO D&C N/A 8/23/2024    Procedure: EXAM UNDER ANESTHESIA, DILATATION AND CURETTAGE, CERVICAL BIOPSIES;  Surgeon: Evin Page MD;  Location:  MAIN OR;  Service: Gynecology Oncology    PA LAPS TOTAL HYSTERECT 250 GM/< W/RMVL TUBE/OVARY N/A 10/11/2024    Procedure: ROBOTIC ASSISTED LTH, BSO, LYMPH NODE DISSECTION, EUA;  Surgeon: Rodney Downing MD;  Location: [facility];  Service: Gynecology Oncology     Family History   Problem Relation Age of Onset    Heart disease Mother     Heart Valve Disease Mother         Replacement    Diabetes Mother         2002    Heart failure Mother         Heart Valve replacement    Arthritis Father     No Known Problems Sister     No Known Problems Daughter     No Known Problems Maternal Grandmother     No Known Problems Maternal Grandfather     No Known Problems Paternal Grandmother     No Known Problems Paternal Grandfather     No Known Problems Son     No Known Problems Maternal Aunt     No Known Problems Maternal Aunt     No Known Problems Maternal Aunt     No Known Problems Maternal Aunt     No Known Problems Maternal Aunt     No Known Problems Paternal Aunt     Alcohol abuse Son         Kolton, 40 year old son, has issues with alcohol and alcohol abuse      reports that she quit smoking about 16 years ago. Her smoking use included cigarettes. She started smoking about 46 years ago. She has a 7.5 pack-year smoking history. She has never been exposed to tobacco smoke. She has never used smokeless tobacco. She reports current alcohol use of about 1.0 standard drink of alcohol per week. She reports that she does not use drugs.  Current Outpatient Medications on File Prior to Visit   Medication Sig Dispense Refill    apixaban (Eliquis) 5 mg Take 1 tablet (5 mg total) by mouth 2 (two) times a day 180 tablet 0    chlorhexidine (PERIDEX) 0.12 % solution USE HALF OUNCE TWICE A DAY RINSE FOR 30 SECONDS BY MOUTH THEN EXPECTORATE DO NOT SWALLOW       Cholecalciferol (VITAMIN D) 2000 units CAPS Take 1 capsule by mouth daily      ciclopirox (LOPROX) 0.77 % cream       clobetasol (TEMOVATE) 0.05 % ointment Apply topically 2 (two) times a week 60 g 3    CRANBERRY PO Take by mouth      famotidine (PEPCID) 20 mg tablet TAKE 1 TABLET BY MOUTH TWICE  DAILY 180 tablet 3    Glucosamine-Chondroit-Vit C-Mn (GLUCOSAMINE 1500 COMPLEX PO) Take by mouth in the morning      Ivermectin 1 % CREA       lidocaine-prilocaine (EMLA) cream Apply to PORT 30 min prior to labs and chemo 30 g 1    LORazepam (ATIVAN) 1 mg tablet Take 1 tablet (1 mg total) by mouth every 8 (eight) hours as needed (nausea or anxiety) 20 tablet 0    lurasidone (LATUDA) 20 mg tablet TAKE 1 TABLET BY MOUTH DAILY  WITH BREAKFAST 30 tablet 11    metoprolol succinate (TOPROL-XL) 50 mg 24 hr tablet Take 1 tablet (50 mg total) by mouth daily 90 tablet 3    Mirabegron ER (Myrbetriq) 25 MG TB24 TAKE 1 TABLET BY MOUTH IN THE  MORNING 90 tablet 1    Multiple Vitamin (MULTI-VITAMIN DAILY) TABS Take by mouth daily      ondansetron (ZOFRAN) 8 mg tablet Take 1 tablet (8 mg total) by mouth every 8 (eight) hours as needed for nausea or vomiting 30 tablet 1    polyethylene glycol (GLYCOLAX) 17 GM/SCOOP powder Take 17 g by mouth daily      Sodium Fluoride 5000 PPM 1.1 % GEL As directed      CRANIAL PROSTHESIS, RX, Apply to affected area for chemotherapy-induced alopecia (Patient not taking: Reported on 12/12/2024) 1 each 0     No current facility-administered medications on file prior to visit.     Allergies   Allergen Reactions    Raw Fruit - Food Allergy Anaphylaxis     PLUMS, PEACHES, FRUIT WITH SKIN      Current Outpatient Medications on File Prior to Visit   Medication Sig Dispense Refill    apixaban (Eliquis) 5 mg Take 1 tablet (5 mg total) by mouth 2 (two) times a day 180 tablet 0    chlorhexidine (PERIDEX) 0.12 % solution USE HALF OUNCE TWICE A DAY RINSE FOR 30 SECONDS BY MOUTH THEN EXPECTORATE DO NOT SWALLOW       Cholecalciferol (VITAMIN D) 2000 units CAPS Take 1 capsule by mouth daily      ciclopirox (LOPROX) 0.77 % cream       clobetasol (TEMOVATE) 0.05 % ointment Apply topically 2 (two) times a week 60 g 3    CRANBERRY PO Take by mouth      famotidine (PEPCID) 20 mg tablet TAKE 1 TABLET BY MOUTH TWICE  DAILY 180 tablet 3    Glucosamine-Chondroit-Vit C-Mn (GLUCOSAMINE 1500 COMPLEX PO) Take by mouth in the morning      Ivermectin 1 % CREA       lidocaine-prilocaine (EMLA) cream Apply to PORT 30 min prior to labs and chemo 30 g 1    LORazepam (ATIVAN) 1 mg tablet Take 1 tablet (1 mg total) by mouth every 8 (eight) hours as needed (nausea or anxiety) 20 tablet 0    lurasidone (LATUDA) 20 mg tablet TAKE 1 TABLET BY MOUTH DAILY  WITH BREAKFAST 30 tablet 11    metoprolol succinate (TOPROL-XL) 50 mg 24 hr tablet Take 1 tablet (50 mg total) by mouth daily 90 tablet 3    Mirabegron ER (Myrbetriq) 25 MG TB24 TAKE 1 TABLET BY MOUTH IN THE  MORNING 90 tablet 1    Multiple Vitamin (MULTI-VITAMIN DAILY) TABS Take by mouth daily      ondansetron (ZOFRAN) 8 mg tablet Take 1 tablet (8 mg total) by mouth every 8 (eight) hours as needed for nausea or vomiting 30 tablet 1    polyethylene glycol (GLYCOLAX) 17 GM/SCOOP powder Take 17 g by mouth daily      Sodium Fluoride 5000 PPM 1.1 % GEL As directed      CRANIAL PROSTHESIS, RX, Apply to affected area for chemotherapy-induced alopecia (Patient not taking: Reported on 2024) 1 each 0     No current facility-administered medications on file prior to visit.      Social History     Tobacco Use    Smoking status: Former     Current packs/day: 0.00     Average packs/day: 0.3 packs/day for 30.0 years (7.5 ttl pk-yrs)     Types: Cigarettes     Start date: 1979     Quit date: 2009     Years since quittin.0     Passive exposure: Never    Smokeless tobacco: Never   Vaping Use    Vaping status: Never Used   Substance and Sexual Activity    Alcohol use: Yes     Alcohol/week: 1.0 standard  "drink of alcohol     Types: 1 Glasses of wine per week    Drug use: Never    Sexual activity: Not Currently     Partners: Male     Birth control/protection: Post-menopausal        Objective   /78 (BP Location: Left arm, Patient Position: Sitting, Cuff Size: Large)   Pulse (!) 108   Temp 97.7 °F (36.5 °C) (Temporal)   Ht 5' 1\" (1.549 m)   Wt 102 kg (225 lb)   SpO2 97%   BMI 42.51 kg/m²     Physical Exam  Vitals reviewed.   Constitutional:       General: She is not in acute distress.     Appearance: She is not ill-appearing, toxic-appearing or diaphoretic.   HENT:      Head: Normocephalic and atraumatic.      Nose: Nose normal.      Mouth/Throat:      Mouth: Mucous membranes are moist.   Eyes:      General:         Right eye: No discharge.         Left eye: No discharge.   Cardiovascular:      Rate and Rhythm: Normal rate.   Pulmonary:      Effort: Pulmonary effort is normal. No respiratory distress.   Abdominal:      General: Abdomen is flat. There is no distension.   Musculoskeletal:         General: No swelling.   Skin:     General: Skin is warm and dry.      Coloration: Skin is not jaundiced or pale.   Neurological:      General: No focal deficit present.      Mental Status: She is alert. Mental status is at baseline.   Psychiatric:         Mood and Affect: Mood normal.         Behavior: Behavior normal.         Thought Content: Thought content normal.         Judgment: Judgment normal.         Recent labs:  Lab Results   Component Value Date/Time    SODIUM 140 12/24/2024 10:03 AM    SODIUM 142 12/09/2020 07:36 AM    SODIUM 144 12/07/2020 08:34 AM    K 4.5 12/24/2024 10:03 AM    K 4.7 12/09/2020 07:36 AM    K 4.4 12/07/2020 08:34 AM    BUN 22 12/24/2024 10:03 AM    BUN 23 12/09/2020 07:36 AM    BUN 20 12/07/2020 08:34 AM    CREATININE 1.55 (H) 12/24/2024 10:03 AM    CREATININE 1.51 (H) 12/07/2020 08:34 AM    GLUC 126 12/24/2024 10:03 AM    GLUC 106 (H) 12/09/2020 07:36 AM    GLUC 104 (H) 12/07/2020 " 08:34 AM    CALCIUM 9.3 12/24/2024 10:03 AM    CALCIUM 9.4 12/07/2020 08:34 AM    AST 16 12/24/2024 10:03 AM    AST 14 12/07/2020 08:34 AM    ALT 14 12/24/2024 10:03 AM    ALT 18 12/07/2020 08:34 AM    ALB 3.9 12/24/2024 10:03 AM    ALB 3.8 12/07/2020 08:34 AM    TP 6.9 12/24/2024 10:03 AM    TP 6.7 12/07/2020 08:34 AM    EGFR 33 12/24/2024 10:03 AM    EGFR 36 (L) 12/07/2020 08:34 AM     Lab Results   Component Value Date/Time    HGB 11.5 12/24/2024 10:03 AM    HGB 14.0 03/10/2015 07:55 AM    WBC 4.07 (L) 12/24/2024 10:03 AM    WBC 5.01 03/10/2015 07:55 AM     (L) 12/24/2024 10:03 AM     03/10/2015 07:55 AM    INR 1.05 10/12/2024 05:35 AM    PTT 23 10/12/2024 05:35 AM     Lab Results   Component Value Date/Time    WWE0OHNXQSWQ 2.329 12/24/2024 10:03 AM    [id number] 1.510 01/15/2018 01:02 PM       Recent Imaging:  Procedure: IR port check  Result Date: 12/26/2024  Narrative: Port check and revision Clinical History: Port flipped. Contrast: None Fluoro time: 0.1 Number of Images: 1 Technique: The patient was brought to the interventional radiology area identified verbally and by wristband. The patient was placed supine on the table. The port was prepped and draped in usual sterile fashion. Lidocaine was given for local anesthesia as well as fentanyl. An incision was made overlying the port and the port was dissected free. The port was then sutured in 2 places onto the chest wall musculature with 2-0 Proline suture and then flushed. The pocket was closed with 3-0 Monocryl suture and Exofin.     Impression: Impression: Successful port revision due to a flipped port and the port was sutured to the chest wall in 2 places. PLAN: The port may be used immediately. If the port continues to be difficult to access, it may need to be placed at a different site more medially.    . Workstation performed: [id number]     Procedure: IR port placement  Result Date: 12/5/2024  Narrative: PROCEDURE: Port Placement  "Procedural Personnel Attending physician(s): Jamaal Colindres MD Pre-procedure diagnosis: Endometrial cancer Post-procedure diagnosis: Same Clinical History: Very pleasant 69-year-old woman with endometrial cancer referred for port placement in anticipation of chemotherapy. PROCEDURE SUMMARY: Insertion of tunneled centrally inserted central venous access device, with subcutaneous port PROCEDURE DETAILS: Pre-procedure Consent: Informed consent for the procedure including risks, benefits and alternatives was obtained and time-out was performed prior to the procedure. Preparation: The site was prepared and draped using maximal sterile barrier technique including cutaneous antisepsis. Anesthesia/sedation Level of anesthesia/sedation: Moderate sedation (conscious sedation) Anesthesia/sedation administered by: Independent trained observer under attending supervision with continuous monitoring of the patient's level of consciousness and physiologic status Total intra-service sedation time: 45 MIN Technique and Findings: Immediate pre-procedure ultrasound demonstrated a widely patent right internal jugular vein, which was judged appropriate for access.  Local anesthetic was administered.  A dermatotomy was made.  Under real-time ultrasound guidance, access was obtained using a 21-gauge micropuncture needle.  A permanent image was saved.  A microwire was advanced via the micropuncture needle and the needle exchanged for a transitional micropuncture dilator.  The intravascular distance was measured with the microwire, and then both the microwire and inner portion of the transitional dilator were removed.  A 0.035\" guidewire was advanced into the IVC.  The wire and transitional dilator were secured with a flow switch. Attention was then turned towards creation of the port pocket.  An appropriate site on the chest, approximately two finger-breadths below the collarbone, was selected.  Local anesthetic was administered " to the planned pocket and along the planned tunnel tract.  An incision was made with a 15-blade scalpel.  The pocket was developed with blunt dissection.  The reservoir was inserted into the pocket and the catheter tunneled to the venous access site.  The catheter was cut to length. Attention was returned to the venous access site.  The flow switch and transitional dilator were removed.  The venotomy was serially dilated and a peel-away sheath was placed.  The port catheter was inserted via the sheath, which was then peeled away.  Fluoroscopy confirmed appropriate position of the catheter tip.  The port was tested; it flushed and aspirated well.  The reservoir was secured in the pocket with a single 3-0 Vicryl suture. The pocket was closed with 3-0 Vicryl deep dermal sutures in simple interrupted fashion, followed by 4-0 Monocryl subcuticular suture in running fashion.  The pocket incision and the neck venotomy sites were covered with Exofin glue. A completion fluoroscopic image demonstrated appropriate position of the port with tip terminating at the right atrium. Contrast Contrast dose: None Radiation Dose Fluoroscopy time: 0.7 MIN FL Reference air kerma: 11 mGy Additional Details Specimens removed: None Estimated blood loss (mL): Less than 10 Complications: No immediate complications.     Impression: Successful placement of right chest port via the right internal jugular vein.  The tip terminates at the right atrium.  Port is ready for immediate use. Workstation performed: [id number]     Procedure: IR IVC filter placement optional/temporary  Result Date: 10/8/2024  Narrative: Inferior venacavogram and inferior venacaval filter placement Clinical History:  69 year-old female with history of PE/DVT on Eliquis, history of cervical mass with vaginal bleeding, with request for IVC filter placement prior to laparoscopic hysterectomy and bilateral salpingo-oophorectomy. Contrast: CO2 Fluoro time: 2.4 MINUTES Number of  Images: Multiple Radiation dose: 220 mGy Conscious sedation time: 20 MINUTES Technique: The patient was brought to the interventional radiology suite and identified verbally and by wrist band. The patient was placed supine on the table. The right internal jugular vein was evaluated as a potential access site with ultrasound. The vessel was found to be patent and compressible. Lidocaine was administered to the skin and a small skin incision was made. Under ultrasound guidance, the right internal jugular vein was accessed using single wall Seldinger technique. Static images of real time needle entry into the vessel were obtained.  A Xolason wire was advanced into the inferior vena cava, over which a Copiah sheath was inserted. An inferior venacavogram was performed using CO2. Findings: The examination demonstrates that there is no thrombus in the inferior vena cava. The renal veins are positively identified. No caval anomalies are present. Intervention: The sheath was positioned appropriately, and a Abril inferior vena cava filter was deployed below the level of the renal veins.     Impression: Impression: 1. Normal inferior venacavogram. 2. Satisfactory intravascular placement of a Abril filter below the level of the renal veins. Workstation performed: [id number]     Procedure: NM myocardial perfusion spect (rx stress and/or rest)  Result Date: 10/4/2024  Narrative:   Stress ECG: The ECG was uninterpretable due to left bundle branch block. after pharmacologic vasodilation, without reproduction of symptoms.   Perfusion: There is a left ventricular perfusion defect that is small in size with mild reduction in uptake present in the mid anterior location(s) that is paradoxical. On reprocessed imaging there is improvement of this defect on stress imaging. LV wall function also appears to be preserved through out the anterior wall. The defect appears to be an artifact caused by breast attenuation.   Stress Function:  Left ventricular function post-stress is normal. Stress ejection fraction is 60%. No evidence of ischemia. Normal left ventricular cavity size, function, and wall thickening. No TID. Compared to prior study there is an improvement in LV EF to 60%     Procedure: Stress strip  Result Date: 10/4/2024  Narrative: Confirmed by KARLA NEWSOME ([id number]),  Tana Luo ([id number]) on 10/4/2024 11:13:49 AM    Procedure: CT chest abdomen pelvis wo contrast  Result Date: 10/2/2024  Narrative: CT CHEST, ABDOMEN AND PELVIS WITHOUT IV CONTRAST INDICATION: C53.0: Malignant neoplasm of endocervix. COMPARISON: 9/5/2024 TECHNIQUE: CT examination of the chest, abdomen and pelvis was performed without intravenous contrast. Multiplanar 2D reformatted images were created from the source data. This examination, like all CT scans performed in the [facility], was performed utilizing techniques to minimize radiation dose exposure, including the use of iterative reconstruction and automated exposure control. Radiation dose length product (DLP) for this visit: 1913.39 mGy-cm Enteric Contrast: Not administered. FINDINGS: CHEST LUNGS: Stable 5 mm right lower lobe pulmonary nodules noted series 302 image 132.. No tracheal or endobronchial lesion. PLEURA: Unremarkable. HEART/GREAT VESSELS: Heart is unremarkable for patient's age. No thoracic aortic aneurysm. MEDIASTINUM AND RC: Moderate hiatal hernia is present. CHEST WALL AND LOWER NECK: Unremarkable. ABDOMEN LIVER/BILIARY TREE: Unremarkable. GALLBLADDER: No calcified gallstones. No pericholecystic inflammatory change. SPLEEN: Unremarkable. PANCREAS: Unremarkable. ADRENAL GLANDS: Unremarkable. KIDNEYS/URETERS: Unremarkable. No hydronephrosis. STOMACH AND BOWEL: Unremarkable. APPENDIX: No findings to suggest appendicitis. ABDOMINOPELVIC CAVITY: No ascites. No pneumoperitoneum. No lymphadenopathy. VESSELS: Unremarkable for patient's age. PELVIS REPRODUCTIVE ORGANS: The left  ovary is prominent. URINARY BLADDER: Unremarkable. ABDOMINAL WALL/INGUINAL REGIONS: Small fat-containing umbilical hernia is present BONES: No acute fracture or suspicious osseous lesion.     Impression: Stable 5 mm right lower lobe pulmonary nodule. No CT evidence of metastatic disease within the abdomen or pelvis. Prominent left ovary, consider further characterization with pelvic ultrasound. Moderate hiatal hernia. Workstation performed: [id number]     Procedure: Mammo diagnostic bilateral w 3d & cad  Result Date: 9/26/2024  Narrative: DIAGNOSIS: Secondary hyperparathyroidism of renal origin (HCC); Chronic kidney disease (CKD) stage G3b/A1, moderately decreased glomerular filtration rate (GFR) between 30-44 mL/min/1.73 square meter and albuminuria creatinine ratio less than 30 mg/g (HCC); Morbid obesity (HCC); Abnormal findings on diagnostic imaging of breast TECHNIQUE: Digital diagnostic mammography was performed. Computer Aided Detection (CAD) analyzed all applicable images. Right breast ultrasound was performed. COMPARISONS: Prior breast imaging dated: 03/25/2024, 09/21/2023, 09/21/2023, 08/01/2023, 07/20/2022, 07/13/2021, 07/07/2020, 07/02/2019, 06/26/2018, 06/20/2017, 06/09/2016, and 05/28/2015 RELEVANT HISTORY: Family Breast Cancer History: No known family history of breast cancer. Family Medical History: No known relevant family medical history. Personal History: Hormone history includes birth control. No known relevant surgical history. No known relevant medical history. RISK ASSESSMENT: 5 Year Tyrer-Cuzick: 1.06% 10 Year Tyrer-Cuzick: 2.26% Lifetime Tyrer-Cuzick: 3.6% TISSUE DENSITY: The breasts are almost entirely fatty. INDICATION: Cherelle Dash is a 69 y.o. female presenting for 6 month follow up. FINDINGS: RIGHT 1) MASS [B] Mammo diagnostic bilateral w 3d & cad: There is a mass seen in the retroareolar region of the right breast in the middle depth. Compared to the previous study, there are no  significant changes. US breast right limited (diagnostic): There is a 5 mm x 2 mm x 6 mm oval, parallel, hypoechoic mass with circumscribed margins with no posterior features seen in the retroareolar region of the right breast at 12 o'clock in the middle depth, 4 cm from the nipple. The mass correlates with the prior mammogram finding and ultrasound finding. Compared to the previous study, there are no significant changes. Left Mammo diagnostic bilateral w 3d & cad There are no suspicious masses, grouped microcalcifications or areas of unexplained architectural distortion. The skin and nipple areolar complex are unremarkable.     Impression: Stable benign morphology mass 12:00 right breast probable complicated cyst.  Surveillance recommended to confirm stability. No evidence for malignancy in either breast. ASSESSMENT/BI-RADS CATEGORY: Left: 1 - Negative Right: 3 - Probably Benign Overall: 3 - Probably Benign RECOMMENDATION:      - Ultrasound in 1 year for the right breast.      - Diagnostic mammogram in 1 year for both breasts. Workstation ID: [id number] Signed by:  Brandon Aguilar MD     Procedure: US breast right limited (diagnostic)  Result Date: 9/26/2024  Narrative: DIAGNOSIS: Secondary hyperparathyroidism of renal origin (HCC); Chronic kidney disease (CKD) stage G3b/A1, moderately decreased glomerular filtration rate (GFR) between 30-44 mL/min/1.73 square meter and albuminuria creatinine ratio less than 30 mg/g (HCC); Morbid obesity (HCC); Abnormal findings on diagnostic imaging of breast TECHNIQUE: Digital diagnostic mammography was performed. Computer Aided Detection (CAD) analyzed all applicable images. Right breast ultrasound was performed. COMPARISONS: Prior breast imaging dated: 03/25/2024, 09/21/2023, 09/21/2023, 08/01/2023, 07/20/2022, 07/13/2021, 07/07/2020, 07/02/2019, 06/26/2018, 06/20/2017, 06/09/2016, and 05/28/2015 RELEVANT HISTORY: Family Breast Cancer History: No known family history of  breast cancer. Family Medical History: No known relevant family medical history. Personal History: Hormone history includes birth control. No known relevant surgical history. No known relevant medical history. RISK ASSESSMENT: 5 Year Tyrer-Cuzick: 1.06% 10 Year Tyrer-Cuzick: 2.26% Lifetime Tyrer-Cuzick: 3.6% TISSUE DENSITY: The breasts are almost entirely fatty. INDICATION: Cherelle Dash is a 69 y.o. female presenting for 6 month follow up. FINDINGS: RIGHT 1) MASS [B] Mammo diagnostic bilateral w 3d & cad: There is a mass seen in the retroareolar region of the right breast in the middle depth. Compared to the previous study, there are no significant changes. US breast right limited (diagnostic): There is a 5 mm x 2 mm x 6 mm oval, parallel, hypoechoic mass with circumscribed margins with no posterior features seen in the retroareolar region of the right breast at 12 o'clock in the middle depth, 4 cm from the nipple. The mass correlates with the prior mammogram finding and ultrasound finding. Compared to the previous study, there are no significant changes. Left Mammo diagnostic bilateral w 3d & cad There are no suspicious masses, grouped microcalcifications or areas of unexplained architectural distortion. The skin and nipple areolar complex are unremarkable.     Impression: Stable benign morphology mass 12:00 right breast probable complicated cyst.  Surveillance recommended to confirm stability. No evidence for malignancy in either breast. ASSESSMENT/BI-RADS CATEGORY: Left: 1 - Negative Right: 3 - Probably Benign Overall: 3 - Probably Benign RECOMMENDATION:      - Ultrasound in 1 year for the right breast.      - Diagnostic mammogram in 1 year for both breasts. Workstation ID: [id number] Signed by:  Brandon Aguilar MD     Procedure: Echo complete w/ contrast if indicated  Result Date: 9/6/2024  Narrative:   Left Ventricle: Left ventricular cavity size is normal. Wall thickness is mildly increased. There is  mild concentric hypertrophy. The left ventricular ejection fraction is 40%. Systolic function is moderately reduced. There is moderate global hypokinesis. Diastolic function is mildly abnormal, consistent with grade I (abnormal) relaxation.   Right Ventricle: Right ventricular cavity size is dilated. Systolic function is mildly reduced.   Mitral Valve: There is mild regurgitation.     Procedure:  VAS VENOUS DUPLEX - LOWER LIMB BILATERAL  Result Date: 9/5/2024  Narrative:  [facility] REPORT CLINICAL: Indications: Patient presents with recent discovery of pulmonary embolism and physician wants to determine potential source. Risk Factors The patient has history of CKD.  FINDINGS:  Right          Impression              Popliteal      Occlusive Subsegmental  PostTibial     Occlusive Subsegmental  Calf Veins     Occlusive Subsegmental  Gastrocnemius  Occlusive Subsegmental   Left           Impression              Popliteal      Non Occlusive Thrombus  PostTibial     Occlusive Subsegmental     CONCLUSION:  Impression:  RIGHT LOWER LIMB: Evidence of acute deep vein thrombosis was noted in the popliteal, posterior tibial, gastrocnemius and soleal veins. No evidence of superficial thrombophlebitis noted. Doppler evaluation shows a normal response to augmentation maneuvers.. Popliteal, posterior tibial and anterior tibial arterial Doppler waveform's are triphasic.  LEFT LOWER LIMB: Evidence of acute deep vein thrombosis was noted in the popliteal and posterior tibial. No evidence of superficial thrombophlebitis noted. Doppler evaluation shows a normal response to augmentation maneuvers. Popliteal, posterior tibial and anterior tibial arterial Doppler waveform's are triphasic.  Technical findings were given to Nacho Chauhan DO at 5:50 PM.  SIGNATURE: Electronically Signed by: DAVI POLLACK MD on 2024-09-05 08:56:59 PM    Procedure: US bedside procedure  Result Date: 9/5/2024  Narrative:  [id number]    Procedure: CTA ED chest PE Study  Result Date: 9/5/2024  Narrative: CTA - CHEST WITH IV CONTRAST - PULMONARY ANGIOGRAM INDICATION: Dimer 8, SOB/LOMABRDI. COMPARISON: CTA chest, PE study, 11/20/2023 TECHNIQUE: CTA examination of the chest was performed using angiographic technique according to a protocol specifically tailored to evaluate for pulmonary embolism. Multiplanar 2D reformatted images were created from the source data. In addition, coronal  3D MIP postprocessing was performed on the acquisition scanner. Radiation dose length product (DLP) for this visit: 381 mGy-cm . This examination, like all CT scans performed in the [facility], was performed utilizing techniques to minimize radiation dose exposure, including the use of iterative reconstruction and automated exposure control. IV Contrast: 70 mL of iohexol (OMNIPAQUE) FINDINGS: PULMONARY ARTERIAL TREE: Bilateral pulmonary emboli: Left upper and lower lobar nonocclusive emboli (305:93, 106) On the right nearly occlusive truncus anterior embolus (305:91), as well as partially occlusive in the interlobar artery ((305:105) LUNGS: 3 mm posterior right upper lobe nodule (304:67) 6 mm right lower lobe nodule (304:127) Nodules not well appreciated on the comparison November 2023 exam, particularly in the right lower lobe secondary to motion artifact at that time. There is no tracheal or endobronchial lesion. PLEURA: Unremarkable. HEART/GREAT VESSELS: Coronary calcifications no thoracic aortic aneurysm. MEDIASTINUM AND RC: Unremarkable. CHEST WALL AND LOWER NECK: Relatively symmetric ovoid soft tissue density structures extending posteriorly from the bilateral nipples. Findings stable from November 2023 exam, correlation with mammographic history advised. VISUALIZED STRUCTURES IN THE UPPER ABDOMEN: Moderate hiatal hernia Partially visualized probable small duodenal diverticulum OSSEOUS STRUCTURES: No acute  fracture or destructive osseous lesion.     Impression: Bilateral pulmonary emboli. On the left, nonocclusive upper and lower lobar branches On the right, occlusive proximal truncus anterior and partially occlusive interlobular artery. No CT evidence of right heart strain. Pulmonary nodules, measuring up to 6 mm in the right lower lobe. Not well evaluated on comparison November 2023 exam secondary to motion artifact at that time. Considering smoking history, recommend follow-up chest CT in 12 months. Moderate hiatal hernia. Findings were discussed with Dr. Marcum at 1:45 p.m. on 9/5/2024 Workstation performed: [id number]     Procedure: XR chest 1 view portable  Result Date: 9/5/2024  Narrative: XR CHEST PORTABLE INDICATION: Shortness of breath. COMPARISON: CTA chest 11/20/2023, chest radiograph 11/18/2023 FINDINGS: Clear lungs. No pneumothorax or pleural effusion. Normal cardiomediastinal silhouette. Bones are unremarkable for age. Normal upper abdomen.     Impression: No acute cardiopulmonary disease. Resident: Asif Jefferson I, the attending radiologist, have reviewed the images and agree with the final report above. Workstation performed: [id number]       Administrative Statements   I have spent a total time of 32 minutes in caring for this patient on the day of the visit/encounter including Risks and benefits of tx options, Instructions for management, Patient and family education, Importance of tx compliance, Risk factor reductions, Impressions, Counseling / Coordination of care, Documenting in the medical record, Reviewing / ordering tests, medicine, procedures  , and Obtaining or reviewing history  . Topics discussed with the patient / family include symptom assessment and management, medication review, medication adjustment, psychosocial support, supportive listening, and anticipatory guidance.  .

## 2025-01-02 NOTE — PATIENT INSTRUCTIONS
It was a pleasure speaking with you today.    As discussed:  -Continue your medications as prescribed.  -Continue with a bowel regimen to avoid constipation.    Follow-up in: 4 weeks or sooner as needed    Please do not hesitate to call me sooner should you have any questions/concerns or needs.    Medication safety issues - Do not drive under the influence of narcotics (including opioids), watch for adverse effects including confusion / altered mental status / respiratory depression (slowed breathing), keep medications stored in a safe/locked environment, do not use alcohol while opioids or other narcotics are in your system. Do not travel with more than the minimum number of tablets or capsules required for the trip.    ER Precautions  Watch for red flag symptoms including, but not limited to fevers, chills, chest pain, shortness of breath, intractable nausea/vomiting/diarrhea, or acute intractable pain (especially if pain is new or has changed).

## 2025-01-02 NOTE — ASSESSMENT & PLAN NOTE
Lab Results   Component Value Date    EGFR 33 12/24/2024    EGFR 39 12/18/2024    EGFR 42 12/11/2024    CREATININE 1.55 (H) 12/24/2024    CREATININE 1.37 (H) 12/18/2024    CREATININE 1.27 12/11/2024

## 2025-01-02 NOTE — ASSESSMENT & PLAN NOTE
Following Dr. Page of Gynecologic Oncology  Care team includes  Dr. Olson of Cardiac Oncology  Potential plans for IVC filter removal soon.    Patient is on Keytruda, Paclitaxel and Carboplatin and tolerating treatment this time. Next cycle on 1/17/2025.  Tolerating PO intake without issue.    No new intolerable pain other than joint discomfort post-treatments.with Neulasta.  Discussed trial of Loratidine 10mg daily as needed to see if this can help her with this bone discomfort.  CrCl of 54 mL/min (37 mL/min adjusted for weight)

## 2025-01-02 NOTE — ASSESSMENT & PLAN NOTE
Psychosocial   Supportive listening provided  Normalized experience of patient/family  Provided anxiety containment     Referrals Placed / Medical Equipment Ordered  -None    Follow-Up Recommendations  -Follow-up with PCP and current medical specialists  -Follow-up with palliative care: 4 weeks

## 2025-01-07 ENCOUNTER — HOSPITAL ENCOUNTER (OUTPATIENT)
Dept: INFUSION CENTER | Facility: CLINIC | Age: 70
Discharge: HOME | End: 2025-01-07
Payer: COMMERCIAL

## 2025-01-07 ENCOUNTER — HOSPITAL ENCOUNTER (EMERGENCY)
Facility: HOSPITAL | Age: 70
Discharge: HOME | End: 2025-01-07
Attending: STUDENT IN AN ORGANIZED HEALTH CARE EDUCATION/TRAINING PROGRAM
Payer: COMMERCIAL

## 2025-01-07 ENCOUNTER — APPOINTMENT (EMERGENCY)
Dept: RADIOLOGY | Facility: HOSPITAL | Age: 70
End: 2025-01-07
Payer: COMMERCIAL

## 2025-01-07 VITALS
DIASTOLIC BLOOD PRESSURE: 81 MMHG | RESPIRATION RATE: 16 BRPM | TEMPERATURE: 98.1 F | HEART RATE: 92 BPM | OXYGEN SATURATION: 96 % | SYSTOLIC BLOOD PRESSURE: 141 MMHG

## 2025-01-07 DIAGNOSIS — J06.9 VIRAL UPPER RESPIRATORY TRACT INFECTION: Primary | ICD-10-CM

## 2025-01-07 DIAGNOSIS — C54.1 ENDOMETRIAL CANCER (HCC): ICD-10-CM

## 2025-01-07 DIAGNOSIS — Z45.2 ENCOUNTER FOR CENTRAL LINE CARE: Primary | ICD-10-CM

## 2025-01-07 LAB
ALBUMIN SERPL BCG-MCNC: 3.7 G/DL (ref 3.5–5)
ALP SERPL-CCNC: 123 U/L (ref 34–104)
ALT SERPL W P-5'-P-CCNC: 12 U/L (ref 7–52)
ANION GAP SERPL CALCULATED.3IONS-SCNC: 7 MMOL/L (ref 4–13)
AST SERPL W P-5'-P-CCNC: 11 U/L (ref 13–39)
BASOPHILS # BLD AUTO: 0.1 THOUSANDS/ΜL (ref 0–0.1)
BASOPHILS NFR BLD AUTO: 1 % (ref 0–1)
BILIRUB SERPL-MCNC: 0.23 MG/DL (ref 0.2–1)
BUN SERPL-MCNC: 23 MG/DL (ref 5–25)
CALCIUM SERPL-MCNC: 9.1 MG/DL (ref 8.4–10.2)
CHLORIDE SERPL-SCNC: 109 MMOL/L (ref 96–108)
CO2 SERPL-SCNC: 24 MMOL/L (ref 21–32)
CREAT SERPL-MCNC: 1.43 MG/DL (ref 0.6–1.3)
EOSINOPHIL # BLD AUTO: 0.16 THOUSAND/ΜL (ref 0–0.61)
EOSINOPHIL NFR BLD AUTO: 1 % (ref 0–6)
ERYTHROCYTE [DISTWIDTH] IN BLOOD BY AUTOMATED COUNT: 18 % (ref 11.6–15.1)
FLUAV AG UPPER RESP QL IA.RAPID: NEGATIVE
FLUBV AG UPPER RESP QL IA.RAPID: NEGATIVE
GFR SERPL CREATININE-BSD FRML MDRD: 37 ML/MIN/1.73SQ M
GLUCOSE SERPL-MCNC: 131 MG/DL (ref 65–140)
HCT VFR BLD AUTO: 34 % (ref 34.8–46.1)
HGB BLD-MCNC: 10.5 G/DL (ref 11.5–15.4)
IMM GRANULOCYTES # BLD AUTO: 0.13 THOUSAND/UL (ref 0–0.2)
IMM GRANULOCYTES NFR BLD AUTO: 1 % (ref 0–2)
LYMPHOCYTES # BLD AUTO: 1.56 THOUSANDS/ΜL (ref 0.6–4.47)
LYMPHOCYTES NFR BLD AUTO: 12 % (ref 14–44)
MAGNESIUM SERPL-MCNC: 1.8 MG/DL (ref 1.9–2.7)
MCH RBC QN AUTO: 26.9 PG (ref 26.8–34.3)
MCHC RBC AUTO-ENTMCNC: 30.9 G/DL (ref 31.4–37.4)
MCV RBC AUTO: 87 FL (ref 82–98)
MONOCYTES # BLD AUTO: 0.81 THOUSAND/ΜL (ref 0.17–1.22)
MONOCYTES NFR BLD AUTO: 6 % (ref 4–12)
NEUTROPHILS # BLD AUTO: 10.3 THOUSANDS/ΜL (ref 1.85–7.62)
NEUTS SEG NFR BLD AUTO: 79 % (ref 43–75)
NRBC BLD AUTO-RTO: 0 /100 WBCS
PLATELET # BLD AUTO: 154 THOUSANDS/UL (ref 149–390)
PMV BLD AUTO: 12.3 FL (ref 8.9–12.7)
POTASSIUM SERPL-SCNC: 4.1 MMOL/L (ref 3.5–5.3)
PROT SERPL-MCNC: 6.5 G/DL (ref 6.4–8.4)
RBC # BLD AUTO: 3.9 MILLION/UL (ref 3.81–5.12)
SARS-COV+SARS-COV-2 AG RESP QL IA.RAPID: NEGATIVE
SODIUM SERPL-SCNC: 140 MMOL/L (ref 135–147)
WBC # BLD AUTO: 13.06 THOUSAND/UL (ref 4.31–10.16)

## 2025-01-07 PROCEDURE — 83735 ASSAY OF MAGNESIUM: CPT

## 2025-01-07 PROCEDURE — 71045 X-RAY EXAM CHEST 1 VIEW: CPT

## 2025-01-07 PROCEDURE — 80053 COMPREHEN METABOLIC PANEL: CPT

## 2025-01-07 PROCEDURE — 99284 EMERGENCY DEPT VISIT MOD MDM: CPT | Performed by: STUDENT IN AN ORGANIZED HEALTH CARE EDUCATION/TRAINING PROGRAM

## 2025-01-07 PROCEDURE — 87804 INFLUENZA ASSAY W/OPTIC: CPT | Performed by: STUDENT IN AN ORGANIZED HEALTH CARE EDUCATION/TRAINING PROGRAM

## 2025-01-07 PROCEDURE — 99284 EMERGENCY DEPT VISIT MOD MDM: CPT

## 2025-01-07 PROCEDURE — 87811 SARS-COV-2 COVID19 W/OPTIC: CPT | Performed by: STUDENT IN AN ORGANIZED HEALTH CARE EDUCATION/TRAINING PROGRAM

## 2025-01-07 PROCEDURE — 85025 COMPLETE CBC W/AUTO DIFF WBC: CPT

## 2025-01-07 NOTE — PROGRESS NOTES
Patient here for central labs. Labs drawn from port and sent. Port accessed, flushed, and deaccessed per protocol. Next appointment confirmed for 1/15 at 0830, declined AVS.

## 2025-01-07 NOTE — ED PROVIDER NOTES
Time reflects when diagnosis was documented in both MDM as applicable and the Disposition within this note       Time User Action Codes Description Comment    1/7/2025  1:27 PM Sofi Norris Add [J06.9] Viral upper respiratory tract infection           ED Disposition       ED Disposition   Discharge    Condition   Stable    Date/Time   Tue Jan 7, 2025  1:35 PM    Comment   Cherelle Dash discharge to home/self care.                   Assessment & Plan       Medical Decision Making  69-year-old female with endometrial cancer on chemo, CKD, cardiomyopathy who presents with cough.  She reports that she has had a productive cough without fever/chills, shortness of breath, chest pain, hemoptysis, leg swelling/calf pain, or any other complaints.  She reports adherence to her home Eliquis and states that she lives in an assisted living facility where she has been likely exposed to sick contacts.    Vital signs stable and afebrile.  Cardiopulmonary exam benign.      Differential diagnosis includes but is not limited to: Viral upper respiratory infection, bacterial pneumonia, bronchitis    Workup and treatment as below:    Imaging: Chest x-ray with right-sided Port-A-Cath in place with no acute intrathoracic abnormality  EKG: N/A  Labs: See ED course  Meds: N/A  Consults: N/A  Reassessment: Patient ambulated without drop in O2 sats or significant respiratory distress experienced.    Dispo: Patient was discharged to home with strict return precautions. Patient acknowledged understanding of plan and diagnostic results, and all their questions were answered to their satisfaction.        Amount and/or Complexity of Data Reviewed  Labs: ordered. Decision-making details documented in ED Course.  Radiology: ordered and independent interpretation performed. Decision-making details documented in ED Course.        ED Course as of 01/08/25 0705   Tue Jan 07, 2025   1253 FLU/COVID Rapid Antigen (30 min. TAT) - Preferred screening test in  "ED  negative   1326 Chest x-ray with no acute intrathoracic abnormality       Medications - No data to display    ED Risk Strat Scores                                              History of Present Illness       Chief Complaint   Patient presents with    Cough     Pt reports \"bad cough\". Reports \"last time I had a bad cough like this I had RSV and I'm concerned now because I have cancer\". Reports cough is sometimes productive, sometimes dry. Reports symptoms for 4-5 days. Denies runny nose, body aches, HA. Denies SOB.        Past Medical History:   Diagnosis Date    Abnormal ECG     Anxiety 2002    Arthritis     Bipolar 1 disorder (HCC)     Cervical cancer (HCC)     Chronic kidney disease     stage 3    CPAP (continuous positive airway pressure) dependence     Depression 2001    Disease of thyroid gland     DVT (deep venous thrombosis) (HCC)     BLLE    Elevated blood pressure reading 06/10/2019    GERD (gastroesophageal reflux disease)     Obesity     Pulmonary emboli (HCC)     B/L    RSV (acute bronchiolitis due to respiratory syncytial virus) 12/05/2023    Sleep apnea     Sleep apnea, obstructive 2007    Uterine cancer (HCC)       Past Surgical History:   Procedure Laterality Date    COLONOSCOPY  2011    IR IVC FILTER PLACEMENT OPTIONAL/TEMPORARY  10/8/2024    IR PORT CHECK  12/26/2024    IR PORT PLACEMENT  12/3/2024    LAPAROTOMY N/A 10/11/2024    Procedure: EXPLORATORY LAPAROTOMY;  Surgeon: Rodney Downing MD;  Location: [facility];  Service: Gynecology Oncology    MS HYSTEROSCOPY BX ENDOMETRIUM&/POLYPC W/WO D&C N/A 8/23/2024    Procedure: EXAM UNDER ANESTHESIA, DILATATION AND CURETTAGE, CERVICAL BIOPSIES;  Surgeon: Evin Page MD;  Location: [facility];  Service: Gynecology Oncology    MS LAPS TOTAL HYSTERECT 250 GM/< W/RMVL TUBE/OVARY N/A 10/11/2024    Procedure: ROBOTIC ASSISTED LTH, BSO, LYMPH NODE DISSECTION, EUA;  Surgeon: Rodney Downing MD;  Location: [facility];  Service: Gynecology " Oncology      Family History   Problem Relation Age of Onset    Heart disease Mother     Heart Valve Disease Mother         Replacement    Diabetes Mother         2002    Heart failure Mother         Heart Valve replacement    Arthritis Father     No Known Problems Sister     No Known Problems Daughter     No Known Problems Maternal Grandmother     No Known Problems Maternal Grandfather     No Known Problems Paternal Grandmother     No Known Problems Paternal Grandfather     No Known Problems Son     No Known Problems Maternal Aunt     No Known Problems Maternal Aunt     No Known Problems Maternal Aunt     No Known Problems Maternal Aunt     No Known Problems Maternal Aunt     No Known Problems Paternal Aunt     Alcohol abuse Son         K[place], 40 year old son, has issues with alcohol and alcohol abuse      Social History     Tobacco Use    Smoking status: Former     Current packs/day: 0.00     Average packs/day: 0.3 packs/day for 30.0 years (7.5 ttl pk-yrs)     Types: Cigarettes     Start date: 1979     Quit date: 2009     Years since quittin.0     Passive exposure: Never    Smokeless tobacco: Never   Vaping Use    Vaping status: Never Used   Substance Use Topics    Alcohol use: Yes     Alcohol/week: 1.0 standard drink of alcohol     Types: 1 Glasses of wine per week    Drug use: Never      E-Cigarette/Vaping    E-Cigarette Use Never User       E-Cigarette/Vaping Substances    Nicotine No     THC No     CBD No     Flavoring No     Other No     Unknown No       I have reviewed and agree with the history as documented.     69-year-old female with endometrial cancer on chemo, CKD, cardiomyopathy who presents with cough.  She reports that she has had a productive cough without fever/chills, shortness of breath, chest pain, hemoptysis, leg swelling/calf pain, or any other complaints.  She reports adherence to her home Eliquis and states that she lives in an assisted living facility where she has been likely  exposed to sick contacts.      Cough  Associated symptoms: no chest pain, no chills, no ear pain, no fever, no headaches, no rash, no shortness of breath and no sore throat        Review of Systems   Constitutional:  Negative for chills and fever.   HENT:  Negative for ear pain and sore throat.    Eyes:  Negative for pain and visual disturbance.   Respiratory:  Positive for cough. Negative for shortness of breath.    Cardiovascular:  Negative for chest pain and palpitations.   Gastrointestinal:  Negative for abdominal pain, blood in stool, constipation, diarrhea, nausea and vomiting.   Genitourinary:  Negative for dysuria and hematuria.   Musculoskeletal:  Negative for arthralgias and back pain.   Skin:  Negative for color change and rash.   Neurological:  Negative for dizziness, seizures, syncope, facial asymmetry, speech difficulty, weakness, light-headedness, numbness and headaches.   All other systems reviewed and are negative.          Objective       ED Triage Vitals [01/07/25 1202]   Temperature Pulse Blood Pressure Respirations SpO2 Patient Position - Orthostatic VS   98.1 °F (36.7 °C) 92 141/81 16 94 % Sitting      Temp Source Heart Rate Source BP Location FiO2 (%) Pain Score    Oral Monitor Right arm -- --      Vitals      Date and Time Temp Pulse SpO2 Resp BP Pain Score FACES Pain Rating User   01/07/25 1320 -- -- 96 % -- -- -- -- RLN   01/07/25 1202 98.1 °F (36.7 °C) 92 94 % 16 141/81 -- -- AK            Physical Exam  Constitutional:       General: She is not in acute distress.     Appearance: Normal appearance. She is normal weight. She is not ill-appearing or toxic-appearing.   HENT:      Head: Normocephalic.      Nose: Nose normal.      Mouth/Throat:      Mouth: Mucous membranes are moist.      Pharynx: Oropharynx is clear.   Eyes:      Conjunctiva/sclera: Conjunctivae normal.   Cardiovascular:      Rate and Rhythm: Normal rate and regular rhythm.      Heart sounds: Normal heart sounds.   Pulmonary:       Effort: Pulmonary effort is normal.      Breath sounds: Normal breath sounds.   Abdominal:      General: Abdomen is flat.      Palpations: Abdomen is soft.      Tenderness: There is no abdominal tenderness.   Skin:     General: Skin is warm and dry.      Capillary Refill: Capillary refill takes less than 2 seconds.   Neurological:      Mental Status: She is alert and oriented to person, place, and time.   Psychiatric:         Mood and Affect: Mood normal.         Results Reviewed       Procedure Component Value Units Date/Time    FLU/COVID Rapid Antigen (30 min. TAT) - Preferred screening test in ED [[id number]]  (Normal) Collected: 01/07/25 1206    Lab Status: Final result Specimen: Nares from Nose Updated: 01/07/25 1251     SARS COV Rapid Antigen Negative     Influenza A Rapid Antigen Negative     Influenza B Rapid Antigen Negative    Narrative:      This test has been performed using the [employer] [place] 2 FLU+SARS Antigen test under the Emergency Use Authorization (EUA). This test has been validated by the  and verified by the performing laboratory. The [place] uses lateral flow immunofluorescent sandwich assay to detect SARS-COV, Influenza A and Influenza B Antigen.     The [employer] [place] 2 SARS Antigen test does not differentiate between SARS-CoV and SARS-CoV-2.     Negative results are presumptive and may be confirmed with a molecular assay, if necessary, for patient management. Negative results do not rule out SARS-CoV-2 or influenza infection and should not be used as the sole basis for treatment or patient management decisions. A negative test result may occur if the level of antigen in a sample is below the limit of detection of this test.     Positive results are indicative of the presence of viral antigens, but do not rule out bacterial infection or co-infection with other viruses.     All test results should be used as an adjunct to clinical observations and other information available to the  provider.    FOR PEDIATRIC PATIENTS - copy/paste COVID Guidelines URL to browser: [email or web address]            XR chest 1 view portable   Final Interpretation by Lidia Damon MD (01/07 1321)      No acute cardiopulmonary disease.            Workstation performed: [id number]             Procedures    ED Medication and Procedure Management   Prior to Admission Medications   Prescriptions Last Dose Informant Patient Reported? Taking?   CRANBERRY PO  Self Yes No   Sig: Take by mouth   CRANIAL PROSTHESIS, RX,  Self No No   Sig: Apply to affected area for chemotherapy-induced alopecia   Patient not taking: Reported on 12/12/2024   Cholecalciferol (VITAMIN D) 2000 units CAPS  Self Yes No   Sig: Take 1 capsule by mouth daily   Glucosamine-Chondroit-Vit C-Mn (GLUCOSAMINE 1500 COMPLEX PO)  Self Yes No   Sig: Take by mouth in the morning   Ivermectin 1 % CREA  Self Yes No   LORazepam (ATIVAN) 1 mg tablet  Self No No   Sig: Take 1 tablet (1 mg total) by mouth every 8 (eight) hours as needed (nausea or anxiety)   Mirabegron ER (Myrbetriq) 25 MG TB24   No No   Sig: TAKE 1 TABLET BY MOUTH IN THE  MORNING   Multiple Vitamin (MULTI-VITAMIN DAILY) TABS  Self Yes No   Sig: Take by mouth daily   Sodium Fluoride 5000 PPM 1.1 % GEL  Self Yes No   Sig: As directed   apixaban (Eliquis) 5 mg  Self No No   Sig: Take 1 tablet (5 mg total) by mouth 2 (two) times a day   chlorhexidine (PERIDEX) 0.12 % solution  Self Yes No   Sig: USE HALF OUNCE TWICE A DAY RINSE FOR 30 SECONDS BY MOUTH THEN EXPECTORATE DO NOT SWALLOW   ciclopirox (LOPROX) 0.77 % cream  Self Yes No   clobetasol (TEMOVATE) 0.05 % ointment  Self No No   Sig: Apply topically 2 (two) times a week   famotidine (PEPCID) 20 mg tablet  Self No No   Sig: TAKE 1 TABLET BY MOUTH TWICE  DAILY   lidocaine-prilocaine (EMLA) cream  Self No No   Sig: Apply to PORT 30 min prior to labs and chemo   lurasidone (LATUDA) 20 mg tablet   Self No No   Sig: TAKE 1 TABLET BY MOUTH DAILY  WITH BREAKFAST   metoprolol succinate (TOPROL-XL) 50 mg 24 hr tablet  Self No No   Sig: Take 1 tablet (50 mg total) by mouth daily   ondansetron (ZOFRAN) 8 mg tablet  Self No No   Sig: Take 1 tablet (8 mg total) by mouth every 8 (eight) hours as needed for nausea or vomiting   polyethylene glycol (GLYCOLAX) 17 GM/SCOOP powder  Self Yes No   Sig: Take 17 g by mouth daily      Facility-Administered Medications: None     Discharge Medication List as of 1/7/2025  1:35 PM        CONTINUE these medications which have NOT CHANGED    Details   apixaban (Eliquis) 5 mg Take 1 tablet (5 mg total) by mouth 2 (two) times a day, Starting Mon 11/11/2024, Until Sun 2/9/2025, Normal      chlorhexidine (PERIDEX) 0.12 % solution USE HALF OUNCE TWICE A DAY RINSE FOR 30 SECONDS BY MOUTH THEN EXPECTORATE DO NOT SWALLOW, Historical Med      Cholecalciferol (VITAMIN D) 2000 units CAPS Take 1 capsule by mouth daily, Historical Med      ciclopirox (LOPROX) 0.77 % cream Historical Med      clobetasol (TEMOVATE) 0.05 % ointment Apply topically 2 (two) times a week, Starting Mon 1/15/2024, Normal      CRANBERRY PO Take by mouth, Historical Med      CRANIAL PROSTHESIS, RX, Apply to affected area for chemotherapy-induced alopecia, Print      famotidine (PEPCID) 20 mg tablet TAKE 1 TABLET BY MOUTH TWICE  DAILY, Starting Mon 6/24/2024, Normal      Glucosamine-Chondroit-Vit C-Mn (GLUCOSAMINE 1500 COMPLEX PO) Take by mouth in the morning, Historical Med      Ivermectin 1 % CREA Historical Med      lidocaine-prilocaine (EMLA) cream Apply to PORT 30 min prior to labs and chemo, Normal      LORazepam (ATIVAN) 1 mg tablet Take 1 tablet (1 mg total) by mouth every 8 (eight) hours as needed (nausea or anxiety), Starting Wed 11/13/2024, Normal      lurasidone (LATUDA) 20 mg tablet TAKE 1 TABLET BY MOUTH DAILY  WITH BREAKFAST, Starting Thu 6/27/2024, Normal      metoprolol succinate (TOPROL-XL) 50 mg 24 hr  tablet Take 1 tablet (50 mg total) by mouth daily, Starting Mon 9/9/2024, Normal      Mirabegron ER (Myrbetriq) 25 MG TB24 TAKE 1 TABLET BY MOUTH IN THE  MORNING, Starting Fri 12/20/2024, Normal      Multiple Vitamin (MULTI-VITAMIN DAILY) TABS Take by mouth daily, Historical Med      ondansetron (ZOFRAN) 8 mg tablet Take 1 tablet (8 mg total) by mouth every 8 (eight) hours as needed for nausea or vomiting, Starting Wed 11/13/2024, Normal      polyethylene glycol (GLYCOLAX) 17 GM/SCOOP powder Take 17 g by mouth daily, Historical Med      Sodium Fluoride 5000 PPM 1.1 % GEL As directed, Starting Fri 11/22/2024, Historical Med           No discharge procedures on file.  ED SEPSIS DOCUMENTATION   Time reflects when diagnosis was documented in both MDM as applicable and the Disposition within this note       Time User Action Codes Description Comment    1/7/2025  1:27 PM Sofi Norris Add [J06.9] Viral upper respiratory tract infection                  Sofi Norris MD  01/08/25 0706

## 2025-01-07 NOTE — DISCHARGE INSTRUCTIONS
You were seen in the Emergency Department for: Cough    Your workup today showed: No evidence of bacterial pneumonia and reassuring physical exam and vital signs    Your next steps should include: Please call today to make an appointment with your primary care provider in one week.    Reasons to RETURN IMMEDIATELY to the Emergency Department: worsening symptoms, difficulty breathing, temperature > 100.4 degrees, uncontrollable nausea/vomiting, or any other concerns.

## 2025-01-08 ENCOUNTER — RESULTS FOLLOW-UP (OUTPATIENT)
Dept: GYNECOLOGIC ONCOLOGY | Facility: CLINIC | Age: 70
End: 2025-01-08

## 2025-01-08 ENCOUNTER — TELEPHONE (OUTPATIENT)
Dept: GYNECOLOGIC ONCOLOGY | Facility: CLINIC | Age: 70
End: 2025-01-08

## 2025-01-08 ENCOUNTER — HOSPITAL ENCOUNTER (OUTPATIENT)
Dept: CT IMAGING | Facility: HOSPITAL | Age: 70
Discharge: HOME | End: 2025-01-08
Payer: COMMERCIAL

## 2025-01-08 DIAGNOSIS — C54.1 ENDOMETRIAL CANCER (HCC): Primary | ICD-10-CM

## 2025-01-08 DIAGNOSIS — J98.4 DRUG-INDUCED PNEUMONITIS: Primary | ICD-10-CM

## 2025-01-08 DIAGNOSIS — C54.1 ENDOMETRIAL CANCER (HCC): ICD-10-CM

## 2025-01-08 DIAGNOSIS — R05.1 ACUTE COUGH: ICD-10-CM

## 2025-01-08 DIAGNOSIS — T50.905A DRUG-INDUCED PNEUMONITIS: Primary | ICD-10-CM

## 2025-01-08 PROCEDURE — 71250 CT THORAX DX C-: CPT

## 2025-01-08 RX ORDER — PREDNISONE 10 MG/1
TABLET ORAL
Qty: 272 TABLET | Refills: 0 | Status: SHIPPED | OUTPATIENT
Start: 2025-01-08

## 2025-01-08 NOTE — TELEPHONE ENCOUNTER
I spoke with the patient and informed her that the STAT CT of the chest is scheduled for today, 1/8/2025, at 11:30AM in [place].

## 2025-01-08 NOTE — RESULT ENCOUNTER NOTE
Images reviewed with Dr. Page. Will proceed with treatment for immunotherapy-induced pneumonitis with steroid 1mg/kg/d taper. Plan for repeat CT chest in 4-6 weeks. Patient to call with worsening cough/SOB.

## 2025-01-08 NOTE — TELEPHONE ENCOUNTER
Follow-up call placed to patient after ED visit on 1/7/25 for cough. Patient notes cough, no additional URI symptoms, afebrile. Denies SOB, but cough is still present and bothersome. Will plan for STAT CT chest to r/o drug-induced pneumonitis. Patient understands that treatment would be high-dose steroid taper if identified. Will continue to closely monitor for SOB.

## 2025-01-10 ENCOUNTER — OFFICE VISIT (OUTPATIENT)
Dept: GYNECOLOGIC ONCOLOGY | Facility: CLINIC | Age: 70
End: 2025-01-10
Payer: COMMERCIAL

## 2025-01-10 ENCOUNTER — LAB REQUISITION (OUTPATIENT)
Dept: LAB | Facility: HOSPITAL | Age: 70
End: 2025-01-10

## 2025-01-10 VITALS
BODY MASS INDEX: 42.7 KG/M2 | DIASTOLIC BLOOD PRESSURE: 80 MMHG | SYSTOLIC BLOOD PRESSURE: 140 MMHG | TEMPERATURE: 97.4 F | WEIGHT: 226 LBS | HEART RATE: 103 BPM | OXYGEN SATURATION: 97 %

## 2025-01-10 DIAGNOSIS — T50.905A DRUG-INDUCED PNEUMONITIS: ICD-10-CM

## 2025-01-10 DIAGNOSIS — J98.4 DRUG-INDUCED PNEUMONITIS: ICD-10-CM

## 2025-01-10 DIAGNOSIS — C54.1 ENDOMETRIAL CANCER (HCC): Primary | ICD-10-CM

## 2025-01-10 DIAGNOSIS — N95.0 POSTMENOPAUSAL BLEEDING: ICD-10-CM

## 2025-01-10 DIAGNOSIS — K59.03 DRUG INDUCED CONSTIPATION: ICD-10-CM

## 2025-01-10 PROCEDURE — 99215 OFFICE O/P EST HI 40 MIN: CPT | Performed by: PHYSICIAN ASSISTANT

## 2025-01-10 RX ORDER — POLYETHYLENE GLYCOL 3350 17 G/17G
17 POWDER, FOR SOLUTION ORAL 2 TIMES DAILY
Qty: 238 G | Refills: 1 | Status: SHIPPED | OUTPATIENT
Start: 2025-01-10

## 2025-01-10 NOTE — ASSESSMENT & PLAN NOTE
Stage IIIC1, grade 1 endometrial cancer with ITC within periaortic LN and extensive LVSI s/p surgical resection who is currently receiving adjuvant chemotherapy with taxol 135 mg/m2, carboplatin AUC 5, pembrolizumab 200 mg IV every 21 days. 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 Otherwise, she is tolerating treatment well.     Continue with cycle 3 of treatment as planned as long as her metabolic and hematologic parameters are adequate. Pembrolizumab will be held for cycle 3.     CT chest/abdomen/pelvis to be performed after completion of cycle 3. Return to the office as per her chemotherapy calendar.   Orders:  •  CT chest abdomen pelvis wo contrast; Future

## 2025-01-10 NOTE — ASSESSMENT & PLAN NOTE
Likely immunotherapy induced.   No cough, no associated URI symptoms.   CT chest from 1/8/25 noted multiple b/l lower lobe ill defined nodular opacities.     Initiated 1 mg/kg/d steroid taper. Symptoms improving after 2 doses.     Continue taper.   Hold immunotherapy for cycle 3.     Repeat CT chest in approximately 3-4 weeks prior to consideration to restart pembrolizumab.   Orders:  •  CT chest abdomen pelvis wo contrast; Future

## 2025-01-10 NOTE — PROGRESS NOTES
Name: Cherelle Dash      : 1955      MRN: [id number]  Encounter Provider: Sharon Banks PA-C  Encounter Date: 1/10/2025   Encounter department: [facility]  :  Assessment & Plan  Endometrial cancer (HCC)  Stage IIIC1, grade 1 endometrial cancer with ITC within periaortic LN and extensive LVSI s/p surgical resection who is currently receiving adjuvant chemotherapy with taxol 135 mg/m2, carboplatin AUC 5, pembrolizumab 200 mg IV every 21 days. 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 Otherwise, she is tolerating treatment well.     Continue with cycle 3 of treatment as planned as long as her metabolic and hematologic parameters are adequate. Pembrolizumab will be held for cycle 3.     CT chest/abdomen/pelvis to be performed after completion of cycle 3. Return to the office as per her chemotherapy calendar.   Orders:  •  CT chest abdomen pelvis wo contrast; Future    Drug-induced pneumonitis  Likely immunotherapy induced.   No cough, no associated URI symptoms.   CT chest from 25 noted multiple b/l lower lobe ill defined nodular opacities.     Initiated 1 mg/kg/d steroid taper. Symptoms improving after 2 doses.     Continue taper.   Hold immunotherapy for cycle 3.     Repeat CT chest in approximately 3-4 weeks prior to consideration to restart pembrolizumab.   Orders:  •  CT chest abdomen pelvis wo contrast; Future    Drug induced constipation  Well managed with miralax.  Orders:  •  polyethylene glycol (GLYCOLAX) 17 GM/SCOOP powder; Take 17 g by mouth 2 (two) times a day            History of Present Illness     Reason for Visit / CC:  Pre-Chemo Visit    Cherelle Dash is a 69 y.o. female   who presents to the office for pre-chemotherapy evaluation. She has been afebrile. The patient developed a new cough this week. She was seen in the ED with negative CXR and RSV/COVID/Flu. Patient without additional associated symptoms. STAT  CT CHEST ordered to r/o drug-induced pneumonitis on 1/8/25. Inflammatory findings noted. Patient started on high-dose 1mg/kg/d steroid taper. She notes improvement in her cough. Continues to deny SOB. Otherwise, patient is tolerating treatment well. No n/v/abdominal pain. Constipation is managed with miralax. Appetite is appropriate. Denies diarrhea, skin rash, chemotherapy-induced neuropathy.          Oncology History   Cancer Staging   Endometrial cancer (HCC)  Staging form: Corpus Uteri - Carcinoma, AJCC 8th Edition  - Pathologic stage from 10/11/2024: FIGO Stage IIIC1 (pT2, pN1mi(sn), cM0) - Signed by Evin Page MD on 11/12/2024  Method of lymph node assessment: [place] lymph node biopsy  Histologic grade (G): G1  Histologic grading system: 3 grade system  Lymph-vascular invasion (LVI): BOTH lymphatic and small vessel AND venous (large vessel) invasion  Peritoneal cytology results: Negative  Pelvic brandi status: Positive  Oncology History   Endometrial cancer (Formerly [facility])   8/11/2024 Initial Diagnosis    Endometrial cancer (Former[facility])     10/11/2024 -  Cancer Staged    Staging form: Corpus Uteri - Carcinoma, AJCC 8th Edition  - Pathologic stage from 10/11/2024: FIGO Stage IIIC1 (pT2, pN1mi(sn), cM0) - Signed by Evin Page MD on 11/12/2024  Method of lymph node assessment: [place] lymph node biopsy  Histologic grade (G): G1  Histologic grading system: 3 grade system  Lymph-vascular invasion (LVI): BOTH lymphatic and small vessel AND venous (large vessel) invasion  Peritoneal cytology results: Negative  Pelvic brandi status: Positive       10/11/2024 Surgery    Robotic assisted total laparoscopic hysterectomy, bilateral salpingo-oophorectomy, pelvic and periaortic sentinel lymph node biopsies, vulvar biopsy  1.  Grade 1, cervical stromal invasion to a depth of 12 out of 15 mm, extensive LVSI, p53 wild-type, pMMR, HER2 1+,  washings negative, 0.7 mm metastatic focus and pelvic lymph node, ITC  identified and periaortic lymph nodes.     12/6/2024 -  Chemotherapy    Taxol 135 mg/m2, carboplatin AUC 5 and pembrolizumab 200 mg IV every 21 days.          Review of Systems   Constitutional: Negative.    HENT: Negative.     Eyes: Negative.    Respiratory:  Positive for cough. Negative for shortness of breath.    Cardiovascular: Negative.    Gastrointestinal: Negative.    Genitourinary: Negative.    Musculoskeletal: Negative.    Skin: Negative.    Neurological: Negative.    Psychiatric/Behavioral: Negative.      A complete review of systems is negative other than that noted above in the HPI.  Medical History Reviewed by provider this encounter:  Tobacco  Allergies  Meds  Problems  Med Hx  Surg Hx  Fam Hx     .  Current Outpatient Medications on File Prior to Visit   Medication Sig Dispense Refill   • apixaban (Eliquis) 5 mg Take 1 tablet (5 mg total) by mouth 2 (two) times a day 180 tablet 0   • chlorhexidine (PERIDEX) 0.12 % solution USE HALF OUNCE TWICE A DAY RINSE FOR 30 SECONDS BY MOUTH THEN EXPECTORATE DO NOT SWALLOW     • Cholecalciferol (VITAMIN D) 2000 units CAPS Take 1 capsule by mouth daily     • ciclopirox (LOPROX) 0.77 % cream      • clobetasol (TEMOVATE) 0.05 % ointment Apply topically 2 (two) times a week 60 g 3   • CRANBERRY PO Take by mouth     • famotidine (PEPCID) 20 mg tablet TAKE 1 TABLET BY MOUTH TWICE  DAILY 180 tablet 3   • Glucosamine-Chondroit-Vit C-Mn (GLUCOSAMINE 1500 COMPLEX PO) Take by mouth in the morning     • Ivermectin 1 % CREA      • lidocaine-prilocaine (EMLA) cream Apply to PORT 30 min prior to labs and chemo 30 g 1   • LORazepam (ATIVAN) 1 mg tablet Take 1 tablet (1 mg total) by mouth every 8 (eight) hours as needed (nausea or anxiety) 20 tablet 0   • lurasidone (LATUDA) 20 mg tablet TAKE 1 TABLET BY MOUTH DAILY  WITH BREAKFAST 30 tablet 11   • metoprolol succinate (TOPROL-XL) 50 mg 24 hr tablet Take 1 tablet (50 mg total) by mouth daily 90 tablet 3   • Mirabegron ER  (Myrbetriq) 25 MG TB24 TAKE 1 TABLET BY MOUTH IN THE  MORNING 90 tablet 1   • Multiple Vitamin (MULTI-VITAMIN DAILY) TABS Take by mouth daily     • ondansetron (ZOFRAN) 8 mg tablet Take 1 tablet (8 mg total) by mouth every 8 (eight) hours as needed for nausea or vomiting 30 tablet 1   • predniSONE 10 mg tablet Take 10 tablets PO QD x 7 days, then 9 tablets PO x 7d, then 8 tablets PO x 7d, then 6 tabs PO x 5d, then 5 tabs PO x 5 d, then 4 tabs PO x 3d, then 3 tabs PO x 3d, then 2 tabs PO x 2d, then 1 tabs PO x 3d. 272 tablet 0   • Sodium Fluoride 5000 PPM 1.1 % GEL As directed     • [DISCONTINUED] polyethylene glycol (GLYCOLAX) 17 GM/SCOOP powder Take 17 g by mouth daily     • CRANIAL PROSTHESIS, RX, Apply to affected area for chemotherapy-induced alopecia (Patient not taking: Reported on 12/12/2024) 1 each 0     Current Facility-Administered Medications on File Prior to Visit   Medication Dose Route Frequency Provider Last Rate Last Admin   • [DISCONTINUED] alteplase (CATHFLO) injection 2 mg  2 mg Intracatheter Q1MIN PRN Evin Page MD             Objective   /80 (BP Location: Left arm, Patient Position: Sitting, Cuff Size: Large)   Pulse 103   Temp (!) 97.4 °F (36.3 °C) (Temporal)   Wt 103 kg (226 lb)   SpO2 97%   BMI 42.70 kg/m²     Body mass index is 42.7 kg/m².  Pain Screening:  Pain Score: 0-No pain    ECOG ECOG Performance Status: 0 - Fully active, able to carry on all pre-disease performance without restriction     Physical Exam  Constitutional:       Appearance: She is well-developed.   Pulmonary:      Effort: Pulmonary effort is normal.   Skin:     General: Skin is warm and dry.      Findings: No rash.   Neurological:      Mental Status: She is alert and oriented to person, place, and time.   Psychiatric:         Behavior: Behavior normal.         Thought Content: Thought content normal.         Judgment: Judgment normal.          Labs: I have reviewed pertinent labs.   Lab Results    Component Value Date/Time    WBC 13.06 (H) 01/07/2025 11:41 AM    RBC 3.90 01/07/2025 11:41 AM    Hemoglobin 10.5 (L) 01/07/2025 11:41 AM    Hematocrit 34.0 (L) 01/07/2025 11:41 AM    MCV 87 01/07/2025 11:41 AM    MCH 26.9 01/07/2025 11:41 AM    RDW 18.0 (H) 01/07/2025 11:41 AM    Platelets 154 01/07/2025 11:41 AM    Segmented % 79 (H) 01/07/2025 11:41 AM    Lymphocytes % 12 (L) 01/07/2025 11:41 AM    Monocytes % 6 01/07/2025 11:41 AM    Eosinophils Relative 1 01/07/2025 11:41 AM    Basophils Relative 1 01/07/2025 11:41 AM    Immature Grans % 1 01/07/2025 11:41 AM    Absolute Neutrophils 10.30 (H) 01/07/2025 11:41 AM      Lab Results   Component Value Date/Time    Sodium 140 01/07/2025 11:41 AM    Potassium 4.1 01/07/2025 11:41 AM    Chloride 109 (H) 01/07/2025 11:41 AM    CO2 24 01/07/2025 11:41 AM    ANION GAP 7 01/07/2025 11:41 AM    BUN 23 01/07/2025 11:41 AM    Creatinine 1.43 (H) 01/07/2025 11:41 AM    Glucose 131 01/07/2025 11:41 AM    Glucose, Fasting 122 (H) 11/14/2024 06:58 AM    Calcium 9.1 01/07/2025 11:41 AM    AST 11 (L) 01/07/2025 11:41 AM    ALT 12 01/07/2025 11:41 AM    Alkaline Phosphatase 123 (H) 01/07/2025 11:41 AM    Total Protein 6.5 01/07/2025 11:41 AM    Albumin 3.7 01/07/2025 11:41 AM    Total Bilirubin 0.23 01/07/2025 11:41 AM    eGFR 37 01/07/2025 11:41 AM           Trend:  Lab Results   Component Value Date     8.8 12/24/2024     8.7 12/04/2024     54.2 (H) 11/14/2024     45.2 (H) 08/14/2024       Radiology Results Review: I have reviewed radiology reports from 1/8/25 including: CT chest.

## 2025-01-14 ENCOUNTER — PATIENT OUTREACH (OUTPATIENT)
Dept: HEMATOLOGY ONCOLOGY | Facility: CLINIC | Age: 70
End: 2025-01-14

## 2025-01-14 NOTE — PROGRESS NOTES
I reached out and spoke with Cherelle  now that consults have been completed with the oncology teams to review for any barriers to care and offer supportive services as needed. Distress Thermometer completed at this time. Patient scored 1/10. Based on responses to DT, no indication for referral to SW needed at this time. . I reviewed and updated the members assigned to the care team in [facility].   She knows the members of the care team as well as how and when to contact them with any needs.   She verbalizes managing the schedules well.   She is currently able to drive and denies any transportation needs.    She is already established with Palliative Care.   She states that she is eating and drinking as per usual with no unintentional weight loss.     Patient does not smoke.   She states she is well supported by family and friends.  Community support groups discussed including the [facility] Support [facility] of the [facility]. Patient declined information at this time.   She feels she has adequate insurance coverage and denies any financial concerns at this time.     Based on individual needs I will follow up as needed.  I have provided my direct contact information and welcome them to contact me if needs as discussed above change. She was appreciative for the call.

## 2025-01-15 ENCOUNTER — HOSPITAL ENCOUNTER (OUTPATIENT)
Dept: INFUSION CENTER | Facility: CLINIC | Age: 70
Discharge: HOME | End: 2025-01-15
Payer: COMMERCIAL

## 2025-01-15 DIAGNOSIS — Z45.2 ENCOUNTER FOR CENTRAL LINE CARE: Primary | ICD-10-CM

## 2025-01-15 DIAGNOSIS — R68.89 OTHER GENERAL SYMPTOMS AND SIGNS: ICD-10-CM

## 2025-01-15 DIAGNOSIS — C54.1 ENDOMETRIAL CANCER (HCC): ICD-10-CM

## 2025-01-15 LAB
ALBUMIN SERPL BCG-MCNC: 3.5 G/DL (ref 3.5–5)
ALP SERPL-CCNC: 93 U/L (ref 34–104)
ALT SERPL W P-5'-P-CCNC: 18 U/L (ref 7–52)
AMYLASE SERPL-CCNC: 37 IU/L (ref 29–103)
ANION GAP SERPL CALCULATED.3IONS-SCNC: 6 MMOL/L (ref 4–13)
AST SERPL W P-5'-P-CCNC: 12 U/L (ref 13–39)
BASOPHILS # BLD AUTO: 0.01 THOUSANDS/ΜL (ref 0–0.1)
BASOPHILS NFR BLD AUTO: 0 % (ref 0–1)
BILIRUB SERPL-MCNC: 0.28 MG/DL (ref 0.2–1)
BUN SERPL-MCNC: 33 MG/DL (ref 5–25)
CALCIUM SERPL-MCNC: 9.1 MG/DL (ref 8.4–10.2)
CANCER AG125 SERPL-ACNC: 9.8 U/ML (ref 0–35)
CHLORIDE SERPL-SCNC: 106 MMOL/L (ref 96–108)
CO2 SERPL-SCNC: 26 MMOL/L (ref 21–32)
CREAT SERPL-MCNC: 1.43 MG/DL (ref 0.6–1.3)
EOSINOPHIL # BLD AUTO: 0.01 THOUSAND/ΜL (ref 0–0.61)
EOSINOPHIL NFR BLD AUTO: 0 % (ref 0–6)
ERYTHROCYTE [DISTWIDTH] IN BLOOD BY AUTOMATED COUNT: 20.2 % (ref 11.6–15.1)
GFR SERPL CREATININE-BSD FRML MDRD: 37 ML/MIN/1.73SQ M
GLUCOSE SERPL-MCNC: 121 MG/DL (ref 65–140)
HCT VFR BLD AUTO: 34.8 % (ref 34.8–46.1)
HGB BLD-MCNC: 11 G/DL (ref 11.5–15.4)
IMM GRANULOCYTES # BLD AUTO: 0.06 THOUSAND/UL (ref 0–0.2)
IMM GRANULOCYTES NFR BLD AUTO: 1 % (ref 0–2)
LIPASE SERPL-CCNC: 18 U/L (ref 11–82)
LYMPHOCYTES # BLD AUTO: 1.35 THOUSANDS/ΜL (ref 0.6–4.47)
LYMPHOCYTES NFR BLD AUTO: 13 % (ref 14–44)
MAGNESIUM SERPL-MCNC: 1.9 MG/DL (ref 1.9–2.7)
MCH RBC QN AUTO: 27.8 PG (ref 26.8–34.3)
MCHC RBC AUTO-ENTMCNC: 31.6 G/DL (ref 31.4–37.4)
MCV RBC AUTO: 88 FL (ref 82–98)
MONOCYTES # BLD AUTO: 0.8 THOUSAND/ΜL (ref 0.17–1.22)
MONOCYTES NFR BLD AUTO: 8 % (ref 4–12)
NEUTROPHILS # BLD AUTO: 8.34 THOUSANDS/ΜL (ref 1.85–7.62)
NEUTS SEG NFR BLD AUTO: 78 % (ref 43–75)
NRBC BLD AUTO-RTO: 0 /100 WBCS
PLATELET # BLD AUTO: 210 THOUSANDS/UL (ref 149–390)
PMV BLD AUTO: 11.9 FL (ref 8.9–12.7)
POTASSIUM SERPL-SCNC: 4 MMOL/L (ref 3.5–5.3)
PROT SERPL-MCNC: 6.4 G/DL (ref 6.4–8.4)
RBC # BLD AUTO: 3.96 MILLION/UL (ref 3.81–5.12)
SODIUM SERPL-SCNC: 138 MMOL/L (ref 135–147)
TSH SERPL DL<=0.05 MIU/L-ACNC: 2.48 UIU/ML (ref 0.45–4.5)
WBC # BLD AUTO: 10.57 THOUSAND/UL (ref 4.31–10.16)

## 2025-01-15 PROCEDURE — 82150 ASSAY OF AMYLASE: CPT

## 2025-01-15 PROCEDURE — 80053 COMPREHEN METABOLIC PANEL: CPT

## 2025-01-15 PROCEDURE — 86304 IMMUNOASSAY TUMOR CA 125: CPT

## 2025-01-15 PROCEDURE — 83735 ASSAY OF MAGNESIUM: CPT

## 2025-01-15 PROCEDURE — 85025 COMPLETE CBC W/AUTO DIFF WBC: CPT

## 2025-01-15 PROCEDURE — 84443 ASSAY THYROID STIM HORMONE: CPT

## 2025-01-15 PROCEDURE — 83690 ASSAY OF LIPASE: CPT

## 2025-01-15 NOTE — PROGRESS NOTES
Patient to infusion for labs.  She offers no complaints.  Port accessed, labs drawn per MD order.  Next appointment confirmed 1/17 0730, she declined AVS

## 2025-01-16 ENCOUNTER — OFFICE VISIT (OUTPATIENT)
Dept: UROLOGY | Facility: CLINIC | Age: 70
End: 2025-01-16
Payer: COMMERCIAL

## 2025-01-16 VITALS
WEIGHT: 228 LBS | OXYGEN SATURATION: 96 % | HEART RATE: 81 BPM | BODY MASS INDEX: 43.08 KG/M2 | DIASTOLIC BLOOD PRESSURE: 80 MMHG | SYSTOLIC BLOOD PRESSURE: 124 MMHG

## 2025-01-16 DIAGNOSIS — N32.81 OAB (OVERACTIVE BLADDER): ICD-10-CM

## 2025-01-16 DIAGNOSIS — N39.0 URINARY TRACT INFECTION WITHOUT HEMATURIA, SITE UNSPECIFIED: Primary | ICD-10-CM

## 2025-01-16 LAB
POST-VOID RESIDUAL VOLUME, ML POC: 63 ML
SL AMB  POCT GLUCOSE, UA: ABNORMAL
SL AMB LEUKOCYTE ESTERASE,UA: ABNORMAL
SL AMB POCT BILIRUBIN,UA: ABNORMAL
SL AMB POCT BLOOD,UA: ABNORMAL
SL AMB POCT CLARITY,UA: CLEAR
SL AMB POCT COLOR,UA: YELLOW
SL AMB POCT KETONES,UA: ABNORMAL
SL AMB POCT NITRITE,UA: POSITIVE
SL AMB POCT PH,UA: 5
SL AMB POCT SPECIFIC GRAVITY,UA: 1.01
SL AMB POCT URINE PROTEIN: ABNORMAL
SL AMB POCT UROBILINOGEN: 0.2

## 2025-01-16 PROCEDURE — 81002 URINALYSIS NONAUTO W/O SCOPE: CPT

## 2025-01-16 PROCEDURE — 87077 CULTURE AEROBIC IDENTIFY: CPT

## 2025-01-16 PROCEDURE — 87186 SC STD MICRODIL/AGAR DIL: CPT

## 2025-01-16 PROCEDURE — 99213 OFFICE O/P EST LOW 20 MIN: CPT

## 2025-01-16 PROCEDURE — 51798 US URINE CAPACITY MEASURE: CPT

## 2025-01-16 PROCEDURE — 87086 URINE CULTURE/COLONY COUNT: CPT

## 2025-01-16 RX ORDER — PALONOSETRON 0.05 MG/ML
0.25 INJECTION, SOLUTION INTRAVENOUS ONCE
Status: CANCELLED | OUTPATIENT
Start: 2025-01-17

## 2025-01-16 RX ORDER — LAMOTRIGINE 100 %
POWDER (GRAM) MISCELLANEOUS
COMMUNITY

## 2025-01-16 RX ORDER — SODIUM CHLORIDE 9 MG/ML
20 INJECTION, SOLUTION INTRAVENOUS ONCE
Status: CANCELLED | OUTPATIENT
Start: 2025-01-17

## 2025-01-16 RX ORDER — MIRABEGRON 50 MG/1
50 TABLET, FILM COATED, EXTENDED RELEASE ORAL DAILY
Qty: 30 TABLET | Refills: 4 | Status: SHIPPED | OUTPATIENT
Start: 2025-01-16

## 2025-01-16 NOTE — PROGRESS NOTES
1/16/2025      No chief complaint on file.        Assessment and Plan    69 y.o. female     1.Overactive Bladder  -Increase Myrbetriq to 50 mg.  -PVR 63 mL today.  -Urine dip is positive for leukocytes, nitrites, and blood.  We will send this out for microscopy and culture.  We will notify patient of results.  -Patient would like to return in 6 months for follow-up.  -All questions addressed.  -Please do not hesitate to reach out with further questions or concerns.     History of Present Illness  Cherelle Dash is a 69 y.o. female here for follow-up of urinary urgency and frequency.  She has currently been maintained on Myrbetriq 25 mg.  Prior to this she was on Detrol for 4 years.  She does state that she has had an increase in nocturia.  She denies dysuria, flank pain, and gross hematuria.  Denies history of recurrent UTIs and kidney stones.  She recently was diagnosed with endometrial cancer in August 2024.  She states she is currently undergoing chemotherapy and has 4 rounds of chemotherapy left with going every 3 weeks.  She then states that she will be on radiation for 5 weeks.  Patient is currently on prednisone due to an allergic reaction to her immunotherapy.  She is taking MiraLAX and stool softeners for constipation.  She is interested in increasing her Myrbetriq to see if that can help improve her nocturia.  Overall patient has been tolerating tolerating Myrbetriq well.      PVR 63 mL      Review of Systems   Constitutional:  Negative for chills and fever.   HENT:  Negative for ear pain and sore throat.    Eyes:  Negative for pain and visual disturbance.   Respiratory:  Negative for cough and shortness of breath.    Cardiovascular:  Negative for chest pain and palpitations.   Gastrointestinal:  Negative for abdominal pain and vomiting.   Genitourinary:  Positive for frequency. Negative for difficulty urinating, dysuria, flank pain, hematuria and urgency.        Nocturia   Musculoskeletal:  Negative for  arthralgias and back pain.   Skin:  Negative for color change and rash.   Neurological:  Negative for seizures and syncope.   All other systems reviewed and are negative.               Vitals  There were no vitals filed for this visit.    Physical Exam  Constitutional:       Appearance: Normal appearance.   HENT:      Head: Normocephalic.   Eyes:      Extraocular Movements: Extraocular movements intact.      Pupils: Pupils are equal, round, and reactive to light.   Pulmonary:      Effort: Pulmonary effort is normal. No respiratory distress.   Musculoskeletal:         General: Normal range of motion.      Cervical back: Normal range of motion.   Neurological:      Mental Status: She is alert and oriented to person, place, and time.   Psychiatric:         Mood and Affect: Mood normal.         Behavior: Behavior normal.         Thought Content: Thought content normal.         Judgment: Judgment normal.           Past History  Past Medical History:   Diagnosis Date    Abnormal ECG     Anxiety 2002    Arthritis     Bipolar 1 disorder ([facility])     Cervical cancer ([facility])     Chronic kidney disease     stage 3    CPAP (continuous positive airway pressure) dependence     Depression 2001    Disease of thyroid gland     DVT (deep venous thrombosis) ([facility])     BLLE    Elevated blood pressure reading 06/10/2019    GERD (gastroesophageal reflux disease)     Obesity     Pulmonary emboli ([facility])     B/L    RSV (acute bronchiolitis due to respiratory syncytial virus) 12/05/2023    Sleep apnea     Sleep apnea, obstructive 2007    Uterine cancer ([facility])      Social History     Socioeconomic History    Marital status: /Civil Union     Spouse name: Not on file    Number of children: Not on file    Years of education: Not on file    Highest education level: Not on file   Occupational History    Not on file   Tobacco Use    Smoking status: Former     Current packs/day: 0.00     Average packs/day: 0.3 packs/day for 30.0 years (7.5 ttl  pk-yrs)     Types: Cigarettes     Start date: 1979     Quit date: 2009     Years since quittin.0     Passive exposure: Never    Smokeless tobacco: Never   Vaping Use    Vaping status: Never Used   Substance and Sexual Activity    Alcohol use: Yes     Alcohol/week: 1.0 standard drink of alcohol     Types: 1 Glasses of wine per week    Drug use: Never    Sexual activity: Not Currently     Partners: Male     Birth control/protection: Post-menopausal   Other Topics Concern    Not on file   Social History Narrative    Daily coffee consumption: 3 cups/day     Social Drivers of Health     Financial Resource Strain: Low Risk  (2024)    Overall Financial Resource Strain (CARDIA)     Difficulty of Paying Living Expenses: Not hard at all   Food Insecurity: No Food Insecurity (2023)    Nursing - Inadequate Food Risk Classification     Worried About Running Out of Food in the Last Year: Never true     Ran Out of Food in the Last Year: Never true     Ran Out of Food in the Last Year: Not on file   Transportation Needs: No Transportation Needs (2024)    PRAPARE - Transportation     Lack of Transportation (Medical): No     Lack of Transportation (Non-Medical): No   Physical Activity: Not on file   Stress: Not on file   Social Connections: Not on file   Intimate Partner Violence: Not on file   Housing Stability: Low Risk  (2023)    Housing Stability Vital Sign     Unable to Pay for Housing in the Last Year: No     Number of Times Moved in the Last Year: 1     Homeless in the Last Year: No     Social History     Tobacco Use   Smoking Status Former    Current packs/day: 0.00    Average packs/day: 0.3 packs/day for 30.0 years (7.5 ttl pk-yrs)    Types: Cigarettes    Start date: 1979    Quit date: 2009    Years since quittin.0    Passive exposure: Never   Smokeless Tobacco Never     Family History   Problem Relation Age of Onset    Heart disease Mother     Heart Valve Disease Mother         " Replacement    Diabetes Mother         2002    Heart failure Mother         Heart Valve replacement    Arthritis Father     No Known Problems Sister     No Known Problems Daughter     No Known Problems Maternal Grandmother     No Known Problems Maternal Grandfather     No Known Problems Paternal Grandmother     No Known Problems Paternal Grandfather     No Known Problems Son     No Known Problems Maternal Aunt     No Known Problems Maternal Aunt     No Known Problems Maternal Aunt     No Known Problems Maternal Aunt     No Known Problems Maternal Aunt     No Known Problems Paternal Aunt     Alcohol abuse Son         Kolton, 40 year old son, has issues with alcohol and alcohol abuse       The following portions of the patient's history were reviewed and updated as appropriate: allergies, current medications, past medical history, past social history, past surgical history and problem list.    Results  No results found for this or any previous visit (from the past hour).]  No results found for: \"PSA\"  Lab Results   Component Value Date    GLUCOSE 124 (H) 11/17/2017    CALCIUM 9.1 01/15/2025     (H) 11/17/2017    K 4.0 01/15/2025    CO2 26 01/15/2025     01/15/2025    BUN 33 (H) 01/15/2025    CREATININE 1.43 (H) 01/15/2025     Lab Results   Component Value Date    WBC 10.57 (H) 01/15/2025    HGB 11.0 (L) 01/15/2025    HCT 34.8 01/15/2025    MCV 88 01/15/2025     01/15/2025       ZURI Hopkins  "

## 2025-01-17 ENCOUNTER — HOSPITAL ENCOUNTER (OUTPATIENT)
Dept: INFUSION CENTER | Facility: CLINIC | Age: 70
End: 2025-01-17
Payer: COMMERCIAL

## 2025-01-17 VITALS
WEIGHT: 229.5 LBS | RESPIRATION RATE: 18 BRPM | HEART RATE: 72 BPM | TEMPERATURE: 97.6 F | HEIGHT: 61 IN | SYSTOLIC BLOOD PRESSURE: 125 MMHG | DIASTOLIC BLOOD PRESSURE: 85 MMHG | OXYGEN SATURATION: 98 % | BODY MASS INDEX: 43.33 KG/M2

## 2025-01-17 DIAGNOSIS — C54.1 ENDOMETRIAL CANCER (HCC): Primary | ICD-10-CM

## 2025-01-17 PROCEDURE — 96413 CHEMO IV INFUSION 1 HR: CPT

## 2025-01-17 PROCEDURE — 96415 CHEMO IV INFUSION ADDL HR: CPT

## 2025-01-17 PROCEDURE — 96367 TX/PROPH/DG ADDL SEQ IV INF: CPT

## 2025-01-17 PROCEDURE — 96375 TX/PRO/DX INJ NEW DRUG ADDON: CPT

## 2025-01-17 PROCEDURE — 96417 CHEMO IV INFUS EACH ADDL SEQ: CPT

## 2025-01-17 RX ORDER — SODIUM CHLORIDE 9 MG/ML
20 INJECTION, SOLUTION INTRAVENOUS ONCE
Status: COMPLETED | OUTPATIENT
Start: 2025-01-17 | End: 2025-01-17

## 2025-01-17 RX ORDER — PALONOSETRON 0.05 MG/ML
0.25 INJECTION, SOLUTION INTRAVENOUS ONCE
Status: COMPLETED | OUTPATIENT
Start: 2025-01-17 | End: 2025-01-17

## 2025-01-17 RX ADMIN — DIPHENHYDRAMINE HYDROCHLORIDE 25 MG: 50 INJECTION, SOLUTION INTRAMUSCULAR; INTRAVENOUS at 09:29

## 2025-01-17 RX ADMIN — DEXAMETHASONE SODIUM PHOSPHATE 20 MG: 10 INJECTION, SOLUTION INTRAMUSCULAR; INTRAVENOUS at 10:00

## 2025-01-17 RX ADMIN — PALONOSETRON HYDROCHLORIDE 0.25 MG: 0.25 INJECTION INTRAVENOUS at 10:24

## 2025-01-17 RX ADMIN — FAMOTIDINE 20 MG: 10 INJECTION INTRAVENOUS at 09:04

## 2025-01-17 RX ADMIN — SODIUM CHLORIDE 20 ML/HR: 0.9 INJECTION, SOLUTION INTRAVENOUS at 08:29

## 2025-01-17 RX ADMIN — PACLITAXEL 267.6 MG: 6 INJECTION, SOLUTION INTRAVENOUS at 10:28

## 2025-01-17 RX ADMIN — FOSAPREPITANT 150 MG: 150 INJECTION, POWDER, LYOPHILIZED, FOR SOLUTION INTRAVENOUS at 08:29

## 2025-01-17 RX ADMIN — SODIUM CHLORIDE 324.5 MG: 9 INJECTION, SOLUTION INTRAVENOUS at 13:40

## 2025-01-17 NOTE — PROGRESS NOTES
Patient tolerated treatment without incident. Port flushed and de-accessed as per protocol. Next appointment confirmed for Neulasta injection 1/18/2025 at 1400. AVS offered and declined.

## 2025-01-17 NOTE — PROGRESS NOTES
Patient to infusion center for treatment with Taxol and Carboplatin. Patient offers no complaints. VSS. Labs from 1/15/2025 reviewed and per provider, okay to proceed with treatment. Port accessed without incident after multiple attempts by multiple RNs. Port deep and tilted, requiring patient to be reclined flat for access of port.

## 2025-01-18 ENCOUNTER — HOSPITAL ENCOUNTER (OUTPATIENT)
Dept: INFUSION CENTER | Facility: CLINIC | Age: 70
Discharge: HOME | End: 2025-01-18
Payer: COMMERCIAL

## 2025-01-18 VITALS — TEMPERATURE: 97.8 F

## 2025-01-18 DIAGNOSIS — D70.2 DRUG-INDUCED NEUTROPENIA (HCC): Primary | ICD-10-CM

## 2025-01-18 DIAGNOSIS — C54.1 ENDOMETRIAL CANCER (HCC): ICD-10-CM

## 2025-01-18 LAB
BACTERIA UR CULT: ABNORMAL
BACTERIA UR CULT: ABNORMAL

## 2025-01-18 PROCEDURE — 96372 THER/PROPH/DIAG INJ SC/IM: CPT

## 2025-01-18 RX ADMIN — PEGFILGRASTIM 6 MG: 6 INJECTION SUBCUTANEOUS at 13:58

## 2025-01-18 NOTE — PROGRESS NOTES
Patient arrives for Neulasta injection today. Temperature stable. Patient tolerated injection to RIGHT arm, bandaid in place. Patient aware of next appt 1/22 at 1100, declines AVS.

## 2025-01-20 ENCOUNTER — RESULTS FOLLOW-UP (OUTPATIENT)
Dept: UROLOGY | Facility: CLINIC | Age: 70
End: 2025-01-20

## 2025-01-20 ENCOUNTER — RESULTS FOLLOW-UP (OUTPATIENT)
Dept: CARDIOLOGY CLINIC | Facility: CLINIC | Age: 70
End: 2025-01-20

## 2025-01-20 ENCOUNTER — HOSPITAL ENCOUNTER (OUTPATIENT)
Dept: NON INVASIVE DIAGNOSTICS | Facility: CLINIC | Age: 70
Discharge: HOME | End: 2025-01-20
Payer: COMMERCIAL

## 2025-01-20 VITALS
HEIGHT: 60 IN | SYSTOLIC BLOOD PRESSURE: 125 MMHG | DIASTOLIC BLOOD PRESSURE: 85 MMHG | HEART RATE: 72 BPM | BODY MASS INDEX: 44.96 KG/M2 | WEIGHT: 229 LBS

## 2025-01-20 DIAGNOSIS — N39.0 URINARY TRACT INFECTION WITHOUT HEMATURIA, SITE UNSPECIFIED: Primary | ICD-10-CM

## 2025-01-20 DIAGNOSIS — I44.7 LBBB (LEFT BUNDLE BRANCH BLOCK): ICD-10-CM

## 2025-01-20 DIAGNOSIS — I42.9 CARDIOMYOPATHY, UNSPECIFIED TYPE (HCC): ICD-10-CM

## 2025-01-20 LAB
AORTIC ROOT: 3.1 CM
ASCENDING AORTA: 3.3 CM
BSA FOR ECHO PROCEDURE: 1.98 M2
E WAVE DECELERATION TIME: 168 MS
E/A RATIO: 0.8
FRACTIONAL SHORTENING: 28 (ref 28–44)
GLOBAL LONGITUIDAL STRAIN: -14 %
INTERVENTRICULAR SEPTUM IN DIASTOLE (PARASTERNAL SHORT AXIS VIEW): 1.2 CM
INTERVENTRICULAR SEPTUM: 1.2 CM (ref 0.6–1.1)
LAAS-AP2: 17.2 CM2
LAAS-AP4: 15.9 CM2
LEFT ATRIUM AREA SYSTOLE SINGLE PLANE A4C: 15.5 CM2
LEFT ATRIUM SIZE: 5 CM
LEFT ATRIUM VOLUME (MOD BIPLANE): 50 ML
LEFT ATRIUM VOLUME INDEX (MOD BIPLANE): 25 ML/M2
LEFT INTERNAL DIMENSION IN SYSTOLE: 3.8 CM (ref 2.1–4)
LEFT VENTRICULAR INTERNAL DIMENSION IN DIASTOLE: 5.3 CM (ref 3.5–6)
LEFT VENTRICULAR POSTERIOR WALL IN END DIASTOLE: 1 CM
LEFT VENTRICULAR STROKE VOLUME: 71 ML
LV EF US.2D.A4C+ESTIMATED: 43 %
LVSV (TEICH): 71 ML
MITRAL REGURGITATION PEAK VELOCITY: 5.72 M/S
MITRAL VALVE MEAN INFLOW VELOCITY: 4.4 M/S
MITRAL VALVE REGURGITANT PEAK GRADIENT: 131 MMHG
MV E'TISSUE VEL-SEP: 6 CM/S
MV PEAK A VEL: 1.14 M/S
MV PEAK E VEL: 91 CM/S
MV STENOSIS PRESSURE HALF TIME: 49 MS
MV VALVE AREA P 1/2 METHOD: 4.49
RIGHT ATRIAL 2D VOLUME: 33 ML
RIGHT ATRIUM AREA SYSTOLE A4C: 13.1 CM2
RIGHT VENTRICLE ID DIMENSION: 3.3 CM
SL CV DOP CALC MV VTI RETROGRADE: 206 CM
SL CV LEFT ATRIUM LENGTH A2C: 4.9 CM
SL CV LV EF: 45
SL CV MV MEAN GRADIENT RETROGRADE: 87 MMHG
SL CV PED ECHO LEFT VENTRICLE DIASTOLIC VOLUME (MOD BIPLANE) 2D: 134 ML
SL CV PED ECHO LEFT VENTRICLE SYSTOLIC VOLUME (MOD BIPLANE) 2D: 63 ML
TR MAX PG: 28 MMHG
TR PEAK VELOCITY: 2.6 M/S
TRICUSPID ANNULAR PLANE SYSTOLIC EXCURSION: 2.9 CM
TRICUSPID VALVE PEAK REGURGITATION VELOCITY: 2.64 M/S

## 2025-01-20 PROCEDURE — 93306 TTE W/DOPPLER COMPLETE: CPT | Performed by: INTERNAL MEDICINE

## 2025-01-20 PROCEDURE — C8929 TTE W OR WO FOL WCON,DOPPLER: HCPCS

## 2025-01-20 RX ORDER — CIPROFLOXACIN 250 MG/1
250 TABLET, FILM COATED ORAL EVERY 12 HOURS SCHEDULED
Qty: 14 TABLET | Refills: 0 | Status: SHIPPED | OUTPATIENT
Start: 2025-01-20 | End: 2025-01-27

## 2025-01-20 RX ADMIN — PERFLUTREN 0.6 ML/MIN: 6.52 INJECTION, SUSPENSION INTRAVENOUS at 10:40

## 2025-01-20 NOTE — TELEPHONE ENCOUNTER
Spoke to patient regarding positive urine culture test results and prescription was sent to pharmacy.

## 2025-01-20 NOTE — TELEPHONE ENCOUNTER
----- Message from ZURI Hopkins sent at 1/20/2025 12:02 PM EST -----  Patient's urine culture is positive for infection. Abx sent to pharmacy.

## 2025-01-22 ENCOUNTER — TELEPHONE (OUTPATIENT)
Age: 70
End: 2025-01-22

## 2025-01-22 ENCOUNTER — TELEMEDICINE (OUTPATIENT)
Dept: INTERVENTIONAL RADIOLOGY/VASCULAR | Facility: CLINIC | Age: 70
End: 2025-01-22
Payer: COMMERCIAL

## 2025-01-22 ENCOUNTER — HOSPITAL ENCOUNTER (OUTPATIENT)
Dept: INFUSION CENTER | Facility: CLINIC | Age: 70
Discharge: HOME | End: 2025-01-22
Payer: COMMERCIAL

## 2025-01-22 DIAGNOSIS — Z95.828 PRESENCE OF IVC FILTER: Primary | ICD-10-CM

## 2025-01-22 DIAGNOSIS — C54.1 ENDOMETRIAL CANCER (HCC): ICD-10-CM

## 2025-01-22 DIAGNOSIS — I26.99 OTHER ACUTE PULMONARY EMBOLISM WITHOUT ACUTE COR PULMONALE (HCC): ICD-10-CM

## 2025-01-22 DIAGNOSIS — Z45.2 ENCOUNTER FOR CENTRAL LINE CARE: Primary | ICD-10-CM

## 2025-01-22 LAB
ALBUMIN SERPL BCG-MCNC: 3.6 G/DL (ref 3.5–5)
ALP SERPL-CCNC: 125 U/L (ref 34–104)
ALT SERPL W P-5'-P-CCNC: 21 U/L (ref 7–52)
ANION GAP SERPL CALCULATED.3IONS-SCNC: 10 MMOL/L (ref 4–13)
ANISOCYTOSIS BLD QL SMEAR: PRESENT
AST SERPL W P-5'-P-CCNC: 15 U/L (ref 13–39)
BASOPHILS # BLD MANUAL: 0 THOUSAND/UL (ref 0–0.1)
BASOPHILS NFR MAR MANUAL: 0 % (ref 0–1)
BILIRUB SERPL-MCNC: 0.42 MG/DL (ref 0.2–1)
BUN SERPL-MCNC: 41 MG/DL (ref 5–25)
CALCIUM SERPL-MCNC: 8.9 MG/DL (ref 8.4–10.2)
CHLORIDE SERPL-SCNC: 105 MMOL/L (ref 96–108)
CO2 SERPL-SCNC: 22 MMOL/L (ref 21–32)
CREAT SERPL-MCNC: 1.34 MG/DL (ref 0.6–1.3)
EOSINOPHIL # BLD MANUAL: 0 THOUSAND/UL (ref 0–0.4)
EOSINOPHIL NFR BLD MANUAL: 0 % (ref 0–6)
ERYTHROCYTE [DISTWIDTH] IN BLOOD BY AUTOMATED COUNT: 21.2 % (ref 11.6–15.1)
GFR SERPL CREATININE-BSD FRML MDRD: 40 ML/MIN/1.73SQ M
GLUCOSE SERPL-MCNC: 293 MG/DL (ref 65–140)
HCT VFR BLD AUTO: 34.6 % (ref 34.8–46.1)
HGB BLD-MCNC: 10.9 G/DL (ref 11.5–15.4)
LYMPHOCYTES # BLD AUTO: 0.59 THOUSAND/UL (ref 0.6–4.47)
LYMPHOCYTES # BLD AUTO: 3 % (ref 14–44)
MAGNESIUM SERPL-MCNC: 1.8 MG/DL (ref 1.9–2.7)
MCH RBC QN AUTO: 28.5 PG (ref 26.8–34.3)
MCHC RBC AUTO-ENTMCNC: 31.5 G/DL (ref 31.4–37.4)
MCV RBC AUTO: 91 FL (ref 82–98)
MONOCYTES # BLD AUTO: 0.59 THOUSAND/UL (ref 0–1.22)
MONOCYTES NFR BLD: 3 % (ref 4–12)
NEUTROPHILS # BLD MANUAL: 18.52 THOUSAND/UL (ref 1.85–7.62)
NEUTS BAND NFR BLD MANUAL: 9 % (ref 0–8)
NEUTS SEG NFR BLD AUTO: 85 % (ref 43–75)
PLATELET # BLD AUTO: 137 THOUSANDS/UL (ref 149–390)
PLATELET BLD QL SMEAR: ADEQUATE
PMV BLD AUTO: 11.9 FL (ref 8.9–12.7)
POTASSIUM SERPL-SCNC: 4.7 MMOL/L (ref 3.5–5.3)
PROT SERPL-MCNC: 6.3 G/DL (ref 6.4–8.4)
RBC # BLD AUTO: 3.82 MILLION/UL (ref 3.81–5.12)
RBC MORPH BLD: PRESENT
SODIUM SERPL-SCNC: 137 MMOL/L (ref 135–147)
WBC # BLD AUTO: 19.7 THOUSAND/UL (ref 4.31–10.16)

## 2025-01-22 PROCEDURE — 85027 COMPLETE CBC AUTOMATED: CPT

## 2025-01-22 PROCEDURE — 83735 ASSAY OF MAGNESIUM: CPT

## 2025-01-22 PROCEDURE — 99212 OFFICE O/P EST SF 10 MIN: CPT | Performed by: INTERNAL MEDICINE

## 2025-01-22 PROCEDURE — 85007 BL SMEAR W/DIFF WBC COUNT: CPT

## 2025-01-22 PROCEDURE — 80053 COMPREHEN METABOLIC PANEL: CPT

## 2025-01-22 NOTE — PROGRESS NOTES
Virtual Regular Visit  Name: Cherelle Dash      : 1955      MRN: [id number]  Encounter Provider: Brian Cavazos MD  Encounter Date: 2025   Encounter department: [facility] INTERVENTIONAL RADIOLOGY [place]      Verification of patient location:  Patient is located at Home in the following state in which I hold an active license [place] :  Assessment & Plan  Other acute pulmonary embolism without acute cor pulmonale (HCC)    Orders:    Ambulatory Referral to Interventional Radiology    Presence of IVC filter    Orders:    IR IVC filter removal; Future    69 year-old female with history of endometrial cancer s/p IVC filter placement on  10/24 and s/p hysterectomy and bilateral salpingo-oophorectomy on 10/24, currently on chemotherapy, currently tolerating Eliquis without any issues. Patient denies any new leg swelling, shortness of breath, or bleeding anywhere. Patient does not have any future surgeries or procedures planned at this time.    We will plan for IVC filter retrieval as patient is currently tolerating Eliquis without any issues.    Other acute pulmonary embolism without acute cor pulmonale (HCC)    Other orders    Nursing communication Apply gown prior to procedure; Standing    Have Patient Void On Call to Procedure Room; Standing    Insert and Maintain IV; Standing    Protime-INR; Standing    Basic metabolic panel; Standing    CBC; Standing          Encounter provider Brian Cavazos MD    The patient was identified by name and date of birth. Cherelle Dash was informed that this is a telemedicine visit and that the visit is being conducted through the Epic Embedded platform. She agrees to proceed..  My office door was closed. No one else was in the room.  She acknowledged consent and understanding of privacy and security of the video platform. The patient has agreed to participate and understands they can discontinue the visit at any time.    Patient is aware this is a billable service.      History of Present Illness     HPI  Review of Systems   All other systems reviewed and are negative.      Objective   There were no vitals taken for this visit.    Physical Exam  Constitutional:       Appearance: Normal appearance.   HENT:      Head: Normocephalic and atraumatic.      Nose: Nose normal.   Eyes:      Extraocular Movements: Extraocular movements intact.   Pulmonary:      Effort: Pulmonary effort is normal.   Neurological:      General: No focal deficit present.      Mental Status: She is alert.         Visit Time  Total Visit Duration: 5 minutes

## 2025-01-22 NOTE — TELEPHONE ENCOUNTER
Return call placed. Patient notes urinary frequency affecting sleep patterns. She was recently started on abx for UTI and myrbetriq increased (management per uro). Will plan to closely monitor, and allow approximately 2-3 weeks for treatment/dose adjustment. Notified patient she can take ativan 1-2 mg during HS only to assist with sleep.

## 2025-01-22 NOTE — TELEPHONE ENCOUNTER
Pt would like a call back from Sharon WALKER to discuss the Ativan medication she is on which has been making it hard for her to sleep. Pt would like a call back at [phone]. Thank you.

## 2025-01-22 NOTE — PROGRESS NOTES
Tolerated procedure without incident: No adverse reactions noted: Verified follow up appt with patient ( 01/29/25 ): AVS offered and declined

## 2025-01-29 ENCOUNTER — HOSPITAL ENCOUNTER (OUTPATIENT)
Dept: INFUSION CENTER | Facility: CLINIC | Age: 70
Discharge: HOME | End: 2025-01-29
Payer: COMMERCIAL

## 2025-01-29 DIAGNOSIS — Z45.2 ENCOUNTER FOR CENTRAL LINE CARE: Primary | ICD-10-CM

## 2025-01-29 DIAGNOSIS — C54.1 ENDOMETRIAL CANCER (HCC): ICD-10-CM

## 2025-01-29 LAB
ALBUMIN SERPL BCG-MCNC: 3.6 G/DL (ref 3.5–5)
ALP SERPL-CCNC: 136 U/L (ref 34–104)
ALT SERPL W P-5'-P-CCNC: 18 U/L (ref 7–52)
ANION GAP SERPL CALCULATED.3IONS-SCNC: 8 MMOL/L (ref 4–13)
ANISOCYTOSIS BLD QL SMEAR: PRESENT
AST SERPL W P-5'-P-CCNC: 10 U/L (ref 13–39)
BASOPHILS # BLD MANUAL: 0 THOUSAND/UL (ref 0–0.1)
BASOPHILS NFR MAR MANUAL: 0 % (ref 0–1)
BILIRUB SERPL-MCNC: 0.34 MG/DL (ref 0.2–1)
BUN SERPL-MCNC: 33 MG/DL (ref 5–25)
CALCIUM SERPL-MCNC: 8.9 MG/DL (ref 8.4–10.2)
CHLORIDE SERPL-SCNC: 106 MMOL/L (ref 96–108)
CO2 SERPL-SCNC: 25 MMOL/L (ref 21–32)
CREAT SERPL-MCNC: 1.53 MG/DL (ref 0.6–1.3)
EOSINOPHIL # BLD MANUAL: 0 THOUSAND/UL (ref 0–0.4)
EOSINOPHIL NFR BLD MANUAL: 0 % (ref 0–6)
ERYTHROCYTE [DISTWIDTH] IN BLOOD BY AUTOMATED COUNT: 22.3 % (ref 11.6–15.1)
GFR SERPL CREATININE-BSD FRML MDRD: 34 ML/MIN/1.73SQ M
GLUCOSE SERPL-MCNC: 259 MG/DL (ref 65–140)
HCT VFR BLD AUTO: 34.7 % (ref 34.8–46.1)
HGB BLD-MCNC: 10.9 G/DL (ref 11.5–15.4)
LYMPHOCYTES # BLD AUTO: 0.26 THOUSAND/UL (ref 0.6–4.47)
LYMPHOCYTES # BLD AUTO: 2 % (ref 14–44)
MAGNESIUM SERPL-MCNC: 2 MG/DL (ref 1.9–2.7)
MCH RBC QN AUTO: 28.6 PG (ref 26.8–34.3)
MCHC RBC AUTO-ENTMCNC: 31.4 G/DL (ref 31.4–37.4)
MCV RBC AUTO: 91 FL (ref 82–98)
MICROCYTES BLD QL AUTO: PRESENT
MONOCYTES # BLD AUTO: 0 THOUSAND/UL (ref 0–1.22)
MONOCYTES NFR BLD: 0 % (ref 4–12)
NEUTROPHILS # BLD MANUAL: 12.63 THOUSAND/UL (ref 1.85–7.62)
NEUTS BAND NFR BLD MANUAL: 1 % (ref 0–8)
NEUTS SEG NFR BLD AUTO: 97 % (ref 43–75)
PLATELET # BLD AUTO: 99 THOUSANDS/UL (ref 149–390)
PLATELET BLD QL SMEAR: ABNORMAL
PMV BLD AUTO: 12 FL (ref 8.9–12.7)
POIKILOCYTOSIS BLD QL SMEAR: PRESENT
POTASSIUM SERPL-SCNC: 4.6 MMOL/L (ref 3.5–5.3)
PROT SERPL-MCNC: 6.2 G/DL (ref 6.4–8.4)
RBC # BLD AUTO: 3.81 MILLION/UL (ref 3.81–5.12)
RBC MORPH BLD: PRESENT
SODIUM SERPL-SCNC: 139 MMOL/L (ref 135–147)
WBC # BLD AUTO: 12.89 THOUSAND/UL (ref 4.31–10.16)

## 2025-01-29 PROCEDURE — 80053 COMPREHEN METABOLIC PANEL: CPT

## 2025-01-29 PROCEDURE — 85007 BL SMEAR W/DIFF WBC COUNT: CPT

## 2025-01-29 PROCEDURE — 85027 COMPLETE CBC AUTOMATED: CPT

## 2025-01-29 PROCEDURE — 83735 ASSAY OF MAGNESIUM: CPT

## 2025-01-29 NOTE — PROGRESS NOTES
Patient presents to Infusion Center for port maintenance and lab draw. Patient offers no complaints at this time. Port accessed with brisk blood return. Labs drawn per MD order. Port flushed, de-accessed, with band aid placed. Next appt confirmed with patient for 2/5 at 0900 at [place]. AVS declined.   
59

## 2025-01-30 ENCOUNTER — OFFICE VISIT (OUTPATIENT)
Dept: PSYCHIATRY | Facility: CLINIC | Age: 70
End: 2025-01-30
Payer: COMMERCIAL

## 2025-01-30 DIAGNOSIS — F31.62 MODERATE MIXED BIPOLAR I DISORDER (HCC): Primary | ICD-10-CM

## 2025-01-30 DIAGNOSIS — Z79.899 LONG TERM CURRENT USE OF ANTIPSYCHOTIC MEDICATION: ICD-10-CM

## 2025-01-30 PROCEDURE — 99214 OFFICE O/P EST MOD 30 MIN: CPT | Performed by: PSYCHIATRY & NEUROLOGY

## 2025-01-30 PROCEDURE — 90833 PSYTX W PT W E/M 30 MIN: CPT | Performed by: PSYCHIATRY & NEUROLOGY

## 2025-01-30 NOTE — PSYCH
Visit Time    Visit Start Time:11:30  Visit Stop Time: 12:00  Total Visit Duration:  30 minutes    Subjective: Medication management      Patient ID: Cherelle Dash is a 69 y.o. female with Bipolar do     HPI ROS Appetite Changes and Sleep: normal appetite, normal energy level, no weight change, and normal number of sleep hours    Cherelle has been compliant with Latuda 20 mg daily and denies medication side effects. Enjoys volunteering at different committees at her living  facility.  Had hysterectomy last October due to stage 3 endometrial ca. She stated symptoms started with post menopause bleeding. She is being treated with chemotherapy and radiation. She lost  her hair and decided to shave and wears scarves or a wig sometimes. Overall a very positive attitude about her health and coping well with her recent diagnosis.   Mood is stable.      Continue Latuda and schedule follow up in 3 months or sooner if needed.    Review Of Systems:     Mood Emotional Lability   Behavior Impulsive Behavior   Thought Content Disturbing Thoughts, Feelings   General Emotional Problems and Decreased Functioning   Personality Normal   Other Psych Symptoms Normal   Constitutional Negative   ENT Negative   Cardiovascular Negative   Respiratory Negative   Gastrointestinal Negative   Genitourinary Negative   Musculoskeletal Negative   Integumentary Negative   Neurological Negative   Endocrine Normal    Other Symptoms Normal        Laboratory Results: Recent Labs (last 12 months):   Hospital Outpatient Visit on 01/29/2025   Component Date Value    WBC 01/29/2025 12.89 (H)     RBC 01/29/2025 3.81     Hemoglobin 01/29/2025 10.9 (L)     Hematocrit 01/29/2025 34.7 (L)     MCV 01/29/2025 91     MCH 01/29/2025 28.6     MCHC 01/29/2025 31.4     RDW 01/29/2025 22.3 (H)     MPV 01/29/2025 12.0     Platelets 01/29/2025 99 (L)     Sodium 01/29/2025 139     Potassium 01/29/2025 4.6     Chloride 01/29/2025 106     CO2 01/29/2025 25     ANION GAP  01/29/2025 8     BUN 01/29/2025 33 (H)     Creatinine 01/29/2025 1.53 (H)     Glucose 01/29/2025 259 (H)     Calcium 01/29/2025 8.9     AST 01/29/2025 10 (L)     ALT 01/29/2025 18     Alkaline Phosphatase 01/29/2025 136 (H)     Total Protein 01/29/2025 6.2 (L)     Albumin 01/29/2025 3.6     Total Bilirubin 01/29/2025 0.34     eGFR 01/29/2025 34     Magnesium 01/29/2025 2.0     Segmented % 01/29/2025 97 (H)     Bands % 01/29/2025 1     Lymphocytes % 01/29/2025 2 (L)     Monocytes % 01/29/2025 0 (L)     Eosinophils % 01/29/2025 0     Basophils % 01/29/2025 0     Absolute Neutrophils 01/29/2025 12.63 (H)     Absolute Lymphocytes 01/29/2025 0.26 (L)     Absolute Monocytes 01/29/2025 0.00     Absolute Eosinophils 01/29/2025 0.00     Absolute Basophils 01/29/2025 0.00     RBC Morphology 01/29/2025 Present     Platelet Estimate 01/29/2025 Decreased (A)     Anisocytosis 01/29/2025 Present     Microcytes 01/29/2025 Present     Poikilocytes 01/29/2025 Present    Hospital Outpatient Visit on 01/22/2025   Component Date Value    WBC 01/22/2025 19.70 (H)     RBC 01/22/2025 3.82     Hemoglobin 01/22/2025 10.9 (L)     Hematocrit 01/22/2025 34.6 (L)     MCV 01/22/2025 91     MCH 01/22/2025 28.5     MCHC 01/22/2025 31.5     RDW 01/22/2025 21.2 (H)     MPV 01/22/2025 11.9     Platelets 01/22/2025 137 (L)     Sodium 01/22/2025 137     Potassium 01/22/2025 4.7     Chloride 01/22/2025 105     CO2 01/22/2025 22     ANION GAP 01/22/2025 10     BUN 01/22/2025 41 (H)     Creatinine 01/22/2025 1.34 (H)     Glucose 01/22/2025 293 (H)     Calcium 01/22/2025 8.9     AST 01/22/2025 15     ALT 01/22/2025 21     Alkaline Phosphatase 01/22/2025 125 (H)     Total Protein 01/22/2025 6.3 (L)     Albumin 01/22/2025 3.6     Total Bilirubin 01/22/2025 0.42     eGFR 01/22/2025 40     Magnesium 01/22/2025 1.8 (L)     Segmented % 01/22/2025 85 (H)     Bands % 01/22/2025 9 (H)     Lymphocytes % 01/22/2025 3 (L)     Monocytes % 01/22/2025 3 (L)      Eosinophils % 01/22/2025 0     Basophils % 01/22/2025 0     Absolute Neutrophils 01/22/2025 18.52 (H)     Absolute Lymphocytes 01/22/2025 0.59 (L)     Absolute Monocytes 01/22/2025 0.59     Absolute Eosinophils 01/22/2025 0.00     Absolute Basophils 01/22/2025 0.00     RBC Morphology 01/22/2025 Present     Platelet Estimate 01/22/2025 Adequate     Anisocytosis 01/22/2025 Present    Hospital Outpatient Visit on 01/20/2025   Component Date Value    BSA 01/20/2025 1.98     A4C EF 01/20/2025 43     LVIDd 01/20/2025 5.30     LVIDS 01/20/2025 3.80     IVSd 01/20/2025 1.20     LVPWd 01/20/2025 1.00     FS 01/20/2025 28     MV E' Tissue Velocity Se* 01/20/2025 6     LA Volume Index (BP) 01/20/2025 25.0     E/A ratio 01/20/2025 0.80     E wave deceleration time 01/20/2025 168     MV Peak E Marcos 01/20/2025 91     MV Peak A Marcos 01/20/2025 1.14     RVID d 01/20/2025 3.3     Tricuspid annular plane * 01/20/2025 2.90     LA size 01/20/2025 5     LA length (A2C) 01/20/2025 4.90     BASIL A4C 01/20/2025 15.5     LA volume (BP) 01/20/2025 50     RA 2D Volume 01/20/2025 33.0     RAA A4C 01/20/2025 13.1     MV VTI RETROGRADE 01/20/2025 206     MV stenosis pressure 1/2* 01/20/2025 49     MV valve area p 1/2 meth* 01/20/2025 4.49     MV mean gradient retrogr* 01/20/2025 87     MR PG 01/20/2025 131     TR Peak Marcos 01/20/2025 2.6     Triscuspid Valve Regurgi* 01/20/2025 28.0     Ao root 01/20/2025 3.10     Asc Ao 01/20/2025 3.3     Mitral regurgitation pea* 01/20/2025 5.72     Mitral valve mean inflow* 01/20/2025 4.40     Tricuspid valve peak reg* 01/20/2025 2.64     Left ventricular stroke * 01/20/2025 71.00     IVS 01/20/2025 1.2     LEFT VENTRICLE SYSTOLIC * 01/20/2025 63     LV DIASTOLIC VOLUME (MOD* 01/20/2025 134     Left Atrium Area-systoli* 01/20/2025 15.9     Left Atrium Area-systoli* 01/20/2025 17.2     LVSV, 2D 01/20/2025 71     LV EF 01/20/2025 45     GLS 01/20/2025 -14    Office Visit on 01/16/2025   Component Date Value     POST-VOID RESIDUAL VOLUM* 01/16/2025 63     LEUKOCYTE ESTERASE,UA 01/16/2025 Large     NITRITE,UA 01/16/2025 Positive     SL AMB POCT UROBILINOGEN 01/16/2025 0.2     POCT URINE PROTEIN 01/16/2025 -      PH,UA 01/16/2025 5.0     BLOOD,UA 01/16/2025 trace     SPECIFIC GRAVITY,UA 01/16/2025 1.015     KETONES,UA 01/16/2025 neg     BILIRUBIN,UA 01/16/2025 Neg     GLUCOSE, UA 01/16/2025 Neg      COLOR,UA 01/16/2025 yellow     CLARITY,UA 01/16/2025 Clear     Urine Culture 01/16/2025 >100,000 cfu/ml Enterobacter cloacae (A)     Urine Culture 01/16/2025 >100,000 cfu/ml Klebsiella pneumoniae (A)    Hospital Outpatient Visit on 01/15/2025   Component Date Value    WBC 01/15/2025 10.57 (H)     RBC 01/15/2025 3.96     Hemoglobin 01/15/2025 11.0 (L)     Hematocrit 01/15/2025 34.8     MCV 01/15/2025 88     MCH 01/15/2025 27.8     MCHC 01/15/2025 31.6     RDW 01/15/2025 20.2 (H)     MPV 01/15/2025 11.9     Platelets 01/15/2025 210     nRBC 01/15/2025 0     Segmented % 01/15/2025 78 (H)     Immature Grans % 01/15/2025 1     Lymphocytes % 01/15/2025 13 (L)     Monocytes % 01/15/2025 8     Eosinophils Relative 01/15/2025 0     Basophils Relative 01/15/2025 0     Absolute Neutrophils 01/15/2025 8.34 (H)     Absolute Immature Grans 01/15/2025 0.06     Absolute Lymphocytes 01/15/2025 1.35     Absolute Monocytes 01/15/2025 0.80     Eosinophils Absolute 01/15/2025 0.01     Basophils Absolute 01/15/2025 0.01     Sodium 01/15/2025 138     Potassium 01/15/2025 4.0     Chloride 01/15/2025 106     CO2 01/15/2025 26     ANION GAP 01/15/2025 6     BUN 01/15/2025 33 (H)     Creatinine 01/15/2025 1.43 (H)     Glucose 01/15/2025 121     Calcium 01/15/2025 9.1     AST 01/15/2025 12 (L)     ALT 01/15/2025 18     Alkaline Phosphatase 01/15/2025 93     Total Protein 01/15/2025 6.4     Albumin 01/15/2025 3.5     Total Bilirubin 01/15/2025 0.28     eGFR 01/15/2025 37     Magnesium 01/15/2025 1.9     TSH 3RD GENERATON 01/15/2025 2.476     Amylase  01/15/2025 37     Lipase 01/15/2025 18      01/15/2025 9.8    Admission on 01/07/2025, Discharged on 01/07/2025   Component Date Value    SARS COV Rapid Antigen 01/07/2025 Negative     Influenza A Rapid Antigen 01/07/2025 Negative     Influenza B Rapid Antigen 01/07/2025 Negative    Hospital Outpatient Visit on 01/07/2025   Component Date Value    WBC 01/07/2025 13.06 (H)     RBC 01/07/2025 3.90     Hemoglobin 01/07/2025 10.5 (L)     Hematocrit 01/07/2025 34.0 (L)     MCV 01/07/2025 87     MCH 01/07/2025 26.9     MCHC 01/07/2025 30.9 (L)     RDW 01/07/2025 18.0 (H)     MPV 01/07/2025 12.3     Platelets 01/07/2025 154     nRBC 01/07/2025 0     Segmented % 01/07/2025 79 (H)     Immature Grans % 01/07/2025 1     Lymphocytes % 01/07/2025 12 (L)     Monocytes % 01/07/2025 6     Eosinophils Relative 01/07/2025 1     Basophils Relative 01/07/2025 1     Absolute Neutrophils 01/07/2025 10.30 (H)     Absolute Immature Grans 01/07/2025 0.13     Absolute Lymphocytes 01/07/2025 1.56     Absolute Monocytes 01/07/2025 0.81     Eosinophils Absolute 01/07/2025 0.16     Basophils Absolute 01/07/2025 0.10     Sodium 01/07/2025 140     Potassium 01/07/2025 4.1     Chloride 01/07/2025 109 (H)     CO2 01/07/2025 24     ANION GAP 01/07/2025 7     BUN 01/07/2025 23     Creatinine 01/07/2025 1.43 (H)     Glucose 01/07/2025 131     Calcium 01/07/2025 9.1     AST 01/07/2025 11 (L)     ALT 01/07/2025 12     Alkaline Phosphatase 01/07/2025 123 (H)     Total Protein 01/07/2025 6.5     Albumin 01/07/2025 3.7     Total Bilirubin 01/07/2025 0.23     eGFR 01/07/2025 37     Magnesium 01/07/2025 1.8 (L)    Hospital Outpatient Visit on 12/24/2024   Component Date Value    WBC 12/24/2024 4.07 (L)     RBC 12/24/2024 4.24     Hemoglobin 12/24/2024 11.5     Hematocrit 12/24/2024 36.5     MCV 12/24/2024 86     MCH 12/24/2024 27.1     MCHC 12/24/2024 31.5     RDW 12/24/2024 16.2 (H)     MPV 12/24/2024 11.7     Platelets 12/24/2024 116 (L)     nRBC  12/24/2024 0     Segmented % 12/24/2024 57     Immature Grans % 12/24/2024 0     Lymphocytes % 12/24/2024 27     Monocytes % 12/24/2024 12     Eosinophils Relative 12/24/2024 3     Basophils Relative 12/24/2024 1     Absolute Neutrophils 12/24/2024 2.36     Absolute Immature Grans 12/24/2024 0.01     Absolute Lymphocytes 12/24/2024 1.08     Absolute Monocytes 12/24/2024 0.48     Eosinophils Absolute 12/24/2024 0.11     Basophils Absolute 12/24/2024 0.03     Sodium 12/24/2024 140     Potassium 12/24/2024 4.5     Chloride 12/24/2024 106     CO2 12/24/2024 27     ANION GAP 12/24/2024 7     BUN 12/24/2024 22     Creatinine 12/24/2024 1.55 (H)     Glucose 12/24/2024 126     Calcium 12/24/2024 9.3     AST 12/24/2024 16     ALT 12/24/2024 14     Alkaline Phosphatase 12/24/2024 77     Total Protein 12/24/2024 6.9     Albumin 12/24/2024 3.9     Total Bilirubin 12/24/2024 0.38     eGFR 12/24/2024 33     Magnesium 12/24/2024 1.9     TSH 3RD GENERATON 12/24/2024 2.329     Amylase 12/24/2024 38     Lipase 12/24/2024 19      12/24/2024 8.8    Hospital Outpatient Visit on 12/18/2024   Component Date Value    WBC 12/18/2024 2.43 (L)     RBC 12/18/2024 3.98     Hemoglobin 12/18/2024 10.4 (L)     Hematocrit 12/18/2024 34.2 (L)     MCV 12/18/2024 86     MCH 12/18/2024 26.1 (L)     MCHC 12/18/2024 30.4 (L)     RDW 12/18/2024 15.1     MPV 12/18/2024 12.4     Platelets 12/18/2024 129 (L)     nRBC 12/18/2024 0     Segmented % 12/18/2024 21 (L)     Immature Grans % 12/18/2024 0     Lymphocytes % 12/18/2024 47 (H)     Monocytes % 12/18/2024 17 (H)     Eosinophils Relative 12/18/2024 14 (H)     Basophils Relative 12/18/2024 1     Absolute Neutrophils 12/18/2024 0.51 (L)     Absolute Immature Grans 12/18/2024 0.00     Absolute Lymphocytes 12/18/2024 1.15     Absolute Monocytes 12/18/2024 0.42     Eosinophils Absolute 12/18/2024 0.33     Basophils Absolute 12/18/2024 0.02     Sodium 12/18/2024 141     Potassium 12/18/2024 3.8      Chloride 12/18/2024 110 (H)     CO2 12/18/2024 26     ANION GAP 12/18/2024 5     BUN 12/18/2024 25     Creatinine 12/18/2024 1.37 (H)     Glucose 12/18/2024 154 (H)     Calcium 12/18/2024 9.0     AST 12/18/2024 12 (L)     ALT 12/18/2024 12     Alkaline Phosphatase 12/18/2024 79     Total Protein 12/18/2024 6.4     Albumin 12/18/2024 3.7     Total Bilirubin 12/18/2024 0.25     eGFR 12/18/2024 39    Hospital Outpatient Visit on 12/11/2024   Component Date Value    WBC 12/11/2024 3.07 (L)     RBC 12/11/2024 4.20     Hemoglobin 12/11/2024 11.0 (L)     Hematocrit 12/11/2024 35.7     MCV 12/11/2024 85     MCH 12/11/2024 26.2 (L)     MCHC 12/11/2024 30.8 (L)     RDW 12/11/2024 15.2 (H)     MPV 12/11/2024 13.1 (H)     Platelets 12/11/2024 104 (L)     nRBC 12/11/2024 0     Segmented % 12/11/2024 72     Immature Grans % 12/11/2024 0     Lymphocytes % 12/11/2024 20     Monocytes % 12/11/2024 2 (L)     Eosinophils Relative 12/11/2024 6     Basophils Relative 12/11/2024 0     Absolute Neutrophils 12/11/2024 2.19     Absolute Immature Grans 12/11/2024 0.01     Absolute Lymphocytes 12/11/2024 0.61     Absolute Monocytes 12/11/2024 0.07 (L)     Eosinophils Absolute 12/11/2024 0.19     Basophils Absolute 12/11/2024 0.00     Sodium 12/11/2024 138     Potassium 12/11/2024 4.1     Chloride 12/11/2024 110 (H)     CO2 12/11/2024 23     ANION GAP 12/11/2024 5     BUN 12/11/2024 28 (H)     Creatinine 12/11/2024 1.27     Glucose 12/11/2024 142 (H)     Calcium 12/11/2024 8.9     AST 12/11/2024 19     ALT 12/11/2024 15     Alkaline Phosphatase 12/11/2024 67     Total Protein 12/11/2024 6.6     Albumin 12/11/2024 3.8     Total Bilirubin 12/11/2024 0.39     eGFR 12/11/2024 42     RBC Morphology 12/11/2024 Normal     Platelet Estimate 12/11/2024 Borderline (A)    There may be more visits with results that are not included.         Substance Abuse History:  Social History     Substance and Sexual Activity   Drug Use Never       Family Psychiatric  History:   Family History   Problem Relation Age of Onset    Heart disease Mother     Heart Valve Disease Mother         Replacement    Diabetes Mother         2002    Heart failure Mother         Heart Valve replacement    Arthritis Father     No Known Problems Sister     No Known Problems Daughter     No Known Problems Maternal Grandmother     No Known Problems Maternal Grandfather     No Known Problems Paternal Grandmother     No Known Problems Paternal Grandfather     No Known Problems Son     No Known Problems Maternal Aunt     No Known Problems Maternal Aunt     No Known Problems Maternal Aunt     No Known Problems Maternal Aunt     No Known Problems Maternal Aunt     No Known Problems Paternal Aunt     Alcohol abuse Son         Kolton, 40 year old son, has issues with alcohol and alcohol abuse       The following portions of the patient's history were reviewed and updated as appropriate: allergies, current medications, past family history, past medical history, past social history, past surgical history, and problem list.    Social History     Socioeconomic History    Marital status: /Civil Union     Spouse name: Not on file    Number of children: Not on file    Years of education: Not on file    Highest education level: Not on file   Occupational History    Not on file   Tobacco Use    Smoking status: Former     Current packs/day: 0.00     Average packs/day: 0.3 packs/day for 30.0 years (7.5 ttl pk-yrs)     Types: Cigarettes     Start date: 1979     Quit date: 2009     Years since quittin.0     Passive exposure: Never    Smokeless tobacco: Never   Vaping Use    Vaping status: Never Used   Substance and Sexual Activity    Alcohol use: Not Currently     Alcohol/week: 1.0 standard drink of alcohol     Types: 1 Glasses of wine per week    Drug use: Never    Sexual activity: Not Currently     Partners: Male     Birth control/protection: Post-menopausal   Other Topics Concern    Not on file    Social History Narrative    Daily coffee consumption: 3 cups/day     Social Drivers of Health     Financial Resource Strain: Low Risk  (1/24/2024)    Overall Financial Resource Strain (CARDIA)     Difficulty of Paying Living Expenses: Not hard at all   Food Insecurity: No Food Insecurity (11/21/2023)    Nursing - Inadequate Food Risk Classification     Worried About Running Out of Food in the Last Year: Never true     Ran Out of Food in the Last Year: Never true     Ran Out of Food in the Last Year: Not on file   Transportation Needs: No Transportation Needs (1/24/2024)    PRAPARE - Transportation     Lack of Transportation (Medical): No     Lack of Transportation (Non-Medical): No   Physical Activity: Not on file   Stress: Not on file   Social Connections: Not on file   Intimate Partner Violence: Not on file   Housing Stability: Low Risk  (11/21/2023)    Housing Stability Vital Sign     Unable to Pay for Housing in the Last Year: No     Number of Times Moved in the Last Year: 1     Homeless in the Last Year: No     Social History     Social History Narrative    Daily coffee consumption: 3 cups/day       Objective:       Mental status:  Appearance calm and cooperative , adequate hygiene and grooming, and good eye contact    Mood dysphoric   Affect affect was constricted   Speech a normal rate and fluent   Thought Processes coherent/organized and normal thought processes   Hallucinations no hallucinations present    Thought Content no delusions   Abnormal Thoughts no suicidal thoughts  and no homicidal thoughts    Orientation  oriented to person and place and time   Remote Memory short term memory intact and long term memory intact   Attention Span concentration intact   Intellect Appears to be of Average Intelligence   Insight Limited insight   Judgement judgment was limited   Muscle Strength Muscle strength and tone were normal and Normal gait    Language no difficulty naming common objects and no difficulty  repeating a phrase    Fund of Knowledge displays adequate knowledge of current events, adequate fund of knowledge regarding past history, and adequate fund of knowledge regarding vocabulary                Assessment/Plan:       Diagnoses and all orders for this visit:    Bipolar 1 disorder, mixed, severe (HCC)    Long term current use of antipsychotic medication  -     Lipid Panel with Direct LDL reflex; Future          Assessment & Plan  Bipolar 1 disorder, mixed, severe (HCC)         Long term current use of antipsychotic medication    Orders:    Lipid Panel with Direct LDL reflex; Future         Treatment Recommendations- Risks Benefits      Immediate Medical/Psychiatric/Psychotherapy Treatments and Any Precautions: continue current treatment     Risks, Benefits And Possible Side Effects Of Medications:  {PSYCH RISK, BENEFITS AND POSSIBLE SIDE EFFECTS (Optional):74902    Controlled Medication Discussion: Discussed with patient Black Box warning on concurrent use of benzodiazepines and opioid medications including sedation, respiratory depression, coma and death. Patient understands the risk of treatment with benzodiazepines in addition to opioids and wants to continue taking those medications. , Discussed with patient the risks of sedation, respiratory depression, impairment of ability to drive and potential for abuse and addiction related to treatment with benzodiazepine medications. The patient understands risk of treatment with benzodiazepine medications, agrees to not drive if feels impaired and agrees to take medications as prescribed., and The patient has been filling controlled prescriptions on time as prescribed to [place] Prescription Drug Monitoring program.      Psychotherapy Provided: Individual psychotherapy provided.       Individual psychotherapy provided: Yes  Counseling was provided during the session today for 16 minutes.  Medications, treatment progress and treatment plan reviewed with  Cherelle.  Medication education provided to Cherelle.  Supportive therapy provided.

## 2025-01-30 NOTE — BH TREATMENT PLAN
TREATMENT PLAN (Medication Management Only)        [facility]    Name and Date of Birth:  Cherelle Dash 69 y.o. 1955  Date of Treatment Plan: January 30, 2025  Diagnosis/Diagnoses:    1. Moderate mixed bipolar I disorder (HCC)    2. Long term current use of antipsychotic medication      Strengths/Personal Resources for Self-Care: taking medications as prescribed, ability to communicate needs.  Area/Areas of need (in own words): mood instability  1. Long Term Goal: continue improvement in mood stability.  Target Date:6 months - 7/30/2025  Person/Persons responsible for completion of goal: Cherelle  2.  Short Term Objective (s) - How will we reach this goal?:   A. Provider new recommended medication/dosage changes and/or continue medication(s): continue current medications as prescribed.  B. N/A.  C. N/A.  Target Date:6 months - 7/30/2025  Person/Persons Responsible for Completion of Goal: Cherelle  Progress Towards Goals: continuing treatment  Treatment Modality: medication management every 6 months  Review due 180 days from date of this plan: 6 months - 7/30/2025  Expected length of service: ongoing treatment  My Physician/PA/NP and I have developed this plan together and I agree to work on the goals and objectives. I understand the treatment goals that were developed for my treatment.

## 2025-01-31 ENCOUNTER — HOSPITAL ENCOUNTER (OUTPATIENT)
Dept: CT IMAGING | Facility: HOSPITAL | Age: 70
Discharge: HOME | End: 2025-01-31
Payer: COMMERCIAL

## 2025-01-31 DIAGNOSIS — J98.4 DRUG-INDUCED PNEUMONITIS: ICD-10-CM

## 2025-01-31 DIAGNOSIS — C54.1 ENDOMETRIAL CANCER (HCC): ICD-10-CM

## 2025-01-31 DIAGNOSIS — T50.905A DRUG-INDUCED PNEUMONITIS: ICD-10-CM

## 2025-01-31 PROCEDURE — 71250 CT THORAX DX C-: CPT

## 2025-01-31 PROCEDURE — 74176 CT ABD & PELVIS W/O CONTRAST: CPT

## 2025-02-03 ENCOUNTER — OFFICE VISIT (OUTPATIENT)
Dept: GYNECOLOGIC ONCOLOGY | Facility: CLINIC | Age: 70
End: 2025-02-03
Payer: COMMERCIAL

## 2025-02-03 ENCOUNTER — TELEPHONE (OUTPATIENT)
Dept: GYNECOLOGIC ONCOLOGY | Facility: CLINIC | Age: 70
End: 2025-02-03

## 2025-02-03 VITALS
HEART RATE: 90 BPM | TEMPERATURE: 98.4 F | WEIGHT: 232 LBS | SYSTOLIC BLOOD PRESSURE: 142 MMHG | BODY MASS INDEX: 45.31 KG/M2 | DIASTOLIC BLOOD PRESSURE: 80 MMHG | OXYGEN SATURATION: 97 %

## 2025-02-03 DIAGNOSIS — C77.5 SECONDARY AND UNSPECIFIED MALIGNANT NEOPLASM OF INTRAPELVIC LYMPH NODES (HCC): ICD-10-CM

## 2025-02-03 DIAGNOSIS — T50.905A DRUG-INDUCED PNEUMONITIS: ICD-10-CM

## 2025-02-03 DIAGNOSIS — J98.4 DRUG-INDUCED PNEUMONITIS: ICD-10-CM

## 2025-02-03 DIAGNOSIS — C54.1 ENDOMETRIAL CANCER (HCC): Primary | ICD-10-CM

## 2025-02-03 PROCEDURE — 99215 OFFICE O/P EST HI 40 MIN: CPT | Performed by: PHYSICIAN ASSISTANT

## 2025-02-03 RX ORDER — LORAZEPAM 1 MG/1
1 TABLET ORAL EVERY 8 HOURS PRN
Qty: 20 TABLET | Refills: 0 | Status: SHIPPED | OUTPATIENT
Start: 2025-02-03

## 2025-02-03 NOTE — PROGRESS NOTES
Name: Cherelle Dash      : 1955      MRN: [id number]  Encounter Provider: Sharon Banks PA-C  Encounter Date: 2/3/2025   Enc[facility]  :  Assessment & Plan  Endometrial cancer (HCC)  Stage IIIC1, grade 1 endometrial cancer with ITC within periaortic LN and extensive LVSI s/p surgical resection who is currently receiving adjuvant chemotherapy with taxol 135 mg/m2, carboplatin AUC 5, pembrolizumab 200 mg IV every 21 days. She had grade 2 immunotherapy-induced pneumonitis which is resolved both clinically and radiologically. Otherwise, she is tolerating treatment well.     Continue with cycle 4 of treatment as planned as long as her metabolic and hematologic parameters are adequate. Restart pembrolizumab and closely monitor symptoms.      Return to the office as per her chemotherapy calendar.     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     Clinically improved almost immediately after initiation of high-dose oral steroids.     Radiologic imaging confirms resolution.     Will escalate steroid dose taper.                History of Present Illness     Reason for Visit / CC:  Pre-Chemo Visit    Cherelle Dash is a 69 y.o. female   who presents to the office for pre-chemotherapy evaluation. Overall, she is tolerating treatment well and without acute complaints. She has been afebrile. Denies n/v/abdominal pain. Appetite is appropriate. Normal bowel/bladder function. Denies diarrhea or blood in her stool. The patient notes her cough and SOB are resolved. She is having difficulty sleeping due to the steroids. The patient denies chemotherapy-induced neuropathy.          Oncology History   Cancer Staging   Endometrial cancer  (HCC)  Staging form: Corpus Uteri - Carcinoma, AJCC 8th Edition  - Pathologic stage from 10/11/2024: FIGO Stage IIIC1 (pT2, pN1mi(sn), cM0) - Signed by Evin Page MD on 11/12/2024  Method of lymph node assessment: [place] lymph node biopsy  Histologic grade (G): G1  Histologic grading system: 3 grade system  Lymph-vascular invasion (LVI): BOTH lymphatic and small vessel AND venous (large vessel) invasion  Peritoneal cytology results: Negative  Pelvic brandi status: Positive  Oncology History   Endometrial cancer (HCC)   8/11/2024 Initial Diagnosis    Endometrial cancer ([facility])     10/11/2024 -  Cancer Staged    Staging form: Corpus Uteri - Carcinoma, AJCC 8th Edition  - Pathologic stage from 10/11/2024: FIGO Stage IIIC1 (pT2, pN1mi(sn), cM0) - Signed by Evin Page MD on 11/12/2024  Method of lymph node assessment: [place] lymph node biopsy  Histologic grade (G): G1  Histologic grading system: 3 grade system  Lymph-vascular invasion (LVI): BOTH lymphatic and small vessel AND venous (large vessel) invasion  Peritoneal cytology results: Negative  Pelvic brandi status: Positive       10/11/2024 Surgery    Robotic assisted total laparoscopic hysterectomy, bilateral salpingo-oophorectomy, pelvic and periaortic sentinel lymph node biopsies, vulvar biopsy  1.  Grade 1, cervical stromal invasion to a depth of 12 out of 15 mm, extensive LVSI, p53 wild-type, pMMR, HER2 1+,  washings negative, 0.7 mm metastatic focus and pelvic lymph node, ITC identified and periaortic lymph nodes.     12/6/2024 -  Chemotherapy    Taxol 135 mg/m2, carboplatin AUC 5 and pembrolizumab 200 mg IV every 21 days.    Pembro held for cycle 3 d/t grade 2 drug-induced pneumonitis.           Review of Systems   Constitutional: Negative.    HENT: Negative.     Eyes: Negative.    Respiratory: Negative.     Cardiovascular: Negative.    Gastrointestinal: Negative.    Genitourinary: Negative.    Musculoskeletal: Negative.    Skin:  Negative.    Neurological: Negative.    Psychiatric/Behavioral: Negative.      A complete review of systems is negative other than that noted above in the HPI.  Medical History Reviewed by provider this encounter:  Tobacco  Allergies  Meds  Problems  Med Hx  Surg Hx  Fam Hx     .  Current Outpatient Medications on File Prior to Visit   Medication Sig Dispense Refill   • apixaban (Eliquis) 5 mg Take 1 tablet (5 mg total) by mouth 2 (two) times a day 180 tablet 0   • chlorhexidine (PERIDEX) 0.12 % solution USE HALF OUNCE TWICE A DAY RINSE FOR 30 SECONDS BY MOUTH THEN EXPECTORATE DO NOT SWALLOW     • Cholecalciferol (VITAMIN D) 2000 units CAPS Take 1 capsule by mouth daily     • clobetasol (TEMOVATE) 0.05 % ointment Apply topically 2 (two) times a week 60 g 3   • CRANBERRY PO Take by mouth     • famotidine (PEPCID) 20 mg tablet TAKE 1 TABLET BY MOUTH TWICE  DAILY 180 tablet 3   • Glucosamine-Chondroit-Vit C-Mn (GLUCOSAMINE 1500 COMPLEX PO) Take by mouth in the morning     • Ivermectin 1 % CREA      • lidocaine-prilocaine (EMLA) cream Apply to PORT 30 min prior to labs and chemo 30 g 1   • lurasidone (LATUDA) 20 mg tablet TAKE 1 TABLET BY MOUTH DAILY  WITH BREAKFAST 30 tablet 11   • metoprolol succinate (TOPROL-XL) 50 mg 24 hr tablet Take 1 tablet (50 mg total) by mouth daily 90 tablet 3   • Mirabegron ER 50 MG TB24 Take 1 tablet (50 mg total) by mouth in the morning 30 tablet 4   • Multiple Vitamin (MULTI-VITAMIN DAILY) TABS Take by mouth daily     • predniSONE 10 mg tablet Take 10 tablets PO QD x 7 days, then 9 tablets PO x 7d, then 8 tablets PO x 7d, then 6 tabs PO x 5d, then 5 tabs PO x 5 d, then 4 tabs PO x 3d, then 3 tabs PO x 3d, then 2 tabs PO x 2d, then 1 tabs PO x 3d. 272 tablet 0   • Sodium Fluoride 5000 PPM 1.1 % GEL As directed     • polyethylene glycol (GLYCOLAX) 17 GM/SCOOP powder Take 17 g by mouth 2 (two) times a day (Patient not taking: Reported on 1/16/2025) 238 g 1     No current  facility-administered medications on file prior to visit.         Objective   /80 (BP Location: Right arm, Patient Position: Sitting, Cuff Size: Large)   Pulse 90   Temp 98.4 °F (36.9 °C) (Temporal)   Wt 105 kg (232 lb)   SpO2 97%   BMI 45.31 kg/m²     Body mass index is 45.31 kg/m².  Pain Screening:  Pain Score: 0-No pain  ECOG ECOG Performance Status: 0 - Fully active, able to carry on all pre-disease performance without restriction   Physical Exam  Vitals reviewed.   Constitutional:       General: She is not in acute distress.     Appearance: Normal appearance. She is not ill-appearing.   HENT:      Head: Normocephalic and atraumatic.      Mouth/Throat:      Mouth: Mucous membranes are moist.   Eyes:      General: No scleral icterus.        Right eye: No discharge.         Left eye: No discharge.      Conjunctiva/sclera: Conjunctivae normal.   Pulmonary:      Effort: Pulmonary effort is normal.   Musculoskeletal:      Right lower leg: No edema.      Left lower leg: No edema.   Skin:     General: Skin is warm and dry.      Coloration: Skin is not jaundiced.      Findings: No rash.   Neurological:      General: No focal deficit present.      Mental Status: She is alert and oriented to person, place, and time.      Cranial Nerves: No cranial nerve deficit.      Sensory: No sensory deficit.      Motor: No weakness.      Gait: Gait normal.   Psychiatric:         Mood and Affect: Mood normal.         Behavior: Behavior normal.         Thought Content: Thought content normal.         Judgment: Judgment normal.          Labs: I have reviewed pertinent labs.   Lab Results   Component Value Date/Time    WBC 12.89 (H) 01/29/2025 12:40 PM    RBC 3.81 01/29/2025 12:40 PM    Hemoglobin 10.9 (L) 01/29/2025 12:40 PM    Hematocrit 34.7 (L) 01/29/2025 12:40 PM    MCV 91 01/29/2025 12:40 PM    MCH 28.6 01/29/2025 12:40 PM    RDW 22.3 (H) 01/29/2025 12:40 PM    Platelets 99 (L) 01/29/2025 12:40 PM    Segmented % 78 (H)  01/15/2025 08:46 AM    Bands % 1 01/29/2025 12:40 PM    Lymphocytes % 2 (L) 01/29/2025 12:40 PM    Lymphocytes % 13 (L) 01/15/2025 08:46 AM    Monocytes % 0 (L) 01/29/2025 12:40 PM    Monocytes % 8 01/15/2025 08:46 AM    Eosinophils % 0 01/29/2025 12:40 PM    Eosinophils Relative 0 01/15/2025 08:46 AM    Basophils % 0 01/29/2025 12:40 PM    Basophils Relative 0 01/15/2025 08:46 AM    Immature Grans % 1 01/15/2025 08:46 AM    Absolute Neutrophils 8.34 (H) 01/15/2025 08:46 AM      Lab Results   Component Value Date/Time    Sodium 139 01/29/2025 12:40 PM    Potassium 4.6 01/29/2025 12:40 PM    Chloride 106 01/29/2025 12:40 PM    CO2 25 01/29/2025 12:40 PM    ANION GAP 8 01/29/2025 12:40 PM    BUN 33 (H) 01/29/2025 12:40 PM    Creatinine 1.53 (H) 01/29/2025 12:40 PM    Glucose 259 (H) 01/29/2025 12:40 PM    Glucose, Fasting 122 (H) 11/14/2024 06:58 AM    Calcium 8.9 01/29/2025 12:40 PM    AST 10 (L) 01/29/2025 12:40 PM    ALT 18 01/29/2025 12:40 PM    Alkaline Phosphatase 136 (H) 01/29/2025 12:40 PM    Total Protein 6.2 (L) 01/29/2025 12:40 PM    Albumin 3.6 01/29/2025 12:40 PM    Total Bilirubin 0.34 01/29/2025 12:40 PM    eGFR 34 01/29/2025 12:40 PM      Amylase/Lipase:   Lab Results   Component Value Date/Time    Amylase 37 01/15/2025 08:46 AM    Lipase 18 01/15/2025 08:46 AM     CA-125:   Lab Results   Component Value Date/Time     9.8 01/15/2025 08:46 AM        Radiology Results Review: I have reviewed radiology reports from 1/31/25 including: CT chest and CT abdomen/pelvis.

## 2025-02-03 NOTE — TELEPHONE ENCOUNTER
Called and spoke with patient about IR appointment.  Told Patient that IR will call her tomorrow about the appointment and they are working on the appointment.  Patient gave her verbal understanding.

## 2025-02-03 NOTE — ASSESSMENT & PLAN NOTE
Stage IIIC1, grade 1 endometrial cancer with ITC within periaortic LN and extensive LVSI s/p surgical resection who is currently receiving adjuvant chemotherapy with taxol 135 mg/m2, carboplatin AUC 5, pembrolizumab 200 mg IV every 21 days. She had grade 2 immunotherapy-induced pneumonitis which is resolved both clinically and radiologically. Otherwise, she is tolerating treatment well.     Continue with cycle 4 of treatment as planned as long as her metabolic and hematologic parameters are adequate. Restart pembrolizumab and closely monitor symptoms.      Return to the office as per her chemotherapy calendar.     Orders:  •  LORazepam (ATIVAN) 1 mg tablet; Take 1 tablet (1 mg total) by mouth every 8 (eight) hours as needed (nausea or anxiety)

## 2025-02-03 NOTE — PATIENT INSTRUCTIONS
Updated steroid dosing   Tomorrow START 5 tabs x 3 days, then 4 tabs x 3 days, then 3 tabs x 2 days, then 2 tabs x 2 days, then 1 tab x 1 day.

## 2025-02-04 ENCOUNTER — OFFICE VISIT (OUTPATIENT)
Dept: PALLIATIVE MEDICINE | Facility: CLINIC | Age: 70
End: 2025-02-04
Payer: COMMERCIAL

## 2025-02-04 ENCOUNTER — OFFICE VISIT (OUTPATIENT)
Dept: FAMILY MEDICINE CLINIC | Facility: CLINIC | Age: 70
End: 2025-02-04
Payer: COMMERCIAL

## 2025-02-04 VITALS
RESPIRATION RATE: 18 BRPM | SYSTOLIC BLOOD PRESSURE: 118 MMHG | TEMPERATURE: 98.8 F | DIASTOLIC BLOOD PRESSURE: 70 MMHG | HEIGHT: 60 IN | WEIGHT: 230.4 LBS | OXYGEN SATURATION: 98 % | BODY MASS INDEX: 45.23 KG/M2 | HEART RATE: 86 BPM

## 2025-02-04 VITALS
SYSTOLIC BLOOD PRESSURE: 126 MMHG | BODY MASS INDEX: 45.75 KG/M2 | HEIGHT: 60 IN | OXYGEN SATURATION: 95 % | TEMPERATURE: 97.2 F | DIASTOLIC BLOOD PRESSURE: 72 MMHG | WEIGHT: 233 LBS | HEART RATE: 96 BPM

## 2025-02-04 DIAGNOSIS — D70.2 DRUG-INDUCED NEUTROPENIA (HCC): ICD-10-CM

## 2025-02-04 DIAGNOSIS — I42.0 DILATED CARDIOMYOPATHY (HCC): ICD-10-CM

## 2025-02-04 DIAGNOSIS — N18.32 STAGE 3B CHRONIC KIDNEY DISEASE (HCC): ICD-10-CM

## 2025-02-04 DIAGNOSIS — M76.60 ACHILLES TENDON PAIN: Primary | ICD-10-CM

## 2025-02-04 DIAGNOSIS — I27.82 OTHER CHRONIC PULMONARY EMBOLISM WITHOUT ACUTE COR PULMONALE (HCC): ICD-10-CM

## 2025-02-04 DIAGNOSIS — F31.62 MODERATE MIXED BIPOLAR I DISORDER (HCC): ICD-10-CM

## 2025-02-04 DIAGNOSIS — M76.60 ACHILLES TENDON PAIN: ICD-10-CM

## 2025-02-04 DIAGNOSIS — Z71.89 GOALS OF CARE, COUNSELING/DISCUSSION: ICD-10-CM

## 2025-02-04 DIAGNOSIS — Z51.5 PALLIATIVE CARE BY SPECIALIST: ICD-10-CM

## 2025-02-04 DIAGNOSIS — C54.1 ENDOMETRIAL CANCER (HCC): ICD-10-CM

## 2025-02-04 DIAGNOSIS — N25.81 SECONDARY HYPERPARATHYROIDISM OF RENAL ORIGIN (HCC): ICD-10-CM

## 2025-02-04 DIAGNOSIS — C54.1 ENDOMETRIAL CANCER (HCC): Primary | ICD-10-CM

## 2025-02-04 PROCEDURE — G2211 COMPLEX E/M VISIT ADD ON: HCPCS | Performed by: FAMILY MEDICINE

## 2025-02-04 PROCEDURE — 99214 OFFICE O/P EST MOD 30 MIN: CPT | Performed by: FAMILY MEDICINE

## 2025-02-04 PROCEDURE — G2211 COMPLEX E/M VISIT ADD ON: HCPCS | Performed by: STUDENT IN AN ORGANIZED HEALTH CARE EDUCATION/TRAINING PROGRAM

## 2025-02-04 PROCEDURE — 99214 OFFICE O/P EST MOD 30 MIN: CPT | Performed by: STUDENT IN AN ORGANIZED HEALTH CARE EDUCATION/TRAINING PROGRAM

## 2025-02-04 NOTE — PROGRESS NOTES
Name: Cherelle Dash      : 1955      MRN: [id number]  Encounter Provider: Gretchen Mayorga DO  Encounter Date: 2025   Encounter department: [facility]    Assessment & Plan  Achilles tendon pain  Trial tylenol and ice/heat  Use pillow for cushion       Dilated cardiomyopathy (HCC)  Seeing cardiology       Other chronic pulmonary embolism without acute cor pulmonale (HCC)  On eliquis       Secondary hyperparathyroidism of renal origin (HCC)  Stable and in normal       Endometrial cancer (HCC)  Going back on immunotherapy friday       Stage 3b chronic kidney disease (HCC)  Lab Results   Component Value Date    EGFR 34 2025    EGFR 40 2025    EGFR 37 01/15/2025    CREATININE 1.53 (H) 2025    CREATININE 1.34 (H) 2025    CREATININE 1.43 (H) 01/15/2025     Watching  Seeing nephrology       Moderate mixed bipolar I disorder (HCC)  Seeing psych       Drug-induced neutropenia (HCC)  improved         Assessment & Plan         History of Present Illness     History of Present Illness  Has been sleeping up right at night  Crossing heel at [facility] area which now hurts  Can flex and extend but walks with pain  Taking prednisone       Review of Systems   Respiratory:  Negative for cough.    Musculoskeletal:  Positive for arthralgias (achilles pain) and gait problem.     Objective   /70 (BP Location: Left arm, Patient Position: Sitting, Cuff Size: Large)   Pulse 86   Temp 98.8 °F (37.1 °C) (Tympanic)   Resp 18   Ht 5' (1.524 m)   Wt 105 kg (230 lb 6.4 oz)   SpO2 98%   BMI 45.00 kg/m²     Physical Exam    Physical Exam  Vitals reviewed.   Constitutional:       Appearance: Normal appearance.   HENT:      Head: Normocephalic and atraumatic.   Eyes:      Extraocular Movements: Extraocular movements intact.      Pupils: Pupils are equal, round, and reactive to light.   Musculoskeletal:         General: Normal range of motion.   Neurological:      Mental Status: She is  alert.

## 2025-02-04 NOTE — PROGRESS NOTES
Name: Cherelle Dash      : 1955      MRN: [id number]  Encounter Provider: Maynor Yen DO  Encounter Date: 2025   Encounter department: [facility] PALLIATIVE [facility] JENNIFER  :  Assessment & Plan  Endometrial cancer (HCC)  Following Gyn/Oncology - Dr. Page and Sharon Banks PA-C  Care team includes  Dr. Olson of Cardiac Oncology  Current treatment with Carboplatin, Paclixatel and Keytruda  Cycle 4 scheduled for 2025  Currently on ongoing steroid taper for her bout of pneumonitis.    Follows Urology for OAB.  -last visit 2025 with adjustment in her Mirabegron          Palliative care by specialist  Psychosocial   Supportive listening provided  Normalized experience of patient/family  Provided anxiety containment     Referrals Placed / Medical Equipment Ordered  -None    Follow-Up Recommendations  -Follow-up with PCP and current medical specialists  -Follow-up with palliative care: 4 weeks         Stage 3b chronic kidney disease (HCC)  Lab Results   Component Value Date    EGFR 34 2025    EGFR 40 2025    EGFR 37 01/15/2025    CREATININE 1.53 (H) 2025    CREATININE 1.34 (H) 2025    CREATININE 1.43 (H) 01/15/2025     Labs reviewed from 2025  Cr of 1.53 (previously 1.34 from 2025)  GFR of 34        Achilles tendon pain  Conservative management and monitoring.  No joint instability of the right ankle.         Moderate mixed bipolar I disorder (HCC)  Following Behavioral Health for ongoing cares - Dr. Hernandez.         Goals of care, counseling/discussion  Goal - ongoing disease directed cares. Patient to proceed with systemic treatment immunotherapy and chemotherapy.           Decisional apparatus: Patient is competent on my exam today. If competence is lost, patient's substitute decision maker would default to spouse by [place] Act 169.   Advance Directive / Living Will / POLST: 4 weeks      PDMP Review: I have reviewed the patient's controlled substance  dispensing history in the Prescription Drug Monitoring Program in compliance with the [place] regulations before prescribing any controlled substances.    History of Present Illness   Cherelle Dash is a 69 y.o. female who presents for follow-up.    Palliative Diagnosis - Endometrial Cancer stage IIIc.  Initial diagnosis from August 11, 2024.  Patient is status post robotic assisted total laparoscopic hysterectomy, BSO, pelvic and periaortic sentinel lymph node biopsies along with vulvar biopsy.    Current treatment plan - Keytruda + Carboplatin + Paclitaxel    Patient seen and examined in NAD.  Accompanied by her .  Doing well at this time and eating very well without issues or concerns with her appetite or ability to tolerate PO intake. No issues with fluid intake.  No issues with constipation or diarrhea as she continues with her bowel regimen to maintain normal BM.    No nausea or vomiting.  No pain, does have some soreness to the right achilles tendon. Saw her PCP today with recommendations for conservative management and monitoring.  No tenderness or instability of the right ankle joint, no edema to the ankle joint. No malleolar tenderness. No tenderness along palpation of the achilles tendon. No edema, erythema or warmth of the right lower extremity.         Imaging history     CT CAP 1/31/20025  1.  Marked improvement in bilateral nodular lung opacities consistent with resolving infectious/inflammatory process.  2.  No evidence of metastasis in the chest, abdomen, or pelvis.      10/11/2024 sentinel lymph node biopsy results  A. Left pelvic sentinel lymph node #1, excision:  -One lymph node, negative for carcinoma (0/1), see comment.     Comment: CK AE1/AE3 evaluated on block A1 and is negative, supporting above findings.       B. Left pelvic sentinel lymph node #2, see comment:  -Isolated tumor cells present in one lymph node, see comment.     Comment: Rare positive CK AE1/AE3 cells (B5-7, B5-8),  consistent with isolated tumor cells. ER attempted but non-contributory.      C. Right pelvic sentinel lymph node #1, excision:  -Isolated tumor cell present in one lymph node, see comment.     Comment: Rare CK AE1/AE3 positive atypical cell (C1-3, C1-4), consistent with isolated tumor cell present.      D. Right pelvic sentinel lymph node #2, excision:  -One lymph node, negative for carcinoma (0/1), see comment.     Comment: CK AE1/AE3 evaluated on block D1 and is negative, supporting above findings.        E. Right pelvic sentinel lymph node #3, excision:  -Metastatic carcinoma involving 1 of 1 lymph node (1/1), see comment.  -Metastatic focus measures 0.7mm.  -No extranodal extension identified.     Comment: CK AE1/AE3 evaluated on blocks E1-E3 supports above interpretation.       F. Right para-aortic sentinel lymph node #1, excision:  -Isolated tumor cells present in one lymph node, see comment.     Comment: 0.1mm focus of atypical cells highlighted by CK AE1/AE3 (F3-2), consistent with isolated tumor cells. Deeper levels examined. ER attempted, however atypical focus no long present on deeper levels.      G. Right para-aortic sentinel lymph node #2, excision:  -Isolated tumor cell present in one lymph node, see comment.     Comment: Rare positive CK AE1/AE3 cell (G2-7), consistent with isolated tumor cells.      H. Uterus, cervix, bilateral fallopian tubes and ovaries:  -Adenocarcinoma, favor endometrioid adenocarcinoma, FIGO grade 1, see comment.   -Surgical margins and parametria negative for carcinoma.  -Lymph-vascular invasion identified, extensive.  -Bilateral ovaries with Josh tumor and stromal luteinization, negative for carcinoma, see note.  -Fallopian tubes negative for carcinoma.   -See synoptic report.      Comment: The mass is predominantly located in endocervical canal/lower uterine segment with cervical stromal invasion and also involves endometrial cavity with background atypical endometrial  hyperplasia/endometrial intraepithelial neoplasia (AEH/EIN) and superficial myometrial invasion.      Immunostains performed on block H7 shows tumor is positive for ER (patchy), DE (patchy), CEA-mono(patchy), p16 (patchy) p53 wild type, vimentin (patchy), CK7 positive, and CK20 focal positive. PTEN is lost.  HPV testing performed on biopsy material ([id number]) is negative. MMR testing performed at outside institution on biopsy material is reportedly intact. The morphologic and immunohistochemical findings support above interpretation.      I. Vulva, right, 7:00, biopsy:  -Lichen sclerosus.  -Negative for squamous intraepithelial lesion and malignancy.        10/11/2024 cytology results  A.B. Peritoneal Washings,  (ThinPrep and cell block preparations):  Negative for malignancy.  Mesothelial cells, histiocytes and neutrophils.     Satisfactory for evaluation.      Medical History Reviewed by provider this encounter:  Tobacco  Allergies  Meds  Problems  Med Hx  Surg Hx  Fam Hx     .  Past Medical History   Past Medical History:   Diagnosis Date    Abnormal ECG     Anxiety 2002    Arthritis     Bipolar 1 disorder (HCC)     Cervical cancer (HCC)     Chronic kidney disease     stage 3    CPAP (continuous positive airway pressure) dependence     Depression 2001    Disease of thyroid gland     DVT (deep venous thrombosis) (HCC)     BLLE    Elevated blood pressure reading 06/10/2019    GERD (gastroesophageal reflux disease)     Obesity     Pulmonary emboli (HCC)     B/L    RSV (acute bronchiolitis due to respiratory syncytial virus) 12/05/2023    Sleep apnea     Sleep apnea, obstructive 2007    Urinary incontinence 2019    Uterine cancer (HCC)      Past Surgical History:   Procedure Laterality Date    COLONOSCOPY  2011    DENTAL SURGERY  2020    IR IVC FILTER PLACEMENT OPTIONAL/TEMPORARY  10/08/2024    IR PORT CHECK  12/26/2024    IR PORT PLACEMENT  12/03/2024    LAPAROTOMY N/A 10/11/2024    Procedure: EXPLORATORY  LAPAROTOMY;  Surgeon: Rodney Downing MD;  Location: [facility];  Service: Gynecology Oncology    DC HYSTEROSCOPY BX ENDOMETRIUM&/POLYPC W/WO D&C N/A 08/23/2024    Procedure: EXAM UNDER ANESTHESIA, DILATATION AND CURETTAGE, CERVICAL BIOPSIES;  Surgeon: Evin Page MD;  Location:  MAIN OR;  Service: Gynecology Oncology    DC LAPS TOTAL HYSTERECT 250 GM/< W/RMVL TUBE/OVARY N/A 10/11/2024    Procedure: ROBOTIC ASSISTED LTH, BSO, LYMPH NODE DISSECTION, EUA;  Surgeon: Rodney Downing MD;  Location: [facility];  Service: Gynecology Oncology     Family History   Problem Relation Age of Onset    Heart disease Mother     Heart Valve Disease Mother         Replacement    Diabetes Mother         2002    Heart failure Mother         Heart Valve replacement    Arthritis Father     No Known Problems Sister     No Known Problems Daughter     No Known Problems Maternal Grandmother     No Known Problems Maternal Grandfather     No Known Problems Paternal Grandmother     No Known Problems Paternal Grandfather     No Known Problems Son     No Known Problems Maternal Aunt     No Known Problems Maternal Aunt     No Known Problems Maternal Aunt     No Known Problems Maternal Aunt     No Known Problems Maternal Aunt     No Known Problems Paternal Aunt     Alcohol abuse Son         Kolton, 40 year old son, has issues with alcohol and alcohol abuse      reports that she quit smoking about 16 years ago. Her smoking use included cigarettes. She started smoking about 46 years ago. She has a 7.5 pack-year smoking history. She has never been exposed to tobacco smoke. She has never used smokeless tobacco. She reports current alcohol use of about 1.0 standard drink of alcohol per week. She reports that she does not use drugs.  Current Outpatient Medications on File Prior to Visit   Medication Sig Dispense Refill    apixaban (Eliquis) 5 mg Take 1 tablet (5 mg total) by mouth 2 (two) times a day 180 tablet 0    chlorhexidine  (PERIDEX) 0.12 % solution USE HALF OUNCE TWICE A DAY RINSE FOR 30 SECONDS BY MOUTH THEN EXPECTORATE DO NOT SWALLOW      Cholecalciferol (VITAMIN D) 2000 units CAPS Take 1 capsule by mouth daily      clobetasol (TEMOVATE) 0.05 % ointment Apply topically 2 (two) times a week 60 g 3    CRANBERRY PO Take by mouth      famotidine (PEPCID) 20 mg tablet TAKE 1 TABLET BY MOUTH TWICE  DAILY 180 tablet 3    Glucosamine-Chondroit-Vit C-Mn (GLUCOSAMINE 1500 COMPLEX PO) Take by mouth in the morning      Ivermectin 1 % CREA       lidocaine-prilocaine (EMLA) cream Apply to PORT 30 min prior to labs and chemo 30 g 1    LORazepam (ATIVAN) 1 mg tablet Take 1 tablet (1 mg total) by mouth every 8 (eight) hours as needed (nausea or anxiety) 20 tablet 0    lurasidone (LATUDA) 20 mg tablet TAKE 1 TABLET BY MOUTH DAILY  WITH BREAKFAST 30 tablet 11    metoprolol succinate (TOPROL-XL) 50 mg 24 hr tablet Take 1 tablet (50 mg total) by mouth daily 90 tablet 3    Mirabegron ER 50 MG TB24 Take 1 tablet (50 mg total) by mouth in the morning 30 tablet 4    Multiple Vitamin (MULTI-VITAMIN DAILY) TABS Take by mouth daily      predniSONE 10 mg tablet Take 10 tablets PO QD x 7 days, then 9 tablets PO x 7d, then 8 tablets PO x 7d, then 6 tabs PO x 5d, then 5 tabs PO x 5 d, then 4 tabs PO x 3d, then 3 tabs PO x 3d, then 2 tabs PO x 2d, then 1 tabs PO x 3d. 272 tablet 0    Sodium Fluoride 5000 PPM 1.1 % GEL As directed      polyethylene glycol (GLYCOLAX) 17 GM/SCOOP powder Take 17 g by mouth 2 (two) times a day (Patient not taking: Reported on 1/16/2025) 238 g 1     No current facility-administered medications on file prior to visit.     Allergies   Allergen Reactions    Raw Fruit - Food Allergy Anaphylaxis     PLUMS, PEACHES, FRUIT WITH SKIN      Current Outpatient Medications on File Prior to Visit   Medication Sig Dispense Refill    apixaban (Eliquis) 5 mg Take 1 tablet (5 mg total) by mouth 2 (two) times a day 180 tablet 0    chlorhexidine (PERIDEX)  0.12 % solution USE HALF OUNCE TWICE A DAY RINSE FOR 30 SECONDS BY MOUTH THEN EXPECTORATE DO NOT SWALLOW      Cholecalciferol (VITAMIN D) 2000 units CAPS Take 1 capsule by mouth daily      clobetasol (TEMOVATE) 0.05 % ointment Apply topically 2 (two) times a week 60 g 3    CRANBERRY PO Take by mouth      famotidine (PEPCID) 20 mg tablet TAKE 1 TABLET BY MOUTH TWICE  DAILY 180 tablet 3    Glucosamine-Chondroit-Vit C-Mn (GLUCOSAMINE 1500 COMPLEX PO) Take by mouth in the morning      Ivermectin 1 % CREA       lidocaine-prilocaine (EMLA) cream Apply to PORT 30 min prior to labs and chemo 30 g 1    LORazepam (ATIVAN) 1 mg tablet Take 1 tablet (1 mg total) by mouth every 8 (eight) hours as needed (nausea or anxiety) 20 tablet 0    lurasidone (LATUDA) 20 mg tablet TAKE 1 TABLET BY MOUTH DAILY  WITH BREAKFAST 30 tablet 11    metoprolol succinate (TOPROL-XL) 50 mg 24 hr tablet Take 1 tablet (50 mg total) by mouth daily 90 tablet 3    Mirabegron ER 50 MG TB24 Take 1 tablet (50 mg total) by mouth in the morning 30 tablet 4    Multiple Vitamin (MULTI-VITAMIN DAILY) TABS Take by mouth daily      predniSONE 10 mg tablet Take 10 tablets PO QD x 7 days, then 9 tablets PO x 7d, then 8 tablets PO x 7d, then 6 tabs PO x 5d, then 5 tabs PO x 5 d, then 4 tabs PO x 3d, then 3 tabs PO x 3d, then 2 tabs PO x 2d, then 1 tabs PO x 3d. 272 tablet 0    Sodium Fluoride 5000 PPM 1.1 % GEL As directed      polyethylene glycol (GLYCOLAX) 17 GM/SCOOP powder Take 17 g by mouth 2 (two) times a day (Patient not taking: Reported on 2025) 238 g 1     No current facility-administered medications on file prior to visit.      Social History     Tobacco Use    Smoking status: Former     Current packs/day: 0.00     Average packs/day: 0.3 packs/day for 30.0 years (7.5 ttl pk-yrs)     Types: Cigarettes     Start date: 1979     Quit date: 2009     Years since quittin.1     Passive exposure: Never    Smokeless tobacco: Never   Vaping Use     Vaping status: Never Used   Substance and Sexual Activity    Alcohol use: Yes     Alcohol/week: 1.0 standard drink of alcohol     Types: 1 Glasses of wine per week    Drug use: Never    Sexual activity: Not Currently     Partners: Male     Birth control/protection: Post-menopausal        Objective   There were no vitals taken for this visit.    Physical Exam  Vitals reviewed.   Constitutional:       General: She is not in acute distress.     Appearance: She is not ill-appearing, toxic-appearing or diaphoretic.   HENT:      Head: Normocephalic and atraumatic.      Nose: Nose normal.      Mouth/Throat:      Mouth: Mucous membranes are moist.   Eyes:      General:         Right eye: No discharge.         Left eye: No discharge.   Cardiovascular:      Rate and Rhythm: Normal rate.   Pulmonary:      Effort: Pulmonary effort is normal. No respiratory distress.   Abdominal:      General: Abdomen is flat. There is no distension.   Musculoskeletal:         General: No swelling, tenderness, deformity or signs of injury.      Right lower leg: No edema.      Left lower leg: No edema.      Comments: No tenderness or instability of the right ankle joint, no edema to the ankle joint. No malleolar tenderness. No tenderness along palpation of the achilles tendon. No edema, erythema or warmth of the right lower extremity.      Skin:     General: Skin is warm and dry.      Coloration: Skin is not jaundiced or pale.   Neurological:      General: No focal deficit present.      Mental Status: She is alert. Mental status is at baseline.   Psychiatric:         Mood and Affect: Mood normal.         Behavior: Behavior normal.         Thought Content: Thought content normal.         Judgment: Judgment normal.         Recent labs:  Lab Results   Component Value Date/Time    SODIUM 139 01/29/2025 12:40 PM    SODIUM 142 12/09/2020 07:36 AM    SODIUM 144 12/07/2020 08:34 AM    K 4.6 01/29/2025 12:40 PM    K 4.7 12/09/2020 07:36 AM    K 4.4  12/07/2020 08:34 AM    BUN 33 (H) 01/29/2025 12:40 PM    BUN 23 12/09/2020 07:36 AM    BUN 20 12/07/2020 08:34 AM    CREATININE 1.53 (H) 01/29/2025 12:40 PM    CREATININE 1.51 (H) 12/07/2020 08:34 AM    GLUC 259 (H) 01/29/2025 12:40 PM    GLUC 106 (H) 12/09/2020 07:36 AM    GLUC 104 (H) 12/07/2020 08:34 AM    CALCIUM 8.9 01/29/2025 12:40 PM    CALCIUM 9.4 12/07/2020 08:34 AM    AST 10 (L) 01/29/2025 12:40 PM    AST 14 12/07/2020 08:34 AM    ALT 18 01/29/2025 12:40 PM    ALT 18 12/07/2020 08:34 AM    ALB 3.6 01/29/2025 12:40 PM    ALB 3.8 12/07/2020 08:34 AM    TP 6.2 (L) 01/29/2025 12:40 PM    TP 6.7 12/07/2020 08:34 AM    EGFR 34 01/29/2025 12:40 PM    EGFR 36 (L) 12/07/2020 08:34 AM     Lab Results   Component Value Date/Time    HGB 10.9 (L) 01/29/2025 12:40 PM    HGB 14.0 03/10/2015 07:55 AM    WBC 12.89 (H) 01/29/2025 12:40 PM    WBC 5.01 03/10/2015 07:55 AM    PLT 99 (L) 01/29/2025 12:40 PM     03/10/2015 07:55 AM    INR 1.05 10/12/2024 05:35 AM    PTT 23 10/12/2024 05:35 AM     Lab Results   Component Value Date/Time    [id number] 2.476 01/15/2025 08:46 AM    PJD4BWQWMQNO 1.510 01/15/2018 01:02 PM       Recent Imaging:  Procedure: CT chest abdomen pelvis wo contrast  Result Date: 2/3/2025  Narrative: CT CHEST, ABDOMEN AND PELVIS WITHOUT IV CONTRAST INDICATION: C54.1: Malignant neoplasm of endometrium J98.4: Other disorders of lung T50.905A: Adverse effect of unspecified drugs, medicaments and biological substances, initial encounter. Additional history [facility]: Stage IIIC1, grade 1 endometrial cancer with lymph node and lymphovascular space invasion status post surgical resection 10/11/2024. 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 COMPARISON: CT chest on 825, CT chest abdomen pelvis 10/1/2024, CTA chest 9/5/2024, CT abdomen pelvis 9/10/2020 TECHNIQUE: CT examination of the chest, abdomen and pelvis was performed without intravenous contrast. Multiplanar 2D  reformatted images were created from the source data. This examination, like all CT scans performed in the [facility], was performed utilizing techniques to minimize radiation dose exposure, including the use of iterative reconstruction and automated exposure control. Radiation dose length product (DLP) for this visit: 1419 mGy-cm Enteric Contrast: Administered. FINDINGS: CHEST LUNGS: Marked improvement in bilateral irregular nodular opacities which have significantly decreased in size and density since 2025. This includes near resolution of the 6 mm juxtapleural left upper lobe nodule described on the previous exam. Stable 4 mm nodule in the right lower lobe (4/130), since at least November 2023. There are no new or enlarging nodules. PLEURA: Unremarkable. HEART/GREAT VESSELS: Heart is unremarkable for patient's age. No thoracic aortic aneurysm. Right chest port terminates at the cavoatrial junction MEDIASTINUM AND RC: Moderate hiatal hernia. No mediastinal or hilar lymphadenopathy. CHEST WALL AND LOWER NECK: Unremarkable. ABDOMEN LIVER/BILIARY TREE: Unremarkable. GALLBLADDER: No calcified gallstones. No pericholecystic inflammatory change. SPLEEN: Unremarkable. PANCREAS: Unremarkable. ADRENAL GLANDS: Unremarkable. KIDNEYS/URETERS: Unremarkable. No hydronephrosis. STOMACH AND BOWEL: Colonic diverticulosis without findings of acute diverticulitis. APPENDIX: Normal. ABDOMINOPELVIC CAVITY: No ascites. No pneumoperitoneum. No lymphadenopathy. VESSELS: Infrarenal IVC filter. No abdominal aortic aneurysm. PELVIS REPRODUCTIVE ORGANS: Post hysterectomy. URINARY BLADDER: Unremarkable. ABDOMINAL WALL/INGUINAL REGIONS: Moderate fat-containing umbilical hernia. BONES: No acute fracture or suspicious osseous lesion. Degenerative changes in the spine. Unchanged grade 1 anterolisthesis L4 on L5.     Impression: 1.  Marked improvement in bilateral nodular lung opacities consistent with resolving  infectious/inflammatory process. 2.  No evidence of metastasis in the chest, abdomen, or pelvis. Resident: SHERYL AYON I, the attending radiologist, have reviewed the images and agree with the final report above. Workstation performed: [id number]     Procedure: Echo complete w/ contrast if indicated  Result Date: 1/20/2025  Narrative:   Left Ventricle: Left ventricular cavity size is normal. Wall thickness is mildly increased. There is eccentric hypertrophy. The left ventricular ejection fraction is 45%. Systolic function is mildly reduced. at -14%. There is mild global hypokinesis. Diastolic function is mildly abnormal, consistent with grade I (abnormal) relaxation.   IVS: There is abnormal septal motion consistent with left bundle branch block.   Right Ventricle: Right ventricular cavity size is normal. Systolic function is normal.   Mitral Valve: There is mild regurgitation.   Tricuspid Valve: There is mild regurgitation. Strain was performed to quantify interventricular dyssynchrony and evaluate components of myocardial function due to (positive family history of HCM, family history of sudden death, HCM, Chemotherapy, complex CHD, genetic abnormality, viral infection) . Results from the utilization of Strain Analysis are listed in the report below.     Procedure: CT chest wo contrast  Result Date: 1/8/2025  Narrative: CT CHEST WITHOUT IV CONTRAST INDICATION: C54.1: Malignant neoplasm of endometrium R05.1: Acute cough. COMPARISON: 10/1/2024. TECHNIQUE: CT examination of the chest was performed without intravenous contrast. Multiplanar 2D reformatted images were created from the source data. This examination, like all CT scans performed in the [facility], was performed utilizing techniques to minimize radiation dose exposure, including the use of iterative reconstruction and automated exposure control. Radiation dose length product (DLP) for this visit: 473 mGy-cm FINDINGS: LUNGS: Interval  development of ill-defined bilateral lower lobe nodular opacities noted, likely infectious or inflammatory in etiology however given the history of malignancy short-term CT follow-up recommended to ensure resolution.. Interval development of a 6 mm left upper lobe juxtapleural nodule is noted series 3 image 34. There is no tracheal or endobronchial lesion. PLEURA: Unremarkable. HEART/GREAT VESSELS: Heart is unremarkable for patient's age. No thoracic aortic aneurysm. MEDIASTINUM AND RC: Moderate hiatal hernia is present. CHEST WALL AND LOWER NECK: Unremarkable. VISUALIZED STRUCTURES IN THE UPPER ABDOMEN: Unremarkable. OSSEOUS STRUCTURES: No acute fracture or destructive osseous lesion.     Impression: Interval development of multiple bilateral lower lobe ill-defined nodular opacities. Although the appearance favors infectious or inflammatory etiology, given the history of malignancy, short-term CT follow-up is recommended to ensure resolution. Interval development of 6 mm left upper lobe nodule which can also be reassessed at follow-up. Stable 5 mm right lower lobe pulmonary nodule. Moderate hiatal hernia. Workstation performed: [id number]     Procedure: XR chest 1 view portable  Result Date: 1/7/2025  Narrative: XR CHEST PORTABLE INDICATION: cough. COMPARISON: Chest and abdomen CT 10/01/2024. FINDINGS: Right port at cavoatrial junction. Clear lungs. No pneumothorax or pleural effusion. Normal cardiomediastinal silhouette. Bones are unremarkable for age. 2 cm rounded opacity over the right upper quadrant, not present on the abdomen CT, possibly extrinsic to the patient.     Impression: No acute cardiopulmonary disease. Workstation performed: [id number]     Procedure: IR port check  Result Date: 12/26/2024  Narrative: Port check and revision Clinical History: Port flipped. Contrast: None Fluoro time: 0.1 Number of Images: 1 Technique: The patient was brought to the interventional radiology area identified verbally  and by wristband. The patient was placed supine on the table. The port was prepped and draped in usual sterile fashion. Lidocaine was given for local anesthesia as well as fentanyl. An incision was made overlying the port and the port was dissected free. The port was then sutured in 2 places onto the chest wall musculature with 2-0 Proline suture and then flushed. The pocket was closed with 3-0 Monocryl suture and Exofin.     Impression: Impression: Successful port revision due to a flipped port and the port was sutured to the chest wall in 2 places. PLAN: The port may be used immediately. If the port continues to be difficult to access, it may need to be placed at a different site more medially.    . Workstation performed: [id number]     Procedure: IR port placement  Result Date: 12/5/2024  Narrative: PROCEDURE: Port Placement Procedural Personnel Attending physician(s): Jamaal Colindres MD Pre-procedure diagnosis: Endometrial cancer Post-procedure diagnosis: Same Clinical History: Very pleasant 69-year-old woman with endometrial cancer referred for port placement in anticipation of chemotherapy. PROCEDURE SUMMARY: Insertion of tunneled centrally inserted central venous access device, with subcutaneous port PROCEDURE DETAILS: Pre-procedure Consent: Informed consent for the procedure including risks, benefits and alternatives was obtained and time-out was performed prior to the procedure. Preparation: The site was prepared and draped using maximal sterile barrier technique including cutaneous antisepsis. Anesthesia/sedation Level of anesthesia/sedation: Moderate sedation (conscious sedation) Anesthesia/sedation administered by: Independent trained observer under attending supervision with continuous monitoring of the patient's level of consciousness and physiologic status Total intra-service sedation time: 45 MIN Technique and Findings: Immediate pre-procedure ultrasound demonstrated a widely patent right  "internal jugular vein, which was judged appropriate for access.  Local anesthetic was administered.  A dermatotomy was made.  Under real-time ultrasound guidance, access was obtained using a 21-gauge micropuncture needle.  A permanent image was saved.  A microwire was advanced via the micropuncture needle and the needle exchanged for a transitional micropuncture dilator.  The intravascular distance was measured with the microwire, and then both the microwire and inner portion of the transitional dilator were removed.  A 0.035\" guidewire was advanced into the IVC.  The wire and transitional dilator were secured with a flow switch. Attention was then turned towards creation of the port pocket.  An appropriate site on the chest, approximately two finger-breadths below the collarbone, was selected.  Local anesthetic was administered to the planned pocket and along the planned tunnel tract.  An incision was made with a 15-blade scalpel.  The pocket was developed with blunt dissection.  The reservoir was inserted into the pocket and the catheter tunneled to the venous access site.  The catheter was cut to length. Attention was returned to the venous access site.  The flow switch and transitional dilator were removed.  The venotomy was serially dilated and a peel-away sheath was placed.  The port catheter was inserted via the sheath, which was then peeled away.  Fluoroscopy confirmed appropriate position of the catheter tip.  The port was tested; it flushed and aspirated well.  The reservoir was secured in the pocket with a single 3-0 Vicryl suture. The pocket was closed with 3-0 Vicryl deep dermal sutures in simple interrupted fashion, followed by 4-0 Monocryl subcuticular suture in running fashion.  The pocket incision and the neck venotomy sites were covered with Exofin glue. A completion fluoroscopic image demonstrated appropriate position of the port with tip terminating at the right atrium. Contrast Contrast dose: " None Radiation Dose Fluoroscopy time: 0.7 MIN FL Reference air kerma: 11 mGy Additional Details Specimens removed: None Estimated blood loss (mL): Less than 10 Complications: No immediate complications.     Impression: Successful placement of right chest port via the right internal jugular vein.  The tip terminates at the right atrium.  Port is ready for immediate use. Workstation performed: [id number]     Procedure: IR IVC filter placement optional/temporary  Result Date: 10/8/2024  Narrative: Inferior venacavogram and inferior venacaval filter placement Clinical History:  69 year-old female with history of PE/DVT on Eliquis, history of cervical mass with vaginal bleeding, with request for IVC filter placement prior to laparoscopic hysterectomy and bilateral salpingo-oophorectomy. Contrast: CO2 Fluoro time: 2.4 MINUTES Number of Images: Multiple Radiation dose: 220 mGy Conscious sedation time: 20 MINUTES Technique: The patient was brought to the interventional radiology suite and identified verbally and by wrist band. The patient was placed supine on the table. The right internal jugular vein was evaluated as a potential access site with ultrasound. The vessel was found to be patent and compressible. Lidocaine was administered to the skin and a small skin incision was made. Under ultrasound guidance, the right internal jugular vein was accessed using single wall Seldinger technique. Static images of real time needle entry into the vessel were obtained.  A Bentson wire was advanced into the inferior vena cava, over which a Abril sheath was inserted. An inferior venacavogram was performed using CO2. Findings: The examination demonstrates that there is no thrombus in the inferior vena cava. The renal veins are positively identified. No caval anomalies are present. Intervention: The sheath was positioned appropriately, and a Abril inferior vena cava filter was deployed below the level of the renal veins.      Impression: Impression: 1. Normal inferior venacavogram. 2. Satisfactory intravascular placement of a Abril filter below the level of the renal veins. Workstation performed: [id number]       Administrative Statements   I have spent a total time of 31 minutes in caring for this patient on the day of the visit/encounter including Risks and benefits of tx options, Instructions for management, Patient and family education, Importance of tx compliance, Risk factor reductions, Impressions, Counseling / Coordination of care, Documenting in the medical record, Reviewing / ordering tests, medicine, procedures  , and Obtaining or reviewing history  . Topics discussed with the patient / family include symptom assessment and management, medication review, psychosocial support, supportive listening, and anticipatory guidance.

## 2025-02-04 NOTE — ASSESSMENT & PLAN NOTE
Goal - ongoing disease directed cares. Patient to proceed with systemic treatment immunotherapy and chemotherapy.

## 2025-02-04 NOTE — ASSESSMENT & PLAN NOTE
Following Gyn/Oncology - Dr. Page and Sharon Banks PA-C  Care team includes  Dr. Olson of Cardiac Oncology  Current treatment with Carboplatin, Paclixatel and Keytruda  Cycle 4 scheduled for 2/7/2025  Currently on ongoing steroid taper for her bout of pneumonitis.    Follows Urology for OAB.  -last visit 1/16/2025 with adjustment in her Mirabegron

## 2025-02-04 NOTE — ASSESSMENT & PLAN NOTE
Lab Results   Component Value Date    EGFR 34 01/29/2025    EGFR 40 01/22/2025    EGFR 37 01/15/2025    CREATININE 1.53 (H) 01/29/2025    CREATININE 1.34 (H) 01/22/2025    CREATININE 1.43 (H) 01/15/2025     Watching  Seeing nephrology

## 2025-02-04 NOTE — ASSESSMENT & PLAN NOTE
Lab Results   Component Value Date    EGFR 34 01/29/2025    EGFR 40 01/22/2025    EGFR 37 01/15/2025    CREATININE 1.53 (H) 01/29/2025    CREATININE 1.34 (H) 01/22/2025    CREATININE 1.43 (H) 01/15/2025     Labs reviewed from 1/29/2025  Cr of 1.53 (previously 1.34 from 1/22/2025)  GFR of 34

## 2025-02-05 ENCOUNTER — HOSPITAL ENCOUNTER (OUTPATIENT)
Dept: INFUSION CENTER | Facility: CLINIC | Age: 70
Discharge: HOME | End: 2025-02-05
Payer: COMMERCIAL

## 2025-02-05 DIAGNOSIS — C54.1 ENDOMETRIAL CANCER (HCC): ICD-10-CM

## 2025-02-05 DIAGNOSIS — Z45.2 ENCOUNTER FOR CENTRAL LINE CARE: Primary | ICD-10-CM

## 2025-02-05 DIAGNOSIS — R68.89 OTHER GENERAL SYMPTOMS AND SIGNS: ICD-10-CM

## 2025-02-05 LAB
ALBUMIN SERPL BCG-MCNC: 3.6 G/DL (ref 3.5–5)
ALP SERPL-CCNC: 99 U/L (ref 34–104)
ALT SERPL W P-5'-P-CCNC: 16 U/L (ref 7–52)
AMYLASE SERPL-CCNC: 40 IU/L (ref 29–103)
ANION GAP SERPL CALCULATED.3IONS-SCNC: 7 MMOL/L (ref 4–13)
ANISOCYTOSIS BLD QL SMEAR: PRESENT
AST SERPL W P-5'-P-CCNC: 9 U/L (ref 13–39)
BASOPHILS # BLD AUTO: 0.02 THOUSANDS/ΜL (ref 0–0.1)
BASOPHILS NFR BLD AUTO: 0 % (ref 0–1)
BILIRUB SERPL-MCNC: 0.33 MG/DL (ref 0.2–1)
BUN SERPL-MCNC: 29 MG/DL (ref 5–25)
CALCIUM SERPL-MCNC: 8.8 MG/DL (ref 8.4–10.2)
CANCER AG125 SERPL-ACNC: 10.3 U/ML (ref 0–35)
CHLORIDE SERPL-SCNC: 107 MMOL/L (ref 96–108)
CO2 SERPL-SCNC: 25 MMOL/L (ref 21–32)
CREAT SERPL-MCNC: 1.52 MG/DL (ref 0.6–1.3)
EOSINOPHIL # BLD AUTO: 0.02 THOUSAND/ΜL (ref 0–0.61)
EOSINOPHIL NFR BLD AUTO: 0 % (ref 0–6)
ERYTHROCYTE [DISTWIDTH] IN BLOOD BY AUTOMATED COUNT: 22.9 % (ref 11.6–15.1)
GFR SERPL CREATININE-BSD FRML MDRD: 34 ML/MIN/1.73SQ M
GLUCOSE SERPL-MCNC: 157 MG/DL (ref 65–140)
HCT VFR BLD AUTO: 34.1 % (ref 34.8–46.1)
HGB BLD-MCNC: 10.8 G/DL (ref 11.5–15.4)
IMM GRANULOCYTES # BLD AUTO: 0.03 THOUSAND/UL (ref 0–0.2)
IMM GRANULOCYTES NFR BLD AUTO: 0 % (ref 0–2)
LIPASE SERPL-CCNC: 19 U/L (ref 11–82)
LYMPHOCYTES # BLD AUTO: 0.81 THOUSANDS/ΜL (ref 0.6–4.47)
LYMPHOCYTES NFR BLD AUTO: 10 % (ref 14–44)
MAGNESIUM SERPL-MCNC: 2 MG/DL (ref 1.9–2.7)
MCH RBC QN AUTO: 29.1 PG (ref 26.8–34.3)
MCHC RBC AUTO-ENTMCNC: 31.7 G/DL (ref 31.4–37.4)
MCV RBC AUTO: 92 FL (ref 82–98)
MONOCYTES # BLD AUTO: 0.26 THOUSAND/ΜL (ref 0.17–1.22)
MONOCYTES NFR BLD AUTO: 3 % (ref 4–12)
NEUTROPHILS # BLD AUTO: 7.28 THOUSANDS/ΜL (ref 1.85–7.62)
NEUTS SEG NFR BLD AUTO: 87 % (ref 43–75)
NRBC BLD AUTO-RTO: 0 /100 WBCS
OVALOCYTES BLD QL SMEAR: PRESENT
PLATELET # BLD AUTO: 99 THOUSANDS/UL (ref 149–390)
PLATELET BLD QL SMEAR: ABNORMAL
PMV BLD AUTO: 12.4 FL (ref 8.9–12.7)
POIKILOCYTOSIS BLD QL SMEAR: PRESENT
POLYCHROMASIA BLD QL SMEAR: PRESENT
POTASSIUM SERPL-SCNC: 4.1 MMOL/L (ref 3.5–5.3)
PROT SERPL-MCNC: 6 G/DL (ref 6.4–8.4)
RBC # BLD AUTO: 3.71 MILLION/UL (ref 3.81–5.12)
RBC MORPH BLD: PRESENT
SODIUM SERPL-SCNC: 139 MMOL/L (ref 135–147)
TSH SERPL DL<=0.05 MIU/L-ACNC: 2.17 UIU/ML (ref 0.45–4.5)
WBC # BLD AUTO: 8.42 THOUSAND/UL (ref 4.31–10.16)

## 2025-02-05 PROCEDURE — 85025 COMPLETE CBC W/AUTO DIFF WBC: CPT

## 2025-02-05 PROCEDURE — 83690 ASSAY OF LIPASE: CPT

## 2025-02-05 PROCEDURE — 84443 ASSAY THYROID STIM HORMONE: CPT

## 2025-02-05 PROCEDURE — 83735 ASSAY OF MAGNESIUM: CPT

## 2025-02-05 PROCEDURE — 86304 IMMUNOASSAY TUMOR CA 125: CPT

## 2025-02-05 PROCEDURE — 80053 COMPREHEN METABOLIC PANEL: CPT

## 2025-02-05 PROCEDURE — 82150 ASSAY OF AMYLASE: CPT

## 2025-02-05 NOTE — PROGRESS NOTES
Patient is here for central labs and offers no complaints at this time. Labs drawn per dr's orders. Next appointment confirmed 2/7/25 at 0730 at [facility]. Patient declined avs

## 2025-02-05 NOTE — ASSESSMENT & PLAN NOTE
Grade 2, resolved.     Clinically improved almost immediately after initiation of high-dose oral steroids.     Radiologic imaging confirms resolution.     Will escalate steroid dose taper.

## 2025-02-06 ENCOUNTER — TELEPHONE (OUTPATIENT)
Age: 70
End: 2025-02-06

## 2025-02-06 ENCOUNTER — HOSPITAL ENCOUNTER (OUTPATIENT)
Dept: NON INVASIVE DIAGNOSTICS | Facility: CLINIC | Age: 70
Discharge: HOME | End: 2025-02-06
Payer: COMMERCIAL

## 2025-02-06 DIAGNOSIS — R60.9 EDEMA, UNSPECIFIED TYPE: ICD-10-CM

## 2025-02-06 DIAGNOSIS — C54.1 ENDOMETRIAL CANCER (HCC): Primary | ICD-10-CM

## 2025-02-06 DIAGNOSIS — M79.89 SWELLING OF RIGHT LOWER EXTREMITY: ICD-10-CM

## 2025-02-06 DIAGNOSIS — M79.89 SWELLING OF RIGHT LOWER EXTREMITY: Primary | ICD-10-CM

## 2025-02-06 PROCEDURE — 93971 EXTREMITY STUDY: CPT

## 2025-02-06 PROCEDURE — 93971 EXTREMITY STUDY: CPT | Performed by: SURGERY

## 2025-02-06 RX ORDER — SODIUM CHLORIDE 9 MG/ML
20 INJECTION, SOLUTION INTRAVENOUS ONCE
Status: CANCELLED | OUTPATIENT
Start: 2025-02-07

## 2025-02-06 RX ORDER — PALONOSETRON 0.05 MG/ML
0.25 INJECTION, SOLUTION INTRAVENOUS ONCE
Status: CANCELLED | OUTPATIENT
Start: 2025-02-07

## 2025-02-06 NOTE — TELEPHONE ENCOUNTER
Patient calling to inform Sharon Darren, she is having difficulty with pain.  Right leg muscles are sore and achy.  She doesn't know if this is a reaction to medication.  She is taking Tylenol and apply ice.  Please call her at [phone].

## 2025-02-06 NOTE — TELEPHONE ENCOUNTER
Return call placed from patient. She notes right lower extremity swelling and pain x 1 day. Will plan for STAT venous doppler RLE.     Gyn-onc staff, please assist with scheduling. She is willing to go out in the weather but prefers [place]. Thanks!

## 2025-02-06 NOTE — TELEPHONE ENCOUNTER
"FYI:  Call to pt to further assess her symptoms of R leg pain.    Pt stated it started Monday she felt some discomfort in the R achilles area. Saw her PCP on Tuesday and they thought it may have been from her sitting in her recliner with her legs crossed.   Pt concerned the R leg now upper and lower feel like\"muscle ache\".  Discussed with pt that it maybe from the Keytruda she is receiving that can cause some myalgias.   Pt is on a  steroid taper and taking ES Tylenol Q8 and using a heating pad.  She stated the heating pad does lessen the discomfort.    Denies any redness or swelling to R leg. Rates her pain 1/10 but tolerable.    Advised to cont with steroid taper as advised and ES Tylenol and heating pad,pt understood.    Pt seen on ofc 2/3 with Sharon WALKER.  Pt sched for chem tx tomorrow.    Pls advise  Pt made note that she would not be able to go out anywhere if needed today.  "

## 2025-02-07 ENCOUNTER — HOSPITAL ENCOUNTER (OUTPATIENT)
Dept: INFUSION CENTER | Facility: CLINIC | Age: 70
End: 2025-02-07
Payer: COMMERCIAL

## 2025-02-07 VITALS
RESPIRATION RATE: 18 BRPM | TEMPERATURE: 97.3 F | DIASTOLIC BLOOD PRESSURE: 78 MMHG | BODY MASS INDEX: 45.35 KG/M2 | HEIGHT: 60 IN | OXYGEN SATURATION: 97 % | WEIGHT: 231 LBS | SYSTOLIC BLOOD PRESSURE: 116 MMHG | HEART RATE: 69 BPM

## 2025-02-07 DIAGNOSIS — C54.1 ENDOMETRIAL CANCER (HCC): Primary | ICD-10-CM

## 2025-02-07 LAB
BASOPHILS # BLD AUTO: 0.01 THOUSANDS/ΜL (ref 0–0.1)
BASOPHILS NFR BLD AUTO: 0 % (ref 0–1)
EOSINOPHIL # BLD AUTO: 0.01 THOUSAND/ΜL (ref 0–0.61)
EOSINOPHIL NFR BLD AUTO: 0 % (ref 0–6)
ERYTHROCYTE [DISTWIDTH] IN BLOOD BY AUTOMATED COUNT: 23.2 % (ref 11.6–15.1)
HCT VFR BLD AUTO: 33.4 % (ref 34.8–46.1)
HGB BLD-MCNC: 10.6 G/DL (ref 11.5–15.4)
IMM GRANULOCYTES # BLD AUTO: 0.03 THOUSAND/UL (ref 0–0.2)
IMM GRANULOCYTES NFR BLD AUTO: 0 % (ref 0–2)
LYMPHOCYTES # BLD AUTO: 0.63 THOUSANDS/ΜL (ref 0.6–4.47)
LYMPHOCYTES NFR BLD AUTO: 9 % (ref 14–44)
MCH RBC QN AUTO: 29.4 PG (ref 26.8–34.3)
MCHC RBC AUTO-ENTMCNC: 31.7 G/DL (ref 31.4–37.4)
MCV RBC AUTO: 93 FL (ref 82–98)
MONOCYTES # BLD AUTO: 0.24 THOUSAND/ΜL (ref 0.17–1.22)
MONOCYTES NFR BLD AUTO: 4 % (ref 4–12)
NEUTROPHILS # BLD AUTO: 6.03 THOUSANDS/ΜL (ref 1.85–7.62)
NEUTS SEG NFR BLD AUTO: 87 % (ref 43–75)
NRBC BLD AUTO-RTO: 0 /100 WBCS
PLATELET # BLD AUTO: 96 THOUSANDS/UL (ref 149–390)
PMV BLD AUTO: 12.3 FL (ref 8.9–12.7)
RBC # BLD AUTO: 3.6 MILLION/UL (ref 3.81–5.12)
WBC # BLD AUTO: 6.95 THOUSAND/UL (ref 4.31–10.16)

## 2025-02-07 PROCEDURE — 96365 THER/PROPH/DIAG IV INF INIT: CPT

## 2025-02-07 PROCEDURE — 85025 COMPLETE CBC W/AUTO DIFF WBC: CPT | Performed by: OBSTETRICS & GYNECOLOGY

## 2025-02-07 RX ORDER — PALONOSETRON 0.05 MG/ML
0.25 INJECTION, SOLUTION INTRAVENOUS ONCE
Status: CANCELLED | OUTPATIENT
Start: 2025-02-14

## 2025-02-07 RX ORDER — SODIUM CHLORIDE 9 MG/ML
20 INJECTION, SOLUTION INTRAVENOUS ONCE
Status: COMPLETED | OUTPATIENT
Start: 2025-02-07 | End: 2025-02-07

## 2025-02-07 RX ORDER — SODIUM CHLORIDE 9 MG/ML
20 INJECTION, SOLUTION INTRAVENOUS ONCE
Status: CANCELLED | OUTPATIENT
Start: 2025-02-14

## 2025-02-07 RX ORDER — PALONOSETRON 0.05 MG/ML
0.25 INJECTION, SOLUTION INTRAVENOUS ONCE
Status: DISCONTINUED | OUTPATIENT
Start: 2025-02-07 | End: 2025-02-07

## 2025-02-07 RX ADMIN — FOSAPREPITANT 150 MG: 150 INJECTION, POWDER, LYOPHILIZED, FOR SOLUTION INTRAVENOUS at 07:58

## 2025-02-07 RX ADMIN — SODIUM CHLORIDE 20 ML/HR: 9 INJECTION, SOLUTION INTRAVENOUS at 08:01

## 2025-02-07 NOTE — PROGRESS NOTES
Pt here for tx, repeat CBC drawn per order before starting tx. PLT trending down, per Sharon BOYLE we are deferring tx by 1 week. Pt aware of plan. In basket sent by Sharon to adjust her schedule moving forward. Declined AVS.

## 2025-02-08 ENCOUNTER — HOSPITAL ENCOUNTER (OUTPATIENT)
Dept: INFUSION CENTER | Facility: CLINIC | Age: 70
Discharge: HOME | End: 2025-02-08

## 2025-02-08 DIAGNOSIS — T50.905A DRUG-INDUCED PNEUMONITIS: ICD-10-CM

## 2025-02-08 DIAGNOSIS — J98.4 DRUG-INDUCED PNEUMONITIS: ICD-10-CM

## 2025-02-09 ENCOUNTER — RA CDI HCC (OUTPATIENT)
Dept: OTHER | Facility: HOSPITAL | Age: 70
End: 2025-02-09

## 2025-02-10 RX ORDER — PREDNISONE 10 MG/1
TABLET ORAL
Qty: 272 TABLET | Refills: 0 | OUTPATIENT
Start: 2025-02-10

## 2025-02-12 ENCOUNTER — HOSPITAL ENCOUNTER (OUTPATIENT)
Dept: INFUSION CENTER | Facility: CLINIC | Age: 70
Discharge: HOME | End: 2025-02-12
Payer: COMMERCIAL

## 2025-02-12 DIAGNOSIS — Z45.2 ENCOUNTER FOR CENTRAL LINE CARE: Primary | ICD-10-CM

## 2025-02-12 DIAGNOSIS — C54.1 ENDOMETRIAL CANCER (HCC): ICD-10-CM

## 2025-02-12 LAB
BASOPHILS # BLD AUTO: 0.01 THOUSANDS/ÂΜL (ref 0–0.1)
BASOPHILS NFR BLD AUTO: 0 % (ref 0–1)
EOSINOPHIL # BLD AUTO: 0.07 THOUSAND/ÂΜL (ref 0–0.61)
EOSINOPHIL NFR BLD AUTO: 1 % (ref 0–6)
ERYTHROCYTE [DISTWIDTH] IN BLOOD BY AUTOMATED COUNT: 22.8 % (ref 11.6–15.1)
HCT VFR BLD AUTO: 33.9 % (ref 34.8–46.1)
HGB BLD-MCNC: 10.8 G/DL (ref 11.5–15.4)
IMM GRANULOCYTES # BLD AUTO: 0.02 THOUSAND/UL (ref 0–0.2)
IMM GRANULOCYTES NFR BLD AUTO: 0 % (ref 0–2)
LYMPHOCYTES # BLD AUTO: 1.33 THOUSANDS/ÂΜL (ref 0.6–4.47)
LYMPHOCYTES NFR BLD AUTO: 23 % (ref 14–44)
MCH RBC QN AUTO: 29.7 PG (ref 26.8–34.3)
MCHC RBC AUTO-ENTMCNC: 31.9 G/DL (ref 31.4–37.4)
MCV RBC AUTO: 93 FL (ref 82–98)
MONOCYTES # BLD AUTO: 0.4 THOUSAND/ÂΜL (ref 0.17–1.22)
MONOCYTES NFR BLD AUTO: 7 % (ref 4–12)
NEUTROPHILS # BLD AUTO: 3.99 THOUSANDS/ÂΜL (ref 1.85–7.62)
NEUTS SEG NFR BLD AUTO: 69 % (ref 43–75)
NRBC BLD AUTO-RTO: 0 /100 WBCS
PLATELET # BLD AUTO: 131 THOUSANDS/UL (ref 149–390)
PMV BLD AUTO: 11.6 FL (ref 8.9–12.7)
RBC # BLD AUTO: 3.64 MILLION/UL (ref 3.81–5.12)
WBC # BLD AUTO: 5.82 THOUSAND/UL (ref 4.31–10.16)

## 2025-02-12 PROCEDURE — 85025 COMPLETE CBC W/AUTO DIFF WBC: CPT | Performed by: OBSTETRICS & GYNECOLOGY

## 2025-02-12 NOTE — PROGRESS NOTES
Patient presents tot [facility] for a central lab draw. She offers no concerns at this time. Port accessed, labs drawn, and port deaccessed. Declined VAS. Confirmed next appointment at the [facility] for 02/14/2025 @ 0800. Patient ambulatory at discharge.

## 2025-02-13 DIAGNOSIS — C53.0 MALIGNANT NEOPLASM OF ENDOCERVIX (HCC): ICD-10-CM

## 2025-02-13 DIAGNOSIS — I27.82 OTHER CHRONIC PULMONARY EMBOLISM WITHOUT ACUTE COR PULMONALE (HCC): ICD-10-CM

## 2025-02-13 NOTE — PROGRESS NOTES
Labs from 2/12/25 reviewed. Due to cycle 4 treatment delay due to thrombocytopenia, and borderline recovery of platelets, will dose-reduce carboplatin to AUC 4 with cycle 4.     Lab Results   Component Value Date     (L) 02/12/2025    PLT 96 (L) 02/07/2025    PLT 99 (L) 02/05/2025    PLT 99 (L) 01/29/2025     (L) 01/22/2025

## 2025-02-13 NOTE — TELEPHONE ENCOUNTER
Reason for call:   [x] Refill   [] Prior Auth  [] Other:     Office:   [x] PCP/Provider - [place]  [] Specialty/Provider -     Medication: Eliquis 5mg    Dose/Frequency: 1 tab bid    Quantity: 180    Pharmacy: [facility]/pharmacy #[id number] - [place], [place] - [address] [phone]     Does the patient have enough for 3 days?   [x] Yes   [] No - Send as HP to POD

## 2025-02-14 ENCOUNTER — HOSPITAL ENCOUNTER (OUTPATIENT)
Dept: INFUSION CENTER | Facility: CLINIC | Age: 70
End: 2025-02-14

## 2025-02-14 ENCOUNTER — HOSPITAL ENCOUNTER (OUTPATIENT)
Dept: INFUSION CENTER | Facility: CLINIC | Age: 70
End: 2025-02-14
Payer: COMMERCIAL

## 2025-02-14 VITALS
HEIGHT: 60 IN | WEIGHT: 232.5 LBS | TEMPERATURE: 97.2 F | SYSTOLIC BLOOD PRESSURE: 115 MMHG | BODY MASS INDEX: 45.65 KG/M2 | DIASTOLIC BLOOD PRESSURE: 73 MMHG | HEART RATE: 80 BPM | OXYGEN SATURATION: 96 %

## 2025-02-14 DIAGNOSIS — C54.1 ENDOMETRIAL CANCER (HCC): Primary | ICD-10-CM

## 2025-02-14 PROCEDURE — 96367 TX/PROPH/DG ADDL SEQ IV INF: CPT

## 2025-02-14 PROCEDURE — 96417 CHEMO IV INFUS EACH ADDL SEQ: CPT

## 2025-02-14 PROCEDURE — 96375 TX/PRO/DX INJ NEW DRUG ADDON: CPT

## 2025-02-14 PROCEDURE — 96415 CHEMO IV INFUSION ADDL HR: CPT

## 2025-02-14 PROCEDURE — 96413 CHEMO IV INFUSION 1 HR: CPT

## 2025-02-14 RX ORDER — SODIUM CHLORIDE 9 MG/ML
20 INJECTION, SOLUTION INTRAVENOUS ONCE
Status: COMPLETED | OUTPATIENT
Start: 2025-02-14 | End: 2025-02-14

## 2025-02-14 RX ORDER — PALONOSETRON 0.05 MG/ML
0.25 INJECTION, SOLUTION INTRAVENOUS ONCE
Status: COMPLETED | OUTPATIENT
Start: 2025-02-14 | End: 2025-02-14

## 2025-02-14 RX ADMIN — SODIUM CHLORIDE 200 MG: 9 INJECTION, SOLUTION INTRAVENOUS at 10:09

## 2025-02-14 RX ADMIN — CARBOPLATIN 251.6 MG: 10 INJECTION INTRAVENOUS at 14:03

## 2025-02-14 RX ADMIN — SODIUM CHLORIDE 20 ML/HR: 0.9 INJECTION, SOLUTION INTRAVENOUS at 08:13

## 2025-02-14 RX ADMIN — DEXAMETHASONE SODIUM PHOSPHATE 20 MG: 10 INJECTION, SOLUTION INTRAMUSCULAR; INTRAVENOUS at 09:30

## 2025-02-14 RX ADMIN — DIPHENHYDRAMINE HYDROCHLORIDE 25 MG: 50 INJECTION, SOLUTION INTRAMUSCULAR; INTRAVENOUS at 09:09

## 2025-02-14 RX ADMIN — PACLITAXEL 268.8 MG: 6 INJECTION, SOLUTION INTRAVENOUS at 11:01

## 2025-02-14 RX ADMIN — PALONOSETRON HYDROCHLORIDE 0.25 MG: 0.25 INJECTION INTRAVENOUS at 10:02

## 2025-02-14 RX ADMIN — FAMOTIDINE 20 MG: 10 INJECTION INTRAVENOUS at 08:47

## 2025-02-14 RX ADMIN — FOSAPREPITANT 150 MG: 150 INJECTION, POWDER, LYOPHILIZED, FOR SOLUTION INTRAVENOUS at 08:14

## 2025-02-14 NOTE — PROGRESS NOTES
Patient to infusion for C4 Keytruda, Taxol, Carboplaints.  Labs reviewed from 2/12/25, per Sharon Harvey ok to treat with labs from 2/12 2/7 and 2/5. Port accessed, good blood return noted.  Call bell within reach.

## 2025-02-14 NOTE — PROGRESS NOTES
Patient tolerated treatment today.  Carboplatin ended at 1503, due to inclement weather forecast for tomorrow she asked to have her Neulasta appointment moved up. We can give Neulasta at  1403 (within one hour window).  She is aware of appointment time, she declined AVS

## 2025-02-15 ENCOUNTER — HOSPITAL ENCOUNTER (OUTPATIENT)
Dept: INFUSION CENTER | Facility: CLINIC | Age: 70
Discharge: HOME | End: 2025-02-15
Payer: COMMERCIAL

## 2025-02-15 ENCOUNTER — TELEPHONE (OUTPATIENT)
Dept: OTHER | Facility: OTHER | Age: 70
End: 2025-02-15

## 2025-02-15 ENCOUNTER — TELEPHONE (OUTPATIENT)
Dept: OTHER | Facility: HOSPITAL | Age: 70
End: 2025-02-15

## 2025-02-15 DIAGNOSIS — C54.1 ENDOMETRIAL CANCER (HCC): ICD-10-CM

## 2025-02-15 DIAGNOSIS — D70.2 DRUG-INDUCED NEUTROPENIA (HCC): Primary | ICD-10-CM

## 2025-02-15 PROCEDURE — 96372 THER/PROPH/DIAG INJ SC/IM: CPT

## 2025-02-15 RX ADMIN — PEGFILGRASTIM 6 MG: 6 INJECTION SUBCUTANEOUS at 13:55

## 2025-02-15 NOTE — TELEPHONE ENCOUNTER
Pt reached answering service, pt states she had a chemo treatment yesterday and woke at 3am w/terrible chest pain, she then remembered she was supposed to take a Claritin prior and she took that and Tylenol and aft ~ 1 hr was able to go back to sleep--pain subsiding. pt concerned of reaction to chemo and states she has an infusion today at [facility] 2pm. Sent esc to Dr. Garibay

## 2025-02-15 NOTE — PROGRESS NOTES
In brief, Ms. Dash is a 68 yo. with Stage IIIC1, grade 1 endometrial cancer with ITC within periaortic LN and extensive LVSI s/p surgical resection who is currently receiving adjuvant chemotherapy with taxol 135 mg/m2, carboplatin AUC 5, pembrolizumab 200 mg IV every 21 days. Last dose of carbo/taxol and pembrolizumab was 2/14.     States that she had sharp pain over her chest after the infusion that resolved with tylenol. She has experienced it before with the pembrolizumab but felt like it was more sharp. She woke up at night with the sharp pain but then it resolved with tylenol. It went from one shoulder to the other.  Currently not experiencing any pain on her chest or discomfort. No fevers. No rash.  She does not feel like her symptoms were similar to those that she experienced with her pneumonitis because that was more of a cough. I told her that if she experiences any chest pain again, then I would head to the ED. Patient in understanding.     Chavez Garibay MD

## 2025-02-17 ENCOUNTER — TELEPHONE (OUTPATIENT)
Dept: RADIOLOGY | Facility: HOSPITAL | Age: 70
End: 2025-02-17

## 2025-02-17 ENCOUNTER — TELEPHONE (OUTPATIENT)
Age: 70
End: 2025-02-17

## 2025-02-17 NOTE — TELEPHONE ENCOUNTER
Pt is requesting a call back from Sharon WALKER to discuss the pain she experienced after chemo. I asked pt if she's still experiencing this pain and pt stated no, that she's okay now. Pt can be reached at [phone]. Thank you.

## 2025-02-17 NOTE — TELEPHONE ENCOUNTER
Return call placed and reviewed post chemo discomforts, bone pain and fatigue. She states she is taking Claritin with some effectiveness.  I explained that side effects from chemo  are cumulative and usually are more prevalent the week following an infusion.  Once she has completed all of her cycles theses side effects will gradually resolve.      I informed that she should call the office at any time with her concerns.  I also let her know I will let Sharon know I spoke with her .

## 2025-02-17 NOTE — TELEPHONE ENCOUNTER
Patient canceled IVC filter removal on 3/10 with . Patient was offered an appt on 3/28 at  with  pt declined due to chemo treatment. She will call back to reschedule appt.

## 2025-02-19 ENCOUNTER — HOSPITAL ENCOUNTER (OUTPATIENT)
Dept: INFUSION CENTER | Facility: CLINIC | Age: 70
Discharge: HOME | DRG: 315 | End: 2025-02-19
Payer: COMMERCIAL

## 2025-02-19 ENCOUNTER — TELEPHONE (OUTPATIENT)
Dept: GYNECOLOGIC ONCOLOGY | Facility: CLINIC | Age: 70
End: 2025-02-19

## 2025-02-19 DIAGNOSIS — Z45.2 ENCOUNTER FOR CENTRAL LINE CARE: Primary | ICD-10-CM

## 2025-02-19 DIAGNOSIS — C54.1 ENDOMETRIAL CANCER (HCC): ICD-10-CM

## 2025-02-19 LAB
ALBUMIN SERPL BCG-MCNC: 3.7 G/DL (ref 3.5–5)
ALP SERPL-CCNC: 97 U/L (ref 34–104)
ALT SERPL W P-5'-P-CCNC: 53 U/L (ref 7–52)
ANION GAP SERPL CALCULATED.3IONS-SCNC: 9 MMOL/L (ref 4–13)
ANISOCYTOSIS BLD QL SMEAR: PRESENT
AST SERPL W P-5'-P-CCNC: 43 U/L (ref 13–39)
BASOPHILS # BLD MANUAL: 0 THOUSAND/UL (ref 0–0.1)
BASOPHILS NFR MAR MANUAL: 0 % (ref 0–1)
BILIRUB SERPL-MCNC: 0.57 MG/DL (ref 0.2–1)
BUN SERPL-MCNC: 27 MG/DL (ref 5–25)
CALCIUM SERPL-MCNC: 8.8 MG/DL (ref 8.4–10.2)
CHLORIDE SERPL-SCNC: 107 MMOL/L (ref 96–108)
CO2 SERPL-SCNC: 24 MMOL/L (ref 21–32)
CREAT SERPL-MCNC: 1.57 MG/DL (ref 0.6–1.3)
EOSINOPHIL # BLD MANUAL: 0 THOUSAND/UL (ref 0–0.4)
EOSINOPHIL NFR BLD MANUAL: 0 % (ref 0–6)
ERYTHROCYTE [DISTWIDTH] IN BLOOD BY AUTOMATED COUNT: 21.7 % (ref 11.6–15.1)
GFR SERPL CREATININE-BSD FRML MDRD: 33 ML/MIN/1.73SQ M
GLUCOSE SERPL-MCNC: 197 MG/DL (ref 65–140)
HCT VFR BLD AUTO: 34.6 % (ref 34.8–46.1)
HGB BLD-MCNC: 11 G/DL (ref 11.5–15.4)
LYMPHOCYTES # BLD AUTO: 0.68 THOUSAND/UL (ref 0.6–4.47)
LYMPHOCYTES # BLD AUTO: 9 % (ref 14–44)
MAGNESIUM SERPL-MCNC: 1.6 MG/DL (ref 1.9–2.7)
MCH RBC QN AUTO: 30.6 PG (ref 26.8–34.3)
MCHC RBC AUTO-ENTMCNC: 31.8 G/DL (ref 31.4–37.4)
MCV RBC AUTO: 96 FL (ref 82–98)
MONOCYTES # BLD AUTO: 0.38 THOUSAND/UL (ref 0–1.22)
MONOCYTES NFR BLD: 5 % (ref 4–12)
NEUTROPHILS # BLD MANUAL: 6.47 THOUSAND/UL (ref 1.85–7.62)
NEUTS BAND NFR BLD MANUAL: 4 % (ref 0–8)
NEUTS SEG NFR BLD AUTO: 82 % (ref 43–75)
PLATELET # BLD AUTO: 106 THOUSANDS/UL (ref 149–390)
PLATELET BLD QL SMEAR: ABNORMAL
PMV BLD AUTO: 12.3 FL (ref 8.9–12.7)
POTASSIUM SERPL-SCNC: 4.2 MMOL/L (ref 3.5–5.3)
PROT SERPL-MCNC: 6.3 G/DL (ref 6.4–8.4)
RBC # BLD AUTO: 3.59 MILLION/UL (ref 3.81–5.12)
RBC MORPH BLD: PRESENT
SODIUM SERPL-SCNC: 140 MMOL/L (ref 135–147)
WBC # BLD AUTO: 7.52 THOUSAND/UL (ref 4.31–10.16)

## 2025-02-19 PROCEDURE — 83735 ASSAY OF MAGNESIUM: CPT

## 2025-02-19 PROCEDURE — 85027 COMPLETE CBC AUTOMATED: CPT

## 2025-02-19 PROCEDURE — 80053 COMPREHEN METABOLIC PANEL: CPT

## 2025-02-19 PROCEDURE — 85007 BL SMEAR W/DIFF WBC COUNT: CPT

## 2025-02-19 NOTE — PROGRESS NOTES
Patient here for lab work. Port flushed, blood return noted, labs drawn per order. Patient reports having pain in her chest/side this morning, describes as a muscle pain, she reports feeling shakey and increase in fatigue & SOB this morning. Per patient she does have SOB at baseline. Per patient she just wants to go home to rest. Patient declined AVS. Next appointment reviewed with patient for labs 2/26 0900. Sharon Harvey PA-C made aware of patient's symptoms, no additional orders at this time.

## 2025-02-19 NOTE — TELEPHONE ENCOUNTER
Outgoing call to Cherelle related to symptoms of chest discomfort, dyspnea.  She is fatigued. Encouraged she go to ED for work up however does not feel she needs to go as she has had these symptoms, she has a filter and these symptoms  usually are worse in the am and better in the pm.    I said that I cannot force her to go to the ED, however if symptoms worsen I encouraged she go for evaluation, if symptoms worsen she needs to go to the ED.

## 2025-02-20 ENCOUNTER — HOSPITAL ENCOUNTER (INPATIENT)
Facility: HOSPITAL | Age: 70
LOS: 3 days | Discharge: HOME | DRG: 315 | End: 2025-02-23
Attending: EMERGENCY MEDICINE
Payer: COMMERCIAL

## 2025-02-20 ENCOUNTER — TELEPHONE (OUTPATIENT)
Dept: OTHER | Facility: OTHER | Age: 70
End: 2025-02-20

## 2025-02-20 ENCOUNTER — APPOINTMENT (EMERGENCY)
Dept: NON INVASIVE DIAGNOSTICS | Facility: HOSPITAL | Age: 70
DRG: 315 | End: 2025-02-20
Payer: COMMERCIAL

## 2025-02-20 ENCOUNTER — APPOINTMENT (INPATIENT)
Dept: MRI IMAGING | Facility: HOSPITAL | Age: 70
DRG: 315 | End: 2025-02-20
Payer: COMMERCIAL

## 2025-02-20 ENCOUNTER — APPOINTMENT (EMERGENCY)
Dept: CT IMAGING | Facility: HOSPITAL | Age: 70
DRG: 315 | End: 2025-02-20
Payer: COMMERCIAL

## 2025-02-20 ENCOUNTER — APPOINTMENT (EMERGENCY)
Dept: RADIOLOGY | Facility: HOSPITAL | Age: 70
DRG: 315 | End: 2025-02-20
Payer: COMMERCIAL

## 2025-02-20 DIAGNOSIS — J98.4 DRUG-INDUCED PNEUMONITIS: ICD-10-CM

## 2025-02-20 DIAGNOSIS — R79.89 ELEVATED TROPONIN: Primary | ICD-10-CM

## 2025-02-20 DIAGNOSIS — R06.00 DYSPNEA: ICD-10-CM

## 2025-02-20 DIAGNOSIS — I40.8 OTHER ACUTE MYOCARDITIS: ICD-10-CM

## 2025-02-20 DIAGNOSIS — C54.1 ENDOMETRIAL CANCER (HCC): ICD-10-CM

## 2025-02-20 DIAGNOSIS — T50.905A DRUG-INDUCED PNEUMONITIS: ICD-10-CM

## 2025-02-20 DIAGNOSIS — R07.9 INTERMITTENT CHEST PAIN: ICD-10-CM

## 2025-02-20 LAB
2HR DELTA HS TROPONIN: -156 NG/L
4HR DELTA HS TROPONIN: -126 NG/L
ALBUMIN SERPL BCG-MCNC: 3.8 G/DL (ref 3.5–5)
ALP SERPL-CCNC: 94 U/L (ref 34–104)
ALT SERPL W P-5'-P-CCNC: 42 U/L (ref 7–52)
ANION GAP SERPL CALCULATED.3IONS-SCNC: 8 MMOL/L (ref 4–13)
ANISOCYTOSIS BLD QL SMEAR: PRESENT
APTT PPP: 25 SECONDS (ref 23–34)
APTT PPP: 50 SECONDS (ref 23–34)
AST SERPL W P-5'-P-CCNC: 27 U/L (ref 13–39)
ATRIAL RATE: 97 BPM
BASOPHILS # BLD MANUAL: 0 THOUSAND/UL (ref 0–0.1)
BASOPHILS NFR MAR MANUAL: 0 % (ref 0–1)
BILIRUB SERPL-MCNC: 0.5 MG/DL (ref 0.2–1)
BNP SERPL-MCNC: 330 PG/ML (ref 0–100)
BSA FOR ECHO PROCEDURE: 1.99 M2
BUN SERPL-MCNC: 26 MG/DL (ref 5–25)
CALCIUM SERPL-MCNC: 8.9 MG/DL (ref 8.4–10.2)
CARDIAC TROPONIN I PNL SERPL HS: 3910 NG/L (ref ?–50)
CARDIAC TROPONIN I PNL SERPL HS: 3940 NG/L (ref ?–50)
CARDIAC TROPONIN I PNL SERPL HS: 4066 NG/L (ref ?–50)
CHLORIDE SERPL-SCNC: 107 MMOL/L (ref 96–108)
CK SERPL-CCNC: 87 U/L (ref 26–192)
CO2 SERPL-SCNC: 24 MMOL/L (ref 21–32)
CREAT SERPL-MCNC: 1.39 MG/DL (ref 0.6–1.3)
CRP SERPL QL: 73.4 MG/L
D DIMER PPP FEU-MCNC: 0.79 UG/ML FEU
DOHLE BOD BLD QL SMEAR: PRESENT
EOSINOPHIL # BLD MANUAL: 0 THOUSAND/UL (ref 0–0.4)
EOSINOPHIL NFR BLD MANUAL: 0 % (ref 0–6)
ERYTHROCYTE [DISTWIDTH] IN BLOOD BY AUTOMATED COUNT: 21.5 % (ref 11.6–15.1)
ERYTHROCYTE [SEDIMENTATION RATE] IN BLOOD: 32 MM/HOUR (ref 0–29)
FLUAV AG UPPER RESP QL IA.RAPID: NEGATIVE
FLUBV AG UPPER RESP QL IA.RAPID: NEGATIVE
GFR SERPL CREATININE-BSD FRML MDRD: 38 ML/MIN/1.73SQ M
GLOBAL LONGITUIDAL STRAIN: -13 %
GLUCOSE SERPL-MCNC: 141 MG/DL (ref 65–140)
HCT VFR BLD AUTO: 33.4 % (ref 34.8–46.1)
HGB BLD-MCNC: 10.6 G/DL (ref 11.5–15.4)
LACTATE SERPL-SCNC: 2 MMOL/L (ref 0.5–2)
LEFT VENTRICLE DIASTOLIC VOLUME (MOD BIPLANE): 117 ML
LEFT VENTRICLE DIASTOLIC VOLUME INDEX (MOD BIPLANE): 58.8 ML/M2
LEFT VENTRICLE SYSTOLIC VOLUME (MOD BIPLANE): 63 ML
LEFT VENTRICLE SYSTOLIC VOLUME INDEX (MOD BIPLANE): 31.7 ML/M2
LG PLATELETS BLD QL SMEAR: PRESENT
LV EF BIPLANE MOD: 46 %
LV EF US.2D.A4C+ESTIMATED: 47 %
LYMPHOCYTES # BLD AUTO: 1.27 THOUSAND/UL (ref 0.6–4.47)
LYMPHOCYTES # BLD AUTO: 17 % (ref 14–44)
MAGNESIUM SERPL-MCNC: 1.8 MG/DL (ref 1.9–2.7)
MCH RBC QN AUTO: 30.5 PG (ref 26.8–34.3)
MCHC RBC AUTO-ENTMCNC: 31.7 G/DL (ref 31.4–37.4)
MCV RBC AUTO: 96 FL (ref 82–98)
METAMYELOCYTE ABSOLUTE CT: 0.21 THOUSAND/UL (ref 0–0.1)
METAMYELOCYTES NFR BLD MANUAL: 3 % (ref 0–1)
MONOCYTES # BLD AUTO: 0.99 THOUSAND/UL (ref 0–1.22)
MONOCYTES NFR BLD: 14 % (ref 4–12)
NEUTROPHILS # BLD MANUAL: 4.59 THOUSAND/UL (ref 1.85–7.62)
NEUTS BAND NFR BLD MANUAL: 4 % (ref 0–8)
NEUTS SEG NFR BLD AUTO: 61 % (ref 43–75)
P AXIS: 62 DEGREES
PLATELET # BLD AUTO: 105 THOUSANDS/UL (ref 149–390)
PLATELET BLD QL SMEAR: ABNORMAL
PMV BLD AUTO: 12.9 FL (ref 8.9–12.7)
POLYCHROMASIA BLD QL SMEAR: PRESENT
POTASSIUM SERPL-SCNC: 4.5 MMOL/L (ref 3.5–5.3)
PR INTERVAL: 162 MS
PROT SERPL-MCNC: 6.4 G/DL (ref 6.4–8.4)
QRS AXIS: -54 DEGREES
QRSD INTERVAL: 124 MS
QT INTERVAL: 390 MS
QTC INTERVAL: 495 MS
RBC # BLD AUTO: 3.48 MILLION/UL (ref 3.81–5.12)
RBC MORPH BLD: PRESENT
SARS-COV+SARS-COV-2 AG RESP QL IA.RAPID: NEGATIVE
SL CV LV EF: 45
SODIUM SERPL-SCNC: 139 MMOL/L (ref 135–147)
T WAVE AXIS: 119 DEGREES
TRICUSPID ANNULAR PLANE SYSTOLIC EXCURSION: 1.7 CM
TRICUSPID VALVE PEAK REGURGITATION VELOCITY: 2.32 M/S
TSH SERPL DL<=0.05 MIU/L-ACNC: 2.14 UIU/ML (ref 0.45–4.5)
VARIANT LYMPHS # BLD AUTO: 1 %
VENTRICULAR RATE: 97 BPM
WBC # BLD AUTO: 7.06 THOUSAND/UL (ref 4.31–10.16)

## 2025-02-20 PROCEDURE — 83880 ASSAY OF NATRIURETIC PEPTIDE: CPT | Performed by: EMERGENCY MEDICINE

## 2025-02-20 PROCEDURE — 85652 RBC SED RATE AUTOMATED: CPT | Performed by: INTERNAL MEDICINE

## 2025-02-20 PROCEDURE — 93010 ELECTROCARDIOGRAM REPORT: CPT | Performed by: INTERNAL MEDICINE

## 2025-02-20 PROCEDURE — 71275 CT ANGIOGRAPHY CHEST: CPT

## 2025-02-20 PROCEDURE — 99223 1ST HOSP IP/OBS HIGH 75: CPT | Performed by: PHYSICIAN ASSISTANT

## 2025-02-20 PROCEDURE — NC001 PR NO CHARGE: Performed by: STUDENT IN AN ORGANIZED HEALTH CARE EDUCATION/TRAINING PROGRAM

## 2025-02-20 PROCEDURE — 82550 ASSAY OF CK (CPK): CPT | Performed by: EMERGENCY MEDICINE

## 2025-02-20 PROCEDURE — 87804 INFLUENZA ASSAY W/OPTIC: CPT | Performed by: EMERGENCY MEDICINE

## 2025-02-20 PROCEDURE — 85007 BL SMEAR W/DIFF WBC COUNT: CPT | Performed by: EMERGENCY MEDICINE

## 2025-02-20 PROCEDURE — 85379 FIBRIN DEGRADATION QUANT: CPT | Performed by: EMERGENCY MEDICINE

## 2025-02-20 PROCEDURE — 93325 DOPPLER ECHO COLOR FLOW MAPG: CPT | Performed by: INTERNAL MEDICINE

## 2025-02-20 PROCEDURE — 99285 EMERGENCY DEPT VISIT HI MDM: CPT

## 2025-02-20 PROCEDURE — 87081 CULTURE SCREEN ONLY: CPT

## 2025-02-20 PROCEDURE — 93321 DOPPLER ECHO F-UP/LMTD STD: CPT

## 2025-02-20 PROCEDURE — 36415 COLL VENOUS BLD VENIPUNCTURE: CPT | Performed by: EMERGENCY MEDICINE

## 2025-02-20 PROCEDURE — 83735 ASSAY OF MAGNESIUM: CPT | Performed by: EMERGENCY MEDICINE

## 2025-02-20 PROCEDURE — 99223 1ST HOSP IP/OBS HIGH 75: CPT | Performed by: INTERNAL MEDICINE

## 2025-02-20 PROCEDURE — 87811 SARS-COV-2 COVID19 W/OPTIC: CPT | Performed by: EMERGENCY MEDICINE

## 2025-02-20 PROCEDURE — 85730 THROMBOPLASTIN TIME PARTIAL: CPT | Performed by: EMERGENCY MEDICINE

## 2025-02-20 PROCEDURE — 85730 THROMBOPLASTIN TIME PARTIAL: CPT | Performed by: PHYSICIAN ASSISTANT

## 2025-02-20 PROCEDURE — 93005 ELECTROCARDIOGRAM TRACING: CPT

## 2025-02-20 PROCEDURE — 86140 C-REACTIVE PROTEIN: CPT | Performed by: INTERNAL MEDICINE

## 2025-02-20 PROCEDURE — 84484 ASSAY OF TROPONIN QUANT: CPT | Performed by: EMERGENCY MEDICINE

## 2025-02-20 PROCEDURE — 71046 X-RAY EXAM CHEST 2 VIEWS: CPT

## 2025-02-20 PROCEDURE — 84443 ASSAY THYROID STIM HORMONE: CPT | Performed by: EMERGENCY MEDICINE

## 2025-02-20 PROCEDURE — 85027 COMPLETE CBC AUTOMATED: CPT | Performed by: EMERGENCY MEDICINE

## 2025-02-20 PROCEDURE — 93308 TTE F-UP OR LMTD: CPT

## 2025-02-20 PROCEDURE — 75561 CARDIAC MRI FOR MORPH W/DYE: CPT

## 2025-02-20 PROCEDURE — 99291 CRITICAL CARE FIRST HOUR: CPT | Performed by: EMERGENCY MEDICINE

## 2025-02-20 PROCEDURE — 80053 COMPREHEN METABOLIC PANEL: CPT | Performed by: EMERGENCY MEDICINE

## 2025-02-20 PROCEDURE — 93356 MYOCRD STRAIN IMG SPCKL TRCK: CPT | Performed by: INTERNAL MEDICINE

## 2025-02-20 PROCEDURE — 83605 ASSAY OF LACTIC ACID: CPT | Performed by: EMERGENCY MEDICINE

## 2025-02-20 PROCEDURE — 93321 DOPPLER ECHO F-UP/LMTD STD: CPT | Performed by: INTERNAL MEDICINE

## 2025-02-20 PROCEDURE — 93308 TTE F-UP OR LMTD: CPT | Performed by: INTERNAL MEDICINE

## 2025-02-20 PROCEDURE — 93325 DOPPLER ECHO COLOR FLOW MAPG: CPT

## 2025-02-20 RX ORDER — DOCUSATE SODIUM 100 MG/1
100 CAPSULE, LIQUID FILLED ORAL 2 TIMES DAILY
Status: DISCONTINUED | OUTPATIENT
Start: 2025-02-20 | End: 2025-02-23 | Stop reason: HOSPADM

## 2025-02-20 RX ORDER — ONDANSETRON 2 MG/ML
4 INJECTION INTRAMUSCULAR; INTRAVENOUS EVERY 6 HOURS PRN
Status: DISCONTINUED | OUTPATIENT
Start: 2025-02-20 | End: 2025-02-23 | Stop reason: HOSPADM

## 2025-02-20 RX ORDER — HEPARIN SODIUM 1000 [USP'U]/ML
4000 INJECTION, SOLUTION INTRAVENOUS; SUBCUTANEOUS EVERY 6 HOURS PRN
Status: DISCONTINUED | OUTPATIENT
Start: 2025-02-20 | End: 2025-02-21

## 2025-02-20 RX ORDER — LURASIDONE HYDROCHLORIDE 20 MG/1
20 TABLET, FILM COATED ORAL
Status: DISCONTINUED | OUTPATIENT
Start: 2025-02-21 | End: 2025-02-23 | Stop reason: HOSPADM

## 2025-02-20 RX ORDER — FAMOTIDINE 20 MG/1
20 TABLET, FILM COATED ORAL DAILY
Status: DISCONTINUED | OUTPATIENT
Start: 2025-02-21 | End: 2025-02-23 | Stop reason: HOSPADM

## 2025-02-20 RX ORDER — MAGNESIUM SULFATE HEPTAHYDRATE 40 MG/ML
2 INJECTION, SOLUTION INTRAVENOUS ONCE
Status: COMPLETED | OUTPATIENT
Start: 2025-02-20 | End: 2025-02-20

## 2025-02-20 RX ORDER — HEPARIN SODIUM 10000 [USP'U]/100ML
3-20 INJECTION, SOLUTION INTRAVENOUS
Status: DISCONTINUED | OUTPATIENT
Start: 2025-02-20 | End: 2025-02-21

## 2025-02-20 RX ORDER — METOPROLOL SUCCINATE 50 MG/1
50 TABLET, EXTENDED RELEASE ORAL DAILY
Status: DISCONTINUED | OUTPATIENT
Start: 2025-02-21 | End: 2025-02-21

## 2025-02-20 RX ORDER — POLYETHYLENE GLYCOL 3350 17 G/17G
17 POWDER, FOR SOLUTION ORAL DAILY PRN
Status: DISCONTINUED | OUTPATIENT
Start: 2025-02-20 | End: 2025-02-23 | Stop reason: HOSPADM

## 2025-02-20 RX ORDER — HEPARIN SODIUM 1000 [USP'U]/ML
4000 INJECTION, SOLUTION INTRAVENOUS; SUBCUTANEOUS ONCE
Status: COMPLETED | OUTPATIENT
Start: 2025-02-20 | End: 2025-02-20

## 2025-02-20 RX ORDER — HEPARIN SODIUM 1000 [USP'U]/ML
2000 INJECTION, SOLUTION INTRAVENOUS; SUBCUTANEOUS EVERY 6 HOURS PRN
Status: DISCONTINUED | OUTPATIENT
Start: 2025-02-20 | End: 2025-02-21

## 2025-02-20 RX ORDER — LORAZEPAM 1 MG/1
1 TABLET ORAL EVERY 8 HOURS PRN
Status: DISCONTINUED | OUTPATIENT
Start: 2025-02-20 | End: 2025-02-23 | Stop reason: HOSPADM

## 2025-02-20 RX ORDER — OXYBUTYNIN CHLORIDE 5 MG/1
10 TABLET, EXTENDED RELEASE ORAL DAILY
Status: DISCONTINUED | OUTPATIENT
Start: 2025-02-21 | End: 2025-02-23 | Stop reason: HOSPADM

## 2025-02-20 RX ORDER — ACETAMINOPHEN 325 MG/1
650 TABLET ORAL EVERY 6 HOURS PRN
Status: DISCONTINUED | OUTPATIENT
Start: 2025-02-20 | End: 2025-02-23 | Stop reason: HOSPADM

## 2025-02-20 RX ADMIN — MAGNESIUM SULFATE HEPTAHYDRATE 2 G: 40 INJECTION, SOLUTION INTRAVENOUS at 10:58

## 2025-02-20 RX ADMIN — DOCUSATE SODIUM 100 MG: 100 CAPSULE, LIQUID FILLED ORAL at 18:30

## 2025-02-20 RX ADMIN — HEPARIN SODIUM 2000 UNITS: 1000 INJECTION INTRAVENOUS; SUBCUTANEOUS at 18:51

## 2025-02-20 RX ADMIN — SODIUM CHLORIDE 1000 ML: 0.9 INJECTION, SOLUTION INTRAVENOUS at 09:13

## 2025-02-20 RX ADMIN — HEPARIN SODIUM 11.1 UNITS/KG/HR: 10000 INJECTION, SOLUTION INTRAVENOUS at 11:03

## 2025-02-20 RX ADMIN — HEPARIN SODIUM 4000 UNITS: 1000 INJECTION INTRAVENOUS; SUBCUTANEOUS at 11:04

## 2025-02-20 RX ADMIN — IOHEXOL 70 ML: 350 INJECTION, SOLUTION INTRAVENOUS at 11:31

## 2025-02-20 NOTE — CONSULTS
For questions/concerns on this patient, please reach out to the following:  [facility]- GynOn Resident       Consult - Gynecology Oncology  Cherelle Dash 69 y.o. female MRN: [id number]  Unit/Bed#: S -01 Encounter: [id number]        Assessment & Plan   Other acute myocarditis  Assessment & Plan  Suspect side effect from treatment with pembrolizumab vs viral origin    Tachycardic on presentation (120s)  Troponin 4066 > 3910  ECG - NSR, chronic LBBB  CTPE - negative      Plan for High-dose steroids: oral prednisone (1 mg/kg/day), then taper slowly over 6-12 weeks based on clinical response and improvement of biomarkers. If unresponsive can consider IV methylprednisolone 1 g/day for 3-5 days    Recommend telemetry      Dilated cardiomyopathy (HCC)  Assessment & Plan  LVEF 45%  Home med: Toprol XL 50 mg daily    Endometrial cancer (HCC)  Assessment & Plan  Stage IIIC1, grade 1 endometrial cancer s/p surgical resection  Currently receiving adjuvant chemotherapy with carboplatin/taxol/prembrolizumab  History of grade 2 immunotherapy-induced pneumonitis      * Elevated troponin  Assessment & Plan  4066 -> 3910           History of Present Illness     HPI:  Cherelle Dash is a 69 y.o. female who presents with symptoms of chest pain and shortness of breath with exertion since her last treatment on Friday 2/14/25. Findings on presentation to ED concerning for myocarditis which could be side effect from treatment with pembrolizumab. She has a history of pneumonitis reaction to treatment for which she finished a course of steroids for last week. At time of this evaluation, Cherelle is feeling improved. She is not currently experiencing any chest pain, shortness of breath, nausea or abdominal pain. She has continued to have regular bowel movements with a home regimen of colace and miralax. We discussed treatment with high dose steroids, and she was agreeable.    Patient seen and evaluated with Dr. Rodríguez    Oncology History    Endometrial cancer ([facility])   8/11/2024 Initial Diagnosis    Endometrial cancer ([facility])     10/11/2024 -  Cancer Staged    Staging form: Corpus Uteri - Carcinoma, AJCC 8th Edition  - Pathologic stage from 10/11/2024: FIGO Stage IIIC1 (pT2, pN1mi(sn), cM0) - Signed by Evin Page MD on 11/12/2024  Method of lymph node assessment: [place] lymph node biopsy  Histologic grade (G): G1  Histologic grading system: 3 grade system  Lymph-vascular invasion (LVI): BOTH lymphatic and small vessel AND venous (large vessel) invasion  Peritoneal cytology results: Negative  Pelvic brandi status: Positive       10/11/2024 Surgery    Robotic assisted total laparoscopic hysterectomy, bilateral salpingo-oophorectomy, pelvic and periaortic sentinel lymph node biopsies, vulvar biopsy  1.  Grade 1, cervical stromal invasion to a depth of 12 out of 15 mm, extensive LVSI, p53 wild-type, pMMR, HER2 1+,  washings negative, 0.7 mm metastatic focus and pelvic lymph node, ITC identified and periaortic lymph nodes.     12/6/2024 -  Chemotherapy    Taxol 135 mg/m2, carboplatin AUC 5 and pembrolizumab 200 mg IV every 21 days.    Pembro held for cycle 3 d/t grade 2 drug-induced pneumonitis.            Review of Systems   Constitutional:  Negative for chills and fever.   Eyes:  Negative for visual disturbance.   Respiratory:  Positive for shortness of breath (with exertion).    Cardiovascular:  Negative for chest pain (on presentation but currently asymptomatic).   Gastrointestinal:  Negative for abdominal pain, constipation, diarrhea, nausea and vomiting.   Genitourinary:  Negative for dysuria, flank pain, hematuria, vaginal bleeding and vaginal discharge.   Skin:  Negative for rash.   Neurological:  Negative for dizziness, numbness and headaches.   All other systems reviewed and are negative.      Historical Information   Past Medical History:   Diagnosis Date    Abnormal ECG     Anxiety 2002    Arthritis     Bipolar 1 disorder ([facility])      Cervical cancer (HCC)     Chronic kidney disease     stage 3    CPAP (continuous positive airway pressure) dependence     Depression     Disease of thyroid gland     DVT (deep venous thrombosis) (HCC)     BLLE    Elevated blood pressure reading 06/10/2019    GERD (gastroesophageal reflux disease)     Obesity     Pulmonary emboli (HCC)     B/L    RSV (acute bronchiolitis due to respiratory syncytial virus) 2023    Sleep apnea     Sleep apnea, obstructive 2007    Urinary incontinence     Uterine cancer (HCC)      Past Surgical History:   Procedure Laterality Date    COLONOSCOPY      DENTAL SURGERY      IR IVC FILTER PLACEMENT OPTIONAL/TEMPORARY  10/08/2024    IR PORT CHECK  2024    IR PORT PLACEMENT  2024    LAPAROTOMY N/A 10/11/2024    Procedure: EXPLORATORY LAPAROTOMY;  Surgeon: Rodney Downing MD;  Location: [facility];  Service: Gynecology Oncology    WI HYSTEROSCOPY BX ENDOMETRIUM&/POLYPC W/WO D&C N/A 2024    Procedure: EXAM UNDER ANESTHESIA, DILATATION AND CURETTAGE, CERVICAL BIOPSIES;  Surgeon: Evin Page MD;  Location: [facility];  Service: Gynecology Oncology    WI LAPS TOTAL HYSTERECT 250 GM/< W/RMVL TUBE/OVARY N/A 10/11/2024    Procedure: ROBOTIC ASSISTED LTH, BSO, LYMPH NODE DISSECTION, EUA;  Surgeon: Rodney Downing MD;  Location: [facility];  Service: Gynecology Oncology     OB History    Para Term  AB Living   2 2 2  0 2   SAB IAB Ectopic Multiple Live Births       2      # Outcome Date GA Lbr Khris/2nd Weight Sex Type Anes PTL Lv   2 Term 84    F Vag-Spont   ROBERT   1 Term 81    M Vag-Spont   ROBERT     Family History   Problem Relation Age of Onset    Heart disease Mother     Heart Valve Disease Mother         Replacement    Diabetes Mother         2002    Heart failure Mother         Heart Valve replacement    Arthritis Father     No Known Problems Sister     No Known Problems Daughter     No Known Problems Maternal  Grandmother     No Known Problems Maternal Grandfather     No Known Problems Paternal Grandmother     No Known Problems Paternal Grandfather     No Known Problems Son     No Known Problems Maternal Aunt     No Known Problems Maternal Aunt     No Known Problems Maternal Aunt     No Known Problems Maternal Aunt     No Known Problems Maternal Aunt     No Known Problems Paternal Aunt     Alcohol abuse Son         [place], 40 year old son, has issues with alcohol and alcohol abuse     Social History   Social History     Substance and Sexual Activity   Alcohol Use Yes    Alcohol/week: 1.0 standard drink of alcohol    Types: 1 Glasses of wine per week     Social History     Substance and Sexual Activity   Drug Use Never     Social History     Tobacco Use   Smoking Status Former    Current packs/day: 0.00    Average packs/day: 0.3 packs/day for 30.0 years (7.5 ttl pk-yrs)    Types: Cigarettes    Start date: 1979    Quit date: 2009    Years since quittin.1    Passive exposure: Never   Smokeless Tobacco Never       Meds/Allergies     Medications Prior to Admission:     apixaban (Eliquis) 5 mg    chlorhexidine (PERIDEX) 0.12 % solution    Cholecalciferol (VITAMIN D) 2000 units CAPS    clobetasol (TEMOVATE) 0.05 % ointment    CRANBERRY PO    famotidine (PEPCID) 20 mg tablet    Glucosamine-Chondroit-Vit C-Mn (GLUCOSAMINE 1500 COMPLEX PO)    Ivermectin 1 % CREA    lidocaine-prilocaine (EMLA) cream    LORazepam (ATIVAN) 1 mg tablet    lurasidone (LATUDA) 20 mg tablet    metoprolol succinate (TOPROL-XL) 50 mg 24 hr tablet    Mirabegron ER 50 MG TB24    Multiple Vitamin (MULTI-VITAMIN DAILY) TABS    polyethylene glycol (GLYCOLAX) 17 GM/SCOOP powder    predniSONE 10 mg tablet    Sodium Fluoride 5000 PPM 1.1 % GEL  Allergies   Allergen Reactions    Raw Fruit - Food Allergy Anaphylaxis     PLUMS, PEACHES, FRUIT WITH SKIN       Objective     /64   Pulse 92   Temp 97.8 °F (36.6 °C) (Oral)   Resp 18   Ht 5' (1.524 m)    Wt 105 kg (231 lb 7.7 oz)   SpO2 97%   BMI 45.21 kg/m²     No intake/output data recorded.  I/O this shift:  In: 1000 [IV Piggyback:1000]  Out: -     Lab Results   Component Value Date    WBC 7.06 02/20/2025    HGB 10.6 (L) 02/20/2025    HCT 33.4 (L) 02/20/2025    MCV 96 02/20/2025     (L) 02/20/2025       Lab Results   Component Value Date    GLUCOSE 124 (H) 11/17/2017    CALCIUM 8.9 02/20/2025     (H) 11/17/2017    K 4.5 02/20/2025    CO2 24 02/20/2025     02/20/2025    BUN 26 (H) 02/20/2025    CREATININE 1.39 (H) 02/20/2025       Physical Exam  Vitals reviewed.   Constitutional:       General: She is not in acute distress.  HENT:      Head: Normocephalic and atraumatic.   Eyes:      Extraocular Movements: Extraocular movements intact.   Cardiovascular:      Rate and Rhythm: Normal rate and regular rhythm.      Pulses: Normal pulses.   Pulmonary:      Effort: Pulmonary effort is normal. No respiratory distress.   Abdominal:      General: There is no distension.      Palpations: Abdomen is soft.      Tenderness: There is no abdominal tenderness. There is no guarding or rebound.   Musculoskeletal:         General: Normal range of motion.      Cervical back: Normal range of motion.   Skin:     General: Skin is warm and dry.   Neurological:      General: No focal deficit present.      Mental Status: She is alert. Mental status is at baseline.   Psychiatric:         Mood and Affect: Mood normal.         Behavior: Behavior normal.           Code Status: Level 1 - Full Code    Jacky Page MD  2/20/2025  5:46 PM

## 2025-02-20 NOTE — CONSULTS
Consultation - Cardiology Team One  Cherelle Dash 69 y.o. female MRN: [id number]  Unit/Bed#: ED-31 Encounter: [id number]    Inpatient consult to Cardiology  Consult performed by: ZURI Tafoya  Consult ordered by: Megha Shepherd MD      Physician Requesting Consult: Megha Christopher*  Reason for Consult / Principal Problem: chest pain, elevated troponin    Assessment & Plan  Elevated troponin  Hs troponin 4066-->3910  ECG- NSR with chronic LBBB  Recent TTE with mild global hypokinesis, LVEF 45%  No evidence of significant volume overload  +CP although not clearly anginal as well as reports of SOB, elevated HR, and diaphoresis with exertion.  Dilated cardiomyopathy (HCC)  LVEF 45%  Nuclear stress test 10/2024 negative for ischemia  GDMT- Toprol XL 50 mg daily  LBBB (left bundle branch block)  Chronic, unchanged  Stage 3 chronic kidney disease (HCC)  Lab Results   Component Value Date    EGFR 38 02/20/2025    EGFR 33 02/19/2025    EGFR 34 02/05/2025    CREATININE 1.39 (H) 02/20/2025    CREATININE 1.57 (H) 02/19/2025    CREATININE 1.52 (H) 02/05/2025   Stable   Hypothyroidism  TSH WNL  Morbid obesity with BMI of 40.0-44.9, adult (HCC)  BMI 45.41  Endometrial cancer (HCC)  S/p surgical treatment. On chemotherapy since 12/2024: Carboplatin, Taxol, Pembro with plan for XRT after chemotherapy    Plan/Recommendations  Continue IV heparin for now  Follow up limited echo results  Continue Toprol XL  Most likely reaction to chemotherapy, not consistent with ACS. Await attending attestation for final plan    History of Present Illness   HPI: Cherelle Dash is a 69 y.o. year old female with presumed NICM LVEF 45%, chronic LBBB, hypothyroidism, hx of DVT/PE, JULIO, CKD, endometrial cancer s/p surgery and on active chemotherapy. She follows with cardiologists Dr. Gold and Dr. Olson.  She was last seen in the office December 2024.  She was first diagnosed with a cardiomyopathy with an EF of 40% at  the time of an acute PE in September 2024.  A subsequent nuclear stress test showed no evidence of ischemia. An follow up echocardiogram was completed in January demonstrating LVEF 45%, mild global hypokinesis, grade 1 diastolic dysfunction, mild MR and mild TR. She started chemotherapy for endometrial cancer recently and has completed 4 out of 6 sessions.  After that, she will require 5 weeks of radiation therapy.  After her fourth chemotherapy infusion last Friday 2/14, she felt very unwell over the weekend with increased shortness of breath and elevated heart rate on pulse oximetry at home.  She also reports chest discomfort, described as a shelf sitting on her chest and pressing down. Her chest pain comes and goes at random, it does not sound pleuritic or reproducible.  Her shortness of breath has been primarily with exertion and she has also noted some diaphoresis with episodes of shortness of breath and elevated heart rates.  She became nervous with the elevated heart rates today and thus came to the ED for further evaluation.  ECG reveals normal sinus rhythm with chronic left bundle branch block.  Telemetry shows sinus tachycardia and normal sinus rhythm.  She reports that she has been eating and drinking fairly normally.  Her renal function is stable and there is no evidence of dehydration on her labs or on exam.  She denies any symptoms of orthopnea or abdominal bloating.  She noticed that her right foot was swollen the other day but that resolved after a night of sleep.  Her first troponin in the ED was elevated at 4066.  This is trending down at 2 hours to 3910.  Cardiology is consulted for further evaluation and management of her chest pain and elevated troponin.    EKG reviewed personally: NSR with LBBB    Telemetry reviewed personally: NSR and sinus tachycardia with LBBB    Review of Systems   Constitutional: Positive for diaphoresis. Negative for chills, malaise/fatigue and weight gain.    Cardiovascular:  Positive for chest pain. Negative for dyspnea on exertion, leg swelling, orthopnea, palpitations and syncope.   Respiratory:  Positive for shortness of breath. Negative for cough, sleep disturbances due to breathing and sputum production.    Endocrine: Negative.    Hematologic/Lymphatic: Negative.    Gastrointestinal:  Negative for bloating, nausea and vomiting.   Genitourinary: Negative.    Neurological:  Negative for dizziness, light-headedness and weakness.   Psychiatric/Behavioral:  Negative for altered mental status.    All other systems reviewed and are negative.    Historical Information   Past Medical History:   Diagnosis Date    Abnormal ECG     Anxiety 2002    Arthritis     Bipolar 1 disorder (HCC)     Cervical cancer (HCC)     Chronic kidney disease     stage 3    CPAP (continuous positive airway pressure) dependence     Depression 2001    Disease of thyroid gland     DVT (deep venous thrombosis) (HCC)     BLLE    Elevated blood pressure reading 06/10/2019    GERD (gastroesophageal reflux disease)     Obesity     Pulmonary emboli (HCC)     B/L    RSV (acute bronchiolitis due to respiratory syncytial virus) 12/05/2023    Sleep apnea     Sleep apnea, obstructive 2007    Urinary incontinence 2019    Uterine cancer (HCC)      Past Surgical History:   Procedure Laterality Date    COLONOSCOPY  2011    DENTAL SURGERY  2020    IR IVC FILTER PLACEMENT OPTIONAL/TEMPORARY  10/08/2024    IR PORT CHECK  12/26/2024    IR PORT PLACEMENT  12/03/2024    LAPAROTOMY N/A 10/11/2024    Procedure: EXPLORATORY LAPAROTOMY;  Surgeon: Rodney Downing MD;  Location: [facility];  Service: Gynecology Oncology    AL HYSTEROSCOPY BX ENDOMETRIUM&/POLYPC W/WO D&C N/A 08/23/2024    Procedure: EXAM UNDER ANESTHESIA, DILATATION AND CURETTAGE, CERVICAL BIOPSIES;  Surgeon: Evin Page MD;  Location:  MAIN OR;  Service: Gynecology Oncology    AL LAPS TOTAL HYSTERECT 250 GM/< W/RMVL TUBE/OVARY N/A 10/11/2024     Procedure: ROBOTIC ASSISTED LTH, BSO, LYMPH NODE DISSECTION, EUA;  Surgeon: Rodney Downing MD;  Location: [facility];  Service: Gynecology Oncology     Social History     Substance and Sexual Activity   Alcohol Use Yes    Alcohol/week: 1.0 standard drink of alcohol    Types: 1 Glasses of wine per week     Social History     Substance and Sexual Activity   Drug Use Never     Social History     Tobacco Use   Smoking Status Former    Current packs/day: 0.00    Average packs/day: 0.3 packs/day for 30.0 years (7.5 ttl pk-yrs)    Types: Cigarettes    Start date: 1979    Quit date: 2009    Years since quittin.1    Passive exposure: Never   Smokeless Tobacco Never     Family History: Family history non-contributory    Meds/Allergies   all current active meds have been reviewed and current meds:   Current Facility-Administered Medications:     heparin (porcine) 25,000 units in 0.45% NaCl 250 mL infusion (premix), Titrated, Last Rate: 11.1 Units/kg/hr (25 1103)    heparin (porcine) injection 2,000 Units, Q6H PRN    heparin (porcine) injection 4,000 Units, Q6H PRN    Facility-Administered Medications Ordered in Other Encounters:     [MAR Hold] alteplase (CATHFLO) injection 2 mg, Q1MIN PRN  heparin (porcine), 3-20 Units/kg/hr (Order-Specific), Last Rate: 11.1 Units/kg/hr (25 1103)        Allergies   Allergen Reactions    Raw Fruit - Food Allergy Anaphylaxis     PLUMS, PEACHES, FRUIT WITH SKIN       Objective   Vitals: Blood pressure 133/63, pulse 91, temperature 97.8 °F (36.6 °C), temperature source Oral, resp. rate 22, SpO2 97%, not currently breastfeeding., There is no height or weight on file to calculate BMI.,     Systolic (24hrs), Av , Min:133 , Max:138     Diastolic (24hrs), Av, Min:63, Max:70      No intake or output data in the 24 hours ending 25 1312  Wt Readings from Last 3 Encounters:   25 105 kg (232 lb 8 oz)   25 105 kg (231 lb)   25 106 kg (233 lb)      Invasive Devices       Central Venous Catheter Line  Duration             Port A Cath 12/03/24 Right Internal jugular 79 days              Peripheral Intravenous Line  Duration             Peripheral IV 02/20/25 Distal;Right;Ventral (anterior) Forearm <1 day    Peripheral IV 02/20/25 Right Antecubital <1 day                    Physical Exam  Vitals reviewed.   Constitutional:       General: She is not in acute distress.     Appearance: She is obese.   Neck:      Vascular: No hepatojugular reflux or JVD.   Cardiovascular:      Rate and Rhythm: Normal rate and regular rhythm.      Heart sounds: Normal heart sounds. No murmur heard.     No friction rub. No gallop.   Pulmonary:      Effort: Pulmonary effort is normal. No respiratory distress.      Breath sounds: No rales.   Abdominal:      General: Bowel sounds are normal. There is no distension.      Palpations: Abdomen is soft.      Tenderness: There is no abdominal tenderness.   Musculoskeletal:         General: No tenderness. Normal range of motion.      Cervical back: Neck supple.      Right lower leg: No edema.      Left lower leg: No edema.   Skin:     General: Skin is warm and dry.      Capillary Refill: Capillary refill takes less than 2 seconds.      Findings: No erythema.   Neurological:      Mental Status: She is alert and oriented to person, place, and time.   Psychiatric:         Mood and Affect: Mood normal.         LABORATORY RESULTS:  Results from last 7 days   Lab Units 02/20/25  0910   CK TOTAL U/L 87     CBC with diff:   Results from last 7 days   Lab Units 02/20/25  0910 02/19/25  0911   WBC Thousand/uL 7.06 7.52   HEMOGLOBIN g/dL 10.6* 11.0*   HEMATOCRIT % 33.4* 34.6*   MCV fL 96 96   PLATELETS Thousands/uL 105* 106*   RBC Million/uL 3.48* 3.59*   MCH pg 30.5 30.6   MCHC g/dL 31.7 31.8   RDW % 21.5* 21.7*   MPV fL 12.9* 12.3       CMP:  Results from last 7 days   Lab Units 02/20/25  0910 02/19/25  0911   POTASSIUM mmol/L 4.5 4.2   CHLORIDE  "mmol/L 107 107   CO2 mmol/L 24 24   BUN mg/dL 26* 27*   CREATININE mg/dL 1.39* 1.57*   CALCIUM mg/dL 8.9 8.8   AST U/L 27 43*   ALT U/L 42 53*   ALK PHOS U/L 94 97   EGFR ml/min/1.73sq m 38 33       BMP:  Results from last 7 days   Lab Units 25  0910 25  0911   POTASSIUM mmol/L 4.5 4.2   CHLORIDE mmol/L 107 107   CO2 mmol/L 24 24   BUN mg/dL 26* 27*   CREATININE mg/dL 1.39* 1.57*   CALCIUM mg/dL 8.9 8.8       Lab Results   Component Value Date    CREATININE 1.39 (H) 2025    CREATININE 1.57 (H) 2025    CREATININE 1.52 (H) 2025       No results found for: \"NTBNP\"       Results from last 7 days   Lab Units 25  0910 25  0911   MAGNESIUM mg/dL 1.8* 1.6*                 Results from last 7 days   Lab Units 25  0910   TSH 3RD GENERATON uIU/mL 2.136           Lipid Profile:   Lab Results   Component Value Date    CHOL 208 (H) 2017    CHOL 199 2017    CHOL 210 (H) 2016     Lab Results   Component Value Date    HDL 54 2024    HDL 50 2023    HDL 51 2023     Lab Results   Component Value Date    LDLCALC 123 (H) 2024    LDLCALC 111 (H) 2023    LDLCALC 127 (H) 2023     Lab Results   Component Value Date    TRIG 115 2024    TRIG 144 2023    TRIG 116 2023       Cardiac testing:   Results for orders placed during the hospital encounter of 20    Echo complete with contrast if indicated    [place] [id number] [facility]  [place] [id number] [phone]    Transthoracic Echocardiogram  2D, M-mode, Doppler, and Color Doppler    Study date:  02-Mar-2020    Patient: AIDAN ZAVALA  MR number: [id number]  Account number: [id number]  : 1955  Age: 64 years  Gender: Female  Status: Outpatient  Location: [facility]  Height: 62 in  Weight: 236 lb  BP: 120/ 76 mmHg    Indications: Abnormal EKG; Edema    Sonographer:  AVANI Haines, RDCS  Primary Physician:  " Gretchen Mayorga DO  Referring Physician:  Torin Gold MD  Group:  [facility]  Interpreting Physician:  Yony Castaneda MD    SUMMARY    LEFT VENTRICLE:  Systolic function was normal. Ejection fraction was estimated to be 60 %.  There were no regional wall motion abnormalities.    RIGHT VENTRICLE:  The size was normal.  Systolic function was normal.    LEFT ATRIUM:  The atrium was mildly dilated.    MITRAL VALVE:  There was mild regurgitation.    HISTORY: PRIOR HISTORY: Abnormal EKG; Palpitations; GERD; Decreased thyroid; JULIO; Edema; Former smoker    PROCEDURE: The study was performed in the [facility]. This was a routine study. The transthoracic approach was used. The study included complete 2D imaging, M-mode, complete spectral Doppler, and color Doppler. The  heart rate was 76 bpm, at the start of the study. Images were obtained from the parasternal, apical, subcostal, and suprasternal notch acoustic windows. Image quality was adequate.    LEFT VENTRICLE: Size was normal. Systolic function was normal. Ejection fraction was estimated to be 60 %. There were no regional wall motion abnormalities. Wall thickness was normal. DOPPLER: Left ventricular diastolic function parameters  were normal for the patient's age.    RIGHT VENTRICLE: The size was normal. Systolic function was normal. Wall thickness was normal.    LEFT ATRIUM: The atrium was mildly dilated.    RIGHT ATRIUM: Size was normal.    MITRAL VALVE: Valve structure was normal. There was normal leaflet separation. DOPPLER: The transmitral velocity was within the normal range. There was no evidence for stenosis. There was mild regurgitation.    AORTIC VALVE: The valve was trileaflet. Leaflets exhibited normal thickness and normal cuspal separation. DOPPLER: Transaortic velocity was within the normal range. There was no evidence for stenosis. There was no significant  regurgitation.    TRICUSPID VALVE: The valve  structure was normal. There was normal leaflet separation. DOPPLER: The transtricuspid velocity was within the normal range. There was no evidence for stenosis. There was no significant regurgitation. The  tricuspid jet envelope definition was inadequate for estimation of RV systolic pressure.    PULMONIC VALVE: Leaflets exhibited normal thickness, no calcification, and normal cuspal separation. DOPPLER: The transpulmonic velocity was within the normal range. There was mild regurgitation.    PERICARDIUM: There was no pericardial effusion. The pericardium was normal in appearance.    AORTA: The root exhibited normal size.    SYSTEMIC VEINS: IVC: The inferior vena cava was normal in size. Respirophasic changes were normal.    SYSTEM MEASUREMENT TABLES    2D  %FS: 31.68 %  AV Diam: 3.06 cm  EDV(Teich): 89.99 ml  EF(Cube): 68.12 %  EF(Teich): 59.85 %  ESV(Cube): 28.07 ml  ESV(Teich): 36.13 ml  IVSd: 1.22 cm  LA Area: 21.7 cm2  LA Diam: 4.62 cm  LVEDV MOD A4C: 91.14 ml  LVEF MOD A4C: 63.33 %  LVESV MOD A4C: 33.43 ml  LVIDd: 4.45 cm  LVIDs: 3.04 cm  LVLd A4C: 7.31 cm  LVLs A4C: 5.67 cm  LVPWd: 1.32 cm  RA Area: 14.58 cm2  RV Diam: 3.78 cm  SV MOD A4C: 57.72 ml  SV(Cube): 59.97 ml  SV(Teich): 53.86 ml    CW  TR MaxP.16 mmHg  TR Vmax: 2.35 m/s    MM  TAPSE: 2.71 cm    PW  E': 0.05 m/s  E/E': 14.28  MV A Marcos: 0.82 m/s  MV Dec [place]: 4.4 m/s2  MV DecT: 172 ms  MV E Marcos: 0.76 m/s  MV E/A Ratio: 0.93    [employer] Commission Accredited Echocardiography Laboratory    Prepared and electronically signed by    Yony Castaneda MD  Signed 02-Mar-2020 12:12:45    No results found for this or any previous visit.    No valid procedures specified.  No results found for this or any previous visit.      Imaging: Results Review Statement: I reviewed radiology reports from this admission including: CT chest.  CTA chest pe study  Result Date: 2025  Narrative: CTA - CHEST WITH IV CONTRAST - PULMONARY ANGIOGRAM INDICATION: Dyspnea  over the last few days along with tachycardia and intermittent chest discomfort, elevated D-dimer, recently treated for pneumonitis, receives chemotherapy for endometrial CA. COMPARISON: Multiple prior chest CTs with the most recent being a CT chest abdomen pelvis obtained 1/31/2025. TECHNIQUE: CTA examination of the chest was performed using angiographic technique according to a protocol specifically tailored to evaluate for pulmonary embolism. Multiplanar 2D reformatted images were created from the source data. In addition, coronal  3D MIP postprocessing was performed on the acquisition scanner. Radiation dose length product (DLP) for this visit: 587 mGy-cm . This examination, like all CT scans performed in the [facility], was performed utilizing techniques to minimize radiation dose exposure, including the use of iterative reconstruction and automated exposure control. IV Contrast: 70 mL of iohexol FINDINGS: PULMONARY ARTERIAL TREE:  No evidence of pulmonary embolus. LUNGS: There is (302:128) right 5 lower lobe nodule measuring 5 mm, stable dating back to at least November 2023. Right lower lobe groundglass opacity measuring approximately 12 mm (302:50) is similar in size but decreased in density as compared to CT of  1/31/2025. Juxtapleural groundglass opacities within the upper lobes (302:95,104) and left lower lobe groundglass opacities (302:150) are not appreciably changed from most recent CT. Scattered areas of scarring/atelectasis are noted. No new or enlarging  pulmonary nodules are evident. No evidence of discrete tracheal or endobronchial lesion. PLEURA: Unremarkable. HEART/GREAT VESSELS: Heart heart is normal in size. Coronary calcifications are noted. Right-sided chest port terminates at the level of the superior cavoatrial junction. No thoracic aortic aneurysm. MEDIASTINUM AND RC: Small to moderately sized hiatal hernia is noted. Mediastinum is otherwise unremarkable. CHEST WALL AND  LOWER NECK: Unremarkable. VISUALIZED STRUCTURES IN THE UPPER ABDOMEN: Unremarkable. OSSEOUS STRUCTURES: No acute fracture or destructive osseous lesion.     Impression: 1.  No evidence of acute pulmonary embolism. 2.  Scattered groundglass opacities which are overall not significantly changed from CT of 1/31/2024. These opacities are again favored to be infectious/inflammatory in etiology. Consider short interval follow-up CT chest in 3 months to ensure complete resolution. 3.  Right lower lobe pulmonary nodule measuring 5 mm, stable. Workstation performed: [id number]     VAS VENOUS DUPLEX -LOWER LIMB UNILATERAL  Result Date: 2/6/2025  Narrative:  [facility] REPORT CLINICAL: Indications: Patient presents with right lower extremity pain and swelling x 2 days. Operative History: 2024-10-08 IVC Filter (Temporary) Risk Factors The patient has history of CKD, PE, LBBB, Cancer, Cardiomyopathy, Raynaud's Disease and DVT.   CONCLUSION:  Impression: RIGHT LOWER LIMB No evidence of acute or chronic deep vein thrombosis . No evidence of superficial thrombophlebitis noted. Doppler evaluation shows a normal response to augmentation maneuvers. Popliteal, posterior tibial and anterior tibial arterial Doppler waveforms are triphasic.  LEFT LOWER LIMB LIMITED Evaluation shows no evidence of thrombus in the common femoral vein. Doppler evaluation shows a normal response to augmentation maneuvers.  Technical findings were given to Sharon Banks PA-C via Secure Chat and electronically sent to patient's medical chart.  SIGNATURE: Electronically Signed by: DAVI POLLACK MD on 2025-02-06 08:37:15 PM    CT chest abdomen pelvis wo contrast  Result Date: 2/3/2025  Narrative: CT CHEST, ABDOMEN AND PELVIS WITHOUT IV CONTRAST INDICATION: C54.1: Malignant neoplasm of endometrium J98.4: Other disorders of lung T50.905A: Adverse effect of unspecified drugs, medicaments and biological substances, initial encounter. Additional  [facility] from [facility]: Stage IIIC1, grade 1 endometrial cancer with lymph node and lymphovascular space invasion status post surgical resection 10/11/2024. 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 COMPARISON: CT chest on 825, CT chest abdomen pelvis 10/1/2024, CTA chest 9/5/2024, CT abdomen pelvis 9/10/2020 TECHNIQUE: CT examination of the chest, abdomen and pelvis was performed without intravenous contrast. Multiplanar 2D reformatted images were created from the source data. This examination, like all CT scans performed in the [facility], was performed utilizing techniques to minimize radiation dose exposure, including the use of iterative reconstruction and automated exposure control. Radiation dose length product (DLP) for this visit: 1419 mGy-cm Enteric Contrast: Administered. FINDINGS: CHEST LUNGS: Marked improvement in bilateral irregular nodular opacities which have significantly decreased in size and density since 2025. This includes near resolution of the 6 mm juxtapleural left upper lobe nodule described on the previous exam. Stable 4 mm nodule in the right lower lobe (4/130), since at least November 2023. There are no new or enlarging nodules. PLEURA: Unremarkable. HEART/GREAT VESSELS: Heart is unremarkable for patient's age. No thoracic aortic aneurysm. Right chest port terminates at the cavoatrial junction MEDIASTINUM AND RC: Moderate hiatal hernia. No mediastinal or hilar lymphadenopathy. CHEST WALL AND LOWER NECK: Unremarkable. ABDOMEN LIVER/BILIARY TREE: Unremarkable. GALLBLADDER: No calcified gallstones. No pericholecystic inflammatory change. SPLEEN: Unremarkable. PANCREAS: Unremarkable. ADRENAL GLANDS: Unremarkable. KIDNEYS/URETERS: Unremarkable. No hydronephrosis. STOMACH AND BOWEL: Colonic diverticulosis without findings of acute diverticulitis. APPENDIX: Normal. ABDOMINOPELVIC CAVITY: No ascites. No pneumoperitoneum. No lymphadenopathy.  VESSELS: Infrarenal IVC filter. No abdominal aortic aneurysm. PELVIS REPRODUCTIVE ORGANS: Post hysterectomy. URINARY BLADDER: Unremarkable. ABDOMINAL WALL/INGUINAL REGIONS: Moderate fat-containing umbilical hernia. BONES: No acute fracture or suspicious osseous lesion. Degenerative changes in the spine. Unchanged grade 1 anterolisthesis L4 on L5.     Impression: 1.  Marked improvement in bilateral nodular lung opacities consistent with resolving infectious/inflammatory process. 2.  No evidence of metastasis in the chest, abdomen, or pelvis. Resident: SHERYL AYON I, the attending radiologist, have reviewed the images and agree with the final report above. Workstation performed: [id number]     Thank you for allowing us to participate in this patient's care. This pt will follow up with Dr. Olson once discharged.     Counseling / Coordination of Care  Total floor / unit time spent today 45 minutes.  Greater than 50% of total time was spent with the patient and / or family counseling and / or coordination of care.  A description of the counseling / coordination of care: Review of history, current assessment, development of a plan.    Code Status: Prior    ** Please Note: Dragon 360 Dictation voice to text software may have been used in the creation of this document. **

## 2025-02-20 NOTE — ASSESSMENT & PLAN NOTE
Hs troponin 4066-->3910  ECG- NSR with chronic LBBB  Recent TTE with mild global hypokinesis, LVEF 45%  No evidence of significant volume overload  +CP although not clearly anginal as well as reports of SOB, elevated HR, and diaphoresis with exertion.

## 2025-02-20 NOTE — ASSESSMENT & PLAN NOTE
Lab Results   Component Value Date    EGFR 38 02/20/2025    EGFR 33 02/19/2025    EGFR 34 02/05/2025    CREATININE 1.39 (H) 02/20/2025    CREATININE 1.57 (H) 02/19/2025    CREATININE 1.52 (H) 02/05/2025   Stable

## 2025-02-20 NOTE — ASSESSMENT & PLAN NOTE
Patient presented with chest heaviness and dyspnea on exertion since her last chemo on Friday 2/14. Initial troponin 4,066 and has remained relatively flat to 3,940. She was tachycardic initially   No volume on PE study, no PE, no orthopnea. Does not look like overt heart failure   Recent negative stress test   Admit patient to med/surg under inpatient status   Cardiology consult appreciated  Telemetry   Heparin drip   Limited ECHO  Possible catheterization   Continue Toprol

## 2025-02-20 NOTE — ASSESSMENT & PLAN NOTE
Stage IIIC1, grade 1 endometrial cancer s/p surgical resection  Currently receiving adjuvant chemotherapy with carboplatin/taxol/prembrolizumab  History of grade 2 immunotherapy-induced pneumonitis

## 2025-02-20 NOTE — ASSESSMENT & PLAN NOTE
S/p surgical treatment. On chemotherapy since 12/2024: Carboplatin, Taxol, Pembro with plan for XRT after chemotherapy

## 2025-02-20 NOTE — ASSESSMENT & PLAN NOTE
Suspect immunotherapy-induced (pembrolizumab) vs viral origin    Tachycardic on presentation (120s)  Troponin 4066 > 3910  ECG - NSR, chronic LBBB  CTPE - negative      Plan for High-dose steroids: oral prednisone (1 mg/kg/day), then taper slowly over 6-12 weeks based on clinical response and improvement of biomarkers. If unresponsive can consider IV methylprednisolone 1 g/day for 3-5 days    Recommend telemetry

## 2025-02-20 NOTE — ASSESSMENT & PLAN NOTE
Lab Results   Component Value Date    EGFR 38 02/20/2025    EGFR 33 02/19/2025    EGFR 34 02/05/2025    CREATININE 1.39 (H) 02/20/2025    CREATININE 1.57 (H) 02/19/2025    CREATININE 1.52 (H) 02/05/2025   Renal function at baseline   Monitor BMP  Avoid hypotension

## 2025-02-20 NOTE — H&P
H&P - Hospitalist   Name: Cherelle Dash 69 y.o. female I MRN: [id number]  Unit/Bed#: ED-31 I Date of Admission: 2/20/2025   Date of Service: 2/20/2025 I Hospital Day: 0     Assessment & Plan  Elevated troponin  Patient presented with chest heaviness and dyspnea on exertion since her last chemo on Friday 2/14. Initial troponin 4,066 and has remained relatively flat to 3,940. She was tachycardic initially   No volume on PE study, no PE, no orthopnea. Does not look like overt heart failure   Recent negative stress test   Admit patient to med/surg under inpatient status   Cardiology consult appreciated  Telemetry   Heparin drip   Limited ECHO  Possible catheterization   Continue Toprol  Dilated cardiomyopathy (HCC)  Prior EF of 40% diagnosed with her PE which improved to 45%  Following Cardio-Oncology   Continue Toprol-XL 50 mg QD   Not on other GDMT  No signs of volume overload   Endometrial cancer (HCC)  Following GYN/Onc  Currently on Taxol/Carboplatin/Pembrolizumab - last dose on 2/14  Follows Cardio-Oncology as well   Will need outpatient follow up  Repeat limited ECHO to assess EF   Consider GYN/Onc consult   Stage 3 chronic kidney disease (HCC)  Lab Results   Component Value Date    EGFR 38 02/20/2025    EGFR 33 02/19/2025    EGFR 34 02/05/2025    CREATININE 1.39 (H) 02/20/2025    CREATININE 1.57 (H) 02/19/2025    CREATININE 1.52 (H) 02/05/2025   Renal function at baseline   Monitor BMP  Avoid hypotension   Morbid obesity with BMI of 40.0-44.9, adult (HCC)  BMI noted   Encourage diet and lifestyle modifications   LBBB (left bundle branch block)  Chronic  Cardiology following in light of primary problem   Other pulmonary embolism without acute cor pulmonale (HCC)  Status post IVC - no missed Eliquis   Continue heparin drip   Hold Eliquis       VTE Pharmacologic Prophylaxis: VTE Score: 5 High Risk (Score >/= 5) - Pharmacological DVT Prophylaxis Ordered: heparin drip. Sequential Compression Devices Ordered.  Code  Status: Full Code   Discussion with family:  None at bedside .     Anticipated Length of Stay: Patient will be admitted on an inpatient basis with an anticipated length of stay of greater than 2 midnights secondary to as per above assessment and plan .    History of Present Illness   Chief Complaint: Chest tightness, dyspnea    Cherelle Dash is a 69 y.o. female with a PMH of dilated cardiomyopathy, endometrial cancer on chemo, CKD 3 who presents with chest heaviness and dyspnea on exertion. She reports that she had her last chemotherapy on Friday 2/14 and was fatigued after. She reports that all she did was sleep. She reports in the days following she had heaviness across her entire chest and reports that when she would get up and move around she would get SOB. She reports that she never gets SOB with exertion otherwise. She had brief swelling in her right foot. Denies palpitations or racing heart. Denies dizziness, syncope, fevers, or chills. Denies nausea, vomiting, or diarrhea. She reports compliance with Eliquis and that she has a filter. Reports that she has been compliant with Metoprolol.     Review of Systems   Constitutional:  Positive for fatigue. Negative for appetite change, chills, diaphoresis and fever.   HENT:  Negative for congestion, rhinorrhea and sore throat.    Eyes:  Negative for visual disturbance.   Respiratory:  Positive for shortness of breath. Negative for cough, chest tightness and wheezing.    Cardiovascular:  Positive for chest pain. Negative for palpitations and leg swelling.   Gastrointestinal:  Negative for abdominal pain, constipation, diarrhea, nausea and vomiting.   Genitourinary:  Negative for dysuria.   Musculoskeletal:  Negative for arthralgias and myalgias.   Neurological:  Negative for dizziness, syncope, weakness, light-headedness, numbness and headaches.   All other systems reviewed and are negative.      Historical Information   Past Medical History:   Diagnosis Date     Abnormal ECG     Anxiety 2002    Arthritis     Bipolar 1 disorder (HCC)     Cervical cancer (HCC)     Chronic kidney disease     stage 3    CPAP (continuous positive airway pressure) dependence     Depression     Disease of thyroid gland     DVT (deep venous thrombosis) (HCC)     BLLE    Elevated blood pressure reading 06/10/2019    GERD (gastroesophageal reflux disease)     Obesity     Pulmonary emboli (HCC)     B/L    RSV (acute bronchiolitis due to respiratory syncytial virus) 2023    Sleep apnea     Sleep apnea, obstructive 2007    Urinary incontinence     Uterine cancer (HCC)      Past Surgical History:   Procedure Laterality Date    COLONOSCOPY      DENTAL SURGERY      IR IVC FILTER PLACEMENT OPTIONAL/TEMPORARY  10/08/2024    IR PORT CHECK  2024    IR PORT PLACEMENT  2024    LAPAROTOMY N/A 10/11/2024    Procedure: EXPLORATORY LAPAROTOMY;  Surgeon: Rodney Downing MD;  Location: [facility];  Service: Gynecology Oncology    DE HYSTEROSCOPY BX ENDOMETRIUM&/POLYPC W/WO D&C N/A 2024    Procedure: EXAM UNDER ANESTHESIA, DILATATION AND CURETTAGE, CERVICAL BIOPSIES;  Surgeon: Evin Page MD;  Location: [facility];  Service: Gynecology Oncology    DE LAPS TOTAL HYSTERECT 250 GM/< W/RMVL TUBE/OVARY N/A 10/11/2024    Procedure: ROBOTIC ASSISTED LTH, BSO, LYMPH NODE DISSECTION, EUA;  Surgeon: Rodney Downing MD;  Location: [facility];  Service: Gynecology Oncology     Social History     Tobacco Use    Smoking status: Former     Current packs/day: 0.00     Average packs/day: 0.3 packs/day for 30.0 years (7.5 ttl pk-yrs)     Types: Cigarettes     Start date: 1979     Quit date: 2009     Years since quittin.1     Passive exposure: Never    Smokeless tobacco: Never   Vaping Use    Vaping status: Never Used   Substance and Sexual Activity    Alcohol use: Yes     Alcohol/week: 1.0 standard drink of alcohol     Types: 1 Glasses of wine per week    Drug use:  Never    Sexual activity: Not Currently     Partners: Male     Birth control/protection: Post-menopausal     E-Cigarette/Vaping    E-Cigarette Use Never User      E-Cigarette/Vaping Substances    Nicotine No     THC No     CBD No     Flavoring No     Other No     Unknown No      Family History   Problem Relation Age of Onset    Heart disease Mother     Heart Valve Disease Mother         Replacement    Diabetes Mother         2002    Heart failure Mother         Heart Valve replacement    Arthritis Father     No Known Problems Sister     No Known Problems Daughter     No Known Problems Maternal Grandmother     No Known Problems Maternal Grandfather     No Known Problems Paternal Grandmother     No Known Problems Paternal Grandfather     No Known Problems Son     No Known Problems Maternal Aunt     No Known Problems Maternal Aunt     No Known Problems Maternal Aunt     No Known Problems Maternal Aunt     No Known Problems Maternal Aunt     No Known Problems Paternal Aunt     Alcohol abuse Son         [place], 40 year old son, has issues with alcohol and alcohol abuse     Social History:  Marital Status: /Civil Union   Occupation: Noncontributory   Patient Pre-hospital Living Situation: Home  Patient Pre-hospital Level of Mobility: walks  Patient Pre-hospital Diet Restrictions: None    Meds/Allergies   I have reviewed home medications using recent Epic encounter.  Prior to Admission medications    Medication Sig Start Date End Date Taking? Authorizing Provider   apixaban (Eliquis) 5 mg Take 1 tablet (5 mg total) by mouth 2 (two) times a day 2/13/25 5/14/25  Gretchen Mayorga,    chlorhexidine (PERIDEX) 0.12 % solution USE HALF OUNCE TWICE A DAY RINSE FOR 30 SECONDS BY MOUTH THEN EXPECTORATE DO NOT SWALLOW 11/22/24   Historical Provider, MD   Cholecalciferol (VITAMIN D) 2000 units CAPS Take 1 capsule by mouth daily    Historical Provider, MD   clobetasol (TEMOVATE) 0.05 % ointment Apply topically 2 (two) times a week  1/15/24   Carmen Troncoso MD   CRANBERRY PO Take by mouth    Historical Provider, MD   famotidine (PEPCID) 20 mg tablet TAKE 1 TABLET BY MOUTH TWICE  DAILY 6/24/24   Gretchen Mayorga DO   Glucosamine-Chondroit-Vit C-Mn (GLUCOSAMINE 1500 COMPLEX PO) Take by mouth in the morning    Historical Provider, MD   Ivermectin 1 % CREA  4/17/24   Historical Provider, MD   lidocaine-prilocaine (EMLA) cream Apply to PORT 30 min prior to labs and chemo 12/11/24   Sharon Banks PA-C   LORazepam (ATIVAN) 1 mg tablet Take 1 tablet (1 mg total) by mouth every 8 (eight) hours as needed (nausea or anxiety) 2/3/25   Sharon Banks PA-C   lurasidone (LATUDA) 20 mg tablet TAKE 1 TABLET BY MOUTH DAILY  WITH BREAKFAST 6/27/24   Jayda Hernandez MD   metoprolol succinate (TOPROL-XL) 50 mg 24 hr tablet Take 1 tablet (50 mg total) by mouth daily 9/9/24   Mert Solares MD   Mirabegron ER 50 MG TB24 Take 1 tablet (50 mg total) by mouth in the morning 1/16/25   ZURI Victoria   Multiple Vitamin (MULTI-VITAMIN DAILY) TABS Take by mouth daily    Historical Provider, MD   polyethylene glycol (GLYCOLAX) 17 GM/SCOOP powder Take 17 g by mouth 2 (two) times a day  Patient not taking: Reported on 1/16/2025 1/10/25   Sharon Banks PA-C   predniSONE 10 mg tablet Take 10 tablets PO QD x 7 days, then 9 tablets PO x 7d, then 8 tablets PO x 7d, then 6 tabs PO x 5d, then 5 tabs PO x 5 d, then 4 tabs PO x 3d, then 3 tabs PO x 3d, then 2 tabs PO x 2d, then 1 tabs PO x 3d. 1/8/25   Sharon Banks PA-C   Sodium Fluoride 5000 PPM 1.1 % GEL As directed 11/22/24   Historical Provider, MD     Allergies   Allergen Reactions    Raw Fruit - Food Allergy Anaphylaxis     PLUMS, PEACHES, FRUIT WITH SKIN       Objective :  Temp:  [97.8 °F (36.6 °C)] 97.8 °F (36.6 °C)  HR:  [] 91  BP: (123-138)/(61-70) 123/61  Resp:  [22] 22  SpO2:  [97 %] 97 %  O2 Device: None (Room air)    Physical Exam  Vitals and nursing note  reviewed.   Constitutional:       General: She is not in acute distress.     Appearance: Normal appearance. She is obese. She is not ill-appearing or diaphoretic.   HENT:      Head: Normocephalic and atraumatic.      Mouth/Throat:      Mouth: Mucous membranes are moist.   Eyes:      General: No scleral icterus.     Pupils: Pupils are equal, round, and reactive to light.   Cardiovascular:      Rate and Rhythm: Normal rate and regular rhythm.      Pulses: Normal pulses.      Heart sounds: Normal heart sounds, S1 normal and S2 normal. No murmur heard.     No systolic murmur is present.      No diastolic murmur is present.      No gallop. No S3 or S4 sounds.   Pulmonary:      Effort: Pulmonary effort is normal. No accessory muscle usage or respiratory distress.      Breath sounds: Normal breath sounds. No stridor. No decreased breath sounds, wheezing, rhonchi or rales.   Chest:      Chest wall: No tenderness.   Abdominal:      General: Bowel sounds are normal. There is no distension.      Palpations: Abdomen is soft.      Tenderness: There is no abdominal tenderness. There is no guarding.   Musculoskeletal:      Right lower leg: No edema.      Left lower leg: No edema.   Skin:     General: Skin is warm and dry.      Coloration: Skin is not jaundiced.   Neurological:      General: No focal deficit present.      Mental Status: She is alert. Mental status is at baseline.      Motor: No tremor or seizure activity.   Psychiatric:         Behavior: Behavior is cooperative.          Lines/Drains:      Central Line:  Goal for removal: N/A - Chronic PICC             Lab Results: I have reviewed the following results:  Results from last 7 days   Lab Units 02/20/25  0910   WBC Thousand/uL 7.06   HEMOGLOBIN g/dL 10.6*   HEMATOCRIT % 33.4*   PLATELETS Thousands/uL 105*   BANDS PCT % 4   LYMPHO PCT % 17   MONO PCT % 14*   EOS PCT % 0     Results from last 7 days   Lab Units 02/20/25  0910   SODIUM mmol/L 139   POTASSIUM mmol/L 4.5    CHLORIDE mmol/L 107   CO2 mmol/L 24   BUN mg/dL 26*   CREATININE mg/dL 1.39*   ANION GAP mmol/L 8   CALCIUM mg/dL 8.9   ALBUMIN g/dL 3.8   TOTAL BILIRUBIN mg/dL 0.50   ALK PHOS U/L 94   ALT U/L 42   AST U/L 27   GLUCOSE RANDOM mg/dL 141*             Lab Results   Component Value Date    HGBA1C 6.2 (H) 11/05/2024    HGBA1C 6.0 (H) 10/01/2024    HGBA1C 5.5 08/14/2024     Results from last 7 days   Lab Units 02/20/25  0910   LACTIC ACID mmol/L 2.0       Imaging Results Review: I reviewed radiology reports from this admission including: chest xray and CT chest.  Other Study Results Review: No additional pertinent studies reviewed.    Administrative Statements   I have spent a total time of 75 minutes in caring for this patient on the day of the visit/encounter including Diagnostic results, Instructions for management, Impressions, Counseling / Coordination of care, Documenting in the medical record, Reviewing/placing orders in the medical record (including tests, medications, and/or procedures), Obtaining or reviewing history  , and Communicating with other healthcare professionals .    ** Please Note: This note has been constructed using a voice recognition system. **

## 2025-02-20 NOTE — PLAN OF CARE
Problem: PAIN - ADULT  Goal: Verbalizes/displays adequate comfort level or baseline comfort level  Description: Interventions:  - Encourage patient to monitor pain and request assistance  - Assess pain using appropriate pain scale  - Administer analgesics based on type and severity of pain and evaluate response  - Implement non-pharmacological measures as appropriate and evaluate response  - Consider cultural and social influences on pain and pain management  - Notify physician/advanced practitioner if interventions unsuccessful or patient reports new pain  Outcome: Progressing     Problem: INFECTION - ADULT  Goal: Absence or prevention of progression during hospitalization  Description: INTERVENTIONS:  - Assess and monitor for signs and symptoms of infection  - Monitor lab/diagnostic results  - Monitor all insertion sites, i.e. indwelling lines, tubes, and drains  - Monitor endotracheal if appropriate and nasal secretions for changes in amount and color  - [place] appropriate cooling/warming therapies per order  - Administer medications as ordered  - Instruct and encourage patient and family to use good hand hygiene technique  - Identify and instruct in appropriate isolation precautions for identified infection/condition  Outcome: Progressing  Goal: Absence of fever/infection during neutropenic period  Description: INTERVENTIONS:  - Monitor WBC    Outcome: Progressing     Problem: SAFETY ADULT  Goal: Patient will remain free of falls  Description: INTERVENTIONS:  - Educate patient/family on patient safety including physical limitations  - Instruct patient to call for assistance with activity   - Consult OT/PT to assist with strengthening/mobility   - Keep Call bell within reach  - Keep bed low and locked with side rails adjusted as appropriate  - Keep care items and personal belongings within reach  - Initiate and maintain comfort rounds  - Make Fall Risk Sign visible to staff  - Offer Toileting every 2 Hours,  in advance of need  - Initiate/Maintain bed and chair alarm  - Obtain necessary fall risk management equipment:   - Apply yellow socks and bracelet for high fall risk patients  - Consider moving patient to room near nurses station  Outcome: Progressing  Goal: Maintain or return to baseline ADL function  Description: INTERVENTIONS:  -  Assess patient's ability to carry out ADLs; assess patient's baseline for ADL function and identify physical deficits which impact ability to perform ADLs (bathing, care of mouth/teeth, toileting, grooming, dressing, etc.)  - Assess/evaluate cause of self-care deficits   - Assess range of motion  - Assess patient's mobility; develop plan if impaired  - Assess patient's need for assistive devices and provide as appropriate  - Encourage maximum independence but intervene and supervise when necessary  - Involve family in performance of ADLs  - Assess for home care needs following discharge   - Consider OT consult to assist with ADL evaluation and planning for discharge  - Provide patient education as appropriate  Outcome: Progressing  Goal: Maintains/Returns to pre admission functional level  Description: INTERVENTIONS:  - Perform AM-PAC 6 Click Basic Mobility/ Daily Activity assessment daily.  - Set and communicate daily mobility goal to care team and patient/family/caregiver.   - Collaborate with rehabilitation services on mobility goals if consulted  - Perform Range of Motion 3 times a day.  - Reposition patient every 2 hours.  - Dangle patient 3 times a day  - Stand patient 3 times a day  - Ambulate patient 3 times a day  - Out of bed to chair 3 times a day   - Out of bed for meals 3 times a day  - Out of bed for toileting  - Record patient progress and toleration of activity level   Outcome: Progressing     Problem: DISCHARGE PLANNING  Goal: Discharge to home or other facility with appropriate resources  Description: INTERVENTIONS:  - Identify barriers to discharge w/patient and  caregiver  - Arrange for needed discharge resources and transportation as appropriate  - Identify discharge learning needs (meds, wound care, etc.)  - Arrange for interpretive services to assist at discharge as needed  - Refer to Case Management Department for coordinating discharge planning if the patient needs post-hospital services based on physician/advanced practitioner order or complex needs related to functional status, cognitive ability, or social support system  Outcome: Progressing     Problem: Knowledge Deficit  Goal: Patient/family/caregiver demonstrates understanding of disease process, treatment plan, medications, and discharge instructions  Description: Complete learning assessment and assess knowledge base.  Interventions:  - Provide teaching at level of understanding  - Provide teaching via preferred learning methods  Outcome: Progressing

## 2025-02-20 NOTE — TELEPHONE ENCOUNTER
Spoke with patient who called to make Sharon Banks PA-C aware that she is on her way to the ED. She became tachycardic from 107-120s this morning and then sustained 120s after getting dressed. She reports shortness of breath, shakiness, and chest discomfort. She is able to maintain conversation well and is about 12 minutes from [facility],  is driving. I advised her to call 911 if she develops sharp shooting chest pain or difficulty breathing. She is in agreement with plan and I let her know I will make her providers aware.

## 2025-02-20 NOTE — ASSESSMENT & PLAN NOTE
Following GYN/Onc  Currently on Taxol/Carboplatin/Pembrolizumab - last dose on 2/14  Follows Cardio-Oncology as well   Will need outpatient follow up  Repeat limited ECHO to assess EF   Consider GYN/Onc consult

## 2025-02-20 NOTE — ED PROCEDURE NOTE
Procedure  POC Cardiac US    Date/Time: 2/20/2025 12:50 PM    Performed by: Yehuda Ordonez DO  Authorized by: Yehuda Ordonez DO    Patient location:  ED  Procedure details:     Exam Type:  Educational    Indications: chest pain      Assessment / Evaluation for: cardiac function      Exam Type: initial exam      Image quality: limited diagnostic      Image availability:  Images available in PACS and video obtained  Patient Details:     Cardiac Rhythm:  Regular    Mechanical ventilation: No    Cardiac findings:     Echo technique: limited 2D      Views obtained: parasternal long axis, parasternal short axis, subcostal and apical      Tamponade physiology: absent      Wall motion: normal      LV systolic function: normal      RV dilation: none    Pulmonary findings:     Left Lung Findings: left lung sliding      Right lung findings: right lung sliding      B-lines: 1 to 3                     Yehuda Ordonez DO  02/20/25 1251

## 2025-02-20 NOTE — ASSESSMENT & PLAN NOTE
Prior EF of 40% diagnosed with her PE which improved to 45%  Following Cardio-Oncology   Continue Toprol-XL 50 mg QD   Not on other GDMT  No signs of volume overload

## 2025-02-20 NOTE — ED PROVIDER NOTES
Time reflects when diagnosis was documented in both MDM as applicable and the Disposition within this note       Time User Action Codes Description Comment    2/20/2025 10:24 AM Megha Shepherd Add [R79.89] Elevated troponin     2/20/2025 10:24 AM Megha Shepherd Add [R06.00] Dyspnea     2/20/2025 10:35 AM Megha Shepherd Add [R07.9] Intermittent chest pain           ED Disposition       ED Disposition   Admit    Condition   Stable    Date/Time   Thu Feb 20, 2025  1:46 PM    Comment   Case was discussed with Dr. Angeles and the patient's admission status was agreed to be Admission Status: inpatient status to the service of Dr. Templeton .               Assessment & Plan       Medical Decision Making  Pt. undergoing chemotherapy for endometrial CA presents w/ tachycardia up to the 120s, dyspnea continuous over the last few days and intermittent chest discomfort .  Troponin is 4000+.  EKG is without acute changes-chronic left bundle branch block.  Since last infusion on 2/14 she has had the intermittent episodes of chest discomfort (none since yesterday, each approximately 30 minutes) as well as near continuous heart rate elevation (up to 120s from her usual 80s, now 80s with sinus rhythm here) and dyspnea.   CBC and chemistry are stable.  D-dimer is mildly elevated.  CTA does not reveal PE or other acute cardiac/pulmonary pathology.  Uncertain etiology of significant troponin elevation.  Differential diagnosis includes but is not limited to NSTEMI, myocarditis (infectious or inflammatory-potentially effect of chemotherapy treatment).  Heparin initiated.  Cardiology consulted.  Admitted for further evaluation.    Critical Care Time Statement: Upon my evaluation, this patient had a high probability of imminent or life-threatening deterioration due to tachycardia, dyspnea and elevated troponin of uncertain etiology in the setting of chemotherapy treatments, which required my direct  attention, intervention, and personal management.  I spent a total of 60 minutes directly providing critical care services, including interpretation of complex medical databases, evaluating for the presence of life-threatening injuries or illnesses, management of organ system failure(s) , interpretation of hemodynamic data, and discussion with consultant and admitting team. This time is exclusive of procedures, teaching, treating other patients, family meetings, and any prior time recorded by providers other than myself.        Problems Addressed:  Dyspnea: acute illness or injury  Elevated troponin: acute illness or injury  Intermittent chest pain: acute illness or injury    Amount and/or Complexity of Data Reviewed  Labs: ordered.  Radiology: ordered and independent interpretation performed.    Risk  Prescription drug management.  Decision regarding hospitalization.        ED Course as of 02/20/25 1445   Thu Feb 20, 2025   0822 Patient with consistently elevated heart rates over the last couple of days, dyspnea with exertion and occasional brief episodes of chest discomfort that lasts up to 30 minutes (not necessarily correlating with dyspnea/exertion).  Deep inspiration triggers cough.  No leg swelling or cramping reminiscent to that with prior DVT.    Differential diagnosis includes but is not limited to dehydration, side effects of chemotherapy agents, tachydysrhythmia (A-fib), ACS, cardiomyopathy, pulmonary infection, recurrent pneumonitis.   0946 CBC reviewed.  No leukocytosis or leukopenia.  Hemoglobin 10.6-stable.  She does have chronic thrombocytopenia.  No marked change in this.    Viral panel negative.    D-dimer is mildly elevated.  Will need to scan.   0947 Heart rate has lowered some-currently 92.     1004 Troponin has resulted extremely elevated at 4000.  She has not had the chest discomfort here and notes that this has been intermittent over the last few days and not specifically with exertion or  correlating with degree of dyspnea.    Differential diagnosis continues to include possible ACS (without acute EKG changes)/NSTEMI, myocarditis (infectious versus inflammatory versus possible treatment/medication side effect), PE.  Initiating heparin.   1011 GFR stable/slightly improved from yesterday.  Patient aware of study results, plan for CT scan, heparin on admission.    I did reach out to Cardiology re: pt. For consultation.    She has not had any chest discomfort here and notes that the last episode was yesterday.  She denies that any of her episodes were much more intense or longer in duration than others.   1105 Patient to be for CT shortly following current trauma patient.   1148 CT scan performed.  I do not appreciate any central (or smaller) PE.  I did touch base with [facility] regarding patient.  Will recontact them following radiology interpretation for admission.   1336 CT report reviewed.  Dr. Templeton accepting to her service.    Reviewed findings with patient.  She remains comfortable and has not had recurrence of discomfort or tachycardia.  Echo tech at bedside.        Medications   heparin (porcine) 25,000 units in 0.45% NaCl 250 mL infusion (premix) (11.1 Units/kg/hr × 90 kg (Order-Specific) Intravenous New Bag 2/20/25 1103)   heparin (porcine) injection 4,000 Units (has no administration in time range)   heparin (porcine) injection 2,000 Units (has no administration in time range)   sodium chloride 0.9 % bolus 1,000 mL (0 mL Intravenous Stopped 2/20/25 1135)   heparin (porcine) injection 4,000 Units (4,000 Units Intravenous Given 2/20/25 1104)   magnesium sulfate 2 g/50 mL IVPB (premix) 2 g (0 g Intravenous Stopped 2/20/25 1348)   iohexol (OMNIPAQUE) 350 MG/ML injection (MULTI-DOSE) 70 mL (70 mL Intravenous Given 2/20/25 1131)       ED Risk Strat Scores   HEART Risk Score      Flowsheet Row Most Recent Value   Heart Score Risk Calculator    History 1 Filed at: 02/20/2025 1417   ECG 1 Filed at:  02/20/2025 1417   Age 2 Filed at: 02/20/2025 1417   Risk Factors 1 Filed at: 02/20/2025 1417   Troponin 2 Filed at: 02/20/2025 1417   HEART Score 7 Filed at: 02/20/2025 1417          HEART Risk Score      Flowsheet Row Most Recent Value   Heart Score Risk Calculator    History 1 Filed at: 02/20/2025 1417   ECG 1 Filed at: 02/20/2025 1417   Age 2 Filed at: 02/20/2025 1417   Risk Factors 1 Filed at: 02/20/2025 1417   Troponin 2 Filed at: 02/20/2025 1417   HEART Score 7 Filed at: 02/20/2025 1417                                                  History of Present Illness       Chief Complaint   Patient presents with    Shortness of Breath     Pt reports last chemo treatment last Friday, pulse in 120s, SOB worsening with exertion       Past Medical History:   Diagnosis Date    Abnormal ECG     Anxiety 2002    Arthritis     Bipolar 1 disorder (HCC)     Cervical cancer (HCC)     Chronic kidney disease     stage 3    CPAP (continuous positive airway pressure) dependence     Depression 2001    Disease of thyroid gland     DVT (deep venous thrombosis) (HCC)     BLLE    Elevated blood pressure reading 06/10/2019    GERD (gastroesophageal reflux disease)     Obesity     Pulmonary emboli (HCC)     B/L    RSV (acute bronchiolitis due to respiratory syncytial virus) 12/05/2023    Sleep apnea     Sleep apnea, obstructive 2007    Urinary incontinence 2019    Uterine cancer (HCC)       Past Surgical History:   Procedure Laterality Date    COLONOSCOPY  2011    DENTAL SURGERY  2020    IR IVC FILTER PLACEMENT OPTIONAL/TEMPORARY  10/08/2024    IR PORT CHECK  12/26/2024    IR PORT PLACEMENT  12/03/2024    LAPAROTOMY N/A 10/11/2024    Procedure: EXPLORATORY LAPAROTOMY;  Surgeon: Rodney Downing MD;  Location: [facility];  Service: Gynecology Oncology    NH HYSTEROSCOPY BX ENDOMETRIUM&/POLYPC W/WO D&C N/A 08/23/2024    Procedure: EXAM UNDER ANESTHESIA, DILATATION AND CURETTAGE, CERVICAL BIOPSIES;  Surgeon: Evin Page MD;   Location:  MAIN OR;  Service: Gynecology Oncology    CT LAPS TOTAL HYSTERECT 250 GM/< W/RMVL TUBE/OVARY N/A 10/11/2024    Procedure: ROBOTIC ASSISTED LTH, BSO, LYMPH NODE DISSECTION, EUA;  Surgeon: Rodney Downing MD;  Location: [facility];  Service: Gynecology Oncology      Family History   Problem Relation Age of Onset    Heart disease Mother     Heart Valve Disease Mother         Replacement    Diabetes Mother         2002    Heart failure Mother         Heart Valve replacement    Arthritis Father     No Known Problems Sister     No Known Problems Daughter     No Known Problems Maternal Grandmother     No Known Problems Maternal Grandfather     No Known Problems Paternal Grandmother     No Known Problems Paternal Grandfather     No Known Problems Son     No Known Problems Maternal Aunt     No Known Problems Maternal Aunt     No Known Problems Maternal Aunt     No Known Problems Maternal Aunt     No Known Problems Maternal Aunt     No Known Problems Paternal Aunt     Alcohol abuse Son         Kolton, 40 year old son, has issues with alcohol and alcohol abuse      Social History     Tobacco Use    Smoking status: Former     Current packs/day: 0.00     Average packs/day: 0.3 packs/day for 30.0 years (7.5 ttl pk-yrs)     Types: Cigarettes     Start date: 1979     Quit date: 2009     Years since quittin.1     Passive exposure: Never    Smokeless tobacco: Never   Vaping Use    Vaping status: Never Used   Substance Use Topics    Alcohol use: Yes     Alcohol/week: 1.0 standard drink of alcohol     Types: 1 Glasses of wine per week    Drug use: Never      E-Cigarette/Vaping    E-Cigarette Use Never User       E-Cigarette/Vaping Substances    Nicotine No     THC No     CBD No     Flavoring No     Other No     Unknown No       I have reviewed and agree with the history as documented.     Patient is a 69-year-old female presents to the emergency department for evaluation with shortness of breath.  She  "receives chemotherapy for endometrial CA and had most recent treatment on Friday (2/14).  She relates that this episode was particularly rough on her.  She describes having had \"pain across the top of my chest\" afterwards as well as shortness of breath (waxing and waning with exertion).  She has appreciated elevation of her heart rate and explains that this is typically in the 80s though the last couple of nights has been to the 110s and 120s.  Episodes of chest discomfort have occurred intermittently.  These do not necessarily correlate to presence or intensity of dyspnea.  Each has lasted approximately 30 minutes and has resolved on its own.  Most recent episode was yesterday.  She has not appreciated any palpitations/sensation of irregular or even the rapid heart rate that was aware of this from monitoring/checking her vital signs.  She has not had any nasal congestion, sore throat or cough.  She does report appetite is a bit reduced though notes that she had been on steroids recently (ending approximately a week ago) for pneumonitis and appreciated marked appetite increase during their use.  No nausea, vomiting, bowel or urinary abnormalities.  She has not appreciated leg swelling or discomfort.    Past medical history significant for hypothyroidism, GERD, CKD, DVT/PE (remotely) and endometrial CA.  She is anticoagulated on Eliquis and also endorses having a filter in place.  Symptoms are not reminiscent to that experienced with PE.        Review of Systems   All other systems reviewed and are negative.          Objective       ED Triage Vitals   Temperature Pulse Blood Pressure Respirations SpO2 Patient Position - Orthostatic VS   02/20/25 0802 02/20/25 0802 02/20/25 0802 02/20/25 0802 02/20/25 0802 02/20/25 0802   97.8 °F (36.6 °C) (!) 123 138/70 22 97 % Sitting      Temp Source Heart Rate Source BP Location FiO2 (%) Pain Score    02/20/25 0802 02/20/25 0802 02/20/25 1048 -- 02/20/25 0802    Oral Monitor Right " arm  No Pain      Vitals      Date and Time Temp Pulse SpO2 Resp BP Pain Score FACES Pain Rating User   02/20/25 1405 -- 92 -- -- 123/61 -- -- MT   02/20/25 1352 -- -- -- -- 123/61 -- -- RI   02/20/25 1048 -- 91 -- -- 133/63 -- -- RI   02/20/25 0802 97.8 °F (36.6 °C) 123 97 % 22 138/70 No Pain -- AB            Physical Exam  Vitals and nursing note reviewed.   Constitutional:       Appearance: She is well-developed.   HENT:      Head: Normocephalic.   Cardiovascular:      Rate and Rhythm: Regular rhythm. Tachycardia present.   Pulmonary:      Effort: Pulmonary effort is normal.      Breath sounds: Normal breath sounds. No decreased breath sounds, wheezing, rhonchi or rales.   Chest:      Chest wall: No tenderness.   Abdominal:      Palpations: Abdomen is soft.      Tenderness: There is no abdominal tenderness.   Musculoskeletal:      Right lower leg: No tenderness.      Left lower leg: No tenderness. No edema.   Skin:     General: Skin is warm and dry.   Neurological:      General: No focal deficit present.      Mental Status: She is alert and oriented to person, place, and time.   Psychiatric:         Mood and Affect: Mood normal.         Results Reviewed       Procedure Component Value Units Date/Time    HS Troponin I 4hr [[id number]]  (Abnormal) Collected: 02/20/25 1354    Lab Status: Final result Specimen: Blood from Arm, Right Updated: 02/20/25 1423     hs TnI 4hr 3,940 ng/L      Delta 4hr hsTnI -126 ng/L     HS Troponin I 2hr [[id number]]  (Abnormal) Collected: 02/20/25 1101    Lab Status: Final result Specimen: Blood from Arm, Right Updated: 02/20/25 1133     hs TnI 2hr 3,910 ng/L      Delta 2hr hsTnI -156 ng/L     B-Type Natriuretic Peptide(BNP) [[id number]]  (Abnormal) Collected: 02/20/25 0910    Lab Status: Final result Specimen: Blood from Arm, Right Updated: 02/20/25 1107      pg/mL     RBC Morphology Reflex Test [[id number]] Collected: 02/20/25 0910    Lab Status: Final result Specimen: Blood from  Arm, Right Updated: 02/20/25 1101    CBC and differential [[id number]]  (Abnormal) Collected: 02/20/25 0910    Lab Status: Final result Specimen: Blood from Arm, Right Updated: 02/20/25 1031     WBC 7.06 Thousand/uL      RBC 3.48 Million/uL      Hemoglobin 10.6 g/dL      Hematocrit 33.4 %      MCV 96 fL      MCH 30.5 pg      MCHC 31.7 g/dL      RDW 21.5 %      MPV 12.9 fL      Platelets 105 Thousands/uL     Narrative:      This is an appended report.  These results have been appended to a previously verified report.    Manual Differential(PHLEBS Do Not Order) [[id number]]  (Abnormal) Collected: 02/20/25 0910    Lab Status: Final result Specimen: Blood from Arm, Right Updated: 02/20/25 1031     Segmented % 61 %      Bands % 4 %      Lymphocytes % 17 %      Monocytes % 14 %      Eosinophils % 0 %      Basophils % 0 %      Metamyelocytes % 3 %      Atypical Lymphocytes % 1 %      Absolute Neutrophils 4.59 Thousand/uL      Absolute Lymphocytes 1.27 Thousand/uL      Absolute Monocytes 0.99 Thousand/uL      Absolute Eosinophils 0.00 Thousand/uL      Absolute Basophils 0.00 Thousand/uL      Absolute Metamyelocytes 0.21 Thousand/uL      Total Counted --     Dohle Bodies Present     RBC Morphology Present     Platelet Estimate Decreased     Large Platelet Present     Anisocytosis Present     Polychromasia Present    CK [[id number]]  (Normal) Collected: 02/20/25 0910    Lab Status: Final result Specimen: Blood from Arm, Right Updated: 02/20/25 1023     Total CK 87 U/L     APTT [[id number]]  (Normal) Collected: 02/20/25 0910    Lab Status: Final result Specimen: Blood from Arm, Right Updated: 02/20/25 1014     PTT 25 seconds     TSH, 3rd generation with Free T4 reflex [[id number]]  (Normal) Collected: 02/20/25 0910    Lab Status: Final result Specimen: Blood from Arm, Right Updated: 02/20/25 0958     TSH 3RD GENERATON 2.136 uIU/mL     Comprehensive metabolic panel [[id number]]  (Abnormal) Collected: 02/20/25 0910    Lab Status:  Final result Specimen: Blood from Arm, Right Updated: 02/20/25 0954     Sodium 139 mmol/L      Potassium 4.5 mmol/L      Chloride 107 mmol/L      CO2 24 mmol/L      ANION GAP 8 mmol/L      BUN 26 mg/dL      Creatinine 1.39 mg/dL      Glucose 141 mg/dL      Calcium 8.9 mg/dL      AST 27 U/L      ALT 42 U/L      Alkaline Phosphatase 94 U/L      Total Protein 6.4 g/dL      Albumin 3.8 g/dL      Total Bilirubin 0.50 mg/dL      eGFR 38 ml/min/1.73sq m     Narrative:      National Kid[employer] Foundation guidelines for Chronic Kidney Disease (CKD):     Stage 1 with normal or high GFR (GFR > 90 mL/min/1.73 square meters)    Stage 2 Mild CKD (GFR = 60-89 mL/min/1.73 square meters)    Stage 3A Moderate CKD (GFR = 45-59 mL/min/1.73 square meters)    Stage 3B Moderate CKD (GFR = 30-44 mL/min/1.73 square meters)    Stage 4 Severe CKD (GFR = 15-29 mL/min/1.73 square meters)    Stage 5 End Stage CKD (GFR <15 mL/min/1.73 square meters)  Note: GFR calculation is accurate only with a steady state creatinine    Magnesium [[id number]]  (Abnormal) Collected: 02/20/25 0910    Lab Status: Final result Specimen: Blood from Arm, Right Updated: 02/20/25 0954     Magnesium 1.8 mg/dL     Lactic acid, plasma (w/reflex if result > 2.0) [[id number]]  (Normal) Collected: 02/20/25 0910    Lab Status: Final result Specimen: Blood from Arm, Right Updated: 02/20/25 0952     LACTIC ACID 2.0 mmol/L     Narrative:      Result may be elevated if tourniquet was used during collection.    HS Troponin 0hr (reflex protocol) [[id number]]  (Abnormal) Collected: 02/20/25 0910    Lab Status: Final result Specimen: Blood from Arm, Right Updated: 02/20/25 0950     hs TnI 0hr 4,066 ng/L     D-Dimer [[id number]]  (Abnormal) Collected: 02/20/25 0910    Lab Status: Final result Specimen: Blood from Arm, Right Updated: 02/20/25 0943     D-Dimer, Quant 0.79 ug/ml FEU     Narrative:      In the evaluation for possible pulmonary embolism, in the appropriate (Well's Score of  4 or less) patient, the age adjusted d-dimer cutoff for this patient can be calculated as:    Age x 0.01 (in ug/mL) for Age-adjusted D-dimer exclusion threshold for a patient over 50 years.    FLU/COVID Rapid Antigen (30 min. TAT) - Preferred screening test in ED [[id number]]  (Normal) Collected: 02/20/25 0910    Lab Status: Final result Specimen: Nares from Nose Updated: 02/20/25 0936     SARS COV Rapid Antigen Negative     Influenza A Rapid Antigen Negative     Influenza B Rapid Antigen Negative    Narrative:      This test has been performed using the [employer] [facility] 2 FLU+SARS Antigen test under the Emergency Use Authorization (EUA). This test has been validated by the  and verified by the performing laboratory. The [place] uses lateral flow immunofluorescent sandwich assay to detect SARS-COV, Influenza A and Influenza B Antigen.     The [facility] [place] 2 SARS Antigen test does not differentiate between SARS-CoV and SARS-CoV-2.     Negative results are presumptive and may be confirmed with a molecular assay, if necessary, for patient management. Negative results do not rule out SARS-CoV-2 or influenza infection and should not be used as the sole basis for treatment or patient management decisions. A negative test result may occur if the level of antigen in a sample is below the limit of detection of this test.     Positive results are indicative of the presence of viral antigens, but do not rule out bacterial infection or co-infection with other viruses.     All test results should be used as an adjunct to clinical observations and other information available to the provider.    FOR PEDIATRIC PATIENTS - copy/paste COVID Guidelines URL to browser: [email or web address]            CTA chest pe study   Final Interpretation by Rafita Combs DO (02/20 4925)      1.  No evidence of acute pulmonary embolism.   2.  Scattered groundglass opacities which are  overall not significantly changed from CT of 1/31/2024. These opacities are again favored to be infectious/inflammatory in etiology. Consider short interval follow-up CT chest in 3 months to ensure complete    resolution.   3.  Right lower lobe pulmonary nodule measuring 5 mm, stable.                  Workstation performed: [id number]         XR chest 2 views   ED Interpretation by Megha Shepherd MD (02/20 9828)   Unremarkable cardiac silhouette.  Lungs clear.      Final Interpretation by Lidia Damon MD (02/20 3779)      No acute cardiopulmonary disease.            Workstation performed: [id number]             ECG 12 Lead Documentation Only    Date/Time: 2/20/2025 8:49 AM    Performed by: Megha Shepherd MD  Authorized by: Megha Shepherd MD    ECG reviewed by me, the ED Provider: yes    Patient location:  ED  Previous ECG:     Previous ECG:  Compared to current    Similarity:  No change  Rate:     ECG rate:  97    ECG rate assessment: normal    Ectopy:     Ectopy: none    QRS:     QRS axis:  Left    QRS intervals:  Wide  Conduction:     Conduction: abnormal      Abnormal conduction: complete LBBB    ST segments:     ST segments:  Normal  T waves:     T waves: normal        ED Medication and Procedure Management   Prior to Admission Medications   Prescriptions Last Dose Informant Patient Reported? Taking?   CRANBERRY PO  Self Yes No   Sig: Take by mouth   Cholecalciferol (VITAMIN D) 2000 units CAPS  Self Yes No   Sig: Take 1 capsule by mouth daily   Glucosamine-Chondroit-Vit C-Mn (GLUCOSAMINE 1500 COMPLEX PO)  Self Yes No   Sig: Take by mouth in the morning   Ivermectin 1 % CREA  Self Yes No   LORazepam (ATIVAN) 1 mg tablet   No No   Sig: Take 1 tablet (1 mg total) by mouth every 8 (eight) hours as needed (nausea or anxiety)   Mirabegron ER 50 MG TB24  Self No No   Sig: Take 1 tablet (50 mg total) by mouth in the morning   Multiple Vitamin (MULTI-VITAMIN DAILY)  TABS  Self Yes No   Sig: Take by mouth daily   Sodium Fluoride 5000 PPM 1.1 % GEL  Self Yes No   Sig: As directed   apixaban (Eliquis) 5 mg   No No   Sig: Take 1 tablet (5 mg total) by mouth 2 (two) times a day   chlorhexidine (PERIDEX) 0.12 % solution  Self Yes No   Sig: USE HALF OUNCE TWICE A DAY RINSE FOR 30 SECONDS BY MOUTH THEN EXPECTORATE DO NOT SWALLOW   clobetasol (TEMOVATE) 0.05 % ointment  Self No No   Sig: Apply topically 2 (two) times a week   famotidine (PEPCID) 20 mg tablet  Self No No   Sig: TAKE 1 TABLET BY MOUTH TWICE  DAILY   lidocaine-prilocaine (EMLA) cream  Self No No   Sig: Apply to PORT 30 min prior to labs and chemo   lurasidone (LATUDA) 20 mg tablet  Self No No   Sig: TAKE 1 TABLET BY MOUTH DAILY  WITH BREAKFAST   metoprolol succinate (TOPROL-XL) 50 mg 24 hr tablet  Self No No   Sig: Take 1 tablet (50 mg total) by mouth daily   polyethylene glycol (GLYCOLAX) 17 GM/SCOOP powder  Self No No   Sig: Take 17 g by mouth 2 (two) times a day   Patient not taking: Reported on 1/16/2025   predniSONE 10 mg tablet  Self No No   Sig: Take 10 tablets PO QD x 7 days, then 9 tablets PO x 7d, then 8 tablets PO x 7d, then 6 tabs PO x 5d, then 5 tabs PO x 5 d, then 4 tabs PO x 3d, then 3 tabs PO x 3d, then 2 tabs PO x 2d, then 1 tabs PO x 3d.      Facility-Administered Medications: None     Patient's Medications   Discharge Prescriptions    No medications on file     No discharge procedures on file.  ED SEPSIS DOCUMENTATION   Time reflects when diagnosis was documented in both MDM as applicable and the Disposition within this note       Time User Action Codes Description Comment    2/20/2025 10:24 AM Megha Shepherd Add [R79.89] Elevated troponin     2/20/2025 10:24 AM Megha Shepherd Add [R06.00] Dyspnea     2/20/2025 10:35 AM Megha Shepherd Add [R07.9] Intermittent chest pain                  Megha Shepherd MD  02/20/25 9407

## 2025-02-21 PROBLEM — I51.4 MYOCARDITIS (HCC): Status: ACTIVE | Noted: 2025-02-20

## 2025-02-21 LAB
ANION GAP SERPL CALCULATED.3IONS-SCNC: 6 MMOL/L (ref 4–13)
APTT PPP: 75 SECONDS (ref 23–34)
APTT PPP: 78 SECONDS (ref 23–34)
BUN SERPL-MCNC: 29 MG/DL (ref 5–25)
CALCIUM SERPL-MCNC: 8.8 MG/DL (ref 8.4–10.2)
CHLORIDE SERPL-SCNC: 109 MMOL/L (ref 96–108)
CO2 SERPL-SCNC: 25 MMOL/L (ref 21–32)
CREAT SERPL-MCNC: 1.6 MG/DL (ref 0.6–1.3)
ERYTHROCYTE [DISTWIDTH] IN BLOOD BY AUTOMATED COUNT: 21.7 % (ref 11.6–15.1)
GFR SERPL CREATININE-BSD FRML MDRD: 32 ML/MIN/1.73SQ M
GLUCOSE SERPL-MCNC: 151 MG/DL (ref 65–140)
HCT VFR BLD AUTO: 31.8 % (ref 34.8–46.1)
HGB BLD-MCNC: 10 G/DL (ref 11.5–15.4)
MAGNESIUM SERPL-MCNC: 2.1 MG/DL (ref 1.9–2.7)
MCH RBC QN AUTO: 30.2 PG (ref 26.8–34.3)
MCHC RBC AUTO-ENTMCNC: 31.4 G/DL (ref 31.4–37.4)
MCV RBC AUTO: 96 FL (ref 82–98)
PLATELET # BLD AUTO: 91 THOUSANDS/UL (ref 149–390)
PMV BLD AUTO: 12.4 FL (ref 8.9–12.7)
POTASSIUM SERPL-SCNC: 4.3 MMOL/L (ref 3.5–5.3)
RBC # BLD AUTO: 3.31 MILLION/UL (ref 3.81–5.12)
SODIUM SERPL-SCNC: 140 MMOL/L (ref 135–147)
WBC # BLD AUTO: 6.69 THOUSAND/UL (ref 4.31–10.16)

## 2025-02-21 PROCEDURE — 83735 ASSAY OF MAGNESIUM: CPT | Performed by: PHYSICIAN ASSISTANT

## 2025-02-21 PROCEDURE — 99232 SBSQ HOSP IP/OBS MODERATE 35: CPT | Performed by: PHYSICIAN ASSISTANT

## 2025-02-21 PROCEDURE — 85730 THROMBOPLASTIN TIME PARTIAL: CPT | Performed by: INTERNAL MEDICINE

## 2025-02-21 PROCEDURE — 80048 BASIC METABOLIC PNL TOTAL CA: CPT | Performed by: PHYSICIAN ASSISTANT

## 2025-02-21 PROCEDURE — 99233 SBSQ HOSP IP/OBS HIGH 50: CPT | Performed by: OBSTETRICS & GYNECOLOGY

## 2025-02-21 PROCEDURE — 99232 SBSQ HOSP IP/OBS MODERATE 35: CPT | Performed by: INTERNAL MEDICINE

## 2025-02-21 PROCEDURE — 85027 COMPLETE CBC AUTOMATED: CPT | Performed by: PHYSICIAN ASSISTANT

## 2025-02-21 RX ORDER — COLCHICINE 0.6 MG/1
0.6 TABLET ORAL 2 TIMES DAILY
Status: DISCONTINUED | OUTPATIENT
Start: 2025-02-21 | End: 2025-02-23 | Stop reason: HOSPADM

## 2025-02-21 RX ORDER — METOPROLOL SUCCINATE 50 MG/1
50 TABLET, EXTENDED RELEASE ORAL EVERY 12 HOURS
Status: DISCONTINUED | OUTPATIENT
Start: 2025-02-21 | End: 2025-02-23 | Stop reason: HOSPADM

## 2025-02-21 RX ORDER — FUROSEMIDE 10 MG/ML
40 INJECTION INTRAMUSCULAR; INTRAVENOUS ONCE
Status: COMPLETED | OUTPATIENT
Start: 2025-02-21 | End: 2025-02-21

## 2025-02-21 RX ADMIN — METOPROLOL SUCCINATE 50 MG: 50 TABLET, EXTENDED RELEASE ORAL at 10:00

## 2025-02-21 RX ADMIN — OXYBUTYNIN CHLORIDE 10 MG: 5 TABLET, EXTENDED RELEASE ORAL at 10:00

## 2025-02-21 RX ADMIN — METOPROLOL SUCCINATE 50 MG: 50 TABLET, EXTENDED RELEASE ORAL at 22:13

## 2025-02-21 RX ADMIN — DOCUSATE SODIUM 100 MG: 100 CAPSULE, LIQUID FILLED ORAL at 10:00

## 2025-02-21 RX ADMIN — COLCHICINE 0.6 MG: 0.6 TABLET ORAL at 12:54

## 2025-02-21 RX ADMIN — APIXABAN 5 MG: 5 TABLET, FILM COATED ORAL at 18:08

## 2025-02-21 RX ADMIN — COLCHICINE 0.6 MG: 0.6 TABLET ORAL at 18:08

## 2025-02-21 RX ADMIN — Medication 2000 UNITS: at 10:00

## 2025-02-21 RX ADMIN — FUROSEMIDE 40 MG: 10 INJECTION, SOLUTION INTRAVENOUS at 11:20

## 2025-02-21 RX ADMIN — APIXABAN 5 MG: 5 TABLET, FILM COATED ORAL at 11:20

## 2025-02-21 RX ADMIN — DOCUSATE SODIUM 100 MG: 100 CAPSULE, LIQUID FILLED ORAL at 18:08

## 2025-02-21 RX ADMIN — FAMOTIDINE 20 MG: 20 TABLET, FILM COATED ORAL at 10:00

## 2025-02-21 RX ADMIN — LURASIDONE HYDROCHLORIDE 20 MG: 20 TABLET, COATED ORAL at 10:00

## 2025-02-21 RX ADMIN — PREDNISONE 105 MG: 5 TABLET ORAL at 10:00

## 2025-02-21 RX ADMIN — HEPARIN SODIUM 13.1 UNITS/KG/HR: 10000 INJECTION, SOLUTION INTRAVENOUS at 07:40

## 2025-02-21 NOTE — ASSESSMENT & PLAN NOTE
Lab Results   Component Value Date    EGFR 32 02/21/2025    EGFR 38 02/20/2025    EGFR 33 02/19/2025    CREATININE 1.60 (H) 02/21/2025    CREATININE 1.39 (H) 02/20/2025    CREATININE 1.57 (H) 02/19/2025   Renal function at baseline   Monitor BMP  Avoid hypotension

## 2025-02-21 NOTE — ASSESSMENT & PLAN NOTE
Patient presented with chest heaviness and dyspnea on exertion since her last chemo on Friday 2/14. Initial troponin 4,066 and has remained relatively flat to 3,940. She was tachycardic initially   No volume on PE study, no PE, no orthopnea. Does not look like overt heart failure   Recent negative stress test   cMRI showing focal myocarditis  Cardiology consult appreciated  Telemetry   Prednisone 1 mg/kg per day with 6-12 week taper per Gyn/Onc   ?Consider colchicine   Continue Toprol-XL, increase per cardiology

## 2025-02-21 NOTE — ASSESSMENT & PLAN NOTE
Lab Results   Component Value Date    EGFR 38 02/20/2025    EGFR 33 02/19/2025    EGFR 34 02/05/2025    CREATININE 1.39 (H) 02/20/2025    CREATININE 1.57 (H) 02/19/2025    CREATININE 1.52 (H) 02/05/2025

## 2025-02-21 NOTE — PLAN OF CARE
Problem: PAIN - ADULT  Goal: Verbalizes/displays adequate comfort level or baseline comfort level  Description: Interventions:  - Encourage patient to monitor pain and request assistance  - Assess pain using appropriate pain scale  - Administer analgesics based on type and severity of pain and evaluate response  - Implement non-pharmacological measures as appropriate and evaluate response  - Consider cultural and social influences on pain and pain management  - Notify physician/advanced practitioner if interventions unsuccessful or patient reports new pain  Outcome: Progressing     Problem: INFECTION - ADULT  Goal: Absence or prevention of progression during hospitalization  Description: INTERVENTIONS:  - Assess and monitor for signs and symptoms of infection  - Monitor lab/diagnostic results  - Monitor all insertion sites, i.e. indwelling lines, tubes, and drains  - Monitor endotracheal if appropriate and nasal secretions for changes in amount and color  - [place] appropriate cooling/warming therapies per order  - Administer medications as ordered  - Instruct and encourage patient and family to use good hand hygiene technique  - Identify and instruct in appropriate isolation precautions for identified infection/condition  Outcome: Progressing  Goal: Absence of fever/infection during neutropenic period  Description: INTERVENTIONS:  - Monitor WBC    Outcome: Progressing     Problem: SAFETY ADULT  Goal: Patient will remain free of falls  Description: INTERVENTIONS:  - Educate patient/family on patient safety including physical limitations  - Instruct patient to call for assistance with activity   - Consult OT/PT to assist with strengthening/mobility   - Keep Call bell within reach  - Keep bed low and locked with side rails adjusted as appropriate  - Keep care items and personal belongings within reach  - Initiate and maintain comfort rounds  - Make Fall Risk Sign visible to staff  - Offer Toileting every 2 Hours,  in advance of need  - Initiate/Maintain bed alarm  - Obtain necessary fall risk management equipment: alarm  - Apply yellow socks and bracelet for high fall risk patients  - Consider moving patient to room near nurses station  Outcome: Progressing  Goal: Maintain or return to baseline ADL function  Description: INTERVENTIONS:  -  Assess patient's ability to carry out ADLs; assess patient's baseline for ADL function and identify physical deficits which impact ability to perform ADLs (bathing, care of mouth/teeth, toileting, grooming, dressing, etc.)  - Assess/evaluate cause of self-care deficits   - Assess range of motion  - Assess patient's mobility; develop plan if impaired  - Assess patient's need for assistive devices and provide as appropriate  - Encourage maximum independence but intervene and supervise when necessary  - Involve family in performance of ADLs  - Assess for home care needs following discharge   - Consider OT consult to assist with ADL evaluation and planning for discharge  - Provide patient education as appropriate  Outcome: Progressing  Goal: Maintains/Returns to pre admission functional level  Description: INTERVENTIONS:  - Perform AM-PAC 6 Click Basic Mobility/ Daily Activity assessment daily.  - Set and communicate daily mobility goal to care team and patient/family/caregiver.   - Collaborate with rehabilitation services on mobility goals if consulted  - Perform Range of Motion 2 times a day.  - Reposition patient every 2 hours.  - Dangle patient 2 times a day  - Stand patient 2 times a day  - Ambulate patient 2 times a day  - Out of bed to chair 2 times a day   - Out of bed for meals 2 times a day  - Out of bed for toileting  - Record patient progress and toleration of activity level   Outcome: Progressing     Problem: DISCHARGE PLANNING  Goal: Discharge to home or other facility with appropriate resources  Description: INTERVENTIONS:  - Identify barriers to discharge w/patient and  caregiver  - Arrange for needed discharge resources and transportation as appropriate  - Identify discharge learning needs (meds, wound care, etc.)  - Arrange for interpretive services to assist at discharge as needed  - Refer to Case Management Department for coordinating discharge planning if the patient needs post-hospital services based on physician/advanced practitioner order or complex needs related to functional status, cognitive ability, or social support system  Outcome: Progressing     Problem: Knowledge Deficit  Goal: Patient/family/caregiver demonstrates understanding of disease process, treatment plan, medications, and discharge instructions  Description: Complete learning assessment and assess knowledge base.  Interventions:  - Provide teaching at level of understanding  - Provide teaching via preferred learning methods  Outcome: Progressing

## 2025-02-21 NOTE — ASSESSMENT & PLAN NOTE
LVEF 45%  Nuclear stress test 10/2024 negative for ischemia  GDMT- Toprol XL 50 mg daily- increasing to 50 mg BID

## 2025-02-21 NOTE — NURSING NOTE
Nurse was contacted by [employer] for updated regarding patient's care. Nurse gave update with patient's permission.

## 2025-02-21 NOTE — PROGRESS NOTES
Progress Note - Cardiology   Name: Cherelle Dash 69 y.o. female I MRN: [id number]  Unit/Bed#: S -01 I Date of Admission: 2/20/2025   Date of Service: 2/21/2025 I Hospital Day: 1     Assessment & Plan  Myocarditis (HCC)  Hs troponin 4066-->3910-->3940  +CP although not clearly anginal as well as reports of SOB, elevated HR, and diaphoresis with exertion.  ECG- NSR with chronic LBBB  Limited TTE- LVEF 45% (unchanged) with mild global hypokinesis, grade I DD, normal RV size/function, mild to moderate MR  No evidence of volume overload  CMR completed- reviewed with reading physician- area of inflammation/edema suggestive of focal myocarditis in inferolateral wall. Formal report pending.  Suspect this is related to Pembrolizumab immunotherapy. Gyn Onc recommending high dose steroids with slow taper over 6-12 weeks- started on 105 mg prednisone   Add colchicine 0.6 mg BID  Increase Toprol XL to 50 mg BID  Dilated cardiomyopathy (HCC)  LVEF 45%  Nuclear stress test 10/2024 negative for ischemia  GDMT- Toprol XL 50 mg daily- increasing to 50 mg BID   LBBB (left bundle branch block)  Chronic, unchanged  Stage 3 chronic kidney disease (HCC)  Lab Results   Component Value Date    EGFR 32 02/21/2025    EGFR 38 02/20/2025    EGFR 33 02/19/2025    CREATININE 1.60 (H) 02/21/2025    CREATININE 1.39 (H) 02/20/2025    CREATININE 1.57 (H) 02/19/2025   Stable   Morbid obesity with BMI of 40.0-44.9, adult (HCC)  BMI 45.21  Endometrial cancer (HCC)  S/p surgical treatment. On chemotherapy since 12/2024: Carboplatin, Taxol, Pembro with plan for XRT after chemotherapy  Other pulmonary embolism without acute cor pulmonale (HCC)  Hx of RLE DVT And PE. Remains on anticoagulation with Eliquis    Subjective  Review of Systems   Constitutional: Negative for chills, malaise/fatigue and weight gain.   Cardiovascular:  Positive for dyspnea on exertion. Negative for chest pain, leg swelling, orthopnea, palpitations and syncope.   Respiratory:   Negative for cough, shortness of breath, sleep disturbances due to breathing and sputum production.    Endocrine: Negative.    Hematologic/Lymphatic: Negative.    Gastrointestinal:  Negative for bloating, nausea and vomiting.   Genitourinary: Negative.    Neurological:  Negative for dizziness, light-headedness and weakness.   Psychiatric/Behavioral:  Negative for altered mental status.    All other systems reviewed and are negative.      Objective:   Vitals: Blood pressure 113/67, pulse 95, temperature 98.5 °F (36.9 °C), resp. rate 16, height 5' (1.524 m), weight 105 kg (231 lb 7.7 oz), SpO2 96%, not currently breastfeeding., Body mass index is 45.21 kg/m².,     Systolic (24hrs), Av , Min:107 , Max:145     Diastolic (24hrs), Av, Min:61, Max:70        Intake/Output Summary (Last 24 hours) at 2025 0948  Last data filed at 2025 0601  Gross per 24 hour   Intake 1120 ml   Output 800 ml   Net 320 ml     Wt Readings from Last 3 Encounters:   25 105 kg (231 lb 7.7 oz)   25 105 kg (232 lb 8 oz)   25 105 kg (231 lb)     Telemetry Review: Sinus tachycardia with brief atrial run and PVCs    Physical Exam  Vitals reviewed.   Constitutional:       General: She is not in acute distress.     Appearance: She is obese.   Neck:      Vascular: No hepatojugular reflux or JVD.   Cardiovascular:      Rate and Rhythm: Regular rhythm. Tachycardia present.      Pulses: Normal pulses.      Heart sounds: Normal heart sounds. No murmur heard.     No friction rub. No gallop.   Pulmonary:      Effort: Pulmonary effort is normal. No respiratory distress.      Breath sounds: No rales.      Comments: Clear, room air  Abdominal:      General: Bowel sounds are normal. There is no distension.      Palpations: Abdomen is soft.      Tenderness: There is no abdominal tenderness.   Musculoskeletal:         General: No tenderness. Normal range of motion.      Cervical back: Neck supple.      Right lower leg: Edema (mild)  present.      Left lower leg: No edema.   Skin:     General: Skin is warm and dry.      Capillary Refill: Capillary refill takes less than 2 seconds.      Findings: No erythema.   Neurological:      Mental Status: She is alert and oriented to person, place, and time.   Psychiatric:         Mood and Affect: Mood normal.         LABORATORY RESULTS  Results from last 7 days   Lab Units 02/20/25  0910   CK TOTAL U/L 87     CBC with diff:   Results from last 7 days   Lab Units 02/21/25  0739 02/20/25  0910 02/19/25  0911   WBC Thousand/uL 6.69 7.06 7.52   HEMOGLOBIN g/dL 10.0* 10.6* 11.0*   HEMATOCRIT % 31.8* 33.4* 34.6*   MCV fL 96 96 96   PLATELETS Thousands/uL 91* 105* 106*   RBC Million/uL 3.31* 3.48* 3.59*   MCH pg 30.2 30.5 30.6   MCHC g/dL 31.4 31.7 31.8   RDW % 21.7* 21.5* 21.7*   MPV fL 12.4 12.9* 12.3     CMP:  Results from last 7 days   Lab Units 02/21/25  0739 02/20/25  0910 02/19/25  0911   POTASSIUM mmol/L 4.3 4.5 4.2   CHLORIDE mmol/L 109* 107 107   CO2 mmol/L 25 24 24   BUN mg/dL 29* 26* 27*   CREATININE mg/dL 1.60* 1.39* 1.57*   CALCIUM mg/dL 8.8 8.9 8.8   AST U/L  --  27 43*   ALT U/L  --  42 53*   ALK PHOS U/L  --  94 97   EGFR ml/min/1.73sq m 32 38 33     BMP:  Results from last 7 days   Lab Units 02/21/25  0739 02/20/25  0910 02/19/25  0911   POTASSIUM mmol/L 4.3 4.5 4.2   CHLORIDE mmol/L 109* 107 107   CO2 mmol/L 25 24 24   BUN mg/dL 29* 26* 27*   CREATININE mg/dL 1.60* 1.39* 1.57*   CALCIUM mg/dL 8.8 8.9 8.8     Lab Results   Component Value Date    CREATININE 1.60 (H) 02/21/2025    CREATININE 1.39 (H) 02/20/2025    CREATININE 1.57 (H) 02/19/2025     Results from last 7 days   Lab Units 02/21/25  0739 02/20/25  0910 02/19/25  0911   MAGNESIUM mg/dL 2.1 1.8* 1.6*     Results from last 7 days   Lab Units 02/20/25  0910   TSH 3RD GENERATON uIU/mL 2.136     Lipid Profile:   Lab Results   Component Value Date    CHOL 208 (H) 11/17/2017    CHOL 199 05/22/2017    CHOL 210 (H) 09/30/2016     Lab Results    Component Value Date    HDL 54 06/06/2024    HDL 50 09/08/2023    HDL 51 01/12/2023     Lab Results   Component Value Date    LDLCALC 123 (H) 06/06/2024    LDLCALC 111 (H) 09/08/2023    LDLCALC 127 (H) 01/12/2023     Lab Results   Component Value Date    TRIG 115 06/06/2024    TRIG 144 09/08/2023    TRIG 116 01/12/2023       Meds/Allergies   all current active meds have been reviewed and current meds:   Current Facility-Administered Medications:     acetaminophen (TYLENOL) tablet 650 mg, Q6H PRN    Cholecalciferol (VITAMIN D3) tablet 2,000 Units, Daily    docusate sodium (COLACE) capsule 100 mg, BID    famotidine (PEPCID) tablet 20 mg, Daily    LORazepam (ATIVAN) tablet 1 mg, Q8H PRN    lurasidone (LATUDA) tablet 20 mg, Daily With Breakfast    metoprolol succinate (TOPROL-XL) 24 hr tablet 50 mg, Daily    ondansetron (ZOFRAN) injection 4 mg, Q6H PRN    oxybutynin (DITROPAN-XL) 24 hr tablet 10 mg, Daily    polyethylene glycol (MIRALAX) packet 17 g, Daily PRN    predniSONE tablet 105 mg, Daily    Facility-Administered Medications Ordered in Other Encounters:     [MAR Hold] alteplase (CATHFLO) injection 2 mg, Q1MIN PRN    Medications Prior to Admission:     apixaban (Eliquis) 5 mg    chlorhexidine (PERIDEX) 0.12 % solution    Cholecalciferol (VITAMIN D) 2000 units CAPS    clobetasol (TEMOVATE) 0.05 % ointment    CRANBERRY PO    famotidine (PEPCID) 20 mg tablet    Glucosamine-Chondroit-Vit C-Mn (GLUCOSAMINE 1500 COMPLEX PO)    Ivermectin 1 % CREA    lidocaine-prilocaine (EMLA) cream    LORazepam (ATIVAN) 1 mg tablet    lurasidone (LATUDA) 20 mg tablet    metoprolol succinate (TOPROL-XL) 50 mg 24 hr tablet    Mirabegron ER 50 MG TB24    Multiple Vitamin (MULTI-VITAMIN DAILY) TABS    polyethylene glycol (GLYCOLAX) 17 GM/SCOOP powder    predniSONE 10 mg tablet    Sodium Fluoride 5000 PPM 1.1 % GEL         Counseling / Coordination of Care  Total floor / unit time spent today 20 minutes.  Greater than 50% of total time was  spent with the patient and / or family counseling and / or coordination of care.      ** Please Note: Dragon 360 Dictation voice to text software may have been used in the creation of this document. **

## 2025-02-21 NOTE — ASSESSMENT & PLAN NOTE
Prior EF of 40% diagnosed with her PE which improved to 45%  Following Cardio-Oncology   Repeat Limited ECHO with stable EF of 45%  Continue Toprol-XL 50 mg QD   Not on other GDMT  Lasix IV x 1 per Cardiology

## 2025-02-21 NOTE — PROGRESS NOTES
Progress Note - Hospitalist   Name: Cherelle Dash 69 y.o. female I MRN: [id number]  Unit/Bed#: S -01 I Date of Admission: 2/20/2025   Date of Service: 2/21/2025 I Hospital Day: 1    Assessment & Plan  Myocarditis (HCC)  Patient presented with chest heaviness and dyspnea on exertion since her last chemo on Friday 2/14. Initial troponin 4,066 and has remained relatively flat to 3,940. She was tachycardic initially   No volume on PE study, no PE, no orthopnea. Does not look like overt heart failure   Recent negative stress test   cMRI showing focal myocarditis  Cardiology consult appreciated  Telemetry   Prednisone 1 mg/kg per day with 6-12 week taper per Gyn/Onc   ?Consider colchicine   Continue Toprol-XL, increase per cardiology   Dilated cardiomyopathy (HCC)  Prior EF of 40% diagnosed with her PE which improved to 45%  Following Cardio-Oncology   Repeat Limited ECHO with stable EF of 45%  Continue Toprol-XL 50 mg QD   Not on other GDMT  Lasix IV x 1 per Cardiology    Endometrial cancer (HCC)  Following GYN/Onc  Currently on Taxol/Carboplatin/Pembrolizumab - last dose on 2/14  Follows Cardio-Oncology as well   Repeat limited ECHO with stable EF  Focal myocarditis noted on cMRI   Gyn/Onc consulted given suspect reaction due to Keytruda   Stage 3 chronic kidney disease (HCC)  Lab Results   Component Value Date    EGFR 32 02/21/2025    EGFR 38 02/20/2025    EGFR 33 02/19/2025    CREATININE 1.60 (H) 02/21/2025    CREATININE 1.39 (H) 02/20/2025    CREATININE 1.57 (H) 02/19/2025   Renal function at baseline   Monitor BMP  Avoid hypotension   Morbid obesity with BMI of 40.0-44.9, adult (HCC)  BMI noted   Encourage diet and lifestyle modifications   LBBB (left bundle branch block)  Chronic  Cardiology following in light of primary problem   Other pulmonary embolism without acute cor pulmonale (HCC)  Status post IVC - no missed Eliquis   No need for heparin drip further as she has myocarditis and not ACS  Restart  Eliquis   Other acute myocarditis      VTE Pharmacologic Prophylaxis: VTE Score: 5 High Risk (Score >/= 5) - Pharmacological DVT Prophylaxis Ordered: apixaban (Eliquis). Sequential Compression Devices Ordered.    Mobility:   Basic Mobility Inpatient Raw Score: 24  JH-HLM Goal: 8: Walk 250 feet or more  JH-HLM Achieved: 6: Walk 10 steps or more  JH-HLM Goal achieved. Continue to encourage appropriate mobility.    Patient Centered Rounds: I performed bedside rounds with nursing staff today.   Discussions with Specialists or Other Care Team Provider: Discussed with Cardiology, Gyn/Onc, RN, CM     Education and Discussions with Family / Patient: Updated  () at bedside.    Current Length of Stay: 1 day(s)  Current Patient Status: Inpatient   Certification Statement: The patient will continue to require additional inpatient hospital stay due to on going treatment of myocarditis   Discharge Plan: Anticipate discharge in 24-48 hrs to home.    Code Status: Level 1 - Full Code    Subjective   Patient reports that she got winded coming back from the bathroom. Denies chest pain or palpitations. Unaware of HR elevation.     Objective :  Temp:  [97.6 °F (36.4 °C)-98.5 °F (36.9 °C)] 98.5 °F (36.9 °C)  HR:  [] 101  BP: (107-145)/(61-71) 121/71  Resp:  [16-18] 16  SpO2:  [95 %-98 %] 95 %  O2 Device: None (Room air)    Body mass index is 45.21 kg/m².     Input and Output Summary (last 24 hours):     Intake/Output Summary (Last 24 hours) at 2/21/2025 1102  Last data filed at 2/21/2025 0601  Gross per 24 hour   Intake 1120 ml   Output 800 ml   Net 320 ml       Physical Exam  Vitals and nursing note reviewed.   Constitutional:       General: She is not in acute distress.     Appearance: Normal appearance. She is obese. She is not ill-appearing or diaphoretic.   HENT:      Head: Normocephalic and atraumatic.      Mouth/Throat:      Mouth: Mucous membranes are moist.   Eyes:      General: No scleral icterus.      Pupils: Pupils are equal, round, and reactive to light.   Cardiovascular:      Rate and Rhythm: Regular rhythm. Tachycardia present.      Pulses: Normal pulses.      Heart sounds: Normal heart sounds, S1 normal and S2 normal. No murmur heard.     No systolic murmur is present.      No diastolic murmur is present.      No gallop. No S3 or S4 sounds.   Pulmonary:      Effort: Pulmonary effort is normal. No accessory muscle usage or respiratory distress.      Breath sounds: Normal breath sounds. No stridor. No decreased breath sounds, wheezing, rhonchi or rales.   Chest:      Chest wall: No tenderness.   Abdominal:      General: Bowel sounds are normal. There is no distension.      Palpations: Abdomen is soft.      Tenderness: There is no abdominal tenderness. There is no guarding.   Musculoskeletal:      Right lower leg: No edema.      Left lower leg: No edema.   Skin:     General: Skin is warm and dry.      Coloration: Skin is not jaundiced.   Neurological:      General: No focal deficit present.      Mental Status: She is alert. Mental status is at baseline.      Motor: No tremor or seizure activity.   Psychiatric:         Behavior: Behavior is cooperative.           Lines/Drains:      Central Line:  Goal for removal: N/A - Chronic PICC         Telemetry:  Telemetry Orders (From admission, onward)               24 Hour Telemetry Monitoring  (ED Bridging Orders Panel)  Continuous x 24 Hours (Telem)        Expiring   Question:  Reason for 24 Hour Telemetry  Answer:  PCI/EP study (including pacer and ICD implementation), Cardiac surgery, MI, abnormal cardiac cath, and chest pain- rule out MI                     Telemetry Reviewed: Sinus Tachycardia  Indication for Continued Telemetry Use: Artis/laurie/endocarditis               Lab Results: I have reviewed the following results:   Results from last 7 days   Lab Units 02/21/25  0739 02/20/25  0910   WBC Thousand/uL 6.69 7.06   HEMOGLOBIN g/dL 10.0* 10.6*   HEMATOCRIT %  31.8* 33.4*   PLATELETS Thousands/uL 91* 105*   BANDS PCT %  --  4   LYMPHO PCT %  --  17   MONO PCT %  --  14*   EOS PCT %  --  0     Results from last 7 days   Lab Units 02/21/25  0739 02/20/25  0910   SODIUM mmol/L 140 139   POTASSIUM mmol/L 4.3 4.5   CHLORIDE mmol/L 109* 107   CO2 mmol/L 25 24   BUN mg/dL 29* 26*   CREATININE mg/dL 1.60* 1.39*   ANION GAP mmol/L 6 8   CALCIUM mg/dL 8.8 8.9   ALBUMIN g/dL  --  3.8   TOTAL BILIRUBIN mg/dL  --  0.50   ALK PHOS U/L  --  94   ALT U/L  --  42   AST U/L  --  27   GLUCOSE RANDOM mg/dL 151* 141*                 Results from last 7 days   Lab Units 02/20/25  0910   LACTIC ACID mmol/L 2.0       Recent Cultures (last 7 days):         Imaging Results Review: No pertinent imaging studies reviewed.  Other Study Results Review: No additional pertinent studies reviewed.    Last 24 Hours Medication List:     Current Facility-Administered Medications:     acetaminophen (TYLENOL) tablet 650 mg, Q6H PRN    Cholecalciferol (VITAMIN D3) tablet 2,000 Units, Daily    docusate sodium (COLACE) capsule 100 mg, BID    famotidine (PEPCID) tablet 20 mg, Daily    furosemide (LASIX) injection 40 mg, Once    LORazepam (ATIVAN) tablet 1 mg, Q8H PRN    lurasidone (LATUDA) tablet 20 mg, Daily With Breakfast    metoprolol succinate (TOPROL-XL) 24 hr tablet 50 mg, Daily    ondansetron (ZOFRAN) injection 4 mg, Q6H PRN    oxybutynin (DITROPAN-XL) 24 hr tablet 10 mg, Daily    polyethylene glycol (MIRALAX) packet 17 g, Daily PRN    predniSONE tablet 105 mg, Daily    Facility-Administered Medications Ordered in Other Encounters:     [MAR Hold] alteplase (CATHFLO) injection 2 mg, Q1MIN PRN    Administrative Statements   Today, Patient Was Seen By: Nathan Canales PA-C  I have spent a total time of 35 minutes in caring for this patient on the day of the visit/encounter including Diagnostic results, Instructions for management, Impressions, Counseling / Coordination of care, Documenting in the medical  record, Reviewing/placing orders in the medical record (including tests, medications, and/or procedures), Obtaining or reviewing history  , and Communicating with other healthcare professionals .    **Please Note: This note may have been constructed using a voice recognition system.**

## 2025-02-21 NOTE — ASSESSMENT & PLAN NOTE
Hs troponin 4066-->3910-->3940  +CP although not clearly anginal as well as reports of SOB, elevated HR, and diaphoresis with exertion.  ECG- NSR with chronic LBBB  Limited TTE- LVEF 45% (unchanged) with mild global hypokinesis, grade I DD, normal RV size/function, mild to moderate MR  No evidence of volume overload  CMR completed- reviewed with reading physician- area of inflammation/edema suggestive of focal myocarditis in inferolateral wall. Formal report pending.  Suspect this is related to Pembrolizumab immunotherapy. Gyn Onc recommending high dose steroids with slow taper over 6-12 weeks- started on 105 mg prednisone   Add colchicine 0.6 mg BID  Increase Toprol XL to 50 mg BID

## 2025-02-21 NOTE — ASSESSMENT & PLAN NOTE
Following GYN/Onc  Currently on Taxol/Carboplatin/Pembrolizumab - last dose on 2/14  Follows Cardio-Oncology as well   Repeat limited ECHO with stable EF  Focal myocarditis noted on cMRI   Gyn/Onc consulted given suspect reaction due to Keytruda

## 2025-02-21 NOTE — ASSESSMENT & PLAN NOTE
Lab Results   Component Value Date    EGFR 32 02/21/2025    EGFR 38 02/20/2025    EGFR 33 02/19/2025    CREATININE 1.60 (H) 02/21/2025    CREATININE 1.39 (H) 02/20/2025    CREATININE 1.57 (H) 02/19/2025   Stable

## 2025-02-21 NOTE — PROGRESS NOTES
Progress Note - GYN Oncology   Name: Cherelle Dash 69 y.o. female I MRN: [id number]  Unit/Bed#: S -01 I Date of Admission: 2/20/2025   Date of Service: 2/21/2025 I Hospital Day: 1     Assessment & Plan  Other acute myocarditis  Suspect immunotherapy-induced (pembrolizumab) vs viral origin    Tachycardic on presentation (120s)  Troponin 4066 > 3910  ECG - NSR, chronic LBBB  CTPE - negative      Plan for High-dose steroids: oral prednisone (1 mg/kg/day), then taper slowly over 6-12 weeks based on clinical response and improvement of biomarkers. If unresponsive can consider IV methylprednisolone 1 g/day for 3-5 days    Recommend telemetry    Endometrial cancer (HCC)  Stage IIIC1, grade 1 endometrial cancer s/p surgical resection  Currently receiving adjuvant chemotherapy with carboplatin/taxol/prembrolizumab  History of grade 2 immunotherapy-induced pneumonitis    Dilated cardiomyopathy (HCC)  LVEF 45%  Home med: Toprol XL 50 mg daily  Elevated troponin  4066 -> 3910  Stage 3 chronic kidney disease (HCC)  Lab Results   Component Value Date    EGFR 38 02/20/2025    EGFR 33 02/19/2025    EGFR 34 02/05/2025    CREATININE 1.39 (H) 02/20/2025    CREATININE 1.57 (H) 02/19/2025    CREATININE 1.52 (H) 02/05/2025     Morbid obesity with BMI of 40.0-44.9, adult (HCC)    LBBB (left bundle branch block)    Other pulmonary embolism without acute cor pulmonale (HCC)      GYNECOLOGY  Subjective   Cherelle is feeling well this morning without complaint. She has not had any additional chest pain or shortness of breath overnight.     Objective :  Temp:  [97.6 °F (36.4 °C)-98.3 °F (36.8 °C)] 98.3 °F (36.8 °C)  HR:  [] 97  BP: (107-145)/(61-70) 107/65  Resp:  [16-22] 16  SpO2:  [96 %-98 %] 97 %  O2 Device: None (Room air)      Physical Exam  Constitutional:       General: She is not in acute distress.     Appearance: Normal appearance.   HENT:      Head: Normocephalic and atraumatic.   Eyes:      Extraocular Movements:  Extraocular movements intact.   Cardiovascular:      Rate and Rhythm: Normal rate.      Pulses: Normal pulses.   Pulmonary:      Effort: Pulmonary effort is normal. No respiratory distress.   Abdominal:      General: There is no distension.      Palpations: Abdomen is soft.      Tenderness: There is no abdominal tenderness. There is no guarding or rebound.   Musculoskeletal:         General: Normal range of motion.      Cervical back: Normal range of motion.   Neurological:      General: No focal deficit present.      Mental Status: She is alert and oriented to person, place, and time.   Skin:     General: Skin is warm and dry.   Psychiatric:         Mood and Affect: Mood normal.         Behavior: Behavior normal.   Vitals reviewed.

## 2025-02-21 NOTE — ASSESSMENT & PLAN NOTE
Status post IVC - no missed Eliquis   No need for heparin drip further as she has myocarditis and not ACS  Restart Eliquis

## 2025-02-21 NOTE — PLAN OF CARE
Problem: PAIN - ADULT  Goal: Verbalizes/displays adequate comfort level or baseline comfort level  Description: Interventions:  - Encourage patient to monitor pain and request assistance  - Assess pain using appropriate pain scale  - Administer analgesics based on type and severity of pain and evaluate response  - Implement non-pharmacological measures as appropriate and evaluate response  - Consider cultural and social influences on pain and pain management  - Notify physician/advanced practitioner if interventions unsuccessful or patient reports new pain  Outcome: Progressing     Problem: INFECTION - ADULT  Goal: Absence or prevention of progression during hospitalization  Description: INTERVENTIONS:  - Assess and monitor for signs and symptoms of infection  - Monitor lab/diagnostic results  - Monitor all insertion sites, i.e. indwelling lines, tubes, and drains  - Monitor endotracheal if appropriate and nasal secretions for changes in amount and color  - [place] appropriate cooling/warming therapies per order  - Administer medications as ordered  - Instruct and encourage patient and family to use good hand hygiene technique  - Identify and instruct in appropriate isolation precautions for identified infection/condition  Outcome: Progressing  Goal: Absence of fever/infection during neutropenic period  Description: INTERVENTIONS:  - Monitor WBC    Outcome: Progressing     Problem: SAFETY ADULT  Goal: Patient will remain free of falls  Description: INTERVENTIONS:  - Educate patient/family on patient safety including physical limitations  - Instruct patient to call for assistance with activity   - Consult OT/PT to assist with strengthening/mobility   - Keep Call bell within reach  - Keep bed low and locked with side rails adjusted as appropriate  - Keep care items and personal belongings within reach  - Initiate and maintain comfort rounds  - Make Fall Risk Sign visible to staff  - Offer Toileting every 2 Hours,  in advance of need  - Initiate/Maintain bed and chair alarm  - Obtain necessary fall risk management equipment:   - Apply yellow socks and bracelet for high fall risk patients  - Consider moving patient to room near nurses station  Outcome: Progressing  Goal: Maintain or return to baseline ADL function  Description: INTERVENTIONS:  -  Assess patient's ability to carry out ADLs; assess patient's baseline for ADL function and identify physical deficits which impact ability to perform ADLs (bathing, care of mouth/teeth, toileting, grooming, dressing, etc.)  - Assess/evaluate cause of self-care deficits   - Assess range of motion  - Assess patient's mobility; develop plan if impaired  - Assess patient's need for assistive devices and provide as appropriate  - Encourage maximum independence but intervene and supervise when necessary  - Involve family in performance of ADLs  - Assess for home care needs following discharge   - Consider OT consult to assist with ADL evaluation and planning for discharge  - Provide patient education as appropriate  Outcome: Progressing  Goal: Maintains/Returns to pre admission functional level  Description: INTERVENTIONS:  - Perform AM-PAC 6 Click Basic Mobility/ Daily Activity assessment daily.  - Set and communicate daily mobility goal to care team and patient/family/caregiver.   - Collaborate with rehabilitation services on mobility goals if consulted  - Perform Range of Motion 3 times a day.  - Reposition patient every 2 hours.  - Dangle patient 3 times a day  - Stand patient 3 times a day  - Ambulate patient 3 times a day  - Out of bed to chair 3 times a day   - Out of bed for meals 3 times a day  - Out of bed for toileting  - Record patient progress and toleration of activity level   Outcome: Progressing     Problem: DISCHARGE PLANNING  Goal: Discharge to home or other facility with appropriate resources  Description: INTERVENTIONS:  - Identify barriers to discharge w/patient and  caregiver  - Arrange for needed discharge resources and transportation as appropriate  - Identify discharge learning needs (meds, wound care, etc.)  - Arrange for interpretive services to assist at discharge as needed  - Refer to Case Management Department for coordinating discharge planning if the patient needs post-hospital services based on physician/advanced practitioner order or complex needs related to functional status, cognitive ability, or social support system  Outcome: Progressing     Problem: Knowledge Deficit  Goal: Patient/family/caregiver demonstrates understanding of disease process, treatment plan, medications, and discharge instructions  Description: Complete learning assessment and assess knowledge base.  Interventions:  - Provide teaching at level of understanding  - Provide teaching via preferred learning methods  Outcome: Progressing

## 2025-02-21 NOTE — UTILIZATION REVIEW
Initial Clinical Review    Admission: Date/Time/Statement:   Admission Orders (From admission, onward)       Ordered        02/20/25 1346  INPATIENT ADMISSION  Once                          Orders Placed This Encounter   Procedures    INPATIENT ADMISSION     Standing Status:   Standing     Number of Occurrences:   1     Level of Care:   Med Surg [16]     Estimated length of stay:   More than 2 Midnights     Certification:   I certify that inpatient services are medically necessary for this patient for a duration of greater than two midnights. See H&P and MD Progress Notes for additional information about the patient's course of treatment.     ED Arrival Information       Expected   -    Arrival   2/20/2025 07:54    Acuity   Emergent              Means of arrival   Walk-In    Escorted by   Self    Service   Hospitalist    Admission type   Emergency              Arrival complaint   Elevated heart rate/Difficulty breathing             Chief Complaint   Patient presents with    Shortness of Breath     Pt reports last chemo treatment last Friday, pulse in 120s, SOB worsening with exertion       Initial Presentation: 69 y.o. female who presented self from home to [facility] ED. Admitted as Inpatient for evaluation and treatment of myocarditis. Pertinent PMHx: cervical & uterine cancer (on carboplatin, taxol, and pembrolizumab), CKD, DVT/PE, sleep apnea. Presented w/ chest pain and elevated troponins, recent x for immune mediated pneumonitis w/ steroids. Concern fro immune mediated myocarditis. HEART Score 7. EXAM: tachycardic (123). Trop 4066 -- 3910 -- 3940. Mag 1.8. no EKG changes. PLAN: telemetry, IV heparin gtt; check PTT every six hours until 2 consecutive PTTs are therapeutic, then obtain PTT daily in AM; echo, continue toprol-XL, Trend labs, replete electrolytes as needed. Telemetry, continue PTA meds, I&O. Cardiology, Gyn Onc consulted.     Anticipated Length of Stay/Certification Statement: Patient will  be admitted on an inpatient basis with an anticipated length of stay of greater than 2 midnights     GYN Oncology: Pt w/ LOMBARDI, elevated troponins. EF stable at 45%. High mortality risk -- start empiric tx w/ high dose steroids. Telemetry.     Cardiology: Pt reports symptoms began after chemo on Friday. Check CRP and ESR, check cardiac MRI. If myocarditis confirmed, start colchicine.       Date: 02/21/25   Day 2: Reports being winded after coming back from bathroom. Exam: tachycardic. Telemetry: sinus tachycardic. Crt bumped up to 1.6. cMRI showed focal myocarditis. Plan: telemetry, prednisone weight based dosing w/ prolonged taper, continue other current meds, IV lasix x1, resume Eliquis and d/c IV heparin gtt.       Date: 02/22/25  Day 3: Has surpassed a 2nd midnight with active treatments and services. Noted an episode of chest discomfort last night and this morning when ambulating to bathroom, but resolved quickly. Reports being unaware of her heart rate being fast. Exam: tachycardic, RLE edema. Telemetry; sinus tachycardia. Crt bumped up to 1.76 (baseline 1.3-1.5). Plan: continue telemetry, prednisone weight based, colchicine BID, continue other current meds, Trend labs, replete electrolytes as needed.       ED Treatment-Medication Administration from 02/20/2025 0754 to 02/20/2025 1707         Date/Time Order Dose Route Action     02/20/2025 0913 sodium chloride 0.9 % bolus 1,000 mL 1,000 mL Intravenous New Bag     02/20/2025 1104 heparin (porcine) injection 4,000 Units 4,000 Units Intravenous Given     02/20/2025 1103 heparin (porcine) 25,000 units in 0.45% NaCl 250 mL infusion (premix) 11.1 Units/kg/hr Intravenous New Bag     02/20/2025 1058 magnesium sulfate 2 g/50 mL IVPB (premix) 2 g 2 g Intravenous New Bag     02/20/2025 1131 iohexol (OMNIPAQUE) 350 MG/ML injection (MULTI-DOSE) 70 mL 70 mL Intravenous Given            Scheduled Medications:  apixaban, 5 mg, Oral, BID  Cholecalciferol, 2,000 Units, Oral,  Daily  colchicine, 0.6 mg, Oral, BID  docusate sodium, 100 mg, Oral, BID  famotidine, 20 mg, Oral, Daily  lurasidone, 20 mg, Oral, Daily With Breakfast  metoprolol succinate, 50 mg, Oral, Q12H  oxybutynin, 10 mg, Oral, Daily  predniSONE, 1 mg/kg/day, Oral, Daily    Continuous IV Infusions:  heparin (porcine) 25,000 units in 0.45% NaCl 250 mL infusion (premix)  Rate: 2.7-18 mL/hr Dose: 3-20 Units/kg/hr  Weight Dosing Info: 90 kg (Order-Specific)  Freq: Titrated Route: IV  Last Dose: Stopped (02/21/25 0942)  Start: 02/20/25 1015 End: 02/21/25 0907    PRN Meds:  acetaminophen, 650 mg, Oral, Q6H PRN  furosemide, 40 mg, Intravenous; 2/21 x1  LORazepam, 1 mg, Oral, Q8H PRN  ondansetron, 4 mg, Intravenous, Q6H PRN  polyethylene glycol, 17 g, Oral, Daily PRN      ED Triage Vitals [02/20/25 0802]   Temperature Pulse Respirations Blood Pressure SpO2 Pain Score   97.8 °F (36.6 °C) (!) 123 22 138/70 97 % No Pain     Weight (last 2 days)       Date/Time Weight    02/20/25 1405 105 (231.48)            Vital Signs (last 3 days)       Date/Time Temp Pulse Resp BP MAP (mmHg) SpO2 O2 Device Patient Position - Orthostatic VS Pain    02/22/25 0906 -- -- -- -- -- -- -- -- Med Not Given for Pain - for MAR use only    02/22/25 07:13:57 97.6 °F (36.4 °C) 75 -- 108/69 82 96 % -- -- --    02/22/25 04:09:59 -- 79 -- 136/73 94 95 % -- -- --    02/21/25 22:50:50 -- 108 -- 155/90 112 97 % -- -- --    02/21/25 21:31:42 98.1 °F (36.7 °C) 86 -- 115/63 80 94 % -- Sitting --    02/21/25 2100 -- -- -- -- -- -- -- -- No Pain    02/21/25 19:00:18 98.1 °F (36.7 °C) 85 -- 111/63 79 92 % -- Lying --    02/21/25 1808 -- -- -- -- -- -- -- -- No Pain    02/21/25 15:25:16 98.2 °F (36.8 °C) 91 18 109/71 84 94 % None (Room air) Sitting --    02/21/25 1254 -- -- -- -- -- -- -- -- No Pain    02/21/25 11:04:19 -- 87 -- 116/70 85 96 % -- -- --    02/21/25 1000 -- 101 -- 121/71 -- -- -- -- --    02/21/25 09:48:48 -- 101 -- 121/71 88 95 % -- -- --    02/21/25 0800 --  -- -- -- -- -- -- -- No Pain    02/21/25 07:00:22 98.5 °F (36.9 °C) 95 16 113/67 82 96 % -- -- --    02/20/25 22:16:12 98.3 °F (36.8 °C) 97 -- 107/65 79 97 % -- -- --    02/20/25 2100 -- -- -- -- -- -- -- -- No Pain    02/20/25 1809 -- -- -- -- -- -- -- -- No Pain    02/20/25 1800 -- -- -- -- -- -- -- -- No Pain    02/20/25 17:53:44 97.6 °F (36.4 °C) 99 16 123/70 88 96 % -- -- --    02/20/25 1630 -- 92 -- 128/64 92 97 % -- -- --    02/20/25 1628 -- 94 18 128/64 92 98 % None (Room air) Sitting --    02/20/25 1509 -- 99 -- 145/64 92 97 % None (Room air) Lying --    02/20/25 1405 -- 92 -- 123/61 -- -- -- -- --    02/20/25 1352 -- -- -- 123/61 88 -- -- Sitting --    02/20/25 1300 -- -- -- -- -- -- None (Room air) -- --    02/20/25 1048 -- 91 -- 133/63 90 -- -- Lying --    02/20/25 0802 97.8 °F (36.6 °C) 123 22 138/70 -- 97 % None (Room air) Sitting No Pain              Pertinent Labs/Diagnostic Test Results:   Radiology:  CTA chest pe study   Final Interpretation by Rafita Combs DO (02/20 9402)      1.  No evidence of acute pulmonary embolism.   2.  Scattered groundglass opacities which are overall not significantly changed from CT of 1/31/2024. These opacities are again favored to be infectious/inflammatory in etiology. Consider short interval follow-up CT chest in 3 months to ensure complete    resolution.   3.  Right lower lobe pulmonary nodule measuring 5 mm, stable.                  Workstation performed: [id number]         XR chest 2 views   ED Interpretation by Megha Shepherd MD (02/20 1070)   Unremarkable cardiac silhouette.  Lungs clear.      Final Interpretation by Lidia Damon MD (02/20 7354)      No acute cardiopulmonary disease.            Workstation performed: [id number]         MRI cardiac  w wo contrast    (Results Pending)     Cardiology:  Echo follow up/limited w/ contrast if indicated   Final Result by Orly Selby DO (02/20 2776)        Left Ventricle: Left ventricular  cavity size is normal. Wall thickness    is increased. The left ventricular ejection fraction is 45%. Systolic    function is mildly reduced. Global longitudinal strain is reduced at -13%.    There is mild global hypokinesis. Diastolic function is mildly abnormal,    consistent with grade I (abnormal) relaxation.     IVS: There is abnormal septal motion consistent with left bundle branch    block.     Right Ventricle: Right ventricular cavity size is normal. Systolic    function is normal.     Mitral Valve: There is mild to moderate regurgitation with a centrally    directed jet.      Strain was performed to quantify interventricular dyssynchrony and    evaluate components of myocardial function due to Chemotherapy. Results    from the utilization of Strain Analysis are listed in the report below.         ECG 12 lead   Final Result by Mert Solares MD (02/20 6665)   Normal sinus rhythm   Left axis deviation   Left bundle branch block   Abnormal ECG   When compared with ECG of 01-Oct-2024 06:55,   No significant change was found   Confirmed by Mert Solares ([id number]) on 2/20/2025 12:59:05 PM            Results from last 7 days   Lab Units 02/22/25  0618 02/21/25  0739 02/20/25  0910 02/19/25  0911   WBC Thousand/uL 12.87* 6.69 7.06 7.52   HEMOGLOBIN g/dL 10.2* 10.0* 10.6* 11.0*   HEMATOCRIT % 31.7* 31.8* 33.4* 34.6*   PLATELETS Thousands/uL 113* 91* 105* 106*   BANDS PCT % 14*  --  4 4         Results from last 7 days   Lab Units 02/22/25  0538 02/21/25  0739 02/20/25  0910 02/19/25  0911   SODIUM mmol/L 135 140 139 140   POTASSIUM mmol/L 5.0 4.3 4.5 4.2   CHLORIDE mmol/L 103 109* 107 107   CO2 mmol/L 21 25 24 24   ANION GAP mmol/L 11 6 8 9   BUN mg/dL 31* 29* 26* 27*   CREATININE mg/dL 1.76* 1.60* 1.39* 1.57*   EGFR ml/min/1.73sq m 28 32 38 33   CALCIUM mg/dL 9.1 8.8 8.9 8.8   MAGNESIUM mg/dL 2.2 2.1 1.8* 1.6*     Results from last 7 days   Lab Units 02/20/25  0910 02/19/25  0911   AST U/L 27 43*   ALT U/L 42 53*    ALK PHOS U/L 94 97   TOTAL PROTEIN g/dL 6.4 6.3*   ALBUMIN g/dL 3.8 3.7   TOTAL BILIRUBIN mg/dL 0.50 0.57         Results from last 7 days   Lab Units 02/22/25  0538 02/21/25  0739 02/20/25  0910 02/19/25  0911   GLUCOSE RANDOM mg/dL 161* 151* 141* 197*     Results from last 7 days   Lab Units 02/20/25  0910   CK TOTAL U/L 87     Results from last 7 days   Lab Units 02/20/25  1354 02/20/25  1101 02/20/25  0910   HS TNI 0HR ng/L  --   --  4,066*   HS TNI 2HR ng/L  --  3,910*  --    HSTNI D2 ng/L  --  -156  --    HS TNI 4HR ng/L 3,940*  --   --    HSTNI D4 ng/L -126  --   --      Results from last 7 days   Lab Units 02/20/25  0910   D-DIMER QUANTITATIVE ug/ml FEU 0.79*     Results from last 7 days   Lab Units 02/21/25  0745 02/21/25  0103 02/20/25  1721   PTT seconds 75* 78* 50*     Results from last 7 days   Lab Units 02/20/25  0910   TSH 3RD GENERATON uIU/mL 2.136         Results from last 7 days   Lab Units 02/20/25  0910   LACTIC ACID mmol/L 2.0     Results from last 7 days   Lab Units 02/20/25  0910   BNP pg/mL 330*     Results from last 7 days   Lab Units 02/20/25  1721   CRP mg/L 73.4*   SED RATE mm/hour 32*         Past Medical History:   Diagnosis Date    Abnormal ECG     Anxiety 2002    Arthritis     Bipolar 1 disorder (HCC)     Cervical cancer (HCC)     Chronic kidney disease     stage 3    CPAP (continuous positive airway pressure) dependence     Depression 2001    Disease of thyroid gland     DVT (deep venous thrombosis) (HCC)     BLLE    Elevated blood pressure reading 06/10/2019    GERD (gastroesophageal reflux disease)     Obesity     Pulmonary emboli (HCC)     B/L    RSV (acute bronchiolitis due to respiratory syncytial virus) 12/05/2023    Sleep apnea     Sleep apnea, obstructive 2007    Urinary incontinence 2019    Uterine cancer (HCC)      Present on Admission:   Stage 3 chronic kidney disease (HCC)   LBBB (left bundle branch block)   Endometrial cancer (HCC)   Dilated cardiomyopathy (HCC)    Myocarditis (HCC)   Other pulmonary embolism without acute cor pulmonale (HCC)   Other acute myocarditis      Admitting Diagnosis: Endometrial cancer (HCC) [C54.1]  Dyspnea [R06.00]  SOB (shortness of breath) [R06.02]  Elevated troponin [R79.89]  Intermittent chest pain [R07.9]  Age/Sex: 69 y.o. female    Network Utilization Review Department  ATTENTION: Please call with any questions or concerns to [phone] and carefully listen to the prompts so that you are directed to the right person. All voicemails are confidential.   For Discharge needs, contact Care Management DC Support Team at [phone] opt. [phone]  Send all requests for admission clinical reviews, approved or denied determinations and any other requests to dedicated fax number below belonging to the campus where the patient is receiving treatment. List of dedicated fax numbers for the Facilities:  FACILITY NAME UR FAX NUMBER   ADMISSION DENIALS (Administrative/Medical Necessity) [phone]   DISCHARGE SUPPORT TEAM (NETWORK) [phone]   PARENT CHILD HEALTH (Maternity/NICU/Pediatrics) [phone]   [facility] [phone]   [facility] [phone]   Psychiatric hospital [phone]   [facility] [phone]   [facility] [phone]   [facility] [phone]   [facility] [phone]   [facility] [phone]   [facility] [phone]   [facility] [phone]   [facility] [phone]   [facility] [phone]

## 2025-02-22 PROBLEM — I40.8 OTHER ACUTE MYOCARDITIS: Status: ACTIVE | Noted: 2025-02-20

## 2025-02-22 LAB
ANION GAP SERPL CALCULATED.3IONS-SCNC: 11 MMOL/L (ref 4–13)
ANISOCYTOSIS BLD QL SMEAR: PRESENT
BASOPHILS # BLD MANUAL: 0 THOUSAND/UL (ref 0–0.1)
BASOPHILS NFR MAR MANUAL: 0 % (ref 0–1)
BUN SERPL-MCNC: 31 MG/DL (ref 5–25)
CALCIUM SERPL-MCNC: 9.1 MG/DL (ref 8.4–10.2)
CHLORIDE SERPL-SCNC: 103 MMOL/L (ref 96–108)
CO2 SERPL-SCNC: 21 MMOL/L (ref 21–32)
CREAT SERPL-MCNC: 1.76 MG/DL (ref 0.6–1.3)
DOHLE BOD BLD QL SMEAR: PRESENT
EOSINOPHIL # BLD MANUAL: 0 THOUSAND/UL (ref 0–0.4)
EOSINOPHIL NFR BLD MANUAL: 0 % (ref 0–6)
ERYTHROCYTE [DISTWIDTH] IN BLOOD BY AUTOMATED COUNT: 21.2 % (ref 11.6–15.1)
GFR SERPL CREATININE-BSD FRML MDRD: 28 ML/MIN/1.73SQ M
GIANT PLATELETS BLD QL SMEAR: PRESENT
GLUCOSE SERPL-MCNC: 161 MG/DL (ref 65–140)
HCT VFR BLD AUTO: 31.7 % (ref 34.8–46.1)
HGB BLD-MCNC: 10.2 G/DL (ref 11.5–15.4)
LYMPHOCYTES # BLD AUTO: 2.57 THOUSAND/UL (ref 0.6–4.47)
LYMPHOCYTES # BLD AUTO: 20 % (ref 14–44)
MAGNESIUM SERPL-MCNC: 2.2 MG/DL (ref 1.9–2.7)
MCH RBC QN AUTO: 30.7 PG (ref 26.8–34.3)
MCHC RBC AUTO-ENTMCNC: 32.2 G/DL (ref 31.4–37.4)
MCV RBC AUTO: 96 FL (ref 82–98)
METAMYELOCYTE ABSOLUTE CT: 0.39 THOUSAND/UL (ref 0–0.1)
METAMYELOCYTES NFR BLD MANUAL: 3 % (ref 0–1)
MICROCYTES BLD QL AUTO: PRESENT
MONOCYTES # BLD AUTO: 0.51 THOUSAND/UL (ref 0–1.22)
MONOCYTES NFR BLD: 4 % (ref 4–12)
MRSA NOSE QL CULT: NORMAL
MYELOCYTE ABSOLUTE CT: 0.13 THOUSAND/UL (ref 0–0.1)
MYELOCYTES NFR BLD MANUAL: 1 % (ref 0–1)
NEUTROPHILS # BLD MANUAL: 9.27 THOUSAND/UL (ref 1.85–7.62)
NEUTS BAND NFR BLD MANUAL: 14 % (ref 0–8)
NEUTS SEG NFR BLD AUTO: 58 % (ref 43–75)
OVALOCYTES BLD QL SMEAR: PRESENT
PLATELET # BLD AUTO: 113 THOUSANDS/UL (ref 149–390)
PLATELET BLD QL SMEAR: ADEQUATE
PLATELET CLUMP BLD QL SMEAR: PRESENT
PMV BLD AUTO: 13.2 FL (ref 8.9–12.7)
POIKILOCYTOSIS BLD QL SMEAR: PRESENT
POLYCHROMASIA BLD QL SMEAR: PRESENT
POTASSIUM SERPL-SCNC: 5 MMOL/L (ref 3.5–5.3)
RBC # BLD AUTO: 3.32 MILLION/UL (ref 3.81–5.12)
RBC MORPH BLD: PRESENT
SODIUM SERPL-SCNC: 135 MMOL/L (ref 135–147)
WBC # BLD AUTO: 12.87 THOUSAND/UL (ref 4.31–10.16)

## 2025-02-22 PROCEDURE — 85027 COMPLETE CBC AUTOMATED: CPT

## 2025-02-22 PROCEDURE — 80048 BASIC METABOLIC PNL TOTAL CA: CPT

## 2025-02-22 PROCEDURE — 83735 ASSAY OF MAGNESIUM: CPT

## 2025-02-22 PROCEDURE — 99232 SBSQ HOSP IP/OBS MODERATE 35: CPT | Performed by: INTERNAL MEDICINE

## 2025-02-22 PROCEDURE — 85007 BL SMEAR W/DIFF WBC COUNT: CPT

## 2025-02-22 PROCEDURE — NC001 PR NO CHARGE: Performed by: STUDENT IN AN ORGANIZED HEALTH CARE EDUCATION/TRAINING PROGRAM

## 2025-02-22 PROCEDURE — 99232 SBSQ HOSP IP/OBS MODERATE 35: CPT | Performed by: PHYSICIAN ASSISTANT

## 2025-02-22 RX ORDER — PREDNISONE 10 MG/1
TABLET ORAL
Qty: 272 TABLET | Refills: 0 | Status: SHIPPED | OUTPATIENT
Start: 2025-02-22 | End: 2025-02-22

## 2025-02-22 RX ORDER — PREDNISONE 10 MG/1
TABLET ORAL
Qty: 272 TABLET | Refills: 0 | Status: SHIPPED | OUTPATIENT
Start: 2025-02-22

## 2025-02-22 RX ADMIN — METOPROLOL SUCCINATE 50 MG: 50 TABLET, EXTENDED RELEASE ORAL at 11:51

## 2025-02-22 RX ADMIN — OXYBUTYNIN CHLORIDE 10 MG: 5 TABLET, EXTENDED RELEASE ORAL at 09:04

## 2025-02-22 RX ADMIN — COLCHICINE 0.6 MG: 0.6 TABLET ORAL at 09:06

## 2025-02-22 RX ADMIN — APIXABAN 5 MG: 5 TABLET, FILM COATED ORAL at 09:07

## 2025-02-22 RX ADMIN — PREDNISONE 105 MG: 5 TABLET ORAL at 09:05

## 2025-02-22 RX ADMIN — DOCUSATE SODIUM 100 MG: 100 CAPSULE, LIQUID FILLED ORAL at 18:24

## 2025-02-22 RX ADMIN — COLCHICINE 0.6 MG: 0.6 TABLET ORAL at 18:24

## 2025-02-22 RX ADMIN — FAMOTIDINE 20 MG: 20 TABLET, FILM COATED ORAL at 09:06

## 2025-02-22 RX ADMIN — METOPROLOL SUCCINATE 50 MG: 50 TABLET, EXTENDED RELEASE ORAL at 22:15

## 2025-02-22 RX ADMIN — Medication 2000 UNITS: at 09:05

## 2025-02-22 RX ADMIN — APIXABAN 5 MG: 5 TABLET, FILM COATED ORAL at 18:24

## 2025-02-22 RX ADMIN — DOCUSATE SODIUM 100 MG: 100 CAPSULE, LIQUID FILLED ORAL at 09:06

## 2025-02-22 RX ADMIN — LURASIDONE HYDROCHLORIDE 20 MG: 20 TABLET, COATED ORAL at 09:06

## 2025-02-22 NOTE — ASSESSMENT & PLAN NOTE
Stage IIIC1, grade 1 endometrial cancer s/p surgical resection, currently receiving adjuvant chemotherapy with carboplatin/taxol/prembrolizumab, most recently status post cycle 4 on 2/14.  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  Now with concern for IO related cardiomyopathy, currently on prednisone 1 mg/kg daily.  High-dose steroid taper resent to pharmacy anticipation of discharge tomorrow.  Next appointment scheduled for 3/5

## 2025-02-22 NOTE — ASSESSMENT & PLAN NOTE
Lab Results   Component Value Date    EGFR 28 02/22/2025    EGFR 32 02/21/2025    EGFR 38 02/20/2025    CREATININE 1.76 (H) 02/22/2025    CREATININE 1.60 (H) 02/21/2025    CREATININE 1.39 (H) 02/20/2025

## 2025-02-22 NOTE — ASSESSMENT & PLAN NOTE
Following GYN/Onc  Currently on Taxol/Carboplatin/Pembrolizumab - last dose on 2/14  Follows Cardio-Oncology as well   Repeat limited ECHO with stable EF  Focal myocarditis noted on cMRI   Gyn/Onc consulted given suspect reaction due to Keytruda   Prednisone 1 mg/kg started and will need 6-12 week taper  Colchicine per Cardiology

## 2025-02-22 NOTE — PROGRESS NOTES
Progress Note - Cardiology   Name: Cherelle Dash 69 y.o. female I MRN: [id number]  Unit/Bed#: S -01 I Date of Admission: 2/20/2025   Date of Service: 2/22/2025 I Hospital Day: 2     Assessment & Plan  Other acute myocarditis  Hs troponin 4066-->3910-->3940  +CP although not clearly anginal as well as reports of SOB, elevated HR, and diaphoresis with exertion.  ECG- NSR with chronic LBBB  Limited TTE- LVEF 45% (unchanged) with mild global hypokinesis, grade I DD, normal RV size/function, mild to moderate MR  CMR completed- reviewed with reading physician- area of inflammation/edema suggestive of focal myocarditis in inferolateral wall. Formal report pending.  Suspect this is related to Pembrolizumab immunotherapy. Gyn Onc recommending high dose steroids with slow taper over 6-12 weeks- started on 105 mg prednisone   2/22: Brief episode of chest pain this morning while in the bathroom. Thinks she got up too quickly. Lasted 5 minutes. Now feeling well.  Continue high dose steroids per gyn onc.   Continue colchicine 0.6 mg BID  Continue Toprol XL 50 mg PO BID- increased from once daily this admission  Dilated cardiomyopathy (HCC)  LVEF 45%, unchanged on updated echo  Nuclear stress test 10/2024 negative for ischemia  GDMT- Toprol XL 50 mg BID  Given rising creatinine, will hold off on further diuretics  LBBB (left bundle branch block)  Chronic, unchanged  Stage 3 chronic kidney disease (HCC)  Lab Results   Component Value Date    EGFR 28 02/22/2025    EGFR 32 02/21/2025    EGFR 38 02/20/2025    CREATININE 1.76 (H) 02/22/2025    CREATININE 1.60 (H) 02/21/2025    CREATININE 1.39 (H) 02/20/2025   Stable   Morbid obesity with BMI of 40.0-44.9, adult (HCC)  BMI 45.21  Endometrial cancer (HCC)  S/p surgical treatment. On chemotherapy since 12/2024: Carboplatin, Taxol, Pembro with plan for XRT after chemotherapy  Other pulmonary embolism without acute cor pulmonale (HCC)  Hx of RLE DVT And PE. Remains on anticoagulation  Screening Questions  1. Are you active on mychart? yes     2. What insurance is in the chart? medicare    2.  Ordering/Referring Provider: lucius bryant    3. BMI 39.38    4. Are you on daily home oxygen? no    5. Do you have a history of difficult airway? Possible stricture, h/o GE cancer    6. Have you had a heart, lung, or liver transplant? no    7. Are you currently on dialysis? no    8. Have you had a stroke or Transient ischemic atttack (TIA) within 6 months? no    9. In the past 6 months, have you had any heart related issues including cardiomyopathy or heart attack?         If yes, did it require cardiac stenting or other implantable device?no    10. Do you have any implantable devices in your body (pacemaker, defib, LVAD)? no    11. Do you take nitroglycerin? If yes, how often? no    12. Are you currently taking any blood thinners?warfarin    13. Are you a diabetic? no    14. (Females) Are you currently pregnant?   If yes, how many weeks?    15. Have you had a procedure in the past that was difficult to tolerate with conscious sedation? Any allergies to Fentanyl or Versed no    16. Are you taking any scheduled prescription narcotics more than once daily? ambien    17. Do you have any chemical dependencies such as alcohol, street drugs, or methadone? no    18. Do you have any history of post-traumatic stress syndrome or mental health issues? no    19. Do you transfer independently? yes    20.  Do you have any issues with constipation? no    21. Preferred Pharmacy for Pre Prescription in chart, short term is [facility] in [place]    Scheduling Details    Colonoscopy Prep Sent?: EGD  Procedure Scheduled: EGD  Provider/Surgeon: kemar  Date of Procedure: 08/12/21  [place] [facility] [place] (Please ask for phone number if not scheduled by patient): lisa beatty      Sedation Type: CS  Conscious Sedation- Needs  for 6 hours after the procedure  MAC/General-Needs  for 24 hours after  procedure    Pre-op Required at [facility] [place], [place] and OR for MAC sedation:   (if yes advise patient they will need a pre-op prior to procedure)      Is patient on blood thinners? -no (If yes- inform patient to follow up with PCP or provider for follow up instructions)     Informed patient they will need an adult  yes  Cannot take any type of public or medical transportation alone    Informed Patient of COVID Test Requirement yes    Confirmed Nurse will call to complete assessment yes    Additional comments: pt will have covid test up in Issue   with Eliquis    Subjective  Overall feeling well, wants to go home  Review of Systems   Constitutional: Negative for chills, malaise/fatigue and weight gain.   Cardiovascular:  Positive for chest pain, dyspnea on exertion and leg swelling. Negative for orthopnea, palpitations and syncope.   Respiratory:  Negative for cough, shortness of breath, sleep disturbances due to breathing and sputum production.    Endocrine: Negative.    Hematologic/Lymphatic: Negative.    Gastrointestinal:  Negative for bloating, nausea and vomiting.   Genitourinary: Negative.    Neurological:  Negative for dizziness, light-headedness and weakness.   Psychiatric/Behavioral:  Negative for altered mental status.    All other systems reviewed and are negative.      Objective:   Vitals: Blood pressure 108/69, pulse 75, temperature 97.6 °F (36.4 °C), resp. rate 18, height 5' (1.524 m), weight 105 kg (231 lb 7.7 oz), SpO2 96%, not currently breastfeeding., Body mass index is 45.21 kg/m².,     Systolic (24hrs), Av , Min:108 , Max:155     Diastolic (24hrs), Av, Min:63, Max:90        Intake/Output Summary (Last 24 hours) at 2025 1136  Last data filed at 2025 0601  Gross per 24 hour   Intake 840 ml   Output 950 ml   Net -110 ml     Wt Readings from Last 3 Encounters:   25 105 kg (231 lb 7.7 oz)   25 105 kg (232 lb 8 oz)   25 105 kg (231 lb)     Telemetry Review: NSR, occasional sinus tachycardia    Physical Exam  Vitals reviewed.   Constitutional:       General: She is not in acute distress.     Appearance: She is obese.   Neck:      Vascular: No hepatojugular reflux or JVD.   Cardiovascular:      Rate and Rhythm: Normal rate and regular rhythm.      Heart sounds: Normal heart sounds. No murmur heard.     No friction rub. No gallop.   Pulmonary:      Effort: Pulmonary effort is normal. No respiratory distress.      Breath sounds: No rales.      Comments: Room air  Abdominal:      General: Bowel sounds are normal.  There is no distension.      Palpations: Abdomen is soft.      Tenderness: There is no abdominal tenderness.   Musculoskeletal:         General: No tenderness. Normal range of motion.      Cervical back: Neck supple.      Right lower leg: Edema present.      Left lower leg: No edema.   Skin:     General: Skin is warm and dry.      Capillary Refill: Capillary refill takes less than 2 seconds.      Findings: No erythema.   Neurological:      Mental Status: She is alert and oriented to person, place, and time.   Psychiatric:         Mood and Affect: Mood normal.         LABORATORY RESULTS  Results from last 7 days   Lab Units 02/20/25  0910   CK TOTAL U/L 87     CBC with diff:   Results from last 7 days   Lab Units 02/22/25  0618 02/21/25  0739 02/20/25  0910 02/19/25  0911   WBC Thousand/uL 12.87* 6.69 7.06 7.52   HEMOGLOBIN g/dL 10.2* 10.0* 10.6* 11.0*   HEMATOCRIT % 31.7* 31.8* 33.4* 34.6*   MCV fL 96 96 96 96   PLATELETS Thousands/uL 113* 91* 105* 106*   RBC Million/uL 3.32* 3.31* 3.48* 3.59*   MCH pg 30.7 30.2 30.5 30.6   MCHC g/dL 32.2 31.4 31.7 31.8   RDW % 21.2* 21.7* 21.5* 21.7*   MPV fL 13.2* 12.4 12.9* 12.3     CMP:  Results from last 7 days   Lab Units 02/22/25  0538 02/21/25  0739 02/20/25  0910 02/19/25  0911   POTASSIUM mmol/L 5.0 4.3 4.5 4.2   CHLORIDE mmol/L 103 109* 107 107   CO2 mmol/L 21 25 24 24   BUN mg/dL 31* 29* 26* 27*   CREATININE mg/dL 1.76* 1.60* 1.39* 1.57*   CALCIUM mg/dL 9.1 8.8 8.9 8.8   AST U/L  --   --  27 43*   ALT U/L  --   --  42 53*   ALK PHOS U/L  --   --  94 97   EGFR ml/min/1.73sq m 28 32 38 33     BMP:  Results from last 7 days   Lab Units 02/22/25  0538 02/21/25  0739 02/20/25  0910 02/19/25  0911   POTASSIUM mmol/L 5.0 4.3 4.5 4.2   CHLORIDE mmol/L 103 109* 107 107   CO2 mmol/L 21 25 24 24   BUN mg/dL 31* 29* 26* 27*   CREATININE mg/dL 1.76* 1.60* 1.39* 1.57*   CALCIUM mg/dL 9.1 8.8 8.9 8.8     Lab Results   Component Value Date    CREATININE 1.76 (H) 02/22/2025    CREATININE  1.60 (H) 02/21/2025    CREATININE 1.39 (H) 02/20/2025      Results from last 7 days   Lab Units 02/22/25  0538 02/21/25  0739 02/20/25  0910 02/19/25  0911   MAGNESIUM mg/dL 2.2 2.1 1.8* 1.6*       Results from last 7 days   Lab Units 02/20/25  0910   TSH 3RD GENERATON uIU/mL 2.136       Lipid Profile:   Lab Results   Component Value Date    CHOL 208 (H) 11/17/2017    CHOL 199 05/22/2017    CHOL 210 (H) 09/30/2016     Lab Results   Component Value Date    HDL 54 06/06/2024    HDL 50 09/08/2023    HDL 51 01/12/2023     Lab Results   Component Value Date    LDLCALC 123 (H) 06/06/2024    LDLCALC 111 (H) 09/08/2023    LDLCALC 127 (H) 01/12/2023     Lab Results   Component Value Date    TRIG 115 06/06/2024    TRIG 144 09/08/2023    TRIG 116 01/12/2023     Meds/Allergies   all current active meds have been reviewed and current meds:   Current Facility-Administered Medications:     acetaminophen (TYLENOL) tablet 650 mg, Q6H PRN    apixaban (ELIQUIS) tablet 5 mg, BID    Cholecalciferol (VITAMIN D3) tablet 2,000 Units, Daily    colchicine (COLCRYS) tablet 0.6 mg, BID    docusate sodium (COLACE) capsule 100 mg, BID    famotidine (PEPCID) tablet 20 mg, Daily    LORazepam (ATIVAN) tablet 1 mg, Q8H PRN    lurasidone (LATUDA) tablet 20 mg, Daily With Breakfast    metoprolol succinate (TOPROL-XL) 24 hr tablet 50 mg, Q12H    ondansetron (ZOFRAN) injection 4 mg, Q6H PRN    oxybutynin (DITROPAN-XL) 24 hr tablet 10 mg, Daily    polyethylene glycol (MIRALAX) packet 17 g, Daily PRN    predniSONE tablet 105 mg, Daily    Medications Prior to Admission:     apixaban (Eliquis) 5 mg    chlorhexidine (PERIDEX) 0.12 % solution    Cholecalciferol (VITAMIN D) 2000 units CAPS    clobetasol (TEMOVATE) 0.05 % ointment    CRANBERRY PO    famotidine (PEPCID) 20 mg tablet    Glucosamine-Chondroit-Vit C-Mn (GLUCOSAMINE 1500 COMPLEX PO)    Ivermectin 1 % CREA    lidocaine-prilocaine (EMLA) cream    LORazepam (ATIVAN) 1 mg tablet    lurasidone (LATUDA)  20 mg tablet    metoprolol succinate (TOPROL-XL) 50 mg 24 hr tablet    Mirabegron ER 50 MG TB24    Multiple Vitamin (MULTI-VITAMIN DAILY) TABS    polyethylene glycol (GLYCOLAX) 17 GM/SCOOP powder    predniSONE 10 mg tablet    Sodium Fluoride 5000 PPM 1.1 % GEL         Counseling / Coordination of Care  Total floor / unit time spent today 20 minutes.  Greater than 50% of total time was spent with the patient and / or family counseling and / or coordination of care.      ** Please Note: Dragon 360 Dictation voice to text software may have been used in the creation of this document. **

## 2025-02-22 NOTE — ASSESSMENT & PLAN NOTE
4066 -> 3910  Appreciate cardiology recommendations.  Status post cardiac MRI and elevated CK consistent with myocarditis.    Although IO related myocarditis is rare and occurs in less than 1% of cases receiving IO, the timing is suspicious.  Will treat with high-dose steroids as above.  A steroid taper has been sent to pharmacy.

## 2025-02-22 NOTE — ASSESSMENT & PLAN NOTE
LVEF 45%, unchanged on updated echo  Nuclear stress test 10/2024 negative for ischemia  GDMT- Toprol XL 50 mg BID  Given rising creatinine, will hold off on further diuretics

## 2025-02-22 NOTE — ASSESSMENT & PLAN NOTE
Lab Results   Component Value Date    EGFR 28 02/22/2025    EGFR 32 02/21/2025    EGFR 38 02/20/2025    CREATININE 1.76 (H) 02/22/2025    CREATININE 1.60 (H) 02/21/2025    CREATININE 1.39 (H) 02/20/2025   Renal function slightly above baseline of 1.3-1.5  Monitor BMP  Avoid hypotension   Note, patient received contrast x 2, IV diuretic, and has started Colchicine   If renal function has tolerated can likely discharge tomorrow

## 2025-02-22 NOTE — ASSESSMENT & PLAN NOTE
Lab Results   Component Value Date    EGFR 28 02/22/2025    EGFR 32 02/21/2025    EGFR 38 02/20/2025    CREATININE 1.76 (H) 02/22/2025    CREATININE 1.60 (H) 02/21/2025    CREATININE 1.39 (H) 02/20/2025   Stable

## 2025-02-22 NOTE — PROGRESS NOTES
Progress Note - GYN Oncology   Name: Cherelle Dash 69 y.o. female I MRN: [id number]  Unit/Bed#: S -01 I Date of Admission: 2/20/2025   Date of Service: 2/22/2025 I Hospital Day: 2    Assessment & Plan  Endometrial cancer (HCC)  Stage IIIC1, grade 1 endometrial cancer s/p surgical resection, currently receiving adjuvant chemotherapy with carboplatin/taxol/prembrolizumab, most recently status post cycle 4 on 2/14.  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  Now with concern for IO related cardiomyopathy, currently on prednisone 1 mg/kg daily.  High-dose steroid taper resent to pharmacy anticipation of discharge tomorrow.  Next appointment scheduled for 3/5    Dilated cardiomyopathy (HCC)  LVEF 45%  Home med: Toprol XL 50 mg daily  Other acute myocarditis  4066 -> 3910  Appreciate cardiology recommendations.  Status post cardiac MRI and elevated CK consistent with myocarditis.    Although IO related myocarditis is rare and occurs in less than 1% of cases receiving IO, the timing is suspicious.  Will treat with high-dose steroids as above.  A steroid taper has been sent to pharmacy.  Stage 3 chronic kidney disease (HCC)  Lab Results   Component Value Date    EGFR 28 02/22/2025    EGFR 32 02/21/2025    EGFR 38 02/20/2025    CREATININE 1.76 (H) 02/22/2025    CREATININE 1.60 (H) 02/21/2025    CREATININE 1.39 (H) 02/20/2025     Morbid obesity with BMI of 40.0-44.9, adult (HCC)    LBBB (left bundle branch block)    Other pulmonary embolism without acute cor pulmonale (HCC)  Continue therapeutic Eliquis.    24 Hour Events : No acute events overnight  Subjective : Patient reports feeling well.  She is eager for discharge tomorrow.  She notes 1 episode of chest pain this morning lasting 5 minutes when she was getting up to ambulate to the bathroom, she suspect she stood up too quickly.  Otherwise she feels well.  Her symptoms are improving.  She denies chest pain at rest, dyspnea,  fevers, chills, nausea, vomiting, constipation.  Denies abdominal pain.  She does note some unconscious right leg movement that she is able to stop when she concentrates on it.  It is not particularly bothersome to her.  She denies paresthesias or weakness in her leg.    Objective :  Temp:  [97.6 °F (36.4 °C)-98.2 °F (36.8 °C)] 97.6 °F (36.4 °C)  HR:  [] 75  BP: (108-155)/(63-90) 108/69  Resp:  [18] 18  SpO2:  [92 %-97 %] 96 %  O2 Device: None (Room air)    Physical Exam  Constitutional:       General: She is not in acute distress.     Appearance: Normal appearance.   Pulmonary:      Effort: Pulmonary effort is normal. No respiratory distress.   Abdominal:      General: There is no distension.      Palpations: Abdomen is soft.   Musculoskeletal:      Right lower leg: Edema present.      Left lower leg: Edema present.      Comments: Symmetric 1+ edema bilaterally   Skin:     General: Skin is warm and dry.   Neurological:      General: No focal deficit present.      Mental Status: She is alert and oriented to person, place, and time.   Psychiatric:         Mood and Affect: Mood normal.         Behavior: Behavior normal.         Lab Results: I have reviewed the following results:CBC/BMP:   .     02/22/25  0538 02/22/25  0618   WBC  --  12.87*   HGB  --  10.2*   HCT  --  31.7*   PLT  --  113*   BANDSPCT  --  14*   SODIUM 135  --    K 5.0  --      --    CO2 21  --    BUN 31*  --    CREATININE 1.76*  --    GLUC 161*  --    MG 2.2  --         Imaging Results Review: No pertinent imaging studies reviewed.  Other Study Results Review: No additional pertinent studies reviewed.    VTE Pharmacologic Prophylaxis: VTE covered by:  apixaban, Oral, 5 mg at 02/22/25 0907     VTE Mechanical Prophylaxis: sequential compression device    I personally saw/examined the patient on 2/22/2025 as a fellow.  This is not a billable service.

## 2025-02-22 NOTE — ASSESSMENT & PLAN NOTE
Prior EF of 40% diagnosed with her PE which improved to 45%  Following Cardio-Oncology   Repeat Limited ECHO with stable EF of 45%  Continue Toprol-XL 50 mg Q12   Not on other GDMT  Lasix IV x 1 per Cardiology    May need low dose diuretic at [facility]

## 2025-02-22 NOTE — PLAN OF CARE
Problem: PAIN - ADULT  Goal: Verbalizes/displays adequate comfort level or baseline comfort level  Description: Interventions:  - Encourage patient to monitor pain and request assistance  - Assess pain using appropriate pain scale  - Administer analgesics based on type and severity of pain and evaluate response  - Implement non-pharmacological measures as appropriate and evaluate response  - Consider cultural and social influences on pain and pain management  - Notify physician/advanced practitioner if interventions unsuccessful or patient reports new pain  Outcome: Progressing     Problem: INFECTION - ADULT  Goal: Absence or prevention of progression during hospitalization  Description: INTERVENTIONS:  - Assess and monitor for signs and symptoms of infection  - Monitor lab/diagnostic results  - Monitor all insertion sites, i.e. indwelling lines, tubes, and drains  - Monitor endotracheal if appropriate and nasal secretions for changes in amount and color  - [place] appropriate cooling/warming therapies per order  - Administer medications as ordered  - Instruct and encourage patient and family to use good hand hygiene technique  - Identify and instruct in appropriate isolation precautions for identified infection/condition  Outcome: Progressing  Goal: Absence of fever/infection during neutropenic period  Description: INTERVENTIONS:  - Monitor WBC    Outcome: Progressing     Problem: SAFETY ADULT  Goal: Patient will remain free of falls  Description: INTERVENTIONS:  - Educate patient/family on patient safety including physical limitations  - Instruct patient to call for assistance with activity   - Consult OT/PT to assist with strengthening/mobility   - Keep Call bell within reach  - Keep bed low and locked with side rails adjusted as appropriate  - Keep care items and personal belongings within reach  - Initiate and maintain comfort rounds  - Make Fall Risk Sign visible to staff  - Offer Toileting every 2 Hours,  in advance of need  - Initiate/Maintain bed/chair alarm  - Obtain necessary fall risk management equipment  - Apply yellow socks and bracelet for high fall risk patients  - Consider moving patient to room near nurses station  Outcome: Progressing  Goal: Maintain or return to baseline ADL function  Description: INTERVENTIONS:  -  Assess patient's ability to carry out ADLs; assess patient's baseline for ADL function and identify physical deficits which impact ability to perform ADLs (bathing, care of mouth/teeth, toileting, grooming, dressing, etc.)  - Assess/evaluate cause of self-care deficits   - Assess range of motion  - Assess patient's mobility; develop plan if impaired  - Assess patient's need for assistive devices and provide as appropriate  - Encourage maximum independence but intervene and supervise when necessary  - Involve family in performance of ADLs  - Assess for home care needs following discharge   - Consider OT consult to assist with ADL evaluation and planning for discharge  - Provide patient education as appropriate  Outcome: Progressing  Goal: Maintains/Returns to pre admission functional level  Description: INTERVENTIONS:  - Perform AM-PAC 6 Click Basic Mobility/ Daily Activity assessment daily.  - Set and communicate daily mobility goal to care team and patient/family/caregiver.   - Collaborate with rehabilitation services on mobility goals if consulted  - Perform Range of Motion   - Reposition patient   - Dangle patient   - Stand patient   - Ambulate patient   - Out of bed to chair   - Out of bed for meals   - Out of bed for toileting  - Record patient progress and toleration of activity level   Outcome: Progressing     Problem: DISCHARGE PLANNING  Goal: Discharge to home or other facility with appropriate resources  Description: INTERVENTIONS:  - Identify barriers to discharge w/patient and caregiver  - Arrange for needed discharge resources and transportation as appropriate  - Identify  discharge learning needs (meds, wound care, etc.)  - Arrange for interpretive services to assist at discharge as needed  - Refer to Case Management Department for coordinating discharge planning if the patient needs post-hospital services based on physician/advanced practitioner order or complex needs related to functional status, cognitive ability, or social support system  Outcome: Progressing     Problem: Knowledge Deficit  Goal: Patient/family/caregiver demonstrates understanding of disease process, treatment plan, medications, and discharge instructions  Description: Complete learning assessment and assess knowledge base.  Interventions:  - Provide teaching at level of understanding  - Provide teaching via preferred learning methods  Outcome: Progressing

## 2025-02-22 NOTE — PROGRESS NOTES
Progress Note - Hospitalist   Name: Cherelle Dash 69 y.o. female I MRN: [id number]  Unit/Bed#: S -01 I Date of Admission: 2/20/2025   Date of Service: 2/22/2025 I Hospital Day: 2    Assessment & Plan  Other acute myocarditis  Patient presented with chest heaviness and dyspnea on exertion since her last chemo on Friday 2/14. Initial troponin 4,066 and has remained relatively flat to 3,940. She was tachycardic initially   No volume on PE study, no PE, no orthopnea. Does not look like overt heart failure   Recent negative stress test   cMRI showing focal myocarditis  Cardiology consult appreciated  Telemetry   Prednisone 1 mg/kg per day with 6-12 week taper per Gyn/Onc   Colchicine 0.6 mg BID   Continue Toprol-XL increased to 50 mg BID per cardiology   Dilated cardiomyopathy (HCC)  Prior EF of 40% diagnosed with her PE which improved to 45%  Following Cardio-Oncology   Repeat Limited ECHO with stable EF of 45%  Continue Toprol-XL 50 mg Q12   Not on other GDMT  Lasix IV x 1 per Cardiology    May need low dose diuretic at dishcarge  Endometrial cancer (HCC)  Following GYN/Onc  Currently on Taxol/Carboplatin/Pembrolizumab - last dose on 2/14  Follows Cardio-Oncology as well   Repeat limited ECHO with stable EF  Focal myocarditis noted on cMRI   Gyn/Onc consulted given suspect reaction due to Keytruda   Prednisone 1 mg/kg started and will need 6-12 week taper  Colchicine per Cardiology   Stage 3 chronic kidney disease (HCC)  Lab Results   Component Value Date    EGFR 28 02/22/2025    EGFR 32 02/21/2025    EGFR 38 02/20/2025    CREATININE 1.76 (H) 02/22/2025    CREATININE 1.60 (H) 02/21/2025    CREATININE 1.39 (H) 02/20/2025   Renal function slightly above baseline of 1.3-1.5  Monitor BMP  Avoid hypotension   Note, patient received contrast x 2, IV diuretic, and has started Colchicine   If renal function has tolerated can likely discharge tomorrow  Morbid obesity with BMI of 40.0-44.9, adult (HCC)  BMI noted    Encourage diet and lifestyle modifications   LBBB (left bundle branch block)  Chronic  Cardiology following in light of primary problem   Other pulmonary embolism without acute cor pulmonale (HCC)  Status post IVC - no missed Eliquis   No need for heparin drip further as she has myocarditis and not ACS  Restart Eliquis     VTE Pharmacologic Prophylaxis: VTE Score: 5 High Risk (Score >/= 5) - Pharmacological DVT Prophylaxis Ordered: apixaban (Eliquis). Sequential Compression Devices Ordered.    Mobility:   Basic Mobility Inpatient Raw Score: 24  JH-HLM Goal: 8: Walk 250 feet or more  JH-HLM Achieved: 7: Walk 25 feet or more  JH-HLM Goal NOT achieved. Continue with multidisciplinary rounding and encourage appropriate mobility to improve upon JH-HLM goals.    Patient Centered Rounds: I performed bedside rounds with nursing staff today.   Discussions with Specialists or Other Care Team Provider: Discussed with Cardiology, RN, CM     Education and Discussions with Family / Patient: Updated  () at bedside.    Current Length of Stay: 2 day(s)  Current Patient Status: Inpatient   Certification Statement: The patient will continue to require additional inpatient hospital stay due to monitoring renal function   Discharge Plan: Anticipate discharge tomorrow to home.    Code Status: Level 1 - Full Code    Subjective   Patient reports that she got some chest discomfort when she got up to go to the bathroom last night but it disappeared quickly. No other pain since. Denies other complaints and wants to go home. She is unaware of her heart rate being fast.     Objective :  Temp:  [97.6 °F (36.4 °C)-98.2 °F (36.8 °C)] 97.6 °F (36.4 °C)  HR:  [] 75  BP: (108-155)/(63-90) 108/69  Resp:  [18] 18  SpO2:  [92 %-97 %] 96 %  O2 Device: None (Room air)    Body mass index is 45.21 kg/m².     Input and Output Summary (last 24 hours):     Intake/Output Summary (Last 24 hours) at 2/22/2025 1027  Last data filed at  2/22/2025 0601  Gross per 24 hour   Intake 840 ml   Output 950 ml   Net -110 ml       Physical Exam  Vitals and nursing note reviewed.   Constitutional:       General: She is not in acute distress.     Appearance: Normal appearance. She is obese. She is not ill-appearing or diaphoretic.   HENT:      Head: Normocephalic and atraumatic.      Mouth/Throat:      Mouth: Mucous membranes are moist.   Eyes:      General: No scleral icterus.     Pupils: Pupils are equal, round, and reactive to light.   Cardiovascular:      Rate and Rhythm: Regular rhythm. Tachycardia present.      Pulses: Normal pulses.      Heart sounds: Normal heart sounds, S1 normal and S2 normal. No murmur heard.     No systolic murmur is present.      No diastolic murmur is present.      No gallop. No S3 or S4 sounds.   Pulmonary:      Effort: Pulmonary effort is normal. No accessory muscle usage or respiratory distress.      Breath sounds: Normal breath sounds. No stridor. No decreased breath sounds, wheezing, rhonchi or rales.   Chest:      Chest wall: No tenderness.   Abdominal:      General: Bowel sounds are normal. There is no distension.      Palpations: Abdomen is soft.      Tenderness: There is no abdominal tenderness. There is no guarding.   Musculoskeletal:      Right lower leg: No edema.      Left lower leg: No edema.   Skin:     General: Skin is warm and dry.      Coloration: Skin is not jaundiced.   Neurological:      General: No focal deficit present.      Mental Status: She is alert. Mental status is at baseline.      Motor: No tremor or seizure activity.   Psychiatric:         Behavior: Behavior is cooperative.           Lines/Drains:      Central Line:  Goal for removal: N/A - Chronic PICC         Telemetry:  Telemetry Orders (From admission, onward)               24 Hour Telemetry Monitoring  (ED Bridging Orders Panel)  Continuous x 24 Hours (Telem)        Expiring   Question:  Reason for 24 Hour Telemetry  Answer:  PCI/EP study  (including pacer and ICD implementation), Cardiac surgery, MI, abnormal cardiac cath, and chest pain- rule out MI                     Telemetry Reviewed: Sinus Tachycardia  Indication for Continued Telemetry Use: Arrthymias requiring medical therapy               Lab Results: I have reviewed the following results:   Results from last 7 days   Lab Units 02/22/25  0618   WBC Thousand/uL 12.87*   HEMOGLOBIN g/dL 10.2*   HEMATOCRIT % 31.7*   PLATELETS Thousands/uL 113*   BANDS PCT % 14*   LYMPHO PCT % 20   MONO PCT % 4   EOS PCT % 0     Results from last 7 days   Lab Units 02/22/25  0538 02/21/25  0739 02/20/25  0910   SODIUM mmol/L 135   < > 139   POTASSIUM mmol/L 5.0   < > 4.5   CHLORIDE mmol/L 103   < > 107   CO2 mmol/L 21   < > 24   BUN mg/dL 31*   < > 26*   CREATININE mg/dL 1.76*   < > 1.39*   ANION GAP mmol/L 11   < > 8   CALCIUM mg/dL 9.1   < > 8.9   ALBUMIN g/dL  --   --  3.8   TOTAL BILIRUBIN mg/dL  --   --  0.50   ALK PHOS U/L  --   --  94   ALT U/L  --   --  42   AST U/L  --   --  27   GLUCOSE RANDOM mg/dL 161*   < > 141*    < > = values in this interval not displayed.                 Results from last 7 days   Lab Units 02/20/25  0910   LACTIC ACID mmol/L 2.0       Recent Cultures (last 7 days):         Imaging Results Review: No pertinent imaging studies reviewed.  Other Study Results Review: No additional pertinent studies reviewed.    Last 24 Hours Medication List:     Current Facility-Administered Medications:     acetaminophen (TYLENOL) tablet 650 mg, Q6H PRN    apixaban (ELIQUIS) tablet 5 mg, BID    Cholecalciferol (VITAMIN D3) tablet 2,000 Units, Daily    colchicine (COLCRYS) tablet 0.6 mg, BID    docusate sodium (COLACE) capsule 100 mg, BID    famotidine (PEPCID) tablet 20 mg, Daily    LORazepam (ATIVAN) tablet 1 mg, Q8H PRN    lurasidone (LATUDA) tablet 20 mg, Daily With Breakfast    metoprolol succinate (TOPROL-XL) 24 hr tablet 50 mg, Q12H    ondansetron (ZOFRAN) injection 4 mg, Q6H PRN     oxybutynin (DITROPAN-XL) 24 hr tablet 10 mg, Daily    polyethylene glycol (MIRALAX) packet 17 g, Daily PRN    predniSONE tablet 105 mg, Daily    Administrative Statements   Today, Patient Was Seen By: Nathan Canales PA-C  I have spent a total time of 35 minutes in caring for this patient on the day of the visit/encounter including Diagnostic results, Instructions for management, Impressions, Counseling / Coordination of care, Documenting in the medical record, Reviewing/placing orders in the medical record (including tests, medications, and/or procedures), Obtaining or reviewing history  , and Communicating with other healthcare professionals .    **Please Note: This note may have been constructed using a voice recognition system.**

## 2025-02-22 NOTE — ASSESSMENT & PLAN NOTE
Hs troponin 4066-->3910-->3940  +CP although not clearly anginal as well as reports of SOB, elevated HR, and diaphoresis with exertion.  ECG- NSR with chronic LBBB  Limited TTE- LVEF 45% (unchanged) with mild global hypokinesis, grade I DD, normal RV size/function, mild to moderate MR  CMR completed- reviewed with reading physician- area of inflammation/edema suggestive of focal myocarditis in inferolateral wall. Formal report pending.  Suspect this is related to Pembrolizumab immunotherapy. Gyn Onc recommending high dose steroids with slow taper over 6-12 weeks- started on 105 mg prednisone   2/22: Brief episode of chest pain this morning while in the bathroom. Thinks she got up too quickly. Lasted 5 minutes. Now feeling well.  Continue high dose steroids per gyn onc.   Continue colchicine 0.6 mg BID  Continue Toprol XL 50 mg PO BID- increased from once daily this admission

## 2025-02-22 NOTE — ASSESSMENT & PLAN NOTE
Patient presented with chest heaviness and dyspnea on exertion since her last chemo on Friday 2/14. Initial troponin 4,066 and has remained relatively flat to 3,940. She was tachycardic initially   No volume on PE study, no PE, no orthopnea. Does not look like overt heart failure   Recent negative stress test   cMRI showing focal myocarditis  Cardiology consult appreciated  Telemetry   Prednisone 1 mg/kg per day with 6-12 week taper per Gyn/Onc   Colchicine 0.6 mg BID   Continue Toprol-XL increased to 50 mg BID per cardiology

## 2025-02-22 NOTE — PLAN OF CARE
Problem: PAIN - ADULT  Goal: Verbalizes/displays adequate comfort level or baseline comfort level  Description: Interventions:  - Encourage patient to monitor pain and request assistance  - Assess pain using appropriate pain scale  - Administer analgesics based on type and severity of pain and evaluate response  - Implement non-pharmacological measures as appropriate and evaluate response  - Consider cultural and social influences on pain and pain management  - Notify physician/advanced practitioner if interventions unsuccessful or patient reports new pain  Outcome: Progressing     Problem: INFECTION - ADULT  Goal: Absence or prevention of progression during hospitalization  Description: INTERVENTIONS:  - Assess and monitor for signs and symptoms of infection  - Monitor lab/diagnostic results  - Monitor all insertion sites, i.e. indwelling lines, tubes, and drains  - Monitor endotracheal if appropriate and nasal secretions for changes in amount and color  - [place] appropriate cooling/warming therapies per order  - Administer medications as ordered  - Instruct and encourage patient and family to use good hand hygiene technique  - Identify and instruct in appropriate isolation precautions for identified infection/condition  Outcome: Progressing  Goal: Absence of fever/infection during neutropenic period  Description: INTERVENTIONS:  - Monitor WBC    Outcome: Progressing     Problem: SAFETY ADULT  Goal: Patient will remain free of falls  Description: INTERVENTIONS:  - Educate patient/family on patient safety including physical limitations  - Instruct patient to call for assistance with activity   - Consult OT/PT to assist with strengthening/mobility   - Keep Call bell within reach  - Keep bed low and locked with side rails adjusted as appropriate  - Keep care items and personal belongings within reach  - Initiate and maintain comfort rounds  - Make Fall Risk Sign visible to staff  - Offer Toileting every 2 Hours,  in advance of need  - Initiate/Maintain bed alarm  - Obtain necessary fall risk management equipment: alarm  - Apply yellow socks and bracelet for high fall risk patients  - Consider moving patient to room near nurses station  Outcome: Progressing  Goal: Maintain or return to baseline ADL function  Description: INTERVENTIONS:  -  Assess patient's ability to carry out ADLs; assess patient's baseline for ADL function and identify physical deficits which impact ability to perform ADLs (bathing, care of mouth/teeth, toileting, grooming, dressing, etc.)  - Assess/evaluate cause of self-care deficits   - Assess range of motion  - Assess patient's mobility; develop plan if impaired  - Assess patient's need for assistive devices and provide as appropriate  - Encourage maximum independence but intervene and supervise when necessary  - Involve family in performance of ADLs  - Assess for home care needs following discharge   - Consider OT consult to assist with ADL evaluation and planning for discharge  - Provide patient education as appropriate  Outcome: Progressing  Goal: Maintains/Returns to pre admission functional level  Description: INTERVENTIONS:  - Perform AM-PAC 6 Click Basic Mobility/ Daily Activity assessment daily.  - Set and communicate daily mobility goal to care team and patient/family/caregiver.   - Collaborate with rehabilitation services on mobility goals if consulted  - Perform Range of Motion 2 times a day.  - Reposition patient every 2 hours.  - Dangle patient 2 times a day  - Stand patient 2 times a day  - Ambulate patient 2 times a day  - Out of bed to chair 2 times a day   - Out of bed for meals 2 times a day  - Out of bed for toileting  - Record patient progress and toleration of activity level   Outcome: Progressing     Problem: DISCHARGE PLANNING  Goal: Discharge to home or other facility with appropriate resources  Description: INTERVENTIONS:  - Identify barriers to discharge w/patient and  caregiver  - Arrange for needed discharge resources and transportation as appropriate  - Identify discharge learning needs (meds, wound care, etc.)  - Arrange for interpretive services to assist at discharge as needed  - Refer to Case Management Department for coordinating discharge planning if the patient needs post-hospital services based on physician/advanced practitioner order or complex needs related to functional status, cognitive ability, or social support system  Outcome: Progressing     Problem: Knowledge Deficit  Goal: Patient/family/caregiver demonstrates understanding of disease process, treatment plan, medications, and discharge instructions  Description: Complete learning assessment and assess knowledge base.  Interventions:  - Provide teaching at level of understanding  - Provide teaching via preferred learning methods  Outcome: Progressing

## 2025-02-23 VITALS
HEART RATE: 72 BPM | TEMPERATURE: 97.6 F | DIASTOLIC BLOOD PRESSURE: 78 MMHG | OXYGEN SATURATION: 98 % | RESPIRATION RATE: 18 BRPM | BODY MASS INDEX: 45.45 KG/M2 | SYSTOLIC BLOOD PRESSURE: 133 MMHG | WEIGHT: 231.48 LBS | HEIGHT: 60 IN

## 2025-02-23 LAB
ANION GAP SERPL CALCULATED.3IONS-SCNC: 10 MMOL/L (ref 4–13)
BASOPHILS # BLD AUTO: 0.04 THOUSANDS/ÂΜL (ref 0–0.1)
BASOPHILS NFR BLD AUTO: 0 % (ref 0–1)
BUN SERPL-MCNC: 30 MG/DL (ref 5–25)
CALCIUM SERPL-MCNC: 9.2 MG/DL (ref 8.4–10.2)
CHLORIDE SERPL-SCNC: 106 MMOL/L (ref 96–108)
CO2 SERPL-SCNC: 25 MMOL/L (ref 21–32)
CREAT SERPL-MCNC: 1.75 MG/DL (ref 0.6–1.3)
EOSINOPHIL # BLD AUTO: 0.02 THOUSAND/ÂΜL (ref 0–0.61)
EOSINOPHIL NFR BLD AUTO: 0 % (ref 0–6)
ERYTHROCYTE [DISTWIDTH] IN BLOOD BY AUTOMATED COUNT: 21.3 % (ref 11.6–15.1)
GFR SERPL CREATININE-BSD FRML MDRD: 29 ML/MIN/1.73SQ M
GLUCOSE SERPL-MCNC: 119 MG/DL (ref 65–140)
HCT VFR BLD AUTO: 32 % (ref 34.8–46.1)
HGB BLD-MCNC: 10.2 G/DL (ref 11.5–15.4)
IMM GRANULOCYTES # BLD AUTO: 0.28 THOUSAND/UL (ref 0–0.2)
IMM GRANULOCYTES NFR BLD AUTO: 2 % (ref 0–2)
LYMPHOCYTES # BLD AUTO: 2.27 THOUSANDS/ÂΜL (ref 0.6–4.47)
LYMPHOCYTES NFR BLD AUTO: 15 % (ref 14–44)
MAGNESIUM SERPL-MCNC: 2 MG/DL (ref 1.9–2.7)
MCH RBC QN AUTO: 30.7 PG (ref 26.8–34.3)
MCHC RBC AUTO-ENTMCNC: 31.9 G/DL (ref 31.4–37.4)
MCV RBC AUTO: 96 FL (ref 82–98)
MONOCYTES # BLD AUTO: 0.83 THOUSAND/ÂΜL (ref 0.17–1.22)
MONOCYTES NFR BLD AUTO: 6 % (ref 4–12)
NEUTROPHILS # BLD AUTO: 11.69 THOUSANDS/ÂΜL (ref 1.85–7.62)
NEUTS SEG NFR BLD AUTO: 77 % (ref 43–75)
NRBC BLD AUTO-RTO: 0 /100 WBCS
PLATELET # BLD AUTO: 130 THOUSANDS/UL (ref 149–390)
PMV BLD AUTO: 12.7 FL (ref 8.9–12.7)
POTASSIUM SERPL-SCNC: 4 MMOL/L (ref 3.5–5.3)
RBC # BLD AUTO: 3.32 MILLION/UL (ref 3.81–5.12)
SODIUM SERPL-SCNC: 141 MMOL/L (ref 135–147)
WBC # BLD AUTO: 15.13 THOUSAND/UL (ref 4.31–10.16)

## 2025-02-23 PROCEDURE — 85027 COMPLETE CBC AUTOMATED: CPT

## 2025-02-23 PROCEDURE — 99239 HOSP IP/OBS DSCHRG MGMT >30: CPT | Performed by: PHYSICIAN ASSISTANT

## 2025-02-23 PROCEDURE — 80048 BASIC METABOLIC PNL TOTAL CA: CPT

## 2025-02-23 PROCEDURE — 83735 ASSAY OF MAGNESIUM: CPT

## 2025-02-23 RX ORDER — COLCHICINE 0.6 MG/1
0.6 TABLET ORAL 2 TIMES DAILY
Qty: 60 TABLET | Refills: 0 | Status: SHIPPED | OUTPATIENT
Start: 2025-02-23 | End: 2025-02-26

## 2025-02-23 RX ORDER — METOPROLOL SUCCINATE 50 MG/1
50 TABLET, EXTENDED RELEASE ORAL EVERY 12 HOURS
Qty: 60 TABLET | Refills: 0 | Status: SHIPPED | OUTPATIENT
Start: 2025-02-23

## 2025-02-23 RX ADMIN — METOPROLOL SUCCINATE 50 MG: 50 TABLET, EXTENDED RELEASE ORAL at 10:12

## 2025-02-23 RX ADMIN — PREDNISONE 105 MG: 5 TABLET ORAL at 08:03

## 2025-02-23 RX ADMIN — DOCUSATE SODIUM 100 MG: 100 CAPSULE, LIQUID FILLED ORAL at 08:03

## 2025-02-23 RX ADMIN — APIXABAN 5 MG: 5 TABLET, FILM COATED ORAL at 08:03

## 2025-02-23 RX ADMIN — OXYBUTYNIN CHLORIDE 10 MG: 5 TABLET, EXTENDED RELEASE ORAL at 08:02

## 2025-02-23 RX ADMIN — COLCHICINE 0.6 MG: 0.6 TABLET ORAL at 08:03

## 2025-02-23 RX ADMIN — LURASIDONE HYDROCHLORIDE 20 MG: 20 TABLET, COATED ORAL at 08:06

## 2025-02-23 RX ADMIN — Medication 2000 UNITS: at 08:03

## 2025-02-23 RX ADMIN — FAMOTIDINE 20 MG: 20 TABLET, FILM COATED ORAL at 08:03

## 2025-02-23 NOTE — PLAN OF CARE
Problem: PAIN - ADULT  Goal: Verbalizes/displays adequate comfort level or baseline comfort level  Description: Interventions:  - Encourage patient to monitor pain and request assistance  - Assess pain using appropriate pain scale  - Administer analgesics based on type and severity of pain and evaluate response  - Implement non-pharmacological measures as appropriate and evaluate response  - Consider cultural and social influences on pain and pain management  - Notify physician/advanced practitioner if interventions unsuccessful or patient reports new pain  Outcome: Progressing     Problem: INFECTION - ADULT  Goal: Absence or prevention of progression during hospitalization  Description: INTERVENTIONS:  - Assess and monitor for signs and symptoms of infection  - Monitor lab/diagnostic results  - Monitor all insertion sites, i.e. indwelling lines, tubes, and drains  - Monitor endotracheal if appropriate and nasal secretions for changes in amount and color  - [place] appropriate cooling/warming therapies per order  - Administer medications as ordered  - Instruct and encourage patient and family to use good hand hygiene technique  - Identify and instruct in appropriate isolation precautions for identified infection/condition  Outcome: Progressing  Goal: Absence of fever/infection during neutropenic period  Description: INTERVENTIONS:  - Monitor WBC    Outcome: Progressing     Problem: SAFETY ADULT  Goal: Patient will remain free of falls  Description: INTERVENTIONS:  - Educate patient/family on patient safety including physical limitations  - Instruct patient to call for assistance with activity   - Consult OT/PT to assist with strengthening/mobility   - Keep Call bell within reach  - Keep bed low and locked with side rails adjusted as appropriate  - Keep care items and personal belongings within reach  - Initiate and maintain comfort rounds  - Make Fall Risk Sign visible to staff  - Offer Toileting every 2 Hours,  in advance of need  - Initiate/Maintain bed alarm  - Obtain necessary fall risk management equipment: alarm  - Apply yellow socks and bracelet for high fall risk patients  - Consider moving patient to room near nurses station  Outcome: Progressing  Goal: Maintain or return to baseline ADL function  Description: INTERVENTIONS:  -  Assess patient's ability to carry out ADLs; assess patient's baseline for ADL function and identify physical deficits which impact ability to perform ADLs (bathing, care of mouth/teeth, toileting, grooming, dressing, etc.)  - Assess/evaluate cause of self-care deficits   - Assess range of motion  - Assess patient's mobility; develop plan if impaired  - Assess patient's need for assistive devices and provide as appropriate  - Encourage maximum independence but intervene and supervise when necessary  - Involve family in performance of ADLs  - Assess for home care needs following discharge   - Consider OT consult to assist with ADL evaluation and planning for discharge  - Provide patient education as appropriate  Outcome: Progressing  Goal: Maintains/Returns to pre admission functional level  Description: INTERVENTIONS:  - Perform AM-PAC 6 Click Basic Mobility/ Daily Activity assessment daily.  - Set and communicate daily mobility goal to care team and patient/family/caregiver.   - Collaborate with rehabilitation services on mobility goals if consulted  - Perform Range of Motion 2 times a day.  - Reposition patient every 2 hours.  - Dangle patient 2 times a day  - Stand patient 2 times a day  - Ambulate patient 2 times a day  - Out of bed to chair 2 times a day   - Out of bed for meals 2 times a day  - Out of bed for toileting  - Record patient progress and toleration of activity level   Outcome: Progressing     Problem: DISCHARGE PLANNING  Goal: Discharge to home or other facility with appropriate resources  Description: INTERVENTIONS:  - Identify barriers to discharge w/patient and  caregiver  - Arrange for needed discharge resources and transportation as appropriate  - Identify discharge learning needs (meds, wound care, etc.)  - Arrange for interpretive services to assist at discharge as needed  - Refer to Case Management Department for coordinating discharge planning if the patient needs post-hospital services based on physician/advanced practitioner order or complex needs related to functional status, cognitive ability, or social support system  Outcome: Progressing     Problem: Knowledge Deficit  Goal: Patient/family/caregiver demonstrates understanding of disease process, treatment plan, medications, and discharge instructions  Description: Complete learning assessment and assess knowledge base.  Interventions:  - Provide teaching at level of understanding  - Provide teaching via preferred learning methods  Outcome: Progressing

## 2025-02-23 NOTE — ASSESSMENT & PLAN NOTE
Prior EF of 40% diagnosed with her PE which improved to 45%  Following Cardio-Oncology   Repeat Limited ECHO with stable EF of 45%  Continue Toprol-XL 50 mg Q12   Not on other GDMT  Lasix IV x 1 per Cardiology

## 2025-02-23 NOTE — DISCHARGE SUMMARY
Discharge Summary - Hospitalist   Name: Cherelle Dash 69 y.o. female I MRN: [id number]  Unit/Bed#: S -01 I Date of Admission: 2/20/2025   Date of Service: 2/23/2025 I Hospital Day: 3     Assessment & Plan  Other acute myocarditis  Patient presented with chest heaviness and dyspnea on exertion since her last chemo on Friday 2/14. Initial troponin 4,066 and has remained relatively flat to 3,940. She was tachycardic initially   No volume on PE study, no PE, no orthopnea. Does not look like overt heart failure   Recent negative stress test   cMRI showing focal myocarditis  Stable for discharge   Cardiology consult appreciated  Prednisone 1 mg/kg per day with 6-12 week taper per Gyn/Onc   Colchicine 0.6 mg BID   Continue Toprol-XL increased to 50 mg BID per cardiology   Follow up with Cardio-oncology   Dilated cardiomyopathy (HCC)  Prior EF of 40% diagnosed with her PE which improved to 45%  Following Cardio-Oncology   Repeat Limited ECHO with stable EF of 45%  Continue Toprol-XL 50 mg Q12   Not on other GDMT  Lasix IV x 1 per Cardiology      Endometrial cancer (HCC)  Following GYN/Onc  Currently on Taxol/Carboplatin/Pembrolizumab - last dose on 2/14  Follows Cardio-Oncology as well   Repeat limited ECHO with stable EF  Focal myocarditis noted on cMRI   Gyn/Onc consulted given suspect reaction due to Keytruda   Prednisone 1 mg/kg started and will need 6-12 week taper  Colchicine per Cardiology   Appreciate Gyn/Onc input   Stage 3 chronic kidney disease (HCC)  Lab Results   Component Value Date    EGFR 29 02/23/2025    EGFR 28 02/22/2025    EGFR 32 02/21/2025    CREATININE 1.75 (H) 02/23/2025    CREATININE 1.76 (H) 02/22/2025    CREATININE 1.60 (H) 02/21/2025   Renal function slightly above baseline of 1.3-1.5  Stable overnight   Monitor BMP  Avoid hypotension   Note, patient received contrast x 2, IV diuretic, and has started Colchicine   Stable for discharge   Morbid obesity with BMI of 40.0-44.9, adult ([facility])  BMI  noted   Encourage diet and lifestyle modifications   LBBB (left bundle branch block)  Chronic  Cardiology following in light of primary problem   Other pulmonary embolism without acute cor pulmonale (HCC)  Status post IVC - no missed Eliquis   No need for heparin drip further as she has myocarditis and not ACS  Restart Eliquis      Medical Problems       Resolved Problems  Date Reviewed: 2/23/2025   None       Discharging Physician / Practitioner: Nathan Canales PA-C  PCP: Gretchen Mayorga,   Admission Date:   Admission Orders (From admission, onward)       Ordered        02/20/25 1346  INPATIENT ADMISSION  Once                          Discharge Date: 02/23/25    Consultations During Hospital Stay:  Cardiology - Dr. Solares   Gyn/Onc - Dr. Rodríguez     Procedures Performed:   CXR  CTA PE Study   ECHO  cMRI     Significant Findings / Test Results:   CXR: No acute pulmonary disease   CTA PE Study: No evidence of acute pulmonary embolism. Scattered groundglass opacities which are overall not significantly changes from CT of 1/2024. These opacities are again favored to be infectious/inflammatory in etiology. Right lower lobe pulmonary nodule measuring 5 mm stable   ECHO: EF = 45%, mild global hypokinesis, grade 1 DD  cMRI: Focal myocarditis (results pending - discussed with Cardiology)    Incidental Findings:   None     Test Results Pending at Discharge (will require follow up):   None     Outpatient Tests Requested:  None    Complications:  None    Reason for Admission: Myocarditis     Hospital Course:   Cherelle Dash is a 69 y.o. female patient who originally presented to the hospital on 2/20/2025 due to chest pain. She had elevated troponin on admission to the ER about 4000. CTA PE study was done which ruled out PE. ECHO was done in the ER showing stable EF. There was concern for ACS vs Myocarditis. Troponin remained flat. She underwent cMRI which confirmed focal myocarditis. Prednisone 1 mg/kg was started per  Gyn/Onc and she will be on a 6-12 week Taper. Cardiology started Colcrys as well. Due to tachycardia her Toprol was increased with good control. She got Lasix x 1 but did not require diuretic at discharge. She will follow up with cardio-oncology which she is already established with as well as Gyn/Onc.     Hospital Course: No notes on file      Please see above list of diagnoses and related plan for additional information.     Condition at Discharge: stable    Discharge Day Visit / Exam:   Subjective:  Patient reports feeling well. She is very eager to go home. Denies chest pain or shortness of breath.   Vitals: Blood Pressure: 133/78 (02/23/25 0709)  Pulse: 72 (02/23/25 0709)  Temperature: 97.6 °F (36.4 °C) (02/23/25 0709)  Temp Source: Oral (02/23/25 0300)  Respirations: 18 (02/23/25 0300)  Height: 5' (152.4 cm) (02/20/25 1405)  Weight - Scale: 105 kg (231 lb 7.7 oz) (02/20/25 1405)  SpO2: 98 % (02/23/25 0709)  Physical Exam  Vitals and nursing note reviewed.   Constitutional:       General: She is not in acute distress.     Appearance: Normal appearance. She is obese. She is not ill-appearing or diaphoretic.   HENT:      Head: Normocephalic and atraumatic.      Mouth/Throat:      Mouth: Mucous membranes are moist.   Eyes:      General: No scleral icterus.     Pupils: Pupils are equal, round, and reactive to light.   Cardiovascular:      Rate and Rhythm: Normal rate and regular rhythm.      Pulses: Normal pulses.      Heart sounds: Normal heart sounds, S1 normal and S2 normal. No murmur heard.     No systolic murmur is present.      No diastolic murmur is present.      No gallop. No S3 or S4 sounds.   Pulmonary:      Effort: Pulmonary effort is normal. No accessory muscle usage or respiratory distress.      Breath sounds: Normal breath sounds. No stridor. No decreased breath sounds, wheezing, rhonchi or rales.   Chest:      Chest wall: No tenderness.   Abdominal:      General: Bowel sounds are normal. There is no  distension.      Palpations: Abdomen is soft.      Tenderness: There is no abdominal tenderness. There is no guarding.   Musculoskeletal:      Right lower leg: No edema.      Left lower leg: No edema.   Skin:     General: Skin is warm and dry.      Coloration: Skin is not jaundiced.   Neurological:      General: No focal deficit present.      Mental Status: She is alert. Mental status is at baseline.      Motor: No tremor or seizure activity.   Psychiatric:         Behavior: Behavior is cooperative.          Discussion with Family: Attempted to update  () via phone. Left voicemail.     Discharge instructions/Information to patient and family:   See after visit summary for information provided to patient and family.      Provisions for Follow-Up Care:  See after visit summary for information related to follow-up care and any pertinent home health orders.      Mobility at time of Discharge:   Basic Mobility Inpatient Raw Score: 24  JH-HLM Goal: 8: Walk 250 feet or more  JH-HLM Achieved: 7: Walk 25 feet or more  HLM Goal NOT achieved. Continue to encourage mobility in post discharge setting.     Disposition:   Home    Planned Readmission: None    Discharge Medications:  See after visit summary for reconciled discharge medications provided to patient and/or family.      Administrative Statements   Discharge Statement:  I have spent a total time of 45 minutes in caring for this patient on the day of the visit/encounter. >30 minutes of time was spent on: Diagnostic results, Instructions for management, Impressions, Counseling / Coordination of care, Documenting in the medical record, Reviewing / ordering tests, medicine, procedures  , and Communicating with other healthcare professionals .    **Please Note: This note may have been constructed using a voice recognition system**

## 2025-02-23 NOTE — ASSESSMENT & PLAN NOTE
Following GYN/Onc  Currently on Taxol/Carboplatin/Pembrolizumab - last dose on 2/14  Follows Cardio-Oncology as well   Repeat limited ECHO with stable EF  Focal myocarditis noted on cMRI   Gyn/Onc consulted given suspect reaction due to Keytruda   Prednisone 1 mg/kg started and will need 6-12 week taper  Colchicine per Cardiology   Appreciate Gyn/Onc input

## 2025-02-23 NOTE — ASSESSMENT & PLAN NOTE
Lab Results   Component Value Date    EGFR 29 02/23/2025    EGFR 28 02/22/2025    EGFR 32 02/21/2025    CREATININE 1.75 (H) 02/23/2025    CREATININE 1.76 (H) 02/22/2025    CREATININE 1.60 (H) 02/21/2025   Renal function slightly above baseline of 1.3-1.5  Stable overnight   Monitor BMP  Avoid hypotension   Note, patient received contrast x 2, IV diuretic, and has started Colchicine   Stable for discharge

## 2025-02-23 NOTE — ASSESSMENT & PLAN NOTE
Patient presented with chest heaviness and dyspnea on exertion since her last chemo on Friday 2/14. Initial troponin 4,066 and has remained relatively flat to 3,940. She was tachycardic initially   No volume on PE study, no PE, no orthopnea. Does not look like overt heart failure   Recent negative stress test   cMRI showing focal myocarditis  Stable for discharge   Cardiology consult appreciated  Prednisone 1 mg/kg per day with 6-12 week taper per Gyn/Onc   Colchicine 0.6 mg BID   Continue Toprol-XL increased to 50 mg BID per cardiology   Follow up with Cardio-oncology

## 2025-02-24 ENCOUNTER — NURSE TRIAGE (OUTPATIENT)
Age: 70
End: 2025-02-24

## 2025-02-24 ENCOUNTER — TELEPHONE (OUTPATIENT)
Age: 70
End: 2025-02-24

## 2025-02-24 ENCOUNTER — TRANSITIONAL CARE MANAGEMENT (OUTPATIENT)
Dept: FAMILY MEDICINE CLINIC | Facility: CLINIC | Age: 70
End: 2025-02-24

## 2025-02-24 DIAGNOSIS — R60.9 EDEMA, UNSPECIFIED TYPE: Primary | ICD-10-CM

## 2025-02-24 RX ORDER — TORSEMIDE 20 MG/1
20 TABLET ORAL DAILY
Qty: 30 TABLET | Refills: 3 | Status: SHIPPED | OUTPATIENT
Start: 2025-02-24

## 2025-02-24 NOTE — UTILIZATION REVIEW
NOTIFICATION OF ADMISSION DISCHARGE   This is a Notification of Discharge from [facility]. Please be advised that this patient has been discharge from our facility. Below you will find the admission and discharge date and time including the patient’s disposition.   UTILIZATION REVIEW CONTACT:  Charley Martínez  Utilization   Network Utilization Review Department  Phone: [phone] x carefully listen to the prompts. All voicemails are confidential.  Email: [email or web address]     ADMISSION INFORMATION  PRESENTATION DATE: 2/20/2025  8:03 AM  OBERVATION ADMISSION DATE: N/A  INPATIENT ADMISSION DATE: 2/20/25  1:47 PM   DISCHARGE DATE: 2/23/2025 11:06 AM   DISPOSITION:Home/Self Care    Network Utilization Review Department  ATTENTION: Please call with any questions or concerns to [phone] and carefully listen to the prompts so that you are directed to the right person. All voicemails are confidential.   For Discharge needs, contact Care Management DC Support Team at [phone] opt. 2  Send all requests for admission clinical reviews, approved or denied determinations and any other requests to dedicated fax number below belonging to the campus where the patient is receiving treatment. List of dedicated fax numbers for the Facilities:  FACILITY NAME UR FAX NUMBER   ADMISSION DENIALS (Administrative/Medical Necessity) [phone]   DISCHARGE SUPPORT TEAM ([facility]) [phone]   PARENT CHILD HEALTH (Maternity/NICU/Pediatrics) [phone]   [facility] [phone]   [facility] [phone]   [facility] [phone]   [facility] [phone]   [facility] [phone]   [facility] [phone]   [facility] [phone]   [facility] [phone]    [facility] [phone]   [facility] [phone]   [facility] [phone]   [facility] [phone]

## 2025-02-24 NOTE — TELEPHONE ENCOUNTER
"Reason for Conversation: received call from pt.  She was hospitalized 2/20 and consulted by cardiology, saw Dr. Solares.  Pt mentioned he was discussing possibly prescribed a diuretic for her ankle edema, but pt does not have an order.  Pt states her ankles are still swollen.  Please advise.    Pt uses [facility] pharmacy.    VS/Weight: (Note: Please include date/time vitals/weight were measured)  unsure     Pain: No    Risk Factors: Other acute myocarditis, dilates cardiomyopathy, endometrial cancer, CKD, LBBB, PE    Recent relevant testing and date of testing: Labs 2/23, echo 2/20    Medication: metoprolol succinate 50 mg bid, colchicine 0.6 mg bid, prednisone, eliquis 5 mg bid    Upcoming Office Visit: Yes    Last Office Visit: 12/18/24        Reason for Disposition   Caller has NON-URGENT medicine question about med that PCP or specialist prescribed and triager unable to answer question    Answer Assessment - Initial Assessment Questions  1. NAME of MEDICINE: \"What medicine(s) are you calling about?\"      diuretic  2. QUESTION: \"What is your question?\" (e.g., double dose of medicine, side effect)      Dr. Solares mentioned prescribing one, but she did not get an order  3. PRESCRIBER: \"Who prescribed the medicine?\" Reason: if prescribed by specialist, call should be referred to that group.      Cardiology consulted in hospital 2/20  4. SYMPTOMS: \"Do you have any symptoms?\" If Yes, ask: \"What symptoms are you having?\"  \"How bad are the symptoms (e.g., mild, moderate, severe)      Ankle edema    Protocols used: Medication Question Call-Adult-[place]    "

## 2025-02-25 NOTE — PROGRESS NOTES
Cardio-Oncology / Heart Failure Cardiology Clinic Note    Cherelle Dash 69 y.o. female   MRN: [id number]  Encounter: [id number]        Assessment / Plan:    # Cardio-Oncology Pertinent History  Endometrial cancer  Dx 2024  S/p surgery  Dec 2024 started chemotherapy - carboplatin, taxol, pembro  Pembro now HELD for myocarditis 2-2025  Plan is to get XRT after chemotherapy    # Recent myocarditis  2-2025.  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 But reduce dose to daily with CKD.  Repeat troponin to make sure trending down   Checkpoint inhibitor was held.  Restarting in this setting would be very high risk.     # Cardiomyopathy  # HF mr EF    ETIOLOGY:  - EF 40% in setting of acute PE (Sept 2024).  BEFORE chemotherapy.   - non-ischemic by stress test.     - repeat echo January 2025 - EF 45% with abnormal septal motion 2/2 LBBB  - possible LBBB as etiology given dyssynchronous appearance on echo.    VOLUME:  - inpatient cardiology started torsemide 20  - exam -  borderline JVP, trace edema    GDMT:  - toprol 50 mg  - would need to be cautious with ACE/ARB/ARNI in setting CKD and active chemo    DEVICE:  - chronic LBBB  - not indicated with current EF    # PE  Diagnosed September 2024  On Eliquis  Has IVC filter, plan to remove    # HLD    Follows with general cardiology    # PVCs  On toprol    # CKD 3  Baseline Cr 1.4 - 1.6  Cr this AM, 1.8.   will need to monitor closely now that started on torsemide.    # Obesity  BMI 42 --> 44  Weight loss recommended    # Bipolar / depression /anxiety  Follows with psychiatry     # High risk medication use  carboplatin, taxol, pembro  Discussed cardiac risks of hypertension, edema, bradycardia arrhythmias, heart block, pericarditis, myocarditis.  NOW complicated by checkpoint inhibitor associated myocarditis as above  Given hx of LV  dysfunction, rec serial echo f/u    # Primary cardiologist  Dr. Gold.      Today's Plan Summary:  See above assessment/plan for full details of today's plan.  Briefly,     Given CKD, reduce dose of colchicine to once daily  Adding magnesium given recent low magnesium value  Continue high-dose slow steroid taper  Recheck troponin to make sure trending down  will send a message to gynecology-oncology                Reason For Visit / Chief Complaint:  F/u -  history of cardiomyopathy and need for potentially heart toxic chemotherapy.    HPI:   Cherelle Dash is a 69 y.o.  female with history as noted in the problem list and further detailed in the above assessment and plan.    Initial:  Dec 2024  Referred by Dr. Page (GYN ONC) for history of cardiomyopathy and need for potentially heart toxic chemotherapy.  As above, the patient has a history of endometrial cancer.  She had surgery and has begun chemotherapy with carboplatin, Taxol, Pembro.  Ultimately she will get XRT and then further immunotherapy.  She also has a history of LV dysfunction earlier this year in the setting of an acute PE.  Further assessment of EF one month later (by nuclear stress) demonstrated recovery of LV function.  The patient follows closely with a cardiologist, Dr. Gold.  Today, the patient reports  - no CV sx's.   Retired.   Used to work for [employer].  Former smoker. Quit in her 50s.      Interval:  Plan at initial visit -->   Echo in January     Echo - 01/20/25   EF 45%.  abnormal septal motion 2/2 LBBB.    Had an admission in February for chest pain and shortness of breath.  Troponin was quite elevated.  Echo showed stable EF at 45%.  The clinical suspicion was for checkpoint inhibitor associated myocarditis.  The patient had an MRI with prelim read showing focal area of myocarditis in the inferolateral wall.  Per gyn onc the patient was started on steroids and per cardiology started on colchicine.  There was not  an ischemic evaluation.  Checkpoint inhibitors were held.    Labs from earlier this morning reviewed in detail.  Cr 1.8.  magnesium 1.6.    Today -  has LOMBARDI and chest pain with exertion.   Only slightly improved from hospitalization. Some leg edema right ankle.   No palpitations.  No syncope.             Cardiac Imaging personally reviewed:  EKG 10-1-24  Sinus  LBBB    2-26-25  Sinus  LBBB       Holter or event monitor    Echo 2020  EF 60%      EF 55%    9-6-24  Mild LVH.  EF 40%.  Mild RV dysfunction  Mild MR    Echo - 01/20/25   Mild LVH.  EF 45%.  abnormal septal motion 2/2 LBBB.  Mild MR  Mild TR    Echo 2-20-25  45%. abnormal septal motion 2/2 LBBB.  1-2+ MR         JUANJO    Cardiac MRI Done in hospital 2-2025 (checkpoint inhibitor associated myocarditis)  No formal read, per cardiology consult note there was focal area of myocarditis in the inferior lateral wall       Stress testing Nuclear stress -   No ischemia.  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/03/2025   • Drug-induced pneumonitis 01/10/2025   • Arthralgia 01/02/2025   • Drug-induced neutropenia (HCC) 12/20/2024   • Palliative care by specialist 11/18/2024   • Anxiety about health 11/18/2024   • Goals of care, counseling/discussion 11/18/2024   • Encounter for central line care 11/13/2024   • Hospital discharge follow-up 09/17/2024   • Dilated cardiomyopathy (HCC) 09/09/2024   • Other pulmonary embolism without acute cor pulmonale (HCC) 09/05/2024   • PMB (postmenopausal bleeding) 08/11/2024   • Endometrial cancer (HCC) 08/11/2024   • Hyperphosphatemia 06/17/2024   • Premature ventricular contractions 12/14/2023   • LBBB (left bundle branch block) 12/14/2023   • Other sleep disorders 12/14/2023   • Lichen  sclerosus 06/14/2022   • Mass of right hand 06/30/2021   • Urinary frequency 04/08/2021   • Secondary hyperparathyroidism of renal origin ([facility]) 04/02/2021   • Persistent proteinuria 04/02/2021   • Primary osteoarthritis of left knee 02/23/2021   • Primary osteoarthritis of right knee 02/23/2021   • Vitamin D deficiency 12/21/2020   • Stage 3 chronic kidney disease ([facility]) 08/25/2020   • Raynaud's disease without gangrene 03/10/2020   • Morbid obesity with BMI of 40.0-44.9, adult ([facility]) 12/20/2018   • High triglycerides 06/07/2018   • Onychomycosis of toenail 01/09/2017   • Insomnia 07/21/2015   • Stress incontinence of urine 07/21/2015   • Patellofemoral arthritis of right knee 08/11/2014   • Depression with anxiety 04/30/2014   • Hypothyroidism 06/04/2013   • Impaired fasting glucose 06/04/2013   • Moderate mixed bipolar I disorder ([facility]) 05/01/2013   • Gastroesophageal reflux disease without esophagitis 05/01/2013   • Obstructive sleep apnea 05/01/2013   • Solar degeneration 06/13/2012       Past Medical History:   Diagnosis Date   • Abnormal ECG    • Anxiety 2002   • Arthritis    • Bipolar 1 disorder ([facility])    • Cervical cancer ([facility])    • Chronic kidney disease     stage 3   • CPAP (continuous positive airway pressure) dependence    • Depression 2001   • Disease of thyroid gland    • DVT (deep venous thrombosis) ([facility])     BLLE   • Elevated blood pressure reading 06/10/2019   • GERD (gastroesophageal reflux disease)    • Obesity    • Pulmonary emboli ([facility])     B/L   • RSV (acute bronchiolitis due to respiratory syncytial virus) 12/05/2023   • Sleep apnea    • Sleep apnea, obstructive 2007   • Urinary incontinence 2019   • Uterine cancer ([facility])        Allergies   Allergen Reactions   • Raw Fruit - Food Allergy Anaphylaxis     PLUMS, PEACHES, APPLES, CHERRIES FRUIT WITH SKIN       Current Outpatient Medications   Medication Instructions   • apixaban (ELIQUIS) 5 mg, Oral, 2 times daily   • chlorhexidine (PERIDEX) 0.12 %  solution USE HALF OUNCE TWICE A DAY RINSE FOR 30 SECONDS BY MOUTH THEN EXPECTORATE DO NOT SWALLOW   • Cholecalciferol (VITAMIN D) 2000 units CAPS 1 capsule, Daily   • clobetasol (TEMOVATE) 0.05 % ointment Topical, 2 times weekly   • colchicine (COLCRYS) 0.6 mg, Oral, Daily   • CRANBERRY PO Take by mouth   • famotidine (PEPCID) 20 mg, Oral, 2 times daily   • Glucosamine-Chondroit-Vit C-Mn (GLUCOSAMINE 1500 COMPLEX PO) Daily   • Ivermectin 1 % CREA    • lidocaine-prilocaine (EMLA) cream Apply to PORT 30 min prior to labs and chemo   • LORazepam (ATIVAN) 1 mg, Oral, Every 8 hours PRN   • lurasidone (LATUDA) 20 mg, Oral, Daily with breakfast   • magnesium Oxide (MAG-OX) 400 mg, Oral, Daily   • metoprolol succinate (TOPROL-XL) 50 mg, Oral, Every 12 hours   • Mirabegron ER 50 mg, Oral, Daily   • Multiple Vitamin (MULTI-VITAMIN DAILY) TABS Daily   • polyethylene glycol (GLYCOLAX) 17 g, Oral, 2 times daily   • predniSONE 10 mg tablet Take 10 tablets PO QD x 7 days, then 9 tablets PO x 7d, then 8 tablets PO x 7d, then 6 tabs PO x 5d, then 5 tabs PO x 5 d, then 4 tabs PO x 3d, then 3 tabs PO x 3d, then 2 tabs PO x 2d, then 1 tabs PO x 3d.   • Sodium Fluoride 5000 PPM 1.1 % GEL As directed   • torsemide (DEMADEX) 20 mg, Oral, Daily       Social History     Socioeconomic History   • Marital status: /Civil Union     Spouse name: Not on file   • Number of children: Not on file   • Years of education: Not on file   • Highest education level: Not on file   Occupational History   • Not on file   Tobacco Use   • Smoking status: Former     Current packs/day: 0.00     Average packs/day: 0.3 packs/day for 30.0 years (7.5 ttl pk-yrs)     Types: Cigarettes     Start date: 1979     Quit date: 2009     Years since quittin.1     Passive exposure: Never   • Smokeless tobacco: Never   Vaping Use   • Vaping status: Never Used   Substance and Sexual Activity   • Alcohol use: Yes     Alcohol/week: 1.0 standard drink of alcohol      Types: 1 Glasses of wine per week   • Drug use: Never   • Sexual activity: Not Currently     Partners: Male     Birth control/protection: Post-menopausal   Other Topics Concern   • Not on file   Social History Narrative    Daily coffee consumption: 3 cups/day     Social Drivers of Health     Financial Resource Strain: Low Risk  (2024)    Overall Financial Resource Strain (CARDIA)    • Difficulty of Paying Living Expenses: Not hard at all   Food Insecurity: No Food Insecurity (2025)    Nursing - Inadequate Food Risk Classification    • Worried About Running Out of Food in the Last Year: Never true    • Ran Out of Food in the Last Year: Never true    • Ran Out of Food in the Last Year: Never true   Transportation Needs: No Transportation Needs (2025)    Nursing - Transportation Risk Classification    • Lack of Transportation: Not on file    • Lack of Transportation: No   Physical Activity: Not on file   Stress: Not on file   Social Connections: Not on file   Intimate Partner Violence: Unknown (2025)    Nursing IPS    • Feels Physically and Emotionally Safe: Not on file    • Physically Hurt by Someone: Not on file    • Humiliated or Emotionally Abused by Someone: Not on file    • Physically Hurt by Someone: No    • Hurt or Threatened by Someone: No   Housing Stability: Unknown (2025)    Nursing: Inadequate Housing Risk Classification    • Has Housing: Not on file    • Worried About Losing Housing: Not on file    • Unable to Get Utilities: Not on file    • Unable to Pay for Housing in the Last Year: No    • Has Housin       Family History   Problem Relation Age of Onset   • Heart disease Mother    • Heart Valve Disease Mother         Replacement   • Diabetes Mother            • Heart failure Mother         Heart Valve replacement   • Arthritis Father    • No Known Problems Sister    • No Known Problems Daughter    • No Known Problems Maternal Grandmother    • No Known Problems  "Maternal Grandfather    • No Known Problems Paternal Grandmother    • No Known Problems Paternal Grandfather    • No Known Problems Son    • No Known Problems Maternal Aunt    • No Known Problems Maternal Aunt    • No Known Problems Maternal Aunt    • No Known Problems Maternal Aunt    • No Known Problems Maternal Aunt    • No Known Problems Paternal Aunt    • Alcohol abuse Son         [place], 40 year old son, has issues with alcohol and alcohol abuse       Physical Exam:  Blood pressure 128/70, pulse 81, height 5' (1.524 m), weight 104 kg (229 lb 14.4 oz), SpO2 96%, not currently breastfeeding.  Body mass index is 44.9 kg/m².  Wt Readings from Last 3 Encounters:   02/26/25 104 kg (229 lb 14.4 oz)   02/20/25 105 kg (231 lb 7.7 oz)   02/14/25 105 kg (232 lb 8 oz)     Physical Exam  Vitals reviewed.   Constitutional:       General: She is not in acute distress.     Appearance: She is not toxic-appearing.   Neck:      Comments: JVP borderline elevated  Cardiovascular:      Rate and Rhythm: Normal rate and regular rhythm.      Heart sounds: No murmur heard.     No friction rub. No gallop.   Pulmonary:      Breath sounds: Normal breath sounds. No wheezing, rhonchi or rales.   Musculoskeletal:      Comments: Trace LE edema  (right > left)   Neurological:      Mental Status: She is alert.         Labs & Results:  Lab Results   Component Value Date    SODIUM 140 02/26/2025    K 3.9 02/26/2025     02/26/2025    CO2 25 02/26/2025    BUN 34 (H) 02/26/2025    CREATININE 1.82 (H) 02/26/2025    GLUC 166 (H) 02/26/2025    CALCIUM 8.6 02/26/2025     No results found for: \"NTBNP\"     Time Statement:  I have spent a total time of 46 minutes in caring for this patient on the day of the visit/encounter including Diagnostic results, Prognosis, Risks and benefits of tx options, Instructions for management, Patient and family education, Importance of tx compliance, Risk factor reductions, Impressions, Counseling / Coordination of " care, Documenting in the medical record, Reviewing/placing orders in the medical record (including tests, medications, and/or procedures), Obtaining or reviewing history  , and Communicating with other healthcare professionals .      Thank you for the opportunity to participate in the care of this patient.    Mert Olson MD, [facility]  Staff Cardiologist  Director of Cardio-Oncology  [facility]

## 2025-02-26 ENCOUNTER — OFFICE VISIT (OUTPATIENT)
Age: 70
End: 2025-02-26
Payer: COMMERCIAL

## 2025-02-26 ENCOUNTER — HOSPITAL ENCOUNTER (OUTPATIENT)
Dept: INFUSION CENTER | Facility: CLINIC | Age: 70
Discharge: HOME | End: 2025-02-26
Payer: COMMERCIAL

## 2025-02-26 VITALS
BODY MASS INDEX: 45.14 KG/M2 | WEIGHT: 229.9 LBS | SYSTOLIC BLOOD PRESSURE: 128 MMHG | DIASTOLIC BLOOD PRESSURE: 70 MMHG | OXYGEN SATURATION: 96 % | HEIGHT: 60 IN | HEART RATE: 81 BPM

## 2025-02-26 DIAGNOSIS — Z45.2 ENCOUNTER FOR CENTRAL LINE CARE: Primary | ICD-10-CM

## 2025-02-26 DIAGNOSIS — E78.2 MIXED HYPERLIPIDEMIA: ICD-10-CM

## 2025-02-26 DIAGNOSIS — Z79.899 HIGH RISK MEDICATION USE: ICD-10-CM

## 2025-02-26 DIAGNOSIS — I40.9 ACUTE MYOCARDITIS, UNSPECIFIED MYOCARDITIS TYPE: Primary | ICD-10-CM

## 2025-02-26 DIAGNOSIS — R07.9 CHEST PAIN, UNSPECIFIED TYPE: ICD-10-CM

## 2025-02-26 DIAGNOSIS — I40.8 OTHER ACUTE MYOCARDITIS: ICD-10-CM

## 2025-02-26 DIAGNOSIS — C54.1 ENDOMETRIAL CANCER (HCC): ICD-10-CM

## 2025-02-26 DIAGNOSIS — R68.89 OTHER GENERAL SYMPTOMS AND SIGNS: ICD-10-CM

## 2025-02-26 DIAGNOSIS — E83.42 HYPOMAGNESEMIA: ICD-10-CM

## 2025-02-26 DIAGNOSIS — I50.22 HEART FAILURE WITH MILDLY REDUCED EJECTION FRACTION (HCC): ICD-10-CM

## 2025-02-26 DIAGNOSIS — N18.32 STAGE 3B CHRONIC KIDNEY DISEASE (HCC): ICD-10-CM

## 2025-02-26 DIAGNOSIS — Z92.21 STATUS POST ADMINISTRATION OF CARDIOTOXIC CHEMOTHERAPY: ICD-10-CM

## 2025-02-26 DIAGNOSIS — I42.7 CARDIOTOXICITY (HCC): ICD-10-CM

## 2025-02-26 DIAGNOSIS — I49.3 PVC (PREMATURE VENTRICULAR CONTRACTION): ICD-10-CM

## 2025-02-26 LAB
ALBUMIN SERPL BCG-MCNC: 3.6 G/DL (ref 3.5–5)
ALP SERPL-CCNC: 131 U/L (ref 34–104)
ALT SERPL W P-5'-P-CCNC: 23 U/L (ref 7–52)
AMYLASE SERPL-CCNC: 36 IU/L (ref 29–103)
ANION GAP SERPL CALCULATED.3IONS-SCNC: 9 MMOL/L (ref 4–13)
AST SERPL W P-5'-P-CCNC: 13 U/L (ref 13–39)
BASOPHILS # BLD AUTO: 0.07 THOUSANDS/ÂΜL (ref 0–0.1)
BASOPHILS NFR BLD AUTO: 1 % (ref 0–1)
BILIRUB SERPL-MCNC: 0.24 MG/DL (ref 0.2–1)
BUN SERPL-MCNC: 34 MG/DL (ref 5–25)
CALCIUM SERPL-MCNC: 8.6 MG/DL (ref 8.4–10.2)
CANCER AG125 SERPL-ACNC: 48.3 U/ML (ref 0–35)
CHLORIDE SERPL-SCNC: 106 MMOL/L (ref 96–108)
CO2 SERPL-SCNC: 25 MMOL/L (ref 21–32)
CREAT SERPL-MCNC: 1.82 MG/DL (ref 0.6–1.3)
EOSINOPHIL # BLD AUTO: 0.03 THOUSAND/ÂΜL (ref 0–0.61)
EOSINOPHIL NFR BLD AUTO: 0 % (ref 0–6)
ERYTHROCYTE [DISTWIDTH] IN BLOOD BY AUTOMATED COUNT: 21.5 % (ref 11.6–15.1)
GFR SERPL CREATININE-BSD FRML MDRD: 27 ML/MIN/1.73SQ M
GLUCOSE SERPL-MCNC: 166 MG/DL (ref 65–140)
HCT VFR BLD AUTO: 33.6 % (ref 34.8–46.1)
HGB BLD-MCNC: 10.6 G/DL (ref 11.5–15.4)
IMM GRANULOCYTES # BLD AUTO: 0.19 THOUSAND/UL (ref 0–0.2)
IMM GRANULOCYTES NFR BLD AUTO: 1 % (ref 0–2)
LIPASE SERPL-CCNC: 22 U/L (ref 11–82)
LYMPHOCYTES # BLD AUTO: 2.16 THOUSANDS/ÂΜL (ref 0.6–4.47)
LYMPHOCYTES NFR BLD AUTO: 15 % (ref 14–44)
MAGNESIUM SERPL-MCNC: 1.6 MG/DL (ref 1.9–2.7)
MCH RBC QN AUTO: 30.5 PG (ref 26.8–34.3)
MCHC RBC AUTO-ENTMCNC: 31.5 G/DL (ref 31.4–37.4)
MCV RBC AUTO: 97 FL (ref 82–98)
MONOCYTES # BLD AUTO: 0.86 THOUSAND/ÂΜL (ref 0.17–1.22)
MONOCYTES NFR BLD AUTO: 6 % (ref 4–12)
NEUTROPHILS # BLD AUTO: 11.53 THOUSANDS/ÂΜL (ref 1.85–7.62)
NEUTS SEG NFR BLD AUTO: 77 % (ref 43–75)
NRBC BLD AUTO-RTO: 0 /100 WBCS
PLATELET # BLD AUTO: 136 THOUSANDS/UL (ref 149–390)
PMV BLD AUTO: 11.5 FL (ref 8.9–12.7)
POTASSIUM SERPL-SCNC: 3.9 MMOL/L (ref 3.5–5.3)
PROT SERPL-MCNC: 6.2 G/DL (ref 6.4–8.4)
RBC # BLD AUTO: 3.47 MILLION/UL (ref 3.81–5.12)
SCAN RESULT: NORMAL
SODIUM SERPL-SCNC: 140 MMOL/L (ref 135–147)
TSH SERPL DL<=0.05 MIU/L-ACNC: 3.38 UIU/ML (ref 0.45–4.5)
WBC # BLD AUTO: 14.84 THOUSAND/UL (ref 4.31–10.16)

## 2025-02-26 PROCEDURE — 80053 COMPREHEN METABOLIC PANEL: CPT

## 2025-02-26 PROCEDURE — 84443 ASSAY THYROID STIM HORMONE: CPT

## 2025-02-26 PROCEDURE — 86304 IMMUNOASSAY TUMOR CA 125: CPT

## 2025-02-26 PROCEDURE — 85025 COMPLETE CBC W/AUTO DIFF WBC: CPT

## 2025-02-26 PROCEDURE — 82150 ASSAY OF AMYLASE: CPT

## 2025-02-26 PROCEDURE — 83690 ASSAY OF LIPASE: CPT

## 2025-02-26 PROCEDURE — 83735 ASSAY OF MAGNESIUM: CPT

## 2025-02-26 PROCEDURE — 93000 ELECTROCARDIOGRAM COMPLETE: CPT | Performed by: INTERNAL MEDICINE

## 2025-02-26 PROCEDURE — 99215 OFFICE O/P EST HI 40 MIN: CPT | Performed by: INTERNAL MEDICINE

## 2025-02-26 RX ORDER — COLCHICINE 0.6 MG/1
0.6 TABLET ORAL DAILY
Qty: 30 TABLET | Refills: 1 | Status: SHIPPED | OUTPATIENT
Start: 2025-02-26

## 2025-02-26 RX ORDER — LANOLIN ALCOHOL/MO/W.PET/CERES
400 CREAM (GRAM) TOPICAL DAILY
Qty: 30 TABLET | Refills: 11 | Status: SHIPPED | OUTPATIENT
Start: 2025-02-26

## 2025-02-26 NOTE — PATIENT INSTRUCTIONS
Reduce your colchicine to just once daily    Start a magnesium supplement    Get blood work on Monday to check a troponin

## 2025-02-26 NOTE — Clinical Note
Sharad Cleary (and Sharon),  Just reaching out about this shared patient.  I just saw her today in cardio-oncology clinic.  It looks like since I last saw her -- she had an admission to the hospital for chest pain and shortness of breath with very elevated troponins.  Cardiac MRI is not read yet....  but supposedly showed evidence of myocarditis.    Highly suspicious for checkpoint inhibitor associated myocarditis.  I am pretty sure you are aware of all this --  but just wanted to make sure we are on the same page.  Agree with high-dose steroids with slow taper.  Obviously re-trial of checkpoint inhibitor would be very high risk in this setting.  I ordered a troponin to check next week to make sure it is trending down.  Please reach out over [employer] or call me on my cell phone if you want to discuss the case at any point.  Thanks,  Mert Olson -  cardiology and cardio-oncology

## 2025-02-26 NOTE — PROGRESS NOTES
Patient here for lab work. Port flushed, blood return noted, labs drawn per order. Patient declined AVS. Next appointment reviewed with patient for 3/5 0800.

## 2025-02-27 ENCOUNTER — DOCUMENTATION (OUTPATIENT)
Dept: CARDIOLOGY CLINIC | Facility: CLINIC | Age: 70
End: 2025-02-27

## 2025-02-27 NOTE — PROGRESS NOTES
Formal read of cardiac MRI - Feb 2025    Visually the EF is 40 to 45%.     There is subepicardial non-CAD LGE in the basal anterolateral wall and transmural non-CAD LGE of the mid inferolateral wall. This is suggestive of myocarditis.     Small anterior pericardial effusion. Extensive epicardial fat and lipomatous hypertrophy of the interatrial septum.    Cystic structures in the left kidney are partially visualized -  (on my review of chart, there was an abdominal CT about 2-3 weeks prior to this MRI that reported kidneys were unremarkable)

## 2025-02-28 PROCEDURE — A9585 GADOBUTROL INJECTION: HCPCS | Performed by: FAMILY MEDICINE

## 2025-02-28 RX ORDER — GADOBUTROL 604.72 MG/ML
20 INJECTION INTRAVENOUS
Status: COMPLETED | OUTPATIENT
Start: 2025-02-28 | End: 2025-02-28

## 2025-02-28 RX ADMIN — GADOBUTROL 20 ML: 604.72 INJECTION INTRAVENOUS at 19:14

## 2025-03-03 ENCOUNTER — OFFICE VISIT (OUTPATIENT)
Dept: FAMILY MEDICINE CLINIC | Facility: CLINIC | Age: 70
End: 2025-03-03
Payer: COMMERCIAL

## 2025-03-03 ENCOUNTER — APPOINTMENT (OUTPATIENT)
Dept: LAB | Facility: AMBULARY SURGERY CENTER | Age: 70
End: 2025-03-03
Payer: COMMERCIAL

## 2025-03-03 VITALS
TEMPERATURE: 99.4 F | RESPIRATION RATE: 18 BRPM | HEART RATE: 96 BPM | SYSTOLIC BLOOD PRESSURE: 126 MMHG | DIASTOLIC BLOOD PRESSURE: 84 MMHG | BODY MASS INDEX: 44.37 KG/M2 | OXYGEN SATURATION: 97 % | HEIGHT: 60 IN | WEIGHT: 226 LBS

## 2025-03-03 DIAGNOSIS — N18.32 STAGE 3B CHRONIC KIDNEY DISEASE (HCC): ICD-10-CM

## 2025-03-03 DIAGNOSIS — R79.0 LOW MAGNESIUM LEVEL: ICD-10-CM

## 2025-03-03 DIAGNOSIS — T50.905A DRUG-INDUCED PNEUMONITIS: ICD-10-CM

## 2025-03-03 DIAGNOSIS — C54.1 ENDOMETRIAL CANCER (HCC): ICD-10-CM

## 2025-03-03 DIAGNOSIS — I40.9 ACUTE MYOCARDITIS, UNSPECIFIED MYOCARDITIS TYPE: ICD-10-CM

## 2025-03-03 DIAGNOSIS — J98.4 DRUG-INDUCED PNEUMONITIS: ICD-10-CM

## 2025-03-03 DIAGNOSIS — I42.0 DILATED CARDIOMYOPATHY (HCC): ICD-10-CM

## 2025-03-03 DIAGNOSIS — Z09 HOSPITAL DISCHARGE FOLLOW-UP: Primary | ICD-10-CM

## 2025-03-03 PROBLEM — R35.0 URINARY FREQUENCY: Status: RESOLVED | Noted: 2021-04-08 | Resolved: 2025-03-03

## 2025-03-03 LAB
CARDIAC TROPONIN I PNL SERPL HS: 165 NG/L (ref 8–18)
MAGNESIUM SERPL-MCNC: 1.9 MG/DL (ref 1.9–2.7)

## 2025-03-03 PROCEDURE — 99495 TRANSJ CARE MGMT MOD F2F 14D: CPT | Performed by: FAMILY MEDICINE

## 2025-03-03 PROCEDURE — 83735 ASSAY OF MAGNESIUM: CPT

## 2025-03-03 PROCEDURE — 84484 ASSAY OF TROPONIN QUANT: CPT

## 2025-03-03 PROCEDURE — 36415 COLL VENOUS BLD VENIPUNCTURE: CPT

## 2025-03-03 NOTE — PROGRESS NOTES
Transition of Care Visit  Name: Cherelle Dash      : 1955      MRN: [id number]  Encounter Provider: Gretchen Mayorga DO  Encounter Date: 3/3/2025   Encounter department: [facility]    Assessment & Plan  Hospital discharge follow-up         Low magnesium level    Orders:  •  Magnesium; Future    Drug-induced pneumonitis  On steroids and cholchicine       Stage 3b chronic kidney disease (HCC)  Lab Results   Component Value Date    EGFR 27 2025    EGFR 29 2025    EGFR 28 2025    CREATININE 1.82 (H) 2025    CREATININE 1.75 (H) 2025    CREATININE 1.76 (H) 2025          Endometrial cancer (HCC)  Was on keytruda  Will likely not have any more due to side effects       Dilated cardiomyopathy (HCC)  Improved on last echo            History of Present Illness     Transitional Care Management Review:   Cherelle Dash is a 69 y.o. female here for TCM follow up.     During the TCM phone call patient stated:  TCM Call     Date and time call was made  2025 10:10 AM    Patient was hospitialized at  [facility]    Date of Admission  25    Date of discharge  25    Diagnosis  other acute myocarditis    Disposition  Home    Were the patients medications reviewed and updated  Yes      TCM Call     Should patient be enrolled in anticoag monitoring?  Yes    Scheduled for follow up?  Yes    Did you obtain your prescribed medications  Yes    Do you need help managing your prescriptions or medications  No    Is transportation to your appointment needed  No    I have advised the patient to call PCP with any new or worsening symptoms  collette Britton    Living Arrangements  Spouse or Significiant other    Are you recieving any outpatient services  No    Are you recieving home care services  No    Are you using any community resources  No    Current waiver services  No    Have you fallen in the last 12 months  No    Interperter language line needed  No         Here for hospital folow up  Had keytruda on a Friday  By Thursday needed to go to hospital   Had chest pressure  Troponin was in 4000  Sounds like it was myocarditis and treated with allopurinol and steroids  Chest pain improved by last Sunday  Seen by Dr. Ag  Sending for more labs  Today with exertion felt ok        Review of Systems   Constitutional:  Positive for fatigue.   HENT: Negative.     Eyes: Negative.    Respiratory: Negative.     Cardiovascular: Negative.    Gastrointestinal: Negative.    Musculoskeletal:  Positive for arthralgias (heel pain).   Allergic/Immunologic: Negative.    Neurological: Negative.    Hematological: Negative.    Psychiatric/Behavioral: Negative.       Objective   /84 (BP Location: Left arm, Patient Position: Sitting, Cuff Size: Large)   Pulse 96   Temp 99.4 °F (37.4 °C) (Tympanic)   Resp 18   Ht 5' (1.524 m)   Wt 103 kg (226 lb)   SpO2 97%   BMI 44.14 kg/m²     Physical Exam  Vitals and nursing note reviewed.   Constitutional:       Appearance: Normal appearance. She is well-developed.   HENT:      Head: Normocephalic and atraumatic.      Right Ear: External ear normal.      Left Ear: External ear normal.      Nose: Nose normal.   Eyes:      Extraocular Movements: Extraocular movements intact.      Conjunctiva/sclera: Conjunctivae normal.      Pupils: Pupils are equal, round, and reactive to light.   Cardiovascular:      Rate and Rhythm: Normal rate and regular rhythm.      Heart sounds: Normal heart sounds.   Pulmonary:      Effort: Pulmonary effort is normal.      Breath sounds: Normal breath sounds.   Abdominal:      General: Bowel sounds are normal.      Palpations: Abdomen is soft.   Musculoskeletal:         General: Normal range of motion.      Cervical back: Normal range of motion and neck supple.   Skin:     General: Skin is warm and dry.      Capillary Refill: Capillary refill takes less than 2 seconds.   Neurological:      General: No focal deficit  present.      Mental Status: She is alert and oriented to person, place, and time.   Psychiatric:         Mood and Affect: Mood normal.         Behavior: Behavior normal.         Thought Content: Thought content normal.         Judgment: Judgment normal.       Medications have been reviewed by provider in current encounter

## 2025-03-03 NOTE — ASSESSMENT & PLAN NOTE
Lab Results   Component Value Date    EGFR 27 02/26/2025    EGFR 29 02/23/2025    EGFR 28 02/22/2025    CREATININE 1.82 (H) 02/26/2025    CREATININE 1.75 (H) 02/23/2025    CREATININE 1.76 (H) 02/22/2025

## 2025-03-04 ENCOUNTER — RESULTS FOLLOW-UP (OUTPATIENT)
Dept: CARDIOLOGY CLINIC | Facility: CLINIC | Age: 70
End: 2025-03-04

## 2025-03-04 ENCOUNTER — RESULTS FOLLOW-UP (OUTPATIENT)
Dept: FAMILY MEDICINE CLINIC | Facility: CLINIC | Age: 70
End: 2025-03-04

## 2025-03-05 ENCOUNTER — HOSPITAL ENCOUNTER (OUTPATIENT)
Dept: INFUSION CENTER | Facility: CLINIC | Age: 70
Discharge: HOME | End: 2025-03-05
Payer: COMMERCIAL

## 2025-03-05 ENCOUNTER — OFFICE VISIT (OUTPATIENT)
Dept: GYNECOLOGIC ONCOLOGY | Facility: CLINIC | Age: 70
End: 2025-03-05
Payer: COMMERCIAL

## 2025-03-05 VITALS
OXYGEN SATURATION: 98 % | HEART RATE: 94 BPM | WEIGHT: 226 LBS | BODY MASS INDEX: 44.14 KG/M2 | SYSTOLIC BLOOD PRESSURE: 126 MMHG | DIASTOLIC BLOOD PRESSURE: 72 MMHG | TEMPERATURE: 97.8 F

## 2025-03-05 DIAGNOSIS — Z45.2 ENCOUNTER FOR CENTRAL LINE CARE: Primary | ICD-10-CM

## 2025-03-05 DIAGNOSIS — R68.89 OTHER GENERAL SYMPTOMS AND SIGNS: ICD-10-CM

## 2025-03-05 DIAGNOSIS — C54.1 ENDOMETRIAL CANCER (HCC): ICD-10-CM

## 2025-03-05 DIAGNOSIS — C54.1 ENDOMETRIAL CANCER (HCC): Primary | ICD-10-CM

## 2025-03-05 DIAGNOSIS — I40.8 OTHER ACUTE MYOCARDITIS: ICD-10-CM

## 2025-03-05 DIAGNOSIS — N18.32 STAGE 3B CHRONIC KIDNEY DISEASE (HCC): ICD-10-CM

## 2025-03-05 LAB
ALBUMIN SERPL BCG-MCNC: 3.8 G/DL (ref 3.5–5)
ALP SERPL-CCNC: 103 U/L (ref 34–104)
ALT SERPL W P-5'-P-CCNC: 17 U/L (ref 7–52)
ANION GAP SERPL CALCULATED.3IONS-SCNC: 9 MMOL/L (ref 4–13)
AST SERPL W P-5'-P-CCNC: 10 U/L (ref 13–39)
BASOPHILS # BLD AUTO: 0.01 THOUSANDS/ÂΜL (ref 0–0.1)
BASOPHILS NFR BLD AUTO: 0 % (ref 0–1)
BILIRUB SERPL-MCNC: 0.35 MG/DL (ref 0.2–1)
BUN SERPL-MCNC: 47 MG/DL (ref 5–25)
CALCIUM SERPL-MCNC: 9.3 MG/DL (ref 8.4–10.2)
CHLORIDE SERPL-SCNC: 101 MMOL/L (ref 96–108)
CO2 SERPL-SCNC: 29 MMOL/L (ref 21–32)
CREAT SERPL-MCNC: 1.82 MG/DL (ref 0.6–1.3)
EOSINOPHIL # BLD AUTO: 0 THOUSAND/ÂΜL (ref 0–0.61)
EOSINOPHIL NFR BLD AUTO: 0 % (ref 0–6)
ERYTHROCYTE [DISTWIDTH] IN BLOOD BY AUTOMATED COUNT: 20.6 % (ref 11.6–15.1)
GFR SERPL CREATININE-BSD FRML MDRD: 27 ML/MIN/1.73SQ M
GLUCOSE SERPL-MCNC: 204 MG/DL (ref 65–140)
HCT VFR BLD AUTO: 35.6 % (ref 34.8–46.1)
HGB BLD-MCNC: 11.8 G/DL (ref 11.5–15.4)
IMM GRANULOCYTES # BLD AUTO: 0.06 THOUSAND/UL (ref 0–0.2)
IMM GRANULOCYTES NFR BLD AUTO: 1 % (ref 0–2)
LYMPHOCYTES # BLD AUTO: 1.39 THOUSANDS/ÂΜL (ref 0.6–4.47)
LYMPHOCYTES NFR BLD AUTO: 11 % (ref 14–44)
MAGNESIUM SERPL-MCNC: 1.9 MG/DL (ref 1.9–2.7)
MCH RBC QN AUTO: 31.5 PG (ref 26.8–34.3)
MCHC RBC AUTO-ENTMCNC: 33.1 G/DL (ref 31.4–37.4)
MCV RBC AUTO: 95 FL (ref 82–98)
MONOCYTES # BLD AUTO: 0.86 THOUSAND/ÂΜL (ref 0.17–1.22)
MONOCYTES NFR BLD AUTO: 7 % (ref 4–12)
NEUTROPHILS # BLD AUTO: 9.88 THOUSANDS/ÂΜL (ref 1.85–7.62)
NEUTS SEG NFR BLD AUTO: 81 % (ref 43–75)
NRBC BLD AUTO-RTO: 0 /100 WBCS
PLATELET # BLD AUTO: 168 THOUSANDS/UL (ref 149–390)
PMV BLD AUTO: 12 FL (ref 8.9–12.7)
POTASSIUM SERPL-SCNC: 3.6 MMOL/L (ref 3.5–5.3)
PROT SERPL-MCNC: 6.5 G/DL (ref 6.4–8.4)
RBC # BLD AUTO: 3.75 MILLION/UL (ref 3.81–5.12)
SODIUM SERPL-SCNC: 139 MMOL/L (ref 135–147)
TSH SERPL DL<=0.05 MIU/L-ACNC: 2.52 UIU/ML (ref 0.45–4.5)
WBC # BLD AUTO: 12.2 THOUSAND/UL (ref 4.31–10.16)

## 2025-03-05 PROCEDURE — G2211 COMPLEX E/M VISIT ADD ON: HCPCS | Performed by: PHYSICIAN ASSISTANT

## 2025-03-05 PROCEDURE — 99215 OFFICE O/P EST HI 40 MIN: CPT | Performed by: PHYSICIAN ASSISTANT

## 2025-03-05 PROCEDURE — 80053 COMPREHEN METABOLIC PANEL: CPT

## 2025-03-05 PROCEDURE — 84443 ASSAY THYROID STIM HORMONE: CPT

## 2025-03-05 PROCEDURE — 83735 ASSAY OF MAGNESIUM: CPT

## 2025-03-05 PROCEDURE — 85025 COMPLETE CBC W/AUTO DIFF WBC: CPT

## 2025-03-05 RX ORDER — SODIUM CHLORIDE 9 MG/ML
20 INJECTION, SOLUTION INTRAVENOUS ONCE
Status: CANCELLED | OUTPATIENT
Start: 2025-03-07

## 2025-03-05 RX ORDER — PALONOSETRON 0.05 MG/ML
0.25 INJECTION, SOLUTION INTRAVENOUS ONCE
Status: CANCELLED | OUTPATIENT
Start: 2025-03-07

## 2025-03-05 NOTE — PROGRESS NOTES
Name: Cherelle Dash      : 1955      MRN: [id number]  Encounter Provider: Sharon Banks PA-C  Encounter Date: 3/5/2025   Encounter [facility]  :  Assessment & Plan  Endometrial cancer (HCC)  Ms Dash is a 70yo with stage III C1 grade 1 endometrioid adenocarcinoma who is status post surgery currently undergoing adjuvant systemic. Reviewed that after immune-related pneumonitis and more recently myocarditis, she is no longer a candidate for immunotherapy. All labs reviewed and she is appropriate to receive treatment on 3/7 with carboplatin AUC 4 and Taxol 135mg/m2 IV every 21 days.   Orders:  •  Comprehensive metabolic panel; Future    Other acute myocarditis  Continue prednisone and colchicine. Has follow up with Dr. Olson on 3/26. Reviewed s/s that would warrant sooner evaluation.       Stage 3b chronic kidney disease (HCC)  Lab Results   Component Value Date    EGFR 27 2025    EGFR 27 2025    EGFR 29 2025    CREATININE 1.82 (H) 2025    CREATININE 1.82 (H) 2025    CREATININE 1.75 (H) 2025     Slightly elevated Cr on labs today at 1.82, also with elevated BUN. Suspect dehydration. Encouraged oral hydration. Will also add 500cc IVF bolus to next infusion with repeat CMP.        Patient examined and counseled in collaboration with gyn-onc fellow, Dr. Beltran.     History of Present Illness   Reason for Visit / CC: Pre-Chemo Visit  Cherelle Dash is a 69 y.o. female   Ms Dash is a 70yo with stage III C1 grade 1 endometrioid adenocarcinoma who is status post surgery currently undergoing adjuvant systemic therapy who presents for a prechemo visit.  She was recently hospitalized with myocarditis secondary to pembrolizumab for which she is currently on steroids and colchicine.  She reports that since discharge from the hospital she overall feels better.  She did have 1 episode of shortness of breath a couple days ago  walking from her house to the car.  This resolved quickly after a couple minutes and was an isolated incident.  Today she denies any chest pain, shortness of breath, lightheadedness, dizziness. No change appetite or bowel/bladder habits. No abdominal pain. No vaginal bleeding. No nausea or vomiting.       Pertinent Medical History   Immunotherapy-related pneumonitis and myocarditis    Oncology History   Cancer Staging   Endometrial cancer (HCC)  Staging form: Corpus Uteri - Carcinoma, AJCC 8th Edition  - Pathologic stage from 10/11/2024: FIGO Stage IIIC1 (pT2, pN1mi(sn), cM0) - Signed by Evin Page MD on 11/12/2024  Method of lymph node assessment: [place] lymph node biopsy  Histologic grade (G): G1  Histologic grading system: 3 grade system  Lymph-vascular invasion (LVI): BOTH lymphatic and small vessel AND venous (large vessel) invasion  Peritoneal cytology results: Negative  Pelvic brandi status: Positive  Oncology History   Endometrial cancer (HCC)   8/11/2024 Initial Diagnosis    Endometrial cancer ([facility])     10/11/2024 -  Cancer Staged    Staging form: Corpus Uteri - Carcinoma, AJCC 8th Edition  - Pathologic stage from 10/11/2024: FIGO Stage IIIC1 (pT2, pN1mi(sn), cM0) - Signed by Evin Page MD on 11/12/2024  Method of lymph node assessment: [place] lymph node biopsy  Histologic grade (G): G1  Histologic grading system: 3 grade system  Lymph-vascular invasion (LVI): BOTH lymphatic and small vessel AND venous (large vessel) invasion  Peritoneal cytology results: Negative  Pelvic brandi status: Positive       10/11/2024 Surgery    Robotic assisted total laparoscopic hysterectomy, bilateral salpingo-oophorectomy, pelvic and periaortic sentinel lymph node biopsies, vulvar biopsy  1.  Grade 1, cervical stromal invasion to a depth of 12 out of 15 mm, extensive LVSI, p53 wild-type, pMMR, HER2 1+,  washings negative, 0.7 mm metastatic focus and pelvic lymph node, ITC identified and  periaortic lymph nodes.     12/6/2024 -  Chemotherapy    Taxol 135 mg/m2, carboplatin AUC 5 and pembrolizumab 200 mg IV every 21 days.    Pembro held for cycle 3 d/t grade 2 drug-induced pneumonitis.        2/2025 Adverse Reaction    Immunotherapy-related (pembro) pneumonitis and myocarditis          Review of Systems   Constitutional:  Negative for chills and fever.   HENT:  Negative for ear pain and sore throat.    Eyes:  Negative for pain and visual disturbance.   Respiratory:  Negative for cough and shortness of breath.    Cardiovascular:  Negative for chest pain and palpitations.   Gastrointestinal:  Negative for abdominal pain and vomiting.   Genitourinary:  Negative for dysuria and hematuria.   Musculoskeletal:  Negative for arthralgias and back pain.   Skin:  Negative for color change and rash.   Neurological:  Negative for seizures and syncope.   All other systems reviewed and are negative.   A complete review of systems is negative other than that noted above in the HPI.     Objective   /72 (BP Location: Right arm, Patient Position: Sitting, Cuff Size: Large)   Pulse 94   Temp 97.8 °F (36.6 °C) (Temporal)   Wt 103 kg (226 lb)   SpO2 98%   BMI 44.14 kg/m²     Body mass index is 44.14 kg/m².  Pain Screening:  Pain Score: 0-No pain  ECOG   0-1  Physical Exam  Vitals and nursing note reviewed.   Constitutional:       Appearance: Normal appearance.   HENT:      Head: Normocephalic and atraumatic.   Cardiovascular:      Rate and Rhythm: Normal rate and regular rhythm.      Heart sounds: No murmur heard.     No friction rub.   Pulmonary:      Effort: Pulmonary effort is normal. No respiratory distress.      Breath sounds: Normal breath sounds.   Abdominal:      General: There is no distension.      Palpations: Abdomen is soft. There is no mass.      Tenderness: There is no abdominal tenderness. There is no guarding or rebound.   Skin:     General: Skin is warm and dry.   Neurological:      Mental  Status: She is alert.        Labs: I have reviewed pertinent labs.   Lab Results   Component Value Date/Time     48.3 (H) 02/26/2025 09:16 AM     Lab Results   Component Value Date/Time    Potassium 3.6 03/05/2025 08:21 AM    Chloride 101 03/05/2025 08:21 AM    CO2 29 03/05/2025 08:21 AM    BUN 47 (H) 03/05/2025 08:21 AM    Creatinine 1.82 (H) 03/05/2025 08:21 AM    Glucose, Fasting 122 (H) 11/14/2024 06:58 AM    Calcium 9.3 03/05/2025 08:21 AM    AST 10 (L) 03/05/2025 08:21 AM    ALT 17 03/05/2025 08:21 AM    Alkaline Phosphatase 103 03/05/2025 08:21 AM    eGFR 27 03/05/2025 08:21 AM     Lab Results   Component Value Date/Time    WBC 12.20 (H) 03/05/2025 08:21 AM    Hemoglobin 11.8 03/05/2025 08:21 AM    Hematocrit 35.6 03/05/2025 08:21 AM    MCV 95 03/05/2025 08:21 AM    Platelets 168 03/05/2025 08:21 AM     Lab Results   Component Value Date/Time    Absolute Neutrophils 9.88 (H) 03/05/2025 08:21 AM        Trend:  Lab Results   Component Value Date     48.3 (H) 02/26/2025     10.3 02/05/2025     9.8 01/15/2025     8.8 12/24/2024     8.7 12/04/2024     54.2 (H) 11/14/2024     45.2 (H) 08/14/2024

## 2025-03-05 NOTE — PROGRESS NOTES
Acute Bronchitis   WHAT YOU NEED TO KNOW:   Acute bronchitis is swelling and irritation in the air passages of your lungs  This irritation may cause you to cough or have other breathing problems  Acute bronchitis often starts because of another illness, such as a cold or the flu  The illness spreads from your nose and throat to your windpipe and airways  Bronchitis is often called a chest cold  Acute bronchitis lasts about 3 to 6 weeks and is usually not a serious illness  Your cough can last for several weeks  DISCHARGE INSTRUCTIONS:   Return to the emergency department if:   · You cough up blood  · Your lips or fingernails turn blue  · You feel like you are not getting enough air when you breathe  Contact your healthcare provider if:   · You have a fever  · Your breathing problems do not go away or get worse  · Your cough does not get better within 4 weeks  · You have questions or concerns about your condition or care  Self-care:   · Get more rest   Rest helps your body to heal  Slowly start to do more each day  Rest when you feel it is needed  · Avoid irritants in the air  Avoid chemicals, fumes, and dust  Wear a face mask if you must work around dust or fumes  Stay inside on days when air pollution levels are high  If you have allergies, stay inside when pollen counts are high  Do not use aerosol products, such as spray-on deodorant, bug spray, and hair spray  · Do not smoke or be around others who smoke  Nicotine and other chemicals in cigarettes and cigars damages the cilia that move mucus out of your lungs  Ask your healthcare provider for information if you currently smoke and need help to quit  E-cigarettes or smokeless tobacco still contain nicotine  Talk to your healthcare provider before you use these products  · Drink liquids as directed  Liquids help keep your air passages moist and help you cough up mucus   You may need to drink more liquids when you have acute Patient to infusion for Labs.  She offers no complaints.  Port accessed, labs drawn per MD order.  Next appointment confirmed 3/7, 0800, she declined AVS   bronchitis  Ask how much liquid to drink each day and which liquids are best for you  · Use a humidifier or vaporizer  Use a cool mist humidifier or a vaporizer to increase air moisture in your home  This may make it easier for you to breathe and help decrease your cough  Decrease risk for acute bronchitis:   · Get the vaccinations you need  Ask your healthcare provider if you should get vaccinated against the flu or pneumonia  · Prevent the spread of germs  You can decrease your risk of acute bronchitis and other illnesses by doing the following:     [facility] [place] [place] [employer] your hands often with soap and water  Carry germ-killing hand lotion or gel with you  You can use the lotion or gel to clean your hands when soap and water are not available  ¨ Do not touch your eyes, nose, or mouth unless you have washed your hands first     ¨ Always cover your mouth when you cough to prevent the spread of germs  It is best to cough into a tissue or your shirt sleeve instead of into your hand  Ask those around you cover their mouths when they cough  ¨ Try to avoid people who have a cold or the flu  If you are sick, stay away from others as much as possible  Medicines: Your healthcare provider may  give you any of the following:  · Ibuprofen or acetaminophen  are medicines that help lower your fever  They are available without a doctor's order  Ask your healthcare provider which medicine is right for you  Ask how much to take and how often to take it  Follow directions  These medicines can cause stomach bleeding if not taken correctly  Ibuprofen can cause kidney damage  Do not take ibuprofen if you have kidney disease, an ulcer, or allergies to aspirin  Acetaminophen can cause liver damage  Do not take more than 4,000 milligrams in 24 hours  · Decongestants  help loosen mucus in your lungs and make it easier to cough up  This can help you breathe easier  · Cough suppressants  decrease your urge to cough   If your cough produces mucus, do not take a cough suppressant unless your healthcare provider tells you to  Your healthcare provider may suggest that you take a cough suppressant at night so you can rest     · Inhalers  may be given  Your healthcare provider may give you one or more inhalers to help you breathe easier and cough less  An inhaler gives your medicine to open your airways  Ask your healthcare provider to show you how to use your inhaler correctly  · Take your medicine as directed  Contact your healthcare provider if you think your medicine is not helping or if you have side effects  Tell him of her if you are allergic to any medicine  Keep a list of the medicines, vitamins, and herbs you take  Include the amounts, and when and why you take them  Bring the list or the pill bottles to follow-up visits  Carry your medicine list with you in case of an emergency  Follow up with your healthcare provider as directed:  Write down questions you have so you will remember to ask them during your follow-up visits  © 2017 [employer] Information is for End User's use only and may not be sold, redistributed or otherwise used for commercial purposes  All illustrations and images included in CareNotes® are the copyrighted property of [employer]  or Armando Mcpherson  The above information is an  only  It is not intended as medical advice for individual conditions or treatments  Talk to your doctor, nurse or pharmacist before following any medical regimen to see if it is safe and effective for you

## 2025-03-05 NOTE — ASSESSMENT & PLAN NOTE
Continue prednisone and colchicine. Has follow up with Dr. Olson on 3/26. Reviewed s/s that would warrant sooner evaluation.

## 2025-03-05 NOTE — ASSESSMENT & PLAN NOTE
Ms Dash is a 70yo with stage III C1 grade 1 endometrioid adenocarcinoma who is status post surgery currently undergoing adjuvant systemic. Reviewed that after immune-related pneumonitis and more recently myocarditis, she is no longer a candidate for immunotherapy. All labs reviewed and she is appropriate to receive treatment on 3/7 with carboplatin AUC 4 and Taxol 135mg/m2 IV every 21 days.   Orders:  •  Comprehensive metabolic panel; Future

## 2025-03-05 NOTE — ASSESSMENT & PLAN NOTE
Lab Results   Component Value Date    EGFR 27 03/05/2025    EGFR 27 02/26/2025    EGFR 29 02/23/2025    CREATININE 1.82 (H) 03/05/2025    CREATININE 1.82 (H) 02/26/2025    CREATININE 1.75 (H) 02/23/2025     Slightly elevated Cr on labs today at 1.82, also with elevated BUN. Suspect dehydration. Encouraged oral hydration. Will also add 500cc IVF bolus to next infusion with repeat CMP.        Patient examined and counseled in collaboration with gyn-onc fellow, Dr. Beltran.

## 2025-03-06 DIAGNOSIS — C54.1 ENDOMETRIAL CANCER (HCC): ICD-10-CM

## 2025-03-06 RX ORDER — LORAZEPAM 1 MG/1
1 TABLET ORAL EVERY 8 HOURS PRN
Qty: 20 TABLET | Refills: 0 | Status: SHIPPED | OUTPATIENT
Start: 2025-03-06

## 2025-03-06 NOTE — TELEPHONE ENCOUNTER
Reason for call:   [x] Refill   [] Prior Auth  [] Other:     Office:   [] PCP/Provider -   [x] Specialty/Provider - Sharon Banks     Medication: LORazepam (ATIVAN) 1 mg tablet     Dose/Frequency: Take 1 tablet (1 mg total) by mouth every 8 (eight) hours as needed     Quantity: 20    Pharmacy: [facility] [place], [place]     Local Pharmacy   Does the patient have enough for 3 days?   [] Yes   [x] No - Send as HP to POD    Mail Away Pharmacy   Does the patient have enough for 10 days?   [] Yes   [] No - Send as HP to POD

## 2025-03-06 NOTE — TELEPHONE ENCOUNTER
1 [id number] 02/03/2025 02/03/2025 LORazepam (Tablet) 20.0 7 1 MG NA KATEY GUPTA [facility] [place] 0 / 0 [place]   1 [id number] 11/15/2024 11/13/2024 LORazepam (Tablet) 20.0 7 1 MG NA KATEY GUPTA [facility] [place] 0 / 0 [place]

## 2025-03-07 ENCOUNTER — HOSPITAL ENCOUNTER (OUTPATIENT)
Dept: INFUSION CENTER | Facility: CLINIC | Age: 70
End: 2025-03-07
Payer: COMMERCIAL

## 2025-03-07 VITALS
HEART RATE: 77 BPM | RESPIRATION RATE: 18 BRPM | SYSTOLIC BLOOD PRESSURE: 107 MMHG | OXYGEN SATURATION: 96 % | DIASTOLIC BLOOD PRESSURE: 73 MMHG | TEMPERATURE: 97.5 F | WEIGHT: 227.5 LBS | HEIGHT: 60 IN | BODY MASS INDEX: 44.66 KG/M2

## 2025-03-07 DIAGNOSIS — C54.1 ENDOMETRIAL CANCER (HCC): Primary | ICD-10-CM

## 2025-03-07 LAB
ALBUMIN SERPL BCG-MCNC: 3.4 G/DL (ref 3.5–5)
ALP SERPL-CCNC: 91 U/L (ref 34–104)
ALT SERPL W P-5'-P-CCNC: 18 U/L (ref 7–52)
ANION GAP SERPL CALCULATED.3IONS-SCNC: 14 MMOL/L (ref 4–13)
AST SERPL W P-5'-P-CCNC: 11 U/L (ref 13–39)
BILIRUB SERPL-MCNC: 0.33 MG/DL (ref 0.2–1)
BUN SERPL-MCNC: 46 MG/DL (ref 5–25)
CALCIUM ALBUM COR SERPL-MCNC: 8.8 MG/DL (ref 8.3–10.1)
CALCIUM SERPL-MCNC: 8.3 MG/DL (ref 8.4–10.2)
CHLORIDE SERPL-SCNC: 99 MMOL/L (ref 96–108)
CO2 SERPL-SCNC: 24 MMOL/L (ref 21–32)
CREAT SERPL-MCNC: 1.85 MG/DL (ref 0.6–1.3)
GFR SERPL CREATININE-BSD FRML MDRD: 27 ML/MIN/1.73SQ M
GLUCOSE P FAST SERPL-MCNC: 335 MG/DL (ref 65–99)
GLUCOSE SERPL-MCNC: 335 MG/DL (ref 65–140)
POTASSIUM SERPL-SCNC: 3.7 MMOL/L (ref 3.5–5.3)
PROT SERPL-MCNC: 5.7 G/DL (ref 6.4–8.4)
SODIUM SERPL-SCNC: 137 MMOL/L (ref 135–147)

## 2025-03-07 PROCEDURE — 96375 TX/PRO/DX INJ NEW DRUG ADDON: CPT

## 2025-03-07 PROCEDURE — 80053 COMPREHEN METABOLIC PANEL: CPT | Performed by: OBSTETRICS & GYNECOLOGY

## 2025-03-07 PROCEDURE — 96417 CHEMO IV INFUS EACH ADDL SEQ: CPT

## 2025-03-07 PROCEDURE — 96415 CHEMO IV INFUSION ADDL HR: CPT

## 2025-03-07 PROCEDURE — 96367 TX/PROPH/DG ADDL SEQ IV INF: CPT

## 2025-03-07 PROCEDURE — 96413 CHEMO IV INFUSION 1 HR: CPT

## 2025-03-07 RX ORDER — PALONOSETRON 0.05 MG/ML
0.25 INJECTION, SOLUTION INTRAVENOUS ONCE
Status: COMPLETED | OUTPATIENT
Start: 2025-03-07 | End: 2025-03-07

## 2025-03-07 RX ORDER — SODIUM CHLORIDE 9 MG/ML
20 INJECTION, SOLUTION INTRAVENOUS ONCE
Status: COMPLETED | OUTPATIENT
Start: 2025-03-07 | End: 2025-03-07

## 2025-03-07 RX ADMIN — SODIUM CHLORIDE 20 ML/HR: 9 INJECTION, SOLUTION INTRAVENOUS at 08:15

## 2025-03-07 RX ADMIN — FAMOTIDINE 20 MG: 10 INJECTION INTRAVENOUS at 08:57

## 2025-03-07 RX ADMIN — DIPHENHYDRAMINE HYDROCHLORIDE 25 MG: 50 INJECTION INTRAMUSCULAR; INTRAVENOUS at 09:20

## 2025-03-07 RX ADMIN — PALONOSETRON HYDROCHLORIDE 0.25 MG: 0.25 INJECTION INTRAVENOUS at 08:53

## 2025-03-07 RX ADMIN — DEXAMETHASONE SODIUM PHOSPHATE 20 MG: 10 INJECTION, SOLUTION INTRAMUSCULAR; INTRAVENOUS at 09:45

## 2025-03-07 RX ADMIN — SODIUM CHLORIDE 500 ML: 0.9 INJECTION, SOLUTION INTRAVENOUS at 08:13

## 2025-03-07 RX ADMIN — CARBOPLATIN 226.4 MG: 10 INJECTION INTRAVENOUS at 14:02

## 2025-03-07 RX ADMIN — FOSAPREPITANT 150 MG: 150 INJECTION, POWDER, LYOPHILIZED, FOR SOLUTION INTRAVENOUS at 08:19

## 2025-03-07 RX ADMIN — PACLITAXEL 268.8 MG: 6 INJECTION, SOLUTION INTRAVENOUS at 10:21

## 2025-03-07 NOTE — PROGRESS NOTES
Pt due to received carboplatin. Bag received from pharmacy with primed tubing. Bag hung and pump programmed per order and prior to hitting start, vent on tubing opened. Chemo began leaking from vent insertion site onto top of pump and floor. None on pt. Bag removed and spill cleaned using appropriate PPE and spill kit. Pharmacy to make new bag.

## 2025-03-07 NOTE — PROGRESS NOTES
Patient tolerated taxol and carboplatin well. She will return tomorrow for Neulasta tomorrow, AVS declined.

## 2025-03-07 NOTE — PROGRESS NOTES
Received for carbo/taxol/hydration. No complaints offered. RPAC accessed without issue, brisk blood return noted, flushes well. Hydration infusing per orders.

## 2025-03-08 ENCOUNTER — HOSPITAL ENCOUNTER (OUTPATIENT)
Dept: INFUSION CENTER | Facility: CLINIC | Age: 70
Discharge: HOME | End: 2025-03-08
Payer: COMMERCIAL

## 2025-03-08 VITALS — TEMPERATURE: 97.2 F

## 2025-03-08 DIAGNOSIS — C54.1 ENDOMETRIAL CANCER (HCC): Primary | ICD-10-CM

## 2025-03-08 PROCEDURE — 96372 THER/PROPH/DIAG INJ SC/IM: CPT

## 2025-03-08 RX ADMIN — PEGFILGRASTIM 6 MG: 6 INJECTION SUBCUTANEOUS at 13:57

## 2025-03-08 NOTE — PROGRESS NOTES
Pt resting comfortably with no complaints. Shot administered as ordered. Pt tolerated well. Declines AVS, aware of next appointment 3/12 at 1300.

## 2025-03-12 ENCOUNTER — HOSPITAL ENCOUNTER (OUTPATIENT)
Dept: INFUSION CENTER | Facility: CLINIC | Age: 70
End: 2025-03-12

## 2025-03-12 ENCOUNTER — TELEPHONE (OUTPATIENT)
Age: 70
End: 2025-03-12

## 2025-03-12 NOTE — TELEPHONE ENCOUNTER
Can you f/u with her and see what symptoms are related to 'sickness'? Patient recently admitted with myocarditis. Let me know. Thanks!

## 2025-03-12 NOTE — TELEPHONE ENCOUNTER
"Return call placed, reports generalized fatigued that she attributes to having chemo last Friday.  Reports is eating and drinking just fine she just needs to rest, denies fever, denies chest pain or acute distress.  States she continues with an intermittent cough \" just like I have always had, it comes and goes\".    Encouraged to call if symptoms get worse or any other concerns arise  "

## 2025-03-12 NOTE — TELEPHONE ENCOUNTER
Pt called she wanted to let the office know she cx her labs for today 3/12 at infusion she is sick and she is aware she has labs on 3/19 and will be there

## 2025-03-13 ENCOUNTER — TELEPHONE (OUTPATIENT)
Age: 70
End: 2025-03-13

## 2025-03-13 DIAGNOSIS — N32.81 OAB (OVERACTIVE BLADDER): ICD-10-CM

## 2025-03-13 NOTE — TELEPHONE ENCOUNTER
Scott pharmacy tech from [employer], will be faxing over information from the pharmacist for Dr. Mayorga to review in regards to the patient - just fyi fax will be coming thank you

## 2025-03-14 RX ORDER — MIRABEGRON 50 MG/1
1 TABLET, FILM COATED, EXTENDED RELEASE ORAL EVERY MORNING
Qty: 90 TABLET | Refills: 1 | Status: ON HOLD | OUTPATIENT
Start: 2025-03-14

## 2025-03-19 ENCOUNTER — TELEPHONE (OUTPATIENT)
Dept: GYNECOLOGIC ONCOLOGY | Facility: CLINIC | Age: 70
End: 2025-03-19

## 2025-03-19 ENCOUNTER — TELEPHONE (OUTPATIENT)
Dept: RADIOLOGY | Facility: HOSPITAL | Age: 70
End: 2025-03-19

## 2025-03-19 ENCOUNTER — HOSPITAL ENCOUNTER (OUTPATIENT)
Dept: INFUSION CENTER | Facility: CLINIC | Age: 70
Discharge: HOME | End: 2025-03-19
Payer: COMMERCIAL

## 2025-03-19 VITALS
OXYGEN SATURATION: 95 % | DIASTOLIC BLOOD PRESSURE: 64 MMHG | RESPIRATION RATE: 20 BRPM | SYSTOLIC BLOOD PRESSURE: 121 MMHG | HEART RATE: 107 BPM | TEMPERATURE: 98.5 F

## 2025-03-19 DIAGNOSIS — Z45.2 ENCOUNTER FOR CENTRAL LINE CARE: Primary | ICD-10-CM

## 2025-03-19 DIAGNOSIS — R73.9 HYPERGLYCEMIA: Primary | ICD-10-CM

## 2025-03-19 DIAGNOSIS — C54.1 ENDOMETRIAL CANCER (HCC): ICD-10-CM

## 2025-03-19 DIAGNOSIS — R60.9 EDEMA, UNSPECIFIED TYPE: ICD-10-CM

## 2025-03-19 LAB
ALBUMIN SERPL BCG-MCNC: 3.7 G/DL (ref 3.5–5)
ALP SERPL-CCNC: 133 U/L (ref 34–104)
ALT SERPL W P-5'-P-CCNC: 24 U/L (ref 7–52)
AMYLASE SERPL-CCNC: 37 IU/L (ref 29–103)
ANION GAP SERPL CALCULATED.3IONS-SCNC: 12 MMOL/L (ref 4–13)
ANISOCYTOSIS BLD QL SMEAR: PRESENT
AST SERPL W P-5'-P-CCNC: 15 U/L (ref 13–39)
BASOPHILS # BLD MANUAL: 0 THOUSAND/UL (ref 0–0.1)
BASOPHILS NFR MAR MANUAL: 0 % (ref 0–1)
BILIRUB SERPL-MCNC: 0.37 MG/DL (ref 0.2–1)
BUN SERPL-MCNC: 44 MG/DL (ref 5–25)
CALCIUM SERPL-MCNC: 9.2 MG/DL (ref 8.4–10.2)
CANCER AG125 SERPL-ACNC: 18.3 U/ML (ref 0–35)
CHLORIDE SERPL-SCNC: 98 MMOL/L (ref 96–108)
CO2 SERPL-SCNC: 28 MMOL/L (ref 21–32)
CREAT SERPL-MCNC: 1.81 MG/DL (ref 0.6–1.3)
DOHLE BOD BLD QL SMEAR: PRESENT
EOSINOPHIL # BLD MANUAL: 0 THOUSAND/UL (ref 0–0.4)
EOSINOPHIL NFR BLD MANUAL: 0 % (ref 0–6)
ERYTHROCYTE [DISTWIDTH] IN BLOOD BY AUTOMATED COUNT: 17.9 % (ref 11.6–15.1)
GFR SERPL CREATININE-BSD FRML MDRD: 27 ML/MIN/1.73SQ M
GLUCOSE SERPL-MCNC: 321 MG/DL (ref 65–140)
HCT VFR BLD AUTO: 33.5 % (ref 34.8–46.1)
HGB BLD-MCNC: 11 G/DL (ref 11.5–15.4)
LIPASE SERPL-CCNC: 28 U/L (ref 11–82)
LYMPHOCYTES # BLD AUTO: 0.39 THOUSAND/UL (ref 0.6–4.47)
LYMPHOCYTES # BLD AUTO: 3 % (ref 14–44)
MAGNESIUM SERPL-MCNC: 2 MG/DL (ref 1.9–2.7)
MCH RBC QN AUTO: 32.1 PG (ref 26.8–34.3)
MCHC RBC AUTO-ENTMCNC: 32.8 G/DL (ref 31.4–37.4)
MCV RBC AUTO: 98 FL (ref 82–98)
MICROCYTES BLD QL AUTO: PRESENT
MONOCYTES # BLD AUTO: 0.13 THOUSAND/UL (ref 0–1.22)
MONOCYTES NFR BLD: 1 % (ref 4–12)
NEUTROPHILS # BLD MANUAL: 12.55 THOUSAND/UL (ref 1.85–7.62)
NEUTS BAND NFR BLD MANUAL: 21 % (ref 0–8)
NEUTS SEG NFR BLD AUTO: 75 % (ref 43–75)
OVALOCYTES BLD QL SMEAR: PRESENT
PLATELET # BLD AUTO: 92 THOUSANDS/UL (ref 149–390)
PLATELET BLD QL SMEAR: ABNORMAL
PMV BLD AUTO: 13 FL (ref 8.9–12.7)
POTASSIUM SERPL-SCNC: 3.5 MMOL/L (ref 3.5–5.3)
PROT SERPL-MCNC: 6.3 G/DL (ref 6.4–8.4)
RBC # BLD AUTO: 3.43 MILLION/UL (ref 3.81–5.12)
RBC MORPH BLD: PRESENT
SODIUM SERPL-SCNC: 138 MMOL/L (ref 135–147)
WBC # BLD AUTO: 13.07 THOUSAND/UL (ref 4.31–10.16)

## 2025-03-19 PROCEDURE — 85027 COMPLETE CBC AUTOMATED: CPT

## 2025-03-19 PROCEDURE — 83690 ASSAY OF LIPASE: CPT

## 2025-03-19 PROCEDURE — 85007 BL SMEAR W/DIFF WBC COUNT: CPT

## 2025-03-19 PROCEDURE — 80053 COMPREHEN METABOLIC PANEL: CPT

## 2025-03-19 PROCEDURE — 82150 ASSAY OF AMYLASE: CPT

## 2025-03-19 PROCEDURE — 83735 ASSAY OF MAGNESIUM: CPT

## 2025-03-19 PROCEDURE — 86304 IMMUNOASSAY TUMOR CA 125: CPT

## 2025-03-19 NOTE — PROGRESS NOTES
[facility]  [address] [place]   [address]  INTERVENTIONAL RADIOLOGY [phone]    You are scheduled for an IVC filter removal on 3/21/25 at 0930.    Your arrival time is 0830.  Our Interventional Radiology nurse will notify you a few days before your procedure with the exact arrival time and location to arrive.    To prepare for your procedure:  Please arrange for someone to drive you home after the procedure and stay with you until the next morning if you are instructed to do so.  This is typically for patients receiving some type of sedative or anesthetic for the procedure.  DO NOT EAT OR DRINK ANYTHING after midnight on the evening before your procedure including candy & gum.  ONLY SIPS OF WATER with your medications are allowed on the morning of your procedure.  TAKE ALL OF YOUR REGULAR MEDICATIONS THE MORNING OF YOUR PROCEDURE with sips of water!  We may call you to stop some of your blood sugar, blood pressure and blood thinning medications depending on the procedure.  Please take all of these medications unless we instruct you to stop them.  If you have an allergy to x-ray dye, please contact Interventional Radiology for an x-ray dye preparation which usually consists of an oral steroid and Benadryl.  If you wear a Glucose Monitor, you may be asked to remove it for your procedure if we are using x-ray.  These devices need to be removed when we are imaging with x-ray near the device since the radiation can cause the unit to malfunction.  If possible and not too inconvenient, you may want to schedule your exam closer to day 14 of your 14 day device so your device is not wasted.    The day of your procedure:  Bring a list of the medications you take at home.  Bring medications you take for breathing problems (such as inhalers), medications for chest pain, or both.  Bring your insurance card and a form of photo ID.  Bring a case for your glasses or contacts.  Please leave all valuables  DOI: 12/26/2017  Initial Treatment Date: 12/26/2017  Date Last Seen: 7/1/2015  [facility]: Other  Occupation: [employer]    Referring by: No referring provider found  Primary Care Physician: Olinda Chun MD  Date informed consent signed:  6/29/2015  ABN:No    Allergies, including latex, Medications, Medical History, Surgical History, Social History and Family History were reviewed and updated. Blossom Rajan reports that she quit smoking about 6 years ago. Her smoking use included Cigarettes. She has a 25.00 pack-year smoking history. She has never used smokeless tobacco.  Blossom Rajan has No Known Allergies. Advance Directive Status:No   Visit Number of Current Episode: 1    Return visit for   Chief Complaint   Patient presents with   â¢ Musculoskeletal Problem     Chronic neck pain. Subjective complaints as reported by the patient:  Blossom Rajan is a 51year old year old female who comes in today for evaluation and treatment of neck pain. Blossom Rajan states that she has had constant neck pain for about the past 5 years. There was no obvious trauma. Her symptom level varies from 3-6/10, most often at 3-4/10. She states that recently she has had very intermittent aching and tingling into her shoulders and upper arms. Her neck symptoms are aggravated by motion through her neck especially at the end ranges. Ice gives temporary relief. Objective findings include the following:  Observation  Thoracic hypokyphosis. Bilateral shoulder protraction. Forward head posture. Ranges of motion  Cervical active ranges of motion were within normal limits, but with endrange pain in all ranges, especially flexion. Orthopedic  Cervical foraminal compression was negative for neck pain or radicular symptoms in all planes of motion.     Neurologic  Reflexes        Left Right  Brachioradialis      2+              2+  Biceps                    2+              2+  Triceps                   2+              2+    Palpation  Passive "segmental palpation revealed joint restriction and tenderness at T1-8 and C2-7. Soft tissue palpation revealed spasm and tenderness in the suboccipital, scalenes, SCMs, upper trapezius, levator scapularis, periscapular muscles. Assessment:  Chronic neck pain. Muscle spasm. Cervical and thoracic region somatic dysfunction. Abnormal posture. Additional comments/complicating factors:  Progress may be prolonged or limited due to  1. Mid cervical spondylosis and cervical kyphosis seen on cervical spine x-rays taken on 2/13/2012     Plan:  Established patient examination. Treatment today included:   Mechanical percussion and manual deep tissue myofascial release at the hypertonic and tender muscles noted above to decrease spasm, tenderness, and pain. Diversified manual manipulation at T1-8 posterior to anterior, T4-6 anterior to posterior, and manual cervical traction with cephalad thrust technique to decrease joint restriction and pain, and improve neuromechanical function. Therapeutic exercise/rehab/modalities performed today included:  None. Patient Instruction/Education:   Reviewed home icing protocols and recommended more frequent icing of her neck. Patient stated understanding of, and was in agreement with, the discussed instructions. Goals of Care:   Goals of todays care is to improve muscular and skeletal function and provide symptom relief. Other treatment options discussed with patient:    Massage therapy. Acupuncture. Response to Treatment:  After treatment the patient graded her symptoms at 0-1/10 and said, ""I don't have much (pain). \""    Length of Visit:   30 minutes. Next Appointment:  1 week. The documentation was recorded by me and accurately and completely reflects the services performed, and the decisions made by me, Liliya Perdomo.          " such as credit cards and jewelry at home.  Report to the registration desk in the main lobby at the [facility].  Ask to be directed to the After Procedure Unit on the 1st floor.  While your procedure is being performed your family may wait in the Waiting Room on the 1st floor.  Be prepared to stay overnight just in case. Sometimes procedures will indicate the need for further observation or treatment.   If you are scheduled for a follow-up visit with the Interventional Radiologist after your procedure, you will be called with a date and time.    Special Instructions (Medications to alter or stop taking before your procedure etc.):      No need to hold Eliquis.

## 2025-03-19 NOTE — TELEPHONE ENCOUNTER
Call placed to review labs and discuss symptoms reported by infusion RN, CASSIEOM requesting call back.

## 2025-03-19 NOTE — PROGRESS NOTES
Patient arrives for lab work. Port accessed, labs drawn, and port de accessed without complication. Patient expresses ongoing fatigue, exertional SOB, and knee pain all of which are not new. VSS but HR slightly elevated 107-115. Sharon Banks made aware and patient has follow up office appointment scheduled for Monday 3/24. Patient escorted to car in wheel chair by RN. Confirmed next infusion appt for 3/26 @ 1230. Declined AVS.

## 2025-03-19 NOTE — TELEPHONE ENCOUNTER
Patient returned call from Sharon WALKER. Requests call back at Sharon's earliest convenience at [phone].

## 2025-03-19 NOTE — PROGRESS NOTES
Reinforced to patient that if symptoms worsen prior to follow up, always recommended to be evaluated in ED.

## 2025-03-19 NOTE — TELEPHONE ENCOUNTER
Return call placed. Patient notes her fatigue has improved over the last few days. She notes b/l knee pain, which is 'excruciating with standing'. This is unchanged from prior to starting chemo. Additionally, she notes frequent urination. We reviewed elevated blood glucose on recent labs. Will plan for c-peptid and fasting glucose tomorrow, per NCCN guidelines.

## 2025-03-20 ENCOUNTER — RESULTS FOLLOW-UP (OUTPATIENT)
Dept: PSYCHIATRY | Facility: CLINIC | Age: 70
End: 2025-03-20

## 2025-03-20 ENCOUNTER — APPOINTMENT (OUTPATIENT)
Dept: LAB | Facility: AMBULARY SURGERY CENTER | Age: 70
End: 2025-03-20
Payer: COMMERCIAL

## 2025-03-20 DIAGNOSIS — Z79.899 LONG TERM CURRENT USE OF ANTIPSYCHOTIC MEDICATION: ICD-10-CM

## 2025-03-20 DIAGNOSIS — R73.9 HYPERGLYCEMIA: ICD-10-CM

## 2025-03-20 LAB
CHOLEST SERPL-MCNC: 219 MG/DL (ref ?–200)
GLUCOSE P FAST SERPL-MCNC: 174 MG/DL (ref 65–99)
HDLC SERPL-MCNC: 57 MG/DL
LDLC SERPL CALC-MCNC: 101 MG/DL (ref 0–100)
TRIGL SERPL-MCNC: 305 MG/DL (ref ?–150)

## 2025-03-20 PROCEDURE — 82947 ASSAY GLUCOSE BLOOD QUANT: CPT

## 2025-03-20 PROCEDURE — 80061 LIPID PANEL: CPT

## 2025-03-20 PROCEDURE — 36415 COLL VENOUS BLD VENIPUNCTURE: CPT

## 2025-03-20 PROCEDURE — 84681 ASSAY OF C-PEPTIDE: CPT

## 2025-03-20 RX ORDER — TORSEMIDE 20 MG/1
20 TABLET ORAL DAILY
Qty: 90 TABLET | Refills: 1 | Status: SHIPPED | OUTPATIENT
Start: 2025-03-20

## 2025-03-20 NOTE — TELEPHONE ENCOUNTER
----- Message from Jayda Hernandez MD sent at 3/20/2025  4:02 PM EDT -----  Needs follow up with PCO for elevated lipids  ----- Message -----  From: Lab, Background User  Sent: 3/20/2025   3:44 PM EDT  To: Jayda Hernandez MD

## 2025-03-20 NOTE — TELEPHONE ENCOUNTER
Called Jorge and left VM requesting a call back. Upon return call will advise she reach out to her PCP for recommended treatment on elevated lipids.

## 2025-03-21 DIAGNOSIS — E83.42 HYPOMAGNESEMIA: ICD-10-CM

## 2025-03-21 LAB — C PEPTIDE SERPL-MCNC: 6.6 NG/ML (ref 1.1–4.4)

## 2025-03-21 RX ORDER — LANOLIN ALCOHOL/MO/W.PET/CERES
400 CREAM (GRAM) TOPICAL DAILY
Qty: 90 TABLET | Refills: 1 | Status: SHIPPED | OUTPATIENT
Start: 2025-03-21

## 2025-03-21 NOTE — RESULT ENCOUNTER NOTE
Called Maggie and informed her that her lipids came back elevated and requested she call her PCP for follow up. She verbalized understanding.

## 2025-03-24 ENCOUNTER — APPOINTMENT (EMERGENCY)
Dept: RADIOLOGY | Facility: HOSPITAL | Age: 70
End: 2025-03-24
Payer: COMMERCIAL

## 2025-03-24 ENCOUNTER — OFFICE VISIT (OUTPATIENT)
Dept: GYNECOLOGIC ONCOLOGY | Facility: CLINIC | Age: 70
End: 2025-03-24

## 2025-03-24 ENCOUNTER — HOSPITAL ENCOUNTER (INPATIENT)
Facility: HOSPITAL | Age: 70
LOS: 1 days | Discharge: HOME | End: 2025-03-25
Attending: EMERGENCY MEDICINE | Admitting: STUDENT IN AN ORGANIZED HEALTH CARE EDUCATION/TRAINING PROGRAM
Payer: COMMERCIAL

## 2025-03-24 ENCOUNTER — TELEPHONE (OUTPATIENT)
Age: 70
End: 2025-03-24

## 2025-03-24 ENCOUNTER — APPOINTMENT (EMERGENCY)
Dept: NON INVASIVE DIAGNOSTICS | Facility: HOSPITAL | Age: 70
End: 2025-03-24
Payer: COMMERCIAL

## 2025-03-24 VITALS
BODY MASS INDEX: 44.53 KG/M2 | SYSTOLIC BLOOD PRESSURE: 132 MMHG | TEMPERATURE: 97.3 F | WEIGHT: 228 LBS | DIASTOLIC BLOOD PRESSURE: 82 MMHG | OXYGEN SATURATION: 98 % | HEART RATE: 78 BPM

## 2025-03-24 DIAGNOSIS — J98.4 DRUG-INDUCED PNEUMONITIS: ICD-10-CM

## 2025-03-24 DIAGNOSIS — I42.0 DILATED CARDIOMYOPATHY (HCC): Primary | ICD-10-CM

## 2025-03-24 DIAGNOSIS — I31.39 PERICARDIAL EFFUSION: ICD-10-CM

## 2025-03-24 DIAGNOSIS — C54.1 ENDOMETRIAL CANCER (HCC): Primary | ICD-10-CM

## 2025-03-24 DIAGNOSIS — T50.905A DRUG-INDUCED PNEUMONITIS: ICD-10-CM

## 2025-03-24 DIAGNOSIS — C54.1 ENDOMETRIAL CANCER (HCC): ICD-10-CM

## 2025-03-24 DIAGNOSIS — R06.02 SHORTNESS OF BREATH: ICD-10-CM

## 2025-03-24 DIAGNOSIS — I40.8 OTHER ACUTE MYOCARDITIS: ICD-10-CM

## 2025-03-24 PROBLEM — R79.89 ELEVATED TROPONIN: Status: ACTIVE | Noted: 2025-03-24

## 2025-03-24 PROBLEM — I42.9 CARDIOMYOPATHY (HCC): Status: ACTIVE | Noted: 2024-09-09

## 2025-03-24 PROBLEM — E78.5 HYPERLIPIDEMIA: Status: ACTIVE | Noted: 2018-06-07

## 2025-03-24 PROBLEM — Z86.79 HISTORY OF MYOCARDITIS: Status: ACTIVE | Noted: 2025-02-20

## 2025-03-24 LAB
2HR DELTA HS TROPONIN: 47 NG/L
4HR DELTA HS TROPONIN: 84 NG/L
ALBUMIN SERPL BCG-MCNC: 3.6 G/DL (ref 3.5–5)
ALP SERPL-CCNC: 130 U/L (ref 34–104)
ALT SERPL W P-5'-P-CCNC: 23 U/L (ref 7–52)
ANION GAP SERPL CALCULATED.3IONS-SCNC: 11 MMOL/L (ref 4–13)
ANISOCYTOSIS BLD QL SMEAR: PRESENT
AST SERPL W P-5'-P-CCNC: 13 U/L (ref 13–39)
ATRIAL RATE: 87 BPM
BASOPHILS # BLD MANUAL: 0 THOUSAND/UL (ref 0–0.1)
BASOPHILS NFR MAR MANUAL: 0 % (ref 0–1)
BILIRUB SERPL-MCNC: 0.42 MG/DL (ref 0.2–1)
BSA FOR ECHO PROCEDURE: 1.97 M2
BUN SERPL-MCNC: 32 MG/DL (ref 5–25)
CALCIUM SERPL-MCNC: 8.9 MG/DL (ref 8.4–10.2)
CARDIAC TROPONIN I PNL SERPL HS: 165 NG/L (ref ?–50)
CARDIAC TROPONIN I PNL SERPL HS: 212 NG/L (ref ?–50)
CARDIAC TROPONIN I PNL SERPL HS: 249 NG/L (ref ?–50)
CHLORIDE SERPL-SCNC: 100 MMOL/L (ref 96–108)
CO2 SERPL-SCNC: 27 MMOL/L (ref 21–32)
CREAT SERPL-MCNC: 1.58 MG/DL (ref 0.6–1.3)
EOSINOPHIL # BLD MANUAL: 0 THOUSAND/UL (ref 0–0.4)
EOSINOPHIL NFR BLD MANUAL: 0 % (ref 0–6)
ERYTHROCYTE [DISTWIDTH] IN BLOOD BY AUTOMATED COUNT: 17.3 % (ref 11.6–15.1)
GFR SERPL CREATININE-BSD FRML MDRD: 32 ML/MIN/1.73SQ M
GLUCOSE SERPL-MCNC: 298 MG/DL (ref 65–140)
HCT VFR BLD AUTO: 34 % (ref 34.8–46.1)
HGB BLD-MCNC: 11.2 G/DL (ref 11.5–15.4)
LYMPHOCYTES # BLD AUTO: 0.77 THOUSAND/UL (ref 0.6–4.47)
LYMPHOCYTES # BLD AUTO: 5 % (ref 14–44)
MCH RBC QN AUTO: 32.9 PG (ref 26.8–34.3)
MCHC RBC AUTO-ENTMCNC: 32.9 G/DL (ref 31.4–37.4)
MCV RBC AUTO: 100 FL (ref 82–98)
MONOCYTES # BLD AUTO: 0 THOUSAND/UL (ref 0–1.22)
MONOCYTES NFR BLD: 0 % (ref 4–12)
NEUTROPHILS # BLD MANUAL: 14.62 THOUSAND/UL (ref 1.85–7.62)
NEUTS BAND NFR BLD MANUAL: 2 % (ref 0–8)
NEUTS SEG NFR BLD AUTO: 93 % (ref 43–75)
P AXIS: 52 DEGREES
PLATELET # BLD AUTO: 107 THOUSANDS/UL (ref 149–390)
PLATELET BLD QL SMEAR: ABNORMAL
PMV BLD AUTO: 12.4 FL (ref 8.9–12.7)
POTASSIUM SERPL-SCNC: 4.1 MMOL/L (ref 3.5–5.3)
PR INTERVAL: 158 MS
PROT SERPL-MCNC: 6.2 G/DL (ref 6.4–8.4)
QRS AXIS: -83 DEGREES
QRSD INTERVAL: 132 MS
QT INTERVAL: 390 MS
QTC INTERVAL: 469 MS
RA PRESSURE ESTIMATED: 3 MMHG
RBC # BLD AUTO: 3.4 MILLION/UL (ref 3.81–5.12)
RBC MORPH BLD: PRESENT
SL CV LV EF: 50
SODIUM SERPL-SCNC: 138 MMOL/L (ref 135–147)
T WAVE AXIS: 70 DEGREES
TSH SERPL DL<=0.05 MIU/L-ACNC: 0.93 UIU/ML (ref 0.45–4.5)
VENTRICULAR RATE: 87 BPM
WBC # BLD AUTO: 15.39 THOUSAND/UL (ref 4.31–10.16)

## 2025-03-24 PROCEDURE — RECHECK: Performed by: PHYSICIAN ASSISTANT

## 2025-03-24 PROCEDURE — 99285 EMERGENCY DEPT VISIT HI MDM: CPT | Performed by: EMERGENCY MEDICINE

## 2025-03-24 PROCEDURE — 84484 ASSAY OF TROPONIN QUANT: CPT

## 2025-03-24 PROCEDURE — 36415 COLL VENOUS BLD VENIPUNCTURE: CPT

## 2025-03-24 PROCEDURE — 99285 EMERGENCY DEPT VISIT HI MDM: CPT

## 2025-03-24 PROCEDURE — 80053 COMPREHEN METABOLIC PANEL: CPT

## 2025-03-24 PROCEDURE — 85007 BL SMEAR W/DIFF WBC COUNT: CPT

## 2025-03-24 PROCEDURE — 71250 CT THORAX DX C-: CPT

## 2025-03-24 PROCEDURE — 93010 ELECTROCARDIOGRAM REPORT: CPT | Performed by: INTERNAL MEDICINE

## 2025-03-24 PROCEDURE — 93308 TTE F-UP OR LMTD: CPT

## 2025-03-24 PROCEDURE — 93005 ELECTROCARDIOGRAM TRACING: CPT

## 2025-03-24 PROCEDURE — 84443 ASSAY THYROID STIM HORMONE: CPT

## 2025-03-24 PROCEDURE — 85027 COMPLETE CBC AUTOMATED: CPT

## 2025-03-24 PROCEDURE — 93308 TTE F-UP OR LMTD: CPT | Performed by: INTERNAL MEDICINE

## 2025-03-24 RX ORDER — TORSEMIDE 20 MG/1
20 TABLET ORAL DAILY
Status: DISCONTINUED | OUTPATIENT
Start: 2025-03-25 | End: 2025-03-25 | Stop reason: HOSPADM

## 2025-03-24 RX ORDER — LURASIDONE HYDROCHLORIDE 20 MG/1
20 TABLET, FILM COATED ORAL
Status: DISCONTINUED | OUTPATIENT
Start: 2025-03-25 | End: 2025-03-25 | Stop reason: HOSPADM

## 2025-03-24 RX ORDER — PREDNISONE 20 MG/1
40 TABLET ORAL DAILY
Status: DISCONTINUED | OUTPATIENT
Start: 2025-03-26 | End: 2025-03-25 | Stop reason: HOSPADM

## 2025-03-24 RX ORDER — PREDNISONE 20 MG/1
20 TABLET ORAL DAILY
Status: DISCONTINUED | OUTPATIENT
Start: 2025-04-01 | End: 2025-03-25 | Stop reason: HOSPADM

## 2025-03-24 RX ORDER — LORAZEPAM 1 MG/1
1 TABLET ORAL EVERY 8 HOURS PRN
Status: DISCONTINUED | OUTPATIENT
Start: 2025-03-24 | End: 2025-03-25 | Stop reason: HOSPADM

## 2025-03-24 RX ORDER — COLCHICINE 0.6 MG/1
0.6 TABLET ORAL DAILY
Status: DISCONTINUED | OUTPATIENT
Start: 2025-03-25 | End: 2025-03-25 | Stop reason: HOSPADM

## 2025-03-24 RX ORDER — PREDNISONE 10 MG/1
10 TABLET ORAL DAILY
Status: DISCONTINUED | OUTPATIENT
Start: 2025-04-03 | End: 2025-03-25 | Stop reason: HOSPADM

## 2025-03-24 RX ORDER — METOPROLOL SUCCINATE 50 MG/1
50 TABLET, EXTENDED RELEASE ORAL EVERY 12 HOURS
Status: DISCONTINUED | OUTPATIENT
Start: 2025-03-24 | End: 2025-03-25 | Stop reason: HOSPADM

## 2025-03-24 RX ORDER — ONDANSETRON 2 MG/ML
4 INJECTION INTRAMUSCULAR; INTRAVENOUS EVERY 6 HOURS PRN
Status: DISCONTINUED | OUTPATIENT
Start: 2025-03-24 | End: 2025-03-25 | Stop reason: HOSPADM

## 2025-03-24 RX ORDER — FAMOTIDINE 20 MG/1
20 TABLET, FILM COATED ORAL 2 TIMES DAILY
Status: DISCONTINUED | OUTPATIENT
Start: 2025-03-24 | End: 2025-03-25 | Stop reason: HOSPADM

## 2025-03-24 RX ADMIN — APIXABAN 5 MG: 5 TABLET, FILM COATED ORAL at 18:51

## 2025-03-24 RX ADMIN — METOPROLOL SUCCINATE 50 MG: 50 TABLET, EXTENDED RELEASE ORAL at 17:09

## 2025-03-24 RX ADMIN — FAMOTIDINE 20 MG: 20 TABLET, FILM COATED ORAL at 19:49

## 2025-03-24 NOTE — ED ATTENDING ATTESTATION
3/24/2025  I, Jacky Jefferson DO, saw and evaluated the patient. I have discussed the patient with the resident/non-physician practitioner and agree with the resident's/non-physician practitioner's findings, Plan of Care, and MDM as documented in the resident's/non-physician practitioner's note, except where noted. All available labs and Radiology studies were reviewed.  I was present for key portions of any procedure(s) performed by the resident/non-physician practitioner and I was immediately available to provide assistance.       At this point I agree with the current assessment done in the Emergency Department.  I have conducted an independent evaluation of this patient a history and physical is as follows:      Patient is a 74-year-old female history of endometrial cancer, previously treated with immunotherapy but developed immune related pneumonitis and myocarditis, currently on chemotherapy every 3 weeks with Taxol and carboplatin, last treatment March 7, CKD, accompanied by her .  The patient says about 2 weeks ago she began having some mild dry nonproductive cough, feeling more tired and fatigued and rundown, and some occasional shaking in her legs.  No fever, no chills, no vomiting, no pain anywhere, typically not had these count of symptoms after chemotherapy.    Patient gets weekly labs, most recent was on March 19 she had a CBC showing a chronic leukocytosis with a white count of 13,000, chronic bandemia, 21% bands, chronic normocytic anemia 11.0, chronic thrombocytopenia, platelet count of 92,000, CMP showing a chronic renal insufficiency, creatinine of 1.81, BUN of 44, chronic hyperglycemia glucose of 321 without acidosis, chronic slight LFT elevation alk phos of 133, magnesium amylase lipase  were unremarkable.    General:  Patient is well-appearing  Head:  Atraumatic  Eyes:  Conjunctiva pink, Extraocular muscle intact, PERRL  ENT:  Mucous membranes are moist  Neck:  Supple  Cardiac:   S1-S2, without murmurs  Lungs:  Clear to auscultation bilaterally  Abdomen:  Soft, nontender, normal bowel sounds, no CVA tenderness, no tympany, no rigidity, no guarding  Extremities:  Normal range of motion  Neurologic:  Awake, fluent speech, normal comprehension. AAOx3. Cranial nerves 2-12 are intact, strength is 5/5 in the bilateral upper & lower extremities, no slurred speech, no facial droop, no deficit on finger-to-nose testing, no pronator drift.  Sensation to light touch is equal and symmetric throughout the whole body  Skin:  Pink warm and dry, no rash  Psychiatric:  Alert, pleasant, cooperative          ED Course  ED Course as of 03/24/25 1502   Mon Mar 24, 2025   1403 Troponin was 4066 on February 20, 2025, current troponin of 165, unclear if that is descending from previous elevation.  Will obtain delta   1429 Patient reassessed, feeling comfortable, said she was feeling fine     CT chest without contrast   Final Result      No acute thoracic abnormality.      Nearly resolved groundglass opacities in bilateral lower lobes, likely sequela of infection/inflammation. Consider follow-up CT chest without contrast in 3 months to ensure resolution.      Subsegmental atelectasis in right middle and bilateral lower lobes.      Unchanged 0.5 cm right lower lobe pulmonary nodule.      Additional chronic/incidental findings as detailed above.      The study was marked in EPIC for immediate notification.               Workstation performed: [id number]           Labs Reviewed   CBC AND DIFFERENTIAL - Abnormal       Result Value Ref Range Status    WBC 15.39 (*) 4.31 - 10.16 Thousand/uL Final    RBC 3.40 (*) 3.81 - 5.12 Million/uL Final    Hemoglobin 11.2 (*) 11.5 - 15.4 g/dL Final    Hematocrit 34.0 (*) 34.8 - 46.1 % Final     (*) 82 - 98 fL Final    MCH 32.9  26.8 - 34.3 pg Final    MCHC 32.9  31.4 - 37.4 g/dL Final    RDW 17.3 (*) 11.6 - 15.1 % Final    MPV 12.4  8.9 - 12.7 fL Final    Platelets 107 (*)  149 - 390 Thousands/uL Final    Narrative:     This is an appended report.  These results have been appended to a previously verified report.   COMPREHENSIVE METABOLIC PANEL - Abnormal    Sodium 138  135 - 147 mmol/L Final    Potassium 4.1  3.5 - 5.3 mmol/L Final    Chloride 100  96 - 108 mmol/L Final    CO2 27  21 - 32 mmol/L Final    ANION GAP 11  4 - 13 mmol/L Final    BUN 32 (*) 5 - 25 mg/dL Final    Creatinine 1.58 (*) 0.60 - 1.30 mg/dL Final    Comment: Standardized to IDMS reference method    Glucose 298 (*) 65 - 140 mg/dL Final    Comment: If the patient is fasting, the ADA then defines impaired fasting glucose as > 100 mg/dL and diabetes as > or equal to 123 mg/dL.    Calcium 8.9  8.4 - 10.2 mg/dL Final    AST 13  13 - 39 U/L Final    ALT 23  7 - 52 U/L Final    Comment: Specimen collection should occur prior to Sulfasalazine administration due to the potential for falsely depressed results.     Alkaline Phosphatase 130 (*) 34 - 104 U/L Final    Total Protein 6.2 (*) 6.4 - 8.4 g/dL Final    Albumin 3.6  3.5 - 5.0 g/dL Final    Total Bilirubin 0.42  0.20 - 1.00 mg/dL Final    Comment: Use of this assay is not recommended for patients undergoing treatment with eltrombopag due to the potential for falsely elevated results.  N-acetyl-p-benzoquinone imine (metabolite of Acetaminophen) will generate erroneously low results in samples for patients that have taken an overdose of Acetaminophen.    eGFR 32  ml/min/1.73sq m Final    Narrative:     National Kid[employer] guidelines for Chronic Kidney Disease (CKD):     Stage 1 with normal or high GFR (GFR > 90 mL/min/1.73 square meters)    Stage 2 Mild CKD (GFR = 60-89 mL/min/1.73 square meters)    Stage 3A Moderate CKD (GFR = 45-59 mL/min/1.73 square meters)    Stage 3B Moderate CKD (GFR = 30-44 mL/min/1.73 square meters)    Stage 4 Severe CKD (GFR = 15-29 mL/min/1.73 square meters)    Stage 5 End Stage CKD (GFR <15 mL/min/1.73 square meters)  Note: GFR  "calculation is accurate only with a steady state creatinine   HS TROPONIN I 0HR - Abnormal    hs TnI 0hr 165 (*) \"Refer to ACS Flowchart\"- see link ng/L Final    Comment:                                              Initial (time 0) result  If >=50 ng/L, Myocardial injury suggested ;  Type of myocardial injury and treatment strategy  to be determined.  If 5-49 ng/L, a delta result at 2 hours and or 4 hours will be needed to further evaluate.  If <4 ng/L, and chest pain has been >3 hours since onset, patient may qualify for discharge based on the HEART score in the ED.  If <5 ng/L and <3hours since onset of chest pain, a delta result at 2 hours will be needed to further evaluate.    HS Troponin 99th Percentile URL of a Health Population=12 ng/L with a 95% Confidence Interval of 8-18 ng/L.    Second Troponin (time 2 hours)  If calculated delta >= 20 ng/L,  Myocardial injury suggested ; Type of myocardial injury and treatment strategy to be determined.  If 5-49 ng/L and the calculated delta is 5-19 ng/L, consult medical service for evaluation.  Continue evaluation for ischemia on ecg and other possible etiology and repeat hs troponin at 4 hours.  If delta is <5 ng/L at 2 hours, consider discharge based on risk stratification via the HEART score (if in ED), or PATTIE risk score in IP/Observation.    HS Troponin 99th Percentile URL of a Health Population=12 ng/L with a 95% Confidence Interval of 8-18 ng/L.   HS TROPONIN I 2HR - Abnormal    hs TnI 2hr 212 (*) \"Refer to ACS Flowchart\"- see link ng/L Final    Comment:                                              Initial (time 0) result  If >=50 ng/L, Myocardial injury suggested ;  Type of myocardial injury and treatment strategy  to be determined.  If 5-49 ng/L, a delta result at 2 hours and or 4 hours will be needed to further evaluate.  If <4 ng/L, and chest pain has been >3 hours since onset, patient may qualify for discharge based on the HEART score in the ED.  If <5 ng/L " and <3hours since onset of chest pain, a delta result at 2 hours will be needed to further evaluate.    HS Troponin 99th Percentile URL of a Health Population=12 ng/L with a 95% Confidence Interval of 8-18 ng/L.    Second Troponin (time 2 hours)  If calculated delta >= 20 ng/L,  Myocardial injury suggested ; Type of myocardial injury and treatment strategy to be determined.  If 5-49 ng/L and the calculated delta is 5-19 ng/L, consult medical service for evaluation.  Continue evaluation for ischemia on ecg and other possible etiology and repeat hs troponin at 4 hours.  If delta is <5 ng/L at 2 hours, consider discharge based on risk stratification via the HEART score (if in ED), or PATTIE risk score in IP/Observation.    HS Troponin 99th Percentile URL of a Health Population=12 ng/L with a 95% Confidence Interval of 8-18 ng/L.    Delta 2hr hsTnI 47 (*) <20 ng/L Final   MANUAL DIFFERENTIAL(PHLEBS DO NOT ORDER) - Abnormal    Segmented % 93 (*) 43 - 75 % Final    Bands % 2  0 - 8 % Final    Lymphocytes % 5 (*) 14 - 44 % Final    Monocytes % 0 (*) 4 - 12 % Final    Eosinophils % 0  0 - 6 % Final    Basophils % 0  0 - 1 % Final    Absolute Neutrophils 14.62 (*) 1.85 - 7.62 Thousand/uL Final    Absolute Lymphocytes 0.77  0.60 - 4.47 Thousand/uL Final    Absolute Monocytes 0.00  0.00 - 1.22 Thousand/uL Final    Absolute Eosinophils 0.00  0.00 - 0.40 Thousand/uL Final    Absolute Basophils 0.00  0.00 - 0.10 Thousand/uL Final    Total Counted     Final    RBC Morphology Present   Final    Platelet Estimate Decreased (*) Adequate Final    Anisocytosis Present   Final   TSH, 3RD GENERATION WITH FREE T4 REFLEX - Normal    TSH 3RD GENERATON 0.929  0.450 - 4.500 uIU/mL Final    Comment: The recommended reference ranges for TSH during pregnancy are as follows:   First trimester 0.100 to 2.500 uIU/mL   Second trimester  0.200 to 3.000 uIU/mL   Third trimester 0.300 to 3.000 uIU/m    Note: Normal ranges may not apply to patients who  are transgender, non-binary, or whose legal sex, sex at birth, and gender identity differ.  Adult TSH (3rd generation) reference range follows the recommended guidelines of the [employer], January, 2020.   RBC MORPHOLOGY REFLEX TEST   HS TROPONIN I 4HR     Patient presents with generalized malaise, symptoms could be related to her underlying chemotherapy treatment.  She has no notes of life-threatening neutropenia, chest CT shows no evidence of pneumonia currently.  Her troponin initial and delta is elevated, and I am concerned this may be related to her underlying chemo or possibly even some myocarditis given her history.  I believe she would benefit from hospitalization and further evaluation and care, plan is for discussion with oncology as well as medicine for presumed admission. Signed out to Dr. Davenport.      MEDICAL DECISION MAKING CODING    COLLECTION AND INTERPRETATION OF DATA  I reviewed prior external notes, including March 5, 2025 gynecology oncology office visit    I ordered each unique test  Tests reviewed personally by me:  ECG: See my ED course  Labs: see above  Imaging: I independently interpreted the chest CT as noted above.            Critical Care Time  Procedures

## 2025-03-24 NOTE — ASSESSMENT & PLAN NOTE
68yo with stage IIIC1 grade 1 endometrial cancer s/p surgical resection, currently receiving adjuvant chemotherapy with carboplatin/taxol. Pembro discontinued after cycle 4 due to pneumonitis and myocarditis

## 2025-03-24 NOTE — CONSULTS
Consultation - General Cardiology Team   Cherelle Dash 69 y.o. female MRN: [id number]  Unit/Bed#: ED 16 Encounter: [id number]      Inpatient consult to Cardiology  Consult performed by: Sundeep Tolentino MD  Consult ordered by: Chen Nelson MD        PCP: Gretchen Mayorga DO     History of Present Illness   Physician Requesting Consult: Kathleen Beltran MD  Reason for Consult / Principal Problem: Pericardial effusion and dilated cardiomyopathy      Assessment & Plan  Stage 3 chronic kidney disease (HCC)  Lab Results   Component Value Date    EGFR 32 03/24/2025    EGFR 27 03/19/2025    EGFR 27 03/07/2025    CREATININE 1.58 (H) 03/24/2025    CREATININE 1.81 (H) 03/19/2025    CREATININE 1.85 (H) 03/07/2025   Baseline Cr 1.4-1.7  Stable  Endometrial cancer (HCC)  Dx 2024  stage IIIC1 grade 1 endometrial cance S/p surgery  Dec 2024 started chemotherapy - carboplatin, taxol, pembro  Pembro now HELD for myocarditis 2-2025   currently receiving adjuvant chemotherapy with carboplatin/taxol. Pembro discontinued after cycle 4 due to pneumonitis and myocarditis   Plan is to get XRT after chemotherapy  Other pulmonary embolism without acute cor pulmonale (HCC)  Diagnosed in 9/2024  On Eliquis  Cardiomyopathy (HCC)  -EF 40% in setting of acute PE (Sept 2024).  BEFORE chemotherapy.   - non-ischemic by stress test.     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  Likely sequelae of inflammation  History of myocarditis  Patient was recently hospitalized from 2/20/2025-2/23/2025 due to myocarditis.  Troponin was initially 4066->3940  CMR 2/28/25 EF appears to be 40 to 45%, subepicardial non-CAD late gadolinium enhancement in the basal anterolateral wall and transmural non-CAD late gadolinium enhancement of the mid inferolateral wall. This is suggestive of myocarditis.   Troponin 3/3/2025: 165  On prednisone taper and colchicine  C-peptide 3/20: 6.6  Elevated troponin  Troponin 165->212->249  10/4/2024 NM stress test: no evidence of ischemia  Mild elevated troponin is likely in the setting of myocarditis      Hyperlipidemia      Lab Units 03/20/25  0737 06/06/24  0652 09/08/23  0634   TRIGLYCERIDES mg/dL 305* 115 144   HDL mg/dL 57 54 50   LDL CALC mg/dL 101* 123* 111*     Not on statin   The 10-year ASCVD risk score (Jada NOVAK, et al., 2019) is: 8.6%    Values used to calculate the score:      Age: 69 years      Sex: Female      Is Non- : No      Diabetic: No      Tobacco smoker: No      Systolic Blood Pressure: 120 mmHg      Is BP treated: No      HDL Cholesterol: 57 mg/dL      Total Cholesterol: 219 mg/dL       PLAN:  Patient looks euvolemic, and denies chest pain.  Continue metoprolol, colchicine and prednisone taper, and torsemide  2.  Elevated ASCVD, recommend atorvastatin 20 mg p.o. daily. Can  discuss with her cardiologist.   3.  Patient has cardiology follow-up tomorrow.  Patient can be discharged from cardiac standpoint.          HPI: Cherelle Dash is a 69 y.o. year old female with a past medical history of myocarditis, endometrial cancer on chemotherapy, CKD and hyperlipidemia who presented with worsening shortness of breath, cough, fatigue and dizziness.  POCUS at the ED \"decent pericardial effusion \".  Cardiology was consulted for the same.  Stat echo revealed cardial " effusion, just a fat pad.  Currently patient denies chest pain, shortness of breath or weight gain.        Review of Systems   Constitutional:  Negative for chills and fever.   HENT:  Negative for congestion, rhinorrhea, sneezing and sore throat.    Eyes:  Negative for pain and discharge.   Respiratory:  Negative for cough, chest tightness, shortness of breath and wheezing.    Cardiovascular:  Negative for chest pain and leg swelling.   Gastrointestinal:  Negative for abdominal pain, nausea and vomiting.   Endocrine: Negative for polydipsia, polyphagia and polyuria.   Genitourinary:  Negative for flank pain, frequency and urgency.   Musculoskeletal:  Negative for arthralgias, back pain and joint swelling.   Skin:  Negative for color change and pallor.   Neurological:  Negative for dizziness, weakness, light-headedness and headaches.   Psychiatric/Behavioral:  Negative for agitation and confusion.      Review of system was conducted and was negative except for as stated in the HPI.      Historical Information   Past Medical History:   Diagnosis Date    Abnormal ECG     Anxiety 2002    Arthritis     Bipolar 1 disorder (HCC)     Cervical cancer (HCC)     Chronic kidney disease     stage 3    CPAP (continuous positive airway pressure) dependence     Depression 2001    Disease of thyroid gland     DVT (deep venous thrombosis) (HCC)     BLLE    Elevated blood pressure reading 06/10/2019    GERD (gastroesophageal reflux disease)     Obesity     Pulmonary emboli (HCC)     B/L    RSV (acute bronchiolitis due to respiratory syncytial virus) 12/05/2023    Sleep apnea     Sleep apnea, obstructive 2007    Urinary incontinence 2019    Uterine cancer (HCC)      Past Surgical History:   Procedure Laterality Date    COLONOSCOPY  2011    DENTAL SURGERY  2020    IR IVC FILTER PLACEMENT OPTIONAL/TEMPORARY  10/08/2024    IR PORT CHECK  12/26/2024    IR PORT PLACEMENT  12/03/2024    LAPAROTOMY N/A 10/11/2024    Procedure: EXPLORATORY  LAPAROTOMY;  Surgeon: Rodney Downing MD;  Location: [facility];  Service: Gynecology Oncology    TX HYSTEROSCOPY BX ENDOMETRIUM&/POLYPC W/WO D&C N/A 2024    Procedure: EXAM UNDER ANESTHESIA, DILATATION AND CURETTAGE, CERVICAL BIOPSIES;  Surgeon: Evin Page MD;  Location:  MAIN OR;  Service: Gynecology Oncology    TX LAPS TOTAL HYSTERECT 250 GM/< W/RMVL TUBE/OVARY N/A 10/11/2024    Procedure: ROBOTIC ASSISTED LTH, BSO, LYMPH NODE DISSECTION, EUA;  Surgeon: Rodney Downing MD;  Location: [facility];  Service: Gynecology Oncology     Social History     Substance and Sexual Activity   Alcohol Use Yes    Alcohol/week: 1.0 standard drink of alcohol    Types: 1 Glasses of wine per week     Social History     Substance and Sexual Activity   Drug Use Never     Social History     Tobacco Use   Smoking Status Former    Current packs/day: 0.00    Average packs/day: 0.3 packs/day for 30.0 years (7.5 ttl pk-yrs)    Types: Cigarettes    Start date: 1979    Quit date: 2009    Years since quittin.2    Passive exposure: Never   Smokeless Tobacco Never     Family History:   Family History   Problem Relation Age of Onset    Heart disease Mother     Heart Valve Disease Mother         Replacement    Diabetes Mother         2002    Heart failure Mother         Heart Valve replacement    Arthritis Father     No Known Problems Sister     No Known Problems Daughter     No Known Problems Maternal Grandmother     No Known Problems Maternal Grandfather     No Known Problems Paternal Grandmother     No Known Problems Paternal Grandfather     No Known Problems Son     No Known Problems Maternal Aunt     No Known Problems Maternal Aunt     No Known Problems Maternal Aunt     No Known Problems Maternal Aunt     No Known Problems Maternal Aunt     No Known Problems Paternal Aunt     Alcohol abuse Son         [place], 40 year old son, has issues with alcohol and alcohol abuse       Meds/Allergies   Hospital  Medications:   Current Facility-Administered Medications:     apixaban (ELIQUIS) tablet 5 mg, BID    [START ON 3/25/2025] colchicine (COLCRYS) tablet 0.6 mg, Daily    LORazepam (ATIVAN) tablet 1 mg, Q8H PRN    [START ON 3/25/2025] lurasidone (LATUDA) tablet 20 mg, Daily With Breakfast    metoprolol succinate (TOPROL-XL) 24 hr tablet 50 mg, Q12H    ondansetron (ZOFRAN) injection 4 mg, Q6H PRN    [START ON 3/25/2025] predniSONE tablet 50 mg, Daily **FOLLOWED BY** [START ON 3/26/2025] predniSONE tablet 40 mg, Daily **FOLLOWED BY** [START ON 3/29/2025] predniSONE tablet 30 mg, Daily **FOLLOWED BY** [START ON 4/1/2025] predniSONE tablet 20 mg, Daily **FOLLOWED BY** [START ON 4/3/2025] predniSONE tablet 10 mg, Daily    [START ON 3/25/2025] torsemide (DEMADEX) tablet 20 mg, Daily  Home Medications: Not in a hospital admission.    Allergies   Allergen Reactions    Raw Fruit - Food Allergy Anaphylaxis     PLUMS, PEACHES, APPLES, CHERRIES FRUIT WITH SKIN       Objective   Vitals: Blood pressure 100/56, pulse 92, temperature (!) 96.5 °F (35.8 °C), temperature source Oral, resp. rate 16, height 5' (1.524 m), weight 103 kg (228 lb), SpO2 98%, not currently breastfeeding.  Orthostatic Blood Pressures      Flowsheet Row Most Recent Value   Blood Pressure 100/56 filed at 03/24/2025 1608   Patient Position - Orthostatic VS Sitting filed at 03/24/2025 1052              Invasive Devices       Central Venous Catheter Line  Duration             Port A Cath 12/03/24 Right Internal jugular 111 days              Peripheral Intravenous Line  Duration             Peripheral IV 02/22/25 Proximal;Right;Ventral (anterior) Forearm 30 days    Peripheral IV 03/24/25 Left Antecubital <1 day                    Physical Exam  Constitutional:       General: She is not in acute distress.     Appearance: She is well-developed. She is obese. She is not diaphoretic.   HENT:      Head: Normocephalic.      Mouth/Throat:      Pharynx: No oropharyngeal  exudate.   Eyes:      Pupils: Pupils are equal, round, and reactive to light.   Cardiovascular:      Rate and Rhythm: Regular rhythm. Tachycardia present.      Heart sounds: No murmur heard.  Pulmonary:      Effort: Pulmonary effort is normal. No respiratory distress.      Breath sounds: No wheezing or rales.   Abdominal:      General: Bowel sounds are normal.      Palpations: Abdomen is soft.      Tenderness: There is no abdominal tenderness.   Musculoskeletal:         General: No tenderness. Normal range of motion.      Cervical back: Normal range of motion.      Right lower leg: No edema.      Left lower leg: No edema.   Skin:     General: Skin is warm.   Neurological:      Mental Status: She is alert and oriented to person, place, and time.           Lab Results: I have personally reviewed pertinent lab results.    Results from last 7 days   Lab Units 03/24/25  1408 03/24/25  1223   HS TNI 0HR ng/L  --  165*   HS TNI 2HR ng/L 212*  --          Results from last 7 days   Lab Units 03/24/25  1223 03/19/25  1221   POTASSIUM mmol/L 4.1 3.5   CO2 mmol/L 27 28   CHLORIDE mmol/L 100 98   BUN mg/dL 32* 44*   CREATININE mg/dL 1.58* 1.81*     Results from last 7 days   Lab Units 03/24/25  1223 03/19/25  1221   HEMOGLOBIN g/dL 11.2* 11.0*   HEMATOCRIT % 34.0* 33.5*   PLATELETS Thousands/uL 107* 92*     Results from last 7 days   Lab Units 03/20/25  0737   TRIGLYCERIDES mg/dL 305*   HDL mg/dL 57   LDL CALC mg/dL 101*         Imaging: Results Review Statement: I reviewed radiology reports from this admission including: CT chest.      Telemetry:   Sinus tachy    EKG:    Encounter Date: 03/24/25   ECG 12 lead   Result Value    Ventricular Rate 87    Atrial Rate 87    DE Interval 158    QRSD Interval 132    QT Interval 390    QTC Interval 469    P Axis 52    QRS Axis -83    T Wave Axis 70    Narrative     Age and gender specific ECG analysis   Normal sinus rhythm  Left axis deviation  Non-specific intra-ventricular conduction  block  Inferior infarct , age undetermined  Cannot rule out Anterior infarct , age undetermined  Abnormal ECG    Confirmed by Toni Shi () on 3/24/2025 1:10:06 PM         Previous STRESS TEST:  No results found for this or any previous visit.     No results found for this or any previous visit.    Results for orders placed during the hospital encounter of 10/04/24    NM myocardial perfusion spect (rx stress and/or rest)    Interpretation Summary    Stress ECG: The ECG was uninterpretable due to left bundle branch block. after pharmacologic vasodilation, without reproduction of symptoms.    Perfusion: There is a left ventricular perfusion defect that is small in size with mild reduction in uptake present in the mid anterior location(s) that is paradoxical. On reprocessed imaging there is improvement of this defect on stress imaging. LV wall function also appears to be preserved through out the anterior wall. The defect appears to be an artifact caused by breast attenuation.    Stress Function: Left ventricular function post-stress is normal. Stress ejection fraction is 60%.    No evidence of ischemia.  Normal left ventricular cavity size, function, and wall thickening. No TID.  Compared to prior study there is an improvement in LV EF to 60%          ECHO:  Results for orders placed during the hospital encounter of 20    Echo complete with contrast if indicated    [facility]  [address], [place] [id number] [phone]    Transthoracic Echocardiogram  2D, M-mode, Doppler, and Color Doppler    Study date:  02-Mar-2020    Patient: AIDAN ZAVALA  MR number: [id number]  Account number: [id number]  : 1955  Age: 64 years  Gender: Female  Status: Outpatient  Location: [facility]  Height: 62 in  Weight: 236 lb  BP: 120/ 76 mmHg    Indications: Abnormal EKG; Edema    Sonographer:  AVANI Haines, RDCS  Primary Physician:  Gretchen Mayorga  DO  Referring Physician:  Torin Gold MD  Group:  [facility]  Interpreting Physician:  Yony Castaneda MD    SUMMARY    LEFT VENTRICLE:  Systolic function was normal. Ejection fraction was estimated to be 60 %.  There were no regional wall motion abnormalities.    RIGHT VENTRICLE:  The size was normal.  Systolic function was normal.    LEFT ATRIUM:  The atrium was mildly dilated.    MITRAL VALVE:  There was mild regurgitation.    HISTORY: PRIOR HISTORY: Abnormal EKG; Palpitations; GERD; Decreased thyroid; JULIO; Edema; Former smoker    PROCEDURE: The study was performed in the [facility]. This was a routine study. The transthoracic approach was used. The study included complete 2D imaging, M-mode, complete spectral Doppler, and color Doppler. The  heart rate was 76 bpm, at the start of the study. Images were obtained from the parasternal, apical, subcostal, and suprasternal notch acoustic windows. Image quality was adequate.    LEFT VENTRICLE: Size was normal. Systolic function was normal. Ejection fraction was estimated to be 60 %. There were no regional wall motion abnormalities. Wall thickness was normal. DOPPLER: Left ventricular diastolic function parameters  were normal for the patient's age.    RIGHT VENTRICLE: The size was normal. Systolic function was normal. Wall thickness was normal.    LEFT ATRIUM: The atrium was mildly dilated.    RIGHT ATRIUM: Size was normal.    MITRAL VALVE: Valve structure was normal. There was normal leaflet separation. DOPPLER: The transmitral velocity was within the normal range. There was no evidence for stenosis. There was mild regurgitation.    AORTIC VALVE: The valve was trileaflet. Leaflets exhibited normal thickness and normal cuspal separation. DOPPLER: Transaortic velocity was within the normal range. There was no evidence for stenosis. There was no significant  regurgitation.    TRICUSPID VALVE: The valve structure was  normal. There was normal leaflet separation. DOPPLER: The transtricuspid velocity was within the normal range. There was no evidence for stenosis. There was no significant regurgitation. The  tricuspid jet envelope definition was inadequate for estimation of RV systolic pressure.    PULMONIC VALVE: Leaflets exhibited normal thickness, no calcification, and normal cuspal separation. DOPPLER: The transpulmonic velocity was within the normal range. There was mild regurgitation.    PERICARDIUM: There was no pericardial effusion. The pericardium was normal in appearance.    AORTA: The root exhibited normal size.    SYSTEMIC VEINS: IVC: The inferior vena cava was normal in size. Respirophasic changes were normal.    SYSTEM MEASUREMENT TABLES    2D  %FS: 31.68 %  AV Diam: 3.06 cm  EDV(Teich): 89.99 ml  EF(Cube): 68.12 %  EF(Teich): 59.85 %  ESV(Cube): 28.07 ml  ESV(Teich): 36.13 ml  IVSd: 1.22 cm  LA Area: 21.7 cm2  LA Diam: 4.62 cm  LVEDV MOD A4C: 91.14 ml  LVEF MOD A4C: 63.33 %  LVESV MOD A4C: 33.43 ml  LVIDd: 4.45 cm  LVIDs: 3.04 cm  LVLd A4C: 7.31 cm  LVLs A4C: 5.67 cm  LVPWd: 1.32 cm  RA Area: 14.58 cm2  RV Diam: 3.78 cm  SV MOD A4C: 57.72 ml  SV(Cube): 59.97 ml  SV(Teich): 53.86 ml    CW  TR MaxP.16 mmHg  TR Vmax: 2.35 m/s    MM  TAPSE: 2.71 cm    PW  E': 0.05 m/s  E/E': 14.28  MV A Marcos: 0.82 m/s  MV Dec [place]: 4.4 m/s2  MV DecT: 172 ms  MV E Marcos: 0.76 m/s  MV E/A Ratio: 0.93    [employer] Accredited Echocardiography Laboratory    Prepared and electronically signed by    Yony Castaneda MD  Signed 02-Mar-2020 12:12:45    Results for orders placed during the hospital encounter of 25    Echo complete w/ contrast if indicated    Interpretation Summary    Left Ventricle: Left ventricular cavity size is normal. Wall thickness is mildly increased. There is eccentric hypertrophy. The left ventricular ejection fraction is 45%. Systolic function is mildly reduced. at -14%. There is mild global  hypokinesis. Diastolic function is mildly abnormal, consistent with grade I (abnormal) relaxation.    IVS: There is abnormal septal motion consistent with left bundle branch block.    Right Ventricle: Right ventricular cavity size is normal. Systolic function is normal.    Mitral Valve: There is mild regurgitation.    Tricuspid Valve: There is mild regurgitation.    Strain was performed to quantify interventricular dyssynchrony and evaluate components of myocardial function due to (positive family history of HCM, family history of sudden death, HCM, Chemotherapy, complex CHD, genetic abnormality, viral infection) . Results from the utilization of Strain Analysis are listed in the report below.        CMR:  No results found for this or any previous visit.    Results for orders placed during the hospital encounter of 02/20/25    MRI cardiac  w wo contrast    Narrative  MRI CARDIAC WITH AND WITHOUT CONTRAST    INDICATION: Myocarditis    TECHNIQUE:  -SSFP based localizers  -SSFP based cine imaging in the short and long axes  -Segmented late gadolinium enhancement.  -T2 weighted fast spin echo dark blood imaging  -Gadovist was power injected  -Patient tolerated the procedure without any complications        SUMMARY:    1. LVEF could not be calculated due to significant artifact from respiratory motion. Visually the EF appears to be 40 to 45%. Recommend correlation with echocardiography.  2.There appears to be mild global hypokinesis with akinesis in the basal anterolateral wall and mid inferolateral wall.  3. Visually the right ventricular function appears to be normal.  4. There is subepicardial non-CAD late gadolinium enhancement in the basal anterolateral wall and transmural non-CAD late gadolinium enhancement of the mid inferolateral wall. This is suggestive of myocarditis. Less likely differential diagnoses include  inflammatory cardiomyopathy such as sarcoidosis, genetic cardiomyopathy or deposition disease such as  Fabry's. Recommend correlation with clinical presentation.  5. LGE percent: 6%  6. Dilated left atrium.  7. Small anterior pericardial effusion. Extensive epicardial fat and lipomatous hypertrophy of the interatrial septum.  8. Extracardiac findings (this study was not optimized for extracardiac evaluation): Gallstones. Possible hiatal hernia. Cystic structures in the left kidney are partially visualized. Consider dedicated imaging if clinically indicated  9. Technically difficult study due to off-axis imaging planes cardiac misgating, and respiratory motion artifact.        VENTRICULAR MEASUREMENTS: Could not be performed due to respiratory artifact      LATE GADOLINIUM ENHANCEMENT    Description: There is subepicardial non-CAD late gadolinium enhancement in the basal anterolateral wall and transmural non-CAD late gadolinium enhancement of the mid inferolateral wall.    LGE percent: 6%  Viability: All coronary artery territories are viable  Myocardial edema: No evidence of myocardial edema on T2 weighted dark blood imaging, however image quality was suboptimal.    PERICARDIUM: Small anterior pericardial effusion    Extracardiac findings (this study is not optimized for extracardiac evaluation): Gallstones, cystic structures in the left kidney are partially visualized. Consider dedicated imaging if clinically indicated. Possible hiatal hernia.    Workstation performed: [id number]    No results found for this or any previous visit.      HOLTER  Results for orders placed during the hospital encounter of 20    Holter monitor - 24 hour    Narrative  PT NAME: Cherelle BLACKWOOD Hardy  : 1955  AGE: 64 y.o.  GENDER: female  MRN: [id number]  PROCEDURE: Holter monitor - 24 hour      INDICATIONS:  24 hour Holter monitor was performed 2020 for palpitations.  Average heart rate was 74 beats per minute.  Minimum heart rate was 56 beats per minute at 9:59 a.m..  Maximum heart rate was 110 beats per minute at 11:40  p.m..    Ventricular ectopic activity consisted of 30 isolated PVCs and one ventricular couplet, comprising less than 0.1% of total beats.    Supraventricular ectopic activity consisted of 3 isolated PACs, comprising less than 0.1% of total beats.    Longest R-R interval was 1.3 seconds at 5:26 a.m..    Patient's rhythm included 13 minutes 42 seconds of bradycardia.  Most of the bradycardia occurred between the hours of 11:00 p.m. and 7:00 a.m..    Patient's rhythm included 17 minutes of tachycardia.    Patient reported a heart rushing feeling at 6:30 a.m., Holter showed sinus rhythm at 84 beats per minute.  Patient reported feeling fingers white and numb at 8:00 a.m., Holter showed sinus rhythm at 90 beats per minute.    IMPRESSIONS:  1. Sinus rhythm with rare ventricular and supraventricular ectopic activity.  2. Patient-reported symptoms did not correlate with any significant arrhythmias or ectopy.    Interpreted by: Dione Mercado MD    No results found for this or any previous visit.          Sundeep Tolentino MD  Cardiology Fellow   PGY-4    ==========================================================================================      ** Please Note: Fluency DirectDictation voice to text software may have been used in the creation of this document. **

## 2025-03-24 NOTE — ASSESSMENT & PLAN NOTE
Stage IIIC1, grade 1 endometrial cancer s/p surgical resection who is currently receiving adjuvant chemotherapy with taxol 135 mg/m2 and carboplatin AUC 4 IV every 21 days. 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 Prior to that, the patient also experienced immunotherapy-induced pneumonitis. She presents to the office with worsening shortness of breath, cough, fatigue and dizziness, with a significant decline in her performance status and quality of life.      Referred to ED for evaluation of acute, progressive symptoms.

## 2025-03-24 NOTE — ASSESSMENT & PLAN NOTE
Stage IIIC1, grade 1 endometrial cancer s/p surgical resection who is currently receiving adjuvant chemotherapy with taxol 135 mg/m2 and carboplatin AUC 4 IV every 21 days. 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 Prior to that, the patient also experienced immunotherapy-induced pneumonitis. She presents to the office with worsening shortness of breath, cough, fatigue and dizziness, with a significant decline in her performance status and quality of life.     Referred to ED for evaluation of acute, progressive symptoms.   Orders:  •  Transfer to other facility

## 2025-03-24 NOTE — ASSESSMENT & PLAN NOTE
Per POCUS in ED, moderate pericardial effusion visualized  Trops 165 --> 212 --> follow up next set of trops  Cardiology consulted, appreciate recs  Follow up STAT echo

## 2025-03-24 NOTE — H&P
For questions/concerns on this patient, please reach out to the following:  [facility] GYN-GynOnc- Resident/AP      H&P  Cherelle Dash 69 y.o. female MRN: [id number]  Unit/Bed#: ED 16 Encounter: [id number]        Assessment & Plan   Pericardial effusion  Assessment & Plan  Per POCUS in ED, moderate pericardial effusion visualized  Trops 165 --> 212 --> follow up next set of trops  Cardiology consulted, appreciate recs  Follow up STAT echo    Other acute myocarditis  Assessment & Plan  Concern for IO related cardiomyopathy, currently on her second round of steroid taper  Continue steroid taper  Seen by cardiology outpatient and started on colchicine x 3 months, continue while inpatient  Follow up STAT echo  Cardiology consulted, appreciate recs    Drug-induced pneumonitis  Assessment & Plan  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 Pembro discontinued after cycle 4 due to pneumonitis and myocarditis    Stage 3 chronic kidney disease (HCC)  Assessment & Plan  Lab Results   Component Value Date    EGFR 32 03/24/2025    EGFR 27 03/19/2025    EGFR 27 03/07/2025    CREATININE 1.58 (H) 03/24/2025    CREATININE 1.81 (H) 03/19/2025    CREATININE 1.85 (H) 03/07/2025   Continue to monitor renal function    Depression with anxiety  Assessment & Plan  Continue home meds        History of Present Illness     HPI:  Cherelle Dash is a 69 y.o. female who presents today at the office visit stating that has been worsening SOB, cough, fatigue, dizziness with h/o immunotherapy induced  pneumonitis and myocarditis currently receiving second steroid taper. At the ED vital signs are stable and POCUS showed decent sized pericardial effusion, trops went acub246 to 212. Cardiology was contacted and will performed a STAT.    Oncology History   Endometrial cancer (HCC)   8/11/2024 Initial Diagnosis    Endometrial cancer (HCC)     10/11/2024 -  Cancer Staged    Staging form: Corpus Uteri - Carcinoma, AJCC 8th Edition  - Pathologic stage from 10/11/2024: FIGO Stage IIIC1 (pT2, pN1mi(sn), cM0) - Signed by Evin Page MD on 11/12/2024  Method of lymph node assessment: [place] lymph node biopsy  Histologic grade (G): G1  Histologic grading system: 3 grade system  Lymph-vascular invasion (LVI): BOTH lymphatic and small vessel AND venous (large vessel) invasion  Peritoneal cytology results: Negative  Pelvic brandi status: Positive       10/11/2024 Surgery    Robotic assisted total laparoscopic hysterectomy, bilateral salpingo-oophorectomy, pelvic and periaortic sentinel lymph node biopsies, vulvar biopsy  1.  Grade 1, cervical stromal invasion to a depth of 12 out of 15 mm, extensive LVSI, p53 wild-type, pMMR, HER2 1+,  washings negative, 0.7 mm metastatic focus and pelvic lymph node, ITC identified and periaortic lymph nodes.     12/6/2024 -  Chemotherapy    Taxol 135 mg/m2, carboplatin AUC 5 and pembrolizumab 200 mg IV every 21 days.    Pembro held for cycle 3 d/t grade 2 drug-induced pneumonitis.        2/2025 Adverse Reaction    Immunotherapy-related (pembro) pneumonitis and myocarditis           Review of Systems   Constitutional:  Negative for fever.   HENT:  Positive for congestion.    Respiratory:  Positive for cough. Negative for shortness of breath.    Cardiovascular:  Negative for chest pain.   Gastrointestinal:  Negative for abdominal pain, nausea and vomiting.   Genitourinary:  Negative for difficulty urinating, dysuria, pelvic pain and vaginal bleeding.   Musculoskeletal: Negative.     Skin: Negative.    Neurological:  Negative for headaches.   Psychiatric/Behavioral:  Negative for agitation, behavioral problems and confusion.        Historical Information   Past Medical History:   Diagnosis Date    Abnormal ECG     Anxiety     Arthritis     Bipolar 1 disorder (HCC)     Cervical cancer (HCC)     Chronic kidney disease     stage 3    CPAP (continuous positive airway pressure) dependence     Depression     Disease of thyroid gland     DVT (deep venous thrombosis) (HCC)     BLLE    Elevated blood pressure reading 06/10/2019    GERD (gastroesophageal reflux disease)     Obesity     Pulmonary emboli (HCC)     B/L    RSV (acute bronchiolitis due to respiratory syncytial virus) 2023    Sleep apnea     Sleep apnea, obstructive     Urinary incontinence     Uterine cancer (HCC)      Past Surgical History:   Procedure Laterality Date    COLONOSCOPY      DENTAL SURGERY      IR IVC FILTER PLACEMENT OPTIONAL/TEMPORARY  10/08/2024    IR PORT CHECK  2024    IR PORT PLACEMENT  2024    LAPAROTOMY N/A 10/11/2024    Procedure: EXPLORATORY LAPAROTOMY;  Surgeon: Rodney Downing MD;  Location: [facility];  Service: Gynecology Oncology    PA HYSTEROSCOPY BX ENDOMETRIUM&/POLYPC W/WO D&C N/A 2024    Procedure: EXAM UNDER ANESTHESIA, DILATATION AND CURETTAGE, CERVICAL BIOPSIES;  Surgeon: Evin Page MD;  Location: [facility];  Service: Gynecology Oncology    PA LAPS TOTAL HYSTERECT 250 GM/< W/RMVL TUBE/OVARY N/A 10/11/2024    Procedure: ROBOTIC ASSISTED LTH, BSO, LYMPH NODE DISSECTION, EUA;  Surgeon: Rodney Downing MD;  Location: [facility];  Service: Gynecology Oncology     OB History    Para Term  AB Living   2 2 2  0 2   SAB IAB Ectopic Multiple Live Births       2      # Outcome Date GA Lbr Khris/2nd Weight Sex Type Anes PTL Lv   2 Term 84    F Vag-Spont   ROBERT   1 Term 81    M Vag-Spont   ROBERT     Family History   Problem Relation  Age of Onset    Heart disease Mother     Heart Valve Disease Mother         Replacement    Diabetes Mother         2002    Heart failure Mother         Heart Valve replacement    Arthritis Father     No Known Problems Sister     No Known Problems Daughter     No Known Problems Maternal Grandmother     No Known Problems Maternal Grandfather     No Known Problems Paternal Grandmother     No Known Problems Paternal Grandfather     No Known Problems Son     No Known Problems Maternal Aunt     No Known Problems Maternal Aunt     No Known Problems Maternal Aunt     No Known Problems Maternal Aunt     No Known Problems Maternal Aunt     No Known Problems Paternal Aunt     Alcohol abuse Son         [place], 40 year old son, has issues with alcohol and alcohol abuse     Social History   Social History     Substance and Sexual Activity   Alcohol Use Yes    Alcohol/week: 1.0 standard drink of alcohol    Types: 1 Glasses of wine per week     Social History     Substance and Sexual Activity   Drug Use Never     Social History     Tobacco Use   Smoking Status Former    Current packs/day: 0.00    Average packs/day: 0.3 packs/day for 30.0 years (7.5 ttl pk-yrs)    Types: Cigarettes    Start date: 1979    Quit date: 2009    Years since quittin.2    Passive exposure: Never   Smokeless Tobacco Never       Meds/Allergies   Not in a hospital admission.  Allergies   Allergen Reactions    Raw Fruit - Food Allergy Anaphylaxis     PLUMS, PEACHES, APPLES, CHERRIES FRUIT WITH SKIN       Objective     Vitals:  Blood pressure 100/56, pulse 92, temperature (!) 96.5 °F (35.8 °C), temperature source Oral, resp. rate 16, height 5' (1.524 m), weight 103 kg (228 lb), SpO2 98%, not currently breastfeeding.  Body mass index is 44.53 kg/m².    Physical Exam  Constitutional:       Appearance: Normal appearance.   HENT:      Head: Normocephalic and atraumatic.   Cardiovascular:      Rate and Rhythm: Normal rate.   Pulmonary:      Effort:  Pulmonary effort is normal.   Abdominal:      General: Abdomen is flat.      Palpations: Abdomen is soft.   Skin:     General: Skin is warm.   Neurological:      General: No focal deficit present.      Mental Status: She is alert.   Psychiatric:         Mood and Affect: Mood normal.         Lab Results   Component Value Date     18.3 03/19/2025     Lab Results   Component Value Date    WBC 15.39 (H) 03/24/2025    HGB 11.2 (L) 03/24/2025    HCT 34.0 (L) 03/24/2025     (H) 03/24/2025     (L) 03/24/2025     Lab Results   Component Value Date     (H) 11/17/2017    K 4.1 03/24/2025     03/24/2025    CO2 27 03/24/2025    ANIONGAP 10 08/04/2015    BUN 32 (H) 03/24/2025    CREATININE 1.58 (H) 03/24/2025    GLUCOSE 124 (H) 11/17/2017    GLUF 174 (H) 03/20/2025    CALCIUM 8.9 03/24/2025    CORRECTEDCA 8.8 03/07/2025    AST 13 03/24/2025    ALT 23 03/24/2025    ALKPHOS 130 (H) 03/24/2025    PROT 7.0 11/17/2017    BILITOT 0.4 11/17/2017    EGFR 32 03/24/2025       Imaging: 3/24/25 CT chest w/o contrast - Nearly resolved groundglass opacities in bilateral lower lobes, likely sequela of infection/inflammation. Consider follow-up CT chest without contrast in 3 months to ensure resolution. No acute thoracic abnormality.   EKG, Pathology, and Other Studies: 3/24/25 Normal sinus rhythm; Cannot rule out Anterior infarct , age undetermined .Abnormal ECG    Sima Gerardo MD  3/24/2025  4:34 PM

## 2025-03-24 NOTE — TELEPHONE ENCOUNTER
Patient calling about upcoming appointment on 3/26/25 with Dr. Olson. Patient is being admitted to the hospital today, and would like to make the office aware that she may have to reschedule appointment. She will call back tomorrow to cancel if she isn't able to make the appointment.

## 2025-03-24 NOTE — ASSESSMENT & PLAN NOTE
Concern for IO related cardiomyopathy, currently on her second round of steroid taper  Continue steroid taper  Trops 165 --> 212  Seen by cardiology outpatient and started on colchicine x 3 months, continue while inpatient  Follow up STAT echo- no evidence of effusion   Cardiology consulted, appreciate recs

## 2025-03-24 NOTE — ASSESSMENT & PLAN NOTE
Lab Results   Component Value Date    EGFR 32 03/24/2025    EGFR 27 03/19/2025    EGFR 27 03/07/2025    CREATININE 1.58 (H) 03/24/2025    CREATININE 1.81 (H) 03/19/2025    CREATININE 1.85 (H) 03/07/2025   Baseline Cr 1.4-1.7  Stable

## 2025-03-24 NOTE — PROGRESS NOTES
Name: Cherelle Dash      : 1955      MRN: [id number]  Encounter Provider: Sharon Banks PA-C  Encounter Date: 3/24/2025   Encounter [facility]  :  Assessment & Plan  Drug-induced pneumonitis    Orders:  •  Transfer to other facility    Other acute myocarditis  Currently completing steroid taper.  Orders:  •  Transfer to other facility    Endometrial cancer (HCC)  Stage IIIC1, grade 1 endometrial cancer s/p surgical resection who is currently receiving adjuvant chemotherapy with taxol 135 mg/m2 and carboplatin AUC 4 IV every 21 days. 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 Prior to that, the patient also experienced immunotherapy-induced pneumonitis. She presents to the office with worsening shortness of breath, cough, fatigue and dizziness, with a significant decline in her performance status and quality of life.     Referred to ED for evaluation of acute, progressive symptoms.   Orders:  •  Transfer to other facility    Shortness of breath    Orders:  •  Transfer to other facility            History of Present Illness     Reason for Visit / CC:  Pre-Chemo Visit    Cherelle Dash is a 69 y.o. female   who presents to the office for pre-chemotherapy evaluation. She notes significant progression of multiple symptoms and difficulty ambulatory and performing her ADLs. She notes progressive SOB and cough. Additionally, she is extremely fatigued and dizzy. She presents today with a walker, as she does not trust herself ambulating independently. She denies vomiting. Normal bowel/bladder function. The patient notes her hands and body just shake/tremble. She continues to taper off her steroids. She is having difficulty sleeping.            Oncology History   Cancer Staging   Endometrial cancer (HCC)  Staging form: Corpus Uteri - Carcinoma, AJCC 8th Edition  - Pathologic stage from  10/11/2024: FIGO Stage IIIC1 (pT2, pN1mi(sn), cM0) - Signed by Evin Page MD on 11/12/2024  Method of lymph node assessment: [place] lymph node biopsy  Histologic grade (G): G1  Histologic grading system: 3 grade system  Lymph-vascular invasion (LVI): BOTH lymphatic and small vessel AND venous (large vessel) invasion  Peritoneal cytology results: Negative  Pelvic brandi status: Positive  Oncology History   Endometrial cancer (HCC)   8/11/2024 Initial Diagnosis    Endometrial cancer (HCC)     10/11/2024 -  Cancer Staged    Staging form: Corpus Uteri - Carcinoma, AJCC 8th Edition  - Pathologic stage from 10/11/2024: FIGO Stage IIIC1 (pT2, pN1mi(sn), cM0) - Signed by Evin Page MD on 11/12/2024  Method of lymph node assessment: [place] lymph node biopsy  Histologic grade (G): G1  Histologic grading system: 3 grade system  Lymph-vascular invasion (LVI): BOTH lymphatic and small vessel AND venous (large vessel) invasion  Peritoneal cytology results: Negative  Pelvic brandi status: Positive       10/11/2024 Surgery    Robotic assisted total laparoscopic hysterectomy, bilateral salpingo-oophorectomy, pelvic and periaortic sentinel lymph node biopsies, vulvar biopsy  1.  Grade 1, cervical stromal invasion to a depth of 12 out of 15 mm, extensive LVSI, p53 wild-type, pMMR, HER2 1+,  washings negative, 0.7 mm metastatic focus and pelvic lymph node, ITC identified and periaortic lymph nodes.     12/6/2024 -  Chemotherapy    Taxol 135 mg/m2, carboplatin AUC 5 and pembrolizumab 200 mg IV every 21 days.    Pembro held for cycle 3 d/t grade 2 drug-induced pneumonitis.        2/2025 Adverse Reaction    Immunotherapy-related (pembro) pneumonitis and myocarditis          Review of Systems   Constitutional:  Positive for fatigue. Negative for fever.   HENT: Negative.     Eyes: Negative.    Respiratory:  Positive for cough and shortness of breath.    Cardiovascular: Negative.    Gastrointestinal: Negative.     Genitourinary: Negative.    Musculoskeletal: Negative.    Skin: Negative.    Neurological:  Positive for dizziness, tremors and weakness.   Psychiatric/Behavioral: Negative.      A complete review of systems is negative other than that noted above in the HPI.  Medical History Reviewed by provider this encounter:  Tobacco  Allergies  Meds  Problems  Med Hx  Surg Hx  Fam Hx     .  Current Outpatient Medications on File Prior to Visit   Medication Sig Dispense Refill   • apixaban (Eliquis) 5 mg Take 1 tablet (5 mg total) by mouth 2 (two) times a day 180 tablet 1   • chlorhexidine (PERIDEX) 0.12 % solution USE HALF OUNCE TWICE A DAY RINSE FOR 30 SECONDS BY MOUTH THEN EXPECTORATE DO NOT SWALLOW     • Cholecalciferol (VITAMIN D) 2000 units CAPS Take 1 capsule by mouth daily     • clobetasol (TEMOVATE) 0.05 % ointment Apply topically 2 (two) times a week 60 g 3   • colchicine (COLCRYS) 0.6 mg tablet Take 1 tablet (0.6 mg total) by mouth daily 30 tablet 1   • CRANBERRY PO Take by mouth     • Glucosamine-Chondroit-Vit C-Mn (GLUCOSAMINE 1500 COMPLEX PO) Take by mouth in the morning     • Ivermectin 1 % CREA      • lidocaine-prilocaine (EMLA) cream Apply to PORT 30 min prior to labs and chemo 30 g 1   • LORazepam (ATIVAN) 1 mg tablet Take 1 tablet (1 mg total) by mouth every 8 (eight) hours as needed (nausea or anxiety) 20 tablet 0   • lurasidone (LATUDA) 20 mg tablet TAKE 1 TABLET BY MOUTH DAILY  WITH BREAKFAST 30 tablet 11   • magnesium Oxide (MAG-OX) 400 mg TABS TAKE 1 TABLET BY MOUTH EVERY DAY 90 tablet 1   • metoprolol succinate (TOPROL-XL) 50 mg 24 hr tablet Take 1 tablet (50 mg total) by mouth every 12 (twelve) hours 60 tablet 0   • Mirabegron ER (Myrbetriq) 50 MG TB24 TAKE 1 TABLET BY MOUTH IN THE  MORNING 90 tablet 1   • Multiple Vitamin (MULTI-VITAMIN DAILY) TABS Take by mouth daily     • polyethylene glycol (GLYCOLAX) 17 GM/SCOOP powder Take 17 g by mouth 2 (two) times a day 238 g 1   • predniSONE 10 mg  tablet Take 10 tablets PO QD x 7 days, then 9 tablets PO x 7d, then 8 tablets PO x 7d, then 6 tabs PO x 5d, then 5 tabs PO x 5 d, then 4 tabs PO x 3d, then 3 tabs PO x 3d, then 2 tabs PO x 2d, then 1 tabs PO x 3d. 272 tablet 0   • Sodium Fluoride 5000 PPM 1.1 % GEL As directed     • torsemide (DEMADEX) 20 mg tablet TAKE 1 TABLET BY MOUTH EVERY DAY 90 tablet 1   • famotidine (PEPCID) 20 mg tablet TAKE 1 TABLET BY MOUTH TWICE  DAILY 180 tablet 3     No current facility-administered medications on file prior to visit.         Objective   /82 (BP Location: Right arm, Patient Position: Sitting, Cuff Size: Large)   Pulse 78   Temp (!) 97.3 °F (36.3 °C) (Temporal)   Wt 103 kg (228 lb)   SpO2 98%   BMI 44.53 kg/m²     Body mass index is 44.53 kg/m².  Pain Screening:  Pain Score:   4 (weak and body aches)  ECOG ECOG Performance Status: 2 - Ambulatory and capable of all selfcare but unable to carry out any work activities.  Up and about more than 50% of waking hours   Physical Exam  Constitutional:       General: She is in acute distress.      Appearance: She is well-developed.   Pulmonary:      Comments: Patient is winded during conversation.  Skin:     General: Skin is warm and dry.      Findings: No rash.   Neurological:      Mental Status: She is alert and oriented to person, place, and time.   Psychiatric:         Behavior: Behavior normal.         Thought Content: Thought content normal.         Judgment: Judgment normal.          Labs: I have reviewed pertinent labs.   Lab Results   Component Value Date/Time    WBC 15.39 (H) 03/24/2025 12:23 PM    RBC 3.40 (L) 03/24/2025 12:23 PM    Hemoglobin 11.2 (L) 03/24/2025 12:23 PM    Hematocrit 34.0 (L) 03/24/2025 12:23 PM     (H) 03/24/2025 12:23 PM    MCH 32.9 03/24/2025 12:23 PM    RDW 17.3 (H) 03/24/2025 12:23 PM    Platelets 107 (L) 03/24/2025 12:23 PM    Segmented % 81 (H) 03/05/2025 08:21 AM    Bands % 21 (H) 03/19/2025 12:21 PM    Lymphocytes % 3 (L)  03/19/2025 12:21 PM    Lymphocytes % 11 (L) 03/05/2025 08:21 AM    Monocytes % 1 (L) 03/19/2025 12:21 PM    Monocytes % 7 03/05/2025 08:21 AM    Eosinophils % 0 03/19/2025 12:21 PM    Eosinophils Relative 0 03/05/2025 08:21 AM    Basophils % 0 03/19/2025 12:21 PM    Basophils Relative 0 03/05/2025 08:21 AM    Immature Grans % 1 03/05/2025 08:21 AM    Absolute Neutrophils 9.88 (H) 03/05/2025 08:21 AM      Lab Results   Component Value Date/Time    Sodium 138 03/19/2025 12:21 PM    Potassium 3.5 03/19/2025 12:21 PM    Chloride 98 03/19/2025 12:21 PM    CO2 28 03/19/2025 12:21 PM    ANION GAP 12 03/19/2025 12:21 PM    BUN 44 (H) 03/19/2025 12:21 PM    Creatinine 1.81 (H) 03/19/2025 12:21 PM    Glucose 321 (H) 03/19/2025 12:21 PM    Glucose, Fasting 174 (H) 03/20/2025 07:37 AM    Calcium 9.2 03/19/2025 12:21 PM    Corrected Calcium 8.8 03/07/2025 10:19 AM    AST 15 03/19/2025 12:21 PM    ALT 24 03/19/2025 12:21 PM    Alkaline Phosphatase 133 (H) 03/19/2025 12:21 PM    Total Protein 6.3 (L) 03/19/2025 12:21 PM    Albumin 3.7 03/19/2025 12:21 PM    Total Bilirubin 0.37 03/19/2025 12:21 PM    eGFR 27 03/19/2025 12:21 PM      TSH:     Amylase/Lipase:   Lab Results   Component Value Date/Time    Amylase 37 03/19/2025 12:21 PM    Lipase 28 03/19/2025 12:21 PM     Lab Results   Component Value Date/Time     18.3 03/19/2025 12:21 PM        Trend:  Lab Results   Component Value Date     18.3 03/19/2025     48.3 (H) 02/26/2025     10.3 02/05/2025     9.8 01/15/2025     8.8 12/24/2024     8.7 12/04/2024     54.2 (H) 11/14/2024     45.2 (H) 08/14/2024

## 2025-03-24 NOTE — ASSESSMENT & PLAN NOTE
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

## 2025-03-24 NOTE — PROGRESS NOTES
Name: Cherelle Dash      : 1955      MRN: [id number]  Encounter Provider: Sharon Banks PA-C  Encounter Date: 3/24/2025   Enc[facility]  :  Assessment & Plan  Endometrial cancer (HCC)  Stage IIIC1, grade 1 endometrial cancer s/p surgical resection who is currently receiving adjuvant chemotherapy with taxol 135 mg/m2 and carboplatin AUC 4 IV every 21 days. 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 Prior to that, the patient also experienced immunotherapy-induced pneumonitis. She presents to the office with worsening shortness of breath, cough, fatigue and dizziness, with a significant decline in her performance status and quality of life.      Referred to ED for evaluation of acute, progressive symptoms.        Drug-induced pneumonitis         Other acute myocarditis  Currently completing steroid taper.                History of Present Illness     Reason for Visit / CC:  Pre-Chemo Visit    Cherelle Dash is a 69 y.o. female   who presents to the office for pre-chemotherapy evaluation. She notes significant progression of multiple symptoms and difficulty ambulatory and performing her ADLs. She notes progressive SOB and cough. Additionally, she is extremely fatigued and dizzy. She presents today with a walker, as she does not trust herself ambulating independently. She denies vomiting. Normal bowel/bladder function. The patient notes her hands and body just shake/tremble. She continues to taper off her steroids. She is having difficulty sleeping.            Oncology History   Cancer Staging   Endometrial cancer (HCC)  Staging form: Corpus Uteri - Carcinoma, AJCC 8th Edition  - Pathologic stage from 10/11/2024: FIGO Stage IIIC1 (pT2, pN1mi(sn), cM0) - Signed by Evin Page MD on 2024  Method of lymph node assessment: [place] lymph node biopsy  Histologic grade  (G): G1  Histologic grading system: 3 grade system  Lymph-vascular invasion (LVI): BOTH lymphatic and small vessel AND venous (large vessel) invasion  Peritoneal cytology results: Negative  Pelvic brandi status: Positive  Oncology History   Endometrial cancer (HCC)   8/11/2024 Initial Diagnosis    Endometrial cancer (HCC)     10/11/2024 -  Cancer Staged    Staging form: Corpus Uteri - Carcinoma, AJCC 8th Edition  - Pathologic stage from 10/11/2024: FIGO Stage IIIC1 (pT2, pN1mi(sn), cM0) - Signed by Evin Page MD on 11/12/2024  Method of lymph node assessment: [place] lymph node biopsy  Histologic grade (G): G1  Histologic grading system: 3 grade system  Lymph-vascular invasion (LVI): BOTH lymphatic and small vessel AND venous (large vessel) invasion  Peritoneal cytology results: Negative  Pelvic brandi status: Positive       10/11/2024 Surgery    Robotic assisted total laparoscopic hysterectomy, bilateral salpingo-oophorectomy, pelvic and periaortic sentinel lymph node biopsies, vulvar biopsy  1.  Grade 1, cervical stromal invasion to a depth of 12 out of 15 mm, extensive LVSI, p53 wild-type, pMMR, HER2 1+,  washings negative, 0.7 mm metastatic focus and pelvic lymph node, ITC identified and periaortic lymph nodes.     12/6/2024 -  Chemotherapy    Taxol 135 mg/m2, carboplatin AUC 5 and pembrolizumab 200 mg IV every 21 days.    Pembro held for cycle 3 d/t grade 2 drug-induced pneumonitis.        2/2025 Adverse Reaction    Immunotherapy-related (pembro) pneumonitis and myocarditis          Review of Systems   Constitutional:  Positive for fatigue. Negative for fever.   HENT: Negative.     Eyes: Negative.    Respiratory:  Positive for cough and shortness of breath.    Cardiovascular: Negative.    Gastrointestinal: Negative.    Genitourinary: Negative.    Musculoskeletal: Negative.    Skin: Negative.    Neurological:  Positive for dizziness, tremors and weakness.   Psychiatric/Behavioral: Negative.      A  complete review of systems is negative other than that noted above in the HPI.  Medical History Reviewed by provider this encounter:  Tobacco  Allergies  Meds  Problems  Med Hx  Surg Hx  Fam Hx     .  Current Outpatient Medications on File Prior to Visit   Medication Sig Dispense Refill   • apixaban (Eliquis) 5 mg Take 1 tablet (5 mg total) by mouth 2 (two) times a day 180 tablet 1   • chlorhexidine (PERIDEX) 0.12 % solution USE HALF OUNCE TWICE A DAY RINSE FOR 30 SECONDS BY MOUTH THEN EXPECTORATE DO NOT SWALLOW     • Cholecalciferol (VITAMIN D) 2000 units CAPS Take 1 capsule by mouth daily     • clobetasol (TEMOVATE) 0.05 % ointment Apply topically 2 (two) times a week 60 g 3   • colchicine (COLCRYS) 0.6 mg tablet Take 1 tablet (0.6 mg total) by mouth daily 30 tablet 1   • CRANBERRY PO Take by mouth     • famotidine (PEPCID) 20 mg tablet TAKE 1 TABLET BY MOUTH TWICE  DAILY 180 tablet 3   • Glucosamine-Chondroit-Vit C-Mn (GLUCOSAMINE 1500 COMPLEX PO) Take by mouth in the morning     • Ivermectin 1 % CREA      • lidocaine-prilocaine (EMLA) cream Apply to PORT 30 min prior to labs and chemo 30 g 1   • LORazepam (ATIVAN) 1 mg tablet Take 1 tablet (1 mg total) by mouth every 8 (eight) hours as needed (nausea or anxiety) 20 tablet 0   • lurasidone (LATUDA) 20 mg tablet TAKE 1 TABLET BY MOUTH DAILY  WITH BREAKFAST 30 tablet 11   • magnesium Oxide (MAG-OX) 400 mg TABS TAKE 1 TABLET BY MOUTH EVERY DAY 90 tablet 1   • metoprolol succinate (TOPROL-XL) 50 mg 24 hr tablet Take 1 tablet (50 mg total) by mouth every 12 (twelve) hours 60 tablet 0   • Mirabegron ER (Myrbetriq) 50 MG TB24 TAKE 1 TABLET BY MOUTH IN THE  MORNING 90 tablet 1   • Multiple Vitamin (MULTI-VITAMIN DAILY) TABS Take by mouth daily     • polyethylene glycol (GLYCOLAX) 17 GM/SCOOP powder Take 17 g by mouth 2 (two) times a day 238 g 1   • predniSONE 10 mg tablet Take 10 tablets PO QD x 7 days, then 9 tablets PO x 7d, then 8 tablets PO x 7d, then 6 tabs  PO x 5d, then 5 tabs PO x 5 d, then 4 tabs PO x 3d, then 3 tabs PO x 3d, then 2 tabs PO x 2d, then 1 tabs PO x 3d. 272 tablet 0   • Sodium Fluoride 5000 PPM 1.1 % GEL As directed     • torsemide (DEMADEX) 20 mg tablet TAKE 1 TABLET BY MOUTH EVERY DAY 90 tablet 1     No current facility-administered medications on file prior to visit.         Objective   There were no vitals taken for this visit.    There is no height or weight on file to calculate BMI.  Pain Screening:     ECOG     Physical Exam  Constitutional:       General: She is in acute distress.      Appearance: She is well-developed.   Pulmonary:      Comments: Patient is winded during conversation.  Skin:     General: Skin is warm and dry.      Findings: No rash.   Neurological:      Mental Status: She is alert and oriented to person, place, and time.   Psychiatric:         Behavior: Behavior normal.         Thought Content: Thought content normal.         Judgment: Judgment normal.          Labs: I have reviewed pertinent labs.   Lab Results   Component Value Date/Time    WBC 15.39 (H) 03/24/2025 12:23 PM    RBC 3.40 (L) 03/24/2025 12:23 PM    Hemoglobin 11.2 (L) 03/24/2025 12:23 PM    Hematocrit 34.0 (L) 03/24/2025 12:23 PM     (H) 03/24/2025 12:23 PM    MCH 32.9 03/24/2025 12:23 PM    RDW 17.3 (H) 03/24/2025 12:23 PM    Platelets 107 (L) 03/24/2025 12:23 PM    Segmented % 81 (H) 03/05/2025 08:21 AM    Bands % 2 03/24/2025 12:23 PM    Lymphocytes % 5 (L) 03/24/2025 12:23 PM    Lymphocytes % 11 (L) 03/05/2025 08:21 AM    Monocytes % 0 (L) 03/24/2025 12:23 PM    Monocytes % 7 03/05/2025 08:21 AM    Eosinophils % 0 03/24/2025 12:23 PM    Eosinophils Relative 0 03/05/2025 08:21 AM    Basophils % 0 03/24/2025 12:23 PM    Basophils Relative 0 03/05/2025 08:21 AM    Immature Grans % 1 03/05/2025 08:21 AM    Absolute Neutrophils 9.88 (H) 03/05/2025 08:21 AM      Lab Results   Component Value Date/Time    Sodium 138 03/24/2025 12:23 PM    Potassium 4.1  03/24/2025 12:23 PM    Chloride 100 03/24/2025 12:23 PM    CO2 27 03/24/2025 12:23 PM    ANION GAP 11 03/24/2025 12:23 PM    BUN 32 (H) 03/24/2025 12:23 PM    Creatinine 1.58 (H) 03/24/2025 12:23 PM    Glucose 298 (H) 03/24/2025 12:23 PM    Glucose, Fasting 174 (H) 03/20/2025 07:37 AM    Calcium 8.9 03/24/2025 12:23 PM    Corrected Calcium 8.8 03/07/2025 10:19 AM    AST 13 03/24/2025 12:23 PM    ALT 23 03/24/2025 12:23 PM    Alkaline Phosphatase 130 (H) 03/24/2025 12:23 PM    Total Protein 6.2 (L) 03/24/2025 12:23 PM    Albumin 3.6 03/24/2025 12:23 PM    Total Bilirubin 0.42 03/24/2025 12:23 PM    eGFR 32 03/24/2025 12:23 PM      TSH:   Results from last 7 days   Lab Units 03/24/25  1223   TSH 3RD GENERATON uIU/mL 0.929       Amylase/Lipase:   Lab Results   Component Value Date/Time    Amylase 37 03/19/2025 12:21 PM    Lipase 28 03/19/2025 12:21 PM     Lab Results   Component Value Date/Time     18.3 03/19/2025 12:21 PM        Trend:  Lab Results   Component Value Date     18.3 03/19/2025     48.3 (H) 02/26/2025     10.3 02/05/2025     9.8 01/15/2025     8.8 12/24/2024     8.7 12/04/2024     54.2 (H) 11/14/2024     45.2 (H) 08/14/2024

## 2025-03-25 ENCOUNTER — TRANSITIONAL CARE MANAGEMENT (OUTPATIENT)
Dept: FAMILY MEDICINE CLINIC | Facility: CLINIC | Age: 70
End: 2025-03-25

## 2025-03-25 VITALS
RESPIRATION RATE: 22 BRPM | HEIGHT: 60 IN | BODY MASS INDEX: 44.84 KG/M2 | HEART RATE: 87 BPM | SYSTOLIC BLOOD PRESSURE: 109 MMHG | DIASTOLIC BLOOD PRESSURE: 67 MMHG | OXYGEN SATURATION: 97 % | WEIGHT: 228.4 LBS | TEMPERATURE: 97.5 F

## 2025-03-25 LAB
ANION GAP SERPL CALCULATED.3IONS-SCNC: 8 MMOL/L (ref 4–13)
BNP SERPL-MCNC: 155 PG/ML (ref 0–100)
BUN SERPL-MCNC: 29 MG/DL (ref 5–25)
CALCIUM SERPL-MCNC: 9 MG/DL (ref 8.4–10.2)
CHLORIDE SERPL-SCNC: 101 MMOL/L (ref 96–108)
CO2 SERPL-SCNC: 32 MMOL/L (ref 21–32)
CREAT SERPL-MCNC: 1.68 MG/DL (ref 0.6–1.3)
ERYTHROCYTE [DISTWIDTH] IN BLOOD BY AUTOMATED COUNT: 17.6 % (ref 11.6–15.1)
GFR SERPL CREATININE-BSD FRML MDRD: 30 ML/MIN/1.73SQ M
GLUCOSE SERPL-MCNC: 98 MG/DL (ref 65–140)
HCT VFR BLD AUTO: 32.7 % (ref 34.8–46.1)
HGB BLD-MCNC: 10.6 G/DL (ref 11.5–15.4)
MAGNESIUM SERPL-MCNC: 2.3 MG/DL (ref 1.9–2.7)
MCH RBC QN AUTO: 33.2 PG (ref 26.8–34.3)
MCHC RBC AUTO-ENTMCNC: 32.4 G/DL (ref 31.4–37.4)
MCV RBC AUTO: 103 FL (ref 82–98)
PLATELET # BLD AUTO: 99 THOUSANDS/UL (ref 149–390)
PMV BLD AUTO: 11.9 FL (ref 8.9–12.7)
POTASSIUM SERPL-SCNC: 3.5 MMOL/L (ref 3.5–5.3)
RBC # BLD AUTO: 3.19 MILLION/UL (ref 3.81–5.12)
SODIUM SERPL-SCNC: 141 MMOL/L (ref 135–147)
WBC # BLD AUTO: 9.7 THOUSAND/UL (ref 4.31–10.16)

## 2025-03-25 PROCEDURE — 97163 PT EVAL HIGH COMPLEX 45 MIN: CPT

## 2025-03-25 PROCEDURE — 99222 1ST HOSP IP/OBS MODERATE 55: CPT | Performed by: INTERNAL MEDICINE

## 2025-03-25 PROCEDURE — 80048 BASIC METABOLIC PNL TOTAL CA: CPT

## 2025-03-25 PROCEDURE — 99233 SBSQ HOSP IP/OBS HIGH 50: CPT | Performed by: OBSTETRICS & GYNECOLOGY

## 2025-03-25 PROCEDURE — 97166 OT EVAL MOD COMPLEX 45 MIN: CPT

## 2025-03-25 PROCEDURE — 83735 ASSAY OF MAGNESIUM: CPT

## 2025-03-25 PROCEDURE — 83880 ASSAY OF NATRIURETIC PEPTIDE: CPT | Performed by: INTERNAL MEDICINE

## 2025-03-25 PROCEDURE — 85027 COMPLETE CBC AUTOMATED: CPT

## 2025-03-25 RX ADMIN — FAMOTIDINE 20 MG: 20 TABLET, FILM COATED ORAL at 08:01

## 2025-03-25 RX ADMIN — COLCHICINE 0.6 MG: 0.6 TABLET ORAL at 08:01

## 2025-03-25 RX ADMIN — METOPROLOL SUCCINATE 50 MG: 50 TABLET, EXTENDED RELEASE ORAL at 06:11

## 2025-03-25 RX ADMIN — LURASIDONE HYDROCHLORIDE 20 MG: 20 TABLET, FILM COATED ORAL at 07:59

## 2025-03-25 RX ADMIN — TORSEMIDE 20 MG: 20 TABLET ORAL at 08:01

## 2025-03-25 RX ADMIN — APIXABAN 5 MG: 5 TABLET, FILM COATED ORAL at 08:01

## 2025-03-25 RX ADMIN — PREDNISONE 50 MG: 20 TABLET ORAL at 08:01

## 2025-03-25 NOTE — PLAN OF CARE
Problem: PHYSICAL THERAPY ADULT  Goal: Performs mobility at highest level of function for planned discharge setting.  See evaluation for individualized goals.  Description: Treatment/Interventions: ADL retraining, Functional transfer training, LE strengthening/ROM, Elevations, Therapeutic exercise, Endurance training, Bed mobility, Gait training, Spoke to nursing, OT          See flowsheet documentation for full assessment, interventions and recommendations.  Outcome: Progressing  Note: Prognosis: Good  Problem List: Decreased strength, Decreased range of motion, Decreased endurance, Impaired balance, Decreased mobility  Assessment: Patient is a 69 year old female, who presents with an initial diagnosis of pericardial effusion. Patient agreeable and cooperative to therapy session. Session consisted of assessing functional mobility and ambulation. Patient reports 0/10 pain, which did not change by the end of session. Patient demonstrates limited endurance, decreased LE strength, and limited ambulation. She required supervision assist for both sit to stand transfers and gait with unilateral UE support from a wall railing- which pt reports is her baseline. Pt does report some right knee pain, which has been ongoing, as well as right heel pain. Therefore, pt will benefit from skilled therapy in order to improve chronic pain modulation as well as increase endurance. Based on these findings and the pt's AM-PAC score of 19, we recommend level III to allow for optimal recovery and return to baseline function ADLs. As long as pt is admitted, therapy will follow for 2-3 days/week to address the aforementioned deficits to allow for optimal re-integration into household and community ADLs and improved quality of life.  Barriers to Discharge: None     Rehab Resource Intensity Level, PT: III (Minimum Resource Intensity)    See flowsheet documentation for full assessment.

## 2025-03-25 NOTE — PLAN OF CARE
Problem: Prexisting or High Potential for Compromised Skin Integrity  Goal: Skin integrity is maintained or improved  Description: INTERVENTIONS:- Identify patients at risk for skin breakdown- Assess and monitor skin integrity- Assess and monitor nutrition and hydration status- Monitor labs - Assess for incontinence - Turn and reposition patient- Assist with mobility/ambulation- Relieve pressure over bony prominences- Avoid friction and shearing- Provide appropriate hygiene as needed including keeping skin clean and dry- Evaluate need for skin moisturizer/barrier cream- Collaborate with interdisciplinary team - Patient/family teaching- Consider wound care consult   Outcome: Progressing     Problem: INFECTION - ADULT  Goal: Absence or prevention of progression during hospitalization  Description: INTERVENTIONS:- Assess and monitor for signs and symptoms of infection- Monitor lab/diagnostic results- Monitor all insertion sites, i.e. indwelling lines, tubes, and drains- Monitor endotracheal if appropriate and nasal secretions for changes in amount and color- Marcus appropriate cooling/warming therapies per order- Administer medications as ordered- Instruct and encourage patient and family to use good hand hygiene technique- Identify and instruct in appropriate isolation precautions for identified infection/condition  Outcome: Progressing  Goal: Absence of fever/infection during neutropenic period  Description: INTERVENTIONS:- Monitor WBC  Outcome: Progressing     Problem: SAFETY ADULT  Goal: Maintain or return to baseline ADL function  Description: INTERVENTIONS:-  Assess patient's ability to carry out ADLs; assess patient's baseline for ADL function and identify physical deficits which impact ability to perform ADLs (bathing, care of mouth/teeth, toileting, grooming, dressing, etc.)- Assess/evaluate cause of self-care deficits - Assess range of motion- Assess patient's mobility; develop plan if impaired- Assess  patient's need for assistive devices and provide as appropriate- Encourage maximum independence but intervene and supervise when necessary- Involve family in performance of ADLs- Assess for home care needs following discharge - Consider OT consult to assist with ADL evaluation and planning for discharge- Provide patient education as appropriate  Outcome: Progressing     Problem: DISCHARGE PLANNING  Goal: Discharge to home or other facility with appropriate resources  Description: INTERVENTIONS:- Identify barriers to discharge w/patient and caregiver- Arrange for needed discharge resources and transportation as appropriate- Identify discharge learning needs (meds, wound care, etc.)- Arrange for interpretive services to assist at discharge as needed- Refer to Case Management Department for coordinating discharge planning if the patient needs post-hospital services based on physician/advanced practitioner order or complex needs related to functional status, cognitive ability, or social support system  Outcome: Progressing     Problem: Knowledge Deficit  Goal: Patient/family/caregiver demonstrates understanding of disease process, treatment plan, medications, and discharge instructions  Description: Complete learning assessment and assess knowledge base.Interventions:- Provide teaching at level of understanding- Provide teaching via preferred learning methods  Outcome: Progressing     Problem: CARDIOVASCULAR - ADULT  Goal: Maintains optimal cardiac output and hemodynamic stability  Description: INTERVENTIONS:- Monitor I/O, vital signs and rhythm- Monitor for S/S and trends of decreased cardiac output- Administer and titrate ordered vasoactive medications to optimize hemodynamic stability- Assess quality of pulses, skin color and temperature- Assess for signs of decreased coronary artery perfusion- Instruct patient to report change in severity of symptoms  Outcome: Progressing  Goal: Absence of cardiac dysrhythmias or at  baseline rhythm  Description: INTERVENTIONS:- Continuous cardiac monitoring, vital signs, obtain 12 lead EKG if ordered- Administer antiarrhythmic and heart rate control medications as ordered- Monitor electrolytes and administer replacement therapy as ordered  Outcome: Progressing     Problem: RESPIRATORY - ADULT  Goal: Achieves optimal ventilation and oxygenation  Description: INTERVENTIONS:- Assess for changes in respiratory status- Assess for changes in mentation and behavior- Position to facilitate oxygenation and minimize respiratory effort- Oxygen administered by appropriate delivery if ordered- Initiate smoking cessation education as indicated- Encourage broncho-pulmonary hygiene including cough, deep breathe, Incentive Spirometry- Assess the need for suctioning and aspirate as needed- Assess and instruct to report SOB or any respiratory difficulty- Respiratory Therapy support as indicated  Outcome: Progressing     Problem: GASTROINTESTINAL - ADULT  Goal: Minimal or absence of nausea and/or vomiting  Description: INTERVENTIONS:- Administer IV fluids if ordered to ensure adequate hydration- Maintain NPO status until nausea and vomiting are resolved- Nasogastric tube if ordered- Administer ordered antiemetic medications as needed- Provide nonpharmacologic comfort measures as appropriate- Advance diet as tolerated, if ordered- Consider nutrition services referral to assist patient with adequate nutrition and appropriate food choices  Outcome: Progressing  Goal: Maintains or returns to baseline bowel function  Description: INTERVENTIONS:- Assess bowel function- Encourage oral fluids to ensure adequate hydration- Administer IV fluids if ordered to ensure adequate hydration- Administer ordered medications as needed- Encourage mobilization and activity- Consider nutritional services referral to assist patient with adequate nutrition and appropriate food choices  Outcome: Progressing  Goal: Maintains adequate  nutritional intake  Description: INTERVENTIONS:- Monitor percentage of each meal consumed- Identify factors contributing to decreased intake, treat as appropriate- Assist with meals as needed- Monitor I&O, weight, and lab values if indicated- Obtain nutrition services referral as needed  Outcome: Progressing  Goal: Oral mucous membranes remain intact  Description: INTERVENTIONS- Assess oral mucosa and hygiene practices- Implement preventative oral hygiene regimen- Implement oral medicated treatments as ordered- Initiate Nutrition services referral as needed  Outcome: Progressing     Problem: GENITOURINARY - ADULT  Goal: Maintains or returns to baseline urinary function  Description: INTERVENTIONS:- Assess urinary function- Encourage oral fluids to ensure adequate hydration if ordered- Administer IV fluids as ordered to ensure adequate hydration- Administer ordered medications as needed- Offer frequent toileting- Follow urinary retention protocol if ordered  Outcome: Progressing  Goal: Absence of urinary retention  Description: INTERVENTIONS:- Assess patient’s ability to void and empty bladder- Monitor I/O- Bladder scan as needed- Discuss with physician/AP medications to alleviate retention as needed- Discuss catheterization for long term situations as appropriate  Outcome: Progressing     Problem: METABOLIC, FLUID AND ELECTROLYTES - ADULT  Goal: Electrolytes maintained within normal limits  Description: INTERVENTIONS:- Monitor labs and assess patient for signs and symptoms of electrolyte imbalances- Administer electrolyte replacement as ordered- Monitor response to electrolyte replacements, including repeat lab results as appropriate- Instruct patient on fluid and nutrition as appropriate  Outcome: Progressing  Goal: Fluid balance maintained  Description: INTERVENTIONS:- Monitor labs - Monitor I/O and WT- Instruct patient on fluid and nutrition as appropriate- Assess for signs & symptoms of volume excess or  deficit  Outcome: Progressing  Goal: Glucose maintained within target range  Description: INTERVENTIONS:- Monitor Blood Glucose as ordered- Assess for signs and symptoms of hyperglycemia and hypoglycemia- Administer ordered medications to maintain glucose within target range- Assess nutritional intake and initiate nutrition service referral as needed  Outcome: Progressing     Problem: HEMATOLOGIC - ADULT  Goal: Maintains hematologic stability  Description: INTERVENTIONS- Assess for signs and symptoms of bleeding or hemorrhage- Monitor labs- Administer supportive blood products/factors as ordered and appropriate  Outcome: Progressing

## 2025-03-25 NOTE — PLAN OF CARE
Problem: Prexisting or High Potential for Compromised Skin Integrity  Goal: Skin integrity is maintained or improved  Description: INTERVENTIONS:- Identify patients at risk for skin breakdown- Assess and monitor skin integrity- Assess and monitor nutrition and hydration status- Monitor labs - Assess for incontinence - Turn and reposition patient- Assist with mobility/ambulation- Relieve pressure over bony prominences- Avoid friction and shearing- Provide appropriate hygiene as needed including keeping skin clean and dry- Evaluate need for skin moisturizer/barrier cream- Collaborate with interdisciplinary team - Patient/family teaching- Consider wound care consult   3/25/2025 1123 by Shaun Batres RN  Outcome: Adequate for Discharge  3/25/2025 1046 by Shaun Batres RN  Outcome: Progressing     Problem: INFECTION - ADULT  Goal: Absence or prevention of progression during hospitalization  Description: INTERVENTIONS:- Assess and monitor for signs and symptoms of infection- Monitor lab/diagnostic results- Monitor all insertion sites, i.e. indwelling lines, tubes, and drains- Monitor endotracheal if appropriate and nasal secretions for changes in amount and color- [place] appropriate cooling/warming therapies per order- Administer medications as ordered- Instruct and encourage patient and family to use good hand hygiene technique- Identify and instruct in appropriate isolation precautions for identified infection/condition  3/25/2025 1123 by Shaun Batres RN  Outcome: Adequate for Discharge  3/25/2025 1046 by Shaun Batres RN  Outcome: Progressing  Goal: Absence of fever/infection during neutropenic period  Description: INTERVENTIONS:- Monitor WBC  3/25/2025 1123 by Shaun Batres RN  Outcome: Adequate for Discharge  3/25/2025 1046 by Shaun Batres RN  Outcome: Progressing     Problem: SAFETY ADULT  Goal: Maintain or return to baseline ADL function  Description: INTERVENTIONS:-  Assess patient's ability to carry out ADLs; assess  patient's baseline for ADL function and identify physical deficits which impact ability to perform ADLs (bathing, care of mouth/teeth, toileting, grooming, dressing, etc.)- Assess/evaluate cause of self-care deficits - Assess range of motion- Assess patient's mobility; develop plan if impaired- Assess patient's need for assistive devices and provide as appropriate- Encourage maximum independence but intervene and supervise when necessary- Involve family in performance of ADLs- Assess for home care needs following discharge - Consider OT consult to assist with ADL evaluation and planning for discharge- Provide patient education as appropriate  3/25/2025 1123 by Shaun Batres RN  Outcome: Adequate for Discharge  3/25/2025 1046 by Shaun Batres RN  Outcome: Progressing     Problem: DISCHARGE PLANNING  Goal: Discharge to home or other facility with appropriate resources  Description: INTERVENTIONS:- Identify barriers to discharge w/patient and caregiver- Arrange for needed discharge resources and transportation as appropriate- Identify discharge learning needs (meds, wound care, etc.)- Arrange for interpretive services to assist at discharge as needed- Refer to Case Management Department for coordinating discharge planning if the patient needs post-hospital services based on physician/advanced practitioner order or complex needs related to functional status, cognitive ability, or social support system  3/25/2025 1123 by Shaun Batres RN  Outcome: Adequate for Discharge  3/25/2025 1046 by Shaun Batres RN  Outcome: Progressing     Problem: Knowledge Deficit  Goal: Patient/family/caregiver demonstrates understanding of disease process, treatment plan, medications, and discharge instructions  Description: Complete learning assessment and assess knowledge base.Interventions:- Provide teaching at level of understanding- Provide teaching via preferred learning methods  3/25/2025 1123 by Shaun Batres RN  Outcome: Adequate for  Discharge  3/25/2025 1046 by Shaun Batres RN  Outcome: Progressing     Problem: CARDIOVASCULAR - ADULT  Goal: Maintains optimal cardiac output and hemodynamic stability  Description: INTERVENTIONS:- Monitor I/O, vital signs and rhythm- Monitor for S/S and trends of decreased cardiac output- Administer and titrate ordered vasoactive medications to optimize hemodynamic stability- Assess quality of pulses, skin color and temperature- Assess for signs of decreased coronary artery perfusion- Instruct patient to report change in severity of symptoms  3/25/2025 1123 by Shaun Batres RN  Outcome: Adequate for Discharge  3/25/2025 1046 by Shaun Batres RN  Outcome: Progressing  Goal: Absence of cardiac dysrhythmias or at baseline rhythm  Description: INTERVENTIONS:- Continuous cardiac monitoring, vital signs, obtain 12 lead EKG if ordered- Administer antiarrhythmic and heart rate control medications as ordered- Monitor electrolytes and administer replacement therapy as ordered  3/25/2025 1123 by Shaun Batres RN  Outcome: Adequate for Discharge  3/25/2025 1046 by Shaun Batres RN  Outcome: Progressing     Problem: RESPIRATORY - ADULT  Goal: Achieves optimal ventilation and oxygenation  Description: INTERVENTIONS:- Assess for changes in respiratory status- Assess for changes in mentation and behavior- Position to facilitate oxygenation and minimize respiratory effort- Oxygen administered by appropriate delivery if ordered- Initiate smoking cessation education as indicated- Encourage broncho-pulmonary hygiene including cough, deep breathe, Incentive Spirometry- Assess the need for suctioning and aspirate as needed- Assess and instruct to report SOB or any respiratory difficulty- Respiratory Therapy support as indicated  3/25/2025 1123 by Shaun Batres RN  Outcome: Adequate for Discharge  3/25/2025 1046 by Shaun Batres RN  Outcome: Progressing     Problem: GASTROINTESTINAL - ADULT  Goal: Minimal or absence of nausea and/or  vomiting  Description: INTERVENTIONS:- Administer IV fluids if ordered to ensure adequate hydration- Maintain NPO status until nausea and vomiting are resolved- Nasogastric tube if ordered- Administer ordered antiemetic medications as needed- Provide nonpharmacologic comfort measures as appropriate- Advance diet as tolerated, if ordered- Consider nutrition services referral to assist patient with adequate nutrition and appropriate food choices  3/25/2025 1123 by Shaun Batres RN  Outcome: Adequate for Discharge  3/25/2025 1046 by Shaun Batres RN  Outcome: Progressing  Goal: Maintains or returns to baseline bowel function  Description: INTERVENTIONS:- Assess bowel function- Encourage oral fluids to ensure adequate hydration- Administer IV fluids if ordered to ensure adequate hydration- Administer ordered medications as needed- Encourage mobilization and activity- Consider nutritional services referral to assist patient with adequate nutrition and appropriate food choices  3/25/2025 1123 by Shaun Batres RN  Outcome: Adequate for Discharge  3/25/2025 1046 by Shaun Batres RN  Outcome: Progressing  Goal: Maintains adequate nutritional intake  Description: INTERVENTIONS:- Monitor percentage of each meal consumed- Identify factors contributing to decreased intake, treat as appropriate- Assist with meals as needed- Monitor I&O, weight, and lab values if indicated- Obtain nutrition services referral as needed  3/25/2025 1123 by Shaun Batres RN  Outcome: Adequate for Discharge  3/25/2025 1046 by Shaun Batres RN  Outcome: Progressing  Goal: Oral mucous membranes remain intact  Description: INTERVENTIONS- Assess oral mucosa and hygiene practices- Implement preventative oral hygiene regimen- Implement oral medicated treatments as ordered- Initiate Nutrition services referral as needed  3/25/2025 1123 by Shaun Batres RN  Outcome: Adequate for Discharge  3/25/2025 1046 by Shaun Batres RN  Outcome: Progressing     Problem: GENITOURINARY -  ADULT  Goal: Maintains or returns to baseline urinary function  Description: INTERVENTIONS:- Assess urinary function- Encourage oral fluids to ensure adequate hydration if ordered- Administer IV fluids as ordered to ensure adequate hydration- Administer ordered medications as needed- Offer frequent toileting- Follow urinary retention protocol if ordered  3/25/2025 1123 by Shaun Batres RN  Outcome: Adequate for Discharge  3/25/2025 1046 by Shaun Batres RN  Outcome: Progressing  Goal: Absence of urinary retention  Description: INTERVENTIONS:- Assess patient’s ability to void and empty bladder- Monitor I/O- Bladder scan as needed- Discuss with physician/AP medications to alleviate retention as needed- Discuss catheterization for long term situations as appropriate  3/25/2025 1123 by Shaun Batres RN  Outcome: Adequate for Discharge  3/25/2025 1046 by Shaun Batres RN  Outcome: Progressing     Problem: METABOLIC, FLUID AND ELECTROLYTES - ADULT  Goal: Electrolytes maintained within normal limits  Description: INTERVENTIONS:- Monitor labs and assess patient for signs and symptoms of electrolyte imbalances- Administer electrolyte replacement as ordered- Monitor response to electrolyte replacements, including repeat lab results as appropriate- Instruct patient on fluid and nutrition as appropriate  3/25/2025 1123 by Shaun Batres RN  Outcome: Adequate for Discharge  3/25/2025 1046 by Shaun Batres RN  Outcome: Progressing  Goal: Fluid balance maintained  Description: INTERVENTIONS:- Monitor labs - Monitor I/O and WT- Instruct patient on fluid and nutrition as appropriate- Assess for signs & symptoms of volume excess or deficit  3/25/2025 1123 by Shaun Batres RN  Outcome: Adequate for Discharge  3/25/2025 1046 by Shaun Batres RN  Outcome: Progressing  Goal: Glucose maintained within target range  Description: INTERVENTIONS:- Monitor Blood Glucose as ordered- Assess for signs and symptoms of hyperglycemia and hypoglycemia- Administer  ordered medications to maintain glucose within target range- Assess nutritional intake and initiate nutrition service referral as needed  3/25/2025 1123 by Shaun Batres RN  Outcome: Adequate for Discharge  3/25/2025 1046 by Shaun Batres,  Outcome: Progressing     Problem: HEMATOLOGIC - ADULT  Goal: Maintains hematologic stability  Description: INTERVENTIONS- Assess for signs and symptoms of bleeding or hemorrhage- Monitor labs- Administer supportive blood products/factors as ordered and appropriate  3/25/2025 1123 by Shaun Batres RN  Outcome: Adequate for Discharge  3/25/2025 1046 by Shaun Batres RN  Outcome: Progressing

## 2025-03-25 NOTE — ASSESSMENT & PLAN NOTE
Troponin 165->212->249  10/4/2024 NM stress test: no evidence of ischemia  Mild elevated troponin is likely in the setting of myocarditis

## 2025-03-25 NOTE — UTILIZATION REVIEW
NOTIFICATION OF ADMISSION DISCHARGE   This is a Notification of Discharge from [facility]. Please be advised that this patient has been discharge from our facility. Below you will find the admission and discharge date and time including the patient’s disposition.   UTILIZATION REVIEW CONTACT:  Phil Colvin  Utilization   Network Utilization Review Department  Phone: [phone] x carefully listen to the prompts. All voicemails are confidential.  Email: [email or web address]     ADMISSION INFORMATION  PRESENTATION DATE: 3/24/2025 10:55 AM  OBERVATION ADMISSION DATE: N/A  INPATIENT ADMISSION DATE: 3/24/25  4:11 PM   DISCHARGE DATE: 3/25/2025 12:03 PM   DISPOSITION:Home/Self Care    Network Utilization Review Department  ATTENTION: Please call with any questions or concerns to [phone] and carefully listen to the prompts so that you are directed to the right person. All voicemails are confidential.   For Discharge needs, contact Care Management DC Support Team at [phone] opt. 2  Send all requests for admission clinical reviews, approved or denied determinations and any other requests to dedicated fax number below belonging to the campus where the patient is receiving treatment. List of dedicated fax numbers for the Facilities:  FACILITY NAME UR FAX NUMBER   ADMISSION DENIALS (Administrative/Medical Necessity) [phone]   DISCHARGE SUPPORT TEAM ([facility]) [phone]   PARENT CHILD HEALTH (Maternity/NICU/Pediatrics) [phone]   [facility] [phone]   [facility] [phone]   [facility] [phone]   [facility] [phone]   [facility] [phone]   [facility] [phone]   [facility] [phone]   [facility] [phone]   [facility]  [facility] [phone]   [facility] [phone]   [facility] [phone]   [facility] [phone]

## 2025-03-25 NOTE — ASSESSMENT & PLAN NOTE
Patient was recently hospitalized from 2/20/2025-2/23/2025 due to myocarditis.  Troponin was initially 4066->3940  CMR 2/28/25 EF appears to be 40 to 45%, subepicardial non-CAD late gadolinium enhancement in the basal anterolateral wall and transmural non-CAD late gadolinium enhancement of the mid inferolateral wall. This is suggestive of myocarditis.   Troponin 3/3/2025: 165  On prednisone taper and colchicine  C-peptide 3/20: 6.6

## 2025-03-25 NOTE — ASSESSMENT & PLAN NOTE
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  Likely sequelae of inflammation

## 2025-03-25 NOTE — PROGRESS NOTES
Cardio-Oncology / Heart Failure Cardiology Clinic Note    Cherelle Dash 69 y.o. female   MRN: [id number]  Encounter: [id number]        Assessment / Plan:    # Cardio-Oncology Pertinent History  Endometrial cancer  Dx 2024  S/p surgery  Dec 2024 started chemotherapy - carboplatin, taxol, pembro  Pembro HELD for myocarditis 2-2025  Current - all chemotherapy on hold  Plan was to get XRT after chemotherapy    # Recent myocarditis  2-2025.  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  Checkpoint inhibitor was held.  Restarting in this setting would be very high risk.   Repeat troponin much lower but even recently persistent elevation noted (249).  She is still on a high dose of prednisone 40 mg daily.  Recommend slow taper in this setting as the persistent elevation in troponin suggests some smoldering myocarditis / inflammation is still active / ongoing.    # Cardiomyopathy  # HF mr EF  - chronic    ETIOLOGY:  - EF 40% in setting of acute PE (Sept 2024).  BEFORE chemotherapy.   - non-ischemic by stress test.     - repeat echo January 2025 - EF 45% with abnormal septal motion 2/2 LBBB  - possible LBBB as etiology given dyssynchronous appearance on echo.  - cardiac MRI (in setting of myocarditis) 2-2025,  EF 40-45%  - Echo 3-2025 - EF 50%    VOLUME:  - torsemide 20 mg daily  - exam -  borderline JVP, trace edema    GDMT:  - toprol 50 mg  BID  - would need to be cautious with ACE/ARB/ARNI in setting CKD and active chemo    DEVICE:  - chronic LBBB  - not indicated with current EF    # PE  Diagnosed September 2024  On Eliquis  Has IVC filter, plan to remove    # HLD    Follows with general cardiology    # PVCs  On toprol    # CKD 3  Baseline Cr 1.4 - 1.6  Cr this AM, 1.68.   will need to monitor on torsemide.    # Obesity  BMI 42 --> 44  Weight loss recommended    # Bipolar / depression  /anxiety  Follows with psychiatry     # High risk medication use  Had myocarditis in setting of checkpoint inhibitor.  Discussed above.    # Primary cardiologist  Dr. Gold.      Today's Plan Summary:  See above assessment/plan for full details of today's plan.  Briefly,     Repeat troponin much lower but even recently persistent elevation noted (249).  She is still on a high dose of prednisone 40 mg daily.  Recommend slow taper in this setting as the persistent elevation in troponin suggests some smoldering myocarditis / inflammation is still active / ongoing.                    Reason For Visit / Chief Complaint:  F/u -  history of cardiomyopathy and need for potentially heart toxic chemotherapy.    HPI:   Cherelle Dash is a 69 y.o.  female with history as noted in the problem list and further detailed in the above assessment and plan.    Initial:  Dec 2024  Referred by Dr. Page (GYN ONC) for history of cardiomyopathy and need for potentially heart toxic chemotherapy.  As above, the patient has a history of endometrial cancer.  She had surgery and has begun chemotherapy with carboplatin, Taxol, Pembro.  Ultimately she will get XRT and then further immunotherapy.  She also has a history of LV dysfunction earlier this year in the setting of an acute PE.  Further assessment of EF one month later (by nuclear stress) demonstrated recovery of LV function.  The patient follows closely with a cardiologist, Dr. Gold.  Today, the patient reports  - no CV sx's.   Retired.   Used to work for [employer].  Former smoker. Quit in her 50s.      Interval:  Last visit -->  admission in February for chest pain and shortness of breath.  Troponin was quite elevated.  Echo showed stable EF at 45%.  The clinical suspicion was for checkpoint inhibitor associated myocarditis.  Checkpoint inhibitors were held.  Feeling only slightly improved from hospitalization.    Plan last visit -->   Given CKD, reduce colchicine  to once daily  Adding magnesium given recent low magnesium value  Continue steroid taper  Recheck troponin to make sure trending down  will send a message to gynecology-oncology    Formal read of cardiac MRI - Feb 2025  Visually the EF is 40 to 45%.   There is subepicardial non-CAD LGE in the basal anterolateral wall and transmural non-CAD LGE of the mid inferolateral wall. This is suggestive of myocarditis.     Troponin - 165  (trending down, previous 3,940)  Magnesium - 1.9  (improved)    Recent ED / admission - sent in from GYN-ONC clinic for not feeling well.  Repeat echo - EF 50%.  No pericardial effusion.  A fat pad is present.   Troponin still mildly elevated.     Today -  feeling good today.   No CP.  Stable LOMBARDI.  No SOB at rest.  No edema.   No palpitations.  No syncope.             Cardiac Imaging personally reviewed:  EKG 10-1-24  Sinus  LBBB    2-26-25  Sinus  LBBB       Holter or event monitor    Echo 2020  EF 60%      EF 55%    9-6-24  Mild LVH.  EF 40%.  Mild RV dysfunction  Mild MR    Echo - 01/20/25   Mild LVH.  EF 45%.  abnormal septal motion 2/2 LBBB.  Mild MR  Mild TR    Echo 2-20-25  45%. abnormal septal motion 2/2 LBBB.  1-2+ MR    3-24-25  EF 50%.  abnormal septal motion 2/2 LBBB.  No pericardial effusion.  A fat pad is present.          JUANJO    Cardiac MRI Done in hospital 2-2025 (checkpoint inhibitor associated myocarditis)  Visually the EF is 40 to 45%.   There is subepicardial non-CAD LGE in the basal anterolateral wall and transmural non-CAD LGE of the mid inferolateral wall. This is suggestive of myocarditis.   Small anterior pericardial effusion. Extensive epicardial fat and lipomatous hypertrophy of the interatrial septum.  Cystic structures in the left kidney are partially visualized -  (on my review of chart, there was an abdominal CT about 2-3 weeks prior to this MRI that reported kidneys were unremarkable)       Stress testing Nuclear stress -   No ischemia.  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[facility]) 02/03/2025   • Drug-induced pneumonitis 01/10/2025   • Arthralgia 01/02/2025   • Drug-induced neutropenia ([facility]) 12/20/2024   • Palliative care by specialist 11/18/2024   • Anxiety about health 11/18/2024   • Goals of care, counseling/discussion 11/18/2024   • Encounter for central line care 11/13/2024   • Hospital discharge follow-up 09/17/2024   • Cardiomyopathy ([facility]) 09/09/2024   • Other pulmonary embolism without acute cor pulmonale ([facility]) 09/05/2024   • PMB (postmenopausal bleeding) 08/11/2024   • Endometrial cancer ([facility]) 08/11/2024   • Hyperphosphatemia 06/17/2024   • Premature ventricular contractions 12/14/2023   • LBBB (left bundle branch block) 12/14/2023   • Other sleep disorders 12/14/2023   • Lichen sclerosus 06/14/2022   • Mass of right hand 06/30/2021   • Secondary hyperparathyroidism of renal origin ([facility]) 04/02/2021   • Persistent proteinuria 04/02/2021   • Primary osteoarthritis of left knee 02/23/2021   • Primary osteoarthritis of right knee 02/23/2021   • Vitamin D deficiency 12/21/2020   • Stage 3 chronic kidney disease ([facility]) 08/25/2020   • Raynaud's disease without gangrene 03/10/2020   • Morbid obesity with BMI of 40.0-44.9, adult ([facility]) 12/20/2018   • Hyperlipidemia 06/07/2018   • Onychomycosis of toenail 01/09/2017   • Insomnia 07/21/2015   • Stress incontinence of urine 07/21/2015   • Patellofemoral arthritis of right knee 08/11/2014   • Depression with anxiety 04/30/2014   • Hypothyroidism 06/04/2013   • Impaired fasting glucose 06/04/2013   • Moderate mixed bipolar I disorder (HCC) 05/01/2013    • Gastroesophageal reflux disease without esophagitis 05/01/2013   • Obstructive sleep apnea 05/01/2013   • Solar degeneration 06/13/2012       Past Medical History:   Diagnosis Date   • Abnormal ECG    • Anxiety 2002   • Arthritis    • Bipolar 1 disorder ([facility])    • Cervical cancer ([facility])    • Chronic kidney disease     stage 3   • CPAP (continuous positive airway pressure) dependence    • Depression 2001   • Disease of thyroid gland    • DVT (deep venous thrombosis) ([facility])     BLLE   • Elevated blood pressure reading 06/10/2019   • GERD (gastroesophageal reflux disease)    • Obesity    • Pulmonary emboli ([facility])     B/L   • RSV (acute bronchiolitis due to respiratory syncytial virus) 12/05/2023   • Sleep apnea    • Sleep apnea, obstructive 2007   • Urinary incontinence 2019   • Uterine cancer ([facility])        Allergies   Allergen Reactions   • Raw Fruit - Food Allergy Anaphylaxis     PLUMS, PEACHES, APPLES, CHERRIES FRUIT WITH SKIN       Current Outpatient Medications   Medication Instructions   • apixaban (ELIQUIS) 5 mg, Oral, 2 times daily   • chlorhexidine (PERIDEX) 0.12 % solution USE HALF OUNCE TWICE A DAY RINSE FOR 30 SECONDS BY MOUTH THEN EXPECTORATE DO NOT SWALLOW   • Cholecalciferol (VITAMIN D) 2000 units CAPS 1 capsule, Daily   • clobetasol (TEMOVATE) 0.05 % ointment Topical, 2 times weekly   • colchicine (COLCRYS) 0.6 mg, Oral, Daily   • CRANBERRY PO Take by mouth   • famotidine (PEPCID) 20 mg, Oral, 2 times daily   • Glucosamine-Chondroit-Vit C-Mn (GLUCOSAMINE 1500 COMPLEX PO) Daily   • Ivermectin 1 % CREA    • lidocaine-prilocaine (EMLA) cream Apply to PORT 30 min prior to labs and chemo   • LORazepam (ATIVAN) 1 mg, Oral, Every 8 hours PRN   • lurasidone (LATUDA) 20 mg, Oral, Daily with breakfast   • magnesium Oxide (MAG-OX) 400 mg, Oral, Daily   • metoprolol succinate (TOPROL-XL) 50 mg, Oral, Every 12 hours   • Multiple Vitamin (MULTI-VITAMIN DAILY) TABS Daily   • Myrbetriq 50 mg, Oral, Every morning   •  polyethylene glycol (GLYCOLAX) 17 g, Oral, 2 times daily   • predniSONE 10 mg tablet Take 10 tablets PO QD x 7 days, then 9 tablets PO x 7d, then 8 tablets PO x 7d, then 6 tabs PO x 5d, then 5 tabs PO x 5 d, then 4 tabs PO x 3d, then 3 tabs PO x 3d, then 2 tabs PO x 2d, then 1 tabs PO x 3d.   • Sodium Fluoride 5000 PPM 1.1 % GEL As directed   • torsemide (DEMADEX) 20 mg, Oral, Daily       Social History     Socioeconomic History   • Marital status: /Civil Union     Spouse name: Not on file   • Number of children: Not on file   • Years of education: Not on file   • Highest education level: Not on file   Occupational History   • Not on file   Tobacco Use   • Smoking status: Former     Current packs/day: 0.00     Average packs/day: 0.3 packs/day for 30.0 years (7.5 ttl pk-yrs)     Types: Cigarettes     Start date: 1979     Quit date: 2009     Years since quittin.2     Passive exposure: Never   • Smokeless tobacco: Never   Vaping Use   • Vaping status: Never Used   Substance and Sexual Activity   • Alcohol use: Yes     Alcohol/week: 1.0 standard drink of alcohol     Types: 1 Glasses of wine per week   • Drug use: Never   • Sexual activity: Not Currently     Partners: Male     Birth control/protection: Post-menopausal   Other Topics Concern   • Not on file   Social History Narrative    Daily coffee consumption: 3 cups/day     Social Drivers of Health     Financial Resource Strain: Low Risk  (2024)    Overall Financial Resource Strain (CARDIA)    • Difficulty of Paying Living Expenses: Not hard at all   Food Insecurity: No Food Insecurity (3/24/2025)    Nursing - Inadequate Food Risk Classification    • Worried About Running Out of Food in the Last Year: Never true    • Ran Out of Food in the Last Year: Never true    • Ran Out of Food in the Last Year: Never true   Transportation Needs: No Transportation Needs (3/24/2025)    Nursing - Transportation Risk Classification    • Lack of Transportation:  Not on file    • Lack of Transportation: No   Physical Activity: Not on file   Stress: Not on file   Social Connections: Not on file   Intimate Partner Violence: Unknown (3/24/2025)    Nursing IPS    • Feels Physically and Emotionally Safe: Not on file    • Physically Hurt by Someone: Not on file    • Humiliated or Emotionally Abused by Someone: Not on file    • Physically Hurt by Someone: No    • Hurt or Threatened by Someone: No   Housing Stability: Unknown (3/24/2025)    Nursing: Inadequate Housing Risk Classification    • Has Housing: Not on file    • Worried About Losing Housing: Not on file    • Unable to Get Utilities: Not on file    • Unable to Pay for Housing in the Last Year: No    • Has Housin       Family History   Problem Relation Age of Onset   • Heart disease Mother    • Heart Valve Disease Mother         Replacement   • Diabetes Mother            • Heart failure Mother         Heart Valve replacement   • Arthritis Father    • No Known Problems Sister    • No Known Problems Daughter    • No Known Problems Maternal Grandmother    • No Known Problems Maternal Grandfather    • No Known Problems Paternal Grandmother    • No Known Problems Paternal Grandfather    • No Known Problems Son    • No Known Problems Maternal Aunt    • No Known Problems Maternal Aunt    • No Known Problems Maternal Aunt    • No Known Problems Maternal Aunt    • No Known Problems Maternal Aunt    • No Known Problems Paternal Aunt    • Alcohol abuse Son         [place], 40 year old son, has issues with alcohol and alcohol abuse       Physical Exam:  Blood pressure 116/64, pulse 97, height 5' (1.524 m), weight 103 kg (227 lb 1.6 oz), SpO2 97%, not currently breastfeeding.  Body mass index is 44.35 kg/m².  Wt Readings from Last 3 Encounters:   25 103 kg (227 lb 1.6 oz)   25 104 kg (228 lb 6.4 oz)   25 103 kg (228 lb)     Physical Exam  Vitals reviewed.   Constitutional:       General: She is not in acute  "distress.     Appearance: She is not toxic-appearing.   Neck:      Comments: No JVD  Cardiovascular:      Rate and Rhythm: Normal rate and regular rhythm.      Heart sounds: No murmur heard.     No friction rub. No gallop.   Pulmonary:      Breath sounds: Normal breath sounds. No wheezing, rhonchi or rales.   Musculoskeletal:      Comments: Mild LE edema   Neurological:      Mental Status: She is alert.         Labs & Results:  Lab Results   Component Value Date    SODIUM 141 03/25/2025    K 3.5 03/25/2025     03/25/2025    CO2 32 03/25/2025    BUN 29 (H) 03/25/2025    CREATININE 1.68 (H) 03/25/2025    GLUC 98 03/25/2025    CALCIUM 9.0 03/25/2025     No results found for: \"NTBNP\"       Time Statement:  I have spent a total time of 43 minutes in caring for this patient on the day of the visit/encounter including Diagnostic results, Prognosis, Risks and benefits of tx options, Instructions for management, Patient and family education, Importance of tx compliance, Risk factor reductions, Impressions, Counseling / Coordination of care, Documenting in the medical record, Reviewing/placing orders in the medical record (including tests, medications, and/or procedures), Obtaining or reviewing history  , and Communicating with other healthcare professionals .      Thank you for the opportunity to participate in the care of this patient.    Mert Olson MD, [facility]  Staff Cardiologist  Director of Cardio-Oncology  [facility]  "

## 2025-03-25 NOTE — OCCUPATIONAL THERAPY NOTE
Occupational Therapy Evaluation     Patient Name: Cherelle Dash  Today's Date: 3/25/2025  Problem List  Principal Problem:    Endometrial cancer (HCC)  Active Problems:    Depression with anxiety    Hyperlipidemia    Stage 3 chronic kidney disease (HCC)    Other pulmonary embolism without acute cor pulmonale (HCC)    Cardiomyopathy (HCC)    Drug-induced pneumonitis    History of myocarditis    Elevated troponin    Past Medical History  Past Medical History:   Diagnosis Date    Abnormal ECG     Anxiety 2002    Arthritis     Bipolar 1 disorder (HCC)     Cervical cancer (HCC)     Chronic kidney disease     stage 3    CPAP (continuous positive airway pressure) dependence     Depression 2001    Disease of thyroid gland     DVT (deep venous thrombosis) (HCC)     BLLE    Elevated blood pressure reading 06/10/2019    GERD (gastroesophageal reflux disease)     Obesity     Pulmonary emboli (HCC)     B/L    RSV (acute bronchiolitis due to respiratory syncytial virus) 12/05/2023    Sleep apnea     Sleep apnea, obstructive 2007    Urinary incontinence 2019    Uterine cancer (HCC)      Past Surgical History  Past Surgical History:   Procedure Laterality Date    COLONOSCOPY  2011    DENTAL SURGERY  2020    IR IVC FILTER PLACEMENT OPTIONAL/TEMPORARY  10/08/2024    IR PORT CHECK  12/26/2024    IR PORT PLACEMENT  12/03/2024    LAPAROTOMY N/A 10/11/2024    Procedure: EXPLORATORY LAPAROTOMY;  Surgeon: Rodney Downing MD;  Location: [facility];  Service: Gynecology Oncology    TX HYSTEROSCOPY BX ENDOMETRIUM&/POLYPC W/WO D&C N/A 08/23/2024    Procedure: EXAM UNDER ANESTHESIA, DILATATION AND CURETTAGE, CERVICAL BIOPSIES;  Surgeon: Evin Page MD;  Location: [facility];  Service: Gynecology Oncology    TX LAPS TOTAL HYSTERECT 250 GM/< W/RMVL TUBE/OVARY N/A 10/11/2024    Procedure: ROBOTIC ASSISTED LTH, BSO, LYMPH NODE DISSECTION, EUA;  Surgeon: Rodney Downing MD;  Location: [facility];  Service: Gynecology Oncology          03/25/25 0917   OT Last Visit   OT Visit Date 03/25/25   Note Type   Note type Evaluation   Pain Assessment   Pain Assessment Tool 0-10   Pain Score No Pain   Restrictions/Precautions   Weight Bearing Precautions Per Order No   Other Precautions Multiple lines;Fall Risk   Home Living   Type of Home Apartment  ([facility])   Home Layout One level  ([address])   Bathroom Shower/Tub Walk-in shower   Bathroom Toilet Raised   Bathroom Equipment Grab bars in shower;Built-in shower seat;Grab bars around toilet   Home Equipment   (denies)   Prior Function   Level of [place] Independent with ADLs;Independent with functional mobility;Independent with IADLS   Lives With Spouse   Receives Help From Family   IADLs Family/Friend/Other provides transportation;Independent with meal prep;Independent with medication management   Falls in the last 6 months 0   Vocational Retired   Lifestyle   Autonomy Pt reports (I) with ADLs, IADLs, and functional mobility without AD. Pt -  and retired   Reciprocal Relationships Family   Service to Others retired   ADL   Where Assessed Chair   Eating Assistance 6  Modified independent   Grooming Assistance 6  Modified Independent   UB Bathing Assistance 6  Modified Independent   LB Bathing Assistance 5  Supervision/Setup   UB Dressing Assistance 6  Modified independent   LB Dressing Assistance 5  Supervision/Setup   Toileting Assistance  5  Supervision/Setup   Functional Assistance 5  Supervision/Setup   Bed Mobility   Additional Comments Pt OOB in chair upon OT arrival   Transfers   Sit to Stand 5  Supervision   Additional items Increased time required;Verbal cues   Stand to Sit 5  Supervision   Additional items Increased time required;Verbal cues   Additional Comments without AD   Functional Mobility   Functional Mobility 5  Supervision   Additional Comments Pt requires supervision for safety to ambulate household distances with occasional hand rail use for steadying    Additional items   (hand rail)   Balance   Static Sitting Fair +   Dynamic Sitting Fair   Static Standing Fair   Dynamic Standing Fair -   Ambulatory Fair -   Activity Tolerance   Activity Tolerance Patient tolerated treatment well   Medical Staff Made Aware PT, SPT   Nurse Made Aware RN Cleared   RUE Assessment   RUE Assessment WFL   LUE Assessment   LUE Assessment WFL   Hand Function   Gross Motor Coordination Functional   Fine Motor Coordination Functional   Cognition   Overall Cognitive Status WFL   Arousal/Participation Alert;Responsive;Cooperative   Attention Within functional limits   Orientation Level Oriented X4   Memory Within functional limits   Following Commands Follows all commands and directions without difficulty   Comments Pt agreeable to therapy   Assessment   Prognosis Good   Assessment Pt is a 68 y/o female that was admitted to [facility] 3/24/2025 with endometrial cancer and pericardial effusion. Pt with active OT orders and activity orders. Pt  has a past medical history of Abnormal ECG, Anxiety, Arthritis, Bipolar 1 disorder ([facility]), Cervical cancer ([facility]), Chronic kidney disease, CPAP (continuous positive airway pressure) dependence, Depression, Disease of thyroid gland, DVT (deep venous thrombosis) ([facility]), Elevated blood pressure reading, GERD (gastroesophageal reflux disease), Obesity, Pulmonary emboli ([facility]), RSV (acute bronchiolitis due to respiratory syncytial virus), Sleep apnea, Sleep apnea, obstructive, Urinary incontinence, and Uterine cancer ([facility]). Pt lives with spouse in a 1 level apartment at [facility] with a raised toilet with grab bars and walk in shower with grab bars and shower chair. Pt reports using no AD at baseline. Prior to admission pt (I) ADLs, IADLs, and functional mobility. Pt currently MOD IND to supervision to complete ADLs, functional transfers, and functional mobility. Pt seated in bedside chair at begning of session, pt seated in bedside chair at  end of session with items within reach. The patient's raw score on the AM-PAC Daily Activity Inpatient Short Form is 21. A raw score of greater than or equal to 19 suggests the patient may benefit from discharge to home. Please refer to the recommendation of the Occupational Therapist for safe discharge planning. Pt appears to be functioning at/close to baseline, further OT services not indicated at this time. Will d/c OT services, please re-consult if OT needs arise.   Goals   Patient Goals To go home   Plan   OT Frequency Eval only   Discharge Recommendation   Rehab Resource Intensity Level, OT No post-acute rehabilitation needs   AM-PAC Daily Activity Inpatient   Lower Body Dressing 3   Bathing 3   Toileting 3   Upper Body Dressing 4   Grooming 4   Eating 4   Daily Activity Raw Score 21   Daily Activity Standardized Score (Calc for Raw Score >=11) 44.27   AM-PAC Applied Cognition Inpatient   Following a Speech/Presentation 4   Understanding Ordinary Conversation 4   Taking Medications 4   Remembering Where Things Are Placed or Put Away 4   Remembering List of 4-5 Errands 4   Taking Care of Complicated Tasks 4   Applied Cognition Raw Score 24   Applied Cognition Standardized Score 62.21   End of Consult   Education Provided Yes   Patient Position at End of Consult Bedside chair;All needs within reach   Nurse Communication Nurse aware of consult     GABRIELLE Duncan, OTR/L

## 2025-03-25 NOTE — UTILIZATION REVIEW
Initial Clinical Review    Admission: Date/Time/Statement:   Admission Orders (From admission, onward)       Ordered        03/24/25 1611  Inpatient Admission  Once                          Orders Placed This Encounter   Procedures    Inpatient Admission     Standing Status:   Standing     Number of Occurrences:   1     Level of Care:   Med Surg [16]     Estimated length of stay:   More than 2 Midnights     Certification:   I certify that inpatient services are medically necessary for this patient for a duration of greater than two midnights. See H&P and MD Progress Notes for additional information about the patient's course of treatment.     ED Arrival Information       Expected   3/24/2025     Arrival   3/24/2025 10:41    Acuity   Urgent              Means of arrival   Walk-In    Escorted by   Family Member    Service   GYN Oncology    Admission type   Emergency              Arrival complaint   Weakness/coughing             Chief Complaint   Patient presents with    Cough     Since having chemotherapy has been coughing, experiencing some weakness and shakiness.        Initial Presentation: 69 y.o. female to ED referred from her Gyn/Onc office d/t worsening SOB, cough, fatigue, and dizziness  PMHx: stage IIIC1 grade 1 endometrial cancer s/p surgical resection, currently receiving adjuvant chemotherapy with carboplatin/taxol, PE on Eliquis, Dilate cardiomyopathy, CKD III, depression/anxiety Pembro d/c'd after cycle 4 d/t progression of symptoms.  In ED VSS, POCUS showed decent sized pericardial effusion, Trops 165->212. Cardiology consulted.  Admitted Inpatient to MS unit with Pericardial Effusion, Acute Myocarditis, Drug-Induced Pneumonitis.  Telemetry. Stat Echo. Continue steroid taper home toprol, eliquis and other home meds.    Date: 3/25   Day 2: pt with no complaints today. VSS. Echo not concerning for pericardial effusion henrique cardiology consult currently pending. Continue telemetry, po meds. Supportive  care.    Cardiology consult --  Patient looks euvolemic, and denies chest pain.  Continue metoprolol, colchicine and prednisone taper, and torsemide  2.  Elevated ASCVD, recommend atorvastatin 20 mg p.o. daily  3.  Patient has cardiology follow-up tomorrow.  Patient can be discharged from cardiac standpoint.      ED Treatment-Medication Administration from 03/24/2025 1041 to 03/24/2025 1732         Date/Time Order Dose Route Action     03/24/2025 1709 metoprolol succinate (TOPROL-XL) 24 hr tablet 50 mg 50 mg Oral Given       Scheduled Medications:  apixaban, 5 mg, Oral, BID  colchicine, 0.6 mg, Oral, Daily  famotidine, 20 mg, Oral, BID  lurasidone, 20 mg, Oral, Daily With Breakfast  metoprolol succinate, 50 mg, Oral, Q12H  [START ON 3/26/2025] predniSONE, 40 mg, Oral, Daily   Followed by  [START ON 3/29/2025] predniSONE, 30 mg, Oral, Daily   Followed by  [START ON 4/1/2025] predniSONE, 20 mg, Oral, Daily   Followed by  [START ON 4/3/2025] predniSONE, 10 mg, Oral, Daily  torsemide, 20 mg, Oral, Daily    PRN Meds:  LORazepam, 1 mg, Oral, Q8H PRN  ondansetron, 4 mg, Intravenous, Q6H PRN      ED Triage Vitals   Temperature Pulse Respirations Blood Pressure SpO2 Pain Score   03/24/25 1055 03/24/25 1052 03/24/25 1052 03/24/25 1052 03/24/25 1052 03/24/25 1052   (!) 96.5 °F (35.8 °C) 87 18 128/87 96 % No Pain     Weight (last 2 days)       Date/Time Weight    03/24/25 1813 104 (228.4)    03/24/25 1808 104 (228.4)    03/24/25 1608 103 (228)            Vital Signs (last 3 days)       Date/Time Temp Pulse Resp BP MAP (mmHg) SpO2 O2 Device Patient Position - Orthostatic VS Pain    03/25/25 08:13:45 97.5 °F (36.4 °C) -- -- 109/67 81 -- -- Lying --    03/25/25 0611 -- 87 -- 120/69 -- -- -- -- --    03/25/25 02:12:28 97.9 °F (36.6 °C) 78 -- 118/70 86 97 % None (Room air) Sitting --    03/24/25 22:24:53 97.5 °F (36.4 °C) 88 -- 122/70 87 95 % None (Room air) Sitting --    03/24/25 20:32:21 97.1 °F (36.2 °C) 78 -- 109/69 82 96 % None  (Room air) Sitting --    03/24/25 2000 -- -- -- -- -- -- None (Room air) -- No Pain    03/24/25 1813 97.7 °F (36.5 °C) 101 22 112/60 -- -- None (Room air) Sitting No Pain    03/24/25 1800 97.7 °F (36.5 °C) 99 -- -- -- 95 % None (Room air) -- No Pain    03/24/25 1708 97.7 °F (36.5 °C) 96 18 133/64 92 98 % None (Room air) Sitting No Pain    03/24/25 1615 -- 92 16 -- -- 98 % None (Room air) -- --    03/24/25 1608 -- 84 -- 100/56 -- -- -- -- --    03/24/25 1408 -- 84 22 100/56 -- 98 % None (Room air) -- --    03/24/25 1345 -- 92 20 -- -- -- -- -- --    03/24/25 1055 96.5 °F (35.8 °C) -- -- -- -- -- -- -- --    03/24/25 1052 -- 87 18 128/87 -- 96 % None (Room air) Sitting No Pain            Pertinent Labs/Diagnostic Test Results:   Radiology:  CT chest without contrast   Final Interpretation by Tonio Gil MD (03/24 1330)      No acute thoracic abnormality.      Nearly resolved groundglass opacities in bilateral lower lobes, likely sequela of infection/inflammation. Consider follow-up CT chest without contrast in 3 months to ensure resolution.      Subsegmental atelectasis in right middle and bilateral lower lobes.      Unchanged 0.5 cm right lower lobe pulmonary nodule.      Additional chronic/incidental findings as detailed above.      The study was marked in EPIC for immediate notification.               Workstation performed: [id number]           Cardiology:  Echo follow up/limited w/ contrast if indicated   Final Result by Mert Solares MD (03/24 6789)        Left Ventricle: Left ventricular cavity size is normal. Wall thickness    is normal. The left ventricular ejection fraction is 50%. Systolic    function is low normal. Although no diagnostic regional wall motion    abnormality was identified, this possibility cannot be completely excluded    on the basis of this study.     IVS: There is abnormal septal motion.     Pericardium: There is no pericardial effusion. A fat pad is present.         ECG  12 lead   Final Result by Toni Shi MD (03/24 1310)   Normal sinus rhythm   Left axis deviation   Non-specific intra-ventricular conduction block   Inferior infarct , age undetermined   Cannot rule out Anterior infarct , age undetermined   Abnormal ECG      Confirmed by Toni Shi ([id number]) on 3/24/2025 1:10:06 PM            Results from last 7 days   Lab Units 03/25/25  0809 03/24/25  1223 03/19/25  1221   WBC Thousand/uL 9.70 15.39* 13.07*   HEMOGLOBIN g/dL 10.6* 11.2* 11.0*   HEMATOCRIT % 32.7* 34.0* 33.5*   PLATELETS Thousands/uL 99* 107* 92*   BANDS PCT %  --  2 21*       Results from last 7 days   Lab Units 03/25/25  0616 03/24/25  1223 03/19/25  1221   SODIUM mmol/L 141 138 138   POTASSIUM mmol/L 3.5 4.1 3.5   CHLORIDE mmol/L 101 100 98   CO2 mmol/L 32 27 28   ANION GAP mmol/L 8 11 12   BUN mg/dL 29* 32* 44*   CREATININE mg/dL 1.68* 1.58* 1.81*   EGFR ml/min/1.73sq m 30 32 27   CALCIUM mg/dL 9.0 8.9 9.2   MAGNESIUM mg/dL 2.3  --  2.0     Results from last 7 days   Lab Units 03/24/25  1223 03/19/25  1221   AST U/L 13 15   ALT U/L 23 24   ALK PHOS U/L 130* 133*   TOTAL PROTEIN g/dL 6.2* 6.3*   ALBUMIN g/dL 3.6 3.7   TOTAL BILIRUBIN mg/dL 0.42 0.37       Results from last 7 days   Lab Units 03/25/25  0616 03/24/25  1223 03/19/25  1221   GLUCOSE RANDOM mg/dL 98 298* 321*       Results from last 7 days   Lab Units 03/24/25  1632 03/24/25  1408 03/24/25  1223   HS TNI 0HR ng/L  --   --  165*   HS TNI 2HR ng/L  --  212*  --    HSTNI D2 ng/L  --  47*  --    HS TNI 4HR ng/L 249*  --   --    HSTNI D4 ng/L 84*  --   --        Results from last 7 days   Lab Units 03/24/25  1223   TSH 3RD GENERATON uIU/mL 0.929     Results from last 7 days   Lab Units 03/25/25  0616   BNP pg/mL 155*             Past Medical History:   Diagnosis Date    Abnormal ECG     Anxiety 2002    Arthritis     Bipolar 1 disorder (HCC)     Cervical cancer (HCC)     Chronic kidney disease     stage 3    CPAP (continuous positive airway  pressure) dependence     Depression 2001    Disease of thyroid gland     DVT (deep venous thrombosis) (HCC)     BLLE    Elevated blood pressure reading 06/10/2019    GERD (gastroesophageal reflux disease)     Obesity     Pulmonary emboli (HCC)     B/L    RSV (acute bronchiolitis due to respiratory syncytial virus) 12/05/2023    Sleep apnea     Sleep apnea, obstructive 2007    Urinary incontinence 2019    Uterine cancer (HCC)      Present on Admission:   Depression with anxiety   Stage 3 chronic kidney disease (HCC)   Endometrial cancer (HCC)   Drug-induced pneumonitis   Other pulmonary embolism without acute cor pulmonale (HCC)   Cardiomyopathy (HCC)   Hyperlipidemia      Admitting Diagnosis: Dilated cardiomyopathy (HCC) [I42.0]  Pericardial effusion [I31.39]  Age/Sex: 69 y.o. female    Network Utilization Review Department  ATTENTION: Please call with any questions or concerns to [phone] and carefully listen to the prompts so that you are directed to the right person. All voicemails are confidential.   For Discharge needs, contact Care Management DC Support Team at [phone] opt. [phone]  Send all requests for admission clinical reviews, approved or denied determinations and any other requests to dedicated fax number below belonging to the [place] where the patient is receiving treatment. List of dedicated fax numbers for the Facilities:  FACILITY NAME UR FAX NUMBER   ADMISSION DENIALS (Administrative/Medical Necessity) [phone]   DISCHARGE SUPPORT TEAM (NETWORK) [phone]   PARENT CHILD HEALTH (Maternity/NICU/Pediatrics) [phone]   [facility] [phone]   [facility] [phone]   [facility] [phone]   [facility] [phone]   [facility] [phone]   [facility] [phone]   [facility] [phone]   NASH  Count includes the [facility] [phone]   [facility] [phone]   [facility] [phone]   [facility] [phone]   [facility] [phone]

## 2025-03-25 NOTE — ASSESSMENT & PLAN NOTE
-EF 40% in setting of acute PE (Sept 2024).  BEFORE chemotherapy.   - non-ischemic by stress test.     - repeat echo January 2025 - EF 45% with abnormal septal motion 2/2 LBBB  - 3/23/2025 TTE LVEF 50%, normal LV size      today down from 330 on 2/20/2025    Neurohormonal Blockade:  --Beta-Blocker: metoprolol succincate 50 mg po bid  --ACEi, ARB or ARNi: hold due to CKD  --Aldosterone Receptor Blocker:   --SGLT2 Inhibitor:   --Home Diuretic:  torsemide 20 mg po qd  --Inpatient diuretic:      Sudden Cardiac Death Risk Reduction:  --ICD: EF >35%

## 2025-03-25 NOTE — ASSESSMENT & PLAN NOTE
Lab Units 03/20/25  0737 06/06/24  0652 09/08/23  0634   TRIGLYCERIDES mg/dL 305* 115 144   HDL mg/dL 57 54 50   LDL CALC mg/dL 101* 123* 111*     Not on statin   The 10-year ASCVD risk score (Jada NOVAK, et al., 2019) is: 8.6%    Values used to calculate the score:      Age: 69 years      Sex: Female      Is Non- : No      Diabetic: No      Tobacco smoker: No      Systolic Blood Pressure: 120 mmHg      Is BP treated: No      HDL Cholesterol: 57 mg/dL      Total Cholesterol: 219 mg/dL

## 2025-03-25 NOTE — PROGRESS NOTES
Progress Note - GYN Oncology   Name: Cherelle Dash 69 y.o. female I MRN: [id number]  [facility] Date of Admission: 3/24/2025   Date of Service: 3/25/2025 I Hospital Day: 1     Assessment & Plan  Depression with anxiety  Continue home meds  Stage 3 chronic kidney disease (HCC)  Lab Results   Component Value Date    EGFR 32 03/24/2025    EGFR 27 03/19/2025    EGFR 27 03/07/2025    CREATININE 1.58 (H) 03/24/2025    CREATININE 1.81 (H) 03/19/2025    CREATININE 1.85 (H) 03/07/2025   Continue to monitor renal function  Endometrial cancer (HCC)  70yo with stage IIIC1 grade 1 endometrial cancer s/p surgical resection, currently receiving adjuvant chemotherapy with carboplatin/taxol. Pembro discontinued after cycle 4 due to pneumonitis and myocarditis    Other pulmonary embolism without acute cor pulmonale (HCC)  Continue home eliquis  Cardiomyopathy (HCC)  Continue home toprol  Drug-induced pneumonitis  History of grade 2 immunotherapy-induced pneumonitis s/p high dose steroid taper, currently on second steroid taper  Continue steroid taper  History of myocarditis  Concern for IO related cardiomyopathy, currently on her second round of steroid taper  Continue steroid taper  Trops 165 --> 212  Seen by cardiology outpatient and started on colchicine x 3 months, continue while inpatient  Follow up STAT echo- no evidence of effusion   Cardiology consulted, appreciate recs    Subjective:    Cherelle Dash reports feeling well this morning.  We discussed that her formal echocardiogram was not concerning for pericardial effusion but we will wait for cardiology to see her this morning and we are hopeful for discharge home today.  She is very happy to hear this.    Objective:  /69   Pulse 87   Temp 97.9 °F (36.6 °C) (Oral)   Resp 22   Ht 5' (1.524 m)   Wt 104 kg (228 lb 6.4 oz)   SpO2 97%   BMI 44.61 kg/m²     I/O last 3 completed shifts:  In: 300 [P.O.:300]  Out: -   I/O this shift:  In: 200  [P.O.:200]  Out: 500 [Urine:500]    Lab Results   Component Value Date    WBC 15.39 (H) 03/24/2025    HGB 11.2 (L) 03/24/2025    HCT 34.0 (L) 03/24/2025     (H) 03/24/2025     (L) 03/24/2025       Lab Results   Component Value Date    GLUCOSE 124 (H) 11/17/2017    CALCIUM 8.9 03/24/2025     (H) 11/17/2017    K 4.1 03/24/2025    CO2 27 03/24/2025     03/24/2025    BUN 32 (H) 03/24/2025    CREATININE 1.58 (H) 03/24/2025           Physical Exam  Constitutional:       General: She is not in acute distress.     Appearance: Normal appearance.   HENT:      Head: Normocephalic and atraumatic.   Cardiovascular:      Rate and Rhythm: Normal rate.      Pulses: Normal pulses.   Pulmonary:      Effort: Pulmonary effort is normal. No respiratory distress.      Breath sounds: Normal breath sounds.   Abdominal:      General: Abdomen is flat.      Palpations: Abdomen is soft.   Skin:     General: Skin is warm and dry.   Neurological:      General: No focal deficit present.      Mental Status: She is alert.   Psychiatric:         Mood and Affect: Mood normal.         Behavior: Behavior normal.           Latesha Mayers MD  3/25/2025  6:27 AM

## 2025-03-25 NOTE — PHYSICAL THERAPY NOTE
PHYSICAL THERAPY NOTE          Patient Name: Cherelle Dash  Today's Date: 3/25/2025        03/25/25 0918   PT Last Visit   PT Visit Date 03/25/25   Note Type   Note type Evaluation   Pain Assessment   Pain Assessment Tool 0-10   Pain Score No Pain   Restrictions/Precautions   Weight Bearing Precautions Per Order No   Other Precautions Fall Risk;Multiple lines   Home Living   Type of Home Apartment   Home Layout One level  (0 KELIN)   Bathroom Shower/Tub Walk-in shower   Bathroom Toilet Raised   Bathroom Equipment Grab bars in shower;Built-in shower seat;Grab bars around toilet   Bathroom Accessibility Accessible   Home Equipment Other (Comment)  (None)   Additional Comments Pt and spouse live in [facility].   Prior Function   Level of [place] Independent with ADLs;Independent with functional mobility;Independent with IADLS   Lives With Spouse   Receives Help From Family   IADLs Family/Friend/Other provides transportation;Independent with medication management;Independent with meal prep   Falls in the last 6 months 0   Vocational Retired   General   Family/Caregiver Present Yes  ()   Cognition   Overall Cognitive Status WFL   Arousal/Participation Alert   Orientation Level Oriented X4   Memory Within functional limits   Following Commands Follows all commands and directions without difficulty   Subjective   Subjective pt is pleasant and cooperative throughout therapy session. pt recieved seated in bedside recliner.   RLE Assessment   RLE Assessment WFL  (Grossly 4/5)   LLE Assessment   LLE Assessment WFL  (Grossly 4/5)   Bed Mobility   Supine to Sit Unable to assess   Sit to Supine Unable to assess   Additional Comments Pt recieved seated in bedside recliner.   Transfers   Sit to Stand 5  Supervision   Additional items Assist x 1;Verbal cues   Stand to Sit 5  Supervision   Additional items Assist x  1;Verbal cues   Additional Comments No AD used   Ambulation/Elevation   Gait pattern Improper Weight shift;Narrow [place];Forward Flexion;Decreased foot clearance;Shuffling;Foward flexed;Short stride   Gait Assistance 5  Supervision   Additional items Verbal cues   Assistive Device None   Distance 25' + 25'  (no rest breaks required)   Ambulation/Elevation Additional Comments Pt did have one hand on wall railings to assist maintaining balance- pt does this at baseline at home and within the community.   Balance   Static Sitting Fair +   Dynamic Sitting Fair   Static Standing Fair   Dynamic Standing Fair -   Ambulatory Fair -   Endurance Deficit   Endurance Deficit Yes   Endurance Deficit Description generalized weakness & deconditioning   Activity Tolerance   Activity Tolerance Patient limited by fatigue;Patient tolerated treatment well   Medical Staff Made Aware PT OLGA Hodges co-eval due to medical complexity   Nurse Made Aware RN cleared & updated   Assessment   Prognosis Good   Problem List Decreased strength;Decreased range of motion;Decreased endurance;Impaired balance;Decreased mobility   Assessment Patient is a 69 year old female, who presents with an initial diagnosis of pericardial effusion. Patient agreeable and cooperative to therapy session. Session consisted of assessing functional mobility and ambulation. Patient reports 0/10 pain, which did not change by the end of session. Patient demonstrates limited endurance, decreased LE strength, and limited ambulation. She required supervision assist for both sit to stand transfers and gait with unilateral UE support from a wall railing- which pt reports is her baseline. Pt does report some right knee pain, which has been ongoing, as well as right heel pain. Therefore, pt will benefit from skilled therapy in order to improve chronic pain modulation as well as increase endurance. Based on these findings and the pt's AM-PAC score of 19, we recommend level  III to allow for optimal recovery and return to baseline function ADLs. As long as pt is admitted, therapy will follow for 2-3 days/week to address the aforementioned deficits to allow for optimal re-integration into household and community ADLs and improved quality of life.   Barriers to Discharge None   Goals   Patient Goals To go home   STG Expiration Date 04/08/25   Short Term Goal #1 STG 1 Patient will demonstrate ability to ambulate 150 feet with appropriate AD Modified Independent to ensure optimal safety ambulating at home and in the community. STG 2 Patient will demonstrate ability to navigate 5 stairs with appropriate UE support and Modified Independent assist to ensure optimal safety navigating the community. STG 3 Patient will perform all bed mobility with [place] to demonstrate optimal independence with ADLs at home. STG 4 Patient will perform all functional transfers with [place]  to demonstrate optimal independence with ADLs at home. STG 5 Patient will statically stand with least restrictive UE support with Modified [place] for 5 minutes to demonstrate ability to perform IADLs at home with optimal independence.   PT Treatment Day 0   Plan   Treatment/Interventions ADL retraining;Functional transfer training;LE strengthening/ROM;Elevations;Therapeutic exercise;Endurance training;Bed mobility;Gait training;Spoke to nursing;OT   PT Frequency 2-3x/wk   Discharge Recommendation   Rehab Resource Intensity Level, PT III (Minimum Resource Intensity)   AM-PAC Basic Mobility Inpatient   Turning in Flat Bed Without Bedrails 4   Lying on Back to Sitting on Edge of Flat Bed Without Bedrails 4   Moving Bed to Chair 3   Standing Up From Chair Using Arms 3   Walk in Room 3   Climb 3-5 Stairs With Railing 2   Basic Mobility Inpatient Raw Score 19   Basic Mobility Standardized Score 42.48   [facility] Highest Level Of Mobility   -HLM Goal 6: Walk 10 steps or more   -HLM Achieved 7: Walk 25 feet or  more   End of Consult   Patient Position at End of Consult Bedside chair;All needs within reach     Levi Bermudez, SPT

## 2025-03-25 NOTE — ASSESSMENT & PLAN NOTE
Dx 2024  stage IIIC1 grade 1 endometrial cance S/p surgery  Dec 2024 started chemotherapy - carboplatin, taxol, pembro  Pembro now HELD for myocarditis 2-2025   currently receiving adjuvant chemotherapy with carboplatin/taxol. Pembro discontinued after cycle 4 due to pneumonitis and myocarditis   Plan is to get XRT after chemotherapy

## 2025-03-25 NOTE — PLAN OF CARE
Problem: Prexisting or High Potential for Compromised Skin Integrity  Goal: Skin integrity is maintained or improved  Description: INTERVENTIONS:- Identify patients at risk for skin breakdown- Assess and monitor skin integrity- Assess and monitor nutrition and hydration status- Monitor labs - Assess for incontinence - Turn and reposition patient- Assist with mobility/ambulation- Relieve pressure over bony prominences- Avoid friction and shearing- Provide appropriate hygiene as needed including keeping skin clean and dry- Evaluate need for skin moisturizer/barrier cream- Collaborate with interdisciplinary team - Patient/family teaching- Consider wound care consult   Outcome: Progressing     Problem: INFECTION - ADULT  Goal: Absence or prevention of progression during hospitalization  Description: INTERVENTIONS:- Assess and monitor for signs and symptoms of infection- Monitor lab/diagnostic results- Monitor all insertion sites, i.e. indwelling lines, tubes, and drains- Monitor endotracheal if appropriate and nasal secretions for changes in amount and color- [place] appropriate cooling/warming therapies per order- Administer medications as ordered- Instruct and encourage patient and family to use good hand hygiene technique- Identify and instruct in appropriate isolation precautions for identified infection/condition  Outcome: Progressing  Goal: Absence of fever/infection during neutropenic period  Description: INTERVENTIONS:- Monitor WBC  Outcome: Progressing     Problem: SAFETY ADULT  Goal: Maintain or return to baseline ADL function  Description: INTERVENTIONS:-  Assess patient's ability to carry out ADLs; assess patient's baseline for ADL function and identify physical deficits which impact ability to perform ADLs (bathing, care of mouth/teeth, toileting, grooming, dressing, etc.)- Assess/evaluate cause of self-care deficits - Assess range of motion- Assess patient's mobility; develop plan if impaired- Assess  patient's need for assistive devices and provide as appropriate- Encourage maximum independence but intervene and supervise when necessary- Involve family in performance of ADLs- Assess for home care needs following discharge - Consider OT consult to assist with ADL evaluation and planning for discharge- Provide patient education as appropriate  Outcome: Progressing     Problem: DISCHARGE PLANNING  Goal: Discharge to home or other facility with appropriate resources  Description: INTERVENTIONS:- Identify barriers to discharge w/patient and caregiver- Arrange for needed discharge resources and transportation as appropriate- Identify discharge learning needs (meds, wound care, etc.)- Arrange for interpretive services to assist at discharge as needed- Refer to Case Management Department for coordinating discharge planning if the patient needs post-hospital services based on physician/advanced practitioner order or complex needs related to functional status, cognitive ability, or social support system  Outcome: Progressing     Problem: Knowledge Deficit  Goal: Patient/family/caregiver demonstrates understanding of disease process, treatment plan, medications, and discharge instructions  Description: Complete learning assessment and assess knowledge base.Interventions:- Provide teaching at level of understanding- Provide teaching via preferred learning methods  Outcome: Progressing     Problem: CARDIOVASCULAR - ADULT  Goal: Maintains optimal cardiac output and hemodynamic stability  Description: INTERVENTIONS:- Monitor I/O, vital signs and rhythm- Monitor for S/S and trends of decreased cardiac output- Administer and titrate ordered vasoactive medications to optimize hemodynamic stability- Assess quality of pulses, skin color and temperature- Assess for signs of decreased coronary artery perfusion- Instruct patient to report change in severity of symptoms  Outcome: Progressing  Goal: Absence of cardiac dysrhythmias or at  baseline rhythm  Description: INTERVENTIONS:- Continuous cardiac monitoring, vital signs, obtain 12 lead EKG if ordered- Administer antiarrhythmic and heart rate control medications as ordered- Monitor electrolytes and administer replacement therapy as ordered  Outcome: Progressing     Problem: RESPIRATORY - ADULT  Goal: Achieves optimal ventilation and oxygenation  Description: INTERVENTIONS:- Assess for changes in respiratory status- Assess for changes in mentation and behavior- Position to facilitate oxygenation and minimize respiratory effort- Oxygen administered by appropriate delivery if ordered- Initiate smoking cessation education as indicated- Encourage broncho-pulmonary hygiene including cough, deep breathe, Incentive Spirometry- Assess the need for suctioning and aspirate as needed- Assess and instruct to report SOB or any respiratory difficulty- Respiratory Therapy support as indicated  Outcome: Progressing     Problem: GASTROINTESTINAL - ADULT  Goal: Minimal or absence of nausea and/or vomiting  Description: INTERVENTIONS:- Administer IV fluids if ordered to ensure adequate hydration- Maintain NPO status until nausea and vomiting are resolved- Nasogastric tube if ordered- Administer ordered antiemetic medications as needed- Provide nonpharmacologic comfort measures as appropriate- Advance diet as tolerated, if ordered- Consider nutrition services referral to assist patient with adequate nutrition and appropriate food choices  Outcome: Progressing  Goal: Maintains or returns to baseline bowel function  Description: INTERVENTIONS:- Assess bowel function- Encourage oral fluids to ensure adequate hydration- Administer IV fluids if ordered to ensure adequate hydration- Administer ordered medications as needed- Encourage mobilization and activity- Consider nutritional services referral to assist patient with adequate nutrition and appropriate food choices  Outcome: Progressing  Goal: Maintains adequate  nutritional intake  Description: INTERVENTIONS:- Monitor percentage of each meal consumed- Identify factors contributing to decreased intake, treat as appropriate- Assist with meals as needed- Monitor I&O, weight, and lab values if indicated- Obtain nutrition services referral as needed  Outcome: Progressing  Goal: Oral mucous membranes remain intact  Description: INTERVENTIONS- Assess oral mucosa and hygiene practices- Implement preventative oral hygiene regimen- Implement oral medicated treatments as ordered- Initiate Nutrition services referral as needed  Outcome: Progressing     Problem: GENITOURINARY - ADULT  Goal: Maintains or returns to baseline urinary function  Description: INTERVENTIONS:- Assess urinary function- Encourage oral fluids to ensure adequate hydration if ordered- Administer IV fluids as ordered to ensure adequate hydration- Administer ordered medications as needed- Offer frequent toileting- Follow urinary retention protocol if ordered  Outcome: Progressing  Goal: Absence of urinary retention  Description: INTERVENTIONS:- Assess patient’s ability to void and empty bladder- Monitor I/O- Bladder scan as needed- Discuss with physician/AP medications to alleviate retention as needed- Discuss catheterization for long term situations as appropriate  Outcome: Progressing     Problem: METABOLIC, FLUID AND ELECTROLYTES - ADULT  Goal: Electrolytes maintained within normal limits  Description: INTERVENTIONS:- Monitor labs and assess patient for signs and symptoms of electrolyte imbalances- Administer electrolyte replacement as ordered- Monitor response to electrolyte replacements, including repeat lab results as appropriate- Instruct patient on fluid and nutrition as appropriate  Outcome: Progressing  Goal: Fluid balance maintained  Description: INTERVENTIONS:- Monitor labs - Monitor I/O and WT- Instruct patient on fluid and nutrition as appropriate- Assess for signs & symptoms of volume excess or  deficit  Outcome: Progressing  Goal: Glucose maintained within target range  Description: INTERVENTIONS:- Monitor Blood Glucose as ordered- Assess for signs and symptoms of hyperglycemia and hypoglycemia- Administer ordered medications to maintain glucose within target ra Assess nutritional intake and initiate nutrition service referral as needed  Outcome: Progressing     Problem: HEMATOLOGIC - ADULT  Goal: Maintains hematologic stability  Description: INTERVENTIONS- Assess for signs and symptoms of bleeding or hemorrhage- Monitor labs- Administer supportive blood products/factors as ordered and appropriate  Outcome: Progressing

## 2025-03-26 ENCOUNTER — HOSPITAL ENCOUNTER (OUTPATIENT)
Dept: INFUSION CENTER | Facility: CLINIC | Age: 70
End: 2025-03-26

## 2025-03-26 ENCOUNTER — TELEPHONE (OUTPATIENT)
Age: 70
End: 2025-03-26

## 2025-03-26 ENCOUNTER — OFFICE VISIT (OUTPATIENT)
Age: 70
End: 2025-03-26
Payer: COMMERCIAL

## 2025-03-26 VITALS
HEART RATE: 97 BPM | BODY MASS INDEX: 44.59 KG/M2 | HEIGHT: 60 IN | OXYGEN SATURATION: 97 % | WEIGHT: 227.1 LBS | SYSTOLIC BLOOD PRESSURE: 116 MMHG | DIASTOLIC BLOOD PRESSURE: 64 MMHG

## 2025-03-26 DIAGNOSIS — C54.1 ENDOMETRIAL CANCER (HCC): Primary | ICD-10-CM

## 2025-03-26 DIAGNOSIS — N18.32 STAGE 3B CHRONIC KIDNEY DISEASE (HCC): ICD-10-CM

## 2025-03-26 DIAGNOSIS — I42.7 CARDIOTOXICITY (HCC): ICD-10-CM

## 2025-03-26 DIAGNOSIS — I49.3 PVC (PREMATURE VENTRICULAR CONTRACTION): ICD-10-CM

## 2025-03-26 DIAGNOSIS — Z92.21 STATUS POST ADMINISTRATION OF CARDIOTOXIC CHEMOTHERAPY: ICD-10-CM

## 2025-03-26 DIAGNOSIS — I44.7 LBBB (LEFT BUNDLE BRANCH BLOCK): ICD-10-CM

## 2025-03-26 DIAGNOSIS — I40.9 ACUTE MYOCARDITIS, UNSPECIFIED MYOCARDITIS TYPE: Primary | ICD-10-CM

## 2025-03-26 DIAGNOSIS — I50.22 HEART FAILURE WITH MILDLY REDUCED EJECTION FRACTION (HCC): ICD-10-CM

## 2025-03-26 DIAGNOSIS — C54.1 ENDOMETRIAL CANCER (HCC): ICD-10-CM

## 2025-03-26 DIAGNOSIS — E83.42 HYPOMAGNESEMIA: ICD-10-CM

## 2025-03-26 DIAGNOSIS — R79.89 ELEVATED TROPONIN: ICD-10-CM

## 2025-03-26 PROCEDURE — G2211 COMPLEX E/M VISIT ADD ON: HCPCS | Performed by: INTERNAL MEDICINE

## 2025-03-26 PROCEDURE — 99215 OFFICE O/P EST HI 40 MIN: CPT | Performed by: INTERNAL MEDICINE

## 2025-03-26 NOTE — Clinical Note
Sharad Cleary,  I saw this patient in cardio-oncology clinic today.  Wanted to mention two things.  1.  Just an FYI / heads up.  She was perseverating about her last chemotherapy being canceled.  She said this makes her very nervous.  2.  Her troponin has trended down significantly but then for the past few weeks is persistently mildly elevated.  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  Just wanted to mention this because it would be important not to taper the steroids down too quickly.  It looks like she is still on a pretty good dose.  Tarik

## 2025-03-26 NOTE — TELEPHONE ENCOUNTER
Call received from patient. She is questioning why her chemo treatment that was scheduled for this Friday was canceled. She said she is feeling better and does want her treatment. She is requesting a call from Sharon Banks PA-C.

## 2025-03-26 NOTE — TELEPHONE ENCOUNTER
Return call placed. Discussed discontinuing final cycle of treatment given recent hospitalization and decline in QOL. Will plan for CT abd/pelvis and f/u with Dr. Page as planned on 4/16/25.     Gyn-onc team, please schedule CT scan. Thanks!

## 2025-03-27 DIAGNOSIS — C54.1 ENDOMETRIAL CANCER (HCC): ICD-10-CM

## 2025-03-27 RX ORDER — LORAZEPAM 1 MG/1
1 TABLET ORAL EVERY 8 HOURS PRN
Qty: 20 TABLET | Refills: 0 | Status: SHIPPED | OUTPATIENT
Start: 2025-03-27

## 2025-03-27 NOTE — TELEPHONE ENCOUNTER
Reason for call:   [x] Refill   [] Prior Auth  [] Other:     Office:   [] PCP/Provider -   [x] Specialty/Provider - [facility]  Authorized By: Sharon Banks PA-C    Medication: LORazepam (ATIVAN) 1 mg tablet       Pharmacy: [facility]/pharmacy #[id number] - [place], [place] - [address]     [facility]   Does the patient have enough for 3 days?   [] Yes   [x] No - Send as HP to POD    [facility] Pharm[facility]   Does the patient have enough for 10 days?   [] Yes   [] No - Send as HP to POD

## 2025-03-27 NOTE — TELEPHONE ENCOUNTER
Refill must be reviewed and completed by the office or provider. The refill is unable to be approved or denied by the medication management team.      Patient Id Prescription # Filled Written Drug Label Qty Days Strength MME** Prescriber Pharmacy Payment REFILL #/Auth State Detail   1 [id number] 03/06/2025 03/06/2025 LORazepam (Tablet) 20.0 7 1 MG NA [facility] [place] 0 / 0 [place]    1 [id number] 02/03/2025 02/03/2025 LORazepam (Tablet) 20.0 7 1 MG NA [facility] [place] 0 / 0 [place]    1 [id number] 11/15/2024 11/13/2024 LORazepam (Tablet) 20.0 7 1 MG NA [facility] Medicare 0 / 0 [place]

## 2025-03-28 ENCOUNTER — HOSPITAL ENCOUNTER (OUTPATIENT)
Dept: INFUSION CENTER | Facility: CLINIC | Age: 70
End: 2025-03-28

## 2025-03-28 ENCOUNTER — NURSE TRIAGE (OUTPATIENT)
Age: 70
End: 2025-03-28

## 2025-03-28 ENCOUNTER — TELEPHONE (OUTPATIENT)
Age: 70
End: 2025-03-28

## 2025-03-28 DIAGNOSIS — F31.9 BIPOLAR 1 DISORDER (HCC): ICD-10-CM

## 2025-03-28 NOTE — TELEPHONE ENCOUNTER
"Reason for Disposition   Taking Coumadin (warfarin) or other strong blood thinner, or known bleeding disorder (e.g., thrombocytopenia)    Answer Assessment - Initial Assessment Questions  1. AMOUNT OF BLEEDING: \"How bad is the bleeding?\" \"How much blood was lost?\" \"Has the bleeding stopped?\"     Bleeding has stopped while talking to spouse on the phone .moderate amount ,spouse stated that she went through almost a box of tissues   2. ONSET: \"When did the nosebleed start?\"       Ten minutes  ago  3. FREQUENCY: \"How many nosebleeds have you had in the last 24 hours?\"       One   4. RECURRENT SYMPTOMS: \"Have there been other recent nosebleeds?\" If Yes, ask: \"How long did it take you to stop the bleeding?\" \"What worked best?\"       Yes two days ago a few minutes less than five minutes   5. CAUSE: \"What do you think caused this nosebleed?\"      Unknown   6. LOCAL FACTORS: \"Do you have any cold symptoms?\", \"Have you been rubbing or picking at your nose?\"      No   7. SYSTEMIC FACTORS: \"Do you have high blood pressure or any bleeding problems?\"      High blood pressure   8. BLOOD THINNERS: \"Do you take any blood thinners?\" (e.g., aspirin, clopidogrel / Plavix, coumadin, heparin). Notes: Other strong blood thinners include: Arixtra (fondaparinux), Eliquis (apixaban), Pradaxa (dabigatran), and Xarelto (rivaroxaban).      Yes eliquis   9. OTHER SYMPTOMS: \"Do you have any other symptoms?\" (e.g., lightheadedness)  Denies lightheadedness  Patient declined urgent care and er evaluation   No appts available at the office    Protocols used: Nosebleed-Adult-[place]  FOLLOW UP: care advise given to patient     REASON FOR CONVERSATION: Nose Bleed    SYMPTOMS: spouse called and stated patients nose bleed  started ten minutes ago,stopped bleeding while talking to spouse on the phone. 1x nose bleed 24 hours.     OTHER: two other nose bleeds two days ago .Patient currently taking Eliquis 5 mg BID     DISPOSITION: No appts available at the " office,patient declined urgent care and ER evaluation

## 2025-03-28 NOTE — TELEPHONE ENCOUNTER
Keith- called states patient is on Eliquis and cancer patient and has a nose bleed for over 10 minutes now, called and spoke to Makenzie-LPN and warm transfer completed.

## 2025-03-28 NOTE — TELEPHONE ENCOUNTER
Patient called back stating she had another small nose bleed. Stating this one lasted only two minutes and stopped. Wanting to know if she can stop the Eliquis. Informed her she should not just stop taking Eliquis and can speak to PCP at [facility] 3/31/25. Also instructed patient that if this continues to occur she needs to seek evaluation. Denies any symptoms at this time. Patient verbalized understanding.

## 2025-03-31 ENCOUNTER — OFFICE VISIT (OUTPATIENT)
Dept: FAMILY MEDICINE CLINIC | Facility: CLINIC | Age: 70
End: 2025-03-31
Payer: COMMERCIAL

## 2025-03-31 VITALS
BODY MASS INDEX: 43.9 KG/M2 | WEIGHT: 223.6 LBS | HEIGHT: 60 IN | SYSTOLIC BLOOD PRESSURE: 120 MMHG | HEART RATE: 104 BPM | TEMPERATURE: 98.1 F | DIASTOLIC BLOOD PRESSURE: 80 MMHG | OXYGEN SATURATION: 97 % | RESPIRATION RATE: 20 BRPM

## 2025-03-31 DIAGNOSIS — Z09 HOSPITAL DISCHARGE FOLLOW-UP: Primary | ICD-10-CM

## 2025-03-31 DIAGNOSIS — M79.671 PAIN OF RIGHT HEEL: ICD-10-CM

## 2025-03-31 DIAGNOSIS — R79.89 ELEVATED TROPONIN: ICD-10-CM

## 2025-03-31 DIAGNOSIS — I50.22 HEART FAILURE WITH MILDLY REDUCED EJECTION FRACTION (HCC): ICD-10-CM

## 2025-03-31 DIAGNOSIS — Z86.79 HISTORY OF MYOCARDITIS: ICD-10-CM

## 2025-03-31 PROCEDURE — 99215 OFFICE O/P EST HI 40 MIN: CPT | Performed by: FAMILY MEDICINE

## 2025-03-31 RX ORDER — LURASIDONE HYDROCHLORIDE 20 MG/1
20 TABLET, FILM COATED ORAL
Qty: 30 TABLET | Refills: 11 | Status: SHIPPED | OUTPATIENT
Start: 2025-03-31

## 2025-03-31 NOTE — ED PROVIDER NOTES
Time reflects when diagnosis was documented in both MDM as applicable and the Disposition within this note       Time User Action Codes Description Comment    3/24/2025  3:29 PM Chen Nelson Add [I42.0] Dilated cardiomyopathy (HCC)     3/24/2025  3:29 PM Chen Nelson Add [I31.39] Pericardial effusion     3/25/2025 10:23 AM Latesha Mayers Add [C54.1] Endometrial cancer (HCC)           ED Disposition       None          Assessment & Plan       Medical Decision Making  Patient is a 69 y.o. female who presents to the ED with cough and fatigue with active chemotherapy for endometrial cancer.    Vital signs stable throughout ED course. Labs notable for initial troponin elevation of 165 with later 4-hour delta in the mid 80s.  Other labs consistent with previous findings. Exam as listed below.    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  NSTEMI is also considered and not ruled out.  Less likely acute coronary syndrome, pulmonary embolism, pneumonia, endocarditis.    Plan: Patient was admitted to oncologic surgery.    View ED course above for further discussion on patient workup.     All labs reviewed and utilized in the medical decision making process  All radiology studies independently viewed by me and interpreted by the radiologist.  I reviewed all testing with the patient.         Amount and/or Complexity of Data Reviewed  Labs: ordered. Decision-making details documented in ED Course.  Radiology: ordered.        ED Course as of 04/04/25 2308   Mon Mar 24, 2025   1419 hs TnI 0hr(!): 165   1541 Has pericardial effusion, worsening trop elevation, surg onc will admit as primary, told me they will place admit orders       Medications   predniSONE tablet 50 mg (has no administration in time range)       ED Risk Strat Scores                                                 History of Present Illness       Chief Complaint   Patient presents with    Cough     Since having chemotherapy has been coughing, experiencing some weakness and shakiness.        Past Medical History:   Diagnosis Date    Abnormal ECG     Anxiety 2002    Arthritis     Bipolar 1 disorder (HCC)     Cervical cancer (HCC)     Chronic kidney disease     stage 3    CPAP (continuous positive airway pressure) dependence     Depression 2001    Disease of thyroid gland     DVT (deep venous thrombosis) (HCC)     BLLE    Elevated blood pressure reading 06/10/2019    GERD (gastroesophageal reflux disease)     Obesity     Pulmonary emboli (HCC)     B/L    RSV (acute bronchiolitis due to respiratory syncytial virus) 12/05/2023    Sleep apnea     Sleep apnea, obstructive 2007    Urinary incontinence 2019    Uterine cancer (HCC)       Past Surgical History:   Procedure Laterality Date    COLONOSCOPY  2011    COLONOSCOPY  03/08/2023    DENTAL SURGERY  2020    FOOT SURGERY  1976    [facility]    IR IVC FILTER PLACEMENT OPTIONAL/TEMPORARY  10/08/2024    IR PORT CHECK  12/26/2024    IR PORT PLACEMENT  12/03/2024    LAPAROTOMY N/A 10/11/2024    Procedure: EXPLORATORY LAPAROTOMY;  Surgeon: Rodney Downing MD;  Location: [facility];  Service: Gynecology Oncology    AR HYSTEROSCOPY BX ENDOMETRIUM&/POLYPC W/WO D&C N/A 08/23/2024    Procedure: EXAM UNDER ANESTHESIA, DILATATION AND CURETTAGE, CERVICAL BIOPSIES;  Surgeon: Evin Page MD;  Location: [facility];  Service: Gynecology Oncology    AR LAPS TOTAL HYSTERECT 250 GM/< W/RMVL TUBE/OVARY N/A 10/11/2024    Procedure: ROBOTIC ASSISTED LTH, BSO, LYMPH NODE DISSECTION, EUA;  Surgeon: Rodney Downing MD;  Location: [facility];  Service: Gynecology Oncology      Family History   Problem Relation Age of Onset    Heart disease Mother     Heart Valve Disease Mother         Replacement    Diabetes Mother         2002    Heart failure  Mother         Heart Valve replacement    Arthritis Father     No Known Problems Sister     No Known Problems Daughter     No Known Problems Maternal Grandmother     No Known Problems Maternal Grandfather     No Known Problems Paternal Grandmother     No Known Problems Paternal Grandfather     No Known Problems Son     No Known Problems Maternal Aunt     No Known Problems Maternal Aunt     No Known Problems Maternal Aunt     No Known Problems Maternal Aunt     No Known Problems Maternal Aunt     No Known Problems Paternal Aunt     Alcohol abuse Son         Kolton, 40 year old son, has issues with alcohol and alcohol abuse      Social History     Tobacco Use    Smoking status: Former     Current packs/day: 0.00     Average packs/day: 0.3 packs/day for 30.0 years (7.5 ttl pk-yrs)     Types: Cigarettes     Start date: 1979     Quit date: 2009     Years since quittin.2     Passive exposure: Never    Smokeless tobacco: Never   Vaping Use    Vaping status: Never Used   Substance Use Topics    Alcohol use: Not Currently     Alcohol/week: 1.0 standard drink of alcohol     Types: 1 Glasses of wine per week    Drug use: Never      E-Cigarette/Vaping    E-Cigarette Use Never User       E-Cigarette/Vaping Substances    Nicotine No     THC No     CBD No     Flavoring No     Other No     Unknown No       I have reviewed and agree with the history as documented.     This is a 74-year-old female with past medical history significant for current endometrial cancer previously on immunotherapy with development of immune pneumonitis and pericarditis with previous therapy, now with chemotherapy on Taxol and carboplatin, who presents to the emergency department with increased fatigue, persistent dry cough, and occasional shaking in the legs.  Patient denies any increased pain, fever, chills, nausea or vomiting, abdominal pain.          Cough  Associated symptoms: no chest pain, no chills, no ear pain, no fever, no rash, no  shortness of breath and no sore throat        Review of Systems   Constitutional:  Positive for fatigue. Negative for chills and fever.   HENT:  Negative for ear pain and sore throat.    Eyes:  Negative for pain and visual disturbance.   Respiratory:  Positive for cough. Negative for shortness of breath.    Cardiovascular:  Negative for chest pain and palpitations.   Gastrointestinal:  Negative for abdominal pain and vomiting.   Genitourinary:  Negative for dysuria and hematuria.   Musculoskeletal:  Negative for arthralgias and back pain.   Skin:  Negative for color change and rash.   Neurological:  Positive for tremors. Negative for seizures and syncope.   All other systems reviewed and are negative.          Objective       ED Triage Vitals   Temperature Pulse Blood Pressure Respirations SpO2 Patient Position - Orthostatic VS   03/24/25 1055 03/24/25 1052 03/24/25 1052 03/24/25 1052 03/24/25 1052 03/24/25 1052   (!) 96.5 °F (35.8 °C) 87 128/87 18 96 % Sitting      Temp Source Heart Rate Source BP Location FiO2 (%) Pain Score    03/24/25 1055 03/24/25 1052 03/24/25 1052 -- 03/24/25 1052    Oral Monitor Right arm  No Pain      Vitals      Date and Time Temp Pulse SpO2 Resp BP Pain Score FACES Pain Rating User   03/25/25 0918 -- -- -- -- -- No Pain -- JM   03/25/25 0917 -- -- -- -- -- No Pain -- MV   03/25/25 0813 97.5 °F (36.4 °C) -- -- -- 109/67 -- -- DII   03/25/25 0800 -- -- -- -- -- No Pain -- TF   03/25/25 0611 -- 87 -- -- 120/69 -- -- GC   03/25/25 0212 97.9 °F (36.6 °C) 78 97 % -- 118/70 -- -- DII   03/24/25 2224 97.5 °F (36.4 °C) 88 95 % -- 122/70 -- -- DII   03/24/25 2032 97.1 °F (36.2 °C) 78 96 % -- 109/69 -- -- DII   03/24/25 2000 -- -- -- -- -- No Pain -- GC   03/24/25 1813 97.7 °F (36.5 °C) 101 -- 22 112/60 No Pain -- CASSIE   03/24/25 1800 -- -- -- -- -- No Pain -- CASSIE   03/24/25 1800 97.7 °F (36.5 °C) 99 95 % -- -- -- -- EV   03/24/25 1708 97.7 °F (36.5 °C) -- -- -- -- -- -- CASSIE   03/24/25 1708 -- 96 98  % 18 133/64 No Pain -- MI   03/24/25 1615 -- 92 98 % 16 -- -- --    03/24/25 1608 -- 84 -- -- 100/56 -- --    03/24/25 1408 -- 84 98 % 22 100/56 -- --    03/24/25 1345 -- 92 -- 20 -- -- --    03/24/25 1055 96.5 °F (35.8 °C) -- -- -- -- -- -- ST   03/24/25 1052 -- 87 96 % 18 128/87 No Pain -- ST            Physical Exam  Vitals and nursing note reviewed.   Constitutional:       General: She is not in acute distress.     Appearance: Normal appearance. She is well-developed and normal weight. She is not ill-appearing or toxic-appearing.   HENT:      Head: Normocephalic and atraumatic.      Right Ear: External ear normal.      Left Ear: External ear normal.      Mouth/Throat:      Mouth: Mucous membranes are moist.      Pharynx: Oropharynx is clear.   Eyes:      General: No scleral icterus.        Right eye: No discharge.         Left eye: No discharge.      Conjunctiva/sclera: Conjunctivae normal.   Neck:      Vascular: No carotid bruit.   Cardiovascular:      Rate and Rhythm: Normal rate and regular rhythm.      Heart sounds: No murmur heard.     No friction rub. No gallop.   Pulmonary:      Effort: Pulmonary effort is normal. No respiratory distress.      Breath sounds: Normal breath sounds. No stridor. No wheezing, rhonchi or rales.      Comments: No significant lung auscultation findings.  Chest:      Chest wall: No tenderness.   Abdominal:      General: There is no distension.      Palpations: Abdomen is soft. There is no mass.      Tenderness: There is no abdominal tenderness. There is no right CVA tenderness, left CVA tenderness, guarding or rebound.      Hernia: No hernia is present.   Musculoskeletal:         General: No swelling.      Cervical back: Neck supple. No rigidity or tenderness.      Right lower leg: No edema.      Left lower leg: No edema.   Lymphadenopathy:      Cervical: No cervical adenopathy.   Skin:     General: Skin is warm and dry.      Capillary Refill: Capillary refill takes less  than 2 seconds.      Coloration: Skin is not jaundiced or pale.      Findings: No bruising, erythema, lesion or rash.   Neurological:      General: No focal deficit present.      Mental Status: She is alert and oriented to person, place, and time. Mental status is at baseline.      Cranial Nerves: No cranial nerve deficit.      Sensory: No sensory deficit.      Motor: No weakness.      Coordination: Coordination normal.      Gait: Gait normal.      Deep Tendon Reflexes: Reflexes normal.      Comments: Resting tremor of the right upper extremity.  Otherwise unremarkable neurological exam.   Psychiatric:         Mood and Affect: Mood normal.         Results Reviewed       Procedure Component Value Units Date/Time    CBC (with platelets) [[id number]]  (Abnormal) Collected: 03/25/25 0809    Lab Status: Final result Specimen: Blood from Arm, Right Updated: 03/25/25 0831     WBC 9.70 Thousand/uL      RBC 3.19 Million/uL      Hemoglobin 10.6 g/dL      Hematocrit 32.7 %       fL      MCH 33.2 pg      MCHC 32.4 g/dL      RDW 17.6 %      Platelets 99 Thousands/uL      MPV 11.9 fL     Basic metabolic panel [[id number]]  (Abnormal) Collected: 03/25/25 0616    Lab Status: Final result Specimen: Blood from Arm, Right Updated: 03/25/25 0744     Sodium 141 mmol/L      Potassium 3.5 mmol/L      Chloride 101 mmol/L      CO2 32 mmol/L      ANION GAP 8 mmol/L      BUN 29 mg/dL      Creatinine 1.68 mg/dL      Glucose 98 mg/dL      Calcium 9.0 mg/dL      eGFR 30 ml/min/1.73sq m     Narrative:      National Kidney Disease Foundation guidelines for Chronic Kidney Disease (CKD):     Stage 1 with normal or high GFR (GFR > 90 mL/min/1.73 square meters)    Stage 2 Mild CKD (GFR = 60-89 mL/min/1.73 square meters)    Stage 3A Moderate CKD (GFR = 45-59 mL/min/1.73 square meters)    Stage 3B Moderate CKD (GFR = 30-44 mL/min/1.73 square meters)    Stage 4 Severe CKD (GFR = 15-29 mL/min/1.73 square meters)    Stage 5 End Stage CKD (GFR <15  mL/min/1.73 square meters)  Note: GFR calculation is accurate only with a steady state creatinine    Magnesium [[id number]]  (Normal) Collected: 03/25/25 0616    Lab Status: Final result Specimen: Blood from Arm, Right Updated: 03/25/25 0744     Magnesium 2.3 mg/dL     HS Troponin I 4hr [[id number]]  (Abnormal) Collected: 03/24/25 1632    Lab Status: Final result Specimen: Blood from Arm, Left Updated: 03/24/25 1707     hs TnI 4hr 249 ng/L      Delta 4hr hsTnI 84 ng/L     RBC Morphology Reflex Test [[id number]] Collected: 03/24/25 1223    Lab Status: Final result Specimen: Blood from Arm, Left Updated: 03/24/25 1501    HS Troponin I 2hr [[id number]]  (Abnormal) Collected: 03/24/25 1408    Lab Status: Final result Specimen: Blood from Arm, Right Updated: 03/24/25 1446     hs TnI 2hr 212 ng/L      Delta 2hr hsTnI 47 ng/L     CBC and differential [[id number]]  (Abnormal) Collected: 03/24/25 1223    Lab Status: Final result Specimen: Blood from Arm, Left Updated: 03/24/25 1422     WBC 15.39 Thousand/uL      RBC 3.40 Million/uL      Hemoglobin 11.2 g/dL      Hematocrit 34.0 %       fL      MCH 32.9 pg      MCHC 32.9 g/dL      RDW 17.3 %      MPV 12.4 fL      Platelets 107 Thousands/uL     Narrative:      This is an appended report.  These results have been appended to a previously verified report.    Manual Differential(PHLEBS Do Not Order) [[id number]]  (Abnormal) Collected: 03/24/25 1223    Lab Status: Final result Specimen: Blood from Arm, Left Updated: 03/24/25 1422     Segmented % 93 %      Bands % 2 %      Lymphocytes % 5 %      Monocytes % 0 %      Eosinophils % 0 %      Basophils % 0 %      Absolute Neutrophils 14.62 Thousand/uL      Absolute Lymphocytes 0.77 Thousand/uL      Absolute Monocytes 0.00 Thousand/uL      Absolute Eosinophils 0.00 Thousand/uL      Absolute Basophils 0.00 Thousand/uL      Total Counted --     RBC Morphology Present     Platelet Estimate Decreased     Anisocytosis Present     Comprehensive metabolic panel [[id number]]  (Abnormal) Collected: 03/24/25 1223    Lab Status: Final result Specimen: Blood from Arm, Left Updated: 03/24/25 1317     Sodium 138 mmol/L      Potassium 4.1 mmol/L      Chloride 100 mmol/L      CO2 27 mmol/L      ANION GAP 11 mmol/L      BUN 32 mg/dL      Creatinine 1.58 mg/dL      Glucose 298 mg/dL      Calcium 8.9 mg/dL      AST 13 U/L      ALT 23 U/L      Alkaline Phosphatase 130 U/L      Total Protein 6.2 g/dL      Albumin 3.6 g/dL      Total Bilirubin 0.42 mg/dL      eGFR 32 ml/min/1.73sq m     Narrative:      National Kidney Disease Foundation guidelines for Chronic Kidney Disease (CKD):     Stage 1 with normal or high GFR (GFR > 90 mL/min/1.73 square meters)    Stage 2 Mild CKD (GFR = 60-89 mL/min/1.73 square meters)    Stage 3A Moderate CKD (GFR = 45-59 mL/min/1.73 square meters)    Stage 3B Moderate CKD (GFR = 30-44 mL/min/1.73 square meters)    Stage 4 Severe CKD (GFR = 15-29 mL/min/1.73 square meters)    Stage 5 End Stage CKD (GFR <15 mL/min/1.73 square meters)  Note: GFR calculation is accurate only with a steady state creatinine    TSH, 3rd generation with Free T4 reflex [[id number]]  (Normal) Collected: 03/24/25 1223    Lab Status: Final result Specimen: Blood from Arm, Left Updated: 03/24/25 1317     TSH 3RD GENERATON 0.929 uIU/mL     HS Troponin 0hr (reflex protocol) [[id number]]  (Abnormal) Collected: 03/24/25 1223    Lab Status: Final result Specimen: Blood from Arm, Left Updated: 03/24/25 1307     hs TnI 0hr 165 ng/L             CT chest without contrast   Final Interpretation by Tonio Gil MD (03/24 1409)      No acute thoracic abnormality.      Nearly resolved groundglass opacities in bilateral lower lobes, likely sequela of infection/inflammation. Consider follow-up CT chest without contrast in 3 months to ensure resolution.      Subsegmental atelectasis in right middle and bilateral lower lobes.      Unchanged 0.5 cm right lower lobe  pulmonary nodule.      Additional chronic/incidental findings as detailed above.      The study was marked in EPIC for immediate notification.               Workstation performed: [id number]             Procedures    ED Medication and Procedure Management   Prior to Admission Medications   Prescriptions Last Dose Informant Patient Reported? Taking?   CRANBERRY PO  Self Yes No   Sig: Take by mouth   Cholecalciferol (VITAMIN D) 2000 units CAPS  Self Yes No   Sig: Take 1 capsule by mouth daily   Glucosamine-Chondroit-Vit C-Mn (GLUCOSAMINE 1500 COMPLEX PO)  Self Yes No   Sig: Take by mouth in the morning   Ivermectin 1 % CREA  Self Yes No   Mirabegron ER (Myrbetriq) 50 MG TB24  Self No No   Sig: TAKE 1 TABLET BY MOUTH IN THE  MORNING   Multiple Vitamin (MULTI-VITAMIN DAILY) TABS  Self Yes No   Sig: Take by mouth daily   Sodium Fluoride 5000 PPM 1.1 % GEL  Self Yes No   Sig: As directed   apixaban (Eliquis) 5 mg  Self No No   Sig: Take 1 tablet (5 mg total) by mouth 2 (two) times a day   chlorhexidine (PERIDEX) 0.12 % solution  Self Yes No   Sig: USE HALF OUNCE TWICE A DAY RINSE FOR 30 SECONDS BY MOUTH THEN EXPECTORATE DO NOT SWALLOW   clobetasol (TEMOVATE) 0.05 % ointment  Self No No   Sig: Apply topically 2 (two) times a week   colchicine (COLCRYS) 0.6 mg tablet  Self No No   Sig: Take 1 tablet (0.6 mg total) by mouth daily   famotidine (PEPCID) 20 mg tablet  Self No No   Sig: TAKE 1 TABLET BY MOUTH TWICE  DAILY   lidocaine-prilocaine (EMLA) cream  Self No No   Sig: Apply to PORT 30 min prior to labs and chemo   magnesium Oxide (MAG-OX) 400 mg TABS  Self No No   Sig: TAKE 1 TABLET BY MOUTH EVERY DAY   metoprolol succinate (TOPROL-XL) 50 mg 24 hr tablet  Self No No   Sig: Take 1 tablet (50 mg total) by mouth every 12 (twelve) hours   polyethylene glycol (GLYCOLAX) 17 GM/SCOOP powder  Self No No   Sig: Take 17 g by mouth 2 (two) times a day   predniSONE 10 mg tablet  Self No No   Sig: Take 10 tablets PO QD x 7 days,  then 9 tablets PO x 7d, then 8 tablets PO x 7d, then 6 tabs PO x 5d, then 5 tabs PO x 5 d, then 4 tabs PO x 3d, then 3 tabs PO x 3d, then 2 tabs PO x 2d, then 1 tabs PO x 3d.   torsemide (DEMADEX) 20 mg tablet  Self No No   Sig: TAKE 1 TABLET BY MOUTH EVERY DAY      Facility-Administered Medications: None     Discharge Medication List as of 3/25/2025 11:08 AM        CONTINUE these medications which have NOT CHANGED    Details   apixaban (Eliquis) 5 mg Take 1 tablet (5 mg total) by mouth 2 (two) times a day, Starting Thu 2/13/2025, Until Wed 5/14/2025, Normal      chlorhexidine (PERIDEX) 0.12 % solution USE HALF OUNCE TWICE A DAY RINSE FOR 30 SECONDS BY MOUTH THEN EXPECTORATE DO NOT SWALLOW, Historical Med      Cholecalciferol (VITAMIN D) 2000 units CAPS Take 1 capsule by mouth daily, Historical Med      clobetasol (TEMOVATE) 0.05 % ointment Apply topically 2 (two) times a week, Starting Mon 1/15/2024, Normal      colchicine (COLCRYS) 0.6 mg tablet Take 1 tablet (0.6 mg total) by mouth daily, Starting Wed 2/26/2025, Normal      CRANBERRY PO Take by mouth, Historical Med      famotidine (PEPCID) 20 mg tablet TAKE 1 TABLET BY MOUTH TWICE  DAILY, Starting Mon 6/24/2024, Normal      Glucosamine-Chondroit-Vit C-Mn (GLUCOSAMINE 1500 COMPLEX PO) Take by mouth in the morning, Historical Med      Ivermectin 1 % CREA Historical Med      lidocaine-prilocaine (EMLA) cream Apply to PORT 30 min prior to labs and chemo, Normal      magnesium Oxide (MAG-OX) 400 mg TABS TAKE 1 TABLET BY MOUTH EVERY DAY, Starting Fri 3/21/2025, Normal      metoprolol succinate (TOPROL-XL) 50 mg 24 hr tablet Take 1 tablet (50 mg total) by mouth every 12 (twelve) hours, Starting Sun 2/23/2025, Normal      Mirabegron ER (Myrbetriq) 50 MG TB24 TAKE 1 TABLET BY MOUTH IN THE  MORNING, Starting Fri 3/14/2025, Normal      Multiple Vitamin (MULTI-VITAMIN DAILY) TABS Take by mouth daily, Historical Med      polyethylene glycol (GLYCOLAX) 17 GM/SCOOP powder Take  17 g by mouth 2 (two) times a day, Starting Fri 1/10/2025, Normal      predniSONE 10 mg tablet Take 10 tablets PO QD x 7 days, then 9 tablets PO x 7d, then 8 tablets PO x 7d, then 6 tabs PO x 5d, then 5 tabs PO x 5 d, then 4 tabs PO x 3d, then 3 tabs PO x 3d, then 2 tabs PO x 2d, then 1 tabs PO x 3d., Normal      Sodium Fluoride 5000 PPM 1.1 % GEL As directed, Starting Fri 11/22/2024, Historical Med      torsemide (DEMADEX) 20 mg tablet TAKE 1 TABLET BY MOUTH EVERY DAY, Starting Thu 3/20/2025, Normal      LORazepam (ATIVAN) 1 mg tablet Take 1 tablet (1 mg total) by mouth every 8 (eight) hours as needed (nausea or anxiety), Starting Thu 3/6/2025, Normal      lurasidone (LATUDA) 20 mg tablet TAKE 1 TABLET BY MOUTH DAILY  WITH BREAKFAST, Starting u 6/27/2024, Normal             ED SEPSIS DOCUMENTATION   Time reflects when diagnosis was documented in both MDM as applicable and the Disposition within this note       Time User Action Codes Description Comment    3/24/2025  3:29 PM Chen Nelson Add [I42.0] Dilated cardiomyopathy (HCC)     3/24/2025  3:29 PM Chen Nelson Add [I31.39] Pericardial effusion     3/25/2025 10:23 AM Latesha Mayers Add [C54.1] Endometrial cancer (HCC)                  Jacky Philip,   03/31/25 0131       Jacky Philip DO  04/04/25 8720

## 2025-03-31 NOTE — ASSESSMENT & PLAN NOTE
Wt Readings from Last 3 Encounters:   03/31/25 101 kg (223 lb 9.6 oz)   03/26/25 103 kg (227 lb 1.6 oz)   03/24/25 104 kg (228 lb 6.4 oz)   On torsemide

## 2025-03-31 NOTE — PROGRESS NOTES
Transition of Care Visit  Name: Cherelle Dash      : 1955      MRN: [id number]  Encounter Provider: Gretchen Mayorga DO  Encounter Date: 3/31/2025   Encounter department: [facility]    Assessment & Plan  Hospital discharge follow-up         History of myocarditis  On chochicine  Stepping down predisone       Heart failure with mildly reduced ejection fraction (HCC)  Wt Readings from Last 3 Encounters:   25 101 kg (223 lb 9.6 oz)   25 103 kg (227 lb 1.6 oz)   25 104 kg (228 lb 6.4 oz)   On torsemide                 Elevated troponin  Due to myocarditis       Pain of right heel    Orders:  •  Ambulatory Referral to Podiatry; Future         History of Present Illness     Transitional Care Management Review:   Cherelle Dash is a 69 y.o. female here for TCM follow up.     During the TCM phone call patient stated:  TCM Call (since 3/17/2025)     Date and time call was made  3/25/2025  9:10 AM    Patient was hospitialized at  [facility]    Date of Admission  25    Date of discharge  25    Diagnosis  Endometrial Cancer    Disposition  Home    Were the patients medications reviewed and updated  Yes      TCM Call (since 3/17/2025)     Scheduled for follow up?  Yes    Did you obtain your prescribed medications  Yes    Do you need help managing your prescriptions or medications  No    Is transportation to your appointment needed  No    I have advised the patient to call PCP with any new or worsening symptoms  David Britton    Living Arrangements  Spouse or Significiant other    Are you recieving home care services  No        Here for hospital follow up  Has sent to ER and stayed overnight   Was tired, SOB and xray was misread per pt  Very confused about why she is on all her meds  Still having right heel pain      Review of Systems   Constitutional:  Positive for fatigue.   HENT: Negative.     Eyes: Negative.    Respiratory:  Positive for shortness of breath  (better).    Cardiovascular:  Negative for chest pain.   Gastrointestinal: Negative.    Endocrine: Negative.    Genitourinary: Negative.    Musculoskeletal:  Positive for arthralgias (heel pain).   Allergic/Immunologic: Negative.    Neurological: Negative.    Hematological: Negative.      Objective   /80 (BP Location: Left arm, Patient Position: Sitting, Cuff Size: Large)   Pulse 104   Temp 98.1 °F (36.7 °C) (Tympanic)   Resp 20   Ht 5' (1.524 m)   Wt 101 kg (223 lb 9.6 oz)   SpO2 97%   BMI 43.67 kg/m²     Physical Exam  Vitals and nursing note reviewed.   Constitutional:       Appearance: She is well-developed.   HENT:      Head: Normocephalic and atraumatic.      Right Ear: External ear normal.      Left Ear: External ear normal.      Nose: Nose normal.   Eyes:      Conjunctiva/sclera: Conjunctivae normal.      Pupils: Pupils are equal, round, and reactive to light.   Cardiovascular:      Rate and Rhythm: Normal rate and regular rhythm.      Heart sounds: Normal heart sounds.   Pulmonary:      Effort: Pulmonary effort is normal.      Breath sounds: Normal breath sounds.   Abdominal:      General: Bowel sounds are normal.      Palpations: Abdomen is soft.   Musculoskeletal:         General: Normal range of motion.      Cervical back: Normal range of motion and neck supple.   Skin:     General: Skin is warm and dry.      Capillary Refill: Capillary refill takes less than 2 seconds.   Neurological:      Mental Status: She is alert and oriented to person, place, and time.   Psychiatric:         Behavior: Behavior normal.         Thought Content: Thought content normal.         Judgment: Judgment normal.       Medications have been reviewed by provider in current encounter

## 2025-04-01 ENCOUNTER — OFFICE VISIT (OUTPATIENT)
Dept: PALLIATIVE MEDICINE | Facility: CLINIC | Age: 70
End: 2025-04-01
Payer: COMMERCIAL

## 2025-04-01 VITALS
DIASTOLIC BLOOD PRESSURE: 78 MMHG | HEART RATE: 98 BPM | RESPIRATION RATE: 20 BRPM | OXYGEN SATURATION: 95 % | SYSTOLIC BLOOD PRESSURE: 110 MMHG | WEIGHT: 223 LBS | BODY MASS INDEX: 43.78 KG/M2 | TEMPERATURE: 95.8 F | HEIGHT: 60 IN

## 2025-04-01 DIAGNOSIS — M25.471 RIGHT ANKLE SWELLING: ICD-10-CM

## 2025-04-01 DIAGNOSIS — M76.60 ACHILLES TENDON PAIN: ICD-10-CM

## 2025-04-01 DIAGNOSIS — Z51.5 PALLIATIVE CARE BY SPECIALIST: ICD-10-CM

## 2025-04-01 DIAGNOSIS — C54.1 ENDOMETRIAL CANCER (HCC): Primary | ICD-10-CM

## 2025-04-01 PROCEDURE — 99214 OFFICE O/P EST MOD 30 MIN: CPT | Performed by: STUDENT IN AN ORGANIZED HEALTH CARE EDUCATION/TRAINING PROGRAM

## 2025-04-01 PROCEDURE — G2211 COMPLEX E/M VISIT ADD ON: HCPCS | Performed by: STUDENT IN AN ORGANIZED HEALTH CARE EDUCATION/TRAINING PROGRAM

## 2025-04-01 NOTE — PROGRESS NOTES
Name: Cherelle Dash      : 1955      MRN: [id number]  Encounter Provider: Maynor Yen DO  Encounter Date: 2025   Encounter department: [facility]  :  Assessment & Plan  Endometrial cancer (HCC)  Following gynecology oncology for ongoing management.  Patient was on Keytruda, however, was hospitalized due to myocarditis with cardiotoxicity.  She also follows Dr. Olson of cardiac oncology for ongoing management and monitoring as well.  Her last scheduled treatment has been held due to this sequela.  Patient has a follow-up appoint with Dr. Page next week.         Achilles tendon pain  Patient has chronic pain to this area for the past 2 months.  She also has some swelling to the right lateral malleolus which has been ongoing as well.  She does note some discomfort to the Achilles tendon.  Denies any trauma.  She notices that the discomfort is more prominent with being on her feet for long periods of time or with ambulating distances.  No calf pain, erythema, warmth, or open skin wounds to the lower extremity or ankle.       Right ankle swelling  Ongoing for the past 2 months.  Intermittent. Does have pain with ambulation with weight bearing over time or overactivity, most notably to the achilles tendon.  Patient is unsure if this is also partially from the steroid that she has been on for the past month.  She does have some tremors that has been ongoing as well for at least the past month.  Potentially from the steroids as she is currently on the taper and is set to complete in the next few days.    Patient does not meet [place] ankle criteria at this time.  She has been referred to podiatrist for evaluation by her PCP who recently saw her.  Did discuss with the patient that she can try to see how she does off the steroids when she has  completed her course.  If she is still having issues or if her symptoms worsen, can consider an x-ray of the ankle to rule out any potential  bony abnormalities/fractures.  Discussed that there could be also component of arthritis particular given her history of increased activity causing discomfort.       Palliative care by specialist  Psychosocial   Supportive listening provided  Normalized experience of patient/family  Provided anxiety containment     Referrals Placed / Medical Equipment Ordered  -None  -patient has existing podiatry referral for right heel/achilles tendon pain    Follow-Up Recommendations  -Follow-up with PCP and current medical specialists  -Follow-up with palliative care: 6-8 weeks           Decisional apparatus: Patient is competent on my exam today. If competence is lost, patient's substitute decision maker would default to spouse by [place].   Advance Directive / Living Will / POLST: On file     PDMP Review: I have reviewed the patient's controlled substance dispensing history in the Prescription Drug Monitoring Program in compliance with the [facility] regulations before prescribing any controlled substances.    History of Present Illness   Cherelle Dash is a 69 y.o. female who presents for evaluation.    Patient seen and examined in no acute distress or discomfort.  Pleasantly conversant, appropriate mood and affect.  Accompanied by her spouse.  Tolerating p.o. intake without nausea or vomiting.  On a steroid taper with about 3 days left to completion.  Recent hospitalization for myocarditis thought to be secondary to her systemic cancer treatments.  She has since improved and feels overall much better compared to her status while she was in the hospital.  Cancer treatments have been on hold for now given his recent hospitalization.    Patient is well supported by her spouse.  Patient is coping at this time.    Medical History Reviewed by provider this encounter:  Tobacco  Allergies  Meds  Problems  Med Hx  Surg Hx  Fam Hx     .  Past Medical History   Past Medical History:   Diagnosis Date    Abnormal ECG     Anxiety 2002     Arthritis     Bipolar 1 disorder (HCC)     Cervical cancer (HCC)     Chronic kidney disease     stage 3    CPAP (continuous positive airway pressure) dependence     Depression 2001    Disease of thyroid gland     DVT (deep venous thrombosis) (HCC)     BLLE    Elevated blood pressure reading 06/10/2019    GERD (gastroesophageal reflux disease)     Obesity     Pulmonary emboli (HCC)     B/L    RSV (acute bronchiolitis due to respiratory syncytial virus) 12/05/2023    Sleep apnea     Sleep apnea, obstructive 2007    Urinary incontinence 2019    Uterine cancer (HCC)      Past Surgical History:   Procedure Laterality Date    COLONOSCOPY  2011    COLONOSCOPY  03/08/2023    DENTAL SURGERY  2020    FOOT SURGERY  1976    [facility]    IR IVC FILTER PLACEMENT OPTIONAL/TEMPORARY  10/08/2024    IR PORT CHECK  12/26/2024    IR PORT PLACEMENT  12/03/2024    LAPAROTOMY N/A 10/11/2024    Procedure: EXPLORATORY LAPAROTOMY;  Surgeon: Rodney Downing MD;  Location: [facility] OR;  Service: Gynecology Oncology    NJ HYSTEROSCOPY BX ENDOMETRIUM&/POLYPC W/WO D&C N/A 08/23/2024    Procedure: EXAM UNDER ANESTHESIA, DILATATION AND CURETTAGE, CERVICAL BIOPSIES;  Surgeon: Evin Page MD;  Location:  MAIN OR;  Service: Gynecology Oncology    NJ LAPS TOTAL HYSTERECT 250 GM/< W/RMVL TUBE/OVARY N/A 10/11/2024    Procedure: ROBOTIC ASSISTED LTH, BSO, LYMPH NODE DISSECTION, EUA;  Surgeon: Rodney Downing MD;  Location: [facility];  Service: Gynecology Oncology     Family History   Problem Relation Age of Onset    Heart disease Mother     Heart Valve Disease Mother         Replacement    Diabetes Mother         2002    Heart failure Mother         Heart Valve replacement    Arthritis Father     No Known Problems Sister     No Known Problems Daughter     No Known Problems Maternal Grandmother     No Known Problems Maternal Grandfather     No Known Problems Paternal Grandmother     No Known Problems Paternal Grandfather     No  Known Problems Son     No Known Problems Maternal Aunt     No Known Problems Maternal Aunt     No Known Problems Maternal Aunt     No Known Problems Maternal Aunt     No Known Problems Maternal Aunt     No Known Problems Paternal Aunt     Alcohol abuse Son         Kolton, 40 year old son, has issues with alcohol and alcohol abuse      reports that she quit smoking about 16 years ago. Her smoking use included cigarettes. She started smoking about 46 years ago. She has a 7.5 pack-year smoking history. She has never been exposed to tobacco smoke. She has never used smokeless tobacco. She reports that she does not currently use alcohol after a past usage of about 1.0 standard drink of alcohol per week. She reports that she does not use drugs.  Current Outpatient Medications   Medication Instructions    apixaban (ELIQUIS) 5 mg, Oral, 2 times daily    chlorhexidine (PERIDEX) 0.12 % solution USE HALF OUNCE TWICE A DAY RINSE FOR 30 SECONDS BY MOUTH THEN EXPECTORATE DO NOT SWALLOW    Cholecalciferol (VITAMIN D) 2000 units CAPS 1 capsule, Daily    clobetasol (TEMOVATE) 0.05 % ointment Topical, 2 times weekly    colchicine (COLCRYS) 0.6 mg, Oral, Daily    CRANBERRY PO Take by mouth    famotidine (PEPCID) 20 mg, Oral, 2 times daily    Glucosamine-Chondroit-Vit C-Mn (GLUCOSAMINE 1500 COMPLEX PO) Daily    Ivermectin 1 % CREA     lidocaine-prilocaine (EMLA) cream Apply to PORT 30 min prior to labs and chemo    LORazepam (ATIVAN) 1 mg, Oral, Every 8 hours PRN    lurasidone (LATUDA) 20 mg, Oral, Daily with breakfast    magnesium Oxide (MAG-OX) 400 mg, Oral, Daily    metoprolol succinate (TOPROL-XL) 50 mg, Oral, Every 12 hours    Multiple Vitamin (MULTI-VITAMIN DAILY) TABS Daily    Myrbetriq 50 mg, Oral, Every morning    polyethylene glycol (GLYCOLAX) 17 g, Oral, 2 times daily    predniSONE 10 mg tablet Take 10 tablets PO QD x 7 days, then 9 tablets PO x 7d, then 8 tablets PO x 7d, then 6 tabs PO x 5d, then 5 tabs PO x 5 d, then 4  tabs PO x 3d, then 3 tabs PO x 3d, then 2 tabs PO x 2d, then 1 tabs PO x 3d.    Sodium Fluoride 5000 PPM 1.1 % GEL As directed    torsemide (DEMADEX) 20 mg, Oral, Daily     Allergies   Allergen Reactions    Raw Fruit - Food Allergy Anaphylaxis     PLUMS, PEACHES, APPLES, CHERRIES FRUIT WITH SKIN      Current Outpatient Medications on File Prior to Visit   Medication Sig Dispense Refill    apixaban (Eliquis) 5 mg Take 1 tablet (5 mg total) by mouth 2 (two) times a day 180 tablet 1    chlorhexidine (PERIDEX) 0.12 % solution USE HALF OUNCE TWICE A DAY RINSE FOR 30 SECONDS BY MOUTH THEN EXPECTORATE DO NOT SWALLOW      Cholecalciferol (VITAMIN D) 2000 units CAPS Take 1 capsule by mouth daily      clobetasol (TEMOVATE) 0.05 % ointment Apply topically 2 (two) times a week 60 g 3    colchicine (COLCRYS) 0.6 mg tablet Take 1 tablet (0.6 mg total) by mouth daily 30 tablet 1    CRANBERRY PO Take by mouth      famotidine (PEPCID) 20 mg tablet TAKE 1 TABLET BY MOUTH TWICE  DAILY 180 tablet 3    Glucosamine-Chondroit-Vit C-Mn (GLUCOSAMINE 1500 COMPLEX PO) Take by mouth in the morning      Ivermectin 1 % CREA       lidocaine-prilocaine (EMLA) cream Apply to PORT 30 min prior to labs and chemo 30 g 1    LORazepam (ATIVAN) 1 mg tablet Take 1 tablet (1 mg total) by mouth every 8 (eight) hours as needed (nausea or anxiety) 20 tablet 0    lurasidone (LATUDA) 20 mg tablet TAKE 1 TABLET BY MOUTH DAILY  WITH BREAKFAST 30 tablet 11    magnesium Oxide (MAG-OX) 400 mg TABS TAKE 1 TABLET BY MOUTH EVERY DAY 90 tablet 1    metoprolol succinate (TOPROL-XL) 50 mg 24 hr tablet Take 1 tablet (50 mg total) by mouth every 12 (twelve) hours 60 tablet 0    Mirabegron ER (Myrbetriq) 50 MG TB24 TAKE 1 TABLET BY MOUTH IN THE  MORNING 90 tablet 1    Multiple Vitamin (MULTI-VITAMIN DAILY) TABS Take by mouth daily      polyethylene glycol (GLYCOLAX) 17 GM/SCOOP powder Take 17 g by mouth 2 (two) times a day 238 g 1    predniSONE 10 mg tablet Take 10 tablets  PO QD x 7 days, then 9 tablets PO x 7d, then 8 tablets PO x 7d, then 6 tabs PO x 5d, then 5 tabs PO x 5 d, then 4 tabs PO x 3d, then 3 tabs PO x 3d, then 2 tabs PO x 2d, then 1 tabs PO x 3d. 272 tablet 0    Sodium Fluoride 5000 PPM 1.1 % GEL As directed      torsemide (DEMADEX) 20 mg tablet TAKE 1 TABLET BY MOUTH EVERY DAY 90 tablet 1     No current facility-administered medications on file prior to visit.      Social History     Tobacco Use    Smoking status: Former     Current packs/day: 0.00     Average packs/day: 0.3 packs/day for 30.0 years (7.5 ttl pk-yrs)     Types: Cigarettes     Start date: 1979     Quit date: 2009     Years since quittin.2     Passive exposure: Never    Smokeless tobacco: Never   Vaping Use    Vaping status: Never Used   Substance and Sexual Activity    Alcohol use: Not Currently     Alcohol/week: 1.0 standard drink of alcohol     Types: 1 Glasses of wine per week    Drug use: Never    Sexual activity: Not Currently     Partners: Male     Birth control/protection: Post-menopausal        Objective   /78 (BP Location: Left arm, Patient Position: Sitting, Cuff Size: Large)   Pulse 98   Temp (!) 95.8 °F (35.4 °C) (Temporal)   Resp 20   Ht 5' (1.524 m)   Wt 101 kg (223 lb)   SpO2 95%   BMI 43.55 kg/m²     Physical Exam  Vitals reviewed.   Constitutional:       General: She is not in acute distress.     Appearance: She is not ill-appearing, toxic-appearing or diaphoretic.   HENT:      Head: Normocephalic and atraumatic.      Nose: Nose normal.   Eyes:      General:         Right eye: No discharge.         Left eye: No discharge.   Cardiovascular:      Rate and Rhythm: Normal rate.   Pulmonary:      Effort: Pulmonary effort is normal. No respiratory distress.   Abdominal:      General: There is no distension.      Comments: Round abdomen   Musculoskeletal:         General: Tenderness (palpation along the right achilles tendon) present. No deformity or signs of injury.       Right lower leg: Edema (non-pitting, particularly around the lateral malleoulus, no signs of trauma, erythema, warmth or skin wounds to the right leg or ankle. no pain on palpation along the medial or lateral malleolus.) present.      Left lower leg: No edema.   Skin:     General: Skin is warm and dry.      Coloration: Skin is not jaundiced or pale.      Findings: No bruising, erythema, lesion or rash.   Neurological:      General: No focal deficit present.      Mental Status: She is alert. Mental status is at baseline.   Psychiatric:         Mood and Affect: Mood normal.         Behavior: Behavior normal.         Thought Content: Thought content normal.         Judgment: Judgment normal.         Recent labs:  Lab Results   Component Value Date/Time    SODIUM 141 03/25/2025 06:16 AM    SODIUM 142 12/09/2020 07:36 AM    SODIUM 144 12/07/2020 08:34 AM    K 3.5 03/25/2025 06:16 AM    K 4.7 12/09/2020 07:36 AM    K 4.4 12/07/2020 08:34 AM    BUN 29 (H) 03/25/2025 06:16 AM    BUN 23 12/09/2020 07:36 AM    BUN 20 12/07/2020 08:34 AM    CREATININE 1.68 (H) 03/25/2025 06:16 AM    CREATININE 1.51 (H) 12/07/2020 08:34 AM    GLUC 98 03/25/2025 06:16 AM    GLUC 106 (H) 12/09/2020 07:36 AM    GLUC 104 (H) 12/07/2020 08:34 AM    CALCIUM 9.0 03/25/2025 06:16 AM    CALCIUM 9.4 12/07/2020 08:34 AM    AST 13 03/24/2025 12:23 PM    AST 14 12/07/2020 08:34 AM    ALT 23 03/24/2025 12:23 PM    ALT 18 12/07/2020 08:34 AM    ALB 3.6 03/24/2025 12:23 PM    ALB 3.8 12/07/2020 08:34 AM    TP 6.2 (L) 03/24/2025 12:23 PM    TP 6.7 12/07/2020 08:34 AM    EGFR 30 03/25/2025 06:16 AM    EGFR 36 (L) 12/07/2020 08:34 AM     Lab Results   Component Value Date/Time    HGB 10.6 (L) 03/25/2025 08:09 AM    HGB 14.0 03/10/2015 07:55 AM    WBC 9.70 03/25/2025 08:09 AM    WBC 5.01 03/10/2015 07:55 AM    PLT 99 (L) 03/25/2025 08:09 AM     03/10/2015 07:55 AM    INR 1.05 10/12/2024 05:35 AM    PTT 75 (H) 02/21/2025 07:45 AM     Lab Results    Component Value Date/Time    APR4QWDHGWXV 0.929 03/24/2025 12:23 PM    [id number] 1.510 01/15/2018 01:02 PM       Recent Imaging:  Procedure: CT chest without contrast  Result Date: 3/24/2025  Impression: No acute thoracic abnormality. Nearly resolved groundglass opacities in bilateral lower lobes, likely sequela of infection/inflammation. Consider follow-up CT chest without contrast in 3 months to ensure resolution. Subsegmental atelectasis in right middle and bilateral lower lobes. Unchanged 0.5 cm right lower lobe pulmonary nodule. Additional chronic/incidental findings as detailed above. The study was marked in EPIC for immediate notification. Workstation performed: [id number]     Procedure: XR chest 2 views  Result Date: 2/20/2025  Impression: No acute cardiopulmonary disease. Workstation performed: [id number]     Procedure: CTA chest pe study  Result Date: 2/20/2025  Impression: 1.  No evidence of acute pulmonary embolism. 2.  Scattered groundglass opacities which are overall not significantly changed from CT of 1/31/2024. These opacities are again favored to be infectious/inflammatory in etiology. Consider short interval follow-up CT chest in 3 months to ensure complete resolution. 3.  Right lower lobe pulmonary nodule measuring 5 mm, stable. Workstation performed: [id number]     Procedure: CT chest abdomen pelvis wo contrast  Result Date: 2/3/2025  Impression: 1.  Marked improvement in bilateral nodular lung opacities consistent with resolving infectious/inflammatory process. 2.  No evidence of metastasis in the chest, abdomen, or pelvis. Resident: SHERYL AYON I, the attending radiologist, have reviewed the images and agree with the final report above. Workstation performed: [id number]     Procedure: CT chest wo contrast  Result Date: 1/8/2025  Impression: Interval development of multiple bilateral lower lobe ill-defined nodular opacities. Although the appearance favors infectious or inflammatory  etiology, given the history of malignancy, short-term CT follow-up is recommended to ensure resolution. Interval development of 6 mm left upper lobe nodule which can also be reassessed at follow-up. Stable 5 mm right lower lobe pulmonary nodule. Moderate hiatal hernia. Workstation performed: [id number]     Procedure: XR chest 1 view portable  Result Date: 1/7/2025  Impression: No acute cardiopulmonary disease. Workstation performed: [id number]     Procedure: IR port check  Result Date: 12/26/2024  Impression: Impression: Successful port revision due to a flipped port and the port was sutured to the chest wall in 2 places. PLAN: The port may be used immediately. If the port continues to be difficult to access, it may need to be placed at a different site more medially.    . Workstation performed: [id number]     Procedure: IR port placement  Result Date: 12/5/2024  Impression: Successful placement of right chest port via the right internal jugular vein.  The tip terminates at the right atrium.  Port is ready for immediate use. Workstation performed: [id number]       Administrative Statements   I have spent a total time of 32 minutes in caring for this patient on the day of the visit/encounter including Risks and benefits of tx options, Instructions for management, Patient and family education, Importance of tx compliance, Risk factor reductions, Impressions, Counseling / Coordination of care, Documenting in the medical record, Reviewing/placing orders in the medical record (including tests, medications, and/or procedures), and Obtaining or reviewing history  .   Topics discussed with the patient / family include symptom assessment and management, medication review, psychosocial support, supportive listening, and anticipatory guidance.

## 2025-04-01 NOTE — ASSESSMENT & PLAN NOTE
Patient has chronic pain to this area for the past 2 months.  She also has some swelling to the right lateral malleolus which has been ongoing as well.  She does note some discomfort to the Achilles tendon.  Denies any trauma.  She notices that the discomfort is more prominent with being on her feet for long periods of time or with ambulating distances.  No calf pain, erythema, warmth, or open skin wounds to the lower extremity or ankle.

## 2025-04-01 NOTE — PATIENT INSTRUCTIONS
It was a pleasure speaking with you today.    As discussed:  -Continue your medications as prescribed.  -Continue with a bowel regimen to avoid constipation.    Follow-up in: 6-8 weeks    Please do not hesitate to call me sooner should you have any questions/concerns or needs.    Medication safety issues - Do not drive under the influence of narcotics (including opioids), watch for adverse effects including confusion / altered mental status / respiratory depression (slowed breathing), keep medications stored in a safe/locked environment, do not use alcohol while opioids or other narcotics are in your system. Do not travel with more than the minimum number of tablets or capsules required for the trip.    ER Precautions  Watch for red flag symptoms including, but not limited to fevers, chills, chest pain, shortness of breath, intractable nausea/vomiting/diarrhea, or acute intractable pain (especially if pain is new or has changed).

## 2025-04-01 NOTE — ASSESSMENT & PLAN NOTE
Following gynecology oncology for ongoing management.  Patient was on Keytruda, however, was hospitalized due to myocarditis with cardiotoxicity.  She also follows Dr. Olson of cardiac oncology for ongoing management and monitoring as well.  Her last scheduled treatment has been held due to this sequela.  Patient has a follow-up appoint with Dr. Page next week.

## 2025-04-01 NOTE — ASSESSMENT & PLAN NOTE
Psychosocial   Supportive listening provided  Normalized experience of patient/family  Provided anxiety containment     Referrals Placed / Medical Equipment Ordered  -None  -patient has existing podiatry referral for right heel/achilles tendon pain    Follow-Up Recommendations  -Follow-up with PCP and current medical specialists  -Follow-up with palliative care: 6-8 weeks

## 2025-04-01 NOTE — ASSESSMENT & PLAN NOTE
Ongoing for the past 2 months.  Intermittent. Does have pain with ambulation with weight bearing over time or overactivity, most notably to the achilles tendon.  Patient is unsure if this is also partially from the steroid that she has been on for the past month.  She does have some tremors that has been ongoing as well for at least the past month.  Potentially from the steroids as she is currently on the taper and is set to complete in the next few days.    Patient does not meet [place] ankle criteria at this time.  She has been referred to podiatrist for evaluation by her PCP who recently saw her.  Did discuss with the patient that she can try to see how she does off the steroids when she has  completed her course.  If she is still having issues or if her symptoms worsen, can consider an x-ray of the ankle to rule out any potential bony abnormalities/fractures.  Discussed that there could be also component of arthritis particular given her history of increased activity causing discomfort.

## 2025-04-02 ENCOUNTER — HOSPITAL ENCOUNTER (OUTPATIENT)
Dept: INFUSION CENTER | Facility: CLINIC | Age: 70
Discharge: HOME | End: 2025-04-02
Payer: COMMERCIAL

## 2025-04-02 DIAGNOSIS — C54.1 ENDOMETRIAL CANCER (HCC): ICD-10-CM

## 2025-04-02 DIAGNOSIS — Z45.2 ENCOUNTER FOR CENTRAL LINE CARE: Primary | ICD-10-CM

## 2025-04-02 LAB
ALBUMIN SERPL BCG-MCNC: 3.9 G/DL (ref 3.5–5)
ALP SERPL-CCNC: 105 U/L (ref 34–104)
ALT SERPL W P-5'-P-CCNC: 26 U/L (ref 7–52)
ANION GAP SERPL CALCULATED.3IONS-SCNC: 14 MMOL/L (ref 4–13)
AST SERPL W P-5'-P-CCNC: 15 U/L (ref 13–39)
BASOPHILS # BLD AUTO: 0.03 THOUSANDS/ÂΜL (ref 0–0.1)
BASOPHILS NFR BLD AUTO: 0 % (ref 0–1)
BILIRUB SERPL-MCNC: 0.47 MG/DL (ref 0.2–1)
BUN SERPL-MCNC: 33 MG/DL (ref 5–25)
CALCIUM SERPL-MCNC: 9.4 MG/DL (ref 8.4–10.2)
CHLORIDE SERPL-SCNC: 98 MMOL/L (ref 96–108)
CO2 SERPL-SCNC: 27 MMOL/L (ref 21–32)
CREAT SERPL-MCNC: 1.78 MG/DL (ref 0.6–1.3)
EOSINOPHIL # BLD AUTO: 0.03 THOUSAND/ÂΜL (ref 0–0.61)
EOSINOPHIL NFR BLD AUTO: 0 % (ref 0–6)
ERYTHROCYTE [DISTWIDTH] IN BLOOD BY AUTOMATED COUNT: 17 % (ref 11.6–15.1)
GFR SERPL CREATININE-BSD FRML MDRD: 28 ML/MIN/1.73SQ M
GLUCOSE SERPL-MCNC: 203 MG/DL (ref 65–140)
HCT VFR BLD AUTO: 36.3 % (ref 34.8–46.1)
HGB BLD-MCNC: 11.7 G/DL (ref 11.5–15.4)
IMM GRANULOCYTES # BLD AUTO: 0.08 THOUSAND/UL (ref 0–0.2)
IMM GRANULOCYTES NFR BLD AUTO: 1 % (ref 0–2)
LYMPHOCYTES # BLD AUTO: 1.37 THOUSANDS/ÂΜL (ref 0.6–4.47)
LYMPHOCYTES NFR BLD AUTO: 11 % (ref 14–44)
MAGNESIUM SERPL-MCNC: 2 MG/DL (ref 1.9–2.7)
MCH RBC QN AUTO: 32.9 PG (ref 26.8–34.3)
MCHC RBC AUTO-ENTMCNC: 32.2 G/DL (ref 31.4–37.4)
MCV RBC AUTO: 102 FL (ref 82–98)
MONOCYTES # BLD AUTO: 0.71 THOUSAND/ÂΜL (ref 0.17–1.22)
MONOCYTES NFR BLD AUTO: 6 % (ref 4–12)
NEUTROPHILS # BLD AUTO: 10.6 THOUSANDS/ÂΜL (ref 1.85–7.62)
NEUTS SEG NFR BLD AUTO: 82 % (ref 43–75)
NRBC BLD AUTO-RTO: 0 /100 WBCS
PLATELET # BLD AUTO: 204 THOUSANDS/UL (ref 149–390)
PMV BLD AUTO: 11.8 FL (ref 8.9–12.7)
POTASSIUM SERPL-SCNC: 4.1 MMOL/L (ref 3.5–5.3)
PROT SERPL-MCNC: 6.8 G/DL (ref 6.4–8.4)
RBC # BLD AUTO: 3.56 MILLION/UL (ref 3.81–5.12)
SODIUM SERPL-SCNC: 139 MMOL/L (ref 135–147)
WBC # BLD AUTO: 12.82 THOUSAND/UL (ref 4.31–10.16)

## 2025-04-02 PROCEDURE — 80053 COMPREHEN METABOLIC PANEL: CPT

## 2025-04-02 PROCEDURE — 83735 ASSAY OF MAGNESIUM: CPT

## 2025-04-02 PROCEDURE — 85025 COMPLETE CBC W/AUTO DIFF WBC: CPT

## 2025-04-02 NOTE — PROGRESS NOTES
Patient arrives for bloodwork. Port flushed and labs drawn without issue. Confirmed next appt for 4/9 @ 1000. Declined AVS.

## 2025-04-09 ENCOUNTER — HOSPITAL ENCOUNTER (OUTPATIENT)
Dept: INFUSION CENTER | Facility: CLINIC | Age: 70
Discharge: HOME | End: 2025-04-09
Payer: COMMERCIAL

## 2025-04-09 DIAGNOSIS — Z45.2 ENCOUNTER FOR CENTRAL LINE CARE: Primary | ICD-10-CM

## 2025-04-09 DIAGNOSIS — C54.1 ENDOMETRIAL CANCER (HCC): ICD-10-CM

## 2025-04-09 LAB
ALBUMIN SERPL BCG-MCNC: 3.8 G/DL (ref 3.5–5)
ALP SERPL-CCNC: 94 U/L (ref 34–104)
ALT SERPL W P-5'-P-CCNC: 22 U/L (ref 7–52)
ANION GAP SERPL CALCULATED.3IONS-SCNC: 12 MMOL/L (ref 4–13)
AST SERPL W P-5'-P-CCNC: 15 U/L (ref 13–39)
BASOPHILS # BLD AUTO: 0.03 THOUSANDS/ÂΜL (ref 0–0.1)
BASOPHILS NFR BLD AUTO: 1 % (ref 0–1)
BILIRUB SERPL-MCNC: 0.52 MG/DL (ref 0.2–1)
BUN SERPL-MCNC: 33 MG/DL (ref 5–25)
CALCIUM SERPL-MCNC: 9.1 MG/DL (ref 8.4–10.2)
CANCER AG125 SERPL-ACNC: 13 U/ML (ref 0–35)
CHLORIDE SERPL-SCNC: 97 MMOL/L (ref 96–108)
CO2 SERPL-SCNC: 29 MMOL/L (ref 21–32)
CREAT SERPL-MCNC: 1.79 MG/DL (ref 0.6–1.3)
EOSINOPHIL # BLD AUTO: 0.03 THOUSAND/ÂΜL (ref 0–0.61)
EOSINOPHIL NFR BLD AUTO: 1 % (ref 0–6)
ERYTHROCYTE [DISTWIDTH] IN BLOOD BY AUTOMATED COUNT: 15.4 % (ref 11.6–15.1)
GFR SERPL CREATININE-BSD FRML MDRD: 28 ML/MIN/1.73SQ M
GLUCOSE SERPL-MCNC: 207 MG/DL (ref 65–140)
HCT VFR BLD AUTO: 35.2 % (ref 34.8–46.1)
HGB BLD-MCNC: 11.9 G/DL (ref 11.5–15.4)
IMM GRANULOCYTES # BLD AUTO: 0.02 THOUSAND/UL (ref 0–0.2)
IMM GRANULOCYTES NFR BLD AUTO: 0 % (ref 0–2)
LYMPHOCYTES # BLD AUTO: 1.24 THOUSANDS/ÂΜL (ref 0.6–4.47)
LYMPHOCYTES NFR BLD AUTO: 22 % (ref 14–44)
MAGNESIUM SERPL-MCNC: 2.1 MG/DL (ref 1.9–2.7)
MCH RBC QN AUTO: 33.5 PG (ref 26.8–34.3)
MCHC RBC AUTO-ENTMCNC: 33.8 G/DL (ref 31.4–37.4)
MCV RBC AUTO: 99 FL (ref 82–98)
MONOCYTES # BLD AUTO: 0.66 THOUSAND/ÂΜL (ref 0.17–1.22)
MONOCYTES NFR BLD AUTO: 12 % (ref 4–12)
NEUTROPHILS # BLD AUTO: 3.77 THOUSANDS/ÂΜL (ref 1.85–7.62)
NEUTS SEG NFR BLD AUTO: 64 % (ref 43–75)
NRBC BLD AUTO-RTO: 0 /100 WBCS
PLATELET # BLD AUTO: 188 THOUSANDS/UL (ref 149–390)
PMV BLD AUTO: 11.6 FL (ref 8.9–12.7)
POTASSIUM SERPL-SCNC: 3.4 MMOL/L (ref 3.5–5.3)
PROT SERPL-MCNC: 6.7 G/DL (ref 6.4–8.4)
RBC # BLD AUTO: 3.55 MILLION/UL (ref 3.81–5.12)
SODIUM SERPL-SCNC: 138 MMOL/L (ref 135–147)
WBC # BLD AUTO: 5.75 THOUSAND/UL (ref 4.31–10.16)

## 2025-04-09 PROCEDURE — 86304 IMMUNOASSAY TUMOR CA 125: CPT

## 2025-04-09 PROCEDURE — 80053 COMPREHEN METABOLIC PANEL: CPT

## 2025-04-09 PROCEDURE — 83735 ASSAY OF MAGNESIUM: CPT

## 2025-04-09 PROCEDURE — 85025 COMPLETE CBC W/AUTO DIFF WBC: CPT

## 2025-04-09 NOTE — PROGRESS NOTES
Pt to clinic for lab draw from port. Pt tolerated without complications. Next appointment 5/28 at 9:30

## 2025-04-11 ENCOUNTER — HOSPITAL ENCOUNTER (OUTPATIENT)
Dept: CT IMAGING | Facility: HOSPITAL | Age: 70
Discharge: HOME | End: 2025-04-11
Payer: COMMERCIAL

## 2025-04-11 DIAGNOSIS — C54.1 ENDOMETRIAL CANCER (HCC): ICD-10-CM

## 2025-04-11 PROCEDURE — 74176 CT ABD & PELVIS W/O CONTRAST: CPT

## 2025-04-15 ENCOUNTER — TELEPHONE (OUTPATIENT)
Age: 70
End: 2025-04-15

## 2025-04-15 NOTE — TELEPHONE ENCOUNTER
Pt called to say she is experiencing pain in both of her knees again and would like a referral to ortho. Please let her know who Dr Mayorga recommends and their phone number.

## 2025-04-15 NOTE — TELEPHONE ENCOUNTER
Patient was called and advised of PCP's recommendation Patient stated she will call Dr. Hoff's office.

## 2025-04-16 ENCOUNTER — OFFICE VISIT (OUTPATIENT)
Dept: GYNECOLOGIC ONCOLOGY | Facility: CLINIC | Age: 70
End: 2025-04-16
Payer: COMMERCIAL

## 2025-04-16 ENCOUNTER — TELEPHONE (OUTPATIENT)
Dept: GYNECOLOGIC ONCOLOGY | Facility: CLINIC | Age: 70
End: 2025-04-16

## 2025-04-16 ENCOUNTER — DOCUMENTATION (OUTPATIENT)
Dept: HEMATOLOGY ONCOLOGY | Facility: CLINIC | Age: 70
End: 2025-04-16

## 2025-04-16 VITALS
DIASTOLIC BLOOD PRESSURE: 76 MMHG | HEART RATE: 107 BPM | TEMPERATURE: 98.4 F | WEIGHT: 225 LBS | OXYGEN SATURATION: 96 % | BODY MASS INDEX: 43.94 KG/M2 | SYSTOLIC BLOOD PRESSURE: 118 MMHG

## 2025-04-16 DIAGNOSIS — I42.7 CARDIOMYOPATHY SECONDARY TO DRUG (HCC): ICD-10-CM

## 2025-04-16 DIAGNOSIS — C54.1 ENDOMETRIAL CANCER (HCC): Primary | ICD-10-CM

## 2025-04-16 DIAGNOSIS — I26.99 OTHER PULMONARY EMBOLISM WITHOUT ACUTE COR PULMONALE, UNSPECIFIED CHRONICITY (HCC): ICD-10-CM

## 2025-04-16 DIAGNOSIS — M25.562 PAIN IN BOTH KNEES, UNSPECIFIED CHRONICITY: Primary | ICD-10-CM

## 2025-04-16 DIAGNOSIS — M25.561 PAIN IN BOTH KNEES, UNSPECIFIED CHRONICITY: Primary | ICD-10-CM

## 2025-04-16 DIAGNOSIS — N18.32 STAGE 3B CHRONIC KIDNEY DISEASE (HCC): ICD-10-CM

## 2025-04-16 DIAGNOSIS — E66.01 MORBID OBESITY WITH BMI OF 40.0-44.9, ADULT (HCC): ICD-10-CM

## 2025-04-16 PROBLEM — N95.0 PMB (POSTMENOPAUSAL BLEEDING): Status: RESOLVED | Noted: 2024-08-11 | Resolved: 2025-04-16

## 2025-04-16 PROCEDURE — G2211 COMPLEX E/M VISIT ADD ON: HCPCS | Performed by: OBSTETRICS & GYNECOLOGY

## 2025-04-16 PROCEDURE — 99214 OFFICE O/P EST MOD 30 MIN: CPT | Performed by: OBSTETRICS & GYNECOLOGY

## 2025-04-16 PROCEDURE — 99459 PELVIC EXAMINATION: CPT | Performed by: OBSTETRICS & GYNECOLOGY

## 2025-04-16 NOTE — PROGRESS NOTES
Referral to radiation oncology received from Dr. Page.  Chart reviewed by oncology nurse navigator at this time.      Diagnosis: Endometrial cancer.  Consideration for adjuvant pelvic RT.  Last chemo was 3/7/25.  No known previous RT or implanted devices.  After review of chart, instructions for scheduling added to referral and sent to be scheduled as advised.

## 2025-04-16 NOTE — TELEPHONE ENCOUNTER
Called and spoke with patient about coming in earlier to appointment. Patient confirmed 1:45 pm appointment with Dr. Page in [place].

## 2025-04-16 NOTE — PROGRESS NOTES
Name: Cherelle Dash      : 1955      MRN: [id number]  Encounter Provider: Evin Page MD  Encounter Date: 2025   Encounter department: [facility] [place]  :  Assessment & Plan  Endometrial cancer (HCC)  69-year-old with stage III C1 grade 1 endometrial cancer with positive ITC in periaortic lymph nodes, extensive LVSI, p53 wild-type, pMMR.  She received 5 cycles of carboplatin, Taxol last 3/7/2025.  Neulasta growth factor support was needed for chemotherapy-induced neutropenia.  I reviewed CBC, CMP, CT abdomen pelvis images, MRD testing.  She is clinically and radiologically without evidence of disease recurrence.  Her performance status is 1.  1.  Follow-up CT chest abdomen pelvis without IV contrast in 3 months  2.  Referral to radiation oncology to consider adjuvant pelvic radiotherapy.  3.  Continue MRD testing  4.  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  Pembrolizumab was stopped.  Last dose was 2025.  She received a total of 4 cycles.  She has completed her last prednisone taper.  Echocardiogram 3/2025 with EF 50%.  She follows with cardio oncology.  1.  Follow-up with cardio oncology       Other pulmonary embolism without acute cor pulmonale, unspecified chronicity (HCC)  Continue Eliquis, she has a follow-up appointment with IR to remove the filter.       Stage 3b chronic kidney disease (HCC)  Lab Results   Component Value Date    EGFR 28 2025    EGFR 28 2025    EGFR 30 2025    CREATININE 1.79 (H) 2025    CREATININE 1.78 (H) 2025    CREATININE 1.68 (H) 2025     Continue to monitor.  Serum creatinine has been stable.       Morbid obesity with BMI of 40.0-44.9, adult (HCC)  BMI 44 kg/m².  Continue to monitor.                History of Present Illness   Reason for Visit / CC: Follow-up after completion of adjuvant chemotherapy/immunotherapy   Cherelle Dash is a 69 y.o. female   History of Present Illness  Who returns after completion of adjuvant chemotherapy for stage III C1 endometrial cancer.  She received 5 cycles of carboplatin and Taxol and 4 cycles of pembrolizumab.  Treatment was complicated by immunotherapy induced cardiomyopathy requiring high-dose steroids for suspected immunotherapy induced pneumonitis.  Most recent EF from echocardiogram 3/24/2025 is 50%.  She follows closely with cardio oncology.  She has completed her most recent prednisone taper.  She has stage IIIb chronic kidney disease and serum creatinine is stable at 1.78 mg/dL.  She completed 5 cycles of treatment and CT abdomen pelvis for 1125 did not reveal any evidence of measurable disease.  MRD testing from 3/2025 was negative.   4/9/2025 is 13 units/mL.  Recent BNP is 155.  She had a CT chest 3/24/2025 that did not reveal any evidence of metastatic disease.  There were resolving groundglass opacities and a 5 mm right lower lobe pulmonary nodule.  She is overall doing well.  She is performing her actives of daily living.  She does not have vaginal bleeding or abdominal pain.  Pertinent Medical History   Stage IIIb chronic kidney disease, morbid obesity with BMI of 44 kg/m², immunotherapy induced cardiomyopathy, pulmonary embolism       Oncology History   Cancer Staging   Endometrial cancer (HCC)  Staging form: Corpus Uteri - Carcinoma, AJCC 8th Edition  - Pathologic stage from 10/11/2024: FIGO Stage IIIC1 (pT2, pN1mi(sn), cM0) - Signed by Evin Page MD on 11/12/2024  Method of lymph node assessment: [place] lymph node biopsy  Histologic grade (G): G1  Histologic grading system: 3 grade system  Lymph-vascular invasion (LVI): BOTH lymphatic and small vessel AND venous (large vessel) invasion  Peritoneal cytology results:  Negative  Pelvic brandi status: Positive  Oncology History   Endometrial cancer (HCC)   8/11/2024 Initial Diagnosis    Endometrial cancer (HCC)     10/11/2024 -  Cancer Staged    Staging form: Corpus Uteri - Carcinoma, AJCC 8th Edition  - Pathologic stage from 10/11/2024: FIGO Stage IIIC1 (pT2, pN1mi(sn), cM0) - Signed by Evin Page MD on 11/12/2024  Method of lymph node assessment: [place] lymph node biopsy  Histologic grade (G): G1  Histologic grading system: 3 grade system  Lymph-vascular invasion (LVI): BOTH lymphatic and small vessel AND venous (large vessel) invasion  Peritoneal cytology results: Negative  Pelvic brandi status: Positive       10/11/2024 Surgery    Robotic assisted total laparoscopic hysterectomy, bilateral salpingo-oophorectomy, pelvic and periaortic sentinel lymph node biopsies, vulvar biopsy  1.  Grade 1, cervical stromal invasion to a depth of 12 out of 15 mm, extensive LVSI, p53 wild-type, pMMR, HER2 1+,  washings negative, 0.7 mm metastatic focus and pelvic lymph node, ITC identified and periaortic lymph nodes.     12/6/2024 -  Chemotherapy    Taxol 135 mg/m2, carboplatin AUC 5 and pembrolizumab 200 mg IV every 21 days.    Pembro held for cycle 3 d/t grade 2 drug-induced pneumonitis.        2/2025 Adverse Reaction    Immunotherapy-related (pembro) pneumonitis and myocarditis          Review of Systems   Constitutional:  Negative for activity change and unexpected weight change.   HENT: Negative.     Eyes: Negative.    Respiratory: Negative.     Cardiovascular: Negative.    Gastrointestinal:  Negative for abdominal distention and abdominal pain.   Endocrine: Negative.    Genitourinary:  Negative for pelvic pain and vaginal bleeding.   Musculoskeletal: Negative.    Skin: Negative.    Allergic/Immunologic: Negative.    Neurological: Negative.    Hematological: Negative.    Psychiatric/Behavioral: Negative.      A complete review of systems is negative other than that noted above  in the HPI.  Current Outpatient Medications on File Prior to Visit   Medication Sig Dispense Refill    apixaban (Eliquis) 5 mg Take 1 tablet (5 mg total) by mouth 2 (two) times a day 180 tablet 1    chlorhexidine (PERIDEX) 0.12 % solution USE HALF OUNCE TWICE A DAY RINSE FOR 30 SECONDS BY MOUTH THEN EXPECTORATE DO NOT SWALLOW      Cholecalciferol (VITAMIN D) 2000 units CAPS Take 1 capsule by mouth daily      clobetasol (TEMOVATE) 0.05 % ointment Apply topically 2 (two) times a week 60 g 3    colchicine (COLCRYS) 0.6 mg tablet Take 1 tablet (0.6 mg total) by mouth daily 30 tablet 1    CRANBERRY PO Take by mouth      famotidine (PEPCID) 20 mg tablet TAKE 1 TABLET BY MOUTH TWICE  DAILY 180 tablet 3    Glucosamine-Chondroit-Vit C-Mn (GLUCOSAMINE 1500 COMPLEX PO) Take by mouth in the morning      Ivermectin 1 % CREA       lidocaine-prilocaine (EMLA) cream Apply to PORT 30 min prior to labs and chemo 30 g 1    LORazepam (ATIVAN) 1 mg tablet Take 1 tablet (1 mg total) by mouth every 8 (eight) hours as needed (nausea or anxiety) 20 tablet 0    lurasidone (LATUDA) 20 mg tablet TAKE 1 TABLET BY MOUTH DAILY  WITH BREAKFAST 30 tablet 11    magnesium Oxide (MAG-OX) 400 mg TABS TAKE 1 TABLET BY MOUTH EVERY DAY 90 tablet 1    metoprolol succinate (TOPROL-XL) 50 mg 24 hr tablet Take 1 tablet (50 mg total) by mouth every 12 (twelve) hours 60 tablet 0    Mirabegron ER (Myrbetriq) 50 MG TB24 TAKE 1 TABLET BY MOUTH IN THE  MORNING 90 tablet 1    Multiple Vitamin (MULTI-VITAMIN DAILY) TABS Take by mouth daily      polyethylene glycol (GLYCOLAX) 17 GM/SCOOP powder Take 17 g by mouth 2 (two) times a day 238 g 1    Sodium Fluoride 5000 PPM 1.1 % GEL As directed      torsemide (DEMADEX) 20 mg tablet TAKE 1 TABLET BY MOUTH EVERY DAY 90 tablet 1    [DISCONTINUED] predniSONE 10 mg tablet Take 10 tablets PO QD x 7 days, then 9 tablets PO x 7d, then 8 tablets PO x 7d, then 6 tabs PO x 5d, then 5 tabs PO x 5 d, then 4 tabs PO x 3d, then 3 tabs PO  x 3d, then 2 tabs PO x 2d, then 1 tabs PO x 3d. 272 tablet 0     No current facility-administered medications on file prior to visit.         Objective   /76 (BP Location: Right arm, Patient Position: Sitting, Cuff Size: Large)   Pulse (!) 107   Temp 98.4 °F (36.9 °C) (Temporal)   Wt 102 kg (225 lb)   SpO2 96%   BMI 43.94 kg/m²     Body mass index is 43.94 kg/m².  Pain Screening:  Pain Score: 0-No pain  ECOG   1  Physical Exam  Vitals reviewed. Exam conducted with a chaperone present.   Constitutional:       General: She is not in acute distress.     Appearance: Normal appearance. She is well-developed. She is obese. She is not ill-appearing, toxic-appearing or diaphoretic.   HENT:      Head: Normocephalic and atraumatic.   Eyes:      General: No scleral icterus.     Extraocular Movements: Extraocular movements intact.      Conjunctiva/sclera: Conjunctivae normal.   Neck:      Thyroid: No thyromegaly.   Pulmonary:      Effort: Pulmonary effort is normal.   Abdominal:      General: There is no distension.      Palpations: Abdomen is soft. There is no mass.      Tenderness: There is no abdominal tenderness. There is no guarding or rebound.      Hernia: A hernia is present.      Comments: Umbilical hernia   Genitourinary:     Comments: The external female genitalia is atrophic and has changes consistent with lichen sclerosis. The bartholin's, uretheral and skenes glands are normal. The urethral meatus is normal (midline with no lesions). Anus without fissure or lesion. Speculum exam reveals a grossly normal vagina. No masses, lesions,discharge or bleeding. No significant cystocele or rectocele noted. Bimanual exam notes a surgical absent cervix, uterus and adnexal structures. No masses or fullness. Bladder is without fullness, mass or tenderness.    Musculoskeletal:         General: No swelling or tenderness.      Cervical back: Normal range of motion and neck supple.      Right lower leg: No edema.       Left lower leg: No edema.   Lymphadenopathy:      Cervical: No cervical adenopathy.   Skin:     General: Skin is warm and dry.      Coloration: Skin is not jaundiced or pale.      Findings: No lesion or rash.   Neurological:      General: No focal deficit present.      Mental Status: She is alert and oriented to person, place, and time. Mental status is at baseline.      Cranial Nerves: No cranial nerve deficit.      Motor: No weakness.      Gait: Gait normal.   Psychiatric:         Mood and Affect: Mood normal.         Behavior: Behavior normal.         Thought Content: Thought content normal.         Judgment: Judgment normal.       Physical Exam       Results    Labs: I have reviewed pertinent labs.   Lab Results   Component Value Date/Time     13.0 04/09/2025 10:03 AM     Lab Results   Component Value Date/Time    Potassium 3.4 (L) 04/09/2025 10:03 AM    Chloride 97 04/09/2025 10:03 AM    CO2 29 04/09/2025 10:03 AM    BUN 33 (H) 04/09/2025 10:03 AM    Creatinine 1.79 (H) 04/09/2025 10:03 AM    Glucose, Fasting 174 (H) 03/20/2025 07:37 AM    Calcium 9.1 04/09/2025 10:03 AM    Corrected Calcium 8.8 03/07/2025 10:19 AM    AST 15 04/09/2025 10:03 AM    ALT 22 04/09/2025 10:03 AM    Alkaline Phosphatase 94 04/09/2025 10:03 AM    eGFR 28 04/09/2025 10:03 AM     Lab Results   Component Value Date/Time    WBC 5.75 04/09/2025 10:03 AM    Hemoglobin 11.9 04/09/2025 10:03 AM    Hematocrit 35.2 04/09/2025 10:03 AM    MCV 99 (H) 04/09/2025 10:03 AM    Platelets 188 04/09/2025 10:03 AM     Lab Results   Component Value Date/Time    Absolute Neutrophils 3.77 04/09/2025 10:03 AM        Trend:  Lab Results   Component Value Date     13.0 04/09/2025     18.3 03/19/2025     48.3 (H) 02/26/2025     10.3 02/05/2025     9.8 01/15/2025     8.8 12/24/2024     8.7 12/04/2024     54.2 (H) 11/14/2024     45.2 (H) 08/14/2024       Radiology Results Review: I personally reviewed the  following image studies in PACS and associated radiology reports: CT chest and CT abdomen/pelvis. My interpretation of the radiology images/reports is: No evidence of metastatic disease, improving groundglass opacities in the chest.  I concur with the radiologist's opinion.  Other Study Results Review : Other studies reviewed include: Echocardiogram

## 2025-04-16 NOTE — ASSESSMENT & PLAN NOTE
Lab Results   Component Value Date    EGFR 28 04/09/2025    EGFR 28 04/02/2025    EGFR 30 03/25/2025    CREATININE 1.79 (H) 04/09/2025    CREATININE 1.78 (H) 04/02/2025    CREATININE 1.68 (H) 03/25/2025     Continue to monitor.  Serum creatinine has been stable.

## 2025-04-16 NOTE — ASSESSMENT & PLAN NOTE
69-year-old with stage III C1 grade 1 endometrial cancer with positive ITC in periaortic lymph nodes, extensive LVSI, p53 wild-type, pMMR.  She received 5 cycles of carboplatin, Taxol last 3/7/2025.  Neulasta growth factor support was needed for chemotherapy-induced neutropenia.  I reviewed CBC, CMP, CT abdomen pelvis images, MRD testing.  She is clinically and radiologically without evidence of disease recurrence.  Her performance status is 1.  1.  Follow-up CT chest abdomen pelvis without IV contrast in 3 months  2.  Referral to radiation oncology to consider adjuvant pelvic radiotherapy.  3.  Continue MRD testing  4.  Return in 3 months for evaluation  Orders:    Ambulatory referral to Radiation Oncology; Future    CT chest abdomen pelvis wo contrast; Future

## 2025-04-16 NOTE — ASSESSMENT & PLAN NOTE
Likely secondary to myocarditis from pembrolizumab.  Pembrolizumab was stopped.  Last dose was 2/14/2025.  She received a total of 4 cycles.  She has completed her last prednisone taper.  Echocardiogram 3/2025 with EF 50%.  She follows with cardio oncology.  1.  Follow-up with cardio oncology

## 2025-04-17 DIAGNOSIS — I40.8 OTHER ACUTE MYOCARDITIS: ICD-10-CM

## 2025-04-18 ENCOUNTER — CONSULT (OUTPATIENT)
Dept: RADIATION ONCOLOGY | Facility: HOSPITAL | Age: 70
End: 2025-04-18
Attending: OBSTETRICS & GYNECOLOGY
Payer: COMMERCIAL

## 2025-04-18 ENCOUNTER — TELEPHONE (OUTPATIENT)
Age: 70
End: 2025-04-18

## 2025-04-18 VITALS
DIASTOLIC BLOOD PRESSURE: 84 MMHG | TEMPERATURE: 97.1 F | OXYGEN SATURATION: 98 % | SYSTOLIC BLOOD PRESSURE: 128 MMHG | HEART RATE: 100 BPM | RESPIRATION RATE: 18 BRPM

## 2025-04-18 DIAGNOSIS — C54.1 ENDOMETRIAL CANCER (HCC): ICD-10-CM

## 2025-04-18 PROCEDURE — 77263 THER RADIOLOGY TX PLNG CPLX: CPT | Performed by: RADIOLOGY

## 2025-04-18 PROCEDURE — 99205 OFFICE O/P NEW HI 60 MIN: CPT | Performed by: RADIOLOGY

## 2025-04-18 RX ORDER — COLCHICINE 0.6 MG/1
0.6 TABLET ORAL DAILY
Qty: 90 TABLET | Refills: 1 | Status: SHIPPED | OUTPATIENT
Start: 2025-04-18 | End: 2025-04-22 | Stop reason: SDUPTHER

## 2025-04-18 NOTE — ASSESSMENT & PLAN NOTE
Ms. Dash is a 69-year-old female who initially presented in August of last year with postmenopausal bleeding.  Clinical examination was concerning for a cervical mass.  An MRI of the pelvis was performed on 8/6/2024 revealing an infiltrative appearing cervical tumor measuring 2.3 x 4.3 x 4.1 cm.  There was no obvious extension into the vagina or definite spread beyond the serosa.  No lymph nodes met size criteria for involvement but there was an 8 mm early enhancing left external iliac lymph node.  Cervical and endometrial biopsies were performed on 8/23/2024 revealing adenocarcinoma with mucinous features from both the endometrial curettings and the cervical biopsies.  On 10/11/2024 she underwent robotic assisted total laparoscopic hysterectomy, bilateral salpingo-oophorectomy, pelvic and para-aortic sentinel lymph node biopsy and vulvar biopsy.  Final pathology revealed grade 1 endometrioid carcinoma measuring 3.8 cm in greatest dimension with the tumor primarily located in the endocervical canal extending into the myometrium to a depth of 5 mm with a total myometrial thickness of 20 mm.  There was cervical stromal invasion of 12 mm with the cervical stromal thickness of 15 mm.  There was no serosal involvement.  Extensive lymphovascular invasion was present.  All margins were negative.  5 pelvic lymph nodes were sampled with a micrometastasis and a right pelvic node and isolated tumor cells in a left pelvic node.  2 para-aortic nodes were sampled 1 of which contained isolated tumor cells.  Her final stage was pT2 N1 jim, FIGO IIIC1i, pMMR, p53 wild-type.  She subsequently initiated systemic therapy with carbo, Taxol and pembrolizumab.  Her course was complicated by immunotherapy induced cardiomyopathy and required high-dose steroids for suspected immunotherapy induced pneumonitis.  She received 5 cycles total of carboplatin and Taxol and 4 cycles of pembrolizumab.  She underwent repeat CT of the chest abdomen and  pelvis which was negative for evidence of metastatic disease.  Her  is within normal limits.  She has been referred to our department to discuss adjuvant radiation therapy.    The role of adjuvant radiation was discussed at length with the patient and her .  Given the extensive cervical involvement, extensive lymphovascular invasion, and positive pelvic lymph node, we have recommended a course of adjuvant pelvic radiation therapy with 3 fractions of high-dose-rate vaginal brachytherapy boost.  There were isolated tumor cells identified in one of the sentinel para-aortic nodes but I do not believe the presence of ITC's justifies the added toxicity of para-aortic radiation.  It was explained that the purpose of treatment is to reduce the risk of pelvic and vaginal recurrence, but radiation has not been shown to increase overall survival.  The associated risks and toxicities of treatment were discussed with the patient and her  in detail, including, but not limited to, fatigue, increased frequency of urination, diarrhea, rectal bleeding, hematuria, vaginal stenosis/dryness, vaginal cuff dehiscence, bowel injury, and long-term radiation cystitis/proctitis.    The patient has signed consent and CT planning has been scheduled.  She has not entirely committed to proceeding with treatment and would like to consider further whether or not she would like to proceed.  If the patient is hesitant to proceed with pelvic radiation, given the extensive cervical involvement, at least receiving vaginal brachytherapy alone would be both well-tolerated and certainly reduce her risk of vaginal recurrence.  It was explained that our recommendation is to proceed with both pelvic and vaginal radiation but vaginal brachytherapy alone would also be a reasonable option to consider.    Orders:    Ambulatory referral to Radiation Oncology

## 2025-04-18 NOTE — PROGRESS NOTES
Cherelle Dash 1955 is a 69 y.o. female with stage III C1 grade 1 endometrial cancer with positive ITC in periaortic lymph nodes, extensive LVSI, p53 wild-type, pMMR. She is referred by Dr. Page for consideration of adjuvant pelvic RT & presents today for initial consult.     Presented with PMB, ultrasound and MRI evidence of a 4.1 x 4.3 cm cervical mass, 8 mm endometrium, bilateral simple appearing ovarian cysts.  She underwent D&C, multiple cervical biopsies and EUA on 8/23/24. She underwent robotic total laparoscopic hysterectomy, bilateral salpingo-oophorectomy, pelvic and periaortic sentinel lymph node biopsy, vulvar biopsy 10/11/24. She received 5 cycles of carboplatin, Taxol last 3/7/2025. 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 Differential diagnoses include a neoplasm or large cervical polyp. Tissue sampling   versus further assessment with MRI pelvis with and without contrast is recommended  Diffuse thickening of the endometrial stripe with apparent 9 mm hyperechoic vascular lesion in the fundal endometrial stripe. Direct visualization and tissue sampling is recommended.  A 3.0 cm right adnexal cyst, and 2.9 cm left adnexal cyst, differential diagnosis include ovarian cysts, hydrosalpinx or paraovarian cysts. Follow-up with pelvic ultrasound in 1 year versus attention on follow-up MRI pelvis is recommended.  A few dependent debris in the partially visualized urinary bladder, nonspecific but can be secondary to acute cystitis. Clinical correlation is recommended.    8/6/24 MRI pelvis female wo and w contrast  IMPRESSION:  4.3 cm previously reported  cervical tumor, as detailed above, compatible with cervical cancer.  Nonenlarged but early enhancing left external iliac lymph node, possibly metastatic.  Abnormal endometrium that may be secondary to the cervical tumor, or reflect primary dysplasia. No extension beyond the uterus.  Bilateral unilocular simple ovarian cysts    8/23/24 D&C - cervical biopsies  Final Diagnosis   A. Endometrial curettings:  - Adenocarcinoma with mucinous features. Please see note.      B. Endometrial curettings:  - Adenocarcinoma with mucinous features. Please see note.      C. Cervical biopsies:  - Invasive adenocarcinoma with mucinous features. Please see note.      9/5/24 CTA ED chest pe study  IMPRESSION:  Bilateral pulmonary emboli.  On the left, nonocclusive upper and lower lobar branches  On the right, occlusive proximal truncus anterior and partially occlusive interlobular artery.  No CT evidence of right heart strain.  Pulmonary nodules, measuring up to 6 mm in the right lower lobe.  Not well evaluated on comparison November 2023 exam secondary to motion artifact at that time.  Considering smoking history, recommend follow-up chest CT in 12 months.  Moderate hiatal hernia.    9/18/24 Gynecologic Oncology Treatment Planning Conference Case Review  Recommendations:   Consider IVC filter with initial surgical management  Consider neoadjuvant radiation or chemotherapy if not appropriate for initial surgical management due to recent PE     10/1/24 CT chest abdomen pelvis wo contrast  IMPRESSION:  Stable 5 mm right lower lobe pulmonary nodule.  No CT evidence of metastatic disease within the abdomen or pelvis.  Prominent left ovary, consider further characterization with pelvic ultrasound.  Moderate hiatal hernia.    10/11/24 Surgery - Dr. Downing  Robotic assisted total laparoscopic hysterectomy, bilateral salpingo-oophorectomy, pelvic and periaortic sentinel lymph node biopsies, vulvar biopsy   Final Diagnosis   A. Left pelvic  sentinel lymph node #1, excision:  -One lymph node, negative for carcinoma (0/1), see comment.     Comment: CK AE1/AE3 evaluated on block A1 and is negative, supporting above findings.       B. Left pelvic sentinel lymph node #2, see comment:  -Isolated tumor cells present in one lymph node, see comment.     Comment: Rare positive CK AE1/AE3 cells (B5-7, B5-8), consistent with isolated tumor cells. ER attempted but non-contributory.      C. Right pelvic sentinel lymph node #1, excision:  -Isolated tumor cell present in one lymph node, see comment.     Comment: Rare CK AE1/AE3 positive atypical cell (C1-3, C1-4), consistent with isolated tumor cell present.      D. Right pelvic sentinel lymph node #2, excision:  -One lymph node, negative for carcinoma (0/1), see comment.     Comment: CK AE1/AE3 evaluated on block D1 and is negative, supporting above findings.        E. Right pelvic sentinel lymph node #3, excision:  -Metastatic carcinoma involving 1 of 1 lymph node (1/1), see comment.  -Metastatic focus measures 0.7mm.  -No extranodal extension identified.     Comment: CK AE1/AE3 evaluated on blocks E1-E3 supports above interpretation.       F. Right para-aortic sentinel lymph node #1, excision:  -Isolated tumor cells present in one lymph node, see comment.     Comment: 0.1mm focus of atypical cells highlighted by CK AE1/AE3 (F3-2), consistent with isolated tumor cells. Deeper levels examined. ER attempted, however atypical focus no long present on deeper levels.      G. Right para-aortic sentinel lymph node #2, excision:  -Isolated tumor cell present in one lymph node, see comment.     Comment: Rare positive CK AE1/AE3 cell (G2-7), consistent with isolated tumor cells.      H. Uterus, cervix, bilateral fallopian tubes and ovaries:  -Adenocarcinoma, favor endometrioid adenocarcinoma, FIGO grade 1, see comment.   -Surgical margins and parametria negative for carcinoma.  -Lymph-vascular invasion identified,  extensive.  -Bilateral ovaries with Josh tumor and stromal luteinization, negative for carcinoma, see note.  -Fallopian tubes negative for carcinoma.   -See synoptic report.      Comment: The mass is predominantly located in endocervical canal/lower uterine segment with cervical stromal invasion and also involves endometrial cavity with background atypical endometrial hyperplasia/endometrial intraepithelial neoplasia (AEH/EIN) and superficial myometrial invasion.      Immunostains performed on block H7 shows tumor is positive for ER (patchy), AZ (patchy), CEA-mono(patchy), p16 (patchy) p53 wild type, vimentin (patchy), CK7 positive, and CK20 focal positive. PTEN is lost.  HPV testing performed on biopsy material ([id number]) is negative. MMR testing performed at outside institution on biopsy material is reportedly intact. The morphologic and immunohistochemical findings support above interpretation.      I. Vulva, right, 7:00, biopsy:  -Lichen sclerosus.  -Negative for squamous intraepithelial lesion and malignancy.        11/4/24 Multidisciplinary Gynecologic Oncology Tumor Case Review  The group recommended to consider chemotherapy utilizing taxol and carboplatin, plus or minus  pembrolizumab.     12/6/24 Initiated chemotherapy: taxol 135 mg/m2, carboplatin AUC 5, pembrolizumab 200 mg IV every 21 days     1/8/25 CT chest wo contrast  IMPRESSION:  Interval development of multiple bilateral lower lobe ill-defined nodular opacities. Although the appearance favors infectious or inflammatory etiology, given the history of malignancy, short-term CT follow-up is recommended to ensure resolution.  Interval development of 6 mm left upper lobe nodule which can also be reassessed at follow-up.  Stable 5 mm right lower lobe pulmonary nodule.  Moderate hiatal hernia.     1/31/25 CT CAP wo contrast  IMPRESSION:  1.  Marked improvement in bilateral nodular lung opacities consistent with resolving infectious/inflammatory  process.  2.  No evidence of metastasis in the chest, abdomen, or pelvis    2/20/25 CTA chest pe study  IMPRESSION:  1.  No evidence of acute pulmonary embolism.  2.  Scattered groundglass opacities which are overall not significantly changed from CT of 1/31/2024. These opacities are again favored to be infectious/inflammatory in etiology. Consider short interval follow-up CT chest in 3 months to ensure complete   resolution.  3.  Right lower lobe pulmonary nodule measuring 5 mm, stable.    2/28/25 MRI cardiac w wo contrast  SUMMARY:  1. LVEF could not be calculated due to significant artifact from respiratory motion. Visually the EF appears to be 40 to 45%. Recommend correlation with echocardiography.  2.There appears to be mild global hypokinesis with akinesis in the basal anterolateral wall and mid inferolateral wall.  3. Visually the right ventricular function appears to be normal.  4. There is subepicardial non-CAD late gadolinium enhancement in the basal anterolateral wall and transmural non-CAD late gadolinium enhancement of the mid inferolateral wall. This is suggestive of myocarditis. Less likely differential diagnoses include   inflammatory cardiomyopathy such as sarcoidosis, genetic cardiomyopathy or deposition disease such as Fabry's. Recommend correlation with clinical presentation.  5. LGE percent: 6%  6. Dilated left atrium.  7. Small anterior pericardial effusion. Extensive epicardial fat and lipomatous hypertrophy of the interatrial septum.  8. Extracardiac findings (this study was not optimized for extracardiac evaluation): Gallstones. Possible hiatal hernia. Cystic structures in the left kidney are partially visualized. Consider dedicated imaging if clinically indicated  9. Technically difficult study due to off-axis imaging planes cardiac misgating, and respiratory motion artifact.    3/24/25 CT chest without contrast  IMPRESSION:  No acute thoracic abnormality.  Nearly resolved groundglass  opacities in bilateral lower lobes, likely sequela of infection/inflammation. Consider follow-up CT chest without contrast in 3 months to ensure resolution.  Subsegmental atelectasis in right middle and bilateral lower lobes.  Unchanged 0.5 cm right lower lobe pulmonary nodule.  Additional chronic/incidental findings as detailed above.    25 CT abdomen pelvis wo contrast  IMPRESSION:  No metastatic disease in the abdomen or pelvis within the limits of unenhanced technique.    Upcomin25 IR  25 Palliative  25 CT CAP  25 Dr. Page      Oncology History   Endometrial cancer (HCC)   2024 Initial Diagnosis    Endometrial cancer (HCC)     10/11/2024 -  Cancer Staged    Staging form: Corpus Uteri - Carcinoma, AJCC 8th Edition  - Pathologic stage from 10/11/2024: FIGO Stage IIIC1 (pT2, pN1mi(sn), cM0) - Signed by Evin Page MD on 2024  Method of lymph node assessment: [place] lymph node biopsy  Histologic grade (G): G1  Histologic grading system: 3 grade system  Lymph-vascular invasion (LVI): BOTH lymphatic and small vessel AND venous (large vessel) invasion  Peritoneal cytology results: Negative  Pelvic brandi status: Positive       10/11/2024 Surgery    Robotic assisted total laparoscopic hysterectomy, bilateral salpingo-oophorectomy, pelvic and periaortic sentinel lymph node biopsies, vulvar biopsy  1.  Grade 1, cervical stromal invasion to a depth of 12 out of 15 mm, extensive LVSI, p53 wild-type, pMMR, HER2 1+,  washings negative, 0.7 mm metastatic focus and pelvic lymph node, ITC identified and periaortic lymph nodes.     2024 -  Chemotherapy    Taxol 135 mg/m2, carboplatin AUC 5 and pembrolizumab 200 mg IV every 21 days.    Pembro held for cycle 3 d/t grade 2 drug-induced pneumonitis.        2025 Adverse Reaction    Immunotherapy-related (pembro) pneumonitis and myocarditis           Review of Systems:  Review of Systems   Constitutional:  Positive for  fatigue.   HENT: Negative.     Eyes: Negative.    Respiratory: Negative.     Cardiovascular: Negative.    Gastrointestinal: Negative.    Endocrine: Negative.    Genitourinary: Negative.    Musculoskeletal: Negative.    Skin: Negative.    Allergic/Immunologic: Negative.    Neurological: Negative.    Hematological: Negative.    Psychiatric/Behavioral:  Positive for sleep disturbance.        Clinical Trial: no    OB/GYN History:  The patient underwent menarche at 10 years  Menopause Status Post  No LMP recorded. Patient has had a hysterectomy.  Menopause at  54 years.  Menopause Reason: Natural  Hormone replacement therapy: no.  Years used 0   2   Para 2   Age at first delivery being 28 years.   Nursing: no.   Birth control pills: yes.  Years used 1-2    Pregnancy test needed:  no    Prior Radiation: No    Teaching: NCI radiation packet    MST: Completed     Implantable Devices (Port, Pacemaker, pain stimulator): IVC filter (scheduled to be removed May 1st)    Hip Replacement: No         Health Maintenance   Topic Date Due    Colorectal Cancer Screening  2024    BMI: Followup Plan  2024    Medicare Annual Wellness Visit (AWV)  2025    COVID-19 Vaccine (7 - Pfizer risk - season) 2025    Breast Cancer Screening: Mammogram  2025    Fall Risk  2025    Urinary Incontinence Screening  2026    BMI: Adult  2026    Hepatitis C Screening  Completed    Osteoporosis Screening  Completed    RSV Vaccine for Pregnant Patients and Patients Age 60+ Years  Completed    Zoster Vaccine  Completed    Pneumococcal Vaccine: 65+ Years  Completed    Influenza Vaccine  Completed    Meningococcal B Vaccine  Aged Out    RSV Vaccine age 0-20 Months  Aged Out    HIB Vaccine  Aged Out    IPV Vaccine  Aged Out    Hepatitis A Vaccine  Aged Out    Meningococcal ACWY Vaccine  Aged Out    HPV Vaccine  Aged Out     Past Medical History:   Diagnosis Date    Abnormal ECG     Anxiety      Arthritis     Bipolar 1 disorder (HCC)     Cervical cancer (HCC)     Chronic kidney disease     stage 3    CPAP (continuous positive airway pressure) dependence     Depression 2001    Disease of thyroid gland     DVT (deep venous thrombosis) (HCC)     BLLE    Elevated blood pressure reading 06/10/2019    GERD (gastroesophageal reflux disease)     Obesity     Pulmonary emboli (HCC)     B/L    RSV (acute bronchiolitis due to respiratory syncytial virus) 12/05/2023    Sleep apnea     Sleep apnea, obstructive 2007    Urinary incontinence 2019    Uterine cancer (HCC)      Past Surgical History:   Procedure Laterality Date    COLONOSCOPY  2011    COLONOSCOPY  03/08/2023    DENTAL SURGERY  2020    FOOT SURGERY  1976    [facility]    IR IVC FILTER PLACEMENT OPTIONAL/TEMPORARY  10/08/2024    IR PORT CHECK  12/26/2024    IR PORT PLACEMENT  12/03/2024    LAPAROTOMY N/A 10/11/2024    Procedure: EXPLORATORY LAPAROTOMY;  Surgeon: Rodney Downing MD;  Location: [facility];  Service: Gynecology Oncology    UT HYSTEROSCOPY BX ENDOMETRIUM&/POLYPC W/WO D&C N/A 08/23/2024    Procedure: EXAM UNDER ANESTHESIA, DILATATION AND CURETTAGE, CERVICAL BIOPSIES;  Surgeon: Evin Page MD;  Location:  MAIN OR;  Service: Gynecology Oncology    UT LAPS TOTAL HYSTERECT 250 GM/< W/RMVL TUBE/OVARY N/A 10/11/2024    Procedure: ROBOTIC ASSISTED LTH, BSO, LYMPH NODE DISSECTION, EUA;  Surgeon: Rodney Downing MD;  Location: [facility];  Service: Gynecology Oncology     Family History   Problem Relation Age of Onset    Heart disease Mother     Heart Valve Disease Mother         Replacement    Diabetes Mother         2002    Heart failure Mother         Heart Valve replacement    Arthritis Father     No Known Problems Sister     No Known Problems Daughter     No Known Problems Maternal Grandmother     No Known Problems Maternal Grandfather     No Known Problems Paternal Grandmother     No Known Problems Paternal Grandfather     No Known  Problems Son     No Known Problems Maternal Aunt     No Known Problems Maternal Aunt     No Known Problems Maternal Aunt     No Known Problems Maternal Aunt     No Known Problems Maternal Aunt     No Known Problems Paternal Aunt     Alcohol abuse Son         [place], 40 year old son, has issues with alcohol and alcohol abuse     Social History     Tobacco Use    Smoking status: Former     Current packs/day: 0.00     Average packs/day: 0.3 packs/day for 30.0 years (7.5 ttl pk-yrs)     Types: Cigarettes     Start date: 1979     Quit date: 2009     Years since quittin.3     Passive exposure: Never    Smokeless tobacco: Never   Vaping Use    Vaping status: Never Used   Substance Use Topics    Alcohol use: Not Currently     Alcohol/week: 1.0 standard drink of alcohol     Types: 1 Glasses of wine per week    Drug use: Never        Current Outpatient Medications:     apixaban (Eliquis) 5 mg, Take 1 tablet (5 mg total) by mouth 2 (two) times a day, Disp: 180 tablet, Rfl: 1    chlorhexidine (PERIDEX) 0.12 % solution, USE HALF OUNCE TWICE A DAY RINSE FOR 30 SECONDS BY MOUTH THEN EXPECTORATE DO NOT SWALLOW, Disp: , Rfl:     Cholecalciferol (VITAMIN D) 2000 units CAPS, Take 1 capsule by mouth daily, Disp: , Rfl:     clobetasol (TEMOVATE) 0.05 % ointment, Apply topically 2 (two) times a week, Disp: 60 g, Rfl: 3    colchicine (COLCRYS) 0.6 mg tablet, TAKE 1 TABLET BY MOUTH EVERY DAY, Disp: 90 tablet, Rfl: 1    CRANBERRY PO, Take by mouth, Disp: , Rfl:     famotidine (PEPCID) 20 mg tablet, TAKE 1 TABLET BY MOUTH TWICE  DAILY, Disp: 180 tablet, Rfl: 3    Glucosamine-Chondroit-Vit C-Mn (GLUCOSAMINE 1500 COMPLEX PO), Take by mouth in the morning, Disp: , Rfl:     Ivermectin 1 % CREA, , Disp: , Rfl:     lidocaine-prilocaine (EMLA) cream, Apply to PORT 30 min prior to labs and chemo, Disp: 30 g, Rfl: 1    LORazepam (ATIVAN) 1 mg tablet, Take 1 tablet (1 mg total) by mouth every 8 (eight) hours as needed (nausea or anxiety),  Disp: 20 tablet, Rfl: 0    lurasidone (LATUDA) 20 mg tablet, TAKE 1 TABLET BY MOUTH DAILY  WITH BREAKFAST, Disp: 30 tablet, Rfl: 11    magnesium Oxide (MAG-OX) 400 mg TABS, TAKE 1 TABLET BY MOUTH EVERY DAY, Disp: 90 tablet, Rfl: 1    metoprolol succinate (TOPROL-XL) 50 mg 24 hr tablet, Take 1 tablet (50 mg total) by mouth every 12 (twelve) hours, Disp: 60 tablet, Rfl: 0    Mirabegron ER (Myrbetriq) 50 MG TB24, TAKE 1 TABLET BY MOUTH IN THE  MORNING, Disp: 90 tablet, Rfl: 1    Multiple Vitamin (MULTI-VITAMIN DAILY) TABS, Take by mouth daily, Disp: , Rfl:     polyethylene glycol (GLYCOLAX) 17 GM/SCOOP powder, Take 17 g by mouth 2 (two) times a day, Disp: 238 g, Rfl: 1    Sodium Fluoride 5000 PPM 1.1 % GEL, As directed, Disp: , Rfl:     torsemide (DEMADEX) 20 mg tablet, TAKE 1 TABLET BY MOUTH EVERY DAY, Disp: 90 tablet, Rfl: 1  Allergies   Allergen Reactions    Raw Fruit - Food Allergy Anaphylaxis     PLUMS, PEACHES, APPLES, CHERRIES FRUIT WITH SKIN      Vitals:    04/18/25 0900   BP: 128/84   BP Location: Left arm   Patient Position: Sitting   Cuff Size: Standard   Pulse: 100   Resp: 18   Temp: (!) 97.1 °F (36.2 °C)   TempSrc: Temporal   SpO2: 98%     Pain Score: 0-No pain

## 2025-04-18 NOTE — TELEPHONE ENCOUNTER
Patient calling in to verify if fasting is needed for her upcoming labs prior to f/u. Verified no fasting was needed.   No further action needed

## 2025-04-18 NOTE — PROGRESS NOTES
Consultation Visit   Name: Cherelle Dash      : 1955      MRN: [id number]  Encounter Provider: Vinod Jara MD  Encounter Date: 2025   Enc[facility] department[facility]  :  Assessment & Plan  Endometrial cancer (HCC)  Ms. Dash is a 69-year-old female who initially presented in August of last year with postmenopausal bleeding.  Clinical examination was concerning for a cervical mass.  An MRI of the pelvis was performed on 2024 revealing an infiltrative appearing cervical tumor measuring 2.3 x 4.3 x 4.1 cm.  There was no obvious extension into the vagina or definite spread beyond the serosa.  No lymph nodes met size criteria for involvement but there was an 8 mm early enhancing left external iliac lymph node.  Cervical and endometrial biopsies were performed on 2024 revealing adenocarcinoma with mucinous features from both the endometrial curettings and the cervical biopsies.  On 10/11/2024 she underwent robotic assisted total laparoscopic hysterectomy, bilateral salpingo-oophorectomy, pelvic and para-aortic sentinel lymph node biopsy and vulvar biopsy.  Final pathology revealed grade 1 endometrioid carcinoma measuring 3.8 cm in greatest dimension with the tumor primarily located in the endocervical canal extending into the myometrium to a depth of 5 mm with a total myometrial thickness of 20 mm.  There was cervical stromal invasion of 12 mm with the cervical stromal thickness of 15 mm.  There was no serosal involvement.  Extensive lymphovascular invasion was present.  All margins were negative.  5 pelvic lymph nodes were sampled with a micrometastasis and a right pelvic node and isolated tumor cells in a left pelvic node.  2 para-aortic nodes were sampled 1 of which contained isolated tumor cells.  Her final stage was pT2 N1 jim, FIGO IIIC1i, pMMR, p53 wild-type.  She subsequently initiated systemic therapy with carbo, Taxol and pembrolizumab.  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  She received 5 cycles total of carboplatin and Taxol and 4 cycles of pembrolizumab.  She underwent repeat CT of the chest abdomen and pelvis which was negative for evidence of metastatic disease.  Her  is within normal limits.  She has been referred to our department to discuss adjuvant radiation therapy.    The role of adjuvant radiation was discussed at length with the patient and her .  Given the extensive cervical involvement, extensive lymphovascular invasion, and positive pelvic lymph node, we have recommended a course of adjuvant pelvic radiation therapy with 3 fractions of high-dose-rate vaginal brachytherapy boost.  There were isolated tumor cells identified in one of the sentinel para-aortic nodes but I do not believe the presence of ITC's justifies the added toxicity of para-aortic radiation.  It was explained that the purpose of treatment is to reduce the risk of pelvic and vaginal recurrence, but radiation has not been shown to increase overall survival.  The associated risks and toxicities of treatment were discussed with the patient and her  in detail, including, but not limited to, fatigue, increased frequency of urination, diarrhea, rectal bleeding, hematuria, vaginal stenosis/dryness, vaginal cuff dehiscence, bowel injury, and long-term radiation cystitis/proctitis.    The patient has signed consent and CT planning has been scheduled.  She has not entirely committed to proceeding with treatment and would like to consider further whether or not she would like to proceed.  If the patient is hesitant to proceed with pelvic radiation, given the extensive cervical involvement, at least receiving vaginal brachytherapy alone would be both well-tolerated and certainly reduce her risk of vaginal recurrence.  It was explained that our  recommendation is to proceed with both pelvic and vaginal radiation but vaginal brachytherapy alone would also be a reasonable option to consider.    Orders:    Ambulatory referral to Radiation Oncology        History of Present Illness   Chief Complaint   Patient presents with    Consult     Radiation Oncology   Pertinent Medical History   Cherelle Dash 1955 is a 69 y.o. female with stage III C1 grade 1 endometrial cancer with positive ITC in periaortic lymph nodes, extensive LVSI, p53 wild-type, pMMR. She is referred by Dr. Page for consideration of adjuvant pelvic RT & presents today for initial consult.     Presented with PMB, ultrasound and MRI evidence of a 4.1 x 4.3 cm cervical mass, 8 mm endometrium, bilateral simple appearing ovarian cysts.  She underwent D&C, multiple cervical biopsies and EUA on 8/23/24. She underwent robotic total laparoscopic hysterectomy, bilateral salpingo-oophorectomy, pelvic and periaortic sentinel lymph node biopsy, vulvar biopsy 10/11/24. She received 5 cycles of carboplatin, Taxol last 3/7/2025. 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 Differential diagnoses include a neoplasm or large cervical polyp. Tissue sampling   versus further assessment with MRI pelvis with and without contrast is recommended  Diffuse thickening of the endometrial stripe with apparent 9 mm hyperechoic vascular lesion in the fundal endometrial stripe. Direct visualization and tissue sampling is recommended.  A 3.0 cm right adnexal cyst, and 2.9 cm left adnexal cyst, differential diagnosis include ovarian cysts, hydrosalpinx or paraovarian cysts.  Follow-up with pelvic ultrasound in 1 year versus attention on follow-up MRI pelvis is recommended.  A few dependent debris in the partially visualized urinary bladder, nonspecific but can be secondary to acute cystitis. Clinical correlation is recommended.    8/6/24 MRI pelvis female wo and w contrast  IMPRESSION:  4.3 cm previously reported cervical tumor, as detailed above, compatible with cervical cancer.  Nonenlarged but early enhancing left external iliac lymph node, possibly metastatic.  Abnormal endometrium that may be secondary to the cervical tumor, or reflect primary dysplasia. No extension beyond the uterus.  Bilateral unilocular simple ovarian cysts    8/23/24 D&C - cervical biopsies  Final Diagnosis   A. Endometrial curettings:  - Adenocarcinoma with mucinous features. Please see note.      B. Endometrial curettings:  - Adenocarcinoma with mucinous features. Please see note.      C. Cervical biopsies:  - Invasive adenocarcinoma with mucinous features. Please see note.      9/5/24 CTA ED chest pe study  IMPRESSION:  Bilateral pulmonary emboli.  On the left, nonocclusive upper and lower lobar branches  On the right, occlusive proximal truncus anterior and partially occlusive interlobular artery.  No CT evidence of right heart strain.  Pulmonary nodules, measuring up to 6 mm in the right lower lobe.  Not well evaluated on comparison November 2023 exam secondary to motion artifact at that time.  Considering smoking history, recommend follow-up chest CT in 12 months.  Moderate hiatal hernia.    9/18/24 Gynecologic Oncology Treatment Planning Conference Case Review  Recommendations:   Consider IVC filter with initial surgical management  Consider neoadjuvant radiation or chemotherapy if not appropriate for initial surgical management due to recent PE     10/1/24 CT chest abdomen pelvis wo contrast  IMPRESSION:  Stable 5 mm right lower lobe pulmonary nodule.  No CT evidence of metastatic disease within the  abdomen or pelvis.  Prominent left ovary, consider further characterization with pelvic ultrasound.  Moderate hiatal hernia.    10/11/24 Surgery - Dr. Downing  Robotic assisted total laparoscopic hysterectomy, bilateral salpingo-oophorectomy, pelvic and periaortic sentinel lymph node biopsies, vulvar biopsy   Final Diagnosis   A. Left pelvic sentinel lymph node #1, excision:  -One lymph node, negative for carcinoma (0/1), see comment.     Comment: CK AE1/AE3 evaluated on block A1 and is negative, supporting above findings.       B. Left pelvic sentinel lymph node #2, see comment:  -Isolated tumor cells present in one lymph node, see comment.     Comment: Rare positive CK AE1/AE3 cells (B5-7, B5-8), consistent with isolated tumor cells. ER attempted but non-contributory.      C. Right pelvic sentinel lymph node #1, excision:  -Isolated tumor cell present in one lymph node, see comment.     Comment: Rare CK AE1/AE3 positive atypical cell (C1-3, C1-4), consistent with isolated tumor cell present.      D. Right pelvic sentinel lymph node #2, excision:  -One lymph node, negative for carcinoma (0/1), see comment.     Comment: CK AE1/AE3 evaluated on block D1 and is negative, supporting above findings.        E. Right pelvic sentinel lymph node #3, excision:  -Metastatic carcinoma involving 1 of 1 lymph node (1/1), see comment.  -Metastatic focus measures 0.7mm.  -No extranodal extension identified.     Comment: CK AE1/AE3 evaluated on blocks E1-E3 supports above interpretation.       F. Right para-aortic sentinel lymph node #1, excision:  -Isolated tumor cells present in one lymph node, see comment.     Comment: 0.1mm focus of atypical cells highlighted by CK AE1/AE3 (F3-2), consistent with isolated tumor cells. Deeper levels examined. ER attempted, however atypical focus no long present on deeper levels.      G. Right para-aortic sentinel lymph node #2, excision:  -Isolated tumor cell present in one lymph node, see  comment.     Comment: Rare positive CK AE1/AE3 cell (G2-7), consistent with isolated tumor cells.      H. Uterus, cervix, bilateral fallopian tubes and ovaries:  -Adenocarcinoma, favor endometrioid adenocarcinoma, FIGO grade 1, see comment.   -Surgical margins and parametria negative for carcinoma.  -Lymph-vascular invasion identified, extensive.  -Bilateral ovaries with Josh tumor and stromal luteinization, negative for carcinoma, see note.  -Fallopian tubes negative for carcinoma.   -See synoptic report.      Comment: The mass is predominantly located in endocervical canal/lower uterine segment with cervical stromal invasion and also involves endometrial cavity with background atypical endometrial hyperplasia/endometrial intraepithelial neoplasia (AEH/EIN) and superficial myometrial invasion.      Immunostains performed on block H7 shows tumor is positive for ER (patchy), HI (patchy), CEA-mono(patchy), p16 (patchy) p53 wild type, vimentin (patchy), CK7 positive, and CK20 focal positive. PTEN is lost.  HPV testing performed on biopsy material ([id number]) is negative. MMR testing performed at outside institution on biopsy material is reportedly intact. The morphologic and immunohistochemical findings support above interpretation.      I. Vulva, right, 7:00, biopsy:  -Lichen sclerosus.  -Negative for squamous intraepithelial lesion and malignancy.        11/4/24 Multidisciplinary Gynecologic Oncology Tumor Case Review  The group recommended to consider chemotherapy utilizing taxol and carboplatin, plus or minus  pembrolizumab.     12/6/24 Initiated chemotherapy: taxol 135 mg/m2, carboplatin AUC 5, pembrolizumab 200 mg IV every 21 days     1/8/25 CT chest wo contrast  IMPRESSION:  Interval development of multiple bilateral lower lobe ill-defined nodular opacities. Although the appearance favors infectious or inflammatory etiology, given the history of malignancy, short-term CT follow-up is recommended to ensure  resolution.  Interval development of 6 mm left upper lobe nodule which can also be reassessed at follow-up.  Stable 5 mm right lower lobe pulmonary nodule.  Moderate hiatal hernia.     1/31/25 CT CAP wo contrast  IMPRESSION:  1.  Marked improvement in bilateral nodular lung opacities consistent with resolving infectious/inflammatory process.  2.  No evidence of metastasis in the chest, abdomen, or pelvis    2/20/25 CTA chest pe study  IMPRESSION:  1.  No evidence of acute pulmonary embolism.  2.  Scattered groundglass opacities which are overall not significantly changed from CT of 1/31/2024. These opacities are again favored to be infectious/inflammatory in etiology. Consider short interval follow-up CT chest in 3 months to ensure complete   resolution.  3.  Right lower lobe pulmonary nodule measuring 5 mm, stable.    2/28/25 MRI cardiac w wo contrast  SUMMARY:  1. LVEF could not be calculated due to significant artifact from respiratory motion. Visually the EF appears to be 40 to 45%. Recommend correlation with echocardiography.  2.There appears to be mild global hypokinesis with akinesis in the basal anterolateral wall and mid inferolateral wall.  3. Visually the right ventricular function appears to be normal.  4. There is subepicardial non-CAD late gadolinium enhancement in the basal anterolateral wall and transmural non-CAD late gadolinium enhancement of the mid inferolateral wall. This is suggestive of myocarditis. Less likely differential diagnoses include   inflammatory cardiomyopathy such as sarcoidosis, genetic cardiomyopathy or deposition disease such as Fabry's. Recommend correlation with clinical presentation.  5. LGE percent: 6%  6. Dilated left atrium.  7. Small anterior pericardial effusion. Extensive epicardial fat and lipomatous hypertrophy of the interatrial septum.  8. Extracardiac findings (this study was not optimized for extracardiac evaluation): Gallstones. Possible hiatal hernia. Cystic  structures in the left kidney are partially visualized. Consider dedicated imaging if clinically indicated  9. Technically difficult study due to off-axis imaging planes cardiac misgating, and respiratory motion artifact.    3/24/25 CT chest without contrast  IMPRESSION:  No acute thoracic abnormality.  Nearly resolved groundglass opacities in bilateral lower lobes, likely sequela of infection/inflammation. Consider follow-up CT chest without contrast in 3 months to ensure resolution.  Subsegmental atelectasis in right middle and bilateral lower lobes.  Unchanged 0.5 cm right lower lobe pulmonary nodule.  Additional chronic/incidental findings as detailed above.    25 CT abdomen pelvis wo contrast  IMPRESSION:  No metastatic disease in the abdomen or pelvis within the limits of unenhanced technique.    Upcomin25 IR  25 Palliative  25 CT CAP  25 Dr. Page      Oncology History   Cancer Staging   Endometrial cancer (HCC)  Staging form: Corpus Uteri - Carcinoma, AJCC 8th Edition  - Pathologic stage from 10/11/2024: FIGO Stage IIIC1 (pT2, pN1mi(sn), cM0) - Signed by Evin Page MD on 2024  Method of lymph node assessment: [place] lymph node biopsy  Histologic grade (G): G1  Histologic grading system: 3 grade system  Lymph-vascular invasion (LVI): BOTH lymphatic and small vessel AND venous (large vessel) invasion  Peritoneal cytology results: Negative  Pelvic brandi status: Positive  Oncology History   Endometrial cancer (HCC)   2024 Initial Diagnosis    Endometrial cancer ([facility])     10/11/2024 -  Cancer Staged    Staging form: Corpus Uteri - Carcinoma, AJCC 8th Edition  - Pathologic stage from 10/11/2024: FIGO Stage IIIC1 (pT2, pN1mi(sn), cM0) - Signed by Evin Page MD on 2024  Method of lymph node assessment: [place] lymph node biopsy  Histologic grade (G): G1  Histologic grading system: 3 grade system  Lymph-vascular invasion (LVI): BOTH lymphatic  and small vessel AND venous (large vessel) invasion  Peritoneal cytology results: Negative  Pelvic brandi status: Positive       10/11/2024 Surgery    Robotic assisted total laparoscopic hysterectomy, bilateral salpingo-oophorectomy, pelvic and periaortic sentinel lymph node biopsies, vulvar biopsy  1.  Grade 1, cervical stromal invasion to a depth of 12 out of 15 mm, extensive LVSI, p53 wild-type, pMMR, HER2 1+,  washings negative, 0.7 mm metastatic focus and pelvic lymph node, ITC identified and periaortic lymph nodes.     2024 -  Chemotherapy    Taxol 135 mg/m2, carboplatin AUC 5 and pembrolizumab 200 mg IV every 21 days.    Pembro held for cycle 3 d/t grade 2 drug-induced pneumonitis.        2025 Adverse Reaction    Immunotherapy-related (pembro) pneumonitis and myocarditis          Review of Systems Refer to nursing note.  Review of Systems   Constitutional:  Positive for fatigue.   HENT: Negative.     Eyes: Negative.    Respiratory: Negative.     Cardiovascular: Negative.    Gastrointestinal: Negative.    Endocrine: Negative.    Genitourinary: Negative.    Musculoskeletal: Negative.    Skin: Negative.    Allergic/Immunologic: Negative.    Neurological: Negative.    Hematological: Negative.    Psychiatric/Behavioral:  Positive for sleep disturbance.        Clinical Trial: no    OB/GYN History:  The patient underwent menarche at 10 years  Menopause Status Post  No LMP recorded. Patient has had a hysterectomy.  Menopause at  54 years.  Menopause Reason: Natural  Hormone replacement therapy: no.  Years used 0   2   Para 2   Age at first delivery being 28 years.   Nursing: no.   Birth control pills: yes.  Years used 1-2  Past Medical History   Past Medical History:   Diagnosis Date    Abnormal ECG     Anxiety     Arthritis     Bipolar 1 disorder (HCC)     Cervical cancer (HCC)     Chronic kidney disease     stage 3    CPAP (continuous positive airway pressure) dependence     Depression      Disease of thyroid gland     DVT (deep venous thrombosis) (HCC)     BLLE    Elevated blood pressure reading 06/10/2019    GERD (gastroesophageal reflux disease)     Obesity     Pulmonary emboli (HCC)     B/L    RSV (acute bronchiolitis due to respiratory syncytial virus) 12/05/2023    Sleep apnea     Sleep apnea, obstructive 2007    Urinary incontinence 2019    Uterine cancer (HCC)      Past Surgical History:   Procedure Laterality Date    COLONOSCOPY  2011    COLONOSCOPY  03/08/2023    DENTAL SURGERY  2020    FOOT SURGERY  1976    [facility]    IR IVC FILTER PLACEMENT OPTIONAL/TEMPORARY  10/08/2024    IR PORT CHECK  12/26/2024    IR PORT PLACEMENT  12/03/2024    LAPAROTOMY N/A 10/11/2024    Procedure: EXPLORATORY LAPAROTOMY;  Surgeon: Rodney Downing MD;  Location: [facility];  Service: Gynecology Oncology    PA HYSTEROSCOPY BX ENDOMETRIUM&/POLYPC W/WO D&C N/A 08/23/2024    Procedure: EXAM UNDER ANESTHESIA, DILATATION AND CURETTAGE, CERVICAL BIOPSIES;  Surgeon: Evin Page MD;  Location: [facility];  Service: Gynecology Oncology    PA LAPS TOTAL HYSTERECT 250 GM/< W/RMVL TUBE/OVARY N/A 10/11/2024    Procedure: ROBOTIC ASSISTED LTH, BSO, LYMPH NODE DISSECTION, EUA;  Surgeon: Rodney Downing MD;  Location: [facility];  Service: Gynecology Oncology     Family History   Problem Relation Age of Onset    Heart disease Mother     Heart Valve Disease Mother         Replacement    Diabetes Mother         2002    Heart failure Mother         Heart Valve replacement    Arthritis Father     No Known Problems Sister     No Known Problems Daughter     No Known Problems Maternal Grandmother     No Known Problems Maternal Grandfather     No Known Problems Paternal Grandmother     No Known Problems Paternal Grandfather     No Known Problems Son     No Known Problems Maternal Aunt     No Known Problems Maternal Aunt     No Known Problems Maternal Aunt     No Known Problems Maternal Aunt     No Known Problems  Maternal Aunt     No Known Problems Paternal Aunt     Alcohol abuse Son         [place], 40 year old son, has issues with alcohol and alcohol abuse      reports that she quit smoking about 16 years ago. Her smoking use included cigarettes. She started smoking about 46 years ago. She has a 7.5 pack-year smoking history. She has never been exposed to tobacco smoke. She has never used smokeless tobacco. She reports that she does not currently use alcohol after a past usage of about 1.0 standard drink of alcohol per week. She reports that she does not use drugs.  Current Outpatient Medications   Medication Instructions    apixaban (ELIQUIS) 5 mg, Oral, 2 times daily    chlorhexidine (PERIDEX) 0.12 % solution USE HALF OUNCE TWICE A DAY RINSE FOR 30 SECONDS BY MOUTH THEN EXPECTORATE DO NOT SWALLOW    Cholecalciferol (VITAMIN D) 2000 units CAPS 1 capsule, Daily    clobetasol (TEMOVATE) 0.05 % ointment Topical, 2 times weekly    colchicine (COLCRYS) 0.6 mg, Oral, Daily    CRANBERRY PO Take by mouth    famotidine (PEPCID) 20 mg, Oral, 2 times daily    Glucosamine-Chondroit-Vit C-Mn (GLUCOSAMINE 1500 COMPLEX PO) Daily    Ivermectin 1 % CREA     lidocaine-prilocaine (EMLA) cream Apply to PORT 30 min prior to labs and chemo    LORazepam (ATIVAN) 1 mg, Oral, Every 8 hours PRN    lurasidone (LATUDA) 20 mg, Oral, Daily with breakfast    magnesium Oxide (MAG-OX) 400 mg, Oral, Daily    metoprolol succinate (TOPROL-XL) 50 mg, Oral, Every 12 hours    Multiple Vitamin (MULTI-VITAMIN DAILY) TABS Daily    Myrbetriq 50 mg, Oral, Every morning    polyethylene glycol (GLYCOLAX) 17 g, Oral, 2 times daily    Sodium Fluoride 5000 PPM 1.1 % GEL As directed    torsemide (DEMADEX) 20 mg, Oral, Daily     Allergies   Allergen Reactions    Raw Fruit - Food Allergy Anaphylaxis     PLUMS, PEACHES, APPLES, CHERRIES FRUIT WITH SKIN         Objective   /84 (BP Location: Left arm, Patient Position: Sitting, Cuff Size: Standard)   Pulse 100   Temp  (!) 97.1 °F (36.2 °C) (Temporal)   Resp 18   SpO2 98%     Pain Screening:  Pain Score: 0-No pain  ECOG  0  Physical Exam   The patient presents today in no apparent distress.  Sclera anicteric.  Normal respiratory effort.  Abdomen obese, soft, nontender.  A pelvic examination was performed revealing the vaginal cuff to be well-healed.  No visible or palpable lesions.    Radiology Results: I have reviewed radiology images/reports described above.       Administrative Statements   I have spent a total time of 60 minutes in caring for this patient on the day of the visit/encounter including Diagnostic results, Prognosis, Risks and benefits of tx options, Instructions for management, Patient and family education, Importance of tx compliance, Risk factor reductions, Impressions, Counseling / Coordination of care, Documenting in the medical record, and Obtaining or reviewing history  .

## 2025-04-22 ENCOUNTER — DOCUMENTATION (OUTPATIENT)
Dept: HEMATOLOGY ONCOLOGY | Facility: CLINIC | Age: 70
End: 2025-04-22

## 2025-04-22 ENCOUNTER — TELEPHONE (OUTPATIENT)
Age: 70
End: 2025-04-22

## 2025-04-22 ENCOUNTER — NURSE TRIAGE (OUTPATIENT)
Age: 70
End: 2025-04-22

## 2025-04-22 DIAGNOSIS — I40.8 OTHER ACUTE MYOCARDITIS: ICD-10-CM

## 2025-04-22 DIAGNOSIS — R60.9 EDEMA, UNSPECIFIED TYPE: ICD-10-CM

## 2025-04-22 RX ORDER — TORSEMIDE 20 MG/1
20 TABLET ORAL DAILY
Qty: 90 TABLET | Refills: 1 | Status: SHIPPED | OUTPATIENT
Start: 2025-04-22

## 2025-04-22 RX ORDER — COLCHICINE 0.6 MG/1
0.6 TABLET ORAL DAILY
Qty: 90 TABLET | Refills: 1 | Status: SHIPPED | OUTPATIENT
Start: 2025-04-22

## 2025-04-22 RX ORDER — METOPROLOL SUCCINATE 50 MG/1
50 TABLET, EXTENDED RELEASE ORAL EVERY 12 HOURS
Qty: 180 TABLET | Refills: 3 | Status: SHIPPED | OUTPATIENT
Start: 2025-04-22

## 2025-04-22 NOTE — PROGRESS NOTES
Oncology Finance Counselor/Advocate placed call to patient. This writer informed patient that this writer is here to assist patient with billing questions, financial assistance, payment/payment plans, quotes, copayment assistance, insurance optimization, and insurance navigation.       This writer conducted a thorough benefit review of copayment, deductible, and out of pocket cost. This information is documented below and has been reviewed with patient.      Copayment: N/A  Deductible: $00.00 / $00.00 Remaining  Out Of Pocket Cost: $1,000.00 / $655.00 Remaining  Insurance Optimization (Limited Benefit Vs Self-Pay): N/A     Interventions:   An introductory outreach call was made to the patient, who answered. This writer introduced herself and explained the scope of the Oncology  role. This writer provided contact information, reviewed insurance details, discussed the upcoming treatment date and time, and answered all questions.     Information above was review thoroughly with patient and patient was advise of possible assistance programs/interventions. If any question arise patient can contact this writer at below information. This information was given to patient at time of contact. PT declined support!    Katy Zuleta MPH  Phone: [phone]  Email: [email or web address]

## 2025-04-22 NOTE — TELEPHONE ENCOUNTER
"Reason for Disposition   Caller has medicine question only, adult not sick, and triager answers question    Answer Assessment - Initial Assessment Questions  1. NAME of MEDICINE: \"What medicine(s) are you calling about?\"      Torsemide  2. QUESTION: \"What is your question?\" (e.g., double dose of medicine, side effect)      Patient would like script resent to her mail order pharmacy  3. PRESCRIBER: \"Who prescribed the medicine?\" Reason: if prescribed by specialist, call should be referred to that group.      Cardiology    Protocols used: Medication Question Call-Adult-[place]    "

## 2025-04-22 NOTE — TELEPHONE ENCOUNTER
"Reason for Disposition   Caller has medicine question only, adult not sick, and triager answers question    Answer Assessment - Initial Assessment Questions  1. NAME of MEDICINE: \"What medicine(s) are you calling about?\"      Colchicine  2. QUESTION: \"What is your question?\" (e.g., double dose of medicine, side effect)      Patient would like refill resent to her mail order pharmacy  3. PRESCRIBER: \"Who prescribed the medicine?\" Reason: if prescribed by specialist, call should be referred to that group.      Dr Olson    Protocols used: Medication Question Call-Adult-[place]    "

## 2025-04-22 NOTE — TELEPHONE ENCOUNTER
Received a call from Jack Villarreal medication metoprolol and Colchicine went to the incorrect pharmacy would like them sent to [facility].     Sent a message for them to resent medication to [facility].     Pt's spouse verbally understood

## 2025-04-23 ENCOUNTER — TELEPHONE (OUTPATIENT)
Age: 70
End: 2025-04-23

## 2025-04-23 DIAGNOSIS — C54.1 ENDOMETRIAL CANCER (HCC): ICD-10-CM

## 2025-04-23 DIAGNOSIS — K21.9 GASTROESOPHAGEAL REFLUX DISEASE WITHOUT ESOPHAGITIS: ICD-10-CM

## 2025-04-23 RX ORDER — FAMOTIDINE 20 MG/1
20 TABLET, FILM COATED ORAL 2 TIMES DAILY
Qty: 180 TABLET | Refills: 1 | Status: SHIPPED | OUTPATIENT
Start: 2025-04-23

## 2025-04-23 RX ORDER — LORAZEPAM 1 MG/1
TABLET ORAL
Qty: 30 TABLET | Refills: 0 | Status: SHIPPED | OUTPATIENT
Start: 2025-04-23

## 2025-04-23 NOTE — TELEPHONE ENCOUNTER
"She called [facility] to check on the Lorazepam order and asked to put a rush on the order. She states she was told by [facility] that they cannot fill the order because there is a \"negative supply\" ordered on the script. She is not sure what this means.    I advised that I would reach out to the pharmacy to clarify what the issue with the script is to get it filled. She requests a callback with an update.    Spoke Markos at [facility] and he states that it was an error on their end and not an issue with the script itself. The issue was fixed on their end and he will place an internal rush on the order for delivery.    Called patient and made her aware. She was appreciative of information and help.  "

## 2025-04-23 NOTE — TELEPHONE ENCOUNTER
Placed call to patient. She will like a refill on the Lorazepam  if appropriate, as it has been helping her to sleep. She is asking for it to be sent to [facility]. She has 4 pills left, but is ok to wait as she sometimes takes 1/2 a pill.

## 2025-04-23 NOTE — TELEPHONE ENCOUNTER
I spoke with the patient and informed her that a new prescription for Lorazepam was sent to the [facility].

## 2025-04-24 ENCOUNTER — TELEPHONE (OUTPATIENT)
Dept: RADIOLOGY | Facility: HOSPITAL | Age: 70
End: 2025-04-24

## 2025-04-24 NOTE — PROGRESS NOTES
[facility]  [address] [place]   [address]  INTERVENTIONAL RADIOLOGY [phone]    You are scheduled for a IVC filter removal on 5/1/2025 at 0845.    Your arrival time is 0745.  Our Interventional Radiology nurse will notify you a few days before your procedure with the exact arrival time and location to arrive.    To prepare for your procedure:  Please arrange for someone to drive you home after the procedure and stay with you until the next morning if you are instructed to do so.  This is typically for patients receiving some type of sedative or anesthetic for the procedure.  DO NOT EAT OR DRINK ANYTHING after midnight on the evening before your procedure including candy & gum.  ONLY SIPS OF WATER with your medications are allowed on the morning of your procedure.  TAKE ALL OF YOUR REGULAR MEDICATIONS THE MORNING OF YOUR PROCEDURE with sips of water!  We may call you to stop some of your blood sugar, blood pressure and blood thinning medications depending on the procedure.  Please take all of these medications unless we instruct you to stop them.  If you have an allergy to x-ray dye, please contact Interventional Radiology for an x-ray dye preparation which usually consists of an oral steroid and Benadryl.  If you wear a Glucose Monitor, you may be asked to remove it for your procedure if we are using x-ray.  These devices need to be removed when we are imaging with x-ray near the device since the radiation can cause the unit to malfunction.  If possible and not too inconvenient, you may want to schedule your exam closer to day 14 of your 14 day device so your device is not wasted.    The day of your procedure:  Bring a list of the medications you take at home.  Bring medications you take for breathing problems (such as inhalers), medications for chest pain, or both.  Bring your insurance card and a form of photo ID.  Bring a case for your glasses or contacts.  Please leave all valuables  such as credit cards and jewelry at home.  Report to the registration desk in the main lobby at the [facility].  Ask to be directed to the After Procedure Unit on the 1st floor.  While your procedure is being performed your family may wait in the Waiting Room on the 1st floor.  Be prepared to stay overnight just in case. Sometimes procedures will indicate the need for further observation or treatment.   If you are scheduled for a follow-up visit with the Interventional Radiologist after your procedure, you will be called with a date and time.    Special Instructions (Medications to alter or stop taking before your procedure etc.):      Do Not Stop taking your Eliquis before this procedure.

## 2025-04-25 ENCOUNTER — RESULTS FOLLOW-UP (OUTPATIENT)
Dept: FAMILY MEDICINE CLINIC | Facility: CLINIC | Age: 70
End: 2025-04-25

## 2025-04-25 ENCOUNTER — HOSPITAL ENCOUNTER (OUTPATIENT)
Dept: VASCULAR ULTRASOUND | Facility: HOSPITAL | Age: 70
Discharge: HOME | End: 2025-04-25
Attending: FAMILY MEDICINE
Payer: COMMERCIAL

## 2025-04-25 ENCOUNTER — OFFICE VISIT (OUTPATIENT)
Dept: FAMILY MEDICINE CLINIC | Facility: CLINIC | Age: 70
End: 2025-04-25
Payer: COMMERCIAL

## 2025-04-25 VITALS
SYSTOLIC BLOOD PRESSURE: 120 MMHG | HEART RATE: 88 BPM | OXYGEN SATURATION: 98 % | WEIGHT: 227 LBS | BODY MASS INDEX: 44.57 KG/M2 | TEMPERATURE: 97.8 F | RESPIRATION RATE: 17 BRPM | DIASTOLIC BLOOD PRESSURE: 78 MMHG | HEIGHT: 60 IN

## 2025-04-25 DIAGNOSIS — M25.474 SWELLING OF FOOT JOINT, RIGHT: ICD-10-CM

## 2025-04-25 DIAGNOSIS — R60.0 LOCALIZED EDEMA: ICD-10-CM

## 2025-04-25 DIAGNOSIS — M25.474 SWELLING OF FOOT JOINT, RIGHT: Primary | ICD-10-CM

## 2025-04-25 DIAGNOSIS — N18.4 CHRONIC KIDNEY DISEASE, STAGE 4 (SEVERE) (HCC): ICD-10-CM

## 2025-04-25 PROCEDURE — 93971 EXTREMITY STUDY: CPT

## 2025-04-25 PROCEDURE — 93971 EXTREMITY STUDY: CPT | Performed by: SURGERY

## 2025-04-25 PROCEDURE — G2211 COMPLEX E/M VISIT ADD ON: HCPCS | Performed by: FAMILY MEDICINE

## 2025-04-25 PROCEDURE — 99214 OFFICE O/P EST MOD 30 MIN: CPT | Performed by: FAMILY MEDICINE

## 2025-04-25 NOTE — ASSESSMENT & PLAN NOTE
Lab Results   Component Value Date    EGFR 28 04/09/2025    EGFR 28 04/02/2025    EGFR 30 03/25/2025    CREATININE 1.79 (H) 04/09/2025    CREATININE 1.78 (H) 04/02/2025    CREATININE 1.68 (H) 03/25/2025      stable  Seeing her nephrologist in 2 weeks

## 2025-04-25 NOTE — PROGRESS NOTES
Name: Cherelle Dash      : 1955      MRN: [id number]  Encounter Provider: Shobha Brown MD  Encounter Date: 2025   Encounter department: [facility]  :  Assessment & Plan  Swelling of foot joint, right  Suspect dependent edema  To try compression stockings  To take an extra water pill today   To check BMP in 2 days  To avoid salt /processed food in her diet   Recent CT scan of abdomen and pelvis on 2025 was normal overall  No abnormal lymph nodes seen   Orders:  •  Basic metabolic panel; Future  •  VAS VENOUS DUPLEX -LOWER LIMB UNILATERAL; Future    Chronic kidney disease, stage 4 (severe) (HCC)  Lab Results   Component Value Date    EGFR 28 2025    EGFR 28 2025    EGFR 30 2025    CREATININE 1.79 (H) 2025    CREATININE 1.78 (H) 2025    CREATININE 1.68 (H) 2025      stable  Seeing her nephrologist in 2 weeks     Localized edema    Orders:  •  VAS VENOUS DUPLEX -LOWER LIMB UNILATERAL; Future           History of Present Illness   HPI  On and off right foot swelling for the last 2 months   Worsening swelling x 2 days   Had right ankle pain 2 months ago but its better now  No recent injury or trauma     Review of Systems   Constitutional: Negative.    HENT: Negative.     Respiratory: Negative.     Cardiovascular:  Positive for leg swelling.   Neurological: Negative.    Hematological: Negative.    Psychiatric/Behavioral: Negative.         Objective   /78 (BP Location: Left arm, Patient Position: Sitting, Cuff Size: Standard)   Pulse 88   Temp 97.8 °F (36.6 °C) (Tympanic)   Resp 17   Ht 5' (1.524 m)   Wt 103 kg (227 lb)   SpO2 98%   BMI 44.33 kg/m²      Physical Exam  Vitals and nursing note reviewed.   Constitutional:       Appearance: Normal appearance.   Eyes:      Pupils: Pupils are equal, round, and reactive to light.   Cardiovascular:      Rate and Rhythm: Normal rate and regular rhythm.      Pulses: Normal pulses.   Pulmonary:       Effort: Pulmonary effort is normal.      Breath sounds: Normal breath sounds.   Musculoskeletal:      Right lower leg: Edema present.      Comments: Right ankle and foot   Skin:     Findings: No bruising, erythema or lesion.   Neurological:      General: No focal deficit present.      Mental Status: She is alert and oriented to person, place, and time.   Psychiatric:         Mood and Affect: Mood normal.         Behavior: Behavior normal.       Administrative Statements   I have spent a total time of 25 minutes in caring for this patient on the day of the visit/encounter including Risks and benefits of tx options, Instructions for management, Patient and family education, Risk factor reductions, and Reviewing/placing orders in the medical record (including tests, medications, and/or procedures).

## 2025-04-28 ENCOUNTER — APPOINTMENT (OUTPATIENT)
Dept: LAB | Facility: AMBULARY SURGERY CENTER | Age: 70
DRG: 322 | End: 2025-04-28
Payer: COMMERCIAL

## 2025-04-28 ENCOUNTER — APPOINTMENT (OUTPATIENT)
Dept: RADIATION ONCOLOGY | Facility: HOSPITAL | Age: 70
End: 2025-04-28
Attending: RADIOLOGY
Payer: COMMERCIAL

## 2025-04-28 DIAGNOSIS — N18.32 STAGE 3B CHRONIC KIDNEY DISEASE (HCC): ICD-10-CM

## 2025-04-28 DIAGNOSIS — M25.474 SWELLING OF FOOT JOINT, RIGHT: ICD-10-CM

## 2025-04-28 DIAGNOSIS — C54.1 ENDOMETRIAL CANCER (HCC): ICD-10-CM

## 2025-04-28 LAB
ALBUMIN SERPL BCG-MCNC: 3.8 G/DL (ref 3.5–5)
ALP SERPL-CCNC: 85 U/L (ref 34–104)
ALT SERPL W P-5'-P-CCNC: 25 U/L (ref 7–52)
ANION GAP SERPL CALCULATED.3IONS-SCNC: 11 MMOL/L (ref 4–13)
AST SERPL W P-5'-P-CCNC: 24 U/L (ref 13–39)
BASOPHILS # BLD AUTO: 0.05 THOUSANDS/ÂΜL (ref 0–0.1)
BASOPHILS NFR BLD AUTO: 1 % (ref 0–1)
BILIRUB SERPL-MCNC: 0.41 MG/DL (ref 0.2–1)
BUN SERPL-MCNC: 26 MG/DL (ref 5–25)
CALCIUM SERPL-MCNC: 9.2 MG/DL (ref 8.4–10.2)
CHLORIDE SERPL-SCNC: 105 MMOL/L (ref 96–108)
CO2 SERPL-SCNC: 25 MMOL/L (ref 21–32)
CREAT SERPL-MCNC: 1.85 MG/DL (ref 0.6–1.3)
CREAT UR-MCNC: 114.5 MG/DL
EOSINOPHIL # BLD AUTO: 0.11 THOUSAND/ÂΜL (ref 0–0.61)
EOSINOPHIL NFR BLD AUTO: 3 % (ref 0–6)
ERYTHROCYTE [DISTWIDTH] IN BLOOD BY AUTOMATED COUNT: 13.3 % (ref 11.6–15.1)
GFR SERPL CREATININE-BSD FRML MDRD: 27 ML/MIN/1.73SQ M
GLUCOSE SERPL-MCNC: 148 MG/DL (ref 65–140)
HCT VFR BLD AUTO: 35.4 % (ref 34.8–46.1)
HGB BLD-MCNC: 11.4 G/DL (ref 11.5–15.4)
IMM GRANULOCYTES # BLD AUTO: 0.01 THOUSAND/UL (ref 0–0.2)
IMM GRANULOCYTES NFR BLD AUTO: 0 % (ref 0–2)
LYMPHOCYTES # BLD AUTO: 0.97 THOUSANDS/ÂΜL (ref 0.6–4.47)
LYMPHOCYTES NFR BLD AUTO: 27 % (ref 14–44)
MAGNESIUM SERPL-MCNC: 2 MG/DL (ref 1.9–2.7)
MCH RBC QN AUTO: 32.1 PG (ref 26.8–34.3)
MCHC RBC AUTO-ENTMCNC: 32.2 G/DL (ref 31.4–37.4)
MCV RBC AUTO: 100 FL (ref 82–98)
MONOCYTES # BLD AUTO: 0.5 THOUSAND/ÂΜL (ref 0.17–1.22)
MONOCYTES NFR BLD AUTO: 14 % (ref 4–12)
NEUTROPHILS # BLD AUTO: 1.97 THOUSANDS/ÂΜL (ref 1.85–7.62)
NEUTS SEG NFR BLD AUTO: 55 % (ref 43–75)
NRBC BLD AUTO-RTO: 0 /100 WBCS
PLATELET # BLD AUTO: 177 THOUSANDS/UL (ref 149–390)
PMV BLD AUTO: 12.5 FL (ref 8.9–12.7)
POTASSIUM SERPL-SCNC: 3.7 MMOL/L (ref 3.5–5.3)
PROT SERPL-MCNC: 6.4 G/DL (ref 6.4–8.4)
PROT UR-MCNC: 26.8 MG/DL
PROT/CREAT UR: 0.2 MG/G{CREAT}
RBC # BLD AUTO: 3.55 MILLION/UL (ref 3.81–5.12)
SODIUM SERPL-SCNC: 141 MMOL/L (ref 135–147)
WBC # BLD AUTO: 3.61 THOUSAND/UL (ref 4.31–10.16)

## 2025-04-28 PROCEDURE — 77334 RADIATION TREATMENT AID(S): CPT | Performed by: RADIOLOGY

## 2025-04-28 PROCEDURE — 82570 ASSAY OF URINE CREATININE: CPT

## 2025-04-28 PROCEDURE — 84156 ASSAY OF PROTEIN URINE: CPT

## 2025-04-28 PROCEDURE — 85025 COMPLETE CBC W/AUTO DIFF WBC: CPT

## 2025-04-28 PROCEDURE — 36415 COLL VENOUS BLD VENIPUNCTURE: CPT

## 2025-04-28 PROCEDURE — 83735 ASSAY OF MAGNESIUM: CPT

## 2025-04-28 PROCEDURE — 86304 IMMUNOASSAY TUMOR CA 125: CPT

## 2025-04-28 PROCEDURE — 80053 COMPREHEN METABOLIC PANEL: CPT

## 2025-04-29 ENCOUNTER — HOSPITAL ENCOUNTER (OUTPATIENT)
Dept: RADIOLOGY | Facility: HOSPITAL | Age: 70
Discharge: HOME | End: 2025-04-29
Attending: ORTHOPAEDIC SURGERY
Payer: COMMERCIAL

## 2025-04-29 ENCOUNTER — OFFICE VISIT (OUTPATIENT)
Dept: OBGYN CLINIC | Facility: HOSPITAL | Age: 70
End: 2025-04-29
Payer: COMMERCIAL

## 2025-04-29 VITALS — WEIGHT: 226 LBS | BODY MASS INDEX: 44.37 KG/M2 | HEIGHT: 60 IN

## 2025-04-29 DIAGNOSIS — M25.562 PAIN IN BOTH KNEES, UNSPECIFIED CHRONICITY: ICD-10-CM

## 2025-04-29 DIAGNOSIS — M25.561 PAIN IN BOTH KNEES, UNSPECIFIED CHRONICITY: ICD-10-CM

## 2025-04-29 DIAGNOSIS — M17.12 PRIMARY OSTEOARTHRITIS OF LEFT KNEE: Primary | ICD-10-CM

## 2025-04-29 DIAGNOSIS — M17.11 PRIMARY OSTEOARTHRITIS OF RIGHT KNEE: ICD-10-CM

## 2025-04-29 LAB — CANCER AG125 SERPL-ACNC: 19.8 U/ML (ref 0–35)

## 2025-04-29 PROCEDURE — 73562 X-RAY EXAM OF KNEE 3: CPT

## 2025-04-29 PROCEDURE — 20610 DRAIN/INJ JOINT/BURSA W/O US: CPT | Performed by: ORTHOPAEDIC SURGERY

## 2025-04-29 PROCEDURE — 99213 OFFICE O/P EST LOW 20 MIN: CPT | Performed by: ORTHOPAEDIC SURGERY

## 2025-04-29 RX ORDER — LIDOCAINE HYDROCHLORIDE 10 MG/ML
2 INJECTION, SOLUTION INFILTRATION; PERINEURAL
Status: COMPLETED | OUTPATIENT
Start: 2025-04-29 | End: 2025-04-29

## 2025-04-29 RX ORDER — BETAMETHASONE SODIUM PHOSPHATE AND BETAMETHASONE ACETATE 3; 3 MG/ML; MG/ML
12 INJECTION, SUSPENSION INTRA-ARTICULAR; INTRALESIONAL; INTRAMUSCULAR; SOFT TISSUE
Status: COMPLETED | OUTPATIENT
Start: 2025-04-29 | End: 2025-04-29

## 2025-04-29 RX ADMIN — LIDOCAINE HYDROCHLORIDE 2 ML: 10 INJECTION, SOLUTION INFILTRATION; PERINEURAL at 07:30

## 2025-04-29 RX ADMIN — BETAMETHASONE SODIUM PHOSPHATE AND BETAMETHASONE ACETATE 12 MG: 3; 3 INJECTION, SUSPENSION INTRA-ARTICULAR; INTRALESIONAL; INTRAMUSCULAR; SOFT TISSUE at 07:30

## 2025-04-29 NOTE — ASSESSMENT & PLAN NOTE
Adult female due to initiate radiation therapy for endometrial cancer.  She has onset and worsening of weightbearing pain in both knees.  Her exam and x-rays are consistent with osteoarthritis.  Of note body mass index was discussed with the patient.  All diagnoses were discussed with the patient.  Treatment option including risks, benefits, return discussed in detail.  Injections of corticosteroid are indicated for both knees.  They are advised, accepted, and administered as outlined above.  Office follow-up in 3 months time is my recommendation

## 2025-04-29 NOTE — PROGRESS NOTES
Name: Cherelle Dash      : 1955       MRN: [id number]   Encounter Provider: Carroll Hoff MD   Encounter Date: 25  Encounter department: [facility] [place]     ASSESSMENT & PLAN:  Assessment & Plan  Primary osteoarthritis of left knee  Adult female due to initiate radiation therapy for endometrial cancer.  She has onset and worsening of weightbearing pain in both knees.  Her exam and x-rays are consistent with osteoarthritis.  Of note body mass index was discussed with the patient.  All diagnoses were discussed with the patient.  Treatment option including risks, benefits, return discussed in detail.  Injections of corticosteroid are indicated for both knees.  They are advised, accepted, and administered as outlined above.  Office follow-up in 3 months time is my recommendation       Primary osteoarthritis of right knee    Orders:    Large joint arthrocentesis: R knee         To do next visit:  No follow-ups on file.    _____________________________________________________  CHIEF COMPLAINT:  Chief Complaint   Patient presents with    Left Knee - Follow-up    Right Knee - Follow-up         SUBJECTIVE:  Cherelle Dash is a 69 y.o. female who presents for evaluation of weightbearing pain in both knees.  It is atraumatic onset gradual getting worse.  She has had a complicated medical history recently she has received treatments for endometrial cancer and she is due to initiate radiation therapy.  So far as her knees she has pain at the level of both knee joints, the pain is made worse bearing weight, pain increases with increased activities.  Pain score is right 7 out of 10 on the right left knee today        PAST MEDICAL HISTORY:  Past Medical History:   Diagnosis Date    Abnormal ECG     Anxiety     Arthritis     Bipolar 1 disorder (HCC)     Cervical cancer (HCC)     Chronic kidney disease     stage 3    CPAP (continuous positive airway pressure) dependence      Depression 2001    Disease of thyroid gland 2001    Thyroid treated with Lithium medication for bi-polar disease    DVT (deep venous thrombosis) (HCC)     BLLE    Elevated blood pressure reading 06/10/2019    GERD (gastroesophageal reflux disease)     Obesity     Pulmonary emboli (HCC)     B/L    RSV (acute bronchiolitis due to respiratory syncytial virus) 12/05/2023    Sleep apnea     Sleep apnea, obstructive 2007    Urinary incontinence 2019    Uterine cancer (HCC)        PAST SURGICAL HISTORY:  Past Surgical History:   Procedure Laterality Date    COLONOSCOPY  2011    COLONOSCOPY  03/08/2023    DENTAL SURGERY  2020    FOOT SURGERY  1976    [place]    HYSTERECTOMY  10/11/24    IR IVC FILTER PLACEMENT OPTIONAL/TEMPORARY  10/08/2024    IR PORT CHECK  12/26/2024    IR PORT PLACEMENT  12/03/2024    LAPAROTOMY N/A 10/11/2024    Procedure: EXPLORATORY LAPAROTOMY;  Surgeon: Rodney Downing MD;  Location: [facility];  Service: Gynecology Oncology    MN HYSTEROSCOPY BX ENDOMETRIUM&/POLYPC W/WO D&C N/A 08/23/2024    Procedure: EXAM UNDER ANESTHESIA, DILATATION AND CURETTAGE, CERVICAL BIOPSIES;  Surgeon: Evin Page MD;  Location:  MAIN OR;  Service: Gynecology Oncology    MN LAPS TOTAL HYSTERECT 250 GM/< W/RMVL TUBE/OVARY N/A 10/11/2024    Procedure: ROBOTIC ASSISTED LTH, BSO, LYMPH NODE DISSECTION, EUA;  Surgeon: Rodney Downing MD;  Location: [facility];  Service: Gynecology Oncology       FAMILY HISTORY:  Family History   Problem Relation Age of Onset    Heart disease Mother     Heart Valve Disease Mother         Replacement    Diabetes Mother         2002    Heart failure Mother         Heart Valve replacement    Arthritis Father     No Known Problems Sister     No Known Problems Daughter     No Known Problems Maternal Grandmother     No Known Problems Maternal Grandfather     No Known Problems Paternal Grandmother     No Known Problems Paternal Grandfather     No Known Problems Son     No Known  Problems Maternal Aunt     No Known Problems Maternal Aunt     No Known Problems Maternal Aunt     No Known Problems Maternal Aunt     No Known Problems Maternal Aunt     No Known Problems Paternal Aunt     Alcohol abuse Son         Kolton, 40 year old son, has issues with alcohol and alcohol abuse       SOCIAL HISTORY:  Social History     Tobacco Use    Smoking status: Former     Current packs/day: 0.00     Average packs/day: 0.3 packs/day for 30.0 years (7.5 ttl pk-yrs)     Types: Cigarettes     Start date: 1979     Quit date: 2009     Years since quittin.3     Passive exposure: Never    Smokeless tobacco: Never   Vaping Use    Vaping status: Never Used   Substance Use Topics    Alcohol use: Not Currently     Alcohol/week: 1.0 standard drink of alcohol     Types: 1 Glasses of wine per week    Drug use: Never       MEDICATIONS:    Current Outpatient Medications:     apixaban (Eliquis) 5 mg, Take 1 tablet (5 mg total) by mouth 2 (two) times a day, Disp: 180 tablet, Rfl: 1    chlorhexidine (PERIDEX) 0.12 % solution, USE HALF OUNCE TWICE A DAY RINSE FOR 30 SECONDS BY MOUTH THEN EXPECTORATE DO NOT SWALLOW, Disp: , Rfl:     Cholecalciferol (VITAMIN D) 2000 units CAPS, Take 1 capsule by mouth daily, Disp: , Rfl:     clobetasol (TEMOVATE) 0.05 % ointment, Apply topically 2 (two) times a week, Disp: 60 g, Rfl: 3    colchicine (COLCRYS) 0.6 mg tablet, Take 1 tablet (0.6 mg total) by mouth daily, Disp: 90 tablet, Rfl: 1    CRANBERRY PO, Take by mouth, Disp: , Rfl:     famotidine (PEPCID) 20 mg tablet, TAKE 1 TABLET BY MOUTH TWICE  DAILY, Disp: 180 tablet, Rfl: 1    Glucosamine-Chondroit-Vit C-Mn (GLUCOSAMINE 1500 COMPLEX PO), Take by mouth in the morning, Disp: , Rfl:     Ivermectin 1 % CREA, , Disp: , Rfl:     lidocaine-prilocaine (EMLA) cream, Apply to PORT 30 min prior to labs and chemo, Disp: 30 g, Rfl: 1    LORazepam (ATIVAN) 1 mg tablet, Take 1 tablet PO HS prn insomnia, Disp: 30 tablet, Rfl: 0     "lurasidone (LATUDA) 20 mg tablet, TAKE 1 TABLET BY MOUTH DAILY  WITH BREAKFAST, Disp: 30 tablet, Rfl: 11    magnesium Oxide (MAG-OX) 400 mg TABS, TAKE 1 TABLET BY MOUTH EVERY DAY, Disp: 90 tablet, Rfl: 1    metoprolol succinate (TOPROL-XL) 50 mg 24 hr tablet, Take 1 tablet (50 mg total) by mouth every 12 (twelve) hours, Disp: 180 tablet, Rfl: 3    Mirabegron ER (Myrbetriq) 50 MG TB24, TAKE 1 TABLET BY MOUTH IN THE  MORNING, Disp: 90 tablet, Rfl: 1    Multiple Vitamin (MULTI-VITAMIN DAILY) TABS, Take by mouth daily, Disp: , Rfl:     polyethylene glycol (GLYCOLAX) 17 GM/SCOOP powder, Take 17 g by mouth 2 (two) times a day, Disp: 238 g, Rfl: 1    Sodium Fluoride 5000 PPM 1.1 % GEL, As directed, Disp: , Rfl:     torsemide (DEMADEX) 20 mg tablet, Take 1 tablet (20 mg total) by mouth daily, Disp: 90 tablet, Rfl: 1    ALLERGIES:  Allergies   Allergen Reactions    Raw Fruit - Food Allergy Anaphylaxis     PLUMS, PEACHES, APPLES, CHERRIES FRUIT WITH SKIN       LABS:  HgA1c:   Lab Results   Component Value Date    HGBA1C 6.2 (H) 11/05/2024     BMP:   Lab Results   Component Value Date    GLUCOSE 124 (H) 11/17/2017    CALCIUM 9.2 04/28/2025     (H) 11/17/2017    K 3.7 04/28/2025    CO2 25 04/28/2025     04/28/2025    BUN 26 (H) 04/28/2025    CREATININE 1.85 (H) 04/28/2025     CBC: No components found for: \"CBC\"    _____________________________________________________  PHYSICAL EXAMINATION:  Vital signs: Ht 5' (1.524 m)   Wt 103 kg (226 lb)   BMI 44.14 kg/m²   General: No acute distress, awake and alert  Psychiatric: Mood and affect appear appropriate  HEENT: Trachea Midline, No torticollis, no apparent facial trauma  Cardiovascular: No audible murmurs; Extremities appear perfused  Pulmonary: No audible wheezing or stridor  Skin: No open lesions; see further details (if any) below    MUSCULOSKELETAL EXAMINATION:  Ortho Exam  Gait pattern is antalgic despite use of a gliding walker.  Neither hip is most loss.  " Neither thigh has atrophy.  Each knee is a intact soft tissue envelope.  Neither knee has an effusion.  Each knee is palpable bony enlargement tenderness medially.  Each knee is crepitation flexion-extension.  Knee the knees palp warmth of the synovium.  Both lower extremities demonstrate calf compartments are soft and supple.  Toes of both feet are warm, sensate, mobile    ___________________________________________________  STUDIES REVIEWED:  I personally reviewed the images obtained in office today and my independent interpretation is as follows:    I have personally reviewed standing x-rays of both knees from today my interpretation is as follows:  Standing films of both knees demonstrate medial and patellofemoral compartment bone-on-bone apposition      PROCEDURES PERFORMED:    Large joint arthrocentesis: R knee  Universal Protocol:  Consent given by: patient  Supporting Documentation  Indications: pain     Is this a Visco injection? NoProcedure Details  Location: knee - R knee  Needle size: 22 G  Ultrasound guidance: no  Medications administered: 2 mL lidocaine 1 %; 12 mg betamethasone acetate-betamethasone sodium phosphate 6 (3-3) mg/mL    Patient tolerance: patient tolerated the procedure well with no immediate complications  Dressing:  Sterile dressing applied      Large joint arthrocentesis: L knee  Universal Protocol:  Consent given by: patient  Supporting Documentation  Indications: pain     Is this a Visco injection? NoProcedure Details  Location: knee - L knee  Needle size: 22 G  Medications administered: 2 mL lidocaine 1 %; 12 mg betamethasone acetate-betamethasone sodium phosphate 6 (3-3) mg/mL            None preformed       Carroll Hoff MD

## 2025-04-30 ENCOUNTER — APPOINTMENT (EMERGENCY)
Dept: RADIOLOGY | Facility: HOSPITAL | Age: 70
DRG: 322 | End: 2025-04-30
Payer: COMMERCIAL

## 2025-04-30 ENCOUNTER — HOSPITAL ENCOUNTER (INPATIENT)
Facility: HOSPITAL | Age: 70
LOS: 2 days | Discharge: HOME | DRG: 322 | End: 2025-05-03
Attending: EMERGENCY MEDICINE | Admitting: INTERNAL MEDICINE
Payer: COMMERCIAL

## 2025-04-30 ENCOUNTER — TELEPHONE (OUTPATIENT)
Dept: RADIOLOGY | Facility: HOSPITAL | Age: 70
End: 2025-04-30

## 2025-04-30 DIAGNOSIS — M89.9 CHRONIC KIDNEY DISEASE-MINERAL AND BONE DISORDER: ICD-10-CM

## 2025-04-30 DIAGNOSIS — R79.89 ELEVATED TROPONIN: ICD-10-CM

## 2025-04-30 DIAGNOSIS — I42.9 CARDIOMYOPATHY, UNSPECIFIED TYPE (HCC): Primary | ICD-10-CM

## 2025-04-30 DIAGNOSIS — Z86.79 HISTORY OF MYOCARDITIS: ICD-10-CM

## 2025-04-30 DIAGNOSIS — N18.4 CHRONIC KIDNEY DISEASE, STAGE 4 (SEVERE) (HCC): ICD-10-CM

## 2025-04-30 DIAGNOSIS — R07.9 CHEST PAIN: ICD-10-CM

## 2025-04-30 DIAGNOSIS — N18.9 CHRONIC KIDNEY DISEASE-MINERAL AND BONE DISORDER: ICD-10-CM

## 2025-04-30 DIAGNOSIS — E83.9 CHRONIC KIDNEY DISEASE-MINERAL AND BONE DISORDER: ICD-10-CM

## 2025-04-30 PROBLEM — R26.2 AMBULATORY DYSFUNCTION: Status: ACTIVE | Noted: 2025-04-30

## 2025-04-30 PROBLEM — M79.89 RIGHT LEG SWELLING: Status: ACTIVE | Noted: 2025-04-30

## 2025-04-30 LAB
2HR DELTA HS TROPONIN: 318 NG/L
ALBUMIN SERPL BCG-MCNC: 3.8 G/DL (ref 3.5–5)
ALP SERPL-CCNC: 92 U/L (ref 34–104)
ALT SERPL W P-5'-P-CCNC: 25 U/L (ref 7–52)
ANION GAP SERPL CALCULATED.3IONS-SCNC: 12 MMOL/L (ref 4–13)
AST SERPL W P-5'-P-CCNC: 19 U/L (ref 13–39)
BASOPHILS # BLD AUTO: 0.02 THOUSANDS/ÂΜL (ref 0–0.1)
BASOPHILS NFR BLD AUTO: 0 % (ref 0–1)
BILIRUB SERPL-MCNC: 0.27 MG/DL (ref 0.2–1)
BNP SERPL-MCNC: 271 PG/ML (ref 0–100)
BUN SERPL-MCNC: 33 MG/DL (ref 5–25)
CALCIUM SERPL-MCNC: 8.9 MG/DL (ref 8.4–10.2)
CARDIAC TROPONIN I PNL SERPL HS: 387 NG/L (ref ?–50)
CARDIAC TROPONIN I PNL SERPL HS: 69 NG/L (ref ?–50)
CHLORIDE SERPL-SCNC: 104 MMOL/L (ref 96–108)
CO2 SERPL-SCNC: 26 MMOL/L (ref 21–32)
CREAT SERPL-MCNC: 2.04 MG/DL (ref 0.6–1.3)
EOSINOPHIL # BLD AUTO: 0 THOUSAND/ÂΜL (ref 0–0.61)
EOSINOPHIL NFR BLD AUTO: 0 % (ref 0–6)
ERYTHROCYTE [DISTWIDTH] IN BLOOD BY AUTOMATED COUNT: 13.2 % (ref 11.6–15.1)
GFR SERPL CREATININE-BSD FRML MDRD: 24 ML/MIN/1.73SQ M
GLUCOSE SERPL-MCNC: 204 MG/DL (ref 65–140)
HCT VFR BLD AUTO: 32.5 % (ref 34.8–46.1)
HGB BLD-MCNC: 10.4 G/DL (ref 11.5–15.4)
IMM GRANULOCYTES # BLD AUTO: 0.03 THOUSAND/UL (ref 0–0.2)
IMM GRANULOCYTES NFR BLD AUTO: 0 % (ref 0–2)
LYMPHOCYTES # BLD AUTO: 1.08 THOUSANDS/ÂΜL (ref 0.6–4.47)
LYMPHOCYTES NFR BLD AUTO: 15 % (ref 14–44)
MCH RBC QN AUTO: 31.4 PG (ref 26.8–34.3)
MCHC RBC AUTO-ENTMCNC: 32 G/DL (ref 31.4–37.4)
MCV RBC AUTO: 98 FL (ref 82–98)
MONOCYTES # BLD AUTO: 0.72 THOUSAND/ÂΜL (ref 0.17–1.22)
MONOCYTES NFR BLD AUTO: 10 % (ref 4–12)
NEUTROPHILS # BLD AUTO: 5.23 THOUSANDS/ÂΜL (ref 1.85–7.62)
NEUTS SEG NFR BLD AUTO: 75 % (ref 43–75)
NRBC BLD AUTO-RTO: 0 /100 WBCS
PLATELET # BLD AUTO: 190 THOUSANDS/UL (ref 149–390)
PMV BLD AUTO: 12.1 FL (ref 8.9–12.7)
POTASSIUM SERPL-SCNC: 3.6 MMOL/L (ref 3.5–5.3)
PROT SERPL-MCNC: 6.5 G/DL (ref 6.4–8.4)
RBC # BLD AUTO: 3.31 MILLION/UL (ref 3.81–5.12)
SODIUM SERPL-SCNC: 142 MMOL/L (ref 135–147)
WBC # BLD AUTO: 7.08 THOUSAND/UL (ref 4.31–10.16)

## 2025-04-30 PROCEDURE — 77300 RADIATION THERAPY DOSE PLAN: CPT | Performed by: RADIOLOGY

## 2025-04-30 PROCEDURE — 77301 RADIOTHERAPY DOSE PLAN IMRT: CPT | Performed by: RADIOLOGY

## 2025-04-30 PROCEDURE — 77338 DESIGN MLC DEVICE FOR IMRT: CPT | Performed by: RADIOLOGY

## 2025-04-30 PROCEDURE — 36415 COLL VENOUS BLD VENIPUNCTURE: CPT

## 2025-04-30 PROCEDURE — 93005 ELECTROCARDIOGRAM TRACING: CPT

## 2025-04-30 PROCEDURE — 85025 COMPLETE CBC W/AUTO DIFF WBC: CPT

## 2025-04-30 PROCEDURE — 80053 COMPREHEN METABOLIC PANEL: CPT

## 2025-04-30 PROCEDURE — 96360 HYDRATION IV INFUSION INIT: CPT

## 2025-04-30 PROCEDURE — 99285 EMERGENCY DEPT VISIT HI MDM: CPT

## 2025-04-30 PROCEDURE — 84484 ASSAY OF TROPONIN QUANT: CPT

## 2025-04-30 PROCEDURE — 99222 1ST HOSP IP/OBS MODERATE 55: CPT | Performed by: NURSE PRACTITIONER

## 2025-04-30 PROCEDURE — 71045 X-RAY EXAM CHEST 1 VIEW: CPT

## 2025-04-30 PROCEDURE — 83880 ASSAY OF NATRIURETIC PEPTIDE: CPT | Performed by: NURSE PRACTITIONER

## 2025-04-30 RX ORDER — FAMOTIDINE 20 MG/1
20 TABLET, FILM COATED ORAL ONCE
Status: COMPLETED | OUTPATIENT
Start: 2025-04-30 | End: 2025-04-30

## 2025-04-30 RX ORDER — OXYBUTYNIN CHLORIDE 5 MG/1
10 TABLET, EXTENDED RELEASE ORAL DAILY
Status: DISCONTINUED | OUTPATIENT
Start: 2025-05-01 | End: 2025-05-03 | Stop reason: HOSPADM

## 2025-04-30 RX ORDER — FAMOTIDINE 20 MG/1
10 TABLET, FILM COATED ORAL DAILY
Status: DISCONTINUED | OUTPATIENT
Start: 2025-05-01 | End: 2025-05-03 | Stop reason: HOSPADM

## 2025-04-30 RX ORDER — NITROGLYCERIN 0.4 MG/1
0.4 TABLET SUBLINGUAL
Status: DISCONTINUED | OUTPATIENT
Start: 2025-04-30 | End: 2025-05-03 | Stop reason: HOSPADM

## 2025-04-30 RX ORDER — POLYETHYLENE GLYCOL 3350 17 G/17G
17 POWDER, FOR SOLUTION ORAL 2 TIMES DAILY
Status: DISCONTINUED | OUTPATIENT
Start: 2025-05-01 | End: 2025-05-03 | Stop reason: HOSPADM

## 2025-04-30 RX ORDER — LURASIDONE HYDROCHLORIDE 20 MG/1
20 TABLET, FILM COATED ORAL
Status: DISCONTINUED | OUTPATIENT
Start: 2025-05-01 | End: 2025-05-03 | Stop reason: HOSPADM

## 2025-04-30 RX ORDER — ATORVASTATIN CALCIUM 40 MG/1
40 TABLET, FILM COATED ORAL
Status: DISCONTINUED | OUTPATIENT
Start: 2025-05-01 | End: 2025-05-03 | Stop reason: HOSPADM

## 2025-04-30 RX ORDER — COLCHICINE 0.6 MG/1
0.6 TABLET ORAL DAILY
Status: DISCONTINUED | OUTPATIENT
Start: 2025-05-01 | End: 2025-05-03 | Stop reason: HOSPADM

## 2025-04-30 RX ORDER — LANOLIN ALCOHOL/MO/W.PET/CERES
400 CREAM (GRAM) TOPICAL DAILY
Status: DISCONTINUED | OUTPATIENT
Start: 2025-05-01 | End: 2025-05-03 | Stop reason: HOSPADM

## 2025-04-30 RX ORDER — ACETAMINOPHEN 325 MG/1
650 TABLET ORAL EVERY 6 HOURS PRN
Status: DISCONTINUED | OUTPATIENT
Start: 2025-04-30 | End: 2025-05-03 | Stop reason: HOSPADM

## 2025-04-30 RX ORDER — ASPIRIN 81 MG/1
81 TABLET, CHEWABLE ORAL DAILY
Status: DISCONTINUED | OUTPATIENT
Start: 2025-05-01 | End: 2025-05-01

## 2025-04-30 RX ORDER — METOPROLOL SUCCINATE 50 MG/1
50 TABLET, EXTENDED RELEASE ORAL EVERY 12 HOURS
Status: DISCONTINUED | OUTPATIENT
Start: 2025-05-01 | End: 2025-05-03 | Stop reason: HOSPADM

## 2025-04-30 RX ORDER — LORAZEPAM 1 MG/1
1 TABLET ORAL
Status: DISCONTINUED | OUTPATIENT
Start: 2025-04-30 | End: 2025-05-03 | Stop reason: HOSPADM

## 2025-04-30 RX ORDER — METOPROLOL TARTRATE 50 MG
50 TABLET ORAL ONCE
Status: COMPLETED | OUTPATIENT
Start: 2025-04-30 | End: 2025-04-30

## 2025-04-30 RX ADMIN — FAMOTIDINE 20 MG: 20 TABLET, FILM COATED ORAL at 20:11

## 2025-04-30 RX ADMIN — APIXABAN 5 MG: 5 TABLET, FILM COATED ORAL at 20:11

## 2025-04-30 RX ADMIN — METOPROLOL TARTRATE 50 MG: 50 TABLET, FILM COATED ORAL at 20:11

## 2025-04-30 RX ADMIN — SODIUM CHLORIDE 1000 ML: 0.9 INJECTION, SOLUTION INTRAVENOUS at 20:59

## 2025-04-30 NOTE — Clinical Note
Case was discussed with Cecy KEATING and the patient's admission status was agreed to be Admission Status: inpatient status to the service of Dr. fabi Ramirez

## 2025-04-30 NOTE — Clinical Note
Received to [facility] procedure room for planned study.  Pleasant,,cooperative xavier appropriate.  Endorses anxiety, reassured.  Spoke with ZURI Marc and informed consent obtained for procedure.  Without current complaints.  Respirations without effort.  Frail appearing.  Face jaimee.

## 2025-04-30 NOTE — ED PROVIDER NOTES
Time reflects when diagnosis was documented in both MDM as applicable and the Disposition within this note       Time User Action Codes Description Comment    4/30/2025 10:03 PM Pamella Paulino Add [R07.9] Chest pain     4/30/2025 10:03 PM Pamella Paulino Add [R79.89] Elevated troponin     4/30/2025 10:10 PM Cecy Parker [Z86.79] History of myocarditis           ED Disposition       ED Disposition   Admit    Condition   Stable    Date/Time   Wed Apr 30, 2025 10:05 PM    Comment   Case was discussed with Cecy KEATING and the patient's admission status was agreed to be Admission Status: observation status to the service of Dr. Angeles.               Assessment & Plan       Medical Decision Making  Patient seen, examined and noted to have chest pain, elevated troponin. Patient coming in after experiencing exertional chest pain. Chest pain resolved with rest and Asprin given at her facility. Has a history of CP since getting chemo but this was worse than it has been in the past. Creatitine is elevated from 1.85 2 days ago to 2.04 today. 0hr troponin of 69. Reached out to cardiology regarding starting heparin vs serial EKGs and trending troponin. Spoke with Dr. Shirley Estrella from cardiology who recommends serial EKGs and trops and for patient to be admitted for tele. Recommends starting heparin if trops rise. Passed this information along to Cecy Parker from [facility] who accepted patient.     Differential diagnosis includes but is not limited to ACS, MI, pneumonia, pleurisy, pericarditis, myocarditis, CHF     Patient's labs notable for: 0hr trop of 69, creatinine of 2.04    Imaging revealed:   Image was independently interpreted by myself and showed no acute concerns of cardiopulmonary disease at this time.     EKG: was interpreted by myself as above.    All labs reviewed and utilized in the medical decision making process.    Amount and/or Complexity of Data Reviewed  Labs: ordered. Decision-making details documented  in ED Course.  Radiology: ordered and independent interpretation performed.    Risk  Prescription drug management.  Decision regarding hospitalization.        ED Course as of 05/01/25 0107 Wed Apr 30, 2025 2022 CBC and differential(!)  Baseline for pt   2044 Creatinine(!): 2.04  Will give fluids, up from 1.84 2 days ago   2046 hs TnI 0hr(!): 69  Elevated, will get 2 hour trop   2118 Reached out to cardiology about starting heparin        Medications   apixaban (ELIQUIS) tablet 5 mg (5 mg Oral Given 4/30/25 2011)   metoprolol tartrate (LOPRESSOR) tablet 50 mg (50 mg Oral Given 4/30/25 2011)   famotidine (PEPCID) tablet 20 mg (20 mg Oral Given 4/30/25 2011)   sodium chloride 0.9 % bolus 1,000 mL (1,000 mL Intravenous New Bag 4/30/25 2059)       ED Risk Strat Scores   HEART Risk Score      Flowsheet Row Most Recent Value   Heart Score Risk Calculator    History 1 Filed at: 05/01/2025 0106   ECG 1 Filed at: 05/01/2025 0106   Age 2 Filed at: 05/01/2025 0106   Risk Factors 1 Filed at: 05/01/2025 0106   Troponin 2 Filed at: 05/01/2025 0106   HEART Score 7 Filed at: 05/01/2025 0106          HEART Risk Score      Flowsheet Row Most Recent Value   Heart Score Risk Calculator    History 1 Filed at: 05/01/2025 0106   ECG 1 Filed at: 05/01/2025 0106   Age 2 Filed at: 05/01/2025 0106   Risk Factors 1 Filed at: 05/01/2025 0106   Troponin 2 Filed at: 05/01/2025 0106   HEART Score 7 Filed at: 05/01/2025 0106                      No data recorded          PATTIE Risk Score      Flowsheet Row Most Recent Value   Age >= 65 1 Filed at: 04/30/2025 2303   Known CAD (stenosis >= 50%) 0 Filed at: 04/30/2025 2303   Recent (<=24 hrs) Service Angina 1 Filed at: 04/30/2025 2303   ST Deviation >= 0.5 mm 0 Filed at: 04/30/2025 2303   3+ CAD Risk Factors (FHx, HTN, HLP, DM, Smoker) 0 Filed at: 04/30/2025 2303   Aspirin Use Past 7 Days 0 Filed at: 04/30/2025 2303   Elevated Cardiac Markers 1 Filed at: 04/30/2025 2303   PATTIE Risk Score  (Calculated) 3 Filed at: 04/30/2025 2300                          History of Present Illness       Chief Complaint   Patient presents with    Chest Pain     Pt walking home from dinner, had some chest pain. Also feeling nauseas and just not feeling well. Pt currently having chemo therapy. Currently has no chest pain or SOB.        Past Medical History:   Diagnosis Date    Abnormal ECG     Anxiety 2002    Arthritis     Bipolar 1 disorder (HCC)     Cervical cancer (HCC)     Chronic kidney disease     stage 3    CPAP (continuous positive airway pressure) dependence     Depression 2001    Disease of thyroid gland 2001    Thyroid treated with Lithium medication for bi-polar disease    DVT (deep venous thrombosis) (HCC)     BLLE    Elevated blood pressure reading 06/10/2019    GERD (gastroesophageal reflux disease)     Obesity     Pulmonary emboli (HCC)     B/L    RSV (acute bronchiolitis due to respiratory syncytial virus) 12/05/2023    Sleep apnea     Sleep apnea, obstructive 2007    Urinary incontinence 2019    Uterine cancer (HCC)       Past Surgical History:   Procedure Laterality Date    COLONOSCOPY  2011    COLONOSCOPY  03/08/2023    DENTAL SURGERY  2020    FOOT SURGERY  1976    [facility]    HYSTERECTOMY  10/11/24    IR IVC FILTER PLACEMENT OPTIONAL/TEMPORARY  10/08/2024    IR PORT CHECK  12/26/2024    IR PORT PLACEMENT  12/03/2024    LAPAROTOMY N/A 10/11/2024    Procedure: EXPLORATORY LAPAROTOMY;  Surgeon: Rodney Downing MD;  Location: [facility];  Service: Gynecology Oncology    VA HYSTEROSCOPY BX ENDOMETRIUM&/POLYPC W/WO D&C N/A 08/23/2024    Procedure: EXAM UNDER ANESTHESIA, DILATATION AND CURETTAGE, CERVICAL BIOPSIES;  Surgeon: Evin Page MD;  Location: [facility];  Service: Gynecology Oncology    VA LAPS TOTAL HYSTERECT 250 GM/< W/RMVL TUBE/OVARY N/A 10/11/2024    Procedure: ROBOTIC ASSISTED LTH, BSO, LYMPH NODE DISSECTION, EUA;  Surgeon: Rodney Downing MD;  Location: [facility];   Service: Gynecology Oncology      Family History   Problem Relation Age of Onset    Heart disease Mother     Heart Valve Disease Mother         Replacement    Diabetes Mother         2002    Heart failure Mother         Heart Valve replacement    Arthritis Father     No Known Problems Sister     No Known Problems Daughter     No Known Problems Maternal Grandmother     No Known Problems Maternal Grandfather     No Known Problems Paternal Grandmother     No Known Problems Paternal Grandfather     No Known Problems Son     No Known Problems Maternal Aunt     No Known Problems Maternal Aunt     No Known Problems Maternal Aunt     No Known Problems Maternal Aunt     No Known Problems Maternal Aunt     No Known Problems Paternal Aunt     Alcohol abuse Son         Kolton, 40 year old son, has issues with alcohol and alcohol abuse      Social History     Tobacco Use    Smoking status: Former     Current packs/day: 0.00     Average packs/day: 0.3 packs/day for 30.0 years (7.5 ttl pk-yrs)     Types: Cigarettes     Start date: 1979     Quit date: 2009     Years since quittin.3     Passive exposure: Never    Smokeless tobacco: Never   Vaping Use    Vaping status: Never Used   Substance Use Topics    Alcohol use: Not Currently     Alcohol/week: 1.0 standard drink of alcohol     Types: 1 Glasses of wine per week    Drug use: Never      E-Cigarette/Vaping    E-Cigarette Use Never User       E-Cigarette/Vaping Substances    Nicotine No     THC No     CBD No     Flavoring No     Other No     Unknown No       I have reviewed and agree with the history as documented.     Cherelle Dash is a 69 y.o. female with a PMH of endometrial cancer status post chemo presenting to the ED on 2025 with chest pain. Patient endorses that walking home from dinner today she developed a chest pain.  Patient has a history of myocarditis ever since she started getting chemo and states this feels similar but has not had chest pain so  severe in a few weeks.  Had Chinese food for dinner.  Went to health center at her facility where she was given aspirin and EMS was called.  Last chemo for endometrial cancer on March 7.  When she gets chest pain it typically improves with rest.  She states that it did improve however still has a mild ache.  Has shortness of breath at baseline.  Has not missed any doses of her Eliquis.  Patient denies abdominal pain, cough, fevers, chills, headache, blurry vision, numbness, tingling or any other complaints at this time.             Review of Systems   Constitutional:  Negative for chills and fever.   Eyes:  Negative for visual disturbance.   Respiratory:  Positive for shortness of breath. Negative for cough.         SOB is baseline for pt   Cardiovascular:  Positive for chest pain. Negative for palpitations.   Gastrointestinal:  Negative for abdominal pain, nausea and vomiting.   Neurological:  Negative for dizziness, weakness, numbness and headaches.           Objective       ED Triage Vitals   Temperature Pulse Blood Pressure Respirations SpO2 Patient Position - Orthostatic VS   04/30/25 1929 04/30/25 1928 04/30/25 1928 04/30/25 1928 04/30/25 1928 04/30/25 1928   97.6 °F (36.4 °C) 94 150/75 18 100 % Lying      Temp Source Heart Rate Source BP Location FiO2 (%) Pain Score    04/30/25 1929 -- 04/30/25 1928 -- 04/30/25 2320    Oral  Right arm  No Pain      Vitals      Date and Time Temp Pulse SpO2 Resp BP Pain Score FACES Pain Rating User   04/30/25 2320 -- -- -- -- -- No Pain -- SK   04/30/25 2318 -- 78 97 % -- 127/78 -- -- DII   04/30/25 2011 -- 82 -- -- 138/63 -- -- MD   04/30/25 1929 97.6 °F (36.4 °C) -- -- -- -- -- -- JR   04/30/25 1928 -- 94 100 % 18 150/75 -- -- JR            Physical Exam  Constitutional:       General: She is not in acute distress.     Appearance: She is well-developed and normal weight. She is not ill-appearing or toxic-appearing.   HENT:      Head: Normocephalic and atraumatic.    Cardiovascular:      Rate and Rhythm: Normal rate and regular rhythm.      Pulses:           Radial pulses are 2+ on the right side and 2+ on the left side.        Dorsalis pedis pulses are 2+ on the right side and 2+ on the left side.      Heart sounds: Normal heart sounds. Heart sounds not distant. No murmur heard.     No friction rub. No gallop.   Pulmonary:      Effort: Pulmonary effort is normal. No tachypnea, accessory muscle usage or respiratory distress.      Breath sounds: Normal breath sounds. No stridor.   Chest:      Chest wall: No mass or tenderness.   Abdominal:      General: Bowel sounds are normal.      Palpations: Abdomen is soft.      Tenderness: There is no abdominal tenderness. There is no guarding.   Skin:     Capillary Refill: Capillary refill takes less than 2 seconds.   Neurological:      General: No focal deficit present.      Mental Status: She is alert and oriented to person, place, and time.         Results Reviewed       Procedure Component Value Units Date/Time    HS Troponin I 4hr [[id number]]  (Abnormal) Collected: 05/01/25 0005    Lab Status: Final result Specimen: Blood from Arm, Left Updated: 05/01/25 0050     hs TnI 4hr 768 ng/L      Delta 4hr hsTnI 699 ng/L     APTT [[id number]]  (Normal) Collected: 05/01/25 0005    Lab Status: Final result Specimen: Blood from Arm, Left Updated: 05/01/25 0030     PTT 25 seconds     Protime-INR [[id number]]  (Abnormal) Collected: 05/01/25 0005    Lab Status: Final result Specimen: Blood from Arm, Left Updated: 05/01/25 0030     Protime 16.2 seconds      INR 1.23    Narrative:      INR Therapeutic Range    Indication                                             INR Range      Atrial Fibrillation                                               2.0-3.0  Hypercoagulable State                                    2.0.2.3  Left Ventricular Asist Device                            2.0-3.0  Mechanical Heart Valve                                  -     Aortic(with afib, MI, embolism, HF, LA enlargement,    and/or coagulopathy)                                     2.0-3.0 (2.5-3.5)     Mitral                                                             2.5-3.5  Prosthetic/Bioprosthetic Heart Valve               2.0-3.0  Venous thromboembolism (VTE: VT, PE        2.0-3.0    B-Type Natriuretic Peptide(BNP) [[id number]]  (Abnormal) Collected: 04/30/25 2009    Lab Status: Final result Specimen: Blood from Arm, Left Updated: 04/30/25 2355      pg/mL     HS Troponin I 2hr [[id number]]  (Abnormal) Collected: 04/30/25 2227    Lab Status: Final result Specimen: Blood Updated: 04/30/25 2259     hs TnI 2hr 387 ng/L      Delta 2hr hsTnI 318 ng/L     HS Troponin 0hr (reflex protocol) [[id number]]  (Abnormal) Collected: 04/30/25 2009    Lab Status: Final result Specimen: Blood from Arm, Left Updated: 04/30/25 2045     hs TnI 0hr 69 ng/L     Comprehensive metabolic panel [[id number]]  (Abnormal) Collected: 04/30/25 2009    Lab Status: Final result Specimen: Blood from Arm, Left Updated: 04/30/25 2037     Sodium 142 mmol/L      Potassium 3.6 mmol/L      Chloride 104 mmol/L      CO2 26 mmol/L      ANION GAP 12 mmol/L      BUN 33 mg/dL      Creatinine 2.04 mg/dL      Glucose 204 mg/dL      Calcium 8.9 mg/dL      AST 19 U/L      ALT 25 U/L      Alkaline Phosphatase 92 U/L      Total Protein 6.5 g/dL      Albumin 3.8 g/dL      Total Bilirubin 0.27 mg/dL      eGFR 24 ml/min/1.73sq m     Narrative:      National Kid[employer] Disease Foundation guidelines for Chronic Kidney Disease (CKD):     Stage 1 with normal or high GFR (GFR > 90 mL/min/1.73 square meters)    Stage 2 Mild CKD (GFR = 60-89 mL/min/1.73 square meters)    Stage 3A Moderate CKD (GFR = 45-59 mL/min/1.73 square meters)    Stage 3B Moderate CKD (GFR = 30-44 mL/min/1.73 square meters)    Stage 4 Severe CKD (GFR = 15-29 mL/min/1.73 square meters)    Stage 5 End Stage CKD (GFR <15 mL/min/1.73 square meters)  Note: GFR calculation is  accurate only with a steady state creatinine    CBC and differential [[id number]]  (Abnormal) Collected: 04/30/25 2009    Lab Status: Final result Specimen: Blood from Arm, Left Updated: 04/30/25 2020     WBC 7.08 Thousand/uL      RBC 3.31 Million/uL      Hemoglobin 10.4 g/dL      Hematocrit 32.5 %      MCV 98 fL      MCH 31.4 pg      MCHC 32.0 g/dL      RDW 13.2 %      MPV 12.1 fL      Platelets 190 Thousands/uL      nRBC 0 /100 WBCs      Segmented % 75 %      Immature Grans % 0 %      Lymphocytes % 15 %      Monocytes % 10 %      Eosinophils Relative 0 %      Basophils Relative 0 %      Absolute Neutrophils 5.23 Thousands/µL      Absolute Immature Grans 0.03 Thousand/uL      Absolute Lymphocytes 1.08 Thousands/µL      Absolute Monocytes 0.72 Thousand/µL      Eosinophils Absolute 0.00 Thousand/µL      Basophils Absolute 0.02 Thousands/µL             XR chest 1 view portable   ED Interpretation by Pamella Paulino PA-C (04/30 2012)   Image was independently interpreted by myself and showed no acute concerns of cardiopulmonary disease at this time.           ECG 12 Lead Documentation Only    Date/Time: 4/30/2025 7:36 PM    Performed by: Pamella Paulino PA-C  Authorized by: Pamella Paulino PA-C    Indications / Diagnosis:  Chest pain  ECG reviewed by me, the ED Provider: yes    Patient location:  ED  Previous ECG:     Previous ECG:  Compared to current    Comparison ECG info:  3/24/25    Similarity:  No change    Comparison to cardiac monitor: Yes    Interpretation:     Interpretation: abnormal    Rate:     ECG rate:  88    ECG rate assessment: normal    Rhythm:     Rhythm: sinus rhythm    Ectopy:     Ectopy: none    QRS:     QRS axis:  Left    QRS intervals:  Normal  Conduction:     Conduction: abnormal      Abnormal conduction: complete LBBB    ST segments:     ST segments:  Normal  T waves:     T waves: normal        ED Medication and Procedure Management   Prior to Admission Medications   Prescriptions Last Dose  Informant Patient Reported? Taking?   CRANBERRY PO  Self Yes No   Sig: Take by mouth   Cholecalciferol (VITAMIN D) 2000 units CAPS  Self Yes No   Sig: Take 1 capsule by mouth daily   Glucosamine-Chondroit-Vit C-Mn (GLUCOSAMINE 1500 COMPLEX PO)  Self Yes No   Sig: Take by mouth in the morning   Ivermectin 1 % CREA  Self Yes No   LORazepam (ATIVAN) 1 mg tablet   No No   Sig: Take 1 tablet PO HS prn insomnia   Mirabegron ER (Myrbetriq) 50 MG TB24  Self No No   Sig: TAKE 1 TABLET BY MOUTH IN THE  MORNING   Multiple Vitamin (MULTI-VITAMIN DAILY) TABS  Self Yes No   Sig: Take by mouth daily   Sodium Fluoride 5000 PPM 1.1 % GEL  Self Yes No   Sig: As directed   apixaban (Eliquis) 5 mg  Self No No   Sig: Take 1 tablet (5 mg total) by mouth 2 (two) times a day   chlorhexidine (PERIDEX) 0.12 % solution  Self Yes No   Sig: USE HALF OUNCE TWICE A DAY RINSE FOR 30 SECONDS BY MOUTH THEN EXPECTORATE DO NOT SWALLOW   clobetasol (TEMOVATE) 0.05 % ointment  Self No No   Sig: Apply topically 2 (two) times a week   colchicine (COLCRYS) 0.6 mg tablet   No No   Sig: Take 1 tablet (0.6 mg total) by mouth daily   famotidine (PEPCID) 20 mg tablet   No No   Sig: TAKE 1 TABLET BY MOUTH TWICE  DAILY   lidocaine-prilocaine (EMLA) cream  Self No No   Sig: Apply to PORT 30 min prior to labs and chemo   lurasidone (LATUDA) 20 mg tablet  Self No No   Sig: TAKE 1 TABLET BY MOUTH DAILY  WITH BREAKFAST   magnesium Oxide (MAG-OX) 400 mg TABS  Self No No   Sig: TAKE 1 TABLET BY MOUTH EVERY DAY   metoprolol succinate (TOPROL-XL) 50 mg 24 hr tablet   No No   Sig: Take 1 tablet (50 mg total) by mouth every 12 (twelve) hours   polyethylene glycol (GLYCOLAX) 17 GM/SCOOP powder  Self No No   Sig: Take 17 g by mouth 2 (two) times a day   torsemide (DEMADEX) 20 mg tablet   No No   Sig: Take 1 tablet (20 mg total) by mouth daily      Facility-Administered Medications: None     Current Discharge Medication List        CONTINUE these medications which have NOT  CHANGED    Details   apixaban (Eliquis) 5 mg Take 1 tablet (5 mg total) by mouth 2 (two) times a day  Qty: 180 tablet, Refills: 1    Associated Diagnoses: Malignant neoplasm of endocervix (HCC); Other chronic pulmonary embolism without acute cor pulmonale (HCC)      chlorhexidine (PERIDEX) 0.12 % solution USE HALF OUNCE TWICE A DAY RINSE FOR 30 SECONDS BY MOUTH THEN EXPECTORATE DO NOT SWALLOW      Cholecalciferol (VITAMIN D) 2000 units CAPS Take 1 capsule by mouth daily      clobetasol (TEMOVATE) 0.05 % ointment Apply topically 2 (two) times a week  Qty: 60 g, Refills: 3    Associated Diagnoses: Lichen sclerosus      colchicine (COLCRYS) 0.6 mg tablet Take 1 tablet (0.6 mg total) by mouth daily  Qty: 90 tablet, Refills: 1    Associated Diagnoses: Other acute myocarditis      CRANBERRY PO Take by mouth      famotidine (PEPCID) 20 mg tablet TAKE 1 TABLET BY MOUTH TWICE  DAILY  Qty: 180 tablet, Refills: 1    Comments: Please send a replace/new response with 90-Day Supply if appropriate to maximize member benefit. Requesting 1 year supply.  Associated Diagnoses: Gastroesophageal reflux disease without esophagitis      Glucosamine-Chondroit-Vit C-Mn (GLUCOSAMINE 1500 COMPLEX PO) Take by mouth in the morning      Ivermectin 1 % CREA       lidocaine-prilocaine (EMLA) cream Apply to PORT 30 min prior to labs and chemo  Qty: 30 g, Refills: 1    Associated Diagnoses: Endometrial cancer (HCC)      LORazepam (ATIVAN) 1 mg tablet Take 1 tablet PO HS prn insomnia  Qty: 30 tablet, Refills: 0    Associated Diagnoses: Endometrial cancer (HCC)      lurasidone (LATUDA) 20 mg tablet TAKE 1 TABLET BY MOUTH DAILY  WITH BREAKFAST  Qty: 30 tablet, Refills: 11    Comments: Requesting 1 year supply  Associated Diagnoses: Bipolar 1 disorder (HCC)      magnesium Oxide (MAG-OX) 400 mg TABS TAKE 1 TABLET BY MOUTH EVERY DAY  Qty: 90 tablet, Refills: 1    Associated Diagnoses: Hypomagnesemia      metoprolol succinate (TOPROL-XL) 50 mg 24 hr tablet  Take 1 tablet (50 mg total) by mouth every 12 (twelve) hours  Qty: 180 tablet, Refills: 3    Associated Diagnoses: Other acute myocarditis      Mirabegron ER (Myrbetriq) 50 MG TB24 TAKE 1 TABLET BY MOUTH IN THE  MORNING  Qty: 90 tablet, Refills: 1    Comments: Please send a replace/new response with 90-Day Supply if appropriate to maximize member benefit. Requesting 1 year supply.  Associated Diagnoses: OAB (overactive bladder)      Multiple Vitamin (MULTI-VITAMIN DAILY) TABS Take by mouth daily      polyethylene glycol (GLYCOLAX) 17 GM/SCOOP powder Take 17 g by mouth 2 (two) times a day  Qty: 238 g, Refills: 1    Associated Diagnoses: Drug induced constipation      Sodium Fluoride 5000 PPM 1.1 % GEL As directed      torsemide (DEMADEX) 20 mg tablet Take 1 tablet (20 mg total) by mouth daily  Qty: 90 tablet, Refills: 1    Associated Diagnoses: Edema, unspecified type           No discharge procedures on file.  ED SEPSIS DOCUMENTATION   Time reflects when diagnosis was documented in both MDM as applicable and the Disposition within this note       Time User Action Codes Description Comment    4/30/2025 10:03 PM Pamella Paulino Add [R07.9] Chest pain     4/30/2025 10:03 PM Pamella Paulino Add [R79.89] Elevated troponin     4/30/2025 10:10 PM Cecy Parker [Z86.79] History of myocarditis                  Pamella Paulino PA-C  05/01/25 0107

## 2025-05-01 ENCOUNTER — APPOINTMENT (OUTPATIENT)
Dept: RADIATION ONCOLOGY | Facility: HOSPITAL | Age: 70
End: 2025-05-01
Attending: RADIOLOGY
Payer: COMMERCIAL

## 2025-05-01 ENCOUNTER — TELEPHONE (OUTPATIENT)
Age: 70
End: 2025-05-01

## 2025-05-01 ENCOUNTER — APPOINTMENT (OUTPATIENT)
Dept: NON INVASIVE DIAGNOSTICS | Facility: HOSPITAL | Age: 70
DRG: 322 | End: 2025-05-01
Payer: COMMERCIAL

## 2025-05-01 PROBLEM — R79.89 ELEVATED SERUM CREATININE: Status: ACTIVE | Noted: 2025-05-01

## 2025-05-01 PROBLEM — E83.9 CHRONIC KIDNEY DISEASE-MINERAL AND BONE DISORDER: Status: ACTIVE | Noted: 2025-05-01

## 2025-05-01 PROBLEM — D63.1 ANEMIA DUE TO STAGE 3B CHRONIC KIDNEY DISEASE  (HCC): Status: ACTIVE | Noted: 2025-05-01

## 2025-05-01 PROBLEM — N18.9 CHRONIC KIDNEY DISEASE-MINERAL AND BONE DISORDER: Status: ACTIVE | Noted: 2025-05-01

## 2025-05-01 PROBLEM — M89.9 CHRONIC KIDNEY DISEASE-MINERAL AND BONE DISORDER: Status: ACTIVE | Noted: 2025-05-01

## 2025-05-01 PROBLEM — N18.32 ANEMIA DUE TO STAGE 3B CHRONIC KIDNEY DISEASE  (HCC): Status: ACTIVE | Noted: 2025-05-01

## 2025-05-01 LAB
4HR DELTA HS TROPONIN: 699 NG/L
ANION GAP SERPL CALCULATED.3IONS-SCNC: 9 MMOL/L (ref 4–13)
APTT PPP: 25 SECONDS (ref 23–34)
APTT PPP: 42 SECONDS (ref 23–34)
ATRIAL RATE: 73 BPM
ATRIAL RATE: 88 BPM
BACTERIA UR QL AUTO: ABNORMAL /HPF
BILIRUB UR QL STRIP: NEGATIVE
BSA FOR ECHO PROCEDURE: 1.97 M2
BUN SERPL-MCNC: 33 MG/DL (ref 5–25)
CALCIUM SERPL-MCNC: 8.3 MG/DL (ref 8.4–10.2)
CARDIAC TROPONIN I PNL SERPL HS: 1949 NG/L (ref 8–18)
CARDIAC TROPONIN I PNL SERPL HS: 768 NG/L (ref ?–50)
CHLORIDE SERPL-SCNC: 110 MMOL/L (ref 96–108)
CHOLEST SERPL-MCNC: 171 MG/DL (ref ?–200)
CLARITY UR: ABNORMAL
CO2 SERPL-SCNC: 25 MMOL/L (ref 21–32)
COLOR UR: ABNORMAL
CREAT SERPL-MCNC: 1.76 MG/DL (ref 0.6–1.3)
CRP SERPL QL: 2.6 MG/L
E WAVE DECELERATION TIME: 154 MS
E/A RATIO: 0.73
ERYTHROCYTE [DISTWIDTH] IN BLOOD BY AUTOMATED COUNT: 13.4 % (ref 11.6–15.1)
EST. AVERAGE GLUCOSE BLD GHB EST-MCNC: 143 MG/DL
FRACTIONAL SHORTENING: 18 (ref 28–44)
GFR SERPL CREATININE-BSD FRML MDRD: 28 ML/MIN/1.73SQ M
GLUCOSE P FAST SERPL-MCNC: 125 MG/DL (ref 65–99)
GLUCOSE SERPL-MCNC: 125 MG/DL (ref 65–140)
GLUCOSE UR STRIP-MCNC: NEGATIVE MG/DL
HBA1C MFR BLD: 6.6 %
HCT VFR BLD AUTO: 29.6 % (ref 34.8–46.1)
HDLC SERPL-MCNC: 44 MG/DL
HGB BLD-MCNC: 9.1 G/DL (ref 11.5–15.4)
HGB UR QL STRIP.AUTO: NEGATIVE
INR PPP: 1.23 (ref 0.85–1.19)
INTERVENTRICULAR SEPTUM IN DIASTOLE (PARASTERNAL SHORT AXIS VIEW): 1 CM
INTERVENTRICULAR SEPTUM: 1 CM (ref 0.6–1.1)
KETONES UR STRIP-MCNC: NEGATIVE MG/DL
LDLC SERPL CALC-MCNC: 101 MG/DL (ref 0–100)
LEFT INTERNAL DIMENSION IN SYSTOLE: 4.2 CM (ref 2.1–4)
LEFT VENTRICLE DIASTOLIC VOLUME (MOD BIPLANE): 127 ML
LEFT VENTRICLE DIASTOLIC VOLUME INDEX (MOD BIPLANE): 64.5 ML/M2
LEFT VENTRICLE SYSTOLIC VOLUME (MOD BIPLANE): 69 ML
LEFT VENTRICLE SYSTOLIC VOLUME INDEX (MOD BIPLANE): 35 ML/M2
LEFT VENTRICULAR INTERNAL DIMENSION IN DIASTOLE: 5.1 CM (ref 3.5–6)
LEFT VENTRICULAR POSTERIOR WALL IN END DIASTOLE: 1.1 CM
LEFT VENTRICULAR STROKE VOLUME: 45 ML
LEUKOCYTE ESTERASE UR QL STRIP: ABNORMAL
LV EF BIPLANE MOD: 46 %
LV EF US.2D.A4C+ESTIMATED: 44 %
LVSV (TEICH): 45 ML
MAGNESIUM SERPL-MCNC: 2.2 MG/DL (ref 1.9–2.7)
MCH RBC QN AUTO: 31.1 PG (ref 26.8–34.3)
MCHC RBC AUTO-ENTMCNC: 30.7 G/DL (ref 31.4–37.4)
MCV RBC AUTO: 101 FL (ref 82–98)
MV EROA: 0.1 CM2
MV PEAK A VEL: 1.08 M/S
MV PEAK E VEL: 79 CM/S
MV REGURGITANT VOLUME: 28 ML
MV STENOSIS PRESSURE HALF TIME: 45 MS
MV VALVE AREA P 1/2 METHOD: 4.89
NITRITE UR QL STRIP: NEGATIVE
NON-SQ EPI CELLS URNS QL MICRO: ABNORMAL /HPF
NONHDLC SERPL-MCNC: 127 MG/DL
P AXIS: 6 DEGREES
P AXIS: 70 DEGREES
PH UR STRIP.AUTO: 6.5 [PH]
PISA MRMAX VEL: 0.42 M/S
PISA RADIUS: 0.5 CM
PLATELET # BLD AUTO: 161 THOUSANDS/UL (ref 149–390)
PMV BLD AUTO: 12 FL (ref 8.9–12.7)
POTASSIUM SERPL-SCNC: 3.5 MMOL/L (ref 3.5–5.3)
PR INTERVAL: 176 MS
PR INTERVAL: 184 MS
PROT UR STRIP-MCNC: NEGATIVE MG/DL
PROTHROMBIN TIME: 16.2 SECONDS (ref 12.3–15)
QRS AXIS: -37 DEGREES
QRS AXIS: 109 DEGREES
QRSD INTERVAL: 144 MS
QRSD INTERVAL: 148 MS
QT INTERVAL: 428 MS
QT INTERVAL: 460 MS
QTC INTERVAL: 507 MS
QTC INTERVAL: 517 MS
RA PRESSURE ESTIMATED: 8 MMHG
RBC # BLD AUTO: 2.93 MILLION/UL (ref 3.81–5.12)
RBC #/AREA URNS AUTO: ABNORMAL /HPF
RV PSP: 35 MMHG
SL CV LV EF: 50
SL CV PED ECHO LEFT VENTRICLE DIASTOLIC VOLUME (MOD BIPLANE) 2D: 123 ML
SL CV PED ECHO LEFT VENTRICLE SYSTOLIC VOLUME (MOD BIPLANE) 2D: 78 ML
SODIUM SERPL-SCNC: 144 MMOL/L (ref 135–147)
SP GR UR STRIP.AUTO: 1.01 (ref 1–1.03)
T WAVE AXIS: -86 DEGREES
T WAVE AXIS: 173 DEGREES
TR MAX PG: 27 MMHG
TR PEAK VELOCITY: 2.6 M/S
TRICUSPID VALVE PEAK REGURGITATION VELOCITY: 2.6 M/S
TRIGL SERPL-MCNC: 128 MG/DL (ref ?–150)
UROBILINOGEN UR STRIP-ACNC: <2 MG/DL
VENTRICULAR RATE: 73 BPM
VENTRICULAR RATE: 88 BPM
WBC # BLD AUTO: 4.63 THOUSAND/UL (ref 4.31–10.16)
WBC #/AREA URNS AUTO: ABNORMAL /HPF

## 2025-05-01 PROCEDURE — 85730 THROMBOPLASTIN TIME PARTIAL: CPT | Performed by: INTERNAL MEDICINE

## 2025-05-01 PROCEDURE — 93010 ELECTROCARDIOGRAM REPORT: CPT | Performed by: INTERNAL MEDICINE

## 2025-05-01 PROCEDURE — 99232 SBSQ HOSP IP/OBS MODERATE 35: CPT | Performed by: INTERNAL MEDICINE

## 2025-05-01 PROCEDURE — 85610 PROTHROMBIN TIME: CPT | Performed by: NURSE PRACTITIONER

## 2025-05-01 PROCEDURE — 85730 THROMBOPLASTIN TIME PARTIAL: CPT | Performed by: NURSE PRACTITIONER

## 2025-05-01 PROCEDURE — 84100 ASSAY OF PHOSPHORUS: CPT

## 2025-05-01 PROCEDURE — 87081 CULTURE SCREEN ONLY: CPT | Performed by: NURSE PRACTITIONER

## 2025-05-01 PROCEDURE — 85027 COMPLETE CBC AUTOMATED: CPT | Performed by: NURSE PRACTITIONER

## 2025-05-01 PROCEDURE — 80053 COMPREHEN METABOLIC PANEL: CPT

## 2025-05-01 PROCEDURE — 84484 ASSAY OF TROPONIN QUANT: CPT

## 2025-05-01 PROCEDURE — 99215 OFFICE O/P EST HI 40 MIN: CPT | Performed by: INTERNAL MEDICINE

## 2025-05-01 PROCEDURE — 80048 BASIC METABOLIC PNL TOTAL CA: CPT | Performed by: NURSE PRACTITIONER

## 2025-05-01 PROCEDURE — 93308 TTE F-UP OR LMTD: CPT | Performed by: INTERNAL MEDICINE

## 2025-05-01 PROCEDURE — 81001 URINALYSIS AUTO W/SCOPE: CPT | Performed by: PHYSICIAN ASSISTANT

## 2025-05-01 PROCEDURE — 80061 LIPID PANEL: CPT | Performed by: NURSE PRACTITIONER

## 2025-05-01 PROCEDURE — 99223 1ST HOSP IP/OBS HIGH 75: CPT | Performed by: INTERNAL MEDICINE

## 2025-05-01 PROCEDURE — 84484 ASSAY OF TROPONIN QUANT: CPT | Performed by: NURSE PRACTITIONER

## 2025-05-01 PROCEDURE — 83735 ASSAY OF MAGNESIUM: CPT | Performed by: NURSE PRACTITIONER

## 2025-05-01 PROCEDURE — 93005 ELECTROCARDIOGRAM TRACING: CPT

## 2025-05-01 PROCEDURE — 93308 TTE F-UP OR LMTD: CPT

## 2025-05-01 PROCEDURE — 83735 ASSAY OF MAGNESIUM: CPT

## 2025-05-01 PROCEDURE — 027034Z DILATION OF CORONARY ARTERY, ONE ARTERY WITH DRUG-ELUTING INTRALUMINAL DEVICE, PERCUTANEOUS APPROACH: ICD-10-PCS | Performed by: INTERNAL MEDICINE

## 2025-05-01 PROCEDURE — 86140 C-REACTIVE PROTEIN: CPT | Performed by: NURSE PRACTITIONER

## 2025-05-01 PROCEDURE — 4A023N7 MEASUREMENT OF CARDIAC SAMPLING AND PRESSURE, LEFT HEART, PERCUTANEOUS APPROACH: ICD-10-PCS | Performed by: INTERNAL MEDICINE

## 2025-05-01 PROCEDURE — 83036 HEMOGLOBIN GLYCOSYLATED A1C: CPT | Performed by: NURSE PRACTITIONER

## 2025-05-01 RX ORDER — ASPIRIN 81 MG/1
81 TABLET, CHEWABLE ORAL DAILY
Status: DISCONTINUED | OUTPATIENT
Start: 2025-05-03 | End: 2025-05-03 | Stop reason: HOSPADM

## 2025-05-01 RX ORDER — HEPARIN SODIUM 1000 [USP'U]/ML
4000 INJECTION, SOLUTION INTRAVENOUS; SUBCUTANEOUS EVERY 6 HOURS PRN
Status: DISCONTINUED | OUTPATIENT
Start: 2025-05-01 | End: 2025-05-02

## 2025-05-01 RX ORDER — HEPARIN SODIUM 1000 [USP'U]/ML
2000 INJECTION, SOLUTION INTRAVENOUS; SUBCUTANEOUS EVERY 6 HOURS PRN
Status: DISCONTINUED | OUTPATIENT
Start: 2025-05-01 | End: 2025-05-02

## 2025-05-01 RX ORDER — SODIUM CHLORIDE 9 MG/ML
50 INJECTION, SOLUTION INTRAVENOUS CONTINUOUS
Status: DISCONTINUED | OUTPATIENT
Start: 2025-05-02 | End: 2025-05-02

## 2025-05-01 RX ORDER — ASPIRIN 81 MG/1
324 TABLET, CHEWABLE ORAL ONCE
Status: COMPLETED | OUTPATIENT
Start: 2025-05-02 | End: 2025-05-02

## 2025-05-01 RX ORDER — HEPARIN SODIUM 10000 [USP'U]/100ML
3-20 INJECTION, SOLUTION INTRAVENOUS
Status: DISCONTINUED | OUTPATIENT
Start: 2025-05-01 | End: 2025-05-02

## 2025-05-01 RX ADMIN — HEPARIN SODIUM 11.1 UNITS/KG/HR: 10000 INJECTION, SOLUTION INTRAVENOUS at 10:14

## 2025-05-01 RX ADMIN — LURASIDONE HYDROCHLORIDE 20 MG: 20 TABLET, FILM COATED ORAL at 09:08

## 2025-05-01 RX ADMIN — FAMOTIDINE 10 MG: 20 TABLET, FILM COATED ORAL at 09:05

## 2025-05-01 RX ADMIN — Medication 400 MG: at 09:05

## 2025-05-01 RX ADMIN — METOPROLOL SUCCINATE 50 MG: 50 TABLET, EXTENDED RELEASE ORAL at 20:10

## 2025-05-01 RX ADMIN — ASPIRIN 81 MG: 81 TABLET, CHEWABLE ORAL at 10:52

## 2025-05-01 RX ADMIN — LORAZEPAM 1 MG: 1 TABLET ORAL at 21:29

## 2025-05-01 RX ADMIN — HEPARIN SODIUM 15.1 UNITS/KG/HR: 10000 INJECTION, SOLUTION INTRAVENOUS at 17:41

## 2025-05-01 RX ADMIN — MULTIPLE VITAMINS W/ MINERALS TAB 1 TABLET: TAB ORAL at 09:06

## 2025-05-01 RX ADMIN — ATORVASTATIN CALCIUM 40 MG: 40 TABLET, FILM COATED ORAL at 16:50

## 2025-05-01 RX ADMIN — HEPARIN SODIUM 4000 UNITS: 1000 INJECTION INTRAVENOUS; SUBCUTANEOUS at 17:42

## 2025-05-01 RX ADMIN — ATORVASTATIN CALCIUM 40 MG: 40 TABLET, FILM COATED ORAL at 00:22

## 2025-05-01 RX ADMIN — POLYETHYLENE GLYCOL 3350 17 G: 17 POWDER, FOR SOLUTION ORAL at 17:18

## 2025-05-01 RX ADMIN — OXYBUTYNIN CHLORIDE 10 MG: 5 TABLET, EXTENDED RELEASE ORAL at 09:06

## 2025-05-01 RX ADMIN — Medication 1000 UNITS: at 09:05

## 2025-05-01 RX ADMIN — METOPROLOL SUCCINATE 50 MG: 50 TABLET, EXTENDED RELEASE ORAL at 09:07

## 2025-05-01 RX ADMIN — COLCHICINE 0.6 MG: 0.6 TABLET ORAL at 09:05

## 2025-05-01 NOTE — ASSESSMENT & PLAN NOTE
Symptoms similar to what she felt when diagnosed with myocarditis  Hs Tnl: 69-->387-->768-->1949  ECG: NSR with LBBB  CRP 2.6  Limited echocardiogram pending  Start IV heparin due to concerns for ACS.  Continue aspirin, statin (new this admission), and Toprol XL  Consult nephrology for renal optimization prior to cardiac catheterization  Cardiac catheterization tentatively planned for Friday, 5/2/2025 to evaluate for obstructive CAD  Continue telemetry monitoring

## 2025-05-01 NOTE — ASSESSMENT & PLAN NOTE
Diagnosed in February 2025 felt to be due to checkpoint inhibitor which was discontinued  CRP now normalized, 2.6 today  Finished prednisone taper per gyn-onc recommendations about 2 weeks ago  Remains on colchicine 0.6 mg daily, to complete a total of 3 months

## 2025-05-01 NOTE — ASSESSMENT & PLAN NOTE
CKD IIIB/IV:  Etiology: Suspect due to age-related nephron loss, prior OWEN, chronic lithium use, prior NSAID use and obesity related hyperfiltration  Admission creatinine 2.04 on 4/30/2025, slightly elevated.    Today's creatinine 1.65, trending down  baseline creatinine 1.4-1.8 since 2020 after an episode of OWEN, most recently high 1s since Feb '25 since starting colchicine  Outpatient nephrologist: Dr. Macias.  F/u  scheduled 5/8/2025.  Imaging: CT 4/11/25 with no hydronephrosis  Renal function stable and remains at baseline  Okay from a nephrology standpoint to proceed with planned cardiac catheterization today  Continue IV hydration pre and post cardiac catheterization.  Cath planned for 11am today.  Continue NSS @50 mL/hr and x 6 hrs post cath.  Hold torsemide today.  Strict I/Os, daily weights  Avoid nephrotoxins, NSAIDs, limit IV contrast if possible  Avoid hypotension.  Maintain MAP >65  Trend BMP in am  Adjust meds to appropriate GFR.  Patient currently on colchicine 0.6 mg daily for 3 month course through 5/20/2025 related to hx myocarditis in Feb '25

## 2025-05-01 NOTE — ASSESSMENT & PLAN NOTE
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  Cardiology following  On ASA, statin, Toprol-XL heparin drip  Plan for cardiac catheterization today, 5/2/2025  Management per cardiology

## 2025-05-01 NOTE — ASSESSMENT & PLAN NOTE
>>ASSESSMENT AND PLAN FOR CORONARY ARTERY DISEASE INVOLVING NATIVE CORONARY ARTERY OF NATIVE HEART WITH UNSTABLE ANGINA PECTORIS WRITTEN ON 5/1/2025  5:20 PM BY KARLENE MCKEON MD    Starting for ACS.  Patient asymptomatic this morning.  Patient on heparin drip.  Plans for cardiac cath tomorrow.  Continue aspirin statin and beta-blocker

## 2025-05-01 NOTE — ASSESSMENT & PLAN NOTE
Recently seen by orthopedics.  Currently ambulating with walker.  Discussion of knee replacement in the coming months once radiation is completed

## 2025-05-01 NOTE — ASSESSMENT & PLAN NOTE
S/p surgical treatment and on chemotherapy since December 2024 with carboplatin, Taxol, and Pembro.  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  She has completed chemotherapy with no evidence of active disease on recent scans.  Still needs to undergo XRT.

## 2025-05-01 NOTE — ASSESSMENT & PLAN NOTE
Diagnosed with bilateral PE in September 2024  Status post IVC filter.  Planned outpatient IVC filter removal scheduled for 5/1

## 2025-05-01 NOTE — PROGRESS NOTES
Progress Note - Hospitalist   Name: Cherelle Dash 69 y.o. female I MRN: [id number]  Unit/Bed#: S -01 I Date of Admission: 4/30/2025   Date of Service: 5/1/2025 I Hospital Day: 0    Assessment & Plan  Chest pain with elevated troponin  Starting for ACS.  Patient asymptomatic this morning.  Patient on heparin drip.  Plans for cardiac cath tomorrow.  Continue aspirin statin and beta-blocker  Cardiomyopathy (HCC)  Echo 9/2024: EF 40%.  This was during acute PE.  Before chemotherapy.  Echo 1/2025: EF 45%  Cardiac MRI 2/2025: EF 40 to 45%  Echo 3/2025: EF 50%  PTA:   Toprol 50 mg every 12 hours  Torsemide 20 mg daily-we will hold this for now pending cardiology evaluation  Will obtain repeat limited echo  History of myocarditis  Diagnosed February 2025  Likely due to pembrolizumab which was since stopped.  Last dose was 2/14/2025.  Received total of 4 doses.  Completed prednisone taper   continue colchicine  Stage 3 chronic kidney disease (HCC)  Creatinine at her baseline  Nephrology was consulted by cardiology because of plans for cardiac cath  Patient started on IV hydration to be given IV hydration pre and post cath.  Endometrial cancer (HCC)  Follows with Dr. Page GYN onc  Received 5 cycles of carboplatin, taxol last on 3/7  Last dose of pembrolizumab 2/4/2025  Clinically and radiologically without evidence of disease recurrence  Also following with radonc.  To begin radiotherapy in the next 1 to 2 weeks.  Other pulmonary embolism without acute cor pulmonale (HCC)  Diagnosed with bilateral PE in September 2024  Status post IVC filter.  Planned outpatient IVC filter removal scheduled for 5/1  [facility] held  Right leg swelling  Right leg swelling.  Negative for DVT as outpatient.  Elevate  Ambulatory dysfunction  Recently seen by orthopedics.  Currently ambulating with walker.  Discussion of knee replacement in the coming months once radiation is completed    VTE Pharmacologic Prophylaxis: VTE Score: 7 High Risk  (Score >/= 5) - Pharmacological DVT Prophylaxis Ordered: heparin drip. Sequential Compression Devices Ordered.    Mobility:   Basic Mobility Inpatient Raw Score: 23  JH-HLM Goal: 7: Walk 25 feet or more  JH-HLM Achieved: 6: Walk 10 steps or more  JH-HLM Goal achieved. Continue to encourage appropriate mobility.    Patient Centered Rounds: I performed bedside rounds with nursing staff today.   Discussions with Specialists or Other Care Team Provider: cardiology    Education and Discussions with Family / Patient: Patient declined call to .     Current Length of Stay: 0 day(s)  Current Patient Status: Inpatient   Certification Statement: The patient will continue to require additional inpatient hospital stay due to cardiac cath tomorrow  Discharge Plan: Anticipate discharge in 24-48 hrs to home.    Code Status: Level 1 - Full Code    Subjective   Pt seen and examined by me in morning.  Denies any complaints.    Objective :  Temp:  [97.3 °F (36.3 °C)-97.6 °F (36.4 °C)] 97.6 °F (36.4 °C)  HR:  [65-94] 79  BP: (111-150)/(63-78) 114/63  Resp:  [16-20] 16  SpO2:  [95 %-100 %] 96 %  O2 Device: None (Room air)    Body mass index is 44.14 kg/m².     Input and Output Summary (last 24 hours):     Intake/Output Summary (Last 24 hours) at 5/1/2025 1714  Last data filed at 5/1/2025 1700  Gross per 24 hour   Intake 530 ml   Output 1400 ml   Net -870 ml       Physical Exam    Constitutional: Pt appears comfortable. Not in any acute distress.  Cardiovascular: Normal rate, regular rhythm, normal heart sounds.  No murmur heard.  Pulmonary/Chest: Effort normal, air entry b/l equal. No respiratory distress. Pt has no wheezes or crackles.   Abdominal: Soft. Non-distended, Non-tender. Bowel sounds are normal.   Musculoskeletal: Normal range of motion.   Neurological: awake, alert. Moving all extremities spontaneously.  Psychiatric: normal mood and affect.      Lines/Drains:      Central Line:  Goal for removal:           Telemetry:  Telemetry Orders (From admission, onward)               24 Hour Telemetry Monitoring  Continuous x 24 Hours (Telem)        Expiring   Question:  Reason for 24 Hour Telemetry  Answer:  PCI/EP study (including pacer and ICD implementation), Cardiac surgery, MI, abnormal cardiac cath, and chest pain- rule out MI                     Telemetry Reviewed:   Indication for Continued Telemetry Use: Acute MI/Unstable Angina/Rule out ACS               Lab Results: I have reviewed the following results:   Results from last 7 days   Lab Units 05/01/25 0444 04/30/25 2009   WBC Thousand/uL 4.63 7.08   HEMOGLOBIN g/dL 9.1* 10.4*   HEMATOCRIT % 29.6* 32.5*   PLATELETS Thousands/uL 161 190   SEGS PCT %  --  75   LYMPHO PCT %  --  15   MONO PCT %  --  10   EOS PCT %  --  0     Results from last 7 days   Lab Units 05/01/25 0444 04/30/25 2009   SODIUM mmol/L 144 142   POTASSIUM mmol/L 3.5 3.6   CHLORIDE mmol/L 110* 104   CO2 mmol/L 25 26   BUN mg/dL 33* 33*   CREATININE mg/dL 1.76* 2.04*   ANION GAP mmol/L 9 12   CALCIUM mg/dL 8.3* 8.9   ALBUMIN g/dL  --  3.8   TOTAL BILIRUBIN mg/dL  --  0.27   ALK PHOS U/L  --  92   ALT U/L  --  25   AST U/L  --  19   GLUCOSE RANDOM mg/dL 125 204*     Results from last 7 days   Lab Units 05/01/25  0005   INR  1.23*         Results from last 7 days   Lab Units 05/01/25  0005   HEMOGLOBIN A1C % 6.6*           Recent Cultures (last 7 days):               Last 24 Hours Medication List:     Current Facility-Administered Medications:     acetaminophen (TYLENOL) tablet 650 mg, Q6H PRN    [START ON 5/2/2025] aspirin chewable tablet 324 mg, Once    [START ON 5/3/2025] aspirin chewable tablet 81 mg, Daily    atorvastatin (LIPITOR) tablet 40 mg, Daily With Dinner    Cholecalciferol (VITAMIN D3) tablet 1,000 Units, Daily    colchicine (COLCRYS) tablet 0.6 mg, Daily    famotidine (PEPCID) tablet 10 mg, Daily    heparin (porcine) 25,000 units in 0.45% NaCl 250 mL infusion (premix), Titrated, Last  Rate: 11.1 Units/kg/hr (05/01/25 1014)    heparin (porcine) injection 2,000 Units, Q6H PRN    heparin (porcine) injection 4,000 Units, Q6H PRN    LORazepam (ATIVAN) tablet 1 mg, HS PRN    lurasidone (LATUDA) tablet 20 mg, Daily With Breakfast    magnesium Oxide (MAG-OX) tablet 400 mg, Daily    metoprolol succinate (TOPROL-XL) 24 hr tablet 50 mg, Q12H    multivitamin-minerals (CENTRUM) tablet 1 tablet, Daily    nitroglycerin (NITROSTAT) SL tablet 0.4 mg, Q5 Min PRN    oxybutynin (DITROPAN-XL) 24 hr tablet 10 mg, Daily    polyethylene glycol (MIRALAX) packet 17 g, BID    [START ON 5/2/2025] sodium chloride 0.9 % infusion, Continuous    Administrative Statements   Today, Patient Was Seen By: Max Garcia MD      **Please Note: This note may have been constructed using a voice recognition system.**

## 2025-05-01 NOTE — TELEPHONE ENCOUNTER
Patient called stating she had labs done on 4/28, they did a urine ordered by Dr. Macias but there were no blood tests ordered by him. She is currently in the hospital and is asking if the doctor can review labs done and let her know if anything additional is needed for her appointment on 5/8.  Please advise

## 2025-05-01 NOTE — ASSESSMENT & PLAN NOTE
TN up to 1949  CRP 2.6  Concern for ACS  Cardiology following  On ASA, statin, Toprol-XL and now heparin drip  Plan for cardiac catheterization tomorrow, 5/2/2025  Management per cardiology

## 2025-05-01 NOTE — H&P
H&P - Hospitalist   Name: Cherelle Dash 69 y.o. female I MRN: [id number]  Unit/Bed#: S -01 I Date of Admission: 4/30/2025   Date of Service: 4/30/2025 I Hospital Day: 0     Assessment & Plan  Chest pain  Chest pain x 1 hour, diaphoresis, flushing, nausea.  Pain occurred while walking back from dinner  EKG: Sinus rhythm, left axis deviation, nonspecific intraventricular conduction block  Troponin 69 to 387  Stress test 10/2024: EF 60%.  Suspected attenuation noted.  Was already given p.m. dose of Eliquis.  Discussed with pharmacist.  Will start heparin drip tomorrow at 9 AM.  Received 325 mg aspirin prehospital  Start atorvastatin 40 mg  Will keep n.p.o.  Cardiology consulted  Cardiomyopathy (HCC)  Echo 9/2024: EF 40%.  This was during acute PE.  Before chemotherapy.  Echo 1/2025: EF 45%  Cardiac MRI 2/2025: EF 40 to 45%  Echo 3/2025: EF 50%  PTA:   Toprol 50 mg every 12 hours  Torsemide 20 mg daily-we will hold this for now pending cardiology evaluation  Will obtain repeat limited echo  History of myocarditis  Diagnosed February 2025  Likely due to pembrolizumab which was since stopped.  Last dose was 2/14/2025.  Received total of 4 doses.  Completed prednisone taper   continue colchicine  Stage 3 chronic kidney disease (HCC)  Creatinine slightly above baseline  Monitor BMP  Avoid hypotension nephrotoxins  Endometrial cancer (HCC)  Follows with Dr. Page GYN onc  Received 5 cycles of carboplatin, taxol last on 3/7  Last dose of pembrolizumab 2/4/2025  Clinically and radiologically without evidence of disease recurrence  Also following with radonc.  To begin radiotherapy in the next 1 to 2 weeks.  Other pulmonary embolism without acute cor pulmonale (HCC)  Diagnosed with bilateral PE in September 2024  Status post IVC filter.  Planned outpatient IVC filter removal scheduled for 5/1  Right leg swelling  Right leg swelling.  Negative for DVT as outpatient.  Elevate  Ambulatory dysfunction  Recently seen by  orthopedics.  Currently ambulating with walker.  Discussion of knee replacement in the coming months once radiation is completed  Achilles tendon pain  Has appointment with podiatry next month      VTE Pharmacologic Prophylaxis: VTE Score: 7 High Risk (Score >/= 5) - Pharmacological DVT Prophylaxis Ordered: heparin drip. Sequential Compression Devices Ordered.  Code Status: Level 1 - Full Code full code  Discussion with family: Patient declined call to .     Anticipated Length of Stay: Patient will be admitted on an observation basis with an anticipated length of stay of less than 2 midnights secondary to chest pain .    History of Present Illness   Chief Complaint: Chest pain    Cherelle Dash is a 69 y.o. female who is a resident of [facility] with a PMH of endometrial cancer, myocarditis/pneumonitis, cardiomyopathy, PE on Eliquis status post IVC filter, CKD, LBBB, PVC, bipolar disorder who presents with left-sided chest pain, diaphoresis, flushing and nausea.  Pain occurred while she was walking back from dinner at [facility].  She reports the pain lasted 1 hour.  She was given 325 of aspirin prehospital.  Currently, she denies any symptoms.    Patient's mother had valve surgery.    Review of Systems   Constitutional:  Positive for diaphoresis.   Cardiovascular:  Positive for chest pain.   Gastrointestinal:  Positive for nausea.   Musculoskeletal:  Positive for arthralgias and gait problem.   All other systems reviewed and are negative.      Historical Information   Past Medical History:   Diagnosis Date    Abnormal ECG     Anxiety 2002    Arthritis     Bipolar 1 disorder (HCC)     Cervical cancer (HCC)     Chronic kidney disease     stage 3    CPAP (continuous positive airway pressure) dependence     Depression 2001    Disease of thyroid gland 2001    Thyroid treated with Lithium medication for bi-polar disease    DVT (deep venous thrombosis) ([facility])     BLLE    Elevated blood pressure  reading 06/10/2019    GERD (gastroesophageal reflux disease)     Obesity     Pulmonary emboli (HCC)     B/L    RSV (acute bronchiolitis due to respiratory syncytial virus) 2023    Sleep apnea     Sleep apnea, obstructive 2007    Urinary incontinence     Uterine cancer (HCC)      Past Surgical History:   Procedure Laterality Date    COLONOSCOPY      COLONOSCOPY  2023    DENTAL SURGERY      FOOT SURGERY      [place]    HYSTERECTOMY  10/11/24    IR IVC FILTER PLACEMENT OPTIONAL/TEMPORARY  10/08/2024    IR PORT CHECK  2024    IR PORT PLACEMENT  2024    LAPAROTOMY N/A 10/11/2024    Procedure: EXPLORATORY LAPAROTOMY;  Surgeon: Rodney Downing MD;  Location: [facility];  Service: Gynecology Oncology    KY HYSTEROSCOPY BX ENDOMETRIUM&/POLYPC W/WO D&C N/A 2024    Procedure: EXAM UNDER ANESTHESIA, DILATATION AND CURETTAGE, CERVICAL BIOPSIES;  Surgeon: Evin Page MD;  Location: [facility];  Service: Gynecology Oncology    KY LAPS TOTAL HYSTERECT 250 GM/< W/RMVL TUBE/OVARY N/A 10/11/2024    Procedure: ROBOTIC ASSISTED LTH, BSO, LYMPH NODE DISSECTION, EUA;  Surgeon: Rodney Downing MD;  Location: [facility];  Service: Gynecology Oncology     Social History     Tobacco Use    Smoking status: Former     Current packs/day: 0.00     Average packs/day: 0.3 packs/day for 30.0 years (7.5 ttl pk-yrs)     Types: Cigarettes     Start date: 1979     Quit date: 2009     Years since quittin.3     Passive exposure: Never    Smokeless tobacco: Never   Vaping Use    Vaping status: Never Used   Substance and Sexual Activity    Alcohol use: Not Currently     Alcohol/week: 1.0 standard drink of alcohol     Types: 1 Glasses of wine per week    Drug use: Never    Sexual activity: Not Currently     Partners: Male     Birth control/protection: Post-menopausal     E-Cigarette/Vaping    E-Cigarette Use Never User      E-Cigarette/Vaping Substances    Nicotine No     THC  No     CBD No     Flavoring No     Other No     Unknown No      Family History   Problem Relation Age of Onset    Heart disease Mother     Heart Valve Disease Mother         Replacement    Diabetes Mother         2002    Heart failure Mother         Heart Valve replacement    Arthritis Father     No Known Problems Sister     No Known Problems Daughter     No Known Problems Maternal Grandmother     No Known Problems Maternal Grandfather     No Known Problems Paternal Grandmother     No Known Problems Paternal Grandfather     No Known Problems Son     No Known Problems Maternal Aunt     No Known Problems Maternal Aunt     No Known Problems Maternal Aunt     No Known Problems Maternal Aunt     No Known Problems Maternal Aunt     No Known Problems Paternal Aunt     Alcohol abuse Son         [place], 40 year old son, has issues with alcohol and alcohol abuse     Social History:  Marital Status: /Civil Union   Occupation:   Patient Pre-hospital Living Situation: Home  Patient Pre-hospital Level of Mobility: walks with walker  Patient Pre-hospital Diet Restrictions:     Meds/Allergies   I have reviewed home medications with a medical source (PCP, Pharmacy, other).  Prior to Admission medications    Medication Sig Start Date End Date Taking? Authorizing Provider   apixaban (Eliquis) 5 mg Take 1 tablet (5 mg total) by mouth 2 (two) times a day 2/13/25 5/14/25  Gretchen Mayorga DO   chlorhexidine (PERIDEX) 0.12 % solution USE HALF OUNCE TWICE A DAY RINSE FOR 30 SECONDS BY MOUTH THEN EXPECTORATE DO NOT SWALLOW 11/22/24   Historical Provider, MD   Cholecalciferol (VITAMIN D) 2000 units CAPS Take 1 capsule by mouth daily    Historical Provider, MD   clobetasol (TEMOVATE) 0.05 % ointment Apply topically 2 (two) times a week 1/15/24   Carmen Troncoso MD   colchicine (COLCRYS) 0.6 mg tablet Take 1 tablet (0.6 mg total) by mouth daily 4/22/25   Mert Olson MD   CRANBERRY PO Take by mouth    Historical  Provider, MD   famotidine (PEPCID) 20 mg tablet TAKE 1 TABLET BY MOUTH TWICE  DAILY 4/23/25   Gretchen Mayorga DO   Glucosamine-Chondroit-Vit C-Mn (GLUCOSAMINE 1500 COMPLEX PO) Take by mouth in the morning    Historical Provider, MD   Ivermectin 1 % CREA  4/17/24   Historical Provider, MD   lidocaine-prilocaine (EMLA) cream Apply to PORT 30 min prior to labs and chemo 12/11/24   Sharon Banks PA-C   LORazepam (ATIVAN) 1 mg tablet Take 1 tablet PO HS prn insomnia 4/23/25   Sharon Banks PA-C   lurasidone (LATUDA) 20 mg tablet TAKE 1 TABLET BY MOUTH DAILY  WITH BREAKFAST 3/31/25   Jayda Hernandez MD   magnesium Oxide (MAG-OX) 400 mg TABS TAKE 1 TABLET BY MOUTH EVERY DAY 3/21/25   Mert Olson MD   metoprolol succinate (TOPROL-XL) 50 mg 24 hr tablet Take 1 tablet (50 mg total) by mouth every 12 (twelve) hours 4/22/25   Mert Olson MD   Mirabegron ER (Myrbetriq) 50 MG TB24 TAKE 1 TABLET BY MOUTH IN THE  MORNING 3/14/25   ZURI Victoria   Multiple Vitamin (MULTI-VITAMIN DAILY) TABS Take by mouth daily    Historical Provider, MD   polyethylene glycol (GLYCOLAX) 17 GM/SCOOP powder Take 17 g by mouth 2 (two) times a day 1/10/25   Sharon Banks PA-C   Sodium Fluoride 5000 PPM 1.1 % GEL As directed 11/22/24   Historical Provider, MD   torsemide (DEMADEX) 20 mg tablet Take 1 tablet (20 mg total) by mouth daily 4/22/25   Mert Solares MD     Allergies   Allergen Reactions    Raw Fruit - Food Allergy Anaphylaxis     PLUMS, PEACHES, APPLES, CHERRIES FRUIT WITH SKIN       Objective :  Temp:  [97.6 °F (36.4 °C)] 97.6 °F (36.4 °C)  HR:  [78-94] 78  BP: (127-150)/(63-78) 127/78  Resp:  [18] 18  SpO2:  [97 %-100 %] 97 %  O2 Device: None (Room air)    Physical Exam  Constitutional:       General: She is not in acute distress.     Appearance: Normal appearance. She is obese. She is not ill-appearing or toxic-appearing.   HENT:      Head: Normocephalic and atraumatic.       Right Ear: External ear normal.      Left Ear: External ear normal.      Nose: Nose normal.      Mouth/Throat:      Pharynx: Oropharynx is clear.   Eyes:      Extraocular Movements: Extraocular movements intact.      Conjunctiva/sclera: Conjunctivae normal.   Cardiovascular:      Rate and Rhythm: Normal rate and regular rhythm.      Pulses: Normal pulses.      Heart sounds: Normal heart sounds.   Pulmonary:      Effort: Pulmonary effort is normal.      Breath sounds: Normal breath sounds.   Abdominal:      General: Bowel sounds are normal.      Palpations: Abdomen is soft.   Musculoskeletal:         General: Normal range of motion.      Cervical back: Normal range of motion.      Right lower leg: No edema.      Left lower leg: No edema.   Skin:     General: Skin is warm.      Capillary Refill: Capillary refill takes less than 2 seconds.   Neurological:      General: No focal deficit present.      Mental Status: She is alert and oriented to person, place, and time.   Psychiatric:         Mood and Affect: Mood normal.         Behavior: Behavior normal.          Lines/Drains:      Central Line:  Goal for removal:              Lab Results: I have reviewed the following results:  Results from last 7 days   Lab Units 04/30/25 2009   WBC Thousand/uL 7.08   HEMOGLOBIN g/dL 10.4*   HEMATOCRIT % 32.5*   PLATELETS Thousands/uL 190   SEGS PCT % 75   LYMPHO PCT % 15   MONO PCT % 10   EOS PCT % 0     Results from last 7 days   Lab Units 04/30/25 2009   SODIUM mmol/L 142   POTASSIUM mmol/L 3.6   CHLORIDE mmol/L 104   CO2 mmol/L 26   BUN mg/dL 33*   CREATININE mg/dL 2.04*   ANION GAP mmol/L 12   CALCIUM mg/dL 8.9   ALBUMIN g/dL 3.8   TOTAL BILIRUBIN mg/dL 0.27   ALK PHOS U/L 92   ALT U/L 25   AST U/L 19   GLUCOSE RANDOM mg/dL 204*             Lab Results   Component Value Date    HGBA1C 6.2 (H) 11/05/2024    HGBA1C 6.0 (H) 10/01/2024    HGBA1C 5.5 08/14/2024           Imaging Results Review: I reviewed radiology reports from  this admission including: chest xray.  Other Study Results Review: EKG was reviewed.     Administrative Statements   I have spent a total time of   minutes in caring for this patient on the day of the visit/encounter including Diagnostic results, Prognosis, Risks and benefits of tx options, Instructions for management, Patient and family education, Importance of tx compliance, Risk factor reductions, Impressions, Counseling / Coordination of care, Documenting in the medical record, Reviewing/placing orders in the medical record (including tests, medications, and/or procedures), Obtaining or reviewing history  , and Communicating with other healthcare professionals .    ** Please Note: This note has been constructed using a voice recognition system. **

## 2025-05-01 NOTE — PLAN OF CARE
Problem: PAIN - ADULT  Goal: Verbalizes/displays adequate comfort level or baseline comfort level  Description: Interventions:- Encourage patient to monitor pain and request assistance- Assess pain using appropriate pain scale- Administer analgesics based on type and severity of pain and evaluate response- Implement non-pharmacological measures as appropriate and evaluate response- Consider cultural and social influences on pain and pain management- Notify physician/advanced practitioner if interventions unsuccessful or patient reports new pain  Outcome: Progressing     Problem: Knowledge Deficit  Goal: Patient/family/caregiver demonstrates understanding of disease process, treatment plan, medications, and discharge instructions  Description: Complete learning assessment and assess knowledge base.Interventions:- Provide teaching at level of understanding- Provide teaching via preferred learning methods  Outcome: Progressing     Problem: CARDIOVASCULAR - ADULT  Goal: Maintains optimal cardiac output and hemodynamic stability  Description: INTERVENTIONS:- Monitor I/O, vital signs and rhythm- Monitor for S/S and trends of decreased cardiac output- Administer and titrate ordered vasoactive medications to optimize hemodynamic stability- Assess quality of pulses, skin color and temperature- Assess for signs of decreased coronary artery perfusion- Instruct patient to report change in severity of symptoms  Outcome: Progressing  Goal: Absence of cardiac dysrhythmias or at baseline rhythm  Description: INTERVENTIONS:- Continuous cardiac monitoring, vital signs, obtain 12 lead EKG if ordered- Administer antiarrhythmic and heart rate control medications as ordered- Monitor electrolytes and administer replacement therapy as ordered  Outcome: Progressing

## 2025-05-01 NOTE — ASSESSMENT & PLAN NOTE
Creatinine 2.04 on admission, slightly above baseline but not true OWEN  Creatinine back to baseline  Continue to avoid nephrotoxins, NSAIDs, hypotension and limit IV contrast if possible  Continue to trend BMP

## 2025-05-01 NOTE — ASSESSMENT & PLAN NOTE
Calcium and magnesium stable  Phosphorus 3.7 in Nov '24, at goal  Previously on calcitriol but discontinued due to normalized PTH.  continue to monitor and follow phosphorus, PTH, calcium, magnesium as outpatient

## 2025-05-01 NOTE — ASSESSMENT & PLAN NOTE
S/p WALLACE-BSO 10/11/2024  S/p adjuvant chemotherapy with carboplatin, Taxol and Pembro completed 3/7/2025  Pembro stopped in February due to induced myocarditis  Undergoing planning for XRT  Management outpatient per oncology

## 2025-05-01 NOTE — PROGRESS NOTES
Progress Note - Nephrology   Name: Cherelle Dash 69 y.o. female I MRN: [id number]  Unit/Bed#: S -01 I Date of Admission: 4/30/2025   Date of Service: 5/2/2025 I Hospital Day: 1       Brief Hx:  69 year old female with CKD 3B/4, endometrial cancer, s/p WALLACE-BSO 10/11/2024, adjuvant chemotherapy and planning for XRT, myocarditis/pneumonitis Feb. '25, cardiomyopathy, morbid obesity, bipolar disorder, GERD, JULIO, hx bilateral DVT, PE on Eliquis, s/pIVC filter p/w CP with elevated TN.  Nephrology consulted for management of elevated creatinine, CKD with need for cardiac catheterization.   Assessment & Plan  Stage 3 chronic kidney disease (HCC)  CKD IIIB/IV:  Etiology: Suspect due to age-related nephron loss, prior OWEN, chronic lithium use, prior NSAID use and obesity related hyperfiltration  Admission creatinine 2.04 on 4/30/2025, slightly elevated.    Today's creatinine 1.65, trending down  baseline creatinine 1.4-1.8 since 2020 after an episode of OWEN, most recently high 1s since Feb '25 since starting colchicine  Outpatient nephrologist: Dr. Macias.  F/u  scheduled 5/8/2025.  Imaging: CT 4/11/25 with no hydronephrosis  Renal function stable and remains at baseline  Okay from a nephrology standpoint to proceed with planned cardiac catheterization today  Continue IV hydration pre and post cardiac catheterization.  Cath planned for 11am today.  Continue NSS @50 mL/hr and x 6 hrs post cath.  Hold torsemide today.  Strict I/Os, daily weights  Avoid nephrotoxins, NSAIDs, limit IV contrast if possible  Avoid hypotension.  Maintain MAP >65  Trend BMP in am  Adjust meds to appropriate GFR.  Patient currently on colchicine 0.6 mg daily for 3 month course through 5/20/2025 related to hx myocarditis in Feb '25    Elevated serum creatinine  Creatinine 2.04 on admission, slightly above baseline but not true OWEN  Creatinine back to baseline and continues to trend down  Continue to avoid nephrotoxins, NSAIDs, hypotension and  limit IV contrast if possible  Continue to trend BMP  Chest pain with elevated troponin  TN 1949  CRP 2.6  Concern for ACS as opposed to myocarditis given normal CRP.  Cardiology following  On ASA, statin, Toprol-XL heparin drip  Plan for cardiac catheterization today, 5/2/2025  Management per cardiology  Chronic kidney disease-mineral and bone disorder  Calcium and magnesium stable  Phosphorus 3.7 in Nov '24, at goal  Previously on calcitriol but discontinued due to normalized PTH.  continue to monitor and follow phosphorus, PTH, calcium, magnesium as outpatient    Cardiomyopathy (HCC)  Presumed nonischemic cardiomyopathy  Compensated  echo 5/1/2025:  EF EF 50%, moderate MR, mild TR, no effusion  home diuretics: Torsemide 20 mg daily.  Currently on hold due to cardiac cath  on beta blockers  fluid restriction, 2 gm low sodium diet, daily weights  management per cardiology    History of myocarditis  Diagnosed in February 2025  completed prednisone taper 2 weeks ago  Remains on colchicine 0.6 mg daily until 5/30/2025 to complete a total of 3-month course  CRP currently normalized to 2.6  Management per cardiology  Endometrial cancer (HCC)  S/p WALLACE-BSO 10/11/2024  S/p adjuvant chemotherapy with carboplatin, Taxol and Pembro completed 3/7/2025  Pembro stopped in February due to induced myocarditis  Undergoing planning for XRT  Management outpatient per oncology  Other pulmonary embolism without acute cor pulmonale ([facility])  Hx DVT/PE Sept '24  S/p IVC filter.  Was scheduled for elective removal today as outpatient  On Eliquis 5 mg p.o. twice daily.  Currently on hold  On heparin drip  Management per primary team    Plan Summary:  -renal function stable and at baseline  -plan for cardiac cath today, tentatively around 11 am  -continue IV hydration pre and post procedure.  Continue NSS @ 50 ml/hr x 6  hours after cardiac cath  -BMP in am if remains inpatient  -f/u with Dr Macias as scheduled next week with repeat labs as  ordered.    SUBJECTIVE:  Pt without complaints.  No CP.  Creatinine at baseline and trending down.  On NSS @ 50 ml/hr.  VSS    A complete review of systems was performed. Pertinent positives and negatives noted in the HPI. Otherwise the review of systems was negative.  OBJECTIVE:  Current Weight: Weight - Scale: 103 kg (226 lb)  Vitals:    05/01/25 2100 05/02/25 0022 05/02/25 0600 05/02/25 0721   BP:  118/68  126/77   BP Location:    Right arm   Pulse: 68 63  67   Resp:    17   Temp:  98.1 °F (36.7 °C)  97.9 °F (36.6 °C)   TempSrc:  Oral  Oral   SpO2: 97% 95%  96%   Weight:   103 kg (226 lb)    Height:           Intake/Output Summary (Last 24 hours) at 5/2/2025 1014  Last data filed at 5/2/2025 0634  Gross per 24 hour   Intake 480 ml   Output 1600 ml   Net -1120 ml       General:  Awake, alert, appears comfortable and in no acute distress.  Nontoxic.  Skin:  No rash, warm, good skin turgor   Eyes:  PERRL, EOMI, sclerae nonicteric.  no periorbital edema   ENT:  Moist mucous membranes  Neck:  Trachea midline, symmetric.  No JVD.  Chest:  Clear to auscultation bilaterally without wheezes, crackles or rhonchi  CVS:  Regular rate and rhythm without murmur, gallop or rub.  S1 and S2 identified and normal.  No S3, S4.   Abdomen:  Soft, nontender, nondistended without masses.  Normal bowel sounds x 4 quadrants.  No bruit.  Extremities:  Warm, pink, motor and sensory intact and well perfused.  No cyanosis, pallor.  No BLE edema.  Neuro:  Awake, alert, oriented x3.  Grossly intact  Psych:  Appropriate affect.  Mentating appropriately.  Normal mental status exam      Medications:    Current Facility-Administered Medications:     acetaminophen (TYLENOL) tablet 650 mg, 650 mg, Oral, Q6H PRN, ZURI Shoemaker    [START ON 5/3/2025] aspirin chewable tablet 81 mg, 81 mg, Oral, Daily, ZURI Tafoya    atorvastatin (LIPITOR) tablet 40 mg, 40 mg, Oral, Daily With Dinner, ZURI Shoemaker, 40 mg at 05/01/25 1650     Cholecalciferol (VITAMIN D3) tablet 1,000 Units, 1,000 Units, Oral, Daily, ZURI Shoemaker, 1,000 Units at 05/02/25 0829    colchicine (COLCRYS) tablet 0.6 mg, 0.6 mg, Oral, Daily, ZURI Shoemaker, 0.6 mg at 05/02/25 0828    famotidine (PEPCID) tablet 10 mg, 10 mg, Oral, Daily, ZURI Shoemaker, 10 mg at 05/02/25 0829    heparin (porcine) 25,000 units in 0.45% NaCl 250 mL infusion (premix), 3-20 Units/kg/hr (Order-Specific), Intravenous, Titrated, ZURI Shoemaker, Last Rate: 10.9 mL/hr at 05/02/25 0843, 12.1 Units/kg/hr at 05/02/25 0843    heparin (porcine) injection 2,000 Units, 2,000 Units, Intravenous, Q6H PRN, ZURI Shoemaker    heparin (porcine) injection 4,000 Units, 4,000 Units, Intravenous, Q6H PRN, ZURI Shoemaker, 4,000 Units at 05/01/25 1742    LORazepam (ATIVAN) tablet 1 mg, 1 mg, Oral, HS PRN, ZURI Shoemaker, 1 mg at 05/01/25 2129    lurasidone (LATUDA) tablet 20 mg, 20 mg, Oral, Daily With Breakfast, ZURI Shoemaker, 20 mg at 05/02/25 0829    magnesium Oxide (MAG-OX) tablet 400 mg, 400 mg, Oral, Daily, ZURI Shoemaker, 400 mg at 05/02/25 0828    metoprolol succinate (TOPROL-XL) 24 hr tablet 50 mg, 50 mg, Oral, Q12H, ZURI Shoemaker, 50 mg at 05/02/25 0828    multivitamin-minerals (CENTRUM) tablet 1 tablet, 1 tablet, Oral, Daily, ZURI Shoemaker, 1 tablet at 05/02/25 0828    nitroglycerin (NITROSTAT) SL tablet 0.4 mg, 0.4 mg, Sublingual, Q5 Min PRN, ZURI Shoemaker    oxybutynin (DITROPAN-XL) 24 hr tablet 10 mg, 10 mg, Oral, Daily, ZURI Shoemaker, 10 mg at 05/02/25 0829    polyethylene glycol (MIRALAX) packet 17 g, 17 g, Oral, BID, ZURI Shoemaker, 17 g at 05/01/25 1718    sodium chloride 0.9 % infusion, 50 mL/hr, Intravenous, Continuous, Cierra Nick PA-C, Last Rate: 50 mL/hr at 05/02/25 0619, 50 mL/hr at 05/02/25 0619    Laboratory Results:  Results from last 7 days   Lab Units 05/02/25  0617 05/01/25  2353 05/01/25  0444 04/30/25 2009 04/28/25  0736   WBC Thousand/uL 4.19*  --   4.63 7.08 3.61*   HEMOGLOBIN g/dL 10.0*  --  9.1* 10.4* 11.4*   HEMATOCRIT % 32.8*  --  29.6* 32.5* 35.4   PLATELETS Thousands/uL 153  --  161 190 177   SODIUM mmol/L 143 142 144 142 141   POTASSIUM mmol/L 3.9 4.0 3.5 3.6 3.7   CHLORIDE mmol/L 109* 106 110* 104 105   CO2 mmol/L 27 28 25 26 25   BUN mg/dL 30* 32* 33* 33* 26*   CREATININE mg/dL 1.65* 1.75* 1.76* 2.04* 1.85*   CALCIUM mg/dL 8.7 8.8 8.3* 8.9 9.2   MAGNESIUM mg/dL  --  2.2 2.2  --  2.0   PHOSPHORUS mg/dL  --  4.0  --   --   --        I have personally reviewed the blood work as stated above and in my note.  I have personally reviewed internal Medicine, co-consultants and previous nephrology notes.

## 2025-05-01 NOTE — ASSESSMENT & PLAN NOTE
Diagnosed in February 2025  completed prednisone taper 2 weeks ago  Remains on colchicine 0.6 mg daily until 5/30/2025 to complete a total of 3-month course  CRP currently normalized to 2.6  Management per cardiology

## 2025-05-01 NOTE — ASSESSMENT & PLAN NOTE
Diagnosed in February 2025  Manage prednisone taper 2 weeks ago  Remains on colchicine 0.6 mg daily to complete a total of 3-month course  CRP currently normalized to 2.6  Management per cardiology

## 2025-05-01 NOTE — ASSESSMENT & PLAN NOTE
Creatinine 2.04 on admission, slightly above baseline but not true OWEN  Creatinine back to baseline and continues to trend down  Continue to avoid nephrotoxins, NSAIDs, hypotension and limit IV contrast if possible  Continue to trend BMP

## 2025-05-01 NOTE — CONSULTS
Consultation - Nephrology   Name: Cherelle Dash 69 y.o. female I MRN: [id number]  Unit/Bed#: S -01 I Date of Admission: 4/30/2025   Date of Service: 5/1/2025 I Hospital Day: 0   Inpatient consult to Nephrology  Consult performed by: Cierra Nick PA-C  Consult ordered by: ZURI Tafoya      Physician Requesting Evaluation: Max Garcia MD   Reason for Evaluation / Principal Problem: CKD 3B/4, periprocedure OWEN risk reduction    Assessment & Plan  Stage 3 chronic kidney disease (HCC)  CKD IIIB/IV:  Etiology: Suspect due to age-related nephron loss, prior OWEN, chronic lithium use, prior NSAID use and obesity related hyperfiltration  Admission creatinine 2.04 on 4/30/2025.  Today's creatinine 1.76, trending down back to baseline  baseline creatinine 1.4-1.8 since 2020 after an episode of OWEN, most recently high 1s since Feb '25 coinciding with initiation of colchicine  Outpatient nephrologist: Dr. Macias.  Follow-up appointment scheduled 5/8/2025  Imaging: CT 4/11/25 with no hydronephrosis  nonoliguric  Renal function stable and at baseline  Okay from a nephrology standpoint to proceed with planned cardiac catheterization tomorrow per cardiology commendations  Recommend IV hydration pre and post cardiac catheterization.  Cath anticipated late morning, early afternoon per Cardiology.  Start NSS at 50 mL/hr at 0500 and continue for 6 hours post cath.  Continue to hold torsemide in anticipation of cardiac cath tomorrow  Strict I/Os, daily weights  Avoid nephrotoxins, NSAIDs, limit IV contrast if possible  Avoid hypotension.  Maintain MAP >65  Trend BMP in am  Adjust meds to appropriate GFR.  Patient currently on colchicine 0.6 mg daily for 3 month course through 5/20/2025 related to hx myocarditis in Feb '25    Elevated serum creatinine  Creatinine 2.04 on admission, slightly above baseline but not true OWEN  Creatinine back to baseline  Continue to avoid nephrotoxins, NSAIDs, hypotension and limit IV  contrast if possible  Continue to trend BMP  Chest pain with elevated troponin  TN up to 1949  CRP 2.6  Concern for ACS  Cardiology following  On ASA, statin, Toprol-XL and now heparin drip  Plan for cardiac catheterization tomorrow, 5/2/2025  Management per cardiology  Anemia due to stage 3b chronic kidney disease  (HCC)  Hgb 9.1, below goal.  Goal >10  continue to trend  Transfuse for Hgb <7.0  management per primary team    Chronic kidney disease-mineral and bone disorder  Calcium and magnesium stable  Phosphorus 3.7 in November, at goal  Previously on calcitriol but discontinued.  continue to monitor and follow phosphorus, PTH, calcium, magnesium as outpatient    Cardiomyopathy ([facility])  Presumed nonischemic cardiomyopathy  Compensated currently  echo 5/1/2025:  EF EF 50%, moderate MR, mild TR, no effusion  home diuretics: Torsemide 20 mg daily.  Currently on hold  on beta blockers  fluid restriction, 2 gm low sodium diet, daily weights  management per cardiology    History of myocarditis  Diagnosed in February 2025  Manage prednisone taper 2 weeks ago  Remains on colchicine 0.6 mg daily to complete a total of 3-month course  CRP currently normalized to 2.6  Management per cardiology  Endometrial cancer (HCC)  S/p WALLACE-BSO 10/11/2024  S/p adjuvant chemotherapy with carboplatin, Taxol and Pembro completed 3/7/2025  Pembro stopped in February due to induced myocarditis  Undergoing planning for XRT  Management outpatient per oncology  Other pulmonary embolism without acute cor pulmonale ([facility])  Hx DVT/PE Sept '24  S/p IVC filter.  Was scheduled for elective removal today as outpatient  On Eliquis 5 mg p.o. twice daily.  Currently on hold  On heparin drip  Management per primary team      HISTORY OF PRESENT ILLNESS:  Requesting Physician: Max Garcia MD  Reason for Consult: Renal optimization/OWEN risk reduction protocol     Cherelle Dash is a 69 y.o. year old female resident of [facility] with CKD 3B/4  followed by Dr. Macias, hx endometrial cancer, s/p WALLACE-BSO 10/11/2024, adjuvant chemotherapy and planning for XRT, myocarditis/pneumonitis, cardiomyopathy, morbid obesity, bipolar disorder, GERD, JULIO, hx bilateral DVT, PE on Eliquis, s/p removable IVC filter (was scheduled for removal today as outpatient) who was admitted to [facility] after presenting with left-sided chest pain, diaphoresis, flushing and nausea.  Troponins elevated concern for ACS.  Cardiology planning left heart cath tomorrow, 5/2/2025.  Admission creatinine 2.04 on 4/30/2025 and trending down today to 1.76, at baseline.  Home torsemide on hold.  Patient remains on colchicine 0.6 mg daily related to myocarditis.  Nephrology is consulted due to CKD, OWEN risk reduction protocol due to need for cardiac catheterization.       PAST MEDICAL HISTORY:  Past Medical History:   Diagnosis Date    Abnormal ECG     Anxiety 2002    Arthritis     Bipolar 1 disorder (HCC)     Cervical cancer (HCC)     Chronic kidney disease     stage 3    CPAP (continuous positive airway pressure) dependence     Depression 2001    Disease of thyroid gland 2001    Thyroid treated with Lithium medication for bi-polar disease    DVT (deep venous thrombosis) (HCC)     BLLE    Elevated blood pressure reading 06/10/2019    GERD (gastroesophageal reflux disease)     Obesity     Pulmonary emboli (HCC)     B/L    RSV (acute bronchiolitis due to respiratory syncytial virus) 12/05/2023    Sleep apnea     Sleep apnea, obstructive 2007    Urinary incontinence 2019    Uterine cancer (HCC)        PAST SURGICAL HISTORY:  Past Surgical History:   Procedure Laterality Date    COLONOSCOPY  2011    COLONOSCOPY  03/08/2023    DENTAL SURGERY  2020    FOOT SURGERY  1976    [facility]    HYSTERECTOMY  10/11/24    IR IVC FILTER PLACEMENT OPTIONAL/TEMPORARY  10/08/2024    IR PORT CHECK  12/26/2024    IR PORT PLACEMENT  12/03/2024    LAPAROTOMY N/A 10/11/2024    Procedure: EXPLORATORY LAPAROTOMY;  Surgeon:  Rodney Downing MD;  Location: [facility];  Service: Gynecology Oncology    MA HYSTEROSCOPY BX ENDOMETRIUM&/POLYPC W/WO D&C N/A 2024    Procedure: EXAM UNDER ANESTHESIA, DILATATION AND CURETTAGE, CERVICAL BIOPSIES;  Surgeon: Evin Page MD;  Location:  MAIN OR;  Service: Gynecology Oncology    MA LAPS TOTAL HYSTERECT 250 GM/< W/RMVL TUBE/OVARY N/A 10/11/2024    Procedure: ROBOTIC ASSISTED LTH, BSO, LYMPH NODE DISSECTION, EUA;  Surgeon: Rodney Downing MD;  Location: [facility];  Service: Gynecology Oncology       ALLERGIES:  Allergies   Allergen Reactions    Raw Fruit - Food Allergy Anaphylaxis     PLUMS, PEACHES, APPLES, CHERRIES FRUIT WITH SKIN       SOCIAL HISTORY:  Social History     Substance and Sexual Activity   Alcohol Use Not Currently    Alcohol/week: 1.0 standard drink of alcohol    Types: 1 Glasses of wine per week     Social History     Substance and Sexual Activity   Drug Use Never     Social History     Tobacco Use   Smoking Status Former    Current packs/day: 0.00    Average packs/day: 0.3 packs/day for 30.0 years (7.5 ttl pk-yrs)    Types: Cigarettes    Start date: 1979    Quit date: 2009    Years since quittin.3    Passive exposure: Never   Smokeless Tobacco Never       FAMILY HISTORY:  Family History   Problem Relation Age of Onset    Heart disease Mother     Heart Valve Disease Mother         Replacement    Diabetes Mother         2002    Heart failure Mother         Heart Valve replacement    Arthritis Father     No Known Problems Sister     No Known Problems Daughter     No Known Problems Maternal Grandmother     No Known Problems Maternal Grandfather     No Known Problems Paternal Grandmother     No Known Problems Paternal Grandfather     No Known Problems Son     No Known Problems Maternal Aunt     No Known Problems Maternal Aunt     No Known Problems Maternal Aunt     No Known Problems Maternal Aunt     No Known Problems Maternal Aunt     No Known  Problems Paternal Aunt     Alcohol abuse Son         [place], 40 year old son, has issues with alcohol and alcohol abuse       MEDICATIONS:  Scheduled Meds:  Current Facility-Administered Medications   Medication Dose Route Frequency Provider Last Rate    acetaminophen  650 mg Oral Q6H PRN Cecy Hawk, CRNP      aspirin  81 mg Oral Daily Cecy Hawk, CRNP      atorvastatin  40 mg Oral Daily With Dinner Cecy Hawk, CRNP      Cholecalciferol  1,000 Units Oral Daily Cecy Hawk, CRNP      colchicine  0.6 mg Oral Daily Cecy Hawk, CRNP      famotidine  10 mg Oral Daily Cecy Hawk, CRNP      heparin (porcine)  3-20 Units/kg/hr (Order-Specific) Intravenous Titrated Cecy Hawk, CRNP 11.1 Units/kg/hr (05/01/25 1014)    heparin (porcine)  2,000 Units Intravenous Q6H PRN Cecy Hawk, CRNP      heparin (porcine)  4,000 Units Intravenous Q6H PRN Cecy Hawk, CRNP      LORazepam  1 mg Oral HS PRN Cecy Hawk, CRNP      lurasidone  20 mg Oral Daily With Breakfast Cecy Hawk, CRNP      magnesium Oxide  400 mg Oral Daily Cecy Hawk, CRNP      metoprolol succinate  50 mg Oral Q12H Cecy Hawk, CRNP      multivitamin-minerals  1 tablet Oral Daily Cecy Hawk, CRNP      nitroglycerin  0.4 mg Sublingual Q5 Min PRN Cecy Hawk, CRNP      oxybutynin  10 mg Oral Daily Cecy Hawk, CRNP      polyethylene glycol  17 g Oral BID Cecy Hawk, CRNP         PRN Meds:.  acetaminophen    heparin (porcine)    heparin (porcine)    LORazepam    nitroglycerin    Continuous Infusions:heparin (porcine), 3-20 Units/kg/hr (Order-Specific), Last Rate: 11.1 Units/kg/hr (05/01/25 1014)        REVIEW OF SYSTEMS:  A complete review of systems was done. Pertinent positives and negatives noted in the HPI but otherwise the review of systems is negative.    PHYSICAL EXAM:  Current Weight: Weight - Scale: 103 kg (226 lb)  First Weight: Weight - Scale: 103 kg (226 lb)  Vitals:    05/01/25 1056   BP: 118/65   Pulse: 65   Resp: 20   Temp:    SpO2: 95%       Intake/Output Summary  (Last 24 hours) at 5/1/2025 1108  Last data filed at 5/1/2025 0701  Gross per 24 hour   Intake 50 ml   Output 400 ml   Net -350 ml     General:  Awake, alert, appears comfortable and in no acute distress.  Nontoxic.  Skin:  warm, good skin turgor. Facial flushing  Eyes:  PERRL, EOMI, sclerae nonicteric.  no periorbital edema   ENT:  Moist mucous membranes  Neck:  Trachea midline, symmetric.  No JVD.  Carotid bruits.  Chest:  Clear to auscultation bilaterally without wheezes, crackles or rhonchi  CVS:  Regular rate and rhythm without murmur, gallop or rub.  S1 and S2 identified and normal.  No S3, S4.   Abdomen:  Soft, nontender, nondistended without masses.  Normal bowel sounds x 4 quadrants.  No bruit.  Extremities:  Warm, pink, motor and sensory intact and well perfused.  No cyanosis, pallor.  trace BLE edema.  Neuro:  Awake, alert, oriented x3.  Grossly intact  Psych:  Appropriate affect.  Mentating appropriately.  Normal mental status exam    Lab Results:   Results from last 7 days   Lab Units 05/01/25  0444 04/30/25 2009 04/28/25  0736   WBC Thousand/uL 4.63 7.08 3.61*   HEMOGLOBIN g/dL 9.1* 10.4* 11.4*   HEMATOCRIT % 29.6* 32.5* 35.4   PLATELETS Thousands/uL 161 190 177   SODIUM mmol/L 144 142 141   POTASSIUM mmol/L 3.5 3.6 3.7   CHLORIDE mmol/L 110* 104 105   CO2 mmol/L 25 26 25   BUN mg/dL 33* 33* 26*   CREATININE mg/dL 1.76* 2.04* 1.85*   CALCIUM mg/dL 8.3* 8.9 9.2   MAGNESIUM mg/dL 2.2  --  2.0       Radiology Results:   XR chest 1 view portable   ED Interpretation by Pamella Paulino PA-C (04/30 2012)   Image was independently interpreted by myself and showed no acute concerns of cardiopulmonary disease at this time.       Final Result by Vinod Garcia MD (05/01 0623)      Mild right basilar atelectasis and/or airspace disease. The remaining lung fields appear well aerated and clear.            Workstation performed: [id number]         Imaging study:  CXR 4/30/2025:  Imaging study and images personally  reviewed.  Mild right basilar atelectasis.  No pneumothorax or pleural effusion.  No significant pulmonary venous congestion.    EMR, including Epic and Care Everywhere, reviewed.  I have personally reviewed the blood work as stated above and in my note.  I have personally reviewed CXR imaging studies.  I have personally reviewed internal Medicine, co-consultants and prior nephrology notes.

## 2025-05-01 NOTE — ASSESSMENT & PLAN NOTE
Creatinine at her baseline  Nephrology was consulted by cardiology because of plans for cardiac cath  Patient started on IV hydration to be given IV hydration pre and post cath.

## 2025-05-01 NOTE — ASSESSMENT & PLAN NOTE
Presumed nonischemic cardiomyopathy  Compensated currently  echo 5/1/2025:  EF EF 50%, moderate MR, mild TR, no effusion  home diuretics: Torsemide 20 mg daily.  Currently on hold  on beta blockers  fluid restriction, 2 gm low sodium diet, daily weights  management per cardiology

## 2025-05-01 NOTE — ASSESSMENT & PLAN NOTE
Follows with Dr. Page GYN onc  Received 5 cycles of carboplatin, taxol last on 3/7  Last dose of pembrolizumab 2/4/2025  Clinically and radiologically without evidence of disease recurrence  Also following with radonc.  To begin radiotherapy in the next 1 to 2 weeks.

## 2025-05-01 NOTE — ASSESSMENT & PLAN NOTE
Starting for ACS.  Patient asymptomatic this morning.  Patient on heparin drip.  Plans for cardiac cath tomorrow.  Continue aspirin statin and beta-blocker

## 2025-05-01 NOTE — ASSESSMENT & PLAN NOTE
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  Pain occurred while walking back from dinner  EKG: Sinus rhythm, left axis deviation, nonspecific intraventricular conduction block  Troponin 69 to 387  Stress test 10/2024: EF 60%.  Suspected attenuation noted.  Was already given p.m. dose of Eliquis.  Discussed with pharmacist.  Will start heparin drip tomorrow at 9 AM.  Received 325 mg aspirin prehospital  Start atorvastatin 40 mg  Will keep n.p.o.  Cardiology consulted

## 2025-05-01 NOTE — ASSESSMENT & PLAN NOTE
Hgb 9.1, below goal.  Goal >10  continue to trend  Transfuse for Hgb <7.0  management per primary team

## 2025-05-01 NOTE — CASE MANAGEMENT
Case Management Assessment    Patient name Cherelle OCONNELL /S -01 MRN [id number]  : 1955 Date 2025       Current Admission Date: 2025  Current Admission Diagnosis:Chest pain with elevated troponin   Patient Active Problem List    Diagnosis Date Noted Date Diagnosed    Elevated serum creatinine 2025     Chronic kidney disease-mineral and bone disorder 2025     Anemia due to stage 3b chronic kidney disease  (HCC) 2025     Chest pain with elevated troponin 2025     Right leg swelling 2025     Ambulatory dysfunction 2025     Chronic kidney disease, stage 4 (severe) (HCC) 2025     Right ankle swelling 2025     Heart failure with mildly reduced ejection fraction (HCC) 2025     Elevated troponin 2025     Low magnesium level 2025     History of myocarditis 2025     Achilles tendon pain 2025     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     Drug-induced pneumonitis 01/10/2025     Arthralgia 2025     Drug-induced neutropenia (HCC) 2024     Palliative care by specialist 2024     Anxiety about health 2024     Goals of care, counseling/discussion 2024     Encounter for central line care 2024     Hospital discharge follow-up 2024     Cardiomyopathy (HCC) 2024     Other pulmonary embolism without acute cor pulmonale (HCC) 2024     Endometrial cancer (HCC) 2024     Hyperphosphatemia 2024     Premature ventricular contractions 2023     LBBB (left bundle branch block) 2023     Other sleep disorders 2023     Lichen sclerosus 2022     Mass of right hand 2021     Secondary hyperparathyroidism of renal origin (HCC) 2021     Persistent proteinuria 2021     Primary osteoarthritis of left knee 2021     Primary osteoarthritis of right knee 2021     Vitamin D  deficiency 12/21/2020     Stage 3 chronic kidney disease (HCC) 08/25/2020     Raynaud's disease without gangrene 03/10/2020     Morbid obesity with BMI of 40.0-44.9, adult ([facility]) 12/20/2018     Hyperlipidemia 06/07/2018     Onychomycosis of toenail 01/09/2017     Insomnia 07/21/2015     Stress incontinence of urine 07/21/2015     Patellofemoral arthritis of right knee 08/11/2014     Depression with anxiety 04/30/2014     Hypothyroidism 06/04/2013     Impaired fasting glucose 06/04/2013     Moderate mixed bipolar I disorder ([facility]) 05/01/2013     Gastroesophageal reflux disease without esophagitis 05/01/2013     Obstructive sleep apnea 05/01/2013     Solar degeneration 06/13/2012       LOS (days): 0  Geometric Mean LOS (GMLOS) (days):   Days to GMLOS:     OBJECTIVE:              Current admission status: Observation       Preferred Pharmacy:   [facility]/pharmacy #[id number] - [place], [place] - [address]  [address] [address]  Phone: [phone] Fax: [phone]    [facility] - [facility] [address]  [address]  [address] [address]  Phone: [phone] Fax: [phone]    Primary Care Provider: Gretchen Mayorga DO    Primary Insurance: [employer]  REP  Secondary Insurance:     ASSESSMENT:  Active Health Care Proxies       Hardy Jack Health Care Representative - Spouse   Primary Phone: [phone] (Mobile)  Home Phone: [phone]                                Patient Information  Admitted from:: Facility ([facility])  Mental Status: Alert  During Assessment patient was accompanied by: Not accompanied during assessment  Assessment information provided by:: Patient  Primary Caregiver: Self  Support Systems: Family members  County of Residence: [place]  What city do you live in?: [place]  Home entry access options. Select all that apply.: No steps to enter home  Type of Current Residence: Apartment  Upon entering residence, is there a  bedroom on the main floor (no further steps)?: Yes  Upon entering residence, is there a bathroom on the main floor (no further steps)?: Yes  Living Arrangements: Lives w/ Spouse/significant other ([facility])    Activities of Daily Living Prior to Admission  Functional Status: Independent  Completes ADLs independently?: Yes  Ambulates independently?: Yes  Does patient use assisted devices?: Yes  Assisted Devices (DME) used: Walker  Does patient currently own DME?: Yes  What DME does the patient currently own?: Walker  Does patient have a history of Outpatient Therapy (PT/OT)?: No  Does the patient have a history of Short-Term Rehab?: No  Does patient have a history of HHC?: No  Does patient currently have HHC?: No         Patient Information Continued  Does patient have prescription coverage?: Yes  Can the patient afford their medications and any related supplies (such as glucometers or test strips)?: Yes  Does patient receive dialysis treatments?: No         Means of Transportation  Means of Transport to [facility]:: Family transport      Social Determinants of Health (SDOH)      Flowsheet Row Most Recent Value   Housing Stability    In the last 12 months, was there a time when you were not able to pay the mortgage or rent on time? N   At any time in the past 12 months, were you homeless or living in a shelter (including now)? N   Transportation Needs    In the past 12 months, has lack of transportation kept you from medical appointments or from getting medications? no   In the past 12 months, has lack of transportation kept you from meetings, work, or from getting things needed for daily living? No   Food Insecurity    Within the past 12 months, you worried that your food would run out before you got the money to buy more. Never true   Within the past 12 months, the food you bought just didn't last and you didn't have money to get more. Never true   Utilities    In the past 12 months has the electric, gas,  oil, or water company threatened to shut off services in your home? No

## 2025-05-01 NOTE — CONSULTS
Consultation - Cardiology Team One  Cherelle Dash 69 y.o. female MRN: [id number]  Unit/Bed#: S -01 Encounter: [id number]    Inpatient consult to Cardiology  Consult performed by: ZURI Tafoya  Consult ordered by: ZURI Shoemaker    Physician Requesting Consult: Max Garcia MD  Reason for Consult / Principal Problem: Chest pain with elevated troponin    Assessment & Plan  Chest pain with elevated troponin  Symptoms similar to what she felt when diagnosed with myocarditis  Hs Tnl: 69-->387-->768-->1949  ECG: NSR with LBBB  CRP 2.6  Limited echocardiogram pending  Start IV heparin due to concerns for ACS.  Continue aspirin, statin (new this admission), and Toprol XL  Consult nephrology for renal optimization prior to cardiac catheterization  Cardiac catheterization tentatively planned for Friday, 5/2/2025 to evaluate for obstructive CAD  Continue telemetry monitoring  Cardiomyopathy (HCC)  Presumed nonischemic cardiomyopathy with nonischemic nuclear stress test in 10/2024.  Thought related to acute PE as well as left bundle branch block.  GDMT limited by CKD.  She is on Toprol 50 mg daily  She takes torsemide 20 mg daily, volume status is stable and creatinine actually improved with IV fluids.  Torsemide currently on hold  History of myocarditis  Diagnosed in February 2025 felt to be due to checkpoint inhibitor which was discontinued  CRP now normalized, 2.6 today  Finished prednisone taper per gyn-onc recommendations about 2 weeks ago  Remains on colchicine 0.6 mg daily, to complete a total of 3 months  Stage 3 chronic kidney disease (HCC)  Lab Results   Component Value Date    EGFR 28 05/01/2025    EGFR 24 04/30/2025    EGFR 27 04/28/2025    CREATININE 1.76 (H) 05/01/2025    CREATININE 2.04 (H) 04/30/2025    CREATININE 1.85 (H) 04/28/2025   Improved with IVF  Baseline 1.4-1.6  Endometrial cancer (HCC)  S/p surgical treatment and on chemotherapy since December 2024 with carboplatin, Taxol, and  Pembro.  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  She has completed chemotherapy with no evidence of active disease on recent scans.  Still needs to undergo XRT.  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  Was supposed to have removed today  On Eliquis 5 mg p.o. twice daily  Right leg swelling  Recent venous duplex negative for DVT    History of Present Illness   HPI: Cherelle Dash is a 69 y.o. year old female with NICM LVEF 45%, chronic HFmrEF, hypothyroidism, checkpoint inhibitor myocarditis, endometrial cancer s/p chemotherapy, left bundle branch block, history of DVT/PE on Eliquis and s/p IVC filter, CKD 3, and morbid obesity who follows with cardiologist Dr. Olson and Dr. Gold.  She was last seen by Dr. Olson on 3/26/2025.    She presented to the hospital on 4/30/2025 with complaints of chest pain, diaphoresis, and SOB that began while walking back from dinner. It lasted about 1 hour and was already dissipating when she got to the ED. She states this is how she felt when diagnosed with myocarditis.  High-sensitivity troponin elevated 69/387/768/1949.  Previous admissions with elevated troponin but flatter trend. CRP now normalized 2.6 (previously 73.4).  ECG with chronic left bundle branch block.  She did recently stop prednisone taper about 2 weeks ago.  Her  states that she has been complaining of some shortness of breath with exertion.  Cardiology consulted for further evaluation and management of her chest pain with elevated troponin.    EKG reviewed personally: Normal sinus rhythm with LBBB    Telemetry reviewed personally: NSR and sinus bradycardia heart rate in the 50s    Review of Systems   Constitutional: Negative for chills, malaise/fatigue and weight gain.   Cardiovascular:  Positive for chest pain, dyspnea on exertion and leg swelling. Negative for orthopnea,  palpitations and syncope.   Respiratory:  Negative for cough, shortness of breath, sleep disturbances due to breathing and sputum production.    Gastrointestinal:  Negative for bloating, nausea and vomiting.   Neurological:  Negative for dizziness, light-headedness and weakness.   Psychiatric/Behavioral:  Negative for altered mental status.    All other systems reviewed and are negative.    Historical Information   Past Medical History:   Diagnosis Date    Abnormal ECG     Anxiety 2002    Arthritis     Bipolar 1 disorder (HCC)     Cervical cancer (HCC)     Chronic kidney disease     stage 3    CPAP (continuous positive airway pressure) dependence     Depression 2001    Disease of thyroid gland 2001    Thyroid treated with Lithium medication for bi-polar disease    DVT (deep venous thrombosis) (HCC)     BLLE    Elevated blood pressure reading 06/10/2019    GERD (gastroesophageal reflux disease)     Obesity     Pulmonary emboli (HCC)     B/L    RSV (acute bronchiolitis due to respiratory syncytial virus) 12/05/2023    Sleep apnea     Sleep apnea, obstructive 2007    Urinary incontinence 2019    Uterine cancer (HCC)      Past Surgical History:   Procedure Laterality Date    COLONOSCOPY  2011    COLONOSCOPY  03/08/2023    DENTAL SURGERY  2020    FOOT SURGERY  1976    [facility]    HYSTERECTOMY  10/11/24    IR IVC FILTER PLACEMENT OPTIONAL/TEMPORARY  10/08/2024    IR PORT CHECK  12/26/2024    IR PORT PLACEMENT  12/03/2024    LAPAROTOMY N/A 10/11/2024    Procedure: EXPLORATORY LAPAROTOMY;  Surgeon: Rodney Downing MD;  Location: [facility];  Service: Gynecology Oncology    IN HYSTEROSCOPY BX ENDOMETRIUM&/POLYPC W/WO D&C N/A 08/23/2024    Procedure: EXAM UNDER ANESTHESIA, DILATATION AND CURETTAGE, CERVICAL BIOPSIES;  Surgeon: Evin Page MD;  Location: [facility];  Service: Gynecology Oncology    IN LAPS TOTAL HYSTERECT 250 GM/< W/RMVL TUBE/OVARY N/A 10/11/2024    Procedure: ROBOTIC ASSISTED LTH, BSO,  LYMPH NODE DISSECTION, EUA;  Surgeon: Rodney Downing MD;  Location: [facility] OR;  Service: Gynecology Oncology     Social History     Substance and Sexual Activity   Alcohol Use Not Currently    Alcohol/week: 1.0 standard drink of alcohol    Types: 1 Glasses of wine per week     Social History     Substance and Sexual Activity   Drug Use Never     Social History     Tobacco Use   Smoking Status Former    Current packs/day: 0.00    Average packs/day: 0.3 packs/day for 30.0 years (7.5 ttl pk-yrs)    Types: Cigarettes    Start date: 1979    Quit date: 2009    Years since quittin.3    Passive exposure: Never   Smokeless Tobacco Never     Family History: Family history non-contributory    Meds/Allergies   all current active meds have been reviewed and current meds:   Current Facility-Administered Medications:     acetaminophen (TYLENOL) tablet 650 mg, Q6H PRN    aspirin chewable tablet 81 mg, Daily    atorvastatin (LIPITOR) tablet 40 mg, Daily With Dinner    Cholecalciferol (VITAMIN D3) tablet 1,000 Units, Daily    colchicine (COLCRYS) tablet 0.6 mg, Daily    famotidine (PEPCID) tablet 10 mg, Daily    heparin (porcine) 25,000 units in 0.45% NaCl 250 mL infusion (premix), Titrated, Last Rate: 11.1 Units/kg/hr (25 1014)    heparin (porcine) injection 2,000 Units, Q6H PRN    heparin (porcine) injection 4,000 Units, Q6H PRN    LORazepam (ATIVAN) tablet 1 mg, HS PRN    lurasidone (LATUDA) tablet 20 mg, Daily With Breakfast    magnesium Oxide (MAG-OX) tablet 400 mg, Daily    metoprolol succinate (TOPROL-XL) 24 hr tablet 50 mg, Q12H    multivitamin-minerals (CENTRUM) tablet 1 tablet, Daily    nitroglycerin (NITROSTAT) SL tablet 0.4 mg, Q5 Min PRN    oxybutynin (DITROPAN-XL) 24 hr tablet 10 mg, Daily    polyethylene glycol (MIRALAX) packet 17 g, BID  heparin (porcine), 3-20 Units/kg/hr (Order-Specific)        Allergies   Allergen Reactions    Raw Fruit - Food Allergy Anaphylaxis     PLUMS, PEACHES, APPLES,  CHERRIES FRUIT WITH SKIN       Objective   Vitals: Blood pressure 111/67, pulse 76, temperature (!) 97.3 °F (36.3 °C), temperature source Oral, resp. rate 16, height 5' (1.524 m), weight 103 kg (226 lb), SpO2 98%, not currently breastfeeding., Body mass index is 44.14 kg/m².,     Systolic (24hrs), Av , Min:111 , Max:150     Diastolic (24hrs), Av, Min:63, Max:78        Intake/Output Summary (Last 24 hours) at 2025 1002  Last data filed at 2025 0701  Gross per 24 hour   Intake 50 ml   Output 400 ml   Net -350 ml     Wt Readings from Last 3 Encounters:   25 103 kg (226 lb)   25 103 kg (226 lb)   25 103 kg (227 lb)     Invasive Devices       Central Venous Catheter Line  Duration             Port A Cath 24 Right Internal jugular 148 days              Peripheral Intravenous Line  Duration             Peripheral IV 25 Proximal;Right;Ventral (anterior) Forearm 68 days    Peripheral IV 25 Distal;Right;Ventral (anterior) Forearm <1 day                    Physical Exam  Vitals reviewed.   Constitutional:       General: She is not in acute distress.     Appearance: She is obese.   Neck:      Vascular: No hepatojugular reflux or JVD.   Cardiovascular:      Rate and Rhythm: Regular rhythm. Bradycardia present.      Pulses: Normal pulses.      Heart sounds: Normal heart sounds. No murmur heard.     No friction rub. No gallop.   Pulmonary:      Effort: Pulmonary effort is normal. No respiratory distress.      Breath sounds: No rales.   Abdominal:      General: Bowel sounds are normal. There is no distension.      Palpations: Abdomen is soft.      Tenderness: There is no abdominal tenderness.   Musculoskeletal:         General: No tenderness. Normal range of motion.      Cervical back: Neck supple.      Right lower leg: Edema present.      Left lower leg: No edema.   Skin:     General: Skin is warm and dry.      Capillary Refill: Capillary refill takes less than 2 seconds.       "Findings: No erythema.      Comments: Face flushed   Neurological:      Mental Status: She is alert and oriented to person, place, and time.   Psychiatric:         Mood and Affect: Mood normal.         LABORATORY RESULTS:      CBC with diff:   Results from last 7 days   Lab Units 05/01/25 0444 04/30/25 2009 04/28/25  0736   WBC Thousand/uL 4.63 7.08 3.61*   HEMOGLOBIN g/dL 9.1* 10.4* 11.4*   HEMATOCRIT % 29.6* 32.5* 35.4   MCV fL 101* 98 100*   PLATELETS Thousands/uL 161 190 177   RBC Million/uL 2.93* 3.31* 3.55*   MCH pg 31.1 31.4 32.1   MCHC g/dL 30.7* 32.0 32.2   RDW % 13.4 13.2 13.3   MPV fL 12.0 12.1 12.5   NRBC AUTO /100 WBCs  --  0 0       CMP:  Results from last 7 days   Lab Units 05/01/25 0444 04/30/25 2009 04/28/25  0736   POTASSIUM mmol/L 3.5 3.6 3.7   CHLORIDE mmol/L 110* 104 105   CO2 mmol/L 25 26 25   BUN mg/dL 33* 33* 26*   CREATININE mg/dL 1.76* 2.04* 1.85*   CALCIUM mg/dL 8.3* 8.9 9.2   AST U/L  --  19 24   ALT U/L  --  25 25   ALK PHOS U/L  --  92 85   EGFR ml/min/1.73sq m 28 24 27       BMP:  Results from last 7 days   Lab Units 05/01/25 0444 04/30/25 2009 04/28/25  0736   POTASSIUM mmol/L 3.5 3.6 3.7   CHLORIDE mmol/L 110* 104 105   CO2 mmol/L 25 26 25   BUN mg/dL 33* 33* 26*   CREATININE mg/dL 1.76* 2.04* 1.85*   CALCIUM mg/dL 8.3* 8.9 9.2       Lab Results   Component Value Date    CREATININE 1.76 (H) 05/01/2025    CREATININE 2.04 (H) 04/30/2025    CREATININE 1.85 (H) 04/28/2025       No results found for: \"NTBNP\"       Results from last 7 days   Lab Units 05/01/25 0444 04/28/25  0736   MAGNESIUM mg/dL 2.2 2.0          Results from last 7 days   Lab Units 05/01/25  0005   HEMOGLOBIN A1C % 6.6*              Results from last 7 days   Lab Units 05/01/25  0005   INR  1.23*     Lipid Profile:   Lab Results   Component Value Date    CHOL 208 (H) 11/17/2017    CHOL 199 05/22/2017    CHOL 210 (H) 09/30/2016     Lab Results   Component Value Date    HDL 44 (L) 05/01/2025    HDL 57 03/20/2025    " HDL 54 2024     Lab Results   Component Value Date    LDLCALC 101 (H) 2025    LDLCALC 101 (H) 2025    LDLCALC 123 (H) 2024     Lab Results   Component Value Date    TRIG 128 2025    TRIG 305 (H) 2025    TRIG 115 2024       Cardiac testing:   Results for orders placed during the hospital encounter of 20    Echo complete with contrast if indicated    [address]  [place] [id number] [phone]    Transthoracic Echocardiogram  2D, M-mode, Doppler, and Color Doppler    Study date:  02-Mar-2020    Patient: AIDAN ZAVALA  MR number: [id number]  Account number: [id number]  : 1955  Age: 64 years  Gender: Female  Status: Outpatient  Location: [facility]  Height: 62 in  Weight: 236 lb  BP: 120/ 76 mmHg    Indications: Abnormal EKG; Edema    Sonographer:  AVANI Haines, RDCS  Primary Physician:  Gretchen Mayorga DO  Referring Physician:  Torin Gold MD  Group:  [facility]  Interpreting Physician:  Yony Castaneda MD    SUMMARY    LEFT VENTRICLE:  Systolic function was normal. Ejection fraction was estimated to be 60 %.  There were no regional wall motion abnormalities.    RIGHT VENTRICLE:  The size was normal.  Systolic function was normal.    LEFT ATRIUM:  The atrium was mildly dilated.    MITRAL VALVE:  There was mild regurgitation.    HISTORY: PRIOR HISTORY: Abnormal EKG; Palpitations; GERD; Decreased thyroid; JULIO; Edema; Former smoker    PROCEDURE: The study was performed in the [facility]. This was a routine study. The transthoracic approach was used. The study included complete 2D imaging, M-mode, complete spectral Doppler, and color Doppler. The  heart rate was 76 bpm, at the start of the study. Images were obtained from the parasternal, apical, subcostal, and suprasternal notch acoustic windows. Image quality was adequate.    LEFT  VENTRICLE: Size was normal. Systolic function was normal. Ejection fraction was estimated to be 60 %. There were no regional wall motion abnormalities. Wall thickness was normal. DOPPLER: Left ventricular diastolic function parameters  were normal for the patient's age.    RIGHT VENTRICLE: The size was normal. Systolic function was normal. Wall thickness was normal.    LEFT ATRIUM: The atrium was mildly dilated.    RIGHT ATRIUM: Size was normal.    MITRAL VALVE: Valve structure was normal. There was normal leaflet separation. DOPPLER: The transmitral velocity was within the normal range. There was no evidence for stenosis. There was mild regurgitation.    AORTIC VALVE: The valve was trileaflet. Leaflets exhibited normal thickness and normal cuspal separation. DOPPLER: Transaortic velocity was within the normal range. There was no evidence for stenosis. There was no significant  regurgitation.    TRICUSPID VALVE: The valve structure was normal. There was normal leaflet separation. DOPPLER: The transtricuspid velocity was within the normal range. There was no evidence for stenosis. There was no significant regurgitation. The  tricuspid jet envelope definition was inadequate for estimation of RV systolic pressure.    PULMONIC VALVE: Leaflets exhibited normal thickness, no calcification, and normal cuspal separation. DOPPLER: The transpulmonic velocity was within the normal range. There was mild regurgitation.    PERICARDIUM: There was no pericardial effusion. The pericardium was normal in appearance.    AORTA: The root exhibited normal size.    SYSTEMIC VEINS: IVC: The inferior vena cava was normal in size. Respirophasic changes were normal.    SYSTEM MEASUREMENT TABLES    2D  %FS: 31.68 %  AV Diam: 3.06 cm  EDV(Teich): 89.99 ml  EF(Cube): 68.12 %  EF(Teich): 59.85 %  ESV(Cube): 28.07 ml  ESV(Teich): 36.13 ml  IVSd: 1.22 cm  LA Area: 21.7 cm2  LA Diam: 4.62 cm  LVEDV MOD A4C: 91.14 ml  LVEF MOD A4C: 63.33 %  LVESV MOD  A4C: 33.43 ml  LVIDd: 4.45 cm  LVIDs: 3.04 cm  LVLd A4C: 7.31 cm  LVLs A4C: 5.67 cm  LVPWd: 1.32 cm  RA Area: 14.58 cm2  RV Diam: 3.78 cm  SV MOD A4C: 57.72 ml  SV(Cube): 59.97 ml  SV(Teich): 53.86 ml    CW  TR MaxP.16 mmHg  TR Vmax: 2.35 m/s    MM  TAPSE: 2.71 cm    PW  E': 0.05 m/s  E/E': 14.28  MV A Marcos: 0.82 m/s  MV Dec Medina: 4.4 m/s2  MV DecT: 172 ms  MV E Marcos: 0.76 m/s  MV E/A Ratio: 0.93    [employer] Accredited Echocardiography Laboratory    Prepared and electronically signed by    Yony Castaneda MD  Signed 02-Mar-2020 12:12:45    No results found for this or any previous visit.    No valid procedures specified.  No results found for this or any previous visit.      Imaging: Results Review Statement: I reviewed radiology reports from this admission including: chest xray.  XR chest 1 view portable  Result Date: 2025  Narrative: XR CHEST PORTABLE INDICATION: cp, sob. COMPARISON: 2025 FINDINGS: Mild right basilar atelectasis and/or airspace disease. The remaining right lung field and left lung field appear well aerated and clear. No pneumothorax or pleural effusion. Normal cardiomediastinal silhouette. Mild calcific atherosclerosis of the aortic arch region and right chest Port-A-Cath with tip in the cavoatrial junction region again noted. Bones are unremarkable for age. No free air in the upper abdomen.     Impression: Mild right basilar atelectasis and/or airspace disease. The remaining lung fields appear well aerated and clear. Workstation performed: [id number]     XR knee 3 vw right non injury  Result Date: 2025  Narrative: RIGHT KNEE INDICATION:   Pain in right knee. Pain in left knee.   COMPARISON:  None. VIEWS:  XR KNEE 3 VW RIGHT NON INJURY FINDINGS: There is no acute fracture or dislocation. There is no joint effusion. Severe narrowing of the medial compartment space. Large osteophyte of the medial femoral epicondyle and lateral epicondyle as well as small osteophytes  of the tibial plateau. Varus angulation secondarily. Patellofemoral space is relatively preserved. Enthesophytes of the superior and inferior patella. No lytic or blastic osseous lesion. Soft tissues are unremarkable.     Impression: Severe medial compartment degenerative changes as above with secondary varus angulation. No significant effusion. No fracture. Small patellar enthesopathy. Electronically signed: 04/29/2025 10:39 AM Jack Arreola MD    XR knee 3 vw left non injury  Result Date: 4/29/2025  Narrative: LEFT KNEE INDICATION:   Pain in right knee. Pain in left knee. COMPARISON:  None. VIEWS:  XR KNEE 3 VW LEFT NON INJURY FINDINGS: There is no acute fracture or dislocation. There is no joint effusion. Severe medial compartment degenerative narrowing. Varus angulation secondarily. Small osteophytes of the lateral plateau and femoral epicondyle. No lytic or blastic osseous lesion. Soft tissues are unremarkable.     Impression: Severe medial compartment degenerative changes as above with secondary varus angulation. No fracture or effusion. Electronically signed: 04/29/2025 10:23 AM Jack Arreola MD    VAS VENOUS DUPLEX -LOWER LIMB UNILATERAL  Result Date: 4/25/2025  Narrative:  [facility] REPORT CLINICAL: Indications: Patient presents with right foot edema x 4 days. Operative History: 2024-10-08 IVC Filter (Temporary) Risk Factors The patient has history of Obesity, Hyperlipidemia, CKD and previous smoking (quit >10yrs ago).   CONCLUSION: Impression: RIGHT LOWER LIMB No evidence of acute or chronic deep vein thrombosis . No evidence of superficial thrombophlebitis noted. Doppler evaluation shows a normal response to augmentation maneuvers. Popliteal, posterior tibial and anterior tibial arterial Doppler waveforms are triphasic.  LEFT LOWER LIMB LIMITED Evaluation shows no evidence of thrombus in the common femoral vein. Doppler evaluation shows a normal response to augmentation maneuvers.  Technical  findings were given to Dr. Brown 1435 4/25/2025  SIGNATURE: Electronically Signed by: MALIK GUARDADO on 2025-04-25 02:51:41 PM    CT abdomen pelvis wo contrast  Result Date: 4/13/2025  Narrative: CT ABDOMEN AND PELVIS WITHOUT IV CONTRAST INDICATION: C54.1: Malignant neoplasm of endometrium. COMPARISON: CT dated 1/31/2025 TECHNIQUE: CT examination of the abdomen and pelvis was performed without intravenous contrast. Multiplanar 2D reformatted images were created from the source data. This examination, like all CT scans performed in the [facility], was performed utilizing techniques to minimize radiation dose exposure, including the use of iterative reconstruction and automated exposure control. Radiation dose length product (DLP) for this visit: 936 mGy-cm Enteric Contrast: Administered. FINDINGS: ABDOMEN The absence of intravenous contrast limits evaluation of certain processes, especially infectious, inflammatory and neoplastic processes. LOWER CHEST: No clinically significant abnormality in the visualized lower chest. LIVER/BILIARY TREE: Unremarkable. GALLBLADDER: No calcified gallstones. No pericholecystic inflammatory change. SPLEEN: Unremarkable. PANCREAS: Unremarkable. ADRENAL GLANDS: Unremarkable. KIDNEYS/URETERS: Unremarkable. No hydronephrosis. STOMACH AND BOWEL: Small hiatal hernia. Small duodenal diverticulum. APPENDIX: No findings to suggest appendicitis. ABDOMINOPELVIC CAVITY: No ascites. No pneumoperitoneum. No lymphadenopathy. VESSELS: There is an infrarenal retrievable IVC filter. PELVIS REPRODUCTIVE ORGANS: Post hysterectomy. URINARY BLADDER: Unremarkable. ABDOMINAL WALL/INGUINAL REGIONS: Small fat-containing umbilical hernia. BONES: No acute fracture or suspicious osseous lesion. Spinal degenerative changes.     Impression: No metastatic disease in the abdomen or pelvis within the limits of unenhanced technique. Workstation performed: [id number]     Thank you for allowing us to  participate in this patient's care. This pt will follow up with Dr         once discharged.     Counseling / Coordination of Care  Total floor / unit time spent today 45 minutes.  Greater than 50% of total time was spent with the patient and / or family counseling and / or coordination of care.  A description of the counseling / coordination of care: Review of history, current assessment, development of a plan.    Code Status: Level 1 - Full Code    ** Please Note: Dragon 360 Dictation voice to text software may have been used in the creation of this document. **

## 2025-05-01 NOTE — ASSESSMENT & PLAN NOTE
Lab Results   Component Value Date    EGFR 28 05/01/2025    EGFR 24 04/30/2025    EGFR 27 04/28/2025    CREATININE 1.76 (H) 05/01/2025    CREATININE 2.04 (H) 04/30/2025    CREATININE 1.85 (H) 04/28/2025   Improved with IVF  Baseline 1.4-1.6

## 2025-05-01 NOTE — ASSESSMENT & PLAN NOTE
Hx DVT/PE Sept '24  S/p IVC filter.  Was scheduled for elective removal today as outpatient  On Eliquis 5 mg p.o. twice daily.  Currently on hold  On heparin drip  Management per primary team

## 2025-05-01 NOTE — ASSESSMENT & PLAN NOTE
>>ASSESSMENT AND PLAN FOR CORONARY ARTERY DISEASE INVOLVING NATIVE CORONARY ARTERY OF NATIVE HEART WITH UNSTABLE ANGINA PECTORIS WRITTEN ON 5/1/2025 12:14 PM BY DENISE RODRIGUEZ up to 1949  CRP 2.6  Concern for ACS  Cardiology following  On ASA, statin, Toprol-XL and now heparin drip  Plan for cardiac catheterization tomorrow, 5/2/2025  Management per cardiology

## 2025-05-01 NOTE — UTILIZATION REVIEW
Initial Clinical Review    Admission: Date/Time/Statement:   Admission Orders (From admission, onward)       Ordered        04/30/25 2205  Place in Observation  Once                          Orders Placed This Encounter   Procedures    Place in Observation     Standing Status:   Standing     Number of Occurrences:   1     Level of Care:   Med Surg [16]     ED Arrival Information       Expected   -    Arrival   4/30/2025 19:25    Acuity   Urgent              Means of arrival   Ambulance    Escorted by   [facility]    Service   Hospitalist    Admission type   Emergency              Arrival complaint   EMS- CP             Chief Complaint   Patient presents with    Chest Pain     Pt walking home from dinner, had some chest pain. Also feeling nauseas and just not feeling well. Pt currently having chemo therapy. Currently has no chest pain or SOB.        Initial Presentation: 69 y.o. female  with a PMH of endometrial cancer, myocarditis/pneumonitis, cardiomyopathy, PE on Eliquis status post IVC filter, CKD, LBBB, PVC, bipolar disorder who presents with left-sided chest pain, diaphoresis, flushing and nausea. Pain occurred while she was walking back from dinner at [facility]. She reports the pain lasted 1 hour. She was given 325 of aspirin prehospital. Currently, she denies any symptoms. Plan: Observation for chest pain, cardiomyopathy, hx of myocarditis, stage 3 CKD, endometrial cancer, hx of PE, right leg swelling, ambulatory dysfunction: heparin drip, start atorvastatin 40 mg, NPO, Cardiology consult, Toprol, Torsemide, colchicine, monitor BMP.     4/30:    Cardiology consult: Echo pending. Heparin drip. Continue ASA, statin, Toprol XL. Nephrology consult. Cardiac cath tentatively planned for 5/2. Telemetry.     Nephrology consult: Renal function stable and at baseline. Okay from a nephrology standpoint to proceed with planned cardiac catheterization tomorrow per cardiology commendations. Recommend IV  hydration pre and post cardiac catheterization.  Cath anticipated late morning, early afternoon per Cardiology.  Start NSS at 50 mL/hr at 0500 and continue for 6 hours post cath. Trend BMP.     ED Treatment-Medication Administration from 04/30/2025 1925 to 04/30/2025 2311         Date/Time Order Dose Route Action     04/30/2025 2011 apixaban (ELIQUIS) tablet 5 mg 5 mg Oral Given     04/30/2025 2011 metoprolol tartrate (LOPRESSOR) tablet 50 mg 50 mg Oral Given     04/30/2025 2011 famotidine (PEPCID) tablet 20 mg 20 mg Oral Given     04/30/2025 2059 sodium chloride 0.9 % bolus 1,000 mL 1,000 mL Intravenous New Bag            Scheduled Medications:  aspirin, 81 mg, Oral, Daily  atorvastatin, 40 mg, Oral, Daily With Dinner  Cholecalciferol, 1,000 Units, Oral, Daily  colchicine, 0.6 mg, Oral, Daily  famotidine, 10 mg, Oral, Daily  lurasidone, 20 mg, Oral, Daily With Breakfast  magnesium Oxide, 400 mg, Oral, Daily  metoprolol succinate, 50 mg, Oral, Q12H  multivitamin-minerals, 1 tablet, Oral, Daily  oxybutynin, 10 mg, Oral, Daily  polyethylene glycol, 17 g, Oral, BID      Continuous IV Infusions:  heparin (porcine), 3-20 Units/kg/hr (Order-Specific), Intravenous, Titrated      PRN Meds:  acetaminophen, 650 mg, Oral, Q6H PRN  heparin (porcine), 2,000 Units, Intravenous, Q6H PRN  heparin (porcine), 4,000 Units, Intravenous, Q6H PRN  LORazepam, 1 mg, Oral, HS PRN  nitroglycerin, 0.4 mg, Sublingual, Q5 Min PRN      ED Triage Vitals   Temperature Pulse Respirations Blood Pressure SpO2 Pain Score   04/30/25 1929 04/30/25 1928 04/30/25 1928 04/30/25 1928 04/30/25 1928 04/30/25 2320   97.6 °F (36.4 °C) 94 18 150/75 100 % No Pain     Weight (last 2 days)       None            Vital Signs (last 3 days)       Date/Time Temp Pulse Resp BP MAP (mmHg) SpO2 O2 Device Patient Position - Orthostatic VS Pain    05/01/25 07:08:45 97.3 °F (36.3 °C) 76 16 111/67 82 98 % -- -- --    04/30/25 4450 -- -- -- -- -- -- -- -- No Pain    04/30/25  23:18:33 -- 78 -- 127/78 94 97 % None (Room air) -- --    04/30/25 2011 -- 82 -- 138/63 -- -- -- -- --    04/30/25 1929 97.6 °F (36.4 °C) -- -- -- -- -- -- -- --    04/30/25 1928 -- 94 18 150/75 -- 100 % None (Room air) Lying --              Pertinent Labs/Diagnostic Test Results:   Radiology:  XR chest 1 view portable   ED Interpretation by Pamella Paulino PA-C (04/30 2012)   Image was independently interpreted by myself and showed no acute concerns of cardiopulmonary disease at this time.       Final Interpretation by Vinod Garcia MD (05/01 0623)      Mild right basilar atelectasis and/or airspace disease. The remaining lung fields appear well aerated and clear.            Workstation performed: [id number]           Cardiology:  No orders to display     GI:  No orders to display           Results from last 7 days   Lab Units 05/01/25 0444 04/30/25 2009 04/28/25  0736   WBC Thousand/uL 4.63 7.08 3.61*   HEMOGLOBIN g/dL 9.1* 10.4* 11.4*   HEMATOCRIT % 29.6* 32.5* 35.4   PLATELETS Thousands/uL 161 190 177   TOTAL NEUT ABS Thousands/µL  --  5.23 1.97         Results from last 7 days   Lab Units 05/01/25 0444 04/30/25 2009 04/28/25  0736   SODIUM mmol/L 144 142 141   POTASSIUM mmol/L 3.5 3.6 3.7   CHLORIDE mmol/L 110* 104 105   CO2 mmol/L 25 26 25   ANION GAP mmol/L 9 12 11   BUN mg/dL 33* 33* 26*   CREATININE mg/dL 1.76* 2.04* 1.85*   EGFR ml/min/1.73sq m 28 24 27   CALCIUM mg/dL 8.3* 8.9 9.2   MAGNESIUM mg/dL 2.2  --  2.0     Results from last 7 days   Lab Units 04/30/25 2009 04/28/25  0736   AST U/L 19 24   ALT U/L 25 25   ALK PHOS U/L 92 85   TOTAL PROTEIN g/dL 6.5 6.4   ALBUMIN g/dL 3.8 3.8   TOTAL BILIRUBIN mg/dL 0.27 0.41         Results from last 7 days   Lab Units 05/01/25  0444 04/30/25 2009 04/28/25  0736   GLUCOSE RANDOM mg/dL 125 204* 148*         Results from last 7 days   Lab Units 05/01/25  0005   HEMOGLOBIN A1C % 6.6*   EAG mg/dl 143       Results from last 7 days   Lab Units 05/01/25  0005  04/30/25 2227 04/30/25 2009   HS TNI 0HR ng/L  --   --  69*   HS TNI 2HR ng/L  --  387*  --    HSTNI D2 ng/L  --  318*  --    HS TNI 4HR ng/L 768*  --   --    HSTNI D4 ng/L 699*  --   --          Results from last 7 days   Lab Units 05/01/25  0005   PROTIME seconds 16.2*   INR  1.23*   PTT seconds 25           Results from last 7 days   Lab Units 04/30/25 2009   BNP pg/mL 271*           Results from last 7 days   Lab Units 04/28/25  0736   CREATININE UR mg/dL 114.5   PROTEIN UR mg/dL 26.8   PROT/CREAT RATIO UR  0.2         Past Medical History:   Diagnosis Date    Abnormal ECG     Anxiety 2002    Arthritis     Bipolar 1 disorder (HCC)     Cervical cancer (HCC)     Chronic kidney disease     stage 3    CPAP (continuous positive airway pressure) dependence     Depression 2001    Disease of thyroid gland 2001    Thyroid treated with Lithium medication for bi-polar disease    DVT (deep venous thrombosis) (HCC)     BLLE    Elevated blood pressure reading 06/10/2019    GERD (gastroesophageal reflux disease)     Obesity     Pulmonary emboli (HCC)     B/L    RSV (acute bronchiolitis due to respiratory syncytial virus) 12/05/2023    Sleep apnea     Sleep apnea, obstructive 2007    Urinary incontinence 2019    Uterine cancer (HCC)      Present on Admission:   Endometrial cancer (HCC)   Cardiomyopathy (HCC)   Other pulmonary embolism without acute cor pulmonale (HCC)   Stage 3 chronic kidney disease (HCC)   Achilles tendon pain      Admitting Diagnosis: Chest pain [R07.9]  Elevated troponin [R79.89]  History of myocarditis [Z86.79]  Age/Sex: 69 y.o. female    Network Utilization Review Department  ATTENTION: Please call with any questions or concerns to [phone] and carefully listen to the prompts so that you are directed to the right person. All voicemails are confidential.   For Discharge needs, contact Care Management DC Support Team at [phone] opt. 2  Send all requests for admission clinical reviews, approved  or denied determinations and any other requests to dedicated fax number below belonging to the campus where the patient is receiving treatment. List of dedicated fax numbers for the Facilities:  FACILITY NAME UR FAX NUMBER   ADMISSION DENIALS (Administrative/Medical Necessity) [phone]   DISCHARGE SUPPORT TEAM (NETWORK) [phone]   PARENT CHILD HEALTH (Maternity/NICU/Pediatrics) [phone]   [facility] [phone]   [facility] [phone]   [facility] [phone]   [facility] [phone]   [facility] [phone]   [facility] [phone]   [facility] [phone]   [facility] [phone]   [facility] [phone]   [facility] [phone]   [facility] [phone]   [facility] [phone]

## 2025-05-01 NOTE — ASSESSMENT & PLAN NOTE
History of DVT/PE in September 2024  S/P IVC filter.  Was supposed to have removed today  On Eliquis 5 mg p.o. twice daily

## 2025-05-01 NOTE — ASSESSMENT & PLAN NOTE
Presumed nonischemic cardiomyopathy with nonischemic nuclear stress test in 10/2024.  Thought related to acute PE as well as left bundle branch block.  GDMT limited by CKD.  She is on Toprol 50 mg daily  She takes torsemide 20 mg daily, volume status is stable and creatinine actually improved with IV fluids.  Torsemide currently on hold

## 2025-05-01 NOTE — ASSESSMENT & PLAN NOTE
TN 1949  CRP 2.6  Concern for ACS as opposed to myocarditis given normal CRP.  Cardiology following  On ASA, statin, Toprol-XL heparin drip  Plan for cardiac catheterization today, 5/2/2025  Management per cardiology

## 2025-05-01 NOTE — ASSESSMENT & PLAN NOTE
>>ASSESSMENT AND PLAN FOR CORONARY ARTERY DISEASE INVOLVING NATIVE CORONARY ARTERY OF NATIVE HEART WITH UNSTABLE ANGINA PECTORIS WRITTEN ON 5/1/2025 10:56 AM BY ZURI SMITH    Symptoms similar to what she felt when diagnosed with myocarditis  Hs Tnl: 69-->387-->768-->1949  ECG: NSR with LBBB  CRP 2.6  Limited echocardiogram pending  Start IV heparin due to concerns for ACS.  Continue aspirin, statin (new this admission), and Toprol XL  Consult nephrology for renal optimization prior to cardiac catheterization  Cardiac catheterization tentatively planned for Friday, 5/2/2025 to evaluate for obstructive CAD  Continue telemetry monitoring

## 2025-05-01 NOTE — ASSESSMENT & PLAN NOTE
Calcium and magnesium stable  Phosphorus 3.7 in November, at goal  Previously on calcitriol but discontinued.  continue to monitor and follow phosphorus, PTH, calcium, magnesium as outpatient

## 2025-05-01 NOTE — ASSESSMENT & PLAN NOTE
Hgb *** today, below goal.  Goal >10  continue to trend  Transfuse for Hgb <7.0  management per primary team

## 2025-05-01 NOTE — ASSESSMENT & PLAN NOTE
CKD IIIB/IV:  Etiology: Suspect due to age-related nephron loss, prior OWEN, chronic lithium use, prior NSAID use and obesity related hyperfiltration  Admission creatinine 2.04 on 4/30/2025.  Today's creatinine 1.76, trending down back to baseline  baseline creatinine 1.4-1.8 since 2020 after an episode of OWEN, most recently high 1s since Feb '25 coinciding with initiation of colchicine  Outpatient nephrologist: Dr. Macias.  Follow-up appointment scheduled 5/8/2025  Imaging: CT 4/11/25 with no hydronephrosis  nonoliguric  Renal function stable and at baseline  Okay from a nephrology standpoint to proceed with planned cardiac catheterization tomorrow per cardiology commendations  Recommend IV hydration pre and post cardiac catheterization.  Cath anticipated late morning, early afternoon per Cardiology.  Start NSS at 50 mL/hr at 0500 and continue for 6 hours post cath.  Continue to hold torsemide in anticipation of cardiac cath tomorrow  Strict I/Os, daily weights  Avoid nephrotoxins, NSAIDs, limit IV contrast if possible  Avoid hypotension.  Maintain MAP >65  Trend BMP in am  Adjust meds to appropriate GFR.  Patient currently on colchicine 0.6 mg daily for 3 month course through 5/20/2025 related to hx myocarditis in Feb '25

## 2025-05-01 NOTE — ASSESSMENT & PLAN NOTE
Echo 9/2024: EF 40%.  This was during acute PE.  Before chemotherapy.  Echo 1/2025: EF 45%  Cardiac MRI 2/2025: EF 40 to 45%  Echo 3/2025: EF 50%  PTA:   Toprol 50 mg every 12 hours  Torsemide 20 mg daily-we will hold this for now pending cardiology evaluation  Will obtain repeat limited echo

## 2025-05-01 NOTE — ASSESSMENT & PLAN NOTE
Diagnosed February 2025  Likely due to pembrolizumab which was since stopped.  Last dose was 2/14/2025.  Received total of 4 doses.  Completed prednisone taper   continue colchicine

## 2025-05-01 NOTE — ASSESSMENT & PLAN NOTE
Chest pain x 1 hour, diaphoresis, flushing, nausea.  Pain occurred while walking back from dinner  EKG: Sinus rhythm, left axis deviation, nonspecific intraventricular conduction block  Troponin 69 to 387  Stress test 10/2024: EF 60%.  Suspected attenuation noted.  Was already given p.m. dose of Eliquis.  Discussed with pharmacist.  Will start heparin drip tomorrow at 9 AM.  Received 325 mg aspirin prehospital  Start atorvastatin 40 mg  Will keep n.p.o.  Cardiology consulted

## 2025-05-01 NOTE — ASSESSMENT & PLAN NOTE
Presumed nonischemic cardiomyopathy  Compensated  echo 5/1/2025:  EF EF 50%, moderate MR, mild TR, no effusion  home diuretics: Torsemide 20 mg daily.  Currently on hold due to cardiac cath  on beta blockers  fluid restriction, 2 gm low sodium diet, daily weights  management per cardiology

## 2025-05-01 NOTE — ASSESSMENT & PLAN NOTE
Diagnosed with bilateral PE in September 2024  Status post IVC filter.  Planned outpatient IVC filter removal scheduled for 5/1  [facility] held

## 2025-05-02 LAB
ALBUMIN SERPL BCG-MCNC: 3.6 G/DL (ref 3.5–5)
ALP SERPL-CCNC: 77 U/L (ref 34–104)
ALT SERPL W P-5'-P-CCNC: 23 U/L (ref 7–52)
ANION GAP SERPL CALCULATED.3IONS-SCNC: 7 MMOL/L (ref 4–13)
ANION GAP SERPL CALCULATED.3IONS-SCNC: 8 MMOL/L (ref 4–13)
APTT PPP: 122 SECONDS (ref 23–34)
APTT PPP: 83 SECONDS (ref 23–34)
AST SERPL W P-5'-P-CCNC: 22 U/L (ref 13–39)
ATRIAL RATE: 67 BPM
BILIRUB SERPL-MCNC: 0.31 MG/DL (ref 0.2–1)
BUN SERPL-MCNC: 30 MG/DL (ref 5–25)
BUN SERPL-MCNC: 32 MG/DL (ref 5–25)
CALCIUM SERPL-MCNC: 8.7 MG/DL (ref 8.4–10.2)
CALCIUM SERPL-MCNC: 8.8 MG/DL (ref 8.4–10.2)
CHLORIDE SERPL-SCNC: 106 MMOL/L (ref 96–108)
CHLORIDE SERPL-SCNC: 109 MMOL/L (ref 96–108)
CO2 SERPL-SCNC: 27 MMOL/L (ref 21–32)
CO2 SERPL-SCNC: 28 MMOL/L (ref 21–32)
CREAT SERPL-MCNC: 1.65 MG/DL (ref 0.6–1.3)
CREAT SERPL-MCNC: 1.75 MG/DL (ref 0.6–1.3)
ERYTHROCYTE [DISTWIDTH] IN BLOOD BY AUTOMATED COUNT: 13.5 % (ref 11.6–15.1)
GFR SERPL CREATININE-BSD FRML MDRD: 29 ML/MIN/1.73SQ M
GFR SERPL CREATININE-BSD FRML MDRD: 31 ML/MIN/1.73SQ M
GLUCOSE SERPL-MCNC: 101 MG/DL (ref 65–140)
GLUCOSE SERPL-MCNC: 112 MG/DL (ref 65–140)
HCT VFR BLD AUTO: 32.8 % (ref 34.8–46.1)
HGB BLD-MCNC: 10 G/DL (ref 11.5–15.4)
KCT BLD-ACNC: 250 SEC (ref 89–137)
MAGNESIUM SERPL-MCNC: 2.2 MG/DL (ref 1.9–2.7)
MCH RBC QN AUTO: 31.2 PG (ref 26.8–34.3)
MCHC RBC AUTO-ENTMCNC: 30.5 G/DL (ref 31.4–37.4)
MCV RBC AUTO: 102 FL (ref 82–98)
MRSA NOSE QL CULT: NORMAL
P AXIS: -12 DEGREES
PHOSPHATE SERPL-MCNC: 4 MG/DL (ref 2.3–4.1)
PLATELET # BLD AUTO: 153 THOUSANDS/UL (ref 149–390)
PMV BLD AUTO: 12.2 FL (ref 8.9–12.7)
POTASSIUM SERPL-SCNC: 3.9 MMOL/L (ref 3.5–5.3)
POTASSIUM SERPL-SCNC: 4 MMOL/L (ref 3.5–5.3)
PR INTERVAL: 172 MS
PROT SERPL-MCNC: 6.1 G/DL (ref 6.4–8.4)
QRS AXIS: 99 DEGREES
QRSD INTERVAL: 132 MS
QT INTERVAL: 496 MS
QTC INTERVAL: 524 MS
RBC # BLD AUTO: 3.21 MILLION/UL (ref 3.81–5.12)
SODIUM SERPL-SCNC: 142 MMOL/L (ref 135–147)
SODIUM SERPL-SCNC: 143 MMOL/L (ref 135–147)
SPECIMEN SOURCE: ABNORMAL
T WAVE AXIS: 29 DEGREES
VENTRICULAR RATE: 67 BPM
WBC # BLD AUTO: 4.19 THOUSAND/UL (ref 4.31–10.16)

## 2025-05-02 PROCEDURE — C1894 INTRO/SHEATH, NON-LASER: HCPCS | Performed by: INTERNAL MEDICINE

## 2025-05-02 PROCEDURE — C1725 CATH, TRANSLUMIN NON-LASER: HCPCS | Performed by: INTERNAL MEDICINE

## 2025-05-02 PROCEDURE — 92928 PRQ TCAT PLMT NTRAC ST 1 LES: CPT | Performed by: INTERNAL MEDICINE

## 2025-05-02 PROCEDURE — 93010 ELECTROCARDIOGRAM REPORT: CPT | Performed by: INTERNAL MEDICINE

## 2025-05-02 PROCEDURE — C1769 GUIDE WIRE: HCPCS | Performed by: INTERNAL MEDICINE

## 2025-05-02 PROCEDURE — 85347 COAGULATION TIME ACTIVATED: CPT

## 2025-05-02 PROCEDURE — 99152 MOD SED SAME PHYS/QHP 5/>YRS: CPT | Performed by: INTERNAL MEDICINE

## 2025-05-02 PROCEDURE — 99153 MOD SED SAME PHYS/QHP EA: CPT | Performed by: INTERNAL MEDICINE

## 2025-05-02 PROCEDURE — 93458 L HRT ARTERY/VENTRICLE ANGIO: CPT | Performed by: INTERNAL MEDICINE

## 2025-05-02 PROCEDURE — C1874 STENT, COATED/COV W/DEL SYS: HCPCS | Performed by: INTERNAL MEDICINE

## 2025-05-02 PROCEDURE — 80048 BASIC METABOLIC PNL TOTAL CA: CPT | Performed by: PHYSICIAN ASSISTANT

## 2025-05-02 PROCEDURE — 85730 THROMBOPLASTIN TIME PARTIAL: CPT | Performed by: INTERNAL MEDICINE

## 2025-05-02 PROCEDURE — 99232 SBSQ HOSP IP/OBS MODERATE 35: CPT | Performed by: INTERNAL MEDICINE

## 2025-05-02 PROCEDURE — C9600 PERC DRUG-EL COR STENT SING: HCPCS | Performed by: INTERNAL MEDICINE

## 2025-05-02 PROCEDURE — 85027 COMPLETE CBC AUTOMATED: CPT | Performed by: NURSE PRACTITIONER

## 2025-05-02 PROCEDURE — C1887 CATHETER, GUIDING: HCPCS | Performed by: INTERNAL MEDICINE

## 2025-05-02 PROCEDURE — 99233 SBSQ HOSP IP/OBS HIGH 50: CPT | Performed by: INTERNAL MEDICINE

## 2025-05-02 DEVICE — STENT ONYXNG27518UX ONYX 2.75X18RX
Type: IMPLANTABLE DEVICE | Status: FUNCTIONAL
Brand: ONYX FRONTIER™

## 2025-05-02 RX ORDER — FENTANYL CITRATE 50 UG/ML
INJECTION, SOLUTION INTRAMUSCULAR; INTRAVENOUS CODE/TRAUMA/SEDATION MEDICATION
Status: DISCONTINUED | OUTPATIENT
Start: 2025-05-02 | End: 2025-05-02 | Stop reason: HOSPADM

## 2025-05-02 RX ORDER — FUROSEMIDE 10 MG/ML
INJECTION INTRAMUSCULAR; INTRAVENOUS CODE/TRAUMA/SEDATION MEDICATION
Status: DISCONTINUED | OUTPATIENT
Start: 2025-05-02 | End: 2025-05-02 | Stop reason: HOSPADM

## 2025-05-02 RX ORDER — HEPARIN SODIUM 1000 [USP'U]/ML
INJECTION, SOLUTION INTRAVENOUS; SUBCUTANEOUS CODE/TRAUMA/SEDATION MEDICATION
Status: DISCONTINUED | OUTPATIENT
Start: 2025-05-02 | End: 2025-05-02 | Stop reason: HOSPADM

## 2025-05-02 RX ORDER — SODIUM CHLORIDE 9 MG/ML
50 INJECTION, SOLUTION INTRAVENOUS CONTINUOUS
Status: DISCONTINUED | OUTPATIENT
Start: 2025-05-02 | End: 2025-05-02

## 2025-05-02 RX ORDER — SODIUM CHLORIDE 9 MG/ML
50 INJECTION, SOLUTION INTRAVENOUS CONTINUOUS
Status: DISPENSED | OUTPATIENT
Start: 2025-05-02 | End: 2025-05-02

## 2025-05-02 RX ORDER — NITROGLYCERIN 20 MG/100ML
INJECTION INTRAVENOUS CODE/TRAUMA/SEDATION MEDICATION
Status: DISCONTINUED | OUTPATIENT
Start: 2025-05-02 | End: 2025-05-02 | Stop reason: HOSPADM

## 2025-05-02 RX ORDER — VERAPAMIL HYDROCHLORIDE 2.5 MG/ML
INJECTION INTRAVENOUS CODE/TRAUMA/SEDATION MEDICATION
Status: DISCONTINUED | OUTPATIENT
Start: 2025-05-02 | End: 2025-05-02 | Stop reason: HOSPADM

## 2025-05-02 RX ORDER — LIDOCAINE HYDROCHLORIDE 10 MG/ML
INJECTION, SOLUTION EPIDURAL; INFILTRATION; INTRACAUDAL; PERINEURAL CODE/TRAUMA/SEDATION MEDICATION
Status: DISCONTINUED | OUTPATIENT
Start: 2025-05-02 | End: 2025-05-02 | Stop reason: HOSPADM

## 2025-05-02 RX ORDER — PRASUGREL 10 MG/1
TABLET, FILM COATED ORAL CODE/TRAUMA/SEDATION MEDICATION
Status: DISCONTINUED | OUTPATIENT
Start: 2025-05-02 | End: 2025-05-02 | Stop reason: HOSPADM

## 2025-05-02 RX ORDER — MIDAZOLAM HYDROCHLORIDE 2 MG/2ML
INJECTION, SOLUTION INTRAMUSCULAR; INTRAVENOUS CODE/TRAUMA/SEDATION MEDICATION
Status: DISCONTINUED | OUTPATIENT
Start: 2025-05-02 | End: 2025-05-02 | Stop reason: HOSPADM

## 2025-05-02 RX ORDER — CLOPIDOGREL BISULFATE 75 MG/1
75 TABLET ORAL DAILY
Status: DISCONTINUED | OUTPATIENT
Start: 2025-05-03 | End: 2025-05-03 | Stop reason: HOSPADM

## 2025-05-02 RX ADMIN — ATORVASTATIN CALCIUM 40 MG: 40 TABLET, FILM COATED ORAL at 17:02

## 2025-05-02 RX ADMIN — SODIUM CHLORIDE 50 ML/HR: 0.9 INJECTION, SOLUTION INTRAVENOUS at 12:44

## 2025-05-02 RX ADMIN — Medication 400 MG: at 08:28

## 2025-05-02 RX ADMIN — LURASIDONE HYDROCHLORIDE 20 MG: 20 TABLET, FILM COATED ORAL at 08:29

## 2025-05-02 RX ADMIN — MULTIPLE VITAMINS W/ MINERALS TAB 1 TABLET: TAB ORAL at 08:28

## 2025-05-02 RX ADMIN — LORAZEPAM 1 MG: 1 TABLET ORAL at 21:31

## 2025-05-02 RX ADMIN — OXYBUTYNIN CHLORIDE 10 MG: 5 TABLET, EXTENDED RELEASE ORAL at 08:29

## 2025-05-02 RX ADMIN — METOPROLOL SUCCINATE 50 MG: 50 TABLET, EXTENDED RELEASE ORAL at 21:31

## 2025-05-02 RX ADMIN — Medication 1000 UNITS: at 08:29

## 2025-05-02 RX ADMIN — COLCHICINE 0.6 MG: 0.6 TABLET ORAL at 08:28

## 2025-05-02 RX ADMIN — ASPIRIN 324 MG: 81 TABLET, CHEWABLE ORAL at 05:41

## 2025-05-02 RX ADMIN — HEPARIN SODIUM 12.1 UNITS/KG/HR: 10000 INJECTION, SOLUTION INTRAVENOUS at 08:43

## 2025-05-02 RX ADMIN — SODIUM CHLORIDE 50 ML/HR: 0.9 INJECTION, SOLUTION INTRAVENOUS at 06:19

## 2025-05-02 RX ADMIN — FAMOTIDINE 10 MG: 20 TABLET, FILM COATED ORAL at 08:29

## 2025-05-02 RX ADMIN — METOPROLOL SUCCINATE 50 MG: 50 TABLET, EXTENDED RELEASE ORAL at 08:28

## 2025-05-02 NOTE — ASSESSMENT & PLAN NOTE
Presumed nonischemic cardiomyopathy with nonischemic nuclear stress test in 10/2024.  Thought related to acute PE as well as left bundle branch block.  GDMT limited by CKD.  She is on Toprol 50 mg daily  She takes torsemide 20 mg daily, volume status is stable and creatinine actually improved with IV fluids.  Torsemide currently on hold in the setting of requiring dye load with cardiac catheterization

## 2025-05-02 NOTE — UTILIZATION REVIEW
Continued Stay Review    Observation on 04/30 @ 2205 upgraded to Inpatient on 05/01 @ 1711. Pt requiring continued stay d/t plan for cardiac cath tomorrow.     Admission Orders (From admission, onward)       Ordered        05/01/25 1711  INPATIENT ADMISSION  Once            04/30/25 2205  Place in Observation  Once                          Orders Placed This Encounter   Procedures    INPATIENT ADMISSION     Standing Status:   Standing     Number of Occurrences:   1     Level of Care:   Med Surg [16]     Estimated length of stay:   More than 2 Midnights     Certification:   I certify that inpatient services are medically necessary for this patient for a duration of greater than two midnights. See H&P and MD Progress Notes for additional information about the patient's course of treatment.     Date: 05/02                           Current Patient Class: Inpatient  Current Level of Care: MS    HPI:69 y.o. female initially admitted on 05/01   Current Diagnosis: chest pain w/ elevated troponin    Assessment/Plan:   Date: 05/02  Day 3: Has surpassed a 2nd midnight with active treatments and services.  No significant events overnight. Neg chest pain, chest pressure/discomfort, dyspnea. Cardiac catheterization tentatively planned for today to evaluate for obstructive CAD. Continue telemetry monitoring. Dc'd hep gtt this am, cont ASA, statin, Toprol. IVFs. Check BMP in am.     05/02 Cardiac cath:    Prox RCA lesion is 40% stenosed.    2nd Mrg lesion is 99% stenosed.     2v CAD/PCI OM2  Mildly elevated LVEDP  Large PL vein     Recommendation Triple x 2w  Lpid Rx

## 2025-05-02 NOTE — PROGRESS NOTES
Cardiology Progress Note - Cherelle Dash 69 y.o. female MRN: [id number]    Unit/Bed#: S -01 Encounter: [id number]        Assessment & Plan  Chest pain with elevated troponin  Symptoms similar to what she felt when diagnosed with myocarditis  Hs Tnl: 69-->387-->768-->1949  ECG: NSR with LBBB  CRP 2.6, normal  Limited echocardiogram with ejection fraction of 50% with wall motion abnormalities in the inferior and inferolateral distribution  Started IV heparin due to concerns for ACS.  Continue aspirin, statin (new this admission), and Toprol XL  nephrology consultation for renal optimization prior to cardiac catheterization appreciated  Cardiac catheterization tentatively planned for today to evaluate for obstructive CAD  Continue telemetry monitoring  Cardiomyopathy (HCC)  Presumed nonischemic cardiomyopathy with nonischemic nuclear stress test in 10/2024.  Thought related to acute PE as well as left bundle branch block.  GDMT limited by CKD.  She is on Toprol 50 mg daily  She takes torsemide 20 mg daily, volume status is stable and creatinine actually improved with IV fluids.  Torsemide currently on hold in the setting of requiring dye load with cardiac catheterization  History of myocarditis  Diagnosed in February 2025 felt to be due to checkpoint inhibitor which was discontinued  CRP now normalized, 2.6 this admission  Finished prednisone taper per gyn-onc recommendations about 2 weeks ago  Remains on colchicine 0.6 mg daily, to complete a total of 3 months  Stage 3 chronic kidney disease (HCC)  Lab Results   Component Value Date    EGFR 31 05/02/2025    EGFR 29 05/01/2025    EGFR 28 05/01/2025    CREATININE 1.65 (H) 05/02/2025    CREATININE 1.75 (H) 05/01/2025    CREATININE 1.76 (H) 05/01/2025   Improved with IVF  Baseline 1.4-1.6  Endometrial cancer (HCC)  S/p surgical treatment and on chemotherapy since December 2024 with carboplatin, Taxol, and Pembro.    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    She has completed chemotherapy with no evidence of active disease on recent scans.    Still needs to undergo XRT.  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  Was supposed to have removed on 5/1/2025  On Eliquis 5 mg p.o. twice daily -currently held with heparin drip on board  Right leg swelling  Recent venous duplex negative for DVT  Elevated serum creatinine  Stable overnight  Chronic kidney disease-mineral and bone disorder  Lab Results   Component Value Date    EGFR 31 05/02/2025    EGFR 29 05/01/2025    EGFR 28 05/01/2025    CREATININE 1.65 (H) 05/02/2025    CREATININE 1.75 (H) 05/01/2025    CREATININE 1.76 (H) 05/01/2025   Creatinine stable overnight, follow after cardiac catheterization in a.m.    Subjective:   Patient seen and examined.  No significant events overnight.  ; pertinent negatives - chest pain, chest pressure/discomfort, dyspnea, irregular heart beat, lower extremity edema, and palpitations.    Objective:     Vitals: Blood pressure 126/77, pulse 67, temperature 97.9 °F (36.6 °C), temperature source Oral, resp. rate 17, height 5' (1.524 m), weight 103 kg (226 lb), SpO2 96%, not currently breastfeeding., Body mass index is 44.14 kg/m².,   Orthostatic Blood Pressures      Flowsheet Row Most Recent Value   Blood Pressure 126/77 filed at 05/02/2025 0721   Patient Position - Orthostatic VS Lying filed at 05/02/2025 0721              Intake/Output Summary (Last 24 hours) at 5/2/2025 0944  Last data filed at 5/2/2025 0634  Gross per 24 hour   Intake 480 ml   Output 1600 ml   Net -1120 ml       TELE: No significant arrhythmias seen.    Physical Exam:    GEN: Cherelle Dash appears well, alert and oriented x 3, pleasant and cooperative   HEENT: pupils equal, round, and reactive to light; extraocular muscles intact  NECK: supple, no carotid bruits   HEART: regular rhythm, normal S1 and S2, +  systolic murmur, no clicks, gallops or rubs   LUNGS: clear to auscultation bilaterally; no wheezes, rales, or rhonchi   ABDOMEN: normal bowel sounds, soft, no tenderness, no distention  EXTREMITIES: peripheral pulses normal; no clubbing, cyanosis, + mild edema  NEURO: no focal findings   SKIN: normal without suspicious lesions on exposed skin    Medications:      Current Facility-Administered Medications:     acetaminophen (TYLENOL) tablet 650 mg, 650 mg, Oral, Q6H PRN, ZURI Shoemaker    [START ON 5/3/2025] aspirin chewable tablet 81 mg, 81 mg, Oral, Daily, ZURI Tafoya    atorvastatin (LIPITOR) tablet 40 mg, 40 mg, Oral, Daily With Dinner, ZUIR Shoemaker, 40 mg at 05/01/25 1650    Cholecalciferol (VITAMIN D3) tablet 1,000 Units, 1,000 Units, Oral, Daily, ZURI Shoemaker, 1,000 Units at 05/02/25 0829    colchicine (COLCRYS) tablet 0.6 mg, 0.6 mg, Oral, Daily, ZURI Shoemaker, 0.6 mg at 05/02/25 0828    famotidine (PEPCID) tablet 10 mg, 10 mg, Oral, Daily, ZURI Shoemaker, 10 mg at 05/02/25 0829    heparin (porcine) 25,000 units in 0.45% NaCl 250 mL infusion (premix), 3-20 Units/kg/hr (Order-Specific), Intravenous, Titrated, ZURI Shoemaker, Last Rate: 10.9 mL/hr at 05/02/25 0843, 12.1 Units/kg/hr at 05/02/25 0843    heparin (porcine) injection 2,000 Units, 2,000 Units, Intravenous, Q6H PRN, ZURI Shoemaker    heparin (porcine) injection 4,000 Units, 4,000 Units, Intravenous, Q6H PRN, ZURI Shoemaker, 4,000 Units at 05/01/25 1742    LORazepam (ATIVAN) tablet 1 mg, 1 mg, Oral, HS PRN, ZURI Shoemaker, 1 mg at 05/01/25 2129    lurasidone (LATUDA) tablet 20 mg, 20 mg, Oral, Daily With Breakfast, ZURI Shoemaker, 20 mg at 05/02/25 0829    magnesium Oxide (MAG-OX) tablet 400 mg, 400 mg, Oral, Daily, ZURI Shoemaker, 400 mg at 05/02/25 0828    metoprolol succinate (TOPROL-XL) 24 hr tablet 50 mg, 50 mg, Oral, Q12H, ZURI Shoemaker, 50 mg at 05/02/25 0828    multivitamin-minerals (CENTRUM) tablet 1  tablet, 1 tablet, Oral, Daily, ZURI Shoemaker, 1 tablet at 05/02/25 0828    nitroglycerin (NITROSTAT) SL tablet 0.4 mg, 0.4 mg, Sublingual, Q5 Min PRN, ZURI Shoemaker    oxybutynin (DITROPAN-XL) 24 hr tablet 10 mg, 10 mg, Oral, Daily, ZURI Shoemaker, 10 mg at 05/02/25 0829    polyethylene glycol (MIRALAX) packet 17 g, 17 g, Oral, BID, ZURI Shoemaker, 17 g at 05/01/25 1718    sodium chloride 0.9 % infusion, 50 mL/hr, Intravenous, Continuous, Cierra Nick PA-C, Last Rate: 50 mL/hr at 05/02/25 0619, 50 mL/hr at 05/02/25 0619     Labs & Results:        Results from last 7 days   Lab Units 05/02/25  0617 05/01/25 0444 04/30/25 2009   WBC Thousand/uL 4.19* 4.63 7.08   HEMOGLOBIN g/dL 10.0* 9.1* 10.4*   HEMATOCRIT % 32.8* 29.6* 32.5*   PLATELETS Thousands/uL 153 161 190     Results from last 7 days   Lab Units 05/01/25 0444   TRIGLYCERIDES mg/dL 128   HDL mg/dL 44*     Results from last 7 days   Lab Units 05/02/25  0617 05/01/25 2353 05/01/25 0444 04/30/25 2009 04/28/25  0736   POTASSIUM mmol/L 3.9 4.0 3.5 3.6 3.7   CHLORIDE mmol/L 109* 106 110* 104 105   CO2 mmol/L 27 28 25 26 25   BUN mg/dL 30* 32* 33* 33* 26*   CREATININE mg/dL 1.65* 1.75* 1.76* 2.04* 1.85*   CALCIUM mg/dL 8.7 8.8 8.3* 8.9 9.2   ALK PHOS U/L  --  77  --  92 85   ALT U/L  --  23  --  25 25   AST U/L  --  22  --  19 24     Results from last 7 days   Lab Units 05/02/25  0838 05/01/25  2353 05/01/25  1646 05/01/25  0005   INR   --   --   --  1.23*   PTT seconds 83* 122* 42* 25     Results from last 7 days   Lab Units 05/01/25  2353 05/01/25  0444 04/28/25  0736   MAGNESIUM mg/dL 2.2 2.2 2.0       Echo: personally reviewed -ejection fraction 50% with hypokinesis of the basal inferior and basal to mid inferolateral walls, moderate mitral regurgitation    EKG personally reviewed by Torin Gold MD.

## 2025-05-02 NOTE — ASSESSMENT & PLAN NOTE
Lab Results   Component Value Date    EGFR 31 05/02/2025    EGFR 29 05/01/2025    EGFR 28 05/01/2025    CREATININE 1.65 (H) 05/02/2025    CREATININE 1.75 (H) 05/01/2025    CREATININE 1.76 (H) 05/01/2025   Improved with IVF  Baseline 1.4-1.6

## 2025-05-02 NOTE — ASSESSMENT & PLAN NOTE
Echo 9/2024: EF 40%.  This was during acute PE.  Before chemotherapy.  Echo 1/2025: EF 45%  Cardiac MRI 2/2025: EF 40 to 45%  Echo 3/2025: EF 50%  PTA:   Toprol 50 mg every 12 hours  Torsemide on hold

## 2025-05-02 NOTE — ASSESSMENT & PLAN NOTE
>>ASSESSMENT AND PLAN FOR CORONARY ARTERY DISEASE INVOLVING NATIVE CORONARY ARTERY OF NATIVE HEART WITH UNSTABLE ANGINA PECTORIS WRITTEN ON 5/2/2025  9:49 AM BY GUCCI LEACH MD    Symptoms similar to what she felt when diagnosed with myocarditis  Hs Tnl: 69-->387-->768-->1949  ECG: NSR with LBBB  CRP 2.6, normal  Limited echocardiogram with ejection fraction of 50% with wall motion abnormalities in the inferior and inferolateral distribution  Started IV heparin due to concerns for ACS.  Continue aspirin, statin (new this admission), and Toprol XL  nephrology consultation for renal optimization prior to cardiac catheterization appreciated  Cardiac catheterization tentatively planned for today to evaluate for obstructive CAD  Continue telemetry monitoring

## 2025-05-02 NOTE — ASSESSMENT & PLAN NOTE
>>ASSESSMENT AND PLAN FOR CORONARY ARTERY DISEASE INVOLVING NATIVE CORONARY ARTERY OF NATIVE HEART WITH UNSTABLE ANGINA PECTORIS WRITTEN ON 5/2/2025  2:29 PM BY KARLENE MCKEON MD    Starting for ACS.  Patient asymptomatic this morning.  Patient on heparin drip.  Plans for cardiac cath later today  Continue aspirin statin and beta-blocker

## 2025-05-02 NOTE — ASSESSMENT & PLAN NOTE
Symptoms similar to what she felt when diagnosed with myocarditis  Hs Tnl: 69-->387-->768-->1949  ECG: NSR with LBBB  CRP 2.6, normal  Limited echocardiogram with ejection fraction of 50% with wall motion abnormalities in the inferior and inferolateral distribution  Started IV heparin due to concerns for ACS.  Continue aspirin, statin (new this admission), and Toprol XL  nephrology consultation for renal optimization prior to cardiac catheterization appreciated  Cardiac catheterization tentatively planned for today to evaluate for obstructive CAD  Continue telemetry monitoring

## 2025-05-02 NOTE — ASSESSMENT & PLAN NOTE
Lab Results   Component Value Date    EGFR 31 05/02/2025    EGFR 29 05/01/2025    EGFR 28 05/01/2025    CREATININE 1.65 (H) 05/02/2025    CREATININE 1.75 (H) 05/01/2025    CREATININE 1.76 (H) 05/01/2025   Creatinine stable overnight, follow after cardiac catheterization in a.m.

## 2025-05-02 NOTE — DISCHARGE INSTR - AVS FIRST PAGE
1. Please see the post angioplasty discharge instructions.   No heavy lifting, greater than 10 lbs. or strenuous activity for 5 days.  Follow angioplasty discharge instructions.    2.Remove band aid tomorrow.  Shower and wash area- wrist gently with soap and water- beginning tomorrow. Rinse and pat dry.  Apply new water seal band aid.  Repeat this process for 5 days. No powders, creams lotions or antibiotic ointments  for 5 days.  No tub baths, hot tubs or swimming for 5 days.     3. Call [facility] Cardiology Office ([phone]) if you develop a fever, redness or drainage at your wrist access site.      4. No driving for 2 days    5. Triple therapy with Aspirin 81 mg daily, Plavix 75 mg daily and Eliquis 5 mg twice daily for 4 weeks, followed by Plavix 75 mg daily and Eliquis 5 mg twice daily.      6. Stent card and book.    From nephrology standpoint:    Please obtain repeat BMP (blood work) early next week prior to your appointment with nephrologist on Thursday.

## 2025-05-02 NOTE — ASSESSMENT & PLAN NOTE
Starting for ACS.  Patient asymptomatic this morning.  Patient on heparin drip.  Plans for cardiac cath later today  Continue aspirin statin and beta-blocker

## 2025-05-02 NOTE — PROGRESS NOTES
Progress Note - Hospitalist   Name: Cherelle Dash 69 y.o. female I MRN: [id number]  Unit/Bed#: S -01 I Date of Admission: 4/30/2025   Date of Service: 5/2/2025 I Hospital Day: 1    Assessment & Plan  Chest pain with elevated troponin  Starting for ACS.  Patient asymptomatic this morning.  Patient on heparin drip.  Plans for cardiac cath later today  Continue aspirin statin and beta-blocker  Cardiomyopathy (HCC)  Echo 9/2024: EF 40%.  This was during acute PE.  Before chemotherapy.  Echo 1/2025: EF 45%  Cardiac MRI 2/2025: EF 40 to 45%  Echo 3/2025: EF 50%  PTA:   Toprol 50 mg every 12 hours  Torsemide on hold  History of myocarditis  Diagnosed February 2025  Likely due to pembrolizumab which was since stopped.  Last dose was 2/14/2025.  Received total of 4 doses.  Completed prednisone taper   continue colchicine  Stage 3 chronic kidney disease (HCC)  Creatinine at her baseline  Nephrology was consulted by cardiology because of plans for cardiac cath  Patient started on IV hydration to be given IV hydration pre and post cath.  Endometrial cancer (HCC)  Follows with Dr. Page GYN onc  Received 5 cycles of carboplatin, taxol last on 3/7  Last dose of pembrolizumab 2/4/2025  Clinically and radiologically without evidence of disease recurrence  Also following with radon.  To begin radiotherapy in the next 1 to 2 weeks.  Other pulmonary embolism without acute cor pulmonale (HCC)  Diagnosed with bilateral PE in September 2024  Status post IVC filter.  Planned outpatient IVC filter removal scheduled for 5/1  Eliqis held  Right leg swelling  Right leg swelling.  Negative for DVT as outpatient.  Possibly from CHF  Ambulatory dysfunction  Recently seen by orthopedics.  Currently ambulating with walker.  Discussion of knee replacement in the coming months once radiation is completed    VTE Pharmacologic Prophylaxis: VTE Score: 7 Moderate Risk (Score 3-4) - Pharmacological DVT Prophylaxis Ordered: heparin  drip.    Mobility:   Basic Mobility Inpatient Raw Score: 23  JH-HLM Goal: 7: Walk 25 feet or more  JH-HLM Achieved: 6: Walk 10 steps or more  JH-HLM Goal achieved. Continue to encourage appropriate mobility.    Patient Centered Rounds: I performed bedside rounds with nursing staff today.   Discussions with Specialists or Other Care Team Provider: cardio    Education and Discussions with Family / Patient: Updated  () at bedside.    Current Length of Stay: 1 day(s)  Current Patient Status: Inpatient   Certification Statement: The patient will continue to require additional inpatient hospital stay due to cardiac cath  Discharge Plan: Anticipate discharge in 24-48 hrs to home.    Code Status: Level 1 - Full Code    Subjective   Pt seen and examined by me in morning.  Denied any complaints.    Objective :  Temp:  [97.6 °F (36.4 °C)-98.1 °F (36.7 °C)] 97.9 °F (36.6 °C)  HR:  [42-79] 75  BP: (102-126)/(47-80) 124/63  Resp:  [16-18] 18  SpO2:  [95 %-98 %] 97 %  O2 Device: None (Room air)    Body mass index is 44.14 kg/m².     Input and Output Summary (last 24 hours):     Intake/Output Summary (Last 24 hours) at 5/2/2025 1422  Last data filed at 5/2/2025 1311  Gross per 24 hour   Intake 480 ml   Output 1600 ml   Net -1120 ml       Physical Exam  Constitutional: Pt appears comfortable. Not in any acute distress.  Cardiovascular: Normal rate, regular rhythm, normal heart sounds.  No murmur heard.  Pulmonary/Chest: Effort normal, air entry b/l equal. No respiratory distress. Pt has no wheezes or crackles.   Abdominal: Soft. Non-distended, Non-tender. Bowel sounds are normal.   Neurological: awake, alert. Moving all extremities spontaneously.  Psychiatric: normal mood and affect.      Lines/Drains:  Lines/Drains/Airways       Active Status       Name Placement date Placement time Site Days    Port A Cath 12/03/24 Right Internal jugular 12/03/24  1209  Internal jugular  150                              Telemetry:  Telemetry Orders (From admission, onward)               24 Hour Telemetry Monitoring  Continuous x 24 Hours (Telem)        Expiring   Question:  Reason for 24 Hour Telemetry  Answer:  PCI/EP study (including pacer and ICD implementation), Cardiac surgery, MI, abnormal cardiac cath, and chest pain- rule out MI                     Telemetry Reviewed:   Indication for Continued Telemetry Use:                Lab Results: I have reviewed the following results:   Results from last 7 days   Lab Units 05/02/25  0617 05/01/25  0444 04/30/25 2009   WBC Thousand/uL 4.19*   < > 7.08   HEMOGLOBIN g/dL 10.0*   < > 10.4*   HEMATOCRIT % 32.8*   < > 32.5*   PLATELETS Thousands/uL 153   < > 190   SEGS PCT %  --   --  75   LYMPHO PCT %  --   --  15   MONO PCT %  --   --  10   EOS PCT %  --   --  0    < > = values in this interval not displayed.     Results from last 7 days   Lab Units 05/02/25  0617 05/01/25  2353   SODIUM mmol/L 143 142   POTASSIUM mmol/L 3.9 4.0   CHLORIDE mmol/L 109* 106   CO2 mmol/L 27 28   BUN mg/dL 30* 32*   CREATININE mg/dL 1.65* 1.75*   ANION GAP mmol/L 7 8   CALCIUM mg/dL 8.7 8.8   ALBUMIN g/dL  --  3.6   TOTAL BILIRUBIN mg/dL  --  0.31   ALK PHOS U/L  --  77   ALT U/L  --  23   AST U/L  --  22   GLUCOSE RANDOM mg/dL 101 112     Results from last 7 days   Lab Units 05/01/25  0005   INR  1.23*         Results from last 7 days   Lab Units 05/01/25  0005   HEMOGLOBIN A1C % 6.6*           Recent Cultures (last 7 days):               Last 24 Hours Medication List:     Current Facility-Administered Medications:     acetaminophen (TYLENOL) tablet 650 mg, Q6H PRN    [START ON 5/3/2025] apixaban (ELIQUIS) tablet 5 mg, BID    [START ON 5/3/2025] aspirin chewable tablet 81 mg, Daily    atorvastatin (LIPITOR) tablet 40 mg, Daily With Dinner    Cholecalciferol (VITAMIN D3) tablet 1,000 Units, Daily    [START ON 5/3/2025] clopidogrel (PLAVIX) tablet 75 mg, Daily    colchicine (COLCRYS) tablet 0.6 mg,  Daily    famotidine (PEPCID) tablet 10 mg, Daily    LORazepam (ATIVAN) tablet 1 mg, HS PRN    lurasidone (LATUDA) tablet 20 mg, Daily With Breakfast    magnesium Oxide (MAG-OX) tablet 400 mg, Daily    metoprolol succinate (TOPROL-XL) 24 hr tablet 50 mg, Q12H    multivitamin-minerals (CENTRUM) tablet 1 tablet, Daily    nitroglycerin (NITROSTAT) SL tablet 0.4 mg, Q5 Min PRN    oxybutynin (DITROPAN-XL) 24 hr tablet 10 mg, Daily    polyethylene glycol (MIRALAX) packet 17 g, BID    sodium chloride 0.9 % infusion, Continuous, Last Rate: 50 mL/hr (05/02/25 1244)    Administrative Statements   Today, Patient Was Seen By: Max Garcia MD      **Please Note: This note may have been constructed using a voice recognition system.**

## 2025-05-02 NOTE — ASSESSMENT & PLAN NOTE
History of DVT/PE in September 2024  S/P IVC filter.  Was supposed to have removed on 5/1/2025  On Eliquis 5 mg p.o. twice daily -currently held with heparin drip on board

## 2025-05-02 NOTE — ASSESSMENT & PLAN NOTE
Diagnosed in February 2025 felt to be due to checkpoint inhibitor which was discontinued  CRP now normalized, 2.6 this admission  Finished prednisone taper per gyn-onc recommendations about 2 weeks ago  Remains on colchicine 0.6 mg daily, to complete a total of 3 months

## 2025-05-03 ENCOUNTER — NURSE TRIAGE (OUTPATIENT)
Dept: OTHER | Facility: OTHER | Age: 70
End: 2025-05-03

## 2025-05-03 VITALS
WEIGHT: 223.55 LBS | HEART RATE: 73 BPM | RESPIRATION RATE: 18 BRPM | BODY MASS INDEX: 43.89 KG/M2 | DIASTOLIC BLOOD PRESSURE: 66 MMHG | TEMPERATURE: 98.2 F | HEIGHT: 60 IN | SYSTOLIC BLOOD PRESSURE: 112 MMHG | OXYGEN SATURATION: 95 %

## 2025-05-03 LAB
ANION GAP SERPL CALCULATED.3IONS-SCNC: 7 MMOL/L (ref 4–13)
BUN SERPL-MCNC: 30 MG/DL (ref 5–25)
CALCIUM SERPL-MCNC: 8.7 MG/DL (ref 8.4–10.2)
CHLORIDE SERPL-SCNC: 109 MMOL/L (ref 96–108)
CO2 SERPL-SCNC: 27 MMOL/L (ref 21–32)
CREAT SERPL-MCNC: 1.84 MG/DL (ref 0.6–1.3)
ERYTHROCYTE [DISTWIDTH] IN BLOOD BY AUTOMATED COUNT: 13.4 % (ref 11.6–15.1)
GFR SERPL CREATININE-BSD FRML MDRD: 27 ML/MIN/1.73SQ M
GLUCOSE SERPL-MCNC: 103 MG/DL (ref 65–140)
HCT VFR BLD AUTO: 31.3 % (ref 34.8–46.1)
HGB BLD-MCNC: 9.7 G/DL (ref 11.5–15.4)
MCH RBC QN AUTO: 31.4 PG (ref 26.8–34.3)
MCHC RBC AUTO-ENTMCNC: 31 G/DL (ref 31.4–37.4)
MCV RBC AUTO: 101 FL (ref 82–98)
PLATELET # BLD AUTO: 156 THOUSANDS/UL (ref 149–390)
PMV BLD AUTO: 12 FL (ref 8.9–12.7)
POTASSIUM SERPL-SCNC: 3.9 MMOL/L (ref 3.5–5.3)
RBC # BLD AUTO: 3.09 MILLION/UL (ref 3.81–5.12)
SODIUM SERPL-SCNC: 143 MMOL/L (ref 135–147)
WBC # BLD AUTO: 4.11 THOUSAND/UL (ref 4.31–10.16)

## 2025-05-03 PROCEDURE — 80048 BASIC METABOLIC PNL TOTAL CA: CPT

## 2025-05-03 PROCEDURE — 99232 SBSQ HOSP IP/OBS MODERATE 35: CPT | Performed by: INTERNAL MEDICINE

## 2025-05-03 PROCEDURE — 85027 COMPLETE CBC AUTOMATED: CPT

## 2025-05-03 PROCEDURE — 99233 SBSQ HOSP IP/OBS HIGH 50: CPT | Performed by: INTERNAL MEDICINE

## 2025-05-03 PROCEDURE — 99239 HOSP IP/OBS DSCHRG MGMT >30: CPT | Performed by: INTERNAL MEDICINE

## 2025-05-03 RX ORDER — ATORVASTATIN CALCIUM 40 MG/1
40 TABLET, FILM COATED ORAL
Qty: 30 TABLET | Refills: 2 | Status: SHIPPED | OUTPATIENT
Start: 2025-05-03 | End: 2025-05-15 | Stop reason: SDUPTHER

## 2025-05-03 RX ORDER — CLOPIDOGREL BISULFATE 75 MG/1
75 TABLET ORAL DAILY
Qty: 30 TABLET | Refills: 2 | Status: SHIPPED | OUTPATIENT
Start: 2025-05-04 | End: 2025-05-15 | Stop reason: SDUPTHER

## 2025-05-03 RX ORDER — ASPIRIN 81 MG/1
81 TABLET, CHEWABLE ORAL DAILY
Qty: 28 TABLET | Refills: 0 | Status: SHIPPED | OUTPATIENT
Start: 2025-05-04 | End: 2025-06-01

## 2025-05-03 RX ADMIN — METOPROLOL SUCCINATE 50 MG: 50 TABLET, EXTENDED RELEASE ORAL at 08:25

## 2025-05-03 RX ADMIN — CLOPIDOGREL 75 MG: 75 TABLET ORAL at 08:29

## 2025-05-03 RX ADMIN — MULTIPLE VITAMINS W/ MINERALS TAB 1 TABLET: TAB ORAL at 08:24

## 2025-05-03 RX ADMIN — OXYBUTYNIN CHLORIDE 10 MG: 5 TABLET, EXTENDED RELEASE ORAL at 08:29

## 2025-05-03 RX ADMIN — COLCHICINE 0.6 MG: 0.6 TABLET ORAL at 08:24

## 2025-05-03 RX ADMIN — ASPIRIN 81 MG: 81 TABLET, CHEWABLE ORAL at 08:29

## 2025-05-03 RX ADMIN — FAMOTIDINE 10 MG: 20 TABLET, FILM COATED ORAL at 08:28

## 2025-05-03 RX ADMIN — LURASIDONE HYDROCHLORIDE 20 MG: 20 TABLET, FILM COATED ORAL at 08:29

## 2025-05-03 RX ADMIN — Medication 400 MG: at 08:25

## 2025-05-03 RX ADMIN — Medication 1000 UNITS: at 08:24

## 2025-05-03 RX ADMIN — POLYETHYLENE GLYCOL 3350 17 G: 17 POWDER, FOR SOLUTION ORAL at 08:24

## 2025-05-03 NOTE — ASSESSMENT & PLAN NOTE
Hx DVT/PE Sept '24  S/p IVC filter.  Was scheduled for elective removal today as outpatient  Anticoagulation per primary team.  As per cardiology note okay to stop IV heparin.  Plan to resume Eliquis at discharge

## 2025-05-03 NOTE — PROGRESS NOTES
Cardiology Progress Note - Cherelle Dash 69 y.o. female MRN: [id number]    Unit/Bed#: S -01 Encounter: [id number]        Assessment & Plan  Cardiomyopathy (HCC)  Presumed nonischemic cardiomyopathy with nonischemic nuclear stress test in 10/2024.  Thought related to acute PE as well as left bundle branch block.  GDMT limited by CKD.  She is on Toprol 50 mg daily  She takes torsemide 20 mg daily, volume status is stable and creatinine actually improved with IV fluids.  Torsemide currently on hold in the setting of requiring dye load with cardiac catheterization  Resume torsemide in the outpatient setting in a few days  History of myocarditis  Diagnosed in February 2025 felt to be due to checkpoint inhibitor which was discontinued  CRP now normalized, 2.6 this admission  Finished prednisone taper per gyn-onc recommendations about 2 weeks ago  Remains on colchicine 0.6 mg daily, to complete a total of 3 months  Stage 3 chronic kidney disease (HCC)  Lab Results   Component Value Date    EGFR 27 05/03/2025    EGFR 31 05/02/2025    EGFR 29 05/01/2025    CREATININE 1.84 (H) 05/03/2025    CREATININE 1.65 (H) 05/02/2025    CREATININE 1.75 (H) 05/01/2025   Improved initially with IVF  Baseline 1.4-1.6  Elevated creatinine overnight after dye load with cardiac catheterization  Reviewed nephrology recommendations  Endometrial cancer (HCC)  S/p surgical treatment and on chemotherapy since December 2024 with carboplatin, Taxol, and Pembro.    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    She has completed chemotherapy with no evidence of active disease on recent scans.    Still needs to undergo XRT.  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  Was supposed to have removed on 5/1/2025  On Eliquis 5 mg p.o. twice daily -was held with heparin drip on board, can now resume  Right leg swelling  Recent venous duplex  negative for DVT  Elevated serum creatinine  Stable overnight  Chronic kidney disease-mineral and bone disorder  Lab Results   Component Value Date    EGFR 27 05/03/2025    EGFR 31 05/02/2025    EGFR 29 05/01/2025    CREATININE 1.84 (H) 05/03/2025    CREATININE 1.65 (H) 05/02/2025    CREATININE 1.75 (H) 05/01/2025   Creatinine increased overnight after dye load.  Await nephrology recommendations for further management  Coronary artery disease involving native coronary artery of native heart with unstable angina pectoris (HCC)  Symptoms similar to what she felt when diagnosed with myocarditis  Hs Tnl: 69-->387-->768-->1949  ECG: NSR with LBBB  CRP 2.6, normal  Limited echocardiogram with ejection fraction of 50% with wall motion abnormalities in the inferior and inferolateral distribution  Started IV heparin due to concerns for ACS, now discontinued.   nephrology consultation for renal optimization prior to cardiac catheterization appreciated  Cardiac catheterization revealed significant OM lesion, status post drug-eluting stent  Continue DAPT (triple therapy for 4 weeks, then Plavix with Eliquis), statin, beta-blocker    Subjective:   Patient seen and examined.  No significant events overnight.  ; pertinent negatives - chest pain, chest pressure/discomfort, dyspnea, irregular heart beat, lower extremity edema, and palpitations.    Objective:     Vitals: Blood pressure 112/66, pulse 73, temperature 98.2 °F (36.8 °C), resp. rate 18, height 5' (1.524 m), weight 101 kg (223 lb 8.7 oz), SpO2 95%, not currently breastfeeding., Body mass index is 43.66 kg/m².,   Orthostatic Blood Pressures      Flowsheet Row Most Recent Value   Blood Pressure 112/66 filed at 05/03/2025 0742   Patient Position - Orthostatic VS Lying filed at 05/02/2025 1615              Intake/Output Summary (Last 24 hours) at 5/3/2025 0924  Last data filed at 5/3/2025 0841  Gross per 24 hour   Intake 660 ml   Output 3165 ml   Net -2505 ml       TELE: No  significant arrhythmias seen.    Physical Exam:    GEN: Cherelle Dash appears well, alert and oriented x 3, pleasant and cooperative   HEENT: pupils equal, round, and reactive to light; extraocular muscles intact  NECK: supple, no carotid bruits   HEART: regular rhythm, normal S1 and S2, no murmurs, clicks, gallops or rubs   LUNGS: clear to auscultation bilaterally; no wheezes, rales, or rhonchi   ABDOMEN: normal bowel sounds, soft, no tenderness, no distention  EXTREMITIES: peripheral pulses normal; no clubbing, cyanosis, or edema  NEURO: no focal findings   SKIN: normal without suspicious lesions on exposed skin    Medications:      Current Facility-Administered Medications:     acetaminophen (TYLENOL) tablet 650 mg, 650 mg, Oral, Q6H PRN, Rosalee Grecsek, CRNP    apixaban (ELIQUIS) tablet 5 mg, 5 mg, Oral, BID, Rosalee Grecsek, CRNP    aspirin chewable tablet 81 mg, 81 mg, Oral, Daily, Rosalee Grecsek, CRNP, 81 mg at 05/03/25 0829    atorvastatin (LIPITOR) tablet 40 mg, 40 mg, Oral, Daily With Dinner, Rosalee Grecsek, CRNP, 40 mg at 05/02/25 1702    Cholecalciferol (VITAMIN D3) tablet 1,000 Units, 1,000 Units, Oral, Daily, Rosalee Grecsek, CRNP, 1,000 Units at 05/03/25 0824    clopidogrel (PLAVIX) tablet 75 mg, 75 mg, Oral, Daily, Rosalee Grecsek, CRNP, 75 mg at 05/03/25 0829    colchicine (COLCRYS) tablet 0.6 mg, 0.6 mg, Oral, Daily, Rosalee Grecsek, CRNP, 0.6 mg at 05/03/25 0824    famotidine (PEPCID) tablet 10 mg, 10 mg, Oral, Daily, Rosalee Grecsek, CRNP, 10 mg at 05/03/25 0828    LORazepam (ATIVAN) tablet 1 mg, 1 mg, Oral, HS PRN, Rosalee Grecsek, CRNP, 1 mg at 05/02/25 2131    lurasidone (LATUDA) tablet 20 mg, 20 mg, Oral, Daily With Breakfast, Rosalee Grecsek, CRNP, 20 mg at 05/03/25 0829    magnesium Oxide (MAG-OX) tablet 400 mg, 400 mg, Oral, Daily, ZURI Hernández, 400 mg at 05/03/25 0825    metoprolol succinate (TOPROL-XL) 24 hr tablet 50 mg, 50 mg, Oral, Q12H, ZURI Hernández, 50 mg at  05/03/25 0825    multivitamin-minerals (CENTRUM) tablet 1 tablet, 1 tablet, Oral, Daily, Rosalee Grecsek, CRNP, 1 tablet at 05/03/25 0824    nitroglycerin (NITROSTAT) SL tablet 0.4 mg, 0.4 mg, Sublingual, Q5 Min PRN, Rosalee Grecsek, CRNP    oxybutynin (DITROPAN-XL) 24 hr tablet 10 mg, 10 mg, Oral, Daily, Rosalee Grecsek, CRNP, 10 mg at 05/03/25 0829    polyethylene glycol (MIRALAX) packet 17 g, 17 g, Oral, BID, Rosalee Grecsek, CRNP, 17 g at 05/03/25 0824     Labs & Results:        Results from last 7 days   Lab Units 05/03/25  0509 05/02/25  0617 05/01/25  0444   WBC Thousand/uL 4.11* 4.19* 4.63   HEMOGLOBIN g/dL 9.7* 10.0* 9.1*   HEMATOCRIT % 31.3* 32.8* 29.6*   PLATELETS Thousands/uL 156 153 161     Results from last 7 days   Lab Units 05/01/25  0444   TRIGLYCERIDES mg/dL 128   HDL mg/dL 44*     Results from last 7 days   Lab Units 05/03/25  0509 05/02/25  0617 05/01/25  2353 05/01/25  0444 04/30/25 2009 04/28/25  0736   POTASSIUM mmol/L 3.9 3.9 4.0   < > 3.6 3.7   CHLORIDE mmol/L 109* 109* 106   < > 104 105   CO2 mmol/L 27 27 28   < > 26 25   BUN mg/dL 30* 30* 32*   < > 33* 26*   CREATININE mg/dL 1.84* 1.65* 1.75*   < > 2.04* 1.85*   CALCIUM mg/dL 8.7 8.7 8.8   < > 8.9 9.2   ALK PHOS U/L  --   --  77  --  92 85   ALT U/L  --   --  23  --  25 25   AST U/L  --   --  22  --  19 24    < > = values in this interval not displayed.     Results from last 7 days   Lab Units 05/02/25  0838 05/01/25  2353 05/01/25  1646 05/01/25  0005   INR   --   --   --  1.23*   PTT seconds 83* 122* 42* 25     Results from last 7 days   Lab Units 05/01/25  2353 05/01/25  0444 04/28/25  0736   MAGNESIUM mg/dL 2.2 2.2 2.0       Echo: personally reviewed - ejection fraction 50% with hypokinesis of the basal inferior and basal to mid inferolateral walls, moderate mitral regurgitation     EKG personally reviewed by Torin Gold MD.

## 2025-05-03 NOTE — TELEPHONE ENCOUNTER
"Regarding: Medication question  ----- Message from Shruthi MROALES sent at 5/3/2025  4:04 PM EDT -----  Pt's  stated, \" I called over an hour ago and I am still waiting for a call back.\"    Pt's  made aware that the previous call is in line to be triaged by one of our nurses and they will give him a call back.    ----- Message from Shruthi MORALES sent at 5/3/2025  2:44 PM EDT -----  Pt's  stated, \" My wife was prescribed PLAVIX and Lipitor. The directions say that she should not take her colchicine (COLCRYS) tablet 0.6 mg that she is currently on.\"    "

## 2025-05-03 NOTE — ASSESSMENT & PLAN NOTE
Creatinine 2.04 on admission, slightly above baseline but not true OWEN  Creatinine back to baseline with holding diuretics.  Currently no evidence of contrast nephropathy.  Plan to restart back on torsemide on discharge and will monitor  Continue to avoid nephrotoxins, NSAIDs, hypotension and limit IV contrast if possible  Continue to trend BMP

## 2025-05-03 NOTE — DISCHARGE SUMMARY
Discharge Summary - Hospitalist   Name: Cherelle Dash 69 y.o. female I MRN: [id number]  Unit/Bed#: S -01 I Date of Admission: 4/30/2025   Date of Service: 5/5/2025 I Hospital Day: 2     Assessment & Plan  Coronary artery disease involving native coronary artery of native heart with unstable angina pectoris  Status post left heart cath showing significant OM disease and status post drug-eluting stent.  Now on aspirin, Plavix and Eliquis.  Continue triple therapy for 4 weeks as per question with cardiology prior to discharge followed by Plavix and Eliquis thereafter.  Continue statin  Outpatient cardiology follow-up.  Cardiomyopathy (HCC)  Echo 9/2024: EF 40%.  This was during acute PE.  Before chemotherapy.  Echo 1/2025: EF 45%  Cardiac MRI 2/2025: EF 40 to 45%  Echo 3/2025: EF 50%  PTA:   Toprol 50 mg every 12 hours  Torsemide on hold, resume on discharge per nephrology.  History of myocarditis  Diagnosed February 2025  Likely due to pembrolizumab which was since stopped.  Last dose was 2/14/2025.  Received total of 4 doses.  Completed prednisone taper   continue colchicine  Stage 3 chronic kidney disease (HCC)  He is lying creatinine 1.4-1.8 per nephrology, currently 1.8 post cath.  Nephrology was consulted by cardiology because of plans for cardiac cath  Patient received IV hydration perioperatively  Cussed with nephrology, okay to resume home torsemide on discharge and repeat BMP on Tuesday with outpatient follow-up with nephrology already scheduled on Thursday next week.  Endometrial cancer (HCC)  Follows with Dr. Page GYN onc  Received 5 cycles of carboplatin, taxol last on 3/7  Last dose of pembrolizumab 2/4/2025  Clinically and radiologically without evidence of disease recurrence  Also following with radonc.  To begin radiotherapy in the next 1 to 2 weeks.  Other pulmonary embolism without acute cor pulmonale (HCC)  Diagnosed with bilateral PE in September 2024  Status post IVC filter.  Planned  outpatient IVC filter removal scheduled for 5/1  Eliquis t restarted.  Right leg swelling  Right leg swelling.  Negative for DVT as outpatient.  Possibly from CHF  Ambulatory dysfunction  Recently seen by orthopedics.  Currently ambulating with walker.  Discussion of knee replacement in the coming months once radiation is completed  Coronary artery disease involving native coronary artery of native heart with unstable angina pectoris (HCC)       Discharge Summary - [facility] Internal Medicine    Patient Information: Cherelle Dash 69 y.o. female MRN: [id number]  Unit/Bed#: S -01 Encounter: [id number]    Discharging Physician / Practitioner: June Hendrix MD  PCP: Gretchen Mayorga DO  Admission Date: 4/30/2025  Discharge Date: 05/05/25    Reason for Admission: Chest pain    Discharge Diagnoses:     Principal Problem:    Coronary artery disease involving native coronary artery of native heart with unstable angina pectoris  Active Problems:    Stage 3 chronic kidney disease (HCC)    Endometrial cancer (HCC)    Other pulmonary embolism without acute cor pulmonale (HCC)    Cardiomyopathy (HCC)    History of myocarditis    Right leg swelling    Ambulatory dysfunction    Elevated serum creatinine    Chronic kidney disease-mineral and bone disorder    Coronary artery disease involving native coronary artery of native heart with unstable angina pectoris (HCC)  Resolved Problems:    * No resolved hospital problems. *      Consultations During Hospital Stay:  Cardiology  Nephrology    Procedures Performed:     [facility]    Significant Findings / Test Results:     XR chest 1 view portable   ED Interpretation by Pamella Paulino PA-C (04/30 2012)   Image was independently interpreted by myself and showed no acute concerns of cardiopulmonary disease at this time.       Final Result by Vinod Garcia MD (05/01 0623)      Mild right basilar atelectasis and/or airspace disease. The remaining lung fields appear well aerated  and clear.            Workstation performed: [id number]                Outpatient Tests Requested:  BMP on Tuesday prior to nephrology appointment on Thursday.    Hospital Course:     Cherelle Dash is a 69 y.o. female patient who originally presented to the hospital on 4/30/2025 due to pain and elevated troponin.  Underwent left heart catheterization with drug-eluting stent in .  Now on aspirin, Plavix and Eliquis.  Cardiology recommends 4-week of triple therapy followed by Plavix and Eliquis.  Evaluated by nephrology and given underlying CKD.  Torsemide resumed and outpatient repeat BMP and nephrology follow-up within next week.  Outpatient cardiology follow-up.  Please refer to detailed assessment and plan above for further details.    Condition at Discharge: stable     Discharge Day Visit / Exam:     Subjective: No chest pain or discomfort.  Tolerating ambulation around the room.  No lightheadedness or dizziness.  Wants to go home today.    Vitals: Blood Pressure: 112/66 (05/03/25 0742)  Pulse: 73 (05/03/25 0742)  Temperature: 98.2 °F (36.8 °C) (05/03/25 0742)  Temp Source: Oral (05/02/25 1517)  Respirations: 18 (05/02/25 1615)  Height: 5' (152.4 cm) (05/01/25 0916)  Weight - Scale: 101 kg (223 lb 8.7 oz) (05/03/25 0546)  SpO2: 95 % (05/03/25 0742)  Exam:   Physical Exam  Constitutional: Pt appears comfortable. Not in any acute distress.  Cardiovascular: Normal rate, regular rhythm, normal heart sounds.  No murmur heard.  Pulmonary/Chest: Effort normal, air entry b/l equal. Pt has no wheezes or crackles.   Abdominal: Soft. Non-distended, Non-tender. Bowel sounds are normal.   Neurological: awake, alert. Moving all extremities spontaneously.    Discussion with Family: Discussed plan with  at bedside    Discharge instructions/Information to patient and family:   See after visit summary for information provided to patient and family.      Provisions for Follow-Up Care:  See after visit summary for  information related to follow-up care and any pertinent home health orders.      Disposition:     Home       Discharge Statement:  I spent 40 minutes discharging the patient. This time was spent on the day of discharge. I had direct contact with the patient on the day of discharge. Greater than 50% of the total time was spent examining patient, answering all patient questions, arranging and discussing plan of care with patient as well as directly providing post-discharge instructions.  Additional time then spent on discharge activities.    Discharge Medications:  See after visit summary for reconciled discharge medications provided to patient and family.      ** Please Note: This note has been constructed using a voice recognition system **

## 2025-05-03 NOTE — TELEPHONE ENCOUNTER
"FOLLOW UP: No follow up needed at this time.     REASON FOR CONVERSATION: Medication Problem    SYMPTOMS: No symptoms.     OTHER: Pt was prescribed atorvastatin today. Was told of a medication interaction between her colchicine she takes daily and the atorvastatin. Pt wants to make sure it is still okay to take?    DISPOSITION: Call PCP Now    Per Dr. Goodson- can take both medications simultaneously. Should eat with her colchicine.     Pt made aware and verbalized understanding. Pt takes colchicine in the morning and atorvastatin is supposed to be taken in the evening with dinner.       Reason for Disposition   [1] Caller has URGENT medicine question about med that PCP or specialist prescribed AND [2] triager unable to answer question    Answer Assessment - Initial Assessment Questions  1. NAME of MEDICINE: \"What medicine(s) are you calling about?\"      Plavix      Lipitor      Colchicine    2. QUESTION: \"What is your question?\" (e.g., double dose of medicine, side effect)      Was told about medication interaction between the lipitor and colchicine.     3. PRESCRIBER: \"Who prescribed the medicine?\" Reason: if prescribed by specialist, call should be referred to that group.      Plavix and Lipitor prescribed while in pt. Pt has been on colchicine prescribed by her Cardiologist.    Protocols used: Medication Question Call-Adult-    "

## 2025-05-03 NOTE — ASSESSMENT & PLAN NOTE
Presumed nonischemic cardiomyopathy previously but now found to have CAD and is status post stent  echo 5/1/2025:  EF EF 50%, moderate MR, mild TR, no effusion  home diuretics: Torsemide 20 mg daily.  Currently on hold due to cardiac cath  on beta blockers  Volume status acceptable continue current treatment okay to resume torsemide on discharge

## 2025-05-03 NOTE — ASSESSMENT & PLAN NOTE
Diagnosed with bilateral PE in September 2024  Status post IVC filter.  Planned outpatient IVC filter removal scheduled for 5/1  Eliquis t restarted.

## 2025-05-03 NOTE — ASSESSMENT & PLAN NOTE
He is lying creatinine 1.4-1.8 per nephrology, currently 1.8 post cath.  Nephrology was consulted by cardiology because of plans for cardiac cath  Patient received IV hydration perioperatively  Cussed with nephrology, okay to resume home torsemide on discharge and repeat BMP on Tuesday with outpatient follow-up with nephrology already scheduled on Thursday next week.

## 2025-05-03 NOTE — ASSESSMENT & PLAN NOTE
TN 1949  Status post cardiac catheterization on 5/2.  Was found to have significant OM lesion status post drug-eluting stent.  Continue management per cardiology.  Currently patient is asymptomatic

## 2025-05-03 NOTE — ASSESSMENT & PLAN NOTE
Symptoms similar to what she felt when diagnosed with myocarditis  Hs Tnl: 69-->387-->768-->1949  ECG: NSR with LBBB  CRP 2.6, normal  Limited echocardiogram with ejection fraction of 50% with wall motion abnormalities in the inferior and inferolateral distribution  Started IV heparin due to concerns for ACS, now discontinued.   nephrology consultation for renal optimization prior to cardiac catheterization appreciated  Cardiac catheterization revealed significant OM lesion, status post drug-eluting stent  Continue DAPT (triple therapy for 4 weeks, then Plavix with Eliquis), statin, beta-blocker

## 2025-05-03 NOTE — ASSESSMENT & PLAN NOTE
>>ASSESSMENT AND PLAN FOR CORONARY ARTERY DISEASE INVOLVING NATIVE CORONARY ARTERY OF NATIVE HEART WITH UNSTABLE ANGINA PECTORIS WRITTEN ON 5/3/2025 11:31 AM BY SREEKANTH POLLARD MD    TN 1949  Status post cardiac catheterization on 5/2.  Was found to have significant OM lesion status post drug-eluting stent.  Continue management per cardiology.  Currently patient is asymptomatic

## 2025-05-03 NOTE — ASSESSMENT & PLAN NOTE
Status post left heart cath showing significant OM disease and status post drug-eluting stent.  Now on aspirin, Plavix and Eliquis.  Continue triple therapy for 4 weeks as per question with cardiology prior to discharge followed by Plavix and Eliquis thereafter.  Continue statin  Outpatient cardiology follow-up.

## 2025-05-03 NOTE — ASSESSMENT & PLAN NOTE
Presumed nonischemic cardiomyopathy with nonischemic nuclear stress test in 10/2024.  Thought related to acute PE as well as left bundle branch block.  GDMT limited by CKD.  She is on Toprol 50 mg daily  She takes torsemide 20 mg daily, volume status is stable and creatinine actually improved with IV fluids.  Torsemide currently on hold in the setting of requiring dye load with cardiac catheterization  Resume torsemide in the outpatient setting in a few days

## 2025-05-03 NOTE — ASSESSMENT & PLAN NOTE
>>ASSESSMENT AND PLAN FOR CORONARY ARTERY DISEASE INVOLVING NATIVE CORONARY ARTERY OF NATIVE HEART WITH UNSTABLE ANGINA PECTORIS WRITTEN ON 5/5/2025  5:48 PM BY MT SHIRLEY MD    Status post left heart cath showing significant OM disease and status post drug-eluting stent.  Now on aspirin, Plavix and Eliquis.  Continue triple therapy for 4 weeks as per question with cardiology prior to discharge followed by Plavix and Eliquis thereafter.  Continue statin  Outpatient cardiology follow-up.

## 2025-05-03 NOTE — ASSESSMENT & PLAN NOTE
- Baseline Creatinine 1.4-1.8 milligrams per deciliter.  Follows with Dr. Macias and has appointment on 5/8.    -Chronic kidney disease likely due to age-related nephron loss, prior OWEN, chronic lithium use, prior NSAID use and obesity related hyperfiltration  - Admission creatinine was slightly on the higher side at 2.04 on 4/30  - Renal function improved and patient underwent cardiac catheterization on 5/2.  Received pre and postcontrast IV fluid.  She was made aware about the risk of contrast nephropathy  -Renal function slightly worsened to creatinine 1.84 mg/dL today but this is still within the baseline range.  Okay to restart torsemide at home dose from tomorrow.  Discussed with patient about risk of contrast nephropathy for initial 3 days and she verbalized understanding.  Will order for repeat BMP next week on Tuesday and she has follow-up with Dr. Macias in the office on Thursday.  Stressed on keeping up schedule appointment.  - Dose of medication per eGFR   -currently on colchicine 0.6 mg daily through 5/20 related to history of myocarditis.

## 2025-05-03 NOTE — ASSESSMENT & PLAN NOTE
Echo 9/2024: EF 40%.  This was during acute PE.  Before chemotherapy.  Echo 1/2025: EF 45%  Cardiac MRI 2/2025: EF 40 to 45%  Echo 3/2025: EF 50%  PTA:   Toprol 50 mg every 12 hours  Torsemide on hold, resume on discharge per nephrology.

## 2025-05-03 NOTE — ASSESSMENT & PLAN NOTE
Lab Results   Component Value Date    EGFR 27 05/03/2025    EGFR 31 05/02/2025    EGFR 29 05/01/2025    CREATININE 1.84 (H) 05/03/2025    CREATININE 1.65 (H) 05/02/2025    CREATININE 1.75 (H) 05/01/2025   Improved initially with IVF  Baseline 1.4-1.6  Elevated creatinine overnight after dye load with cardiac catheterization  Reviewed nephrology recommendations

## 2025-05-03 NOTE — ASSESSMENT & PLAN NOTE
Lab Results   Component Value Date    EGFR 27 05/03/2025    EGFR 31 05/02/2025    EGFR 29 05/01/2025    CREATININE 1.84 (H) 05/03/2025    CREATININE 1.65 (H) 05/02/2025    CREATININE 1.75 (H) 05/01/2025   Creatinine increased overnight after dye load.  Await nephrology recommendations for further management

## 2025-05-03 NOTE — PROGRESS NOTES
Progress Note - Nephrology   Name: Cherelle Dash 69 y.o. female I MRN: [id number]  Unit/Bed#: S -01 I Date of Admission: 4/30/2025   Date of Service: 5/3/2025 I Hospital Day: 2    Assessment & Plan  Stage 3 chronic kidney disease (HCC)  - Baseline Creatinine 1.4-1.8 milligrams per deciliter.  Follows with Dr. Macias and has appointment on 5/8.    -Chronic kidney disease likely due to age-related nephron loss, prior OWEN, chronic lithium use, prior NSAID use and obesity related hyperfiltration  - Admission creatinine was slightly on the higher side at 2.04 on 4/30  - Renal function improved and patient underwent cardiac catheterization on 5/2.  Received pre and postcontrast IV fluid.  She was made aware about the risk of contrast nephropathy  -Renal function slightly worsened to creatinine 1.84 mg/dL today but this is still within the baseline range.  Okay to restart torsemide at home dose from tomorrow.  Discussed with patient about risk of contrast nephropathy for initial 3 days and she verbalized understanding.  Will order for repeat BMP next week on Tuesday and she has follow-up with Dr. Macias in the office on Thursday.  Stressed on keeping up schedule appointment.  - Dose of medication per eGFR   -currently on colchicine 0.6 mg daily through 5/20 related to history of myocarditis.    Elevated serum creatinine  Creatinine 2.04 on admission, slightly above baseline but not true OWEN  Creatinine back to baseline with holding diuretics.  Currently no evidence of contrast nephropathy.  Plan to restart back on torsemide on discharge and will monitor  Continue to avoid nephrotoxins, NSAIDs, hypotension and limit IV contrast if possible  Continue to trend BMP  Chest pain with elevated troponin  TN 1949  Status post cardiac catheterization on 5/2.  Was found to have significant OM lesion status post drug-eluting stent.  Continue management per cardiology.  Currently patient is asymptomatic  Cardiomyopathy  (HCC)  Presumed nonischemic cardiomyopathy previously but now found to have CAD and is status post stent  echo 5/1/2025:  EF EF 50%, moderate MR, mild TR, no effusion  home diuretics: Torsemide 20 mg daily.  Currently on hold due to cardiac cath  on beta blockers  Volume status acceptable continue current treatment okay to resume torsemide on discharge    History of myocarditis  Diagnosed in February 2025  completed prednisone taper 2 weeks ago  Remains on colchicine 0.6 mg daily until 5/30/2025 to complete a total of 3-month course  CRP currently normalized to 2.6  Management per cardiology  Endometrial cancer (HCC)  S/p WALLACE-BSO 10/11/2024  S/p adjuvant chemotherapy with carboplatin, Taxol and Pembro completed 3/7/2025  Pembro stopped in February due to induced myocarditis  Undergoing planning for XRT  Management outpatient per oncology  Other pulmonary embolism without acute cor pulmonale (HCC)  Hx DVT/PE Sept '24  S/p IVC filter.  Was scheduled for elective removal today as outpatient  Anticoagulation per primary team.  As per cardiology note okay to stop IV heparin.  Plan to resume Eliquis at discharge    I have reviewed the nephrology recommendations including renal function stable and okay to resume torsemide on discharge and okay for discharge from nephrology side, with primary team attending, and we are in agreement with renal plan including the information outlined above.  Will inform outpatient nephrologist and ordered for BMP to be done on Tuesday     Subjective   No new complaints, status post cardiac cath with stent placement on 5/2.  No chest pain or shortness of breath    Objective :  Temp:  [97.8 °F (36.6 °C)-98.2 °F (36.8 °C)] 98.2 °F (36.8 °C)  HR:  [42-81] 73  BP: ()/(47-80) 112/66  Resp:  [18] 18  SpO2:  [95 %-98 %] 95 %  O2 Device: None (Room air)    Current Weight: Weight - Scale: 101 kg (223 lb 8.7 oz)  First Weight: Weight - Scale: 103 kg (226 lb)  I/O         05/01 0701 05/02 0700  05/02 0701  05/03 0700 05/03 0701  05/04 0700    P.O. 480 660     Total Intake(mL/kg) 480 (4.7) 660 (6.5)     Urine (mL/kg/hr) 1600 (0.6) 2765 (1.1) 400 (0.9)    Total Output 1600 2765 400    Net -1120 -2105 -400           Unmeasured Urine Occurrence  1 x           Physical Exam   General:  Ill looking, awake.  Eyes: Conjunctivae pink,  Sclera anicteric  ENT: lips and mucous membranes moist  Neck: supple   Chest: Clear to Auscultation both lungs,  no crackles, ronchus or wheezing.  CVS: S1 & S2 present, normal rate, regular rhythm, no murmur.  Abdomen: soft, non-tender, non-distended, Bowel sounds normoactive  Extremities: Trace edema both legs  Skin: no rash  Neuro: awake, alert, oriented x 3   Psych: Mood and affect appropriate    Medications:    Current Facility-Administered Medications:     acetaminophen (TYLENOL) tablet 650 mg, 650 mg, Oral, Q6H PRN, Rosalee Grecsek, CRNP    apixaban (ELIQUIS) tablet 5 mg, 5 mg, Oral, BID, Rosalee Grecsek, CRNP    aspirin chewable tablet 81 mg, 81 mg, Oral, Daily, Rosalee Grecsek, CRNP, 81 mg at 05/03/25 0829    atorvastatin (LIPITOR) tablet 40 mg, 40 mg, Oral, Daily With Dinner, Rosalee Grecsek, CRNP, 40 mg at 05/02/25 1702    Cholecalciferol (VITAMIN D3) tablet 1,000 Units, 1,000 Units, Oral, Daily, Rosalee Grecsek, CRNP, 1,000 Units at 05/03/25 0824    clopidogrel (PLAVIX) tablet 75 mg, 75 mg, Oral, Daily, Rosalee Grecsek, CRNP, 75 mg at 05/03/25 0829    colchicine (COLCRYS) tablet 0.6 mg, 0.6 mg, Oral, Daily, Rosalee Grecsek, CRNP, 0.6 mg at 05/03/25 0824    famotidine (PEPCID) tablet 10 mg, 10 mg, Oral, Daily, Rosalee Grecsek, CRNP, 10 mg at 05/03/25 0828    LORazepam (ATIVAN) tablet 1 mg, 1 mg, Oral, HS PRN, ZURI Hernández, 1 mg at 05/02/25 2131    lurasidone (LATUDA) tablet 20 mg, 20 mg, Oral, Daily With Breakfast, ZURI Hernández, 20 mg at 05/03/25 0829    magnesium Oxide (MAG-OX) tablet 400 mg, 400 mg, Oral, Daily, ZURI Hernández, 400 mg at 05/03/25  "0825    metoprolol succinate (TOPROL-XL) 24 hr tablet 50 mg, 50 mg, Oral, Q12H, Rosalee Grecsek, CRNP, 50 mg at 05/03/25 0825    multivitamin-minerals (CENTRUM) tablet 1 tablet, 1 tablet, Oral, Daily, Rosalee Grecsek, CRNP, 1 tablet at 05/03/25 0824    nitroglycerin (NITROSTAT) SL tablet 0.4 mg, 0.4 mg, Sublingual, Q5 Min PRN, Rosalee Grecsek, CRNP    oxybutynin (DITROPAN-XL) 24 hr tablet 10 mg, 10 mg, Oral, Daily, Rosalee Grecsek, CRNP, 10 mg at 05/03/25 0829    polyethylene glycol (MIRALAX) packet 17 g, 17 g, Oral, BID, Rosalee Grecsek, CRNP, 17 g at 05/03/25 0824      Lab Results: I have reviewed the following results:  Results from last 7 days   Lab Units 05/03/25  0509 05/02/25  0617 05/01/25  2353 05/01/25  0444 04/30/25 2009 04/28/25  0736   WBC Thousand/uL 4.11* 4.19*  --  4.63 7.08 3.61*   HEMOGLOBIN g/dL 9.7* 10.0*  --  9.1* 10.4* 11.4*   HEMATOCRIT % 31.3* 32.8*  --  29.6* 32.5* 35.4   PLATELETS Thousands/uL 156 153  --  161 190 177   POTASSIUM mmol/L 3.9 3.9 4.0 3.5 3.6 3.7   CHLORIDE mmol/L 109* 109* 106 110* 104 105   CO2 mmol/L 27 27 28 25 26 25   BUN mg/dL 30* 30* 32* 33* 33* 26*   CREATININE mg/dL 1.84* 1.65* 1.75* 1.76* 2.04* 1.85*   CALCIUM mg/dL 8.7 8.7 8.8 8.3* 8.9 9.2   MAGNESIUM mg/dL  --   --  2.2 2.2  --  2.0   PHOSPHORUS mg/dL  --   --  4.0  --   --   --    ALBUMIN g/dL  --   --  3.6  --  3.8 3.8       Administrative Statements     Portions of the record may have been created with voice recognition software. Occasional wrong word or \"sound a like\" substitutions may have occurred due to the inherent limitations of voice recognition software. Read the chart carefully and recognize, using context, where substitutions have occurred.If you have any questions, please contact the dictating provider.  "

## 2025-05-04 PROBLEM — I25.110 CORONARY ARTERY DISEASE INVOLVING NATIVE CORONARY ARTERY OF NATIVE HEART WITH UNSTABLE ANGINA PECTORIS (HCC): Status: ACTIVE | Noted: 2025-05-04

## 2025-05-05 ENCOUNTER — TRANSITIONAL CARE MANAGEMENT (OUTPATIENT)
Dept: FAMILY MEDICINE CLINIC | Facility: CLINIC | Age: 70
End: 2025-05-05

## 2025-05-05 PROBLEM — I25.10 CORONARY ARTERY DISEASE: Status: ACTIVE | Noted: 2025-04-30

## 2025-05-05 NOTE — UTILIZATION REVIEW
NOTIFICATION OF ADMISSION DISCHARGE   This is a Notification of Discharge from [facility]. Please be advised that this patient has been discharge from our facility. Below you will find the admission and discharge date and time including the patient’s disposition.   UTILIZATION REVIEW CONTACT:  Utilization Review Assistants  Network Utilization Review Department  Phone: [phone] x carefully listen to the prompts. All voicemails are confidential.  Email: [email or web address]     ADMISSION INFORMATION  PRESENTATION DATE: 4/30/2025  7:25 PM  OBERVATION ADMISSION DATE: 04/30/2025 2205  INPATIENT ADMISSION DATE: 5/1/25  5:11 PM   DISCHARGE DATE: 5/3/2025  1:32 PM   DISPOSITION:Home/Self Care    Network Utilization Review Department  ATTENTION: Please call with any questions or concerns to [phone] and carefully listen to the prompts so that you are directed to the right person. All voicemails are confidential.   For Discharge needs, contact Care Management DC Support Team at [phone] opt. 2  Send all requests for admission clinical reviews, approved or denied determinations and any other requests to dedicated fax number below belonging to the campus where the patient is receiving treatment. List of dedicated fax numbers for the Facilities:  FACILITY NAME UR FAX NUMBER   ADMISSION DENIALS (Administrative/Medical Necessity) [phone]   DISCHARGE SUPPORT TEAM ([facility]) [phone]   PARENT CHILD HEALTH (Maternity/NICU/Pediatrics) [phone]   [facility] [phone]   [facility] [phone]   [facility] [phone]   [facility] [phone]   [facility] [phone]   [facility] [phone]   [facility] [phone]   [facility] [phone]   [facility]  [facility] [phone]   [facility] [phone]   [facility] [phone]   [facility] [phone]

## 2025-05-06 ENCOUNTER — APPOINTMENT (OUTPATIENT)
Dept: LAB | Facility: AMBULARY SURGERY CENTER | Age: 70
End: 2025-05-06
Payer: COMMERCIAL

## 2025-05-06 DIAGNOSIS — N18.9 CHRONIC KIDNEY DISEASE-MINERAL AND BONE DISORDER: ICD-10-CM

## 2025-05-06 DIAGNOSIS — M89.9 CHRONIC KIDNEY DISEASE-MINERAL AND BONE DISORDER: ICD-10-CM

## 2025-05-06 DIAGNOSIS — E83.9 CHRONIC KIDNEY DISEASE-MINERAL AND BONE DISORDER: ICD-10-CM

## 2025-05-06 LAB
ANION GAP SERPL CALCULATED.3IONS-SCNC: 11 MMOL/L (ref 4–13)
BUN SERPL-MCNC: 29 MG/DL (ref 5–25)
CALCIUM SERPL-MCNC: 9.2 MG/DL (ref 8.4–10.2)
CHLORIDE SERPL-SCNC: 108 MMOL/L (ref 96–108)
CO2 SERPL-SCNC: 24 MMOL/L (ref 21–32)
CREAT SERPL-MCNC: 1.77 MG/DL (ref 0.6–1.3)
GFR SERPL CREATININE-BSD FRML MDRD: 28 ML/MIN/1.73SQ M
GLUCOSE P FAST SERPL-MCNC: 127 MG/DL (ref 65–99)
POTASSIUM SERPL-SCNC: 3.9 MMOL/L (ref 3.5–5.3)
SODIUM SERPL-SCNC: 143 MMOL/L (ref 135–147)

## 2025-05-06 PROCEDURE — 36415 COLL VENOUS BLD VENIPUNCTURE: CPT

## 2025-05-06 PROCEDURE — 80048 BASIC METABOLIC PNL TOTAL CA: CPT

## 2025-05-07 ENCOUNTER — OFFICE VISIT (OUTPATIENT)
Dept: FAMILY MEDICINE CLINIC | Facility: CLINIC | Age: 70
End: 2025-05-07
Payer: COMMERCIAL

## 2025-05-07 ENCOUNTER — RA CDI HCC (OUTPATIENT)
Dept: OTHER | Facility: HOSPITAL | Age: 70
End: 2025-05-07

## 2025-05-07 VITALS
SYSTOLIC BLOOD PRESSURE: 106 MMHG | DIASTOLIC BLOOD PRESSURE: 70 MMHG | HEIGHT: 60 IN | BODY MASS INDEX: 44.02 KG/M2 | RESPIRATION RATE: 18 BRPM | WEIGHT: 224.2 LBS | OXYGEN SATURATION: 96 % | HEART RATE: 81 BPM | TEMPERATURE: 97.7 F

## 2025-05-07 DIAGNOSIS — N18.32 ANEMIA DUE TO STAGE 3B CHRONIC KIDNEY DISEASE  (HCC): ICD-10-CM

## 2025-05-07 DIAGNOSIS — I27.82 OTHER CHRONIC PULMONARY EMBOLISM WITHOUT ACUTE COR PULMONALE (HCC): ICD-10-CM

## 2025-05-07 DIAGNOSIS — D63.1 ANEMIA DUE TO STAGE 3B CHRONIC KIDNEY DISEASE  (HCC): ICD-10-CM

## 2025-05-07 DIAGNOSIS — Z09 HOSPITAL DISCHARGE FOLLOW-UP: Primary | ICD-10-CM

## 2025-05-07 DIAGNOSIS — C53.0 MALIGNANT NEOPLASM OF ENDOCERVIX (HCC): ICD-10-CM

## 2025-05-07 DIAGNOSIS — I50.22 HEART FAILURE WITH MILDLY REDUCED EJECTION FRACTION (HCC): ICD-10-CM

## 2025-05-07 DIAGNOSIS — I25.110 CORONARY ARTERY DISEASE INVOLVING NATIVE CORONARY ARTERY OF NATIVE HEART WITH UNSTABLE ANGINA PECTORIS (HCC): ICD-10-CM

## 2025-05-07 PROCEDURE — 99496 TRANSJ CARE MGMT HIGH F2F 7D: CPT | Performed by: FAMILY MEDICINE

## 2025-05-07 NOTE — ASSESSMENT & PLAN NOTE
Lab Results   Component Value Date    EGFR 28 05/06/2025    EGFR 27 05/03/2025    EGFR 31 05/02/2025    CREATININE 1.77 (H) 05/06/2025    CREATININE 1.84 (H) 05/03/2025    CREATININE 1.65 (H) 05/02/2025       Orders:  •  CBC and differential; Future

## 2025-05-07 NOTE — ASSESSMENT & PLAN NOTE
Wt Readings from Last 3 Encounters:   05/07/25 102 kg (224 lb 3.2 oz)   05/03/25 101 kg (223 lb 8.7 oz)   04/29/25 103 kg (226 lb)   Cont demedex

## 2025-05-07 NOTE — PROGRESS NOTES
Suggestion used  HCC coding opportunities          Chart Reviewed number of suggestions sent to Provider: 1     Patients Insurance     Medicare Insurance: [employer]age

## 2025-05-07 NOTE — ASSESSMENT & PLAN NOTE
Will stop asa after 4 weeks  And cont plavix and eliquis  Seeing cardiology  Starting cardiac PT

## 2025-05-07 NOTE — PROGRESS NOTES
Transition of Care Visit:  Name: Cherelle Dash      : 1955      MRN: [id number]  Encounter Provider: Gretchen Mayorga DO  Encounter Date: 2025   Encounter department: [facility]    Assessment & Plan  Hospital discharge follow-up         Coronary artery disease involving native coronary artery of native heart with unstable angina pectoris (HCC)  Will stop asa after 4 weeks  And cont plavix and eliquis  Seeing cardiology  Starting cardiac PT         Heart failure with mildly reduced ejection fraction (HCC)  Wt Readings from Last 3 Encounters:   25 102 kg (224 lb 3.2 oz)   25 101 kg (223 lb 8.7 oz)   25 103 kg (226 lb)   Cont demedex                 Other chronic pulmonary embolism without acute cor pulmonale (HCC)  Cont demedex  Orders:  •  apixaban (Eliquis) 5 mg; Take 1 tablet (5 mg total) by mouth 2 (two) times a day    Malignant neoplasm of endocervix (HCC)  Starting radiation  Orders:  •  apixaban (Eliquis) 5 mg; Take 1 tablet (5 mg total) by mouth 2 (two) times a day    Anemia due to stage 3b chronic kidney disease  (HCC)  Lab Results   Component Value Date    EGFR 28 2025    EGFR 27 2025    EGFR 31 2025    CREATININE 1.77 (H) 2025    CREATININE 1.84 (H) 2025    CREATININE 1.65 (H) 2025       Orders:  •  CBC and differential; Future         History of Present Illness     Transitional Care Management Review:   Cherelle Dash is a 69 y.o. female here for TCM follow up.     During the TCM phone call patient stated:  TCM Call (since 2025)     Date and time call was made  2025  9:01 AM    Date of Admission  25    Date of discharge  25    Diagnosis  Chest pain with elevated troponin    Disposition  Home    Were the patients medications reviewed and updated  Yes      TCM Call (since 2025)     Scheduled for follow up?  Yes    Did you obtain your prescribed medications  Yes    Do you need help managing your  prescriptions or medications  No    Is transportation to your appointment needed  No    I have advised the patient to call PCP with any new or worsening symptoms  Cassie Dias,     Living Arrangements  Spouse or Significiant other    Are you recieving home care services  No        Started with sob walking back from dinner  Couldn't compose herself  Got flushed, had chest pain  Had same pain with pericarditis  Admistted to rule out pericarditis but also to get cardiac cath  Found blockage and needed stent  Ambulance called  Discharged home with triple blodd thinners  This morning still a little flushed        Review of Systems   Constitutional:  Positive for fatigue.   Respiratory:  Positive for shortness of breath.    Cardiovascular: Negative.    Gastrointestinal: Negative.    Endocrine: Negative.    Genitourinary: Negative.    Musculoskeletal:  Positive for arthralgias.   Allergic/Immunologic: Negative.    Neurological: Negative.    Hematological: Negative.    Psychiatric/Behavioral: Negative.       Objective   /70 (BP Location: Left arm, Patient Position: Sitting, Cuff Size: Large)   Pulse 81   Temp 97.7 °F (36.5 °C) (Tympanic)   Resp 18   Ht 5' (1.524 m)   Wt 102 kg (224 lb 3.2 oz)   SpO2 96%   BMI 43.79 kg/m²     Physical Exam  Vitals and nursing note reviewed.   Constitutional:       Appearance: Normal appearance. She is well-developed.   HENT:      Head: Normocephalic and atraumatic.      Right Ear: External ear normal.      Left Ear: External ear normal.      Nose: Nose normal.   Eyes:      Extraocular Movements: Extraocular movements intact.      Conjunctiva/sclera: Conjunctivae normal.      Pupils: Pupils are equal, round, and reactive to light.   Cardiovascular:      Rate and Rhythm: Normal rate and regular rhythm.      Heart sounds: Normal heart sounds.   Pulmonary:      Effort: Pulmonary effort is normal.      Breath sounds: Normal breath sounds.   Abdominal:      General: Abdomen is  flat. Bowel sounds are normal.      Palpations: Abdomen is soft.   Musculoskeletal:         General: Normal range of motion.      Cervical back: Normal range of motion and neck supple.   Skin:     General: Skin is warm and dry.      Capillary Refill: Capillary refill takes less than 2 seconds.   Neurological:      General: No focal deficit present.      Mental Status: She is alert and oriented to person, place, and time.   Psychiatric:         Mood and Affect: Mood normal.         Behavior: Behavior normal.         Thought Content: Thought content normal.         Judgment: Judgment normal.       Medications have been reviewed by provider in current encounter

## 2025-05-07 NOTE — ASSESSMENT & PLAN NOTE
Cont demedex  Orders:  •  apixaban (Eliquis) 5 mg; Take 1 tablet (5 mg total) by mouth 2 (two) times a day

## 2025-05-08 ENCOUNTER — OFFICE VISIT (OUTPATIENT)
Dept: NEPHROLOGY | Facility: CLINIC | Age: 70
End: 2025-05-08
Payer: COMMERCIAL

## 2025-05-08 VITALS
HEART RATE: 84 BPM | DIASTOLIC BLOOD PRESSURE: 72 MMHG | OXYGEN SATURATION: 97 % | HEIGHT: 60 IN | BODY MASS INDEX: 44.17 KG/M2 | WEIGHT: 225 LBS | SYSTOLIC BLOOD PRESSURE: 102 MMHG

## 2025-05-08 DIAGNOSIS — E78.5 HYPERLIPIDEMIA, UNSPECIFIED HYPERLIPIDEMIA TYPE: ICD-10-CM

## 2025-05-08 DIAGNOSIS — C54.1 ENDOMETRIAL CANCER (HCC): ICD-10-CM

## 2025-05-08 DIAGNOSIS — N18.4 ANEMIA DUE TO STAGE 4 CHRONIC KIDNEY DISEASE  (HCC): ICD-10-CM

## 2025-05-08 DIAGNOSIS — I25.110 CORONARY ARTERY DISEASE INVOLVING NATIVE CORONARY ARTERY OF NATIVE HEART WITH UNSTABLE ANGINA PECTORIS (HCC): ICD-10-CM

## 2025-05-08 DIAGNOSIS — N18.4 STAGE 4 CHRONIC KIDNEY DISEASE (HCC): ICD-10-CM

## 2025-05-08 DIAGNOSIS — N18.4 CHRONIC KIDNEY DISEASE, STAGE 4 (SEVERE) (HCC): Primary | ICD-10-CM

## 2025-05-08 DIAGNOSIS — I27.82 OTHER CHRONIC PULMONARY EMBOLISM WITHOUT ACUTE COR PULMONALE (HCC): ICD-10-CM

## 2025-05-08 DIAGNOSIS — E66.01 MORBID OBESITY WITH BMI OF 40.0-44.9, ADULT (HCC): ICD-10-CM

## 2025-05-08 DIAGNOSIS — D63.1 ANEMIA DUE TO STAGE 4 CHRONIC KIDNEY DISEASE  (HCC): ICD-10-CM

## 2025-05-08 PROCEDURE — 99214 OFFICE O/P EST MOD 30 MIN: CPT | Performed by: INTERNAL MEDICINE

## 2025-05-08 PROCEDURE — G2211 COMPLEX E/M VISIT ADD ON: HCPCS | Performed by: INTERNAL MEDICINE

## 2025-05-08 NOTE — ASSESSMENT & PLAN NOTE
Lab Results   Component Value Date    EGFR 28 05/06/2025    EGFR 27 05/03/2025    EGFR 31 05/02/2025    CREATININE 1.77 (H) 05/06/2025    CREATININE 1.84 (H) 05/03/2025    CREATININE 1.65 (H) 05/02/2025       Orders:    PTH, intact; Future    Renal function panel; Future    Protein / creatinine ratio, urine; Future    CBC; Future  CKD stage IIIb/IV with previous creatinine in the mid ones but lately around 1.4-1.8.  CKD multifactorial in the setting of prior lithium use, morbid obesity, prior NSAID use, obesity related hyperfiltration, age-related nephron loss, cardiorenal syndrome, prior chemotherapy.  Most recent creatinine 1.77 with an estimated GFR of 28  No changes in medication.  Plan to follow labs in 2 to 3 months, if stable follow-up in 6 months.

## 2025-05-08 NOTE — ASSESSMENT & PLAN NOTE
Status post surgical treatment and chemotherapy.  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  Completed chemotherapy with no evidence of active disease on recent scans.  Plan for radiation therapy.  Continue follow-up with gynecology oncology

## 2025-05-08 NOTE — PATIENT INSTRUCTIONS
As we discussed in the office visit your kidney function lately is slightly decreased and your creatinine fluctuates in the 1.4-1.8 range.  Most recent blood test showing stable kidney function after recent hospitalization.  Continue following a low-salt diet.  No changes in your medications.  Remember to avoid NSAIDs (no ibuprofen, Motrin, Advil, Aleve, naproxen).  Okay to take Tylenol or acetaminophen as needed for pain.  I would like you to go for repeat blood and urine test in 2 to 3 months, we will contact you with the results.  If your kidney function remains stable I would like to see you back in the office in 6 months.  Continue close follow-up with your primary care doctor, cardiologist, gynecologist oncologist.

## 2025-05-08 NOTE — ASSESSMENT & PLAN NOTE
Lab Results   Component Value Date    EGFR 28 05/06/2025    EGFR 27 05/03/2025    EGFR 31 05/02/2025    CREATININE 1.77 (H) 05/06/2025    CREATININE 1.84 (H) 05/03/2025    CREATININE 1.65 (H) 05/02/2025

## 2025-05-08 NOTE — PROGRESS NOTES
Name: Cherelle Dash      : 1955      MRN: [id number]  Encounter Provider: Stew Macias MD  Encounter Date: 2025   Encounter department: [facility]  :  Assessment & Plan  Chronic kidney disease, stage 4 (severe) (HCC)  Lab Results   Component Value Date    EGFR 28 2025    EGFR 27 2025    EGFR 31 2025    CREATININE 1.77 (H) 2025    CREATININE 1.84 (H) 2025    CREATININE 1.65 (H) 2025       Orders:    PTH, intact; Future    Renal function panel; Future    Protein / creatinine ratio, urine; Future    CBC; Future  CKD stage IIIb/IV with previous creatinine in the mid ones but lately around 1.4-1.8.  CKD multifactorial in the setting of prior lithium use, morbid obesity, prior NSAID use, obesity related hyperfiltration, age-related nephron loss, cardiorenal syndrome, prior chemotherapy.  Most recent creatinine 1.77 with an estimated GFR of 28  No changes in medication.  Plan to follow labs in 2 to 3 months, if stable follow-up in 6 months.    Stage 4 chronic kidney disease (HCC)  Lab Results   Component Value Date    EGFR 28 2025    EGFR 27 2025    EGFR 31 2025    CREATININE 1.77 (H) 2025    CREATININE 1.84 (H) 2025    CREATININE 1.65 (H) 2025            Coronary artery disease involving native coronary artery of native heart with unstable angina pectoris (HCC)       Coronary artery disease, status post cardiac cath that showed significant OM lesions status post MAIA  Myocarditis felt due to checkpoint inhibitor that was discontinued  Prednisone taper by gynecology oncology currently on colchicine daily for 3 months daughter.  Volume status acceptable on torsemide.  Continue follow-up with cardiology.  Follow low-salt diet  Other chronic pulmonary embolism without acute cor pulmonale (HCC)       History of DVT/PE 2024, status post IVC filter.  On anticoagulation  Endometrial cancer (HCC)       Status  post surgical treatment and chemotherapy.  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  Completed chemotherapy with no evidence of active disease on recent scans.  Plan for radiation therapy.  Continue follow-up with gynecology oncology  Anemia due to stage 4 chronic kidney disease  (HCC)       Anemia multifactorial in the setting of CKD, malignancy  Morbid obesity with BMI of 40.0-44.9, adult (HCC)       Advised to lose weight  Hyperlipidemia, unspecified hyperlipidemia type       On Atorvastatin      Patient Instructions   As we discussed in the office visit your kidney function lately is slightly decreased and your creatinine fluctuates in the 1.4-1.8 range.  Most recent blood test showing stable kidney function after recent hospitalization.  Continue following a low-salt diet.  No changes in your medications.  Remember to avoid NSAIDs (no ibuprofen, Motrin, Advil, Aleve, naproxen).  Okay to take Tylenol or acetaminophen as needed for pain.  I would like you to go for repeat blood and urine test in 2 to 3 months, we will contact you with the results.  If your kidney function remains stable I would like to see you back in the office in 6 months.  Continue close follow-up with your primary care doctor, cardiologist, gynecologist oncologist.      It was a pleasure evaluating your patient in the office today. Thank you for allowing our team to participate in the care of  Cherelle Dash. Please do not hesitate to contact our team if further issues/questions shall arise in the interim.     History of Present Illness   HPI  Cherelle Dash is a 69 y.o. female who presents to the office for CKD follow-up.  Patient present with her .  Last time seen on 11/2024.    Noted since last office visit patient was hospitalized 02/2025 found to have myocarditis suspected secondary to checkpoint inhibitor that was  discontinued.  Patient was recently hospitalized last week with chest pain, shortness of breath, initially presumed due to myocarditis/cardiomyopathy, underwent cardiac cath that shows significant OM lesion status post MAIA    Today presents to the office with her .  In general feeling better, reports chest pain and shortness of breath improved, denies significant lower extremity swelling.  Currently denies any abdominal pain, no recent nausea, no vomiting, no diarrhea or constipation.  Denies any urinary problems, no dysuria gross hematuria.  Denies NSAID use.  Taking torsemide 20 mg daily    History obtained from: patient  Pertinent Medical History   CKD stage IIIb/IV creatinine around 1.4-1.8.  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  Cardiomyopathy.  CAD status post MAIA to OM.  Morbid obesity.  History of DVT/PE.  Endometrial cancer status post surgical treatment and chemotherapy since 12/2024    Review of Systems  ROS: All the systems were reviewed and were negative except as documented on the H&P.    Medical History Reviewed by provider this encounter:  Allergies  Meds     .       Current Outpatient Medications on File Prior to Visit   Medication Sig Dispense Refill    apixaban (Eliquis) 5 mg Take 1 tablet (5 mg total) by mouth 2 (two) times a day 180 tablet 1    aspirin 81 mg chewable tablet Chew 1 tablet (81 mg total) daily for 28 days 28 tablet 0    atorvastatin (LIPITOR) 40 mg tablet Take 1 tablet (40 mg total) by mouth daily with dinner 30 tablet 2    Cholecalciferol (VITAMIN D) 2000 units CAPS Take 1 capsule by mouth daily      clobetasol (TEMOVATE) 0.05 % ointment Apply topically 2 (two) times a week 60 g 3    clopidogrel (PLAVIX) 75 mg tablet Take 1 tablet (75 mg total) by mouth daily 30 tablet 2    colchicine (COLCRYS) 0.6 mg tablet Take 1 tablet (0.6 mg total) by mouth daily 90 tablet 1    CRANBERRY PO Take by mouth      famotidine (PEPCID) 20 mg tablet TAKE 1  TABLET BY MOUTH TWICE  DAILY 180 tablet 1    lidocaine-prilocaine (EMLA) cream Apply to PORT 30 min prior to labs and chemo 30 g 1    LORazepam (ATIVAN) 1 mg tablet Take 1 tablet PO HS prn insomnia 30 tablet 0    lurasidone (LATUDA) 20 mg tablet TAKE 1 TABLET BY MOUTH DAILY  WITH BREAKFAST 30 tablet 11    magnesium Oxide (MAG-OX) 400 mg TABS TAKE 1 TABLET BY MOUTH EVERY DAY 90 tablet 1    metoprolol succinate (TOPROL-XL) 50 mg 24 hr tablet Take 1 tablet (50 mg total) by mouth every 12 (twelve) hours 180 tablet 3    Mirabegron ER (Myrbetriq) 50 MG TB24 TAKE 1 TABLET BY MOUTH IN THE  MORNING 90 tablet 1    Multiple Vitamin (MULTI-VITAMIN DAILY) TABS Take by mouth daily      polyethylene glycol (GLYCOLAX) 17 GM/SCOOP powder Take 17 g by mouth 2 (two) times a day 238 g 1    Sodium Fluoride 5000 PPM 1.1 % GEL As directed      torsemide (DEMADEX) 20 mg tablet Take 1 tablet (20 mg total) by mouth daily 90 tablet 1    chlorhexidine (PERIDEX) 0.12 % solution USE HALF OUNCE TWICE A DAY RINSE FOR 30 SECONDS BY MOUTH THEN EXPECTORATE DO NOT SWALLOW      Glucosamine-Chondroit-Vit C-Mn (GLUCOSAMINE 1500 COMPLEX PO) Take by mouth in the morning      Ivermectin 1 % CREA        No current facility-administered medications on file prior to visit.     Objective   /72 (BP Location: Left arm, Patient Position: Sitting, Cuff Size: Adult)   Pulse 84   Ht 5' (1.524 m)   Wt 102 kg (225 lb)   SpO2 97%   BMI 43.94 kg/m²      Physical Exam  General: conscious, cooperative, in not acute distress  Eyes: conjunctivae pink, anicteric sclerae  ENT: lips and mucous membranes moist  Neck: supple, no JVD  Chest: clear breath sounds bilateral, no crackles, ronchus or wheezings  CVS: distinct S1 & S2, normal rate, regular rhythm  Abdomen: soft, non-tender, non-distended, normoactive bowel sounds  Back: no CVA tenderness  Extremities: no edema of both legs  Skin: no rash  Neuro: awake, alert, oriented      Laboratory Results:  Results from  last 7 days   Lab Units 05/06/25  0819 05/03/25  0509 05/02/25  0617 05/01/25  2353   WBC Thousand/uL  --  4.11* 4.19*  --    HEMOGLOBIN g/dL  --  9.7* 10.0*  --    HEMATOCRIT %  --  31.3* 32.8*  --    PLATELETS Thousands/uL  --  156 153  --    POTASSIUM mmol/L 3.9 3.9 3.9 4.0   CHLORIDE mmol/L 108 109* 109* 106   CO2 mmol/L 24 27 27 28   BUN mg/dL 29* 30* 30* 32*   CREATININE mg/dL 1.77* 1.84* 1.65* 1.75*   CALCIUM mg/dL 9.2 8.7 8.7 8.8   MAGNESIUM mg/dL  --   --   --  2.2   PHOSPHORUS mg/dL  --   --   --  4.0       Results for orders placed or performed in visit on 05/06/25   Basic metabolic panel (BMP)   Result Value Ref Range    Sodium 143 135 - 147 mmol/L    Potassium 3.9 3.5 - 5.3 mmol/L    Chloride 108 96 - 108 mmol/L    CO2 24 21 - 32 mmol/L    ANION GAP 11 4 - 13 mmol/L    BUN 29 (H) 5 - 25 mg/dL    Creatinine 1.77 (H) 0.60 - 1.30 mg/dL    Glucose, Fasting 127 (H) 65 - 99 mg/dL    Calcium 9.2 8.4 - 10.2 mg/dL    eGFR 28 ml/min/1.73sq m     *Note: Due to a large number of results and/or encounters for the requested time period, some results have not been displayed. A complete set of results can be found in Results Review.

## 2025-05-08 NOTE — ASSESSMENT & PLAN NOTE
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  Volume status acceptable on torsemide.  Continue follow-up with cardiology.  Follow low-salt diet

## 2025-05-09 ENCOUNTER — TELEPHONE (OUTPATIENT)
Dept: GYNECOLOGIC ONCOLOGY | Facility: CLINIC | Age: 70
End: 2025-05-09

## 2025-05-13 ENCOUNTER — APPOINTMENT (OUTPATIENT)
Dept: RADIATION ONCOLOGY | Facility: HOSPITAL | Age: 70
End: 2025-05-13
Attending: RADIOLOGY
Payer: COMMERCIAL

## 2025-05-13 LAB
RAD ONC ARIA COURSE FIRST TREATMENT DATE: NORMAL
RAD ONC ARIA COURSE ID: NORMAL
RAD ONC ARIA COURSE INTENT: NORMAL
RAD ONC ARIA COURSE LAST TREATMENT DATE: NORMAL
RAD ONC ARIA COURSE SESSION NUMBER: 1
RAD ONC ARIA COURSE START DATE: NORMAL
RAD ONC ARIA COURSE TREATMENT ELAPSED DAYS: 0
RAD ONC ARIA PLAN FRACTIONS TREATED TO DATE: 1
RAD ONC ARIA PLAN ID: NORMAL
RAD ONC ARIA PLAN NAME: NORMAL
RAD ONC ARIA PLAN PRESCRIBED DOSE PER FRACTION: 1.8 GY
RAD ONC ARIA PLAN PRIMARY REFERENCE POINT: NORMAL
RAD ONC ARIA PLAN TOTAL FRACTIONS PRESCRIBED: 25
RAD ONC ARIA PLAN TOTAL PRESCRIBED DOSE: 4500 CGY
RAD ONC ARIA REFERENCE POINT DOSAGE GIVEN TO DATE: 1.8 GY
RAD ONC ARIA REFERENCE POINT ID: NORMAL
RAD ONC ARIA REFERENCE POINT SESSION DOSAGE GIVEN: 1.8 GY

## 2025-05-13 PROCEDURE — 77386 HB NTSTY MODUL RAD TX DLVR CPLX: CPT | Performed by: RADIOLOGY

## 2025-05-13 PROCEDURE — 77387 GUIDANCE FOR RADJ TX DLVR: CPT | Performed by: RADIOLOGY

## 2025-05-13 PROCEDURE — 77427 RADIATION TX MANAGEMENT X5: CPT | Performed by: RADIOLOGY

## 2025-05-14 ENCOUNTER — APPOINTMENT (OUTPATIENT)
Dept: RADIATION ONCOLOGY | Facility: HOSPITAL | Age: 70
End: 2025-05-14
Attending: RADIOLOGY
Payer: COMMERCIAL

## 2025-05-14 ENCOUNTER — OFFICE VISIT (OUTPATIENT)
Dept: FAMILY MEDICINE CLINIC | Facility: CLINIC | Age: 70
End: 2025-05-14
Payer: COMMERCIAL

## 2025-05-14 ENCOUNTER — TELEPHONE (OUTPATIENT)
Age: 70
End: 2025-05-14

## 2025-05-14 VITALS
SYSTOLIC BLOOD PRESSURE: 124 MMHG | HEART RATE: 95 BPM | RESPIRATION RATE: 18 BRPM | DIASTOLIC BLOOD PRESSURE: 84 MMHG | BODY MASS INDEX: 44.14 KG/M2 | HEIGHT: 60 IN | TEMPERATURE: 98 F | OXYGEN SATURATION: 96 % | WEIGHT: 224.8 LBS

## 2025-05-14 DIAGNOSIS — E11.22 TYPE 2 DIABETES MELLITUS WITH STAGE 4 CHRONIC KIDNEY DISEASE, WITHOUT LONG-TERM CURRENT USE OF INSULIN (HCC): ICD-10-CM

## 2025-05-14 DIAGNOSIS — Z00.00 MEDICARE ANNUAL WELLNESS VISIT, SUBSEQUENT: Primary | ICD-10-CM

## 2025-05-14 DIAGNOSIS — C54.1 ENDOMETRIAL CANCER (HCC): ICD-10-CM

## 2025-05-14 DIAGNOSIS — E66.01 MORBID OBESITY WITH BMI OF 40.0-44.9, ADULT (HCC): ICD-10-CM

## 2025-05-14 DIAGNOSIS — N18.4 TYPE 2 DIABETES MELLITUS WITH STAGE 4 CHRONIC KIDNEY DISEASE, WITHOUT LONG-TERM CURRENT USE OF INSULIN (HCC): ICD-10-CM

## 2025-05-14 DIAGNOSIS — I25.110 CORONARY ARTERY DISEASE INVOLVING NATIVE CORONARY ARTERY OF NATIVE HEART WITH UNSTABLE ANGINA PECTORIS (HCC): ICD-10-CM

## 2025-05-14 PROBLEM — M25.471 RIGHT ANKLE SWELLING: Status: RESOLVED | Noted: 2025-04-01 | Resolved: 2025-05-14

## 2025-05-14 PROBLEM — M79.89 RIGHT LEG SWELLING: Status: RESOLVED | Noted: 2025-04-30 | Resolved: 2025-05-14

## 2025-05-14 PROBLEM — M76.60 ACHILLES TENDON PAIN: Status: RESOLVED | Noted: 2025-02-04 | Resolved: 2025-05-14

## 2025-05-14 LAB
RAD ONC ARIA COURSE FIRST TREATMENT DATE: NORMAL
RAD ONC ARIA COURSE ID: NORMAL
RAD ONC ARIA COURSE INTENT: NORMAL
RAD ONC ARIA COURSE LAST TREATMENT DATE: NORMAL
RAD ONC ARIA COURSE SESSION NUMBER: 2
RAD ONC ARIA COURSE START DATE: NORMAL
RAD ONC ARIA COURSE TREATMENT ELAPSED DAYS: 1
RAD ONC ARIA PLAN FRACTIONS TREATED TO DATE: 2
RAD ONC ARIA PLAN ID: NORMAL
RAD ONC ARIA PLAN NAME: NORMAL
RAD ONC ARIA PLAN PRESCRIBED DOSE PER FRACTION: 1.8 GY
RAD ONC ARIA PLAN PRIMARY REFERENCE POINT: NORMAL
RAD ONC ARIA PLAN TOTAL FRACTIONS PRESCRIBED: 25
RAD ONC ARIA PLAN TOTAL PRESCRIBED DOSE: 4500 CGY
RAD ONC ARIA REFERENCE POINT DOSAGE GIVEN TO DATE: 3.6 GY
RAD ONC ARIA REFERENCE POINT ID: NORMAL
RAD ONC ARIA REFERENCE POINT SESSION DOSAGE GIVEN: 1.8 GY

## 2025-05-14 PROCEDURE — G2211 COMPLEX E/M VISIT ADD ON: HCPCS | Performed by: FAMILY MEDICINE

## 2025-05-14 PROCEDURE — 77386 HB NTSTY MODUL RAD TX DLVR CPLX: CPT | Performed by: RADIOLOGY

## 2025-05-14 PROCEDURE — G0439 PPPS, SUBSEQ VISIT: HCPCS | Performed by: FAMILY MEDICINE

## 2025-05-14 PROCEDURE — 77387 GUIDANCE FOR RADJ TX DLVR: CPT | Performed by: RADIOLOGY

## 2025-05-14 PROCEDURE — 99214 OFFICE O/P EST MOD 30 MIN: CPT | Performed by: FAMILY MEDICINE

## 2025-05-14 RX ORDER — TIRZEPATIDE 2.5 MG/.5ML
2.5 INJECTION, SOLUTION SUBCUTANEOUS WEEKLY
Qty: 2 ML | Refills: 0 | Status: SHIPPED | OUTPATIENT
Start: 2025-05-14 | End: 2025-05-15 | Stop reason: SDUPTHER

## 2025-05-14 NOTE — PROGRESS NOTES
Name: Cherelle Dash      : 1955      MRN: [id number]  Encounter Provider: Gretchen Mayorga DO  Encounter Date: 2025   Encounter department: [facility]  :  Assessment & Plan  Medicare annual wellness visit, subsequent         Type 2 diabetes mellitus with stage 4 chronic kidney disease, without long-term current use of insulin (HCC)  Discussed starting mounjaro  Will wait until after after radiation    Lab Results   Component Value Date    HGBA1C 6.6 (H) 2025       Orders:  •  Tirzepatide (Mounjaro) 2.5 MG/0.5ML SOAJ; Inject 2.5 mg under the skin once a week    Endometrial cancer (HCC)  Radiation therapy       Coronary artery disease involving native coronary artery of native heart with unstable angina pectoris (HCC)  LDL is 101 not at goal  Seeing Dr. Gold  May need adjustment       Morbid obesity with BMI of 40.0-44.9, adult (HCC)    Discussed mounjaro          Preventive health issues were discussed with patient, and age appropriate screening tests were ordered as noted in patient's After Visit Summary. Personalized health advice and appropriate referrals for health education or preventive services given if needed, as noted in patient's After Visit Summary.    History of Present Illness     Here for wellness  Started radiation daily  No side effects so far but needs full bladder  Needs cardiac rehab still to schedule  Labs reviewed  A1vc in diabetic range         Patient Care Team:  Gretchen Mayorga DO as PCP - General  DO Toni Corona DO Sana Rab Akbar, MD (Nephrology)  Stew Macias MD (Nephrology)  Carmen Troncoso MD (Obstetrics and Gynecology)  Evin Page MD (Gynecologic Oncology)  Maynor Yen DO (Palliative Care)  Sharon Banks PA-C (Gynecologic Oncology)  Vinod Jara MD (Radiation Oncology)    Review of Systems  Medical History Reviewed by provider this encounter:  Tobacco  Allergies  Meds  Problems  Med Hx   Surg Hx  Fam Hx       Annual Wellness Visit Questionnaire   Cherelle is here for her Subsequent Wellness visit.     Health Risk Assessment:   Patient rates overall health as good. Patient feels that their physical health rating is slightly worse. Patient is satisfied with their life. Eyesight was rated as same. Hearing was rated as same. Patient feels that their emotional and mental health rating is same. Patients states they are never, rarely angry. Patient states they are sometimes unusually tired/fatigued. Pain experienced in the last 7 days has been some. Patient's pain rating has been 1/10. Patient states that she has experienced no weight loss or gain in last 6 months.     Depression Screening:   PHQ-9 Score: 0      Fall Risk Screening:   In the past year, patient has experienced: no history of falling in past year      Urinary Incontinence Screening:   Patient has leaked urine accidently in the last six months.     Home Safety:  Patient does not have trouble with stairs inside or outside of their home. Patient has working smoke alarms and has working carbon monoxide detector. Home safety hazards include: none.     Nutrition:   Current diet is No Added Salt.     Medications:   Patient is currently taking over-the-counter supplements. OTC medications include: see medication list. Patient is able to manage medications.     Activities of Daily Living (ADLs)/Instrumental Activities of Daily Living (IADLs):   Walk and transfer into and out of bed and chair?: Yes  Dress and groom yourself?: Yes    Bathe or shower yourself?: Yes    Feed yourself? Yes  Do your laundry/housekeeping?: Yes  Manage your money, pay your bills and track your expenses?: Yes  Make your own meals?: Yes    Do your own shopping?: Yes    Previous Hospitalizations:   Any hospitalizations or ED visits within the last 12 months?: Yes    How many hospitalizations have you had in the last year?: 3-4    Hospitalization Comments: Two surgeries, blood  clots, chest x-ray    Advance Care Planning:   Living will: Yes    Durable POA for healthcare: Yes    Advanced directive: Yes      Preventive Screenings      Cardiovascular Screening:    General: Screening Not Indicated and History Lipid Disorder      Diabetes Screening:     General: Screening Current      Breast Cancer Screening:     General: Screening Current      Cervical Cancer Screening:    General: Screening Not Indicated and History Cervical Cancer      Lung Cancer Screening:     General: Screening Not Indicated      Hepatitis C Screening:    General: Screening Current    Screening, Brief Intervention, and Referral to Treatment (SBIRT)     Screening  Typical number of drinks in a day: 0  Typical number of drinks in a week: 0  Interpretation: Low risk drinking behavior.    AUDIT-C Screenin) How often did you have a drink containing alcohol in the past year? monthly or less  2) How many drinks did you have on a typical day when you were drinking in the past year? 0  3) How often did you have 6 or more drinks on one occasion in the past year? never    AUDIT-C Score: 1  Interpretation: Score 0-2 (female): Negative screen for alcohol misuse    Single Item Drug Screening:  How often have you used an illegal drug (including marijuana) or a prescription medication for non-medical reasons in the past year? never    Single Item Drug Screen Score: 0  Interpretation: Negative screen for possible drug use disorder    Social Drivers of Health     Financial Resource Strain: Low Risk  (2024)    Overall Financial Resource Strain (CARDIA)    • Difficulty of Paying Living Expenses: Not hard at all   Food Insecurity: No Food Insecurity (2025)    Nursing - Inadequate Food Risk Classification    • Worried About Running Out of Food in the Last Year: Never true    • Ran Out of Food in the Last Year: Never true    • Ran Out of Food in the Last Year: Never true   Transportation Needs: No Transportation Needs (2025)     PRAPARE - Transportation    • Lack of Transportation (Medical): No    • Lack of Transportation (Non-Medical): No   Housing Stability: Low Risk  (5/12/2025)    Housing Stability Vital Sign    • Unable to Pay for Housing in the Last Year: No    • Number of Times Moved in the Last Year: 0    • Homeless in the Last Year: No   Utilities: Not At Risk (5/12/2025)    [facility] Utilities    • Threatened with loss of utilities: No     No results found.    Objective   /84 (BP Location: Left arm, Patient Position: Sitting, Cuff Size: Standard)   Pulse 95   Temp 98 °F (36.7 °C) (Tympanic)   Resp 18   Ht 5' (1.524 m)   Wt 102 kg (224 lb 12.8 oz)   SpO2 96%   BMI 43.90 kg/m²     Physical Exam  Vitals and nursing note reviewed.   Constitutional:       Appearance: Normal appearance. She is well-developed.   HENT:      Head: Normocephalic and atraumatic.      Right Ear: External ear normal.      Left Ear: External ear normal.      Nose: Nose normal.     Eyes:      Conjunctiva/sclera: Conjunctivae normal.      Pupils: Pupils are equal, round, and reactive to light.       Cardiovascular:      Rate and Rhythm: Normal rate and regular rhythm.      Heart sounds: Normal heart sounds.   Pulmonary:      Effort: Pulmonary effort is normal.      Breath sounds: Normal breath sounds.   Abdominal:      General: Bowel sounds are normal.      Palpations: Abdomen is soft.     Musculoskeletal:         General: Normal range of motion.      Cervical back: Normal range of motion and neck supple.     Skin:     General: Skin is warm and dry.      Capillary Refill: Capillary refill takes less than 2 seconds.     Neurological:      Mental Status: She is alert and oriented to person, place, and time.     Psychiatric:         Behavior: Behavior normal.         Thought Content: Thought content normal.         Judgment: Judgment normal.

## 2025-05-14 NOTE — ASSESSMENT & PLAN NOTE
Discussed starting mounjaro  Will wait until after after radiation    Lab Results   Component Value Date    HGBA1C 6.6 (H) 05/01/2025       Orders:  •  Tirzepatide (Mounjaro) 2.5 MG/0.5ML SOAJ; Inject 2.5 mg under the skin once a week

## 2025-05-14 NOTE — TELEPHONE ENCOUNTER
TAMAR Henao 2.5 MG/0.5ML SUBMITTED    to [email or web address]     via    []CMM-KEY:    [x]Surescripts-Case ID # [id number]  []Availity-Auth ID #  NDC #    []Faxed to plan   []Other website    []Phone call Case ID #      []PA sent as URGENT    All office notes, labs and other pertaining documents and studies sent. Clinical questions answered. Awaiting determination from insurance company.     Turnaround time for your insurance to make a decision on your Prior Authorization can take 7-21 business days.

## 2025-05-14 NOTE — PATIENT INSTRUCTIONS
Medicare Preventive Visit Patient Instructions  Thank you for completing your Welcome to Medicare Visit or Medicare Annual Wellness Visit today. Your next wellness visit will be due in one year (5/15/2026).  The screening/preventive services that you may require over the next 5-10 years are detailed below. Some tests may not apply to you based off risk factors and/or age. Screening tests ordered at today's visit but not completed yet may show as past due. Also, please note that scanned in results may not display below.  Preventive Screenings:  Service Recommendations Previous Testing/Comments   Colorectal Cancer Screening  * Colonoscopy    * Fecal Occult Blood Test (FOBT)/Fecal Immunochemical Test (FIT)  * Fecal DNA/Cologuard Test  * Flexible Sigmoidoscopy Age: 45-75 years old   Colonoscopy: every 10 years (may be performed more frequently if at higher risk)  OR  FOBT/FIT: every 1 year  OR  Cologuard: every 3 years  OR  Sigmoidoscopy: every 5 years  Screening may be recommended earlier than age 45 if at higher risk for colorectal cancer. Also, an individualized decision between you and your healthcare provider will decide whether screening between the ages of 76-85 would be appropriate. Colonoscopy: 03/08/2023  FOBT/FIT: Not on file  Cologuard: 01/13/2023  Sigmoidoscopy: Not on file          Breast Cancer Screening Age: 40+ years old  Frequency: every 1-2 years  Not required if history of left and right mastectomy Mammogram: 09/26/2024    Screening Current   Cervical Cancer Screening Between the ages of 21-29, pap smear recommended once every 3 years.   Between the ages of 30-65, can perform pap smear with HPV co-testing every 5 years.   Recommendations may differ for women with a history of total hysterectomy, cervical cancer, or abnormal pap smears in past. Pap Smear: 01/15/2024    Screening Not Indicated  History Cervical Cancer   Hepatitis C Screening Once for adults born between 1945 and 1965  More frequently  in patients at high risk for Hepatitis C Hep C Antibody: 12/06/2019    Screening Current   Diabetes Screening 1-2 times per year if you're at risk for diabetes or have pre-diabetes Fasting glucose: 127 mg/dL (5/6/2025)  A1C: 6.6 % (5/1/2025)  Screening Current   Cholesterol Screening Once every 5 years if you don't have a lipid disorder. May order more often based on risk factors. Lipid panel: 05/01/2025    Screening Not Indicated  History Lipid Disorder     Other Preventive Screenings Covered by Medicare:  Abdominal Aortic Aneurysm (AAA) Screening: covered once if your at risk. You're considered to be at risk if you have a family history of AAA.  Lung Cancer Screening: covers low dose CT scan once per year if you meet all of the following conditions: (1) Age 55-77; (2) No signs or symptoms of lung cancer; (3) Current smoker or have quit smoking within the last 15 years; (4) You have a tobacco smoking history of at least 20 pack years (packs per day multiplied by number of years you smoked); (5) You get a written order from a healthcare provider.  Glaucoma Screening: covered annually if you're considered high risk: (1) You have diabetes OR (2) Family history of glaucoma OR (3)  aged 50 and older OR (4)  American aged 65 and older  Osteoporosis Screening: covered every 2 years if you meet one of the following conditions: (1) You're estrogen deficient and at risk for osteoporosis based off medical history and other findings; (2) Have a vertebral abnormality; (3) On glucocorticoid therapy for more than 3 months; (4) Have primary hyperparathyroidism; (5) On osteoporosis medications and need to assess response to drug therapy.   Last bone density test (DXA Scan): 05/25/2022.  HIV Screening: covered annually if you're between the age of 15-65. Also covered annually if you are younger than 15 and older than 65 with risk factors for HIV infection. For pregnant patients, it is covered up to 3 times  per pregnancy.    Immunizations:  Immunization Recommendations   Influenza Vaccine Annual influenza vaccination during flu season is recommended for all persons aged >= 6 months who do not have contraindications   Pneumococcal Vaccine   * Pneumococcal conjugate vaccine = PCV13 (Prevnar 13), PCV15 (Vaxneuvance), PCV20 (Prevnar 20)  * Pneumococcal polysaccharide vaccine = PPSV23 (Pneumovax) Adults 19-63 yo with certain risk factors or if 65+ yo  If never received any pneumonia vaccine: recommend Prevnar 20 (PCV20)  Give PCV20 if previously received 1 dose of PCV13 or PPSV23   Hepatitis B Vaccine 3 dose series if at intermediate or high risk (ex: diabetes, end stage renal disease, liver disease)   Respiratory syncytial virus (RSV) Vaccine - COVERED BY MEDICARE PART D  * RSVPreF3 (Arexvy) CDC recommends that adults 60 years of age and older may receive a single dose of RSV vaccine using shared clinical decision-making (SCDM)   Tetanus (Td) Vaccine - COST NOT COVERED BY MEDICARE PART B Following completion of primary series, a booster dose should be given every 10 years to maintain immunity against tetanus. Td may also be given as tetanus wound prophylaxis.   Tdap Vaccine - COST NOT COVERED BY MEDICARE PART B Recommended at least once for all adults. For pregnant patients, recommended with each pregnancy.   Shingles Vaccine (Shingrix) - COST NOT COVERED BY MEDICARE PART B  2 shot series recommended in those 19 years and older who have or will have weakened immune systems or those 50 years and older     Health Maintenance Due:      Topic Date Due   • Colorectal Cancer Screening  03/07/2024   • Breast Cancer Screening: Mammogram  09/26/2025   • Hepatitis C Screening  Completed     Immunizations Due:      Topic Date Due   • COVID-19 Vaccine (7 - Pfizer risk 2024-25 season) 03/18/2025     Advance Directives   What are advance directives?  Advance directives are legal documents that state your wishes and plans for medical  care. These plans are made ahead of time in case you lose your ability to make decisions for yourself. Advance directives can apply to any medical decision, such as the treatments you want, and if you want to donate organs.   What are the types of advance directives?  There are many types of advance directives, and each state has rules about how to use them. You may choose a combination of any of the following:  Living will:  This is a written record of the treatment you want. You can also choose which treatments you do not want, which to limit, and which to stop at a certain time. This includes surgery, medicine, IV fluid, and tube feedings.   Durable power of  for healthcare (DPAHC):  This is a written record that states who you want to make healthcare choices for you when you are unable to make them for yourself. This person, called a proxy, is usually a family member or a friend. You may choose more than 1 proxy.  Do not resuscitate (DNR) order:  A DNR order is used in case your heart stops beating or you stop breathing. It is a request not to have certain forms of treatment, such as CPR. A DNR order may be included in other types of advance directives.  Medical directive:  This covers the care that you want if you are in a coma, near death, or unable to make decisions for yourself. You can list the treatments you want for each condition. Treatment may include pain medicine, surgery, blood transfusions, dialysis, IV or tube feedings, and a ventilator (breathing machine).  Values history:  This document has questions about your views, beliefs, and how you feel and think about life. This information can help others choose the care that you would choose.  Why are advance directives important?  An advance directive helps you control your care. Although spoken wishes may be used, it is better to have your wishes written down. Spoken wishes can be misunderstood, or not followed. Treatments may be given even if  you do not want them. An advance directive may make it easier for your family to make difficult choices about your care.   Urinary Incontinence   Urinary incontinence (UI)  is when you lose control of your bladder. UI develops because your bladder cannot store or empty urine properly. The 3 most common types of UI are stress incontinence, urge incontinence, or both.  Medicines:   May be given to help strengthen your bladder control. Report any side effects of medication to your healthcare provider.  Do pelvic muscle exercises often:  Your pelvic muscles help you stop urinating. Squeeze these muscles tight for 5 seconds, then relax for 5 seconds. Gradually work up to squeezing for 10 seconds. Do 3 sets of 15 repetitions a day, or as directed. This will help strengthen your pelvic muscles and improve bladder control.  Train your bladder:  Go to the bathroom at set times, such as every 2 hours, even if you do not feel the urge to go. You can also try to hold your urine when you feel the urge to go. For example, hold your urine for 5 minutes when you feel the urge to go. As that becomes easier, hold your urine for 10 minutes.   Self-care:   Keep a UI record.  Write down how often you leak urine and how much you leak. Make a note of what you were doing when you leaked urine.  Drink liquids as directed. You may need to limit the amount of liquid you drink to help control your urine leakage. Do not drink any liquid right before you go to bed. Limit or do not have drinks that contain caffeine or alcohol.   Prevent constipation.  Eat a variety of high-fiber foods. Good examples are high-fiber cereals, beans, vegetables, and whole-grain breads. Walking is the best way to trigger your intestines to have a bowel movement.  Exercise regularly and maintain a healthy weight.  Weight loss and exercise will decrease pressure on your bladder and help you control your leakage.   Use a catheter as directed  to help empty your bladder.  A catheter is a tiny, plastic tube that is put into your bladder to drain your urine.   Go to behavior therapy as directed.  Behavior therapy may be used to help you learn to control your urge to urinate.    Weight Management   Why it is important to manage your weight:  Being overweight increases your risk of health conditions such as heart disease, high blood pressure, type 2 diabetes, and certain types of cancer. It can also increase your risk for osteoarthritis, sleep apnea, and other respiratory problems. Aim for a slow, steady weight loss. Even a small amount of weight loss can lower your risk of health problems.  How to lose weight safely:  A safe and healthy way to lose weight is to eat fewer calories and get regular exercise. You can lose up about 1 pound a week by decreasing the number of calories you eat by 500 calories each day.   Healthy meal plan for weight management:  A healthy meal plan includes a variety of foods, contains fewer calories, and helps you stay healthy. A healthy meal plan includes the following:  Eat whole-grain foods more often.  A healthy meal plan should contain fiber. Fiber is the part of grains, fruits, and vegetables that is not broken down by your body. Whole-grain foods are healthy and provide extra fiber in your diet. Some examples of whole-grain foods are whole-wheat breads and pastas, oatmeal, brown rice, and bulgur.  Eat a variety of vegetables every day.  Include dark, leafy greens such as spinach, kale, juan ramon greens, and mustard greens. Eat yellow and orange vegetables such as carrots, sweet potatoes, and winter squash.   Eat a variety of fruits every day.  Choose fresh or canned fruit (canned in its own juice or light syrup) instead of juice. Fruit juice has very little or no fiber.  Eat low-fat dairy foods.  Drink fat-free (skim) milk or 1% milk. Eat fat-free yogurt and low-fat cottage cheese. Try low-fat cheeses such as mozzarella and other reduced-fat  cheeses.  Choose meat and other protein foods that are low in fat.  Choose beans or other legumes such as split peas or lentils. Choose fish, skinless poultry (chicken or turkey), or lean cuts of red meat (beef or pork). Before you cook meat or poultry, cut off any visible fat.   Use less fat and oil.  Try baking foods instead of frying them. Add less fat, such as margarine, sour cream, regular salad dressing and mayonnaise to foods. Eat fewer high-fat foods. Some examples of high-fat foods include french fries, doughnuts, ice cream, and cakes.  Eat fewer sweets.  Limit foods and drinks that are high in sugar. This includes candy, cookies, regular soda, and sweetened drinks.  Exercise:  Exercise at least 30 minutes per day on most days of the week. Some examples of exercise include walking, biking, dancing, and swimming. You can also fit in more physical activity by taking the stairs instead of the elevator or parking farther away from stores. Ask your healthcare provider about the best exercise plan for you.    © Copyright [employer] 2018 Information is for End User's use only and may not be sold, redistributed or otherwise used for commercial purposes. All illustrations and images included in CareNotes® are the copyrighted property of [employer].A[employer]M., Inc. or [employer]

## 2025-05-15 ENCOUNTER — APPOINTMENT (OUTPATIENT)
Dept: RADIATION ONCOLOGY | Facility: HOSPITAL | Age: 70
End: 2025-05-15
Attending: RADIOLOGY
Payer: COMMERCIAL

## 2025-05-15 ENCOUNTER — TELEPHONE (OUTPATIENT)
Dept: FAMILY MEDICINE CLINIC | Facility: CLINIC | Age: 70
End: 2025-05-15

## 2025-05-15 ENCOUNTER — OFFICE VISIT (OUTPATIENT)
Dept: CARDIOLOGY CLINIC | Facility: CLINIC | Age: 70
End: 2025-05-15
Payer: COMMERCIAL

## 2025-05-15 VITALS
BODY MASS INDEX: 43.9 KG/M2 | HEART RATE: 84 BPM | OXYGEN SATURATION: 97 % | DIASTOLIC BLOOD PRESSURE: 68 MMHG | SYSTOLIC BLOOD PRESSURE: 118 MMHG | HEIGHT: 60 IN | WEIGHT: 223.6 LBS

## 2025-05-15 DIAGNOSIS — Z86.79 HISTORY OF MYOCARDITIS: ICD-10-CM

## 2025-05-15 DIAGNOSIS — I49.3 PREMATURE VENTRICULAR CONTRACTIONS: Primary | ICD-10-CM

## 2025-05-15 DIAGNOSIS — I50.22 HEART FAILURE WITH MILDLY REDUCED EJECTION FRACTION (HCC): ICD-10-CM

## 2025-05-15 DIAGNOSIS — I25.110 CORONARY ARTERY DISEASE INVOLVING NATIVE CORONARY ARTERY OF NATIVE HEART WITH UNSTABLE ANGINA PECTORIS (HCC): ICD-10-CM

## 2025-05-15 DIAGNOSIS — E78.2 MIXED HYPERLIPIDEMIA: ICD-10-CM

## 2025-05-15 DIAGNOSIS — R60.9 EDEMA, UNSPECIFIED TYPE: ICD-10-CM

## 2025-05-15 DIAGNOSIS — I40.8 OTHER ACUTE MYOCARDITIS: ICD-10-CM

## 2025-05-15 DIAGNOSIS — N18.4 TYPE 2 DIABETES MELLITUS WITH STAGE 4 CHRONIC KIDNEY DISEASE, WITHOUT LONG-TERM CURRENT USE OF INSULIN (HCC): ICD-10-CM

## 2025-05-15 DIAGNOSIS — R07.9 CHEST PAIN: ICD-10-CM

## 2025-05-15 DIAGNOSIS — I42.0 DILATED CARDIOMYOPATHY (HCC): ICD-10-CM

## 2025-05-15 DIAGNOSIS — I44.7 LBBB (LEFT BUNDLE BRANCH BLOCK): ICD-10-CM

## 2025-05-15 DIAGNOSIS — E11.22 TYPE 2 DIABETES MELLITUS WITH STAGE 4 CHRONIC KIDNEY DISEASE, WITHOUT LONG-TERM CURRENT USE OF INSULIN (HCC): ICD-10-CM

## 2025-05-15 DIAGNOSIS — I26.99 OTHER ACUTE PULMONARY EMBOLISM WITHOUT ACUTE COR PULMONALE (HCC): ICD-10-CM

## 2025-05-15 PROBLEM — R79.89 ELEVATED TROPONIN: Status: RESOLVED | Noted: 2025-03-24 | Resolved: 2025-05-15

## 2025-05-15 LAB
RAD ONC ARIA COURSE FIRST TREATMENT DATE: NORMAL
RAD ONC ARIA COURSE ID: NORMAL
RAD ONC ARIA COURSE INTENT: NORMAL
RAD ONC ARIA COURSE LAST TREATMENT DATE: NORMAL
RAD ONC ARIA COURSE SESSION NUMBER: 3
RAD ONC ARIA COURSE START DATE: NORMAL
RAD ONC ARIA COURSE TREATMENT ELAPSED DAYS: 2
RAD ONC ARIA PLAN FRACTIONS TREATED TO DATE: 3
RAD ONC ARIA PLAN ID: NORMAL
RAD ONC ARIA PLAN NAME: NORMAL
RAD ONC ARIA PLAN PRESCRIBED DOSE PER FRACTION: 1.8 GY
RAD ONC ARIA PLAN PRIMARY REFERENCE POINT: NORMAL
RAD ONC ARIA PLAN TOTAL FRACTIONS PRESCRIBED: 25
RAD ONC ARIA PLAN TOTAL PRESCRIBED DOSE: 4500 CGY
RAD ONC ARIA REFERENCE POINT DOSAGE GIVEN TO DATE: 5.4 GY
RAD ONC ARIA REFERENCE POINT ID: NORMAL
RAD ONC ARIA REFERENCE POINT SESSION DOSAGE GIVEN: 1.8 GY

## 2025-05-15 PROCEDURE — 99214 OFFICE O/P EST MOD 30 MIN: CPT | Performed by: INTERNAL MEDICINE

## 2025-05-15 PROCEDURE — 77387 GUIDANCE FOR RADJ TX DLVR: CPT | Performed by: INTERNAL MEDICINE

## 2025-05-15 PROCEDURE — 77386 HB NTSTY MODUL RAD TX DLVR CPLX: CPT | Performed by: INTERNAL MEDICINE

## 2025-05-15 RX ORDER — TORSEMIDE 20 MG/1
20 TABLET ORAL DAILY
Qty: 90 TABLET | Refills: 3 | Status: SHIPPED | OUTPATIENT
Start: 2025-05-15

## 2025-05-15 RX ORDER — ATORVASTATIN CALCIUM 80 MG/1
80 TABLET, FILM COATED ORAL
Qty: 90 TABLET | Refills: 3 | Status: SHIPPED | OUTPATIENT
Start: 2025-05-15

## 2025-05-15 RX ORDER — CLOPIDOGREL BISULFATE 75 MG/1
75 TABLET ORAL DAILY
Qty: 90 TABLET | Refills: 3 | Status: SHIPPED | OUTPATIENT
Start: 2025-05-15

## 2025-05-15 RX ORDER — METOPROLOL SUCCINATE 50 MG/1
50 TABLET, EXTENDED RELEASE ORAL EVERY 12 HOURS
Qty: 180 TABLET | Refills: 3 | Status: SHIPPED | OUTPATIENT
Start: 2025-05-15

## 2025-05-15 NOTE — TELEPHONE ENCOUNTER
CASSIE for patient to return call. I received a fax from [facility] that they are unable to fill her Mounjaro prescription. Does she want us to transfer it to [facility] pharmacy?

## 2025-05-15 NOTE — TELEPHONE ENCOUNTER
We received a fax from [facility] that they are unable to fill her Mounjaro prescription, new Pharmacy info updated

## 2025-05-15 NOTE — ASSESSMENT & PLAN NOTE
Continued on Lipitor 40 mg daily   in May 2025 which is elevated in the setting of a goal of less than 70 with CAD and type 2 diabetes mellitus  Will increase Lipitor to 80 mg daily for improved control  Recheck lipid panel in 3 months

## 2025-05-15 NOTE — ASSESSMENT & PLAN NOTE
Wt Readings from Last 3 Encounters:   05/15/25 101 kg (223 lb 9.6 oz)   05/14/25 102 kg (224 lb 12.8 oz)   05/08/25 102 kg (225 lb)   Continued on maintenance diuretic with torsemide 20 mg daily  Weight stable without evidence of volume overload on exam today

## 2025-05-15 NOTE — ASSESSMENT & PLAN NOTE
Diagnosed in February 2025  completed prednisone taper  Remains on colchicine 0.6 mg daily until 5/30/2025 to complete a total of 3-month course  CRP normalized to 2.6

## 2025-05-15 NOTE — PROGRESS NOTES
Cardiology Follow Up    Cherelle Dash  1955  [id number]  [facility] [place]  [address]  [place] [address] [phone]      Assessment & Plan  Premature ventricular contractions  noted to have a few PVCs and brief NSVT during hospitalization for RSV.    Echocardiogram revealed normal LV size and function with G1DD, abnormal septal motion due to LBBB, LAE, mild to mod MR at that time.    She was started on a B-blocker and her heart rates are improved.   Continues on B-blocker without side effects or new symptoms.  Dose was increased of beta-blocker due to mildly reduced ejection fraction noted on echocardiogram the setting of pulmonary embolism and myocarditis  Other acute pulmonary embolism without acute cor pulmonale (HCC)  History of DVT and PE in September 2024, status post IVC filter  Continued on anticoagulation with Eliquis  LBBB (left bundle branch block)  Chronic and seen on EKG  Heart failure with mildly reduced ejection fraction (HCC)  Wt Readings from Last 3 Encounters:   05/15/25 101 kg (223 lb 9.6 oz)   05/14/25 102 kg (224 lb 12.8 oz)   05/08/25 102 kg (225 lb)   Continued on maintenance diuretic with torsemide 20 mg daily  Weight stable without evidence of volume overload on exam today  Coronary artery disease involving native coronary artery of native heart with unstable angina pectoris (HCC)  Drug-eluting stent in May 2025 in the setting of chest pain and elevated troponin to the OM2  She does have residual 40% proximal RCA lesion which will be left for medical therapy  Continued on aspirin, Plavix, beta-blocker, and statin - will continue on ASA/Plavix/Eliquis for 1 month and then continued on Plavix and Eliquis without Aspirin  She has been referred to cardiac rehab  Dilated cardiomyopathy (HCC)  Presumed nonischemic cardiomyopathy in the setting of myocarditis  echo 5/1/2025:  EF low normal at 50%, moderate MR, mild TR, no effusion  Continue on  beta-blocker for GDMT along with maintenance diuretic  Mixed hyperlipidemia  Continued on Lipitor 40 mg daily   in May 2025 which is elevated in the setting of a goal of less than 70 with CAD and type 2 diabetes mellitus  Will increase Lipitor to 80 mg daily for improved control  Recheck lipid panel in 3 months  History of myocarditis  Diagnosed in February 2025  completed prednisone taper  Remains on colchicine 0.6 mg daily until 5/30/2025 to complete a total of 3-month course  CRP normalized to 2.6    Chief Complaint   Patient presents with    Follow-up     No cardiac complaints        Interval History: Patient presented to the hospital with chest pain, felt different than her myocarditis pain, status post cardiac catheterization and no drug-eluting stent to the OM2.  Patient feels well, without complaints.  No reported chest pain, shortness of breath, palpitations, lightheadedness, syncope, LE edema, orthopnea, PND, or significant weight changes.  Patient remains active without any increased fatigue out of the ordinary.        Blood pressure 118/68, pulse 84, height 5' (1.524 m), weight 101 kg (223 lb 9.6 oz), SpO2 97%, not currently breastfeeding.      Review of Systems:  Review of Systems   Constitutional:  Negative for activity change, appetite change, chills, diaphoresis, fatigue and unexpected weight change.   HENT:  Negative for hearing loss, nosebleeds and sore throat.    Eyes:  Negative for photophobia and visual disturbance.   Respiratory:  Negative for cough, chest tightness, shortness of breath and wheezing.    Cardiovascular:  Negative for chest pain, palpitations and leg swelling.   Gastrointestinal:  Negative for abdominal pain, diarrhea, nausea and vomiting.   Endocrine: Negative for polyuria.   Genitourinary:  Negative for dysuria, frequency and hematuria.   Musculoskeletal:  Negative for arthralgias, back pain, gait problem and neck pain.   Skin:  Negative for pallor and rash.    Neurological:  Negative for dizziness, syncope and headaches.   Hematological:  Does not bruise/bleed easily.   Psychiatric/Behavioral:  Negative for behavioral problems and confusion.        Physical Exam:  Physical Exam  Vitals reviewed.   Constitutional:       Appearance: Normal appearance. She is well-developed. She is not ill-appearing or diaphoretic.   HENT:      Head: Normocephalic and atraumatic.      Nose: Nose normal.     Eyes:      General: No scleral icterus.     Extraocular Movements: Extraocular movements intact.      Pupils: Pupils are equal, round, and reactive to light.     Neck:      Vascular: No JVD.     Cardiovascular:      Rate and Rhythm: Normal rate and regular rhythm.      Heart sounds: Normal heart sounds. No murmur heard.     No friction rub. No gallop.   Pulmonary:      Effort: Pulmonary effort is normal. No respiratory distress.      Breath sounds: Normal breath sounds. No wheezing or rales.   Abdominal:      General: Bowel sounds are normal. There is no distension.      Palpations: Abdomen is soft.      Tenderness: There is no abdominal tenderness.     Musculoskeletal:         General: No deformity. Normal range of motion.      Cervical back: Normal range of motion and neck supple.      Right lower leg: No edema.      Left lower leg: No edema.     Skin:     General: Skin is warm and dry.      Findings: No rash.     Neurological:      Mental Status: She is alert and oriented to person, place, and time.      Cranial Nerves: No cranial nerve deficit.     Psychiatric:         Mood and Affect: Mood normal.         Behavior: Behavior normal.         Patient Active Problem List   Diagnosis    Moderate mixed bipolar I disorder (HCC)    Depression with anxiety    Gastroesophageal reflux disease without esophagitis    Hypothyroidism    Type 2 diabetes mellitus with chronic kidney disease, without long-term current use of insulin ([facility])    Insomnia    Obstructive sleep apnea    Onychomycosis of  toenail    Patellofemoral arthritis of right knee    Stress incontinence of urine    Hyperlipidemia    Raynaud's disease without gangrene    Stage 4 chronic kidney disease ([facility])    Medicare annual wellness visit, subsequent    Vitamin D deficiency    Primary osteoarthritis of left knee    Primary osteoarthritis of right knee    Secondary hyperparathyroidism of renal origin ([facility])    Persistent proteinuria    Morbid obesity with BMI of 40.0-44.9, adult ([facility])    Mass of right hand    Lichen sclerosus    Premature ventricular contractions    LBBB (left bundle branch block)    Other sleep disorders    Hyperphosphatemia    Endometrial cancer ([facility])    Other pulmonary embolism without acute cor pulmonale ([facility])    Cardiomyopathy ([facility])    Hospital discharge follow-up    Encounter for central line care    Palliative care by specialist    Anxiety about health    Goals of care, counseling/discussion    Drug-induced neutropenia ([facility])    Arthralgia    Drug-induced pneumonitis    Solar degeneration    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[facility])    History of myocarditis    Low magnesium level    Heart failure with mildly reduced ejection fraction ([facility])    Chronic kidney disease, stage 4 (severe) ([facility])    Ambulatory dysfunction    Elevated serum creatinine    Chronic kidney disease-mineral and bone disorder    Anemia due to stage 4 chronic kidney disease  ([facility])    Coronary artery disease involving native coronary artery of native heart with unstable angina pectoris ([facility])     Past Medical History:   Diagnosis Date    Abnormal ECG     Anxiety 2002    Arthritis     Bipolar 1 disorder (HCC)     Cervical cancer ([facility])     Chronic kidney disease     stage 3    CPAP (continuous positive airway pressure) dependence     Depression 2001    Disease of thyroid gland 2001    Thyroid treated with Lithium medication for bi-polar disease    DVT (deep venous thrombosis) ([facility])     BLLE    Elevated blood pressure  reading 06/10/2019    GERD (gastroesophageal reflux disease)     Obesity     Pulmonary emboli (HCC)     B/L    RSV (acute bronchiolitis due to respiratory syncytial virus) 2023    Sleep apnea     Sleep apnea, obstructive 2007    Urinary incontinence 2019    Uterine cancer (HCC)      Social History     Socioeconomic History    Marital status: /Civil Union     Spouse name: Not on file    Number of children: Not on file    Years of education: Not on file    Highest education level: Not on file   Occupational History    Not on file   Tobacco Use    Smoking status: Former     Current packs/day: 0.00     Average packs/day: 0.3 packs/day for 30.0 years (7.5 ttl pk-yrs)     Types: Cigarettes     Start date: 1979     Quit date: 2009     Years since quittin.3     Passive exposure: Never    Smokeless tobacco: Never   Vaping Use    Vaping status: Never Used   Substance and Sexual Activity    Alcohol use: Not Currently     Alcohol/week: 1.0 standard drink of alcohol     Types: 1 Glasses of wine per week    Drug use: Never    Sexual activity: Not Currently     Partners: Male     Birth control/protection: Post-menopausal   Other Topics Concern    Not on file   Social History Narrative    Daily coffee consumption: 3 cups/day     Social Drivers of Health     Financial Resource Strain: Low Risk  (2024)    Overall Financial Resource Strain (CARDIA)     Difficulty of Paying Living Expenses: Not hard at all   Food Insecurity: No Food Insecurity (2025)    Nursing - Inadequate Food Risk Classification     Worried About Running Out of Food in the Last Year: Never true     Ran Out of Food in the Last Year: Never true     Ran Out of Food in the Last Year: Never true   Transportation Needs: No Transportation Needs (2025)    PRAPARE - Transportation     Lack of Transportation (Medical): No     Lack of Transportation (Non-Medical): No   Physical Activity: Not on file   Stress: Not on file   Social  Connections: Not on file   Intimate Partner Violence: Unknown (4/30/2025)    Nursing IPS     Feels Physically and Emotionally Safe: Not on file     Physically Hurt by Someone: Not on file     Humiliated or Emotionally Abused by Someone: Not on file     Physically Hurt by Someone: No     Hurt or Threatened by Someone: No   Housing Stability: Low Risk  (5/12/2025)    Housing Stability Vital Sign     Unable to Pay for Housing in the Last Year: No     Number of Times Moved in the Last Year: 0     Homeless in the Last Year: No      Family History   Problem Relation Age of Onset    Heart disease Mother     Heart Valve Disease Mother         Replacement    Diabetes Mother         2002    Heart failure Mother         Heart Valve replacement    Arthritis Father     No Known Problems Sister     No Known Problems Daughter     No Known Problems Maternal Grandmother     No Known Problems Maternal Grandfather     No Known Problems Paternal Grandmother     No Known Problems Paternal Grandfather     No Known Problems Son     No Known Problems Maternal Aunt     No Known Problems Maternal Aunt     No Known Problems Maternal Aunt     No Known Problems Maternal Aunt     No Known Problems Maternal Aunt     No Known Problems Paternal Aunt     Alcohol abuse Son         [place], 40 year old son, has issues with alcohol and alcohol abuse     Past Surgical History:   Procedure Laterality Date    CARDIAC CATHETERIZATION N/A 5/2/2025    Procedure: Cardiac Catheterization;  Surgeon: Jose Osborne MD;  [facility];  Service: Cardiology    COLONOSCOPY  2011    COLONOSCOPY  03/08/2023    DENTAL SURGERY  2020    FOOT SURGERY  1976    [place]    HYSTERECTOMY  10/11/24    IR IVC FILTER PLACEMENT OPTIONAL/TEMPORARY  10/08/2024    IR PORT CHECK  12/26/2024    IR PORT PLACEMENT  12/03/2024    LAPAROTOMY N/A 10/11/2024    Procedure: EXPLORATORY LAPAROTOMY;  Surgeon: Rodney Downing MD;  Location: [facility];  Service: Gynecology  Oncology    AR HYSTEROSCOPY BX ENDOMETRIUM&/POLYPC W/WO D&C N/A 08/23/2024    Procedure: EXAM UNDER ANESTHESIA, DILATATION AND CURETTAGE, CERVICAL BIOPSIES;  Surgeon: Evin Page MD;  Location: [facility];  Service: Gynecology Oncology    AR LAPS TOTAL HYSTERECT 250 GM/< W/RMVL TUBE/OVARY N/A 10/11/2024    Procedure: ROBOTIC ASSISTED LTH, BSO, LYMPH NODE DISSECTION, EUA;  Surgeon: Rodney Downing MD;  Location[facility];  Service: Gynecology Oncology       Current Outpatient Medications:     apixaban (Eliquis) 5 mg, Take 1 tablet (5 mg total) by mouth 2 (two) times a day, Disp: 180 tablet, Rfl: 1    aspirin 81 mg chewable tablet, Chew 1 tablet (81 mg total) daily for 28 days, Disp: 28 tablet, Rfl: 0    atorvastatin (LIPITOR) 80 mg tablet, Take 1 tablet (80 mg total) by mouth daily with dinner, Disp: 90 tablet, Rfl: 3    chlorhexidine (PERIDEX) 0.12 % solution, , Disp: , Rfl:     Cholecalciferol (VITAMIN D) 2000 units CAPS, Take 1 capsule by mouth in the morning., Disp: , Rfl:     clobetasol (TEMOVATE) 0.05 % ointment, Apply topically 2 (two) times a week, Disp: 60 g, Rfl: 3    clopidogrel (PLAVIX) 75 mg tablet, Take 1 tablet (75 mg total) by mouth daily, Disp: 90 tablet, Rfl: 3    colchicine (COLCRYS) 0.6 mg tablet, Take 1 tablet (0.6 mg total) by mouth daily, Disp: 90 tablet, Rfl: 1    CRANBERRY PO, Take by mouth, Disp: , Rfl:     famotidine (PEPCID) 20 mg tablet, TAKE 1 TABLET BY MOUTH TWICE  DAILY, Disp: 180 tablet, Rfl: 1    Glucosamine-Chondroit-Vit C-Mn (GLUCOSAMINE 1500 COMPLEX PO), Take by mouth in the morning, Disp: , Rfl:     Ivermectin 1 % CREA, , Disp: , Rfl:     lidocaine-prilocaine (EMLA) cream, Apply to PORT 30 min prior to labs and chemo, Disp: 30 g, Rfl: 1    LORazepam (ATIVAN) 1 mg tablet, Take 1 tablet PO HS prn insomnia, Disp: 30 tablet, Rfl: 0    lurasidone (LATUDA) 20 mg tablet, TAKE 1 TABLET BY MOUTH DAILY  WITH BREAKFAST, Disp: 30 tablet, Rfl: 11    magnesium Oxide (MAG-OX)  400 mg TABS, TAKE 1 TABLET BY MOUTH EVERY DAY, Disp: 90 tablet, Rfl: 1    metoprolol succinate (TOPROL-XL) 50 mg 24 hr tablet, Take 1 tablet (50 mg total) by mouth every 12 (twelve) hours, Disp: 180 tablet, Rfl: 3    Mirabegron ER (Myrbetriq) 50 MG TB24, TAKE 1 TABLET BY MOUTH IN THE  MORNING, Disp: 90 tablet, Rfl: 1    Multiple Vitamin (MULTI-VITAMIN DAILY) TABS, Take by mouth in the morning., Disp: , Rfl:     polyethylene glycol (GLYCOLAX) 17 GM/SCOOP powder, Take 17 g by mouth 2 (two) times a day, Disp: 238 g, Rfl: 1    Sodium Fluoride 5000 PPM 1.1 % GEL, As directed, Disp: , Rfl:     torsemide (DEMADEX) 20 mg tablet, Take 1 tablet (20 mg total) by mouth daily, Disp: 90 tablet, Rfl: 3    Tirzepatide (Mounjaro) 2.5 MG/0.5ML SOAJ, Inject 2.5 mg under the skin once a week (Patient not taking: Reported on 5/15/2025), Disp: 2 mL, Rfl: 0  Allergies   Allergen Reactions    Raw Fruit - Food Allergy Anaphylaxis     PLUMS, PEACHES, APPLES, CHERRIES FRUIT WITH SKIN       Labs:  Orders Only on 05/14/2025   Component Date Value    Course ID 05/14/2025 C1     Course Intent 05/14/2025 Curative     Course Start Date 05/14/2025 4/30/2025  7:07:05 AM EDT     Session Number 05/14/2025 2     Course First Treatment D* 05/14/2025 5/13/2025  9:03:08 AM EDT     Course Last Treatment Da* 05/14/2025 5/14/2025  4:02:03 PM EDT     Course Elapsed Days 05/14/2025 1     Reference Point ID 05/14/2025 Whole Pelvis     Reference Point Dosage G* 05/14/2025 3.6     Reference Point Session * 05/14/2025 1.8     Plan ID 05/14/2025 Whole Pelvis     Plan Name 05/14/2025 Whole Pelvis     Plan Fractions Treated T* 05/14/2025 2     Plan Total Fractions Pre* 05/14/2025 25     Plan Prescribed Dose Per* 05/14/2025 1.8     Plan Total Prescribed Do* 05/14/2025 4,500     Plan Primary Reference P* 05/14/2025 Whole Pelvis    Orders Only on 05/13/2025   Component Date Value    Course ID 05/13/2025 C1     Course Intent 05/13/2025 Curative     Course Start Date  05/13/2025 4/30/2025  7:07:05 AM EDT     Session Number 05/13/2025 1     Course First Treatment D* 05/13/2025 5/13/2025  9:03:08 AM EDT     Course Last Treatment Da* 05/13/2025 5/13/2025  9:06:39 AM EDT     Course Elapsed Days 05/13/2025 0     Reference Point ID 05/13/2025 Whole Pelvis     Reference Point Dosage G* 05/13/2025 1.8     Reference Point Session * 05/13/2025 1.8     Plan ID 05/13/2025 Whole Pelvis     Plan Name 05/13/2025 Whole Pelvis     Plan Fractions Treated T* 05/13/2025 1     Plan Total Fractions Pre* 05/13/2025 25     Plan Prescribed Dose Per* 05/13/2025 1.8     Plan Total Prescribed Do* 05/13/2025 4,500     Plan Primary Reference P* 05/13/2025 Whole Pelvis    Appointment on 05/06/2025   Component Date Value    Sodium 05/06/2025 143     Potassium 05/06/2025 3.9     Chloride 05/06/2025 108     CO2 05/06/2025 24     ANION GAP 05/06/2025 11     BUN 05/06/2025 29 (H)     Creatinine 05/06/2025 1.77 (H)     Glucose, Fasting 05/06/2025 127 (H)     Calcium 05/06/2025 9.2     eGFR 05/06/2025 28    No results displayed because visit has over 200 results.      Appointment on 04/28/2025   Component Date Value    WBC 04/28/2025 3.61 (L)     RBC 04/28/2025 3.55 (L)     Hemoglobin 04/28/2025 11.4 (L)     Hematocrit 04/28/2025 35.4     MCV 04/28/2025 100 (H)     MCH 04/28/2025 32.1     MCHC 04/28/2025 32.2     RDW 04/28/2025 13.3     MPV 04/28/2025 12.5     Platelets 04/28/2025 177     nRBC 04/28/2025 0     Segmented % 04/28/2025 55     Immature Grans % 04/28/2025 0     Lymphocytes % 04/28/2025 27     Monocytes % 04/28/2025 14 (H)     Eosinophils Relative 04/28/2025 3     Basophils Relative 04/28/2025 1     Absolute Neutrophils 04/28/2025 1.97     Absolute Immature Grans 04/28/2025 0.01     Absolute Lymphocytes 04/28/2025 0.97     Absolute Monocytes 04/28/2025 0.50     Eosinophils Absolute 04/28/2025 0.11     Basophils Absolute 04/28/2025 0.05     Sodium 04/28/2025 141     Potassium 04/28/2025 3.7     Chloride  04/28/2025 105     CO2 04/28/2025 25     ANION GAP 04/28/2025 11     BUN 04/28/2025 26 (H)     Creatinine 04/28/2025 1.85 (H)     Glucose 04/28/2025 148 (H)     Calcium 04/28/2025 9.2     AST 04/28/2025 24     ALT 04/28/2025 25     Alkaline Phosphatase 04/28/2025 85     Total Protein 04/28/2025 6.4     Albumin 04/28/2025 3.8     Total Bilirubin 04/28/2025 0.41     eGFR 04/28/2025 27     Magnesium 04/28/2025 2.0      04/28/2025 19.8     Creatinine, Ur 04/28/2025 114.5     Protein Urine Random 04/28/2025 26.8     Prot/Creat Ratio, Ur 04/28/2025 0.2    Hospital Outpatient Visit on 04/09/2025   Component Date Value    WBC 04/09/2025 5.75     RBC 04/09/2025 3.55 (L)     Hemoglobin 04/09/2025 11.9     Hematocrit 04/09/2025 35.2     MCV 04/09/2025 99 (H)     MCH 04/09/2025 33.5     MCHC 04/09/2025 33.8     RDW 04/09/2025 15.4 (H)     MPV 04/09/2025 11.6     Platelets 04/09/2025 188     nRBC 04/09/2025 0     Segmented % 04/09/2025 64     Immature Grans % 04/09/2025 0     Lymphocytes % 04/09/2025 22     Monocytes % 04/09/2025 12     Eosinophils Relative 04/09/2025 1     Basophils Relative 04/09/2025 1     Absolute Neutrophils 04/09/2025 3.77     Absolute Immature Grans 04/09/2025 0.02     Absolute Lymphocytes 04/09/2025 1.24     Absolute Monocytes 04/09/2025 0.66     Eosinophils Absolute 04/09/2025 0.03     Basophils Absolute 04/09/2025 0.03     Sodium 04/09/2025 138     Potassium 04/09/2025 3.4 (L)     Chloride 04/09/2025 97     CO2 04/09/2025 29     ANION GAP 04/09/2025 12     BUN 04/09/2025 33 (H)     Creatinine 04/09/2025 1.79 (H)     Glucose 04/09/2025 207 (H)     Calcium 04/09/2025 9.1     AST 04/09/2025 15     ALT 04/09/2025 22     Alkaline Phosphatase 04/09/2025 94     Total Protein 04/09/2025 6.7     Albumin 04/09/2025 3.8     Total Bilirubin 04/09/2025 0.52     eGFR 04/09/2025 28     Magnesium 04/09/2025 2.1      04/09/2025 13.0    Hospital Outpatient Visit on 04/02/2025   Component Date Value    WBC  04/02/2025 12.82 (H)     RBC 04/02/2025 3.56 (L)     Hemoglobin 04/02/2025 11.7     Hematocrit 04/02/2025 36.3     MCV 04/02/2025 102 (H)     MCH 04/02/2025 32.9     MCHC 04/02/2025 32.2     RDW 04/02/2025 17.0 (H)     MPV 04/02/2025 11.8     Platelets 04/02/2025 204     nRBC 04/02/2025 0     Segmented % 04/02/2025 82 (H)     Immature Grans % 04/02/2025 1     Lymphocytes % 04/02/2025 11 (L)     Monocytes % 04/02/2025 6     Eosinophils Relative 04/02/2025 0     Basophils Relative 04/02/2025 0     Absolute Neutrophils 04/02/2025 10.60 (H)     Absolute Immature Grans 04/02/2025 0.08     Absolute Lymphocytes 04/02/2025 1.37     Absolute Monocytes 04/02/2025 0.71     Eosinophils Absolute 04/02/2025 0.03     Basophils Absolute 04/02/2025 0.03     Sodium 04/02/2025 139     Potassium 04/02/2025 4.1     Chloride 04/02/2025 98     CO2 04/02/2025 27     ANION GAP 04/02/2025 14 (H)     BUN 04/02/2025 33 (H)     Creatinine 04/02/2025 1.78 (H)     Glucose 04/02/2025 203 (H)     Calcium 04/02/2025 9.4     AST 04/02/2025 15     ALT 04/02/2025 26     Alkaline Phosphatase 04/02/2025 105 (H)     Total Protein 04/02/2025 6.8     Albumin 04/02/2025 3.9     Total Bilirubin 04/02/2025 0.47     eGFR 04/02/2025 28     Magnesium 04/02/2025 2.0    Admission on 03/24/2025, Discharged on 03/25/2025   Component Date Value    WBC 03/24/2025 15.39 (H)     RBC 03/24/2025 3.40 (L)     Hemoglobin 03/24/2025 11.2 (L)     Hematocrit 03/24/2025 34.0 (L)     MCV 03/24/2025 100 (H)     MCH 03/24/2025 32.9     MCHC 03/24/2025 32.9     RDW 03/24/2025 17.3 (H)     MPV 03/24/2025 12.4     Platelets 03/24/2025 107 (L)     Sodium 03/24/2025 138     Potassium 03/24/2025 4.1     Chloride 03/24/2025 100     CO2 03/24/2025 27     ANION GAP 03/24/2025 11     BUN 03/24/2025 32 (H)     Creatinine 03/24/2025 1.58 (H)     Glucose 03/24/2025 298 (H)     Calcium 03/24/2025 8.9     AST 03/24/2025 13     ALT 03/24/2025 23     Alkaline Phosphatase 03/24/2025 130 (H)      Total Protein 03/24/2025 6.2 (L)     Albumin 03/24/2025 3.6     Total Bilirubin 03/24/2025 0.42     eGFR 03/24/2025 32     hs TnI 0hr 03/24/2025 165 (H)     TSH 3RD GENERATON 03/24/2025 0.929     hs TnI 2hr 03/24/2025 212 (H)     Delta 2hr hsTnI 03/24/2025 47 (H)     Ventricular Rate 03/24/2025 87     Atrial Rate 03/24/2025 87     WA Interval 03/24/2025 158     QRSD Interval 03/24/2025 132     QT Interval 03/24/2025 390     QTC Interval 03/24/2025 469     P Axis 03/24/2025 52     QRS Axis 03/24/2025 -83     T Wave Axis 03/24/2025 70     Segmented % 03/24/2025 93 (H)     Bands % 03/24/2025 2     Lymphocytes % 03/24/2025 5 (L)     Monocytes % 03/24/2025 0 (L)     Eosinophils % 03/24/2025 0     Basophils % 03/24/2025 0     Absolute Neutrophils 03/24/2025 14.62 (H)     Absolute Lymphocytes 03/24/2025 0.77     Absolute Monocytes 03/24/2025 0.00     Absolute Eosinophils 03/24/2025 0.00     Absolute Basophils 03/24/2025 0.00     RBC Morphology 03/24/2025 Present     Platelet Estimate 03/24/2025 Decreased (A)     Anisocytosis 03/24/2025 Present     hs TnI 4hr 03/24/2025 249 (H)     Delta 4hr hsTnI 03/24/2025 84 (H)     BSA 03/24/2025 1.97     LV EF 03/24/2025 50     Est. RA pres 03/24/2025 3.0     WBC 03/25/2025 9.70     RBC 03/25/2025 3.19 (L)     Hemoglobin 03/25/2025 10.6 (L)     Hematocrit 03/25/2025 32.7 (L)     MCV 03/25/2025 103 (H)     MCH 03/25/2025 33.2     MCHC 03/25/2025 32.4     RDW 03/25/2025 17.6 (H)     Platelets 03/25/2025 99 (L)     MPV 03/25/2025 11.9     Sodium 03/25/2025 141     Potassium 03/25/2025 3.5     Chloride 03/25/2025 101     CO2 03/25/2025 32     ANION GAP 03/25/2025 8     BUN 03/25/2025 29 (H)     Creatinine 03/25/2025 1.68 (H)     Glucose 03/25/2025 98     Calcium 03/25/2025 9.0     eGFR 03/25/2025 30     Magnesium 03/25/2025 2.3     BNP 03/25/2025 155 (H)    Appointment on 03/20/2025   Component Date Value    Cholesterol 03/20/2025 219 (H)     Triglycerides 03/20/2025 305 (H)     HDL,  Direct 03/20/2025 57     LDL Calculated 03/20/2025 101 (H)     C-Peptide 03/20/2025 6.6 (H)     Glucose, Fasting 03/20/2025 174 (H)    Hospital Outpatient Visit on 03/19/2025   Component Date Value    WBC 03/19/2025 13.07 (H)     RBC 03/19/2025 3.43 (L)     Hemoglobin 03/19/2025 11.0 (L)     Hematocrit 03/19/2025 33.5 (L)     MCV 03/19/2025 98     MCH 03/19/2025 32.1     MCHC 03/19/2025 32.8     RDW 03/19/2025 17.9 (H)     MPV 03/19/2025 13.0 (H)     Platelets 03/19/2025 92 (L)     Sodium 03/19/2025 138     Potassium 03/19/2025 3.5     Chloride 03/19/2025 98     CO2 03/19/2025 28     ANION GAP 03/19/2025 12     BUN 03/19/2025 44 (H)     Creatinine 03/19/2025 1.81 (H)     Glucose 03/19/2025 321 (H)     Calcium 03/19/2025 9.2     AST 03/19/2025 15     ALT 03/19/2025 24     Alkaline Phosphatase 03/19/2025 133 (H)     Total Protein 03/19/2025 6.3 (L)     Albumin 03/19/2025 3.7     Total Bilirubin 03/19/2025 0.37     eGFR 03/19/2025 27     Magnesium 03/19/2025 2.0     Amylase 03/19/2025 37     Lipase 03/19/2025 28      03/19/2025 18.3     Segmented % 03/19/2025 75     Bands % 03/19/2025 21 (H)     Lymphocytes % 03/19/2025 3 (L)     Monocytes % 03/19/2025 1 (L)     Eosinophils % 03/19/2025 0     Basophils % 03/19/2025 0     Absolute Neutrophils 03/19/2025 12.55 (H)     Absolute Lymphocytes 03/19/2025 0.39 (L)     Absolute Monocytes 03/19/2025 0.13     Absolute Eosinophils 03/19/2025 0.00     Absolute Basophils 03/19/2025 0.00     Dohle Bodies 03/19/2025 Present     RBC Morphology 03/19/2025 Present     Platelet Estimate 03/19/2025 Decreased (A)     Anisocytosis 03/19/2025 Present     Microcytes 03/19/2025 Present     Ovalocytes 03/19/2025 Present    There may be more visits with results that are not included.     Lab Results   Component Value Date    CHOL 208 (H) 11/17/2017    TRIG 128 05/01/2025    TRIG 116 06/25/2020    HDL 44 (L) 05/01/2025    HDL 57 06/25/2020     Imaging: IR port placement  Result Date:  "12/5/2024  Narrative: PROCEDURE: Port Placement Procedural Personnel Attending physician(s): Jamaal Colindres MD Pre-procedure diagnosis: Endometrial cancer Post-procedure diagnosis: Same Clinical History: Very pleasant 69-year-old woman with endometrial cancer referred for port placement in anticipation of chemotherapy. PROCEDURE SUMMARY: Insertion of tunneled centrally inserted central venous access device, with subcutaneous port PROCEDURE DETAILS: Pre-procedure Consent: Informed consent for the procedure including risks, benefits and alternatives was obtained and time-out was performed prior to the procedure. Preparation: The site was prepared and draped using maximal sterile barrier technique including cutaneous antisepsis. Anesthesia/sedation Level of anesthesia/sedation: Moderate sedation (conscious sedation) Anesthesia/sedation administered by: Independent trained observer under attending supervision with continuous monitoring of the patient's level of consciousness and physiologic status Total intra-service sedation time: 45 MIN Technique and Findings: Immediate pre-procedure ultrasound demonstrated a widely patent right internal jugular vein, which was judged appropriate for access.  Local anesthetic was administered.  A dermatotomy was made.  Under real-time ultrasound guidance, access was obtained using a 21-gauge micropuncture needle.  A permanent image was saved.  A microwire was advanced via the micropuncture needle and the needle exchanged for a transitional micropuncture dilator.  The intravascular distance was measured with the microwire, and then both the microwire and inner portion of the transitional dilator were removed.  A 0.035\" guidewire was advanced into the IVC.  The wire and transitional dilator were secured with a flow switch. Attention was then turned towards creation of the port pocket.  An appropriate site on the chest, approximately two finger-breadths below the collarbone, " was selected.  Local anesthetic was administered to the planned pocket and along the planned tunnel tract.  An incision was made with a 15-blade scalpel.  The pocket was developed with blunt dissection.  The reservoir was inserted into the pocket and the catheter tunneled to the venous access site.  The catheter was cut to length. Attention was returned to the venous access site.  The flow switch and transitional dilator were removed.  The venotomy was serially dilated and a peel-away sheath was placed.  The port catheter was inserted via the sheath, which was then peeled away.  Fluoroscopy confirmed appropriate position of the catheter tip.  The port was tested; it flushed and aspirated well.  The reservoir was secured in the pocket with a single 3-0 Vicryl suture. The pocket was closed with 3-0 Vicryl deep dermal sutures in simple interrupted fashion, followed by 4-0 Monocryl subcuticular suture in running fashion.  The pocket incision and the neck venotomy sites were covered with Exofin glue. A completion fluoroscopic image demonstrated appropriate position of the port with tip terminating at the right atrium. Contrast Contrast dose: None Radiation Dose Fluoroscopy time: 0.7 MIN FL Reference air kerma: 11 mGy Additional Details Specimens removed: None Estimated blood loss (mL): Less than 10 Complications: No immediate complications.     Impression: Successful placement of right chest port via the right internal jugular vein.  The tip terminates at the right atrium.  Port is ready for immediate use. Workstation performed: [id number]

## 2025-05-15 NOTE — ASSESSMENT & PLAN NOTE
noted to have a few PVCs and brief NSVT during hospitalization for RSV.    Echocardiogram revealed normal LV size and function with G1DD, abnormal septal motion due to LBBB, LAE, mild to mod MR at that time.    She was started on a B-blocker and her heart rates are improved.   Continues on B-blocker without side effects or new symptoms.  Dose was increased of beta-blocker due to mildly reduced ejection fraction noted on echocardiogram the setting of pulmonary embolism and myocarditis

## 2025-05-15 NOTE — ASSESSMENT & PLAN NOTE
History of DVT and PE in September 2024, status post IVC filter  Continued on anticoagulation with Eliquis

## 2025-05-15 NOTE — ASSESSMENT & PLAN NOTE
Presumed nonischemic cardiomyopathy in the setting of myocarditis  echo 5/1/2025:  EF low normal at 50%, moderate MR, mild TR, no effusion  Continue on beta-blocker for GDMT along with maintenance diuretic

## 2025-05-15 NOTE — TELEPHONE ENCOUNTER
Patient called to inform that is ok to sent the medication to     [facility]/pharmacy #[id number] - [place], [place] - [address]

## 2025-05-16 ENCOUNTER — APPOINTMENT (OUTPATIENT)
Dept: RADIATION ONCOLOGY | Facility: HOSPITAL | Age: 70
End: 2025-05-16
Attending: RADIOLOGY
Payer: COMMERCIAL

## 2025-05-16 LAB
RAD ONC ARIA COURSE FIRST TREATMENT DATE: NORMAL
RAD ONC ARIA COURSE ID: NORMAL
RAD ONC ARIA COURSE INTENT: NORMAL
RAD ONC ARIA COURSE LAST TREATMENT DATE: NORMAL
RAD ONC ARIA COURSE SESSION NUMBER: 4
RAD ONC ARIA COURSE START DATE: NORMAL
RAD ONC ARIA COURSE TREATMENT ELAPSED DAYS: 3
RAD ONC ARIA PLAN FRACTIONS TREATED TO DATE: 4
RAD ONC ARIA PLAN ID: NORMAL
RAD ONC ARIA PLAN NAME: NORMAL
RAD ONC ARIA PLAN PRESCRIBED DOSE PER FRACTION: 1.8 GY
RAD ONC ARIA PLAN PRIMARY REFERENCE POINT: NORMAL
RAD ONC ARIA PLAN TOTAL FRACTIONS PRESCRIBED: 25
RAD ONC ARIA PLAN TOTAL PRESCRIBED DOSE: 4500 CGY
RAD ONC ARIA REFERENCE POINT DOSAGE GIVEN TO DATE: 7.2 GY
RAD ONC ARIA REFERENCE POINT ID: NORMAL
RAD ONC ARIA REFERENCE POINT SESSION DOSAGE GIVEN: 1.8 GY

## 2025-05-16 PROCEDURE — 77387 GUIDANCE FOR RADJ TX DLVR: CPT | Performed by: STUDENT IN AN ORGANIZED HEALTH CARE EDUCATION/TRAINING PROGRAM

## 2025-05-16 PROCEDURE — 77386 HB NTSTY MODUL RAD TX DLVR CPLX: CPT | Performed by: STUDENT IN AN ORGANIZED HEALTH CARE EDUCATION/TRAINING PROGRAM

## 2025-05-16 RX ORDER — TIRZEPATIDE 2.5 MG/.5ML
2.5 INJECTION, SOLUTION SUBCUTANEOUS WEEKLY
Qty: 2 ML | Refills: 0 | Status: SHIPPED | OUTPATIENT
Start: 2025-05-16

## 2025-05-19 ENCOUNTER — APPOINTMENT (OUTPATIENT)
Dept: RADIATION ONCOLOGY | Facility: HOSPITAL | Age: 70
End: 2025-05-19
Attending: RADIOLOGY
Payer: COMMERCIAL

## 2025-05-19 LAB
RAD ONC ARIA COURSE FIRST TREATMENT DATE: NORMAL
RAD ONC ARIA COURSE ID: NORMAL
RAD ONC ARIA COURSE INTENT: NORMAL
RAD ONC ARIA COURSE LAST TREATMENT DATE: NORMAL
RAD ONC ARIA COURSE SESSION NUMBER: 5
RAD ONC ARIA COURSE START DATE: NORMAL
RAD ONC ARIA COURSE TREATMENT ELAPSED DAYS: 6
RAD ONC ARIA PLAN FRACTIONS TREATED TO DATE: 5
RAD ONC ARIA PLAN ID: NORMAL
RAD ONC ARIA PLAN NAME: NORMAL
RAD ONC ARIA PLAN PRESCRIBED DOSE PER FRACTION: 1.8 GY
RAD ONC ARIA PLAN PRIMARY REFERENCE POINT: NORMAL
RAD ONC ARIA PLAN TOTAL FRACTIONS PRESCRIBED: 25
RAD ONC ARIA PLAN TOTAL PRESCRIBED DOSE: 4500 CGY
RAD ONC ARIA REFERENCE POINT DOSAGE GIVEN TO DATE: 9 GY
RAD ONC ARIA REFERENCE POINT ID: NORMAL
RAD ONC ARIA REFERENCE POINT SESSION DOSAGE GIVEN: 1.8 GY

## 2025-05-19 PROCEDURE — 77387 GUIDANCE FOR RADJ TX DLVR: CPT | Performed by: STUDENT IN AN ORGANIZED HEALTH CARE EDUCATION/TRAINING PROGRAM

## 2025-05-19 PROCEDURE — 77336 RADIATION PHYSICS CONSULT: CPT | Performed by: RADIOLOGY

## 2025-05-19 PROCEDURE — 77386 HB NTSTY MODUL RAD TX DLVR CPLX: CPT | Performed by: STUDENT IN AN ORGANIZED HEALTH CARE EDUCATION/TRAINING PROGRAM

## 2025-05-19 NOTE — PROGRESS NOTES
Cardio-Oncology / Heart Failure Cardiology Clinic Note    Cherelle Dash 69 y.o. female   MRN: [id number]  Encounter: [id number]        Assessment / Plan:    # Cardio-Oncology Pertinent History  Endometrial cancer  Dx 2024  S/p surgery  S/p - carboplatin, taxol, pembro  (complicated by myocarditis)  Current -  XRT    # Recent myocarditis  2-2025.  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  Restarting in this setting would be very high risk.     # CAD  NSTEMI 5-2025.   PCI to OM2.  On ASA/plavix and eliquis -->   plan to transition to plavix / eliquis at 1 month  On statin   Will do cardiac rehab    # Cardiomyopathy  # HF mr EF  - chronic    ETIOLOGY:  - EF 40% in setting of acute PE (Sept 2024).  BEFORE chemotherapy.   - non-ischemic by stress test at that time.     - repeat echo January 2025 - EF 45% with abnormal septal motion 2/2 LBBB  - possible LBBB as etiology given dyssynchronous appearance on echo.  - cardiac MRI (in setting of myocarditis) 2-2025,  EF 40-45%  - Echo 3-2025 - EF 50%  - Echo 5-2025- EF 50%    VOLUME:  - torsemide 20 mg daily  - exam - no significant volume overload    GDMT:  - toprol 50 mg  BID  - would need to be cautious with ACE/ARB/ARNI in setting CKD    DEVICE:  - chronic LBBB  - not indicated with current EF    # PE  Diagnosed September 2024  On Eliquis  Has IVC filter, plan to remove    # HLD  On statin.  Follows with general cardiology    # PVCs  On toprol    # CKD 3  Baseline Cr 1.4 - 1.6  Last Cr 1.7    # Obesity  BMI 42 --> 43  Weight loss recommended    # Bipolar / depression /anxiety  Follows with psychiatry     # High risk medication use  Had myocarditis in setting of checkpoint inhibitor.  Discussed above.    # Primary cardiologist  Dr. Gold.      Today's Plan Summary:  See above assessment/plan for full details of today's  plan.  Briefly,     No medication changes  Can stop colchicine at the end of the month                  Reason For Visit / Chief Complaint:  F/u -  history of cardiomyopathy and need for potentially heart toxic chemotherapy.    HPI:   Cherelle Dash is a 69 y.o.  female with history as noted in the problem list and further detailed in the above assessment and plan.    Initial:  Dec 2024  Referred by Dr. Page (GYN ONC) for history of cardiomyopathy and need for potentially heart toxic chemotherapy.  As above, the patient has a history of endometrial cancer.  She had surgery and has begun chemotherapy with carboplatin, Taxol, Pembro.  Ultimately she will get XRT and then further immunotherapy.  She also has a history of LV dysfunction earlier this year in the setting of an acute PE.  Further assessment of EF one month later (by nuclear stress) demonstrated recovery of LV function.  The patient follows closely with a cardiologist, Dr. Gold.  Today, the patient reports  - no CV sx's.   Retired.   Used to work for [employer].  Former smoker. Quit in her 50s.      Interval:  Last visit -->  felt well.    Plan last visit -->   Repeat troponin much lower but even recently persistent elevation noted (249).  She is still on a high dose of prednisone 40 mg daily.  Recommend slow taper in this setting as the persistent elevation in troponin suggests some smoldering myocarditis / inflammation is still active / ongoing.    Finished prednisone taper    Admission early May.  Chest pain.  Rising troponin.   Had [facility] - had PCI to OM2.  EF 50%, unchanged.     Saw Dr Gold - lipitor dose increased.    Started radiation.     Today  - feeling ok today.  No chest pain.  No SOB.  No edema.  No palpitations. No syncope.               Cardiac Imaging personally reviewed:  EKG 10-1-24  Sinus  LBBB    2-26-25  Sinus  LBBB         Holter or event monitor    Echo 2020  EF 60%      EF 55%    9-6-24  Mild LVH.  EF  40%.  Mild RV dysfunction  Mild MR    Echo - 01/20/25   Mild LVH.  EF 45%.  abnormal septal motion 2/2 LBBB.  Mild MR  Mild TR    Echo 2-20-25  45%. abnormal septal motion 2/2 LBBB.  1-2+ MR    3-24-25  EF 50%.  abnormal septal motion 2/2 LBBB.  No pericardial effusion.  A fat pad is present.     5-1-25  EF 50%.  abnormal septal motion 2/2 LBBB.  Mod MR         JUANJO    Cardiac MRI Done in hospital 2-2025 (checkpoint inhibitor associated myocarditis)  Visually the EF is 40 to 45%.   There is subepicardial non-CAD LGE in the basal anterolateral wall and transmural non-CAD LGE of the mid inferolateral wall. This is suggestive of myocarditis.   Small anterior pericardial effusion. Extensive epicardial fat and lipomatous hypertrophy of the interatrial septum.  Cystic structures in the left kidney are partially visualized -  (on my review of chart, there was an abdominal CT about 2-3 weeks prior to this MRI that reported kidneys were unremarkable)       Stress testing Nuclear stress -   No ischemia.  EF 60%       Coronary CTA or [facility] 5-2-25  •  Prox RCA lesion is 40% stenosed.  •  2nd Mrg lesion is 99% stenosed.  Had PCI to OM2.         [facility]    CPET              Patient Active Problem List    Diagnosis Date Noted   • Elevated serum creatinine 05/01/2025   • Chronic kidney disease-mineral and bone disorder 05/01/2025   • Anemia due to stage 4 chronic kidney disease  ([facility]) 05/01/2025   • Ambulatory dysfunction 04/30/2025   • Coronary artery disease involving native coronary artery of native heart with unstable angina pectoris ([facility]) 04/30/2025   • Chronic kidney disease, stage 4 (severe) ([facility]) 04/25/2025   • Heart failure with mildly reduced ejection fraction ([facility]) 03/26/2025   • Low magnesium level 03/03/2025   • History of myocarditis 02/20/2025   • 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[facility]) 02/03/2025   • Drug-induced pneumonitis 01/10/2025   • Arthralgia 01/02/2025   •  Drug-induced neutropenia ([facility]) 12/20/2024   • Palliative care by specialist 11/18/2024   • Anxiety about health 11/18/2024   • Goals of care, counseling/discussion 11/18/2024   • Encounter for central line care 11/13/2024   • Hospital discharge follow-up 09/17/2024   • Cardiomyopathy ([facility]) 09/09/2024   • Other pulmonary embolism without acute cor pulmonale ([facility]) 09/05/2024   • Endometrial cancer ([facility]) 08/11/2024   • Hyperphosphatemia 06/17/2024   • Premature ventricular contractions 12/14/2023   • LBBB (left bundle branch block) 12/14/2023   • Other sleep disorders 12/14/2023   • Lichen sclerosus 06/14/2022   • Mass of right hand 06/30/2021   • Secondary hyperparathyroidism of renal origin ([facility]) 04/02/2021   • Persistent proteinuria 04/02/2021   • Primary osteoarthritis of left knee 02/23/2021   • Primary osteoarthritis of right knee 02/23/2021   • Vitamin D deficiency 12/21/2020   • Medicare annual wellness visit, subsequent 12/15/2020   • Stage 4 chronic kidney disease ([facility]) 08/25/2020   • Raynaud's disease without gangrene 03/10/2020   • Morbid obesity with BMI of 40.0-44.9, adult ([facility]) 12/20/2018   • Hyperlipidemia 06/07/2018   • Onychomycosis of toenail 01/09/2017   • Insomnia 07/21/2015   • Stress incontinence of urine 07/21/2015   • Patellofemoral arthritis of right knee 08/11/2014   • Depression with anxiety 04/30/2014   • Hypothyroidism 06/04/2013   • Type 2 diabetes mellitus with chronic kidney disease, without long-term current use of insulin ([facility]) 06/04/2013   • Moderate mixed bipolar I disorder ([facility]) 05/01/2013   • Gastroesophageal reflux disease without esophagitis 05/01/2013   • Obstructive sleep apnea 05/01/2013   • Solar degeneration 06/13/2012       Past Medical History:   Diagnosis Date   • Abnormal ECG    • Anxiety 2002   • Arthritis    • Bipolar 1 disorder ([facility])    • Cervical cancer ([facility])    • Chronic kidney disease     stage 3   • CPAP (continuous positive airway pressure) dependence    •  Depression 2001   • Disease of thyroid gland 2001    Thyroid treated with Lithium medication for bi-polar disease   • DVT (deep venous thrombosis) ([facility])     BLLE   • Elevated blood pressure reading 06/10/2019   • GERD (gastroesophageal reflux disease)    • Obesity    • Pulmonary emboli ([facility])     B/L   • RSV (acute bronchiolitis due to respiratory syncytial virus) 12/05/2023   • Sleep apnea    • Sleep apnea, obstructive 2007   • Urinary incontinence 2019   • Uterine cancer ([facility])        Allergies   Allergen Reactions   • Raw Fruit - Food Allergy Anaphylaxis     PLUMS, PEACHES, APPLES, CHERRIES FRUIT WITH SKIN       Current Outpatient Medications   Medication Instructions   • apixaban (ELIQUIS) 5 mg, Oral, 2 times daily   • aspirin 81 mg, Oral, Daily   • atorvastatin (LIPITOR) 80 mg, Oral, Daily with dinner   • chlorhexidine (PERIDEX) 0.12 % solution    • Cholecalciferol (VITAMIN D) 2000 units CAPS 1 capsule, Daily   • clobetasol (TEMOVATE) 0.05 % ointment Topical, 2 times weekly   • clopidogrel (PLAVIX) 75 mg, Oral, Daily   • colchicine (COLCRYS) 0.6 mg, Oral, Daily   • CRANBERRY PO Take by mouth   • famotidine (PEPCID) 20 mg, Oral, 2 times daily   • Glucosamine-Chondroit-Vit C-Mn (GLUCOSAMINE 1500 COMPLEX PO) Daily   • Ivermectin 1 % CREA    • lidocaine-prilocaine (EMLA) cream Apply to PORT 30 min prior to labs and chemo   • LORazepam (ATIVAN) 1 mg tablet Take 1 tablet PO HS prn insomnia   • lurasidone (LATUDA) 20 mg, Oral, Daily with breakfast   • magnesium Oxide (MAG-OX) 400 mg, Oral, Daily   • metoprolol succinate (TOPROL-XL) 50 mg, Oral, Every 12 hours   • Mounjaro 2.5 mg, Subcutaneous, Weekly   • Multiple Vitamin (MULTI-VITAMIN DAILY) TABS Daily   • Myrbetriq 50 mg, Oral, Every morning   • polyethylene glycol (GLYCOLAX) 17 g, Oral, 2 times daily   • Sodium Fluoride 5000 PPM 1.1 % GEL As directed   • torsemide (DEMADEX) 20 mg, Oral, Daily       Social History     Socioeconomic History   • Marital status:  /Civil Union     Spouse name: Not on file   • Number of children: Not on file   • Years of education: Not on file   • Highest education level: Not on file   Occupational History   • Not on file   Tobacco Use   • Smoking status: Former     Current packs/day: 0.00     Average packs/day: 0.3 packs/day for 30.0 years (7.5 ttl pk-yrs)     Types: Cigarettes     Start date: 1979     Quit date: 2009     Years since quittin.3     Passive exposure: Never   • Smokeless tobacco: Never   Vaping Use   • Vaping status: Never Used   Substance and Sexual Activity   • Alcohol use: Not Currently     Alcohol/week: 1.0 standard drink of alcohol     Types: 1 Glasses of wine per week   • Drug use: Never   • Sexual activity: Not Currently     Partners: Male     Birth control/protection: Post-menopausal   Other Topics Concern   • Not on file   Social History Narrative    Daily coffee consumption: 3 cups/day     Social Drivers of Health     Financial Resource Strain: Low Risk  (2024)    Overall Financial Resource Strain (CARDIA)    • Difficulty of Paying Living Expenses: Not hard at all   Food Insecurity: No Food Insecurity (2025)    Nursing - Inadequate Food Risk Classification    • Worried About Running Out of Food in the Last Year: Never true    • Ran Out of Food in the Last Year: Never true    • Ran Out of Food in the Last Year: Never true   Transportation Needs: No Transportation Needs (2025)    PRAPARE - Transportation    • Lack of Transportation (Medical): No    • Lack of Transportation (Non-Medical): No   Physical Activity: Not on file   Stress: Not on file   Social Connections: Not on file   Intimate Partner Violence: Unknown (2025)    Nursing IPS    • Feels Physically and Emotionally Safe: Not on file    • Physically Hurt by Someone: Not on file    • Humiliated or Emotionally Abused by Someone: Not on file    • Physically Hurt by Someone: No    • Hurt or Threatened by Someone: No   Housing  Stability: Low Risk  (5/12/2025)    Housing Stability Vital Sign    • Unable to Pay for Housing in the Last Year: No    • Number of Times Moved in the Last Year: 0    • Homeless in the Last Year: No       Family History   Problem Relation Age of Onset   • Heart disease Mother    • Heart Valve Disease Mother         Replacement   • Diabetes Mother         2002   • Heart failure Mother         Heart Valve replacement   • Arthritis Father    • No Known Problems Sister    • No Known Problems Daughter    • No Known Problems Maternal Grandmother    • No Known Problems Maternal Grandfather    • No Known Problems Paternal Grandmother    • No Known Problems Paternal Grandfather    • No Known Problems Son    • No Known Problems Maternal Aunt    • No Known Problems Maternal Aunt    • No Known Problems Maternal Aunt    • No Known Problems Maternal Aunt    • No Known Problems Maternal Aunt    • No Known Problems Paternal Aunt    • Alcohol abuse Son         [place], 40 year old son, has issues with alcohol and alcohol abuse       Physical Exam:  Blood pressure 116/68, pulse 90, height 5' (1.524 m), weight 101 kg (223 lb 9.6 oz), SpO2 97%, not currently breastfeeding.  Body mass index is 43.67 kg/m².  Wt Readings from Last 3 Encounters:   05/21/25 101 kg (223 lb 9.6 oz)   05/15/25 101 kg (223 lb 9.6 oz)   05/14/25 102 kg (224 lb 12.8 oz)     Physical Exam  Vitals reviewed.   Constitutional:       General: She is not in acute distress.     Appearance: She is not toxic-appearing.   Neck:      Comments: No JVD  Cardiovascular:      Rate and Rhythm: Normal rate and regular rhythm.      Heart sounds: No murmur heard.     No friction rub. No gallop.   Pulmonary:      Breath sounds: Normal breath sounds. No wheezing, rhonchi or rales.     Musculoskeletal:      Comments: Mild LE edema --> now just trace     Neurological:      Mental Status: She is alert.         Labs & Results:  Lab Results   Component Value Date    SODIUM 143 05/06/2025     "K 3.9 05/06/2025     05/06/2025    CO2 24 05/06/2025    BUN 29 (H) 05/06/2025    CREATININE 1.77 (H) 05/06/2025    GLUC 103 05/03/2025    CALCIUM 9.2 05/06/2025     No results found for: \"NTBNP\"       Thank you for the opportunity to participate in the care of this patient.    Mert Olson MD, [facility]  Staff Cardiologist  Director of Cardio-Oncology  [facility]  "

## 2025-05-19 NOTE — H&P (VIEW-ONLY)
Cardio-Oncology / Heart Failure Cardiology Clinic Note    Cherelle Dash 69 y.o. female   MRN: [id number]  Encounter: [id number]        Assessment / Plan:    # Cardio-Oncology Pertinent History  Endometrial cancer  Dx 2024  S/p surgery  S/p - carboplatin, taxol, pembro  (complicated by myocarditis)  Current -  XRT    # Recent myocarditis  2-2025.  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  Restarting in this setting would be very high risk.     # CAD  NSTEMI 5-2025.   PCI to OM2.  On ASA/plavix and eliquis -->   plan to transition to plavix / eliquis at 1 month  On statin   Will do cardiac rehab    # Cardiomyopathy  # HF mr EF  - chronic    ETIOLOGY:  - EF 40% in setting of acute PE (Sept 2024).  BEFORE chemotherapy.   - non-ischemic by stress test at that time.     - repeat echo January 2025 - EF 45% with abnormal septal motion 2/2 LBBB  - possible LBBB as etiology given dyssynchronous appearance on echo.  - cardiac MRI (in setting of myocarditis) 2-2025,  EF 40-45%  - Echo 3-2025 - EF 50%  - Echo 5-2025- EF 50%    VOLUME:  - torsemide 20 mg daily  - exam - no significant volume overload    GDMT:  - toprol 50 mg  BID  - would need to be cautious with ACE/ARB/ARNI in setting CKD    DEVICE:  - chronic LBBB  - not indicated with current EF    # PE  Diagnosed September 2024  On Eliquis  Has IVC filter, plan to remove    # HLD  On statin.  Follows with general cardiology    # PVCs  On toprol    # CKD 3  Baseline Cr 1.4 - 1.6  Last Cr 1.7    # Obesity  BMI 42 --> 43  Weight loss recommended    # Bipolar / depression /anxiety  Follows with psychiatry     # High risk medication use  Had myocarditis in setting of checkpoint inhibitor.  Discussed above.    # Primary cardiologist  Dr. Gold.      Today's Plan Summary:  See above assessment/plan for full details of today's  plan.  Briefly,     No medication changes  Can stop colchicine at the end of the month                  Reason For Visit / Chief Complaint:  F/u -  history of cardiomyopathy and need for potentially heart toxic chemotherapy.    HPI:   Cherelle Dash is a 69 y.o.  female with history as noted in the problem list and further detailed in the above assessment and plan.    Initial:  Dec 2024  Referred by Dr. Page (GYN ONC) for history of cardiomyopathy and need for potentially heart toxic chemotherapy.  As above, the patient has a history of endometrial cancer.  She had surgery and has begun chemotherapy with carboplatin, Taxol, Pembro.  Ultimately she will get XRT and then further immunotherapy.  She also has a history of LV dysfunction earlier this year in the setting of an acute PE.  Further assessment of EF one month later (by nuclear stress) demonstrated recovery of LV function.  The patient follows closely with a cardiologist, Dr. Gold.  Today, the patient reports  - no CV sx's.   Retired.   Used to work for [employer].  Former smoker. Quit in her 50s.      Interval:  Last visit -->  felt well.    Plan last visit -->   Repeat troponin much lower but even recently persistent elevation noted (249).  She is still on a high dose of prednisone 40 mg daily.  Recommend slow taper in this setting as the persistent elevation in troponin suggests some smoldering myocarditis / inflammation is still active / ongoing.    Finished prednisone taper    Admission early May.  Chest pain.  Rising troponin.   Had [facility] - had PCI to OM2.  EF 50%, unchanged.     Saw Dr Gold - lipitor dose increased.    Started radiation.     Today  - feeling ok today.  No chest pain.  No SOB.  No edema.  No palpitations. No syncope.               Cardiac Imaging personally reviewed:  EKG 10-1-24  Sinus  LBBB    2-26-25  Sinus  LBBB         Holter or event monitor    Echo 2020  EF 60%      EF 55%    9-6-24  Mild LVH.  EF  40%.  Mild RV dysfunction  Mild MR    Echo - 01/20/25   Mild LVH.  EF 45%.  abnormal septal motion 2/2 LBBB.  Mild MR  Mild TR    Echo 2-20-25  45%. abnormal septal motion 2/2 LBBB.  1-2+ MR    3-24-25  EF 50%.  abnormal septal motion 2/2 LBBB.  No pericardial effusion.  A fat pad is present.     5-1-25  EF 50%.  abnormal septal motion 2/2 LBBB.  Mod MR         JUANJO    Cardiac MRI Done in hospital 2-2025 (checkpoint inhibitor associated myocarditis)  Visually the EF is 40 to 45%.   There is subepicardial non-CAD LGE in the basal anterolateral wall and transmural non-CAD LGE of the mid inferolateral wall. This is suggestive of myocarditis.   Small anterior pericardial effusion. Extensive epicardial fat and lipomatous hypertrophy of the interatrial septum.  Cystic structures in the left kidney are partially visualized -  (on my review of chart, there was an abdominal CT about 2-3 weeks prior to this MRI that reported kidneys were unremarkable)       Stress testing Nuclear stress -   No ischemia.  EF 60%       Coronary CTA or [facility] 5-2-25  •  Prox RCA lesion is 40% stenosed.  •  2nd Mrg lesion is 99% stenosed.  Had PCI to OM2.         [facility]    CPET              Patient Active Problem List    Diagnosis Date Noted   • Elevated serum creatinine 05/01/2025   • Chronic kidney disease-mineral and bone disorder 05/01/2025   • Anemia due to stage 4 chronic kidney disease  ([facility]) 05/01/2025   • Ambulatory dysfunction 04/30/2025   • Coronary artery disease involving native coronary artery of native heart with unstable angina pectoris ([facility]) 04/30/2025   • Chronic kidney disease, stage 4 (severe) ([facility]) 04/25/2025   • Heart failure with mildly reduced ejection fraction ([facility]) 03/26/2025   • Low magnesium level 03/03/2025   • History of myocarditis 02/20/2025   • 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[facility]) 02/03/2025   • Drug-induced pneumonitis 01/10/2025   • Arthralgia 01/02/2025   •  Drug-induced neutropenia ([facility]) 12/20/2024   • Palliative care by specialist 11/18/2024   • Anxiety about health 11/18/2024   • Goals of care, counseling/discussion 11/18/2024   • Encounter for central line care 11/13/2024   • Hospital discharge follow-up 09/17/2024   • Cardiomyopathy ([facility]) 09/09/2024   • Other pulmonary embolism without acute cor pulmonale ([facility]) 09/05/2024   • Endometrial cancer ([facility]) 08/11/2024   • Hyperphosphatemia 06/17/2024   • Premature ventricular contractions 12/14/2023   • LBBB (left bundle branch block) 12/14/2023   • Other sleep disorders 12/14/2023   • Lichen sclerosus 06/14/2022   • Mass of right hand 06/30/2021   • Secondary hyperparathyroidism of renal origin ([facility]) 04/02/2021   • Persistent proteinuria 04/02/2021   • Primary osteoarthritis of left knee 02/23/2021   • Primary osteoarthritis of right knee 02/23/2021   • Vitamin D deficiency 12/21/2020   • Medicare annual wellness visit, subsequent 12/15/2020   • Stage 4 chronic kidney disease ([facility]) 08/25/2020   • Raynaud's disease without gangrene 03/10/2020   • Morbid obesity with BMI of 40.0-44.9, adult ([facility]) 12/20/2018   • Hyperlipidemia 06/07/2018   • Onychomycosis of toenail 01/09/2017   • Insomnia 07/21/2015   • Stress incontinence of urine 07/21/2015   • Patellofemoral arthritis of right knee 08/11/2014   • Depression with anxiety 04/30/2014   • Hypothyroidism 06/04/2013   • Type 2 diabetes mellitus with chronic kidney disease, without long-term current use of insulin ([facility]) 06/04/2013   • Moderate mixed bipolar I disorder ([facility]) 05/01/2013   • Gastroesophageal reflux disease without esophagitis 05/01/2013   • Obstructive sleep apnea 05/01/2013   • Solar degeneration 06/13/2012       Past Medical History:   Diagnosis Date   • Abnormal ECG    • Anxiety 2002   • Arthritis    • Bipolar 1 disorder ([facility])    • Cervical cancer ([facility])    • Chronic kidney disease     stage 3   • CPAP (continuous positive airway pressure) dependence    •  Depression 2001   • Disease of thyroid gland 2001    Thyroid treated with Lithium medication for bi-polar disease   • DVT (deep venous thrombosis) ([facility])     BLLE   • Elevated blood pressure reading 06/10/2019   • GERD (gastroesophageal reflux disease)    • Obesity    • Pulmonary emboli ([facility])     B/L   • RSV (acute bronchiolitis due to respiratory syncytial virus) 12/05/2023   • Sleep apnea    • Sleep apnea, obstructive 2007   • Urinary incontinence 2019   • Uterine cancer ([facility])        Allergies   Allergen Reactions   • Raw Fruit - Food Allergy Anaphylaxis     PLUMS, PEACHES, APPLES, CHERRIES FRUIT WITH SKIN       Current Outpatient Medications   Medication Instructions   • apixaban (ELIQUIS) 5 mg, Oral, 2 times daily   • aspirin 81 mg, Oral, Daily   • atorvastatin (LIPITOR) 80 mg, Oral, Daily with dinner   • chlorhexidine (PERIDEX) 0.12 % solution    • Cholecalciferol (VITAMIN D) 2000 units CAPS 1 capsule, Daily   • clobetasol (TEMOVATE) 0.05 % ointment Topical, 2 times weekly   • clopidogrel (PLAVIX) 75 mg, Oral, Daily   • colchicine (COLCRYS) 0.6 mg, Oral, Daily   • CRANBERRY PO Take by mouth   • famotidine (PEPCID) 20 mg, Oral, 2 times daily   • Glucosamine-Chondroit-Vit C-Mn (GLUCOSAMINE 1500 COMPLEX PO) Daily   • Ivermectin 1 % CREA    • lidocaine-prilocaine (EMLA) cream Apply to PORT 30 min prior to labs and chemo   • LORazepam (ATIVAN) 1 mg tablet Take 1 tablet PO HS prn insomnia   • lurasidone (LATUDA) 20 mg, Oral, Daily with breakfast   • magnesium Oxide (MAG-OX) 400 mg, Oral, Daily   • metoprolol succinate (TOPROL-XL) 50 mg, Oral, Every 12 hours   • Mounjaro 2.5 mg, Subcutaneous, Weekly   • Multiple Vitamin (MULTI-VITAMIN DAILY) TABS Daily   • Myrbetriq 50 mg, Oral, Every morning   • polyethylene glycol (GLYCOLAX) 17 g, Oral, 2 times daily   • Sodium Fluoride 5000 PPM 1.1 % GEL As directed   • torsemide (DEMADEX) 20 mg, Oral, Daily       Social History     Socioeconomic History   • Marital status:  /Civil Union     Spouse name: Not on file   • Number of children: Not on file   • Years of education: Not on file   • Highest education level: Not on file   Occupational History   • Not on file   Tobacco Use   • Smoking status: Former     Current packs/day: 0.00     Average packs/day: 0.3 packs/day for 30.0 years (7.5 ttl pk-yrs)     Types: Cigarettes     Start date: 1979     Quit date: 2009     Years since quittin.3     Passive exposure: Never   • Smokeless tobacco: Never   Vaping Use   • Vaping status: Never Used   Substance and Sexual Activity   • Alcohol use: Not Currently     Alcohol/week: 1.0 standard drink of alcohol     Types: 1 Glasses of wine per week   • Drug use: Never   • Sexual activity: Not Currently     Partners: Male     Birth control/protection: Post-menopausal   Other Topics Concern   • Not on file   Social History Narrative    Daily coffee consumption: 3 cups/day     Social Drivers of Health     Financial Resource Strain: Low Risk  (2024)    Overall Financial Resource Strain (CARDIA)    • Difficulty of Paying Living Expenses: Not hard at all   Food Insecurity: No Food Insecurity (2025)    Nursing - Inadequate Food Risk Classification    • Worried About Running Out of Food in the Last Year: Never true    • Ran Out of Food in the Last Year: Never true    • Ran Out of Food in the Last Year: Never true   Transportation Needs: No Transportation Needs (2025)    PRAPARE - Transportation    • Lack of Transportation (Medical): No    • Lack of Transportation (Non-Medical): No   Physical Activity: Not on file   Stress: Not on file   Social Connections: Not on file   Intimate Partner Violence: Unknown (2025)    Nursing IPS    • Feels Physically and Emotionally Safe: Not on file    • Physically Hurt by Someone: Not on file    • Humiliated or Emotionally Abused by Someone: Not on file    • Physically Hurt by Someone: No    • Hurt or Threatened by Someone: No   Housing  Stability: Low Risk  (5/12/2025)    Housing Stability Vital Sign    • Unable to Pay for Housing in the Last Year: No    • Number of Times Moved in the Last Year: 0    • Homeless in the Last Year: No       Family History   Problem Relation Age of Onset   • Heart disease Mother    • Heart Valve Disease Mother         Replacement   • Diabetes Mother         2002   • Heart failure Mother         Heart Valve replacement   • Arthritis Father    • No Known Problems Sister    • No Known Problems Daughter    • No Known Problems Maternal Grandmother    • No Known Problems Maternal Grandfather    • No Known Problems Paternal Grandmother    • No Known Problems Paternal Grandfather    • No Known Problems Son    • No Known Problems Maternal Aunt    • No Known Problems Maternal Aunt    • No Known Problems Maternal Aunt    • No Known Problems Maternal Aunt    • No Known Problems Maternal Aunt    • No Known Problems Paternal Aunt    • Alcohol abuse Son         [place], 40 year old son, has issues with alcohol and alcohol abuse       Physical Exam:  Blood pressure 116/68, pulse 90, height 5' (1.524 m), weight 101 kg (223 lb 9.6 oz), SpO2 97%, not currently breastfeeding.  Body mass index is 43.67 kg/m².  Wt Readings from Last 3 Encounters:   05/21/25 101 kg (223 lb 9.6 oz)   05/15/25 101 kg (223 lb 9.6 oz)   05/14/25 102 kg (224 lb 12.8 oz)     Physical Exam  Vitals reviewed.   Constitutional:       General: She is not in acute distress.     Appearance: She is not toxic-appearing.   Neck:      Comments: No JVD  Cardiovascular:      Rate and Rhythm: Normal rate and regular rhythm.      Heart sounds: No murmur heard.     No friction rub. No gallop.   Pulmonary:      Breath sounds: Normal breath sounds. No wheezing, rhonchi or rales.     Musculoskeletal:      Comments: Mild LE edema --> now just trace     Neurological:      Mental Status: She is alert.         Labs & Results:  Lab Results   Component Value Date    SODIUM 143 05/06/2025     "K 3.9 05/06/2025     05/06/2025    CO2 24 05/06/2025    BUN 29 (H) 05/06/2025    CREATININE 1.77 (H) 05/06/2025    GLUC 103 05/03/2025    CALCIUM 9.2 05/06/2025     No results found for: \"NTBNP\"       Thank you for the opportunity to participate in the care of this patient.    Mert Olson MD, [facility]  Staff Cardiologist  Director of Cardio-Oncology  [facility]  "

## 2025-05-20 ENCOUNTER — APPOINTMENT (OUTPATIENT)
Dept: RADIATION ONCOLOGY | Facility: HOSPITAL | Age: 70
End: 2025-05-20
Attending: RADIOLOGY
Payer: COMMERCIAL

## 2025-05-20 LAB
RAD ONC ARIA COURSE FIRST TREATMENT DATE: NORMAL
RAD ONC ARIA COURSE ID: NORMAL
RAD ONC ARIA COURSE INTENT: NORMAL
RAD ONC ARIA COURSE LAST TREATMENT DATE: NORMAL
RAD ONC ARIA COURSE SESSION NUMBER: 6
RAD ONC ARIA COURSE START DATE: NORMAL
RAD ONC ARIA COURSE TREATMENT ELAPSED DAYS: 7
RAD ONC ARIA PLAN FRACTIONS TREATED TO DATE: 6
RAD ONC ARIA PLAN ID: NORMAL
RAD ONC ARIA PLAN NAME: NORMAL
RAD ONC ARIA PLAN PRESCRIBED DOSE PER FRACTION: 1.8 GY
RAD ONC ARIA PLAN PRIMARY REFERENCE POINT: NORMAL
RAD ONC ARIA PLAN TOTAL FRACTIONS PRESCRIBED: 25
RAD ONC ARIA PLAN TOTAL PRESCRIBED DOSE: 4500 CGY
RAD ONC ARIA REFERENCE POINT DOSAGE GIVEN TO DATE: 10.8 GY
RAD ONC ARIA REFERENCE POINT ID: NORMAL
RAD ONC ARIA REFERENCE POINT SESSION DOSAGE GIVEN: 1.8 GY

## 2025-05-20 PROCEDURE — 77427 RADIATION TX MANAGEMENT X5: CPT | Performed by: RADIOLOGY

## 2025-05-20 PROCEDURE — 77387 GUIDANCE FOR RADJ TX DLVR: CPT | Performed by: STUDENT IN AN ORGANIZED HEALTH CARE EDUCATION/TRAINING PROGRAM

## 2025-05-20 PROCEDURE — 77386 HB NTSTY MODUL RAD TX DLVR CPLX: CPT | Performed by: STUDENT IN AN ORGANIZED HEALTH CARE EDUCATION/TRAINING PROGRAM

## 2025-05-21 ENCOUNTER — OFFICE VISIT (OUTPATIENT)
Age: 70
End: 2025-05-21
Payer: COMMERCIAL

## 2025-05-21 ENCOUNTER — APPOINTMENT (OUTPATIENT)
Dept: RADIATION ONCOLOGY | Facility: HOSPITAL | Age: 70
End: 2025-05-21
Attending: RADIOLOGY
Payer: COMMERCIAL

## 2025-05-21 ENCOUNTER — NURSE TRIAGE (OUTPATIENT)
Age: 70
End: 2025-05-21

## 2025-05-21 VITALS
WEIGHT: 223.6 LBS | HEIGHT: 60 IN | SYSTOLIC BLOOD PRESSURE: 116 MMHG | OXYGEN SATURATION: 97 % | HEART RATE: 90 BPM | DIASTOLIC BLOOD PRESSURE: 68 MMHG | BODY MASS INDEX: 43.9 KG/M2

## 2025-05-21 DIAGNOSIS — I50.22 HEART FAILURE WITH MILDLY REDUCED EJECTION FRACTION (HCC): ICD-10-CM

## 2025-05-21 DIAGNOSIS — I44.7 LBBB (LEFT BUNDLE BRANCH BLOCK): ICD-10-CM

## 2025-05-21 DIAGNOSIS — N18.30 STAGE 3 CHRONIC KIDNEY DISEASE, UNSPECIFIED WHETHER STAGE 3A OR 3B CKD (HCC): ICD-10-CM

## 2025-05-21 DIAGNOSIS — I25.10 CAD IN NATIVE ARTERY: ICD-10-CM

## 2025-05-21 DIAGNOSIS — Z86.79 HISTORY OF MYOCARDITIS: Primary | ICD-10-CM

## 2025-05-21 LAB
RAD ONC ARIA COURSE FIRST TREATMENT DATE: NORMAL
RAD ONC ARIA COURSE ID: NORMAL
RAD ONC ARIA COURSE INTENT: NORMAL
RAD ONC ARIA COURSE LAST TREATMENT DATE: NORMAL
RAD ONC ARIA COURSE SESSION NUMBER: 7
RAD ONC ARIA COURSE START DATE: NORMAL
RAD ONC ARIA COURSE TREATMENT ELAPSED DAYS: 8
RAD ONC ARIA PLAN FRACTIONS TREATED TO DATE: 7
RAD ONC ARIA PLAN ID: NORMAL
RAD ONC ARIA PLAN NAME: NORMAL
RAD ONC ARIA PLAN PRESCRIBED DOSE PER FRACTION: 1.8 GY
RAD ONC ARIA PLAN PRIMARY REFERENCE POINT: NORMAL
RAD ONC ARIA PLAN TOTAL FRACTIONS PRESCRIBED: 25
RAD ONC ARIA PLAN TOTAL PRESCRIBED DOSE: 4500 CGY
RAD ONC ARIA REFERENCE POINT DOSAGE GIVEN TO DATE: 12.6 GY
RAD ONC ARIA REFERENCE POINT ID: NORMAL
RAD ONC ARIA REFERENCE POINT SESSION DOSAGE GIVEN: 1.8 GY

## 2025-05-21 PROCEDURE — 77387 GUIDANCE FOR RADJ TX DLVR: CPT | Performed by: STUDENT IN AN ORGANIZED HEALTH CARE EDUCATION/TRAINING PROGRAM

## 2025-05-21 PROCEDURE — 77386 HB NTSTY MODUL RAD TX DLVR CPLX: CPT | Performed by: STUDENT IN AN ORGANIZED HEALTH CARE EDUCATION/TRAINING PROGRAM

## 2025-05-21 PROCEDURE — 99214 OFFICE O/P EST MOD 30 MIN: CPT | Performed by: INTERNAL MEDICINE

## 2025-05-21 PROCEDURE — G2211 COMPLEX E/M VISIT ADD ON: HCPCS | Performed by: INTERNAL MEDICINE

## 2025-05-22 ENCOUNTER — APPOINTMENT (OUTPATIENT)
Dept: RADIATION ONCOLOGY | Facility: HOSPITAL | Age: 70
End: 2025-05-22
Attending: RADIOLOGY
Payer: COMMERCIAL

## 2025-05-22 LAB
RAD ONC ARIA COURSE FIRST TREATMENT DATE: NORMAL
RAD ONC ARIA COURSE ID: NORMAL
RAD ONC ARIA COURSE INTENT: NORMAL
RAD ONC ARIA COURSE LAST TREATMENT DATE: NORMAL
RAD ONC ARIA COURSE SESSION NUMBER: 8
RAD ONC ARIA COURSE START DATE: NORMAL
RAD ONC ARIA COURSE TREATMENT ELAPSED DAYS: 9
RAD ONC ARIA PLAN FRACTIONS TREATED TO DATE: 8
RAD ONC ARIA PLAN ID: NORMAL
RAD ONC ARIA PLAN NAME: NORMAL
RAD ONC ARIA PLAN PRESCRIBED DOSE PER FRACTION: 1.8 GY
RAD ONC ARIA PLAN PRIMARY REFERENCE POINT: NORMAL
RAD ONC ARIA PLAN TOTAL FRACTIONS PRESCRIBED: 25
RAD ONC ARIA PLAN TOTAL PRESCRIBED DOSE: 4500 CGY
RAD ONC ARIA REFERENCE POINT DOSAGE GIVEN TO DATE: 14.4 GY
RAD ONC ARIA REFERENCE POINT ID: NORMAL
RAD ONC ARIA REFERENCE POINT SESSION DOSAGE GIVEN: 1.8 GY

## 2025-05-22 PROCEDURE — 77387 GUIDANCE FOR RADJ TX DLVR: CPT | Performed by: INTERNAL MEDICINE

## 2025-05-22 PROCEDURE — 77386 HB NTSTY MODUL RAD TX DLVR CPLX: CPT | Performed by: INTERNAL MEDICINE

## 2025-05-23 ENCOUNTER — APPOINTMENT (OUTPATIENT)
Dept: RADIATION ONCOLOGY | Facility: HOSPITAL | Age: 70
End: 2025-05-23
Attending: RADIOLOGY
Payer: COMMERCIAL

## 2025-05-23 PROCEDURE — 77387 GUIDANCE FOR RADJ TX DLVR: CPT | Performed by: RADIOLOGY

## 2025-05-23 PROCEDURE — 77386 HB NTSTY MODUL RAD TX DLVR CPLX: CPT | Performed by: RADIOLOGY

## 2025-05-27 ENCOUNTER — TELEPHONE (OUTPATIENT)
Age: 70
End: 2025-05-27

## 2025-05-27 ENCOUNTER — APPOINTMENT (OUTPATIENT)
Dept: RADIATION ONCOLOGY | Facility: HOSPITAL | Age: 70
End: 2025-05-27
Attending: RADIOLOGY
Payer: COMMERCIAL

## 2025-05-27 DIAGNOSIS — C54.1 ENDOMETRIAL CANCER (HCC): ICD-10-CM

## 2025-05-27 LAB
RAD ONC ARIA COURSE FIRST TREATMENT DATE: NORMAL
RAD ONC ARIA COURSE FIRST TREATMENT DATE: NORMAL
RAD ONC ARIA COURSE ID: NORMAL
RAD ONC ARIA COURSE ID: NORMAL
RAD ONC ARIA COURSE INTENT: NORMAL
RAD ONC ARIA COURSE INTENT: NORMAL
RAD ONC ARIA COURSE LAST TREATMENT DATE: NORMAL
RAD ONC ARIA COURSE LAST TREATMENT DATE: NORMAL
RAD ONC ARIA COURSE SESSION NUMBER: 10
RAD ONC ARIA COURSE SESSION NUMBER: 9
RAD ONC ARIA COURSE START DATE: NORMAL
RAD ONC ARIA COURSE START DATE: NORMAL
RAD ONC ARIA COURSE TREATMENT ELAPSED DAYS: 10
RAD ONC ARIA COURSE TREATMENT ELAPSED DAYS: 14
RAD ONC ARIA PLAN FRACTIONS TREATED TO DATE: 10
RAD ONC ARIA PLAN FRACTIONS TREATED TO DATE: 9
RAD ONC ARIA PLAN ID: NORMAL
RAD ONC ARIA PLAN ID: NORMAL
RAD ONC ARIA PLAN NAME: NORMAL
RAD ONC ARIA PLAN NAME: NORMAL
RAD ONC ARIA PLAN PRESCRIBED DOSE PER FRACTION: 1.8 GY
RAD ONC ARIA PLAN PRESCRIBED DOSE PER FRACTION: 1.8 GY
RAD ONC ARIA PLAN PRIMARY REFERENCE POINT: NORMAL
RAD ONC ARIA PLAN PRIMARY REFERENCE POINT: NORMAL
RAD ONC ARIA PLAN TOTAL FRACTIONS PRESCRIBED: 25
RAD ONC ARIA PLAN TOTAL FRACTIONS PRESCRIBED: 25
RAD ONC ARIA PLAN TOTAL PRESCRIBED DOSE: 4500 CGY
RAD ONC ARIA PLAN TOTAL PRESCRIBED DOSE: 4500 CGY
RAD ONC ARIA REFERENCE POINT DOSAGE GIVEN TO DATE: 16.2 GY
RAD ONC ARIA REFERENCE POINT DOSAGE GIVEN TO DATE: 18 GY
RAD ONC ARIA REFERENCE POINT ID: NORMAL
RAD ONC ARIA REFERENCE POINT ID: NORMAL
RAD ONC ARIA REFERENCE POINT SESSION DOSAGE GIVEN: 1.8 GY
RAD ONC ARIA REFERENCE POINT SESSION DOSAGE GIVEN: 1.8 GY

## 2025-05-27 PROCEDURE — 77387 GUIDANCE FOR RADJ TX DLVR: CPT | Performed by: STUDENT IN AN ORGANIZED HEALTH CARE EDUCATION/TRAINING PROGRAM

## 2025-05-27 PROCEDURE — 77336 RADIATION PHYSICS CONSULT: CPT | Performed by: RADIOLOGY

## 2025-05-27 PROCEDURE — 77386 HB NTSTY MODUL RAD TX DLVR CPLX: CPT | Performed by: STUDENT IN AN ORGANIZED HEALTH CARE EDUCATION/TRAINING PROGRAM

## 2025-05-27 RX ORDER — LORAZEPAM 1 MG/1
TABLET ORAL
Qty: 30 TABLET | Refills: 0 | Status: SHIPPED | OUTPATIENT
Start: 2025-05-27

## 2025-05-27 NOTE — TELEPHONE ENCOUNTER
1 [id number] 04/23/2025 04/23/2025 04/23/2025 LORazepam (Tablet) 30.0 30 1 MG NA KATEY HUNTERON [facility] [employer] [place] 0 / 0 [place]   1 [id number] ** 03/27/2025 03/27/2025 LORazepam (Tablet) 20.0 7 1 [facility] 0 [place]   1 [id number] ** 03/06/2025 03/06/2025 LORazepam (Tablet) 20.0 7 1 MG [facility] [place] / 0 [place]   1 [id number] ** 02/03/2025 02/03/2025 LORazepam (Tablet) 20.0 7 1 MG [facility] [place] / 0 [place]   1 [id number] 11/15/2024 11/15/2024 11/13/2024 LORazepam (Tablet) 20.0 7 1 [facility] [employer] Medicare 0 / 0 [place]

## 2025-05-27 NOTE — TELEPHONE ENCOUNTER
Reason for call:   [x] Refill   [] Prior Auth  [] Other:     Office:   [] PCP/Provider -   [x] Specialty/Provider - [facility] MERCEDEZ Banks PA-C     Medication:   LORazepam (ATIVAN) 1 mg tablet    Dose/Frequency: Take 1 tablet PO HS prn insomnia     Quantity: 30 tablet     Pharmacy: [address] [phone]    Local Pharmacy   Does the patient have enough for 3 days?   [] Yes   [] No - Send as HP to POD    Mail Away Pharmacy   Does the patient have enough for 10 days?   [x] Yes   [] No - Send as HP to POD

## 2025-05-27 NOTE — TELEPHONE ENCOUNTER
Pt called back stating that she spoke to [facility] about a possible drug interaction with plavix. She states that she only has 3 days left of plavix so she would need a 2 week supply sent to [facility] on [address].     Please call [facility] regarding plavix medication ref#[id number], Ph#[phone]    Please send new Plavix script to [facility].

## 2025-05-27 NOTE — TELEPHONE ENCOUNTER
Patient calling regarding her Plavix prescription. Advised her it was confirmed by [facility] on 5/15. She states she will call [facility] to see when it will be delivered, and if she needs a script sent to local pharmacy to cover her until she receives the one in the mail she will call us back.

## 2025-05-28 ENCOUNTER — APPOINTMENT (OUTPATIENT)
Dept: RADIATION ONCOLOGY | Facility: HOSPITAL | Age: 70
End: 2025-05-28
Attending: RADIOLOGY
Payer: COMMERCIAL

## 2025-05-28 DIAGNOSIS — R07.9 CHEST PAIN: ICD-10-CM

## 2025-05-28 LAB
RAD ONC ARIA COURSE FIRST TREATMENT DATE: NORMAL
RAD ONC ARIA COURSE ID: NORMAL
RAD ONC ARIA COURSE INTENT: NORMAL
RAD ONC ARIA COURSE LAST TREATMENT DATE: NORMAL
RAD ONC ARIA COURSE SESSION NUMBER: 11
RAD ONC ARIA COURSE START DATE: NORMAL
RAD ONC ARIA COURSE TREATMENT ELAPSED DAYS: 15
RAD ONC ARIA PLAN FRACTIONS TREATED TO DATE: 11
RAD ONC ARIA PLAN ID: NORMAL
RAD ONC ARIA PLAN NAME: NORMAL
RAD ONC ARIA PLAN PRESCRIBED DOSE PER FRACTION: 1.8 GY
RAD ONC ARIA PLAN PRIMARY REFERENCE POINT: NORMAL
RAD ONC ARIA PLAN TOTAL FRACTIONS PRESCRIBED: 25
RAD ONC ARIA PLAN TOTAL PRESCRIBED DOSE: 4500 CGY
RAD ONC ARIA REFERENCE POINT DOSAGE GIVEN TO DATE: 19.8 GY
RAD ONC ARIA REFERENCE POINT ID: NORMAL
RAD ONC ARIA REFERENCE POINT SESSION DOSAGE GIVEN: 1.8 GY

## 2025-05-28 PROCEDURE — 77427 RADIATION TX MANAGEMENT X5: CPT | Performed by: RADIOLOGY

## 2025-05-28 PROCEDURE — 77387 GUIDANCE FOR RADJ TX DLVR: CPT | Performed by: RADIOLOGY

## 2025-05-28 PROCEDURE — 77386 HB NTSTY MODUL RAD TX DLVR CPLX: CPT | Performed by: RADIOLOGY

## 2025-05-28 RX ORDER — CLOPIDOGREL BISULFATE 75 MG/1
75 TABLET ORAL DAILY
Qty: 30 TABLET | Refills: 3 | Status: SHIPPED | OUTPATIENT
Start: 2025-05-28

## 2025-05-28 NOTE — TELEPHONE ENCOUNTER
I spoke to pt  and she says she is currently taking both Eliquis and Plavix. I need clarification to properly explain to pharmacy and patient. Please advise. Thank you.

## 2025-05-28 NOTE — TELEPHONE ENCOUNTER
As per my last office note: she is to continue on ASA, Plavix, and Eliquis for 1 month and then discontinue ASA and continue only on Plavix and Eliquis.  She has a drug-eluting stent, so she needs to continue on the Plavix to help prevent that from closing off and causing a large heart attack.  She also needs to be on the Eliquis for her clotting disorder.

## 2025-05-28 NOTE — TELEPHONE ENCOUNTER
Please find out why [facility] won't fill her plavix.  I sent the 30 day script to [facility] in the meantime.

## 2025-05-29 ENCOUNTER — APPOINTMENT (OUTPATIENT)
Dept: RADIATION ONCOLOGY | Facility: HOSPITAL | Age: 70
End: 2025-05-29
Attending: RADIOLOGY
Payer: COMMERCIAL

## 2025-05-29 ENCOUNTER — HOSPITAL ENCOUNTER (OUTPATIENT)
Dept: INFUSION CENTER | Facility: CLINIC | Age: 70
Discharge: HOME | End: 2025-05-29
Payer: COMMERCIAL

## 2025-05-29 DIAGNOSIS — C54.1 ENDOMETRIAL CANCER (HCC): ICD-10-CM

## 2025-05-29 DIAGNOSIS — Z45.2 ENCOUNTER FOR CENTRAL LINE CARE: Primary | ICD-10-CM

## 2025-05-29 LAB
RAD ONC ARIA COURSE FIRST TREATMENT DATE: NORMAL
RAD ONC ARIA COURSE ID: NORMAL
RAD ONC ARIA COURSE INTENT: NORMAL
RAD ONC ARIA COURSE LAST TREATMENT DATE: NORMAL
RAD ONC ARIA COURSE SESSION NUMBER: 12
RAD ONC ARIA COURSE START DATE: NORMAL
RAD ONC ARIA COURSE TREATMENT ELAPSED DAYS: 16
RAD ONC ARIA PLAN FRACTIONS TREATED TO DATE: 12
RAD ONC ARIA PLAN ID: NORMAL
RAD ONC ARIA PLAN NAME: NORMAL
RAD ONC ARIA PLAN PRESCRIBED DOSE PER FRACTION: 1.8 GY
RAD ONC ARIA PLAN PRIMARY REFERENCE POINT: NORMAL
RAD ONC ARIA PLAN TOTAL FRACTIONS PRESCRIBED: 25
RAD ONC ARIA PLAN TOTAL PRESCRIBED DOSE: 4500 CGY
RAD ONC ARIA REFERENCE POINT DOSAGE GIVEN TO DATE: 21.6 GY
RAD ONC ARIA REFERENCE POINT ID: NORMAL
RAD ONC ARIA REFERENCE POINT SESSION DOSAGE GIVEN: 1.8 GY

## 2025-05-29 PROCEDURE — 77387 GUIDANCE FOR RADJ TX DLVR: CPT | Performed by: INTERNAL MEDICINE

## 2025-05-29 PROCEDURE — 96523 IRRIG DRUG DELIVERY DEVICE: CPT

## 2025-05-29 PROCEDURE — 77386 HB NTSTY MODUL RAD TX DLVR CPLX: CPT | Performed by: INTERNAL MEDICINE

## 2025-05-30 ENCOUNTER — APPOINTMENT (OUTPATIENT)
Dept: RADIATION ONCOLOGY | Facility: HOSPITAL | Age: 70
End: 2025-05-30
Attending: RADIOLOGY
Payer: COMMERCIAL

## 2025-05-30 LAB
RAD ONC ARIA COURSE FIRST TREATMENT DATE: NORMAL
RAD ONC ARIA COURSE ID: NORMAL
RAD ONC ARIA COURSE INTENT: NORMAL
RAD ONC ARIA COURSE LAST TREATMENT DATE: NORMAL
RAD ONC ARIA COURSE SESSION NUMBER: 13
RAD ONC ARIA COURSE START DATE: NORMAL
RAD ONC ARIA COURSE TREATMENT ELAPSED DAYS: 17
RAD ONC ARIA PLAN FRACTIONS TREATED TO DATE: 13
RAD ONC ARIA PLAN ID: NORMAL
RAD ONC ARIA PLAN NAME: NORMAL
RAD ONC ARIA PLAN PRESCRIBED DOSE PER FRACTION: 1.8 GY
RAD ONC ARIA PLAN PRIMARY REFERENCE POINT: NORMAL
RAD ONC ARIA PLAN TOTAL FRACTIONS PRESCRIBED: 25
RAD ONC ARIA PLAN TOTAL PRESCRIBED DOSE: 4500 CGY
RAD ONC ARIA REFERENCE POINT DOSAGE GIVEN TO DATE: 23.4 GY
RAD ONC ARIA REFERENCE POINT ID: NORMAL
RAD ONC ARIA REFERENCE POINT SESSION DOSAGE GIVEN: 1.8 GY

## 2025-05-30 PROCEDURE — 77386 HB NTSTY MODUL RAD TX DLVR CPLX: CPT | Performed by: STUDENT IN AN ORGANIZED HEALTH CARE EDUCATION/TRAINING PROGRAM

## 2025-05-30 PROCEDURE — 77387 GUIDANCE FOR RADJ TX DLVR: CPT | Performed by: STUDENT IN AN ORGANIZED HEALTH CARE EDUCATION/TRAINING PROGRAM

## 2025-06-02 ENCOUNTER — APPOINTMENT (OUTPATIENT)
Dept: RADIATION ONCOLOGY | Facility: HOSPITAL | Age: 70
End: 2025-06-02
Payer: COMMERCIAL

## 2025-06-02 ENCOUNTER — APPOINTMENT (OUTPATIENT)
Dept: RADIATION ONCOLOGY | Facility: HOSPITAL | Age: 70
End: 2025-06-02
Attending: RADIOLOGY
Payer: COMMERCIAL

## 2025-06-02 LAB
LEFT EYE DIABETIC RETINOPATHY: NORMAL
RAD ONC ARIA COURSE FIRST TREATMENT DATE: NORMAL
RAD ONC ARIA COURSE ID: NORMAL
RAD ONC ARIA COURSE INTENT: NORMAL
RAD ONC ARIA COURSE LAST TREATMENT DATE: NORMAL
RAD ONC ARIA COURSE SESSION NUMBER: 14
RAD ONC ARIA COURSE START DATE: NORMAL
RAD ONC ARIA COURSE TREATMENT ELAPSED DAYS: 20
RAD ONC ARIA PLAN FRACTIONS TREATED TO DATE: 14
RAD ONC ARIA PLAN ID: NORMAL
RAD ONC ARIA PLAN NAME: NORMAL
RAD ONC ARIA PLAN PRESCRIBED DOSE PER FRACTION: 1.8 GY
RAD ONC ARIA PLAN PRIMARY REFERENCE POINT: NORMAL
RAD ONC ARIA PLAN TOTAL FRACTIONS PRESCRIBED: 25
RAD ONC ARIA PLAN TOTAL PRESCRIBED DOSE: 4500 CGY
RAD ONC ARIA REFERENCE POINT DOSAGE GIVEN TO DATE: 25.2 GY
RAD ONC ARIA REFERENCE POINT ID: NORMAL
RAD ONC ARIA REFERENCE POINT SESSION DOSAGE GIVEN: 1.8 GY
RIGHT EYE DIABETIC RETINOPATHY: NORMAL

## 2025-06-02 PROCEDURE — 77386 HB NTSTY MODUL RAD TX DLVR CPLX: CPT | Performed by: RADIOLOGY

## 2025-06-02 PROCEDURE — 77387 GUIDANCE FOR RADJ TX DLVR: CPT | Performed by: RADIOLOGY

## 2025-06-03 ENCOUNTER — TELEPHONE (OUTPATIENT)
Age: 70
End: 2025-06-03

## 2025-06-03 ENCOUNTER — APPOINTMENT (OUTPATIENT)
Dept: RADIATION ONCOLOGY | Facility: HOSPITAL | Age: 70
End: 2025-06-03
Attending: RADIOLOGY
Payer: COMMERCIAL

## 2025-06-03 ENCOUNTER — TELEPHONE (OUTPATIENT)
Dept: RADIOLOGY | Facility: HOSPITAL | Age: 70
End: 2025-06-03

## 2025-06-03 LAB
RAD ONC ARIA COURSE FIRST TREATMENT DATE: NORMAL
RAD ONC ARIA COURSE ID: NORMAL
RAD ONC ARIA COURSE INTENT: NORMAL
RAD ONC ARIA COURSE LAST TREATMENT DATE: NORMAL
RAD ONC ARIA COURSE SESSION NUMBER: 15
RAD ONC ARIA COURSE START DATE: NORMAL
RAD ONC ARIA COURSE TREATMENT ELAPSED DAYS: 21
RAD ONC ARIA PLAN FRACTIONS TREATED TO DATE: 15
RAD ONC ARIA PLAN ID: NORMAL
RAD ONC ARIA PLAN NAME: NORMAL
RAD ONC ARIA PLAN PRESCRIBED DOSE PER FRACTION: 1.8 GY
RAD ONC ARIA PLAN PRIMARY REFERENCE POINT: NORMAL
RAD ONC ARIA PLAN TOTAL FRACTIONS PRESCRIBED: 25
RAD ONC ARIA PLAN TOTAL PRESCRIBED DOSE: 4500 CGY
RAD ONC ARIA REFERENCE POINT DOSAGE GIVEN TO DATE: 27 GY
RAD ONC ARIA REFERENCE POINT ID: NORMAL
RAD ONC ARIA REFERENCE POINT SESSION DOSAGE GIVEN: 1.8 GY

## 2025-06-03 PROCEDURE — 77386 HB NTSTY MODUL RAD TX DLVR CPLX: CPT | Performed by: STUDENT IN AN ORGANIZED HEALTH CARE EDUCATION/TRAINING PROGRAM

## 2025-06-03 PROCEDURE — 77336 RADIATION PHYSICS CONSULT: CPT | Performed by: RADIOLOGY

## 2025-06-03 PROCEDURE — 77387 GUIDANCE FOR RADJ TX DLVR: CPT | Performed by: STUDENT IN AN ORGANIZED HEALTH CARE EDUCATION/TRAINING PROGRAM

## 2025-06-04 ENCOUNTER — APPOINTMENT (OUTPATIENT)
Dept: RADIATION ONCOLOGY | Facility: HOSPITAL | Age: 70
End: 2025-06-04
Attending: RADIOLOGY
Payer: COMMERCIAL

## 2025-06-04 LAB
RAD ONC ARIA COURSE FIRST TREATMENT DATE: NORMAL
RAD ONC ARIA COURSE ID: NORMAL
RAD ONC ARIA COURSE INTENT: NORMAL
RAD ONC ARIA COURSE LAST TREATMENT DATE: NORMAL
RAD ONC ARIA COURSE SESSION NUMBER: 16
RAD ONC ARIA COURSE START DATE: NORMAL
RAD ONC ARIA COURSE TREATMENT ELAPSED DAYS: 22
RAD ONC ARIA PLAN FRACTIONS TREATED TO DATE: 16
RAD ONC ARIA PLAN ID: NORMAL
RAD ONC ARIA PLAN NAME: NORMAL
RAD ONC ARIA PLAN PRESCRIBED DOSE PER FRACTION: 1.8 GY
RAD ONC ARIA PLAN PRIMARY REFERENCE POINT: NORMAL
RAD ONC ARIA PLAN TOTAL FRACTIONS PRESCRIBED: 25
RAD ONC ARIA PLAN TOTAL PRESCRIBED DOSE: 4500 CGY
RAD ONC ARIA REFERENCE POINT DOSAGE GIVEN TO DATE: 28.8 GY
RAD ONC ARIA REFERENCE POINT ID: NORMAL
RAD ONC ARIA REFERENCE POINT SESSION DOSAGE GIVEN: 1.8 GY

## 2025-06-04 PROCEDURE — 77387 GUIDANCE FOR RADJ TX DLVR: CPT | Performed by: RADIOLOGY

## 2025-06-04 PROCEDURE — 77386 HB NTSTY MODUL RAD TX DLVR CPLX: CPT | Performed by: RADIOLOGY

## 2025-06-04 PROCEDURE — 77427 RADIATION TX MANAGEMENT X5: CPT | Performed by: RADIOLOGY

## 2025-06-05 ENCOUNTER — TELEPHONE (OUTPATIENT)
Dept: RADIOLOGY | Facility: HOSPITAL | Age: 70
End: 2025-06-05

## 2025-06-05 ENCOUNTER — APPOINTMENT (OUTPATIENT)
Dept: RADIATION ONCOLOGY | Facility: HOSPITAL | Age: 70
End: 2025-06-05
Attending: RADIOLOGY
Payer: COMMERCIAL

## 2025-06-05 LAB
RAD ONC ARIA COURSE FIRST TREATMENT DATE: NORMAL
RAD ONC ARIA COURSE ID: NORMAL
RAD ONC ARIA COURSE INTENT: NORMAL
RAD ONC ARIA COURSE LAST TREATMENT DATE: NORMAL
RAD ONC ARIA COURSE SESSION NUMBER: 17
RAD ONC ARIA COURSE START DATE: NORMAL
RAD ONC ARIA COURSE TREATMENT ELAPSED DAYS: 23
RAD ONC ARIA PLAN FRACTIONS TREATED TO DATE: 17
RAD ONC ARIA PLAN ID: NORMAL
RAD ONC ARIA PLAN NAME: NORMAL
RAD ONC ARIA PLAN PRESCRIBED DOSE PER FRACTION: 1.8 GY
RAD ONC ARIA PLAN PRIMARY REFERENCE POINT: NORMAL
RAD ONC ARIA PLAN TOTAL FRACTIONS PRESCRIBED: 25
RAD ONC ARIA PLAN TOTAL PRESCRIBED DOSE: 4500 CGY
RAD ONC ARIA REFERENCE POINT DOSAGE GIVEN TO DATE: 30.6 GY
RAD ONC ARIA REFERENCE POINT ID: NORMAL
RAD ONC ARIA REFERENCE POINT SESSION DOSAGE GIVEN: 1.8 GY

## 2025-06-05 PROCEDURE — 77387 GUIDANCE FOR RADJ TX DLVR: CPT | Performed by: RADIOLOGY

## 2025-06-05 PROCEDURE — 77386 HB NTSTY MODUL RAD TX DLVR CPLX: CPT | Performed by: RADIOLOGY

## 2025-06-05 NOTE — PROGRESS NOTES
[facility]  [address] [place]   [address]  INTERVENTIONAL RADIOLOGY [phone]    You are scheduled for a IVC filter removal on 6/13/25 at 0830.    Your arrival time is 0730.  Our Interventional Radiology nurse will notify you a few days before your procedure with the exact arrival time and location to arrive.    To prepare for your procedure:  Please arrange for someone to drive you home after the procedure and stay with you until the next morning if you are instructed to do so.  This is typically for patients receiving some type of sedative or anesthetic for the procedure.  DO NOT EAT OR DRINK ANYTHING after midnight on the evening before your procedure including candy & gum.  ONLY SIPS OF WATER with your medications are allowed on the morning of your procedure.  If you have an allergy to x-ray dye, please contact Interventional Radiology for an x-ray dye preparation which usually consists of an oral steroid and Benadryl.  If you wear a Glucose Monitor, you may be asked to remove it for your procedure if we are using x-ray.  These devices need to be removed when we are imaging with x-ray near the device since the radiation can cause the unit to malfunction.  If possible and not too inconvenient, you may want to schedule your exam closer to day 14 of your 14 day device so your device is not wasted.    The day of your procedure:  Bring a list of the medications you take at home.  Bring medications you take for breathing problems (such as inhalers), medications for chest pain, or both.  Bring your insurance card and a form of photo ID.  Bring a case for your glasses or contacts.  Please leave all valuables such as credit cards and jewelry at home.  Report to the registration desk in the main lobby at the [facility].  Ask to be directed to the After Procedure Unit on the 1st floor.  While your procedure is being performed your family may wait in the Waiting Room on the 1st floor.  Be  prepared to stay overnight just in case. Sometimes procedures will indicate the need for further observation or treatment.   If you are scheduled for a follow-up visit with the Interventional Radiologist after your procedure, you will be called with a date and time.    Special Instructions (Medications to alter or stop taking before your procedure etc.):      n/a

## 2025-06-06 ENCOUNTER — APPOINTMENT (OUTPATIENT)
Dept: RADIATION ONCOLOGY | Facility: HOSPITAL | Age: 70
End: 2025-06-06
Attending: RADIOLOGY
Payer: COMMERCIAL

## 2025-06-06 LAB
RAD ONC ARIA COURSE FIRST TREATMENT DATE: NORMAL
RAD ONC ARIA COURSE ID: NORMAL
RAD ONC ARIA COURSE INTENT: NORMAL
RAD ONC ARIA COURSE LAST TREATMENT DATE: NORMAL
RAD ONC ARIA COURSE SESSION NUMBER: 18
RAD ONC ARIA COURSE START DATE: NORMAL
RAD ONC ARIA COURSE TREATMENT ELAPSED DAYS: 24
RAD ONC ARIA PLAN FRACTIONS TREATED TO DATE: 18
RAD ONC ARIA PLAN ID: NORMAL
RAD ONC ARIA PLAN NAME: NORMAL
RAD ONC ARIA PLAN PRESCRIBED DOSE PER FRACTION: 1.8 GY
RAD ONC ARIA PLAN PRIMARY REFERENCE POINT: NORMAL
RAD ONC ARIA PLAN TOTAL FRACTIONS PRESCRIBED: 25
RAD ONC ARIA PLAN TOTAL PRESCRIBED DOSE: 4500 CGY
RAD ONC ARIA REFERENCE POINT DOSAGE GIVEN TO DATE: 32.4 GY
RAD ONC ARIA REFERENCE POINT ID: NORMAL
RAD ONC ARIA REFERENCE POINT SESSION DOSAGE GIVEN: 1.8 GY

## 2025-06-06 PROCEDURE — 77386 HB NTSTY MODUL RAD TX DLVR CPLX: CPT | Performed by: STUDENT IN AN ORGANIZED HEALTH CARE EDUCATION/TRAINING PROGRAM

## 2025-06-06 PROCEDURE — 77387 GUIDANCE FOR RADJ TX DLVR: CPT | Performed by: STUDENT IN AN ORGANIZED HEALTH CARE EDUCATION/TRAINING PROGRAM

## 2025-06-09 ENCOUNTER — APPOINTMENT (OUTPATIENT)
Dept: RADIATION ONCOLOGY | Facility: HOSPITAL | Age: 70
End: 2025-06-09
Payer: COMMERCIAL

## 2025-06-09 ENCOUNTER — APPOINTMENT (OUTPATIENT)
Dept: RADIATION ONCOLOGY | Facility: HOSPITAL | Age: 70
End: 2025-06-09
Attending: RADIOLOGY
Payer: COMMERCIAL

## 2025-06-09 LAB
RAD ONC ARIA COURSE FIRST TREATMENT DATE: NORMAL
RAD ONC ARIA COURSE ID: NORMAL
RAD ONC ARIA COURSE INTENT: NORMAL
RAD ONC ARIA COURSE LAST TREATMENT DATE: NORMAL
RAD ONC ARIA COURSE SESSION NUMBER: 19
RAD ONC ARIA COURSE START DATE: NORMAL
RAD ONC ARIA COURSE TREATMENT ELAPSED DAYS: 27
RAD ONC ARIA PLAN FRACTIONS TREATED TO DATE: 19
RAD ONC ARIA PLAN ID: NORMAL
RAD ONC ARIA PLAN NAME: NORMAL
RAD ONC ARIA PLAN PRESCRIBED DOSE PER FRACTION: 1.8 GY
RAD ONC ARIA PLAN PRIMARY REFERENCE POINT: NORMAL
RAD ONC ARIA PLAN TOTAL FRACTIONS PRESCRIBED: 25
RAD ONC ARIA PLAN TOTAL PRESCRIBED DOSE: 4500 CGY
RAD ONC ARIA REFERENCE POINT DOSAGE GIVEN TO DATE: 34.2 GY
RAD ONC ARIA REFERENCE POINT ID: NORMAL
RAD ONC ARIA REFERENCE POINT SESSION DOSAGE GIVEN: 1.8 GY

## 2025-06-09 PROCEDURE — 77386 HB NTSTY MODUL RAD TX DLVR CPLX: CPT | Performed by: RADIOLOGY

## 2025-06-09 PROCEDURE — 77387 GUIDANCE FOR RADJ TX DLVR: CPT | Performed by: RADIOLOGY

## 2025-06-10 ENCOUNTER — APPOINTMENT (OUTPATIENT)
Dept: RADIATION ONCOLOGY | Facility: HOSPITAL | Age: 70
End: 2025-06-10
Attending: RADIOLOGY
Payer: COMMERCIAL

## 2025-06-10 LAB
RAD ONC ARIA COURSE FIRST TREATMENT DATE: NORMAL
RAD ONC ARIA COURSE ID: NORMAL
RAD ONC ARIA COURSE INTENT: NORMAL
RAD ONC ARIA COURSE LAST TREATMENT DATE: NORMAL
RAD ONC ARIA COURSE SESSION NUMBER: 20
RAD ONC ARIA COURSE START DATE: NORMAL
RAD ONC ARIA COURSE TREATMENT ELAPSED DAYS: 28
RAD ONC ARIA PLAN FRACTIONS TREATED TO DATE: 20
RAD ONC ARIA PLAN ID: NORMAL
RAD ONC ARIA PLAN NAME: NORMAL
RAD ONC ARIA PLAN PRESCRIBED DOSE PER FRACTION: 1.8 GY
RAD ONC ARIA PLAN PRIMARY REFERENCE POINT: NORMAL
RAD ONC ARIA PLAN TOTAL FRACTIONS PRESCRIBED: 25
RAD ONC ARIA PLAN TOTAL PRESCRIBED DOSE: 4500 CGY
RAD ONC ARIA REFERENCE POINT DOSAGE GIVEN TO DATE: 36 GY
RAD ONC ARIA REFERENCE POINT ID: NORMAL
RAD ONC ARIA REFERENCE POINT SESSION DOSAGE GIVEN: 1.8 GY

## 2025-06-10 PROCEDURE — 77386 HB NTSTY MODUL RAD TX DLVR CPLX: CPT | Performed by: RADIOLOGY

## 2025-06-10 PROCEDURE — 77387 GUIDANCE FOR RADJ TX DLVR: CPT | Performed by: RADIOLOGY

## 2025-06-10 PROCEDURE — 77336 RADIATION PHYSICS CONSULT: CPT | Performed by: RADIOLOGY

## 2025-06-11 ENCOUNTER — APPOINTMENT (OUTPATIENT)
Dept: RADIATION ONCOLOGY | Facility: HOSPITAL | Age: 70
End: 2025-06-11
Attending: RADIOLOGY
Payer: COMMERCIAL

## 2025-06-11 LAB
RAD ONC ARIA COURSE FIRST TREATMENT DATE: NORMAL
RAD ONC ARIA COURSE ID: NORMAL
RAD ONC ARIA COURSE INTENT: NORMAL
RAD ONC ARIA COURSE LAST TREATMENT DATE: NORMAL
RAD ONC ARIA COURSE SESSION NUMBER: 21
RAD ONC ARIA COURSE START DATE: NORMAL
RAD ONC ARIA COURSE TREATMENT ELAPSED DAYS: 29
RAD ONC ARIA PLAN FRACTIONS TREATED TO DATE: 21
RAD ONC ARIA PLAN ID: NORMAL
RAD ONC ARIA PLAN NAME: NORMAL
RAD ONC ARIA PLAN PRESCRIBED DOSE PER FRACTION: 1.8 GY
RAD ONC ARIA PLAN PRIMARY REFERENCE POINT: NORMAL
RAD ONC ARIA PLAN TOTAL FRACTIONS PRESCRIBED: 25
RAD ONC ARIA PLAN TOTAL PRESCRIBED DOSE: 4500 CGY
RAD ONC ARIA REFERENCE POINT DOSAGE GIVEN TO DATE: 37.8 GY
RAD ONC ARIA REFERENCE POINT ID: NORMAL
RAD ONC ARIA REFERENCE POINT SESSION DOSAGE GIVEN: 1.8 GY

## 2025-06-11 PROCEDURE — 77387 GUIDANCE FOR RADJ TX DLVR: CPT | Performed by: RADIOLOGY

## 2025-06-11 PROCEDURE — 77427 RADIATION TX MANAGEMENT X5: CPT | Performed by: RADIOLOGY

## 2025-06-11 PROCEDURE — 77386 HB NTSTY MODUL RAD TX DLVR CPLX: CPT | Performed by: RADIOLOGY

## 2025-06-12 ENCOUNTER — APPOINTMENT (OUTPATIENT)
Dept: RADIATION ONCOLOGY | Facility: HOSPITAL | Age: 70
End: 2025-06-12
Attending: RADIOLOGY
Payer: COMMERCIAL

## 2025-06-12 LAB
RAD ONC ARIA COURSE FIRST TREATMENT DATE: NORMAL
RAD ONC ARIA COURSE ID: NORMAL
RAD ONC ARIA COURSE INTENT: NORMAL
RAD ONC ARIA COURSE LAST TREATMENT DATE: NORMAL
RAD ONC ARIA COURSE SESSION NUMBER: 22
RAD ONC ARIA COURSE START DATE: NORMAL
RAD ONC ARIA COURSE TREATMENT ELAPSED DAYS: 30
RAD ONC ARIA PLAN FRACTIONS TREATED TO DATE: 22
RAD ONC ARIA PLAN ID: NORMAL
RAD ONC ARIA PLAN NAME: NORMAL
RAD ONC ARIA PLAN PRESCRIBED DOSE PER FRACTION: 1.8 GY
RAD ONC ARIA PLAN PRIMARY REFERENCE POINT: NORMAL
RAD ONC ARIA PLAN TOTAL FRACTIONS PRESCRIBED: 25
RAD ONC ARIA PLAN TOTAL PRESCRIBED DOSE: 4500 CGY
RAD ONC ARIA REFERENCE POINT DOSAGE GIVEN TO DATE: 39.6 GY
RAD ONC ARIA REFERENCE POINT ID: NORMAL
RAD ONC ARIA REFERENCE POINT SESSION DOSAGE GIVEN: 1.8 GY

## 2025-06-12 PROCEDURE — 77386 HB NTSTY MODUL RAD TX DLVR CPLX: CPT | Performed by: INTERNAL MEDICINE

## 2025-06-12 PROCEDURE — 77332 RADIATION TREATMENT AID(S): CPT | Performed by: RADIOLOGY

## 2025-06-12 PROCEDURE — 77290 THER RAD SIMULAJ FIELD CPLX: CPT | Performed by: RADIOLOGY

## 2025-06-13 ENCOUNTER — APPOINTMENT (OUTPATIENT)
Dept: RADIATION ONCOLOGY | Facility: HOSPITAL | Age: 70
End: 2025-06-13
Attending: RADIOLOGY
Payer: COMMERCIAL

## 2025-06-13 ENCOUNTER — HOSPITAL ENCOUNTER (OUTPATIENT)
Dept: RADIOLOGY | Facility: HOSPITAL | Age: 70
Discharge: HOME | End: 2025-06-13
Attending: INTERNAL MEDICINE
Payer: COMMERCIAL

## 2025-06-13 VITALS
HEIGHT: 60 IN | SYSTOLIC BLOOD PRESSURE: 94 MMHG | BODY MASS INDEX: 44.17 KG/M2 | OXYGEN SATURATION: 100 % | HEART RATE: 75 BPM | TEMPERATURE: 97.2 F | DIASTOLIC BLOOD PRESSURE: 55 MMHG | WEIGHT: 225 LBS | RESPIRATION RATE: 16 BRPM

## 2025-06-13 DIAGNOSIS — Z95.828 PRESENCE OF IVC FILTER: ICD-10-CM

## 2025-06-13 PROBLEM — Z00.00 MEDICARE ANNUAL WELLNESS VISIT, SUBSEQUENT: Status: RESOLVED | Noted: 2020-12-15 | Resolved: 2025-06-13

## 2025-06-13 LAB
ANION GAP SERPL CALCULATED.3IONS-SCNC: 10 MMOL/L (ref 4–13)
BUN SERPL-MCNC: 28 MG/DL (ref 5–25)
CALCIUM SERPL-MCNC: 8.9 MG/DL (ref 8.4–10.2)
CHLORIDE SERPL-SCNC: 106 MMOL/L (ref 96–108)
CO2 SERPL-SCNC: 22 MMOL/L (ref 21–32)
CREAT SERPL-MCNC: 1.57 MG/DL (ref 0.6–1.3)
ERYTHROCYTE [DISTWIDTH] IN BLOOD BY AUTOMATED COUNT: 16.1 % (ref 11.6–15.1)
GFR SERPL CREATININE-BSD FRML MDRD: 32 ML/MIN/1.73SQ M
GLUCOSE P FAST SERPL-MCNC: 148 MG/DL (ref 65–99)
GLUCOSE SERPL-MCNC: 144 MG/DL (ref 65–140)
GLUCOSE SERPL-MCNC: 148 MG/DL (ref 65–140)
HCT VFR BLD AUTO: 30.6 % (ref 34.8–46.1)
HGB BLD-MCNC: 9.9 G/DL (ref 11.5–15.4)
INR PPP: 1.46 (ref 0.85–1.19)
MCH RBC QN AUTO: 31.2 PG (ref 26.8–34.3)
MCHC RBC AUTO-ENTMCNC: 32.4 G/DL (ref 31.4–37.4)
MCV RBC AUTO: 97 FL (ref 82–98)
PLATELET # BLD AUTO: 127 THOUSANDS/UL (ref 149–390)
PMV BLD AUTO: 12.1 FL (ref 8.9–12.7)
POTASSIUM SERPL-SCNC: 3.3 MMOL/L (ref 3.5–5.3)
PROTHROMBIN TIME: 18.5 SECONDS (ref 12.3–15)
RAD ONC ARIA COURSE FIRST TREATMENT DATE: NORMAL
RAD ONC ARIA COURSE ID: NORMAL
RAD ONC ARIA COURSE INTENT: NORMAL
RAD ONC ARIA COURSE LAST TREATMENT DATE: NORMAL
RAD ONC ARIA COURSE SESSION NUMBER: 23
RAD ONC ARIA COURSE START DATE: NORMAL
RAD ONC ARIA COURSE TREATMENT ELAPSED DAYS: 31
RAD ONC ARIA PLAN FRACTIONS TREATED TO DATE: 23
RAD ONC ARIA PLAN ID: NORMAL
RAD ONC ARIA PLAN NAME: NORMAL
RAD ONC ARIA PLAN PRESCRIBED DOSE PER FRACTION: 1.8 GY
RAD ONC ARIA PLAN PRIMARY REFERENCE POINT: NORMAL
RAD ONC ARIA PLAN TOTAL FRACTIONS PRESCRIBED: 25
RAD ONC ARIA PLAN TOTAL PRESCRIBED DOSE: 4500 CGY
RAD ONC ARIA REFERENCE POINT DOSAGE GIVEN TO DATE: 41.4 GY
RAD ONC ARIA REFERENCE POINT ID: NORMAL
RAD ONC ARIA REFERENCE POINT SESSION DOSAGE GIVEN: 1.8 GY
RBC # BLD AUTO: 3.17 MILLION/UL (ref 3.81–5.12)
SODIUM SERPL-SCNC: 138 MMOL/L (ref 135–147)
WBC # BLD AUTO: 4.53 THOUSAND/UL (ref 4.31–10.16)

## 2025-06-13 PROCEDURE — 85027 COMPLETE CBC AUTOMATED: CPT | Performed by: INTERNAL MEDICINE

## 2025-06-13 PROCEDURE — 77386 HB NTSTY MODUL RAD TX DLVR CPLX: CPT | Performed by: INTERNAL MEDICINE

## 2025-06-13 PROCEDURE — 75860 VEIN X-RAY NECK: CPT

## 2025-06-13 PROCEDURE — 37193 REM ENDOVAS VENA CAVA FILTER: CPT

## 2025-06-13 PROCEDURE — 80048 BASIC METABOLIC PNL TOTAL CA: CPT | Performed by: INTERNAL MEDICINE

## 2025-06-13 PROCEDURE — 82948 REAGENT STRIP/BLOOD GLUCOSE: CPT

## 2025-06-13 PROCEDURE — C1773 RET DEV, INSERTABLE: HCPCS

## 2025-06-13 PROCEDURE — C1894 INTRO/SHEATH, NON-LASER: HCPCS

## 2025-06-13 PROCEDURE — 85610 PROTHROMBIN TIME: CPT | Performed by: INTERNAL MEDICINE

## 2025-06-13 PROCEDURE — 77387 GUIDANCE FOR RADJ TX DLVR: CPT | Performed by: INTERNAL MEDICINE

## 2025-06-13 PROCEDURE — 99152 MOD SED SAME PHYS/QHP 5/>YRS: CPT

## 2025-06-13 PROCEDURE — C1769 GUIDE WIRE: HCPCS

## 2025-06-13 PROCEDURE — 99153 MOD SED SAME PHYS/QHP EA: CPT

## 2025-06-13 RX ORDER — LIDOCAINE WITH 8.4% SOD BICARB 0.9%(10ML)
SYRINGE (ML) INJECTION AS NEEDED
Status: COMPLETED | OUTPATIENT
Start: 2025-06-13 | End: 2025-06-13

## 2025-06-13 RX ORDER — FENTANYL CITRATE 50 UG/ML
INJECTION, SOLUTION INTRAMUSCULAR; INTRAVENOUS AS NEEDED
Status: COMPLETED | OUTPATIENT
Start: 2025-06-13 | End: 2025-06-13

## 2025-06-13 RX ORDER — MIDAZOLAM HYDROCHLORIDE 2 MG/2ML
INJECTION, SOLUTION INTRAMUSCULAR; INTRAVENOUS AS NEEDED
Status: COMPLETED | OUTPATIENT
Start: 2025-06-13 | End: 2025-06-13

## 2025-06-13 RX ADMIN — MIDAZOLAM 1 MG: 1 INJECTION INTRAMUSCULAR; INTRAVENOUS at 08:52

## 2025-06-13 RX ADMIN — FENTANYL CITRATE 50 MCG: 50 INJECTION INTRAMUSCULAR; INTRAVENOUS at 09:17

## 2025-06-13 RX ADMIN — MIDAZOLAM 1 MG: 1 INJECTION INTRAMUSCULAR; INTRAVENOUS at 09:17

## 2025-06-13 RX ADMIN — IOHEXOL 3 ML: 350 INJECTION, SOLUTION INTRAVENOUS at 10:05

## 2025-06-13 RX ADMIN — Medication 2 ML: at 09:20

## 2025-06-13 RX ADMIN — Medication 3 ML: at 08:58

## 2025-06-13 RX ADMIN — FENTANYL CITRATE 50 MCG: 50 INJECTION INTRAMUSCULAR; INTRAVENOUS at 08:52

## 2025-06-13 NOTE — INTERVAL H&P NOTE
Update: (This section must be completed if the H&P was completed greater than 24 hrs to procedure or admission)    H&P reviewed. After examining the patient, I find no changed to the H&P since it had been written.    /66   Pulse 79   Temp (!) 97 °F (36.1 °C) (Temporal)   Resp 16   Ht 5' (1.524 m)   Wt 102 kg (225 lb)   SpO2 96%   BMI 43.94 kg/m²     Patient re-evaluated. Accept as history and physical.    We will proceed with IVC filter retrieval today.    Brian Cavazos MD/June 13, 2025/8:47 AM

## 2025-06-13 NOTE — DISCHARGE INSTRUCTIONS
Inferior Vena Cava Filter Removal     WHAT YOU NEED TO KNOW:   Inferior vena cava (IVC) filter removal is a procedure to remove your IVC filter.     DISCHARGE INSTRUCTIONS:     Wound care: Keep your wound clean and dry. Bandaide may come off in 24 hours.     Self-care:   Limit activity for 24 hours. Slowly start to do more each day. Return to your daily activities as directed.  Resume your normal diet. Small sips of flat soda will help with nausea.    Contact Interventional Radiology at [phone] ([facility] PATIENTS: Contact Interventional Radiology at [phone]) ([facility] PATIENTS: Contact Interventional Radiology at [phone]) if:  You have a fever.  Persistent nausea or vomiting.   You have chills, a cough, or feel weak and achy.   Your wound is red, swollen, or draining pus.   You have questions or concerns about your condition.            Procedural Sedation   WHAT YOU NEED TO KNOW:   Procedural sedation is medicine used during procedures to help you feel relaxed and calm. You will remember little to none of the procedure. After sedation you may feel tired, weak, or unsteady on your feet. You may also have trouble concentrating or short-term memory loss. These symptoms should go away in 24 hours or less.   DISCHARGE INSTRUCTIONS:     Call 911 or have someone else call for any of the following:   You have sudden trouble breathing.     You cannot be woken.      Contact Interventional Radiology at [phone]   ([facility] PATIENTS: Contact Interventional Radiology at [phone]) ([facility] PATIENTS: Contact Interventional Radiology at [phone]) if any of the following occur:     You have a severe headache or dizziness.     Your heart is beating faster than usual.    You have a fever or chills.     Your skin is itchy, swollen, or you have a rash.     You have nausea or are vomiting for more than 8 hours after the procedure.      You have questions or concerns about your condition or care.  Self-care:   Have  someone stay with you for 24 hours. This person can drive you to errands and help you do things around the house. This person can also watch for problems.      Rest and do quiet activities for 24 hours. Do not exercise, ride a bike, or play sports. Stand up slowly to prevent dizziness and falls. Take short walks around the house with another person. Slowly return to your usual activities the next day.      Do not drive or use dangerous machines or tools for 24 hours. You may injure yourself or others. Examples include a lawnmower, saw, or drill. Do not return to work for 24 hours if you use dangerous machines or tools for work.      Do not make important decisions for 24 hours. For example, do not sign important papers or invest money.      Drink liquids as directed. Liquids help flush the sedation medicine out of your body. Ask how much liquid to drink each day and which liquids are best for you.      Eat small, frequent meals to prevent nausea and vomiting. Start with clear liquids such as juice or broth. If you do not vomit after clear liquids, you can eat your usual foods.      Do not drink alcohol or take medicines that make you drowsy. This includes medicines that help you sleep and anxiety medicines. Ask your healthcare provider if it is safe for you to take pain medicine.  Follow up with your healthcare provider as directed: Write down your questions so you remember to ask them during your visits.

## 2025-06-13 NOTE — BRIEF OP NOTE (RAD/CATH)
INTERVENTIONAL RADIOLOGY PROCEDURE NOTE    Date: 6/13/2025    Procedure:   Procedure Summary       Date: 06/13/25 Room / Location: [facility] Interventional Radiology    Anesthesia Start:  Anesthesia Stop:     Procedure: IR IVC FILTER REMOVAL Diagnosis:       Presence of IVC filter      (IVC filter retreival)    Scheduled Providers: Brian Cavazos MD Responsible Provider:     Anesthesia Type: Not recorded ASA Status: Not recorded            Preoperative diagnosis:   1. Presence of IVC filter         Postoperative diagnosis: Same.    Surgeon: Brian Cavazos MD     Assistant: None. No qualified resident was available.    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     No thrombus was identified in the existing filter, therefore, the filter was removed in its entirety. Post-removal venogram showed no extravasation.    Complications: None immediate.    Anesthesia: conscious sedation and local

## 2025-06-16 ENCOUNTER — APPOINTMENT (OUTPATIENT)
Dept: RADIATION ONCOLOGY | Facility: HOSPITAL | Age: 70
End: 2025-06-16
Attending: RADIOLOGY
Payer: COMMERCIAL

## 2025-06-16 ENCOUNTER — APPOINTMENT (OUTPATIENT)
Dept: RADIATION ONCOLOGY | Facility: HOSPITAL | Age: 70
End: 2025-06-16
Payer: COMMERCIAL

## 2025-06-16 LAB
RAD ONC ARIA COURSE FIRST TREATMENT DATE: NORMAL
RAD ONC ARIA COURSE ID: NORMAL
RAD ONC ARIA COURSE INTENT: NORMAL
RAD ONC ARIA COURSE LAST TREATMENT DATE: NORMAL
RAD ONC ARIA COURSE SESSION NUMBER: 24
RAD ONC ARIA COURSE START DATE: NORMAL
RAD ONC ARIA COURSE TREATMENT ELAPSED DAYS: 34
RAD ONC ARIA PLAN FRACTIONS TREATED TO DATE: 24
RAD ONC ARIA PLAN ID: NORMAL
RAD ONC ARIA PLAN NAME: NORMAL
RAD ONC ARIA PLAN PRESCRIBED DOSE PER FRACTION: 1.8 GY
RAD ONC ARIA PLAN PRIMARY REFERENCE POINT: NORMAL
RAD ONC ARIA PLAN TOTAL FRACTIONS PRESCRIBED: 25
RAD ONC ARIA PLAN TOTAL PRESCRIBED DOSE: 4500 CGY
RAD ONC ARIA REFERENCE POINT DOSAGE GIVEN TO DATE: 43.2 GY
RAD ONC ARIA REFERENCE POINT ID: NORMAL
RAD ONC ARIA REFERENCE POINT SESSION DOSAGE GIVEN: 1.8 GY

## 2025-06-16 PROCEDURE — 77386 HB NTSTY MODUL RAD TX DLVR CPLX: CPT | Performed by: RADIOLOGY

## 2025-06-16 PROCEDURE — 77387 GUIDANCE FOR RADJ TX DLVR: CPT | Performed by: RADIOLOGY

## 2025-06-17 ENCOUNTER — APPOINTMENT (OUTPATIENT)
Dept: RADIATION ONCOLOGY | Facility: HOSPITAL | Age: 70
End: 2025-06-17
Attending: RADIOLOGY
Payer: COMMERCIAL

## 2025-06-17 LAB
RAD ONC ARIA COURSE FIRST TREATMENT DATE: NORMAL
RAD ONC ARIA COURSE ID: NORMAL
RAD ONC ARIA COURSE INTENT: NORMAL
RAD ONC ARIA COURSE LAST TREATMENT DATE: NORMAL
RAD ONC ARIA COURSE SESSION NUMBER: 25
RAD ONC ARIA COURSE START DATE: NORMAL
RAD ONC ARIA COURSE TREATMENT ELAPSED DAYS: 35
RAD ONC ARIA PLAN FRACTIONS TREATED TO DATE: 25
RAD ONC ARIA PLAN ID: NORMAL
RAD ONC ARIA PLAN NAME: NORMAL
RAD ONC ARIA PLAN PRESCRIBED DOSE PER FRACTION: 1.8 GY
RAD ONC ARIA PLAN PRIMARY REFERENCE POINT: NORMAL
RAD ONC ARIA PLAN TOTAL FRACTIONS PRESCRIBED: 25
RAD ONC ARIA PLAN TOTAL PRESCRIBED DOSE: 4500 CGY
RAD ONC ARIA REFERENCE POINT DOSAGE GIVEN TO DATE: 45 GY
RAD ONC ARIA REFERENCE POINT ID: NORMAL
RAD ONC ARIA REFERENCE POINT SESSION DOSAGE GIVEN: 1.8 GY

## 2025-06-17 PROCEDURE — 77386 HB NTSTY MODUL RAD TX DLVR CPLX: CPT | Performed by: RADIOLOGY

## 2025-06-17 PROCEDURE — 77336 RADIATION PHYSICS CONSULT: CPT | Performed by: RADIOLOGY

## 2025-06-17 PROCEDURE — 77387 GUIDANCE FOR RADJ TX DLVR: CPT | Performed by: RADIOLOGY

## 2025-06-18 ENCOUNTER — NURSE TRIAGE (OUTPATIENT)
Age: 70
End: 2025-06-18

## 2025-06-18 NOTE — TELEPHONE ENCOUNTER
"REASON FOR CONVERSATION: Vaginal Pain    SYMPTOMS: vaginal pain, vaginal swelling, pain with urination, red rash going up rectum, vaginal dryness    OTHER HEALTH INFORMATION: Patient called office stating she has vaginal pain, vaginal swelling, pain with urination, red itching rash going up rectum, vaginal dryness that occurred after she had Brachy Cylinder placed vaginally and removed last week. She is unsure if it is related. She states the pain with urination started a week ago and other symptoms started about 5 days ago. She denies fever, bleeding, abdominal pain, discharge, back pain or any other symptoms to note at this time.     PROTOCOL DISPOSITION: Discuss with Provider and Call Back Patient (overriding See Today in Office)    CARE ADVICE PROVIDED: Advised she call back with new or worsening pain and symptoms, fever, abdominal pain. Will reach out to provider for further recommendations and advice.     PRACTICE FOLLOW-UP: Routing to provider       Reason for Disposition   MILD-MODERATE vaginal pain and present > 24 hours (Exception: Chronic pain.)    Answer Assessment - Initial Assessment Questions  1. SYMPTOM: \"What's the main symptom you're concerned about?\" (e.g., pain, itching, dryness)      Pain and swelling in vagina ( inside vagina)   2. LOCATION: \"Where is the  pain/swelling  located?\" (e.g., inside/outside, left/right)      Inside vagina   3. ONSET: \"When did the  symptoms  start?\"      5 days ago   4. PAIN: \"Is there any pain?\" If Yes, ask: \"How bad is it?\" (Scale: 1-10; mild, moderate, severe)      2   5. ITCHING: \"Is there any itching?\" If Yes, ask: \"How bad is it?\" (Scale: 1-10; mild, moderate, severe)      2   6. CAUSE: \"What do you think is causing the discharge?\" \"Have you had the same problem before?\" \"What happened then?\"      None   7. OTHER SYMPTOMS: \"Do you have any other symptoms?\" (e.g., fever, itching, vaginal bleeding, pain with urination, injury to genital area, vaginal foreign " body)      Vaginal dryness, red rash / itching going up rectum , pain with urination ( for one week)    Protocols used: Vaginal Symptoms-Adult-[place]

## 2025-06-19 DIAGNOSIS — R30.0 DYSURIA: Primary | ICD-10-CM

## 2025-06-19 PROCEDURE — 77316 BRACHYTX ISODOSE PLAN SIMPLE: CPT | Performed by: RADIOLOGY

## 2025-06-19 NOTE — TELEPHONE ENCOUNTER
Please have her call gyn/onc - this is likely related to the radiation  Or if you have the onc pod contact, please reach out to them

## 2025-06-20 ENCOUNTER — APPOINTMENT (OUTPATIENT)
Dept: RADIATION ONCOLOGY | Facility: HOSPITAL | Age: 70
End: 2025-06-20
Payer: COMMERCIAL

## 2025-06-20 ENCOUNTER — APPOINTMENT (OUTPATIENT)
Dept: LAB | Facility: AMBULARY SURGERY CENTER | Age: 70
End: 2025-06-20
Payer: COMMERCIAL

## 2025-06-20 DIAGNOSIS — R30.0 DYSURIA: ICD-10-CM

## 2025-06-20 LAB
BACTERIA UR QL AUTO: ABNORMAL /HPF
BILIRUB UR QL STRIP: NEGATIVE
CLARITY UR: ABNORMAL
COLOR UR: ABNORMAL
GLUCOSE UR STRIP-MCNC: NEGATIVE MG/DL
HGB UR QL STRIP.AUTO: ABNORMAL
KETONES UR STRIP-MCNC: NEGATIVE MG/DL
LEUKOCYTE ESTERASE UR QL STRIP: ABNORMAL
NITRITE UR QL STRIP: NEGATIVE
NON-SQ EPI CELLS URNS QL MICRO: ABNORMAL /HPF
PH UR STRIP.AUTO: 6.5 [PH]
PROT UR STRIP-MCNC: ABNORMAL MG/DL
RBC #/AREA URNS AUTO: ABNORMAL /HPF
SP GR UR STRIP.AUTO: 1.01 (ref 1–1.03)
UROBILINOGEN UR STRIP-ACNC: <2 MG/DL
WBC #/AREA URNS AUTO: ABNORMAL /HPF

## 2025-06-20 PROCEDURE — 87186 SC STD MICRODIL/AGAR DIL: CPT

## 2025-06-20 PROCEDURE — 87077 CULTURE AEROBIC IDENTIFY: CPT

## 2025-06-20 PROCEDURE — 87086 URINE CULTURE/COLONY COUNT: CPT

## 2025-06-20 PROCEDURE — 81001 URINALYSIS AUTO W/SCOPE: CPT

## 2025-06-22 DIAGNOSIS — E83.42 HYPOMAGNESEMIA: ICD-10-CM

## 2025-06-22 LAB — BACTERIA UR CULT: ABNORMAL

## 2025-06-22 RX ORDER — LANOLIN ALCOHOL/MO/W.PET/CERES
400 CREAM (GRAM) TOPICAL DAILY
Qty: 90 TABLET | Refills: 1 | Status: ON HOLD | OUTPATIENT
Start: 2025-06-22

## 2025-06-23 ENCOUNTER — RESULTS FOLLOW-UP (OUTPATIENT)
Dept: GYNECOLOGIC ONCOLOGY | Facility: CLINIC | Age: 70
End: 2025-06-23

## 2025-06-23 DIAGNOSIS — N39.0 URINARY TRACT INFECTION WITHOUT HEMATURIA, SITE UNSPECIFIED: Primary | ICD-10-CM

## 2025-06-23 RX ORDER — SULFAMETHOXAZOLE AND TRIMETHOPRIM 800; 160 MG/1; MG/1
1 TABLET ORAL EVERY 12 HOURS SCHEDULED
Qty: 14 TABLET | Refills: 0 | Status: ON HOLD | OUTPATIENT
Start: 2025-06-23 | End: 2025-06-30

## 2025-06-24 ENCOUNTER — APPOINTMENT (OUTPATIENT)
Dept: RADIATION ONCOLOGY | Facility: HOSPITAL | Age: 70
End: 2025-06-24
Payer: COMMERCIAL

## 2025-06-24 LAB
RAD ONC ARIA COURSE FIRST TREATMENT DATE: NORMAL
RAD ONC ARIA COURSE ID: NORMAL
RAD ONC ARIA COURSE INTENT: NORMAL
RAD ONC ARIA COURSE LAST TREATMENT DATE: NORMAL
RAD ONC ARIA COURSE SESSION NUMBER: 1
RAD ONC ARIA COURSE START DATE: NORMAL
RAD ONC ARIA COURSE TREATMENT ELAPSED DAYS: 0
RAD ONC ARIA PLAN FRACTIONS TREATED TO DATE: 1
RAD ONC ARIA PLAN ID: NORMAL
RAD ONC ARIA PLAN NAME: NORMAL
RAD ONC ARIA PLAN PRESCRIBED DOSE PER FRACTION: 6 GY
RAD ONC ARIA PLAN PRIMARY REFERENCE POINT: NORMAL
RAD ONC ARIA PLAN TOTAL FRACTIONS PRESCRIBED: 3
RAD ONC ARIA PLAN TOTAL PRESCRIBED DOSE: 1800 CGY
RAD ONC ARIA REFERENCE POINT DOSAGE GIVEN TO DATE: 6 GY
RAD ONC ARIA REFERENCE POINT ID: NORMAL
RAD ONC ARIA REFERENCE POINT SESSION DOSAGE GIVEN: 6 GY

## 2025-06-24 PROCEDURE — 57156 INS VAG BRACHYTX DEVICE: CPT | Performed by: STUDENT IN AN ORGANIZED HEALTH CARE EDUCATION/TRAINING PROGRAM

## 2025-06-24 PROCEDURE — 77770 HDR RDNCL NTRSTL/ICAV BRCHTX: CPT | Performed by: STUDENT IN AN ORGANIZED HEALTH CARE EDUCATION/TRAINING PROGRAM

## 2025-06-24 PROCEDURE — 77280 THER RAD SIMULAJ FIELD SMPL: CPT | Performed by: STUDENT IN AN ORGANIZED HEALTH CARE EDUCATION/TRAINING PROGRAM

## 2025-06-24 PROCEDURE — C1717 BRACHYTX, NON-STR,HDR IR-192: HCPCS | Performed by: STUDENT IN AN ORGANIZED HEALTH CARE EDUCATION/TRAINING PROGRAM

## 2025-06-27 ENCOUNTER — NURSE TRIAGE (OUTPATIENT)
Age: 70
End: 2025-06-27

## 2025-06-27 NOTE — TELEPHONE ENCOUNTER
"Provider: Dr. Jara    Attempted to transfer call to office.    REASON FOR CONVERSATION: Fatigue    SYMPTOMS: Patient started with increased fatigue that started after brachy therapy on Tuesday. She is able to get up and walk, but with difficulty and has been sleeping a lot.     OTHER HEALTH INFORMATION: Patient is currently on Bactrim for a UTI that she started earlier in the week.     PROTOCOL DISPOSITION: Callback From Office Today (overriding See Today in Office)    CARE ADVICE PROVIDED: Instructed patient to continue frequent rest periods and to try to make sure she is drinking plenty of fluids.     PRACTICE FOLLOW-UP: Return phone call to patient with further instructions/recommendations.      Reason for Disposition   MODERATE weakness (e.g., interferes with work, school, normal activities) and persists > 3 days    Answer Assessment - Initial Assessment Questions  1. DESCRIPTION: \"Describe how you are feeling.\"      Increased fatigue    2. SEVERITY: \"How bad is it?\"  \"Can you stand and walk?\"      Able to stand and walk, but with difficulty.    3. ONSET: \"When did these symptoms begin?\" (e.g., hours, days, weeks, months)      Started after Brachy on Tuesday.    4. CAUSE: \"What do you think is causing the weakness or fatigue?\" (e.g., not drinking enough fluids, medical problem, trouble sleeping)      Unsure.    5. NEW MEDICINES:  \"Have you started on any new medicines recently?\" (e.g., opioid pain medicines, benzodiazepines, muscle relaxants, antidepressants, antihistamines, neuroleptics, beta blockers)      Denies    6. OTHER SYMPTOMS: \"Do you have any other symptoms?\" (e.g., chest pain, fever, cough, SOB, vomiting, diarrhea, bleeding, other areas of pain)      Denies    Protocols used: Weakness (Generalized) and Fatigue-Adult-[place]    "

## 2025-06-28 ENCOUNTER — HOSPITAL ENCOUNTER (INPATIENT)
Facility: HOSPITAL | Age: 70
LOS: 4 days | Discharge: HOME | DRG: 205 | End: 2025-07-02
Attending: EMERGENCY MEDICINE | Admitting: OBSTETRICS & GYNECOLOGY
Payer: COMMERCIAL

## 2025-06-28 ENCOUNTER — APPOINTMENT (INPATIENT)
Dept: NON INVASIVE DIAGNOSTICS | Facility: HOSPITAL | Age: 70
DRG: 205 | End: 2025-06-28
Payer: COMMERCIAL

## 2025-06-28 ENCOUNTER — APPOINTMENT (EMERGENCY)
Dept: RADIOLOGY | Facility: HOSPITAL | Age: 70
DRG: 205 | End: 2025-06-28
Payer: COMMERCIAL

## 2025-06-28 DIAGNOSIS — N18.4 STAGE 4 CHRONIC KIDNEY DISEASE (HCC): ICD-10-CM

## 2025-06-28 DIAGNOSIS — R06.00 DYSPNEA: Primary | ICD-10-CM

## 2025-06-28 DIAGNOSIS — L03.90 CELLULITIS: ICD-10-CM

## 2025-06-28 DIAGNOSIS — N17.9 ACUTE ON CHRONIC RENAL FAILURE  (HCC): ICD-10-CM

## 2025-06-28 DIAGNOSIS — N18.9 ACUTE ON CHRONIC RENAL FAILURE  (HCC): ICD-10-CM

## 2025-06-28 DIAGNOSIS — L50.9 URTICARIA: ICD-10-CM

## 2025-06-28 PROBLEM — N39.0 UTI (URINARY TRACT INFECTION): Status: ACTIVE | Noted: 2025-06-28

## 2025-06-28 LAB
2HR DELTA HS TROPONIN: 0 NG/L
4HR DELTA HS TROPONIN: 0 NG/L
ALBUMIN SERPL BCG-MCNC: 3.6 G/DL (ref 3.5–5)
ALP SERPL-CCNC: 89 U/L (ref 34–104)
ALT SERPL W P-5'-P-CCNC: 14 U/L (ref 7–52)
ANION GAP SERPL CALCULATED.3IONS-SCNC: 11 MMOL/L (ref 4–13)
AORTIC ROOT: 3.2 CM
AST SERPL W P-5'-P-CCNC: 14 U/L (ref 13–39)
BASOPHILS # BLD AUTO: 0.01 THOUSANDS/ÂΜL (ref 0–0.1)
BASOPHILS NFR BLD AUTO: 0 % (ref 0–1)
BILIRUB SERPL-MCNC: 0.49 MG/DL (ref 0.2–1)
BNP SERPL-MCNC: 123 PG/ML (ref 0–100)
BSA FOR ECHO PROCEDURE: 1.96 M2
BUN SERPL-MCNC: 29 MG/DL (ref 5–25)
CALCIUM SERPL-MCNC: 8.8 MG/DL (ref 8.4–10.2)
CARDIAC TROPONIN I PNL SERPL HS: 27 NG/L (ref ?–50)
CHLORIDE SERPL-SCNC: 103 MMOL/L (ref 96–108)
CO2 SERPL-SCNC: 24 MMOL/L (ref 21–32)
CREAT SERPL-MCNC: 2.31 MG/DL (ref 0.6–1.3)
E WAVE DECELERATION TIME: 139 MS
E/A RATIO: 0.68
EOSINOPHIL # BLD AUTO: 0.51 THOUSAND/ÂΜL (ref 0–0.61)
EOSINOPHIL NFR BLD AUTO: 11 % (ref 0–6)
ERYTHROCYTE [DISTWIDTH] IN BLOOD BY AUTOMATED COUNT: 17.2 % (ref 11.6–15.1)
FRACTIONAL SHORTENING: 26 (ref 28–44)
GFR SERPL CREATININE-BSD FRML MDRD: 20 ML/MIN/1.73SQ M
GLUCOSE SERPL-MCNC: 128 MG/DL (ref 65–140)
HCT VFR BLD AUTO: 30.6 % (ref 34.8–46.1)
HGB BLD-MCNC: 9.6 G/DL (ref 11.5–15.4)
IMM GRANULOCYTES # BLD AUTO: 0.01 THOUSAND/UL (ref 0–0.2)
IMM GRANULOCYTES NFR BLD AUTO: 0 % (ref 0–2)
INTERVENTRICULAR SEPTUM IN DIASTOLE (PARASTERNAL SHORT AXIS VIEW): 1.1 CM
INTERVENTRICULAR SEPTUM: 1.1 CM (ref 0.6–1.1)
LACTATE SERPL-SCNC: 1.5 MMOL/L (ref 0.5–2)
LEFT ATRIUM SIZE: 3.9 CM
LEFT INTERNAL DIMENSION IN SYSTOLE: 3.9 CM (ref 2.1–4)
LEFT VENTRICULAR INTERNAL DIMENSION IN DIASTOLE: 5.3 CM (ref 3.5–6)
LEFT VENTRICULAR POSTERIOR WALL IN END DIASTOLE: 1.2 CM
LEFT VENTRICULAR STROKE VOLUME: 66 ML
LV EF US.2D.A4C+ESTIMATED: 48 %
LVSV (TEICH): 66 ML
LYMPHOCYTES # BLD AUTO: 0.25 THOUSANDS/ÂΜL (ref 0.6–4.47)
LYMPHOCYTES NFR BLD AUTO: 5 % (ref 14–44)
MAGNESIUM SERPL-MCNC: 1.8 MG/DL (ref 1.9–2.7)
MCH RBC QN AUTO: 31.3 PG (ref 26.8–34.3)
MCHC RBC AUTO-ENTMCNC: 31.4 G/DL (ref 31.4–37.4)
MCV RBC AUTO: 100 FL (ref 82–98)
MONOCYTES # BLD AUTO: 0.46 THOUSAND/ÂΜL (ref 0.17–1.22)
MONOCYTES NFR BLD AUTO: 9 % (ref 4–12)
MV E'TISSUE VEL-LAT: 7 CM/S
MV E'TISSUE VEL-SEP: 5 CM/S
MV PEAK A VEL: 1 M/S
MV PEAK E VEL: 68 CM/S
MV STENOSIS PRESSURE HALF TIME: 40 MS
MV VALVE AREA P 1/2 METHOD: 5.5
NEUTROPHILS # BLD AUTO: 3.63 THOUSANDS/ÂΜL (ref 1.85–7.62)
NEUTS SEG NFR BLD AUTO: 75 % (ref 43–75)
NRBC BLD AUTO-RTO: 0 /100 WBCS
PLATELET # BLD AUTO: 105 THOUSANDS/UL (ref 149–390)
PMV BLD AUTO: 11.9 FL (ref 8.9–12.7)
POTASSIUM SERPL-SCNC: 3.4 MMOL/L (ref 3.5–5.3)
PROT SERPL-MCNC: 6.7 G/DL (ref 6.4–8.4)
RA PRESSURE ESTIMATED: 3 MMHG
RBC # BLD AUTO: 3.07 MILLION/UL (ref 3.81–5.12)
RV PSP: 20 MMHG
SL CV LV EF: 50
SL CV PED ECHO LEFT VENTRICLE DIASTOLIC VOLUME (MOD BIPLANE) 2D: 132 ML
SL CV PED ECHO LEFT VENTRICLE SYSTOLIC VOLUME (MOD BIPLANE) 2D: 66 ML
SODIUM SERPL-SCNC: 138 MMOL/L (ref 135–147)
TR MAX PG: 17 MMHG
TR PEAK VELOCITY: 2.1 M/S
TRICUSPID ANNULAR PLANE SYSTOLIC EXCURSION: 2.2 CM
TRICUSPID VALVE PEAK REGURGITATION VELOCITY: 2.06 M/S
WBC # BLD AUTO: 4.87 THOUSAND/UL (ref 4.31–10.16)

## 2025-06-28 PROCEDURE — 84484 ASSAY OF TROPONIN QUANT: CPT

## 2025-06-28 PROCEDURE — 93005 ELECTROCARDIOGRAM TRACING: CPT

## 2025-06-28 PROCEDURE — 93308 TTE F-UP OR LMTD: CPT | Performed by: STUDENT IN AN ORGANIZED HEALTH CARE EDUCATION/TRAINING PROGRAM

## 2025-06-28 PROCEDURE — 93321 DOPPLER ECHO F-UP/LMTD STD: CPT | Performed by: STUDENT IN AN ORGANIZED HEALTH CARE EDUCATION/TRAINING PROGRAM

## 2025-06-28 PROCEDURE — 99285 EMERGENCY DEPT VISIT HI MDM: CPT

## 2025-06-28 PROCEDURE — 93321 DOPPLER ECHO F-UP/LMTD STD: CPT

## 2025-06-28 PROCEDURE — 96365 THER/PROPH/DIAG IV INF INIT: CPT

## 2025-06-28 PROCEDURE — 93325 DOPPLER ECHO COLOR FLOW MAPG: CPT

## 2025-06-28 PROCEDURE — 85025 COMPLETE CBC W/AUTO DIFF WBC: CPT

## 2025-06-28 PROCEDURE — 99285 EMERGENCY DEPT VISIT HI MDM: CPT | Performed by: EMERGENCY MEDICINE

## 2025-06-28 PROCEDURE — 71045 X-RAY EXAM CHEST 1 VIEW: CPT

## 2025-06-28 PROCEDURE — 36415 COLL VENOUS BLD VENIPUNCTURE: CPT

## 2025-06-28 PROCEDURE — 99448 NTRPROF PH1/NTRNET/EHR 21-30: CPT | Performed by: RADIOLOGY

## 2025-06-28 PROCEDURE — 93308 TTE F-UP OR LMTD: CPT

## 2025-06-28 PROCEDURE — 87040 BLOOD CULTURE FOR BACTERIA: CPT

## 2025-06-28 PROCEDURE — 83880 ASSAY OF NATRIURETIC PEPTIDE: CPT

## 2025-06-28 PROCEDURE — 83605 ASSAY OF LACTIC ACID: CPT

## 2025-06-28 PROCEDURE — 83735 ASSAY OF MAGNESIUM: CPT

## 2025-06-28 PROCEDURE — 93325 DOPPLER ECHO COLOR FLOW MAPG: CPT | Performed by: STUDENT IN AN ORGANIZED HEALTH CARE EDUCATION/TRAINING PROGRAM

## 2025-06-28 PROCEDURE — NC001 PR NO CHARGE: Performed by: OBSTETRICS & GYNECOLOGY

## 2025-06-28 PROCEDURE — 80053 COMPREHEN METABOLIC PANEL: CPT

## 2025-06-28 RX ORDER — CEFAZOLIN SODIUM 2 G/50ML
2000 SOLUTION INTRAVENOUS EVERY 12 HOURS
Status: DISCONTINUED | OUTPATIENT
Start: 2025-06-29 | End: 2025-06-30

## 2025-06-28 RX ORDER — CLOPIDOGREL BISULFATE 75 MG/1
75 TABLET ORAL DAILY
Status: DISCONTINUED | OUTPATIENT
Start: 2025-06-29 | End: 2025-07-02 | Stop reason: HOSPADM

## 2025-06-28 RX ORDER — LURASIDONE HYDROCHLORIDE 20 MG/1
20 TABLET, FILM COATED ORAL
Status: DISCONTINUED | OUTPATIENT
Start: 2025-06-29 | End: 2025-07-02 | Stop reason: HOSPADM

## 2025-06-28 RX ORDER — LANOLIN ALCOHOL/MO/W.PET/CERES
400 CREAM (GRAM) TOPICAL 2 TIMES DAILY
Status: DISCONTINUED | OUTPATIENT
Start: 2025-06-28 | End: 2025-07-02

## 2025-06-28 RX ORDER — METOPROLOL SUCCINATE 50 MG/1
50 TABLET, EXTENDED RELEASE ORAL EVERY 12 HOURS
Status: DISCONTINUED | OUTPATIENT
Start: 2025-06-28 | End: 2025-07-02 | Stop reason: HOSPADM

## 2025-06-28 RX ORDER — ENOXAPARIN SODIUM 100 MG/ML
1 INJECTION SUBCUTANEOUS
Status: DISCONTINUED | OUTPATIENT
Start: 2025-06-28 | End: 2025-06-28

## 2025-06-28 RX ORDER — ATORVASTATIN CALCIUM 40 MG/1
80 TABLET, FILM COATED ORAL
Status: DISCONTINUED | OUTPATIENT
Start: 2025-06-29 | End: 2025-07-02 | Stop reason: HOSPADM

## 2025-06-28 RX ORDER — FAMOTIDINE 20 MG/1
20 TABLET, FILM COATED ORAL DAILY
Status: DISCONTINUED | OUTPATIENT
Start: 2025-06-28 | End: 2025-07-02 | Stop reason: HOSPADM

## 2025-06-28 RX ORDER — FAMOTIDINE 20 MG/1
20 TABLET, FILM COATED ORAL 2 TIMES DAILY
Status: DISCONTINUED | OUTPATIENT
Start: 2025-06-28 | End: 2025-06-28

## 2025-06-28 RX ORDER — ENOXAPARIN SODIUM 100 MG/ML
1 INJECTION SUBCUTANEOUS 2 TIMES DAILY
Status: DISCONTINUED | OUTPATIENT
Start: 2025-06-28 | End: 2025-06-28

## 2025-06-28 RX ORDER — POTASSIUM CHLORIDE 14.9 MG/ML
20 INJECTION INTRAVENOUS
Status: COMPLETED | OUTPATIENT
Start: 2025-06-28 | End: 2025-06-28

## 2025-06-28 RX ORDER — MAGNESIUM SULFATE HEPTAHYDRATE 40 MG/ML
2 INJECTION, SOLUTION INTRAVENOUS ONCE
Status: COMPLETED | OUTPATIENT
Start: 2025-06-28 | End: 2025-06-28

## 2025-06-28 RX ORDER — HEPARIN SODIUM 5000 [USP'U]/ML
7500 INJECTION, SOLUTION INTRAVENOUS; SUBCUTANEOUS EVERY 8 HOURS SCHEDULED
Status: DISCONTINUED | OUTPATIENT
Start: 2025-06-28 | End: 2025-06-29

## 2025-06-28 RX ORDER — LORAZEPAM 1 MG/1
1 TABLET ORAL
Status: DISCONTINUED | OUTPATIENT
Start: 2025-06-28 | End: 2025-07-02 | Stop reason: HOSPADM

## 2025-06-28 RX ORDER — ACETAMINOPHEN 325 MG/1
650 TABLET ORAL EVERY 4 HOURS PRN
Status: DISCONTINUED | OUTPATIENT
Start: 2025-06-28 | End: 2025-07-02 | Stop reason: HOSPADM

## 2025-06-28 RX ORDER — CEFAZOLIN SODIUM 2 G/50ML
2000 SOLUTION INTRAVENOUS EVERY 8 HOURS
Status: DISCONTINUED | OUTPATIENT
Start: 2025-06-28 | End: 2025-06-28

## 2025-06-28 RX ORDER — TORSEMIDE 20 MG/1
20 TABLET ORAL DAILY
Status: DISCONTINUED | OUTPATIENT
Start: 2025-06-29 | End: 2025-07-02 | Stop reason: HOSPADM

## 2025-06-28 RX ADMIN — CEFTRIAXONE SODIUM 1000 MG: 10 INJECTION, POWDER, FOR SOLUTION INTRAVENOUS at 13:44

## 2025-06-28 RX ADMIN — HEPARIN SODIUM 7500 UNITS: 5000 INJECTION INTRAVENOUS; SUBCUTANEOUS at 17:05

## 2025-06-28 RX ADMIN — POTASSIUM CHLORIDE 20 MEQ: 14.9 INJECTION, SOLUTION INTRAVENOUS at 17:14

## 2025-06-28 RX ADMIN — ACETAMINOPHEN 650 MG: 325 TABLET ORAL at 23:50

## 2025-06-28 RX ADMIN — SODIUM CHLORIDE 500 ML: 0.9 INJECTION, SOLUTION INTRAVENOUS at 13:33

## 2025-06-28 RX ADMIN — FAMOTIDINE 20 MG: 20 TABLET, FILM COATED ORAL at 17:05

## 2025-06-28 RX ADMIN — HEPARIN SODIUM 7500 UNITS: 5000 INJECTION INTRAVENOUS; SUBCUTANEOUS at 20:55

## 2025-06-28 RX ADMIN — Medication 400 MG: at 17:05

## 2025-06-28 RX ADMIN — POTASSIUM CHLORIDE 20 MEQ: 14.9 INJECTION, SOLUTION INTRAVENOUS at 19:19

## 2025-06-28 RX ADMIN — MAGNESIUM SULFATE HEPTAHYDRATE 2 G: 40 INJECTION, SOLUTION INTRAVENOUS at 17:15

## 2025-06-28 NOTE — ED PROVIDER NOTES
Time reflects when diagnosis was documented in both MDM as applicable and the Disposition within this note       Time User Action Codes Description Comment    6/28/2025  2:13 PM Panfilo López [R06.00] Dyspnea     6/28/2025  2:13 PM Panfilo López Add [L50.9] Urticaria     6/28/2025  2:14 PM Panfilo López Add [L03.90] Cellulitis     6/28/2025  2:15 PM Panfilo López Add [N17.9,  N18.9] Acute on chronic renal failure  (HCC)           ED Disposition       ED Disposition   Admit    Condition   Stable    Date/Time   Sat Jun 28, 2025  2:13 PM    Comment   Case was discussed with Gyn/Onc and the patient's admission status was agreed to be Admission Status: inpatient status to the service of Dr. Downing .               Assessment & Plan       Medical Decision Making  70-year-old female presenting with hives after receiving Bactrim but also found to have an infected port and new acute dyspnea on exertion.    On physical exam, patient's hives appear to be resolving, she has no other symptoms of allergic reaction.  She already received 50 mg of Benadryl prehospital.  Likely rash secondary to Bactrim but will continue to monitor, no need for epinephrine at this time.    Patient's dyspnea is noted while walking to the bathroom, after lying in bed, patient rebounds to normal vitals with normal pulse ox.  No need for oxygen saturation at this time.  We are considering ACS, myocarditis, heart failure, pneumonia, viral syndrome.  Will get cardiac labs and chest x-ray as well as EKG.    Patient's port noted to be red and tender, no tachycardia, no fever.  There is no drainage at the port site.  Will reach out to gynecology oncology for further recommendations.    Following workup, gynecology oncology fellow to bedside to evaluate patient.  Gynecology oncology states they will admit the patient to their service and continue management.    Patient admitted.    Amount and/or Complexity of Data Reviewed  Labs:  ordered. Decision-making details documented in ED Course.  Radiology: ordered and independent interpretation performed.    Risk  OTC drugs.  Decision regarding hospitalization.        ED Course as of 06/28/25 1809   Sat Jun 28, 2025   1351 CBC and differential(!)  Hemoglobin does seem to be dropping mildly but no need for transfusion at this time.  No elevated white count or elevated neutrophil count.  Eosinophils noted to be mildly elevated.  Patient is getting antibiotics.   1404 Comprehensive metabolic panel(!)  OWEN noted, mildly decreased potassium.   1404 MAGNESIUM(!): 1.8  Will replete   1405 LACTIC ACID: 1.5  Reassuring   1410 BNP(!): 123  Mildly elevated   1410 hs TnI 0hr: 27  Will need delta       Medications   magnesium Oxide (MAG-OX) tablet 400 mg (has no administration in time range)   diphenhydrAMINE (FOR EMS ONLY) (BENADRYL) injection 50 mg (0 mg Does not apply Given to EMS 6/28/25 1206)   sodium chloride 0.9 % bolus 500 mL (500 mL Intravenous New Bag 6/28/25 1333)   ceftriaxone (ROCEPHIN) 1 g/50 mL in dextrose IVPB (1,000 mg Intravenous New Bag 6/28/25 1344)       ED Risk Strat Scores                    No data recorded                            History of Present Illness       Chief Complaint   Patient presents with    Rash     Pt arrives by EMS from [facility] with hives to her bilat hands and arms. Pt denies having similar allergic reactions and denies new soaps/foods/etc. Pt reports starting bactrim 3 days ago and took a dose this morning.        Past Medical History[1]   Past Surgical History[2]   Family History[3]   Social History[4]   E-Cigarette/Vaping    E-Cigarette Use Never User       E-Cigarette/Vaping Substances    Nicotine No     THC No     CBD No     Flavoring No     Other No     Unknown No       I have reviewed and agree with the history as documented.     70-year-old female presenting to the ED for complaint of hives while taking Bactrim.  Patient states that this morning she took a dose of  Bactrim and then broke up with itchy hives on both of her hands.  Normal was called and EMS gave 50 mg of Benadryl.  The hives are starting to resolve.    However on further examination of the patient it was noted that patient has a port in the right chest for chemotherapy from her history of endometrial cancer.  The port appears to be erythematous with streaking up to the neck and tenderness on palpation.  Patient denies fever or chills.  Patient states that this rash is new over the past day.    Also noted that while patient was walking to the bathroom she was having increased dyspnea and shortness of breath after 5-10 steps.  I discussed with patient the symptoms and she states over the past week she has been noticing increased shortness of breath on exertion.  Patient was significantly tachypneic after getting back from the bathroom and while laying down was able to get back to normal.  Patient denies any new leg swelling or cough.        Review of Systems        Objective       ED Triage Vitals   Temperature Pulse Blood Pressure Respirations SpO2 Patient Position - Orthostatic VS   06/28/25 1204 06/28/25 1204 06/28/25 1204 06/28/25 1204 06/28/25 1204 06/28/25 1204   98.3 °F (36.8 °C) 87 (!) 95/48 12 95 % Lying      Temp Source Heart Rate Source BP Location FiO2 (%) Pain Score    06/28/25 1204 06/28/25 1204 06/28/25 1204 -- 06/28/25 1500    Oral Monitor Right arm  No Pain      Vitals      Date and Time Temp Pulse SpO2 Resp BP Pain Score FACES Pain Rating User   06/28/25 1558 -- -- -- -- -- No Pain -- ML   06/28/25 1500 -- 85 95 % -- 113/51 -- -- AP   06/28/25 1500 98.4 °F (36.9 °C) -- -- 16 -- No Pain -- AN   06/28/25 1357 -- 88 95 % 14 113/51 -- -- RL   06/28/25 1230 -- 85 97 % 12 100/60 -- -- RL   06/28/25 1210 -- 87 97 % 12 106/52 -- -- RL   06/28/25 1204 98.3 °F (36.8 °C) 87 95 % 12 95/48 -- -- RL            Physical Exam  Constitutional:       Appearance: Normal appearance.   HENT:      Head: Normocephalic  and atraumatic.      Right Ear: External ear normal.      Left Ear: External ear normal.      Nose: Nose normal.      Mouth/Throat:      Mouth: Mucous membranes are moist.      Pharynx: Oropharynx is clear.     Eyes:      Extraocular Movements: Extraocular movements intact.      Pupils: Pupils are equal, round, and reactive to light.       Cardiovascular:      Rate and Rhythm: Normal rate.      Pulses: Normal pulses.      Heart sounds: Normal heart sounds.   Pulmonary:      Breath sounds: Normal breath sounds. No wheezing or rales.      Comments: Effort normal at rest however when exerting, becomes tachypneic and hypoxic down to the low 90s  Chest:      Chest wall: No tenderness.   Abdominal:      Tenderness: There is no abdominal tenderness.     Musculoskeletal:         General: Normal range of motion.      Cervical back: Normal range of motion.     Skin:     General: Skin is warm.      Capillary Refill: Capillary refill takes less than 2 seconds.      Comments: 1 hive noted on the right arm medial aspect.  See image for image of port     Neurological:      General: No focal deficit present.      Mental Status: She is alert and oriented to person, place, and time.     Psychiatric:         Mood and Affect: Mood normal.         Behavior: Behavior normal.         Results Reviewed       Procedure Component Value Units Date/Time    HS Troponin I 4hr [[id number]] Collected: 06/28/25 1736    Lab Status: In process Specimen: Blood from Central Venous Line Updated: 06/28/25 1750    HS Troponin I 2hr [[id number]]  (Normal) Collected: 06/28/25 1640    Lab Status: Final result Specimen: Blood from Line, Venous Updated: 06/28/25 1722     hs TnI 2hr 27 ng/L      Delta 2hr hsTnI 0 ng/L     HS Troponin 0hr (reflex protocol) [[id number]]  (Normal) Collected: 06/28/25 1332    Lab Status: Final result Specimen: Blood from Arm, Right Updated: 06/28/25 1406     hs TnI 0hr 27 ng/L     B-Type Natriuretic Peptide(BNP) [[id number]]  (Abnormal)  Collected: 06/28/25 1332    Lab Status: Final result Specimen: Blood from Arm, Right Updated: 06/28/25 1405      pg/mL     Comprehensive metabolic panel [[id number]]  (Abnormal) Collected: 06/28/25 1332    Lab Status: Final result Specimen: Blood from Arm, Right Updated: 06/28/25 1402     Sodium 138 mmol/L      Potassium 3.4 mmol/L      Chloride 103 mmol/L      CO2 24 mmol/L      ANION GAP 11 mmol/L      BUN 29 mg/dL      Creatinine 2.31 mg/dL      Glucose 128 mg/dL      Calcium 8.8 mg/dL      AST 14 U/L      ALT 14 U/L      Alkaline Phosphatase 89 U/L      Total Protein 6.7 g/dL      Albumin 3.6 g/dL      Total Bilirubin 0.49 mg/dL      eGFR 20 ml/min/1.73sq m     Narrative:      National Kid[employer] Foundation guidelines for Chronic Kidney Disease (CKD):     Stage 1 with normal or high GFR (GFR > 90 mL/min/1.73 square meters)    Stage 2 Mild CKD (GFR = 60-89 mL/min/1.73 square meters)    Stage 3A Moderate CKD (GFR = 45-59 mL/min/1.73 square meters)    Stage 3B Moderate CKD (GFR = 30-44 mL/min/1.73 square meters)    Stage 4 Severe CKD (GFR = 15-29 mL/min/1.73 square meters)    Stage 5 End Stage CKD (GFR <15 mL/min/1.73 square meters)  Note: GFR calculation is accurate only with a steady state creatinine    Magnesium [[id number]]  (Abnormal) Collected: 06/28/25 1332    Lab Status: Final result Specimen: Blood from Arm, Right Updated: 06/28/25 1402     Magnesium 1.8 mg/dL     Lactic acid, plasma (w/reflex if result > 2.0) [[id number]]  (Normal) Collected: 06/28/25 1329    Lab Status: Final result Specimen: Blood from Arm, Right Updated: 06/28/25 1400     LACTIC ACID 1.5 mmol/L     Narrative:      Result may be elevated if tourniquet was used during collection.    CBC and differential [[id number]]  (Abnormal) Collected: 06/28/25 1332    Lab Status: Final result Specimen: Blood from Arm, Right Updated: 06/28/25 1346     WBC 4.87 Thousand/uL      RBC 3.07 Million/uL      Hemoglobin 9.6 g/dL      Hematocrit 30.6 %        fL      MCH 31.3 pg      MCHC 31.4 g/dL      RDW 17.2 %      MPV 11.9 fL      Platelets 105 Thousands/uL      nRBC 0 /100 WBCs      Segmented % 75 %      Immature Grans % 0 %      Lymphocytes % 5 %      Monocytes % 9 %      Eosinophils Relative 11 %      Basophils Relative 0 %      Absolute Neutrophils 3.63 Thousands/µL      Absolute Immature Grans 0.01 Thousand/uL      Absolute Lymphocytes 0.25 Thousands/µL      Absolute Monocytes 0.46 Thousand/µL      Eosinophils Absolute 0.51 Thousand/µL      Basophils Absolute 0.01 Thousands/µL     Blood culture #2 [[id number]] Collected: 06/28/25 1329    Lab Status: In process Specimen: Blood from Arm, Left Updated: 06/28/25 1339    Blood culture #1 [[id number]] Collected: 06/28/25 1329    Lab Status: In process Specimen: Blood from Arm, Right Updated: 06/28/25 1339    Blood culture [[id number]] Collected: 06/28/25 1329    Lab Status: In process Specimen: Blood from Central Venous Line Updated: 06/28/25 1339            XR chest 1 view portable   ED Interpretation by Panfilo Owusu MD (06/28 6031)   Patient rotated but no acute changes from previous x-ray noted.          Procedures    ED Medication and Procedure Management   Prior to Admission Medications   Prescriptions Last Dose Informant Patient Reported? Taking?   CRANBERRY PO  Self Yes No   Sig: Take by mouth   Cholecalciferol (VITAMIN D) 2000 units CAPS 6/28/2025 Self Yes Yes   Sig: Take 1 capsule by mouth in the morning.   Glucosamine-Chondroit-Vit C-Mn (GLUCOSAMINE 1500 COMPLEX PO)  Self Yes No   Sig: Take by mouth in the morning   Ivermectin 1 % CREA  Self Yes No   LORazepam (ATIVAN) 1 mg tablet 6/27/2025  No Yes   Sig: Take 1 tablet PO HS prn insomnia   Mirabegron ER (Myrbetriq) 50 MG TB24  Self No No   Sig: TAKE 1 TABLET BY MOUTH IN THE  MORNING   Multiple Vitamin (MULTI-VITAMIN DAILY) TABS  Self Yes No   Sig: Take by mouth in the morning.   Sodium Fluoride 5000 PPM 1.1 % GEL  Self Yes No   Sig: As  directed   Tirzepatide (Mounjaro) 2.5 MG/0.5ML SOAJ   No No   Sig: Inject 2.5 mg under the skin once a week   Patient not taking: Reported on 5/21/2025   apixaban (Eliquis) 5 mg 6/28/2025 Morning Self No Yes   Sig: Take 1 tablet (5 mg total) by mouth 2 (two) times a day   aspirin 81 mg chewable tablet  Self No No   Sig: Chew 1 tablet (81 mg total) daily for 28 days   atorvastatin (LIPITOR) 80 mg tablet 6/28/2025  No Yes   Sig: Take 1 tablet (80 mg total) by mouth daily with dinner   chlorhexidine (PERIDEX) 0.12 % solution  Self Yes No   clobetasol (TEMOVATE) 0.05 % ointment  Self No No   Sig: Apply topically 2 (two) times a week   clopidogrel (PLAVIX) 75 mg tablet 6/28/2025  No Yes   Sig: Take 1 tablet (75 mg total) by mouth daily   colchicine (COLCRYS) 0.6 mg tablet  Self No No   Sig: Take 1 tablet (0.6 mg total) by mouth daily   famotidine (PEPCID) 20 mg tablet 6/28/2025 Self No Yes   Sig: TAKE 1 TABLET BY MOUTH TWICE  DAILY   lidocaine-prilocaine (EMLA) cream  Self No No   Sig: Apply to PORT 30 min prior to labs and chemo   lurasidone (LATUDA) 20 mg tablet 6/28/2025 Self No Yes   Sig: TAKE 1 TABLET BY MOUTH DAILY  WITH BREAKFAST   magnesium Oxide (MAG-OX) 400 mg TABS   No No   Sig: TAKE 1 TABLET BY MOUTH EVERY DAY   metoprolol succinate (TOPROL-XL) 50 mg 24 hr tablet 6/28/2025  No Yes   Sig: Take 1 tablet (50 mg total) by mouth every 12 (twelve) hours   polyethylene glycol (GLYCOLAX) 17 GM/SCOOP powder  Self No No   Sig: Take 17 g by mouth 2 (two) times a day   sulfamethoxazole-trimethoprim (BACTRIM DS) 800-160 mg per tablet 6/28/2025 Morning  No Yes   Sig: Take 1 tablet by mouth every 12 (twelve) hours for 7 days   torsemide (DEMADEX) 20 mg tablet 6/28/2025  No Yes   Sig: Take 1 tablet (20 mg total) by mouth daily      Facility-Administered Medications: None     Current Discharge Medication List        CONTINUE these medications which have NOT CHANGED    Details   apixaban (Eliquis) 5 mg Take 1 tablet (5 mg  total) by mouth 2 (two) times a day  Qty: 180 tablet, Refills: 1    Associated Diagnoses: Other chronic pulmonary embolism without acute cor pulmonale (HCC); Malignant neoplasm of endocervix (HCC)      atorvastatin (LIPITOR) 80 mg tablet Take 1 tablet (80 mg total) by mouth daily with dinner  Qty: 90 tablet, Refills: 3    Associated Diagnoses: Coronary artery disease involving native coronary artery of native heart with unstable angina pectoris (HCC); Mixed hyperlipidemia      Cholecalciferol (VITAMIN D) 2000 units CAPS Take 1 capsule by mouth in the morning.      clopidogrel (PLAVIX) 75 mg tablet Take 1 tablet (75 mg total) by mouth daily  Qty: 30 tablet, Refills: 3    Associated Diagnoses: Chest pain      famotidine (PEPCID) 20 mg tablet TAKE 1 TABLET BY MOUTH TWICE  DAILY  Qty: 180 tablet, Refills: 1    Comments: Please send a replace/new response with 90-Day Supply if appropriate to maximize member benefit. Requesting 1 year supply.  Associated Diagnoses: Gastroesophageal reflux disease without esophagitis      LORazepam (ATIVAN) 1 mg tablet Take 1 tablet PO HS prn insomnia  Qty: 30 tablet, Refills: 0    Associated Diagnoses: Endometrial cancer (HCC)      lurasidone (LATUDA) 20 mg tablet TAKE 1 TABLET BY MOUTH DAILY  WITH BREAKFAST  Qty: 30 tablet, Refills: 11    Comments: Requesting 1 year supply  Associated Diagnoses: Bipolar 1 disorder (HCC)      metoprolol succinate (TOPROL-XL) 50 mg 24 hr tablet Take 1 tablet (50 mg total) by mouth every 12 (twelve) hours  Qty: 180 tablet, Refills: 3    Associated Diagnoses: Other acute myocarditis      sulfamethoxazole-trimethoprim (BACTRIM DS) 800-160 mg per tablet Take 1 tablet by mouth every 12 (twelve) hours for 7 days  Qty: 14 tablet, Refills: 0    Associated Diagnoses: Urinary tract infection without hematuria, site unspecified      torsemide (DEMADEX) 20 mg tablet Take 1 tablet (20 mg total) by mouth daily  Qty: 90 tablet, Refills: 3    Associated Diagnoses: Edema,  unspecified type      aspirin 81 mg chewable tablet Chew 1 tablet (81 mg total) daily for 28 days  Qty: 28 tablet, Refills: 0    Associated Diagnoses: Chest pain      chlorhexidine (PERIDEX) 0.12 % solution       clobetasol (TEMOVATE) 0.05 % ointment Apply topically 2 (two) times a week  Qty: 60 g, Refills: 3    Associated Diagnoses: Lichen sclerosus      colchicine (COLCRYS) 0.6 mg tablet Take 1 tablet (0.6 mg total) by mouth daily  Qty: 90 tablet, Refills: 1    Associated Diagnoses: Other acute myocarditis      CRANBERRY PO Take by mouth      Glucosamine-Chondroit-Vit C-Mn (GLUCOSAMINE 1500 COMPLEX PO) Take by mouth in the morning      Ivermectin 1 % CREA       lidocaine-prilocaine (EMLA) cream Apply to PORT 30 min prior to labs and chemo  Qty: 30 g, Refills: 1    Associated Diagnoses: Endometrial cancer (HCC)      magnesium Oxide (MAG-OX) 400 mg TABS TAKE 1 TABLET BY MOUTH EVERY DAY  Qty: 90 tablet, Refills: 1    Associated Diagnoses: Hypomagnesemia      Mirabegron ER (Myrbetriq) 50 MG TB24 TAKE 1 TABLET BY MOUTH IN THE  MORNING  Qty: 90 tablet, Refills: 1    Comments: Please send a replace/new response with 90-Day Supply if appropriate to maximize member benefit. Requesting 1 year supply.  Associated Diagnoses: OAB (overactive bladder)      Multiple Vitamin (MULTI-VITAMIN DAILY) TABS Take by mouth in the morning.      polyethylene glycol (GLYCOLAX) 17 GM/SCOOP powder Take 17 g by mouth 2 (two) times a day  Qty: 238 g, Refills: 1    Associated Diagnoses: Drug induced constipation      Sodium Fluoride 5000 PPM 1.1 % GEL As directed      Tirzepatide (Mounjaro) 2.5 MG/0.5ML SOAJ Inject 2.5 mg under the skin once a week  Qty: 2 mL, Refills: 0    Associated Diagnoses: Type 2 diabetes mellitus with stage 4 chronic kidney disease, without long-term current use of insulin (HCC)           No discharge procedures on file.  ED SEPSIS DOCUMENTATION   Time reflects when diagnosis was documented in both MDM as applicable and  the Disposition within this note       Time User Action Codes Description Comment    6/28/2025  2:13 PM Panfilo López Add [R06.00] Dyspnea     6/28/2025  2:13 PM Panfilo López Add [L50.9] Urticaria     6/28/2025  2:14 PM Panfilo López Add [L03.90] Cellulitis     6/28/2025  2:15 PM Panfilo López Add [N17.9,  N18.9] Acute on chronic renal failure  (HCC)                      [1]   Past Medical History:  Diagnosis Date    Abnormal ECG     Anxiety 2002    Arthritis     Bipolar 1 disorder (HCC)     Cervical cancer (HCC)     Chronic kidney disease     stage 3    CPAP (continuous positive airway pressure) dependence     Depression 2001    Disease of thyroid gland 2001    Thyroid treated with Lithium medication for bi-polar disease    DVT (deep venous thrombosis) (HCC)     BLLE    Elevated blood pressure reading 06/10/2019    GERD (gastroesophageal reflux disease)     Obesity     Pulmonary emboli (HCC)     B/L    RSV (acute bronchiolitis due to respiratory syncytial virus) 12/05/2023    Sleep apnea     Sleep apnea, obstructive 2007    Urinary incontinence 2019    Uterine cancer (HCC)    [2]   Past Surgical History:  Procedure Laterality Date    CARDIAC CATHETERIZATION N/A 5/2/2025    Procedure: Cardiac Catheterization;  Surgeon: Jose Osborne MD;  Location: [facility];  Service: Cardiology    COLONOSCOPY  2011    COLONOSCOPY  03/08/2023    DENTAL SURGERY  2020    [facility]    HYSTERECTOMY  10/11/24    IR IVC FILTER PLACEMENT OPTIONAL/TEMPORARY  10/08/2024    IR IVC FILTER REMOVAL  6/13/2025    IR PORT CHECK  12/26/2024    IR PORT PLACEMENT  12/03/2024    LAPAROTOMY N/A 10/11/2024    Procedure: EXPLORATORY LAPAROTOMY;  Surgeon: Rodney Downing MD;  Location: [facility];  Service: Gynecology Oncology    ID HYSTEROSCOPY BX ENDOMETRIUM&/POLYPC W/WO D&C N/A 08/23/2024    Procedure: EXAM UNDER ANESTHESIA, DILATATION AND CURETTAGE, CERVICAL BIOPSIES;  Surgeon: Evin  Félix Page MD;  Location: [facility];  Service: Gynecology Oncology    UT LAPS TOTAL HYSTERECT 250 GM/< W/RMVL TUBE/OVARY N/A 10/11/2024    Procedure: ROBOTIC ASSISTED LTH, BSO, LYMPH NODE DISSECTION, EUA;  Surgeon: Rodney Downing MD;  Location: [facility];  Service: Gynecology Oncology   [3]   Family History  Problem Relation Name Age of Onset    Heart disease Mother Zakia Lambert     Heart Valve Disease Mother Zakia Lambert         Replacement    Diabetes Mother Zakia Lambert             Heart failure Mother Zakia Lambert         Heart Valve replacement    Arthritis Father Dad     No Known Problems Sister      No Known Problems Daughter      No Known Problems Maternal Grandmother      No Known Problems Maternal Grandfather      No Known Problems Paternal Grandmother      No Known Problems Paternal Grandfather      No Known Problems Son      No Known Problems Maternal Aunt      No Known Problems Maternal Aunt      No Known Problems Maternal Aunt      No Known Problems Maternal Aunt      No Known Problems Maternal Aunt      No Known Problems Paternal Aunt      Alcohol abuse Son [place], 40 year old son, has issues with alcohol and alcohol abuse   [4]   Social History  Tobacco Use    Smoking status: Former     Current packs/day: 0.00     Average packs/day: 0.3 packs/day for 30.0 years (7.5 ttl pk-yrs)     Types: Cigarettes     Start date: 1979     Quit date: 2009     Years since quittin.4     Passive exposure: Never    Smokeless tobacco: Never   Vaping Use    Vaping status: Never Used   Substance Use Topics    Alcohol use: Not Currently     Alcohol/week: 1.0 standard drink of alcohol     Types: 1 Glasses of wine per week    Drug use: Never        Panfilo Owusu MD  [phone]

## 2025-06-28 NOTE — H&P
H&P - Gynecologic Oncology  Cherelle Dash 70 y.o. female MRN: [id number]  Unit/Bed#: W -01 Encounter: [id number]    Assessment:   69yo with Stage IIIC1 endometrial cancer s/p RA-TLH, BSO, LND 10/2024, 5 cycles adjuvant carbo/taxol, 4 cycles adjuvant pembrolizumab, whole pelvic RT, currently receiving vaginal brachytherapy. Tx course complicated by pembro-induced cardiomyopathy requiring high-dose steroids, now presenting with suspected port site infection and new-onset dyspnea on exertion.    Assessment & Plan   * Dyspnea  Assessment & Plan  New-onset. Patient without additional cardiopulmonary symptoms. Prior history of immunotherapy-induced cardiomyopathy  - Cardiology consulted, recommendations appreciated  - f/u CXR, EKG, echo      Cellulitis  Assessment & Plan  Suspect infection at R IJ port site  - IR consulted for possible port removal; recs appreciated  - Ancef 2g BID    UTI (urinary tract infection)  Assessment & Plan  - d/c Bactrim given mild allergic reaction  - continue Ancef as above    Coronary artery disease involving native coronary artery of native heart with unstable angina pectoris (HCC)  Assessment & Plan  S/p cardiac catheterization on 5/2/2025  - continue home clopidogrel 75mg qd     Heart failure with mildly reduced ejection fraction (HCC)  Assessment & Plan  Wt Readings from Last 3 Encounters:   06/28/25 102 kg (224 lb)   06/13/25 102 kg (225 lb)   05/21/25 101 kg (223 lb 9.6 oz)   Follows with Cardiology outpatient  - continue home torsemide 20mg qd          Other pulmonary embolism without acute cor pulmonale (HCC)  Assessment & Plan  Hx DVT and bilateral PE in 9/2024, s/p IVC filter  - on Eliquis 5mg BID, currently held for possible removal of R IJ port by IR    Endometrial cancer (HCC)  Assessment & Plan  Stage IIIC1 grade 1 endometrial adenocarcinoma. S/p robotic-assisted total laparoscopic hysterectomy, bilateral salpingo-oophorectomy, lymph node dissection on 10/11/2024, 5 cycles  "of adjuvant carboplatin/paclitaxel, 4 cycles of adjuvant pembrolizumab, whole pelvic radiation, now currently receiving vaginal brachytherapy. 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   - f/u with Radiation Oncology    Stage 4 chronic kidney disease (HCC)  Assessment & Plan  Lab Results   Component Value Date    EGFR 20 06/28/2025    EGFR 32 06/13/2025    EGFR 28 05/06/2025    CREATININE 2.31 (H) 06/28/2025    CREATININE 1.57 (H) 06/13/2025    CREATININE 1.77 (H) 05/06/2025   Follows with Nephrology outpatient  - continue to monitor Cr    Hyperlipidemia  Assessment & Plan  - continue home atorvastatin 80mg qd    Type 2 diabetes mellitus with chronic kidney disease, without long-term current use of insulin (formerly [facility])  Assessment & Plan  Lab Results   Component Value Date    HGBA1C 6.6 (H) 05/01/2025     No results for input(s): \"POCGLU\" in the last 72 hours.    - monitor blood glucose while inpatient    GERD (gastroesophageal reflux disease)  Assessment & Plan  - famotidine 20mg qd    Bipolar I disorder (formerly [facility])  Assessment & Plan  - continue home lurasidone       History of Present Illness   Cherelle Dash is a 70 y.o. female who presents with dyspnea on exertion and suspected port site infection.     PMH is significant for bipolar I disorder, stage IV chronic kidney disease, DVT, bilateral pulmonary emboli, BMI 43, hyperlipidemia, GERD, type 2 diabetes, CAD s/p cardiac catheterization on 5/2/2025, and Stage IIIC1 grade 1 endometrial adenocarcinoma s/p robotic-assisted total laparoscopic hysterectomy, bilateral salpingo-oophorectomy, lymph node dissection on 10/11/2024, 5 cycles of adjuvant carboplatin/paclitaxel, 4 cycles of adjuvant pembrolizumab, whole pelvic radiation, now currently receiving vaginal brachytherapy. 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    She initially presented to the ED today for a mild allergic reaction to Bactrim, which she had been taking for a UTI. The reaction resolved; however, the ED provider noted dyspnea on exertion and erythema surrounding her R IJ port site. ED workup was notable for lactic acid wnl; CBC with Plt 105 (from 127 on 6/13), otherwise wnl; Cr 2.31 (baseline ~1.7); troponin 27; ; blood cultures pending.    On evaluation, patient reports shortness of breath with exertion over the past 2 days. She needs to stop and rest after just a small amount of walking. She denies chest pain, fevers/chills, URI symptoms, or sick contacts. She denies pain at her port site.    Oncology History   Endometrial cancer (HCC)   8/11/2024 Initial Diagnosis    Endometrial cancer (HCC)     10/11/2024 -  Cancer Staged    Staging form: Corpus Uteri - Carcinoma, AJCC 8th Edition  - Pathologic stage from 10/11/2024: FIGO Stage IIIC1 (pT2, pN1mi(sn), cM0) - Signed by Evin Page MD on 11/12/2024  Method of lymph node assessment: [place] lymph node biopsy  Histologic grade (G): G1  Histologic grading system: 3 grade system  Lymph-vascular invasion (LVI): BOTH lymphatic and small vessel AND venous (large vessel) invasion  Peritoneal cytology results: Negative  Pelvic brandi status: Positive       10/11/2024 Surgery    Robotic assisted total laparoscopic hysterectomy, bilateral salpingo-oophorectomy, pelvic and periaortic sentinel lymph node biopsies, vulvar biopsy  1.  Grade 1, cervical stromal invasion to a depth of 12 out of 15 mm, extensive LVSI, p53 wild-type, pMMR, HER2 1+,  washings negative, 0.7 mm metastatic focus and pelvic lymph node, ITC identified and periaortic lymph nodes.     12/6/2024 -  Chemotherapy    Taxol 135 mg/m2, carboplatin AUC 5 and pembrolizumab 200 mg IV every 21 days.    Pembro held for cycle 3 d/t grade 2 drug-induced pneumonitis.        2/2025 Adverse Reaction    Immunotherapy-related (pembro)  pneumonitis and myocarditis         Historical Information   Past Medical History[1]  Past Surgical History[2]  OB History    Para Term  AB Living   2 2 2  0 2   SAB IAB Ectopic Multiple Live Births       2      # Outcome Date GA Lbr Khris/2nd Weight Sex Type Anes PTL Lv   2 Term 84    F Vag-Spont   ROBERT   1 Term 81    M Vag-Spont   ROBERT     Family History[3]  Social History   Social History     Substance and Sexual Activity   Alcohol Use Not Currently    Alcohol/week: 1.0 standard drink of alcohol    Types: 1 Glasses of wine per week     Social History     Substance and Sexual Activity   Drug Use Never     Tobacco Use History[4]    Meds/Allergies     Medications Prior to Admission:     apixaban (Eliquis) 5 mg    atorvastatin (LIPITOR) 80 mg tablet    Cholecalciferol (VITAMIN D) 2000 units CAPS    clopidogrel (PLAVIX) 75 mg tablet    famotidine (PEPCID) 20 mg tablet    LORazepam (ATIVAN) 1 mg tablet    lurasidone (LATUDA) 20 mg tablet    metoprolol succinate (TOPROL-XL) 50 mg 24 hr tablet    sulfamethoxazole-trimethoprim (BACTRIM DS) 800-160 mg per tablet    torsemide (DEMADEX) 20 mg tablet    aspirin 81 mg chewable tablet    chlorhexidine (PERIDEX) 0.12 % solution    clobetasol (TEMOVATE) 0.05 % ointment    colchicine (COLCRYS) 0.6 mg tablet    CRANBERRY PO    Glucosamine-Chondroit-Vit C-Mn (GLUCOSAMINE 1500 COMPLEX PO)    Ivermectin 1 % CREA    lidocaine-prilocaine (EMLA) cream    magnesium Oxide (MAG-OX) 400 mg TABS    Mirabegron ER (Myrbetriq) 50 MG TB24    Multiple Vitamin (MULTI-VITAMIN DAILY) TABS    polyethylene glycol (GLYCOLAX) 17 GM/SCOOP powder    Sodium Fluoride 5000 PPM 1.1 % GEL    Tirzepatide (Mounjaro) 2.5 MG/0.5ML SOAJ  Allergies[5]    Objective   /51 (BP Location: Right arm)   Pulse 85   Temp 98.4 °F (36.9 °C) (Oral)   Resp 16   Ht 5' (1.524 m)   Wt 102 kg (224 lb)   SpO2 95%   BMI 43.75 kg/m²     No intake/output data recorded.  No intake/output data  recorded.    Lab Results   Component Value Date    WBC 4.87 06/28/2025    HGB 9.6 (L) 06/28/2025    HCT 30.6 (L) 06/28/2025     (H) 06/28/2025     (L) 06/28/2025     Lab Results   Component Value Date    GLUCOSE 124 (H) 11/17/2017    CALCIUM 8.8 06/28/2025     (H) 11/17/2017    K 3.4 (L) 06/28/2025    CO2 24 06/28/2025     06/28/2025    BUN 29 (H) 06/28/2025    CREATININE 2.31 (H) 06/28/2025     Physical Exam  Vitals reviewed.   Constitutional:       General: She is not in acute distress.     Appearance: She is well-developed.   HENT:      Head: Normocephalic and atraumatic.     Cardiovascular:      Rate and Rhythm: Normal rate and regular rhythm.   Pulmonary:      Effort: Pulmonary effort is normal. No respiratory distress.      Breath sounds: Normal breath sounds.   Abdominal:      General: There is no distension.      Palpations: Abdomen is soft.      Tenderness: There is no abdominal tenderness.     Musculoskeletal:      Right lower leg: No edema.      Left lower leg: No edema.     Skin:     General: Skin is warm and dry.      Findings: No rash (on exposed skin).     Neurological:      Mental Status: She is alert and oriented to person, place, and time.     Psychiatric:         Mood and Affect: Affect normal.         Speech: Speech normal.         Behavior: Behavior normal.       Code Status: Level 1 - Full Code    Tucker Izquierdo MD  OBGYN PGY-1  06/28/25  4:34 PM         [1]   Past Medical History:  Diagnosis Date    Abnormal ECG     Anxiety 2002    Arthritis     Bipolar 1 disorder (HCC)     Cervical cancer (HCC)     Chronic kidney disease     stage 3    CPAP (continuous positive airway pressure) dependence     Depression 2001    Disease of thyroid gland 2001    Thyroid treated with Lithium medication for bi-polar disease    DVT (deep venous thrombosis) (HCC)     BLLE    Elevated blood pressure reading 06/10/2019    GERD (gastroesophageal reflux disease)     Obesity     Pulmonary  emboli (HCC)     B/L    RSV (acute bronchiolitis due to respiratory syncytial virus) 12/05/2023    Sleep apnea     Sleep apnea, obstructive 2007    Urinary incontinence 2019    Uterine cancer (HCC)    [2]   Past Surgical History:  Procedure Laterality Date    CARDIAC CATHETERIZATION N/A 5/2/2025    Procedure: Cardiac Catheterization;  Surgeon: Jose Osborne MD;  [facility];  Service: Cardiology    COLONOSCOPY  2011    COLONOSCOPY  03/08/2023    DENTAL SURGERY  2020    FOOT SURGERY  1976    [place]    HYSTERECTOMY  10/11/24    IR IVC FILTER PLACEMENT OPTIONAL/TEMPORARY  10/08/2024    IR IVC FILTER REMOVAL  6/13/2025    IR PORT CHECK  12/26/2024    IR PORT PLACEMENT  12/03/2024    LAPAROTOMY N/A 10/11/2024    Procedure: EXPLORATORY LAPAROTOMY;  Surgeon: Rodney Downing MD;  Location: [facility];  Service: Gynecology Oncology    IN HYSTEROSCOPY BX ENDOMETRIUM&/POLYPC W/WO D&C N/A 08/23/2024    Procedure: EXAM UNDER ANESTHESIA, DILATATION AND CURETTAGE, CERVICAL BIOPSIES;  Surgeon: Evin Page MD;  Location: [facility];  Service: Gynecology Oncology    IN LAPS TOTAL HYSTERECT 250 GM/< W/RMVL TUBE/OVARY N/A 10/11/2024    Procedure: ROBOTIC ASSISTED LTH, BSO, LYMPH NODE DISSECTION, EUA;  Surgeon: Rodney Downing MD;  Location: [facility];  Service: Gynecology Oncology   [3]   Family History  Problem Relation Name Age of Onset    Heart disease Mother Zakia Lazaro Fausto     Heart Valve Disease Mother Zakia Lazaro [place]         Replacement    Diabetes Mother Zakia Lazaro Fausto         2002    Heart failure Mother Zakia Lazaro Fausto         Heart Valve replacement    Arthritis Father Dad     No Known Problems Sister      No Known Problems Daughter      No Known Problems Maternal Grandmother      No Known Problems Maternal Grandfather      No Known Problems Paternal Grandmother      No Known Problems Paternal Grandfather      No Known Problems Son      No Known  Problems Maternal Aunt      No Known Problems Maternal Aunt      No Known Problems Maternal Aunt      No Known Problems Maternal Aunt      No Known Problems Maternal Aunt      No Known Problems Paternal Aunt      Alcohol abuse Son [place], 40 year old son, has issues with alcohol and alcohol abuse   [4]   Social History  Tobacco Use   Smoking Status Former    Current packs/day: 0.00    Average packs/day: 0.3 packs/day for 30.0 years (7.5 ttl pk-yrs)    Types: Cigarettes    Start date: 1979    Quit date: 2009    Years since quittin.4    Passive exposure: Never   Smokeless Tobacco Never   [5]   Allergies  Allergen Reactions    Raw Fruit - Food Allergy Anaphylaxis     PLUMS, PEACHES, APPLES, CHERRIES FRUIT WITH SKIN

## 2025-06-28 NOTE — ASSESSMENT & PLAN NOTE
Stage IIIC1 grade 1 endometrial adenocarcinoma. S/p robotic-assisted total laparoscopic hysterectomy, bilateral salpingo-oophorectomy, lymph node dissection on 10/11/2024, 5 cycles of adjuvant carboplatin/paclitaxel, 4 cycles of adjuvant pembrolizumab, whole pelvic radiation, now currently receiving vaginal brachytherapy. 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

## 2025-06-28 NOTE — ASSESSMENT & PLAN NOTE
Hx DVT and bilateral PE in 9/2024, s/p IVC filter  - on Eliquis 5mg BID, currently held for possible removal of R IJ port by IR

## 2025-06-28 NOTE — ASSESSMENT & PLAN NOTE
"Lab Results   Component Value Date    HGBA1C 6.6 (H) 05/01/2025     No results for input(s): \"POCGLU\" in the last 72 hours.    - monitor blood glucose while inpatient  "

## 2025-06-28 NOTE — ASSESSMENT & PLAN NOTE
Lab Results   Component Value Date    EGFR 17 06/29/2025    EGFR 20 06/28/2025    EGFR 32 06/13/2025    CREATININE 2.60 (H) 06/29/2025    CREATININE 2.31 (H) 06/28/2025    CREATININE 1.57 (H) 06/13/2025   Follows with Nephrology outpatient  - continue to monitor Cr

## 2025-06-28 NOTE — ASSESSMENT & PLAN NOTE
Suspect infection at R IJ port site  - IR consulted for possible port removal; recs appreciated   - recommend Keflex x1wk, maintain port for now and continue to observe  - Ancef 2g BID

## 2025-06-28 NOTE — ASSESSMENT & PLAN NOTE
New-onset. Patient without additional cardiopulmonary symptoms. Prior history of immunotherapy-induced cardiomyopathy. EKG with new PVCs.  - Cardiology consulted, recommendations appreciated

## 2025-06-28 NOTE — ASSESSMENT & PLAN NOTE
Wt Readings from Last 3 Encounters:   06/28/25 102 kg (224 lb)   06/13/25 102 kg (225 lb)   05/21/25 101 kg (223 lb 9.6 oz)   Follows with Cardiology outpatient  - continue home torsemide 20mg qd

## 2025-06-28 NOTE — PLAN OF CARE
Problem: INFECTION - ADULT  Goal: Absence or prevention of progression during hospitalization  Description: INTERVENTIONS:  - Assess and monitor for signs and symptoms of infection  - Monitor lab/diagnostic results  - Monitor all insertion sites, i.e. indwelling lines, tubes, and drains  - Monitor endotracheal if appropriate and nasal secretions for changes in amount and color  - [place] appropriate cooling/warming therapies per order  - Administer medications as ordered  - Instruct and encourage patient and family to use good hand hygiene technique  - Identify and instruct in appropriate isolation precautions for identified infection/condition  Outcome: Progressing     Problem: PAIN - ADULT  Goal: Verbalizes/displays adequate comfort level or baseline comfort level  Description: Interventions:  - Encourage patient to monitor pain and request assistance  - Assess pain using appropriate pain scale  - Administer analgesics as ordered based on type and severity of pain and evaluate response  - Implement non-pharmacological measures as appropriate and evaluate response  - Consider cultural and social influences on pain and pain management  - Notify physician/advanced practitioner if interventions unsuccessful or patient reports new pain  - Educate patient/family on pain management process including their role and importance of  reporting pain   - Provide non-pharmacologic/complimentary pain relief interventions  Outcome: Progressing     Problem: SAFETY ADULT  Goal: Patient will remain free of falls  Description: INTERVENTIONS:  - Educate patient/family on patient safety including physical limitations  - Instruct patient to call for assistance with activity   - Consider consulting OT/PT to assist with strengthening/mobility based on AM PAC & JH-HLM score  - Consult OT/PT to assist with strengthening/mobility   - Keep Call bell within reach  - Keep bed low and locked with side rails adjusted as appropriate  - Keep  care items and personal belongings within reach  - Initiate and maintain comfort rounds  - Apply yellow socks and bracelet for high fall risk patients  - Consider moving patient to room near nurses station  Outcome: Progressing

## 2025-06-28 NOTE — ED NOTES
SBAR sent by prior RN to Charge Nurse, patient is off to the Unit, AAOx3 resp even and unlabored with no S$S of distress.      Ashley Caldera, REMY  06/28/25 [id number]

## 2025-06-28 NOTE — CONSULTS
e-Consult ([facility])  - Interventional Radiology  Cherelle Dash 70 y.o. female MRN: [id number]  Unit/Bed#: W -01 Encounter: [id number]    Interventional Radiology has been consulted to evaluate Cherelle Dash    We were consulted by Dr. Izquierdo concerning this patient with a port infection.    Inpatient Consult to IR  Consult performed by: Mert Harry MD  Consult ordered by: Tucker Izquierdo MD        06/28/25    Assessment/Recommendation:   Port site red but not tender.  Iimages seen on media tab.  IR consulted for port removal.  Recommend 1 week of Keflex and re-assess.  If still red or worsened, please re-contact IR for removal.    71yo with Stage IIIC1 endometrial cancer s/p RA-TLH, BSO, LND 10/2024, 5 cycles adjuvant carbo/taxol, 4 cycles adjuvant pembrolizumab, whole pelvic RT, currently receiving vaginal brachytherapy. Tx course complicated by pembro-induced cardiomyopathy requiring high-dose steroids, now presenting with suspected port site infection and new-onset dyspnea on exertion.      21-30 minutes, >50% of the total time devoted to medical consultative verbal/EMR discussion between providers. Written report will be generated in the EMR.     Thank you for allowing Interventional Radiology to participate in the care of Cherelle Dash. Please don't hesitate to call or TigerText us with any questions.     Mert Harry MD

## 2025-06-29 ENCOUNTER — APPOINTMENT (INPATIENT)
Dept: ULTRASOUND IMAGING | Facility: HOSPITAL | Age: 70
DRG: 205 | End: 2025-06-29
Payer: COMMERCIAL

## 2025-06-29 PROBLEM — D64.9 ANEMIA: Status: ACTIVE | Noted: 2025-06-29

## 2025-06-29 LAB
ANION GAP SERPL CALCULATED.3IONS-SCNC: 9 MMOL/L (ref 4–13)
ATRIAL RATE: 88 BPM
BACTERIA UR QL AUTO: ABNORMAL /HPF
BASOPHILS # BLD AUTO: 0.01 THOUSANDS/ÂΜL (ref 0–0.1)
BASOPHILS NFR BLD AUTO: 0 % (ref 0–1)
BILIRUB UR QL STRIP: NEGATIVE
BUN SERPL-MCNC: 32 MG/DL (ref 5–25)
CALCIUM SERPL-MCNC: 8.6 MG/DL (ref 8.4–10.2)
CHLORIDE SERPL-SCNC: 108 MMOL/L (ref 96–108)
CLARITY UR: ABNORMAL
CO2 SERPL-SCNC: 22 MMOL/L (ref 21–32)
COLOR UR: ABNORMAL
CREAT SERPL-MCNC: 2.6 MG/DL (ref 0.6–1.3)
CREAT UR-MCNC: 108 MG/DL
EOSINOPHIL # BLD AUTO: 0.49 THOUSAND/ÂΜL (ref 0–0.61)
EOSINOPHIL NFR BLD AUTO: 13 % (ref 0–6)
ERYTHROCYTE [DISTWIDTH] IN BLOOD BY AUTOMATED COUNT: 17.5 % (ref 11.6–15.1)
GFR SERPL CREATININE-BSD FRML MDRD: 17 ML/MIN/1.73SQ M
GLUCOSE SERPL-MCNC: 105 MG/DL (ref 65–140)
GLUCOSE UR STRIP-MCNC: NEGATIVE MG/DL
HCT VFR BLD AUTO: 26.5 % (ref 34.8–46.1)
HGB BLD-MCNC: 8.6 G/DL (ref 11.5–15.4)
HGB UR QL STRIP.AUTO: NEGATIVE
IMM GRANULOCYTES # BLD AUTO: 0.01 THOUSAND/UL (ref 0–0.2)
IMM GRANULOCYTES NFR BLD AUTO: 0 % (ref 0–2)
KETONES UR STRIP-MCNC: NEGATIVE MG/DL
LEUKOCYTE ESTERASE UR QL STRIP: ABNORMAL
LYMPHOCYTES # BLD AUTO: 0.32 THOUSANDS/ÂΜL (ref 0.6–4.47)
LYMPHOCYTES NFR BLD AUTO: 9 % (ref 14–44)
MAGNESIUM SERPL-MCNC: 2.7 MG/DL (ref 1.9–2.7)
MCH RBC QN AUTO: 31.7 PG (ref 26.8–34.3)
MCHC RBC AUTO-ENTMCNC: 32.5 G/DL (ref 31.4–37.4)
MCV RBC AUTO: 98 FL (ref 82–98)
MONOCYTES # BLD AUTO: 0.42 THOUSAND/ÂΜL (ref 0.17–1.22)
MONOCYTES NFR BLD AUTO: 11 % (ref 4–12)
NEUTROPHILS # BLD AUTO: 2.42 THOUSANDS/ÂΜL (ref 1.85–7.62)
NEUTS SEG NFR BLD AUTO: 67 % (ref 43–75)
NITRITE UR QL STRIP: NEGATIVE
NON-SQ EPI CELLS URNS QL MICRO: ABNORMAL /HPF
NRBC BLD AUTO-RTO: 0 /100 WBCS
P AXIS: 51 DEGREES
PH UR STRIP.AUTO: 6 [PH]
PHOSPHATE SERPL-MCNC: 4 MG/DL (ref 2.3–4.1)
PLATELET # BLD AUTO: 96 THOUSANDS/UL (ref 149–390)
PMV BLD AUTO: 12.2 FL (ref 8.9–12.7)
POTASSIUM SERPL-SCNC: 3.7 MMOL/L (ref 3.5–5.3)
PR INTERVAL: 184 MS
PROT UR STRIP-MCNC: ABNORMAL MG/DL
PROT UR-MCNC: 75 MG/DL
PROT/CREAT UR: 0.7 MG/G{CREAT}
QRS AXIS: -72 DEGREES
QRSD INTERVAL: 138 MS
QT INTERVAL: 418 MS
QTC INTERVAL: 506 MS
RBC # BLD AUTO: 2.71 MILLION/UL (ref 3.81–5.12)
RBC #/AREA URNS AUTO: ABNORMAL /HPF
SODIUM SERPL-SCNC: 139 MMOL/L (ref 135–147)
SP GR UR STRIP.AUTO: 1.01 (ref 1–1.03)
T WAVE AXIS: 84 DEGREES
UROBILINOGEN UR STRIP-ACNC: <2 MG/DL
VENTRICULAR RATE: 88 BPM
WBC # BLD AUTO: 3.67 THOUSAND/UL (ref 4.31–10.16)
WBC #/AREA URNS AUTO: ABNORMAL /HPF

## 2025-06-29 PROCEDURE — 99222 1ST HOSP IP/OBS MODERATE 55: CPT | Performed by: STUDENT IN AN ORGANIZED HEALTH CARE EDUCATION/TRAINING PROGRAM

## 2025-06-29 PROCEDURE — 76775 US EXAM ABDO BACK WALL LIM: CPT

## 2025-06-29 PROCEDURE — 99223 1ST HOSP IP/OBS HIGH 75: CPT | Performed by: STUDENT IN AN ORGANIZED HEALTH CARE EDUCATION/TRAINING PROGRAM

## 2025-06-29 PROCEDURE — 93010 ELECTROCARDIOGRAM REPORT: CPT | Performed by: STUDENT IN AN ORGANIZED HEALTH CARE EDUCATION/TRAINING PROGRAM

## 2025-06-29 PROCEDURE — 83735 ASSAY OF MAGNESIUM: CPT

## 2025-06-29 PROCEDURE — 80048 BASIC METABOLIC PNL TOTAL CA: CPT

## 2025-06-29 PROCEDURE — 85025 COMPLETE CBC W/AUTO DIFF WBC: CPT

## 2025-06-29 PROCEDURE — 82570 ASSAY OF URINE CREATININE: CPT | Performed by: STUDENT IN AN ORGANIZED HEALTH CARE EDUCATION/TRAINING PROGRAM

## 2025-06-29 PROCEDURE — 87205 SMEAR GRAM STAIN: CPT | Performed by: STUDENT IN AN ORGANIZED HEALTH CARE EDUCATION/TRAINING PROGRAM

## 2025-06-29 PROCEDURE — 84100 ASSAY OF PHOSPHORUS: CPT

## 2025-06-29 PROCEDURE — 84156 ASSAY OF PROTEIN URINE: CPT | Performed by: STUDENT IN AN ORGANIZED HEALTH CARE EDUCATION/TRAINING PROGRAM

## 2025-06-29 PROCEDURE — 99233 SBSQ HOSP IP/OBS HIGH 50: CPT | Performed by: OBSTETRICS & GYNECOLOGY

## 2025-06-29 PROCEDURE — 81001 URINALYSIS AUTO W/SCOPE: CPT | Performed by: STUDENT IN AN ORGANIZED HEALTH CARE EDUCATION/TRAINING PROGRAM

## 2025-06-29 RX ORDER — SODIUM CHLORIDE, SODIUM LACTATE, POTASSIUM CHLORIDE, CALCIUM CHLORIDE 600; 310; 30; 20 MG/100ML; MG/100ML; MG/100ML; MG/100ML
50 INJECTION, SOLUTION INTRAVENOUS CONTINUOUS
Status: DISPENSED | OUTPATIENT
Start: 2025-06-29 | End: 2025-06-29

## 2025-06-29 RX ORDER — BENZOCAINE/MENTHOL 6 MG-10 MG
LOZENGE MUCOUS MEMBRANE 4 TIMES DAILY PRN
Status: DISCONTINUED | OUTPATIENT
Start: 2025-06-29 | End: 2025-07-02 | Stop reason: HOSPADM

## 2025-06-29 RX ORDER — TRIAMCINOLONE ACETONIDE 1 MG/G
CREAM TOPICAL 2 TIMES DAILY
Status: DISCONTINUED | OUTPATIENT
Start: 2025-06-29 | End: 2025-07-02 | Stop reason: HOSPADM

## 2025-06-29 RX ORDER — DIPHENHYDRAMINE HCL 25 MG
25 TABLET ORAL EVERY 6 HOURS PRN
Status: DISCONTINUED | OUTPATIENT
Start: 2025-06-29 | End: 2025-07-02 | Stop reason: HOSPADM

## 2025-06-29 RX ADMIN — HEPARIN SODIUM 7500 UNITS: 5000 INJECTION INTRAVENOUS; SUBCUTANEOUS at 04:46

## 2025-06-29 RX ADMIN — METOPROLOL SUCCINATE 50 MG: 50 TABLET, EXTENDED RELEASE ORAL at 18:07

## 2025-06-29 RX ADMIN — HYDROCORTISONE 1 APPLICATION: 1 CREAM TOPICAL at 20:18

## 2025-06-29 RX ADMIN — TRIAMCINOLONE ACETONIDE: 1 CREAM TOPICAL at 17:19

## 2025-06-29 RX ADMIN — Medication 400 MG: at 08:09

## 2025-06-29 RX ADMIN — HEPARIN SODIUM 7500 UNITS: 5000 INJECTION INTRAVENOUS; SUBCUTANEOUS at 14:02

## 2025-06-29 RX ADMIN — Medication 400 MG: at 17:18

## 2025-06-29 RX ADMIN — CLOPIDOGREL 75 MG: 75 TABLET ORAL at 08:09

## 2025-06-29 RX ADMIN — CEFAZOLIN SODIUM 2000 MG: 2 SOLUTION INTRAVENOUS at 14:07

## 2025-06-29 RX ADMIN — METOPROLOL SUCCINATE 50 MG: 50 TABLET, EXTENDED RELEASE ORAL at 05:46

## 2025-06-29 RX ADMIN — APIXABAN 5 MG: 5 TABLET, FILM COATED ORAL at 17:17

## 2025-06-29 RX ADMIN — ATORVASTATIN CALCIUM 80 MG: 40 TABLET, FILM COATED ORAL at 15:59

## 2025-06-29 RX ADMIN — TRIAMCINOLONE ACETONIDE: 1 CREAM TOPICAL at 11:10

## 2025-06-29 RX ADMIN — HYDROCORTISONE: 1 CREAM TOPICAL at 11:10

## 2025-06-29 RX ADMIN — HYDROCORTISONE: 1 CREAM TOPICAL at 16:01

## 2025-06-29 RX ADMIN — FAMOTIDINE 20 MG: 20 TABLET, FILM COATED ORAL at 08:09

## 2025-06-29 RX ADMIN — LURASIDONE HYDROCHLORIDE 20 MG: 20 TABLET, FILM COATED ORAL at 08:09

## 2025-06-29 RX ADMIN — SODIUM CHLORIDE, SODIUM LACTATE, POTASSIUM CHLORIDE, AND CALCIUM CHLORIDE 50 ML/HR: .6; .31; .03; .02 INJECTION, SOLUTION INTRAVENOUS at 10:15

## 2025-06-29 NOTE — ASSESSMENT & PLAN NOTE
Lab Results   Component Value Date    EGFR 17 06/29/2025    EGFR 20 06/28/2025    EGFR 32 06/13/2025    CREATININE 2.60 (H) 06/29/2025    CREATININE 2.31 (H) 06/28/2025    CREATININE 1.57 (H) 06/13/2025     Baseline creatinine: 1.6 to 1.8 mg/dL  Etiology: Likely secondary to nephrosclerosis, nephron loss

## 2025-06-29 NOTE — ASSESSMENT & PLAN NOTE
"Lab Results   Component Value Date    HGBA1C 6.6 (H) 05/01/2025   HbA1c 6.6  Advised to maintain a good DM control to prevent progression of CKD       No results for input(s): \"POCGLU\" in the last 72 hours.    Blood Sugar Average: Last 72 hrs:      "

## 2025-06-29 NOTE — ASSESSMENT & PLAN NOTE
Wt Readings from Last 3 Encounters:   06/28/25 102 kg (224 lb)   06/13/25 102 kg (225 lb)   05/21/25 101 kg (223 lb 9.6 oz)   Currently euvolemic  Reinforced fluid intake  Discontinue IV fluids at 6 PM

## 2025-06-29 NOTE — ED ATTENDING ATTESTATION
6/28/2025  INila MD, saw and evaluated the patient. I have discussed the patient with the resident/non-physician practitioner and agree with the resident's/non-physician practitioner's findings, Plan of Care, and MDM as documented in the resident's/non-physician practitioner's note, except where noted. All available labs and Radiology studies were reviewed.  I was present for key portions of any procedure(s) performed by the resident/non-physician practitioner and I was immediately available to provide assistance.       At this point I agree with the current assessment done in the Emergency Department.  I have conducted an independent evaluation of this patient a history and physical is as follows:    70-year-old female presents to the ER due to hives lower extremities.  She received Benadryl from EMS which improved symptoms.  On arrival noted to have erythema over port site on the right.  Also complaining of dyspnea on exertion for the last 3 days.  No chest pain.  Being treated with Gyn Onc due to endometrial carcinoma.  Chronic anticoagulated due to previous blood clots.  Heart rate improved after resting.  No hypoxia on initial evaluation.    Agree with cardiac valuation, antibiotics for potentially infected port, admission to hospital for continued workup      ED Course         Critical Care Time  Procedures

## 2025-06-29 NOTE — PLAN OF CARE
Problem: PAIN - ADULT  Goal: Verbalizes/displays adequate comfort level or baseline comfort level  Description: Interventions:  - Encourage patient to monitor pain and request assistance  - Assess pain using appropriate pain scale  - Administer analgesics as ordered based on type and severity of pain and evaluate response  - Implement non-pharmacological measures as appropriate and evaluate response  - Consider cultural and social influences on pain and pain management  - Notify physician/advanced practitioner if interventions unsuccessful or patient reports new pain  - Educate patient/family on pain management process including their role and importance of  reporting pain   - Provide non-pharmacologic/complimentary pain relief interventions  Outcome: Progressing     Problem: INFECTION - ADULT  Goal: Absence or prevention of progression during hospitalization  Description: INTERVENTIONS:  - Assess and monitor for signs and symptoms of infection  - Monitor lab/diagnostic results  - Monitor all insertion sites, i.e. indwelling lines, tubes, and drains  - Monitor endotracheal if appropriate and nasal secretions for changes in amount and color  - [place] appropriate cooling/warming therapies per order  - Administer medications as ordered  - Instruct and encourage patient and family to use good hand hygiene technique  - Identify and instruct in appropriate isolation precautions for identified infection/condition  Outcome: Progressing  Goal: Absence of fever/infection during neutropenic period  Description: INTERVENTIONS:  - Monitor WBC  - Perform strict hand hygiene  - Limit to healthy visitors only  - No plants, dried, fresh or silk flowers with martínez in patient room  Outcome: Progressing     Problem: SAFETY ADULT  Goal: Patient will remain free of falls  Description: INTERVENTIONS:  - Educate patient/family on patient safety including physical limitations  - Instruct patient to call for assistance with activity   -  Consider consulting OT/PT to assist with strengthening/mobility based on AM PAC & JH-HLM score  - Consult OT/PT to assist with strengthening/mobility   - Keep Call bell within reach  - Keep bed low and locked with side rails adjusted as appropriate  - Keep care items and personal belongings within reach  - Initiate and maintain comfort rounds  - Make Fall Risk Sign visible to staff  - Initiate/Maintain bed alarm  - Apply yellow socks and bracelet for high fall risk patients  - Consider moving patient to room near nurses station  Outcome: Progressing  Goal: Maintain or return to baseline ADL function  Description: INTERVENTIONS:  -  Assess patient's ability to carry out ADLs; assess patient's baseline for ADL function and identify physical deficits which impact ability to perform ADLs (bathing, care of mouth/teeth, toileting, grooming, dressing, etc.)  - Assess/evaluate cause of self-care deficits   - Assess range of motion  - Assess patient's mobility; develop plan if impaired  - Assess patient's need for assistive devices and provide as appropriate  - Encourage maximum independence but intervene and supervise when necessary  - Involve family in performance of ADLs  - Assess for home care needs following discharge   - Consider OT consult to assist with ADL evaluation and planning for discharge  - Provide patient education as appropriate  - Monitor functional capacity and physical performance, use of AM PAC & JH-HLM   - Monitor gait, balance and fatigue with ambulation    Outcome: Progressing  Goal: Maintains/Returns to pre admission functional level  Description: INTERVENTIONS:  - Perform AM-PAC 6 Click Basic Mobility/ Daily Activity assessment daily.  - Set and communicate daily mobility goal to care team and patient/family/caregiver.   - Collaborate with rehabilitation services on mobility goals if consulted  Problem: DISCHARGE PLANNING  Goal: Discharge to home or other facility with appropriate  resources  Description: INTERVENTIONS:  - Identify barriers to discharge w/patient and caregiver  - Arrange for needed discharge resources and transportation as appropriate  - Identify discharge learning needs (meds, wound care, etc.)  - Arrange for interpretive services to assist at discharge as needed  - Refer to Case Management Department for coordinating discharge planning if the patient needs post-hospital services based on physician/advanced practitioner order or complex needs related to functional status, cognitive ability, or social support system  Outcome: Progressing     Problem: Knowledge Deficit  Goal: Patient/family/caregiver demonstrates understanding of disease process, treatment plan, medications, and discharge instructions  Description: Complete learning assessment and assess knowledge base.  Interventions:  - Provide teaching at level of understanding  - Provide teaching via preferred learning methods  Outcome: Progressing     Problem: RESPIRATORY - ADULT  Goal: Achieves optimal ventilation and oxygenation  Description: INTERVENTIONS:  - Assess for changes in respiratory status  - Assess for changes in mentation and behavior  - Position to facilitate oxygenation and minimize respiratory effort  - Oxygen administered by appropriate delivery if ordered  - Initiate smoking cessation education as indicated  - Encourage broncho-pulmonary hygiene including cough, deep breathe, Incentive Spirometry  - Assess the need for suctioning and aspirate as needed  - Assess and instruct to report SOB or any respiratory difficulty  - Respiratory Therapy support as indicated  Outcome: Progressing     - Out of bed for toileting  - Record patient progress and toleration of activity level   Outcome: Progressing

## 2025-06-29 NOTE — CONSULTS
Consultation - Cardiology   Cherelle Dash 70 y.o. female MRN: [id number]  Unit/Bed#: W -01 Encounter: [id number]    Inpatient consult to Cardiology  Consult performed by: Skye Finn PA-C  Consult ordered by: Tucker Izquierdo MD            Physician Requesting Consult: Rodney Downing MD  Reason for Consult / Principal Problem: dyspnea    Assessment/Plan:  Dyspnea  - patient presented yesterday after she devleoped a rash s/p Bactrim use for UTI, also with worsening dyspnea   - hs troponin levels are within normal limits  - Repeat echo this admission unchanged from prior: LVEF 50%, inferior hypokinesis, mild-moderate MR, mild TR.   - no overt volume overload on exam, BNP lower than prior  - EKG showing sinus rhythm, LBBB (chronic), occasional PVC's.   - at this time there does not appear to be an acute cardiac etiology for her dyspnea. There is most likely a component of deconditioning as her  reports she lays in bed most of the day due to fatigue. She also has anemia which could be contributing.  - no additional cardiac recommendations at this time other than to monitor volume status closely with IVF administration, replace potassium.    OWEN on CKD  - baseline creatinine 1.6-1.8  - creatinine on admission: 2.4  - creatinine today: 2.6  - may be secondary to hypotension, recent Bactrim use, sepsis. Per nephrology note can have AIN for up to 1 year s/p keytruda use.  - continue IVF at this time, monitor for volume overload    Heart failure with preserved ejection fraction  - maintained on torsemide 20 mg daily at baseline  - examines compensated  - torsemide will be held given #2.    Coronary artery disease  - s/p MAIA to OM2 5/2/25  - denies anginal symptoms  - hs troponin levels are within normal limits  - continue plavix, statin, beta-blocker.    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    History of  DVT/PE  - typically on anticoagulation with Eliquis, was held in anticipation of possible port removal, should resume as this is being postponed until 1 week antibiotic course complete    History of Present Illness   HPI: Cherelle Dash is a 70 y.o. year old female with coronary artery disease s/p MAIA to OM2 in May 2025, heart failure with preserved ejection fraction, last EF 50%, endometrial cancer, recent myocarditis felt to be 2/2 checkpoint inhibitor use, prior PE/DVT on anticoagulation with Eliquis, CKD with baseline creatinine 1.6-1.8 who follows with Dr. Gold as well as Dr. Olson from cardio-oncology.     The patient presented to the ER yesterday for the evaluation of hives after receiving Bactrim for a UTI.  Patient's  at bedside states that she has been feeling short of breath with exertion for quite some time, such as when walking down the hallway to dinner at their assisted living facility.  However, when she was getting dressed to come to the hospital after she developed the rash, she became acutely short of breath.  She denies any chest pain/pressure/discomfort.  She denies any worsening lower extremity edema.  She did miss a dose of her torsemide this past Tuesday due to receiving brachii radiation therapy for endometrial cancer.  Otherwise she reports compliance.  She does not weigh herself on a regular basis but denies any change in her weight that she is aware of.    On arrival, patient noted to have erythema involving her port which was placed previously for chemotherapy for endometrial cancer. IR was consulted and recommended antibiotic therapy x 1 week prior to port removal.    Hs troponin levels have been within normal limits x 3.    Repeat echo this admission: LVEF 50%, inferior hypokinesis, mild-moderate MR, mild TR. Appears unchanged from echo 5/1/25.    Baseline creatinine 1.6-1.8, 2.31 on admission, 2.6 today. Patient was placed on IVF and nephrology was consulted.    .  "Hgb 8.6    Cardiology was consulted due to \"dyspnea.\"    Review of Systems   All other systems reviewed and are negative.    Historical Information   Past Medical History[1]  Past Surgical History[2]  Social History     Substance and Sexual Activity   Alcohol Use Not Currently    Alcohol/week: 1.0 standard drink of alcohol    Types: 1 Glasses of wine per week     Social History     Substance and Sexual Activity   Drug Use Never     Tobacco Use History[3]  Family History: Family history non-contributory    Meds/Allergies   all current active meds have been reviewed  lactated ringers, 50 mL/hr, Last Rate: 50 mL/hr (25 1015)        Allergies[4]    Objective   Vitals: Blood pressure 108/59, pulse 82, temperature (!) 97.2 °F (36.2 °C), resp. rate 16, height 5' (1.524 m), weight 102 kg (224 lb), SpO2 93%, not currently breastfeeding., Body mass index is 43.75 kg/m².,   Orthostatic Blood Pressures      Flowsheet Row Most Recent Value   Blood Pressure 108/59 filed at 2025 0721   Patient Position - Orthostatic VS Lying filed at 2025 1500          Systolic (24hrs), Av , Min:95 , Max:115     Diastolic (24hrs), Av, Min:48, Max:61      Intake/Output Summary (Last 24 hours) at 2025 1152  Last data filed at 2025 0822  Gross per 24 hour   Intake --   Output 1 ml   Net -1 ml       Weight (last 2 days)       Date/Time Weight    25 1500 102 (224)    25 1425 102 (224.87)            Invasive Devices       Central Venous Catheter Line  Duration             Port A Cath 24 Right Internal jugular 207 days              Peripheral Intravenous Line  Duration             Peripheral IV 25 Dorsal (posterior);Left Hand 1 day    Peripheral IV 25 Left Antecubital <1 day                      Physical Exam  Vitals reviewed.   Constitutional:       General: She is not in acute distress.     Appearance: She is obese. She is not diaphoretic.   HENT:      Head: Normocephalic and " atraumatic.     Eyes:      Pupils: Pupils are equal, round, and reactive to light.     Neck:      Vascular: No carotid bruit or JVD.     Cardiovascular:      Rate and Rhythm: Normal rate and regular rhythm.      Heart sounds: S1 normal and S2 normal. No murmur heard.  Pulmonary:      Effort: Pulmonary effort is normal. No respiratory distress.      Breath sounds: Examination of the right-lower field reveals rales. Examination of the left-lower field reveals rales. Rales present. No wheezing.   Abdominal:      General: There is no distension.      Palpations: Abdomen is soft.      Tenderness: There is no abdominal tenderness.     Musculoskeletal:         General: No deformity. Normal range of motion.      Cervical back: Normal range of motion.      Right lower leg: Edema (trace edema bilaterally) present.      Left lower leg: Edema present.     Skin:     General: Skin is warm and dry.      Findings: No erythema.     Neurological:      General: No focal deficit present.      Mental Status: She is alert and oriented to person, place, and time.      Gait: Gait normal.     Psychiatric:         Mood and Affect: Mood normal.         Behavior: Behavior normal.             Laboratory Results:        CBC with diff:   Results from last 7 days   Lab Units 06/29/25  0431 06/28/25  1332   WBC Thousand/uL 3.67* 4.87   HEMOGLOBIN g/dL 8.6* 9.6*   HEMATOCRIT % 26.5* 30.6*   MCV fL 98 100*   PLATELETS Thousands/uL 96* 105*   RBC Million/uL 2.71* 3.07*   MCH pg 31.7 31.3   MCHC g/dL 32.5 31.4   RDW % 17.5* 17.2*   MPV fL 12.2 11.9   NRBC AUTO /100 WBCs 0 0         CMP:  Results from last 7 days   Lab Units 06/29/25  0431 06/28/25  1332   POTASSIUM mmol/L 3.7 3.4*   CHLORIDE mmol/L 108 103   CO2 mmol/L 22 24   BUN mg/dL 32* 29*   CREATININE mg/dL 2.60* 2.31*   CALCIUM mg/dL 8.6 8.8   AST U/L  --  14   ALT U/L  --  14   ALK PHOS U/L  --  89   EGFR ml/min/1.73sq m 17 20         BMP:  Results from last 7 days   Lab Units 06/29/25 0431  25  1332   POTASSIUM mmol/L 3.7 3.4*   CHLORIDE mmol/L 108 103   CO2 mmol/L 22 24   BUN mg/dL 32* 29*   CREATININE mg/dL 2.60* 2.31*   CALCIUM mg/dL 8.6 8.8       BNP:    Recent Labs     25  1332   *       Magnesium:   Results from last 7 days   Lab Units 25  0431 25  1332   MAGNESIUM mg/dL 2.7 1.8*       Coags:       TSH:       Hemoglobin A1C       Lipid Profile:         Cardiac testing:   Results for orders placed during the hospital encounter of 20    Echo complete with contrast if indicated    [facility]  [address] [place] [address] [phone]    Transthoracic Echocardiogram  2D, M-mode, Doppler, and Color Doppler    Study date:  02-Mar-2020    Patient: AIDAN ZAVALA  MR number: [id number]  Account number: [id number]  : 1955  Age: 64 years  Gender: Female  Status: Outpatient  Location: [facility]  Height: 62 in  Weight: 236 lb  BP: 120/ 76 mmHg    Indications: Abnormal EKG; Edema    Sonographer:  AVANI Haines, RDCS  Primary Physician:  Gretchen Mayorga DO  Referring Physician:  Torin Gold MD  Group:  [facility]  Interpreting Physician:  Yony Castaneda MD    SUMMARY    LEFT VENTRICLE:  Systolic function was normal. Ejection fraction was estimated to be 60 %.  There were no regional wall motion abnormalities.    RIGHT VENTRICLE:  The size was normal.  Systolic function was normal.    LEFT ATRIUM:  The atrium was mildly dilated.    MITRAL VALVE:  There was mild regurgitation.    HISTORY: PRIOR HISTORY: Abnormal EKG; Palpitations; GERD; Decreased thyroid; JULIO; Edema; Former smoker    PROCEDURE: The study was performed in the [facility]. This was a routine study. The transthoracic approach was used. The study included complete 2D imaging, M-mode, complete spectral Doppler, and color Doppler. The  heart rate was 76 bpm, at the start of the study. Images  were obtained from the parasternal, apical, subcostal, and suprasternal notch acoustic windows. Image quality was adequate.    LEFT VENTRICLE: Size was normal. Systolic function was normal. Ejection fraction was estimated to be 60 %. There were no regional wall motion abnormalities. Wall thickness was normal. DOPPLER: Left ventricular diastolic function parameters  were normal for the patient's age.    RIGHT VENTRICLE: The size was normal. Systolic function was normal. Wall thickness was normal.    LEFT ATRIUM: The atrium was mildly dilated.    RIGHT ATRIUM: Size was normal.    MITRAL VALVE: Valve structure was normal. There was normal leaflet separation. DOPPLER: The transmitral velocity was within the normal range. There was no evidence for stenosis. There was mild regurgitation.    AORTIC VALVE: The valve was trileaflet. Leaflets exhibited normal thickness and normal cuspal separation. DOPPLER: Transaortic velocity was within the normal range. There was no evidence for stenosis. There was no significant  regurgitation.    TRICUSPID VALVE: The valve structure was normal. There was normal leaflet separation. DOPPLER: The transtricuspid velocity was within the normal range. There was no evidence for stenosis. There was no significant regurgitation. The  tricuspid jet envelope definition was inadequate for estimation of RV systolic pressure.    PULMONIC VALVE: Leaflets exhibited normal thickness, no calcification, and normal cuspal separation. DOPPLER: The transpulmonic velocity was within the normal range. There was mild regurgitation.    PERICARDIUM: There was no pericardial effusion. The pericardium was normal in appearance.    AORTA: The root exhibited normal size.    SYSTEMIC VEINS: IVC: The inferior vena cava was normal in size. Respirophasic changes were normal.    SYSTEM MEASUREMENT TABLES    2D  %FS: 31.68 %  AV Diam: 3.06 cm  EDV(Teich): 89.99 ml  EF(Cube): 68.12 %  EF(Teich): 59.85 %  ESV(Cube): 28.07  ml  ESV(Teich): 36.13 ml  IVSd: 1.22 cm  LA Area: 21.7 cm2  LA Diam: 4.62 cm  LVEDV MOD A4C: 91.14 ml  LVEF MOD A4C: 63.33 %  LVESV MOD A4C: 33.43 ml  LVIDd: 4.45 cm  LVIDs: 3.04 cm  LVLd A4C: 7.31 cm  LVLs A4C: 5.67 cm  LVPWd: 1.32 cm  RA Area: 14.58 cm2  RV Diam: 3.78 cm  SV MOD A4C: 57.72 ml  SV(Cube): 59.97 ml  SV(Teich): 53.86 ml    CW  TR MaxP.16 mmHg  TR Vmax: 2.35 m/s    MM  TAPSE: 2.71 cm    PW  E': 0.05 m/s  E/E': 14.28  MV A Marcos: 0.82 m/s  MV Dec Riley: 4.4 m/s2  MV DecT: 172 ms  MV E Marcos: 0.76 m/s  MV E/A Ratio: 0.93    [facility] [place] [facility] Accredited Echocardiography Laboratory    Prepared and electronically signed by    Yony Castaneda MD  Signed 02-Mar-2020 12:12:45    No results found for this or any previous visit.    No results found for this or any previous visit.    No results found for this or any previous visit.        Imaging: Results Review Statement: I reviewed radiology reports from this admission including: chest xray.  Echo complete w/ contrast if indicated  Result Date: 2025  Narrative:   Stat limited echocardiogram.   Left Ventricle: Left ventricular cavity size is normal. Wall thickness is normal. The left ventricular ejection fraction is 50% by visual estimation. Systolic function is low normal.   The following segments are hypokinetic: basal inferior and basal inferolateral.   All other segments are normal.   IVS: There is abnormal septal motion.   Mitral Valve: There is mild to moderate regurgitation.   Tricuspid Valve: There is mild regurgitation.   Prior TTE study available for comparison. Prior study date: 2025. No significant changes noted compared to the prior study.     IR IVC filter removal  Result Date: 2025  Narrative: Inferior venacavogram and inferior venacaval filter removal Clinical History: 70 year-old female with history of endometrial cancer s/p IVC filter placement on  10/24 and s/p hysterectomy and bilateral salpingo-oophorectomy on 10/24,  currently on chemotherapy, currently tolerating Eliquis without any issues Contrast: 3 mL of iohexol (OMNIPAQUE) Fluoro time: 7.1 min Number of Images: Multiple Radiation dose: 197 mGy Conscious sedation time: 1 hour Technique: The patient was brought to the interventional radiology suite and identified verbally and by wrist band. The patient was placed supine on the table. The right internal jugular vein was evaluated as a potential access site with ultrasound. The vessel was found to be patent and compressible. Lidocaine was administered to the skin and a small skin incision was made. Under ultrasound guidance, the right internal vein was accessed using single wall Seldinger technique. Static images of real time needle entry into the vessel were obtained. There was difficulty passing the microwire through the right jugular vein, therefore contrast was injected, showing occlusion of the distal right internal jugular vein. Therefore, the left internal jugular vein was evaluated as a potential access site with ultrasound. The vessel was found to be patent and compressible. Lidocaine was administered to the skin and a small skin incision was made. Under ultrasound guidance, the left internal jugular vein was accessed using single wall Seldinger technique. Static images of real time needle entry into the vessel were obtained.  A heavy wire was advanced into the inferior vena cava, and after tract dilatation, a 10 Afghan coaxial dilator/sheath was inserted, and the inner portions removed. An inferior venacavogram was performed using CO2. Findings: The examination demonstrates that there is no thrombus in the inferior vena cava or within the filter. The renal veins are positively identified. Intervention: A gooseneck loop snare was then advanced through the sheath. The filter was re captured by sliding the inner sheath over it. The filter was then successfully removed. Follow-up inferior venacavogram using CO2  demonstrated no complication.     Impression: Impression: 1. No evidence of thrombus within the filter or inferior vena cava. 2. Successful [place] IVC filter removal. Workstation performed: [id number]       EKG reviewed personally: sinus rhythm with PVC's, LBBB  Telemetry reviewed personally: not on telemetry    Assessment:  Principal Problem:    Dyspnea  Active Problems:    Bipolar I disorder (HCC)    GERD (gastroesophageal reflux disease)    Type 2 diabetes mellitus with chronic kidney disease, without long-term current use of insulin (HCC)    Hyperlipidemia    Stage 4 chronic kidney disease (HCC)    Endometrial cancer (HCC)    Other pulmonary embolism without acute cor pulmonale (HCC)    Cardiomyopathy (HCC)    Drug-induced pneumonitis    History of myocarditis    Heart failure with mildly reduced ejection fraction (HCC)    Coronary artery disease involving native coronary artery of native heart with unstable angina pectoris (HCC)    Cellulitis    UTI (urinary tract infection)        Code Status: Level 1 - Full Code           [1]   Past Medical History:  Diagnosis Date    Abnormal ECG     Anxiety 2002    Arthritis     Bipolar 1 disorder (HCC)     Cervical cancer (HCC)     Chronic kidney disease     stage 3    CPAP (continuous positive airway pressure) dependence     Depression 2001    Disease of thyroid gland 2001    Thyroid treated with Lithium medication for bi-polar disease    DVT (deep venous thrombosis) (HCC)     BLLE    Elevated blood pressure reading 06/10/2019    GERD (gastroesophageal reflux disease)     Obesity     Pulmonary emboli (HCC)     B/L    RSV (acute bronchiolitis due to respiratory syncytial virus) 12/05/2023    Sleep apnea     Sleep apnea, obstructive 2007    Urinary incontinence 2019    Uterine cancer (HCC)    [2]   Past Surgical History:  Procedure Laterality Date    CARDIAC CATHETERIZATION N/A 5/2/2025    Procedure: Cardiac Catheterization;  Surgeon: Jose Osborne MD;  [facility];  Service: Cardiology    COLONOSCOPY      COLONOSCOPY  2023    DENTAL SURGERY      FOOT SURGERY  1976    [place]    HYSTERECTOMY  10/11/24    IR IVC FILTER PLACEMENT OPTIONAL/TEMPORARY  10/08/2024    IR IVC FILTER REMOVAL  2025    IR PORT CHECK  2024    IR PORT PLACEMENT  2024    LAPAROTOMY N/A 10/11/2024    Procedure: EXPLORATORY LAPAROTOMY;  Surgeon: Rodney Downing MD;  Location: [facility];  Service: Gynecology Oncology    AL HYSTEROSCOPY BX ENDOMETRIUM&/POLYPC W/WO D&C N/A 2024    Procedure: EXAM UNDER ANESTHESIA, DILATATION AND CURETTAGE, CERVICAL BIOPSIES;  Surgeon: Evin Page MD;  Location: [facility];  Service: Gynecology Oncology    AL LAPS TOTAL HYSTERECT 250 GM/< W/RMVL TUBE/OVARY N/A 10/11/2024    Procedure: ROBOTIC ASSISTED LTH, BSO, LYMPH NODE DISSECTION, EUA;  Surgeon: Rodney Downing MD;  Location: [facility];  Service: Gynecology Oncology   [3]   Social History  Tobacco Use   Smoking Status Former    Current packs/day: 0.00    Average packs/day: 0.3 packs/day for 30.0 years (7.5 ttl pk-yrs)    Types: Cigarettes    Start date: 1979    Quit date: 2009    Years since quittin.5    Passive exposure: Never   Smokeless Tobacco Never   [4]   Allergies  Allergen Reactions    Raw Fruit - Food Allergy Anaphylaxis     PLUMS, PEACHES, APPLES, CHERRIES FRUIT WITH SKIN

## 2025-06-29 NOTE — ASSESSMENT & PLAN NOTE
#Non-Oliguric KDIGO OWEN stage1  Etiology: Factorial likely secondary to hemodynamic changes in the settings of sepsis, borderline hypotension, Bactrim .  Patient was on Keytruda until February develop cardiac toxicity, currently leukocyturia although recent infection we do not rule out AIN.  AIN can be present up to 1 year after last Keytruda dose.  Eos although normal they are trending up   Baseline creatinine 1.6 to 1.8 mg/dL  Current creatinine: 2.6 mL/dL, seems to be plateauing  Peak creatinine: Trending  UA: Leukocyturia with occasional bacteria, microhematuria  Renal imaging : Order  Treatment:  Will repeat urinalysis, UPCR,  urine eos  Maintain MAP:  Over 65 mmHg if possible/avoid hypoperfusion:  Hold parameters on blood pressure medications  Avoid nephrotoxic agents such as NSAIDs, and IV contrast if possible. Avoid opioids   Adjust medications to GFR  Will consider steroids if persistent leukocyturia with proteinuria and no improvement of kidney function.  Patient is currently receiving treatment for infection,  we will have to discuss with ID  Do not recommend resume Bactrim at any time

## 2025-06-29 NOTE — PLAN OF CARE
Problem: INFECTION - ADULT  Goal: Absence or prevention of progression during hospitalization  Description: INTERVENTIONS:  - Assess and monitor for signs and symptoms of infection  - Monitor lab/diagnostic results  - Monitor all insertion sites, i.e. indwelling lines, tubes, and drains  - Monitor endotracheal if appropriate and nasal secretions for changes in amount and color  - [place] appropriate cooling/warming therapies per order  - Administer medications as ordered  - Instruct and encourage patient and family to use good hand hygiene technique  - Identify and instruct in appropriate isolation precautions for identified infection/condition  Outcome: Progressing  Goal: Absence of fever/infection during neutropenic period  Description: INTERVENTIONS:  - Monitor WBC  - Perform strict hand hygiene  - Limit to healthy visitors only  - No plants, dried, fresh or silk flowers with martínez in patient room  Outcome: Progressing     Problem: RESPIRATORY - ADULT  Goal: Achieves optimal ventilation and oxygenation  Description: INTERVENTIONS:  - Assess for changes in respiratory status  - Assess for changes in mentation and behavior  - Position to facilitate oxygenation and minimize respiratory effort  - Oxygen administered by appropriate delivery if ordered  - Initiate smoking cessation education as indicated  - Encourage broncho-pulmonary hygiene including cough, deep breathe, Incentive Spirometry  - Assess the need for suctioning and aspirate as needed  - Assess and instruct to report SOB or any respiratory difficulty  - Respiratory Therapy support as indicated  Outcome: Progressing

## 2025-06-29 NOTE — PROGRESS NOTES
"Progress Note - GYN Oncology   Name: Cherelle Dash 70 y.o. female I MRN: [id number]  Unit/Bed#: W -01 I Date of Admission: 6/28/2025   Date of Service: 6/29/2025 I Hospital Day: 1     Assessment & Plan  Dyspnea  New-onset. Patient without additional cardiopulmonary symptoms. Prior history of immunotherapy-induced cardiomyopathy. EKG with new PVCs.  - Cardiology consulted, recommendations appreciated  Cellulitis  Suspect infection at R IJ port site  - IR consulted for possible port removal; recs appreciated   - recommend Keflex x1wk, maintain port for now and continue to observe  - Ancef 2g BID  Bipolar I disorder (HCC)  - continue home lurasidone  GERD (gastroesophageal reflux disease)  - famotidine 20mg qd  Type 2 diabetes mellitus with chronic kidney disease, without long-term current use of insulin (HCC)  Lab Results   Component Value Date    HGBA1C 6.6 (H) 05/01/2025     No results for input(s): \"POCGLU\" in the last 72 hours.    - monitor blood glucose while inpatient  Hyperlipidemia  - continue home atorvastatin 80mg qd  Stage 4 chronic kidney disease (HCC)  Lab Results   Component Value Date    EGFR 17 06/29/2025    EGFR 20 06/28/2025    EGFR 32 06/13/2025    CREATININE 2.60 (H) 06/29/2025    CREATININE 2.31 (H) 06/28/2025    CREATININE 1.57 (H) 06/13/2025   Follows with Nephrology outpatient  - continue to monitor Cr  Endometrial cancer (HCC)  Stage IIIC1 grade 1 endometrial adenocarcinoma. S/p robotic-assisted total laparoscopic hysterectomy, bilateral salpingo-oophorectomy, lymph node dissection on 10/11/2024, 5 cycles of adjuvant carboplatin/paclitaxel, 4 cycles of adjuvant pembrolizumab, whole pelvic radiation, now currently receiving vaginal brachytherapy. 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   - f/u with Radiation Oncology  Other pulmonary embolism without acute cor pulmonale (HCC)  Hx DVT and bilateral PE in 9/2024, s/p IVC filter  - on " Eliquis 5mg BID, currently held for possible removal of R IJ port by IR  Heart failure with mildly reduced ejection fraction (HCC)  Wt Readings from Last 3 Encounters:   06/28/25 102 kg (224 lb)   06/13/25 102 kg (225 lb)   05/21/25 101 kg (223 lb 9.6 oz)   Follows with Cardiology outpatient  - continue home torsemide 20mg qd  Coronary artery disease involving native coronary artery of native heart with unstable angina pectoris (HCC)  S/p cardiac catheterization on 5/2/2025  - continue home clopidogrel 75mg qd   UTI (urinary tract infection)  - d/c Bactrim given mild allergic reaction  - continue Ancef as above    Subjective:  Overnight events: Fever to 101.2F @2155, resolved  Cherelle Dash reports worsening pruritus at bilateral upper extremities. She denies chest pain, shortness of breath, or palpitations. She is been ambulating to the bathroom without difficulty. She denies nausea or vomiting.    Objective:  /59   Pulse 82   Temp (!) 97.2 °F (36.2 °C)   Resp 16   Ht 5' (1.524 m)   Wt 102 kg (224 lb)   SpO2 93%   BMI 43.75 kg/m²     No intake/output data recorded.  No intake/output data recorded.    Lab Results   Component Value Date    WBC 4.87 06/28/2025    HGB 9.6 (L) 06/28/2025    HCT 30.6 (L) 06/28/2025     (H) 06/28/2025     (L) 06/28/2025     Lab Results   Component Value Date    GLUCOSE 124 (H) 11/17/2017    CALCIUM 8.6 06/29/2025     (H) 11/17/2017    K 3.7 06/29/2025    CO2 22 06/29/2025     06/29/2025    BUN 32 (H) 06/29/2025    CREATININE 2.60 (H) 06/29/2025     Physical Exam  Vitals reviewed.   Constitutional:       General: She is not in acute distress.     Appearance: She is well-developed.   HENT:      Head: Normocephalic and atraumatic.     Cardiovascular:      Rate and Rhythm: Normal rate.   Pulmonary:      Effort: Pulmonary effort is normal. No respiratory distress.   Chest:      Comments: Port site with Tegaderm in place; minimal surrounding  erythema    Neurological:      Mental Status: She is alert and oriented to person, place, and time.     Psychiatric:         Mood and Affect: Affect normal.         Speech: Speech normal.         Behavior: Behavior normal.       Tucker Izquierdo MD  OBGYN PGY-1  06/29/25  8:10 AM

## 2025-06-29 NOTE — ASSESSMENT & PLAN NOTE
Status post surgical intervention in 2024  Status post Keytruda discontinued in  settings of cardiac toxicity  Status post carboplatin  Currently on pelvic radiation

## 2025-06-29 NOTE — ASSESSMENT & PLAN NOTE
Felt to be secondary to checkpoint inhibitors   Status post prednisone received colchicine for 3 months   Fu by Dr. Ackerman

## 2025-06-29 NOTE — CONSULTS
NEPHROLOGY HOSPITAL CONSULTATION   Cherelle Dash 70 y.o. female MRN: [id number]  Unit/Bed#: W -01 Encounter: [id number]    Brief History of Admission -  70 y.o. woman  with PMH of endometrial cancer  (status post pembrolizumab stopped) 2025 in the settings of cardiomyopathy, required steroids, 5 cycles of carboplatin, Taxol last on March 2025 currently on radiation ), status post  RA-TLH , BSO, p/w possible port infection.  Nephrology is consulted for management of OWEN    Assessment & Plan  OWEN (acute kidney injury) (HCC)  #Non-Oliguric KDIGO OWEN stage1  Etiology: Factorial likely secondary to hemodynamic changes in the settings of sepsis, borderline hypotension, Bactrim .  Patient was on Keytruda until February develop cardiac toxicity, currently leukocyturia although recent infection we do not rule out AIN.  AIN can be present up to 1 year after last Keytruda dose.  Eos although normal they are trending up   Baseline creatinine 1.6 to 1.8 mg/dL  Current creatinine: 2.6 mL/dL, seems to be plateauing  Peak creatinine: Trending  UA: Leukocyturia with occasional bacteria, microhematuria  Renal imaging : Order  Treatment:  Will repeat urinalysis, UPCR,  urine eos  Maintain MAP:  Over 65 mmHg if possible/avoid hypoperfusion:  Hold parameters on blood pressure medications  Avoid nephrotoxic agents such as NSAIDs, and IV contrast if possible. Avoid opioids   Adjust medications to GFR  Will consider steroids if persistent leukocyturia with proteinuria and no improvement of kidney function.  Patient is currently receiving treatment for infection,  we will have to discuss with ID  Do not recommend resume Bactrim at any time    Stage 4 chronic kidney disease (HCC)  Lab Results   Component Value Date    EGFR 17 06/29/2025    EGFR 20 06/28/2025    EGFR 32 06/13/2025    CREATININE 2.60 (H) 06/29/2025    CREATININE 2.31 (H) 06/28/2025    CREATININE 1.57 (H) 06/13/2025     Baseline creatinine: 1.6 to 1.8 mg/dL  Etiology:  "Likely secondary to nephrosclerosis, nephron loss  Type 2 diabetes mellitus with chronic kidney disease, without long-term current use of insulin (HCC)  Lab Results   Component Value Date    HGBA1C 6.6 (H) 05/01/2025   HbA1c 6.6  Advised to maintain a good DM control to prevent progression of CKD       No results for input(s): \"POCGLU\" in the last 72 hours.    Blood Sugar Average: Last 72 hrs:      Endometrial cancer (HCC)  Status post surgical intervention in 2024  Status post Keytruda discontinued in  settings of cardiac toxicity  Status post carboplatin  Currently on pelvic radiation  History of myocarditis  Felt to be secondary to checkpoint inhibitors   Status post prednisone received colchicine for 3 months   Fu by Dr. Ackerman    Heart failure with mildly reduced ejection fraction (HCC)  Wt Readings from Last 3 Encounters:   06/28/25 102 kg (224 lb)   06/13/25 102 kg (225 lb)   05/21/25 101 kg (223 lb 9.6 oz)   Currently euvolemic  Reinforced fluid intake  Discontinue IV fluids at 6 PM  UTI (urinary tract infection)  Recent UTI treated with antibiotics  Urine culture positive from 6/20/25  Anemia  Current hemoglobin:8.6mg/dL   Treatment:  Transfuse for hemoglobin less than 7.0 per primary service      I have reviewed the nephrology recommendations including discontinue IV fluids tonight, avoid further Bactrim, with primary team, and we are in agreement with renal plan including the information outlined above.    HISTORY OF PRESENT ILLNESS:  Requesting Physician: Rodney Downing MD  Reason for Consult: OWEN Dash is a 70 y.o. female with PMH of endometrial cancer  (status post pembrolizumab stopped) 2025 in the settings of cardiomyopathy, required steroids, 5 cycles of carboplatin, Taxol last on March 2025 currently on radiation ), status post  RA-TLH , BSO, who was admitted to [facility] after presenting with possible port infection. A renal consultation is requested today for assistance in the " management of OWEN.    PAST MEDICAL HISTORY:  Past Medical History[1]    PAST SURGICAL HISTORY:  Past Surgical History[2]    ALLERGIES:  Allergies[3]    SOCIAL HISTORY:  Social History     Substance and Sexual Activity   Alcohol Use Not Currently    Alcohol/week: 1.0 standard drink of alcohol    Types: 1 Glasses of wine per week     Social History     Substance and Sexual Activity   Drug Use Never     Tobacco Use History[4]    FAMILY HISTORY:  Family History[5]    MEDICATIONS:  Current Medications[6]    REVIEW OF SYSTEMS:  Constitutional: Negative for fatigue, anorexia, fever, chills, diaphoresis  HENT: Negative for postnasal drip  Eyes: Negative for visual disturbance.   Respiratory: Negative for cough, shortness of breath and wheezing.   Cardiovascular: Negative for chest pain, palpitations and leg swelling.   Gastrointestinal: Negative for abdominal pain, constipation, diarrhea, nausea and vomiting.   Genitourinary: No dysuria, hematuria  Endocrine: Negative for polyuria.   Musculoskeletal: Negative for arthralgias, back pain and joint swelling.   Skin: Negative for rash.   Neurological: Negative for focal weakness, headaches, dizziness.  Hematological: Negative for easy bruising or bleeding.  Psychiatric/Behavioral: Negative for confusion and sleep disturbance.   All the systems were reviewed and were negative except as documented on the HPI.    PHYSICAL EXAM:  Current Weight: Weight - Scale: 102 kg (224 lb)  First Weight: Weight - Scale: 102 kg (224 lb 13.9 oz)  Vitals:    06/29/25 0546 06/29/25 0546 06/29/25 0718 06/29/25 0721   BP: 115/61 115/61 108/59 108/59   Pulse: 79   82   Resp:    16   Temp:   (!) 97.2 °F (36.2 °C) (!) 97.2 °F (36.2 °C)   TempSrc:       SpO2:    93%   Weight:       Height:           Intake/Output Summary (Last 24 hours) at 6/29/2025 1231  Last data filed at 6/29/2025 0822  Gross per 24 hour   Intake --   Output 1 ml   Net -1 ml     Physical Exam  General:  no acute distress at this  "time  Skin:  No acute rash  Eyes:  No scleral icterus and noninjected  ENT:  mucous membranes moist  Neck:  no carotid bruits  Chest:  Clear to auscultation percussion, good respiratory effort, no use of accessory respiratory muscles  CVS:  Regular rate and rhythm without rub   Abdomen:  soft and nontender   Extremities: no significant lower extremity edema  Neuro:  No gross focality  Psych:  Alert , cooperative       Invasive Devices:      Lab Results:   Results from last 7 days   Lab Units 06/29/25  0431 06/28/25  1332   WBC Thousand/uL 3.67* 4.87   HEMOGLOBIN g/dL 8.6* 9.6*   HEMATOCRIT % 26.5* 30.6*   PLATELETS Thousands/uL 96* 105*   POTASSIUM mmol/L 3.7 3.4*   CHLORIDE mmol/L 108 103   CO2 mmol/L 22 24   BUN mg/dL 32* 29*   CREATININE mg/dL 2.60* 2.31*   CALCIUM mg/dL 8.6 8.8   MAGNESIUM mg/dL 2.7 1.8*   PHOSPHORUS mg/dL 4.0  --    ALK PHOS U/L  --  89   ALT U/L  --  14   AST U/L  --  14     Other Studies:     Portions of the record may have been created with voice recognition software. Occasional wrong word or \"sound a like\" substitutions may have occurred due to the inherent limitations of voice recognition software. Read the chart carefully and recognize, using context, where substitutions have occurred.If you have any questions, please contact the dictating provider.         [1]   Past Medical History:  Diagnosis Date    Abnormal ECG     Anxiety 2002    Arthritis     Bipolar 1 disorder (HCC)     Cervical cancer (HCC)     Chronic kidney disease     stage 3    CPAP (continuous positive airway pressure) dependence     Depression 2001    Disease of thyroid gland 2001    Thyroid treated with Lithium medication for bi-polar disease    DVT (deep venous thrombosis) ([facility])     BLLE    Elevated blood pressure reading 06/10/2019    GERD (gastroesophageal reflux disease)     Obesity     Pulmonary emboli ([facility])     B/L    RSV (acute bronchiolitis due to respiratory syncytial virus) 12/05/2023    Sleep apnea     Sleep " apnea, obstructive     Urinary incontinence 2019    Uterine cancer (HCC)    [2]   Past Surgical History:  Procedure Laterality Date    CARDIAC CATHETERIZATION N/A 2025    Procedure: Cardiac Catheterization;  Surgeon: Jose Osborne MD;  [facility];  Service: Cardiology    COLONOSCOPY      COLONOSCOPY  2023    DENTAL SURGERY      FOOT SURGERY      [place]    HYSTERECTOMY  10/11/24    IR IVC FILTER PLACEMENT OPTIONAL/TEMPORARY  10/08/2024    IR IVC FILTER REMOVAL  2025    IR PORT CHECK  2024    IR PORT PLACEMENT  2024    LAPAROTOMY N/A 10/11/2024    Procedure: EXPLORATORY LAPAROTOMY;  Surgeon: Rodney Downing MD;  Location: [facility];  Service: Gynecology Oncology    MO HYSTEROSCOPY BX ENDOMETRIUM&/POLYPC W/WO D&C N/A 2024    Procedure: EXAM UNDER ANESTHESIA, DILATATION AND CURETTAGE, CERVICAL BIOPSIES;  Surgeon: Evin Page MD;  Location: [facility];  Service: Gynecology Oncology    MO LAPS TOTAL HYSTERECT 250 GM/< W/RMVL TUBE/OVARY N/A 10/11/2024    Procedure: ROBOTIC ASSISTED LTH, BSO, LYMPH NODE DISSECTION, EUA;  Surgeon: Rodney Downing MD;  Location: [facility];  Service: Gynecology Oncology   [3]   Allergies  Allergen Reactions    Raw Fruit - Food Allergy Anaphylaxis     PLUMS, PEACHES, APPLES, CHERRIES FRUIT WITH SKIN   [4]   Social History  Tobacco Use   Smoking Status Former    Current packs/day: 0.00    Average packs/day: 0.3 packs/day for 30.0 years (7.5 ttl pk-yrs)    Types: Cigarettes    Start date: 1979    Quit date: 2009    Years since quittin.5    Passive exposure: Never   Smokeless Tobacco Never   [5]   Family History  Problem Relation Name Age of Onset    Heart disease Mother Zakia Lambert     Heart Valve Disease Mother Zakia Lambert         Replacement    Diabetes Mother Zakia Lambert         2002    Heart failure Mother Zakia Solanosalima Lambert         Heart Valve  replacement    Arthritis Father Dad     No Known Problems Sister      No Known Problems Daughter      No Known Problems Maternal Grandmother      No Known Problems Maternal Grandfather      No Known Problems Paternal Grandmother      No Known Problems Paternal Grandfather      No Known Problems Son      No Known Problems Maternal Aunt      No Known Problems Maternal Aunt      No Known Problems Maternal Aunt      No Known Problems Maternal Aunt      No Known Problems Maternal Aunt      No Known Problems Paternal Aunt      Alcohol abuse Son [place], 40 year old son, has issues with alcohol and alcohol abuse   [6]   Current Facility-Administered Medications:     acetaminophen (TYLENOL) tablet 650 mg, 650 mg, Oral, Q4H PRN, Jacky Page MD, 650 mg at 06/28/25 2350    [Held by provider] apixaban (ELIQUIS) tablet 5 mg, 5 mg, Oral, BID, Tucker Izquierdo MD    atorvastatin (LIPITOR) tablet 80 mg, 80 mg, Oral, Daily With Dinner, Tucker Izquierdo MD    ceFAZolin (ANCEF) IVPB (premix in dextrose) 2,000 mg 50 mL, 2,000 mg, Intravenous, Q12H, Tucker Izquierdo MD    clopidogrel (PLAVIX) tablet 75 mg, 75 mg, Oral, Daily, Tucker Izquierdo MD, 75 mg at 06/29/25 0809    diphenhydrAMINE (BENADRYL) tablet 25 mg, 25 mg, Oral, Q6H PRN, Rodney Downing MD    famotidine (PEPCID) tablet 20 mg, 20 mg, Oral, Daily, Tucker Izquierdo MD, 20 mg at 06/29/25 0809    heparin (porcine) subcutaneous injection 7,500 Units, 7,500 Units, Subcutaneous, Q8H RUBINA, Tucker Izquierdo MD, 7,500 Units at 06/29/25 0446    hydrocortisone 1 % cream, , Topical, 4x Daily PRN, Tucker Izquierdo MD, Given at 06/29/25 1110    lactated ringers infusion, 50 mL/hr, Intravenous, Continuous, Tucker Izquierdo MD, Last Rate: 50 mL/hr at 06/29/25 1015, 50 mL/hr at 06/29/25 1015    LORazepam (ATIVAN) tablet 1 mg, 1 mg, Oral, HS PRN, Tucker Izquierdo MD    lurasidone (LATUDA) tablet 20 mg, 20 mg, Oral, Daily With Breakfast, Sedgalilea  MD Timbo, 20 mg at 06/29/25 0809    magnesium Oxide (MAG-OX) tablet 400 mg, 400 mg, Oral, BID, Panfilo Owusu MD, 400 mg at 06/29/25 0809    metoprolol succinate (TOPROL-XL) 24 hr tablet 50 mg, 50 mg, Oral, Q12H, Tucker Izquierdo MD, 50 mg at 06/29/25 0546    torsemide (DEMADEX) tablet 20 mg, 20 mg, Oral, Daily, Tucker Izquierdo MD    triamcinolone (KENALOG) 0.1 % cream, , Topical, BID, Rodney Downing MD, Given at 06/29/25 1110

## 2025-06-29 NOTE — ASSESSMENT & PLAN NOTE
Current hemoglobin:8.6mg/dL   Treatment:  Transfuse for hemoglobin less than 7.0 per primary service

## 2025-06-30 ENCOUNTER — APPOINTMENT (INPATIENT)
Dept: CT IMAGING | Facility: HOSPITAL | Age: 70
DRG: 205 | End: 2025-06-30
Payer: COMMERCIAL

## 2025-06-30 ENCOUNTER — TELEPHONE (OUTPATIENT)
Age: 70
End: 2025-06-30

## 2025-06-30 PROBLEM — D72.19 OTHER EOSINOPHILIA: Status: ACTIVE | Noted: 2025-06-30

## 2025-06-30 PROBLEM — R93.89 ABNORMAL CT OF THE CHEST: Status: ACTIVE | Noted: 2025-06-30

## 2025-06-30 LAB
ANION GAP SERPL CALCULATED.3IONS-SCNC: 8 MMOL/L (ref 4–13)
ATRIAL RATE: 87 BPM
B PARAP IS1001 DNA NPH QL NAA+NON-PROBE: NOT DETECTED
B PERT.PT PRMT NPH QL NAA+NON-PROBE: NOT DETECTED
BASOPHILS # BLD AUTO: 0.01 THOUSANDS/ÂΜL (ref 0–0.1)
BASOPHILS NFR BLD AUTO: 0 % (ref 0–1)
BUN SERPL-MCNC: 32 MG/DL (ref 5–25)
C PNEUM DNA NPH QL NAA+NON-PROBE: NOT DETECTED
CALCIUM SERPL-MCNC: 8.7 MG/DL (ref 8.4–10.2)
CHLORIDE SERPL-SCNC: 108 MMOL/L (ref 96–108)
CO2 SERPL-SCNC: 22 MMOL/L (ref 21–32)
CREAT SERPL-MCNC: 2.09 MG/DL (ref 0.6–1.3)
CRP SERPL QL: 91.8 MG/L
EOSINOPHIL # BLD AUTO: 0.56 THOUSAND/ÂΜL (ref 0–0.61)
EOSINOPHIL NFR BLD AUTO: 16 % (ref 0–6)
EOSINOPHIL NFR URNS MANUAL: 0 %
ERYTHROCYTE [DISTWIDTH] IN BLOOD BY AUTOMATED COUNT: 17.3 % (ref 11.6–15.1)
FLUAV RNA NPH QL NAA+NON-PROBE: NOT DETECTED
FLUBV RNA NPH QL NAA+NON-PROBE: NOT DETECTED
GFR SERPL CREATININE-BSD FRML MDRD: 23 ML/MIN/1.73SQ M
GLUCOSE SERPL-MCNC: 112 MG/DL (ref 65–140)
HADV DNA NPH QL NAA+NON-PROBE: NOT DETECTED
HCOV 229E RNA NPH QL NAA+NON-PROBE: NOT DETECTED
HCOV HKU1 RNA NPH QL NAA+NON-PROBE: NOT DETECTED
HCOV NL63 RNA NPH QL NAA+NON-PROBE: NOT DETECTED
HCOV OC43 RNA NPH QL NAA+NON-PROBE: NOT DETECTED
HCT VFR BLD AUTO: 26.6 % (ref 34.8–46.1)
HGB BLD-MCNC: 8.4 G/DL (ref 11.5–15.4)
HMPV RNA NPH QL NAA+NON-PROBE: NOT DETECTED
HPIV1 RNA NPH QL NAA+NON-PROBE: NOT DETECTED
HPIV2 RNA NPH QL NAA+NON-PROBE: NOT DETECTED
HPIV3 RNA NPH QL NAA+NON-PROBE: NOT DETECTED
HPIV4 RNA NPH QL NAA+NON-PROBE: NOT DETECTED
IMM GRANULOCYTES # BLD AUTO: 0.01 THOUSAND/UL (ref 0–0.2)
IMM GRANULOCYTES NFR BLD AUTO: 0 % (ref 0–2)
LYMPHOCYTES # BLD AUTO: 0.27 THOUSANDS/ÂΜL (ref 0.6–4.47)
LYMPHOCYTES NFR BLD AUTO: 8 % (ref 14–44)
M PNEUMO DNA NPH QL NAA+NON-PROBE: NOT DETECTED
MAGNESIUM SERPL-MCNC: 2.5 MG/DL (ref 1.9–2.7)
MCH RBC QN AUTO: 31 PG (ref 26.8–34.3)
MCHC RBC AUTO-ENTMCNC: 31.6 G/DL (ref 31.4–37.4)
MCV RBC AUTO: 98 FL (ref 82–98)
MONOCYTES # BLD AUTO: 0.41 THOUSAND/ÂΜL (ref 0.17–1.22)
MONOCYTES NFR BLD AUTO: 12 % (ref 4–12)
NEUTROPHILS # BLD AUTO: 2.2 THOUSANDS/ÂΜL (ref 1.85–7.62)
NEUTS SEG NFR BLD AUTO: 64 % (ref 43–75)
NRBC BLD AUTO-RTO: 0 /100 WBCS
P AXIS: 53 DEGREES
PHOSPHATE SERPL-MCNC: 3.5 MG/DL (ref 2.3–4.1)
PLATELET # BLD AUTO: 98 THOUSANDS/UL (ref 149–390)
PMV BLD AUTO: 12 FL (ref 8.9–12.7)
POTASSIUM SERPL-SCNC: 3.8 MMOL/L (ref 3.5–5.3)
PR INTERVAL: 176 MS
QRS AXIS: -70 DEGREES
QRSD INTERVAL: 146 MS
QT INTERVAL: 406 MS
QTC INTERVAL: 488 MS
RBC # BLD AUTO: 2.71 MILLION/UL (ref 3.81–5.12)
RSV RNA NPH QL NAA+NON-PROBE: NOT DETECTED
RV+EV RNA NPH QL NAA+NON-PROBE: NOT DETECTED
SARS-COV-2 RNA NPH QL NAA+NON-PROBE: NOT DETECTED
SODIUM SERPL-SCNC: 138 MMOL/L (ref 135–147)
T WAVE AXIS: 63 DEGREES
VENTRICULAR RATE: 87 BPM
WBC # BLD AUTO: 3.46 THOUSAND/UL (ref 4.31–10.16)

## 2025-06-30 PROCEDURE — 99223 1ST HOSP IP/OBS HIGH 75: CPT | Performed by: INTERNAL MEDICINE

## 2025-06-30 PROCEDURE — 87449 NOS EACH ORGANISM AG IA: CPT | Performed by: INTERNAL MEDICINE

## 2025-06-30 PROCEDURE — 93010 ELECTROCARDIOGRAM REPORT: CPT | Performed by: INTERNAL MEDICINE

## 2025-06-30 PROCEDURE — 83520 IMMUNOASSAY QUANT NOS NONAB: CPT | Performed by: INTERNAL MEDICINE

## 2025-06-30 PROCEDURE — 99232 SBSQ HOSP IP/OBS MODERATE 35: CPT | Performed by: INTERNAL MEDICINE

## 2025-06-30 PROCEDURE — 87305 ASPERGILLUS AG IA: CPT | Performed by: INTERNAL MEDICINE

## 2025-06-30 PROCEDURE — 84100 ASSAY OF PHOSPHORUS: CPT

## 2025-06-30 PROCEDURE — 86003 ALLG SPEC IGE CRUDE XTRC EA: CPT | Performed by: INTERNAL MEDICINE

## 2025-06-30 PROCEDURE — 82785 ASSAY OF IGE: CPT | Performed by: INTERNAL MEDICINE

## 2025-06-30 PROCEDURE — 87081 CULTURE SCREEN ONLY: CPT | Performed by: INTERNAL MEDICINE

## 2025-06-30 PROCEDURE — 0202U NFCT DS 22 TRGT SARS-COV-2: CPT | Performed by: INTERNAL MEDICINE

## 2025-06-30 PROCEDURE — 86140 C-REACTIVE PROTEIN: CPT | Performed by: INTERNAL MEDICINE

## 2025-06-30 PROCEDURE — 86037 ANCA TITER EACH ANTIBODY: CPT | Performed by: INTERNAL MEDICINE

## 2025-06-30 PROCEDURE — 71250 CT THORAX DX C-: CPT

## 2025-06-30 PROCEDURE — 99232 SBSQ HOSP IP/OBS MODERATE 35: CPT | Performed by: OBSTETRICS & GYNECOLOGY

## 2025-06-30 PROCEDURE — 83735 ASSAY OF MAGNESIUM: CPT

## 2025-06-30 PROCEDURE — 85025 COMPLETE CBC W/AUTO DIFF WBC: CPT

## 2025-06-30 PROCEDURE — 80048 BASIC METABOLIC PNL TOTAL CA: CPT

## 2025-06-30 RX ADMIN — FAMOTIDINE 20 MG: 20 TABLET, FILM COATED ORAL at 08:53

## 2025-06-30 RX ADMIN — TRIAMCINOLONE ACETONIDE: 1 CREAM TOPICAL at 18:50

## 2025-06-30 RX ADMIN — HYDROCORTISONE: 1 CREAM TOPICAL at 16:35

## 2025-06-30 RX ADMIN — Medication 400 MG: at 08:53

## 2025-06-30 RX ADMIN — Medication 400 MG: at 17:11

## 2025-06-30 RX ADMIN — CLOPIDOGREL 75 MG: 75 TABLET ORAL at 08:52

## 2025-06-30 RX ADMIN — ATORVASTATIN CALCIUM 80 MG: 40 TABLET, FILM COATED ORAL at 17:11

## 2025-06-30 RX ADMIN — TRIAMCINOLONE ACETONIDE: 1 CREAM TOPICAL at 08:54

## 2025-06-30 RX ADMIN — APIXABAN 5 MG: 5 TABLET, FILM COATED ORAL at 08:53

## 2025-06-30 RX ADMIN — APIXABAN 5 MG: 5 TABLET, FILM COATED ORAL at 17:11

## 2025-06-30 RX ADMIN — LURASIDONE HYDROCHLORIDE 20 MG: 20 TABLET, FILM COATED ORAL at 08:52

## 2025-06-30 RX ADMIN — CEFAZOLIN SODIUM 2000 MG: 2 SOLUTION INTRAVENOUS at 00:52

## 2025-06-30 RX ADMIN — CEFEPIME 2000 MG: 2 INJECTION, POWDER, FOR SOLUTION INTRAVENOUS at 16:35

## 2025-06-30 RX ADMIN — METOPROLOL SUCCINATE 50 MG: 50 TABLET, EXTENDED RELEASE ORAL at 19:09

## 2025-06-30 RX ADMIN — LORAZEPAM 1 MG: 1 TABLET ORAL at 19:08

## 2025-06-30 RX ADMIN — AZITHROMYCIN MONOHYDRATE 500 MG: 500 INJECTION, POWDER, LYOPHILIZED, FOR SOLUTION INTRAVENOUS at 13:00

## 2025-06-30 RX ADMIN — METOPROLOL SUCCINATE 50 MG: 50 TABLET, EXTENDED RELEASE ORAL at 06:41

## 2025-06-30 RX ADMIN — LORAZEPAM 1 MG: 1 TABLET ORAL at 00:52

## 2025-06-30 NOTE — ASSESSMENT & PLAN NOTE
Remains on differential, further differential and evaluation as above  Off Keytruda since 2/2025  No associated orders from this encounter found during lookback period of 72 hours.

## 2025-06-30 NOTE — ASSESSMENT & PLAN NOTE
Likely multifactorial given her protracted course with noted frequent hospitalizations, recent steroid use, CAD post PCI and fatigue with ongoing cancer therapy  Evidence of pulmonary inflammation as listed below - will plan further evaluation as listed below  Orders from past 72 hours:    Inpatient consult to Cardiology; Standing    Inpatient consult to Pulmonology; Standing

## 2025-06-30 NOTE — PROGRESS NOTES
Progress Note - Nephrology   Name: Cherelle Dash 70 y.o. female I MRN: [id number]  Unit/Bed#: W -01 I Date of Admission: 6/28/2025   Date of Service: 6/30/2025 I Hospital Day: 2     Assessment & Plan  OWEN (acute kidney injury) (Formerly [facility])  #Non-Oliguric KDIGO OWEN stage1  Etiology: Factorial likely secondary to hemodynamic changes in the settings of sepsis, borderline hypotension, Bactrim .  Patient was on Keytruda until February develop cardiac toxicity, currently leukocyturia although recent infection we do not rule out AIN.  AIN can be present up to 1 year after last Keytruda dose.  There is no evidence of peripheral eosinophilia.  Urine eosinophils is pending  Baseline creatinine 1.6 to 1.8 mg/dL  Renal function is improving creatinine down to 2.0 mg/dL and essentially close to baseline  UA: Leukocyturia with occasional bacteria, microhematuria  Urine protein creatinine ratio 0.7 g/g but patient has an active urinary infection  Renal imaging : Order  Treatment:  Urine analysis showed large leukocytes with 20-30 WBCs with urine culture growing out Klebsiella  Maintain MAP:  Over 65 mmHg if possible/avoid hypoperfusion:  Hold parameters on blood pressure medications  Avoid nephrotoxic agents such as NSAIDs, and IV contrast if possible. Avoid opioids   Continue to hold Bactrim  No indication for any further IV fluids is tolerating oral intake well  No indication for steroids  Daily BMP    Stage 4 chronic kidney disease (Formerly [facility])  Lab Results   Component Value Date    EGFR 23 06/30/2025    EGFR 17 06/29/2025    EGFR 20 06/28/2025    CREATININE 2.09 (H) 06/30/2025    CREATININE 2.60 (H) 06/29/2025    CREATININE 2.31 (H) 06/28/2025     Baseline creatinine: 1.6 to 1.8 mg/dL  Etiology: Likely secondary to nephrosclerosis, nephron loss  Outpatient nephrologist Dr. Macias  Type 2 diabetes mellitus with chronic kidney disease, without long-term current use of insulin (Formerly [facility])  Lab Results   Component Value Date    HGBA1C 6.6 (H)  "05/01/2025   HbA1c 6.6  Advised to maintain a good DM control to prevent progression of CKD       No results for input(s): \"POCGLU\" in the last 72 hours.    Blood Sugar Average: Last 72 hrs:      Endometrial cancer (HCC)  Status post surgical intervention in 2024  Status post Keytruda discontinued in  settings of cardiac toxicity  Status post carboplatin  Currently on pelvic radiation  History of myocarditis  Felt to be secondary to checkpoint inhibitors   Status post prednisone received colchicine for 3 months   Fu by Dr. Ackerman    Heart failure with mildly reduced ejection fraction (HCC)  Wt Readings from Last 3 Encounters:   06/28/25 102 kg (224 lb)   06/13/25 102 kg (225 lb)   05/21/25 101 kg (223 lb 9.6 oz)   Examines euvolemic  Off IV fluids can monitor off IV fluids at this time  UTI (urinary tract infection)  Urine culture from June 22 grew out Klebsiella.  Blood cultures are no growth  Patient also found to have a multifocal pneumonia  Anemia  Current hemoglobin:8.6mg/dL   Treatment:  Transfuse for hemoglobin less than 7.0 per primary service      I have reviewed the nephrology recommendations including assessment and plan, with patient and family, and we are in agreement with renal plan including the information outlined above. I have discussed the above management plan in detail with the primary service.     Subjective   Brief History of Admission - 70 y.o. woman  with PMH of endometrial cancer  (status post pembrolizumab stopped) 2025 in the settings of cardiomyopathy, required steroids, 5 cycles of carboplatin, Taxol last on March 2025 currently on radiation ), status post  RA-TLH , BSO, p/w possible port infection.  Nephrology is consulted for management of OWEN     No chest pain or shortness of breath.  Currently afebrile.    Objective :  Temp:  [98.1 °F (36.7 °C)-98.9 °F (37.2 °C)] 98.9 °F (37.2 °C)  HR:  [84-98] 84  BP: (107-130)/(60-70) 107/62  Resp:  [16] 16  SpO2:  [90 %-94 %] 94 %  O2 Device: " None (Room air)    Current Weight: Weight - Scale: 102 kg (224 lb)  First Weight: Weight - Scale: 102 kg (224 lb 13.9 oz)  I/O         06/28 0701  06/29 0700 06/29 0701 06/30 0700 06/30 0701  07/01 0700    P.O.  360 360    IV Piggyback  100     Total Intake(mL/kg)  460 (4.5) 360 (3.5)    Urine (mL/kg/hr)  1401 (0.6) 200 (0.4)    Stool  0     Total Output  1401 200    Net  -941 +160           Unmeasured Urine Occurrence 6 x 3 x     Unmeasured Stool Occurrence  1 x           Physical Exam  Vitals and nursing note reviewed. Exam conducted with a chaperone present.   Constitutional:       General: She is not in acute distress.     Appearance: She is well-developed. She is not diaphoretic.   HENT:      Head: Normocephalic and atraumatic.     Eyes:      General: No scleral icterus.     Pupils: Pupils are equal, round, and reactive to light.       Cardiovascular:      Rate and Rhythm: Normal rate and regular rhythm.      Heart sounds: Normal heart sounds. No murmur heard.     No friction rub. No gallop.   Pulmonary:      Effort: Pulmonary effort is normal. No respiratory distress.      Breath sounds: Normal breath sounds. No wheezing or rales.   Chest:      Chest wall: No tenderness.   Abdominal:      General: Bowel sounds are normal. There is no distension.      Palpations: Abdomen is soft.      Tenderness: There is no abdominal tenderness. There is no rebound.     Musculoskeletal:         General: Normal range of motion.      Cervical back: Normal range of motion and neck supple.     Skin:     Findings: No rash.     Neurological:      Mental Status: She is alert and oriented to person, place, and time.         Medications:  Current Medications[1]      Lab Results: I have reviewed the following results:  Results from last 7 days   Lab Units 06/30/25  0502 06/29/25  0431 06/28/25  1332   WBC Thousand/uL 3.46* 3.67* 4.87   HEMOGLOBIN g/dL 8.4* 8.6* 9.6*   HEMATOCRIT % 26.6* 26.5* 30.6*   PLATELETS Thousands/uL 98* 96* 105*  "  POTASSIUM mmol/L 3.8 3.7 3.4*   CHLORIDE mmol/L 108 108 103   CO2 mmol/L 22 22 24   BUN mg/dL 32* 32* 29*   CREATININE mg/dL 2.09* 2.60* 2.31*   CALCIUM mg/dL 8.7 8.6 8.8   MAGNESIUM mg/dL 2.5 2.7 1.8*   PHOSPHORUS mg/dL 3.5 4.0  --    ALBUMIN g/dL  --   --  3.6       Administrative Statements   I have spent a total time of 20 minutes in caring for this patient on the day of the visit/encounter including Impressions, Counseling / Coordination of care, Documenting in the medical record, Reviewing/placing orders in the medical record (including tests, medications, and/or procedures), and Obtaining or reviewing history  .  Portions of the record may have been created with voice recognition software. Occasional wrong word or \"sound a like\" substitutions may have occurred due to the inherent limitations of voice recognition software. Read the chart carefully and recognize, using context, where substitutions have occurred.If you have any questions, please contact the dictating provider.       [1]   Current Facility-Administered Medications:     acetaminophen (TYLENOL) tablet 650 mg, 650 mg, Oral, Q4H PRN, Jacky Page MD, 650 mg at 06/28/25 2350    apixaban (ELIQUIS) tablet 5 mg, 5 mg, Oral, BID, Tucker Izquierdo MD, 5 mg at 06/30/25 0853    atorvastatin (LIPITOR) tablet 80 mg, 80 mg, Oral, Daily With Dinner, Tucker Izquierdo MD, 80 mg at 06/29/25 1559    ceFAZolin (ANCEF) IVPB (premix in dextrose) 2,000 mg 50 mL, 2,000 mg, Intravenous, Q12H, Tucker Izquierdo MD, Stopped at 06/30/25 0101    clopidogrel (PLAVIX) tablet 75 mg, 75 mg, Oral, Daily, Tucker Izquierdo MD, 75 mg at 06/30/25 0852    diphenhydrAMINE (BENADRYL) tablet 25 mg, 25 mg, Oral, Q6H PRN, Rodney Downing MD    famotidine (PEPCID) tablet 20 mg, 20 mg, Oral, Daily, Tucker Izquierdo MD, 20 mg at 06/30/25 0853    hydrocortisone 1 % cream, , Topical, 4x Daily PRN, Tucker Izquierdo MD, 1 Application at 06/29/25 2018    LORazepam (ATIVAN) " tablet 1 mg, 1 mg, Oral, HS PRN, Tucker Izquierdo MD, 1 mg at 06/30/25 0052    lurasidone (LATUDA) tablet 20 mg, 20 mg, Oral, Daily With Breakfast, Tucker Izquierdo MD, 20 mg at 06/30/25 0852    magnesium Oxide (MAG-OX) tablet 400 mg, 400 mg, Oral, BID, Panfilo Owusu MD, 400 mg at 06/30/25 0853    metoprolol succinate (TOPROL-XL) 24 hr tablet 50 mg, 50 mg, Oral, Q12H, Tucker Izquierdo MD, 50 mg at 06/30/25 0641    [Held by provider] torsemide (DEMADEX) tablet 20 mg, 20 mg, Oral, Daily, Tucker Izquierdo MD    triamcinolone (KENALOG) 0.1 % cream, , Topical, BID, Rodney Downing MD, Given at 06/30/25 0854

## 2025-06-30 NOTE — ASSESSMENT & PLAN NOTE
#Non-Oliguric KDIGO OWEN stage1  Etiology: Factorial likely secondary to hemodynamic changes in the settings of sepsis, borderline hypotension, Bactrim .  Patient was on Keytruda until February develop cardiac toxicity, currently leukocyturia although recent infection we do not rule out AIN.  AIN can be present up to 1 year after last Keytruda dose.  There is no evidence of peripheral eosinophilia.  Urine eosinophils is pending  Baseline creatinine 1.6 to 1.8 mg/dL  Renal function is improving creatinine down to 2.0 mg/dL and essentially close to baseline  UA: Leukocyturia with occasional bacteria, microhematuria  Urine protein creatinine ratio 0.7 g/g but patient has an active urinary infection  Renal imaging : Order  Treatment:  Urine analysis showed large leukocytes with 20-30 WBCs with urine culture growing out Klebsiella  Maintain MAP:  Over 65 mmHg if possible/avoid hypoperfusion:  Hold parameters on blood pressure medications  Avoid nephrotoxic agents such as NSAIDs, and IV contrast if possible. Avoid opioids   Continue to hold Bactrim  No indication for any further IV fluids is tolerating oral intake well  No indication for steroids  Daily BMP

## 2025-06-30 NOTE — ASSESSMENT & PLAN NOTE
May be related to antibiotics and drug reaction, further evaluation as above  No associated orders from this encounter found during lookback period of 72 hours.

## 2025-06-30 NOTE — ASSESSMENT & PLAN NOTE
Hx DVT and bilateral PE in 9/2024, s/p IVC filter  - Was previously held in case port site needed to be removed, but has since been recontinued  - on Eliquis 5mg BID

## 2025-06-30 NOTE — ASSESSMENT & PLAN NOTE
"Lab Results   Component Value Date    HGBA1C 6.6 (H) 05/01/2025       No results for input(s): \"POCGLU\" in the last 72 hours.    Blood Sugar Average: Last 72 hrs:        No associated orders from this encounter found during lookback period of 72 hours.    "

## 2025-06-30 NOTE — ASSESSMENT & PLAN NOTE
Lab Results   Component Value Date    EGFR 23 06/30/2025    EGFR 17 06/29/2025    EGFR 20 06/28/2025    CREATININE 2.09 (H) 06/30/2025    CREATININE 2.60 (H) 06/29/2025    CREATININE 2.31 (H) 06/28/2025     Baseline creatinine: 1.6 to 1.8 mg/dL  Etiology: Likely secondary to nephrosclerosis, nephron loss  Outpatient nephrologist Dr. Macias

## 2025-06-30 NOTE — TELEPHONE ENCOUNTER
Patient's  called and cancelled appt for 7/3 due to pt. Being in hospital. He reported that she will call back at later time to r/s.

## 2025-06-30 NOTE — ASSESSMENT & PLAN NOTE
Wt Readings from Last 3 Encounters:   06/28/25 102 kg (224 lb)   06/13/25 102 kg (225 lb)   05/21/25 101 kg (223 lb 9.6 oz)   Follows with Cardiology outpatient  Torsemide 20mg qd held currently in the setting of OWEN

## 2025-06-30 NOTE — ASSESSMENT & PLAN NOTE
Lab Results   Component Value Date    EGFR 23 06/30/2025    EGFR 17 06/29/2025    EGFR 20 06/28/2025    CREATININE 2.09 (H) 06/30/2025    CREATININE 2.60 (H) 06/29/2025    CREATININE 2.31 (H) 06/28/2025       Orders from past 72 hours:    Inpatient consult to Nephrology; Standing

## 2025-06-30 NOTE — ASSESSMENT & PLAN NOTE
Per Gyn Onc Service   No associated orders from this encounter found during lookback period of 72 hours.

## 2025-06-30 NOTE — ASSESSMENT & PLAN NOTE
IVC filter removed  Remains on eliquis   No associated orders from this encounter found during lookback period of 72 hours.

## 2025-06-30 NOTE — ASSESSMENT & PLAN NOTE
Wt Readings from Last 3 Encounters:   06/28/25 102 kg (224 lb)   06/13/25 102 kg (225 lb)   05/21/25 101 kg (223 lb 9.6 oz)   Examines euvolemic  Off IV fluids can monitor off IV fluids at this time

## 2025-06-30 NOTE — ASSESSMENT & PLAN NOTE
Suspect infection at R IJ port site  - IR consulted for possible port removal; recs appreciated  - recommend Keflex x1wk, maintain port for now and continue to observe

## 2025-06-30 NOTE — CASE MANAGEMENT
Case Management Assessment & Discharge Planning Note    Patient name Cherelle Dash  Location W /W -01 MRN [id number]  : 1955 Date 2025       Current Admission Date: 2025  Current Admission Diagnosis:Dyspnea   Patient Active Problem List    Diagnosis Date Noted    Abnormal CT of the chest 2025    Other eosinophilia 2025    Anemia 2025    Dyspnea 2025    Cellulitis 2025    UTI (urinary tract infection) 2025    Elevated serum creatinine 2025    Chronic kidney disease-mineral and bone disorder 2025    Anemia due to stage 4 chronic kidney disease  (HCC) 2025    Ambulatory dysfunction 2025    Coronary artery disease involving native coronary artery of native heart with unstable angina pectoris (HCC) 2025    Chronic kidney disease, stage 4 (severe) (HCC) 2025    Heart failure with mildly reduced ejection fraction (HCC) 2025    Low magnesium level 2025    History of myocarditis 2025    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    Drug-induced pneumonitis 01/10/2025    Arthralgia 2025    Drug-induced neutropenia (HCC) 2024    Palliative care by specialist 2024    Anxiety about health 2024    Goals of care, counseling/discussion 2024    Encounter for central line care 2024    Hospital discharge follow-up 2024    Cardiomyopathy (HCC) 2024    Other pulmonary embolism without acute cor pulmonale (HCC) 2024    Endometrial cancer (HCC) 2024    Hyperphosphatemia 2024    Premature ventricular contractions 2023    LBBB (left bundle branch block) 2023    Other sleep disorders 2023    Lichen sclerosus 2022    Mass of right hand 2021    Secondary hyperparathyroidism of renal origin (HCC) 2021    Persistent proteinuria 2021    Primary osteoarthritis of left knee  02/23/2021    Primary osteoarthritis of right knee 02/23/2021    Vitamin D deficiency 12/21/2020    OWEN (acute kidney injury) ([facility]) 09/30/2020    Stage 4 chronic kidney disease ([facility]) 08/25/2020    Raynaud's disease without gangrene 03/10/2020    Morbid obesity with BMI of 40.0-44.9, adult ([facility]) 12/20/2018    Hyperlipidemia 06/07/2018    Onychomycosis of toenail 01/09/2017    Insomnia 07/21/2015    Stress incontinence of urine 07/21/2015    Patellofemoral arthritis of right knee 08/11/2014    Depression with anxiety 04/30/2014    Hypothyroidism 06/04/2013    Type 2 diabetes mellitus with chronic kidney disease, without long-term current use of insulin ([facility]) 06/04/2013    Bipolar I disorder ([facility]) 05/01/2013    GERD (gastroesophageal reflux disease) 05/01/2013    Obstructive sleep apnea 05/01/2013    Solar degeneration 06/13/2012      LOS (days): 2  Geometric Mean LOS (GMLOS) (days): 4.9  Days to GMLOS:2.7     OBJECTIVE:    Risk of Unplanned Readmission Score: 42.53         Current admission status: Inpatient       Preferred Pharmacy:   [facility]/pharmacy #[id number] - [place], [place] - [address]  [address] [address]  Phone: [phone] Fax: [phone]    [facility] [address]  [address]  [address] [address]  Phone: [phone] Fax: [phone]    Primary Care Provider: Gretcehn Mayorga DO    Primary Insurance: ANGEL VILLALOBOS  Secondary Insurance:     ASSESSMENT:  Active Health Care Proxies       Jack aDsh Health Care Representative - Spouse   Primary Phone: [phone] (Mobile)  Home Phone: [phone]                                Patient Information  Admitted from:: Facility ([facility])  Mental Status: Alert  During Assessment patient was accompanied by: Not accompanied during assessment  Assessment information provided by:: Patient  Primary Caregiver: Self  Support Systems: Family members  County of Residence:  [place]  What city do you live in?: [place]  Home entry access options. Select all that apply.: No steps to enter home  Type of Current Residence: Facility  Upon entering residence, is there a bedroom on the main floor (no further steps)?: Yes  Upon entering residence, is there a bathroom on the main floor (no further steps)?: Yes  Living Arrangements: Lives in Facility    Activities of Daily Living Prior to Admission  Functional Status: Independent  Completes ADLs independently?: Yes  Ambulates independently?: Yes  Does patient use assisted devices?: No  Does patient currently own DME?: Yes  What DME does the patient currently own?: [employer]  Does patient have a history of Outpatient Therapy (PT/OT)?: No  Does the patient have a history of Short-Term Rehab?: No  Does patient have a history of HHC?: No  Does patient currently have HHC?: No         Patient Information Continued  Income Source: Pension/longterm  Does patient have prescription coverage?: Yes  Can the patient afford their medications and any related supplies (such as glucometers or test strips)?: Yes  Does patient receive dialysis treatments?: No  Does patient have a history of substance abuse?: No  Does patient have a history of Mental Health Diagnosis?: No         Means of Transportation  Means of Transport to Appts:: Family transport          DISCHARGE DETAILS:    Discharge planning discussed with:: Patient  Freedom of Choice: Yes  Comments - Freedom of Choice: Cm met with patient to introduce self and role. Patient from [facility]. Patient independent at baseline.  Patient does not anticipate any needs upon dc.

## 2025-06-30 NOTE — ASSESSMENT & PLAN NOTE
Noted scattered mixed consolidations with some ground glass infiltrates - the differential is fairly broad and includes typical and atypical pneumonia, viral pneumonitis, concern for drug induced pneumonitis and organizing pneumonia with Keytruda use, radiation pneumonitis, vasculitis and fungal infections are also possible   I reviewed the CT images with the patient and her   Given her current stability - will plan for infectious and serologic evaluation, may consider empiric OCS course, may consider bronchoscopy  Will check RP2 panel, procalcitonin, CRP, MRSA swab, IgE level, Asp IgE, B-D glucan, Asp galactomannan, ANCA panels  Would consider expanding antibiotics to cefepime/vanc or pip/tazo and vanc pending above analysis  No associated orders from this encounter found during lookback period of 72 hours.

## 2025-06-30 NOTE — CONSULTS
Consult Note - Pulmonary   Cherelle Dash 70 y.o. female MRN: [id number]  Unit/Bed#: W -01 Encounter: [id number]      Reason for consultation: dyspnea evaluation    Requesting provider: Dr. Xavier    Assessment & Recommendations:   Name: Cherelle Dash      : 1955      MRN: [id number]  Encounter Provider: No name on file  Encounter Date: 2025   Encounter department: [facility] SURGICAL  :  Assessment & Plan  Dyspnea  Likely multifactorial given her protracted course with noted frequent hospitalizations, recent steroid use, CAD post PCI and fatigue with ongoing cancer therapy  Evidence of pulmonary inflammation as listed below - will plan further evaluation as listed below  Orders from past 72 hours:    Inpatient consult to Cardiology; Standing    Inpatient consult to Pulmonology; Standing    Abnormal CT of the chest  Noted scattered mixed consolidations with some ground glass infiltrates - the differential is fairly broad and includes typical and atypical pneumonia, viral pneumonitis, concern for drug induced pneumonitis and organizing pneumonia with Keytruda use, radiation pneumonitis, vasculitis and fungal infections are also possible   I reviewed the CT images with the patient and her   Given her current stability - will plan for infectious and serologic evaluation, may consider empiric OCS course, may consider bronchoscopy  Will check RP2 panel, procalcitonin, CRP, MRSA swab, IgE level, Asp IgE, B-D glucan, Asp galactomannan, ANCA panels  Would consider expanding antibiotics to cefepime/vanc or pip/tazo and vanc pending above analysis  No associated orders from this encounter found during lookback period of 72 hours.    Drug-induced pneumonitis  Remains on differential, further differential and evaluation as above  Off Keytruda since 2025  No associated orders from this encounter found during lookback period of 72 hours.    Endometrial cancer (HCC)  Per  Gyn Onc Service   No associated orders from this encounter found during lookback period of 72 hours.    Other pulmonary embolism without acute cor pulmonale (HCC)  IVC filter removed  Remains on eliquis   No associated orders from this encounter found during lookback period of 72 hours.    Cardiomyopathy (HCC)  Appears euvolemic  Weaned off colchicine and prednisone  No associated orders from this encounter found during lookback period of 72 hours.    Cellulitis    Orders from past 72 hours:    Inpatient Consult to IR; Standing    UTI (urinary tract infection)    No associated orders from this encounter found during lookback period of 72 hours.    Other eosinophilia  May be related to antibiotics and drug reaction, further evaluation as above  No associated orders from this encounter found during lookback period of 72 hours.        I discussed above with Dr. Xavier Gyn/Onc service on phone at time of consultation    History of Present Illness   HPI:  Cherelle Dahs is a 70 y.o. 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  She as been diagnosed with UTI and port infection.  She was placed on Bactrim but stopped with OWEN and rash felt secondary to drug reaction.      She notes she has had increased dyspnea with exertion for the past 1-2 weeks. She feels this preceded her brachytherapy. She had noted dyspnea with performing her ADLs - dressing, bathing, stairs, but could walk between rooms and denied dyspnea at rest. She notes slight cough but denied any sputum production, no pleurisy, no hemoptysis.  She did note fever while admitted first night, none  since, denied rigors. She has noted rash on arms, denied hematuria, denied hemoptysis. She denied history of asthma or COPD. She denied recent respiratory infections.      She has been off Keytruda since Feb 2025, she has since completed prednisone course and colchicine.  She denied chest pain    Review of systems:  12 point review of systems was completed and was otherwise negative except as listed in HPI.      Historical Information   Past Medical History[1]  Past Surgical History[2]  Family History[3]    Occupational History: previously worked for [employer], no chemical or toxin exposures    Social History: former smoker, quit 2007, was 1/4ppd x 30 years, no exotic animal exposures    Meds/Allergies     Current Facility-Administered Medications:     acetaminophen (TYLENOL) tablet 650 mg, Q4H PRN    apixaban (ELIQUIS) tablet 5 mg, BID    atorvastatin (LIPITOR) tablet 80 mg, Daily With Dinner    ceFAZolin (ANCEF) IVPB (premix in dextrose) 2,000 mg 50 mL, Q12H, Last Rate: Stopped (06/30/25 0101)    clopidogrel (PLAVIX) tablet 75 mg, Daily    diphenhydrAMINE (BENADRYL) tablet 25 mg, Q6H PRN    famotidine (PEPCID) tablet 20 mg, Daily    hydrocortisone 1 % cream, 4x Daily PRN    LORazepam (ATIVAN) tablet 1 mg, HS PRN    lurasidone (LATUDA) tablet 20 mg, Daily With Breakfast    magnesium Oxide (MAG-OX) tablet 400 mg, BID    metoprolol succinate (TOPROL-XL) 24 hr tablet 50 mg, Q12H    [Held by provider] torsemide (DEMADEX) tablet 20 mg, Daily    triamcinolone (KENALOG) 0.1 % cream, BID    Medications Prior to Admission:     apixaban (Eliquis) 5 mg    atorvastatin (LIPITOR) 80 mg tablet    Cholecalciferol (VITAMIN D) 2000 units CAPS    clopidogrel (PLAVIX) 75 mg tablet    famotidine (PEPCID) 20 mg tablet    LORazepam (ATIVAN) 1 mg tablet    lurasidone (LATUDA) 20 mg tablet    metoprolol succinate (TOPROL-XL) 50 mg 24 hr tablet    sulfamethoxazole-trimethoprim (BACTRIM DS) 800-160 mg per tablet    torsemide  (DEMADEX) 20 mg tablet    aspirin 81 mg chewable tablet    chlorhexidine (PERIDEX) 0.12 % solution    clobetasol (TEMOVATE) 0.05 % ointment    colchicine (COLCRYS) 0.6 mg tablet    CRANBERRY PO    Glucosamine-Chondroit-Vit C-Mn (GLUCOSAMINE 1500 COMPLEX PO)    Ivermectin 1 % CREA    lidocaine-prilocaine (EMLA) cream    magnesium Oxide (MAG-OX) 400 mg TABS    Mirabegron ER (Myrbetriq) 50 MG TB24    Multiple Vitamin (MULTI-VITAMIN DAILY) TABS    polyethylene glycol (GLYCOLAX) 17 GM/SCOOP powder    Sodium Fluoride 5000 PPM 1.1 % GEL    Tirzepatide (Mounjaro) 2.5 MG/0.5ML SOAJ  Allergies[4]    Vitals: Blood pressure 107/62, pulse 84, temperature 98.9 °F (37.2 °C), resp. rate 16, height 5' (1.524 m), weight 102 kg (224 lb), SpO2 94%, not currently breastfeeding., RA, Body mass index is 43.75 kg/m².      Intake/Output Summary (Last 24 hours) at 6/30/2025 1052  Last data filed at 6/30/2025 0847  Gross per 24 hour   Intake 820 ml   Output 1600 ml   Net -780 ml       Physical Exam  General: older woman in bed, awake alert and oriented x 3, conversant without conversational dyspnea, NAD, normal affect  HEENT:  PERRL, Sclera noninjected, nonicteric OU, Nares patent, no nasal flaring, no nasal drainage, Mucous membranes, moist, no oral lesions  NECK: Trachea midline, no accessory muscle use, no stridor, no cervical or supraclavicular adenopathy, JVP not elevated  CARDIAC: Reg, single s1/S2, no m/r/g  PULM: slightly diminished BS at bases, no wheeze or rales, no pleural rubs appreciated  CHEST: No gross deformities, equal chest expansion on inspiration bilaterally  ABD: Normoactive bowel sounds, soft nontender, nondistended, no rebound, no rigidity, no guarding  EXT: No cyanosis, no clubbing, no edema, normal capillary refill  SKIN:  slight raised rash on arms  NEURO: no focal neurologic deficits, AAOx3, moving all extremities appropriately    Labs: I have personally reviewed pertinent lab results.  Laboratory and  Diagnostics  Results from last 7 days   Lab Units 06/30/25  0502 06/29/25  0431 06/28/25  1332   WBC Thousand/uL 3.46* 3.67* 4.87   HEMOGLOBIN g/dL 8.4* 8.6* 9.6*   HEMATOCRIT % 26.6* 26.5* 30.6*   PLATELETS Thousands/uL 98* 96* 105*   SEGS PCT % 64 67 75   MONO PCT % 12 11 9   EOS PCT % 16* 13* 11*     Results from last 7 days   Lab Units 06/30/25  0502 06/29/25  0431 06/28/25  1332   SODIUM mmol/L 138 139 138   POTASSIUM mmol/L 3.8 3.7 3.4*   CHLORIDE mmol/L 108 108 103   CO2 mmol/L 22 22 24   ANION GAP mmol/L 8 9 11   BUN mg/dL 32* 32* 29*   CREATININE mg/dL 2.09* 2.60* 2.31*   CALCIUM mg/dL 8.7 8.6 8.8   GLUCOSE RANDOM mg/dL 112 105 128   ALT U/L  --   --  14   AST U/L  --   --  14   ALK PHOS U/L  --   --  89   ALBUMIN g/dL  --   --  3.6   TOTAL BILIRUBIN mg/dL  --   --  0.49     Results from last 7 days   Lab Units 06/30/25  0502 06/29/25  0431 06/28/25  1332   MAGNESIUM mg/dL 2.5 2.7 1.8*   PHOSPHORUS mg/dL 3.5 4.0  --                Results from last 7 days   Lab Units 06/28/25  1329   LACTIC ACID mmol/L 1.5       MICRO  Urine Cx >100K Klebsiella   Bld Cx 6/28 NGTD      Imaging and other studies: new images and reports personally reviewed  CT Chest 6/30/2025 scattered infiltrates noted with consolidations and some Atol sign and some ground glass infiltrates - greatest degree on the left, no effusions, no PTX - noted not seen on CT abd/pelvis May 2025 or CT chest 3/2025    CXR 6/28 - no focal infiltrates appreciated no effusions, no PTX      Pulmonary function testing: none available     EKG, Pathology, and Other Studies:   TTE 6/2025 - EF 50%, normal RV size/function    LHC 5/2025 - RCA 40%, PCI OM2 99%, mildly elevated LVEDP    Code Status: Level 1 - Full Code      Asif Harrell DO, FACP  [facility]         [1]   Past Medical History:  Diagnosis Date    Abnormal ECG     Anxiety 2002    Arthritis     Bipolar 1 disorder (HCC)     Cervical cancer (HCC)     Chronic  kidney disease     stage 3    CPAP (continuous positive airway pressure) dependence     Depression 2001    Disease of thyroid gland 2001    Thyroid treated with Lithium medication for bi-polar disease    DVT (deep venous thrombosis) (HCC)     BLLE    Elevated blood pressure reading 06/10/2019    GERD (gastroesophageal reflux disease)     Obesity     Pulmonary emboli (HCC)     B/L    RSV (acute bronchiolitis due to respiratory syncytial virus) 12/05/2023    Sleep apnea     Sleep apnea, obstructive 2007    Urinary incontinence 2019    Uterine cancer (HCC)    [2]   Past Surgical History:  Procedure Laterality Date    CARDIAC CATHETERIZATION N/A 5/2/2025    Procedure: Cardiac Catheterization;  Surgeon: Jose Osborne MD;  [facility];  Service: Cardiology    COLONOSCOPY  2011    COLONOSCOPY  03/08/2023    DENTAL SURGERY  2020    FOOT SURGERY  1976    [place] Wort    HYSTERECTOMY  10/11/24    IR IVC FILTER PLACEMENT OPTIONAL/TEMPORARY  10/08/2024    IR IVC FILTER REMOVAL  6/13/2025    IR PORT CHECK  12/26/2024    IR PORT PLACEMENT  12/03/2024    LAPAROTOMY N/A 10/11/2024    Procedure: EXPLORATORY LAPAROTOMY;  Surgeon: Rodney Downing MD;  Location: [facility];  Service: Gynecology Oncology    MD HYSTEROSCOPY BX ENDOMETRIUM&/POLYPC W/WO D&C N/A 08/23/2024    Procedure: EXAM UNDER ANESTHESIA, DILATATION AND CURETTAGE, CERVICAL BIOPSIES;  Surgeon: Evin Page MD;  Location: [facility];  Service: Gynecology Oncology    MD LAPS TOTAL HYSTERECT 250 GM/< W/RMVL TUBE/OVARY N/A 10/11/2024    Procedure: ROBOTIC ASSISTED LTH, BSO, LYMPH NODE DISSECTION, EUA;  Surgeon: Rodney Downing MD;  Location: [facility];  Service: Gynecology Oncology   [3]   Family History  Problem Relation Name Age of Onset    Heart disease Mother Zakia Lazaro Fausto     Heart Valve Disease Mother Zakia Lambert         Replacement    Diabetes Mother Zakia Lazaro Fausto         2002    Heart failure Mother  Zakia Lazaro Fausto         Heart Valve replacement    Arthritis Father Dad     No Known Problems Sister      No Known Problems Daughter      No Known Problems Maternal Grandmother      No Known Problems Maternal Grandfather      No Known Problems Paternal Grandmother      No Known Problems Paternal Grandfather      No Known Problems Son      No Known Problems Maternal Aunt      No Known Problems Maternal Aunt      No Known Problems Maternal Aunt      No Known Problems Maternal Aunt      No Known Problems Maternal Aunt      No Known Problems Paternal Aunt      Alcohol abuse Son [place], 40 year old son, has issues with alcohol and alcohol abuse   [4]   Allergies  Allergen Reactions    Raw Fruit - Food Allergy Anaphylaxis     PLUMS, PEACHES, APPLES, CHERRIES FRUIT WITH SKIN

## 2025-06-30 NOTE — ASSESSMENT & PLAN NOTE
Lab Results   Component Value Date    EGFR 23 06/30/2025    EGFR 17 06/29/2025    EGFR 20 06/28/2025    CREATININE 2.09 (H) 06/30/2025    CREATININE 2.60 (H) 06/29/2025    CREATININE 2.31 (H) 06/28/2025     Follows with Nephrology outpatient  Nephrology consulted, appreciate recommendations  Creatinine currently on the downtrend, today at 2.09 from 2.6 yesterday  Baseline Cr of 1.6-1.8  UA: notable for large leukocytes, 20-30 WBC's, and occasional bacteria  Renal/kidney U/S: No acute abnormality or suspicious mass. No hydronephrosis   UPC 0.7, urine eosinophils pending     Plan:  Per nephrology

## 2025-06-30 NOTE — DISCHARGE SUMMARY
Gynecologic Oncology Discharge Summary   Cherelle Dash MRN: [id number]  Unit/Bed#: W -01 Encounter: [id number]    Admission Date: 6/28/2025     Discharge Date: 7/2/2025    Attending: Rodney Downing MD    Principal Diagnosis:   Cellulitis [L03.90]  Rash [R21]  Allergic reaction [T78.40XA]  Dyspnea [R06.00]  Urticaria [L50.9]  Acute on chronic renal failure  (HCC) [N17.9, N18.9]    Procedures:   ***    Hospital course:   Cherelle Dash is a 70 y.o.F with past medical history significant for bipolar I disorder, stage IV CKD, DVT, bilateral PE, BMI 43, hyperlipidemia, GERD, T2DM, CAD s/p cardiac catheterization on 5/2/2025, and Stage IIIC1 grade 1 endometrial adenocarcinoma s/p RA-TLH, BSO, LND on 10/11/2024, 5 cycles of adjuvant carbo/taxol, 4 cycles of adjuvant pembrolizumab, whole pelvic radiation, now currently receiving vaginal brachytherapy.     She initially presented to the ED on 6/28/25 for a mild allergic reaction to Bactrim, which she had been taking for a UTI. The reaction resolved; however, the ED provider noted dyspnea on exertion and erythema surrounding her R IJ port site. She was subsequently admitted to the Gyn Oncology service for management.     ***    Lab Results:   Lab Results   Component Value Date    WBC 3.46 (L) 06/30/2025    HGB 8.4 (L) 06/30/2025    HCT 26.6 (L) 06/30/2025    MCV 98 06/30/2025    PLT 98 (L) 06/30/2025     Lab Results   Component Value Date    GLUCOSE 124 (H) 11/17/2017    CALCIUM 8.7 06/30/2025     (H) 11/17/2017    K 3.8 06/30/2025    CO2 22 06/30/2025     06/30/2025    BUN 32 (H) 06/30/2025    CREATININE 2.09 (H) 06/30/2025     Lab Results   Component Value Date/Time    POCGLU 144 (H) 06/13/2025 07:42 AM     Lab Results   Component Value Date    PTT 83 (H) 05/02/2025     Lab Results   Component Value Date    INR 1.46 (H) 06/13/2025    INR 1.23 (H) 05/01/2025    INR 1.05 10/12/2024    PROTIME 18.5 (H) 06/13/2025    PROTIME 16.2 (H) 05/01/2025    PROTIME  13.9 10/12/2024     Complications: none apparent    Condition at discharge: stable     Discharge instructions/Information to patient and family:   See After Visit Summary for information provided to patient and family.      Provisions for Follow-Up Care:  See After Visit Summary for information related to follow-up care and any pertinent home health orders.      Disposition:   See After Visit Summary for discharge disposition information.    Discharge Medications:  For a complete list of the patient's medications, please refer to her medication reconciliation list.    Planned Readmission: No    Vikash Xavier MD  Obstetrics & Gynecology PGY-2  7/2/2025  1:48 PM

## 2025-06-30 NOTE — SEPSIS NOTE
Sepsis Note   Cherelle Dash 70 y.o. female MRN: [id number]  Unit/Bed#: W -01 Encounter: [id number]               Body mass index is 43.75 kg/m².  Wt Readings from Last 1 Encounters:   06/28/25 102 kg (224 lb)        Ideal body weight: 45.5 kg (100 lb 4.9 oz)  Adjusted ideal body weight: 67.9 kg (149 lb 12.6 oz)    Patient met SIRS criteria for tachycardia and leukopenia. She does not clinically appear to be in sepsis. Leukopenia potentially secondary to dilution and will be trending CBC daily, and pulse now in the 80's. Low suspicion for sepsis. Patient to be treated with antibiotics for recently diagnosed pneumonia. Also blood cultures have shown no growth for 24 hours x2.    Vikash Xavier MD  Obstetrics & Gynecology PGY-2  6/30/2025  3:42 PM

## 2025-06-30 NOTE — ASSESSMENT & PLAN NOTE
New-onset. Shortly after taking bactrim for UTI, also presenting with a rash. Patient without additional cardiopulmonary symptoms. Prior history of immunotherapy-induced cardiomyopathy. EKG with known LBBB (chronic), and new PVCs.  - Cardiology consulted, recommendations appreciated; dyspnea deemed not secondary to cardiac etiology  - Echo unchanged from prior exam  - BNP lower from previous value 123 (6/28/25)   - No growth for 24hours in blood cultures x2

## 2025-06-30 NOTE — PLAN OF CARE
Problem: PAIN - ADULT  Goal: Verbalizes/displays adequate comfort level or baseline comfort level  Description: Interventions:  - Encourage patient to monitor pain and request assistance  - Assess pain using appropriate pain scale  - Administer analgesics as ordered based on type and severity of pain and evaluate response  - Implement non-pharmacological measures as appropriate and evaluate response  - Consider cultural and social influences on pain and pain management  - Notify physician/advanced practitioner if interventions unsuccessful or patient reports new pain  - Educate patient/family on pain management process including their role and importance of  reporting pain   - Provide non-pharmacologic/complimentary pain relief interventions  Outcome: Progressing     Problem: INFECTION - ADULT  Goal: Absence or prevention of progression during hospitalization  Description: INTERVENTIONS:  - Assess and monitor for signs and symptoms of infection  - Monitor lab/diagnostic results  - Monitor all insertion sites, i.e. indwelling lines, tubes, and drains  - Monitor endotracheal if appropriate and nasal secretions for changes in amount and color  - Spencer appropriate cooling/warming therapies per order  - Administer medications as ordered  - Instruct and encourage patient and family to use good hand hygiene technique  - Identify and instruct in appropriate isolation precautions for identified infection/condition  Outcome: Progressing  Goal: Absence of fever/infection during neutropenic period  Description: INTERVENTIONS:  - Monitor WBC  - Perform strict hand hygiene  - Limit to healthy visitors only  - No plants, dried, fresh or silk flowers with martínez in patient room  Outcome: Progressing     Problem: SAFETY ADULT  Goal: Patient will remain free of falls  Description: INTERVENTIONS:  - Educate patient/family on patient safety including physical limitations  - Instruct patient to call for assistance with activity   -  Consider consulting OT/PT to assist with strengthening/mobility based on AM PAC & JH-HLM score  - Consult OT/PT to assist with strengthening/mobility   - Keep Call bell within reach  - Keep bed low and locked with side rails adjusted as appropriate  - Keep care items and personal belongings within reach  - Initiate and maintain comfort rounds  - Make Fall Risk Sign visible to staff  - Offer Toileting every  Hours, in advance of need  - Initiate/Maintain alarm  - Obtain necessary fall risk management equipment:   - Apply yellow socks and bracelet for high fall risk patients  - Consider moving patient to room near nurses station  Outcome: Progressing  Goal: Maintain or return to baseline ADL function  Description: INTERVENTIONS:  -  Assess patient's ability to carry out ADLs; assess patient's baseline for ADL function and identify physical deficits which impact ability to perform ADLs (bathing, care of mouth/teeth, toileting, grooming, dressing, etc.)  - Assess/evaluate cause of self-care deficits   - Assess range of motion  - Assess patient's mobility; develop plan if impaired  - Assess patient's need for assistive devices and provide as appropriate  - Encourage maximum independence but intervene and supervise when necessary  - Involve family in performance of ADLs  - Assess for home care needs following discharge   - Consider OT consult to assist with ADL evaluation and planning for discharge  - Provide patient education as appropriate  - Monitor functional capacity and physical performance, use of AM PAC & JH-HLM   - Monitor gait, balance and fatigue with ambulation    Outcome: Progressing  Goal: Maintains/Returns to pre admission functional level  Description: INTERVENTIONS:  - Perform AM-PAC 6 Click Basic Mobility/ Daily Activity assessment daily.  - Set and communicate daily mobility goal to care team and patient/family/caregiver.   - Collaborate with rehabilitation services on mobility goals if consulted  -  Perform Range of Motion  times a day.  - Reposition patient every  hours.  - Dangle patient  times a day  - Stand patient  times a day  - Ambulate patient  times a day  - Out of bed to chair  times a day   - Out of bed for meals  times a day  - Out of bed for toileting  - Record patient progress and toleration of activity level   Outcome: Progressing     Problem: DISCHARGE PLANNING  Goal: Discharge to home or other facility with appropriate resources  Description: INTERVENTIONS:  - Identify barriers to discharge w/patient and caregiver  - Arrange for needed discharge resources and transportation as appropriate  - Identify discharge learning needs (meds, wound care, etc.)  - Arrange for interpretive services to assist at discharge as needed  - Refer to Case Management Department for coordinating discharge planning if the patient needs post-hospital services based on physician/advanced practitioner order or complex needs related to functional status, cognitive ability, or social support system  Outcome: Progressing     Problem: Knowledge Deficit  Goal: Patient/family/caregiver demonstrates understanding of disease process, treatment plan, medications, and discharge instructions  Description: Complete learning assessment and assess knowledge base.  Interventions:  - Provide teaching at level of understanding  - Provide teaching via preferred learning methods  Outcome: Progressing     Problem: RESPIRATORY - ADULT  Goal: Achieves optimal ventilation and oxygenation  Description: INTERVENTIONS:  - Assess for changes in respiratory status  - Assess for changes in mentation and behavior  - Position to facilitate oxygenation and minimize respiratory effort  - Oxygen administered by appropriate delivery if ordered  - Initiate smoking cessation education as indicated  - Encourage broncho-pulmonary hygiene including cough, deep breathe, Incentive Spirometry  - Assess the need for suctioning and aspirate as needed  - Assess and  instruct to report SOB or any respiratory difficulty  - Respiratory Therapy support as indicated  Outcome: Progressing

## 2025-06-30 NOTE — ASSESSMENT & PLAN NOTE
Appears euvolemic  Weaned off colchicine and prednisone  No associated orders from this encounter found during lookback period of 72 hours.

## 2025-06-30 NOTE — ASSESSMENT & PLAN NOTE
Wt Readings from Last 3 Encounters:   06/28/25 102 kg (224 lb)   06/13/25 102 kg (225 lb)   05/21/25 101 kg (223 lb 9.6 oz)               No associated orders from this encounter found during lookback period of 72 hours.

## 2025-06-30 NOTE — PLAN OF CARE
Problem: PAIN - ADULT  Goal: Verbalizes/displays adequate comfort level or baseline comfort level  Description: Interventions:  - Encourage patient to monitor pain and request assistance  - Assess pain using appropriate pain scale  - Administer analgesics as ordered based on type and severity of pain and evaluate response  - Implement non-pharmacological measures as appropriate and evaluate response  - Consider cultural and social influences on pain and pain management  - Notify physician/advanced practitioner if interventions unsuccessful or patient reports new pain  - Educate patient/family on pain management process including their role and importance of  reporting pain   - Provide non-pharmacologic/complimentary pain relief interventions  Outcome: Progressing     Problem: INFECTION - ADULT  Goal: Absence or prevention of progression during hospitalization  Description: INTERVENTIONS:  - Assess and monitor for signs and symptoms of infection  - Monitor lab/diagnostic results  - Monitor all insertion sites, i.e. indwelling lines, tubes, and drains  - Monitor endotracheal if appropriate and nasal secretions for changes in amount and color  - [place] appropriate cooling/warming therapies per order  - Administer medications as ordered  - Instruct and encourage patient and family to use good hand hygiene technique  - Identify and instruct in appropriate isolation precautions for identified infection/condition  Outcome: Progressing  Goal: Absence of fever/infection during neutropenic period  Description: INTERVENTIONS:  - Monitor WBC  - Perform strict hand hygiene  - Limit to healthy visitors only  - No plants, dried, fresh or silk flowers with martínez in patient room  Outcome: Progressing     Problem: SAFETY ADULT  Goal: Patient will remain free of falls  Description: INTERVENTIONS:  - Educate patient/family on patient safety including physical limitations  - Instruct patient to call for assistance with activity   -  Consider consulting OT/PT to assist with strengthening/mobility based on AM PAC & JH-HLM score  - Consult OT/PT to assist with strengthening/mobility   - Keep Call bell within reach  - Keep bed low and locked with side rails adjusted as appropriate  - Keep care items and personal belongings within reach  - Initiate and maintain comfort rounds  - Make Fall Risk Sign visible to staff  - Offer Toileting every  Hours, in advance of need  - Initiate/Maintain alarm  - Obtain necessary fall risk management equipment:   - Apply yellow socks and bracelet for high fall risk patients  - Consider moving patient to room near nurses station  Outcome: Progressing  Goal: Maintain or return to baseline ADL function  Description: INTERVENTIONS:  -  Assess patient's ability to carry out ADLs; assess patient's baseline for ADL function and identify physical deficits which impact ability to perform ADLs (bathing, care of mouth/teeth, toileting, grooming, dressing, etc.)  - Assess/evaluate cause of self-care deficits   - Assess range of motion  - Assess patient's mobility; develop plan if impaired  - Assess patient's need for assistive devices and provide as appropriate  - Encourage maximum independence but intervene and supervise when necessary  - Involve family in performance of ADLs  - Assess for home care needs following discharge   - Consider OT consult to assist with ADL evaluation and planning for discharge  - Provide patient education as appropriate  - Monitor functional capacity and physical performance, use of AM PAC & JH-HLM   - Monitor gait, balance and fatigue with ambulation    Outcome: Progressing  Goal: Maintains/Returns to pre admission functional level  Description: INTERVENTIONS:  - Perform AM-PAC 6 Click Basic Mobility/ Daily Activity assessment daily.  - Set and communicate daily mobility goal to care team and patient/family/caregiver.   - Collaborate with rehabilitation services on mobility goals if consulted  -  Perform Range of Motion  times a day.  - Reposition patient every  hours.  - Dangle patient  times a day  - Stand patient  times a day  - Ambulate patient  times a day  - Out of bed to chair  times a day   - Out of bed for meals  times a day  - Out of bed for toileting  - Record patient progress and toleration of activity level   Outcome: Progressing     Problem: DISCHARGE PLANNING  Goal: Discharge to home or other facility with appropriate resources  Description: INTERVENTIONS:  - Identify barriers to discharge w/patient and caregiver  - Arrange for needed discharge resources and transportation as appropriate  - Identify discharge learning needs (meds, wound care, etc.)  - Arrange for interpretive services to assist at discharge as needed  - Refer to Case Management Department for coordinating discharge planning if the patient needs post-hospital services based on physician/advanced practitioner order or complex needs related to functional status, cognitive ability, or social support system  Outcome: Progressing     Problem: Knowledge Deficit  Goal: Patient/family/caregiver demonstrates understanding of disease process, treatment plan, medications, and discharge instructions  Description: Complete learning assessment and assess knowledge base.  Interventions:  - Provide teaching at level of understanding  - Provide teaching via preferred learning methods  Outcome: Progressing     Problem: RESPIRATORY - ADULT  Goal: Achieves optimal ventilation and oxygenation  Description: INTERVENTIONS:  - Assess for changes in respiratory status  - Assess for changes in mentation and behavior  - Position to facilitate oxygenation and minimize respiratory effort  - Oxygen administered by appropriate delivery if ordered  - Initiate smoking cessation education as indicated  - Encourage broncho-pulmonary hygiene including cough, deep breathe, Incentive Spirometry  - Assess the need for suctioning and aspirate as needed  - Assess and  instruct to report SOB or any respiratory difficulty  - Respiratory Therapy support as indicated  Outcome: Progressing

## 2025-06-30 NOTE — PROGRESS NOTES
"Progress Note - GYN Oncology   Name: Cherelle Dash 70 y.o. female I MRN: [id number]  Unit/Bed#: W -01 I Date of Admission: 6/28/2025   Date of Service: 6/30/2025 I Hospital Day: 2     Assessment & Plan  Dyspnea  New-onset. Shortly after taking bactrim for UTI, also presenting with a rash. Patient without additional cardiopulmonary symptoms. Prior history of immunotherapy-induced cardiomyopathy. EKG with known LBBB (chronic), and new PVCs.  - Cardiology consulted, recommendations appreciated; dyspnea deemed not secondary to cardiac etiology  - Echo unchanged from prior exam  - BNP lower from previous value 123 (6/28/25)   - No growth for 24hours in blood cultures x2  Cellulitis  Suspect infection at R IJ port site  - IR consulted for possible port removal; recs appreciated  - recommend Keflex x1wk, maintain port for now and continue to observe    Bipolar I disorder (HCC)  - continue home lurasidone  GERD (gastroesophageal reflux disease)  - famotidine 20mg qd  Type 2 diabetes mellitus with chronic kidney disease, without long-term current use of insulin ([facility])  Lab Results   Component Value Date    HGBA1C 6.6 (H) 05/01/2025     No results for input(s): \"POCGLU\" in the last 72 hours.    - monitor blood glucose while inpatient  Hyperlipidemia  - continue home atorvastatin 80mg qd  Stage 4 chronic kidney disease (HCC)  Lab Results   Component Value Date    EGFR 23 06/30/2025    EGFR 17 06/29/2025    EGFR 20 06/28/2025    CREATININE 2.09 (H) 06/30/2025    CREATININE 2.60 (H) 06/29/2025    CREATININE 2.31 (H) 06/28/2025     Follows with Nephrology outpatient  Nephrology consulted, appreciate recommendations  Creatinine currently on the downtrend, today at 2.09 from 2.6 yesterday  Baseline Cr of 1.6-1.8  UA: notable for large leukocytes, 20-30 WBC's, and occasional bacteria  Renal/kidney U/S: No acute abnormality or suspicious mass. No hydronephrosis   UPC 0.7, urine eosinophils pending     Plan:  Per " nephrology  Endometrial cancer (HCC)  Stage IIIC1 grade 1 endometrial adenocarcinoma. S/p robotic-assisted total laparoscopic hysterectomy, bilateral salpingo-oophorectomy, lymph node dissection on 10/11/2024, 5 cycles of adjuvant carboplatin/paclitaxel, 4 cycles of adjuvant pembrolizumab, whole pelvic radiation, now currently receiving vaginal brachytherapy. 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   - f/u with Radiation Oncology  Other pulmonary embolism without acute cor pulmonale (HCC)  Hx DVT and bilateral PE in 9/2024, s/p IVC filter  - Was previously held in case port site needed to be removed, but has since been recontinued  - on Eliquis 5mg BID  Heart failure with mildly reduced ejection fraction (HCC)  Wt Readings from Last 3 Encounters:   06/28/25 102 kg (224 lb)   06/13/25 102 kg (225 lb)   05/21/25 101 kg (223 lb 9.6 oz)   Follows with Cardiology outpatient  Torsemide 20mg qd held currently in the setting of OWEN  Coronary artery disease involving native coronary artery of native heart with unstable angina pectoris ([facility])  S/p cardiac catheterization on 5/2/2025  - continue home clopidogrel 75mg qd   UTI (urinary tract infection)  - d/c Bactrim given mild allergic reaction  - continue Ancef as above  OWEN (acute kidney injury) ([facility])    Anemia  Hgb today at 8.4 from 8.6 yesterday    24 Hour Events : none  Subjective : Patient is doing well. Denies having any fever/chills. Is tolerating PO without N/V. Has not been able to ambulate much, and reports no changes to the exertional shortness of breath, but has no chest pain. Denies pain or burning with urination. Still having itchiness, but it has been improving.     Objective :  Temp:  [97.2 °F (36.2 °C)-98.1 °F (36.7 °C)] 98.1 °F (36.7 °C)  HR:  [82-98] 98  BP: (108-130)/(59-70) 130/70  Resp:  [16] 16  SpO2:  [90 %-94 %] 94 %  O2 Device: None (Room air)    Physical Exam  Vitals and nursing note reviewed.    Constitutional:       General: She is not in acute distress.     Appearance: She is well-developed.   HENT:      Head: Normocephalic and atraumatic.     Eyes:      Conjunctiva/sclera: Conjunctivae normal.       Cardiovascular:      Rate and Rhythm: Normal rate and regular rhythm.      Heart sounds: Normal heart sounds. No murmur heard.  Pulmonary:      Effort: Pulmonary effort is normal. No respiratory distress.      Breath sounds: Normal breath sounds.   Abdominal:      Palpations: Abdomen is soft.      Tenderness: There is no abdominal tenderness. There is no guarding or rebound.     Musculoskeletal:         General: No swelling.      Cervical back: Neck supple.     Skin:     General: Skin is warm and dry.      Capillary Refill: Capillary refill takes less than 2 seconds.      Comments: No erythema or tenderness by port site     Neurological:      Mental Status: She is alert.     Psychiatric:         Mood and Affect: Mood normal.         Lab Results: I have reviewed the following results:CBC/BMP:   .     06/30/25  0502   WBC 3.46*   HGB 8.4*   HCT 26.6*   PLT 98*   SODIUM 138   K 3.8      CO2 22   BUN 32*   CREATININE 2.09*   GLUC 112   MG 2.5   PHOS 3.5        Imaging Results Review: No pertinent imaging studies reviewed.  Other Study Results Review: No additional pertinent studies reviewed.    VTE Pharmacologic Prophylaxis: VTE covered by:  apixaban, Oral, 5 mg at 06/29/25 1717     VTE Mechanical Prophylaxis: sequential compression device    Vikash Xavier MD  Obstetrics & Gynecology PGY-1  6/30/2025  6:49 AM

## 2025-06-30 NOTE — ASSESSMENT & PLAN NOTE
Urine culture from June 22 grew out Klebsiella.  Blood cultures are no growth  Patient also found to have a multifocal pneumonia

## 2025-07-01 ENCOUNTER — APPOINTMENT (OUTPATIENT)
Dept: RADIATION ONCOLOGY | Facility: HOSPITAL | Age: 70
End: 2025-07-01
Attending: RADIOLOGY
Payer: COMMERCIAL

## 2025-07-01 LAB
ANION GAP SERPL CALCULATED.3IONS-SCNC: 7 MMOL/L (ref 4–13)
BASOPHILS # BLD AUTO: 0.02 THOUSANDS/ÂΜL (ref 0–0.1)
BASOPHILS NFR BLD AUTO: 1 % (ref 0–1)
BUN SERPL-MCNC: 32 MG/DL (ref 5–25)
CALCIUM SERPL-MCNC: 8.9 MG/DL (ref 8.4–10.2)
CHLORIDE SERPL-SCNC: 111 MMOL/L (ref 96–108)
CO2 SERPL-SCNC: 22 MMOL/L (ref 21–32)
CREAT SERPL-MCNC: 1.94 MG/DL (ref 0.6–1.3)
EOSINOPHIL # BLD AUTO: 0.61 THOUSAND/ÂΜL (ref 0–0.61)
EOSINOPHIL NFR BLD AUTO: 17 % (ref 0–6)
ERYTHROCYTE [DISTWIDTH] IN BLOOD BY AUTOMATED COUNT: 17.3 % (ref 11.6–15.1)
GFR SERPL CREATININE-BSD FRML MDRD: 25 ML/MIN/1.73SQ M
GLUCOSE SERPL-MCNC: 139 MG/DL (ref 65–140)
HCT VFR BLD AUTO: 27.9 % (ref 34.8–46.1)
HGB BLD-MCNC: 8.7 G/DL (ref 11.5–15.4)
IMM GRANULOCYTES # BLD AUTO: 0.02 THOUSAND/UL (ref 0–0.2)
IMM GRANULOCYTES NFR BLD AUTO: 1 % (ref 0–2)
LYMPHOCYTES # BLD AUTO: 0.29 THOUSANDS/ÂΜL (ref 0.6–4.47)
LYMPHOCYTES NFR BLD AUTO: 8 % (ref 14–44)
MAGNESIUM SERPL-MCNC: 2.3 MG/DL (ref 1.9–2.7)
MCH RBC QN AUTO: 31.3 PG (ref 26.8–34.3)
MCHC RBC AUTO-ENTMCNC: 31.2 G/DL (ref 31.4–37.4)
MCV RBC AUTO: 100 FL (ref 82–98)
MONOCYTES # BLD AUTO: 0.38 THOUSAND/ÂΜL (ref 0.17–1.22)
MONOCYTES NFR BLD AUTO: 11 % (ref 4–12)
MRSA NOSE QL CULT: NORMAL
NEUTROPHILS # BLD AUTO: 2.23 THOUSANDS/ÂΜL (ref 1.85–7.62)
NEUTS SEG NFR BLD AUTO: 62 % (ref 43–75)
NRBC BLD AUTO-RTO: 0 /100 WBCS
PHOSPHATE SERPL-MCNC: 3.8 MG/DL (ref 2.3–4.1)
PLATELET # BLD AUTO: 104 THOUSANDS/UL (ref 149–390)
PMV BLD AUTO: 12.3 FL (ref 8.9–12.7)
POTASSIUM SERPL-SCNC: 4.2 MMOL/L (ref 3.5–5.3)
PROCALCITONIN SERPL-MCNC: 0.32 NG/ML
RAD ONC ARIA COURSE FIRST TREATMENT DATE: NORMAL
RAD ONC ARIA COURSE ID: NORMAL
RAD ONC ARIA COURSE INTENT: NORMAL
RAD ONC ARIA COURSE LAST TREATMENT DATE: NORMAL
RAD ONC ARIA COURSE SESSION NUMBER: 2
RAD ONC ARIA COURSE START DATE: NORMAL
RAD ONC ARIA COURSE TREATMENT ELAPSED DAYS: 7
RAD ONC ARIA PLAN FRACTIONS TREATED TO DATE: 2
RAD ONC ARIA PLAN ID: NORMAL
RAD ONC ARIA PLAN NAME: NORMAL
RAD ONC ARIA PLAN PRESCRIBED DOSE PER FRACTION: 6 GY
RAD ONC ARIA PLAN PRIMARY REFERENCE POINT: NORMAL
RAD ONC ARIA PLAN TOTAL FRACTIONS PRESCRIBED: 3
RAD ONC ARIA PLAN TOTAL PRESCRIBED DOSE: 1800 CGY
RAD ONC ARIA REFERENCE POINT DOSAGE GIVEN TO DATE: 12 GY
RAD ONC ARIA REFERENCE POINT ID: NORMAL
RAD ONC ARIA REFERENCE POINT SESSION DOSAGE GIVEN: 6 GY
RBC # BLD AUTO: 2.78 MILLION/UL (ref 3.81–5.12)
SODIUM SERPL-SCNC: 140 MMOL/L (ref 135–147)
URATE SERPL-MCNC: 6.6 MG/DL (ref 2–7.5)
WBC # BLD AUTO: 3.55 THOUSAND/UL (ref 4.31–10.16)

## 2025-07-01 PROCEDURE — 80048 BASIC METABOLIC PNL TOTAL CA: CPT

## 2025-07-01 PROCEDURE — 99232 SBSQ HOSP IP/OBS MODERATE 35: CPT | Performed by: INTERNAL MEDICINE

## 2025-07-01 PROCEDURE — 83735 ASSAY OF MAGNESIUM: CPT

## 2025-07-01 PROCEDURE — 85025 COMPLETE CBC W/AUTO DIFF WBC: CPT

## 2025-07-01 PROCEDURE — 84550 ASSAY OF BLOOD/URIC ACID: CPT | Performed by: INTERNAL MEDICINE

## 2025-07-01 PROCEDURE — 99232 SBSQ HOSP IP/OBS MODERATE 35: CPT | Performed by: OBSTETRICS & GYNECOLOGY

## 2025-07-01 PROCEDURE — 99232 SBSQ HOSP IP/OBS MODERATE 35: CPT | Performed by: STUDENT IN AN ORGANIZED HEALTH CARE EDUCATION/TRAINING PROGRAM

## 2025-07-01 PROCEDURE — 84100 ASSAY OF PHOSPHORUS: CPT

## 2025-07-01 PROCEDURE — 84145 PROCALCITONIN (PCT): CPT | Performed by: INTERNAL MEDICINE

## 2025-07-01 RX ADMIN — Medication 400 MG: at 18:49

## 2025-07-01 RX ADMIN — APIXABAN 5 MG: 5 TABLET, FILM COATED ORAL at 18:48

## 2025-07-01 RX ADMIN — METOPROLOL SUCCINATE 50 MG: 50 TABLET, EXTENDED RELEASE ORAL at 18:49

## 2025-07-01 RX ADMIN — TRIAMCINOLONE ACETONIDE: 1 CREAM TOPICAL at 18:49

## 2025-07-01 RX ADMIN — HYDROCORTISONE: 1 CREAM TOPICAL at 05:03

## 2025-07-01 RX ADMIN — HYDROCORTISONE: 1 CREAM TOPICAL at 14:00

## 2025-07-01 RX ADMIN — TRIAMCINOLONE ACETONIDE: 1 CREAM TOPICAL at 08:54

## 2025-07-01 RX ADMIN — ACETAMINOPHEN 650 MG: 325 TABLET ORAL at 12:38

## 2025-07-01 RX ADMIN — APIXABAN 5 MG: 5 TABLET, FILM COATED ORAL at 08:52

## 2025-07-01 RX ADMIN — Medication 400 MG: at 08:52

## 2025-07-01 RX ADMIN — CLOPIDOGREL 75 MG: 75 TABLET ORAL at 08:52

## 2025-07-01 RX ADMIN — CEFEPIME 2000 MG: 2 INJECTION, POWDER, FOR SOLUTION INTRAVENOUS at 03:48

## 2025-07-01 RX ADMIN — ATORVASTATIN CALCIUM 80 MG: 40 TABLET, FILM COATED ORAL at 15:53

## 2025-07-01 RX ADMIN — LORAZEPAM 1 MG: 1 TABLET ORAL at 19:41

## 2025-07-01 RX ADMIN — FAMOTIDINE 20 MG: 20 TABLET, FILM COATED ORAL at 08:53

## 2025-07-01 RX ADMIN — LURASIDONE HYDROCHLORIDE 20 MG: 20 TABLET, FILM COATED ORAL at 08:53

## 2025-07-01 RX ADMIN — CEFEPIME 2000 MG: 2 INJECTION, POWDER, FOR SOLUTION INTRAVENOUS at 15:55

## 2025-07-01 NOTE — PROGRESS NOTES
"Progress Note - GYN Oncology   Name: Cherelle Dash 70 y.o. female I MRN: [id number]  Unit/Bed#: W -01 I Date of Admission: 6/28/2025   Date of Service: 7/1/2025 I Hospital Day: 3     Assessment & Plan  Dyspnea  New-onset. Shortly after taking bactrim for UTI, also presenting with a rash. Patient without additional cardiopulmonary symptoms. Prior history of immunotherapy-induced cardiomyopathy. EKG with known LBBB (chronic), and new PVCs.  - Cardiology consulted, recommendations appreciated; dyspnea deemed not secondary to cardiac etiology, echo unchanged from prior exam, BNP lower from previous value 123 (6/28/25)   - No growth for 48 hours in blood cultures x2  - Pulmonology consulted, appreciate recs  - CT chest w/o contrast: multifocal PNA, with some of the consolidations have atoll-type configuration which can be associated with other inflammatory processes such as cryptogenic organizing pneumonia or granulomatous disease   - Viral panel negative  - CRP elevated at 91.3, procal 0.32  - Respiratory panel negative  - MRSA swab, IgE level, Asp IgE, B-D glucan, Asp galactomannan, ANCA panels   - Per discussion w/ pulmonology, cefepime q12h for PNA coverage (this abx chosen given patient's current OWEN)  - Consider transition to PO abx regimen, once OWEN resolves  Cellulitis  Suspect infection at R IJ port site  - IR consulted for possible port removal; recs appreciated  - recommend Keflex x1wk, maintain port for now and continue to observe  - Coverage with cefepime    Bipolar I disorder (HCC)  - continue home lurasidone  GERD (gastroesophageal reflux disease)  - famotidine 20mg qd  Type 2 diabetes mellitus with chronic kidney disease, without long-term current use of insulin (HCC)  Lab Results   Component Value Date    HGBA1C 6.6 (H) 05/01/2025     No results for input(s): \"POCGLU\" in the last 72 hours.    - monitor blood glucose while inpatient  Hyperlipidemia  - continue home atorvastatin 80mg qd  Stage 4 " chronic kidney disease (HCC)  Lab Results   Component Value Date    EGFR 25 07/01/2025    EGFR 23 06/30/2025    EGFR 17 06/29/2025    CREATININE 1.94 (H) 07/01/2025    CREATININE 2.09 (H) 06/30/2025    CREATININE 2.60 (H) 06/29/2025     Follows with Nephrology outpatient  Nephrology consulted, appreciate recommendations  Creatinine currently on the downtrend, today at 1.94 down from 2.09 yesterday; slowly on the downtrend  Baseline Cr of 1.6-1.8  UA: notable for large leukocytes, 20-30 WBC's, and occasional bacteria  Renal/kidney U/S: No acute abnormality or suspicious mass. No hydronephrosis   UPC 0.7, urine eosinophils negative    Plan:  Per nephrology  Endometrial cancer (HCC)  Stage IIIC1 grade 1 endometrial adenocarcinoma. S/p robotic-assisted total laparoscopic hysterectomy, bilateral salpingo-oophorectomy, lymph node dissection on 10/11/2024, 5 cycles of adjuvant carboplatin/paclitaxel, 4 cycles of adjuvant pembrolizumab, whole pelvic radiation, now currently receiving vaginal brachytherapy. 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   - f/u with Radiation Oncology  Other pulmonary embolism without acute cor pulmonale (HCC)  Hx DVT and bilateral PE in 9/2024, s/p IVC filter  - Was previously held in case port site needed to be removed, but has since been recontinued  - on Eliquis 5mg BID  Heart failure with mildly reduced ejection fraction (HCC)  Wt Readings from Last 3 Encounters:   06/28/25 102 kg (224 lb)   06/13/25 102 kg (225 lb)   05/21/25 101 kg (223 lb 9.6 oz)   Follows with Cardiology outpatient  Torsemide 20mg qd held currently in the setting of OWEN  Coronary artery disease involving native coronary artery of native heart with unstable angina pectoris (HCC)  S/p cardiac catheterization on 5/2/2025  - continue home clopidogrel 75mg qd   UTI (urinary tract infection)  - d/c Bactrim given mild allergic reaction  - Ancef d/c'd; coverage with  cefepime  OWEN (acute kidney injury) (HCC)  See plan under stage 4 CKD   Anemia  Hgb stable at 8.7 today  Abnormal CT of the chest    Other eosinophilia      24 Hour Events : none  Subjective : Patient is doing well. She reports mild improvements with extertional dyspnea. Denies fever/chills. Has been ambulating. Denies having any abdominal pain.     Objective :  Temp:  [98.2 °F (36.8 °C)-98.9 °F (37.2 °C)] 98.3 °F (36.8 °C)  HR:  [83-94] 83  BP: (105-135)/(62-76) 107/67  Resp:  [16] 16  SpO2:  [94 %-97 %] 94 %  O2 Device: None (Room air)    Physical Exam  Vitals and nursing note reviewed.   Constitutional:       General: She is not in acute distress.     Appearance: She is well-developed.   HENT:      Head: Normocephalic and atraumatic.     Eyes:      Conjunctiva/sclera: Conjunctivae normal.       Cardiovascular:      Rate and Rhythm: Normal rate and regular rhythm.      Heart sounds: No murmur heard.  Pulmonary:      Effort: Pulmonary effort is normal. No respiratory distress.      Breath sounds: Normal breath sounds.   Abdominal:      Palpations: Abdomen is soft.      Tenderness: There is no abdominal tenderness.     Musculoskeletal:         General: No swelling.      Cervical back: Neck supple.     Skin:     General: Skin is warm and dry.      Capillary Refill: Capillary refill takes less than 2 seconds.      Comments: Port site with no erythema surrounding, and no tenderness to palpation     Neurological:      Mental Status: She is alert.     Psychiatric:         Mood and Affect: Mood normal.         Lab Results: I have reviewed the following results:CBC/BMP:   .     07/01/25  0500   WBC 3.55*   HGB 8.7*   HCT 27.9*   *   SODIUM 140   K 4.2   *   CO2 22   BUN 32*   CREATININE 1.94*   GLUC 139   MG 2.3   PHOS 3.8        Imaging Results Review: No pertinent imaging studies reviewed.  Other Study Results Review: No additional pertinent studies reviewed.    VTE Pharmacologic Prophylaxis: VTE covered  by:  apixaban, Oral, 5 mg at 06/30/25 1711     VTE Mechanical Prophylaxis: sequential compression device    Vikash Xavier MD  Obstetrics & Gynecology PGY-2  7/1/2025  6:49 AM

## 2025-07-01 NOTE — ASSESSMENT & PLAN NOTE
Noted scattered mixed consolidations with some ground glass infiltrates - the differential is fairly broad and includes typical and atypical pneumonia, viral pneumonitis, concern for drug induced pneumonitis and organizing pneumonia with Keytruda use, radiation pneumonitis, vasculitis and fungal infections are also possible   Currently stable on room air denies any shortness of breath but has also not been very active  WBC 3.46-3.55  RP 2 panel negative, procalcitonin slightly elevated 0.32, CRP 91.8  Remainder of serologic evaluation pending IgE level, ASP IgE, B-D glucan, ASP galactomannan and ANCA panels, MRSA pending  Can consider empiric OCS course, may consider bronchoscopy-currently on Plavix and Eliquis will need to hold for 48 to 72 hours prior to bronchoscopy  Currently on cefepime day #2- continue  Could consider expanding to Zosyn and vancomycin  pending cultures

## 2025-07-01 NOTE — ASSESSMENT & PLAN NOTE
New-onset. Shortly after taking bactrim for UTI, also presenting with a rash. Patient without additional cardiopulmonary symptoms. Prior history of immunotherapy-induced cardiomyopathy. EKG with known LBBB (chronic), and new PVCs.  - Cardiology consulted, recommendations appreciated; dyspnea deemed not secondary to cardiac etiology, echo unchanged from prior exam, BNP lower from previous value 123 (6/28/25)   - No growth for 48 hours in blood cultures x2  - Pulmonology consulted, appreciate recs  - CT chest w/o contrast: multifocal PNA, with some of the consolidations have atoll-type configuration which can be associated with other inflammatory processes such as cryptogenic organizing pneumonia or granulomatous disease   - Viral panel negative  - CRP elevated at 91.3, procal 0.32  - Respiratory panel negative  - MRSA swab, IgE level, Asp IgE, B-D glucan, Asp galactomannan, ANCA panels   - Per discussion w/ pulmonology, cefepime q12h for PNA coverage (this abx chosen given patient's current OWEN)  - Consider transition to PO abx regimen, once OWEN resolves

## 2025-07-01 NOTE — ASSESSMENT & PLAN NOTE
Remains on differential, further differential and evaluation as above  Off Keytruda since 2/2025

## 2025-07-01 NOTE — ASSESSMENT & PLAN NOTE
Eosinophils 510 - 490 - 560, urine eosinophils negative  May be related to antibiotics and drug reaction, further evaluation as above

## 2025-07-01 NOTE — ASSESSMENT & PLAN NOTE
Lab Results   Component Value Date    EGFR 25 07/01/2025    EGFR 23 06/30/2025    EGFR 17 06/29/2025    CREATININE 1.94 (H) 07/01/2025    CREATININE 2.09 (H) 06/30/2025    CREATININE 2.60 (H) 06/29/2025     Baseline creatinine: 1.6 to 1.8 mg/dL  Etiology: Likely secondary to nephrosclerosis, nephron loss  Outpatient nephrologist Dr. Macias

## 2025-07-01 NOTE — ASSESSMENT & PLAN NOTE
Lab Results   Component Value Date    EGFR 25 07/01/2025    EGFR 23 06/30/2025    EGFR 17 06/29/2025    CREATININE 1.94 (H) 07/01/2025    CREATININE 2.09 (H) 06/30/2025    CREATININE 2.60 (H) 06/29/2025     Follows with Nephrology outpatient  Nephrology consulted, appreciate recommendations  Creatinine currently on the downtrend, today at 1.94 down from 2.09 yesterday; slowly on the downtrend  Baseline Cr of 1.6-1.8  UA: notable for large leukocytes, 20-30 WBC's, and occasional bacteria  Renal/kidney U/S: No acute abnormality or suspicious mass. No hydronephrosis   UPC 0.7, urine eosinophils negative    Plan:  Per nephrology

## 2025-07-01 NOTE — ASSESSMENT & PLAN NOTE
#Non-Oliguric KDIGO OWEN stage1  Etiology:   Factorial likely secondary to hemodynamic changes in the settings of sepsis, borderline hypotension, Bactrim .    Patient was on Keytruda until February develop cardiac toxicity, currently leukocyturia although recent infection we do not rule out AIN.  AIN can be present up to 1 year after last Keytruda dose.  Doubt AIN.  No evidence of peripheral eosinophilia and no eosinophils in the urine.  Baseline creatinine 1.6 to 1.8 mg/dL with persistent proteinuria.  Renal function continues to improve: Creatinine down to 1.94  UA: Leukocyturia with occasional bacteria, microhematuria  Urine culture: Klebsiella  Urine protein creatinine ratio 0.7 g/g but patient has an active urinary infection.  prior UPCR measurements lower in early between 0.2-0.3  Renal imaging : Order  Current status: Renal function has improved and near baseline  Treatment:  Maintain MAP:  Over 65 mmHg if possible/avoid hypoperfusion:  Hold parameters on blood pressure medications  Avoid nephrotoxic agents such as NSAIDs, and IV contrast if possible. Avoid opioids   Continue to hold Bactrim  No indication for any further IV fluids is tolerating oral intake well  No indication for steroids  Daily BMP  Cherelle was inquiring about her brachytherapy plan for this afternoon there does not appear to be any contraindication to proceeding

## 2025-07-01 NOTE — ASSESSMENT & PLAN NOTE
Likely multifactorial given her protracted course with noted frequent hospitalizations, recent steroid use, CAD post PCI and fatigue with ongoing cancer therapy  Evidence of pulmonary inflammation as listed below - will plan further evaluation as listed below

## 2025-07-01 NOTE — PROGRESS NOTES
Progress Note - Nephrology   Name: Cherelle Dash 70 y.o. female I MRN: [id number]  Unit/Bed#: W -01 I Date of Admission: 6/28/2025   Date of Service: 7/1/2025 I Hospital Day: 3     Assessment & Plan  OWEN (acute kidney injury) (HCC)  #Non-Oliguric KDIGO OWEN stage1  Etiology:   Factorial likely secondary to hemodynamic changes in the settings of sepsis, borderline hypotension, Bactrim .    Patient was on Keytruda until February develop cardiac toxicity, currently leukocyturia although recent infection we do not rule out AIN.  AIN can be present up to 1 year after last Keytruda dose.  Doubt AIN.  No evidence of peripheral eosinophilia and no eosinophils in the urine.  Baseline creatinine 1.6 to 1.8 mg/dL with persistent proteinuria.  Renal function continues to improve: Creatinine down to 1.94  UA: Leukocyturia with occasional bacteria, microhematuria  Urine culture: Klebsiella  Urine protein creatinine ratio 0.7 g/g but patient has an active urinary infection.  prior UPCR measurements lower in early between 0.2-0.3  Renal imaging : Order  Current status: Renal function has improved and near baseline  Treatment:  Maintain MAP:  Over 65 mmHg if possible/avoid hypoperfusion:  Hold parameters on blood pressure medications  Avoid nephrotoxic agents such as NSAIDs, and IV contrast if possible. Avoid opioids   Continue to hold Bactrim  No indication for any further IV fluids is tolerating oral intake well  No indication for steroids  Daily BMP  Cherelle was inquiring about her brachytherapy plan for this afternoon there does not appear to be any contraindication to proceeding    Stage 4 chronic kidney disease (HCC)  Lab Results   Component Value Date    EGFR 25 07/01/2025    EGFR 23 06/30/2025    EGFR 17 06/29/2025    CREATININE 1.94 (H) 07/01/2025    CREATININE 2.09 (H) 06/30/2025    CREATININE 2.60 (H) 06/29/2025     Baseline creatinine: 1.6 to 1.8 mg/dL  Etiology: Likely secondary to nephrosclerosis, nephron  "loss  Outpatient nephrologist Dr. Macias  Type 2 diabetes mellitus with chronic kidney disease, without long-term current use of insulin (HCC)  Lab Results   Component Value Date    HGBA1C 6.6 (H) 05/01/2025   HbA1c 6.6  Advised to maintain a good DM control to prevent progression of CKD       No results for input(s): \"POCGLU\" in the last 72 hours.    Blood Sugar Average: Last 72 hrs:      Endometrial cancer (HCC)  Status post surgical intervention in 2024  Status post Keytruda discontinued in  settings of cardiac toxicity  Status post carboplatin  Currently on pelvic radiation  History of myocarditis  Felt to be secondary to checkpoint inhibitors   Status post prednisone received colchicine for 3 months   Fu by Dr. Ackerman    Heart failure with mildly reduced ejection fraction (HCC)  Wt Readings from Last 3 Encounters:   06/28/25 102 kg (224 lb)   06/13/25 102 kg (225 lb)   05/21/25 101 kg (223 lb 9.6 oz)   Examines euvolemic  Off IV fluids can monitor off IV fluids at this time  UTI (urinary tract infection)  Urine culture from June 22 grew out Klebsiella.  Blood cultures are no growth  Patient also found to have a multifocal pneumonia  Anemia  Current hemoglobin:8.7 mg/dL stable  Treatment:  Transfuse for hemoglobin less than 7.0 per primary service    Abnormal CT of the chest    Other eosinophilia        Subjective       Review of systems: No complaints offered.  Patient states she is taking oral nourishment without difficulty.  She was requesting to be able to get up and walk independently.  No pain complaints.    Objective :  Temp:  [97.9 °F (36.6 °C)-98.3 °F (36.8 °C)] 97.9 °F (36.6 °C)  HR:  [83-94] 87  BP: (105-135)/(62-76) 107/63  Resp:  [16-17] 17  SpO2:  [94 %-97 %] 96 %  O2 Device: None (Room air)    Current Weight: Weight - Scale: 102 kg (224 lb)  First Weight: Weight - Scale: 102 kg (224 lb 13.9 oz)  I/O         06/29 0701 06/30 0700 06/30 0701 07/01 0700 07/01 0701  07/02 0700    P.O. 360 840 280 "    IV Piggyback 100      Total Intake(mL/kg) 460 (4.5) 840 (8.2) 280 (2.7)    Urine (mL/kg/hr) 1401 (0.6) 700 (0.3) 150 (0.4)    Stool 0      Total Output 1401 700 150    Net -941 +140 +130           Unmeasured Urine Occurrence 3 x      Unmeasured Stool Occurrence 1 x            Physical Exam  Vitals and nursing note reviewed.   Constitutional:       General: She is not in acute distress.     Appearance: She is well-developed. She is not ill-appearing or diaphoretic.   HENT:      Head: Normocephalic and atraumatic.      Nose: Nose normal. No congestion.      Mouth/Throat:      Mouth: Mucous membranes are moist.     Eyes:      Extraocular Movements: Extraocular movements intact.      Conjunctiva/sclera: Conjunctivae normal.     Neck:      Vascular: No carotid bruit.     Cardiovascular:      Rate and Rhythm: Normal rate and regular rhythm.      Heart sounds: No murmur heard.     No friction rub. No gallop.   Pulmonary:      Effort: Pulmonary effort is normal. No respiratory distress.      Breath sounds: Normal breath sounds.   Abdominal:      General: There is no distension.      Palpations: Abdomen is soft.      Tenderness: There is no abdominal tenderness.     Musculoskeletal:         General: No swelling, tenderness or signs of injury.      Cervical back: Normal range of motion and neck supple. No rigidity or tenderness.      Right lower leg: No edema.      Left lower leg: No edema.     Skin:     General: Skin is warm and dry.      Capillary Refill: Capillary refill takes less than 2 seconds.      Coloration: Skin is not jaundiced or pale.      Findings: No erythema or rash.     Neurological:      Mental Status: She is alert and oriented to person, place, and time.     Psychiatric:         Mood and Affect: Mood normal.         Behavior: Behavior normal.         Thought Content: Thought content normal.         Judgment: Judgment normal.         Medications:  Current Medications[1]      Lab Results: I have reviewed  "the following results:  Results from last 7 days   Lab Units 07/01/25  0500 06/30/25  0502 06/29/25  0431 06/28/25  1332   WBC Thousand/uL 3.55* 3.46* 3.67* 4.87   HEMOGLOBIN g/dL 8.7* 8.4* 8.6* 9.6*   HEMATOCRIT % 27.9* 26.6* 26.5* 30.6*   PLATELETS Thousands/uL 104* 98* 96* 105*   POTASSIUM mmol/L 4.2 3.8 3.7 3.4*   CHLORIDE mmol/L 111* 108 108 103   CO2 mmol/L 22 22 22 24   BUN mg/dL 32* 32* 32* 29*   CREATININE mg/dL 1.94* 2.09* 2.60* 2.31*   CALCIUM mg/dL 8.9 8.7 8.6 8.8   MAGNESIUM mg/dL 2.3 2.5 2.7 1.8*   PHOSPHORUS mg/dL 3.8 3.5 4.0  --    ALBUMIN g/dL  --   --   --  3.6       Administrative Statements     Portions of the record may have been created with voice recognition software. Occasional wrong word or \"sound a like\" substitutions may have occurred due to the inherent limitations of voice recognition software. Read the chart carefully and recognize, using context, where substitutions have occurred.If you have any questions, please contact the dictating provider.       [1]   Current Facility-Administered Medications:     acetaminophen (TYLENOL) tablet 650 mg, 650 mg, Oral, Q4H PRN, Jacky Page MD, 650 mg at 06/28/25 2350    apixaban (ELIQUIS) tablet 5 mg, 5 mg, Oral, BID, Tucker Izquierdo MD, 5 mg at 07/01/25 0852    atorvastatin (LIPITOR) tablet 80 mg, 80 mg, Oral, Daily With Dinner, Tucker Izquierdo MD, 80 mg at 06/30/25 1711    ceFEPime (MAXIPIME) 2,000 mg in dextrose 5 % 50 mL IVPB, 2,000 mg, Intravenous, Q12H, Vikahs Xavier MD, Last Rate: 100 mL/hr at 07/01/25 0348, 2,000 mg at 07/01/25 0348    clopidogrel (PLAVIX) tablet 75 mg, 75 mg, Oral, Daily, Tucker Izquierdo MD, 75 mg at 07/01/25 0852    diphenhydrAMINE (BENADRYL) tablet 25 mg, 25 mg, Oral, Q6H PRN, Rodney Downing MD    famotidine (PEPCID) tablet 20 mg, 20 mg, Oral, Daily, Tucker Izquierdo MD, 20 mg at 07/01/25 0853    hydrocortisone 1 % cream, , Topical, 4x Daily PRN, Tucker Izquierdo MD, Given at 07/01/25 " 0503    LORazepam (ATIVAN) tablet 1 mg, 1 mg, Oral, HS PRN, Tucker Izquierdo MD, 1 mg at 06/30/25 1908    lurasidone (LATUDA) tablet 20 mg, 20 mg, Oral, Daily With Breakfast, Tucker Izquierdo MD, 20 mg at 07/01/25 0853    magnesium Oxide (MAG-OX) tablet 400 mg, 400 mg, Oral, BID, Panfilo Owusu MD, 400 mg at 07/01/25 0852    metoprolol succinate (TOPROL-XL) 24 hr tablet 50 mg, 50 mg, Oral, Q12H, Tucker Izquierdo MD, 50 mg at 06/30/25 1909    [Held by provider] torsemide (DEMADEX) tablet 20 mg, 20 mg, Oral, Daily, Tucker Izquierdo MD    triamcinolone (KENALOG) 0.1 % cream, , Topical, BID, Rodney Downing MD, Given at 07/01/25 0817

## 2025-07-01 NOTE — PLAN OF CARE
Problem: PAIN - ADULT  Goal: Verbalizes/displays adequate comfort level or baseline comfort level  Description: Interventions:  - Encourage patient to monitor pain and request assistance  - Assess pain using appropriate pain scale  - Administer analgesics as ordered based on type and severity of pain and evaluate response  - Implement non-pharmacological measures as appropriate and evaluate response  - Consider cultural and social influences on pain and pain management  - Notify physician/advanced practitioner if interventions unsuccessful or patient reports new pain  - Educate patient/family on pain management process including their role and importance of  reporting pain   - Provide non-pharmacologic/complimentary pain relief interventions  Outcome: Progressing     Problem: INFECTION - ADULT  Goal: Absence or prevention of progression during hospitalization  Description: INTERVENTIONS:  - Assess and monitor for signs and symptoms of infection  - Monitor lab/diagnostic results  - Monitor all insertion sites, i.e. indwelling lines, tubes, and drains  - Monitor endotracheal if appropriate and nasal secretions for changes in amount and color  - [place] appropriate cooling/warming therapies per order  - Administer medications as ordered  - Instruct and encourage patient and family to use good hand hygiene technique  - Identify and instruct in appropriate isolation precautions for identified infection/condition  Outcome: Progressing  Goal: Absence of fever/infection during neutropenic period  Description: INTERVENTIONS:  - Monitor WBC  - Perform strict hand hygiene  - Limit to healthy visitors only  - No plants, dried, fresh or silk flowers with martínez in patient room  Outcome: Progressing     Problem: SAFETY ADULT  Goal: Patient will remain free of falls  Description: INTERVENTIONS:  - Educate patient/family on patient safety including physical limitations  - Instruct patient to call for assistance with activity   -  Consider consulting OT/PT to assist with strengthening/mobility based on AM PAC & JH-HLM score  - Consult OT/PT to assist with strengthening/mobility   - Keep Call bell within reach  - Keep bed low and locked with side rails adjusted as appropriate  - Keep care items and personal belongings within reach  - Initiate and maintain comfort rounds  - Make Fall Risk Sign visible to staff  - Offer Toileting every  Hours, in advance of need  - Initiate/Maintain alarm  - Obtain necessary fall risk management equipment:   - Apply yellow socks and bracelet for high fall risk patients  - Consider moving patient to room near nurses station  Outcome: Progressing  Goal: Maintain or return to baseline ADL function  Description: INTERVENTIONS:  -  Assess patient's ability to carry out ADLs; assess patient's baseline for ADL function and identify physical deficits which impact ability to perform ADLs (bathing, care of mouth/teeth, toileting, grooming, dressing, etc.)  - Assess/evaluate cause of self-care deficits   - Assess range of motion  - Assess patient's mobility; develop plan if impaired  - Assess patient's need for assistive devices and provide as appropriate  - Encourage maximum independence but intervene and supervise when necessary  - Involve family in performance of ADLs  - Assess for home care needs following discharge   - Consider OT consult to assist with ADL evaluation and planning for discharge  - Provide patient education as appropriate  - Monitor functional capacity and physical performance, use of AM PAC & JH-HLM   - Monitor gait, balance and fatigue with ambulation    Outcome: Progressing  Goal: Maintains/Returns to pre admission functional level  Description: INTERVENTIONS:  - Perform AM-PAC 6 Click Basic Mobility/ Daily Activity assessment daily.  - Set and communicate daily mobility goal to care team and patient/family/caregiver.   - Collaborate with rehabilitation services on mobility goals if consulted  -  Perform Range of Motion  times a day.  - Reposition patient every  hours.  - Dangle patient  times a day  - Stand patient  times a day  - Ambulate patient  times a day  - Out of bed to chair  times a day   - Out of bed for meals times a day  - Out of bed for toileting  - Record patient progress and toleration of activity level   Outcome: Progressing     Problem: DISCHARGE PLANNING  Goal: Discharge to home or other facility with appropriate resources  Description: INTERVENTIONS:  - Identify barriers to discharge w/patient and caregiver  - Arrange for needed discharge resources and transportation as appropriate  - Identify discharge learning needs (meds, wound care, etc.)  - Arrange for interpretive services to assist at discharge as needed  - Refer to Case Management Department for coordinating discharge planning if the patient needs post-hospital services based on physician/advanced practitioner order or complex needs related to functional status, cognitive ability, or social support system  Outcome: Progressing     Problem: Knowledge Deficit  Goal: Patient/family/caregiver demonstrates understanding of disease process, treatment plan, medications, and discharge instructions  Description: Complete learning assessment and assess knowledge base.  Interventions:  - Provide teaching at level of understanding  - Provide teaching via preferred learning methods  Outcome: Progressing     Problem: RESPIRATORY - ADULT  Goal: Achieves optimal ventilation and oxygenation  Description: INTERVENTIONS:  - Assess for changes in respiratory status  - Assess for changes in mentation and behavior  - Position to facilitate oxygenation and minimize respiratory effort  - Oxygen administered by appropriate delivery if ordered  - Initiate smoking cessation education as indicated  - Encourage broncho-pulmonary hygiene including cough, deep breathe, Incentive Spirometry  - Assess the need for suctioning and aspirate as needed  - Assess and  instruct to report SOB or any respiratory difficulty  - Respiratory Therapy support as indicated  Outcome: Progressing

## 2025-07-01 NOTE — PROGRESS NOTES
Progress Note - Pulmonology   Name: Cherelle Dash 70 y.o. female I MRN: [id number]  Unit/Bed#: W -01 I Date of Admission: 6/28/2025   Date of Service: 7/1/2025 I Hospital Day: 3    Assessment & Plan  Dyspnea  Likely multifactorial given her protracted course with noted frequent hospitalizations, recent steroid use, CAD post PCI and fatigue with ongoing cancer therapy  Evidence of pulmonary inflammation as listed below - will plan further evaluation as listed below  Abnormal CT of the chest  Noted scattered mixed consolidations with some ground glass infiltrates - the differential is fairly broad and includes typical and atypical pneumonia, viral pneumonitis, concern for drug induced pneumonitis and organizing pneumonia with Keytruda use, radiation pneumonitis, vasculitis and fungal infections are also possible   Currently stable on room air denies any shortness of breath but has also not been very active  WBC 3.46-3.55  RP 2 panel negative, procalcitonin slightly elevated 0.32, CRP 91.8  Remainder of serologic evaluation pending IgE level, ASP IgE, B-D glucan, ASP galactomannan and ANCA panels, MRSA pending  Can consider empiric OCS course, may consider bronchoscopy-currently on Plavix and Eliquis will need to hold for 48 to 72 hours prior to bronchoscopy  Currently on cefepime day #2- continue  Could consider expanding to Zosyn and vancomycin  pending cultures  Drug-induced pneumonitis  Remains on differential, further differential and evaluation as above  Off Keytruda since 2/2025    Endometrial cancer (HCC)  Per Gyn Onc Service     Other pulmonary embolism without acute cor pulmonale (HCC)  IVC filter removed  Remains on eliquis   Other eosinophilia  Eosinophils 510 - 490 - 560, urine eosinophils negative  May be related to antibiotics and drug reaction, further evaluation as above    24 Hour Events : No overnight events  Subjective : Patient seen and examined at bedside.  Found resting comfortably in bed.   Does not appear in any respiratory distress.  Saturations are stable on room air.  She does not endorse any shortness of breath at this time.  But also has not been out of bed with any activity since admitted.  Plans for radiation later on today.  Will reassess after radiation    Objective :  Temp:  [97.9 °F (36.6 °C)-98.3 °F (36.8 °C)] 97.9 °F (36.6 °C)  HR:  [83-94] 87  BP: (105-135)/(62-76) 107/63  Resp:  [16-17] 17  SpO2:  [94 %-97 %] 96 %  O2 Device: None (Room air)    Physical Exam  Vitals reviewed.   Constitutional:       General: She is not in acute distress.     Appearance: She is obese. She is not ill-appearing.   HENT:      Head: Normocephalic and atraumatic.      Right Ear: External ear normal.      Left Ear: External ear normal.      Nose: Nose normal.      Mouth/Throat:      Mouth: Mucous membranes are moist.      Pharynx: Oropharynx is clear.     Eyes:      Extraocular Movements: Extraocular movements intact.      Pupils: Pupils are equal, round, and reactive to light.       Cardiovascular:      Rate and Rhythm: Normal rate and regular rhythm.      Pulses: Normal pulses.      Heart sounds: Normal heart sounds.   Pulmonary:      Effort: Pulmonary effort is normal.      Breath sounds: Rales present.      Comments:  Crackles noted in left base  Abdominal:      Palpations: Abdomen is soft.      Tenderness: There is no abdominal tenderness.     Musculoskeletal:         General: Normal range of motion.      Cervical back: Normal range of motion.      Right lower leg: No edema.      Left lower leg: No edema.     Skin:     General: Skin is warm and dry.     Neurological:      Mental Status: She is alert and oriented to person, place, and time.     Psychiatric:         Mood and Affect: Mood normal.         Behavior: Behavior normal.         Thought Content: Thought content normal.         Judgment: Judgment normal.         Lab Results: I have reviewed the following results:   .     07/01/25  0500   WBC 3.55*    HGB 8.7*   HCT 27.9*   *   SODIUM 140   K 4.2   *   CO2 22   BUN 32*   CREATININE 1.94*   GLUC 139   MG 2.3   PHOS 3.8     ABG: No new results in last 24 hours.    Imaging Results Review: I reviewed radiology reports from this admission including: CT chest.    Multifocal pneumonia. Some of the consolidations have atoll-type configuration which can be associated with other inflammatory processes such as cryptogenic organizing pneumonia or granulomatous disease, though conventional pneumonia is much more common   and therefore much more likely. However, given somewhat atypical configuration, recommend chest CT follow-up in 2-3 months to document resolution.  Other Study Results Review: Other studies reviewed include: ECHO  Echo 06/28/2025 LVEF 50% systolic function low normal wall thickness normal RVSP 20.00 mmHg  PFT Results Reviewed: No formal PFTs on file

## 2025-07-01 NOTE — ASSESSMENT & PLAN NOTE
Current hemoglobin:8.7 mg/dL stable  Treatment:  Transfuse for hemoglobin less than 7.0 per primary service

## 2025-07-01 NOTE — ASSESSMENT & PLAN NOTE
Suspect infection at R IJ port site  - IR consulted for possible port removal; recs appreciated  - recommend Keflex x1wk, maintain port for now and continue to observe  - Coverage with cefepime

## 2025-07-01 NOTE — QUICK NOTE
Radiation Oncology Treatment Note     A treatment dose of 600 cGy  to surface was administered today to the involved tumor site(s) vaginal cuff using HDR brachytherapy.  Present total dose at this time is 1200 cGy.  Approximately 1 more treatment before completion of treatments.

## 2025-07-02 VITALS
RESPIRATION RATE: 19 BRPM | SYSTOLIC BLOOD PRESSURE: 111 MMHG | BODY MASS INDEX: 43.98 KG/M2 | DIASTOLIC BLOOD PRESSURE: 66 MMHG | HEIGHT: 60 IN | TEMPERATURE: 98.1 F | HEART RATE: 83 BPM | WEIGHT: 224 LBS | OXYGEN SATURATION: 95 %

## 2025-07-02 DIAGNOSIS — R05.2 SUBACUTE COUGH: Primary | ICD-10-CM

## 2025-07-02 PROBLEM — N17.9 AKI (ACUTE KIDNEY INJURY) (HCC): Status: RESOLVED | Noted: 2020-09-30 | Resolved: 2025-07-02

## 2025-07-02 LAB
ANION GAP SERPL CALCULATED.3IONS-SCNC: 7 MMOL/L (ref 4–13)
BASOPHILS # BLD AUTO: 0.01 THOUSANDS/ÂΜL (ref 0–0.1)
BASOPHILS NFR BLD AUTO: 0 % (ref 0–1)
BUN SERPL-MCNC: 28 MG/DL (ref 5–25)
C-ANCA TITR SER IF: NORMAL TITER
CALCIUM SERPL-MCNC: 9.2 MG/DL (ref 8.4–10.2)
CHLORIDE SERPL-SCNC: 112 MMOL/L (ref 96–108)
CO2 SERPL-SCNC: 21 MMOL/L (ref 21–32)
CREAT SERPL-MCNC: 1.66 MG/DL (ref 0.6–1.3)
EOSINOPHIL # BLD AUTO: 0.54 THOUSAND/ÂΜL (ref 0–0.61)
EOSINOPHIL NFR BLD AUTO: 15 % (ref 0–6)
ERYTHROCYTE [DISTWIDTH] IN BLOOD BY AUTOMATED COUNT: 17.6 % (ref 11.6–15.1)
GFR SERPL CREATININE-BSD FRML MDRD: 30 ML/MIN/1.73SQ M
GLUCOSE SERPL-MCNC: 122 MG/DL (ref 65–140)
HCT VFR BLD AUTO: 27.7 % (ref 34.8–46.1)
HGB BLD-MCNC: 8.6 G/DL (ref 11.5–15.4)
IMM GRANULOCYTES # BLD AUTO: 0.01 THOUSAND/UL (ref 0–0.2)
IMM GRANULOCYTES NFR BLD AUTO: 0 % (ref 0–2)
LYMPHOCYTES # BLD AUTO: 0.33 THOUSANDS/ÂΜL (ref 0.6–4.47)
LYMPHOCYTES NFR BLD AUTO: 9 % (ref 14–44)
MAGNESIUM SERPL-MCNC: 2.4 MG/DL (ref 1.9–2.7)
MCH RBC QN AUTO: 31.2 PG (ref 26.8–34.3)
MCHC RBC AUTO-ENTMCNC: 31 G/DL (ref 31.4–37.4)
MCV RBC AUTO: 100 FL (ref 82–98)
MONOCYTES # BLD AUTO: 0.43 THOUSAND/ÂΜL (ref 0.17–1.22)
MONOCYTES NFR BLD AUTO: 12 % (ref 4–12)
MYELOPEROXIDASE AB SER IA-ACNC: <0.2 UNITS (ref 0–0.9)
NEUTROPHILS # BLD AUTO: 2.38 THOUSANDS/ÂΜL (ref 1.85–7.62)
NEUTS SEG NFR BLD AUTO: 64 % (ref 43–75)
NRBC BLD AUTO-RTO: 0 /100 WBCS
P-ANCA ATYPICAL TITR SER IF: NORMAL TITER
P-ANCA TITR SER IF: NORMAL TITER
PHOSPHATE SERPL-MCNC: 4.1 MG/DL (ref 2.3–4.1)
PLATELET # BLD AUTO: 105 THOUSANDS/UL (ref 149–390)
PMV BLD AUTO: 12.4 FL (ref 8.9–12.7)
POTASSIUM SERPL-SCNC: 4.6 MMOL/L (ref 3.5–5.3)
PROTEINASE3 AB SER IA-ACNC: <0.2 UNITS (ref 0–0.9)
RBC # BLD AUTO: 2.76 MILLION/UL (ref 3.81–5.12)
SODIUM SERPL-SCNC: 140 MMOL/L (ref 135–147)
WBC # BLD AUTO: 3.7 THOUSAND/UL (ref 4.31–10.16)

## 2025-07-02 PROCEDURE — 99232 SBSQ HOSP IP/OBS MODERATE 35: CPT | Performed by: INTERNAL MEDICINE

## 2025-07-02 PROCEDURE — 85025 COMPLETE CBC W/AUTO DIFF WBC: CPT

## 2025-07-02 PROCEDURE — NC001 PR NO CHARGE: Performed by: OBSTETRICS & GYNECOLOGY

## 2025-07-02 PROCEDURE — 84100 ASSAY OF PHOSPHORUS: CPT

## 2025-07-02 PROCEDURE — 83735 ASSAY OF MAGNESIUM: CPT

## 2025-07-02 PROCEDURE — 99238 HOSP IP/OBS DSCHRG MGMT 30/<: CPT | Performed by: OBSTETRICS & GYNECOLOGY

## 2025-07-02 PROCEDURE — 97166 OT EVAL MOD COMPLEX 45 MIN: CPT

## 2025-07-02 PROCEDURE — 80048 BASIC METABOLIC PNL TOTAL CA: CPT

## 2025-07-02 RX ORDER — CEFPODOXIME PROXETIL 200 MG/1
200 TABLET, FILM COATED ORAL 2 TIMES DAILY
Qty: 10 TABLET | Refills: 0 | Status: SHIPPED | OUTPATIENT
Start: 2025-07-02 | End: 2025-07-07

## 2025-07-02 RX ORDER — TRIAMCINOLONE ACETONIDE 1 MG/G
CREAM TOPICAL 2 TIMES DAILY
Qty: 15 G | Refills: 0 | Status: SHIPPED | OUTPATIENT
Start: 2025-07-02

## 2025-07-02 RX ADMIN — TRIAMCINOLONE ACETONIDE: 1 CREAM TOPICAL at 09:25

## 2025-07-02 RX ADMIN — FAMOTIDINE 20 MG: 20 TABLET, FILM COATED ORAL at 09:24

## 2025-07-02 RX ADMIN — APIXABAN 5 MG: 5 TABLET, FILM COATED ORAL at 09:25

## 2025-07-02 RX ADMIN — CEFEPIME 2000 MG: 2 INJECTION, POWDER, FOR SOLUTION INTRAVENOUS at 04:58

## 2025-07-02 RX ADMIN — LURASIDONE HYDROCHLORIDE 20 MG: 20 TABLET, FILM COATED ORAL at 09:25

## 2025-07-02 RX ADMIN — CLOPIDOGREL 75 MG: 75 TABLET ORAL at 09:24

## 2025-07-02 RX ADMIN — METOPROLOL SUCCINATE 50 MG: 50 TABLET, EXTENDED RELEASE ORAL at 05:56

## 2025-07-02 NOTE — ASSESSMENT & PLAN NOTE
Wt Readings from Last 3 Encounters:   06/28/25 102 kg (224 lb)   06/13/25 102 kg (225 lb)   05/21/25 101 kg (223 lb 9.6 oz)   Examines euvolemic  Off IV fluids can monitor off IV fluids at this time  Off torsemide at this time but can be restarted at a lower dose if evidence of volume, the weights are currently stable

## 2025-07-02 NOTE — OCCUPATIONAL THERAPY NOTE
Occupational Therapy Evaluation     Patient Name: Cherelle Dash  Today's Date: 7/2/2025  Problem List  Principal Problem:    Dyspnea  Active Problems:    Bipolar I disorder (HCC)    GERD (gastroesophageal reflux disease)    Type 2 diabetes mellitus with chronic kidney disease, without long-term current use of insulin (HCC)    Hyperlipidemia    Stage 4 chronic kidney disease (HCC)    OWEN (acute kidney injury) (HCC)    Endometrial cancer (HCC)    Other pulmonary embolism without acute cor pulmonale (HCC)    Cardiomyopathy (HCC)    Drug-induced pneumonitis    History of myocarditis    Heart failure with mildly reduced ejection fraction (HCC)    Coronary artery disease involving native coronary artery of native heart with unstable angina pectoris (HCC)    Cellulitis    UTI (urinary tract infection)    Anemia    Abnormal CT of the chest    Other eosinophilia    Past Medical History  Past Medical History[1]  Past Surgical History  Past Surgical History[2]        07/02/25 0822   OT Last Visit   OT Visit Date 07/02/25   Note Type   Note type Evaluation   Pain Assessment   Pain Assessment Tool 0-10   Pain Score No Pain   Hospital Pain Intervention(s) Repositioned;Ambulation/increased activity;Emotional support   Restrictions/Precautions   Weight Bearing Precautions Per Order No   Other Precautions Multiple lines;Fall Risk   Home Living   Type of Home Apartment  ([facility] [address])   Home Layout One level;Performs ADLs on one level;Able to live on main level with bedroom/bathroom;Access;Elevator   Bathroom Shower/Tub Walk-in shower   Bathroom Toilet Raised   Bathroom Equipment Grab bars in shower;Built-in shower seat;Grab bars around toilet   Bathroom Accessibility Accessible   Home Equipment Walker;Grab bars   Additional Comments Pt reports living at [facility] with her  in a 3rd floor apartment with elevator access, [address]; no DME PTA, but has access to RW prn   Prior  "Function   Level of [place] Independent with ADLs;Independent with functional mobility;Independent with IADLS   Lives With Spouse   Receives Help From Family   IADLs Family/Friend/Other provides transportation;Independent with meal prep;Independent with medication management   Falls in the last 6 months 0   Vocational Retired   Comments (-) driving reporting that her  assists with transportation needs   Lifestyle   Autonomy PTA, pt was independent in ADLs/IADLs and uses no DME for functional mobility; (-) driving   Reciprocal Relationships Lives with her    Service to Others Retired reporting previously working for the [employer]   Intrinsic Gratification Enjoys playing cards with friends and dining with friends   General   Family/Caregiver Present Yes   Additional General Comments Pt's  present, supportive and interactive   Subjective   Subjective \"I am going to start cardiac rehab when I get back.\"   ADL   Where Assessed Edge of bed   Eating Assistance 7  Independent   Grooming Assistance 7  Independent   UB Bathing Assistance 5  Supervision/Setup   LB Bathing Assistance 5  Supervision/Setup   UB Dressing Assistance 5  Supervision/Setup   LB Dressing Assistance 5  Supervision/Setup   Toileting Assistance  5  Supervision/Setup   Functional Assistance 5  Supervision/Setup   Bed Mobility   Supine to Sit 5  Supervision   Additional items HOB elevated;Bedrails;Increased time required;Verbal cues   Sit to Supine 5  Supervision   Additional items Bedrails;Increased time required;Verbal cues   Additional Comments At end of session, pt requesting to return to supine position, left with all functional needs in reach with  present in room   Transfers   Sit to Stand 5  Supervision   Additional items Increased time required;Verbal cues   Stand to Sit 5  Supervision   Additional items Increased time required;Verbal cues   Additional Comments w/ no DME   Functional Mobility "   Functional Mobility 5  Supervision   Additional Comments Pt completed household functional mobility distances @ supervision level w/ no DME   Balance   Static Sitting Good   Dynamic Sitting Fair +   Static Standing Fair   Dynamic Standing Fair   Ambulatory Fair   Activity Tolerance   Activity Tolerance Patient tolerated treatment well;Patient limited by fatigue   Nurse Made Aware RN cleared and updated   RUE Assessment   RUE Assessment WFL   LUE Assessment   LUE Assessment WFL   Hand Function   Gross Motor Coordination Functional   Fine Motor Coordination Functional   Cognition   Overall Cognitive Status WFL   Arousal/Participation Alert;Responsive;Arousable;Cooperative   Attention Within functional limits   Orientation Level Oriented X4   Memory Within functional limits   Following Commands Follows all commands and directions without difficulty   Comments Pt pleasant and cooperative   Assessment   Prognosis Fair   Assessment Pt is a 69 yo female who presented to [facility] with LOMBARDI and suspected port site infection in which pt dx w/ dyspnea. Pt  has a past medical history of Abnormal ECG, Anxiety (2002), Arthritis, Bipolar 1 disorder ([facility]), Cervical cancer ([facility]), Chronic kidney disease, CPAP (continuous positive airway pressure) dependence, Depression (2001), Disease of thyroid gland (2001), DVT (deep venous thrombosis) ([facility]), Elevated blood pressure reading (06/10/2019), GERD (gastroesophageal reflux disease), Obesity, Pulmonary emboli ([facility]), RSV (acute bronchiolitis due to respiratory syncytial virus) (12/05/2023), Sleep apnea, Sleep apnea, obstructive (2007), Urinary incontinence (2019), and Uterine cancer ([facility]). Pt with active OT orders and OT consulted to assess pt's functional status and occupational performance to determine safe d/c needs. Pt lives with her  at [facility] and is independent in ADLs/IADLs and uses no DME for functional mobility. (-) driving. Discussed role and  scope of OT in which pt was receptive. At this time, pt is not demonstrating any significant occupational deficits and is functioning at a level of supervision for bed mobility, functional transfers/mobility w/ no DME, and UB/LB ADLs. From an OT standpoint, recommend discharge w/ Level 4 resources with increased support once medically stable. Pt was receptive regarding education on returning home safely with energy conservation techniques and demonstrated good carryover during occupational/functional performance. At this time, pt demonstrates good insight/safety awareness and does not express any concerns regarding performing ADL/IADL/functional mobility tasks. No further skilled acute care OT services are needed at this time.   The patient's raw score on the AM-PAC Daily Activity Inpatient Short Form is 21. A raw score of greater than or equal to 19 suggests the patient may benefit from discharge to home. Please refer to the recommendation of the Occupational Therapist for safe discharge planning.  Recommend continued engagement in ADL/functional mobility tasks with nursing and restorative therapy staff as appropriate to promote the highest level of independence prior to discharge. OT is discharging pt from caseload at this time, please reconsult if needed.   Goals   Patient Goals To get better and go home   Plan   OT Frequency Eval only   Discharge Recommendation   Rehab Resource Intensity Level, OT No post-acute rehabilitation needs   AM-PAC Daily Activity Inpatient   Lower Body Dressing 3   Bathing 3   Toileting 3   Upper Body Dressing 4   Grooming 4   Eating 4   Daily Activity Raw Score 21   Daily Activity Standardized Score (Calc for Raw Score >=11) 44.27   AM-PAC Applied Cognition Inpatient   Following a Speech/Presentation 4   Understanding Ordinary Conversation 4   Taking Medications 4   Remembering Where Things Are Placed or Put Away 4   Remembering List of 4-5 Errands 4   Taking Care of Complicated Tasks  4   Applied Cognition Raw Score 24   Applied Cognition Standardized Score 62.21   End of Consult   Education Provided Yes;Family or social support of family present for education by provider   Patient Position at End of Consult Supine;All needs within reach   Nurse Communication Nurse aware of consult     Kaylee Alexis MS, OTR/L            [1]   Past Medical History:  Diagnosis Date    Abnormal ECG     Anxiety 2002    Arthritis     Bipolar 1 disorder (HCC)     Cervical cancer (HCC)     Chronic kidney disease     stage 3    CPAP (continuous positive airway pressure) dependence     Depression 2001    Disease of thyroid gland 2001    Thyroid treated with Lithium medication for bi-polar disease    DVT (deep venous thrombosis) (HCC)     BLLE    Elevated blood pressure reading 06/10/2019    GERD (gastroesophageal reflux disease)     Obesity     Pulmonary emboli (HCC)     B/L    RSV (acute bronchiolitis due to respiratory syncytial virus) 12/05/2023    Sleep apnea     Sleep apnea, obstructive 2007    Urinary incontinence 2019    Uterine cancer (HCC)    [2]   Past Surgical History:  Procedure Laterality Date    CARDIAC CATHETERIZATION N/A 5/2/2025    Procedure: Cardiac Catheterization;  Surgeon: Jose Osborne MD;  [facility];  Service: Cardiology    COLONOSCOPY  2011    COLONOSCOPY  03/08/2023    DENTAL SURGERY  2020    FOOT SURGERY  1976    [place]    HYSTERECTOMY  10/11/24    IR IVC FILTER PLACEMENT OPTIONAL/TEMPORARY  10/08/2024    IR IVC FILTER REMOVAL  6/13/2025    IR PORT CHECK  12/26/2024    IR PORT PLACEMENT  12/03/2024    LAPAROTOMY N/A 10/11/2024    Procedure: EXPLORATORY LAPAROTOMY;  Surgeon: Rodney Downing MD;  Location: [facility];  Service: Gynecology Oncology    AZ HYSTEROSCOPY BX ENDOMETRIUM&/POLYPC W/WO D&C N/A 08/23/2024    Procedure: EXAM UNDER ANESTHESIA, DILATATION AND CURETTAGE, CERVICAL BIOPSIES;  Surgeon: Evin Page MD;  Location: [facility];   Service: Gynecology Oncology    HI LAPS TOTAL HYSTERECT 250 GM/< W/RMVL TUBE/OVARY N/A 10/11/2024    Procedure: ROBOTIC ASSISTED LTH, BSO, LYMPH NODE DISSECTION, EUA;  Surgeon: Rodney Downing MD;  Location[facility];  Service: Gynecology Oncology

## 2025-07-02 NOTE — PROGRESS NOTES
Progress Note - GYN Oncology   Name: Cherelle Dash 70 y.o. female I MRN: [id number]  Unit/Bed#: W -01 I Date of Admission: 6/28/2025   Date of Service: 7/2/2025 I Hospital Day: 4     Assessment & Plan  Dyspnea  New-onset. Shortly after taking bactrim for UTI, also presenting with a rash. Patient without additional cardiopulmonary symptoms. Prior history of immunotherapy-induced cardiomyopathy. EKG with known LBBB (chronic), and new PVCs.  - Cardiology consulted, recommendations appreciated; dyspnea deemed not secondary to cardiac etiology, echo unchanged from prior exam, BNP lower from previous value 123 (6/28/25)   - No growth for 72 hours in blood cultures x2  - Pulmonology consulted, appreciate recs  - CT chest w/o contrast: multifocal PNA, with some of the consolidations have atoll-type configuration which can be associated with other inflammatory processes such as cryptogenic organizing pneumonia or granulomatous disease   - CRP elevated at 91.3, procal 0.32  - Respiratory panel negative  - MRSA swab, IgE level, Asp IgE, B-D glucan, Asp galactomannan, ANCA labs pending  - Per discussion w/ pulmonology, cefepime q12h for PNA coverage (this abx chosen given patient's current OWEN)  - Currently on day #3 of abx  - Patient appears clinically improved from this standpoint, reporting significant improvement  - SpO2 overnight ranging from 93-96%  - Consider transition to PO abx regimen  - PT/OT eval before d/c  Cellulitis  Suspect infection at R IJ port site  - IR consulted for possible port removal; recs appreciated  - recommend Keflex x1wk, maintain port for now and continue to observe  - Coverage with cefepime    Bipolar I disorder (HCC)  - continue home lurasidone  GERD (gastroesophageal reflux disease)  - famotidine 20mg qd  Type 2 diabetes mellitus with chronic kidney disease, without long-term current use of insulin (HCC)  Lab Results   Component Value Date    HGBA1C 6.6 (H) 05/01/2025     No results for  "input(s): \"POCGLU\" in the last 72 hours.    - monitor blood glucose while inpatient  Hyperlipidemia  - continue home atorvastatin 80mg qd  Stage 4 chronic kidney disease (HCC)  Lab Results   Component Value Date    EGFR 30 07/02/2025    EGFR 25 07/01/2025    EGFR 23 06/30/2025    CREATININE 1.66 (H) 07/02/2025    CREATININE 1.94 (H) 07/01/2025    CREATININE 2.09 (H) 06/30/2025     Follows with Nephrology outpatient  - Nephrology consulted, appreciate recommendations  - Creatinine currently on the downtrend, today at 1.66 down from 1.94 yesterday; slowly on the downtrend  - OWEN resolved  - Baseline Cr of 1.6-1.8  - UA: notable for large leukocytes, 20-30 WBC's, and occasional bacteria  - Renal/kidney U/S: No acute abnormality or suspicious mass. No hydronephrosis   - UPC 0.7, urine eosinophils negative    Plan:  Per nephrology  Endometrial cancer (HCC)  Stage IIIC1 grade 1 endometrial adenocarcinoma. S/p robotic-assisted total laparoscopic hysterectomy, bilateral salpingo-oophorectomy, lymph node dissection on 10/11/2024, 5 cycles of adjuvant carboplatin/paclitaxel, 4 cycles of adjuvant pembrolizumab, whole pelvic radiation, now currently receiving vaginal brachytherapy. 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   - s/p vaginal brachytherapy (7/1/25)  Other pulmonary embolism without acute cor pulmonale (HCC)  Hx DVT and bilateral PE in 9/2024, s/p IVC filter  - Was previously held in case port site needed to be removed, but has since been recontinued  - on Eliquis 5mg BID  Heart failure with mildly reduced ejection fraction (HCC)  Wt Readings from Last 3 Encounters:   06/28/25 102 kg (224 lb)   06/13/25 102 kg (225 lb)   05/21/25 101 kg (223 lb 9.6 oz)   Follows with Cardiology outpatient  Torsemide 20mg qd held currently in the setting of OWEN  Coronary artery disease involving native coronary artery of native heart with unstable angina pectoris ([facility])  S/p " cardiac catheterization on 5/2/2025  - continue home clopidogrel 75mg qd   UTI (urinary tract infection)  - Urine culture: Klebsiella pneumoniae > 100,000 colonies (6/20/25)  - d/c Bactrim given mild allergic reaction  - Ancef d/c'd; coverage with cefepime  OWEN (acute kidney injury) (HCC)  Resolved  Anemia  Hgb stable at 8.6 today  Abnormal CT of the chest    Other eosinophilia      24 Hour Events : no acute events overnight  Subjective : Patient is doing well. She reports significant improvement with regards to her exertional dyspnea. She does not feel as short of breath, when walking around her room or to the bathroom. She feels ready to start moving more. She has no chest pain. She denies fever/chills. She denies pain or burning with urination. She is passing flatus, and tolerating PO. Patient would like to be discharged soon.     Objective :  Temp:  [97.5 °F (36.4 °C)-97.9 °F (36.6 °C)] 97.5 °F (36.4 °C)  HR:  [74-98] 89  BP: (107-133)/(63-74) 120/73  Resp:  [17-18] 18  SpO2:  [93 %-96 %] 94 %    Physical Exam  Vitals and nursing note reviewed.   Constitutional:       General: She is not in acute distress.     Appearance: She is well-developed.   HENT:      Head: Normocephalic and atraumatic.     Eyes:      Conjunctiva/sclera: Conjunctivae normal.       Cardiovascular:      Rate and Rhythm: Normal rate and regular rhythm.      Pulses: Normal pulses.      Heart sounds: Normal heart sounds. No murmur heard.  Pulmonary:      Effort: Pulmonary effort is normal. No respiratory distress.      Breath sounds: Normal breath sounds. No wheezing.   Abdominal:      Palpations: Abdomen is soft.      Tenderness: There is no abdominal tenderness. There is no guarding or rebound.      Comments: Normal bowel sounds     Musculoskeletal:         General: No swelling.      Cervical back: Neck supple.     Skin:     General: Skin is warm and dry.      Capillary Refill: Capillary refill takes less than 2 seconds.      Comments: Port  site with no erythema surrounding it, and no tenderness     Neurological:      Mental Status: She is alert.     Psychiatric:         Mood and Affect: Mood normal.         Behavior: Behavior normal.         Thought Content: Thought content normal.         Judgment: Judgment normal.         Lab Results: I have reviewed the following results:    Imaging Results Review: No pertinent imaging studies reviewed.  Other Study Results Review: No additional pertinent studies reviewed.    VTE Pharmacologic Prophylaxis: VTE covered by:  apixaban, Oral, 5 mg at 07/01/25 1848      VTE Mechanical Prophylaxis: sequential compression device    Vikash Xavier MD  Obstetrics & Gynecology PGY-2  7/2/2025  6:33 AM

## 2025-07-02 NOTE — UTILIZATION REVIEW
Initial Clinical Review    Admission: Date/Time/Statement:   Admission Orders (From admission, onward)       Ordered        06/28/25 1426  INPATIENT ADMISSION  Once                          Orders Placed This Encounter   Procedures    INPATIENT ADMISSION     Standing Status:   Standing     Number of Occurrences:   1     Level of Care:   Med Surg [16]     Estimated length of stay:   More than 2 Midnights     Certification:   I certify that inpatient services are medically necessary for this patient for a duration of greater than two midnights. See H&P and MD Progress Notes for additional information about the patient's course of treatment.     ED Arrival Information       Expected   -    Arrival   6/28/2025 11:58    Acuity   Emergent              Means of arrival   Ambulance    Escorted by   [facility]    Service   GYN Oncology    Admission type   Emergency              Arrival complaint   EMS allergic reaction             Chief Complaint   Patient presents with    Rash     Pt arrives by EMS from [facility] with hives to her bilat hands and arms. Pt denies having similar allergic reactions and denies new soaps/foods/etc. Pt reports starting bactrim 3 days ago and took a dose this morning.        Initial Presentation:     70-year-old female presenting to the ED for complaint of hives while taking Bactrim. Patient states that this morning she took a dose of Bactrim and then broke up with itchy hives on both of her hands. Normal was called and EMS gave 50 mg of Benadryl. Noted with port in the right chest for chemotherapy from her history of endometrial cancer. The port appears to be erythematous with streaking up to the neck and tenderness on palpation. Hx Stage IIIC1 endometrial cancer s/p RA-TLH, BSO, LND 10/2024, 5 cycles adjuvant carbo/taxol, 4 cycles adjuvant pembrolizumab, whole pelvic RT, currently receiving vaginal brachytherapy. 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 Port streak as per image below.  Admission CXR neg. Labs: Hgb 9.6, , K 3.4, BUN 29, Cr 2.31, Mg 1.8, procalcitonin 0.32, , CRP 91.8.  Admitted to inpatient status for dyspnea. Plan: IVABT, cultures, K repletion, consult cardio, consult IR.   Per IR: Port site red but not tender.  Iimages seen on media tab.  IR consulted for port removal.  Recommend 1 week of Keflex and re-assess.  If still red or worsened, please re-contact IR for removal.         Date: 6/29/25   Day 2:   Rising creatinine with a history of stage IV chronic kidney disease.  Creatinine now 2.6.  Will gently hydrate with LR at 50 cc/h.  Consult nephrology. Patient noted onset of rash in the neck and bilateral forearms.  This initiated when she started Bactrim.  Is unclear if this is a reaction to the drug.  Patient is currently off Bactrim.  Will add p.o. Benadryl and topical steroids for comfort.    Per renal: OWEN  Etiology: Factorial likely secondary to hemodynamic changes in the settings of sepsis, borderline hypotension, Bactrim .  Patient was on Keytruda until February develop cardiac toxicity, currently leukocyturia although recent infection we do not rule out AIN.  AIN can be present up to 1 year after last Keytruda dose.  Eos although normal they are trending up   Baseline creatinine 1.6 to 1.8 mg/dL  Current creatinine: 2.6 mL/dL, seems to be plateauing  Peak creatinine: Trending  UA: Leukocyturia with occasional bacteria, microhematuria  Renal imaging : Order  Treatment:  Will repeat urinalysis, UPCR,  urine eos  Maintain MAP:  Over 65 mmHg if possible/avoid hypoperfusion:  Hold parameters on blood pressure medications  Avoid nephrotoxic agents such as NSAIDs, and IV contrast if possible. Avoid opioids   Adjust medications to GFR  Will consider steroids if persistent leukocyturia with proteinuria and no improvement of kidney function.  Patient  is currently receiving treatment for infection,  we will have to discuss with ID  Do not recommend resume Bactrim at any time      Per cardio: dyspnea- patient presented yesterday after she devleoped a rash s/p Bactrim use for UTI, also with worsening dyspnea   - hs troponin levels are within normal limits  - Repeat echo this admission unchanged from prior: LVEF 50%, inferior hypokinesis, mild-moderate MR, mild TR.   - no overt volume overload on exam, BNP lower than prior  - EKG showing sinus rhythm, LBBB (chronic), occasional PVC's.   - at this time there does not appear to be an acute cardiac etiology for her dyspnea. There is most likely a component of deconditioning as her  reports she lays in bed most of the day due to fatigue. She also has anemia which could be contributing.  - no additional cardiac recommendations at this time other than to monitor volume status closely with IVF administration, replace potassium.      Date: 6/30/25  Day 3: Has surpassed a 2nd midnight with active treatments and services.  Dyspnea: appreciate cardiology input. No evdience of cardiac change.  CT Today given persistent symptoms, pulmonary consulted. Cellulitis: improving. Continue ABX. Rash: no improvement despite stopping bactrim. Bilateral arms and right side of neck. This could be related to immunotherapy. Will start topical steroids. If no improvement will need oral steroids. Continue to monitor respiratory status, O2 needs, VS, follow labs    Per pulm:  Noted scattered mixed consolidations with some ground glass infiltrates - the differential is fairly broad and includes typical and atypical pneumonia, viral pneumonitis, concern for drug induced pneumonitis and organizing pneumonia with Keytruda use, radiation pneumonitis, vasculitis and fungal infections are also possible   I reviewed the CT images with the patient and her   Given her current stability - will plan for infectious and serologic evaluation, may  consider empiric OCS course, may consider bronchoscopy  Will check RP2 panel, procalcitonin, CRP, MRSA swab, IgE level, Asp IgE, B-D glucan, Asp galactomannan, ANCA panels  Would consider expanding antibiotics to cefepime/vanc or pip/tazo and vanc pending above analysis.  No associated orders from this encounter found during lookback period of 72 hours.      ED Treatment-Medication Administration from 06/28/2025 1158 to 06/28/2025 1540         Date/Time Order Dose Route Action     06/28/2025 1206 diphenhydrAMINE (FOR EMS ONLY) (BENADRYL) injection 50 mg 0 mg Does not apply Given to EMS     06/28/2025 1333 sodium chloride 0.9 % bolus 500 mL 500 mL Intravenous New Bag     06/28/2025 1344 ceftriaxone (ROCEPHIN) 1 g/50 mL in dextrose IVPB 1,000 mg Intravenous New Bag            Scheduled Medications:  Medications 06/23 06/24 06/25 06/26 06/27 06/28 06/29 06/30 07/01 07/02   ampicillin-sulbactam (UNASYN) 1.5 g in sodium chloride 0.9 % 50 mL IVPB  Dose: 1.5 g  Freq: Every 6 hours Route: IV  Start: 06/30/25 1145 End: 06/30/25 1304   Admin Instructions:      Order specific questions:              1304-D/C'd  (1317)          apixaban (ELIQUIS) tablet 5 mg  Dose: 5 mg  Freq: 2 times daily Route: PO  Start: 06/28/25 1800   Admin Instructions:      Order specific questions:            1523     1800      0900     1651     1717      0853     1711      0852     1848      0925     1800        atorvastatin (LIPITOR) tablet 80 mg  Dose: 80 mg  Freq: Daily with dinner Route: PO  Start: 06/29/25 1630          1559      1711      1553      1630        azithromycin (ZITHROMAX) 500 mg in sodium chloride 0.9 % 250 mL IVPB  Dose: 500 mg  Freq: Every 24 hours Route: IV  Last Dose: 500 mg (06/30/25 1300)  Start: 06/30/25 1200 End: 06/30/25 1536   Admin Instructions:      Order specific questions:              1300     1536-D/C'd        ceFAZolin (ANCEF) IVPB (premix in dextrose) 2,000 mg 50 mL  Dose: 2,000 mg  Freq: Every 12 hours Route:  IV  Last Dose: Stopped (06/30/25 0101)  Start: 06/29/25 1345 End: 06/30/25 1307   Admin Instructions:      Order specific questions:             1407      0033     0052     0101     1143     1307     1307-D/C'd        ceFAZolin (ANCEF) IVPB (premix in dextrose) 2,000 mg 50 mL  Dose: 2,000 mg  Freq: Every 8 hours Route: IV  Start: 06/28/25 1515 End: 06/28/25 1519   Admin Instructions:      Order specific questions:            1519-D/C'd              ceFEPime (MAXIPIME) 2,000 mg in dextrose 5 % 50 mL IVPB  Dose: 2,000 mg  Freq: Every 12 hours Route: IV  Last Dose: 2,000 mg (07/02/25 0458)  Start: 06/30/25 1615   Admin Instructions:      Order specific questions:              1635      0348     0500     1555      0458     1615        ceftriaxone (ROCEPHIN) 1 g/50 mL in dextrose IVPB  Dose: 1,000 mg  Freq: Once Route: IV  Last Dose: Stopped (06/28/25 1531)  Start: 06/28/25 1315 End: 06/28/25 1531   Admin Instructions:      Order specific questions:            1344     1531            cephalexin (KEFLEX) capsule 500 mg  Dose: 500 mg  Freq: Every 8 hours Route: PO  Start: 06/28/25 2100 End: 06/28/25 1510   Admin Instructions:      Order specific questions:            1510-D/C'd          clopidogrel (PLAVIX) tablet 75 mg  Dose: 75 mg  Freq: Daily Route: PO  Start: 06/29/25 0900   Admin Instructions:             0809      0852      0852      0924        diphenhydrAMINE (FOR EMS ONLY) (BENADRYL) injection 50 mg  Dose: 1 each  Freq: Once Route: XX  Start: 06/28/25 1215 End: 06/28/25 1206   Admin Instructions:      Order specific questions:            1206            enoxaparin (LOVENOX) subcutaneous injection 100 mg  Dose: 1 mg/kg  Weight Dosing Info: 102 kg  Freq: Every 24 hours scheduled Route: SC  Start: 06/28/25 1600 End: 06/28/25 1637   Admin Instructions:            1556     1600     1637     1637-D/C'd          enoxaparin (LOVENOX) subcutaneous injection 100 mg  Dose: 1 mg/kg  Weight Dosing Info: 102 kg  Freq: 2 times  daily Route: SC  Start: 06/28/25 2100 End: 06/28/25 1548   Admin Instructions:            1548-D/C'd          famotidine (PEPCID) tablet 20 mg  Dose: 20 mg  Freq: Daily Route: PO  Start: 06/28/25 1600         1705      0809      0853      0853      0924        famotidine (PEPCID) tablet 20 mg  Dose: 20 mg  Freq: 2 times daily Route: PO  Start: 06/28/25 1800 End: 06/28/25 1548         1548-D/C'd          heparin (porcine) subcutaneous injection 7,500 Units  Dose: 7,500 Units  Freq: Every 8 hours scheduled Route: SC  Start: 06/28/25 1645 End: 06/29/25 1651   Admin Instructions:            1705     2055           0446          1402     1651-D/C'd         lurasidone (LATUDA) tablet 20 mg  Dose: 20 mg  Freq: Daily with breakfast Route: PO  Start: 06/29/25 0730   Admin Instructions:             0809      0852      0853      0925        magnesium Oxide (MAG-OX) tablet 400 mg  Dose: 400 mg  Freq: 2 times daily Route: PO  Start: 06/28/25 1800 End: 07/02/25 0300         1705      0809     1718      0853     1711      0852     1849      0300-D/C'd      magnesium sulfate 2 g/50 mL IVPB (premix) 2 g  Dose: 2 g  Freq: Once Route: IV  Last Dose: 2 g (06/28/25 1715)  Start: 06/28/25 1700 End: 06/28/25 1915   Admin Instructions:            1715            metoprolol succinate (TOPROL-XL) 24 hr tablet 50 mg  Dose: 50 mg  Freq: Every 12 hours Route: PO  Start: 06/28/25 1900   Admin Instructions:      Order specific questions:            [phone]) [phone] (8426) [phone]         piperacillin-tazobactam (ZOSYN) 4.5 g in sodium chloride 0.9 % 100 mL IV LOADING DOSE  Dose: 4.5 g  Freq: Once Route: IV  Start: 06/30/25 1330 End: 06/30/25 1536   Admin Instructions:      Order specific questions:              1536-D/C'd  (7688) [C]          Followed by   piperacillin-tazobactam (ZOSYN) 4.5 g in sodium chloride 0.9 % 100 mL IVPB (EXTENDED INFUSION)  Dose: 4.5 g  Freq: Every 8 hours  Route: IV  Start: 06/30/25 1730 End: 06/30/25 1536   Admin Instructions:      Order specific questions:              1536-D/C'd        potassium chloride 20 mEq IVPB (premix)  Dose: 20 mEq  Freq: Every 2 hours Route: IV  Last Dose: 20 mEq (06/28/25 1919)  Start: 06/28/25 1715 End: 06/28/25 2119   Admin Instructions:            1714     1919            sodium chloride 0.9 % bolus 500 mL  Dose: 500 mL  Freq: Once Route: IV  Last Dose: Stopped (06/28/25 1531)  Start: 06/28/25 1300 End: 06/28/25 1531         1333     1531            torsemide (DEMADEX) tablet 20 mg  Dose: 20 mg  Freq: Daily Route: PO  Start: 06/29/25 0900   Order specific questions:             (0805) [C]     1318      0900      0900      0900        triamcinolone (KENALOG) 0.1 % cream  Freq: 2 times daily Route: TP  Indications of Use: DERMATITIS  Start: 06/29/25 1130   Admin Instructions:      Order specific questions:             1110     1719      0854     1850      0854     1849      0925     1800                    Continuous Meds Sorted by Name  for Cherelle Dash as of 06/23/25 through 7/2/25  Legend:       Medications 06/23 06/24 06/25 06/26 06/27 06/28 06/29 06/30 07/01 07/02   lactated ringers infusion  Rate: 50 mL/hr Dose: 50 mL/hr  Freq: Continuous Route: IV  Last Dose: Stopped (06/29/25 1704)  Start: 06/29/25 1000 End: 06/29/25 1800          1015     1704 [C]     1800-D/C'd                     PRN Meds Sorted by Name  for Cherelle Dash as of 06/23/25 through 7/2/25  Legend:       Medications 06/23 06/24 06/25 06/26 06/27 06/28 06/29 06/30 07/01 07/02   acetaminophen (TYLENOL) tablet 650 mg  Dose: 650 mg  Freq: Every 4 hours PRN Route: PO  PRN Reason: mild pain  Indications of Use: FEVER,PAIN  Start: 06/28/25 2206 1960        1238         alteplase (CATHFLO) injection 2 mg  Dose: 2 mg  Freq: Every 1 Minute as needed Route: IK  PRN Reason: blocked port per protocol  Start: 05/29/25 1432 End: 06/01/25 0324   Admin Instructions:                    diphenhydrAMINE (BENADRYL) tablet 25 mg  Dose: 25 mg  Freq: Every 6 hours PRN Route: PO  PRN Reason: itching  Indications of Use: PRURITUS  Start: 06/29/25 1022                fentaNYL injection  Freq: As needed Route: IV  Start: 06/13/25 0852 End: 06/13/25 0917                hydrocortisone 1 % cream  Freq: 4 times daily PRN Route: TP  PRN Reasons: skin irritation,rash  Start: 06/29/25 0953   Admin Instructions:      Order specific questions:             1110     1601     2018 [C]      [phone]         iohexol (OMNIPAQUE) 350 MG/ML injection (SINGLE-DOSE) 3 mL  Dose: 3 mL  Freq: Once in imaging Route: IV  PRN Reason: contrast  Start: 06/13/25 1005 End: 06/13/25 1005                lidocaine 1% buffered  Freq: As needed Route: INFILTRATION  Start: 06/13/25 0858 End: 06/13/25 0920                LORazepam (ATIVAN) tablet 1 mg  Dose: 1 mg  Freq: Daily at bedtime PRN Route: PO  PRN Reason: insomnia  Start: 06/28/25 1514   Admin Instructions:              0052     1908      1941         midazolam (VERSED) injection  Freq: As needed  Start: 06/13/25 0852 End: 06/13/25 0917                Legend:       [id number]/2306/2406/2506/2606/2706/2806/2906/3007/0107/02      ED Triage Vitals   Temperature Pulse Respirations Blood Pressure SpO2 Pain Score   06/28/25 1204 06/28/25 1204 06/28/25 1204 06/28/25 1204 06/28/25 1204 06/28/25 1500   98.3 °F (36.8 °C) 87 12 (!) 95/48 95 % No Pain     Weight (last 2 days) before discharge       None            Vital Signs (last 3 days) before discharge       Date/Time Temp Pulse Resp BP MAP (mmHg) SpO2 O2 Device Patient Position - Orthostatic VS Nawaf Coma Scale Score Pain    07/02/25 0925 -- -- -- -- -- -- None (Room air) -- 15 No Pain    07/02/25 0822 -- -- -- -- -- -- -- -- -- No Pain    07/02/25 07:31:20 98.1 °F (36.7 °C) 83 19 111/66 81 95 % -- -- -- --    07/02/25 05:55:08 -- 89 -- 120/73 89 94 % -- -- -- --    07/01/25 4591 97.5 °F (36.4 °C) 74 -- 121/73  89 96 % -- -- -- --    07/01/25 2247 97.7 °F (36.5 °C) 98 18 133/74 94 96 % -- -- -- --    07/01/25 2000 -- -- -- -- -- -- -- -- 15 No Pain    07/01/25 18:50:55 -- 97 -- 113/66 82 93 % -- -- -- --    07/01/25 1849 -- 93 -- 113/66 -- -- -- -- -- --    07/01/25 15:52:34 97.5 °F (36.4 °C) 91 18 113/68 83 95 % -- -- -- --    07/01/25 1238 -- -- -- -- -- -- -- -- -- Med Not Given for Pain - for MAR use only    07/01/25 0900 -- -- -- -- -- -- -- -- 15 --    07/01/25 0800 -- -- -- -- -- -- -- -- 15 --    07/01/25 07:38:56 97.9 °F (36.6 °C) 87 17 107/63 78 96 % -- -- -- --    06/30/25 2228 98.3 °F (36.8 °C) 83 16 107/67 80 94 % None (Room air) Lying -- --    06/30/25 1930 -- -- -- -- -- -- -- -- 15 No Pain    06/30/25 19:09:32 -- 91 -- 135/76 96 95 % -- -- -- --    06/30/25 1909 -- 94 -- 135/76 -- -- -- -- -- --    06/30/25 15:33:45 98.2 °F (36.8 °C) 88 -- 105/62 76 97 % -- -- -- --    06/30/25 0905 -- -- -- -- -- -- -- -- 15 No Pain    06/30/25 08:04:57 98.9 °F (37.2 °C) 84 -- 107/62 77 94 % -- -- -- --    06/30/25 06:42:23 -- 85 -- 110/63 79 94 % -- -- -- --    06/30/25 0641 -- 88 -- 110/62 -- -- -- -- -- --    06/30/25 0028 -- -- -- -- -- -- None (Room air) -- 15 No Pain    06/29/25 23:19:55 98.1 °F (36.7 °C) 98 16 130/70 90 94 % -- -- -- --    06/29/25 2013 -- -- -- -- -- -- None (Room air) -- 15 No Pain    06/29/25 18:08:02 -- 89 -- 112/60 77 92 % -- -- -- --    06/29/25 18:07:14 -- 93 -- 112/60 77 90 % -- -- -- --    06/29/25 1807 -- 89 -- 112/60 -- -- -- -- -- --    06/29/25 0809 -- -- -- -- -- -- -- -- 15 No Pain    06/29/25 0721 97.2 °F (36.2 °C) 82 16 108/59 75 93 % -- -- -- --    06/29/25 07:18:31 97.2 °F (36.2 °C) -- -- 108/59 75 -- -- -- -- --    06/29/25 05:46:19 -- -- -- 115/61 79 -- -- -- -- --    06/29/25 0546 -- 79 -- 115/61 -- -- -- -- -- --    06/29/25 01:00:45 99.5 °F (37.5 °C) 94 18 -- -- 92 % -- -- -- --              Pertinent Labs/Diagnostic Test Results:   Radiology:  CT chest wo contrast   Final  Interpretation by Vinod Cornelius MD (06/30 1007)      Multifocal pneumonia. Some of the consolidations have atoll-type configuration which can be associated with other inflammatory processes such as cryptogenic organizing pneumonia or granulomatous disease, though conventional pneumonia is much more common    and therefore much more likely. However, given somewhat atypical configuration, recommend chest CT follow-up in 2-3 months to document resolution.      Note: The study was marked in EPIC for immediate notification. Imaging follow-up reminder notification was scheduled in the electronic medical record.      Workstation performed: [id number]         US kidney and bladder   Final Interpretation by Ty Mendoza DO (06/30 0255)      No acute abnormality or suspicious mass. No hydronephrosis            Resident: Asif Jefferson I, the attending radiologist, have reviewed the images and agree with the final report above.      Workstation performed: [id number]         XR chest 1 view portable   ED Interpretation by Panfilo Owusu MD (06/28 8731)   Patient rotated but no acute changes from previous x-ray noted.      Final Interpretation by Lidia Damon MD (06/30 0804)      No acute cardiopulmonary disease.            Workstation performed: [id number]           Cardiology:  ECG 12 lead   Final Result by Pamella Gonzales MD (06/29 8217)   Sinus rhythm with frequent Premature ventricular complexes   Left axis deviation   Left bundle branch block   Abnormal ECG   When compared with ECG of 28-Jun-2025 12:12, (unconfirmed)   Premature ventricular complexes are now Present   Confirmed by Pamella Gonzales ([id number]) on 6/29/2025 6:32:12 PM      Echo complete w/ contrast if indicated   Final Result by Pamella Gonzales MD (06/28 8473)        Stat limited echocardiogram.     Left Ventricle: Left ventricular cavity size is normal. Wall thickness    is normal. The left ventricular ejection fraction is 50% by visual     estimation. Systolic function is low normal.     The following segments are hypokinetic: basal inferior and basal    inferolateral.     All other segments are normal.     IVS: There is abnormal septal motion.     Mitral Valve: There is mild to moderate regurgitation.     Tricuspid Valve: There is mild regurgitation.     Prior TTE study available for comparison. Prior study date: 5/1/2025.    No significant changes noted compared to the prior study.         ECG 12 lead   Final Result by Torin Gold MD (06/30 7821)   Normal sinus rhythm   Left axis deviation   Left bundle branch block   Abnormal ECG   When compared with ECG of 01-May-2025 22:45,   QRS axis Shifted left   Nonspecific T wave abnormality no longer evident in Inferior leads   Confirmed by Torin Gold ([id number]) on 6/30/2025 9:38:02 AM        GI:  No orders to display       Results from last 7 days   Lab Units 06/30/25  1709   SARS-COV-2  Not Detected     Results from last 7 days   Lab Units 07/02/25  0557 07/01/25  0500 06/30/25  0502 06/29/25  0431 06/28/25  1332   WBC Thousand/uL 3.70* 3.55* 3.46* 3.67* 4.87   HEMOGLOBIN g/dL 8.6* 8.7* 8.4* 8.6* 9.6*   HEMATOCRIT % 27.7* 27.9* 26.6* 26.5* 30.6*   PLATELETS Thousands/uL 105* 104* 98* 96* 105*   TOTAL NEUT ABS Thousands/µL 2.38 2.23 2.20 2.42 3.63         Results from last 7 days   Lab Units 07/02/25  0557 07/01/25  0500 06/30/25  0502 06/29/25  0431 06/28/25  1332   SODIUM mmol/L 140 140 138 139 138   POTASSIUM mmol/L 4.6 4.2 3.8 3.7 3.4*   CHLORIDE mmol/L 112* 111* 108 108 103   CO2 mmol/L 21 22 22 22 24   ANION GAP mmol/L 7 7 8 9 11   BUN mg/dL 28* 32* 32* 32* 29*   CREATININE mg/dL 1.66* 1.94* 2.09* 2.60* 2.31*   EGFR ml/min/1.73sq m 30 25 23 17 20   CALCIUM mg/dL 9.2 8.9 8.7 8.6 8.8   MAGNESIUM mg/dL 2.4 2.3 2.5 2.7 1.8*   PHOSPHORUS mg/dL 4.1 3.8 3.5 4.0  --      Results from last 7 days   Lab Units 06/28/25  1332   AST U/L 14   ALT U/L 14   ALK PHOS U/L 89   TOTAL PROTEIN g/dL 6.7  "  ALBUMIN g/dL 3.6   TOTAL BILIRUBIN mg/dL 0.49         Results from last 7 days   Lab Units 07/02/25  0557 07/01/25  0500 06/30/25  0502 06/29/25  0431 06/28/25  1332   GLUCOSE RANDOM mg/dL 122 139 112 105 128             No results found for: \"BETA-HYDROXYBUTYRATE\"                   Results from last 7 days   Lab Units 06/28/25  1736 06/28/25  1640 06/28/25  1332   HS TNI 0HR ng/L  --   --  27   HS TNI 2HR ng/L  --  27  --    HSTNI D2 ng/L  --  0  --    HS TNI 4HR ng/L 27  --   --    HSTNI D4 ng/L 0  --   --                  Results from last 7 days   Lab Units 07/01/25  0500   PROCALCITONIN ng/ml 0.32*     Results from last 7 days   Lab Units 06/28/25  1329   LACTIC ACID mmol/L 1.5             Results from last 7 days   Lab Units 06/28/25  1332   BNP pg/mL 123*                         Results from last 7 days   Lab Units 06/30/25  1429   CRP mg/L 91.8*             Results from last 7 days   Lab Units 06/29/25  1318   CLARITY UA  Turbid   COLOR UA  Light Yellow   SPEC GRAV UA  1.013   PH UA  6.0   GLUCOSE UA mg/dl Negative   KETONES UA mg/dl Negative   BLOOD UA  Negative   PROTEIN UA mg/dl 30 (1+)*   NITRITE UA  Negative   BILIRUBIN UA  Negative   UROBILINOGEN UA (BE) mg/dl <2.0   LEUKOCYTES UA  Large*   WBC UA /hpf 20-30*   RBC UA /hpf None Seen   BACTERIA UA /hpf None Seen   EPITHELIAL CELLS WET PREP /hpf Occasional   CREATININE UR mg/dL 108.0   PROTEIN UR mg/dL 75.0   PROT/CREAT RATIO UR  0.7*     Results from last 7 days   Lab Units 06/30/25  1709   INFLUENZA B  Not Detected   RESPIRATORY SYNCYTIAL VIRUS  Not Detected     Results from last 7 days   Lab Units 06/30/25  1709   ADENOVIRUS  Not Detected   BORDETELLA PARAPERTUSSIS  Not Detected   BORDETELLA PERTUSSIS  Not Detected   CHLAMYDIA PNEUMONIAE  Not Detected   CORONAVIRUS 229E  Not Detected   CORONAVIRUS HKU1  Not Detected   CORONAVIRUS NL63  Not Detected   CORONAVIRUS OC43  Not Detected   METAPNEUMOVIRUS  Not Detected   RHINOVIRUS  Not Detected "   MYCOPLASMA PNEUMONIAE  Not Detected   PARAINFLUENZA 1  Not Detected   PARAINFLUENZA 2  Not Detected   PARAINFLUENZA 3  Not Detected   PARAINFLUENZA 4  Not Detected                         Results from last 7 days   Lab Units 06/28/25  1329   BLOOD CULTURE  No Growth at 72 hrs.  No Growth at 72 hrs.  No Growth at 72 hrs.                   Past Medical History[1]  Present on Admission:   Bipolar I disorder (HCC)   GERD (gastroesophageal reflux disease)   Type 2 diabetes mellitus with chronic kidney disease, without long-term current use of insulin (HCC)   Hyperlipidemia   Stage 4 chronic kidney disease (HCC)   Endometrial cancer (HCC)   Other pulmonary embolism without acute cor pulmonale (HCC)   Cardiomyopathy (HCC)   Drug-induced pneumonitis   Heart failure with mildly reduced ejection fraction (HCC)   Coronary artery disease involving native coronary artery of native heart with unstable angina pectoris (HCC)   (Resolved) OWEN (acute kidney injury) ([facility])      Admitting Diagnosis: Cellulitis [L03.90]  Rash [R21]  Allergic reaction [T78.40XA]  Dyspnea [R06.00]  Urticaria [L50.9]  Acute on chronic renal failure  (HCC) [N17.9, N18.9]  Age/Sex: 70 y.o. female    Network Utilization Review Department  ATTENTION: Please call with any questions or concerns to [phone] and carefully listen to the prompts so that you are directed to the right person. All voicemails are confidential.   For Discharge needs, contact Care Management DC Support Team at [phone] opt. 2  Send all requests for admission clinical reviews, approved or denied determinations and any other requests to dedicated fax number below belonging to the campus where the patient is receiving treatment. List of dedicated fax numbers for the Facilities:  FACILITY NAME UR FAX NUMBER   ADMISSION DENIALS (Administrative/Medical Necessity) [phone]   DISCHARGE SUPPORT TEAM (NETWORK) [phone]   PARENT CHILD HEALTH (Maternity/NICU/Pediatrics)  [phone]   [facility] [phone]   [facility] [phone]   [facility] [phone]   [facility] [phone]   [facility] [phone]   [facility] [phone]   [facility] [phone]   [facility] [phone]   [facility] [phone]   [facility] [phone]   [facility] [phone]   [facility] [phone]              [1]   Past Medical History:  Diagnosis Date    Abnormal ECG     OWEN (acute kidney injury) ([facility]) 09/30/2020    Anxiety 2002    Arthritis     Bipolar 1 disorder ([facility])     Cervical cancer ([facility])     Chronic kidney disease     stage 3    CPAP (continuous positive airway pressure) dependence     Depression 2001    Disease of thyroid gland 2001    Thyroid treated with Lithium medication for bi-polar disease    DVT (deep venous thrombosis) ([facility])     BLLE    Elevated blood pressure reading 06/10/2019    GERD (gastroesophageal reflux disease)     Obesity     Pulmonary emboli ([facility])     B/L    RSV (acute bronchiolitis due to respiratory syncytial virus) 12/05/2023    Sleep apnea     Sleep apnea, obstructive 2007    Urinary incontinence 2019    Uterine cancer ([facility])

## 2025-07-02 NOTE — ASSESSMENT & PLAN NOTE
- Urine culture: Klebsiella pneumoniae > 100,000 colonies (6/20/25)  - d/c Bactrim given mild allergic reaction  - Ancef d/c'd; coverage with cefepime

## 2025-07-02 NOTE — ASSESSMENT & PLAN NOTE
#Non-Oliguric KDIGO OWEN stage1--resolved  Etiology:   Factorial likely secondary to hemodynamic changes in the settings of sepsis, borderline hypotension, Bactrim .    Patient was on Keytruda until February develop cardiac toxicity, currently leukocyturia although recent infection we do not rule out AIN.  AIN can be present up to 1 year after last Keytruda dose.  Doubt AIN.  No evidence of peripheral eosinophilia and no eosinophils in the urine.  Baseline creatinine 1.6 to 1.8 mg/dL with persistent proteinuria.  Renal function has improved back to baseline with a creatinine of 1.6 mg/dL  UA: Leukocyturia with occasional bacteria, microhematuria  Urine culture: Klebsiella  Urine protein creatinine ratio 0.7 g/g but patient has an active urinary infection.  prior UPCR measurements lower in early between 0.2-0.3  Renal imaging : Order  Current status: Renal function has improved and near baseline  Treatment:  Maintain MAP:  Over 65 mmHg if possible/avoid hypoperfusion:  Hold parameters on blood pressure medications  Avoid nephrotoxic agents such as NSAIDs, and IV contrast if possible. Avoid opioids   No longer on Bactrim  Status post brachytherapy yesterday  No indication for any further IV fluids is tolerating oral intake well  No indication for steroids  Daily BMP  Torsemide remains on hold.  Examines close to euvolemic at this time.  Blood pressure is quite stable.  No indication to restart at this time.  Overall stable from a renal standpoint for discharge when deemed stable by the primary team.

## 2025-07-02 NOTE — ASSESSMENT & PLAN NOTE
Lab Results   Component Value Date    EGFR 30 07/02/2025    EGFR 25 07/01/2025    EGFR 23 06/30/2025    CREATININE 1.66 (H) 07/02/2025    CREATININE 1.94 (H) 07/01/2025    CREATININE 2.09 (H) 06/30/2025     Follows with Nephrology outpatient  - Nephrology consulted, appreciate recommendations  - Creatinine currently on the downtrend, today at 1.66 down from 1.94 yesterday; slowly on the downtrend  - OWEN resolved  - Baseline Cr of 1.6-1.8  - UA: notable for large leukocytes, 20-30 WBC's, and occasional bacteria  - Renal/kidney U/S: No acute abnormality or suspicious mass. No hydronephrosis   - UPC 0.7, urine eosinophils negative    Plan:  Per nephrology

## 2025-07-02 NOTE — PHYSICAL THERAPY NOTE
07/02/25 1125   PT Last Visit   PT Visit Date 07/02/25   Note Type   Note type Screen   Additional Comments PT orders received and chart reviewed. Per Kaylee AUSTIN, OT pt is not in need of skilled IP PT services at this time.  Pt is SUP with all bed mob, trans and ambulation without an AD.This PT spoke with pt who reports she is IND with no PT needs. Will dc pt from skilled IP PT caseload. If any new need arises, please re-consult.   Discharge Recommendation   Rehab Resource Intensity Level, PT No post-acute rehabilitation needs   Licensure   [place] License Number  Yoana Santos, SHELDON

## 2025-07-02 NOTE — PROGRESS NOTES
Progress Note - Nephrology   Name: Cherelle Dash 70 y.o. female I MRN: [id number]  Unit/Bed#: W -01 I Date of Admission: 6/28/2025   Date of Service: 7/2/2025 I Hospital Day: 4     Assessment & Plan  OWEN (acute kidney injury) (HCC) (Resolved: 7/2/2025)  #Non-Oliguric KDIGO OWEN stage1--resolved  Etiology:   Factorial likely secondary to hemodynamic changes in the settings of sepsis, borderline hypotension, Bactrim .    Patient was on Keytruda until February develop cardiac toxicity, currently leukocyturia although recent infection we do not rule out AIN.  AIN can be present up to 1 year after last Keytruda dose.  Doubt AIN.  No evidence of peripheral eosinophilia and no eosinophils in the urine.  Baseline creatinine 1.6 to 1.8 mg/dL with persistent proteinuria.  Renal function has improved back to baseline with a creatinine of 1.6 mg/dL  UA: Leukocyturia with occasional bacteria, microhematuria  Urine culture: Klebsiella  Urine protein creatinine ratio 0.7 g/g but patient has an active urinary infection.  prior UPCR measurements lower in early between 0.2-0.3  Renal imaging : Order  Current status: Renal function has improved and near baseline  Treatment:  Maintain MAP:  Over 65 mmHg if possible/avoid hypoperfusion:  Hold parameters on blood pressure medications  Avoid nephrotoxic agents such as NSAIDs, and IV contrast if possible. Avoid opioids   No longer on Bactrim  Status post brachytherapy yesterday  No indication for any further IV fluids is tolerating oral intake well  No indication for steroids  Daily BMP  Torsemide remains on hold.  Examines close to euvolemic at this time.  Blood pressure is quite stable.  No indication to restart at this time.  Overall stable from a renal standpoint for discharge when deemed stable by the primary team.    Stage 4 chronic kidney disease (HCC)  Lab Results   Component Value Date    EGFR 30 07/02/2025    EGFR 25 07/01/2025    EGFR 23 06/30/2025    CREATININE 1.66 (H)  "07/02/2025    CREATININE 1.94 (H) 07/01/2025    CREATININE 2.09 (H) 06/30/2025     Baseline creatinine: 1.6 to 1.8 mg/dL  Etiology: Likely secondary to nephrosclerosis, nephron loss  Outpatient nephrologist Dr. Macias  Type 2 diabetes mellitus with chronic kidney disease, without long-term current use of insulin (HCC)  Lab Results   Component Value Date    HGBA1C 6.6 (H) 05/01/2025   HbA1c 6.6  Advised to maintain a good DM control to prevent progression of CKD       No results for input(s): \"POCGLU\" in the last 72 hours.    Blood Sugar Average: Last 72 hrs:      Endometrial cancer (HCC)  Status post surgical intervention in 2024  Status post Keytruda discontinued in  settings of cardiac toxicity  Status post carboplatin  Currently on pelvic radiation  History of myocarditis  Felt to be secondary to checkpoint inhibitors   Status post prednisone received colchicine for 3 months   Fu by Dr. Ackerman    Heart failure with mildly reduced ejection fraction (HCC)  Wt Readings from Last 3 Encounters:   06/28/25 102 kg (224 lb)   06/13/25 102 kg (225 lb)   05/21/25 101 kg (223 lb 9.6 oz)   Examines euvolemic  Off IV fluids can monitor off IV fluids at this time  Off torsemide at this time but can be restarted at a lower dose if evidence of volume, the weights are currently stable  UTI (urinary tract infection)  Urine culture from June 22 grew out Klebsiella.  Blood cultures are no growth  Patient also found to have a multifocal pneumonia  Anemia  Current hemoglobin:8.7 mg/dL stable  Treatment:  Transfuse for hemoglobin less than 7.0 per primary service    Abnormal CT of the chest    Other eosinophilia      I have reviewed the nephrology recommendations including assessment and plan, with patient and family member, and we are in agreement with renal plan including the information outlined above. Ok for discharge from Nephrology service perspective.    Subjective   Brief History of Admission -  70 y.o. woman  with PMH of " endometrial cancer  (status post pembrolizumab stopped) 2025 in the settings of cardiomyopathy, required steroids, 5 cycles of carboplatin, Taxol last on March 2025 currently on radiation ), status post  RA-TLH , BSO, p/w possible port infection.  Nephrology is consulted for management of OWEN     Status post brachytherapy yesterday no issues.    Not reporting any shortness of breath or worsening swelling.    Saturating 95% on room air.    Blood pressure stable ranging anywhere from 110-130 systolic    Objective :  Temp:  [97.5 °F (36.4 °C)-98.1 °F (36.7 °C)] 98.1 °F (36.7 °C)  HR:  [74-98] 83  BP: (111-133)/(66-74) 111/66  Resp:  [18-19] 19  SpO2:  [93 %-96 %] 95 %    Current Weight: Weight - Scale: 102 kg (224 lb)  First Weight: Weight - Scale: 102 kg (224 lb 13.9 oz)  I/O         06/30 0701  07/01 0700 07/01 0701  07/02 0700 07/02 0701  07/03 0700    P.O. 840 640     IV Piggyback       Total Intake(mL/kg) 840 (8.2) 640 (6.3)     Urine (mL/kg/hr) 700 (0.3) 751 (0.3)     Stool       Total Output 700 751     Net +140 -111                  Physical Exam  Vitals and nursing note reviewed. Exam conducted with a chaperone present.   Constitutional:       General: She is not in acute distress.     Appearance: She is well-developed. She is obese. She is not diaphoretic.   HENT:      Head: Normocephalic and atraumatic.     Eyes:      General: No scleral icterus.     Pupils: Pupils are equal, round, and reactive to light.       Cardiovascular:      Rate and Rhythm: Normal rate and regular rhythm.      Heart sounds: Normal heart sounds. No murmur heard.     No friction rub. No gallop.   Pulmonary:      Effort: Pulmonary effort is normal. No respiratory distress.      Breath sounds: Normal breath sounds. No wheezing or rales.   Chest:      Chest wall: No tenderness.   Abdominal:      General: Bowel sounds are normal. There is no distension.      Palpations: Abdomen is soft.      Tenderness: There is no abdominal tenderness.  "There is no rebound.     Musculoskeletal:         General: Normal range of motion.      Cervical back: Normal range of motion and neck supple.     Skin:     Findings: No rash.     Neurological:      Mental Status: She is alert and oriented to person, place, and time.         Medications:  Current Medications[1]      Lab Results: I have reviewed the following results:  Results from last 7 days   Lab Units 07/02/25  0557 07/01/25  0500 06/30/25  0502 06/29/25  0431 06/28/25  1332   WBC Thousand/uL 3.70* 3.55* 3.46* 3.67* 4.87   HEMOGLOBIN g/dL 8.6* 8.7* 8.4* 8.6* 9.6*   HEMATOCRIT % 27.7* 27.9* 26.6* 26.5* 30.6*   PLATELETS Thousands/uL 105* 104* 98* 96* 105*   POTASSIUM mmol/L 4.6 4.2 3.8 3.7 3.4*   CHLORIDE mmol/L 112* 111* 108 108 103   CO2 mmol/L 21 22 22 22 24   BUN mg/dL 28* 32* 32* 32* 29*   CREATININE mg/dL 1.66* 1.94* 2.09* 2.60* 2.31*   CALCIUM mg/dL 9.2 8.9 8.7 8.6 8.8   MAGNESIUM mg/dL 2.4 2.3 2.5 2.7 1.8*   PHOSPHORUS mg/dL 4.1 3.8 3.5 4.0  --    ALBUMIN g/dL  --   --   --   --  3.6       Administrative Statements   I have spent a total time of 20 minutes in caring for this patient on the day of the visit/encounter including Counseling / Coordination of care, Documenting in the medical record, Reviewing/placing orders in the medical record (including tests, medications, and/or procedures), and Obtaining or reviewing history  .  Portions of the record may have been created with voice recognition software. Occasional wrong word or \"sound a like\" substitutions may have occurred due to the inherent limitations of voice recognition software. Read the chart carefully and recognize, using context, where substitutions have occurred.If you have any questions, please contact the dictating provider.       [1]   Current Facility-Administered Medications:     acetaminophen (TYLENOL) tablet 650 mg, 650 mg, Oral, Q4H PRN, Jacky Page MD, 650 mg at 07/01/25 1238    apixaban (ELIQUIS) tablet 5 mg, 5 mg, Oral, BID, " Tucker Izquierdo MD, 5 mg at 07/02/25 0925    atorvastatin (LIPITOR) tablet 80 mg, 80 mg, Oral, Daily With Dinner, Tucker Izquierdo MD, 80 mg at 07/01/25 1553    ceFEPime (MAXIPIME) 2,000 mg in dextrose 5 % 50 mL IVPB, 2,000 mg, Intravenous, Q12H, Vikash Xavier MD, Last Rate: 100 mL/hr at 07/02/25 0458, 2,000 mg at 07/02/25 0458    clopidogrel (PLAVIX) tablet 75 mg, 75 mg, Oral, Daily, Tucker Izquierdo MD, 75 mg at 07/02/25 0924    diphenhydrAMINE (BENADRYL) tablet 25 mg, 25 mg, Oral, Q6H PRN, Rodney Downing MD    famotidine (PEPCID) tablet 20 mg, 20 mg, Oral, Daily, Tucker Izquierdo MD, 20 mg at 07/02/25 0924    hydrocortisone 1 % cream, , Topical, 4x Daily PRN, Tucker Izquierdo MD, Given at 07/01/25 1400    LORazepam (ATIVAN) tablet 1 mg, 1 mg, Oral, HS PRN, Tucker Izquierdo MD, 1 mg at 07/01/25 1941    lurasidone (LATUDA) tablet 20 mg, 20 mg, Oral, Daily With Breakfast, Tucker Izquierdo MD, 20 mg at 07/02/25 0925    metoprolol succinate (TOPROL-XL) 24 hr tablet 50 mg, 50 mg, Oral, Q12H, Tucker Izquierdo MD, 50 mg at 07/02/25 0556    [Held by provider] torsemide (DEMADEX) tablet 20 mg, 20 mg, Oral, Daily, Tucker Izquierdo MD    triamcinolone (KENALOG) 0.1 % cream, , Topical, BID, Rodney Downing MD, Given at 07/02/25 0925

## 2025-07-02 NOTE — PLAN OF CARE
Problem: PAIN - ADULT  Goal: Verbalizes/displays adequate comfort level or baseline comfort level  Description: Interventions:  - Encourage patient to monitor pain and request assistance  - Assess pain using appropriate pain scale  - Administer analgesics as ordered based on type and severity of pain and evaluate response  - Implement non-pharmacological measures as appropriate and evaluate response  - Consider cultural and social influences on pain and pain management  - Notify physician/advanced practitioner if interventions unsuccessful or patient reports new pain  - Educate patient/family on pain management process including their role and importance of  reporting pain   - Provide non-pharmacologic/complimentary pain relief interventions  Outcome: Progressing     Problem: INFECTION - ADULT  Goal: Absence or prevention of progression during hospitalization  Description: INTERVENTIONS:  - Assess and monitor for signs and symptoms of infection  - Monitor lab/diagnostic results  - Monitor all insertion sites, i.e. indwelling lines, tubes, and drains  - Monitor endotracheal if appropriate and nasal secretions for changes in amount and color  - [place] appropriate cooling/warming therapies per order  - Administer medications as ordered  - Instruct and encourage patient and family to use good hand hygiene technique  - Identify and instruct in appropriate isolation precautions for identified infection/condition  Outcome: Progressing  Goal: Absence of fever/infection during neutropenic period  Description: INTERVENTIONS:  - Monitor WBC  - Perform strict hand hygiene  - Limit to healthy visitors only  - No plants, dried, fresh or silk flowers with martínez in patient room  Outcome: Progressing     Problem: SAFETY ADULT  Goal: Patient will remain free of falls  Description: INTERVENTIONS:  - Educate patient/family on patient safety including physical limitations  - Instruct patient to call for assistance with activity   -  Consider consulting OT/PT to assist with strengthening/mobility based on AM PAC & JH-HLM score  - Consult OT/PT to assist with strengthening/mobility   - Keep Call bell within reach  - Keep bed low and locked with side rails adjusted as appropriate  - Keep care items and personal belongings within reach  - Initiate and maintain comfort rounds  - Make Fall Risk Sign visible to staff  - Offer Toileting every  Hours, in advance of need  - Initiate/Maintain alarm  - Obtain necessary fall risk management equipment: - Apply yellow socks and bracelet for high fall risk patients  - Consider moving patient to room near nurses station  Outcome: Progressing  Goal: Maintain or return to baseline ADL function  Description: INTERVENTIONS:  -  Assess patient's ability to carry out ADLs; assess patient's baseline for ADL function and identify physical deficits which impact ability to perform ADLs (bathing, care of mouth/teeth, toileting, grooming, dressing, etc.)  - Assess/evaluate cause of self-care deficits   - Assess range of motion  - Assess patient's mobility; develop plan if impaired  - Assess patient's need for assistive devices and provide as appropriate  - Encourage maximum independence but intervene and supervise when necessary  - Involve family in performance of ADLs  - Assess for home care needs following discharge   - Consider OT consult to assist with ADL evaluation and planning for discharge  - Provide patient education as appropriate  - Monitor functional capacity and physical performance, use of AM PAC & JH-HLM   - Monitor gait, balance and fatigue with ambulation    Outcome: Progressing  Goal: Maintains/Returns to pre admission functional level  Description: INTERVENTIONS:  - Perform AM-PAC 6 Click Basic Mobility/ Daily Activity assessment daily.  - Set and communicate daily mobility goal to care team and patient/family/caregiver.   - Collaborate with rehabilitation services on mobility goals if consulted  -  Perform Range of Motion times a day.  - Reposition patient every hours.  - Dangle patient  times a day  - Stand patient  times a day  - Ambulate patient  times a day  - Out of bed to chair  times a day   - Out of bed for meals times a day  - Out of bed for toileting  - Record patient progress and toleration of activity level   Outcome: Progressing     Problem: DISCHARGE PLANNING  Goal: Discharge to home or other facility with appropriate resources  Description: INTERVENTIONS:  - Identify barriers to discharge w/patient and caregiver  - Arrange for needed discharge resources and transportation as appropriate  - Identify discharge learning needs (meds, wound care, etc.)  - Arrange for interpretive services to assist at discharge as needed  - Refer to Case Management Department for coordinating discharge planning if the patient needs post-hospital services based on physician/advanced practitioner order or complex needs related to functional status, cognitive ability, or social support system  Outcome: Progressing     Problem: Knowledge Deficit  Goal: Patient/family/caregiver demonstrates understanding of disease process, treatment plan, medications, and discharge instructions  Description: Complete learning assessment and assess knowledge base.  Interventions:  - Provide teaching at level of understanding  - Provide teaching via preferred learning methods  Outcome: Progressing     Problem: RESPIRATORY - ADULT  Goal: Achieves optimal ventilation and oxygenation  Description: INTERVENTIONS:  - Assess for changes in respiratory status  - Assess for changes in mentation and behavior  - Position to facilitate oxygenation and minimize respiratory effort  - Oxygen administered by appropriate delivery if ordered  - Initiate smoking cessation education as indicated  - Encourage broncho-pulmonary hygiene including cough, deep breathe, Incentive Spirometry  - Assess the need for suctioning and aspirate as needed  - Assess and  instruct to report SOB or any respiratory difficulty  - Respiratory Therapy support as indicated  Outcome: Progressing

## 2025-07-02 NOTE — ASSESSMENT & PLAN NOTE
Stage IIIC1 grade 1 endometrial adenocarcinoma. S/p robotic-assisted total laparoscopic hysterectomy, bilateral salpingo-oophorectomy, lymph node dissection on 10/11/2024, 5 cycles of adjuvant carboplatin/paclitaxel, 4 cycles of adjuvant pembrolizumab, whole pelvic radiation, now currently receiving vaginal brachytherapy. 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

## 2025-07-02 NOTE — PROGRESS NOTES
Progress Note - Pulmonology   Name: Cherelle Dash 70 y.o. female I MRN: [id number]  Unit/Bed#: W -01 I Date of Admission: 6/28/2025   Date of Service: 7/2/2025 I Hospital Day: 4    Assessment & Plan  Dyspnea  Likely multifactorial given her protracted course with noted frequent hospitalizations, recent steroid use, CAD post PCI and fatigue with ongoing cancer therapy  Evidence of pulmonary inflammation as listed below - will plan further evaluation as listed below  Abnormal CT of the chest  Noted scattered mixed consolidations with some ground glass infiltrates - the differential is fairly broad and includes typical and atypical pneumonia, viral pneumonitis, concern for drug induced pneumonitis and organizing pneumonia with Keytruda use, radiation pneumonitis, vasculitis and fungal infections are also possible   Currently stable on room air denies any shortness of breath but has also not been very active  WBC 3.46-3.55-3.70  RP 2 panel negative, procalcitonin slightly elevated 0.32, CRP 91.8, MRSA negative  Remainder of serologic evaluation pending IgE level, ASP IgE, B-D glucan, ASP galactomannan and ANCA panels  Can consider empiric OCS course, may consider bronchoscopy-currently on Plavix and Eliquis will need to hold for 48 to 72 hours prior to bronchoscopy  Currently on cefepime day #3- continue for 7 days  Could consider expanding to Zosyn if Pt decompensates  We will schedule 1 to 2-week follow-up outpatient with repeat chest x-ray  Drug-induced pneumonitis  Remains on differential, further differential and evaluation as above  Off Keytruda since 2/2025    Endometrial cancer (HCC)  Per Gyn Onc Service     Other pulmonary embolism without acute cor pulmonale (HCC)  IVC filter removed  Remains on eliquis   Other eosinophilia  Eosinophils 510 - 490 - 560, urine eosinophils negative  May be related to antibiotics and drug reaction, further evaluation as above    No further recommendations from pulmonary.   We will sign off.  Please feel free to reach out with any questions or concerns    24 Hour Events : No overnight events  Subjective : Pt seen and examined at bedside.    Objective :  Temp:  [97.5 °F (36.4 °C)-98.1 °F (36.7 °C)] 98.1 °F (36.7 °C)  HR:  [74-98] 83  BP: (111-133)/(66-74) 111/66  Resp:  [18-19] 19  SpO2:  [93 %-96 %] 95 %    Physical Exam  Vitals reviewed.   Constitutional:       General: She is not in acute distress.     Appearance: She is obese. She is not ill-appearing.   HENT:      Head: Normocephalic and atraumatic.      Right Ear: External ear normal.      Left Ear: External ear normal.      Nose: Nose normal.      Mouth/Throat:      Mouth: Mucous membranes are moist.      Pharynx: Oropharynx is clear.     Eyes:      Extraocular Movements: Extraocular movements intact.      Pupils: Pupils are equal, round, and reactive to light.       Cardiovascular:      Rate and Rhythm: Normal rate and regular rhythm.      Pulses: Normal pulses.      Heart sounds: Normal heart sounds.   Pulmonary:      Effort: Pulmonary effort is normal.      Breath sounds: Normal breath sounds. No wheezing or rhonchi.   Abdominal:      Palpations: Abdomen is soft.     Musculoskeletal:         General: Normal range of motion.      Cervical back: Normal range of motion and neck supple.      Right lower leg: No edema.      Left lower leg: No edema.     Skin:     General: Skin is warm and dry.     Neurological:      Mental Status: She is alert and oriented to person, place, and time.     Psychiatric:         Mood and Affect: Mood normal.         Behavior: Behavior normal.         Thought Content: Thought content normal.         Judgment: Judgment normal.         Lab Results: I have reviewed the following results:   .     07/02/25  0557   WBC 3.70*   HGB 8.6*   HCT 27.7*   *   SODIUM 140   K 4.6   *   CO2 21   BUN 28*   CREATININE 1.66*   GLUC 122   MG 2.4   PHOS 4.1     ABG: No new results in last 24 hours.    Imaging  Results Review: I reviewed radiology reports from this admission including: CT chest.  CT Chest 06/30/2025  Multifocal pneumonia. Some of the consolidations have atoll-type configuration which can be associated with other inflammatory processes such as cryptogenic organizing pneumonia or granulomatous disease, though conventional pneumonia is much more common   and therefore much more likely. However, given somewhat atypical configuration, recommend chest CT follow-up in 2-3 months to document resolution.  Other Study Results Review: Other studies reviewed include: ECHOecho 06/28/2025 shows LVEF 50% systolic function low normal mild regurgitation no tricuspid valve and mitral valve  PFT Results Reviewed: No formal PFTs

## 2025-07-02 NOTE — ASSESSMENT & PLAN NOTE
New-onset. Shortly after taking bactrim for UTI, also presenting with a rash. Patient without additional cardiopulmonary symptoms. Prior history of immunotherapy-induced cardiomyopathy. EKG with known LBBB (chronic), and new PVCs.  - Cardiology consulted, recommendations appreciated; dyspnea deemed not secondary to cardiac etiology, echo unchanged from prior exam, BNP lower from previous value 123 (6/28/25)   - No growth for 72 hours in blood cultures x2  - Pulmonology consulted, appreciate recs  - CT chest w/o contrast: multifocal PNA, with some of the consolidations have atoll-type configuration which can be associated with other inflammatory processes such as cryptogenic organizing pneumonia or granulomatous disease   - CRP elevated at 91.3, procal 0.32  - Respiratory panel negative  - MRSA swab, IgE level, Asp IgE, B-D glucan, Asp galactomannan, ANCA labs pending  - Per discussion w/ pulmonology, cefepime q12h for PNA coverage (this abx chosen given patient's current OWEN)  - Currently on day #3 of abx  - Patient appears clinically improved from this standpoint, reporting significant improvement  - SpO2 overnight ranging from 93-96%  - Consider transition to PO abx regimen  - PT/OT eval before d/c

## 2025-07-02 NOTE — ASSESSMENT & PLAN NOTE
Lab Results   Component Value Date    EGFR 30 07/02/2025    EGFR 25 07/01/2025    EGFR 23 06/30/2025    CREATININE 1.66 (H) 07/02/2025    CREATININE 1.94 (H) 07/01/2025    CREATININE 2.09 (H) 06/30/2025     Baseline creatinine: 1.6 to 1.8 mg/dL  Etiology: Likely secondary to nephrosclerosis, nephron loss  Outpatient nephrologist Dr. Macias

## 2025-07-02 NOTE — PLAN OF CARE
Problem: PAIN - ADULT  Goal: Verbalizes/displays adequate comfort level or baseline comfort level  Description: Interventions:  - Encourage patient to monitor pain and request assistance  - Assess pain using appropriate pain scale  - Administer analgesics as ordered based on type and severity of pain and evaluate response  - Implement non-pharmacological measures as appropriate and evaluate response  - Consider cultural and social influences on pain and pain management  - Notify physician/advanced practitioner if interventions unsuccessful or patient reports new pain  - Educate patient/family on pain management process including their role and importance of  reporting pain   - Provide non-pharmacologic/complimentary pain relief interventions  Outcome: Progressing     Problem: INFECTION - ADULT  Goal: Absence or prevention of progression during hospitalization  Description: INTERVENTIONS:  - Assess and monitor for signs and symptoms of infection  - Monitor lab/diagnostic results  - Monitor all insertion sites, i.e. indwelling lines, tubes, and drains  - Monitor endotracheal if appropriate and nasal secretions for changes in amount and color  - [place] appropriate cooling/warming therapies per order  - Administer medications as ordered  - Instruct and encourage patient and family to use good hand hygiene technique  - Identify and instruct in appropriate isolation precautions for identified infection/condition  Outcome: Progressing  Goal: Absence of fever/infection during neutropenic period  Description: INTERVENTIONS:  - Monitor WBC  - Perform strict hand hygiene  - Limit to healthy visitors only  - No plants, dried, fresh or silk flowers with martínez in patient room  Outcome: Progressing     Problem: SAFETY ADULT  Goal: Patient will remain free of falls  Description: INTERVENTIONS:  - Educate patient/family on patient safety including physical limitations  - Instruct patient to call for assistance with activity   -  Consider consulting OT/PT to assist with strengthening/mobility based on AM PAC & JH-HLM score  - Consult OT/PT to assist with strengthening/mobility   - Keep Call bell within reach  - Keep bed low and locked with side rails adjusted as appropriate  - Keep care items and personal belongings within reach  - Initiate and maintain comfort rounds  - Make Fall Risk Sign visible to staff  - Offer Toileting every  Hours, in advance of need  - Initiate/Maintain alarm  - Obtain necessary fall risk management equipment:   - Apply yellow socks and bracelet for high fall risk patients  - Consider moving patient to room near nurses station  Outcome: Progressing  Goal: Maintain or return to baseline ADL function  Description: INTERVENTIONS:  -  Assess patient's ability to carry out ADLs; assess patient's baseline for ADL function and identify physical deficits which impact ability to perform ADLs (bathing, care of mouth/teeth, toileting, grooming, dressing, etc.)  - Assess/evaluate cause of self-care deficits   - Assess range of motion  - Assess patient's mobility; develop plan if impaired  - Assess patient's need for assistive devices and provide as appropriate  - Encourage maximum independence but intervene and supervise when necessary  - Involve family in performance of ADLs  - Assess for home care needs following discharge   - Consider OT consult to assist with ADL evaluation and planning for discharge  - Provide patient education as appropriate  - Monitor functional capacity and physical performance, use of AM PAC & JH-HLM   - Monitor gait, balance and fatigue with ambulation    Outcome: Progressing  Goal: Maintains/Returns to pre admission functional level  Description: INTERVENTIONS:  - Perform AM-PAC 6 Click Basic Mobility/ Daily Activity assessment daily.  - Set and communicate daily mobility goal to care team and patient/family/caregiver.   - Collaborate with rehabilitation services on mobility goals if consulted  -  Perform Range of Motion  times a day.  - Reposition patient every  hours.  - Dangle patient  times a day  - Stand patient  times a day  - Ambulate patient  times a day  - Out of bed to chair  times a day   - Out of bed for meals  times a day  - Out of bed for toileting  - Record patient progress and toleration of activity level   Outcome: Progressing     Problem: DISCHARGE PLANNING  Goal: Discharge to home or other facility with appropriate resources  Description: INTERVENTIONS:  - Identify barriers to discharge w/patient and caregiver  - Arrange for needed discharge resources and transportation as appropriate  - Identify discharge learning needs (meds, wound care, etc.)  - Arrange for interpretive services to assist at discharge as needed  - Refer to Case Management Department for coordinating discharge planning if the patient needs post-hospital services based on physician/advanced practitioner order or complex needs related to functional status, cognitive ability, or social support system  Outcome: Progressing     Problem: Knowledge Deficit  Goal: Patient/family/caregiver demonstrates understanding of disease process, treatment plan, medications, and discharge instructions  Description: Complete learning assessment and assess knowledge base.  Interventions:  - Provide teaching at level of understanding  - Provide teaching via preferred learning methods  Outcome: Progressing     Problem: RESPIRATORY - ADULT  Goal: Achieves optimal ventilation and oxygenation  Description: INTERVENTIONS:  - Assess for changes in respiratory status  - Assess for changes in mentation and behavior  - Position to facilitate oxygenation and minimize respiratory effort  - Oxygen administered by appropriate delivery if ordered  - Initiate smoking cessation education as indicated  - Encourage broncho-pulmonary hygiene including cough, deep breathe, Incentive Spirometry  - Assess the need for suctioning and aspirate as needed  - Assess and  instruct to report SOB or any respiratory difficulty  - Respiratory Therapy support as indicated  Outcome: Progressing

## 2025-07-02 NOTE — ASSESSMENT & PLAN NOTE
Noted scattered mixed consolidations with some ground glass infiltrates - the differential is fairly broad and includes typical and atypical pneumonia, viral pneumonitis, concern for drug induced pneumonitis and organizing pneumonia with Keytruda use, radiation pneumonitis, vasculitis and fungal infections are also possible   Currently stable on room air denies any shortness of breath but has also not been very active  WBC 3.46-3.55-3.70  RP 2 panel negative, procalcitonin slightly elevated 0.32, CRP 91.8, MRSA negative  Remainder of serologic evaluation pending IgE level, ASP IgE, B-D glucan, ASP galactomannan and ANCA panels  Can consider empiric OCS course, may consider bronchoscopy-currently on Plavix and Eliquis will need to hold for 48 to 72 hours prior to bronchoscopy  Currently on cefepime day #3- continue for 7 days  Could consider expanding to Zosyn if Pt decompensates  We will schedule 1 to 2-week follow-up outpatient with repeat chest x-ray

## 2025-07-03 ENCOUNTER — HOSPITAL ENCOUNTER (OUTPATIENT)
Dept: INFUSION CENTER | Facility: CLINIC | Age: 70
Discharge: HOME | End: 2025-07-03
Payer: COMMERCIAL

## 2025-07-03 ENCOUNTER — TRANSITIONAL CARE MANAGEMENT (OUTPATIENT)
Dept: FAMILY MEDICINE CLINIC | Facility: CLINIC | Age: 70
End: 2025-07-03

## 2025-07-03 DIAGNOSIS — C54.1 ENDOMETRIAL CANCER (HCC): ICD-10-CM

## 2025-07-03 DIAGNOSIS — Z45.2 ENCOUNTER FOR CENTRAL LINE CARE: Primary | ICD-10-CM

## 2025-07-03 LAB
BACTERIA BLD CULT: NORMAL
FUNGITELL VALUE: <31.25 PG/ML
GALACTOMANNAN AG SPEC IA-ACNC: 0.03 INDEX (ref 0–0.49)
INTERPRETATION: NORMAL
JCPYV AB SERPL QL IA: NEGATIVE
Lab: NORMAL
REFERENCE VALUE: NORMAL

## 2025-07-03 PROCEDURE — 96523 IRRIG DRUG DELIVERY DEVICE: CPT

## 2025-07-03 NOTE — PROGRESS NOTES
Pt here for port deaccess. Pt tolerated well with no complaints. Port flush canceled for next week. Pt to see oncologist later this month and wishes to get port removed. Declined AVS.

## 2025-07-03 NOTE — UTILIZATION REVIEW
NOTIFICATION OF ADMISSION DISCHARGE   This is a Notification of Discharge from [facility]. Please be advised that this patient has been discharge from our facility. Below you will find the admission and discharge date and time including the patient’s disposition.   UTILIZATION REVIEW CONTACT:  Utilization Review Assistants  Network Utilization Review Department  Phone: [phone] x carefully listen to the prompts. All voicemails are confidential.  Email: [email or web address]     ADMISSION INFORMATION  PRESENTATION DATE: 6/28/2025 11:58 AM  OBERVATION ADMISSION DATE: N/A  INPATIENT ADMISSION DATE: 6/28/25  2:26 PM   DISCHARGE DATE: 7/2/2025  2:39 PM   DISPOSITION:Home/Self Care    Network Utilization Review Department  ATTENTION: Please call with any questions or concerns to [phone] and carefully listen to the prompts so that you are directed to the right person. All voicemails are confidential.   For Discharge needs, contact Care Management DC Support Team at [phone] opt. 2  Send all requests for admission clinical reviews, approved or denied determinations and any other requests to dedicated fax number below belonging to the campus where the patient is receiving treatment. List of dedicated fax numbers for the Facilities:  FACILITY NAME UR FAX NUMBER   ADMISSION DENIALS (Administrative/Medical Necessity) [phone]   DISCHARGE SUPPORT TEAM ([facility]) [phone]   PARENT CHILD HEALTH (Maternity/NICU/Pediatrics) [phone]   [facility] [phone]   [facility] [phone]   Critical access hospital [phone]   [facility] [phone]   [facility] [phone]   [facility] [phone]   [facility] [phone]   [facility] [phone]   [facility]  [facility] [phone]   [facility] [phone]   [facility] [phone]   [facility] [phone]

## 2025-07-08 ENCOUNTER — OFFICE VISIT (OUTPATIENT)
Dept: FAMILY MEDICINE CLINIC | Facility: CLINIC | Age: 70
End: 2025-07-08
Payer: COMMERCIAL

## 2025-07-08 ENCOUNTER — APPOINTMENT (OUTPATIENT)
Dept: RADIATION ONCOLOGY | Facility: HOSPITAL | Age: 70
End: 2025-07-08
Attending: RADIOLOGY
Payer: COMMERCIAL

## 2025-07-08 VITALS
BODY MASS INDEX: 43.43 KG/M2 | OXYGEN SATURATION: 94 % | WEIGHT: 221.2 LBS | TEMPERATURE: 99.3 F | HEART RATE: 97 BPM | DIASTOLIC BLOOD PRESSURE: 80 MMHG | RESPIRATION RATE: 22 BRPM | HEIGHT: 60 IN | SYSTOLIC BLOOD PRESSURE: 126 MMHG

## 2025-07-08 DIAGNOSIS — N18.4 CHRONIC KIDNEY DISEASE, STAGE 4 (SEVERE) (HCC): ICD-10-CM

## 2025-07-08 DIAGNOSIS — J18.9 PNEUMONIA OF BOTH LUNGS DUE TO INFECTIOUS ORGANISM, UNSPECIFIED PART OF LUNG: ICD-10-CM

## 2025-07-08 DIAGNOSIS — Z09 HOSPITAL DISCHARGE FOLLOW-UP: Primary | ICD-10-CM

## 2025-07-08 DIAGNOSIS — N18.4 TYPE 2 DIABETES MELLITUS WITH STAGE 4 CHRONIC KIDNEY DISEASE, WITHOUT LONG-TERM CURRENT USE OF INSULIN (HCC): ICD-10-CM

## 2025-07-08 DIAGNOSIS — T50.905A DRUG-INDUCED PNEUMONITIS: ICD-10-CM

## 2025-07-08 DIAGNOSIS — J98.4 DRUG-INDUCED PNEUMONITIS: ICD-10-CM

## 2025-07-08 DIAGNOSIS — E11.22 TYPE 2 DIABETES MELLITUS WITH STAGE 4 CHRONIC KIDNEY DISEASE, WITHOUT LONG-TERM CURRENT USE OF INSULIN (HCC): ICD-10-CM

## 2025-07-08 DIAGNOSIS — I50.22 HEART FAILURE WITH MILDLY REDUCED EJECTION FRACTION (HCC): ICD-10-CM

## 2025-07-08 PROBLEM — L03.90 CELLULITIS: Status: RESOLVED | Noted: 2025-06-28 | Resolved: 2025-07-08

## 2025-07-08 LAB
RAD ONC ARIA COURSE FIRST TREATMENT DATE: NORMAL
RAD ONC ARIA COURSE ID: NORMAL
RAD ONC ARIA COURSE INTENT: NORMAL
RAD ONC ARIA COURSE LAST TREATMENT DATE: NORMAL
RAD ONC ARIA COURSE SESSION NUMBER: 3
RAD ONC ARIA COURSE START DATE: NORMAL
RAD ONC ARIA COURSE TREATMENT ELAPSED DAYS: 14
RAD ONC ARIA PLAN FRACTIONS TREATED TO DATE: 3
RAD ONC ARIA PLAN ID: NORMAL
RAD ONC ARIA PLAN NAME: NORMAL
RAD ONC ARIA PLAN PRESCRIBED DOSE PER FRACTION: 6 GY
RAD ONC ARIA PLAN PRIMARY REFERENCE POINT: NORMAL
RAD ONC ARIA PLAN TOTAL FRACTIONS PRESCRIBED: 3
RAD ONC ARIA PLAN TOTAL PRESCRIBED DOSE: 1800 CGY
RAD ONC ARIA REFERENCE POINT DOSAGE GIVEN TO DATE: 18 GY
RAD ONC ARIA REFERENCE POINT ID: NORMAL
RAD ONC ARIA REFERENCE POINT SESSION DOSAGE GIVEN: 6 GY

## 2025-07-08 PROCEDURE — 77770 HDR RDNCL NTRSTL/ICAV BRCHTX: CPT | Performed by: RADIOLOGY

## 2025-07-08 PROCEDURE — 77280 THER RAD SIMULAJ FIELD SMPL: CPT | Performed by: RADIOLOGY

## 2025-07-08 PROCEDURE — 99496 TRANSJ CARE MGMT HIGH F2F 7D: CPT | Performed by: FAMILY MEDICINE

## 2025-07-08 PROCEDURE — 57156 INS VAG BRACHYTX DEVICE: CPT | Performed by: RADIOLOGY

## 2025-07-08 PROCEDURE — C1717 BRACHYTX, NON-STR,HDR IR-192: HCPCS | Performed by: RADIOLOGY

## 2025-07-08 NOTE — PROGRESS NOTES
Transition of Care Visit:  Name: Cherelle Dash      : 1955      MRN: [id number]  Encounter Provider: Gretchen Mayorga DO  Encounter Date: 2025   Enc[place] [facility]    Assessment & Plan  Hospital discharge follow-up         Drug-induced pneumonitis         Type 2 diabetes mellitus with stage 4 chronic kidney disease, without long-term current use of insulin (HCC)    Lab Results   Component Value Date    HGBA1C 6.6 (H) 2025            Pneumonia of both lungs due to infectious organism, unspecified part of lung  Multilobar  Finished antibiotics       Heart failure with mildly reduced ejection fraction (HCC)  Wt Readings from Last 3 Encounters:   25 100 kg (221 lb 3.2 oz)   25 102 kg (224 lb)   25 102 kg (225 lb)     Was held               Chronic kidney disease, stage 4 (severe) ([facility])  Lab Results   Component Value Date    EGFR 30 2025    EGFR 25 2025    EGFR 23 2025    CREATININE 1.66 (H) 2025    CREATININE 1.94 (H) 2025    CREATININE 2.09 (H) 2025     Torsemide was held due OWEN in hospital  Will need to clarify if needs to restart            History of Present Illness     Transitional Care Management Review:   Cherelle Dash is a 70 y.o. female here for TCM follow up.     During the TCM phone call patient stated:  TCM Call (since 2025)     Date and time call was made  7/3/2025  9:10 AM    Patient was hospitialized at  [facility]    Date of Admission  25    Date of discharge  25    Diagnosis  Dyspnea    Disposition  Home    Were the patients medications reviewed and updated  Yes      TCM Call (since 2025)     Scheduled for follow up?  Yes    Did you obtain your prescribed medications  Yes    Do you need help managing your prescriptions or medications  No    Is transportation to your appointment needed  No    I have advised the patient to call PCP with any new or worsening symptoms   Cassie Dias,     Living Arrangements  Spouse or Significiant other    Are you recieving home care services  No        Here for hospital follow up  Initially port infection   Started antibiotics  Kept her for SOB  Cardiology did not find cause of SOB  Pulm ordered CT- diagnosed with multifocal pneumonia  Started on antibiotic  Has follow up appt with them this week  Needs to get cxr prior to pulm appt  Getting CT chest/abd/pelvis tomorrow      Review of Systems   Constitutional:  Positive for fatigue.   HENT: Negative.     Eyes: Negative.    Respiratory:  Positive for shortness of breath.    Cardiovascular: Negative.    Gastrointestinal: Negative.    Endocrine: Negative.    Genitourinary: Negative.    Musculoskeletal:  Positive for arthralgias (knee pain).   Allergic/Immunologic: Negative.    Neurological: Negative.    Hematological: Negative.    Psychiatric/Behavioral: Negative.       Objective   /80 (BP Location: Left arm, Patient Position: Sitting, Cuff Size: Standard)   Pulse 97   Temp 99.3 °F (37.4 °C) (Tympanic)   Resp 22   Ht 5' (1.524 m)   Wt 100 kg (221 lb 3.2 oz)   SpO2 94%   BMI 43.20 kg/m²     Physical Exam  Vitals and nursing note reviewed.   Constitutional:       Appearance: She is well-developed.   HENT:      Head: Normocephalic and atraumatic.      Right Ear: External ear normal.      Left Ear: External ear normal.      Nose: Nose normal.     Eyes:      Conjunctiva/sclera: Conjunctivae normal.      Pupils: Pupils are equal, round, and reactive to light.       Cardiovascular:      Rate and Rhythm: Normal rate and regular rhythm.      Heart sounds: Normal heart sounds.   Pulmonary:      Effort: Pulmonary effort is normal.      Breath sounds: Normal breath sounds.   Abdominal:      General: Bowel sounds are normal.      Palpations: Abdomen is soft.     Musculoskeletal:         General: Normal range of motion.      Cervical back: Normal range of motion and neck supple.     Skin:      General: Skin is warm and dry.      Capillary Refill: Capillary refill takes less than 2 seconds.     Neurological:      Mental Status: She is alert and oriented to person, place, and time.     Psychiatric:         Behavior: Behavior normal.         Thought Content: Thought content normal.         Judgment: Judgment normal.       Medications have been reviewed by provider in current encounter

## 2025-07-08 NOTE — ASSESSMENT & PLAN NOTE
Wt Readings from Last 3 Encounters:   07/08/25 100 kg (221 lb 3.2 oz)   06/28/25 102 kg (224 lb)   06/13/25 102 kg (225 lb)     Was held

## 2025-07-08 NOTE — ASSESSMENT & PLAN NOTE
Lab Results   Component Value Date    EGFR 30 07/02/2025    EGFR 25 07/01/2025    EGFR 23 06/30/2025    CREATININE 1.66 (H) 07/02/2025    CREATININE 1.94 (H) 07/01/2025    CREATININE 2.09 (H) 06/30/2025     Torsemide was held due OWEN in hospital  Will need to clarify if needs to restart

## 2025-07-09 ENCOUNTER — HOSPITAL ENCOUNTER (OUTPATIENT)
Dept: CT IMAGING | Facility: HOSPITAL | Age: 70
Discharge: HOME | End: 2025-07-09
Attending: OBSTETRICS & GYNECOLOGY
Payer: COMMERCIAL

## 2025-07-09 ENCOUNTER — APPOINTMENT (OUTPATIENT)
Dept: LAB | Facility: AMBULARY SURGERY CENTER | Age: 70
End: 2025-07-09
Payer: COMMERCIAL

## 2025-07-09 DIAGNOSIS — N18.4 CHRONIC KIDNEY DISEASE, STAGE 4 (SEVERE) (HCC): ICD-10-CM

## 2025-07-09 DIAGNOSIS — N18.32 STAGE 3B CHRONIC KIDNEY DISEASE (HCC): ICD-10-CM

## 2025-07-09 DIAGNOSIS — C54.1 ENDOMETRIAL CANCER (HCC): ICD-10-CM

## 2025-07-09 LAB
ALBUMIN SERPL BCG-MCNC: 3.5 G/DL (ref 3.5–5)
ANION GAP SERPL CALCULATED.3IONS-SCNC: 12 MMOL/L (ref 4–13)
BUN SERPL-MCNC: 21 MG/DL (ref 5–25)
CALCIUM SERPL-MCNC: 9.2 MG/DL (ref 8.4–10.2)
CHLORIDE SERPL-SCNC: 107 MMOL/L (ref 96–108)
CO2 SERPL-SCNC: 20 MMOL/L (ref 21–32)
CREAT SERPL-MCNC: 1.55 MG/DL (ref 0.6–1.3)
CREAT UR-MCNC: 123.5 MG/DL
ERYTHROCYTE [DISTWIDTH] IN BLOOD BY AUTOMATED COUNT: 17.2 % (ref 11.6–15.1)
GFR SERPL CREATININE-BSD FRML MDRD: 33 ML/MIN/1.73SQ M
GLUCOSE P FAST SERPL-MCNC: 126 MG/DL (ref 65–99)
HCT VFR BLD AUTO: 28.3 % (ref 34.8–46.1)
HGB BLD-MCNC: 9 G/DL (ref 11.5–15.4)
MCH RBC QN AUTO: 31.1 PG (ref 26.8–34.3)
MCHC RBC AUTO-ENTMCNC: 31.8 G/DL (ref 31.4–37.4)
MCV RBC AUTO: 98 FL (ref 82–98)
PHOSPHATE SERPL-MCNC: 3.9 MG/DL (ref 2.3–4.1)
PLATELET # BLD AUTO: 115 THOUSANDS/UL (ref 149–390)
PMV BLD AUTO: 12.7 FL (ref 8.9–12.7)
POTASSIUM SERPL-SCNC: 4 MMOL/L (ref 3.5–5.3)
PROT UR-MCNC: 66.1 MG/DL
PROT/CREAT UR: 0.5 MG/G{CREAT}
PTH-INTACT SERPL-MCNC: 24.2 PG/ML (ref 12–88)
RBC # BLD AUTO: 2.89 MILLION/UL (ref 3.81–5.12)
SODIUM SERPL-SCNC: 139 MMOL/L (ref 135–147)
WBC # BLD AUTO: 5.1 THOUSAND/UL (ref 4.31–10.16)

## 2025-07-09 PROCEDURE — 71250 CT THORAX DX C-: CPT

## 2025-07-09 PROCEDURE — 83970 ASSAY OF PARATHORMONE: CPT

## 2025-07-09 PROCEDURE — 82570 ASSAY OF URINE CREATININE: CPT

## 2025-07-09 PROCEDURE — 36415 COLL VENOUS BLD VENIPUNCTURE: CPT

## 2025-07-09 PROCEDURE — 85027 COMPLETE CBC AUTOMATED: CPT

## 2025-07-09 PROCEDURE — 84156 ASSAY OF PROTEIN URINE: CPT

## 2025-07-09 PROCEDURE — 74176 CT ABD & PELVIS W/O CONTRAST: CPT

## 2025-07-09 PROCEDURE — 80069 RENAL FUNCTION PANEL: CPT

## 2025-07-10 ENCOUNTER — OFFICE VISIT (OUTPATIENT)
Dept: PULMONOLOGY | Facility: CLINIC | Age: 70
End: 2025-07-10
Payer: COMMERCIAL

## 2025-07-10 ENCOUNTER — TELEPHONE (OUTPATIENT)
Age: 70
End: 2025-07-10

## 2025-07-10 ENCOUNTER — HOSPITAL ENCOUNTER (OUTPATIENT)
Dept: RADIOLOGY | Facility: HOSPITAL | Age: 70
Discharge: HOME | End: 2025-07-10
Payer: COMMERCIAL

## 2025-07-10 VITALS
TEMPERATURE: 97.4 F | OXYGEN SATURATION: 93 % | SYSTOLIC BLOOD PRESSURE: 124 MMHG | WEIGHT: 220 LBS | BODY MASS INDEX: 42.97 KG/M2 | DIASTOLIC BLOOD PRESSURE: 80 MMHG | HEART RATE: 93 BPM

## 2025-07-10 DIAGNOSIS — I27.82 OTHER CHRONIC PULMONARY EMBOLISM WITHOUT ACUTE COR PULMONALE (HCC): Primary | ICD-10-CM

## 2025-07-10 DIAGNOSIS — R05.2 SUBACUTE COUGH: ICD-10-CM

## 2025-07-10 DIAGNOSIS — R93.89 ABNORMAL CT OF THE CHEST: ICD-10-CM

## 2025-07-10 DIAGNOSIS — E66.01 MORBID OBESITY WITH BMI OF 40.0-44.9, ADULT (HCC): ICD-10-CM

## 2025-07-10 DIAGNOSIS — R06.00 DYSPNEA, UNSPECIFIED TYPE: ICD-10-CM

## 2025-07-10 PROCEDURE — 71046 X-RAY EXAM CHEST 2 VIEWS: CPT

## 2025-07-10 PROCEDURE — G2211 COMPLEX E/M VISIT ADD ON: HCPCS | Performed by: NURSE PRACTITIONER

## 2025-07-10 PROCEDURE — 99214 OFFICE O/P EST MOD 30 MIN: CPT | Performed by: NURSE PRACTITIONER

## 2025-07-10 RX ORDER — BENZONATATE 200 MG/1
200 CAPSULE ORAL 3 TIMES DAILY PRN
Qty: 20 CAPSULE | Refills: 0 | Status: SHIPPED | OUTPATIENT
Start: 2025-07-10

## 2025-07-10 NOTE — PROGRESS NOTES
Follow-up  Visit - Pulmonary Medicine   Name: Cherelle Dash      : 1955      MRN: [id number]  Encounter Provider: ZURI David  Encounter Date: 7/10/2025   Encounter department: [facility]  :  Assessment & Plan  Other chronic pulmonary embolism without acute cor pulmonale (HCC)  Diagnosed in 2024 and maintains on Eliquis       Morbid obesity with BMI of 40.0-44.9, adult (HCC)  Lifestyle modifications and weight loss if able       Abnormal CT of the chest  Scattered infiltrates noted with consolidations and groundglass greatest on the left are at least stable if not mildly progressed on imaging  Symptoms have worsened despite cephalosporin  Culture data negative in the hospital, as well as RP 2 panel  Aspergillus, Fungitell, ANCA negative with CRP 91  Differential diagnosis is broad and includes typical versus atypical pneumonia, viral pneumonitis, concern for drug-induced pneumonitis or organizing pneumonia with previous Keytruda use (last use 2025), radiation pneumonitis, vasculitis and/or fungal infections are also possible  We discussed bronchoscopy with BAL for further diagnostic data versus initiation of empiric course of oral corticosteroids.  We discussed risks and benefits of each option and she ultimately decided on bronchoscopy.  Message sent to procedure coordinator to schedule this within the next 1 week.  Based upon results and overall status, will determine course thereafter.       Dyspnea, unspecified type  Did not require supplemental oxygen in the hospital       Subacute cough  Suspect related to abnormal CT chest  Give Tessalon for symptom management  If refractory, she will return call to the office  Orders:    benzonatate (TESSALON) 200 MG capsule; Take 1 capsule (200 mg total) by mouth 3 (three) times a day as needed for cough      Return in about 4 weeks (around 2025), or if symptoms worsen or fail to improve.    All of  Cherelle's questions were answered prior to leaving the office today. She will follow-up as listed above or sooner should the need arise, and is aware to call the office with any further questions or concerns.    History of Present Illness   Cherelle Dash is a 70 y.o. female who presents to the office for follow-up after hospitalization recently for abnormal CT chest.  She reports that overall she is feeling slightly worse.  She is more active than she was in the hospital, but more dyspneic on exertion.  She also has a fairly intractable dry cough without sputum production or hemoptysis.  She is not having fevers or chills.  She does have some discomfort with coughing, but no chest pain or leg pain/swelling.  Weight has remained stable.  She is currently off her diuretic, but her PCP is managing whether this needs to be restarted or not per notes.  She did complete her antibiotics with virtually no improvement in her symptoms.    Review of Systems   All other systems reviewed and are negative.  Aside from what is mentioned in the HPI, ROS is otherwise negative.     Medical History Reviewed by provider this encounter:  Tobacco  Allergies  Meds  Problems  Med Hx  Surg Hx  Fam Hx     .    Objective   /80 (BP Location: Left arm, Patient Position: Sitting, Cuff Size: Standard)   Pulse 93   Temp (!) 97.4 °F (36.3 °C) (Temporal)   Wt 99.8 kg (220 lb)   SpO2 93%   BMI 42.97 kg/m²     Physical Exam  Vitals reviewed.   Constitutional:       General: She is not in acute distress.     Appearance: She is well-developed. She is morbidly obese. She is not toxic-appearing or diaphoretic.   HENT:      Head: Normocephalic and atraumatic.     Eyes:      General: No scleral icterus.     Extraocular Movements: Extraocular movements intact.     Neck:      Trachea: No tracheal deviation.     Cardiovascular:      Rate and Rhythm: Normal rate and regular rhythm.      Heart sounds: S1 normal and S2 normal. No murmur heard.      No friction rub. No gallop.   Pulmonary:      Effort: Pulmonary effort is normal. No tachypnea, accessory muscle usage or respiratory distress.      Breath sounds: No stridor. Examination of the left-lower field reveals rales. Rales present. No decreased breath sounds, wheezing or rhonchi.   Chest:      Chest wall: No tenderness.   Abdominal:      General: Bowel sounds are normal. There is no distension.      Palpations: Abdomen is soft.      Tenderness: There is no abdominal tenderness.     Musculoskeletal:      Cervical back: Neck supple.      Right lower leg: No edema.      Left lower leg: No edema.     Skin:     General: Skin is warm and dry.      Findings: No rash.     Neurological:      Mental Status: She is alert and oriented to person, place, and time.      GCS: GCS eye subscore is 4. GCS verbal subscore is 5. GCS motor subscore is 6.     Psychiatric:         Speech: Speech normal.         Behavior: Behavior normal. Behavior is cooperative.       Diagnostic Data:  Chest x-ray done today shows multifocal opacities  CT chest done today pending radiologist read, when compared to last CT chest shows continued multifocal opacities with possible worsening in some areas      ZURI David

## 2025-07-10 NOTE — LETTER
July 10, 2025     Gretchen Mayorga DO  [address]  [address]  [place] [address]    Patient: Cherelle Dash   YOB: 1955   Date of Visit: 7/10/2025       Dear DO Asif Hurd DO:    Thank you for referring Cherelle Dash to me for evaluation. Below are my notes for this consultation.    If you have questions, please do not hesitate to call me. I look forward to following your patient along with you.         Sincerely,        ZURI David        CC: DO Tana Hernandez CRNP  7/10/2025 12:26 PM  Sign when Signing Visit  Follow-up  Visit - Pulmonary Medicine   Name: Cherelle Dash      : 1955      MRN: [id number]  Encounter Provider: ZURI David  Encounter Date: 7/10/2025   Encounter department: [facility]  :  Assessment & Plan  Other chronic pulmonary embolism without acute cor pulmonale (HCC)  Diagnosed in 2024 and maintains on Eliquis       Morbid obesity with BMI of 40.0-44.9, adult (HCC)  Lifestyle modifications and weight loss if able       Abnormal CT of the chest  Scattered infiltrates noted with consolidations and groundglass greatest on the left are at least stable if not mildly progressed on imaging  Symptoms have worsened despite cephalosporin  Culture data negative in the hospital, as well as RP 2 panel  Aspergillus, Fungitell, ANCA negative with CRP 91  Differential diagnosis is broad and includes typical versus atypical pneumonia, viral pneumonitis, concern for drug-induced pneumonitis or organizing pneumonia with previous Keytruda use (last use 2025), radiation pneumonitis, vasculitis and/or fungal infections are also possible  We discussed bronchoscopy with BAL for further diagnostic data versus initiation of empiric course of oral corticosteroids.  We discussed risks and benefits of each option and she ultimately decided on bronchoscopy.  Message sent to  procedure coordinator to schedule this within the next 1 week.  Based upon results and overall status, will determine course thereafter.       Dyspnea, unspecified type  Did not require supplemental oxygen in the hospital       Subacute cough  Suspect related to abnormal CT chest  Give Tessalon for symptom management  If refractory, she will return call to the office  Orders:  •  benzonatate (TESSALON) 200 MG capsule; Take 1 capsule (200 mg total) by mouth 3 (three) times a day as needed for cough      Return in about 4 weeks (around 8/7/2025), or if symptoms worsen or fail to improve.    All of Cherelle's questions were answered prior to leaving the office today. She will follow-up as listed above or sooner should the need arise, and is aware to call the office with any further questions or concerns.    History of Present Illness  Cherelle Dash is a 70 y.o. female who presents to the office for follow-up after hospitalization recently for abnormal CT chest.  She reports that overall she is feeling slightly worse.  She is more active than she was in the hospital, but more dyspneic on exertion.  She also has a fairly intractable dry cough without sputum production or hemoptysis.  She is not having fevers or chills.  She does have some discomfort with coughing, but no chest pain or leg pain/swelling.  Weight has remained stable.  She is currently off her diuretic, but her PCP is managing whether this needs to be restarted or not per notes.  She did complete her antibiotics with virtually no improvement in her symptoms.    Review of Systems   All other systems reviewed and are negative.  Aside from what is mentioned in the HPI, ROS is otherwise negative.     Medical History Reviewed by provider this encounter:  Tobacco  Allergies  Meds  Problems  Med Hx  Surg Hx  Fam Hx     .    Objective  /80 (BP Location: Left arm, Patient Position: Sitting, Cuff Size: Standard)   Pulse 93   Temp (!) 97.4 °F (36.3 °C)  (Temporal)   Wt 99.8 kg (220 lb)   SpO2 93%   BMI 42.97 kg/m²     Physical Exam  Vitals reviewed.   Constitutional:       General: She is not in acute distress.     Appearance: She is well-developed. She is morbidly obese. She is not toxic-appearing or diaphoretic.   HENT:      Head: Normocephalic and atraumatic.     Eyes:      General: No scleral icterus.     Extraocular Movements: Extraocular movements intact.     Neck:      Trachea: No tracheal deviation.     Cardiovascular:      Rate and Rhythm: Normal rate and regular rhythm.      Heart sounds: S1 normal and S2 normal. No murmur heard.     No friction rub. No gallop.   Pulmonary:      Effort: Pulmonary effort is normal. No tachypnea, accessory muscle usage or respiratory distress.      Breath sounds: No stridor. Examination of the left-lower field reveals rales. Rales present. No decreased breath sounds, wheezing or rhonchi.   Chest:      Chest wall: No tenderness.   Abdominal:      General: Bowel sounds are normal. There is no distension.      Palpations: Abdomen is soft.      Tenderness: There is no abdominal tenderness.     Musculoskeletal:      Cervical back: Neck supple.      Right lower leg: No edema.      Left lower leg: No edema.     Skin:     General: Skin is warm and dry.      Findings: No rash.     Neurological:      Mental Status: She is alert and oriented to person, place, and time.      GCS: GCS eye subscore is 4. GCS verbal subscore is 5. GCS motor subscore is 6.     Psychiatric:         Speech: Speech normal.         Behavior: Behavior normal. Behavior is cooperative.       Diagnostic Data:  Chest x-ray done today shows multifocal opacities  CT chest done today pending radiologist read, when compared to last CT chest shows continued multifocal opacities with possible worsening in some areas      ZURI David

## 2025-07-10 NOTE — TELEPHONE ENCOUNTER
Patient calling, stating that her appointment notes state CXR prior to appointment; she stated she did not know what that this. Explained that it was a chest x-ray and asked her if she was able to go earlier for her appointment and have it done prior. She stated she is not feeling well today and will not be able to get the test done prior. She did have a CT of the chest yesterday. Encouraged her to keep the appointment for today with Yoon Blank. Will notify Yoon of no chest x-ray being done. Patient sounded very short of breath on the phone.

## 2025-07-10 NOTE — ASSESSMENT & PLAN NOTE
Scattered infiltrates noted with consolidations and groundglass greatest on the left are at least stable if not mildly progressed on imaging  Symptoms have worsened despite cephalosporin  Culture data negative in the hospital, as well as RP 2 panel  Aspergillus, Fungitell, ANCA negative with CRP 91  Differential diagnosis is broad and includes typical versus atypical pneumonia, viral pneumonitis, concern for drug-induced pneumonitis or organizing pneumonia with previous Keytruda use (last use February 2025), radiation pneumonitis, vasculitis and/or fungal infections are also possible  We discussed bronchoscopy with BAL for further diagnostic data versus initiation of empiric course of oral corticosteroids.  We discussed risks and benefits of each option and she ultimately decided on bronchoscopy.  Message sent to procedure coordinator to schedule this within the next 1 week.  Based upon results and overall status, will determine course thereafter.        Attending Only

## 2025-07-10 NOTE — H&P (VIEW-ONLY)
Follow-up  Visit - Pulmonary Medicine   Name: Cherelle Dash      : 1955      MRN: [id number]  Encounter Provider: ZURI David  Encounter Date: 7/10/2025   Encounter department: [facility]  :  Assessment & Plan  Other chronic pulmonary embolism without acute cor pulmonale (HCC)  Diagnosed in 2024 and maintains on Eliquis       Morbid obesity with BMI of 40.0-44.9, adult (HCC)  Lifestyle modifications and weight loss if able       Abnormal CT of the chest  Scattered infiltrates noted with consolidations and groundglass greatest on the left are at least stable if not mildly progressed on imaging  Symptoms have worsened despite cephalosporin  Culture data negative in the hospital, as well as RP 2 panel  Aspergillus, Fungitell, ANCA negative with CRP 91  Differential diagnosis is broad and includes typical versus atypical pneumonia, viral pneumonitis, concern for drug-induced pneumonitis or organizing pneumonia with previous Keytruda use (last use 2025), radiation pneumonitis, vasculitis and/or fungal infections are also possible  We discussed bronchoscopy with BAL for further diagnostic data versus initiation of empiric course of oral corticosteroids.  We discussed risks and benefits of each option and she ultimately decided on bronchoscopy.  Message sent to procedure coordinator to schedule this within the next 1 week.  Based upon results and overall status, will determine course thereafter.       Dyspnea, unspecified type  Did not require supplemental oxygen in the hospital       Subacute cough  Suspect related to abnormal CT chest  Give Tessalon for symptom management  If refractory, she will return call to the office  Orders:    benzonatate (TESSALON) 200 MG capsule; Take 1 capsule (200 mg total) by mouth 3 (three) times a day as needed for cough      Return in about 4 weeks (around 2025), or if symptoms worsen or fail to improve.    All of  Cherelle's questions were answered prior to leaving the office today. She will follow-up as listed above or sooner should the need arise, and is aware to call the office with any further questions or concerns.    History of Present Illness   Cherelle Dash is a 70 y.o. female who presents to the office for follow-up after hospitalization recently for abnormal CT chest.  She reports that overall she is feeling slightly worse.  She is more active than she was in the hospital, but more dyspneic on exertion.  She also has a fairly intractable dry cough without sputum production or hemoptysis.  She is not having fevers or chills.  She does have some discomfort with coughing, but no chest pain or leg pain/swelling.  Weight has remained stable.  She is currently off her diuretic, but her PCP is managing whether this needs to be restarted or not per notes.  She did complete her antibiotics with virtually no improvement in her symptoms.    Review of Systems   All other systems reviewed and are negative.  Aside from what is mentioned in the HPI, ROS is otherwise negative.     Medical History Reviewed by provider this encounter:  Tobacco  Allergies  Meds  Problems  Med Hx  Surg Hx  Fam Hx     .    Objective   /80 (BP Location: Left arm, Patient Position: Sitting, Cuff Size: Standard)   Pulse 93   Temp (!) 97.4 °F (36.3 °C) (Temporal)   Wt 99.8 kg (220 lb)   SpO2 93%   BMI 42.97 kg/m²     Physical Exam  Vitals reviewed.   Constitutional:       General: She is not in acute distress.     Appearance: She is well-developed. She is morbidly obese. She is not toxic-appearing or diaphoretic.   HENT:      Head: Normocephalic and atraumatic.     Eyes:      General: No scleral icterus.     Extraocular Movements: Extraocular movements intact.     Neck:      Trachea: No tracheal deviation.     Cardiovascular:      Rate and Rhythm: Normal rate and regular rhythm.      Heart sounds: S1 normal and S2 normal. No murmur heard.      No friction rub. No gallop.   Pulmonary:      Effort: Pulmonary effort is normal. No tachypnea, accessory muscle usage or respiratory distress.      Breath sounds: No stridor. Examination of the left-lower field reveals rales. Rales present. No decreased breath sounds, wheezing or rhonchi.   Chest:      Chest wall: No tenderness.   Abdominal:      General: Bowel sounds are normal. There is no distension.      Palpations: Abdomen is soft.      Tenderness: There is no abdominal tenderness.     Musculoskeletal:      Cervical back: Neck supple.      Right lower leg: No edema.      Left lower leg: No edema.     Skin:     General: Skin is warm and dry.      Findings: No rash.     Neurological:      Mental Status: She is alert and oriented to person, place, and time.      GCS: GCS eye subscore is 4. GCS verbal subscore is 5. GCS motor subscore is 6.     Psychiatric:         Speech: Speech normal.         Behavior: Behavior normal. Behavior is cooperative.       Diagnostic Data:  Chest x-ray done today shows multifocal opacities  CT chest done today pending radiologist read, when compared to last CT chest shows continued multifocal opacities with possible worsening in some areas      ZURI David

## 2025-07-11 ENCOUNTER — TELEPHONE (OUTPATIENT)
Dept: PULMONOLOGY | Facility: CLINIC | Age: 70
End: 2025-07-11

## 2025-07-11 NOTE — TELEPHONE ENCOUNTER
Dr. CHIDLRESS will be performing a BRONCH on Cherelle Dash on 07/21/2025     LOCATION: 07/21/2025    ANTICOAGULATION INSTRUCTIONS: plavix hold for 7 days prior to procedure - eliquis ok to continue as per Tana Haile     OTHER MEDICATION INSTRUCTIONS: none    LABS: (CBC/PT/PTT in last 90 days): 07/10/2025   (If no, labs ordered / to be done at least one day prior to procedure)    LAST OFFICE NOTE/H&P within 30 days of procedure: 07/10/2025    INSTRUCTIONS GIVEN: Spoke with patient advised of procedure date and location. Patient aware SHE is to be NPO after midnight the night prior and will need a . Patient advised she will receive a call the day before with time of arrival.    Thank You   Roberta Cullen

## 2025-07-14 ENCOUNTER — TELEPHONE (OUTPATIENT)
Age: 70
End: 2025-07-14

## 2025-07-14 ENCOUNTER — DOCUMENTATION (OUTPATIENT)
Dept: PULMONOLOGY | Facility: CLINIC | Age: 70
End: 2025-07-14

## 2025-07-14 NOTE — TELEPHONE ENCOUNTER
Patient calling because she is having a procedure on the 21st of July and needs to hold mounjaro until after the procedure. Please call back if this is ok to do.  Jacquelyn Matthews

## 2025-07-14 NOTE — TELEPHONE ENCOUNTER
Call to patient and she has her Stelara infusion on 3-10-22.  Eight weeks later she will start the Stelara injections.  Patient inquiring if she has to continue to take the Imuran. Please advise.    Patient was called and made aware of PCP's recommendation.

## 2025-07-14 NOTE — TELEPHONE ENCOUNTER
Received call from patient stating she just received a call from Tana (Tana KEATING documented 7/14/2025 at 0846).     Patient confirmed she has and understands all the instructions for the bronchoscopy 7/21/2025 but she is concerned she does not see the appointment listed on her MyChart.     She would feel more comfortable knowing the appointment scheduled and is listed in her upcoming appointments.

## 2025-07-15 LAB
A FUMIGATUS IGE QN: <0.1 KUA/I (ref 0–0.1)
TOTAL IGE SMQN RAST: 13.5 KU/L (ref 0–113)

## 2025-07-18 ENCOUNTER — OFFICE VISIT (OUTPATIENT)
Dept: GYNECOLOGIC ONCOLOGY | Facility: CLINIC | Age: 70
End: 2025-07-18
Payer: COMMERCIAL

## 2025-07-18 VITALS
TEMPERATURE: 97.5 F | BODY MASS INDEX: 42.38 KG/M2 | OXYGEN SATURATION: 93 % | SYSTOLIC BLOOD PRESSURE: 130 MMHG | DIASTOLIC BLOOD PRESSURE: 76 MMHG | HEART RATE: 95 BPM | WEIGHT: 217 LBS

## 2025-07-18 DIAGNOSIS — I27.82 OTHER CHRONIC PULMONARY EMBOLISM WITHOUT ACUTE COR PULMONALE (HCC): ICD-10-CM

## 2025-07-18 DIAGNOSIS — C54.1 ENDOMETRIAL CANCER (HCC): Primary | ICD-10-CM

## 2025-07-18 PROBLEM — C77.5 SECONDARY AND UNSPECIFIED MALIGNANT NEOPLASM OF INTRAPELVIC LYMPH NODES (HCC): Status: RESOLVED | Noted: 2025-02-03 | Resolved: 2025-07-18

## 2025-07-18 PROCEDURE — 99215 OFFICE O/P EST HI 40 MIN: CPT | Performed by: OBSTETRICS & GYNECOLOGY

## 2025-07-18 PROCEDURE — G2211 COMPLEX E/M VISIT ADD ON: HCPCS | Performed by: OBSTETRICS & GYNECOLOGY

## 2025-07-18 NOTE — ASSESSMENT & PLAN NOTE
70-year-old with stage III C1 grade 1 endometrial cancer with positive ITC and periaortic lymph nodes, extensive LVSI, p53 wild-type, pMMR who received 5 cycles of carboplatin and Taxol last 3/7/2025.  Immunotherapy was stopped 2/14/2025 after 4 cycles due to myocarditis.  She completed whole pelvic radiation therapy and vaginal brachytherapy in July 2025.  She has progressive dyspnea with multifocal pulmonary consolidations.  MRD testing has been negative.  CT chest abdomen pelvis imaging did not reveal evidence of recurrent disease.  She is clinically and radiologically without evidence of disease.  Her performance status is 2.  1.  She will follow-up with pulmonary tomorrow for bronchoscopy and bronchioloalveolar lavage to assess pulmonary consolidations.  2.  Continue MRD testing  3.  Return in 3 months for evaluation.  Orders:    CBC and differential; Future    Comprehensive metabolic panel; Future

## 2025-07-18 NOTE — PROGRESS NOTES
Name: Cherelle Dash      : 1955      MRN: [id number]  Encounter Provider: Evin Page MD  Encounter Date: 2025   Encounter department: [facility]  :  Assessment & Plan  Endometrial cancer (HCC)  70-year-old with stage III C1 grade 1 endometrial cancer with positive ITC and periaortic lymph nodes, extensive LVSI, p53 wild-type, pMMR who received 5 cycles of carboplatin and Taxol last 3/7/2025.  Immunotherapy was stopped 2025 after 4 cycles due to myocarditis.  She completed whole pelvic radiation therapy and vaginal brachytherapy in 2025.  She has progressive dyspnea with multifocal pulmonary consolidations.  MRD testing has been negative.  CT chest abdomen pelvis imaging did not reveal evidence of recurrent disease.  She is clinically and radiologically without evidence of disease.  Her performance status is 2.  1.  She will follow-up with pulmonary tomorrow for bronchoscopy and bronchioloalveolar lavage to assess pulmonary consolidations.  2.  Continue MRD testing  3.  Return in 3 months for evaluation.  Orders:    CBC and differential; Future    Comprehensive metabolic panel; Future    Other chronic pulmonary embolism without acute cor pulmonale (HCC)  Continue therapeutic Eliquis         Assessment & Plan            History of Present Illness   Reason for Visit / CC: Dyspnea on exertion, follow-up after radiation therapy   Cherelle Dash is a 70 y.o. female who returns for evaluation after recent hospital admission.  She was admitted 2025 for OWEN, dyspnea.  She has a history of pulmonary embolus and immunotherapy induced myocarditis.  She was treated with azithromycin and cefpodoxime.  She was seen by pulmonary post discharge on 7/10/2025 and was noted to have worsening symptoms.  There is a plan for bronchoscopy and bronchioloalveolar lavage.  Last CBC 2025 revealed a hemoglobin of 9 g/dL, platelet count 115 K.  UPC was 0.5.  Serum  creatinine 1.55 mg/dL.  Chest x-ray 7/10/2025 revealed multifocal infiltrates in the left upper lobe, left lower lobe and right upper lobe consistent with pneumonia.  CT chest abdomen pelvis 7/9/2025 revealed interval development of consolidation and groundglass opacities consistent with pneumonitis.  There is an umbilical hernia.  No other evidence of metastatic disease.  Echo 6/28/2025 revealed an EF of 50%.  MRD testing has been negative x 3 last 5/13/2025.  She does not have any abdominal pain, pelvic pain, vaginal bleeding.  No hematochezia, melena, nausea, vomiting.  She does have dyspnea on exertion.  She does not feel short of breath at rest.  She is scheduled for bronchioloalveolar lavage tomorrow.  She has been started on Mounjaro for type 2 diabetes and morbid obesity.  She has severe right knee pain and is due for right knee replacement.  Of note, she completed radiation therapy 7/8/2025.  She had both whole pelvic radiation therapy and vaginal brachytherapy.  History of Present Illness    Pertinent Medical History     Stage IIIb chronic kidney disease, morbid obesity with BMI of 44 kg/m², immunotherapy induced cardiomyopathy, pulmonary embolism         Oncology History   Cancer Staging   Endometrial cancer ([facility])  Staging form: Corpus Uteri - Carcinoma, AJCC 8th Edition  - Pathologic stage from 10/11/2024: FIGO Stage IIIC1 (pT2, pN1mi(sn), cM0) - Signed by Evin Page MD on 11/12/2024  Method of lymph node assessment: [place] lymph node biopsy  Histologic grade (G): G1  Histologic grading system: 3 grade system  Lymph-vascular invasion (LVI): BOTH lymphatic and small vessel AND venous (large vessel) invasion  Peritoneal cytology results: Negative  Pelvic brandi status: Positive  Oncology History   Endometrial cancer ([facility])   8/11/2024 Initial Diagnosis    Endometrial cancer ([facility])     10/11/2024 -  Cancer Staged    Staging form: Corpus Uteri - Carcinoma, AJCC 8th Edition  - Pathologic stage  from 10/11/2024: FIGO Stage IIIC1 (pT2, pN1mi(sn), cM0) - Signed by Evin Page MD on 11/12/2024  Method of lymph node assessment: [place] lymph node biopsy  Histologic grade (G): G1  Histologic grading system: 3 grade system  Lymph-vascular invasion (LVI): BOTH lymphatic and small vessel AND venous (large vessel) invasion  Peritoneal cytology results: Negative  Pelvic brandi status: Positive       10/11/2024 Surgery    Robotic assisted total laparoscopic hysterectomy, bilateral salpingo-oophorectomy, pelvic and periaortic sentinel lymph node biopsies, vulvar biopsy  1.  Grade 1, cervical stromal invasion to a depth of 12 out of 15 mm, extensive LVSI, p53 wild-type, pMMR, HER2 1+,  washings negative, 0.7 mm metastatic focus and pelvic lymph node, ITC identified and periaortic lymph nodes.     12/6/2024 -  Chemotherapy    Taxol 135 mg/m2, carboplatin AUC 5 and pembrolizumab 200 mg IV every 21 days.    Pembro held for cycle 3 d/t grade 2 drug-induced pneumonitis.        2/2025 Adverse Reaction    Immunotherapy-related (pembro) pneumonitis and myocarditis        - 7/8/2025 Radiation      Radiation Treatments       Active   Reference Points   Cylinder   Most recent treatment: Dose given: 600 cGy (on 7/8/2025)   Total: Dose given: 1,800 cGy   Elapsed Days: 14           Historical   Plans   Whole Pelvis   Most recent treatment: Dose planned: 180 cGy (fraction 25 on 6/17/2025)   Total: Dose planned: 4,500 cGy (25 fractions)   Elapsed Days: 35      [id number]   Most recent treatment: Dose planned: 600 cGy (fraction 3 on 7/8/2025)   Total: Dose planned: 1,800 cGy (3 fractions)   Elapsed Days: 14      Reference Points   Whole Pelvis   Most recent treatment: Dose given: 180 cGy (on 6/17/2025)   Total: Dose given: 4,500 cGy   Elapsed Days: 35                           Review of Systems   Constitutional:  Negative for activity change and unexpected weight change.   HENT: Negative.     Eyes: Negative.     Respiratory:  Positive for shortness of breath.    Cardiovascular: Negative.    Gastrointestinal:  Negative for abdominal distention and abdominal pain.   Endocrine: Negative.    Genitourinary:  Negative for pelvic pain and vaginal bleeding.   Musculoskeletal:  Positive for arthralgias.   Skin: Negative.    Allergic/Immunologic: Negative.    Neurological: Negative.    Hematological: Negative.    Psychiatric/Behavioral: Negative.      A complete review of systems is negative other than that noted above in the HPI.  Medications Ordered Prior to Encounter[1]      Objective   /76 (BP Location: Right arm, Patient Position: Sitting, Cuff Size: Large)   Pulse 95   Temp 97.5 °F (36.4 °C) (Temporal)   Wt 98.4 kg (217 lb)   SpO2 93%   BMI 42.38 kg/m²     Body mass index is 42.38 kg/m².  Pain Screening:  Pain Score: 0-No pain  ECOG   2  Physical Exam  Vitals reviewed.   Constitutional:       General: She is not in acute distress.     Appearance: Normal appearance. She is not ill-appearing.   HENT:      Head: Normocephalic and atraumatic.      Mouth/Throat:      Mouth: Mucous membranes are moist.     Eyes:      General: No scleral icterus.        Right eye: No discharge.         Left eye: No discharge.      Conjunctiva/sclera: Conjunctivae normal.     Pulmonary:      Effort: Pulmonary effort is normal. No respiratory distress.      Breath sounds: Rales present. No wheezing or rhonchi.     Musculoskeletal:      Right lower leg: No edema.      Left lower leg: No edema.     Skin:     General: Skin is warm and dry.      Coloration: Skin is not jaundiced.      Findings: No rash.     Neurological:      General: No focal deficit present.      Mental Status: She is alert and oriented to person, place, and time.      Cranial Nerves: No cranial nerve deficit.      Sensory: No sensory deficit.      Motor: No weakness.      Gait: Gait normal.     Psychiatric:         Mood and Affect: Mood normal.         Behavior: Behavior  normal.         Thought Content: Thought content normal.         Judgment: Judgment normal.       Physical Exam       Results    Labs: I have reviewed pertinent labs.   Lab Results   Component Value Date/Time     19.8 04/28/2025 07:36 AM     Lab Results   Component Value Date/Time    Potassium 4.0 07/09/2025 07:08 AM    Chloride 107 07/09/2025 07:08 AM    CO2 20 (L) 07/09/2025 07:08 AM    BUN 21 07/09/2025 07:08 AM    Creatinine 1.55 (H) 07/09/2025 07:08 AM    Glucose, Fasting 126 (H) 07/09/2025 07:08 AM    Calcium 9.2 07/09/2025 07:08 AM    Corrected Calcium 8.8 03/07/2025 10:19 AM    AST 14 06/28/2025 01:32 PM    ALT 14 06/28/2025 01:32 PM    Alkaline Phosphatase 89 06/28/2025 01:32 PM    eGFR 33 07/09/2025 07:08 AM     Lab Results   Component Value Date/Time    WBC 5.10 07/09/2025 07:08 AM    Hemoglobin 9.0 (L) 07/09/2025 07:08 AM    Hematocrit 28.3 (L) 07/09/2025 07:08 AM    MCV 98 07/09/2025 07:08 AM    Platelets 115 (L) 07/09/2025 07:08 AM     Lab Results   Component Value Date/Time    Absolute Neutrophils 2.38 07/02/2025 05:57 AM        Trend:  Lab Results   Component Value Date     19.8 04/28/2025     13.0 04/09/2025     18.3 03/19/2025     48.3 (H) 02/26/2025     10.3 02/05/2025     9.8 01/15/2025     8.8 12/24/2024     8.7 12/04/2024     54.2 (H) 11/14/2024     45.2 (H) 08/14/2024       Radiology Results Review: I personally reviewed the following image studies in PACS and associated radiology reports: CT chest and CT abdomen/pelvis. My interpretation of the radiology images/reports is: Multifocal bilateral pulmonary consolidations, no evidence of recurrent malignancy.  No lymphadenopathy, peritoneal disease.  Other Study Results Review : Other studies reviewed include: Echocardiogram    Administrative Statements   I have spent a total time of 40 minutes in caring for this patient on the day of the visit/encounter including Diagnostic results,  Risks and benefits of tx options, Instructions for management, Patient and family education, Importance of tx compliance, Impressions, Counseling / Coordination of care, Documenting in the medical record, Reviewing/placing orders in the medical record (including tests, medications, and/or procedures), Obtaining or reviewing history  , and Communicating with other healthcare professionals .       [1]   Current Outpatient Medications on File Prior to Visit   Medication Sig Dispense Refill    apixaban (Eliquis) 5 mg Take 1 tablet (5 mg total) by mouth 2 (two) times a day 180 tablet 1    atorvastatin (LIPITOR) 80 mg tablet Take 1 tablet (80 mg total) by mouth daily with dinner 90 tablet 3    benzonatate (TESSALON) 200 MG capsule Take 1 capsule (200 mg total) by mouth 3 (three) times a day as needed for cough 20 capsule 0    chlorhexidine (PERIDEX) 0.12 % solution       Cholecalciferol (VITAMIN D) 2000 units CAPS Take 1 capsule by mouth in the morning.      clobetasol (TEMOVATE) 0.05 % ointment Apply topically 2 (two) times a week 60 g 3    clopidogrel (PLAVIX) 75 mg tablet Take 1 tablet (75 mg total) by mouth daily 30 tablet 3    CRANBERRY PO Take by mouth      famotidine (PEPCID) 20 mg tablet TAKE 1 TABLET BY MOUTH TWICE  DAILY 180 tablet 1    Glucosamine-Chondroit-Vit C-Mn (GLUCOSAMINE 1500 COMPLEX PO) Take by mouth in the morning      Ivermectin 1 % CREA       lidocaine-prilocaine (EMLA) cream Apply to PORT 30 min prior to labs and chemo 30 g 1    LORazepam (ATIVAN) 1 mg tablet Take 1 tablet PO HS prn insomnia 30 tablet 0    lurasidone (LATUDA) 20 mg tablet TAKE 1 TABLET BY MOUTH DAILY  WITH BREAKFAST 30 tablet 11    magnesium Oxide (MAG-OX) 400 mg TABS TAKE 1 TABLET BY MOUTH EVERY DAY 90 tablet 1    metoprolol succinate (TOPROL-XL) 50 mg 24 hr tablet Take 1 tablet (50 mg total) by mouth every 12 (twelve) hours 180 tablet 3    Mirabegron ER (Myrbetriq) 50 MG TB24 TAKE 1 TABLET BY MOUTH IN THE  MORNING 90 tablet 1     Multiple Vitamin (MULTI-VITAMIN DAILY) TABS Take by mouth in the morning.      polyethylene glycol (GLYCOLAX) 17 GM/SCOOP powder Take 17 g by mouth 2 (two) times a day 238 g 1    Sodium Fluoride 5000 PPM 1.1 % GEL As directed      Tirzepatide (Mounjaro) 2.5 MG/0.5ML SOAJ Inject 2.5 mg under the skin once a week 2 mL 0    torsemide (DEMADEX) 20 mg tablet Take 1 tablet (20 mg total) by mouth daily 90 tablet 3    triamcinolone (KENALOG) 0.1 % cream Apply topically 2 (two) times a day 15 g 0     No current facility-administered medications on file prior to visit.

## 2025-07-21 ENCOUNTER — HOSPITAL ENCOUNTER (OUTPATIENT)
Dept: GASTROENTEROLOGY | Facility: HOSPITAL | Age: 70
Discharge: HOME | End: 2025-07-21
Attending: NURSE PRACTITIONER
Payer: COMMERCIAL

## 2025-07-21 ENCOUNTER — ANESTHESIA EVENT (OUTPATIENT)
Dept: GASTROENTEROLOGY | Facility: HOSPITAL | Age: 70
End: 2025-07-21
Payer: COMMERCIAL

## 2025-07-21 ENCOUNTER — ANESTHESIA (OUTPATIENT)
Dept: GASTROENTEROLOGY | Facility: HOSPITAL | Age: 70
End: 2025-07-21
Payer: COMMERCIAL

## 2025-07-21 VITALS
DIASTOLIC BLOOD PRESSURE: 68 MMHG | TEMPERATURE: 97 F | WEIGHT: 214 LBS | OXYGEN SATURATION: 94 % | RESPIRATION RATE: 18 BRPM | HEART RATE: 83 BPM | SYSTOLIC BLOOD PRESSURE: 133 MMHG | BODY MASS INDEX: 42.01 KG/M2 | HEIGHT: 60 IN

## 2025-07-21 DIAGNOSIS — R93.89 ABNORMAL CT OF THE CHEST: ICD-10-CM

## 2025-07-21 LAB
BASOPHILS NFR FLD: 1 %
EOSINOPHIL NFR FLD MANUAL: 15 %
GLUCOSE SERPL-MCNC: 114 MG/DL (ref 65–140)
LYMPHOCYTES NFR BLD AUTO: 43 %
LYMPHOCYTES NFR BLD AUTO: 47 %
MACROPHAGES NFR FLD: 30 %
MACROPHAGES NFR FLD: 50 %
NEUTS SEG NFR BLD AUTO: 11 %
NEUTS SEG NFR BLD AUTO: 3 %
TOTAL CELLS COUNTED SPEC: 100
TOTAL CELLS COUNTED SPEC: 100

## 2025-07-21 PROCEDURE — 82948 REAGENT STRIP/BLOOD GLUCOSE: CPT

## 2025-07-21 PROCEDURE — 87252 VIRUS INOCULATION TISSUE: CPT | Performed by: INTERNAL MEDICINE

## 2025-07-21 PROCEDURE — 87102 FUNGUS ISOLATION CULTURE: CPT | Performed by: INTERNAL MEDICINE

## 2025-07-21 PROCEDURE — 89051 BODY FLUID CELL COUNT: CPT | Performed by: INTERNAL MEDICINE

## 2025-07-21 PROCEDURE — 31624 DX BRONCHOSCOPE/LAVAGE: CPT | Performed by: INTERNAL MEDICINE

## 2025-07-21 PROCEDURE — 87070 CULTURE OTHR SPECIMN AEROBIC: CPT | Performed by: INTERNAL MEDICINE

## 2025-07-21 PROCEDURE — 87206 SMEAR FLUORESCENT/ACID STAI: CPT | Performed by: INTERNAL MEDICINE

## 2025-07-21 PROCEDURE — 87205 SMEAR GRAM STAIN: CPT | Performed by: INTERNAL MEDICINE

## 2025-07-21 PROCEDURE — 88112 CYTOPATH CELL ENHANCE TECH: CPT | Performed by: PATHOLOGY

## 2025-07-21 PROCEDURE — 87116 MYCOBACTERIA CULTURE: CPT | Performed by: INTERNAL MEDICINE

## 2025-07-21 PROCEDURE — 88185 FLOWCYTOMETRY/TC ADD-ON: CPT | Performed by: INTERNAL MEDICINE

## 2025-07-21 PROCEDURE — 88184 FLOWCYTOMETRY/ TC 1 MARKER: CPT | Performed by: INTERNAL MEDICINE

## 2025-07-21 PROCEDURE — 88305 TISSUE EXAM BY PATHOLOGIST: CPT | Performed by: PATHOLOGY

## 2025-07-21 RX ORDER — PROPOFOL 10 MG/ML
INJECTION, EMULSION INTRAVENOUS CONTINUOUS PRN
Status: DISCONTINUED | OUTPATIENT
Start: 2025-07-21 | End: 2025-07-21

## 2025-07-21 RX ORDER — LIDOCAINE HYDROCHLORIDE 10 MG/ML
INJECTION, SOLUTION EPIDURAL; INFILTRATION; INTRACAUDAL; PERINEURAL AS NEEDED
Status: DISCONTINUED | OUTPATIENT
Start: 2025-07-21 | End: 2025-07-21

## 2025-07-21 RX ORDER — ONDANSETRON 2 MG/ML
INJECTION INTRAMUSCULAR; INTRAVENOUS AS NEEDED
Status: DISCONTINUED | OUTPATIENT
Start: 2025-07-21 | End: 2025-07-21

## 2025-07-21 RX ORDER — PHENYLEPHRINE HCL IN 0.9% NACL 1 MG/10 ML
SYRINGE (ML) INTRAVENOUS AS NEEDED
Status: DISCONTINUED | OUTPATIENT
Start: 2025-07-21 | End: 2025-07-21

## 2025-07-21 RX ORDER — PROPOFOL 10 MG/ML
INJECTION, EMULSION INTRAVENOUS AS NEEDED
Status: DISCONTINUED | OUTPATIENT
Start: 2025-07-21 | End: 2025-07-21

## 2025-07-21 RX ORDER — SODIUM CHLORIDE 9 MG/ML
INJECTION, SOLUTION INTRAVENOUS CONTINUOUS PRN
Status: DISCONTINUED | OUTPATIENT
Start: 2025-07-21 | End: 2025-07-21

## 2025-07-21 RX ORDER — DEXAMETHASONE SODIUM PHOSPHATE 10 MG/ML
INJECTION, SOLUTION INTRAMUSCULAR; INTRAVENOUS AS NEEDED
Status: DISCONTINUED | OUTPATIENT
Start: 2025-07-21 | End: 2025-07-21

## 2025-07-21 RX ADMIN — SODIUM CHLORIDE: 0.9 INJECTION, SOLUTION INTRAVENOUS at 07:56

## 2025-07-21 RX ADMIN — REMIFENTANIL HYDROCHLORIDE 0.1 MCG/KG/MIN: 1 INJECTION, POWDER, LYOPHILIZED, FOR SOLUTION INTRAVENOUS at 08:01

## 2025-07-21 RX ADMIN — PROPOFOL 100 MCG/KG/MIN: 10 INJECTION, EMULSION INTRAVENOUS at 08:01

## 2025-07-21 RX ADMIN — PROPOFOL 50 MG: 10 INJECTION, EMULSION INTRAVENOUS at 08:18

## 2025-07-21 RX ADMIN — Medication 100 MCG: at 08:18

## 2025-07-21 RX ADMIN — ONDANSETRON 4 MG: 2 INJECTION, SOLUTION INTRAMUSCULAR; INTRAVENOUS at 08:07

## 2025-07-21 RX ADMIN — LIDOCAINE HYDROCHLORIDE 50 MG: 10 INJECTION, SOLUTION EPIDURAL; INFILTRATION; INTRACAUDAL; PERINEURAL at 07:58

## 2025-07-21 RX ADMIN — DEXAMETHASONE SODIUM PHOSPHATE 10 MG: 10 INJECTION, SOLUTION INTRAMUSCULAR; INTRAVENOUS at 08:06

## 2025-07-21 RX ADMIN — Medication 100 MCG: at 08:11

## 2025-07-21 RX ADMIN — Medication 100 MCG: at 08:20

## 2025-07-21 RX ADMIN — PROPOFOL 50 MG: 10 INJECTION, EMULSION INTRAVENOUS at 08:05

## 2025-07-21 NOTE — ANESTHESIA POSTPROCEDURE EVALUATION
Post-Op Assessment Note    CV Status:  Stable  Pain Score: 0    Pain management: adequate       Mental Status:  Sleepy and arousable   Hydration Status:  Euvolemic   PONV Controlled:  Controlled   Airway Patency:  Patent     Post Op Vitals Reviewed: Yes    No anethesia notable event occurred.    Staff: CRNA           Last Filed PACU Vitals:  Vitals Value Taken Time   Temp     Pulse 79 07/21/2025 08:35   /79 07/21/2025 08:35   Resp 24 07/21/2025 08:35   SpO2 97 07/21/2025 08:35

## 2025-07-21 NOTE — RESPIRATORY THERAPY NOTE
Assisted Bronchoscopy with Dr. Thomson. Anesthesia present with sedation and airway.   6 ml of 1% lidocaine on cords and 4 ml of lidocaine in airways. 180 ml of 0.9% sodium chloride injected in the CAROLYNE with approx 65 ml returned for BAL and another 180 ml of 0.9% sodium chloride injected int the LLL with appox 90 ml returned for BAL. No issues throughout procedure. All specimens ordered and labeled and walked to lab.

## 2025-07-21 NOTE — NURSING NOTE
Pt escorted to bathroom via wheelchair at 0916 following independently dressing and transferring to wheelchair    Pt independently voided in bathroom   Pt called out from bathroom that she fell and was visualized on the floor in a sitting position against the wall, patient states she did not hit her head but fell onto her bottom    No injuries noted upon assessment, VSS and charted    Pt states she lost her balance and became dizzy while trying to flush the toilet    Will continue to monitor

## 2025-07-21 NOTE — INTERVAL H&P NOTE
H&P reviewed. After examining the patient I find no changes in the patients condition since the H&P had been written.    Vitals:    07/21/25 0704   BP: 103/58   Pulse: 88   Resp: 19   Temp: (!) 97 °F (36.1 °C)   SpO2: 94%     Exam unchanged from above    Assessment:  CT chest abnormality  Coagulopathy secondary to apixaban    Plan:  Bronchoscopy with airway inspection  Possible wash  Possible BAL    Ramsey Thomson MD

## 2025-07-21 NOTE — ANESTHESIA PREPROCEDURE EVALUATION
Procedure:  BRONCHOSCOPY    Relevant Problems   CARDIO   (+) Coronary artery disease involving native coronary artery of native heart with unstable angina pectoris (HCC)   (+) Hyperlipidemia   (+) LBBB (left bundle branch block)   (+) Other pulmonary embolism without acute cor pulmonale (HCC)   (+) Premature ventricular contractions   (+) Raynaud's disease without gangrene      ENDO   (+) Hypothyroidism   (+) Secondary hyperparathyroidism of renal origin (HCC)   (+) Type 2 diabetes mellitus with chronic kidney disease, without long-term current use of insulin (HCC)      GI/HEPATIC   (+) GERD (gastroesophageal reflux disease)      /RENAL   (+) Chronic kidney disease, stage 4 (severe) ([facility])   (+) Chronic kidney disease-mineral and bone disorder   (+) Stage 4 chronic kidney disease (HCC)      GYN   (+) Endometrial cancer (HCC)      HEMATOLOGY   (+) Anemia   (+) Anemia due to stage 4 chronic kidney disease  (HCC)      MUSCULOSKELETAL   (+) Patellofemoral arthritis of right knee   (+) Primary osteoarthritis of left knee   (+) Primary osteoarthritis of right knee      NEURO/PSYCH   (+) Anxiety about health   (+) Depression with anxiety      PULMONARY   (+) Dyspnea   (+) Obstructive sleep apnea   (+) Pneumonia of both lungs due to infectious organism        Physical Exam    Airway     Mallampati score: II    Neck ROM: full      Cardiovascular      Dental       Pulmonary      Neurological      Other Findings  post-pubertal.      Anesthesia Plan  ASA Score- 3     Anesthesia Type- general with ASA Monitors.         Additional Monitors:     Airway Plan: LMA and LMA.    Comment: I, Dr. Tolbert, the attending physician, have personally seen and evaluated the patient prior to anesthetic care.  I have reviewed the pre-anesthetic record, and other medical records if appropriate to the anesthetic care.  If a CRNA is involved in the case, I have reviewed the CRNA assessment, if present, and agree.  The patient is in a suitable  condition to proceed with my formulated anesthetic plan.  .       Plan Factors-    Chart reviewed.                      Induction- intravenous.    Postoperative Plan-         Informed Consent- Anesthetic plan and risks discussed with patient.  I personally reviewed this patient with the CRNA. Discussed and agreed on the Anesthesia Plan with the CRNA..      NPO Status:  Vitals Value Taken Time   Date of last liquid 07/20/25 07/21/25 06:56   Time of last liquid 1700 07/21/25 06:56   Date of last solid 07/20/25 07/21/25 06:56   Time of last solid 1700 07/21/25 06:56

## 2025-07-22 LAB
RHODAMINE-AURAMINE STN SPEC: NORMAL
RHODAMINE-AURAMINE STN SPEC: NORMAL

## 2025-07-23 LAB
BACTERIA BRONCH AEROBE CULT: NO GROWTH
BACTERIA BRONCH AEROBE CULT: NORMAL
GRAM STN SPEC: NORMAL
GRAM STN SPEC: NORMAL
SCAN RESULT: NORMAL
SCAN RESULT: NORMAL

## 2025-07-23 PROCEDURE — 88112 CYTOPATH CELL ENHANCE TECH: CPT | Performed by: PATHOLOGY

## 2025-07-23 PROCEDURE — 88305 TISSUE EXAM BY PATHOLOGIST: CPT | Performed by: PATHOLOGY

## 2025-07-28 PROBLEM — N39.0 UTI (URINARY TRACT INFECTION): Status: RESOLVED | Noted: 2025-06-28 | Resolved: 2025-07-28

## 2025-07-28 LAB
FUNGUS SPEC CULT: NORMAL
FUNGUS SPEC CULT: NORMAL

## 2025-07-29 ENCOUNTER — TELEPHONE (OUTPATIENT)
Age: 70
End: 2025-07-29

## 2025-07-30 ENCOUNTER — OFFICE VISIT (OUTPATIENT)
Age: 70
End: 2025-07-30
Payer: COMMERCIAL

## 2025-07-30 VITALS
DIASTOLIC BLOOD PRESSURE: 70 MMHG | HEART RATE: 84 BPM | SYSTOLIC BLOOD PRESSURE: 126 MMHG | OXYGEN SATURATION: 96 % | HEIGHT: 60 IN | WEIGHT: 213 LBS | BODY MASS INDEX: 41.82 KG/M2

## 2025-07-30 DIAGNOSIS — I44.7 LBBB (LEFT BUNDLE BRANCH BLOCK): ICD-10-CM

## 2025-07-30 DIAGNOSIS — I25.10 CAD IN NATIVE ARTERY: ICD-10-CM

## 2025-07-30 DIAGNOSIS — R06.09 DOE (DYSPNEA ON EXERTION): ICD-10-CM

## 2025-07-30 DIAGNOSIS — N18.30 STAGE 3 CHRONIC KIDNEY DISEASE, UNSPECIFIED WHETHER STAGE 3A OR 3B CKD (HCC): ICD-10-CM

## 2025-07-30 DIAGNOSIS — Z86.79 HISTORY OF MYOCARDITIS: Primary | ICD-10-CM

## 2025-07-30 DIAGNOSIS — I50.22 HEART FAILURE WITH MILDLY REDUCED EJECTION FRACTION (HCC): ICD-10-CM

## 2025-07-30 DIAGNOSIS — I42.9 CARDIOMYOPATHY, UNSPECIFIED TYPE (HCC): ICD-10-CM

## 2025-07-30 DIAGNOSIS — R60.9 EDEMA, UNSPECIFIED TYPE: ICD-10-CM

## 2025-07-30 LAB
MYCOBACTERIUM SPEC CULT: NORMAL
MYCOBACTERIUM SPEC CULT: NORMAL
RHODAMINE-AURAMINE STN SPEC: NORMAL
RHODAMINE-AURAMINE STN SPEC: NORMAL

## 2025-07-30 PROCEDURE — 99215 OFFICE O/P EST HI 40 MIN: CPT | Performed by: INTERNAL MEDICINE

## 2025-07-30 RX ORDER — TORSEMIDE 10 MG/1
10 TABLET ORAL EVERY OTHER DAY
Qty: 45 TABLET | Refills: 3 | Status: SHIPPED | OUTPATIENT
Start: 2025-07-30

## 2025-07-31 ENCOUNTER — OFFICE VISIT (OUTPATIENT)
Dept: OBGYN CLINIC | Facility: HOSPITAL | Age: 70
End: 2025-07-31
Payer: COMMERCIAL

## 2025-07-31 ENCOUNTER — TELEPHONE (OUTPATIENT)
Age: 70
End: 2025-07-31

## 2025-07-31 VITALS — BODY MASS INDEX: 41.62 KG/M2 | HEIGHT: 60 IN | WEIGHT: 212 LBS

## 2025-07-31 DIAGNOSIS — E11.22 TYPE 2 DIABETES MELLITUS WITH STAGE 4 CHRONIC KIDNEY DISEASE, WITHOUT LONG-TERM CURRENT USE OF INSULIN (HCC): Primary | ICD-10-CM

## 2025-07-31 DIAGNOSIS — N18.4 TYPE 2 DIABETES MELLITUS WITH STAGE 4 CHRONIC KIDNEY DISEASE, WITHOUT LONG-TERM CURRENT USE OF INSULIN (HCC): Primary | ICD-10-CM

## 2025-07-31 DIAGNOSIS — M17.11 PRIMARY OSTEOARTHRITIS OF RIGHT KNEE: Primary | ICD-10-CM

## 2025-07-31 DIAGNOSIS — M17.12 PRIMARY OSTEOARTHRITIS OF LEFT KNEE: ICD-10-CM

## 2025-07-31 PROCEDURE — 99213 OFFICE O/P EST LOW 20 MIN: CPT | Performed by: ORTHOPAEDIC SURGERY

## 2025-07-31 PROCEDURE — 20610 DRAIN/INJ JOINT/BURSA W/O US: CPT | Performed by: ORTHOPAEDIC SURGERY

## 2025-07-31 RX ORDER — BETAMETHASONE SODIUM PHOSPHATE AND BETAMETHASONE ACETATE 3; 3 MG/ML; MG/ML
12 INJECTION, SUSPENSION INTRA-ARTICULAR; INTRALESIONAL; INTRAMUSCULAR; SOFT TISSUE
Status: COMPLETED | OUTPATIENT
Start: 2025-07-31 | End: 2025-07-31

## 2025-07-31 RX ORDER — LIDOCAINE HYDROCHLORIDE 10 MG/ML
4 INJECTION, SOLUTION INFILTRATION; PERINEURAL
Status: COMPLETED | OUTPATIENT
Start: 2025-07-31 | End: 2025-07-31

## 2025-07-31 RX ORDER — TIRZEPATIDE 5 MG/.5ML
5 INJECTION, SOLUTION SUBCUTANEOUS WEEKLY
Qty: 2 ML | Refills: 0 | Status: SHIPPED | OUTPATIENT
Start: 2025-07-31

## 2025-07-31 RX ADMIN — LIDOCAINE HYDROCHLORIDE 4 ML: 10 INJECTION, SOLUTION INFILTRATION; PERINEURAL at 14:00

## 2025-07-31 RX ADMIN — BETAMETHASONE SODIUM PHOSPHATE AND BETAMETHASONE ACETATE 12 MG: 3; 3 INJECTION, SUSPENSION INTRA-ARTICULAR; INTRALESIONAL; INTRAMUSCULAR; SOFT TISSUE at 14:00

## 2025-08-01 DIAGNOSIS — C54.1 ENDOMETRIAL CANCER (HCC): ICD-10-CM

## 2025-08-03 DIAGNOSIS — R07.9 CHEST PAIN: ICD-10-CM

## 2025-08-04 ENCOUNTER — TELEPHONE (OUTPATIENT)
Age: 70
End: 2025-08-04

## 2025-08-04 LAB
FUNGUS SPEC CULT: NORMAL
FUNGUS SPEC CULT: NORMAL

## 2025-08-04 RX ORDER — CLOPIDOGREL BISULFATE 75 MG/1
75 TABLET ORAL DAILY
Qty: 90 TABLET | Refills: 0 | Status: SHIPPED | OUTPATIENT
Start: 2025-08-04

## 2025-08-04 RX ORDER — LORAZEPAM 1 MG/1
TABLET ORAL
Qty: 30 TABLET | Refills: 0 | Status: SHIPPED | OUTPATIENT
Start: 2025-08-04

## 2025-08-05 ENCOUNTER — OFFICE VISIT (OUTPATIENT)
Dept: PULMONOLOGY | Facility: CLINIC | Age: 70
End: 2025-08-05
Payer: COMMERCIAL

## 2025-08-05 VITALS
HEIGHT: 61 IN | DIASTOLIC BLOOD PRESSURE: 68 MMHG | BODY MASS INDEX: 40.02 KG/M2 | TEMPERATURE: 98.2 F | SYSTOLIC BLOOD PRESSURE: 120 MMHG | OXYGEN SATURATION: 97 % | WEIGHT: 212 LBS | HEART RATE: 87 BPM

## 2025-08-05 DIAGNOSIS — C54.1 ENDOMETRIAL CANCER (HCC): ICD-10-CM

## 2025-08-05 DIAGNOSIS — R93.89 ABNORMAL CT OF THE CHEST: Primary | ICD-10-CM

## 2025-08-05 DIAGNOSIS — I50.22 HEART FAILURE WITH MILDLY REDUCED EJECTION FRACTION (HCC): ICD-10-CM

## 2025-08-05 DIAGNOSIS — D72.19 OTHER EOSINOPHILIA: ICD-10-CM

## 2025-08-05 DIAGNOSIS — J98.4 DRUG-INDUCED PNEUMONITIS: ICD-10-CM

## 2025-08-05 DIAGNOSIS — T50.905A DRUG-INDUCED PNEUMONITIS: ICD-10-CM

## 2025-08-05 PROCEDURE — G2211 COMPLEX E/M VISIT ADD ON: HCPCS | Performed by: INTERNAL MEDICINE

## 2025-08-05 PROCEDURE — 99214 OFFICE O/P EST MOD 30 MIN: CPT | Performed by: INTERNAL MEDICINE

## 2025-08-07 PROBLEM — J18.9 PNEUMONIA OF BOTH LUNGS DUE TO INFECTIOUS ORGANISM: Status: RESOLVED | Noted: 2025-07-08 | Resolved: 2025-08-07

## 2025-08-11 LAB
FUNGUS SPEC CULT: NORMAL
FUNGUS SPEC CULT: NORMAL

## 2025-08-12 ENCOUNTER — OFFICE VISIT (OUTPATIENT)
Dept: RADIATION ONCOLOGY | Facility: HOSPITAL | Age: 70
End: 2025-08-12
Attending: RADIOLOGY
Payer: COMMERCIAL

## 2025-08-15 ENCOUNTER — TELEPHONE (OUTPATIENT)
Age: 70
End: 2025-08-15

## 2025-08-18 ENCOUNTER — APPOINTMENT (OUTPATIENT)
Dept: LAB | Facility: AMBULARY SURGERY CENTER | Age: 70
End: 2025-08-18
Attending: FAMILY MEDICINE
Payer: COMMERCIAL

## 2025-08-18 DIAGNOSIS — E11.22 TYPE 2 DIABETES MELLITUS WITH STAGE 4 CHRONIC KIDNEY DISEASE, WITHOUT LONG-TERM CURRENT USE OF INSULIN (HCC): ICD-10-CM

## 2025-08-18 DIAGNOSIS — E03.2 HYPOTHYROIDISM DUE TO MEDICATION: ICD-10-CM

## 2025-08-18 DIAGNOSIS — N18.4 TYPE 2 DIABETES MELLITUS WITH STAGE 4 CHRONIC KIDNEY DISEASE, WITHOUT LONG-TERM CURRENT USE OF INSULIN (HCC): ICD-10-CM

## 2025-08-18 DIAGNOSIS — N18.32 ANEMIA DUE TO STAGE 3B CHRONIC KIDNEY DISEASE  (HCC): ICD-10-CM

## 2025-08-18 DIAGNOSIS — E78.2 MIXED HYPERLIPIDEMIA: ICD-10-CM

## 2025-08-18 DIAGNOSIS — D63.1 ANEMIA DUE TO STAGE 3B CHRONIC KIDNEY DISEASE  (HCC): ICD-10-CM

## 2025-08-18 DIAGNOSIS — C54.1 ENDOMETRIAL CANCER (HCC): ICD-10-CM

## 2025-08-18 LAB
ALBUMIN SERPL BCG-MCNC: 3.8 G/DL (ref 3.5–5)
ALP SERPL-CCNC: 86 U/L (ref 34–104)
ALT SERPL W P-5'-P-CCNC: 18 U/L (ref 7–52)
ANION GAP SERPL CALCULATED.3IONS-SCNC: 13 MMOL/L (ref 4–13)
AST SERPL W P-5'-P-CCNC: 18 U/L (ref 13–39)
BASOPHILS # BLD AUTO: 0.03 THOUSANDS/ÂΜL (ref 0–0.1)
BASOPHILS NFR BLD AUTO: 1 % (ref 0–1)
BILIRUB SERPL-MCNC: 0.53 MG/DL (ref 0.2–1)
BUN SERPL-MCNC: 30 MG/DL (ref 5–25)
CALCIUM SERPL-MCNC: 9.4 MG/DL (ref 8.4–10.2)
CHLORIDE SERPL-SCNC: 106 MMOL/L (ref 96–108)
CHOLEST SERPL-MCNC: 139 MG/DL (ref ?–200)
CO2 SERPL-SCNC: 24 MMOL/L (ref 21–32)
CREAT SERPL-MCNC: 1.82 MG/DL (ref 0.6–1.3)
CREAT UR-MCNC: 103.6 MG/DL
EOSINOPHIL # BLD AUTO: 0.14 THOUSAND/ÂΜL (ref 0–0.61)
EOSINOPHIL NFR BLD AUTO: 5 % (ref 0–6)
ERYTHROCYTE [DISTWIDTH] IN BLOOD BY AUTOMATED COUNT: 15.5 % (ref 11.6–15.1)
EST. AVERAGE GLUCOSE BLD GHB EST-MCNC: 111 MG/DL
GFR SERPL CREATININE-BSD FRML MDRD: 27 ML/MIN/1.73SQ M
GLUCOSE P FAST SERPL-MCNC: 111 MG/DL (ref 65–99)
HBA1C MFR BLD: 5.5 %
HCT VFR BLD AUTO: 34.9 % (ref 34.8–46.1)
HDLC SERPL-MCNC: 50 MG/DL
HGB BLD-MCNC: 11 G/DL (ref 11.5–15.4)
IMM GRANULOCYTES # BLD AUTO: 0.01 THOUSAND/UL (ref 0–0.2)
IMM GRANULOCYTES NFR BLD AUTO: 0 % (ref 0–2)
LDLC SERPL CALC-MCNC: 58 MG/DL (ref 0–100)
LYMPHOCYTES # BLD AUTO: 0.51 THOUSANDS/ÂΜL (ref 0.6–4.47)
LYMPHOCYTES NFR BLD AUTO: 16 % (ref 14–44)
MCH RBC QN AUTO: 30.6 PG (ref 26.8–34.3)
MCHC RBC AUTO-ENTMCNC: 31.5 G/DL (ref 31.4–37.4)
MCV RBC AUTO: 97 FL (ref 82–98)
MICROALBUMIN UR-MCNC: 18.1 MG/L
MICROALBUMIN/CREAT 24H UR: 17 MG/G CREATININE (ref 0–30)
MONOCYTES # BLD AUTO: 0.44 THOUSAND/ÂΜL (ref 0.17–1.22)
MONOCYTES NFR BLD AUTO: 14 % (ref 4–12)
NEUTROPHILS # BLD AUTO: 1.98 THOUSANDS/ÂΜL (ref 1.85–7.62)
NEUTS SEG NFR BLD AUTO: 64 % (ref 43–75)
NONHDLC SERPL-MCNC: 89 MG/DL
NRBC BLD AUTO-RTO: 0 /100 WBCS
PLATELET # BLD AUTO: 131 THOUSANDS/UL (ref 149–390)
PMV BLD AUTO: 12.4 FL (ref 8.9–12.7)
POTASSIUM SERPL-SCNC: 3.6 MMOL/L (ref 3.5–5.3)
PROT SERPL-MCNC: 6.9 G/DL (ref 6.4–8.4)
RBC # BLD AUTO: 3.6 MILLION/UL (ref 3.81–5.12)
SODIUM SERPL-SCNC: 143 MMOL/L (ref 135–147)
TRIGL SERPL-MCNC: 153 MG/DL (ref ?–150)
TSH SERPL DL<=0.05 MIU/L-ACNC: 2.5 UIU/ML (ref 0.45–4.5)
WBC # BLD AUTO: 3.11 THOUSAND/UL (ref 4.31–10.16)

## 2025-08-18 PROCEDURE — 80061 LIPID PANEL: CPT

## 2025-08-18 PROCEDURE — 85025 COMPLETE CBC W/AUTO DIFF WBC: CPT

## 2025-08-18 PROCEDURE — 82043 UR ALBUMIN QUANTITATIVE: CPT

## 2025-08-18 PROCEDURE — 83036 HEMOGLOBIN GLYCOSYLATED A1C: CPT

## 2025-08-18 PROCEDURE — 36415 COLL VENOUS BLD VENIPUNCTURE: CPT

## 2025-08-18 PROCEDURE — 84443 ASSAY THYROID STIM HORMONE: CPT

## 2025-08-18 PROCEDURE — 80053 COMPREHEN METABOLIC PANEL: CPT

## 2025-08-18 PROCEDURE — 82570 ASSAY OF URINE CREATININE: CPT

## 2025-08-22 ENCOUNTER — TELEPHONE (OUTPATIENT)
Age: 70
End: 2025-08-22

## 2025-08-22 DIAGNOSIS — C54.1 ENDOMETRIAL CANCER (HCC): Primary | ICD-10-CM

## (undated) DEVICE — ELECTRO LUBE IS A SINGLE PATIENT USE DEVICE THAT IS INTENDED TO BE USED ON ELECTROSURGICAL ELECTRODES TO REDUCE STICKING.: Brand: KEY SURGICAL ELECTRO LUBE

## (undated) DEVICE — ELECTRODE BALL E-Z CLEAN 2IN -0015

## (undated) DEVICE — TROCAR PORT ACCESS 12 X120MM W/BLDLS OPTICAL TIP AIRSEAL

## (undated) DEVICE — TIP COVER ACCESSORY

## (undated) DEVICE — PREMIUM DRY TRAY LF: Brand: MEDLINE INDUSTRIES, INC.

## (undated) DEVICE — GLOVE PI ULTRA TOUCH SZ.7.5

## (undated) DEVICE — EXOFIN PRECISION PEN HIGH VISCOSITY TOPICAL SKIN ADHESIVE: Brand: EXOFIN PRECISION PEN, 1G

## (undated) DEVICE — MONOPOLAR CURVED SCISSORS: Brand: ENDOWRIST

## (undated) DEVICE — STERILE 8 INCH PROCTO SWAB: Brand: CARDINAL HEALTH

## (undated) DEVICE — STRL COTTON TIP APPLCTR 6IN PK: Brand: CARDINAL HEALTH

## (undated) DEVICE — CANNULA SEAL

## (undated) DEVICE — 40595 XL TRENDELENBURG POSITIONING KIT: Brand: 40595 XL TRENDELENBURG POSITIONING KIT

## (undated) DEVICE — TROCAR: Brand: KII FIOS FIRST ENTRY

## (undated) DEVICE — Device

## (undated) DEVICE — RUNTHROUGH NS EXTRA FLOPPY PTCA GUIDEWIRE: Brand: RUNTHROUGH

## (undated) DEVICE — INSUFFLATION TUBING PRIMFLO

## (undated) DEVICE — SUT STRATAFIX SPIRAL 2-0 CT-1 30 CM SXPP1B410

## (undated) DEVICE — PLUMEPEN PRO 10FT

## (undated) DEVICE — DECANTER: Brand: UNBRANDED

## (undated) DEVICE — GUIDEWIRE WHOLEY .035 145 CM FLOP STR TIP

## (undated) DEVICE — 3000CC GUARDIAN II: Brand: GUARDIAN

## (undated) DEVICE — GLOVE INDICATOR PI UNDERGLOVE SZ 7.5 BLUE

## (undated) DEVICE — INTENDED FOR TISSUE SEPARATION, AND OTHER PROCEDURES THAT REQUIRE A SHARP SURGICAL BLADE TO PUNCTURE OR CUT.: Brand: BARD-PARKER SAFETY BLADES SIZE 15, STERILE

## (undated) DEVICE — CATH GUIDE LAUNCHER 6FR EBU 3.5

## (undated) DEVICE — MONOPTY® DISPOSABLE CORE BIOPSY INSTRUMENT, 22MM PENETRATION DEPTH, 12G X 10CM: Brand: MONOPTY

## (undated) DEVICE — EXIDINE 4 PCT

## (undated) DEVICE — SUT VICRYL 0 REEL 54 IN J287G

## (undated) DEVICE — NEEDLE SPINAL 22G X 3.5IN  QUINCKE

## (undated) DEVICE — PAD GROUNDING DUAL ADULT

## (undated) DEVICE — CHLORAPREP HI-LITE 26ML ORANGE

## (undated) DEVICE — POV-IOD SOLUTION 4OZ BT

## (undated) DEVICE — GAUZE SPONGES,16 PLY: Brand: CURITY

## (undated) DEVICE — PENCIL ELECTROSURG E-Z CLEAN -0035H

## (undated) DEVICE — BALLOON EUPHORA RX 2.5 X 15MM

## (undated) DEVICE — SUT PLAIN 3-0 CTX 27 IN 873H

## (undated) DEVICE — HEMOSTATIC MATRIX SURGIFLO 8ML W/THROMBIN

## (undated) DEVICE — BLADELESS OBTURATOR: Brand: WECK VISTA

## (undated) DEVICE — SYRINGE CATH TIP 50ML

## (undated) DEVICE — SYRINGE 10ML LL CONTROL TOP

## (undated) DEVICE — AIRSEAL TUBE SMOKE EVAC LUMENX3 FILTERED

## (undated) DEVICE — CAUTERY TIP POLISHER: Brand: DEVON

## (undated) DEVICE — BETHLEHEM UNIVERSAL MINOR VAG: Brand: CARDINAL HEALTH

## (undated) DEVICE — DGW .035 FC J3MM 260CM TEF: Brand: EMERALD

## (undated) DEVICE — MAX-CORE® DISPOSABLE CORE BIOPSY INSTRUMENT, 18G X 20CM: Brand: MAX-CORE

## (undated) DEVICE — INTENDED FOR TISSUE SEPARATION, AND OTHER PROCEDURES THAT REQUIRE A SHARP SURGICAL BLADE TO PUNCTURE OR CUT.: Brand: BARD-PARKER SAFETY BLADES SIZE 11, STERILE

## (undated) DEVICE — TUBE SET SMOKE EVAC PNEUMOCLEAR HIGH FLOW

## (undated) DEVICE — SUT PDS II 1 TP-1 96 IN Z880G

## (undated) DEVICE — GLIDESHEATH SLENDER STAINLESS STEEL KIT: Brand: GLIDESHEATH SLENDER

## (undated) DEVICE — VESSEL SEALER EXTEND: Brand: ENDOWRIST

## (undated) DEVICE — [HIGH FLOW INSUFFLATOR,  DO NOT USE IF PACKAGE IS DAMAGED,  KEEP DRY,  KEEP AWAY FROM SUNLIGHT,  PROTECT FROM HEAT AND RADIOACTIVE SOURCES.]: Brand: PNEUMOSURE

## (undated) DEVICE — RADIFOCUS OPTITORQUE ANGIOGRAPHIC CATHETER: Brand: OPTITORQUE

## (undated) DEVICE — ABDOMINAL PAD: Brand: DERMACEA

## (undated) DEVICE — ENDOPATH PNEUMONEEDLE INSUFFLATION NEEDLES WITH LUER LOCK CONNECTORS 120MM: Brand: ENDOPATH

## (undated) DEVICE — SURGIFLO ENDOSCOPIC APPICATOR: Brand: ETHICON

## (undated) DEVICE — TOWEL SET X-RAY

## (undated) DEVICE — SCD SEQUENTIAL COMPRESSION COMFORT SLEEVE MEDIUM KNEE LENGTH: Brand: KENDALL SCD

## (undated) DEVICE — MEGA SUTURECUT ND: Brand: ENDOWRIST

## (undated) DEVICE — KIT, BETHLEHEM ROBOTIC PROST: Brand: CARDINAL HEALTH

## (undated) DEVICE — DRAPE FLUID WARMER (BIRD BATH)

## (undated) DEVICE — TRAY FOLEY 16FR URIMETER SILICONE SURESTEP

## (undated) DEVICE — TELFA NON-ADHERENT ABSORBENT DRESSING: Brand: TELFA

## (undated) DEVICE — GLOVE INDICATOR PI UNDERGLOVE SZ 8 BLUE

## (undated) DEVICE — ELECTRODE BLADE MOD  E-Z CLEAN 6.5IN -0014M

## (undated) DEVICE — ARM DRAPE

## (undated) DEVICE — METZENBAUM ADTEC SINGLE USE DISSECTING SCISSORS, SHAFT ONLY, MONOPOLAR, CURVED TO LEFT, WORKING LENGTH: 12 1/4", (310 MM), DIAM. 5 MM, INSULATED, DOUBLE ACTION, STERILE, DISPOSABLE, PACKAGE OF 10 PIECES: Brand: AESCULAP

## (undated) DEVICE — COLUMN DRAPE

## (undated) DEVICE — BETHLEHEM UNIVERSAL LAPAROTOMY: Brand: CARDINAL HEALTH

## (undated) DEVICE — PROGRASP FORCEPS: Brand: ENDOWRIST

## (undated) DEVICE — TUBING SUCTION 5MM X 12 FT

## (undated) DEVICE — MEDI-VAC YANK SUCT HNDL W/TPRD BULBOUS TIP: Brand: CARDINAL HEALTH

## (undated) DEVICE — DRAPE LAPAROTOMY W/POUCHES

## (undated) DEVICE — SPONGE STICK WITH PVP-I: Brand: KENDALL

## (undated) DEVICE — SUT STRATAFIX SPIRAL PLUS 3-0 PS-2 30 X 30 CM SXMP2B408

## (undated) DEVICE — SUT MONOCRYL 4-0 PS-2 27 IN Y426H

## (undated) DEVICE — ANTIBACTERIAL UNDYED BRAIDED (POLYGLACTIN 910), SYNTHETIC ABSORBABLE SUTURE: Brand: COATED VICRYL

## (undated) DEVICE — VISUALIZATION SYSTEM: Brand: CLEARIFY